# Patient Record
Sex: FEMALE | Race: WHITE | NOT HISPANIC OR LATINO | Employment: OTHER | RURAL
[De-identification: names, ages, dates, MRNs, and addresses within clinical notes are randomized per-mention and may not be internally consistent; named-entity substitution may affect disease eponyms.]

---

## 2017-03-03 ENCOUNTER — CONVERSION ENCOUNTER (OUTPATIENT)
Dept: FAMILY MEDICINE CLINIC | Facility: CLINIC | Age: 72
End: 2017-03-03

## 2017-03-03 LAB
ALBUMIN SERPL-MCNC: 4 G/DL (ref 3.6–5.1)
ALP SERPL-CCNC: 70 U/L (ref 33–130)
ALT SERPL-CCNC: 14 U/L (ref 6–29)
AST SERPL-CCNC: 14 U/L (ref 10–35)
BILIRUB SERPL-MCNC: 0.5 MG/DL (ref 0.2–1.2)
BUN SERPL-MCNC: 23 MG/DL (ref 7–25)
BUN/CREAT SERPL: 14 (CALC) (ref 6–22)
CALCIUM SERPL-MCNC: 9.3 MG/DL (ref 8.6–10.4)
CHLORIDE SERPL-SCNC: 102 MMOL/L (ref 98–110)
CHOLEST SERPL-MCNC: 160 MG/DL (ref 125–200)
CHOLEST/HDLC SERPL: 4.2 (CALC)
CONV CO2: 30 MMOL/L (ref 20–31)
CONV TOTAL PROTEIN: 6.9 G/DL (ref 6.1–8.1)
CREAT UR-MCNC: 1.59 MG/DL (ref 0.6–0.93)
GLOBULIN UR ELPH-MCNC: 2.9 MG/DL (ref 1.9–3.7)
GLUCOSE UR QL: 140 MG/DL (ref 65–99)
HDLC SERPL-MCNC: 38 MG/DL
INSULIN SERPL-ACNC: 1.4 (CALC) (ref 1–2.5)
LDLC SERPL CALC-MCNC: 102 MG/DL
NONHDLC SERPL-MCNC: 122 MG/DL
POTASSIUM SERPL-SCNC: 4.3 MMOL/L (ref 3.5–5.3)
SODIUM SERPL-SCNC: 142 MMOL/L (ref 135–146)
TRIGL SERPL-MCNC: 100 MG/DL

## 2017-11-03 ENCOUNTER — HOSPITAL ENCOUNTER (OUTPATIENT)
Dept: ULTRASOUND IMAGING | Facility: HOSPITAL | Age: 72
Discharge: HOME | End: 2017-11-03
Attending: INTERNAL MEDICINE | Admitting: INTERNAL MEDICINE

## 2017-11-03 LAB
ALBUMIN SERPL-MCNC: 3.6 G/DL (ref 3.5–4.8)
ALBUMIN/GLOB SERPL: 1.3 {RATIO} (ref 1–1.7)
ALP SERPL-CCNC: 67 IU/L (ref 32–91)
ALT SERPL-CCNC: 17 IU/L (ref 14–54)
ANION GAP SERPL CALC-SCNC: 12 MMOL/L (ref 10–20)
AST SERPL-CCNC: 16 IU/L (ref 15–41)
BASOPHILS # BLD AUTO: 0.1 10*3/UL (ref 0–0.2)
BASOPHILS NFR BLD AUTO: 1 % (ref 0–2)
BILIRUB SERPL-MCNC: 0.3 MG/DL (ref 0.3–1.2)
BILIRUB UR QL STRIP: NEGATIVE MG/DL
BUN SERPL-MCNC: 19 MG/DL (ref 8–20)
BUN/CREAT SERPL: 12.7 (ref 5.4–26.2)
CALCIUM SERPL-MCNC: 9.2 MG/DL (ref 8.9–10.3)
CASTS URNS QL MICRO: NORMAL /[LPF]
CHLORIDE SERPL-SCNC: 99 MMOL/L (ref 101–111)
COLOR UR: YELLOW
CONV BACTERIA IN URINE MICRO: NEGATIVE
CONV CLARITY OF URINE: CLEAR
CONV CO2: 31 MMOL/L (ref 22–32)
CONV HYALINE CASTS IN URINE MICRO: 2 /[LPF] (ref 0–5)
CONV PROTEIN IN URINE BY AUTOMATED TEST STRIP: NEGATIVE MG/DL
CONV SMALL ROUND CELLS: NORMAL /[HPF]
CONV TOTAL PROTEIN: 6.4 G/DL (ref 6.1–7.9)
CONV UROBILINOGEN IN URINE BY AUTOMATED TEST STRIP: 0.2 MG/DL
CREAT 24H UR-MCNC: 77.2 MG/DL
CREAT UR-MCNC: 1.5 MG/DL (ref 0.4–1)
CULTURE INDICATED?: NORMAL
DIFFERENTIAL METHOD BLD: (no result)
EOSINOPHIL # BLD AUTO: 0.1 10*3/UL (ref 0–0.3)
EOSINOPHIL # BLD AUTO: 1 % (ref 0–3)
ERYTHROCYTE [DISTWIDTH] IN BLOOD BY AUTOMATED COUNT: 13.5 % (ref 11.5–14.5)
GLOBULIN UR ELPH-MCNC: 2.8 G/DL (ref 2.5–3.8)
GLUCOSE SERPL-MCNC: 205 MG/DL (ref 65–99)
GLUCOSE UR QL: NEGATIVE MG/DL
HCT VFR BLD AUTO: 46.9 % (ref 35–49)
HGB BLD-MCNC: 15.8 G/DL (ref 12–15)
HGB UR QL STRIP: NEGATIVE
IRON SATN MFR SERPL: 12 % (ref 15–50)
IRON SERPL-MCNC: 44 UG/DL (ref 28–170)
KETONES UR QL STRIP: NEGATIVE MG/DL
LEUKOCYTE ESTERASE UR QL STRIP: NEGATIVE
LYMPHOCYTES # BLD AUTO: 2.5 10*3/UL (ref 0.8–4.8)
LYMPHOCYTES NFR BLD AUTO: 37 % (ref 18–42)
MCH RBC QN AUTO: 32.7 PG (ref 26–32)
MCHC RBC AUTO-ENTMCNC: 33.8 G/DL (ref 32–36)
MCV RBC AUTO: 96.8 FL (ref 80–94)
MONOCYTES # BLD AUTO: 0.6 10*3/UL (ref 0.1–1.3)
MONOCYTES NFR BLD AUTO: 8 % (ref 2–11)
NEUTROPHILS # BLD AUTO: 3.7 10*3/UL (ref 2.3–8.6)
NEUTROPHILS NFR BLD AUTO: 53 % (ref 50–75)
NITRITE UR QL STRIP: NEGATIVE
NRBC BLD AUTO-RTO: 0 /100{WBCS}
NRBC/RBC NFR BLD MANUAL: 0 10*3/UL
PH UR STRIP.AUTO: 7 [PH] (ref 4.5–8)
PHOSPHATE SERPL-MCNC: 3.2 MG/DL (ref 2.4–4.7)
PLATELET # BLD AUTO: 203 10*3/UL (ref 150–450)
PMV BLD AUTO: 8.4 FL (ref 7.4–10.4)
POTASSIUM SERPL-SCNC: 4 MMOL/L (ref 3.6–5.1)
PROT UR-MCNC: 7 MG/DL
PROT/CREAT UR: 0.1 MG/MG (ref 0–22)
RBC # BLD AUTO: 4.84 10*6/UL (ref 4–5.4)
RBC #/AREA URNS HPF: 2 /[HPF] (ref 0–3)
SODIUM SERPL-SCNC: 138 MMOL/L (ref 136–144)
SP GR UR: 1.01 (ref 1–1.03)
SPERM URNS QL MICRO: NORMAL /[HPF]
SQUAMOUS SPT QL MICRO: 2 /[HPF] (ref 0–5)
TIBC SERPL-MCNC: 365 UG/DL (ref 228–428)
TSH SERPL-ACNC: 1.43 UIU/ML (ref 0.34–5.6)
UNIDENT CRYS URNS QL MICRO: NORMAL /[HPF]
WBC # BLD AUTO: 6.9 10*3/UL (ref 4.5–11.5)
WBC #/AREA URNS HPF: 1 /[HPF] (ref 0–5)
YEAST SPEC QL WET PREP: NORMAL /[HPF]

## 2017-11-06 LAB — PTH-INTACT SERPL-MCNC: 65 PG/ML (ref 11–72)

## 2018-03-05 ENCOUNTER — OFFICE (AMBULATORY)
Dept: URBAN - METROPOLITAN AREA CLINIC 64 | Facility: CLINIC | Age: 73
End: 2018-03-05

## 2018-03-05 VITALS
SYSTOLIC BLOOD PRESSURE: 154 MMHG | DIASTOLIC BLOOD PRESSURE: 103 MMHG | HEART RATE: 79 BPM | HEIGHT: 65 IN | WEIGHT: 211 LBS

## 2018-03-05 DIAGNOSIS — R10.12 LEFT UPPER QUADRANT PAIN: ICD-10-CM

## 2018-03-05 DIAGNOSIS — K59.00 CONSTIPATION, UNSPECIFIED: ICD-10-CM

## 2018-03-05 PROCEDURE — 99213 OFFICE O/P EST LOW 20 MIN: CPT | Performed by: NURSE PRACTITIONER

## 2018-03-05 RX ORDER — LUBIPROSTONE 24 UG/1
48 CAPSULE, GELATIN COATED ORAL
Qty: 60 | Refills: 11 | Status: COMPLETED
Start: 2018-03-05 | End: 2018-06-29

## 2018-03-15 ENCOUNTER — HOSPITAL ENCOUNTER (OUTPATIENT)
Dept: GENERAL RADIOLOGY | Facility: HOSPITAL | Age: 73
Discharge: HOME | End: 2018-03-15
Attending: FAMILY MEDICINE | Admitting: FAMILY MEDICINE

## 2018-06-04 ENCOUNTER — OFFICE (AMBULATORY)
Dept: URBAN - METROPOLITAN AREA CLINIC 64 | Facility: CLINIC | Age: 73
End: 2018-06-04

## 2018-06-04 VITALS
HEIGHT: 65 IN | SYSTOLIC BLOOD PRESSURE: 162 MMHG | HEART RATE: 60 BPM | DIASTOLIC BLOOD PRESSURE: 93 MMHG | WEIGHT: 208 LBS

## 2018-06-04 DIAGNOSIS — R10.12 LEFT UPPER QUADRANT PAIN: ICD-10-CM

## 2018-06-04 DIAGNOSIS — K30 FUNCTIONAL DYSPEPSIA: ICD-10-CM

## 2018-06-04 DIAGNOSIS — K59.00 CONSTIPATION, UNSPECIFIED: ICD-10-CM

## 2018-06-04 PROCEDURE — 99214 OFFICE O/P EST MOD 30 MIN: CPT | Performed by: INTERNAL MEDICINE

## 2018-06-04 RX ORDER — OMEPRAZOLE 40 MG/1
CAPSULE, DELAYED RELEASE ORAL
Qty: 30 | Refills: 3 | Status: COMPLETED
End: 2020-01-30

## 2018-07-02 ENCOUNTER — OFFICE (AMBULATORY)
Dept: URBAN - METROPOLITAN AREA PATHOLOGY 4 | Facility: PATHOLOGY | Age: 73
End: 2018-07-02

## 2018-07-02 ENCOUNTER — ON CAMPUS - OUTPATIENT (AMBULATORY)
Dept: URBAN - METROPOLITAN AREA HOSPITAL 2 | Facility: HOSPITAL | Age: 73
End: 2018-07-02

## 2018-07-02 VITALS
DIASTOLIC BLOOD PRESSURE: 63 MMHG | DIASTOLIC BLOOD PRESSURE: 75 MMHG | SYSTOLIC BLOOD PRESSURE: 195 MMHG | OXYGEN SATURATION: 94 % | DIASTOLIC BLOOD PRESSURE: 57 MMHG | SYSTOLIC BLOOD PRESSURE: 117 MMHG | DIASTOLIC BLOOD PRESSURE: 83 MMHG | OXYGEN SATURATION: 93 % | OXYGEN SATURATION: 96 % | HEART RATE: 82 BPM | HEART RATE: 59 BPM | DIASTOLIC BLOOD PRESSURE: 67 MMHG | HEART RATE: 85 BPM | HEART RATE: 77 BPM | DIASTOLIC BLOOD PRESSURE: 74 MMHG | OXYGEN SATURATION: 95 % | HEART RATE: 87 BPM | SYSTOLIC BLOOD PRESSURE: 121 MMHG | SYSTOLIC BLOOD PRESSURE: 134 MMHG | WEIGHT: 202 LBS | SYSTOLIC BLOOD PRESSURE: 141 MMHG | RESPIRATION RATE: 19 BRPM | HEART RATE: 66 BPM | HEIGHT: 65 IN | DIASTOLIC BLOOD PRESSURE: 64 MMHG | SYSTOLIC BLOOD PRESSURE: 124 MMHG | RESPIRATION RATE: 14 BRPM | DIASTOLIC BLOOD PRESSURE: 81 MMHG | SYSTOLIC BLOOD PRESSURE: 138 MMHG | HEART RATE: 79 BPM | DIASTOLIC BLOOD PRESSURE: 70 MMHG | RESPIRATION RATE: 20 BRPM | RESPIRATION RATE: 18 BRPM | SYSTOLIC BLOOD PRESSURE: 148 MMHG | OXYGEN SATURATION: 92 % | RESPIRATION RATE: 10 BRPM | SYSTOLIC BLOOD PRESSURE: 115 MMHG | SYSTOLIC BLOOD PRESSURE: 129 MMHG | DIASTOLIC BLOOD PRESSURE: 79 MMHG | RESPIRATION RATE: 16 BRPM | HEART RATE: 83 BPM | DIASTOLIC BLOOD PRESSURE: 65 MMHG | TEMPERATURE: 97.8 F

## 2018-07-02 DIAGNOSIS — D12.6 BENIGN NEOPLASM OF COLON, UNSPECIFIED: ICD-10-CM

## 2018-07-02 DIAGNOSIS — K64.1 SECOND DEGREE HEMORRHOIDS: ICD-10-CM

## 2018-07-02 DIAGNOSIS — K63.5 POLYP OF COLON: ICD-10-CM

## 2018-07-02 DIAGNOSIS — D12.2 BENIGN NEOPLASM OF ASCENDING COLON: ICD-10-CM

## 2018-07-02 DIAGNOSIS — Z98.890 OTHER SPECIFIED POSTPROCEDURAL STATES: ICD-10-CM

## 2018-07-02 DIAGNOSIS — K22.2 ESOPHAGEAL OBSTRUCTION: ICD-10-CM

## 2018-07-02 DIAGNOSIS — R10.32 LEFT LOWER QUADRANT PAIN: ICD-10-CM

## 2018-07-02 DIAGNOSIS — D12.0 BENIGN NEOPLASM OF CECUM: ICD-10-CM

## 2018-07-02 DIAGNOSIS — R13.10 DYSPHAGIA, UNSPECIFIED: ICD-10-CM

## 2018-07-02 PROBLEM — D12.3 BENIGN NEOPLASM OF TRANSVERSE COLON: Status: ACTIVE | Noted: 2018-07-02

## 2018-07-02 LAB
GI HISTOLOGY: A. UNSPECIFIED: (no result)
GI HISTOLOGY: B. UNSPECIFIED: (no result)
GI HISTOLOGY: C. UNSPECIFIED: (no result)
GI HISTOLOGY: D. SELECT: (no result)
GI HISTOLOGY: PDF REPORT: (no result)

## 2018-07-02 PROCEDURE — 43249 ESOPH EGD DILATION <30 MM: CPT | Performed by: INTERNAL MEDICINE

## 2018-07-02 PROCEDURE — 45385 COLONOSCOPY W/LESION REMOVAL: CPT | Performed by: INTERNAL MEDICINE

## 2018-07-02 PROCEDURE — 88305 TISSUE EXAM BY PATHOLOGIST: CPT | Performed by: INTERNAL MEDICINE

## 2018-07-02 RX ADMIN — PROPOFOL: 10 INJECTION, EMULSION INTRAVENOUS at 13:40

## 2018-11-08 ENCOUNTER — CONVERSION ENCOUNTER (OUTPATIENT)
Dept: FAMILY MEDICINE CLINIC | Facility: CLINIC | Age: 73
End: 2018-11-08

## 2018-11-09 LAB
ALBUMIN SERPL-MCNC: 3.8 G/DL (ref 3.6–5.1)
ALP SERPL-CCNC: 77 U/L (ref 33–130)
ALT SERPL-CCNC: 9 U/L (ref 6–29)
AST SERPL-CCNC: 12 U/L (ref 10–35)
BASOPHILS # BLD AUTO: 47 CELLS/UL (ref 0–200)
BASOPHILS NFR BLD AUTO: 0.6 %
BILIRUB SERPL-MCNC: 0.4 MG/DL (ref 0.2–1.2)
BUN SERPL-MCNC: 21 MG/DL (ref 7–25)
BUN/CREAT SERPL: 14 (CALC) (ref 6–22)
CALCIUM SERPL-MCNC: 9.1 MG/DL (ref 8.6–10.4)
CHLORIDE SERPL-SCNC: 105 MMOL/L (ref 98–110)
CHOLEST SERPL-MCNC: 83 MG/DL
CHOLEST/HDLC SERPL: 2.2 (CALC)
CONV CO2: 30 MMOL/L (ref 20–32)
CONV TOTAL PROTEIN: 6.4 G/DL (ref 6.1–8.1)
CREAT UR-MCNC: 1.45 MG/DL (ref 0.6–0.93)
EOSINOPHIL # BLD AUTO: 1.3 %
EOSINOPHIL # BLD AUTO: 103 CELLS/UL (ref 15–500)
ERYTHROCYTE [DISTWIDTH] IN BLOOD BY AUTOMATED COUNT: 12.2 % (ref 11–15)
GLOBULIN UR ELPH-MCNC: 2.6 MG/DL (ref 1.9–3.7)
GLUCOSE UR QL: 130 MG/DL (ref 65–99)
HCT VFR BLD AUTO: 46.5 % (ref 35–45)
HDLC SERPL-MCNC: 38 MG/DL
HGB BLD-MCNC: 15.9 G/DL (ref 11.7–15.5)
INSULIN SERPL-ACNC: 1.5 (CALC) (ref 1–2.5)
LDLC SERPL CALC-MCNC: 26 MG/DL
LYMPHOCYTES # BLD AUTO: 2544 CELLS/UL (ref 850–3900)
LYMPHOCYTES NFR BLD AUTO: 32.2 %
MCH RBC QN AUTO: 31.9 PG (ref 27–33)
MCHC RBC AUTO-ENTMCNC: 34.2 G/DL (ref 32–36)
MCV RBC AUTO: 93.4 FL (ref 80–100)
MONOCYTES # BLD AUTO: 624 CELLS/UL (ref 200–950)
MONOCYTES NFR BLD AUTO: 7.9 %
NEUTROPHILS # BLD AUTO: 4582 CELLS/UL (ref 1500–7800)
NEUTROPHILS NFR BLD AUTO: 58 %
NONHDLC SERPL-MCNC: 45 MG/DL
PLATELET # BLD AUTO: 231 10*3/UL (ref 140–400)
PMV BLD AUTO: 10.6 FL (ref 7.5–12.5)
POTASSIUM SERPL-SCNC: 4.5 MMOL/L (ref 3.5–5.3)
RBC # BLD AUTO: 4.98 MILLION/UL (ref 3.8–5.1)
SODIUM SERPL-SCNC: 142 MMOL/L (ref 135–146)
TRIGL SERPL-MCNC: 100 MG/DL
URATE SERPL-MCNC: 8.5 MG/DL (ref 2.5–7)
WBC # BLD AUTO: 7.9 10*3/UL (ref 3.8–10.8)

## 2019-05-03 ENCOUNTER — HOSPITAL ENCOUNTER (OUTPATIENT)
Dept: CARDIOLOGY | Facility: HOSPITAL | Age: 74
Discharge: HOME | End: 2019-05-03
Attending: INTERNAL MEDICINE | Admitting: INTERNAL MEDICINE

## 2019-07-05 ENCOUNTER — APPOINTMENT (OUTPATIENT)
Dept: CT IMAGING | Facility: HOSPITAL | Age: 74
End: 2019-07-05

## 2019-07-05 ENCOUNTER — HOSPITAL ENCOUNTER (EMERGENCY)
Facility: HOSPITAL | Age: 74
Discharge: HOME | End: 2019-07-05
Attending: EMERGENCY MEDICINE | Admitting: EMERGENCY MEDICINE

## 2019-07-05 VITALS
HEIGHT: 65 IN | SYSTOLIC BLOOD PRESSURE: 124 MMHG | DIASTOLIC BLOOD PRESSURE: 68 MMHG | HEART RATE: 68 BPM | TEMPERATURE: 98 F | RESPIRATION RATE: 18 BRPM | BODY MASS INDEX: 32.18 KG/M2 | OXYGEN SATURATION: 97 % | WEIGHT: 193.12 LBS

## 2019-07-05 DIAGNOSIS — R10.9 ABDOMINAL PAIN, UNSPECIFIED ABDOMINAL LOCATION: Primary | ICD-10-CM

## 2019-07-05 LAB
ALBUMIN SERPL-MCNC: 3.8 G/DL (ref 3.5–4.8)
ALBUMIN/GLOB SERPL: 1.3 G/DL (ref 1–1.7)
ALP SERPL-CCNC: 61 U/L (ref 32–91)
ALT SERPL W P-5'-P-CCNC: 15 U/L (ref 14–54)
ANION GAP SERPL CALCULATED.3IONS-SCNC: 18.7 MMOL/L (ref 10–20)
AST SERPL-CCNC: 23 U/L (ref 15–41)
BASOPHILS # BLD AUTO: 0.1 10*3/MM3 (ref 0–0.2)
BASOPHILS NFR BLD AUTO: 0.8 % (ref 0–1.5)
BILIRUB SERPL-MCNC: 0.9 MG/DL (ref 0.3–1.2)
BILIRUB UR QL STRIP: NEGATIVE
BUN BLD-MCNC: 17 MG/DL (ref 8–20)
BUN/CREAT SERPL: 12.1 (ref 5.4–26.2)
CALCIUM SPEC-SCNC: 9.1 MG/DL (ref 8.9–10.3)
CHLORIDE SERPL-SCNC: 107 MMOL/L (ref 101–111)
CLARITY UR: CLEAR
CO2 SERPL-SCNC: 19 MMOL/L (ref 22–32)
COLOR UR: YELLOW
CREAT BLD-MCNC: 1.4 MG/DL (ref 0.4–1)
DEPRECATED RDW RBC AUTO: 46.8 FL (ref 37–54)
EOSINOPHIL # BLD AUTO: 0.1 10*3/MM3 (ref 0–0.4)
EOSINOPHIL NFR BLD AUTO: 0.9 % (ref 0.3–6.2)
ERYTHROCYTE [DISTWIDTH] IN BLOOD BY AUTOMATED COUNT: 13.6 % (ref 12.3–15.4)
GFR SERPL CREATININE-BSD FRML MDRD: 37 ML/MIN/1.73
GLOBULIN UR ELPH-MCNC: 2.9 GM/DL (ref 2.5–3.8)
GLUCOSE BLD-MCNC: 105 MG/DL (ref 65–99)
GLUCOSE UR STRIP-MCNC: NEGATIVE MG/DL
HCT VFR BLD AUTO: 51.9 % (ref 34–46.6)
HGB BLD-MCNC: 17.3 G/DL (ref 12–15.9)
HGB UR QL STRIP.AUTO: NEGATIVE
KETONES UR QL STRIP: NEGATIVE
LEUKOCYTE ESTERASE UR QL STRIP.AUTO: NEGATIVE
LIPASE SERPL-CCNC: 34 U/L (ref 22–51)
LYMPHOCYTES # BLD AUTO: 2.8 10*3/MM3 (ref 0.7–3.1)
LYMPHOCYTES NFR BLD AUTO: 33.7 % (ref 19.6–45.3)
MCH RBC QN AUTO: 32.7 PG (ref 26.6–33)
MCHC RBC AUTO-ENTMCNC: 33.2 G/DL (ref 31.5–35.7)
MCV RBC AUTO: 98.4 FL (ref 79–97)
MONOCYTES # BLD AUTO: 0.7 10*3/MM3 (ref 0.1–0.9)
MONOCYTES NFR BLD AUTO: 8.1 % (ref 5–12)
NEUTROPHILS # BLD AUTO: 4.7 10*3/MM3 (ref 1.7–7)
NEUTROPHILS NFR BLD AUTO: 56.5 % (ref 42.7–76)
NITRITE UR QL STRIP: NEGATIVE
NRBC BLD AUTO-RTO: 0.1 /100 WBC (ref 0–0.2)
PH UR STRIP.AUTO: 5.5 [PH] (ref 5–8)
PLATELET # BLD AUTO: 174 10*3/MM3 (ref 140–450)
PMV BLD AUTO: 8.7 FL (ref 6–12)
POTASSIUM BLD-SCNC: 4.7 MMOL/L (ref 3.6–5.1)
PROT SERPL-MCNC: 6.7 G/DL (ref 6.1–7.9)
PROT UR QL STRIP: NEGATIVE
RBC # BLD AUTO: 5.28 10*6/MM3 (ref 3.77–5.28)
SODIUM BLD-SCNC: 140 MMOL/L (ref 136–144)
SP GR UR STRIP: 1.02 (ref 1–1.03)
UROBILINOGEN UR QL STRIP: NORMAL
WBC NRBC COR # BLD: 8.3 10*3/MM3 (ref 3.4–10.8)

## 2019-07-05 PROCEDURE — 74176 CT ABD & PELVIS W/O CONTRAST: CPT

## 2019-07-05 PROCEDURE — 85025 COMPLETE CBC W/AUTO DIFF WBC: CPT | Performed by: EMERGENCY MEDICINE

## 2019-07-05 PROCEDURE — 83690 ASSAY OF LIPASE: CPT | Performed by: EMERGENCY MEDICINE

## 2019-07-05 PROCEDURE — 80053 COMPREHEN METABOLIC PANEL: CPT | Performed by: EMERGENCY MEDICINE

## 2019-07-05 PROCEDURE — 81003 URINALYSIS AUTO W/O SCOPE: CPT | Performed by: EMERGENCY MEDICINE

## 2019-07-05 PROCEDURE — 99283 EMERGENCY DEPT VISIT LOW MDM: CPT

## 2019-07-05 RX ORDER — SODIUM CHLORIDE 0.9 % (FLUSH) 0.9 %
10 SYRINGE (ML) INJECTION AS NEEDED
Status: DISCONTINUED | OUTPATIENT
Start: 2019-07-05 | End: 2019-07-05 | Stop reason: HOSPADM

## 2019-07-05 RX ORDER — ONDANSETRON 2 MG/ML
4 INJECTION INTRAMUSCULAR; INTRAVENOUS ONCE
Status: DISCONTINUED | OUTPATIENT
Start: 2019-07-05 | End: 2019-07-05 | Stop reason: HOSPADM

## 2019-07-05 RX ORDER — MORPHINE SULFATE 4 MG/ML
4 INJECTION, SOLUTION INTRAMUSCULAR; INTRAVENOUS ONCE
Status: DISCONTINUED | OUTPATIENT
Start: 2019-07-05 | End: 2019-07-05 | Stop reason: HOSPADM

## 2019-07-05 RX ADMIN — SODIUM CHLORIDE 1000 ML: 900 INJECTION, SOLUTION INTRAVENOUS at 13:25

## 2019-07-05 NOTE — ED PROVIDER NOTES
Subjective   Chief complaint abdominal pain    History of present illness 73-year-old female who complains of one-week history of some right-sided abdominal pain which she is discharged is crampy in nature nonradiating.  Nausea decreased appetite but no vomiting.  No diarrhea no black or bloody stool.  No injury no foreign travels antibiotic use or recent hospitalization.  Nothing makes better worse whenever she tries to eat or drink anything.  Moderate degree.  No urinary symptoms.  Patient's had this off and on for quite some time.  She has been a few yards with negative work-up she is gone to gastrointestinal doctors who have not found anything wrong.  Patient had her gallbladder removed.  She has upcoming GI appointment.            Review of Systems   Constitutional: Negative for chills and fever.   HENT: Negative for congestion and sinus pain.    Eyes: Negative for photophobia and visual disturbance.   Respiratory: Negative for chest tightness and shortness of breath.    Cardiovascular: Negative for chest pain and leg swelling.   Gastrointestinal: Positive for nausea. Negative for abdominal pain and vomiting.   Endocrine: Negative for cold intolerance and heat intolerance.   Genitourinary: Negative for difficulty urinating and dysuria.   Musculoskeletal: Negative for arthralgias and back pain.   Skin: Negative for pallor and rash.   Neurological: Negative for headaches.   Psychiatric/Behavioral: Negative for agitation and behavioral problems.       No past medical history on file.  Cholecystectomy.  Allergies not on file    No past surgical history on file.    No family history on file.    Social History     Socioeconomic History   • Marital status:      Spouse name: Not on file   • Number of children: Not on file   • Years of education: Not on file   • Highest education level: Not on file           Objective   Physical Exam  73-year-old awake alert nontoxic remains well-appearing HEENT extraocular  muscles intact pupils equal reactive sclerae clear mouth is clear moist neck is supple no adenopathy lungs clear no retraction no CVA tenderness heart regular without murmur abdomen soft nontender nondistended good bowel sounds no masses extremities no edema cords or Homans sign is no evidence of DVT no cellulitic changes.  Patient's awake alert follows commandsextremities normal speech no focal weakness.  Procedures        Prior to Admission medications    Not on File           ED Course      Results for orders placed or performed during the hospital encounter of 07/05/19   Comprehensive Metabolic Panel   Result Value Ref Range    Glucose 105 (H) 65 - 99 mg/dL    BUN 17 8 - 20 mg/dL    Creatinine 1.40 (H) 0.40 - 1.00 mg/dL    Sodium 140 136 - 144 mmol/L    Potassium 4.7 3.6 - 5.1 mmol/L    Chloride 107 101 - 111 mmol/L    CO2 19.0 (L) 22.0 - 32.0 mmol/L    Calcium 9.1 8.9 - 10.3 mg/dL    Total Protein 6.7 6.1 - 7.9 g/dL    Albumin 3.80 3.50 - 4.80 g/dL    ALT (SGPT) 15 14 - 54 U/L    AST (SGOT) 23 15 - 41 U/L    Alkaline Phosphatase 61 32 - 91 U/L    Total Bilirubin 0.9 0.3 - 1.2 mg/dL    eGFR Non African Amer 37 (L) >60 mL/min/1.73    Globulin 2.9 2.5 - 3.8 gm/dL    A/G Ratio 1.3 1.0 - 1.7 g/dL    BUN/Creatinine Ratio 12.1 5.4 - 26.2    Anion Gap 18.7 10.0 - 20.0 mmol/L   Lipase   Result Value Ref Range    Lipase 34 22 - 51 U/L   Urinalysis With Culture If Indicated - Urine, Clean Catch   Result Value Ref Range    Color, UA Yellow Yellow, Straw    Appearance, UA Clear Clear    pH, UA 5.5 5.0 - 8.0    Specific Gravity, UA 1.017 1.005 - 1.030    Glucose, UA Negative Negative    Ketones, UA Negative Negative    Bilirubin, UA Negative Negative    Blood, UA Negative Negative    Protein, UA Negative Negative    Leuk Esterase, UA Negative Negative    Nitrite, UA Negative Negative    Urobilinogen, UA 0.2 E.U./dL 0.2 - 1.0 E.U./dL   CBC Auto Differential   Result Value Ref Range    WBC 8.30 3.40 - 10.80 10*3/mm3    RBC  5.28 3.77 - 5.28 10*6/mm3    Hemoglobin 17.3 (H) 12.0 - 15.9 g/dL    Hematocrit 51.9 (H) 34.0 - 46.6 %    MCV 98.4 (H) 79.0 - 97.0 fL    MCH 32.7 26.6 - 33.0 pg    MCHC 33.2 31.5 - 35.7 g/dL    RDW 13.6 12.3 - 15.4 %    RDW-SD 46.8 37.0 - 54.0 fl    MPV 8.7 6.0 - 12.0 fL    Platelets 174 140 - 450 10*3/mm3    Neutrophil % 56.5 42.7 - 76.0 %    Lymphocyte % 33.7 19.6 - 45.3 %    Monocyte % 8.1 5.0 - 12.0 %    Eosinophil % 0.9 0.3 - 6.2 %    Basophil % 0.8 0.0 - 1.5 %    Neutrophils, Absolute 4.70 1.70 - 7.00 10*3/mm3    Lymphocytes, Absolute 2.80 0.70 - 3.10 10*3/mm3    Monocytes, Absolute 0.70 0.10 - 0.90 10*3/mm3    Eosinophils, Absolute 0.10 0.00 - 0.40 10*3/mm3    Basophils, Absolute 0.10 0.00 - 0.20 10*3/mm3    nRBC 0.1 0.0 - 0.2 /100 WBC     Ct Abdomen Pelvis Without Contrast    Result Date: 7/5/2019  No acute findings in the abdomen or pelvis. No CT explanation for the patient's abdominal pain. The appendix is normal.  Surgical changes in the abdomen as described above.  Electronically Signed By-Dr. Kathy Finley MD On:7/5/2019 2:39 PM This report was finalized on [id number] by Dr. Kathy Finley MD.    Medications   sodium chloride 0.9 % flush 10 mL (not administered)   Morphine sulfate (PF) injection 4 mg (4 mg Intravenous Not Given 7/5/19 1326)   ondansetron (ZOFRAN) injection 4 mg (4 mg Intravenous Not Given 7/5/19 1326)   sodium chloride 0.9 % bolus 1,000 mL (1,000 mL Intravenous New Bag 7/5/19 1325)                   MDM  Number of Diagnoses or Management Options  Abdominal pain, unspecified abdominal location:   Diagnosis management comments: Medical decision making.  Patient IV normal saline x1 L given morphine IV and Zofran IV and had the above exam and evaluation.  The patient CBC unremarkable hemoglobin was 17 chemistry his creatinine 1.4.  Pancreas liver enzymes unremarkable.  T scan abdomen pelvis status post nephrectomy but no other acute findings.  The patient on repeat examination at 4:30  PM was feeling better resting comfortably.  Head was soft without tenderness no peritoneal findings or masses.  Patient's had off-and-on problems for quite some time.  Etiology of this discomfort and pain is not quite clear today.  Which is stable she was made aware of the findings and she will be discharged home for outpatient management.  We talked about what to return for       Amount and/or Complexity of Data Reviewed  Clinical lab tests: reviewed          Final diagnoses:   Abdominal pain, unspecified abdominal location            Fabio Cochran MD  07/05/19 8423

## 2019-07-05 NOTE — DISCHARGE INSTRUCTIONS
Follow-up primary care doctor next week.  Follow-up gastrointestinal doctor next week.  [place] diet no greasy fried fatty spicy foods or caffeine.  Return for fever vomiting blood black or bloody stool or any other new response or concerns.  Blood pressure elevated please have this rechecked by primary care doctor within the next 4 weeks.

## 2019-07-11 ENCOUNTER — OFFICE VISIT (OUTPATIENT)
Dept: FAMILY MEDICINE CLINIC | Facility: CLINIC | Age: 74
End: 2019-07-11

## 2019-07-11 ENCOUNTER — OFFICE (AMBULATORY)
Dept: URBAN - METROPOLITAN AREA CLINIC 64 | Facility: CLINIC | Age: 74
End: 2019-07-11

## 2019-07-11 VITALS
HEIGHT: 65 IN | OXYGEN SATURATION: 96 % | HEART RATE: 76 BPM | RESPIRATION RATE: 16 BRPM | WEIGHT: 198.8 LBS | BODY MASS INDEX: 33.12 KG/M2 | TEMPERATURE: 98.1 F | DIASTOLIC BLOOD PRESSURE: 78 MMHG | SYSTOLIC BLOOD PRESSURE: 128 MMHG

## 2019-07-11 VITALS
DIASTOLIC BLOOD PRESSURE: 102 MMHG | SYSTOLIC BLOOD PRESSURE: 172 MMHG | HEIGHT: 65 IN | WEIGHT: 196 LBS | HEART RATE: 62 BPM

## 2019-07-11 DIAGNOSIS — R10.12 LEFT UPPER QUADRANT PAIN: Primary | ICD-10-CM

## 2019-07-11 DIAGNOSIS — I25.10 CORONARY ARTERY DISEASE INVOLVING NATIVE CORONARY ARTERY OF NATIVE HEART WITHOUT ANGINA PECTORIS: ICD-10-CM

## 2019-07-11 DIAGNOSIS — L72.0 EPIDERMAL INCLUSION CYST: ICD-10-CM

## 2019-07-11 DIAGNOSIS — R14.0 ABDOMINAL DISTENSION (GASEOUS): ICD-10-CM

## 2019-07-11 DIAGNOSIS — R10.11 RIGHT UPPER QUADRANT PAIN: ICD-10-CM

## 2019-07-11 PROBLEM — C66.1 MALIGNANT NEOPLASM OF RIGHT URETER: Status: ACTIVE | Noted: 2017-10-30

## 2019-07-11 PROBLEM — E11.9 TYPE 2 DIABETES MELLITUS WITHOUT COMPLICATION: Status: ACTIVE | Noted: 2017-10-24

## 2019-07-11 PROBLEM — I70.213 ATHEROSCLEROSIS OF NATIVE ARTERIES OF EXTREMITIES WITH INTERMITTENT CLAUDICATION, BILATERAL LEGS (HCC): Status: ACTIVE | Noted: 2018-08-01

## 2019-07-11 PROBLEM — J44.9 CHRONIC OBSTRUCTIVE PULMONARY DISEASE: Status: ACTIVE | Noted: 2017-10-24

## 2019-07-11 PROBLEM — K58.9 IRRITABLE BOWEL SYNDROME WITHOUT DIARRHEA: Status: ACTIVE | Noted: 2018-03-22

## 2019-07-11 PROBLEM — I10 HYPERTENSION: Status: ACTIVE | Noted: 2017-10-24

## 2019-07-11 PROBLEM — E78.5 HYPERLIPIDEMIA: Status: ACTIVE | Noted: 2017-10-24

## 2019-07-11 PROBLEM — N18.30 CHRONIC KIDNEY DISEASE, STAGE 3 (MODERATE): Status: ACTIVE | Noted: 2017-10-30

## 2019-07-11 LAB
BILIRUB BLD-MCNC: NEGATIVE MG/DL
CLARITY, POC: CLEAR
COLOR UR: YELLOW
GLUCOSE UR STRIP-MCNC: NEGATIVE MG/DL
KETONES UR QL: NEGATIVE
LEUKOCYTE EST, POC: NEGATIVE
NITRITE UR-MCNC: NEGATIVE MG/ML
PH UR: 5 [PH] (ref 5–8)
PROT UR STRIP-MCNC: NEGATIVE MG/DL
RBC # UR STRIP: NEGATIVE /UL
SP GR UR: 1.01 (ref 1–1.03)
UROBILINOGEN UR QL: NORMAL

## 2019-07-11 PROCEDURE — 99214 OFFICE O/P EST MOD 30 MIN: CPT | Performed by: NURSE PRACTITIONER

## 2019-07-11 PROCEDURE — 81003 URINALYSIS AUTO W/O SCOPE: CPT | Performed by: FAMILY MEDICINE

## 2019-07-11 PROCEDURE — 99214 OFFICE O/P EST MOD 30 MIN: CPT | Performed by: FAMILY MEDICINE

## 2019-07-11 RX ORDER — RIFAXIMIN 550 MG/1
TABLET ORAL
Qty: 42 | Refills: 2 | Status: COMPLETED
Start: 2019-07-11 | End: 2020-01-14

## 2019-07-11 RX ORDER — CARVEDILOL 12.5 MG/1
TABLET ORAL
COMMUNITY
Start: 2018-08-01 | End: 2019-12-02 | Stop reason: SDUPTHER

## 2019-07-11 RX ORDER — UBIDECARENONE 100 MG
1 CAPSULE ORAL DAILY
COMMUNITY
Start: 2018-08-01 | End: 2023-01-23

## 2019-07-11 RX ORDER — INSULIN GLARGINE 100 [IU]/ML
INJECTION, SOLUTION SUBCUTANEOUS DAILY
COMMUNITY
End: 2020-05-01

## 2019-07-11 RX ORDER — SERTRALINE HYDROCHLORIDE 25 MG/1
25 TABLET, FILM COATED ORAL DAILY
COMMUNITY
End: 2020-05-01 | Stop reason: SDUPTHER

## 2019-07-11 RX ORDER — ROSUVASTATIN CALCIUM 20 MG/1
20 TABLET, COATED ORAL DAILY
COMMUNITY
End: 2020-12-03 | Stop reason: SDUPTHER

## 2019-07-11 RX ORDER — NICOTINE 21 MG/24HR
1 PATCH, TRANSDERMAL 24 HOURS TRANSDERMAL EVERY 24 HOURS
COMMUNITY
End: 2020-12-03

## 2019-07-11 RX ORDER — HYDROCHLOROTHIAZIDE 25 MG/1
25 TABLET ORAL DAILY
COMMUNITY
End: 2019-12-19 | Stop reason: SDUPTHER

## 2019-07-11 NOTE — PROGRESS NOTES
Rooming Tab(CC,VS,Pt Hx,Fall Screen)  Chief Complaint   Patient presents with   • Abdominal Pain   • Transitional Care Management       Subjective   Vianey Reeves is a 73 y.o. female.     Abdominal Pain   This is a recurrent problem. The current episode started more than 1 year ago (current pain is 3 weeks). The problem occurs intermittently. The most recent episode lasted 4 weeks. The problem has been gradually improving. The pain is located in the LUQ (radiates to the right). The quality of the pain is cramping, aching and a sensation of fullness (feels like spasms). The abdominal pain radiates to the RUQ and back. Associated symptoms include flatus. Pertinent negatives include no arthralgias, constipation, diarrhea, fever, frequency, nausea or vomiting. Associated symptoms comments: flactulance. The pain is aggravated by eating. The pain is relieved by belching and passing flatus. Prior diagnostic workup includes GI consult (She is seeing GI).   Coronary Artery Disease   Presents for follow-up visit. Pertinent negatives include no chest pain, chest pressure, chest tightness or shortness of breath. The symptoms have been stable. Compliance with diet is variable. Compliance with exercise is variable. Compliance with medications is good.   She had nuclear stress test with Dr Gonzalez last month and it was negative.      The following portions of the patient's history were reviewed and updated as appropriate: allergies, current medications, past family history, past medical history, past social history, past surgical history and problem list.    Patient Care Team:  Emma Hammer MD as PCP - General  Emma Hammer MD as PCP - Family Medicine    Problem List Tab  Medications Tab  Synopsis Tab  Chart Review Tab  Care Everywhere Tab  Immunizations Tab  Patient History Tab    Social History     Tobacco Use   • Smoking status: Current Every Day Smoker     Types: Cigarettes   • Smokeless tobacco: Never Used  "  Substance Use Topics   • Alcohol use: No     Frequency: Never       Review of Systems   Constitutional: Negative for activity change and fever.   HENT: Negative for ear pain, rhinorrhea, sinus pressure and voice change.    Eyes: Negative for visual disturbance.   Respiratory: Negative for cough, chest tightness and shortness of breath.    Cardiovascular: Negative for chest pain.   Gastrointestinal: Positive for abdominal pain and flatus. Negative for constipation, diarrhea, nausea and vomiting.   Endocrine: Negative for cold intolerance and heat intolerance.   Genitourinary: Negative for frequency and urgency.   Musculoskeletal: Negative for arthralgias.   Skin: Negative for rash.   Neurological: Negative for syncope.   Hematological: Does not bruise/bleed easily.   Psychiatric/Behavioral: Negative for depressed mood. The patient is not nervous/anxious.        Objective     Rooming Tab(CC,VS,Pt Hx,Fall Screen)  /78   Pulse 76   Temp 98.1 °F (36.7 °C)   Resp 16   Ht 165.1 cm (65\")   Wt 90.2 kg (198 lb 12.8 oz)   SpO2 96%   BMI 33.08 kg/m²     Body mass index is 33.08 kg/m².    Physical Exam   Constitutional: She is oriented to person, place, and time. She appears well-developed and well-nourished. She is cooperative.   Cardiovascular: Normal rate, regular rhythm and normal heart sounds. Exam reveals no gallop and no friction rub.   No murmur heard.  Pulmonary/Chest: Effort normal and breath sounds normal. She has no wheezes. She has no rales.   Abdominal: Soft. Bowel sounds are normal. She exhibits no distension and no mass. There is tenderness. There is no rebound and no guarding.   LUQ pain   Neurological: She is alert and oriented to person, place, and time. Coordination normal.   Skin: Skin is warm and dry.        Psychiatric: She has a normal mood and affect.   Vitals reviewed.        Ct Abdomen Pelvis Without Contrast    Result Date: 7/5/2019  Impression: No acute findings in the abdomen or " pelvis. No CT explanation for the patient's abdominal pain. The appendix is normal.  Surgical changes in the abdomen as described above.  Electronically Signed By-Dr. Kathy Finley MD On:7/5/2019 2:39 PM This report was finalized on [id number] by Dr. Kathy Finley MD.            Lab Results   Component Value Date    BUN 17 07/05/2019    CREATININE 1.40 (H) 07/05/2019    EGFRIFNONA 37 (L) 07/05/2019     07/05/2019    K 4.7 07/05/2019     07/05/2019    CALCIUM 9.1 07/05/2019    ALBUMIN 3.80 07/05/2019    BILITOT 0.9 07/05/2019    ALKPHOS 61 07/05/2019    AST 23 07/05/2019    ALT 15 07/05/2019    WBC 8.30 07/05/2019    RBC 5.28 07/05/2019    HCT 51.9 (H) 07/05/2019    MCV 98.4 (H) 07/05/2019    MCH 32.7 07/05/2019      Assessment/Plan   Order Review Tab  Health Maintenance Tab  Patient Plan/Order Tab    Problem List Items Addressed This Visit        Cardiovascular and Mediastinum    Coronary artery disease    Relevant Medications    carvedilol (COREG) 12.5 MG tablet      Other Visit Diagnoses     Left upper quadrant pain    -  Primary    She is going to see GI today. She has hx of hiatal hernia    Relevant Orders    POC Urinalysis Dipstick, Automated (Completed)    Epidermal inclusion cyst        right upper arm post, healing          Wrapup Tab  Return if symptoms worsen or fail to improve.

## 2019-12-02 ENCOUNTER — TELEPHONE (OUTPATIENT)
Dept: FAMILY MEDICINE CLINIC | Facility: CLINIC | Age: 74
End: 2019-12-02

## 2019-12-02 DIAGNOSIS — I11.9 HYPERTENSIVE HEART DISEASE, UNSPECIFIED WHETHER HEART FAILURE PRESENT: Primary | ICD-10-CM

## 2019-12-02 RX ORDER — CARVEDILOL 12.5 MG/1
12.5 TABLET ORAL 2 TIMES DAILY
Qty: 30 TABLET | Refills: 12 | Status: SHIPPED | OUTPATIENT
Start: 2019-12-02 | End: 2019-12-19 | Stop reason: SDUPTHER

## 2019-12-19 ENCOUNTER — OFFICE VISIT (OUTPATIENT)
Dept: FAMILY MEDICINE CLINIC | Facility: CLINIC | Age: 74
End: 2019-12-19

## 2019-12-19 VITALS
HEIGHT: 65 IN | SYSTOLIC BLOOD PRESSURE: 157 MMHG | WEIGHT: 200 LBS | DIASTOLIC BLOOD PRESSURE: 84 MMHG | RESPIRATION RATE: 18 BRPM | TEMPERATURE: 97.5 F | OXYGEN SATURATION: 93 % | HEART RATE: 69 BPM | BODY MASS INDEX: 33.32 KG/M2

## 2019-12-19 DIAGNOSIS — J41.0 SIMPLE CHRONIC BRONCHITIS (HCC): ICD-10-CM

## 2019-12-19 DIAGNOSIS — I25.10 ATHEROSCLEROSIS OF NATIVE CORONARY ARTERY OF NATIVE HEART WITHOUT ANGINA PECTORIS: ICD-10-CM

## 2019-12-19 DIAGNOSIS — E78.2 MIXED HYPERLIPIDEMIA: ICD-10-CM

## 2019-12-19 DIAGNOSIS — C64.1 MALIGNANT NEOPLASM OF RIGHT KIDNEY, EXCEPT RENAL PELVIS (HCC): ICD-10-CM

## 2019-12-19 DIAGNOSIS — M54.6 ACUTE LEFT-SIDED THORACIC BACK PAIN: ICD-10-CM

## 2019-12-19 DIAGNOSIS — I10 ESSENTIAL (PRIMARY) HYPERTENSION: Primary | ICD-10-CM

## 2019-12-19 DIAGNOSIS — I11.9 HYPERTENSIVE HEART DISEASE, UNSPECIFIED WHETHER HEART FAILURE PRESENT: ICD-10-CM

## 2019-12-19 DIAGNOSIS — E11.9 TYPE 2 DIABETES MELLITUS WITHOUT COMPLICATION, WITHOUT LONG-TERM CURRENT USE OF INSULIN (HCC): ICD-10-CM

## 2019-12-19 LAB
BILIRUB BLD-MCNC: NEGATIVE MG/DL
CLARITY, POC: CLEAR
COLOR UR: YELLOW
GLUCOSE UR STRIP-MCNC: NEGATIVE MG/DL
KETONES UR QL: NEGATIVE
LEUKOCYTE EST, POC: NEGATIVE
NITRITE UR-MCNC: NEGATIVE MG/ML
PH UR: 6.5 [PH] (ref 5–8)
PROT UR STRIP-MCNC: NEGATIVE MG/DL
RBC # UR STRIP: NEGATIVE /UL
SP GR UR: 1.01 (ref 1–1.03)
UROBILINOGEN UR QL: NORMAL

## 2019-12-19 PROCEDURE — 81003 URINALYSIS AUTO W/O SCOPE: CPT | Performed by: FAMILY MEDICINE

## 2019-12-19 PROCEDURE — 99214 OFFICE O/P EST MOD 30 MIN: CPT | Performed by: FAMILY MEDICINE

## 2019-12-19 RX ORDER — HYDROCHLOROTHIAZIDE 25 MG/1
25 TABLET ORAL DAILY
Qty: 30 TABLET | Refills: 12 | Status: SHIPPED | OUTPATIENT
Start: 2019-12-19 | End: 2019-12-19

## 2019-12-19 RX ORDER — CARVEDILOL 12.5 MG/1
12.5 TABLET ORAL 2 TIMES DAILY
Qty: 180 TABLET | Refills: 3 | Status: SHIPPED | OUTPATIENT
Start: 2019-12-19 | End: 2020-03-18

## 2019-12-19 RX ORDER — FLUTICASONE PROPIONATE 50 MCG
2 SPRAY, SUSPENSION (ML) NASAL AS NEEDED
COMMUNITY
Start: 2019-09-06 | End: 2022-04-14

## 2019-12-19 RX ORDER — OMEPRAZOLE 40 MG/1
CAPSULE, DELAYED RELEASE ORAL
COMMUNITY
Start: 2019-06-17 | End: 2020-12-03

## 2019-12-19 RX ORDER — HYDROCHLOROTHIAZIDE 25 MG/1
25 TABLET ORAL DAILY
Qty: 90 TABLET | Refills: 3 | Status: SHIPPED | OUTPATIENT
Start: 2019-12-19 | End: 2020-03-18

## 2019-12-19 RX ORDER — LIDOCAINE 50 MG/G
1 PATCH TOPICAL EVERY 24 HOURS
Qty: 30 PATCH | Refills: 3 | Status: SHIPPED | OUTPATIENT
Start: 2019-12-19 | End: 2020-09-03

## 2019-12-19 RX ORDER — CARVEDILOL 12.5 MG/1
12.5 TABLET ORAL 2 TIMES DAILY
Qty: 30 TABLET | Refills: 12 | Status: SHIPPED | OUTPATIENT
Start: 2019-12-19 | End: 2019-12-19

## 2019-12-19 RX ORDER — TIZANIDINE 4 MG/1
4 TABLET ORAL NIGHTLY PRN
Qty: 30 TABLET | Refills: 1 | Status: SHIPPED | OUTPATIENT
Start: 2019-12-19 | End: 2020-01-21

## 2019-12-19 NOTE — PROGRESS NOTES
Subjective   Vianey Reeves is a 74 y.o. female.     Chief Complaint   Patient presents with   • Hypertension   • Diabetes     seeing luis -  pt taking Januvia        Hypertension   This is a chronic problem. The current episode started more than 1 year ago. The problem has been gradually worsening (pt reports a high elizabeth at 's office today as well) since onset. The problem is controlled. Pertinent negatives include no chest pain, headaches or shortness of breath. There are no associated agents to hypertension. Risk factors for coronary artery disease include diabetes mellitus, post-menopausal state, smoking/tobacco exposure and dyslipidemia. Past treatments include diuretics. Current antihypertension treatment includes diuretics. The current treatment provides moderate improvement. There are no compliance problems.    Back Pain   This is a recurrent problem. The current episode started more than 1 year ago. The problem occurs daily. The problem is unchanged. The pain is present in the thoracic spine. The quality of the pain is described as aching. Radiates to: to ribs. The pain is moderate. The pain is the same all the time. The symptoms are aggravated by bending (lifting). Stiffness is present in the morning. Pertinent negatives include no abdominal pain, bladder incontinence, bowel incontinence, chest pain, dysuria, fever, headaches, leg pain, numbness, paresis, pelvic pain or perianal numbness. Risk factors include obesity and lack of exercise. She has tried chiropractic manipulation for the symptoms. The treatment provided moderate relief.   Coronary Artery Disease   Presents for follow-up visit. Pertinent negatives include no chest pain or shortness of breath. The symptoms have been stable. Compliance with diet is variable. Compliance with exercise is variable.   COPD   There is no cough or shortness of breath. This is a chronic problem. The current episode started more than 1 year ago.  The problem occurs intermittently. The problem has been unchanged. Pertinent negatives include no chest pain, ear pain, fever, headaches or rhinorrhea. Her symptoms are aggravated by URI and change in weather. Her symptoms are alleviated by rest (meds).        The following portions of the patient's history were reviewed and updated as appropriate: allergies, current medications, past family history, past medical history, past social history, past surgical history and problem list.    Allergies:  Allergies   Allergen Reactions   • Erythromycin Rash   • Naproxen Sodium Unknown (See Comments)   • Latex Itching and Swelling   • Metoclopramide Unknown (See Comments)       Social History:  Social History     Socioeconomic History   • Marital status:      Spouse name: Not on file   • Number of children: Not on file   • Years of education: Not on file   • Highest education level: Not on file   Tobacco Use   • Smoking status: Current Every Day Smoker     Types: Cigarettes   • Smokeless tobacco: Never Used   Substance and Sexual Activity   • Alcohol use: No     Frequency: Never   • Drug use: No       Family History:  Family History   Problem Relation Age of Onset   • Arthritis Father    • Prostate cancer Father        Past Medical History :  Patient Active Problem List   Diagnosis   • Type 2 diabetes mellitus without complication (CMS/HCC)   • Essential (primary) hypertension   • Mixed hyperlipidemia   • History of substance abuse (CMS/HCC)   • Hiatal hernia   • Fatigue   • Long term current use of antithrombotics/antiplatelets   • Chronic kidney disease, stage 3 (moderate) (CMS/HCC)   • Chronic obstructive pulmonary disease (CMS/HCC)   • Atherosclerotic heart disease of native coronary artery without angina pectoris   • Hypertensive cardiovascular disease   • Irritable bowel syndrome without diarrhea   • Malignant neoplasm of unspecified kidney, except renal pelvis (CMS/HCC)   • Solitary kidney   • Atherosclerosis of  native arteries of extremities with intermittent claudication, bilateral legs (CMS/HCC)       Medication List:    Current Outpatient Medications:   •  aspirin 81 MG tablet, Take 81 mg by mouth Daily., Disp: , Rfl:   •  calcium-vitamin D (OSCAL 500/200 D-3) 500-200 MG-UNIT per tablet, Take  by mouth., Disp: , Rfl:   •  carvedilol (COREG) 12.5 MG tablet, Take 1 tablet by mouth 2 (Two) Times a Day for 90 days., Disp: 180 tablet, Rfl: 3  •  coenzyme Q10 100 MG capsule, Take 1 capsule by mouth Daily., Disp: , Rfl:   •  Evolocumab (REPATHA SURECLICK) 140 MG/ML solution auto-injector, , Disp: , Rfl:   •  fluticasone (FLONASE) 50 MCG/ACT nasal spray, USE 2 SPRAYS IN EACH NOSTRIL EVERY DAY, Disp: , Rfl:   •  fluticasone-salmeterol (ADVAIR HFA) 45-21 MCG/ACT inhaler, Inhale 2 puffs 2 (Two) Times a Day., Disp: , Rfl:   •  hydroCHLOROthiazide (HYDRODIURIL) 25 MG tablet, Take 1 tablet by mouth Daily for 90 days., Disp: 90 tablet, Rfl: 3  •  insulin glargine (LANTUS) 100 UNIT/ML injection, Inject  under the skin into the appropriate area as directed Daily., Disp: , Rfl:   •  omeprazole (priLOSEC) 40 MG capsule, TAKE 1 CAPSULE BY MOUTH EVERY MORNING, Disp: , Rfl:   •  sertraline (ZOLOFT) 25 MG tablet, Take 25 mg by mouth Daily., Disp: , Rfl:   •  SITagliptin (JANUVIA) 100 MG tablet, , Disp: , Rfl:   •  lidocaine (LIDODERM) 5 %, Place 1 patch on the skin as directed by provider Daily. Remove & Discard patch within 12 hours or as directed by MD, Disp: 30 patch, Rfl: 3  •  nicotine (NICODERM CQ) 14 MG/24HR patch, Place 1 patch on the skin as directed by provider Daily., Disp: , Rfl:   •  rosuvastatin (CRESTOR) 20 MG tablet, Take 20 mg by mouth Daily., Disp: , Rfl:   •  tiotropium (SPIRIVA) 18 MCG per inhalation capsule, Place 1 capsule into inhaler and inhale Daily., Disp: , Rfl:   •  tiZANidine (ZANAFLEX) 4 MG tablet, Take 1 tablet by mouth At Night As Needed for Muscle Spasms., Disp: 30 tablet, Rfl: 1    Past Surgical  "History:  Past Surgical History:   Procedure Laterality Date   • CARDIAC CATHETERIZATION     • CHOLECYSTECTOMY     • COLON SURGERY      REMOVAL   • HERNIA REPAIR     • NEPHRECTOMY         Review of Systems:  Review of Systems   Constitutional: Negative for activity change and fever.   HENT: Negative for ear pain, rhinorrhea, sinus pressure and voice change.    Eyes: Negative for visual disturbance.   Respiratory: Negative for cough and shortness of breath.    Cardiovascular: Negative for chest pain.   Gastrointestinal: Negative for abdominal pain, bowel incontinence, diarrhea, nausea and vomiting.   Endocrine: Negative for cold intolerance and heat intolerance.   Genitourinary: Negative for dysuria, frequency, pelvic pain, urgency and urinary incontinence.   Musculoskeletal: Positive for back pain. Negative for arthralgias.   Skin: Negative for rash.   Neurological: Negative for syncope and numbness.   Hematological: Does not bruise/bleed easily.   Psychiatric/Behavioral: Negative for depressed mood. The patient is not nervous/anxious.        Physical Exam:  Vital Signs:  Visit Vitals  /84   Pulse 69   Temp 97.5 °F (36.4 °C) (Oral)   Resp 18   Ht 165.1 cm (65\")   Wt 90.7 kg (200 lb)   SpO2 93%   BMI 33.28 kg/m²       Physical Exam   Constitutional: She is oriented to person, place, and time. She appears well-developed and well-nourished. She is cooperative.   Cardiovascular: Normal rate, regular rhythm and normal heart sounds. Exam reveals no gallop and no friction rub.   No murmur heard.  Pulmonary/Chest: Effort normal and breath sounds normal. She has no wheezes. She has no rales.   Musculoskeletal:        Thoracic back: She exhibits spasm. She exhibits normal range of motion and no tenderness.   Neurological: She is alert and oriented to person, place, and time. Coordination normal.   Skin: Skin is warm and dry.   Psychiatric: She has a normal mood and affect.   Vitals reviewed.      Assessment and " Plan:  Problem List Items Addressed This Visit        Cardiovascular and Mediastinum    Essential (primary) hypertension - Primary    Overview     Elevated today but she has not taken her medication. Check at home and call with results in a few days    Dicussed if there ischest pain,  loss of vision, confusion, weakness or numbness in an extremity, dropping of an eyelid or one side of the face, severe pain, intractable vomiting, go to the ER           Relevant Medications    carvedilol (COREG) 12.5 MG tablet    hydroCHLOROthiazide (HYDRODIURIL) 25 MG tablet    Other Relevant Orders    POC Urinalysis Dipstick, Multipro (Completed)    Atherosclerotic heart disease of native coronary artery without angina pectoris    Overview     Stable         Relevant Medications    carvedilol (COREG) 12.5 MG tablet    Hypertensive cardiovascular disease    Overview     stable         Relevant Medications    carvedilol (COREG) 12.5 MG tablet    hydroCHLOROthiazide (HYDRODIURIL) 25 MG tablet       Respiratory    Chronic obstructive pulmonary disease (CMS/HCC)    Overview     Stable         Relevant Medications    fluticasone (FLONASE) 50 MCG/ACT nasal spray       Endocrine    Type 2 diabetes mellitus without complication (CMS/HCC)    Overview     She is seeing Dr Kaur         Relevant Medications    SITagliptin (JANUVIA) 100 MG tablet       Genitourinary    Malignant neoplasm of unspecified kidney, except renal pelvis (CMS/HCC)    Overview     Was in the ureter not kidney  Dr Ray  Advised she go back to see him         Relevant Medications    hydroCHLOROthiazide (HYDRODIURIL) 25 MG tablet      Other Visit Diagnoses     Acute left-sided thoracic back pain        Relevant Medications    lidocaine (LIDODERM) 5 %    tiZANidine (ZANAFLEX) 4 MG tablet        Ice three times a day for about 10-15 minutes for the first 1-2 days. Then may alternate heat and ice. Better body mechanics discussed. Home exercises discussed and hand out  given. Discussed nsaids and if they can be taken. May need imaging and or PT if persists.      An After Visit Summary and PPPS were given to the patient.

## 2019-12-23 PROBLEM — E78.2 MIXED HYPERLIPIDEMIA: Status: ACTIVE | Noted: 2017-10-24

## 2019-12-30 ENCOUNTER — OFFICE VISIT (OUTPATIENT)
Dept: CARDIOLOGY | Facility: CLINIC | Age: 74
End: 2019-12-30

## 2019-12-30 VITALS
OXYGEN SATURATION: 93 % | WEIGHT: 198 LBS | HEIGHT: 65 IN | DIASTOLIC BLOOD PRESSURE: 98 MMHG | HEART RATE: 63 BPM | BODY MASS INDEX: 32.99 KG/M2 | SYSTOLIC BLOOD PRESSURE: 156 MMHG

## 2019-12-30 DIAGNOSIS — IMO0002 SOLITARY KIDNEY: ICD-10-CM

## 2019-12-30 DIAGNOSIS — I10 ESSENTIAL (PRIMARY) HYPERTENSION: ICD-10-CM

## 2019-12-30 DIAGNOSIS — J44.9 CHRONIC OBSTRUCTIVE PULMONARY DISEASE, UNSPECIFIED COPD TYPE (HCC): ICD-10-CM

## 2019-12-30 DIAGNOSIS — I25.10 ATHEROSCLEROSIS OF NATIVE CORONARY ARTERY OF NATIVE HEART WITHOUT ANGINA PECTORIS: ICD-10-CM

## 2019-12-30 DIAGNOSIS — Z79.02 LONG TERM CURRENT USE OF ANTITHROMBOTICS/ANTIPLATELETS: ICD-10-CM

## 2019-12-30 DIAGNOSIS — N18.30 CHRONIC KIDNEY DISEASE, STAGE 3 (MODERATE): ICD-10-CM

## 2019-12-30 DIAGNOSIS — E11.9 TYPE 2 DIABETES MELLITUS WITHOUT COMPLICATION, WITHOUT LONG-TERM CURRENT USE OF INSULIN (HCC): ICD-10-CM

## 2019-12-30 DIAGNOSIS — E78.2 MIXED HYPERLIPIDEMIA: Primary | ICD-10-CM

## 2019-12-30 DIAGNOSIS — I11.9 HYPERTENSIVE HEART DISEASE, UNSPECIFIED WHETHER HEART FAILURE PRESENT: ICD-10-CM

## 2019-12-30 PROCEDURE — 99214 OFFICE O/P EST MOD 30 MIN: CPT | Performed by: INTERNAL MEDICINE

## 2019-12-30 PROCEDURE — 93000 ELECTROCARDIOGRAM COMPLETE: CPT | Performed by: INTERNAL MEDICINE

## 2020-01-14 ENCOUNTER — OFFICE (AMBULATORY)
Dept: URBAN - METROPOLITAN AREA CLINIC 64 | Facility: CLINIC | Age: 75
End: 2020-01-14

## 2020-01-14 ENCOUNTER — TRANSCRIBE ORDERS (OUTPATIENT)
Dept: ADMINISTRATIVE | Facility: HOSPITAL | Age: 75
End: 2020-01-14

## 2020-01-14 VITALS
DIASTOLIC BLOOD PRESSURE: 91 MMHG | HEART RATE: 73 BPM | SYSTOLIC BLOOD PRESSURE: 155 MMHG | WEIGHT: 197 LBS | HEIGHT: 65 IN

## 2020-01-14 DIAGNOSIS — R13.10 DYSPHAGIA, UNSPECIFIED: ICD-10-CM

## 2020-01-14 DIAGNOSIS — Z72.0 TOBACCO USE: ICD-10-CM

## 2020-01-14 DIAGNOSIS — R14.2 FLATULENCE, ERUCTATION, AND GAS PAIN: ICD-10-CM

## 2020-01-14 DIAGNOSIS — R14.3 FLATULENCE, ERUCTATION, AND GAS PAIN: ICD-10-CM

## 2020-01-14 DIAGNOSIS — R10.12 ABDOMINAL PAIN, LEFT UPPER QUADRANT: Primary | ICD-10-CM

## 2020-01-14 DIAGNOSIS — R10.13 EPIGASTRIC PAIN: ICD-10-CM

## 2020-01-14 DIAGNOSIS — R14.3 FLATULENCE: ICD-10-CM

## 2020-01-14 DIAGNOSIS — R14.1 FLATULENCE, ERUCTATION, AND GAS PAIN: ICD-10-CM

## 2020-01-14 DIAGNOSIS — R10.11 ABDOMINAL PAIN, RIGHT UPPER QUADRANT: ICD-10-CM

## 2020-01-14 PROCEDURE — 99214 OFFICE O/P EST MOD 30 MIN: CPT | Performed by: NURSE PRACTITIONER

## 2020-01-17 ENCOUNTER — ON CAMPUS - OUTPATIENT (AMBULATORY)
Dept: URBAN - METROPOLITAN AREA HOSPITAL 2 | Facility: HOSPITAL | Age: 75
End: 2020-01-17

## 2020-01-17 VITALS
HEART RATE: 55 BPM | HEIGHT: 65 IN | RESPIRATION RATE: 18 BRPM | DIASTOLIC BLOOD PRESSURE: 63 MMHG | OXYGEN SATURATION: 96 % | SYSTOLIC BLOOD PRESSURE: 136 MMHG | OXYGEN SATURATION: 95 % | RESPIRATION RATE: 19 BRPM | WEIGHT: 197 LBS | SYSTOLIC BLOOD PRESSURE: 145 MMHG | OXYGEN SATURATION: 97 % | SYSTOLIC BLOOD PRESSURE: 141 MMHG | DIASTOLIC BLOOD PRESSURE: 76 MMHG | RESPIRATION RATE: 16 BRPM | DIASTOLIC BLOOD PRESSURE: 97 MMHG | DIASTOLIC BLOOD PRESSURE: 74 MMHG | OXYGEN SATURATION: 98 % | DIASTOLIC BLOOD PRESSURE: 72 MMHG | SYSTOLIC BLOOD PRESSURE: 160 MMHG | HEART RATE: 64 BPM | HEART RATE: 70 BPM | TEMPERATURE: 97.5 F | SYSTOLIC BLOOD PRESSURE: 148 MMHG | HEART RATE: 60 BPM

## 2020-01-17 DIAGNOSIS — R13.10 DYSPHAGIA, UNSPECIFIED: ICD-10-CM

## 2020-01-17 DIAGNOSIS — K31.89 OTHER DISEASES OF STOMACH AND DUODENUM: ICD-10-CM

## 2020-01-17 DIAGNOSIS — K44.9 DIAPHRAGMATIC HERNIA WITHOUT OBSTRUCTION OR GANGRENE: ICD-10-CM

## 2020-01-17 DIAGNOSIS — K29.60 OTHER GASTRITIS WITHOUT BLEEDING: ICD-10-CM

## 2020-01-17 DIAGNOSIS — K22.2 ESOPHAGEAL OBSTRUCTION: ICD-10-CM

## 2020-01-17 PROBLEM — K29.70 GASTRITIS, UNSPECIFIED, WITHOUT BLEEDING: Status: ACTIVE | Noted: 2020-01-17

## 2020-01-17 PROCEDURE — 43450 DILATE ESOPHAGUS 1/MULT PASS: CPT | Performed by: INTERNAL MEDICINE

## 2020-01-17 PROCEDURE — 43235 EGD DIAGNOSTIC BRUSH WASH: CPT | Performed by: INTERNAL MEDICINE

## 2020-01-17 RX ORDER — LINACLOTIDE 145 UG/1
CAPSULE, GELATIN COATED ORAL
Qty: 0 | Refills: 0 | Status: COMPLETED
End: 2021-06-11

## 2020-01-21 ENCOUNTER — OFFICE VISIT (OUTPATIENT)
Dept: FAMILY MEDICINE CLINIC | Facility: CLINIC | Age: 75
End: 2020-01-21

## 2020-01-21 VITALS
HEART RATE: 98 BPM | TEMPERATURE: 98.2 F | WEIGHT: 196.4 LBS | RESPIRATION RATE: 16 BRPM | BODY MASS INDEX: 30.83 KG/M2 | SYSTOLIC BLOOD PRESSURE: 140 MMHG | OXYGEN SATURATION: 96 % | DIASTOLIC BLOOD PRESSURE: 82 MMHG | HEIGHT: 67 IN

## 2020-01-21 DIAGNOSIS — K58.1 IRRITABLE BOWEL SYNDROME WITH CONSTIPATION: Primary | ICD-10-CM

## 2020-01-21 DIAGNOSIS — K59.00 CONSTIPATION, UNSPECIFIED CONSTIPATION TYPE: ICD-10-CM

## 2020-01-21 DIAGNOSIS — R10.31 RIGHT LOWER QUADRANT ABDOMINAL PAIN: ICD-10-CM

## 2020-01-21 PROCEDURE — 99214 OFFICE O/P EST MOD 30 MIN: CPT | Performed by: FAMILY MEDICINE

## 2020-01-21 RX ORDER — INSULIN GLARGINE 100 [IU]/ML
INJECTION, SOLUTION SUBCUTANEOUS
COMMUNITY
Start: 2019-12-28 | End: 2020-05-01 | Stop reason: SDUPTHER

## 2020-01-21 RX ORDER — LINACLOTIDE 145 UG/1
CAPSULE, GELATIN COATED ORAL
COMMUNITY
Start: 2020-01-17 | End: 2020-11-02

## 2020-01-21 RX ORDER — CIPROFLOXACIN 500 MG/1
500 TABLET, FILM COATED ORAL 2 TIMES DAILY
Qty: 20 TABLET | Refills: 0 | Status: SHIPPED | OUTPATIENT
Start: 2020-01-21 | End: 2020-06-25

## 2020-01-21 RX ORDER — TRAMADOL HYDROCHLORIDE 50 MG/1
TABLET ORAL
COMMUNITY
Start: 2020-01-16 | End: 2021-06-04

## 2020-01-21 NOTE — PROGRESS NOTES
Subjective   Vianey Reeves is a 74 y.o. female.     Chief Complaint   Patient presents with   • Abdominal Pain       Abdominal Pain   This is a recurrent problem. The current episode started more than 1 year ago. The problem occurs constantly. The most recent episode lasted 3 weeks. The problem has been gradually worsening. The pain is located in the RLQ. The pain is moderate. The quality of the pain is cramping (spasm). The abdominal pain radiates to the back. Pertinent negatives include no arthralgias, constipation, diarrhea, fever, frequency, nausea or vomiting. The pain is aggravated by movement. The pain is relieved by being still. Treatments tried: omeprazole, linzess. The treatment provided no relief. Prior diagnostic workup includes GI consult and upper endoscopy (She has had a CT, emptying study, seeing GI).    She has seen GI.   7/19 CT with abdomen and pelvis: Nothing acute  1/2020: EGD with dilation. Showed gastritis  7/2/18: colonoscopy polyps    She is very gassy, eating makes pain worse. Bowel movements help. She has had this pain since 2010 but worse in the last 3 weeks  She had a BM this am that was pellet like and then loose.   The following portions of the patient's history were reviewed and updated as appropriate: allergies, current medications, past family history, past medical history, past social history, past surgical history and problem list.    Allergies:  Allergies   Allergen Reactions   • Erythromycin Rash   • Naproxen Sodium Unknown (See Comments)   • Latex Itching and Swelling   • Metoclopramide Unknown (See Comments)       Social History:  Social History     Socioeconomic History   • Marital status:      Spouse name: Not on file   • Number of children: Not on file   • Years of education: Not on file   • Highest education level: Not on file   Tobacco Use   • Smoking status: Current Every Day Smoker     Packs/day: 1.00     Types: Cigarettes   • Smokeless tobacco: Never Used    Substance and Sexual Activity   • Alcohol use: Yes     Frequency: Never     Comment: occasionally    • Drug use: Never       Family History:  Family History   Problem Relation Age of Onset   • Arthritis Father    • Prostate cancer Father    • Heart disease Sister        Past Medical History :  Patient Active Problem List   Diagnosis   • Type 2 diabetes mellitus without complication (CMS/[facility], located [facility])   • Essential (primary) hypertension   • Mixed hyperlipidemia   • Hiatal hernia   • Fatigue   • Long term current use of antithrombotics/antiplatelets   • Chronic kidney disease, stage 3 (moderate) (CMS/[facility], located [facility])   • Chronic obstructive pulmonary disease (CMS/[facility], located [facility])   • Atherosclerotic heart disease of native coronary artery without angina pectoris   • Hypertensive cardiovascular disease   • Irritable bowel syndrome with constipation   • Malignant neoplasm of unspecified kidney, except renal pelvis (CMS/[facility], located [facility])   • Solitary kidney   • Atherosclerosis of native arteries of extremities with intermittent claudication, bilateral legs (CMS/[facility], located [facility])       Medication List:  Outpatient Encounter Medications as of 1/21/2020   Medication Sig Dispense Refill   • aspirin 81 MG tablet Take 81 mg by mouth Daily.     • carvedilol (COREG) 12.5 MG tablet Take 1 tablet by mouth 2 (Two) Times a Day for 90 days. 180 tablet 3   • coenzyme Q10 100 MG capsule Take 1 capsule by mouth Daily.     • fluticasone (FLONASE) 50 MCG/ACT nasal spray USE 2 SPRAYS IN EACH NOSTRIL EVERY DAY     • fluticasone-salmeterol (ADVAIR HFA) 45-21 MCG/ACT inhaler Inhale 2 puffs 2 (Two) Times a Day.     • hydroCHLOROthiazide (HYDRODIURIL) 25 MG tablet Take 1 tablet by mouth Daily for 90 days. 90 tablet 3   • LANTUS SOLOSTAR 100 UNIT/ML injection pen      • LINZESS 145 MCG capsule capsule      • nicotine (NICODERM CQ) 14 MG/24HR patch Place 1 patch on the skin as directed by provider Daily.     • omeprazole (priLOSEC) 40 MG capsule TAKE 1 CAPSULE BY MOUTH EVERY MORNING     • rifAXIMin  (XIFAXAN PO) Take  by mouth 2 (Two) Times a Day.     • sertraline (ZOLOFT) 25 MG tablet Take 25 mg by mouth Daily.     • SITagliptin (JANUVIA) 100 MG tablet Take 100 mg by mouth Daily.     • traMADol (ULTRAM) 50 MG tablet      • [DISCONTINUED] tiZANidine (ZANAFLEX) 4 MG tablet Take 1 tablet by mouth At Night As Needed for Muscle Spasms. 30 tablet 1   • calcium-vitamin D (OSCAL 500/200 D-3) 500-200 MG-UNIT per tablet Take  by mouth. Twice weekly     • ciprofloxacin (CIPRO) 500 MG tablet Take 1 tablet by mouth 2 (Two) Times a Day. 20 tablet 0   • Evolocumab (REPATHA SURECLICK) 140 MG/ML solution auto-injector      • insulin glargine (LANTUS) 100 UNIT/ML injection Inject  under the skin into the appropriate area as directed Daily.     • lidocaine (LIDODERM) 5 % Place 1 patch on the skin as directed by provider Daily. Remove & Discard patch within 12 hours or as directed by MD 30 patch 3   • rosuvastatin (CRESTOR) 20 MG tablet Take 20 mg by mouth Daily.     • tiotropium (SPIRIVA) 18 MCG per inhalation capsule Place 1 capsule into inhaler and inhale Daily As Needed.       No facility-administered encounter medications on file as of 1/21/2020.        Past Surgical History:  Past Surgical History:   Procedure Laterality Date   • CARDIAC CATHETERIZATION     • CHOLECYSTECTOMY     • COLON SURGERY      REMOVAL   • HERNIA REPAIR     • NEPHRECTOMY         Review of Systems:  Review of Systems   Constitutional: Negative for activity change and fever.   HENT: Negative for ear pain, rhinorrhea, sinus pressure and voice change.    Eyes: Negative for visual disturbance.   Respiratory: Negative for cough and shortness of breath.    Cardiovascular: Negative for chest pain.   Gastrointestinal: Positive for abdominal pain. Negative for constipation, diarrhea, nausea and vomiting.   Endocrine: Negative for cold intolerance and heat intolerance.   Genitourinary: Negative for frequency and urgency.   Musculoskeletal: Negative for  "arthralgias.   Skin: Negative for rash.   Neurological: Negative for syncope.   Hematological: Does not bruise/bleed easily.   Psychiatric/Behavioral: Negative for depressed mood. The patient is not nervous/anxious.        I have reviewed and confirmed the accuracy of the ROS as documented by the MA/LPN/RN Emma Hammer MD    Vital Signs:  Visit Vitals  /82   Pulse 98   Temp 98.2 °F (36.8 °C)   Resp 16   Ht 168.9 cm (66.5\")   Wt 89.1 kg (196 lb 6.4 oz)   SpO2 96%   BMI 31.22 kg/m²       Physical Exam   Constitutional: She is oriented to person, place, and time. She appears well-developed and well-nourished. She is cooperative.   Cardiovascular: Normal rate, regular rhythm and normal heart sounds. Exam reveals no gallop and no friction rub.   No murmur heard.  Pulmonary/Chest: Effort normal and breath sounds normal. She has no wheezes. She has no rales.   Abdominal: Soft. Bowel sounds are normal. She exhibits no distension and no mass. There is no tenderness. There is no rebound.   Feels bloated.   Neurological: She is alert and oriented to person, place, and time. Coordination normal.   Skin: Skin is warm and dry.   Psychiatric: She has a normal mood and affect.   Vitals reviewed.      Assessment and Plan:  Problem List Items Addressed This Visit        Digestive    Irritable bowel syndrome with constipation - Primary    Overview     May be the cause of her pain. Double the linzess and see if that helps           Other Visit Diagnoses     Right lower quadrant abdominal pain        Relevant Medications    ciprofloxacin (CIPRO) 500 MG tablet    Other Relevant Orders    CT Abdomen Pelvis With & Without Contrast    CBC & Differential    Comprehensive Metabolic Panel    Constipation, unspecified constipation type            Liquid diet x 2 days. Will try to get CT scan sometime tomorrow if possible.     An After Visit Summary and PPPS were given to the patient.     "

## 2020-01-22 ENCOUNTER — TELEPHONE (OUTPATIENT)
Dept: FAMILY MEDICINE CLINIC | Facility: CLINIC | Age: 75
End: 2020-01-22

## 2020-01-22 ENCOUNTER — HOSPITAL ENCOUNTER (OUTPATIENT)
Dept: CT IMAGING | Facility: HOSPITAL | Age: 75
Discharge: HOME | End: 2020-01-22
Admitting: FAMILY MEDICINE

## 2020-01-22 ENCOUNTER — HOSPITAL ENCOUNTER (OUTPATIENT)
Dept: CT IMAGING | Facility: HOSPITAL | Age: 75
End: 2020-01-22

## 2020-01-22 DIAGNOSIS — R10.31 RIGHT LOWER QUADRANT ABDOMINAL PAIN: Primary | ICD-10-CM

## 2020-01-22 DIAGNOSIS — R10.31 RIGHT LOWER QUADRANT ABDOMINAL PAIN: ICD-10-CM

## 2020-01-22 PROBLEM — Z85.54: Status: ACTIVE | Noted: 2020-01-22

## 2020-01-22 LAB
ALBUMIN SERPL-MCNC: 3.7 G/DL (ref 3.7–4.7)
ALBUMIN/GLOB SERPL: 1.3 {RATIO} (ref 1.2–2.2)
ALP SERPL-CCNC: 69 IU/L (ref 39–117)
ALT SERPL-CCNC: 16 IU/L (ref 0–32)
AST SERPL-CCNC: 15 IU/L (ref 0–40)
BASOPHILS # BLD AUTO: 0 X10E3/UL (ref 0–0.2)
BASOPHILS NFR BLD AUTO: 0 %
BILIRUB SERPL-MCNC: <0.2 MG/DL (ref 0–1.2)
BUN SERPL-MCNC: 26 MG/DL (ref 8–27)
BUN/CREAT SERPL: 17 (ref 12–28)
CALCIUM SERPL-MCNC: 9 MG/DL (ref 8.7–10.3)
CHLORIDE SERPL-SCNC: 102 MMOL/L (ref 96–106)
CO2 SERPL-SCNC: 23 MMOL/L (ref 20–29)
CREAT SERPL-MCNC: 1.57 MG/DL (ref 0.57–1)
EOSINOPHIL # BLD AUTO: 0.1 X10E3/UL (ref 0–0.4)
EOSINOPHIL NFR BLD AUTO: 1 %
ERYTHROCYTE [DISTWIDTH] IN BLOOD BY AUTOMATED COUNT: 13.2 % (ref 11.7–15.4)
GLOBULIN SER CALC-MCNC: 2.8 G/DL (ref 1.5–4.5)
GLUCOSE SERPL-MCNC: 165 MG/DL (ref 65–99)
HCT VFR BLD AUTO: 47.2 % (ref 34–46.6)
HGB BLD-MCNC: 15.9 G/DL (ref 11.1–15.9)
IMM GRANULOCYTES # BLD AUTO: 0 X10E3/UL (ref 0–0.1)
IMM GRANULOCYTES NFR BLD AUTO: 0 %
LYMPHOCYTES # BLD AUTO: 2.4 X10E3/UL (ref 0.7–3.1)
LYMPHOCYTES NFR BLD AUTO: 33 %
MCH RBC QN AUTO: 32.1 PG (ref 26.6–33)
MCHC RBC AUTO-ENTMCNC: 33.7 G/DL (ref 31.5–35.7)
MCV RBC AUTO: 95 FL (ref 79–97)
MONOCYTES # BLD AUTO: 0.4 X10E3/UL (ref 0.1–0.9)
MONOCYTES NFR BLD AUTO: 5 %
NEUTROPHILS # BLD AUTO: 4.5 X10E3/UL (ref 1.4–7)
NEUTROPHILS NFR BLD AUTO: 61 %
PLATELET # BLD AUTO: 205 X10E3/UL (ref 150–450)
POTASSIUM SERPL-SCNC: 5.1 MMOL/L (ref 3.5–5.2)
PROT SERPL-MCNC: 6.5 G/DL (ref 6–8.5)
RBC # BLD AUTO: 4.96 X10E6/UL (ref 3.77–5.28)
SODIUM SERPL-SCNC: 142 MMOL/L (ref 134–144)
WBC # BLD AUTO: 7.4 X10E3/UL (ref 3.4–10.8)

## 2020-01-22 PROCEDURE — 74176 CT ABD & PELVIS W/O CONTRAST: CPT

## 2020-01-23 ENCOUNTER — OFFICE VISIT (OUTPATIENT)
Dept: FAMILY MEDICINE CLINIC | Facility: CLINIC | Age: 75
End: 2020-01-23

## 2020-01-23 VITALS
HEART RATE: 59 BPM | HEIGHT: 66 IN | WEIGHT: 195 LBS | OXYGEN SATURATION: 96 % | TEMPERATURE: 97.8 F | DIASTOLIC BLOOD PRESSURE: 82 MMHG | RESPIRATION RATE: 16 BRPM | SYSTOLIC BLOOD PRESSURE: 126 MMHG | BODY MASS INDEX: 31.34 KG/M2

## 2020-01-23 DIAGNOSIS — R10.31 RIGHT LOWER QUADRANT ABDOMINAL PAIN: Primary | ICD-10-CM

## 2020-01-23 DIAGNOSIS — K59.00 CONSTIPATION, UNSPECIFIED CONSTIPATION TYPE: ICD-10-CM

## 2020-01-23 DIAGNOSIS — K58.1 IRRITABLE BOWEL SYNDROME WITH CONSTIPATION: ICD-10-CM

## 2020-01-23 PROCEDURE — 99213 OFFICE O/P EST LOW 20 MIN: CPT | Performed by: FAMILY MEDICINE

## 2020-01-23 NOTE — PROGRESS NOTES
Subjective   Vianey Reeves is a 74 y.o. female.     Chief Complaint   Patient presents with   • Abdominal Pain       Patient's abdominal pain is improving    Abdominal Pain   This is a recurrent problem. The current episode started 1 to 4 weeks ago. The onset quality is sudden. The problem occurs daily. The problem has been rapidly improving. Pain location: Mid back pain today. The quality of the pain is sharp. The abdominal pain radiates to the back. Pertinent negatives include no arthralgias, diarrhea, fever, frequency, nausea or vomiting. Nothing aggravates the pain. The pain is relieved by liquids. She has tried antibiotics (cipro and liquid diet) for the symptoms. The treatment provided significant relief. Her past medical history is significant for irritable bowel syndrome.        The following portions of the patient's history were reviewed and updated as appropriate: allergies, current medications, past family history, past medical history, past social history, past surgical history and problem list.    Allergies:  Allergies   Allergen Reactions   • Erythromycin Rash   • Naproxen Sodium Unknown (See Comments)   • Latex Itching and Swelling   • Metoclopramide Unknown (See Comments)       Social History:  Social History     Socioeconomic History   • Marital status:      Spouse name: Not on file   • Number of children: Not on file   • Years of education: Not on file   • Highest education level: Not on file   Tobacco Use   • Smoking status: Current Every Day Smoker     Packs/day: 1.00     Types: Cigarettes   • Smokeless tobacco: Never Used   Substance and Sexual Activity   • Alcohol use: Yes     Frequency: Never     Comment: occasionally    • Drug use: Never       Family History:  Family History   Problem Relation Age of Onset   • Arthritis Father    • Prostate cancer Father    • Heart disease Sister        Past Medical History :  Patient Active Problem List   Diagnosis   • Type 2 diabetes mellitus  without complication (CMS/HCC)   • Essential (primary) hypertension   • Mixed hyperlipidemia   • Hiatal hernia   • Fatigue   • Long term current use of antithrombotics/antiplatelets   • Chronic kidney disease, stage 3 (moderate) (CMS/HCC)   • Chronic obstructive pulmonary disease (CMS/HCC)   • Atherosclerotic heart disease of native coronary artery without angina pectoris   • Hypertensive cardiovascular disease   • Irritable bowel syndrome with constipation   • Malignant neoplasm of unspecified kidney, except renal pelvis (CMS/HCC)   • Solitary kidney   • Atherosclerosis of native arteries of extremities with intermittent claudication, bilateral legs (CMS/HCC)   • H/O malignant neoplasm of ureter       Medication List:    Current Outpatient Medications:   •  aspirin 81 MG tablet, Take 81 mg by mouth Daily., Disp: , Rfl:   •  calcium-vitamin D (OSCAL 500/200 D-3) 500-200 MG-UNIT per tablet, Take  by mouth. Twice weekly, Disp: , Rfl:   •  carvedilol (COREG) 12.5 MG tablet, Take 1 tablet by mouth 2 (Two) Times a Day for 90 days., Disp: 180 tablet, Rfl: 3  •  ciprofloxacin (CIPRO) 500 MG tablet, Take 1 tablet by mouth 2 (Two) Times a Day., Disp: 20 tablet, Rfl: 0  •  coenzyme Q10 100 MG capsule, Take 1 capsule by mouth Daily., Disp: , Rfl:   •  Evolocumab (REPATHA SURECLICK) 140 MG/ML solution auto-injector, , Disp: , Rfl:   •  fluticasone (FLONASE) 50 MCG/ACT nasal spray, USE 2 SPRAYS IN EACH NOSTRIL EVERY DAY, Disp: , Rfl:   •  fluticasone-salmeterol (ADVAIR HFA) 45-21 MCG/ACT inhaler, Inhale 2 puffs 2 (Two) Times a Day., Disp: , Rfl:   •  hydroCHLOROthiazide (HYDRODIURIL) 25 MG tablet, Take 1 tablet by mouth Daily for 90 days., Disp: 90 tablet, Rfl: 3  •  insulin glargine (LANTUS) 100 UNIT/ML injection, Inject  under the skin into the appropriate area as directed Daily., Disp: , Rfl:   •  LANTUS SOLOSTAR 100 UNIT/ML injection pen, , Disp: , Rfl:   •  lidocaine (LIDODERM) 5 %, Place 1 patch on the skin as directed by  provider Daily. Remove & Discard patch within 12 hours or as directed by MD, Disp: 30 patch, Rfl: 3  •  LINZESS 145 MCG capsule capsule, , Disp: , Rfl:   •  nicotine (NICODERM CQ) 14 MG/24HR patch, Place 1 patch on the skin as directed by provider Daily., Disp: , Rfl:   •  omeprazole (priLOSEC) 40 MG capsule, TAKE 1 CAPSULE BY MOUTH EVERY MORNING, Disp: , Rfl:   •  rifAXIMin (XIFAXAN PO), Take  by mouth 2 (Two) Times a Day., Disp: , Rfl:   •  rosuvastatin (CRESTOR) 20 MG tablet, Take 20 mg by mouth Daily., Disp: , Rfl:   •  sertraline (ZOLOFT) 25 MG tablet, Take 25 mg by mouth Daily., Disp: , Rfl:   •  SITagliptin (JANUVIA) 100 MG tablet, Take 100 mg by mouth Daily., Disp: , Rfl:   •  tiotropium (SPIRIVA) 18 MCG per inhalation capsule, Place 1 capsule into inhaler and inhale Daily As Needed., Disp: , Rfl:   •  traMADol (ULTRAM) 50 MG tablet, , Disp: , Rfl:     Past Surgical History:  Past Surgical History:   Procedure Laterality Date   • CARDIAC CATHETERIZATION     • CHOLECYSTECTOMY     • COLON SURGERY      REMOVAL   • HERNIA REPAIR     • NEPHRECTOMY         Review of Systems:  Review of Systems   Constitutional: Negative for activity change and fever.   HENT: Negative for ear pain, rhinorrhea, sinus pressure and voice change.    Eyes: Negative for visual disturbance.   Respiratory: Negative for cough and shortness of breath.    Cardiovascular: Negative for chest pain.   Gastrointestinal: Positive for abdominal pain. Negative for diarrhea, nausea and vomiting.   Endocrine: Negative for cold intolerance and heat intolerance.   Genitourinary: Negative for frequency and urgency.   Musculoskeletal: Negative for arthralgias.   Skin: Negative for rash.   Neurological: Negative for syncope.   Hematological: Does not bruise/bleed easily.   Psychiatric/Behavioral: Negative for depressed mood. The patient is not nervous/anxious.        Physical Exam:  Vital Signs:  Visit Vitals  /82   Pulse 59   Temp 97.8 °F (36.6 °C)  "(Oral)   Resp 16   Ht 168.9 cm (66.5\")   Wt 88.5 kg (195 lb)   SpO2 96%   BMI 31.01 kg/m²       Physical Exam   Constitutional: She is oriented to person, place, and time. She appears well-developed and well-nourished. She is cooperative.   Cardiovascular: Normal rate, regular rhythm and normal heart sounds. Exam reveals no gallop and no friction rub.   No murmur heard.  Pulmonary/Chest: Effort normal and breath sounds normal. She has no wheezes. She has no rales.   Abdominal: Soft. Bowel sounds are normal. She exhibits no distension and no mass. There is no tenderness. There is no rebound and no guarding.   Neurological: She is alert and oriented to person, place, and time. Coordination normal.   Skin: Skin is warm and dry.   Psychiatric: She has a normal mood and affect.   Vitals reviewed.      Assessment and Plan:  Problem List Items Addressed This Visit        Digestive    Irritable bowel syndrome with constipation    Overview     Feeling better           Other Visit Diagnoses     Right lower quadrant abdominal pain    -  Primary    Improving    Constipation, unspecified constipation type            Continue current treatment    An After Visit Summary and PPPS were given to the patient.     "

## 2020-01-30 ENCOUNTER — OFFICE (AMBULATORY)
Dept: URBAN - METROPOLITAN AREA CLINIC 64 | Facility: CLINIC | Age: 75
End: 2020-01-30

## 2020-01-30 ENCOUNTER — HOSPITAL ENCOUNTER (OUTPATIENT)
Dept: NUCLEAR MEDICINE | Facility: HOSPITAL | Age: 75
Discharge: HOME | End: 2020-01-30

## 2020-01-30 ENCOUNTER — TELEPHONE (OUTPATIENT)
Dept: FAMILY MEDICINE CLINIC | Facility: CLINIC | Age: 75
End: 2020-01-30

## 2020-01-30 VITALS
DIASTOLIC BLOOD PRESSURE: 85 MMHG | HEIGHT: 65 IN | HEART RATE: 60 BPM | SYSTOLIC BLOOD PRESSURE: 181 MMHG | WEIGHT: 191 LBS

## 2020-01-30 DIAGNOSIS — R10.12 ABDOMINAL PAIN, LEFT UPPER QUADRANT: ICD-10-CM

## 2020-01-30 DIAGNOSIS — R63.4 ABNORMAL WEIGHT LOSS: ICD-10-CM

## 2020-01-30 DIAGNOSIS — R14.2 FLATULENCE, ERUCTATION, AND GAS PAIN: ICD-10-CM

## 2020-01-30 DIAGNOSIS — R10.11 ABDOMINAL PAIN, RIGHT UPPER QUADRANT: ICD-10-CM

## 2020-01-30 DIAGNOSIS — K57.32 DIVERTICULITIS OF LARGE INTESTINE WITHOUT PERFORATION OR ABS: ICD-10-CM

## 2020-01-30 DIAGNOSIS — K59.00 CONSTIPATION, UNSPECIFIED: ICD-10-CM

## 2020-01-30 DIAGNOSIS — R10.9 UNSPECIFIED ABDOMINAL PAIN: ICD-10-CM

## 2020-01-30 DIAGNOSIS — R14.3 FLATULENCE, ERUCTATION, AND GAS PAIN: ICD-10-CM

## 2020-01-30 DIAGNOSIS — R63.0 ANOREXIA: ICD-10-CM

## 2020-01-30 DIAGNOSIS — R14.1 FLATULENCE, ERUCTATION, AND GAS PAIN: ICD-10-CM

## 2020-01-30 PROCEDURE — 78264 GASTRIC EMPTYING IMG STUDY: CPT

## 2020-01-30 PROCEDURE — 0 TECHNETIUM SULFUR COLLOID: Performed by: NURSE PRACTITIONER

## 2020-01-30 PROCEDURE — A9541 TC99M SULFUR COLLOID: HCPCS | Performed by: NURSE PRACTITIONER

## 2020-01-30 PROCEDURE — 99215 OFFICE O/P EST HI 40 MIN: CPT | Performed by: NURSE PRACTITIONER

## 2020-01-30 RX ORDER — HYOSCYAMINE SULFATE 0.12 MG/1
0.75 TABLET ORAL; SUBLINGUAL
Qty: 30 | Refills: 3 | Status: COMPLETED
Start: 2020-01-30 | End: 2021-06-11

## 2020-01-30 RX ORDER — SIMETHICONE 250 MG/1
1000 CAPSULE, GELATIN COATED ORAL
Qty: 9999 | Refills: 999 | Status: COMPLETED
Start: 2020-01-30 | End: 2021-06-11

## 2020-01-30 RX ADMIN — TECHNETIUM TC 99M SULFUR COLLOID 1 DOSE: KIT at 07:20

## 2020-02-28 ENCOUNTER — OFFICE (AMBULATORY)
Dept: URBAN - METROPOLITAN AREA CLINIC 64 | Facility: CLINIC | Age: 75
End: 2020-02-28

## 2020-02-28 VITALS
HEIGHT: 65 IN | HEART RATE: 61 BPM | DIASTOLIC BLOOD PRESSURE: 74 MMHG | SYSTOLIC BLOOD PRESSURE: 132 MMHG | WEIGHT: 194 LBS

## 2020-02-28 DIAGNOSIS — K59.00 CONSTIPATION, UNSPECIFIED: ICD-10-CM

## 2020-02-28 DIAGNOSIS — K30 FUNCTIONAL DYSPEPSIA: ICD-10-CM

## 2020-02-28 DIAGNOSIS — R13.10 DYSPHAGIA, UNSPECIFIED: ICD-10-CM

## 2020-02-28 DIAGNOSIS — R10.32 LEFT LOWER QUADRANT PAIN: ICD-10-CM

## 2020-02-28 PROCEDURE — 99214 OFFICE O/P EST MOD 30 MIN: CPT | Performed by: INTERNAL MEDICINE

## 2020-02-28 RX ORDER — FAMOTIDINE 20 MG/1
TABLET, FILM COATED ORAL
Qty: 30 | Refills: 1 | Status: ACTIVE
Start: 2020-02-28

## 2020-02-28 RX ORDER — CIPROFLOXACIN 500 MG/1
1000 TABLET, FILM COATED ORAL
Qty: 14 | Refills: 0 | Status: COMPLETED
Start: 2020-02-28 | End: 2021-06-11

## 2020-05-01 ENCOUNTER — OFFICE VISIT (OUTPATIENT)
Dept: FAMILY MEDICINE CLINIC | Facility: CLINIC | Age: 75
End: 2020-05-01

## 2020-05-01 VITALS
HEART RATE: 75 BPM | OXYGEN SATURATION: 96 % | DIASTOLIC BLOOD PRESSURE: 72 MMHG | SYSTOLIC BLOOD PRESSURE: 140 MMHG | HEIGHT: 66 IN | TEMPERATURE: 98 F | BODY MASS INDEX: 31.53 KG/M2 | WEIGHT: 196.2 LBS | RESPIRATION RATE: 17 BRPM

## 2020-05-01 DIAGNOSIS — J44.9 CHRONIC OBSTRUCTIVE PULMONARY DISEASE, UNSPECIFIED COPD TYPE (HCC): ICD-10-CM

## 2020-05-01 DIAGNOSIS — B37.31 CANDIDA VAGINITIS: ICD-10-CM

## 2020-05-01 DIAGNOSIS — E11.9 TYPE 2 DIABETES MELLITUS WITHOUT COMPLICATION, WITHOUT LONG-TERM CURRENT USE OF INSULIN (HCC): ICD-10-CM

## 2020-05-01 DIAGNOSIS — R10.31 RIGHT LOWER QUADRANT ABDOMINAL PAIN: Primary | ICD-10-CM

## 2020-05-01 PROCEDURE — 81003 URINALYSIS AUTO W/O SCOPE: CPT | Performed by: FAMILY MEDICINE

## 2020-05-01 PROCEDURE — 99214 OFFICE O/P EST MOD 30 MIN: CPT | Performed by: FAMILY MEDICINE

## 2020-05-01 RX ORDER — FAMOTIDINE 20 MG/1
20 TABLET, FILM COATED ORAL DAILY
COMMUNITY
Start: 2020-03-30 | End: 2020-09-03

## 2020-05-01 RX ORDER — CARVEDILOL 12.5 MG/1
12.5 TABLET ORAL 2 TIMES DAILY
COMMUNITY
Start: 2020-03-29 | End: 2020-06-29

## 2020-05-01 RX ORDER — SERTRALINE HYDROCHLORIDE 25 MG/1
25 TABLET, FILM COATED ORAL DAILY
Qty: 30 TABLET | Refills: 12 | Status: SHIPPED | OUTPATIENT
Start: 2020-05-01 | End: 2021-07-02

## 2020-05-01 RX ORDER — KETOCONAZOLE 20 MG/ML
SHAMPOO TOPICAL
COMMUNITY
Start: 2020-03-16 | End: 2020-12-03

## 2020-05-01 RX ORDER — FLUCONAZOLE 150 MG/1
150 TABLET ORAL ONCE
Qty: 1 TABLET | Refills: 1 | Status: SHIPPED | OUTPATIENT
Start: 2020-05-01 | End: 2020-05-01

## 2020-05-01 NOTE — PROGRESS NOTES
Subjective   Vianey Reeves is a 74 y.o. female.     Chief Complaint   Patient presents with   • Earache   • Abdominal Pain       Earache    There is pain in the right ear. This is a new problem. The current episode started 1 to 4 weeks ago. The problem occurs every few hours. The problem has been waxing and waning. There has been no fever. The pain is moderate. Associated symptoms include abdominal pain and ear discharge. Pertinent negatives include no coughing, diarrhea, headaches, rash, rhinorrhea, sore throat or vomiting. She has tried nothing for the symptoms.   Abdominal Pain   This is a new problem. The current episode started yesterday. The onset quality is sudden. The problem occurs intermittently. The problem has been unchanged. The pain is located in the RLQ. The quality of the pain is dull. The abdominal pain does not radiate. Pertinent negatives include no arthralgias, diarrhea, fever, frequency, headaches, nausea or vomiting. The pain is aggravated by certain positions and movement. The pain is relieved by being still. She has tried nothing for the symptoms.    Ear feels itchy and the pain feels deep inside.     Her RLQ pain is not the chronic pain she was having in the past. It started yesterday. Movement makes it worse. It is just a dull ache. Nothing makes it better. She has had multiple CTs and EGD and colonscopy that were negative. She has been doing twisting exercises a lot lately on the floor    The following portions of the patient's history were reviewed and updated as appropriate: allergies, current medications, past family history, past medical history, past social history, past surgical history and problem list.    Allergies:  Allergies   Allergen Reactions   • Erythromycin Rash   • Naproxen Sodium Unknown (See Comments)   • Latex Itching and Swelling   • Metoclopramide Unknown (See Comments)       Social History:  Social History     Socioeconomic History   • Marital status:       Spouse name: Not on file   • Number of children: Not on file   • Years of education: Not on file   • Highest education level: Not on file   Tobacco Use   • Smoking status: Current Every Day Smoker     Packs/day: 1.00     Types: Cigarettes   • Smokeless tobacco: Never Used   Substance and Sexual Activity   • Alcohol use: Yes     Frequency: Never     Comment: occasionally    • Drug use: Never       Family History:  Family History   Problem Relation Age of Onset   • Arthritis Father    • Prostate cancer Father    • Heart disease Sister        Past Medical History :  Patient Active Problem List   Diagnosis   • Type 2 diabetes mellitus without complication (CMS/HCC)   • Essential (primary) hypertension   • Mixed hyperlipidemia   • Hiatal hernia   • Fatigue   • Long term current use of antithrombotics/antiplatelets   • Chronic kidney disease, stage 3 (moderate) (CMS/HCC)   • Chronic obstructive pulmonary disease (CMS/HCC)   • Atherosclerotic heart disease of native coronary artery without angina pectoris   • Hypertensive cardiovascular disease   • Irritable bowel syndrome with constipation   • Malignant neoplasm of unspecified kidney, except renal pelvis (CMS/HCC)   • Solitary kidney   • Atherosclerosis of native arteries of extremities with intermittent claudication, bilateral legs (CMS/[facility])   • H/O malignant neoplasm of ureter       Medication List:  Outpatient Encounter Medications as of 5/1/2020   Medication Sig Dispense Refill   • aspirin 81 MG tablet Take 81 mg by mouth Daily.     • calcium-vitamin D (OSCAL 500/200 D-3) 500-200 MG-UNIT per tablet Take  by mouth. Twice weekly     • carvedilol (COREG) 12.5 MG tablet Take 12.5 mg by mouth 2 (Two) Times a Day.     • coenzyme Q10 100 MG capsule Take 1 capsule by mouth Daily.     • fluticasone (FLONASE) 50 MCG/ACT nasal spray USE 2 SPRAYS IN EACH NOSTRIL EVERY DAY     • hyoscyamine (LEVSIN) 0.125 MG SL tablet PLACE 1 TABLET UNDER THE TONGUE EVERY FOUR HOURS AS NEEDED FOR  CRAMPS     • Insulin Glargine (Lantus SoloStar) 100 UNIT/ML injection pen Inject 20 Units under the skin into the appropriate area as directed Every Night. 5 pen 12   • ketoconazole (NIZORAL) 2 % shampoo APPLY TO SCALP THREE TIMES A WEEK     • LINZESS 145 MCG capsule capsule      • nicotine (NICODERM CQ) 14 MG/24HR patch Place 1 patch on the skin as directed by provider Daily.     • sertraline (ZOLOFT) 25 MG tablet Take 1 tablet by mouth Daily. 30 tablet 12   • SITagliptin (Januvia) 100 MG tablet Take 1 tablet by mouth Daily. 30 tablet 12   • tiotropium (SPIRIVA) 18 MCG per inhalation capsule Place 1 capsule into inhaler and inhale Daily. 30 capsule 12   • traMADol (ULTRAM) 50 MG tablet      • [DISCONTINUED] LANTUS SOLOSTAR 100 UNIT/ML injection pen      • [DISCONTINUED] sertraline (ZOLOFT) 25 MG tablet Take 25 mg by mouth Daily.     • [DISCONTINUED] SITagliptin (JANUVIA) 100 MG tablet Take 100 mg by mouth Daily.     • [DISCONTINUED] tiotropium (SPIRIVA) 18 MCG per inhalation capsule Place 1 capsule into inhaler and inhale Daily As Needed.     • ciprofloxacin (CIPRO) 500 MG tablet Take 1 tablet by mouth 2 (Two) Times a Day. 20 tablet 0   • Evolocumab (REPATHA SURECLICK) 140 MG/ML solution auto-injector      • famotidine (PEPCID) 20 MG tablet Take 20 mg by mouth Daily.     • [] fluconazole (DIFLUCAN) 150 MG tablet Take 1 tablet by mouth 1 (One) Time for 1 dose. 1 tablet 1   • fluticasone-salmeterol (ADVAIR HFA) 45-21 MCG/ACT inhaler Inhale 2 puffs 2 (Two) Times a Day.     • lidocaine (LIDODERM) 5 % Place 1 patch on the skin as directed by provider Daily. Remove & Discard patch within 12 hours or as directed by MD 30 patch 3   • omeprazole (priLOSEC) 40 MG capsule TAKE 1 CAPSULE BY MOUTH EVERY MORNING     • rifAXIMin (XIFAXAN PO) Take  by mouth 2 (Two) Times a Day.     • rosuvastatin (CRESTOR) 20 MG tablet Take 20 mg by mouth Daily.     • [DISCONTINUED] insulin glargine (LANTUS) 100 UNIT/ML injection Inject   "under the skin into the appropriate area as directed Daily.       No facility-administered encounter medications on file as of 5/1/2020.        Past Surgical History:  Past Surgical History:   Procedure Laterality Date   • CARDIAC CATHETERIZATION     • CHOLECYSTECTOMY     • COLON SURGERY      REMOVAL   • HERNIA REPAIR     • NEPHRECTOMY         Review of Systems:  Review of Systems   Constitutional: Negative for activity change and fever.   HENT: Positive for ear discharge and ear pain. Negative for rhinorrhea, sinus pressure, sore throat and voice change.    Eyes: Negative for visual disturbance.   Respiratory: Negative for cough and shortness of breath.    Cardiovascular: Negative for chest pain.   Gastrointestinal: Positive for abdominal pain. Negative for diarrhea, nausea and vomiting.   Endocrine: Negative for cold intolerance and heat intolerance.   Genitourinary: Negative for frequency and urgency.   Musculoskeletal: Negative for arthralgias.   Skin: Negative for rash.   Neurological: Negative for syncope.   Hematological: Does not bruise/bleed easily.   Psychiatric/Behavioral: Negative for depressed mood. The patient is not nervous/anxious.        I have reviewed and confirmed the accuracy of the ROS as documented by the MA/LPN/RN Emma Hammer MD    Vital Signs:  Visit Vitals  /72   Pulse 75   Temp 98 °F (36.7 °C)   Resp 17   Ht 167.6 cm (66\")   Wt 89 kg (196 lb 3.2 oz)   SpO2 96%   BMI 31.67 kg/m²       Physical Exam   Constitutional: She is oriented to person, place, and time. She appears well-developed and well-nourished. She is cooperative.   HENT:   Head: Normocephalic and atraumatic.   Right Ear: External ear normal. Tympanic membrane is not injected, not erythematous, not retracted and not bulging. No middle ear effusion.   Left Ear: External ear normal. Tympanic membrane is not injected, not erythematous, not retracted and not bulging.  No middle ear effusion.   Nose: Nose normal. No " rhinorrhea.   Mouth/Throat: Oropharynx is clear and moist. No oropharyngeal exudate.   Cardiovascular: Normal rate, regular rhythm and normal heart sounds. Exam reveals no gallop and no friction rub.   No murmur heard.  Pulmonary/Chest: Effort normal and breath sounds normal. No respiratory distress. She has no wheezes. She has no rales.   Abdominal: Soft. Bowel sounds are normal. She exhibits no distension. There is no tenderness. There is no rebound.   Musculoskeletal: Normal range of motion.        Right hip: She exhibits normal range of motion, normal strength and no tenderness.        Left hip: She exhibits normal range of motion, normal strength and no tenderness.   Lymphadenopathy:     She has no cervical adenopathy.   Neurological: She is alert and oriented to person, place, and time. Coordination normal.   Skin: Skin is warm and dry.   Psychiatric: She has a normal mood and affect.   Vitals reviewed.      Assessment and Plan:  Problem List Items Addressed This Visit        Respiratory    Chronic obstructive pulmonary disease (CMS/[facility])    Overview     Stable         Relevant Medications    tiotropium (SPIRIVA) 18 MCG per inhalation capsule       Endocrine    Type 2 diabetes mellitus without complication (CMS/[facility])    Overview     She is seeing Dr Kaur         Relevant Medications    Insulin Glargine (Lantus SoloStar) 100 UNIT/ML injection pen    SITagliptin (Januvia) 100 MG tablet      Other Visit Diagnoses     Right lower quadrant abdominal pain    -  Primary    u/a is clear. Uncertain etiology of abdominal pain    Relevant Orders    POC Urinalysis Dipstick, Multipro (Completed)    Candida vaginitis            She has an endocrinologist, refilled her medication until she can get in. Her RLQ pain seems more muscular. Exam was negative and no s/s of infection or surgical abdomen. Medication send for yeast infection.   I refilled her sipriva as well. Her COPD is stable.     An After Visit Summary and PPPS  were given to the patient.

## 2020-06-23 ENCOUNTER — OFFICE VISIT (OUTPATIENT)
Dept: FAMILY MEDICINE CLINIC | Facility: CLINIC | Age: 75
End: 2020-06-23

## 2020-06-23 VITALS
SYSTOLIC BLOOD PRESSURE: 132 MMHG | DIASTOLIC BLOOD PRESSURE: 80 MMHG | HEIGHT: 66 IN | WEIGHT: 197.4 LBS | RESPIRATION RATE: 16 BRPM | OXYGEN SATURATION: 95 % | BODY MASS INDEX: 31.72 KG/M2 | TEMPERATURE: 98.1 F | HEART RATE: 56 BPM

## 2020-06-23 DIAGNOSIS — J41.0 SIMPLE CHRONIC BRONCHITIS (HCC): ICD-10-CM

## 2020-06-23 DIAGNOSIS — E78.2 MIXED HYPERLIPIDEMIA: ICD-10-CM

## 2020-06-23 DIAGNOSIS — E11.9 TYPE 2 DIABETES MELLITUS WITHOUT COMPLICATION, WITHOUT LONG-TERM CURRENT USE OF INSULIN (HCC): Primary | ICD-10-CM

## 2020-06-23 DIAGNOSIS — R42 DIZZINESS: ICD-10-CM

## 2020-06-23 DIAGNOSIS — I25.10 ATHEROSCLEROSIS OF NATIVE CORONARY ARTERY OF NATIVE HEART WITHOUT ANGINA PECTORIS: ICD-10-CM

## 2020-06-23 DIAGNOSIS — I10 ESSENTIAL (PRIMARY) HYPERTENSION: ICD-10-CM

## 2020-06-23 DIAGNOSIS — R42 VERTIGO: ICD-10-CM

## 2020-06-23 DIAGNOSIS — R11.0 NAUSEA: ICD-10-CM

## 2020-06-23 DIAGNOSIS — R00.1 BRADYCARDIA: ICD-10-CM

## 2020-06-23 LAB
BILIRUB BLD-MCNC: NEGATIVE MG/DL
CLARITY, POC: CLEAR
COLOR UR: YELLOW
GLUCOSE BLDC GLUCOMTR-MCNC: 94 MG/DL (ref 70–130)
GLUCOSE UR STRIP-MCNC: NEGATIVE MG/DL
HBA1C MFR BLD: 6.1 %
KETONES UR QL: NEGATIVE
LEUKOCYTE EST, POC: NEGATIVE
NITRITE UR-MCNC: NEGATIVE MG/ML
PH UR: 5 [PH] (ref 5–8)
PROT UR STRIP-MCNC: ABNORMAL MG/DL
RBC # UR STRIP: NEGATIVE /UL
SP GR UR: 1.01 (ref 1–1.03)
UROBILINOGEN UR QL: NORMAL

## 2020-06-23 PROCEDURE — 99214 OFFICE O/P EST MOD 30 MIN: CPT | Performed by: FAMILY MEDICINE

## 2020-06-23 PROCEDURE — 82962 GLUCOSE BLOOD TEST: CPT | Performed by: FAMILY MEDICINE

## 2020-06-23 PROCEDURE — 83036 HEMOGLOBIN GLYCOSYLATED A1C: CPT | Performed by: FAMILY MEDICINE

## 2020-06-23 PROCEDURE — 81003 URINALYSIS AUTO W/O SCOPE: CPT | Performed by: FAMILY MEDICINE

## 2020-06-23 RX ORDER — HYDROCHLOROTHIAZIDE 25 MG/1
25 TABLET ORAL DAILY
COMMUNITY
Start: 2020-06-11 | End: 2020-06-25

## 2020-06-23 RX ORDER — PROMETHAZINE HYDROCHLORIDE 25 MG/1
25 TABLET ORAL EVERY 6 HOURS PRN
Qty: 30 TABLET | Refills: 0 | Status: SHIPPED | OUTPATIENT
Start: 2020-06-23 | End: 2020-09-03

## 2020-06-23 RX ORDER — TIZANIDINE 4 MG/1
4 TABLET ORAL EVERY 8 HOURS PRN
COMMUNITY
Start: 2020-05-26 | End: 2020-09-03

## 2020-06-23 RX ORDER — GLUCOSAMINE HCL/CHONDROITIN SU 500-400 MG
1 CAPSULE ORAL 3 TIMES DAILY
Qty: 300 EACH | Refills: 3 | Status: SHIPPED | OUTPATIENT
Start: 2020-06-23 | End: 2020-09-03 | Stop reason: SDUPTHER

## 2020-06-23 RX ORDER — CEFDINIR 300 MG/1
CAPSULE ORAL
COMMUNITY
Start: 2020-06-22 | End: 2020-08-10

## 2020-06-23 RX ORDER — ONDANSETRON 4 MG/1
TABLET, FILM COATED ORAL
COMMUNITY
Start: 2020-06-22 | End: 2020-09-03

## 2020-06-23 NOTE — PROGRESS NOTES
Subjective   Vianey Reeves is a 74 y.o. female.     Chief Complaint   Patient presents with   • Dizziness       Dizziness   This is a new problem. The current episode started in the past 7 days. The problem occurs constantly. The problem has been gradually improving. Associated symptoms include nausea. Pertinent negatives include no abdominal pain, arthralgias, chest pain, coughing, fever, neck pain, rash, visual change or vomiting. The symptoms are aggravated by walking and standing. She has tried rest for the symptoms.   Coronary Artery Disease   Presents for follow-up visit. Symptoms include dizziness. Pertinent negatives include no chest pain, chest pressure, chest tightness or shortness of breath. The symptoms have been stable. Compliance with diet is variable. Compliance with exercise is variable. Compliance with medications is good.   Hypertension   This is a chronic problem. The current episode started more than 1 year ago. The problem is unchanged. The problem is controlled. Pertinent negatives include no anxiety, chest pain, malaise/fatigue, neck pain, orthopnea, peripheral edema, PND or shortness of breath. Risk factors for coronary artery disease include diabetes mellitus and dyslipidemia. Current antihypertension treatment includes beta blockers and diuretics. The current treatment provides moderate improvement.   COPD   There is no cough or shortness of breath. This is a chronic problem. The current episode started more than 1 year ago. The problem occurs constantly. The problem has been unchanged. Pertinent negatives include no chest pain, ear pain, fever, malaise/fatigue, orthopnea, PND, postnasal drip, rhinorrhea or trouble swallowing. Her symptoms are aggravated by change in weather and URI. Her symptoms are alleviated by beta-agonist. She reports significant improvement on treatment.   Chronic Kidney Disease   This is a chronic problem. The current episode started more than 1 year ago. The  problem occurs constantly. The problem has been unchanged. Associated symptoms include nausea. Pertinent negatives include no abdominal pain, arthralgias, chest pain, coughing, fever, neck pain, rash, visual change or vomiting. Nothing aggravates the symptoms. She has tried nothing for the symptoms.      She has had a headache in the front of the head. Turning her head too quick makes her swimmy. No fall. No loss of vision. No change in her vision. After getting up and moving she will get dizzy.     The following portions of the patient's history were reviewed and updated as appropriate: allergies, current medications, past family history, past medical history, past social history, past surgical history and problem list.    Allergies:  Allergies   Allergen Reactions   • Erythromycin Rash   • Naproxen Sodium Unknown (See Comments)   • Latex Itching and Swelling   • Metoclopramide Unknown (See Comments)       Social History:  Social History     Socioeconomic History   • Marital status:      Spouse name: Not on file   • Number of children: Not on file   • Years of education: Not on file   • Highest education level: Not on file   Tobacco Use   • Smoking status: Current Every Day Smoker     Packs/day: 1.00     Types: Cigarettes   • Smokeless tobacco: Never Used   Substance and Sexual Activity   • Alcohol use: Yes     Frequency: Never     Comment: occasionally    • Drug use: Never       Family History:  Family History   Problem Relation Age of Onset   • Arthritis Father    • Prostate cancer Father    • Heart disease Sister        Past Medical History :  Patient Active Problem List   Diagnosis   • Type 2 diabetes mellitus without complication (CMS/HCC)   • Essential (primary) hypertension   • Mixed hyperlipidemia   • Hiatal hernia   • Fatigue   • Long term current use of antithrombotics/antiplatelets   • Chronic kidney disease, stage 3 (moderate) (CMS/HCC)   • Chronic obstructive pulmonary disease (CMS/HCC)   •  Atherosclerotic heart disease of native coronary artery without angina pectoris   • Hypertensive cardiovascular disease   • Irritable bowel syndrome with constipation   • Malignant neoplasm of unspecified kidney, except renal pelvis (CMS/HCC)   • Solitary kidney   • Atherosclerosis of native arteries of extremities with intermittent claudication, bilateral legs (CMS/HCC)   • H/O malignant neoplasm of ureter   • Vertigo   • Bradycardia       Medication List:  Outpatient Encounter Medications as of 6/23/2020   Medication Sig Dispense Refill   • aspirin 81 MG tablet Take 81 mg by mouth Daily.     • calcium-vitamin D (Oscal 500/200 D-3) 500-200 MG-UNIT per tablet Take  by mouth. Twice weekly     • cefdinir (OMNICEF) 300 MG capsule      • coenzyme Q10 100 MG capsule Take 1 capsule by mouth Daily.     • famotidine (PEPCID) 20 MG tablet Take 20 mg by mouth Daily.     • fluticasone (FLONASE) 50 MCG/ACT nasal spray USE 2 SPRAYS IN EACH NOSTRIL EVERY DAY     • fluticasone-salmeterol (ADVAIR HFA) 45-21 MCG/ACT inhaler Inhale 2 puffs 2 (Two) Times a Day.     • Insulin Glargine (Lantus SoloStar) 100 UNIT/ML injection pen Inject 20 Units under the skin into the appropriate area as directed Every Night. 5 pen 12   • ketoconazole (NIZORAL) 2 % shampoo APPLY TO SCALP THREE TIMES A WEEK     • lidocaine (LIDODERM) 5 % Place 1 patch on the skin as directed by provider Daily. Remove & Discard patch within 12 hours or as directed by MD 30 patch 3   • LINZESS 145 MCG capsule capsule      • nicotine (NICODERM CQ) 14 MG/24HR patch Place 1 patch on the skin as directed by provider Daily.     • ondansetron (ZOFRAN) 4 MG tablet      • sertraline (ZOLOFT) 25 MG tablet Take 1 tablet by mouth Daily. 30 tablet 12   • SITagliptin (Januvia) 100 MG tablet Take 1 tablet by mouth Daily. 30 tablet 12   • tiZANidine (ZANAFLEX) 4 MG tablet Take 4 mg by mouth Every 8 (Eight) Hours As Needed.     • [DISCONTINUED] carvedilol (COREG) 12.5 MG tablet Take 12.5  mg by mouth 2 (Two) Times a Day.     • [DISCONTINUED] hydroCHLOROthiazide (HYDRODIURIL) 25 MG tablet Take 25 mg by mouth Daily.     • Evolocumab (REPATHA SURECLICK) 140 MG/ML solution auto-injector      • Glucose Blood (BLOOD GLUCOSE TEST) strip 1 each by In Vitro route 3 (Three) Times a Day. 300 each 3   • hyoscyamine (LEVSIN) 0.125 MG SL tablet PLACE 1 TABLET UNDER THE TONGUE EVERY FOUR HOURS AS NEEDED FOR CRAMPS     • omeprazole (priLOSEC) 40 MG capsule TAKE 1 CAPSULE BY MOUTH EVERY MORNING     • promethazine (PHENERGAN) 25 MG tablet Take 1 tablet by mouth Every 6 (Six) Hours As Needed for Nausea or Vomiting. 30 tablet 0   • rifAXIMin (XIFAXAN PO) Take  by mouth 2 (Two) Times a Day.     • rosuvastatin (CRESTOR) 20 MG tablet Take 20 mg by mouth Daily.     • tiotropium (SPIRIVA) 18 MCG per inhalation capsule Place 1 capsule into inhaler and inhale Daily. 30 capsule 12   • traMADol (ULTRAM) 50 MG tablet      • [DISCONTINUED] ciprofloxacin (CIPRO) 500 MG tablet Take 1 tablet by mouth 2 (Two) Times a Day. 20 tablet 0     No facility-administered encounter medications on file as of 6/23/2020.        Past Surgical History:  Past Surgical History:   Procedure Laterality Date   • CARDIAC CATHETERIZATION     • CHOLECYSTECTOMY     • COLON SURGERY      REMOVAL   • HERNIA REPAIR     • NEPHRECTOMY         Review of Systems:  Review of Systems   Constitutional: Negative for activity change, fever and malaise/fatigue.   HENT: Negative for ear pain, postnasal drip, rhinorrhea, sinus pressure, trouble swallowing and voice change.    Eyes: Negative for visual disturbance.   Respiratory: Negative for cough, chest tightness and shortness of breath.    Cardiovascular: Negative for chest pain, orthopnea and PND.   Gastrointestinal: Positive for nausea. Negative for abdominal pain, diarrhea and vomiting.   Endocrine: Negative for cold intolerance and heat intolerance.   Genitourinary: Negative for frequency and urgency.  "  Musculoskeletal: Negative for arthralgias and neck pain.   Skin: Negative for rash.   Neurological: Positive for dizziness. Negative for syncope.   Hematological: Does not bruise/bleed easily.   Psychiatric/Behavioral: Negative for depressed mood. The patient is not nervous/anxious.        I have reviewed and confirmed the accuracy of the ROS as documented by the MA/LPN/RN Emma Hammer MD    Vital Signs:  Visit Vitals  /80   Pulse 56   Temp 98.1 °F (36.7 °C)   Resp 16   Ht 167.6 cm (66\")   Wt 89.5 kg (197 lb 6.4 oz)   SpO2 95%   BMI 31.86 kg/m²       Physical Exam   Constitutional: She is oriented to person, place, and time. She appears well-developed and well-nourished. She is cooperative. No distress.   HENT:   Head: Normocephalic and atraumatic.   Right Ear: External ear normal.   Left Ear: External ear normal.   Nose: Nose normal.   Mouth/Throat: Oropharynx is clear and moist. No oropharyngeal exudate.   Eyes: Pupils are equal, round, and reactive to light. Conjunctivae and EOM are normal. Right eye exhibits no discharge. Left eye exhibits no discharge. No scleral icterus.   Neck: Normal range of motion. Neck supple. Carotid bruit is not present. No thyromegaly present.   Cardiovascular: Normal rate, regular rhythm, normal heart sounds and intact distal pulses. Exam reveals no gallop and no friction rub.   No murmur heard.  Pulmonary/Chest: Effort normal and breath sounds normal. No respiratory distress. She has no wheezes. She has no rales.   Abdominal: Soft. Bowel sounds are normal. She exhibits no distension. There is no tenderness. There is no rebound and no guarding.   Musculoskeletal: Normal range of motion. She exhibits no edema, tenderness or deformity.   Lymphadenopathy:     She has no cervical adenopathy.   Neurological: She is alert and oriented to person, place, and time. She displays normal reflexes. No cranial nerve deficit. She exhibits normal muscle tone. Coordination normal.   Skin: " Skin is warm and dry. Capillary refill takes less than 2 seconds. Turgor is normal. No rash noted. She is not diaphoretic. No erythema. No pallor.   Psychiatric: She has a normal mood and affect. Her behavior is normal.   Vitals reviewed.      Assessment and Plan:  Problem List Items Addressed This Visit        Cardiovascular and Mediastinum    Essential (primary) hypertension    Overview     Stable         Relevant Orders    TSH (Completed)    Mixed hyperlipidemia    Overview     She sees endocrinology  She can not tolerate statins         Relevant Orders    Comprehensive Metabolic Panel (Completed)    Lipid Panel With / Chol / HDL Ratio (Completed)    Atherosclerotic heart disease of native coronary artery without angina pectoris    Overview     Stable         Bradycardia    Overview     Stop the carvediolol. Her HR was getting low and she was getting dizzy.             Respiratory    Chronic obstructive pulmonary disease (CMS/HCC)    Overview     Stable         Relevant Medications    promethazine (PHENERGAN) 25 MG tablet       Endocrine    Type 2 diabetes mellitus without complication (CMS/[facility]) - Primary    Overview     A1C 6.1 6/23/2020         Relevant Medications    Glucose Blood (BLOOD GLUCOSE TEST) strip    Other Relevant Orders    POC Glucose (Completed)    POC Urinalysis Dipstick, Multipro (Completed)    POC Glycosylated Hemoglobin (Hb A1C) (Completed)       Other    Vertigo    Relevant Orders    CT Head Without Contrast (Completed)      Other Visit Diagnoses     Dizziness        Relevant Orders    CBC & Differential (Completed)    Nausea        Relevant Medications    promethazine (PHENERGAN) 25 MG tablet        Will check labs. Hold coreg Recheck in a few days    An After Visit Summary and PPPS were given to the patient.

## 2020-06-24 ENCOUNTER — HOSPITAL ENCOUNTER (OUTPATIENT)
Dept: CT IMAGING | Facility: HOSPITAL | Age: 75
Discharge: HOME | End: 2020-06-24
Admitting: FAMILY MEDICINE

## 2020-06-24 DIAGNOSIS — R42 VERTIGO: ICD-10-CM

## 2020-06-24 LAB
ALBUMIN SERPL-MCNC: 3.7 G/DL (ref 3.7–4.7)
ALBUMIN/GLOB SERPL: 1.6 {RATIO} (ref 1.2–2.2)
ALP SERPL-CCNC: 56 IU/L (ref 39–117)
ALT SERPL-CCNC: 11 IU/L (ref 0–32)
AST SERPL-CCNC: 12 IU/L (ref 0–40)
BASOPHILS # BLD AUTO: 0 X10E3/UL (ref 0–0.2)
BASOPHILS NFR BLD AUTO: 1 %
BILIRUB SERPL-MCNC: <0.2 MG/DL (ref 0–1.2)
BUN SERPL-MCNC: 27 MG/DL (ref 8–27)
BUN/CREAT SERPL: 15 (ref 12–28)
CALCIUM SERPL-MCNC: 9.2 MG/DL (ref 8.7–10.3)
CHLORIDE SERPL-SCNC: 105 MMOL/L (ref 96–106)
CHOLEST SERPL-MCNC: 184 MG/DL (ref 100–199)
CHOLEST/HDLC SERPL: 5 RATIO (ref 0–4.4)
CO2 SERPL-SCNC: 29 MMOL/L (ref 20–29)
CREAT SERPL-MCNC: 1.86 MG/DL (ref 0.57–1)
EOSINOPHIL # BLD AUTO: 0.1 X10E3/UL (ref 0–0.4)
EOSINOPHIL NFR BLD AUTO: 2 %
ERYTHROCYTE [DISTWIDTH] IN BLOOD BY AUTOMATED COUNT: 12.1 % (ref 11.7–15.4)
GLOBULIN SER CALC-MCNC: 2.3 G/DL (ref 1.5–4.5)
GLUCOSE SERPL-MCNC: 91 MG/DL (ref 65–99)
HCT VFR BLD AUTO: 44.1 % (ref 34–46.6)
HDLC SERPL-MCNC: 37 MG/DL
HGB BLD-MCNC: 14.2 G/DL (ref 11.1–15.9)
IMM GRANULOCYTES # BLD AUTO: 0 X10E3/UL (ref 0–0.1)
IMM GRANULOCYTES NFR BLD AUTO: 0 %
LDLC SERPL CALC-MCNC: 121 MG/DL (ref 0–99)
LYMPHOCYTES # BLD AUTO: 2.8 X10E3/UL (ref 0.7–3.1)
LYMPHOCYTES NFR BLD AUTO: 36 %
MCH RBC QN AUTO: 31.3 PG (ref 26.6–33)
MCHC RBC AUTO-ENTMCNC: 32.2 G/DL (ref 31.5–35.7)
MCV RBC AUTO: 97 FL (ref 79–97)
MONOCYTES # BLD AUTO: 0.7 X10E3/UL (ref 0.1–0.9)
MONOCYTES NFR BLD AUTO: 9 %
NEUTROPHILS # BLD AUTO: 4.2 X10E3/UL (ref 1.4–7)
NEUTROPHILS NFR BLD AUTO: 52 %
PLATELET # BLD AUTO: 206 X10E3/UL (ref 150–450)
POTASSIUM SERPL-SCNC: 4.5 MMOL/L (ref 3.5–5.2)
PROT SERPL-MCNC: 6 G/DL (ref 6–8.5)
RBC # BLD AUTO: 4.54 X10E6/UL (ref 3.77–5.28)
SODIUM SERPL-SCNC: 144 MMOL/L (ref 134–144)
TRIGL SERPL-MCNC: 130 MG/DL (ref 0–149)
TSH SERPL DL<=0.005 MIU/L-ACNC: 1.84 UIU/ML (ref 0.45–4.5)
VLDLC SERPL CALC-MCNC: 26 MG/DL (ref 5–40)
WBC # BLD AUTO: 8 X10E3/UL (ref 3.4–10.8)

## 2020-06-24 PROCEDURE — 70450 CT HEAD/BRAIN W/O DYE: CPT

## 2020-06-25 ENCOUNTER — OFFICE VISIT (OUTPATIENT)
Dept: CARDIOLOGY | Facility: CLINIC | Age: 75
End: 2020-06-25

## 2020-06-25 ENCOUNTER — OFFICE VISIT (OUTPATIENT)
Dept: FAMILY MEDICINE CLINIC | Facility: CLINIC | Age: 75
End: 2020-06-25

## 2020-06-25 VITALS
OXYGEN SATURATION: 96 % | WEIGHT: 196 LBS | SYSTOLIC BLOOD PRESSURE: 152 MMHG | HEART RATE: 74 BPM | DIASTOLIC BLOOD PRESSURE: 82 MMHG | BODY MASS INDEX: 31.64 KG/M2

## 2020-06-25 VITALS
OXYGEN SATURATION: 94 % | RESPIRATION RATE: 16 BRPM | WEIGHT: 197.2 LBS | HEIGHT: 66 IN | TEMPERATURE: 98.2 F | BODY MASS INDEX: 31.69 KG/M2 | DIASTOLIC BLOOD PRESSURE: 80 MMHG | SYSTOLIC BLOOD PRESSURE: 134 MMHG | HEART RATE: 60 BPM

## 2020-06-25 DIAGNOSIS — N18.30 CHRONIC KIDNEY DISEASE, STAGE 3 (MODERATE): ICD-10-CM

## 2020-06-25 DIAGNOSIS — I10 ESSENTIAL (PRIMARY) HYPERTENSION: ICD-10-CM

## 2020-06-25 DIAGNOSIS — I10 ESSENTIAL (PRIMARY) HYPERTENSION: Primary | ICD-10-CM

## 2020-06-25 DIAGNOSIS — E78.2 MIXED HYPERLIPIDEMIA: ICD-10-CM

## 2020-06-25 DIAGNOSIS — IMO0002 SOLITARY KIDNEY: ICD-10-CM

## 2020-06-25 DIAGNOSIS — R00.1 BRADYCARDIA: ICD-10-CM

## 2020-06-25 DIAGNOSIS — N18.30 CHRONIC KIDNEY DISEASE, STAGE 3 (MODERATE): Primary | ICD-10-CM

## 2020-06-25 DIAGNOSIS — E11.9 TYPE 2 DIABETES MELLITUS WITHOUT COMPLICATION, WITHOUT LONG-TERM CURRENT USE OF INSULIN (HCC): ICD-10-CM

## 2020-06-25 DIAGNOSIS — I25.10 ATHEROSCLEROSIS OF NATIVE CORONARY ARTERY OF NATIVE HEART WITHOUT ANGINA PECTORIS: ICD-10-CM

## 2020-06-25 DIAGNOSIS — I11.9 HYPERTENSIVE HEART DISEASE, UNSPECIFIED WHETHER HEART FAILURE PRESENT: ICD-10-CM

## 2020-06-25 DIAGNOSIS — J41.0 SIMPLE CHRONIC BRONCHITIS (HCC): ICD-10-CM

## 2020-06-25 DIAGNOSIS — R42 VERTIGO: ICD-10-CM

## 2020-06-25 PROCEDURE — 99214 OFFICE O/P EST MOD 30 MIN: CPT | Performed by: INTERNAL MEDICINE

## 2020-06-25 PROCEDURE — 99214 OFFICE O/P EST MOD 30 MIN: CPT | Performed by: FAMILY MEDICINE

## 2020-06-25 RX ORDER — AMLODIPINE BESYLATE 10 MG/1
10 TABLET ORAL DAILY
Qty: 30 TABLET | Refills: 12 | Status: SHIPPED | OUTPATIENT
Start: 2020-06-25 | End: 2021-08-31

## 2020-06-25 NOTE — PROGRESS NOTES
Cardiology Office Visit      Encounter Date:  06/25/2020    Patient ID:   Vianey Reeves is a 74 y.o. female.    Reason For Followup:  Coronary artery disease  Hypertension  Hypertensive cardiovascular disease    Brief Clinical History:  Dear Dr. Hammer, Emma Simmons MD    I had the pleasure of seeing Vianey Reeves today. As you are well aware, this is a 74 y.o. female with a known history of ischemic heart disease.  She underwent cardiac catheterization with PCI and drug-eluting stent placement on April 9, 2016 and follow-up PCI on 5/25/2016. She presents today for follow-up on the above conditions.        Interval History:  She denies any chest pain pressure heaviness or tightness.  She denies any shortness of breath out of character. She denies any PND orthopnea.  She denies any syncope or near syncope.     Her blood pressure is elevated today.  It is been elevated on the last several visits.  Her daughter accompanies her today.  She recently was seen in the emergency department because of sudden onset of nausea vomiting and lightheadedness.  Work-up was negative for cardiac etiology but I did reveal that her GFR had dropped.  Her hydrochlorothiazide was stopped.  Her heart rate was slow.  Her beta-blocker was reduced and then discontinued.  She is currently not on an antihypertensive but is supposed to start amlodipine tomorrow.   She is scheduled to see her nephrologist on Monday.     Assessment & Plan     Impressions:  Coronary artery disease status post PCI and drug-eluting stent placement in her mid RCA and Cx        Negative nuclear stress test May 2019  Diabetes mellitus.  Dyslipidemia with intolerance to statins.  Hypertension with hypertensive cardiovascular disease. Suboptimal but better at home.  Solitary kidney.  Chronic renal failure with declining GFR  Tobacco abuse  Anti-platelet therapy.  Fatigue  Dizziness     Recommendations:  Start amlodipine 5 mg daily as previously directed  Monitor blood  pressure notify if persistently elevated.  Smoking cessation  Follow-up in 3 months time sooner should there be difficulties    Objective:    Vitals:  Vitals:    06/25/20 1530   BP: 152/82   Pulse: 74   SpO2: 96%   Weight: 88.9 kg (196 lb)       Physical Exam:    General: Alert, cooperative, no distress, appears stated age  Head:  Normocephalic, atraumatic, mucous membranes moist  Eyes:  Conjunctiva/corneas clear, EOM's intact     Neck:  Supple,  no bruit  Lungs: Clear to auscultation bilaterally, no wheezes rhonchi rales are noted  Chest wall: No tenderness  Heart::  Regular rate and rhythm, S1 and S2 normal, 1/6 holosystolic murmur.  No rub or gallop  Abdomen: Soft, non-tender, nondistended bowel sounds active  Extremities: No cyanosis, clubbing, or edema  Pulses: 2+ and symmetric all extremities  Skin:  No rashes or lesions  Neuro/psych: A&O x3. CN II through XII are grossly intact with appropriate affect      Allergies:  Allergies   Allergen Reactions   • Erythromycin Rash   • Naproxen Sodium Unknown (See Comments)   • Latex Itching and Swelling   • Metoclopramide Unknown (See Comments)       Medication Review:     Current Outpatient Medications:   •  amLODIPine (NORVASC) 10 MG tablet, Take 1 tablet by mouth Daily. (Patient taking differently: Take 10 mg by mouth Daily. Will start tomorrow on 5 mg daily), Disp: 30 tablet, Rfl: 12  •  aspirin 81 MG tablet, Take 81 mg by mouth Daily., Disp: , Rfl:   •  calcium-vitamin D (Oscal 500/200 D-3) 500-200 MG-UNIT per tablet, Take  by mouth. Twice weekly, Disp: , Rfl:   •  cefdinir (OMNICEF) 300 MG capsule, , Disp: , Rfl:   •  coenzyme Q10 100 MG capsule, Take 1 capsule by mouth Daily., Disp: , Rfl:   •  fluticasone (FLONASE) 50 MCG/ACT nasal spray, USE 2 SPRAYS IN EACH NOSTRIL EVERY DAY, Disp: , Rfl:   •  fluticasone-salmeterol (ADVAIR HFA) 45-21 MCG/ACT inhaler, Inhale 2 puffs 2 (Two) Times a Day., Disp: , Rfl:   •  Glucose Blood (BLOOD GLUCOSE TEST) strip, 1 each by In  Vitro route 3 (Three) Times a Day., Disp: 300 each, Rfl: 3  •  Insulin Glargine (Lantus SoloStar) 100 UNIT/ML injection pen, Inject 20 Units under the skin into the appropriate area as directed Every Night., Disp: 5 pen, Rfl: 12  •  LINZESS 145 MCG capsule capsule, , Disp: , Rfl:   •  promethazine (PHENERGAN) 25 MG tablet, Take 1 tablet by mouth Every 6 (Six) Hours As Needed for Nausea or Vomiting., Disp: 30 tablet, Rfl: 0  •  sertraline (ZOLOFT) 25 MG tablet, Take 1 tablet by mouth Daily., Disp: 30 tablet, Rfl: 12  •  SITagliptin (Januvia) 100 MG tablet, Take 1 tablet by mouth Daily., Disp: 30 tablet, Rfl: 12  •  tiotropium (SPIRIVA) 18 MCG per inhalation capsule, Place 1 capsule into inhaler and inhale Daily., Disp: 30 capsule, Rfl: 12  •  tiZANidine (ZANAFLEX) 4 MG tablet, Take 4 mg by mouth Every 8 (Eight) Hours As Needed., Disp: , Rfl:   •  traMADol (ULTRAM) 50 MG tablet, , Disp: , Rfl:   •  carvedilol (COREG) 12.5 MG tablet, Take 12.5 mg by mouth 2 (Two) Times a Day., Disp: , Rfl:   •  Evolocumab (REPATHA SURECLICK) 140 MG/ML solution auto-injector, , Disp: , Rfl:   •  famotidine (PEPCID) 20 MG tablet, Take 20 mg by mouth Daily., Disp: , Rfl:   •  hyoscyamine (LEVSIN) 0.125 MG SL tablet, PLACE 1 TABLET UNDER THE TONGUE EVERY FOUR HOURS AS NEEDED FOR CRAMPS, Disp: , Rfl:   •  ketoconazole (NIZORAL) 2 % shampoo, APPLY TO SCALP THREE TIMES A WEEK, Disp: , Rfl:   •  lidocaine (LIDODERM) 5 %, Place 1 patch on the skin as directed by provider Daily. Remove & Discard patch within 12 hours or as directed by MD, Disp: 30 patch, Rfl: 3  •  nicotine (NICODERM CQ) 14 MG/24HR patch, Place 1 patch on the skin as directed by provider Daily., Disp: , Rfl:   •  omeprazole (priLOSEC) 40 MG capsule, TAKE 1 CAPSULE BY MOUTH EVERY MORNING, Disp: , Rfl:   •  ondansetron (ZOFRAN) 4 MG tablet, , Disp: , Rfl:   •  rifAXIMin (XIFAXAN PO), Take  by mouth 2 (Two) Times a Day., Disp: , Rfl:   •  rosuvastatin (CRESTOR) 20 MG tablet, Take  20 mg by mouth Daily., Disp: , Rfl:     Family History:  Family History   Problem Relation Age of Onset   • Arthritis Father    • Prostate cancer Father    • Heart disease Sister        Past Medical History:  Past Medical History:   Diagnosis Date   • Anxiety    • Bilateral carotid bruits    • CKD (chronic kidney disease)    • Claudication, intermittent (CMS/HCC)    • Coronary artery disease    • DDD (degenerative disc disease), thoracic    • Diabetes mellitus (CMS/HCC)    • Gout    • 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   • Hypertension    • Mass of right breast    • Renal insufficiency    • Simple chronic bronchitis (CMS/HCC)        Past surgical History:  Past Surgical History:   Procedure Laterality Date   • CARDIAC CATHETERIZATION     • CHOLECYSTECTOMY     • COLON SURGERY      REMOVAL   • HERNIA REPAIR     • NEPHRECTOMY         Social History:  Social History     Socioeconomic History   • Marital status:      Spouse name: Not on file   • Number of children: Not on file   • Years of education: Not on file   • Highest education level: Not on file   Tobacco Use   • Smoking status: Current Every Day Smoker     Packs/day: 1.00     Types: Cigarettes   • Smokeless tobacco: Never Used   Substance and Sexual Activity   • Alcohol use: Yes     Frequency: Never     Comment: occasionally    • Drug use: Never       Review of Systems:  The following systems were reviewed as they relate to the cardiovascular system: Constitutional, Eyes, ENT, Cardiovascular, Respiratory, Gastrointestinal, Integumentary, Neurological, Psychiatric, Hematologic, Endocrine, Musculoskeletal, and Genitourinary. The pertinent cardiovascular findings are reported above with all other cardiovascular points within those systems being negative.    Diagnostic Study Review:     Current Electrocardiogram:  Procedures no new EKG.  EKG dated 12/30/2019 demonstrates normal sinus rhythm with a ventricular rate of 63 bpm.  Low voltage in the  precordial leads.  Normal QT and QTc intervals.      NOTE: The following portions of the patient's history were reviewed and updated this visit as appropriate: allergies, current medications, past family history, past medical history, past social history, past surgical history and problem list.

## 2020-06-25 NOTE — PROGRESS NOTES
Subjective   Vianey Reeves is a 74 y.o. female.     Chief Complaint   Patient presents with   • Dizziness   • Headache       Dizziness   This is a new problem. The current episode started in the past 7 days. The problem occurs constantly. The problem has been unchanged. Associated symptoms include headaches and nausea. Pertinent negatives include no abdominal pain, arthralgias, chest pain, coughing, fever, rash or vomiting. Nothing aggravates the symptoms. She has tried nothing for the symptoms.   Headache    The current episode started in the past 7 days. The problem occurs intermittently. The problem has been waxing and waning. The pain is located in the frontal region. The pain quality is similar to prior headaches. The quality of the pain is described as aching and throbbing. Associated symptoms include dizziness and nausea. Pertinent negatives include no abdominal pain, coughing, ear pain, fever, rhinorrhea, sinus pressure or vomiting. Nothing aggravates the symptoms. She has tried nothing for the symptoms.    CT scan was negative  She stopped the carvedilol and hctz    The following portions of the patient's history were reviewed and updated as appropriate: allergies, current medications, past family history, past medical history, past social history, past surgical history and problem list.    Allergies:  Allergies   Allergen Reactions   • Erythromycin Rash   • Naproxen Sodium Unknown (See Comments)   • Latex Itching and Swelling   • Metoclopramide Unknown (See Comments)       Social History:  Social History     Socioeconomic History   • Marital status:      Spouse name: Not on file   • Number of children: Not on file   • Years of education: Not on file   • Highest education level: Not on file   Tobacco Use   • Smoking status: Current Every Day Smoker     Packs/day: 1.00     Types: Cigarettes   • Smokeless tobacco: Never Used   Substance and Sexual Activity   • Alcohol use: Yes     Frequency: Never      Comment: occasionally    • Drug use: Never       Family History:  Family History   Problem Relation Age of Onset   • Arthritis Father    • Prostate cancer Father    • Heart disease Sister        Past Medical History :  Patient Active Problem List   Diagnosis   • Type 2 diabetes mellitus without complication (CMS/HCC)   • Essential (primary) hypertension   • Mixed hyperlipidemia   • Hiatal hernia   • Fatigue   • Long term current use of antithrombotics/antiplatelets   • Chronic kidney disease, stage 3 (moderate) (CMS/HCC)   • Chronic obstructive pulmonary disease (CMS/HCC)   • Atherosclerotic heart disease of native coronary artery without angina pectoris   • Hypertensive cardiovascular disease   • Irritable bowel syndrome with constipation   • Malignant neoplasm of unspecified kidney, except renal pelvis (CMS/HCC)   • Solitary kidney   • Atherosclerosis of native arteries of extremities with intermittent claudication, bilateral legs (CMS/HCC)   • H/O malignant neoplasm of ureter   • Vertigo   • Bradycardia       Medication List:  Outpatient Encounter Medications as of 6/25/2020   Medication Sig Dispense Refill   • aspirin 81 MG tablet Take 81 mg by mouth Daily.     • calcium-vitamin D (Oscal 500/200 D-3) 500-200 MG-UNIT per tablet Take  by mouth. Twice weekly     • cefdinir (OMNICEF) 300 MG capsule      • coenzyme Q10 100 MG capsule Take 1 capsule by mouth Daily.     • Evolocumab (REPATHA SURECLICK) 140 MG/ML solution auto-injector      • famotidine (PEPCID) 20 MG tablet Take 20 mg by mouth Daily.     • fluticasone (FLONASE) 50 MCG/ACT nasal spray USE 2 SPRAYS IN EACH NOSTRIL EVERY DAY     • fluticasone-salmeterol (ADVAIR HFA) 45-21 MCG/ACT inhaler Inhale 2 puffs 2 (Two) Times a Day.     • Glucose Blood (BLOOD GLUCOSE TEST) strip 1 each by In Vitro route 3 (Three) Times a Day. 300 each 3   • hyoscyamine (LEVSIN) 0.125 MG SL tablet PLACE 1 TABLET UNDER THE TONGUE EVERY FOUR HOURS AS NEEDED FOR CRAMPS     • Insulin  Glargine (Lantus SoloStar) 100 UNIT/ML injection pen Inject 20 Units under the skin into the appropriate area as directed Every Night. 5 pen 12   • ketoconazole (NIZORAL) 2 % shampoo APPLY TO SCALP THREE TIMES A WEEK     • lidocaine (LIDODERM) 5 % Place 1 patch on the skin as directed by provider Daily. Remove & Discard patch within 12 hours or as directed by MD 30 patch 3   • LINZESS 145 MCG capsule capsule      • nicotine (NICODERM CQ) 14 MG/24HR patch Place 1 patch on the skin as directed by provider Daily.     • omeprazole (priLOSEC) 40 MG capsule TAKE 1 CAPSULE BY MOUTH EVERY MORNING     • ondansetron (ZOFRAN) 4 MG tablet      • promethazine (PHENERGAN) 25 MG tablet Take 1 tablet by mouth Every 6 (Six) Hours As Needed for Nausea or Vomiting. 30 tablet 0   • rifAXIMin (XIFAXAN PO) Take  by mouth 2 (Two) Times a Day.     • rosuvastatin (CRESTOR) 20 MG tablet Take 20 mg by mouth Daily.     • sertraline (ZOLOFT) 25 MG tablet Take 1 tablet by mouth Daily. 30 tablet 12   • SITagliptin (Januvia) 100 MG tablet Take 1 tablet by mouth Daily. 30 tablet 12   • tiotropium (SPIRIVA) 18 MCG per inhalation capsule Place 1 capsule into inhaler and inhale Daily. 30 capsule 12   • tiZANidine (ZANAFLEX) 4 MG tablet Take 4 mg by mouth Every 8 (Eight) Hours As Needed.     • traMADol (ULTRAM) 50 MG tablet      • [DISCONTINUED] carvedilol (COREG) 12.5 MG tablet Take 12.5 mg by mouth 2 (Two) Times a Day.     • [DISCONTINUED] hydroCHLOROthiazide (HYDRODIURIL) 25 MG tablet Take 25 mg by mouth Daily.     • amLODIPine (NORVASC) 10 MG tablet Take 1 tablet by mouth Daily. (Patient taking differently: Take 10 mg by mouth Daily. Will start tomorrow on 5 mg daily) 30 tablet 12   • [DISCONTINUED] ciprofloxacin (CIPRO) 500 MG tablet Take 1 tablet by mouth 2 (Two) Times a Day. 20 tablet 0     No facility-administered encounter medications on file as of 6/25/2020.        Past Surgical History:  Past Surgical History:   Procedure Laterality Date   •  "CARDIAC CATHETERIZATION     • CHOLECYSTECTOMY     • COLON SURGERY      REMOVAL   • HERNIA REPAIR     • NEPHRECTOMY         Review of Systems:  Review of Systems   Constitutional: Negative for activity change and fever.   HENT: Negative for ear pain, rhinorrhea, sinus pressure and voice change.    Eyes: Negative for visual disturbance.   Respiratory: Negative for cough and shortness of breath.    Cardiovascular: Negative for chest pain.   Gastrointestinal: Positive for nausea. Negative for abdominal pain, diarrhea and vomiting.   Endocrine: Negative for cold intolerance and heat intolerance.   Genitourinary: Negative for frequency and urgency.   Musculoskeletal: Negative for arthralgias.   Skin: Negative for rash.   Neurological: Positive for dizziness. Negative for syncope.   Hematological: Does not bruise/bleed easily.   Psychiatric/Behavioral: Negative for depressed mood. The patient is not nervous/anxious.        I have reviewed and confirmed the accuracy of the ROS as documented by the MA/LPN/RN Emma Hammer MD    Vital Signs:  Visit Vitals  /80   Pulse 60   Temp 98.2 °F (36.8 °C)   Resp 16   Ht 167.6 cm (66\")   Wt 89.4 kg (197 lb 3.2 oz)   SpO2 94%   BMI 31.83 kg/m²       Physical Exam   Constitutional: She is oriented to person, place, and time. She appears well-developed and well-nourished. She is cooperative.   Eyes: Pupils are equal, round, and reactive to light. EOM are normal.   Cardiovascular: Normal rate, regular rhythm and normal heart sounds. Exam reveals no gallop and no friction rub.   No murmur heard.  Pulmonary/Chest: Effort normal and breath sounds normal. She has no wheezes. She has no rales.   Musculoskeletal: Normal range of motion.   Neurological: She is alert and oriented to person, place, and time. She displays normal reflexes. No cranial nerve deficit. She exhibits normal muscle tone. Coordination normal.   Skin: Skin is warm and dry.   Psychiatric: She has a normal mood and " affect.   Vitals reviewed.      Assessment and Plan:  Problem List Items Addressed This Visit        Cardiovascular and Mediastinum    Essential (primary) hypertension    Overview     Stable         Current Assessment & Plan     Doing ok with stopping her carvedilol and hctz         Relevant Medications    amLODIPine (NORVASC) 10 MG tablet    Bradycardia    Overview     Resolved with stopping beta blocker. I asked her to make sure she talks to her cardiologist about stopping it            Genitourinary    Chronic kidney disease, stage 3 (moderate) (CMS/HCC) - Primary    Current Assessment & Plan     Stop hctz            Other    Vertigo    Current Assessment & Plan     Better but still there. Discussed vertigo exercises. Will place her in PT for it         Relevant Orders    Ambulatory Referral to Physical Therapy Evaluate and treat          An After Visit Summary and PPPS were given to the patient.

## 2020-06-29 PROBLEM — R42 VERTIGO: Status: RESOLVED | Noted: 2020-06-23 | Resolved: 2020-06-29

## 2020-07-10 ENCOUNTER — OFFICE VISIT (OUTPATIENT)
Dept: FAMILY MEDICINE CLINIC | Facility: CLINIC | Age: 75
End: 2020-07-10

## 2020-07-10 VITALS
OXYGEN SATURATION: 96 % | HEIGHT: 66 IN | HEART RATE: 76 BPM | WEIGHT: 193.6 LBS | DIASTOLIC BLOOD PRESSURE: 88 MMHG | BODY MASS INDEX: 31.12 KG/M2 | TEMPERATURE: 97.6 F | RESPIRATION RATE: 18 BRPM | SYSTOLIC BLOOD PRESSURE: 142 MMHG

## 2020-07-10 DIAGNOSIS — R00.1 BRADYCARDIA: ICD-10-CM

## 2020-07-10 DIAGNOSIS — I10 ESSENTIAL (PRIMARY) HYPERTENSION: ICD-10-CM

## 2020-07-10 DIAGNOSIS — N18.30 CHRONIC KIDNEY DISEASE, STAGE 3 (MODERATE): Primary | ICD-10-CM

## 2020-07-10 PROCEDURE — 99213 OFFICE O/P EST LOW 20 MIN: CPT | Performed by: FAMILY MEDICINE

## 2020-07-10 NOTE — PROGRESS NOTES
Subjective   Vianey Reeves is a 74 y.o. female.     Chief Complaint   Patient presents with   • Hypertension       Hypertension   This is a chronic problem. The current episode started more than 1 year ago. The problem has been waxing and waning since onset. The problem is controlled. Associated symptoms include headaches. Pertinent negatives include no chest pain or shortness of breath. (Light headed  ) There are no associated agents to hypertension. Risk factors for coronary artery disease include post-menopausal state. Past treatments include calcium channel blockers. Current antihypertension treatment includes calcium channel blockers. The current treatment provides mild improvement. There are no compliance problems.    She had been taking 2 of the 10 mg amlodopine   Headaches with taking 2 of them. She stopped a few days ago    The following portions of the patient's history were reviewed and updated as appropriate: allergies, current medications, past family history, past medical history, past social history, past surgical history and problem list.    Allergies:  Allergies   Allergen Reactions   • Erythromycin Rash   • Naproxen Sodium Unknown (See Comments)   • Latex Itching and Swelling   • Metoclopramide Unknown (See Comments)       Social History:  Social History     Socioeconomic History   • Marital status:      Spouse name: Not on file   • Number of children: Not on file   • Years of education: Not on file   • Highest education level: Not on file   Tobacco Use   • Smoking status: Current Every Day Smoker     Packs/day: 1.00     Types: Cigarettes   • Smokeless tobacco: Never Used   Substance and Sexual Activity   • Alcohol use: Yes     Frequency: Never     Comment: occasionally    • Drug use: Never       Family History:  Family History   Problem Relation Age of Onset   • Arthritis Father    • Prostate cancer Father    • Heart disease Sister        Past Medical History :  Patient Active Problem  List   Diagnosis   • Type 2 diabetes mellitus without complication (CMS/[facility])   • Essential (primary) hypertension   • Mixed hyperlipidemia   • Hiatal hernia   • Fatigue   • Long term current use of antithrombotics/antiplatelets   • Chronic kidney disease, stage 3 (moderate) (CMS/[facility])   • Chronic obstructive pulmonary disease (CMS/[facility])   • Atherosclerotic heart disease of native coronary artery without angina pectoris   • Hypertensive cardiovascular disease   • Irritable bowel syndrome with constipation   • History of cancer of ureter   • Solitary kidney   • Atherosclerosis of native arteries of extremities with intermittent claudication, bilateral legs (CMS/[facility])   • H/O malignant neoplasm of ureter   • Vertigo   • Absent kidney       Medication List:    Current Outpatient Medications:   •  amLODIPine (NORVASC) 10 MG tablet, Take 1 tablet by mouth Daily., Disp: 30 tablet, Rfl: 12  •  aspirin 81 MG tablet, Take 81 mg by mouth Daily., Disp: , Rfl:   •  calcium-vitamin D (Oscal 500/200 D-3) 500-200 MG-UNIT per tablet, Take  by mouth. Twice weekly, Disp: , Rfl:   •  coenzyme Q10 100 MG capsule, Take 1 capsule by mouth Daily., Disp: , Rfl:   •  Evolocumab (REPATHA SURECLICK) 140 MG/ML solution auto-injector, , Disp: , Rfl:   •  fluticasone (FLONASE) 50 MCG/ACT nasal spray, USE 2 SPRAYS IN EACH NOSTRIL EVERY DAY, Disp: , Rfl:   •  fluticasone-salmeterol (ADVAIR HFA) 45-21 MCG/ACT inhaler, Inhale 2 puffs 2 (Two) Times a Day., Disp: , Rfl:   •  Glucose Blood (BLOOD GLUCOSE TEST) strip, 1 each by In Vitro route 3 (Three) Times a Day., Disp: 300 each, Rfl: 3  •  Insulin Glargine (Lantus SoloStar) 100 UNIT/ML injection pen, Inject 20 Units under the skin into the appropriate area as directed Every Night., Disp: 5 pen, Rfl: 12  •  ketoconazole (NIZORAL) 2 % shampoo, APPLY TO SCALP THREE TIMES A WEEK, Disp: , Rfl:   •  LINZESS 145 MCG capsule capsule, , Disp: , Rfl:   •  nicotine (NICODERM CQ) 14 MG/24HR patch, Place 1 patch on  the skin as directed by provider Daily., Disp: , Rfl:   •  sertraline (ZOLOFT) 25 MG tablet, Take 1 tablet by mouth Daily., Disp: 30 tablet, Rfl: 12  •  SITagliptin (Januvia) 100 MG tablet, Take 1 tablet by mouth Daily., Disp: 30 tablet, Rfl: 12  •  traMADol (ULTRAM) 50 MG tablet, , Disp: , Rfl:   •  cefdinir (OMNICEF) 300 MG capsule, , Disp: , Rfl:   •  famotidine (PEPCID) 20 MG tablet, Take 20 mg by mouth Daily., Disp: , Rfl:   •  hyoscyamine (LEVSIN) 0.125 MG SL tablet, PLACE 1 TABLET UNDER THE TONGUE EVERY FOUR HOURS AS NEEDED FOR CRAMPS, Disp: , Rfl:   •  lidocaine (LIDODERM) 5 %, Place 1 patch on the skin as directed by provider Daily. Remove & Discard patch within 12 hours or as directed by MD, Disp: 30 patch, Rfl: 3  •  omeprazole (priLOSEC) 40 MG capsule, TAKE 1 CAPSULE BY MOUTH EVERY MORNING, Disp: , Rfl:   •  ondansetron (ZOFRAN) 4 MG tablet, , Disp: , Rfl:   •  promethazine (PHENERGAN) 25 MG tablet, Take 1 tablet by mouth Every 6 (Six) Hours As Needed for Nausea or Vomiting., Disp: 30 tablet, Rfl: 0  •  rifAXIMin (XIFAXAN PO), Take  by mouth 2 (Two) Times a Day., Disp: , Rfl:   •  rosuvastatin (CRESTOR) 20 MG tablet, Take 20 mg by mouth Daily., Disp: , Rfl:   •  tiotropium (SPIRIVA) 18 MCG per inhalation capsule, Place 1 capsule into inhaler and inhale Daily., Disp: 30 capsule, Rfl: 12  •  tiZANidine (ZANAFLEX) 4 MG tablet, Take 4 mg by mouth Every 8 (Eight) Hours As Needed., Disp: , Rfl:     Past Surgical History:  Past Surgical History:   Procedure Laterality Date   • CARDIAC CATHETERIZATION     • CHOLECYSTECTOMY     • COLON SURGERY      REMOVAL   • HERNIA REPAIR     • NEPHRECTOMY         Review of Systems:  Review of Systems   Constitutional: Negative for activity change and fever.   HENT: Negative for ear pain, rhinorrhea, sinus pressure and voice change.    Eyes: Negative for visual disturbance.   Respiratory: Negative for cough and shortness of breath.    Cardiovascular: Negative for chest pain.  "  Gastrointestinal: Negative for abdominal pain, diarrhea, nausea and vomiting.   Endocrine: Negative for cold intolerance and heat intolerance.   Genitourinary: Negative for frequency and urgency.   Musculoskeletal: Negative for arthralgias.   Skin: Negative for rash.   Neurological: Negative for syncope.   Hematological: Does not bruise/bleed easily.   Psychiatric/Behavioral: Negative for depressed mood. The patient is not nervous/anxious.        Physical Exam:  Vital Signs:  Visit Vitals  /88   Pulse 76   Temp 97.6 °F (36.4 °C)   Resp 18   Ht 166.4 cm (65.5\")   Wt 87.8 kg (193 lb 9.6 oz)   SpO2 96%   BMI 31.73 kg/m²       Physical Exam   Constitutional: She is oriented to person, place, and time. She appears well-developed and well-nourished. She is cooperative.   Cardiovascular: Normal rate, regular rhythm and normal heart sounds. Exam reveals no gallop and no friction rub.   No murmur heard.  Pulmonary/Chest: Effort normal and breath sounds normal. She has no wheezes. She has no rales.   Neurological: She is alert and oriented to person, place, and time. She displays normal reflexes. She exhibits normal muscle tone. Coordination normal.   Skin: Skin is warm and dry.   Psychiatric: She has a normal mood and affect.   Vitals reviewed.      Assessment and Plan:  Problem List Items Addressed This Visit        Cardiovascular and Mediastinum    Essential (primary) hypertension    Overview     Stable  Better on amlodopine         RESOLVED: Bradycardia    Overview     Resolved with stopping beta blocker. I asked her to make sure she talks to her cardiologist about stopping it            Genitourinary    Chronic kidney disease, stage 3 (moderate) (CMS/HCC) - Primary    Overview     Recheck today         Relevant Orders    Comprehensive Metabolic Panel          An After Visit Summary and PPPS were given to the patient.             I wore protective equipment throughout this patient encounter to include mask and " gloves. Hand hygiene was performed before donning protective equipment and after removal when leaving the room.

## 2020-07-11 LAB
ALBUMIN SERPL-MCNC: 3.9 G/DL (ref 3.7–4.7)
ALBUMIN/GLOB SERPL: 1.4 {RATIO} (ref 1.2–2.2)
ALP SERPL-CCNC: 70 IU/L (ref 39–117)
ALT SERPL-CCNC: 12 IU/L (ref 0–32)
AST SERPL-CCNC: 13 IU/L (ref 0–40)
BILIRUB SERPL-MCNC: <0.2 MG/DL (ref 0–1.2)
BUN SERPL-MCNC: 18 MG/DL (ref 8–27)
BUN/CREAT SERPL: 13 (ref 12–28)
CALCIUM SERPL-MCNC: 9 MG/DL (ref 8.7–10.3)
CHLORIDE SERPL-SCNC: 103 MMOL/L (ref 96–106)
CO2 SERPL-SCNC: 26 MMOL/L (ref 20–29)
CREAT SERPL-MCNC: 1.44 MG/DL (ref 0.57–1)
GLOBULIN SER CALC-MCNC: 2.8 G/DL (ref 1.5–4.5)
GLUCOSE SERPL-MCNC: 182 MG/DL (ref 65–99)
POTASSIUM SERPL-SCNC: 4.1 MMOL/L (ref 3.5–5.2)
PROT SERPL-MCNC: 6.7 G/DL (ref 6–8.5)
SODIUM SERPL-SCNC: 141 MMOL/L (ref 134–144)

## 2020-07-27 DIAGNOSIS — R00.1 BRADYCARDIA: Primary | ICD-10-CM

## 2020-08-10 ENCOUNTER — OFFICE VISIT (OUTPATIENT)
Dept: FAMILY MEDICINE CLINIC | Facility: CLINIC | Age: 75
End: 2020-08-10

## 2020-08-10 VITALS
OXYGEN SATURATION: 98 % | RESPIRATION RATE: 16 BRPM | SYSTOLIC BLOOD PRESSURE: 132 MMHG | DIASTOLIC BLOOD PRESSURE: 84 MMHG | TEMPERATURE: 98.6 F | HEART RATE: 95 BPM | HEIGHT: 65 IN | BODY MASS INDEX: 31.79 KG/M2 | WEIGHT: 190.8 LBS

## 2020-08-10 DIAGNOSIS — I10 ESSENTIAL (PRIMARY) HYPERTENSION: Primary | ICD-10-CM

## 2020-08-10 PROCEDURE — 99213 OFFICE O/P EST LOW 20 MIN: CPT | Performed by: FAMILY MEDICINE

## 2020-08-10 NOTE — PROGRESS NOTES
Subjective   Vianey Reeves is a 74 y.o. female.     Chief Complaint   Patient presents with   • Slow Heart Rate   • Hypertension       Hypertension   This is a chronic problem. The current episode started more than 1 year ago. The problem has been waxing and waning since onset. The problem is controlled. Associated symptoms include malaise/fatigue. Pertinent negatives include no chest pain, headaches, palpitations, shortness of breath or sweats. (Light headed  ) There are no associated agents to hypertension. Risk factors for coronary artery disease include post-menopausal state and smoking/tobacco exposure. Past treatments include calcium channel blockers. Current antihypertension treatment includes calcium channel blockers. The current treatment provides mild improvement. There are no compliance problems.    She is doing better. Amlodopine is helping. Her BP is better    The following portions of the patient's history were reviewed and updated as appropriate: allergies, current medications, past family history, past medical history, past social history, past surgical history and problem list.    Allergies:  Allergies   Allergen Reactions   • Erythromycin Rash   • Naproxen Sodium Unknown (See Comments)   • Latex Itching and Swelling   • Metoclopramide Unknown (See Comments)       Social History:  Social History     Socioeconomic History   • Marital status:      Spouse name: Not on file   • Number of children: Not on file   • Years of education: Not on file   • Highest education level: Not on file   Tobacco Use   • Smoking status: Current Every Day Smoker     Packs/day: 1.00     Types: Cigarettes   • Smokeless tobacco: Never Used   Substance and Sexual Activity   • Alcohol use: Yes     Frequency: Never     Comment: occasionally    • Drug use: Never       Family History:  Family History   Problem Relation Age of Onset   • Arthritis Father    • Prostate cancer Father    • Heart disease Sister        Past  Medical History :  Patient Active Problem List   Diagnosis   • Type 2 diabetes mellitus without complication (CMS/[facility])   • Essential (primary) hypertension   • Mixed hyperlipidemia   • Hiatal hernia   • Fatigue   • Long term current use of antithrombotics/antiplatelets   • Chronic kidney disease, stage 3 (moderate) (CMS/HCC)   • Chronic obstructive pulmonary disease (CMS/[facility])   • Atherosclerotic heart disease of native coronary artery without angina pectoris   • Hypertensive cardiovascular disease   • Irritable bowel syndrome with constipation   • History of cancer of ureter   • Solitary kidney   • Atherosclerosis of native arteries of extremities with intermittent claudication, bilateral legs (CMS/[facility])   • H/O malignant neoplasm of ureter   • Vertigo   • Absent kidney       Medication List:    Current Outpatient Medications:   •  amLODIPine (NORVASC) 10 MG tablet, Take 1 tablet by mouth Daily., Disp: 30 tablet, Rfl: 12  •  aspirin 81 MG tablet, Take 81 mg by mouth Daily., Disp: , Rfl:   •  calcium-vitamin D (Oscal 500/200 D-3) 500-200 MG-UNIT per tablet, Take  by mouth. Twice weekly, Disp: , Rfl:   •  cefdinir (OMNICEF) 300 MG capsule, , Disp: , Rfl:   •  coenzyme Q10 100 MG capsule, Take 1 capsule by mouth Daily., Disp: , Rfl:   •  Evolocumab (REPATHA SURECLICK) 140 MG/ML solution auto-injector, , Disp: , Rfl:   •  famotidine (PEPCID) 20 MG tablet, Take 20 mg by mouth Daily., Disp: , Rfl:   •  fluticasone (FLONASE) 50 MCG/ACT nasal spray, USE 2 SPRAYS IN EACH NOSTRIL EVERY DAY, Disp: , Rfl:   •  fluticasone-salmeterol (ADVAIR HFA) 45-21 MCG/ACT inhaler, Inhale 2 puffs 2 (Two) Times a Day., Disp: , Rfl:   •  Glucose Blood (BLOOD GLUCOSE TEST) strip, 1 each by In Vitro route 3 (Three) Times a Day., Disp: 300 each, Rfl: 3  •  hyoscyamine (LEVSIN) 0.125 MG SL tablet, PLACE 1 TABLET UNDER THE TONGUE EVERY FOUR HOURS AS NEEDED FOR CRAMPS, Disp: , Rfl:   •  Insulin Glargine (Lantus SoloStar) 100 UNIT/ML injection pen,  Inject 20 Units under the skin into the appropriate area as directed Every Night., Disp: 5 pen, Rfl: 12  •  ketoconazole (NIZORAL) 2 % shampoo, APPLY TO SCALP THREE TIMES A WEEK, Disp: , Rfl:   •  lidocaine (LIDODERM) 5 %, Place 1 patch on the skin as directed by provider Daily. Remove & Discard patch within 12 hours or as directed by MD, Disp: 30 patch, Rfl: 3  •  LINZESS 145 MCG capsule capsule, , Disp: , Rfl:   •  nicotine (NICODERM CQ) 14 MG/24HR patch, Place 1 patch on the skin as directed by provider Daily., Disp: , Rfl:   •  omeprazole (priLOSEC) 40 MG capsule, TAKE 1 CAPSULE BY MOUTH EVERY MORNING, Disp: , Rfl:   •  ondansetron (ZOFRAN) 4 MG tablet, , Disp: , Rfl:   •  promethazine (PHENERGAN) 25 MG tablet, Take 1 tablet by mouth Every 6 (Six) Hours As Needed for Nausea or Vomiting., Disp: 30 tablet, Rfl: 0  •  rifAXIMin (XIFAXAN PO), Take  by mouth 2 (Two) Times a Day., Disp: , Rfl:   •  rosuvastatin (CRESTOR) 20 MG tablet, Take 20 mg by mouth Daily., Disp: , Rfl:   •  sertraline (ZOLOFT) 25 MG tablet, Take 1 tablet by mouth Daily., Disp: 30 tablet, Rfl: 12  •  SITagliptin (Januvia) 100 MG tablet, Take 1 tablet by mouth Daily., Disp: 30 tablet, Rfl: 12  •  tiotropium (SPIRIVA) 18 MCG per inhalation capsule, Place 1 capsule into inhaler and inhale Daily., Disp: 30 capsule, Rfl: 12  •  tiZANidine (ZANAFLEX) 4 MG tablet, Take 4 mg by mouth Every 8 (Eight) Hours As Needed., Disp: , Rfl:   •  traMADol (ULTRAM) 50 MG tablet, , Disp: , Rfl:     Past Surgical History:  Past Surgical History:   Procedure Laterality Date   • CARDIAC CATHETERIZATION     • CHOLECYSTECTOMY     • COLON SURGERY      REMOVAL   • HERNIA REPAIR     • NEPHRECTOMY         Review of Systems:  Review of Systems   Constitutional: Positive for malaise/fatigue. Negative for activity change and fever.   HENT: Negative for ear pain, rhinorrhea, sinus pressure and voice change.    Eyes: Negative for visual disturbance.   Respiratory: Negative for  "cough and shortness of breath.    Cardiovascular: Negative for chest pain and palpitations.   Gastrointestinal: Negative for abdominal pain, diarrhea, nausea and vomiting.   Endocrine: Negative for cold intolerance and heat intolerance.   Genitourinary: Negative for frequency and urgency.   Musculoskeletal: Negative for arthralgias.   Skin: Negative for rash.   Neurological: Negative for syncope.   Hematological: Does not bruise/bleed easily.   Psychiatric/Behavioral: Negative for depressed mood. The patient is not nervous/anxious.        Physical Exam:  Vital Signs:  Visit Vitals  /84   Pulse 95   Temp 98.6 °F (37 °C)   Resp 16   Ht 165.1 cm (65\")   Wt 86.5 kg (190 lb 12.8 oz)   SpO2 98%   BMI 31.75 kg/m²       Physical Exam   Constitutional: She is oriented to person, place, and time. She appears well-developed and well-nourished. She is cooperative.   Cardiovascular: Normal rate, regular rhythm and normal heart sounds. Exam reveals no gallop and no friction rub.   No murmur heard.  Pulmonary/Chest: Effort normal and breath sounds normal. She has no wheezes. She has no rales.   Neurological: She is alert and oriented to person, place, and time. She displays normal reflexes. She exhibits normal muscle tone. Coordination normal.   Skin: Skin is warm and dry.   Psychiatric: She has a normal mood and affect.   Vitals reviewed.      Assessment and Plan:  Problem List Items Addressed This Visit        Cardiovascular and Mediastinum    Essential (primary) hypertension - Primary    Overview     Stable  Better on amlodopine               An After Visit Summary and PPPS were given to the patient.             I wore protective equipment throughout this patient encounter to include mask and gloves. Hand hygiene was performed before donning protective equipment and after removal when leaving the room.   "

## 2020-09-03 ENCOUNTER — OFFICE VISIT (OUTPATIENT)
Dept: FAMILY MEDICINE CLINIC | Facility: CLINIC | Age: 75
End: 2020-09-03

## 2020-09-03 VITALS
OXYGEN SATURATION: 95 % | RESPIRATION RATE: 18 BRPM | DIASTOLIC BLOOD PRESSURE: 86 MMHG | WEIGHT: 189.6 LBS | HEART RATE: 91 BPM | SYSTOLIC BLOOD PRESSURE: 134 MMHG | HEIGHT: 65 IN | BODY MASS INDEX: 31.59 KG/M2 | TEMPERATURE: 97.7 F

## 2020-09-03 DIAGNOSIS — I25.10 ATHEROSCLEROSIS OF NATIVE CORONARY ARTERY OF NATIVE HEART WITHOUT ANGINA PECTORIS: ICD-10-CM

## 2020-09-03 DIAGNOSIS — Z12.4 SCREENING FOR CERVICAL CANCER: ICD-10-CM

## 2020-09-03 DIAGNOSIS — F17.200 TOBACCO DEPENDENCY: ICD-10-CM

## 2020-09-03 DIAGNOSIS — Z00.00 MEDICARE ANNUAL WELLNESS VISIT, SUBSEQUENT: ICD-10-CM

## 2020-09-03 DIAGNOSIS — N95.8 POSTARTIFICIAL MENOPAUSAL SYNDROME: ICD-10-CM

## 2020-09-03 DIAGNOSIS — J41.0 SIMPLE CHRONIC BRONCHITIS (HCC): ICD-10-CM

## 2020-09-03 DIAGNOSIS — Z12.31 SCREENING MAMMOGRAM, ENCOUNTER FOR: ICD-10-CM

## 2020-09-03 DIAGNOSIS — E11.9 TYPE 2 DIABETES MELLITUS WITHOUT COMPLICATION, WITHOUT LONG-TERM CURRENT USE OF INSULIN (HCC): Primary | ICD-10-CM

## 2020-09-03 DIAGNOSIS — N18.30 CHRONIC KIDNEY DISEASE, STAGE 3 (MODERATE): ICD-10-CM

## 2020-09-03 DIAGNOSIS — I10 ESSENTIAL (PRIMARY) HYPERTENSION: ICD-10-CM

## 2020-09-03 DIAGNOSIS — E78.2 MIXED HYPERLIPIDEMIA: ICD-10-CM

## 2020-09-03 DIAGNOSIS — F17.210 NICOTINE DEPENDENCE, CIGARETTES, UNCOMPLICATED: ICD-10-CM

## 2020-09-03 PROBLEM — H25.819 COMBINED FORM OF SENILE CATARACT: Status: ACTIVE | Noted: 2017-10-27

## 2020-09-03 PROBLEM — H52.4 PRESBYOPIA: Status: ACTIVE | Noted: 2017-10-27

## 2020-09-03 LAB
BILIRUB BLD-MCNC: NEGATIVE MG/DL
CLARITY, POC: CLEAR
COLOR UR: YELLOW
GLUCOSE UR STRIP-MCNC: NEGATIVE MG/DL
KETONES UR QL: NEGATIVE
LEUKOCYTE EST, POC: NEGATIVE
NITRITE UR-MCNC: NEGATIVE MG/ML
PH UR: 5 [PH] (ref 5–8)
POC MICROALBUMIN URINE: 20
PROT UR STRIP-MCNC: ABNORMAL MG/DL
RBC # UR STRIP: NEGATIVE /UL
SP GR UR: 1.02 (ref 1–1.03)
UROBILINOGEN UR QL: NORMAL

## 2020-09-03 PROCEDURE — 81003 URINALYSIS AUTO W/O SCOPE: CPT | Performed by: FAMILY MEDICINE

## 2020-09-03 PROCEDURE — 99213 OFFICE O/P EST LOW 20 MIN: CPT | Performed by: FAMILY MEDICINE

## 2020-09-03 PROCEDURE — 99497 ADVNCD CARE PLAN 30 MIN: CPT | Performed by: FAMILY MEDICINE

## 2020-09-03 PROCEDURE — G0439 PPPS, SUBSEQ VISIT: HCPCS | Performed by: FAMILY MEDICINE

## 2020-09-03 RX ORDER — GLUCOSAMINE HCL/CHONDROITIN SU 500-400 MG
1 CAPSULE ORAL 3 TIMES DAILY
Qty: 300 EACH | Refills: 3 | Status: SHIPPED | OUTPATIENT
Start: 2020-09-03 | End: 2021-03-11 | Stop reason: SDUPTHER

## 2020-09-03 NOTE — PROGRESS NOTES
QUICK REFERENCE INFORMATION:  The ABCs of the Annual Wellness Visit    Subsequent Medicare Wellness Visit     HEALTH RISK ASSESSMENT    : 1945    Recent Hospitalizations:  No hospitalization(s) within the last year..  ccc    Current Medical Providers:  Patient Care Team:  Emma Hammer MD as PCP - General  Emma Hammer MD as PCP - Family Medicine    Smoking Status:  Social History     Tobacco Use   Smoking Status Current Every Day Smoker   • Packs/day: 1.00   • Years: 50.00   • Pack years: 50.00   • Types: Cigarettes   Smokeless Tobacco Never Used       Alcohol Consumption:  Social History     Substance and Sexual Activity   Alcohol Use Yes   • Frequency: Never    Comment: occasionally        Depression Screen:   PHQ-2/PHQ-9 Depression Screening 9/3/2020   Little interest or pleasure in doing things 1   Feeling down, depressed, or hopeless 0   Trouble falling or staying asleep, or sleeping too much 0   Feeling tired or having little energy 0   Poor appetite or overeating 0   Feeling bad about yourself - or that you are a failure or have let yourself or your family down 0   Trouble concentrating on things, such as reading the newspaper or watching television 0   Moving or speaking so slowly that other people could have noticed. Or the opposite - being so fidgety or restless that you have been moving around a lot more than usual 0   Thoughts that you would be better off dead, or of hurting yourself in some way 0   Total Score 1   If you checked off any problems, how difficult have these problems made it for you to do your work, take care of things at home, or get along with other people? Not difficult at all     We spent more than 16 minutes asking patient questions, counseling and documenting in the chart.    Health Habits and Functional and Cognitive Screening:  Functional & Cognitive Status 9/3/2020   Do you have difficulty preparing food and eating? No   Do you have difficulty bathing yourself,  getting dressed or grooming yourself? No   Do you have difficulty using the toilet? No   Do you have difficulty moving around from place to place? No   Do you have trouble with steps or getting out of a bed or a chair? Yes   Current Diet Well Balanced Diet   Dental Exam Not up to date   Eye Exam Up to date   Exercise (times per week) 5 times per week   Current Exercise Activities Include Aerobics   Do you need help using the phone?  No   Are you deaf or do you have serious difficulty hearing?  No   Do you need help with transportation? No   Do you need help shopping? No   Do you need help preparing meals?  No   Do you need help with housework?  No   Do you need help with laundry? No   Do you need help taking your medications? No   Do you need help managing money? No   Do you ever drive or ride in a car without wearing a seat belt? No   Have you felt unusual stress, anger or loneliness in the last month? No   Who do you live with? Child   If you need help, do you have trouble finding someone available to you? No   Have you been bothered in the last four weeks by sexual problems? No   Do you have difficulty concentrating, remembering or making decisions? No       Does the patient have evidence of cognitive impairment? No    Aspirin use counseling: Taking ASA appropriately as indicated    Recent Lab Results:    Lab Results   Component Value Date     (H) 07/10/2020     Lab Results   Component Value Date    HGBA1C 6.1 06/23/2020     Lab Results   Component Value Date    CHOL 83 11/08/2018    TRIG 130 06/23/2020    HDL 37 (L) 06/23/2020    VLDL 26 06/23/2020       Age-appropriate Screening Schedule:  Refer to the list below for future screening recommendations based on patient's age, sex and/or medical conditions. Orders for these recommended tests are listed in the plan section. The patient has been provided with a written plan.    Health Maintenance   Topic Date Due   • TDAP/TD VACCINES (1 - Tdap) 10/09/1962   •  ZOSTER VACCINE (1 of 2) 10/09/1993   • DIABETIC EYE EXAM  07/08/2019   • INFLUENZA VACCINE  08/01/2020   • HEMOGLOBIN A1C  12/23/2020   • LIPID PANEL  06/23/2021   • URINE MICROALBUMIN  09/03/2021        Subjective   History of Present Illness    Vianey Reeves is a 74 y.o. female who presents for an Annual Wellness Visit.  Hyperlipidemia   This is a chronic problem. The current episode started more than 1 year ago. The problem is controlled. Exacerbating diseases include diabetes. She has no history of hypothyroidism. There are no known factors aggravating her hyperlipidemia. Associated symptoms include leg pain. Pertinent negatives include no chest pain or shortness of breath. Current antihyperlipidemic treatment includes herbal therapy. The current treatment provides significant improvement of lipids. Risk factors for coronary artery disease include post-menopausal, diabetes mellitus and dyslipidemia.   Hypertension   This is a chronic problem. The current episode started more than 1 year ago. The problem has been waxing and waning since onset. Associated symptoms include malaise/fatigue. Pertinent negatives include no blurred vision, chest pain, headaches, shortness of breath or sweats. There are no associated agents to hypertension. Risk factors for coronary artery disease include diabetes mellitus, dyslipidemia and post-menopausal state. Current antihypertension treatment includes calcium channel blockers. The current treatment provides significant improvement. There are no compliance problems.    COPD   There is no chest tightness, cough, difficulty breathing, frequent throat clearing, hoarse voice, shortness of breath, sputum production or wheezing. This is a chronic problem. The current episode started more than 1 year ago. The problem occurs constantly. The problem has been waxing and waning. Associated symptoms include malaise/fatigue. Pertinent negatives include no chest pain, ear pain, fever,  headaches, rhinorrhea or sweats. Her symptoms are aggravated by nothing. Her symptoms are alleviated by steroid inhaler. She reports significant improvement on treatment. Her past medical history is significant for COPD.   Diabetes   She presents for her follow-up diabetic visit. She has type 2 diabetes mellitus. Hypoglycemia symptoms include hunger. Pertinent negatives for hypoglycemia include no confusion, dizziness, headaches, nervousness/anxiousness, sweats or tremors. Pertinent negatives for diabetes include no blurred vision, no chest pain, no foot ulcerations, no polyuria, no visual change and no weakness. Symptoms are stable. Risk factors for coronary artery disease include diabetes mellitus, hypertension, dyslipidemia and post-menopausal. Current diabetic treatment includes diet and insulin injections. She is compliant with treatment most of the time. She is following a generally healthy diet. Meal planning includes avoidance of concentrated sweets. She has not had a previous visit with a dietitian. She participates in exercise every other day. (Havent been taking has been out of test strips  ) An ACE inhibitor/angiotensin II receptor blocker is not being taken. She sees a podiatrist.Eye exam is current.   Chronic Kidney Disease   This is a chronic problem. The current episode started more than 1 year ago. Pertinent negatives include no abdominal pain, arthralgias, chest pain, coughing, fever, headaches, nausea, rash, visual change, vomiting or weakness. Nothing aggravates the symptoms. She has tried nothing for the symptoms.       The following portions of the patient's history were reviewed and updated as appropriate: allergies, current medications, past family history, past medical history, past social history, past surgical history and problem list.    Outpatient Medications Prior to Visit   Medication Sig Dispense Refill   • amLODIPine (NORVASC) 10 MG tablet Take 1 tablet by mouth Daily. 30 tablet 12    • aspirin 81 MG tablet Take 81 mg by mouth Daily.     • calcium-vitamin D (Oscal 500/200 D-3) 500-200 MG-UNIT per tablet Take  by mouth. Twice weekly     • coenzyme Q10 100 MG capsule Take 1 capsule by mouth Daily.     • fluticasone (FLONASE) 50 MCG/ACT nasal spray USE 2 SPRAYS IN EACH NOSTRIL EVERY DAY     • fluticasone-salmeterol (ADVAIR HFA) 45-21 MCG/ACT inhaler Inhale 2 puffs 2 (Two) Times a Day.     • Insulin Glargine (Lantus SoloStar) 100 UNIT/ML injection pen Inject 20 Units under the skin into the appropriate area as directed Every Night. 5 pen 12   • ketoconazole (NIZORAL) 2 % shampoo APPLY TO SCALP THREE TIMES A WEEK     • LINZESS 145 MCG capsule capsule      • nicotine (NICODERM CQ) 14 MG/24HR patch Place 1 patch on the skin as directed by provider Daily.     • rosuvastatin (CRESTOR) 20 MG tablet Take 20 mg by mouth Daily.     • sertraline (ZOLOFT) 25 MG tablet Take 1 tablet by mouth Daily. 30 tablet 12   • SITagliptin (Januvia) 100 MG tablet Take 1 tablet by mouth Daily. 30 tablet 12   • traMADol (ULTRAM) 50 MG tablet      • Evolocumab (REPATHA SURECLICK) 140 MG/ML solution auto-injector      • Glucose Blood (BLOOD GLUCOSE TEST) strip 1 each by In Vitro route 3 (Three) Times a Day. 300 each 3   • omeprazole (priLOSEC) 40 MG capsule TAKE 1 CAPSULE BY MOUTH EVERY MORNING     • famotidine (PEPCID) 20 MG tablet Take 20 mg by mouth Daily.     • hyoscyamine (LEVSIN) 0.125 MG SL tablet PLACE 1 TABLET UNDER THE TONGUE EVERY FOUR HOURS AS NEEDED FOR CRAMPS     • lidocaine (LIDODERM) 5 % Place 1 patch on the skin as directed by provider Daily. Remove & Discard patch within 12 hours or as directed by MD 30 patch 3   • ondansetron (ZOFRAN) 4 MG tablet      • promethazine (PHENERGAN) 25 MG tablet Take 1 tablet by mouth Every 6 (Six) Hours As Needed for Nausea or Vomiting. 30 tablet 0   • rifAXIMin (XIFAXAN PO) Take  by mouth 2 (Two) Times a Day.     • tiotropium (SPIRIVA) 18 MCG per inhalation capsule Place 1  capsule into inhaler and inhale Daily. 30 capsule 12   • tiZANidine (ZANAFLEX) 4 MG tablet Take 4 mg by mouth Every 8 (Eight) Hours As Needed.       No facility-administered medications prior to visit.        Patient Active Problem List   Diagnosis   • Type 2 diabetes mellitus without complication (CMS/[facility])   • Essential (primary) hypertension   • Mixed hyperlipidemia   • Hiatal hernia   • Fatigue   • Long term current use of antithrombotics/antiplatelets   • Chronic kidney disease, stage 3 (moderate) (CMS/[facility])   • Chronic obstructive pulmonary disease (CMS/[facility])   • Atherosclerotic heart disease of native coronary artery without angina pectoris   • Hypertensive cardiovascular disease   • Irritable bowel syndrome with constipation   • History of cancer of ureter   • Solitary kidney   • Atherosclerosis of native arteries of extremities with intermittent claudication, bilateral legs (CMS/[facility])   • H/O malignant neoplasm of ureter   • Vertigo   • Absent kidney   • Medicare annual wellness visit, subsequent   • Presbyopia   • Combined form of senile cataract   • Cervical cancer screening   • Postartificial menopausal syndrome       Advance Care Planning:  ACP discussion was held with the patient during this visit. Patient does not have an advance directive, information provided.     A face-to-face visit was completed today with patient.  Counseling explanation, and discussion of advanced directives was performed.   The last advanced care visit was performed in 2018.  In a near life ending situation, from which she is not expected to recover functionally, and she is not able to speak for her, she does not want life sustaining measures. We discussed feeding tubes, ventilators and cardiac support as well as dialysis.    We spent more than 16 minutes discussing Advanced Care Planning information and documenting in the chart.      Identification of Risk Factors:  Risk factors include: Advance Directive Discussion  Breast  Cancer/Mammogram Screening.    Review of Systems   Constitutional: Positive for malaise/fatigue. Negative for activity change and fever.   HENT: Negative for ear pain, hoarse voice, rhinorrhea, sinus pressure and voice change.    Eyes: Negative for blurred vision and visual disturbance.   Respiratory: Negative for cough, sputum production, shortness of breath and wheezing.    Cardiovascular: Negative for chest pain.   Gastrointestinal: Negative for abdominal pain, diarrhea, nausea and vomiting.   Endocrine: Negative for cold intolerance, heat intolerance and polyuria.   Genitourinary: Negative for frequency and urgency.   Musculoskeletal: Negative for arthralgias.   Skin: Negative for rash.   Neurological: Negative for dizziness, tremors, syncope, weakness and confusion.   Hematological: Does not bruise/bleed easily.   Psychiatric/Behavioral: Negative for depressed mood. The patient is not nervous/anxious.          Compared to one year ago, the patient feels her physical health is the same.  Compared to one year ago, the patient feels her mental health is the same.    Objective     Physical Exam   Constitutional: She is oriented to person, place, and time. She appears well-developed and well-nourished. No distress.   HENT:   Head: Normocephalic and atraumatic.   Right Ear: External ear normal.   Left Ear: External ear normal.   Nose: Nose normal.   Mouth/Throat: Oropharynx is clear and moist. No oropharyngeal exudate.   Eyes: Pupils are equal, round, and reactive to light. Conjunctivae are normal. Right eye exhibits no discharge. Left eye exhibits no discharge. No scleral icterus.   Neck: Normal range of motion. Neck supple. No tracheal deviation present. No thyromegaly present.   Cardiovascular: Normal rate, regular rhythm and normal heart sounds. Exam reveals no gallop and no friction rub.   No murmur heard.  Pulmonary/Chest: Effort normal and breath sounds normal. No stridor. No respiratory distress. She has no  "wheezes. She has no rales. Right breast exhibits no inverted nipple, no mass, no nipple discharge, no skin change and no tenderness. Left breast exhibits no inverted nipple, no mass, no nipple discharge, no skin change and no tenderness.   Abdominal: Soft. Bowel sounds are normal. She exhibits no distension and no mass. There is no abdominal tenderness. There is no rebound and no guarding.   Genitourinary: Uterus normal. There is no rash, tenderness or lesion on the right labia. There is no rash, tenderness or lesion on the left labia. Cervix does not exhibit discharge or lesion. Right adnexum displays no mass and no tenderness. Left adnexum displays no mass and no tenderness. Vagina exhibits no lesion. No erythema or bleeding in the vagina. No vaginal discharge found.   Musculoskeletal: Normal range of motion. No tenderness or deformity.   Lymphadenopathy:     She has no cervical adenopathy.   Neurological: She is alert and oriented to person, place, and time. She has normal reflexes. She displays no atrophy, no tremor and normal reflexes. No cranial nerve deficit or sensory deficit. She exhibits normal muscle tone. Coordination and gait normal.   Skin: Skin is warm and dry. Capillary refill takes less than 2 seconds. No rash noted. She is not diaphoretic. No erythema. No pallor.   Psychiatric: Her behavior is normal. Judgment and thought content normal. Cognition and memory are not impaired. She exhibits normal recent memory and normal remote memory.   Nursing note and vitals reviewed.      Vitals:    09/03/20 1353 09/03/20 1426   BP: 142/88 134/86   Pulse: 91    Resp: 18    Temp: 97.7 °F (36.5 °C)    SpO2: 95%    Weight: 86 kg (189 lb 9.6 oz)    Height: 165.1 cm (65\")        Patient's Body mass index is 31.55 kg/m². BMI is above normal parameters. Recommendations include: educational material, exercise counseling and nutrition counseling.      Assessment/Plan   Patient Self-Management and Personalized Health " Advice  The patient has been provided with information about: diet and exercise and preventive services including:   · Annual Wellness Visit (AWV).    Visit Diagnoses:    Problem List Items Addressed This Visit        Cardiovascular and Mediastinum    Essential (primary) hypertension    Relevant Orders    POC Urinalysis Dipstick, Multipro (Completed)    Mixed hyperlipidemia    Relevant Medications    Evolocumab (Repatha SureClick) 140 MG/ML solution auto-injector    Atherosclerotic heart disease of native coronary artery without angina pectoris     Coronary artery disease is unchanged.  Continue current treatment regimen.  Cardiac status will be reassessed in 3 months.            Respiratory    Chronic obstructive pulmonary disease (CMS/[facility])     COPD is unchanged.  Continue current medications.                Endocrine    Type 2 diabetes mellitus without complication (CMS/[facility]) - Primary    Relevant Medications    Glucose Blood (BLOOD GLUCOSE TEST) strip    Other Relevant Orders    POC Urinalysis Dipstick, Multipro (Completed)    POC Microalbumin (Completed)       Genitourinary    Chronic kidney disease, stage 3 (moderate) (CMS/[facility])     Renal condition is unchanged.  Continue current treatment regimen.  Renal condition will be reassessed in 3 months.         Relevant Orders    POC Urinalysis Dipstick, Multipro (Completed)       Other    Medicare annual wellness visit, subsequent    Postartificial menopausal syndrome    Relevant Orders    DEXA Bone Density Axial      Other Visit Diagnoses     Screening mammogram, encounter for        Relevant Orders    Mammo Screening Digital Tomosynthesis Bilateral With CAD    Tobacco dependency        Relevant Orders    CT Chest Low Dose WO    Nicotine dependence, cigarettes, uncomplicated         Relevant Orders    CT Chest Low Dose WO    Screening for cervical cancer        Relevant Orders    IGP, Aptima HPV, Rfx 16 / 18,45 (Completed)           Reviewed use of high risk  medication in the elderly: yes  Reviewed for potential of harmful drug interactions in the elderly: yes    Follow Up:  No follow-ups on file.     An After Visit Summary and PPPS with all of these plans were given to the patient.           Discussed wearing sunscreen, seatbelts. Avoidance or caution with alcohol. Discussed screening as appropriate for age.     I wore protective equipment throughout this patient encounter to include mask, gloves and face shield. Hand hygiene was performed before donning protective equipment and after removal when leaving the room.

## 2020-09-07 LAB
CYTOLOGIST CVX/VAG CYTO: NORMAL
CYTOLOGY CVX/VAG DOC CYTO: NORMAL
CYTOLOGY CVX/VAG DOC THIN PREP: NORMAL
DX ICD CODE: NORMAL
HIV 1 & 2 AB SER-IMP: NORMAL
HPV I/H RISK 4 DNA CVX QL PROBE+SIG AMP: NEGATIVE
OTHER STN SPEC: NORMAL
STAT OF ADQ CVX/VAG CYTO-IMP: NORMAL

## 2020-09-09 ENCOUNTER — PATIENT OUTREACH (OUTPATIENT)
Dept: CASE MANAGEMENT | Facility: OTHER | Age: 75
End: 2020-09-09

## 2020-09-09 NOTE — OUTREACH NOTE
Care Plan Note      Responses   Annual Wellness Visit:   Patient Has Completed   Care Gaps Addressed  Colon Cancer Screening, Diabetic A1C, Diabetic Eye Exam, Flu Shot, Mammogram, Pneumonia Vaccine, Other (See Comment) [dexa scan, lung CA screening, shingles vaccine]   Colon Cancer Screening Type  Colonoscopy   Colonoscopy Status  Up to Date (< 10 yrs)   HbA1c Status  Up to Date-within defined limits   Diabetic Eye Exam Status  Up to Date [UTD per pt]   Diabetic Eye Exam Completion at [facility] or Other  Other   Flu Shot Status  Patient will Complete   Mammogram Status  Patient will Complete   Pneumonia Vaccine Status  Up to Date   Other Patient Education/Resources   24/7 [facility] Nurse Call Line   24/7 Nurse Call Line Education Method  Send Materials   Does patient have depression diagnosis?  No   Advanced Directives:  Send Materials        The main concerns and/or symptoms the patient would like to address are: RN-ACM outreach call made to pt for well check. Explained role of RN-ACM. Pt denies any symptoms or concerns at this time.    Education/instruction provided by Care Coordinator: reviewed with pt: disease education; diet; monitoring BS and BP- pt reports to be compliant; covid precautions; medical appointments; RN-ACM contact information; [facility] 24 hour nurse line number; health maintenance gaps; AD- materials provided; MyChart- active; SDOH- pt denies food, medication, or transportation insecurities. Pt UTD on AWV. Pt reports to have good family support. Pt states she lives with her daughter, who is a nurse, she manages her medications and assists with her care. Pt has follow up appts scheduled, seeing PCP next on 12/3/20. No questions or concerns per pt at this time. Pt appreciates the phone call and declines need for further calls. Advised pt to call with any needs. Follow up outreach as needed.     Follow Up Outreach Due: as needed    Patricia Quinteros RN  Ambulatory     9/9/2020,  15:12

## 2020-09-09 NOTE — OUTREACH NOTE
Care Coordination Assessment    Documented/Reviewed By:  Patricia Quinteros RN Date/time:  9/9/2020  3:09 PM   Assessment completed with:  patient  Enrolled in care management program:  No  Living arrangement:  children (Comment: daughter)  Support system:  children  Type of residence:  private residence  Home care services:  No  Equipment used at home:  none  Communication device:  Yes  Bed or wheelchair confined:  No  Inadequate nutrition:  No  Medication adherence problem:  No  Experiencing side effects from current medications:  No  History of fall(s) in last 6 months:  No  Difficulty keeping appointments:  No  Family aware of the patient's advance care planning wishes:  No

## 2020-09-14 NOTE — ASSESSMENT & PLAN NOTE
Coronary artery disease is unchanged.  Continue current treatment regimen.  Cardiac status will be reassessed in 3 months.

## 2020-09-15 ENCOUNTER — OFFICE VISIT (OUTPATIENT)
Dept: FAMILY MEDICINE CLINIC | Facility: CLINIC | Age: 75
End: 2020-09-15

## 2020-09-15 VITALS
HEART RATE: 92 BPM | BODY MASS INDEX: 31.59 KG/M2 | RESPIRATION RATE: 18 BRPM | DIASTOLIC BLOOD PRESSURE: 76 MMHG | HEIGHT: 65 IN | SYSTOLIC BLOOD PRESSURE: 130 MMHG | WEIGHT: 189.6 LBS | TEMPERATURE: 97.5 F | OXYGEN SATURATION: 97 %

## 2020-09-15 DIAGNOSIS — T14.8XXA WOUND OF SKIN: Primary | ICD-10-CM

## 2020-09-15 PROCEDURE — 99213 OFFICE O/P EST LOW 20 MIN: CPT | Performed by: FAMILY MEDICINE

## 2020-09-15 NOTE — PROGRESS NOTES
Subjective   Vianey Reeves is a 74 y.o. female.     Chief Complaint   Patient presents with   • Wound Check       Wound Infection  This is a new problem. The current episode started in the past 7 days. The problem occurs constantly. The problem has been unchanged. Pertinent negatives include no abdominal pain, arthralgias, chest pain, chills, coughing, fever, nausea, rash or vomiting. Nothing aggravates the symptoms. She has tried nothing for the symptoms.    right lateral lower leg with wound from where she had surgery for skin cancer removal. Area is not healing    The following portions of the patient's history were reviewed and updated as appropriate: allergies, current medications, past family history, past medical history, past social history, past surgical history and problem list.    Allergies:  Allergies   Allergen Reactions   • Erythromycin Rash   • Naproxen Sodium Unknown - High Severity   • Latex Itching and Swelling   • Metoclopramide Unknown (See Comments)       Social History:  Social History     Socioeconomic History   • Marital status:      Spouse name: Not on file   • Number of children: Not on file   • Years of education: Not on file   • Highest education level: Not on file   Social Needs   • Financial resource strain: Not on file   • Food insecurity     Worry: Never true     Inability: Never true   • Transportation needs     Medical: No     Non-medical: No   Tobacco Use   • Smoking status: Current Every Day Smoker     Packs/day: 1.00     Years: 50.00     Pack years: 50.00     Types: Cigarettes   • Smokeless tobacco: Never Used   Substance and Sexual Activity   • Alcohol use: Yes     Frequency: Never     Comment: occasionally    • Drug use: Never       Family History:  Family History   Problem Relation Age of Onset   • Arthritis Father    • Prostate cancer Father    • Heart disease Sister        Past Medical History :  Patient Active Problem List   Diagnosis   • Type 2 diabetes mellitus  without complication (CMS/Formerly [facility])   • Essential (primary) hypertension   • Mixed hyperlipidemia   • Hiatal hernia   • Fatigue   • Long term current use of antithrombotics/antiplatelets   • Chronic kidney disease, stage 3 (moderate) (CMS/HCC)   • Chronic obstructive pulmonary disease (CMS/HCC)   • Atherosclerotic heart disease of native coronary artery without angina pectoris   • Hypertensive cardiovascular disease   • Irritable bowel syndrome with constipation   • History of cancer of ureter   • Solitary kidney   • Atherosclerosis of native arteries of extremities with intermittent claudication, bilateral legs (CMS/[facility])   • H/O malignant neoplasm of ureter   • Vertigo   • Absent kidney   • Medicare annual wellness visit, subsequent   • Presbyopia   • Combined form of senile cataract   • Cervical cancer screening   • Postartificial menopausal syndrome   • Wound of skin       Medication List:    Current Outpatient Medications:   •  amLODIPine (NORVASC) 10 MG tablet, Take 1 tablet by mouth Daily., Disp: 30 tablet, Rfl: 12  •  aspirin 81 MG tablet, Take 81 mg by mouth Daily., Disp: , Rfl:   •  calcium-vitamin D (Oscal 500/200 D-3) 500-200 MG-UNIT per tablet, Take  by mouth. Twice weekly, Disp: , Rfl:   •  coenzyme Q10 100 MG capsule, Take 1 capsule by mouth Daily., Disp: , Rfl:   •  Evolocumab (Repatha SureClick) 140 MG/ML solution auto-injector, Inject 1 mL under the skin into the appropriate area as directed Every 14 (Fourteen) Days., Disp: 1 pen, Rfl: 12  •  fluticasone (FLONASE) 50 MCG/ACT nasal spray, USE 2 SPRAYS IN EACH NOSTRIL EVERY DAY, Disp: , Rfl:   •  fluticasone-salmeterol (ADVAIR HFA) 45-21 MCG/ACT inhaler, Inhale 2 puffs 2 (Two) Times a Day., Disp: , Rfl:   •  Glucose Blood (BLOOD GLUCOSE TEST) strip, 1 each by In Vitro route 3 (Three) Times a Day., Disp: 300 each, Rfl: 3  •  Insulin Glargine (Lantus SoloStar) 100 UNIT/ML injection pen, Inject 20 Units under the skin into the appropriate area as directed  Every Night., Disp: 5 pen, Rfl: 12  •  ketoconazole (NIZORAL) 2 % shampoo, APPLY TO SCALP THREE TIMES A WEEK, Disp: , Rfl:   •  LINZESS 145 MCG capsule capsule, , Disp: , Rfl:   •  nicotine (NICODERM CQ) 14 MG/24HR patch, Place 1 patch on the skin as directed by provider Daily., Disp: , Rfl:   •  sertraline (ZOLOFT) 25 MG tablet, Take 1 tablet by mouth Daily., Disp: 30 tablet, Rfl: 12  •  SITagliptin (Januvia) 100 MG tablet, Take 1 tablet by mouth Daily., Disp: 30 tablet, Rfl: 12  •  traMADol (ULTRAM) 50 MG tablet, , Disp: , Rfl:   •  mupirocin (BACTROBAN) 2 % ointment, Apply  topically to the appropriate area as directed 3 (Three) Times a Day., Disp: 22 g, Rfl: 0  •  omeprazole (priLOSEC) 40 MG capsule, TAKE 1 CAPSULE BY MOUTH EVERY MORNING, Disp: , Rfl:   •  rosuvastatin (CRESTOR) 20 MG tablet, Take 20 mg by mouth Daily., Disp: , Rfl:     Past Surgical History:  Past Surgical History:   Procedure Laterality Date   • CARDIAC CATHETERIZATION     • CHOLECYSTECTOMY     • COLON SURGERY      REMOVAL   • HERNIA REPAIR     • NEPHRECTOMY         Review of Systems:  Review of Systems   Constitutional: Negative for activity change, chills and fever.   HENT: Negative for ear pain, rhinorrhea, sinus pressure and voice change.    Eyes: Negative for visual disturbance.   Respiratory: Negative for cough and shortness of breath.    Cardiovascular: Negative for chest pain.   Gastrointestinal: Negative for abdominal pain, diarrhea, nausea and vomiting.   Endocrine: Negative for cold intolerance and heat intolerance.   Genitourinary: Negative for frequency and urgency.   Musculoskeletal: Negative for arthralgias.   Skin: Negative for rash.   Neurological: Negative for syncope.   Hematological: Does not bruise/bleed easily.   Psychiatric/Behavioral: Negative for depressed mood. The patient is not nervous/anxious.        Physical Exam:  Vital Signs:  Visit Vitals  /76   Pulse 92   Temp 97.5 °F (36.4 °C)   Resp 18   Ht 165.1 cm  "(65\")   Wt 86 kg (189 lb 9.6 oz)   SpO2 97%   BMI 31.55 kg/m²       Physical Exam  Vitals signs reviewed.   Constitutional:       Appearance: She is well-developed.   Eyes:      General:         Right eye: No discharge.         Left eye: No discharge.   Cardiovascular:      Rate and Rhythm: Normal rate and regular rhythm.      Heart sounds: Normal heart sounds. No murmur. No friction rub. No gallop.    Pulmonary:      Effort: Pulmonary effort is normal. No respiratory distress.      Breath sounds: Normal breath sounds. No wheezing or rales.   Skin:     General: Skin is warm and dry.      Findings: No rash.      Comments: Right lower leg lateral. 1,5cm x 2cm wound. Erythema surrounding.    Neurological:      Mental Status: She is alert and oriented to person, place, and time.         Assessment and Plan:  Problem List Items Addressed This Visit        Musculoskeletal and Integument    Wound of skin - Primary    Overview     1.6cm x 2cm from after dermatologist removed a skin cancer. Wound is not healing  She took an antibiotic         Relevant Medications    mupirocin (BACTROBAN) 2 % ointment          An After Visit Summary and PPPS were given to the patient.         I wore protective equipment throughout this patient encounter to include mask, gloves and eye protection. Hand hygiene was performed before donning protective equipment and after removal when leaving the room.   "

## 2020-09-16 ENCOUNTER — HOSPITAL ENCOUNTER (OUTPATIENT)
Dept: CT IMAGING | Facility: HOSPITAL | Age: 75
Discharge: HOME | End: 2020-09-16

## 2020-09-16 ENCOUNTER — HOSPITAL ENCOUNTER (OUTPATIENT)
Dept: MAMMOGRAPHY | Facility: HOSPITAL | Age: 75
Discharge: HOME | End: 2020-09-16

## 2020-09-16 ENCOUNTER — HOSPITAL ENCOUNTER (OUTPATIENT)
Dept: BONE DENSITY | Facility: HOSPITAL | Age: 75
Discharge: HOME | End: 2020-09-16

## 2020-09-16 DIAGNOSIS — F17.200 TOBACCO DEPENDENCY: ICD-10-CM

## 2020-09-16 DIAGNOSIS — Z12.31 SCREENING MAMMOGRAM, ENCOUNTER FOR: ICD-10-CM

## 2020-09-16 DIAGNOSIS — F17.210 NICOTINE DEPENDENCE, CIGARETTES, UNCOMPLICATED: ICD-10-CM

## 2020-09-16 DIAGNOSIS — N95.8 POSTARTIFICIAL MENOPAUSAL SYNDROME: ICD-10-CM

## 2020-09-16 PROCEDURE — 77080 DXA BONE DENSITY AXIAL: CPT

## 2020-09-16 PROCEDURE — 77067 SCR MAMMO BI INCL CAD: CPT

## 2020-09-16 PROCEDURE — 77063 BREAST TOMOSYNTHESIS BI: CPT

## 2020-09-16 PROCEDURE — G0297 LDCT FOR LUNG CA SCREEN: HCPCS

## 2020-09-17 ENCOUNTER — TELEPHONE (OUTPATIENT)
Dept: FAMILY MEDICINE CLINIC | Facility: CLINIC | Age: 75
End: 2020-09-17

## 2020-09-17 DIAGNOSIS — E04.9 ENLARGED THYROID: Primary | ICD-10-CM

## 2020-09-17 NOTE — TELEPHONE ENCOUNTER
----- Message from Emma Hammer MD sent at 9/17/2020  8:13 AM EDT -----  Her ct is ok except her left tyroid lobe is enlarged. I want her to get a thyroid u/s please

## 2020-09-22 RX ORDER — CARVEDILOL 3.12 MG/1
1 TABLET ORAL EVERY 12 HOURS
COMMUNITY
End: 2020-11-02 | Stop reason: SDUPTHER

## 2020-09-29 ENCOUNTER — OFFICE VISIT (OUTPATIENT)
Dept: CARDIOLOGY | Facility: CLINIC | Age: 75
End: 2020-09-29

## 2020-09-29 VITALS
HEIGHT: 65 IN | HEART RATE: 70 BPM | WEIGHT: 190 LBS | RESPIRATION RATE: 18 BRPM | SYSTOLIC BLOOD PRESSURE: 136 MMHG | BODY MASS INDEX: 31.65 KG/M2 | DIASTOLIC BLOOD PRESSURE: 80 MMHG | OXYGEN SATURATION: 96 %

## 2020-09-29 DIAGNOSIS — E11.9 TYPE 2 DIABETES MELLITUS WITHOUT COMPLICATION, WITHOUT LONG-TERM CURRENT USE OF INSULIN (HCC): ICD-10-CM

## 2020-09-29 DIAGNOSIS — N18.30 CHRONIC KIDNEY DISEASE, STAGE 3 (MODERATE): ICD-10-CM

## 2020-09-29 DIAGNOSIS — E78.2 MIXED HYPERLIPIDEMIA: ICD-10-CM

## 2020-09-29 DIAGNOSIS — I10 ESSENTIAL (PRIMARY) HYPERTENSION: Primary | ICD-10-CM

## 2020-09-29 DIAGNOSIS — Z79.02 LONG TERM CURRENT USE OF ANTITHROMBOTICS/ANTIPLATELETS: ICD-10-CM

## 2020-09-29 DIAGNOSIS — IMO0002 SOLITARY KIDNEY: ICD-10-CM

## 2020-09-29 DIAGNOSIS — I25.10 ATHEROSCLEROSIS OF NATIVE CORONARY ARTERY OF NATIVE HEART WITHOUT ANGINA PECTORIS: ICD-10-CM

## 2020-09-29 DIAGNOSIS — I11.9 HYPERTENSIVE HEART DISEASE, UNSPECIFIED WHETHER HEART FAILURE PRESENT: ICD-10-CM

## 2020-09-29 PROCEDURE — 93000 ELECTROCARDIOGRAM COMPLETE: CPT | Performed by: INTERNAL MEDICINE

## 2020-09-29 PROCEDURE — 99214 OFFICE O/P EST MOD 30 MIN: CPT | Performed by: INTERNAL MEDICINE

## 2020-09-29 NOTE — PROGRESS NOTES
Cardiology Office Visit      Encounter Date:  09/29/2020    Patient ID:   Vianey Reeves is a 74 y.o. female.    Reason For Followup:  Coronary artery disease  Hypertension  Hypertensive cardiovascular disease    Brief Clinical History:  Dear Dr. Hammer, Emma Simmons MD    I had the pleasure of seeing Vianey Reeves today. As you are well aware, this is a 74 y.o. female with a known history of ischemic heart disease.  She underwent cardiac catheterization with PCI and drug-eluting stent placement on April 9, 2016 and follow-up PCI on 5/25/2016. She presents today for follow-up on the above conditions.        Interval History:  She denies any chest pain pressure heaviness or tightness.  She denies any shortness of breath out of character. She denies any PND orthopnea.  She denies any syncope or near syncope.     Her blood pressure is much better today.  It had been elevated on the last several visits.  We initiated amlodipine on her last visit and this has improved her blood pressure greatly.  She also reports that she is down to 3 cigarettes daily.  She has some occasional palpitations in the evening when she is preparing for bed.    She had some lab work performed in July of this year.  Reviewed the results of those today in detail.     Assessment & Plan     Impressions:  Coronary artery disease status post PCI and drug-eluting stent placement in her mid RCA and Cx        Negative nuclear stress test May 2019  Diabetes mellitus.  Dyslipidemia with intolerance to statins.  Hypertension with hypertensive cardiovascular disease. Suboptimal but better at home.  Solitary kidney.  Chronic renal failure with declining GFR  Tobacco abuse  Anti-platelet therapy.  Fatigue  Dizziness     Recommendations:  Continuation of her current cardiovascular regimen at the present time.  Smoking cessation  Follow-up in 6 months time sooner should there be difficulties.  Objective:    Vitals:  Vitals:    09/29/20 1444   BP: 136/80   BP  "Location: Left arm   Patient Position: Sitting   Cuff Size: Large Adult   Pulse: 70   Resp: 18   SpO2: 96%   Weight: 86.2 kg (190 lb)   Height: 165.1 cm (65\")       Physical Exam:    General: Alert, cooperative, no distress, appears stated age  Head:  Normocephalic, atraumatic, mucous membranes moist  Eyes:  Conjunctiva/corneas clear, EOM's intact     Neck:  Supple,  no bruit  Lungs: Clear to auscultation bilaterally, no wheezes rhonchi rales are noted  Chest wall: No tenderness  Heart::  Regular rate and rhythm, S1 and S2 normal, 1/6 holosystolic murmur.  No rub or gallop  Abdomen: Soft, non-tender, nondistended bowel sounds active  Extremities: No cyanosis, clubbing, or edema  Pulses: 2+ and symmetric all extremities  Skin:  No rashes or lesions  Neuro/psych: A&O x3. CN II through XII are grossly intact with appropriate affect      Allergies:  Allergies   Allergen Reactions   • Erythromycin Rash   • Naproxen Sodium Unknown - High Severity   • Latex Itching and Swelling   • Metoclopramide Unknown (See Comments)       Medication Review:     Current Outpatient Medications:   •  amLODIPine (NORVASC) 10 MG tablet, Take 1 tablet by mouth Daily., Disp: 30 tablet, Rfl: 12  •  aspirin 81 MG tablet, Take 81 mg by mouth Daily., Disp: , Rfl:   •  calcium-vitamin D (Oscal 500/200 D-3) 500-200 MG-UNIT per tablet, Take  by mouth. Twice weekly, Disp: , Rfl:   •  carvedilol (Coreg) 3.125 MG tablet, Take 1 tablet by mouth Every 12 (Twelve) Hours., Disp: , Rfl:   •  coenzyme Q10 100 MG capsule, Take 1 capsule by mouth Daily., Disp: , Rfl:   •  Evolocumab (Repatha SureClick) 140 MG/ML solution auto-injector, Inject 1 mL under the skin into the appropriate area as directed Every 14 (Fourteen) Days., Disp: 1 pen, Rfl: 12  •  fluticasone (FLONASE) 50 MCG/ACT nasal spray, USE 2 SPRAYS IN EACH NOSTRIL EVERY DAY, Disp: , Rfl:   •  fluticasone-salmeterol (ADVAIR HFA) 45-21 MCG/ACT inhaler, Inhale 2 puffs 2 (Two) Times a Day., Disp: , Rfl: "   •  Glucose Blood (BLOOD GLUCOSE TEST) strip, 1 each by In Vitro route 3 (Three) Times a Day., Disp: 300 each, Rfl: 3  •  Insulin Glargine (Lantus SoloStar) 100 UNIT/ML injection pen, Inject 20 Units under the skin into the appropriate area as directed Every Night., Disp: 5 pen, Rfl: 12  •  ketoconazole (NIZORAL) 2 % shampoo, APPLY TO SCALP THREE TIMES A WEEK, Disp: , Rfl:   •  LINZESS 145 MCG capsule capsule, , Disp: , Rfl:   •  mupirocin (BACTROBAN) 2 % ointment, Apply  topically to the appropriate area as directed 3 (Three) Times a Day., Disp: 22 g, Rfl: 0  •  nicotine (NICODERM CQ) 14 MG/24HR patch, Place 1 patch on the skin as directed by provider Daily., Disp: , Rfl:   •  rosuvastatin (CRESTOR) 20 MG tablet, Take 20 mg by mouth Daily., Disp: , Rfl:   •  sertraline (ZOLOFT) 25 MG tablet, Take 1 tablet by mouth Daily., Disp: 30 tablet, Rfl: 12  •  SITagliptin (Januvia) 100 MG tablet, Take 1 tablet by mouth Daily., Disp: 30 tablet, Rfl: 12  •  traMADol (ULTRAM) 50 MG tablet, , Disp: , Rfl:   •  omeprazole (priLOSEC) 40 MG capsule, TAKE 1 CAPSULE BY MOUTH EVERY MORNING, Disp: , Rfl:     Family History:  Family History   Problem Relation Age of Onset   • Arthritis Father    • Prostate cancer Father    • Heart disease Sister        Past Medical History:  Past Medical History:   Diagnosis Date   • Anxiety    • Bilateral carotid bruits    • CKD (chronic kidney disease)    • Claudication, intermittent (CMS/HCC)    • Coronary artery disease    • DDD (degenerative disc disease), thoracic    • Diabetes mellitus (CMS/HCC)    • Gout    • 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   • Hypertension    • Mass of right breast    • Renal insufficiency    • Simple chronic bronchitis (CMS/HCC)        Past surgical History:  Past Surgical History:   Procedure Laterality Date   • BREAST BIOPSY Right    • CARDIAC CATHETERIZATION     • CHOLECYSTECTOMY     • COLON SURGERY      REMOVAL   • HERNIA REPAIR     • NEPHRECTOMY         Social  History:  Social History     Socioeconomic History   • Marital status:      Spouse name: Not on file   • Number of children: Not on file   • Years of education: Not on file   • Highest education level: Not on file   Social Needs   • Financial resource strain: Not on file   • Food insecurity     Worry: Never true     Inability: Never true   • Transportation needs     Medical: No     Non-medical: No   Tobacco Use   • Smoking status: Current Every Day Smoker     Packs/day: 0.25     Years: 50.00     Pack years: 12.50     Types: Cigarettes   • Smokeless tobacco: Never Used   Substance and Sexual Activity   • Alcohol use: Yes     Frequency: Never     Comment: occasionally    • Drug use: Never       Review of Systems:  The following systems were reviewed as they relate to the cardiovascular system: Constitutional, Eyes, ENT, Cardiovascular, Respiratory, Gastrointestinal, Integumentary, Neurological, Psychiatric, Hematologic, Endocrine, Musculoskeletal, and Genitourinary. The pertinent cardiovascular findings are reported above with all other cardiovascular points within those systems being negative.    Diagnostic Study Review:     Current Electrocardiogram:    ECG 12 Lead    Date/Time: 9/29/2020 5:11 PM  Performed by: Leon Gonzalez DO  Authorized by: Leon Gonzalez DO   Comparison: not compared with previous ECG   Previous ECG: no previous ECG available  Comments: Normal sinus rhythm with a ventricular rate of 73 bpm.  Nonspecific interventricular conduction delay.  Low voltage in the extremity and precordial leads.  Normal QT and QTc intervals.  Normal QRS axis.               NOTE: The following portions of the patient's history were reviewed and updated this visit as appropriate: allergies, current medications, past family history, past medical history, past social history, past surgical history and problem list.

## 2020-09-30 ENCOUNTER — HOSPITAL ENCOUNTER (OUTPATIENT)
Dept: ULTRASOUND IMAGING | Facility: HOSPITAL | Age: 75
Discharge: HOME | End: 2020-09-30
Admitting: FAMILY MEDICINE

## 2020-09-30 DIAGNOSIS — E04.9 ENLARGED THYROID: ICD-10-CM

## 2020-09-30 PROCEDURE — 76536 US EXAM OF HEAD AND NECK: CPT

## 2020-10-05 ENCOUNTER — TELEPHONE (OUTPATIENT)
Dept: FAMILY MEDICINE CLINIC | Facility: CLINIC | Age: 75
End: 2020-10-05

## 2020-10-05 NOTE — TELEPHONE ENCOUNTER
----- Message from Emma Hammer MD sent at 10/3/2020  5:32 PM EDT -----  She needs to see an ent for the thyroid nodule on the left. It needs a biopsy. Who would she want to see?

## 2020-10-05 NOTE — TELEPHONE ENCOUNTER
Pt called back and information given and pt stated that she would like to talk to her daughter and then will call back.

## 2020-10-07 ENCOUNTER — OFFICE VISIT (OUTPATIENT)
Dept: WOUND CARE | Facility: HOSPITAL | Age: 75
End: 2020-10-07

## 2020-10-07 DIAGNOSIS — L97.211 NON-PRESSURE CHRONIC ULCER OF RIGHT CALF, LIMITED TO BREAKDOWN OF SKIN (HCC): ICD-10-CM

## 2020-10-07 PROCEDURE — 99203 OFFICE O/P NEW LOW 30 MIN: CPT | Performed by: PODIATRIST

## 2020-10-07 PROCEDURE — G0463 HOSPITAL OUTPT CLINIC VISIT: HCPCS

## 2020-10-09 ENCOUNTER — APPOINTMENT (OUTPATIENT)
Dept: WOUND CARE | Facility: HOSPITAL | Age: 75
End: 2020-10-09

## 2020-10-09 ENCOUNTER — TELEPHONE (OUTPATIENT)
Dept: FAMILY MEDICINE CLINIC | Facility: CLINIC | Age: 75
End: 2020-10-09

## 2020-10-13 ENCOUNTER — TELEPHONE (OUTPATIENT)
Dept: FAMILY MEDICINE CLINIC | Facility: CLINIC | Age: 75
End: 2020-10-13

## 2020-10-13 NOTE — TELEPHONE ENCOUNTER
Patient was contacted and she said she wants to discuss with her family on which ent and she will call back

## 2020-10-20 ENCOUNTER — HOSPITAL ENCOUNTER (OUTPATIENT)
Dept: CT IMAGING | Facility: HOSPITAL | Age: 75
Discharge: HOME | End: 2020-10-20
Admitting: FAMILY MEDICINE

## 2020-10-20 ENCOUNTER — TELEPHONE (OUTPATIENT)
Dept: FAMILY MEDICINE CLINIC | Facility: CLINIC | Age: 75
End: 2020-10-20

## 2020-10-20 ENCOUNTER — OFFICE VISIT (OUTPATIENT)
Dept: FAMILY MEDICINE CLINIC | Facility: CLINIC | Age: 75
End: 2020-10-20

## 2020-10-20 VITALS
HEIGHT: 65 IN | RESPIRATION RATE: 18 BRPM | OXYGEN SATURATION: 96 % | DIASTOLIC BLOOD PRESSURE: 80 MMHG | TEMPERATURE: 97.9 F | WEIGHT: 188 LBS | BODY MASS INDEX: 31.32 KG/M2 | SYSTOLIC BLOOD PRESSURE: 132 MMHG | HEART RATE: 91 BPM

## 2020-10-20 DIAGNOSIS — K57.30 DIVERTICULA OF COLON: ICD-10-CM

## 2020-10-20 DIAGNOSIS — R10.32 LLQ PAIN: Primary | ICD-10-CM

## 2020-10-20 DIAGNOSIS — R10.32 LLQ PAIN: ICD-10-CM

## 2020-10-20 PROCEDURE — 74176 CT ABD & PELVIS W/O CONTRAST: CPT

## 2020-10-20 PROCEDURE — 99214 OFFICE O/P EST MOD 30 MIN: CPT | Performed by: FAMILY MEDICINE

## 2020-10-20 RX ORDER — CIPROFLOXACIN 500 MG/1
500 TABLET, FILM COATED ORAL 2 TIMES DAILY
Qty: 20 TABLET | Refills: 0 | Status: CANCELLED | OUTPATIENT
Start: 2020-10-20

## 2020-10-20 RX ORDER — METRONIDAZOLE 500 MG/1
500 TABLET ORAL 3 TIMES DAILY
Qty: 21 TABLET | Refills: 0 | Status: SHIPPED | OUTPATIENT
Start: 2020-10-20 | End: 2020-11-16

## 2020-10-20 RX ORDER — CIPROFLOXACIN 500 MG/1
500 TABLET, FILM COATED ORAL 2 TIMES DAILY
Qty: 20 TABLET | Refills: 0 | Status: SHIPPED | OUTPATIENT
Start: 2020-10-20 | End: 2020-11-16

## 2020-10-20 RX ORDER — COLLAGENASE SANTYL 250 [ARB'U]/G
OINTMENT TOPICAL
COMMUNITY
Start: 2020-10-09 | End: 2021-03-11

## 2020-10-20 NOTE — PROGRESS NOTES
Subjective   Vianey Reeves is a 75 y.o. female.     Chief Complaint   Patient presents with   • Abdominal Pain       Abdominal Pain  This is a new problem. The current episode started 1 to 4 weeks ago. The problem occurs constantly. The problem has been gradually worsening. The pain is located in the LLQ, LUQ, RLQ and RUQ (starts on left side and will hurt downward and then come up on the right side). The pain is moderate. The quality of the pain is cramping, aching, burning and sharp. The abdominal pain does not radiate. Pertinent negatives include no arthralgias, belching, constipation, diarrhea, fever, frequency, headaches, nausea or vomiting. Associated symptoms comments: Bowel movements have looked like pellets pt states  . The pain is aggravated by eating. Relieved by: laying down. She has tried antacids (tums ) for the symptoms. The treatment provided mild relief. Prior diagnostic workup includes GI consult.        The following portions of the patient's history were reviewed and updated as appropriate: allergies, current medications, past family history, past medical history, past social history, past surgical history and problem list.    Allergies:  Allergies   Allergen Reactions   • Erythromycin Rash   • Naproxen Sodium Unknown - High Severity   • Latex Itching and Swelling   • Metoclopramide Unknown (See Comments)       Social History:  Social History     Socioeconomic History   • Marital status:      Spouse name: Not on file   • Number of children: Not on file   • Years of education: Not on file   • Highest education level: Not on file   Social Needs   • Financial resource strain: Not on file   • Food insecurity     Worry: Never true     Inability: Never true   • Transportation needs     Medical: No     Non-medical: No   Tobacco Use   • Smoking status: Current Every Day Smoker     Packs/day: 0.25     Years: 50.00     Pack years: 12.50     Types: Cigarettes   • Smokeless tobacco: Never Used    Substance and Sexual Activity   • Alcohol use: Yes     Frequency: Never     Comment: occasionally    • Drug use: Never       Family History:  Family History   Problem Relation Age of Onset   • Arthritis Father    • Prostate cancer Father    • Heart disease Sister        Past Medical History :  Patient Active Problem List   Diagnosis   • Type 2 diabetes mellitus without complication (CMS/HCC)   • Essential (primary) hypertension   • Mixed hyperlipidemia   • Hiatal hernia   • Fatigue   • Long term current use of antithrombotics/antiplatelets   • Chronic kidney disease, stage 3 (moderate)   • Chronic obstructive pulmonary disease (CMS/HCC)   • Atherosclerotic heart disease of native coronary artery without angina pectoris   • Hypertensive cardiovascular disease   • Irritable bowel syndrome with constipation   • History of cancer of ureter   • Solitary kidney   • Atherosclerosis of native arteries of extremities with intermittent claudication, bilateral legs (CMS/HCC)   • H/O malignant neoplasm of ureter   • Vertigo   • Absent kidney   • Medicare annual wellness visit, subsequent   • Presbyopia   • Combined form of senile cataract   • Cervical cancer screening   • Postartificial menopausal syndrome   • Wound of skin   • LLQ pain   • Diverticula of colon       Medication List:    Current Outpatient Medications:   •  amLODIPine (NORVASC) 10 MG tablet, Take 1 tablet by mouth Daily., Disp: 30 tablet, Rfl: 12  •  aspirin 81 MG tablet, Take 81 mg by mouth Daily., Disp: , Rfl:   •  calcium-vitamin D (Oscal 500/200 D-3) 500-200 MG-UNIT per tablet, Take  by mouth. Twice weekly, Disp: , Rfl:   •  carvedilol (Coreg) 3.125 MG tablet, Take 1 tablet by mouth Every 12 (Twelve) Hours., Disp: , Rfl:   •  coenzyme Q10 100 MG capsule, Take 1 capsule by mouth Daily., Disp: , Rfl:   •  Evolocumab (Repatha SureClick) 140 MG/ML solution auto-injector, Inject 1 mL under the skin into the appropriate area as directed Every 14 (Fourteen)  Days., Disp: 1 pen, Rfl: 12  •  fluticasone (FLONASE) 50 MCG/ACT nasal spray, USE 2 SPRAYS IN EACH NOSTRIL EVERY DAY, Disp: , Rfl:   •  fluticasone-salmeterol (ADVAIR HFA) 45-21 MCG/ACT inhaler, Inhale 2 puffs 2 (Two) Times a Day., Disp: , Rfl:   •  Glucose Blood (BLOOD GLUCOSE TEST) strip, 1 each by In Vitro route 3 (Three) Times a Day., Disp: 300 each, Rfl: 3  •  Insulin Glargine (Lantus SoloStar) 100 UNIT/ML injection pen, Inject 20 Units under the skin into the appropriate area as directed Every Night., Disp: 5 pen, Rfl: 12  •  ketoconazole (NIZORAL) 2 % shampoo, APPLY TO SCALP THREE TIMES A WEEK, Disp: , Rfl:   •  LINZESS 145 MCG capsule capsule, , Disp: , Rfl:   •  mupirocin (BACTROBAN) 2 % ointment, Apply  topically to the appropriate area as directed 3 (Three) Times a Day., Disp: 22 g, Rfl: 0  •  nicotine (NICODERM CQ) 14 MG/24HR patch, Place 1 patch on the skin as directed by provider Daily., Disp: , Rfl:   •  omeprazole (priLOSEC) 40 MG capsule, TAKE 1 CAPSULE BY MOUTH EVERY MORNING, Disp: , Rfl:   •  rosuvastatin (CRESTOR) 20 MG tablet, Take 20 mg by mouth Daily., Disp: , Rfl:   •  Santyl 250 UNIT/GM ointment, APPLY TO WOUND ONCE D, Disp: , Rfl:   •  sertraline (ZOLOFT) 25 MG tablet, Take 1 tablet by mouth Daily., Disp: 30 tablet, Rfl: 12  •  SITagliptin (Januvia) 100 MG tablet, Take 1 tablet by mouth Daily., Disp: 30 tablet, Rfl: 12  •  traMADol (ULTRAM) 50 MG tablet, , Disp: , Rfl:   •  ciprofloxacin (CIPRO) 500 MG tablet, Take 1 tablet by mouth 2 (Two) Times a Day., Disp: 20 tablet, Rfl: 0  •  metroNIDAZOLE (FLAGYL) 500 MG tablet, Take 1 tablet by mouth 3 (Three) Times a Day. Do not use alcohol for at least 24 hours after the last dose., Disp: 21 tablet, Rfl: 0    Past Surgical History:  Past Surgical History:   Procedure Laterality Date   • BREAST BIOPSY Right    • CARDIAC CATHETERIZATION     • CHOLECYSTECTOMY     • COLON SURGERY      REMOVAL   • HERNIA REPAIR     • NEPHRECTOMY         Review of  "Systems:  Review of Systems   Constitutional: Negative for activity change and fever.   HENT: Negative for ear pain, rhinorrhea, sinus pressure and voice change.    Eyes: Negative for visual disturbance.   Respiratory: Negative for cough and shortness of breath.    Cardiovascular: Negative for chest pain.   Gastrointestinal: Positive for abdominal pain. Negative for constipation, diarrhea, nausea and vomiting.   Endocrine: Negative for cold intolerance and heat intolerance.   Genitourinary: Negative for frequency and urgency.   Musculoskeletal: Negative for arthralgias.   Skin: Negative for rash.   Neurological: Negative for syncope.   Hematological: Does not bruise/bleed easily.   Psychiatric/Behavioral: Negative for depressed mood. The patient is not nervous/anxious.        Physical Exam:  Vital Signs:  Visit Vitals  /80   Pulse 91   Temp 97.9 °F (36.6 °C)   Resp 18   Ht 165.1 cm (65\")   Wt 85.3 kg (188 lb)   SpO2 96%   BMI 31.28 kg/m²       Physical Exam  Vitals signs reviewed.   Constitutional:       Appearance: Normal appearance. She is well-developed.   HENT:      Head: Normocephalic and atraumatic.   Cardiovascular:      Rate and Rhythm: Normal rate and regular rhythm.      Heart sounds: Normal heart sounds. No murmur. No friction rub. No gallop.    Pulmonary:      Effort: Pulmonary effort is normal.      Breath sounds: Normal breath sounds. No wheezing or rales.   Abdominal:      General: Abdomen is flat. Bowel sounds are normal. There is no distension.      Palpations: Abdomen is soft. There is no mass.      Tenderness: There is abdominal tenderness in the left lower quadrant. There is no right CVA tenderness, left CVA tenderness, guarding or rebound.      Hernia: No hernia is present.   Skin:     General: Skin is warm and dry.   Neurological:      Mental Status: She is alert and oriented to person, place, and time.      Coordination: Coordination normal.      Gait: Gait normal.   Psychiatric:         " Behavior: Behavior is cooperative.         Assessment and Plan:  Problem List Items Addressed This Visit        Digestive    Diverticula of colon    Overview     sigmoid         Relevant Orders    CBC & Differential (Completed)    Comprehensive Metabolic Panel (Completed)       Nervous and Auditory    LLQ pain - Primary    Current Assessment & Plan     Will get ct scan and labs         Relevant Medications    ciprofloxacin (CIPRO) 500 MG tablet    metroNIDAZOLE (FLAGYL) 500 MG tablet        Diagnosis, treatment and and course discussed. Potential side effects discussed. Return if there is worsening or persistence of symptoms.     Will see her in 3 days. She is to start a liquid diet x 2 days. If her pain worsens, she is to go to the ER       An After Visit Summary and PPPS were given to the patient.             I wore protective equipment throughout this patient encounter to include mask, gloves and eye protection. Hand hygiene was performed before donning protective equipment and after removal when leaving the room.

## 2020-10-20 NOTE — TELEPHONE ENCOUNTER
Called pt and left message to check on her and that she had missed her appointment. And to call us back.

## 2020-10-21 ENCOUNTER — APPOINTMENT (OUTPATIENT)
Dept: WOUND CARE | Facility: HOSPITAL | Age: 75
End: 2020-10-21

## 2020-10-21 LAB
ALBUMIN SERPL-MCNC: 4.4 G/DL (ref 3.7–4.7)
ALBUMIN/GLOB SERPL: 1.8 {RATIO} (ref 1.2–2.2)
ALP SERPL-CCNC: 77 IU/L (ref 39–117)
ALT SERPL-CCNC: 16 IU/L (ref 0–32)
AST SERPL-CCNC: 15 IU/L (ref 0–40)
BASOPHILS # BLD AUTO: 0.1 X10E3/UL (ref 0–0.2)
BASOPHILS NFR BLD AUTO: 1 %
BILIRUB SERPL-MCNC: <0.2 MG/DL (ref 0–1.2)
BUN SERPL-MCNC: 32 MG/DL (ref 8–27)
BUN/CREAT SERPL: 18 (ref 12–28)
CALCIUM SERPL-MCNC: 9.3 MG/DL (ref 8.7–10.3)
CHLORIDE SERPL-SCNC: 102 MMOL/L (ref 96–106)
CO2 SERPL-SCNC: 28 MMOL/L (ref 20–29)
CREAT SERPL-MCNC: 1.74 MG/DL (ref 0.57–1)
EOSINOPHIL # BLD AUTO: 0.1 X10E3/UL (ref 0–0.4)
EOSINOPHIL NFR BLD AUTO: 1 %
ERYTHROCYTE [DISTWIDTH] IN BLOOD BY AUTOMATED COUNT: 12.4 % (ref 11.7–15.4)
GLOBULIN SER CALC-MCNC: 2.4 G/DL (ref 1.5–4.5)
GLUCOSE SERPL-MCNC: 115 MG/DL (ref 65–99)
HCT VFR BLD AUTO: 44.3 % (ref 34–46.6)
HGB BLD-MCNC: 14.7 G/DL (ref 11.1–15.9)
IMM GRANULOCYTES # BLD AUTO: 0 X10E3/UL (ref 0–0.1)
IMM GRANULOCYTES NFR BLD AUTO: 0 %
LYMPHOCYTES # BLD AUTO: 2.4 X10E3/UL (ref 0.7–3.1)
LYMPHOCYTES NFR BLD AUTO: 27 %
MCH RBC QN AUTO: 30.9 PG (ref 26.6–33)
MCHC RBC AUTO-ENTMCNC: 33.2 G/DL (ref 31.5–35.7)
MCV RBC AUTO: 93 FL (ref 79–97)
MONOCYTES # BLD AUTO: 0.6 X10E3/UL (ref 0.1–0.9)
MONOCYTES NFR BLD AUTO: 6 %
NEUTROPHILS # BLD AUTO: 5.6 X10E3/UL (ref 1.4–7)
NEUTROPHILS NFR BLD AUTO: 65 %
PLATELET # BLD AUTO: 240 X10E3/UL (ref 150–450)
POTASSIUM SERPL-SCNC: 4.6 MMOL/L (ref 3.5–5.2)
PROT SERPL-MCNC: 6.8 G/DL (ref 6–8.5)
RBC # BLD AUTO: 4.75 X10E6/UL (ref 3.77–5.28)
SODIUM SERPL-SCNC: 142 MMOL/L (ref 134–144)
WBC # BLD AUTO: 8.6 X10E3/UL (ref 3.4–10.8)

## 2020-10-23 ENCOUNTER — OFFICE VISIT (OUTPATIENT)
Dept: FAMILY MEDICINE CLINIC | Facility: CLINIC | Age: 75
End: 2020-10-23

## 2020-10-23 ENCOUNTER — RESULTS ENCOUNTER (OUTPATIENT)
Dept: FAMILY MEDICINE CLINIC | Facility: CLINIC | Age: 75
End: 2020-10-23

## 2020-10-23 VITALS
HEART RATE: 99 BPM | RESPIRATION RATE: 16 BRPM | WEIGHT: 186.4 LBS | BODY MASS INDEX: 31.06 KG/M2 | SYSTOLIC BLOOD PRESSURE: 138 MMHG | TEMPERATURE: 97.3 F | OXYGEN SATURATION: 98 % | DIASTOLIC BLOOD PRESSURE: 80 MMHG | HEIGHT: 65 IN

## 2020-10-23 DIAGNOSIS — R10.32 LLQ PAIN: Primary | ICD-10-CM

## 2020-10-23 DIAGNOSIS — R10.32 LLQ PAIN: ICD-10-CM

## 2020-10-23 PROCEDURE — 99213 OFFICE O/P EST LOW 20 MIN: CPT | Performed by: FAMILY MEDICINE

## 2020-10-23 RX ORDER — DICYCLOMINE HCL 20 MG
20 TABLET ORAL EVERY 6 HOURS
Qty: 30 TABLET | Refills: 1 | Status: SHIPPED | OUTPATIENT
Start: 2020-10-23 | End: 2020-12-03

## 2020-10-23 NOTE — PROGRESS NOTES
Subjective   Vianey Reeves is a 75 y.o. female.     Chief Complaint   Patient presents with   • Abnormal Lab   • Abdominal Pain       Abdominal Pain  This is a new problem. The current episode started 1 to 4 weeks ago. The onset quality is sudden. The problem occurs intermittently. The problem has been waxing and waning. The pain is located in the generalized abdominal region. The quality of the pain is sharp. The abdominal pain radiates to the LUQ and LLQ. Pertinent negatives include no arthralgias, diarrhea, fever, frequency, nausea or vomiting. The pain is aggravated by eating. The pain is relieved by bowel movements. She has tried antibiotics for the symptoms. The treatment provided mild relief. Prior diagnostic workup includes CT scan.        The following portions of the patient's history were reviewed and updated as appropriate: allergies, current medications, past family history, past medical history, past social history, past surgical history and problem list.    Allergies:  Allergies   Allergen Reactions   • Erythromycin Rash   • Naproxen Sodium Unknown - High Severity   • Latex Itching and Swelling   • Metoclopramide Unknown (See Comments)       Social History:  Social History     Socioeconomic History   • Marital status:      Spouse name: Not on file   • Number of children: Not on file   • Years of education: Not on file   • Highest education level: Not on file   Social Needs   • Financial resource strain: Not on file   • Food insecurity     Worry: Never true     Inability: Never true   • Transportation needs     Medical: No     Non-medical: No   Tobacco Use   • Smoking status: Current Every Day Smoker     Packs/day: 0.25     Years: 50.00     Pack years: 12.50     Types: Cigarettes   • Smokeless tobacco: Never Used   Substance and Sexual Activity   • Alcohol use: Yes     Frequency: Never     Comment: occasionally    • Drug use: Never       Family History:  Family History   Problem Relation Age  of Onset   • Arthritis Father    • Prostate cancer Father    • Heart disease Sister        Past Medical History :  Patient Active Problem List   Diagnosis   • Type 2 diabetes mellitus without complication (CMS/HCC)   • Essential (primary) hypertension   • Mixed hyperlipidemia   • Hiatal hernia   • Fatigue   • Long term current use of antithrombotics/antiplatelets   • Chronic kidney disease, stage 3 (moderate)   • Chronic obstructive pulmonary disease (CMS/HCC)   • Atherosclerotic heart disease of native coronary artery without angina pectoris   • Hypertensive cardiovascular disease   • Irritable bowel syndrome with constipation   • History of cancer of ureter   • Solitary kidney   • Atherosclerosis of native arteries of extremities with intermittent claudication, bilateral legs (CMS/HCC)   • H/O malignant neoplasm of ureter   • Vertigo   • Absent kidney   • Medicare annual wellness visit, subsequent   • Presbyopia   • Combined form of senile cataract   • Cervical cancer screening   • Postartificial menopausal syndrome   • Wound of skin   • LLQ pain   • Diverticula of colon       Medication List:    Current Outpatient Medications:   •  amLODIPine (NORVASC) 10 MG tablet, Take 1 tablet by mouth Daily., Disp: 30 tablet, Rfl: 12  •  aspirin 81 MG tablet, Take 81 mg by mouth Daily., Disp: , Rfl:   •  calcium-vitamin D (Oscal 500/200 D-3) 500-200 MG-UNIT per tablet, Take  by mouth. Twice weekly, Disp: , Rfl:   •  carvedilol (Coreg) 3.125 MG tablet, Take 1 tablet by mouth Every 12 (Twelve) Hours., Disp: , Rfl:   •  coenzyme Q10 100 MG capsule, Take 1 capsule by mouth Daily., Disp: , Rfl:   •  Evolocumab (Repatha SureClick) 140 MG/ML solution auto-injector, Inject 1 mL under the skin into the appropriate area as directed Every 14 (Fourteen) Days., Disp: 1 pen, Rfl: 12  •  fluticasone (FLONASE) 50 MCG/ACT nasal spray, USE 2 SPRAYS IN EACH NOSTRIL EVERY DAY, Disp: , Rfl:   •  fluticasone-salmeterol (ADVAIR HFA) 45-21 MCG/ACT  inhaler, Inhale 2 puffs 2 (Two) Times a Day., Disp: , Rfl:   •  Glucose Blood (BLOOD GLUCOSE TEST) strip, 1 each by In Vitro route 3 (Three) Times a Day., Disp: 300 each, Rfl: 3  •  Insulin Glargine (Lantus SoloStar) 100 UNIT/ML injection pen, Inject 20 Units under the skin into the appropriate area as directed Every Night., Disp: 5 pen, Rfl: 12  •  ketoconazole (NIZORAL) 2 % shampoo, APPLY TO SCALP THREE TIMES A WEEK, Disp: , Rfl:   •  LINZESS 145 MCG capsule capsule, , Disp: , Rfl:   •  metroNIDAZOLE (FLAGYL) 500 MG tablet, Take 1 tablet by mouth 3 (Three) Times a Day. Do not use alcohol for at least 24 hours after the last dose., Disp: 21 tablet, Rfl: 0  •  mupirocin (BACTROBAN) 2 % ointment, Apply  topically to the appropriate area as directed 3 (Three) Times a Day., Disp: 22 g, Rfl: 0  •  nicotine (NICODERM CQ) 14 MG/24HR patch, Place 1 patch on the skin as directed by provider Daily., Disp: , Rfl:   •  omeprazole (priLOSEC) 40 MG capsule, TAKE 1 CAPSULE BY MOUTH EVERY MORNING, Disp: , Rfl:   •  Santyl 250 UNIT/GM ointment, APPLY TO WOUND ONCE D, Disp: , Rfl:   •  sertraline (ZOLOFT) 25 MG tablet, Take 1 tablet by mouth Daily., Disp: 30 tablet, Rfl: 12  •  SITagliptin (Januvia) 100 MG tablet, Take 1 tablet by mouth Daily., Disp: 30 tablet, Rfl: 12  •  traMADol (ULTRAM) 50 MG tablet, , Disp: , Rfl:   •  ciprofloxacin (CIPRO) 500 MG tablet, Take 1 tablet by mouth 2 (Two) Times a Day., Disp: 20 tablet, Rfl: 0  •  rosuvastatin (CRESTOR) 20 MG tablet, Take 20 mg by mouth Daily., Disp: , Rfl:     Past Surgical History:  Past Surgical History:   Procedure Laterality Date   • BREAST BIOPSY Right    • CARDIAC CATHETERIZATION     • CHOLECYSTECTOMY     • COLON SURGERY      REMOVAL   • HERNIA REPAIR     • NEPHRECTOMY         Review of Systems:  Review of Systems   Constitutional: Negative for activity change and fever.   HENT: Negative for ear pain, rhinorrhea, sinus pressure and voice change.    Eyes: Negative for visual  "disturbance.   Respiratory: Negative for cough and shortness of breath.    Cardiovascular: Negative for chest pain.   Gastrointestinal: Positive for abdominal pain. Negative for diarrhea, nausea and vomiting.   Endocrine: Negative for cold intolerance and heat intolerance.   Genitourinary: Negative for frequency and urgency.   Musculoskeletal: Negative for arthralgias.   Skin: Negative for rash.   Neurological: Negative for syncope.   Hematological: Does not bruise/bleed easily.   Psychiatric/Behavioral: Negative for depressed mood. The patient is not nervous/anxious.        Physical Exam:  Vital Signs:  Visit Vitals  /80 (BP Location: Right arm)   Pulse 99   Temp 97.3 °F (36.3 °C) (Temporal)   Resp 16   Ht 165.1 cm (65\")   Wt 84.6 kg (186 lb 6.4 oz)   SpO2 98%   BMI 31.02 kg/m²       Physical Exam  Vitals signs reviewed.   Constitutional:       Appearance: Normal appearance. She is well-developed.   HENT:      Head: Normocephalic and atraumatic.   Cardiovascular:      Rate and Rhythm: Normal rate and regular rhythm.      Heart sounds: Normal heart sounds. No murmur. No friction rub. No gallop.    Pulmonary:      Effort: Pulmonary effort is normal.      Breath sounds: Normal breath sounds. No wheezing or rales.   Abdominal:      General: Abdomen is flat. Bowel sounds are normal. There is no distension.      Palpations: Abdomen is soft. There is no mass.      Tenderness: There is abdominal tenderness in the left lower quadrant. There is no right CVA tenderness, left CVA tenderness, guarding or rebound.      Hernia: No hernia is present.   Skin:     General: Skin is warm and dry.   Neurological:      Mental Status: She is alert and oriented to person, place, and time.      Coordination: Coordination normal.      Gait: Gait normal.   Psychiatric:         Behavior: Behavior is cooperative.         Assessment and Plan:  Problem List Items Addressed This Visit        Nervous and Auditory    LLQ pain - Primary    " Relevant Orders    CBC & Differential    Comprehensive Metabolic Panel    C-reactive Protein    Lipase    Amylase        Will recheck labs and labs added to see how things are going. Suspicion still high for diverticulitis. No signs of ischemic bowel. Will resume antibiotics but on a different schedule bc of her kidneys.     An After Visit Summary and PPPS were given to the patient.             I wore protective equipment throughout this patient encounter to include mask, gloves and eye protection. Hand hygiene was performed before donning protective equipment and after removal when leaving the room.

## 2020-10-23 NOTE — ASSESSMENT & PLAN NOTE
Labs were negative.   Will start cipro tomorrow and take every other day.   Start back on motronidazole  Will send bentyl if needed. If she worsens she is to go to the Er

## 2020-10-27 ENCOUNTER — TELEPHONE (OUTPATIENT)
Dept: FAMILY MEDICINE CLINIC | Facility: CLINIC | Age: 75
End: 2020-10-27

## 2020-10-27 NOTE — TELEPHONE ENCOUNTER
Called Vianey to ask why she missed her appt. She said she thought it was Wednesday and that she forgot. I scheduled her for next Monday since she declined coming in this week. She said she's still in some pain but not too badly.

## 2020-10-28 ENCOUNTER — OFFICE VISIT (OUTPATIENT)
Dept: WOUND CARE | Facility: HOSPITAL | Age: 75
End: 2020-10-28

## 2020-10-28 DIAGNOSIS — L97.211 NON-PRESSURE CHRONIC ULCER OF RIGHT CALF, LIMITED TO BREAKDOWN OF SKIN (HCC): ICD-10-CM

## 2020-10-28 PROCEDURE — 99213 OFFICE O/P EST LOW 20 MIN: CPT | Performed by: PODIATRIST

## 2020-10-28 PROCEDURE — G0463 HOSPITAL OUTPT CLINIC VISIT: HCPCS

## 2020-11-02 ENCOUNTER — OFFICE VISIT (OUTPATIENT)
Dept: FAMILY MEDICINE CLINIC | Facility: CLINIC | Age: 75
End: 2020-11-02

## 2020-11-02 VITALS
HEIGHT: 65 IN | BODY MASS INDEX: 31.86 KG/M2 | HEART RATE: 84 BPM | OXYGEN SATURATION: 95 % | SYSTOLIC BLOOD PRESSURE: 132 MMHG | DIASTOLIC BLOOD PRESSURE: 76 MMHG | RESPIRATION RATE: 18 BRPM | WEIGHT: 191.2 LBS | TEMPERATURE: 98.1 F

## 2020-11-02 DIAGNOSIS — R10.32 LLQ PAIN: ICD-10-CM

## 2020-11-02 DIAGNOSIS — I10 ESSENTIAL (PRIMARY) HYPERTENSION: ICD-10-CM

## 2020-11-02 DIAGNOSIS — E04.9 ENLARGED THYROID: Primary | ICD-10-CM

## 2020-11-02 DIAGNOSIS — K58.1 IRRITABLE BOWEL SYNDROME WITH CONSTIPATION: ICD-10-CM

## 2020-11-02 DIAGNOSIS — K57.30 DIVERTICULA OF COLON: Primary | ICD-10-CM

## 2020-11-02 PROCEDURE — 99214 OFFICE O/P EST MOD 30 MIN: CPT | Performed by: FAMILY MEDICINE

## 2020-11-02 RX ORDER — CARVEDILOL 3.12 MG/1
3.12 TABLET ORAL EVERY 12 HOURS
Qty: 60 TABLET | Refills: 12 | Status: SHIPPED | OUTPATIENT
Start: 2020-11-02 | End: 2021-03-11

## 2020-11-02 NOTE — PROGRESS NOTES
Subjective   Vianey Reeves is a 75 y.o. female.     Chief Complaint   Patient presents with   • Abdominal Pain   • Hypertension   • Irritable Bowel Syndrome       When patient eat something sometimes she still is having a little bit of pain but will take a abilio and feel a bit better.     Abdominal Pain  This is a new problem. The current episode started more than 1 month ago. The onset quality is sudden. The problem occurs intermittently. The problem has been waxing and waning. The pain is located in the generalized abdominal region. The quality of the pain is sharp. The abdominal pain radiates to the LUQ and LLQ. Associated symptoms include constipation. Pertinent negatives include no arthralgias, belching, diarrhea, fever, frequency, headaches, nausea or vomiting. The pain is aggravated by eating. The pain is relieved by bowel movements. She has tried antibiotics for the symptoms. The treatment provided mild relief. Prior diagnostic workup includes CT scan.        The following portions of the patient's history were reviewed and updated as appropriate: allergies, current medications, past family history, past medical history, past social history, past surgical history and problem list.    Allergies:  Allergies   Allergen Reactions   • Erythromycin Rash   • Naproxen Sodium Unknown - High Severity   • Latex Itching and Swelling   • Metoclopramide Unknown (See Comments)       Social History:  Social History     Socioeconomic History   • Marital status:      Spouse name: Not on file   • Number of children: Not on file   • Years of education: Not on file   • Highest education level: Not on file   Social Needs   • Financial resource strain: Not on file   • Food insecurity     Worry: Never true     Inability: Never true   • Transportation needs     Medical: No     Non-medical: No   Tobacco Use   • Smoking status: Current Every Day Smoker     Packs/day: 0.25     Years: 50.00     Pack years: 12.50     Types:  Cigarettes   • Smokeless tobacco: Never Used   Substance and Sexual Activity   • Alcohol use: Yes     Frequency: Never     Comment: occasionally    • Drug use: Never       Family History:  Family History   Problem Relation Age of Onset   • Arthritis Father    • Prostate cancer Father    • Heart disease Sister        Past Medical History :  Patient Active Problem List   Diagnosis   • Type 2 diabetes mellitus without complication (CMS/HCC)   • Essential (primary) hypertension   • Mixed hyperlipidemia   • Hiatal hernia   • Fatigue   • Long term current use of antithrombotics/antiplatelets   • Chronic kidney disease, stage 3 (moderate)   • Chronic obstructive pulmonary disease (CMS/HCC)   • Atherosclerotic heart disease of native coronary artery without angina pectoris   • Hypertensive cardiovascular disease   • Irritable bowel syndrome with constipation   • History of cancer of ureter   • Solitary kidney   • Atherosclerosis of native arteries of extremities with intermittent claudication, bilateral legs (CMS/HCC)   • H/O malignant neoplasm of ureter   • Vertigo   • Absent kidney   • Medicare annual wellness visit, subsequent   • Presbyopia   • Combined form of senile cataract   • Cervical cancer screening   • Postartificial menopausal syndrome   • Wound of skin   • LLQ pain   • Diverticula of colon       Medication List:    Current Outpatient Medications:   •  amLODIPine (NORVASC) 10 MG tablet, Take 1 tablet by mouth Daily., Disp: 30 tablet, Rfl: 12  •  aspirin 81 MG tablet, Take 81 mg by mouth Daily., Disp: , Rfl:   •  calcium-vitamin D (Oscal 500/200 D-3) 500-200 MG-UNIT per tablet, Take  by mouth. Twice weekly, Disp: , Rfl:   •  carvedilol (Coreg) 3.125 MG tablet, Take 1 tablet by mouth Every 12 (Twelve) Hours., Disp: 60 tablet, Rfl: 12  •  ciprofloxacin (CIPRO) 500 MG tablet, Take 1 tablet by mouth 2 (Two) Times a Day., Disp: 20 tablet, Rfl: 0  •  coenzyme Q10 100 MG capsule, Take 1 capsule by mouth Daily., Disp: ,  Rfl:   •  dicyclomine (Bentyl) 20 MG tablet, Take 1 tablet by mouth Every 6 (Six) Hours., Disp: 30 tablet, Rfl: 1  •  Evolocumab (Repatha SureClick) 140 MG/ML solution auto-injector, Inject 1 mL under the skin into the appropriate area as directed Every 14 (Fourteen) Days., Disp: 1 pen, Rfl: 12  •  fluticasone (FLONASE) 50 MCG/ACT nasal spray, USE 2 SPRAYS IN EACH NOSTRIL EVERY DAY, Disp: , Rfl:   •  fluticasone-salmeterol (ADVAIR HFA) 45-21 MCG/ACT inhaler, Inhale 2 puffs 2 (Two) Times a Day., Disp: , Rfl:   •  Glucose Blood (BLOOD GLUCOSE TEST) strip, 1 each by In Vitro route 3 (Three) Times a Day., Disp: 300 each, Rfl: 3  •  Insulin Glargine (Lantus SoloStar) 100 UNIT/ML injection pen, Inject 20 Units under the skin into the appropriate area as directed Every Night., Disp: 5 pen, Rfl: 12  •  ketoconazole (NIZORAL) 2 % shampoo, APPLY TO SCALP THREE TIMES A WEEK, Disp: , Rfl:   •  metroNIDAZOLE (FLAGYL) 500 MG tablet, Take 1 tablet by mouth 3 (Three) Times a Day. Do not use alcohol for at least 24 hours after the last dose., Disp: 21 tablet, Rfl: 0  •  mupirocin (BACTROBAN) 2 % ointment, Apply  topically to the appropriate area as directed 3 (Three) Times a Day., Disp: 22 g, Rfl: 0  •  nicotine (NICODERM CQ) 14 MG/24HR patch, Place 1 patch on the skin as directed by provider Daily., Disp: , Rfl:   •  omeprazole (priLOSEC) 40 MG capsule, TAKE 1 CAPSULE BY MOUTH EVERY MORNING, Disp: , Rfl:   •  rosuvastatin (CRESTOR) 20 MG tablet, Take 20 mg by mouth Daily., Disp: , Rfl:   •  Santyl 250 UNIT/GM ointment, APPLY TO WOUND ONCE D, Disp: , Rfl:   •  sertraline (ZOLOFT) 25 MG tablet, Take 1 tablet by mouth Daily., Disp: 30 tablet, Rfl: 12  •  SITagliptin (Januvia) 100 MG tablet, Take 1 tablet by mouth Daily., Disp: 30 tablet, Rfl: 12  •  traMADol (ULTRAM) 50 MG tablet, , Disp: , Rfl:   •  linaclotide (Linzess) 72 MCG capsule capsule, Take 1 capsule by mouth Every Morning Before Breakfast., Disp: 30 capsule, Rfl: 12    Past  "Surgical History:  Past Surgical History:   Procedure Laterality Date   • BREAST BIOPSY Right    • CARDIAC CATHETERIZATION     • CHOLECYSTECTOMY     • COLON SURGERY      REMOVAL   • HERNIA REPAIR     • NEPHRECTOMY         Review of Systems:  Review of Systems   Constitutional: Negative for activity change and fever.   HENT: Negative for ear pain, rhinorrhea, sinus pressure and voice change.    Eyes: Negative for visual disturbance.   Respiratory: Negative for cough and shortness of breath.    Cardiovascular: Negative for chest pain.   Gastrointestinal: Positive for abdominal pain and constipation. Negative for diarrhea, nausea and vomiting.   Endocrine: Negative for cold intolerance and heat intolerance.   Genitourinary: Negative for frequency and urgency.   Musculoskeletal: Negative for arthralgias.   Skin: Negative for rash.   Neurological: Negative for syncope.   Hematological: Does not bruise/bleed easily.   Psychiatric/Behavioral: Negative for depressed mood. The patient is not nervous/anxious.        Physical Exam:  Vital Signs:  Visit Vitals  /76   Pulse 84   Temp 98.1 °F (36.7 °C)   Resp 18   Ht 165.1 cm (65\")   Wt 86.7 kg (191 lb 3.2 oz)   SpO2 95%   BMI 31.82 kg/m²       Physical Exam  Vitals signs reviewed.   Constitutional:       Appearance: Normal appearance. She is well-developed.   HENT:      Head: Normocephalic and atraumatic.   Cardiovascular:      Rate and Rhythm: Normal rate and regular rhythm.      Heart sounds: Normal heart sounds. No murmur. No friction rub. No gallop.    Pulmonary:      Effort: Pulmonary effort is normal.      Breath sounds: Normal breath sounds. No wheezing or rales.   Abdominal:      General: Abdomen is flat. Bowel sounds are normal. There is no distension.      Palpations: Abdomen is soft. There is no mass.      Tenderness: There is abdominal tenderness in the left lower quadrant. There is no right CVA tenderness, left CVA tenderness, guarding or rebound.      Hernia: " No hernia is present.   Skin:     General: Skin is warm and dry.   Neurological:      Mental Status: She is alert and oriented to person, place, and time.      Coordination: Coordination normal.      Gait: Gait normal.   Psychiatric:         Behavior: Behavior is cooperative.         Assessment and Plan:  Problem List Items Addressed This Visit        Cardiovascular and Mediastinum    Essential (primary) hypertension    Overview     Stable    Refill coreg         Relevant Medications    carvedilol (Coreg) 3.125 MG tablet       Digestive    Irritable bowel syndrome with constipation    Overview     She is taking linzess but she does not take it daily bc of diarrhea. Will lower the dose         Relevant Medications    linaclotide (Linzess) 72 MCG capsule capsule    Diverticula of colon - Primary    Overview     sigmoid            Nervous and Auditory    LLQ pain    Current Assessment & Plan     Resolved except for when she eats certain foods. Labs and CT scan was negative. I recommend she see GI.              Will recheck labs and labs added to see how things are going. Suspicion still high for diverticulitis. No signs of ischemic bowel. Will resume antibiotics but on a different schedule bc of her kidneys.     An After Visit Summary and PPPS were given to the patient.             I wore protective equipment throughout this patient encounter to include mask, gloves and eye protection. Hand hygiene was performed before donning protective equipment and after removal when leaving the room.

## 2020-11-02 NOTE — ASSESSMENT & PLAN NOTE
Resolved except for when she eats certain foods. Labs and CT scan was negative. I recommend she see GI.

## 2020-11-16 ENCOUNTER — OFFICE VISIT (OUTPATIENT)
Dept: FAMILY MEDICINE CLINIC | Facility: CLINIC | Age: 75
End: 2020-11-16

## 2020-11-16 VITALS
WEIGHT: 187.2 LBS | OXYGEN SATURATION: 96 % | HEIGHT: 65 IN | RESPIRATION RATE: 18 BRPM | SYSTOLIC BLOOD PRESSURE: 124 MMHG | BODY MASS INDEX: 31.19 KG/M2 | DIASTOLIC BLOOD PRESSURE: 74 MMHG | HEART RATE: 85 BPM | TEMPERATURE: 97.5 F

## 2020-11-16 DIAGNOSIS — R10.32 LLQ PAIN: ICD-10-CM

## 2020-11-16 DIAGNOSIS — K58.1 IRRITABLE BOWEL SYNDROME WITH CONSTIPATION: Primary | ICD-10-CM

## 2020-11-16 PROBLEM — T14.8XXA WOUND OF SKIN: Status: RESOLVED | Noted: 2020-09-15 | Resolved: 2020-11-16

## 2020-11-16 PROCEDURE — 99213 OFFICE O/P EST LOW 20 MIN: CPT | Performed by: FAMILY MEDICINE

## 2020-11-16 NOTE — PROGRESS NOTES
Subjective   Vianey Reeves is a 75 y.o. female.     Chief Complaint   Patient presents with   • Abdominal Pain       Abdominal Pain  This is a new problem. The current episode started more than 1 month ago. The onset quality is sudden. The problem occurs rarely. The problem has been rapidly improving. The pain is located in the generalized abdominal region. The quality of the pain is sharp. The abdominal pain radiates to the LUQ and LLQ. Pertinent negatives include no arthralgias, belching, constipation, diarrhea, fever, frequency, headaches, nausea or vomiting. Nothing aggravates the pain. The pain is relieved by bowel movements. She has tried antibiotics (linzess) for the symptoms. The treatment provided significant relief. Prior diagnostic workup includes CT scan.        The following portions of the patient's history were reviewed and updated as appropriate: allergies, current medications, past family history, past medical history, past social history, past surgical history and problem list.    Allergies:  Allergies   Allergen Reactions   • Erythromycin Rash   • Naproxen Sodium Unknown - High Severity   • Latex Itching and Swelling   • Metoclopramide Unknown (See Comments)       Social History:  Social History     Socioeconomic History   • Marital status:      Spouse name: Not on file   • Number of children: Not on file   • Years of education: Not on file   • Highest education level: Not on file   Social Needs   • Financial resource strain: Not on file   • Food insecurity     Worry: Never true     Inability: Never true   • Transportation needs     Medical: No     Non-medical: No   Tobacco Use   • Smoking status: Current Every Day Smoker     Packs/day: 0.25     Years: 50.00     Pack years: 12.50     Types: Cigarettes   • Smokeless tobacco: Never Used   Substance and Sexual Activity   • Alcohol use: Yes     Frequency: Never     Comment: occasionally    • Drug use: Never       Family History:  Family  History   Problem Relation Age of Onset   • Arthritis Father    • Prostate cancer Father    • Heart disease Sister        Past Medical History :  Patient Active Problem List   Diagnosis   • Type 2 diabetes mellitus without complication (CMS/HCC)   • Essential (primary) hypertension   • Mixed hyperlipidemia   • Hiatal hernia   • Fatigue   • Long term current use of antithrombotics/antiplatelets   • Chronic kidney disease, stage 3 (moderate)   • Chronic obstructive pulmonary disease (CMS/HCC)   • Atherosclerotic heart disease of native coronary artery without angina pectoris   • Hypertensive cardiovascular disease   • Irritable bowel syndrome with constipation   • History of cancer of ureter   • Solitary kidney   • Atherosclerosis of native arteries of extremities with intermittent claudication, bilateral legs (CMS/HCC)   • H/O malignant neoplasm of ureter   • Vertigo   • Absent kidney   • Medicare annual wellness visit, subsequent   • Presbyopia   • Combined form of senile cataract   • Cervical cancer screening   • Postartificial menopausal syndrome   • LLQ pain   • Diverticula of colon       Medication List:    Current Outpatient Medications:   •  amLODIPine (NORVASC) 10 MG tablet, Take 1 tablet by mouth Daily., Disp: 30 tablet, Rfl: 12  •  aspirin 81 MG tablet, Take 81 mg by mouth Daily., Disp: , Rfl:   •  calcium-vitamin D (Oscal 500/200 D-3) 500-200 MG-UNIT per tablet, Take  by mouth. Twice weekly, Disp: , Rfl:   •  carvedilol (Coreg) 3.125 MG tablet, Take 1 tablet by mouth Every 12 (Twelve) Hours., Disp: 60 tablet, Rfl: 12  •  coenzyme Q10 100 MG capsule, Take 1 capsule by mouth Daily., Disp: , Rfl:   •  dicyclomine (Bentyl) 20 MG tablet, Take 1 tablet by mouth Every 6 (Six) Hours., Disp: 30 tablet, Rfl: 1  •  Evolocumab (Repatha SureClick) 140 MG/ML solution auto-injector, Inject 1 mL under the skin into the appropriate area as directed Every 14 (Fourteen) Days., Disp: 1 pen, Rfl: 12  •  fluticasone (FLONASE)  50 MCG/ACT nasal spray, USE 2 SPRAYS IN EACH NOSTRIL EVERY DAY, Disp: , Rfl:   •  fluticasone-salmeterol (ADVAIR HFA) 45-21 MCG/ACT inhaler, Inhale 2 puffs 2 (Two) Times a Day., Disp: , Rfl:   •  Glucose Blood (BLOOD GLUCOSE TEST) strip, 1 each by In Vitro route 3 (Three) Times a Day., Disp: 300 each, Rfl: 3  •  Insulin Glargine (Lantus SoloStar) 100 UNIT/ML injection pen, Inject 20 Units under the skin into the appropriate area as directed Every Night., Disp: 5 pen, Rfl: 12  •  ketoconazole (NIZORAL) 2 % shampoo, APPLY TO SCALP THREE TIMES A WEEK, Disp: , Rfl:   •  linaclotide (Linzess) 72 MCG capsule capsule, Take 1 capsule by mouth Every Morning Before Breakfast., Disp: 30 capsule, Rfl: 12  •  mupirocin (BACTROBAN) 2 % ointment, Apply  topically to the appropriate area as directed 3 (Three) Times a Day., Disp: 22 g, Rfl: 0  •  nicotine (NICODERM CQ) 14 MG/24HR patch, Place 1 patch on the skin as directed by provider Daily., Disp: , Rfl:   •  omeprazole (priLOSEC) 40 MG capsule, TAKE 1 CAPSULE BY MOUTH EVERY MORNING, Disp: , Rfl:   •  rosuvastatin (CRESTOR) 20 MG tablet, Take 20 mg by mouth Daily., Disp: , Rfl:   •  Santyl 250 UNIT/GM ointment, APPLY TO WOUND ONCE D, Disp: , Rfl:   •  sertraline (ZOLOFT) 25 MG tablet, Take 1 tablet by mouth Daily., Disp: 30 tablet, Rfl: 12  •  SITagliptin (Januvia) 100 MG tablet, Take 1 tablet by mouth Daily., Disp: 30 tablet, Rfl: 12  •  traMADol (ULTRAM) 50 MG tablet, , Disp: , Rfl:     Past Surgical History:  Past Surgical History:   Procedure Laterality Date   • BREAST BIOPSY Right    • CARDIAC CATHETERIZATION     • CHOLECYSTECTOMY     • COLON SURGERY      REMOVAL   • HERNIA REPAIR     • NEPHRECTOMY         Review of Systems:  Review of Systems   Constitutional: Negative for activity change and fever.   HENT: Negative for ear pain, rhinorrhea, sinus pressure and voice change.    Eyes: Negative for visual disturbance.   Respiratory: Negative for cough and shortness of breath.   "  Cardiovascular: Negative for chest pain.   Gastrointestinal: Negative for abdominal pain, constipation, diarrhea, nausea and vomiting.   Endocrine: Negative for cold intolerance and heat intolerance.   Genitourinary: Negative for frequency and urgency.   Musculoskeletal: Negative for arthralgias.   Skin: Negative for rash.   Neurological: Negative for syncope.   Hematological: Does not bruise/bleed easily.   Psychiatric/Behavioral: Negative for depressed mood. The patient is not nervous/anxious.        Physical Exam:  Vital Signs:  Visit Vitals  /74   Pulse 85   Temp 97.5 °F (36.4 °C)   Resp 18   Ht 165.1 cm (65\")   Wt 84.9 kg (187 lb 3.2 oz)   SpO2 96%   BMI 31.15 kg/m²       Physical Exam  Vitals signs reviewed.   Constitutional:       Appearance: Normal appearance. She is well-developed.   HENT:      Head: Normocephalic and atraumatic.   Cardiovascular:      Rate and Rhythm: Normal rate and regular rhythm.      Heart sounds: Normal heart sounds. No murmur. No friction rub. No gallop.    Pulmonary:      Effort: Pulmonary effort is normal.      Breath sounds: Normal breath sounds. No wheezing or rales.   Abdominal:      General: Abdomen is flat. Bowel sounds are normal. There is no distension.      Palpations: Abdomen is soft. There is no mass.      Tenderness: There is no abdominal tenderness. There is no right CVA tenderness, left CVA tenderness, guarding or rebound.      Hernia: No hernia is present.   Skin:     General: Skin is warm and dry.   Neurological:      Mental Status: She is alert and oriented to person, place, and time.      Coordination: Coordination normal.      Gait: Gait normal.   Psychiatric:         Behavior: Behavior is cooperative.         Assessment and Plan:  Problem List Items Addressed This Visit        Digestive    Irritable bowel syndrome with constipation - Primary    Overview     She is doing much better with lower dose of linzess            Nervous and Auditory    LLQ pain    " Overview     She is much better             Will recheck labs and labs added to see how things are going. Suspicion still high for diverticulitis. No signs of ischemic bowel. Will resume antibiotics but on a different schedule bc of her kidneys.     An After Visit Summary and PPPS were given to the patient.             I wore protective equipment throughout this patient encounter to include mask, gloves and eye protection. Hand hygiene was performed before donning protective equipment and after removal when leaving the room.

## 2020-12-03 ENCOUNTER — OFFICE VISIT (OUTPATIENT)
Dept: FAMILY MEDICINE CLINIC | Facility: CLINIC | Age: 75
End: 2020-12-03

## 2020-12-03 VITALS
OXYGEN SATURATION: 96 % | DIASTOLIC BLOOD PRESSURE: 82 MMHG | WEIGHT: 186.4 LBS | TEMPERATURE: 97.7 F | RESPIRATION RATE: 18 BRPM | HEART RATE: 72 BPM | SYSTOLIC BLOOD PRESSURE: 149 MMHG | HEIGHT: 65 IN | BODY MASS INDEX: 31.06 KG/M2

## 2020-12-03 DIAGNOSIS — I25.10 ATHEROSCLEROSIS OF NATIVE CORONARY ARTERY OF NATIVE HEART WITHOUT ANGINA PECTORIS: ICD-10-CM

## 2020-12-03 DIAGNOSIS — J41.0 SIMPLE CHRONIC BRONCHITIS (HCC): ICD-10-CM

## 2020-12-03 DIAGNOSIS — H91.93 BILATERAL HEARING LOSS, UNSPECIFIED HEARING LOSS TYPE: ICD-10-CM

## 2020-12-03 DIAGNOSIS — E11.9 TYPE 2 DIABETES MELLITUS WITHOUT COMPLICATION, WITHOUT LONG-TERM CURRENT USE OF INSULIN (HCC): Primary | ICD-10-CM

## 2020-12-03 DIAGNOSIS — I10 ESSENTIAL (PRIMARY) HYPERTENSION: ICD-10-CM

## 2020-12-03 DIAGNOSIS — E78.2 MIXED HYPERLIPIDEMIA: ICD-10-CM

## 2020-12-03 PROBLEM — H72.92 RUPTURED TYMPANIC MEMBRANE, LEFT: Status: ACTIVE | Noted: 2020-12-03

## 2020-12-03 PROBLEM — M26.629 TMJ PAIN DYSFUNCTION SYNDROME: Status: ACTIVE | Noted: 2020-12-03

## 2020-12-03 LAB
BILIRUB BLD-MCNC: NEGATIVE MG/DL
CLARITY, POC: CLEAR
COLOR UR: YELLOW
GLUCOSE BLDC GLUCOMTR-MCNC: 174 MG/DL (ref 70–130)
GLUCOSE UR STRIP-MCNC: NEGATIVE MG/DL
HBA1C MFR BLD: 5.9 %
KETONES UR QL: NEGATIVE
LEUKOCYTE EST, POC: NEGATIVE
NITRITE UR-MCNC: NEGATIVE MG/ML
PH UR: 5 [PH] (ref 5–8)
PROT UR STRIP-MCNC: ABNORMAL MG/DL
RBC # UR STRIP: NEGATIVE /UL
SP GR UR: 1.02 (ref 1–1.03)
UROBILINOGEN UR QL: NORMAL

## 2020-12-03 PROCEDURE — 82962 GLUCOSE BLOOD TEST: CPT | Performed by: FAMILY MEDICINE

## 2020-12-03 PROCEDURE — 81003 URINALYSIS AUTO W/O SCOPE: CPT | Performed by: FAMILY MEDICINE

## 2020-12-03 PROCEDURE — 99214 OFFICE O/P EST MOD 30 MIN: CPT | Performed by: FAMILY MEDICINE

## 2020-12-03 PROCEDURE — 83036 HEMOGLOBIN GLYCOSYLATED A1C: CPT | Performed by: FAMILY MEDICINE

## 2020-12-03 RX ORDER — ROSUVASTATIN CALCIUM 20 MG/1
20 TABLET, COATED ORAL DAILY
Qty: 30 TABLET | Refills: 12 | Status: SHIPPED | OUTPATIENT
Start: 2020-12-03 | End: 2021-01-06

## 2020-12-03 NOTE — PROGRESS NOTES
Subjective   Vianey Reeves is a 75 y.o. female.     Chief Complaint   Patient presents with   • Diabetes   • Hyperlipidemia   • Hypertension   • COPD   • Coronary Artery Disease       Diabetes  She presents for her follow-up diabetic visit. She has type 2 diabetes mellitus. No MedicAlert identification noted. Her disease course has been fluctuating. Pertinent negatives for hypoglycemia include no dizziness, headaches, nervousness/anxiousness or sweats. There are no diabetic associated symptoms. Pertinent negatives for diabetes include no chest pain. There are no hypoglycemic complications. Symptoms are stable. There are no diabetic complications. Risk factors for coronary artery disease include diabetes mellitus, dyslipidemia, hypertension and post-menopausal. Current diabetic treatment includes oral agent (monotherapy) and insulin injections. She is compliant with treatment most of the time. Her weight is stable. She is following a generally healthy diet. Meal planning includes avoidance of concentrated sweets and carbohydrate counting. She has not had a previous visit with a dietitian. She participates in exercise intermittently. Her overall blood glucose range is 130-140 mg/dl. An ACE inhibitor/angiotensin II receptor blocker is being taken. She sees a podiatrist.Eye exam is not current.   Hyperlipidemia  This is a chronic problem. The current episode started more than 1 year ago. The problem is uncontrolled. Recent lipid tests were reviewed and are variable. Factors aggravating her hyperlipidemia include smoking and beta blockers. Pertinent negatives include no chest pain or shortness of breath. Current antihyperlipidemic treatment includes herbal therapy. The current treatment provides mild improvement of lipids. There are no compliance problems.  Risk factors for coronary artery disease include diabetes mellitus, dyslipidemia, obesity and post-menopausal.   Hypertension  This is a chronic problem. The  current episode started more than 1 year ago. The problem has been waxing and waning since onset. The problem is controlled. Pertinent negatives include no chest pain, headaches, PND, shortness of breath or sweats. There are no associated agents to hypertension. There are no known risk factors for coronary artery disease. Past treatments include beta blockers. Current antihypertension treatment includes beta blockers. The current treatment provides moderate improvement. There are no compliance problems.    COPD  There is no chest tightness, cough, shortness of breath, sputum production or wheezing. This is a chronic problem. The current episode started more than 1 year ago. The problem occurs intermittently. The problem has been unchanged. Pertinent negatives include no chest pain, ear pain, fever, headaches, nasal congestion, orthopnea, PND, postnasal drip, rhinorrhea or sweats. Her symptoms are aggravated by URI and change in weather. Her symptoms are alleviated by beta-agonist. She reports significant improvement on treatment.   Coronary Artery Disease  Presents for follow-up visit. Pertinent negatives include no chest pain, chest pressure, chest tightness, dizziness or shortness of breath. Risk factors include hyperlipidemia. The symptoms have been stable. Compliance with diet is variable. Compliance with exercise is variable. Compliance with medications is good.   Earache   There is pain in both ears. This is a new problem. The current episode started in the past 7 days. The problem occurs constantly. The problem has been unchanged. There has been no fever. The pain is moderate. Pertinent negatives include no abdominal pain, coughing, diarrhea, ear discharge, headaches, hearing loss, rash, rhinorrhea or vomiting. She has tried nothing for the symptoms.        The following portions of the patient's history were reviewed and updated as appropriate: allergies, current medications, past family history, past  medical history, past social history, past surgical history and problem list.    Allergies:  Allergies   Allergen Reactions   • Erythromycin Rash   • Naproxen Sodium Unknown - High Severity   • Latex Itching and Swelling   • Metoclopramide Unknown (See Comments)       Social History:  Social History     Socioeconomic History   • Marital status:      Spouse name: Not on file   • Number of children: Not on file   • Years of education: Not on file   • Highest education level: Not on file   Social Needs   • Financial resource strain: Not on file   • Food insecurity     Worry: Never true     Inability: Never true   • Transportation needs     Medical: No     Non-medical: No   Tobacco Use   • Smoking status: Current Every Day Smoker     Packs/day: 0.25     Years: 50.00     Pack years: 12.50     Types: Cigarettes   • Smokeless tobacco: Never Used   Substance and Sexual Activity   • Alcohol use: Yes     Frequency: Never     Comment: occasionally    • Drug use: Never       Family History:  Family History   Problem Relation Age of Onset   • Arthritis Father    • Prostate cancer Father    • Heart disease Sister        Past Medical History :  Patient Active Problem List   Diagnosis   • Type 2 diabetes mellitus without complication (CMS/[facility])   • Essential (primary) hypertension   • Mixed hyperlipidemia   • Hiatal hernia   • Fatigue   • Long term current use of antithrombotics/antiplatelets   • Chronic kidney disease, stage 3 (moderate)   • Chronic obstructive pulmonary disease (CMS/HCC)   • Atherosclerotic heart disease of native coronary artery without angina pectoris   • Irritable bowel syndrome with constipation   • History of cancer of ureter   • Solitary kidney   • Atherosclerosis of native arteries of extremities with intermittent claudication, bilateral legs (CMS/HCC)   • H/O malignant neoplasm of ureter   • Vertigo   • Absent kidney   • Medicare annual wellness visit, subsequent   • Presbyopia   • Combined form of  senile cataract   • Cervical cancer screening   • Postartificial menopausal syndrome   • LLQ pain   • Diverticula of colon   • Ruptured tympanic membrane, left   • Hearing loss, bilateral   • TMJ pain dysfunction syndrome       Medication List:    Current Outpatient Medications:   •  amLODIPine (NORVASC) 10 MG tablet, Take 1 tablet by mouth Daily., Disp: 30 tablet, Rfl: 12  •  aspirin 81 MG tablet, Take 81 mg by mouth Daily., Disp: , Rfl:   •  calcium-vitamin D (Oscal 500/200 D-3) 500-200 MG-UNIT per tablet, Take  by mouth. Twice weekly, Disp: , Rfl:   •  carvedilol (Coreg) 3.125 MG tablet, Take 1 tablet by mouth Every 12 (Twelve) Hours., Disp: 60 tablet, Rfl: 12  •  coenzyme Q10 100 MG capsule, Take 1 capsule by mouth Daily., Disp: , Rfl:   •  Evolocumab (Repatha SureClick) 140 MG/ML solution auto-injector, Inject 1 mL under the skin into the appropriate area as directed Every 14 (Fourteen) Days., Disp: 1 pen, Rfl: 12  •  fluticasone (FLONASE) 50 MCG/ACT nasal spray, USE 2 SPRAYS IN EACH NOSTRIL EVERY DAY, Disp: , Rfl:   •  fluticasone-salmeterol (ADVAIR HFA) 45-21 MCG/ACT inhaler, Inhale 2 puffs 2 (Two) Times a Day., Disp: 1 inhaler, Rfl: 12  •  Glucose Blood (BLOOD GLUCOSE TEST) strip, 1 each by In Vitro route 3 (Three) Times a Day., Disp: 300 each, Rfl: 3  •  Insulin Glargine (Lantus SoloStar) 100 UNIT/ML injection pen, Inject 20 Units under the skin into the appropriate area as directed Every Night., Disp: 5 pen, Rfl: 12  •  linaclotide (Linzess) 72 MCG capsule capsule, Take 1 capsule by mouth Every Morning Before Breakfast., Disp: 30 capsule, Rfl: 12  •  rosuvastatin (CRESTOR) 20 MG tablet, Take 1 tablet by mouth Daily., Disp: 30 tablet, Rfl: 12  •  Santyl 250 UNIT/GM ointment, APPLY TO WOUND ONCE D, Disp: , Rfl:   •  sertraline (ZOLOFT) 25 MG tablet, Take 1 tablet by mouth Daily., Disp: 30 tablet, Rfl: 12  •  SITagliptin (Januvia) 100 MG tablet, Take 1 tablet by mouth Daily., Disp: 30 tablet, Rfl: 12  •   "traMADol (ULTRAM) 50 MG tablet, , Disp: , Rfl:     Past Surgical History:  Past Surgical History:   Procedure Laterality Date   • BREAST BIOPSY Right    • CARDIAC CATHETERIZATION     • CHOLECYSTECTOMY     • COLON SURGERY      REMOVAL   • HERNIA REPAIR     • NEPHRECTOMY         Review of Systems:  Review of Systems   Constitutional: Negative for activity change and fever.   HENT: Negative for ear discharge, ear pain, hearing loss, postnasal drip, rhinorrhea, sinus pressure and voice change.    Eyes: Negative for visual disturbance.   Respiratory: Negative for cough, sputum production, chest tightness, shortness of breath and wheezing.    Cardiovascular: Negative for chest pain and PND.   Gastrointestinal: Negative for abdominal pain, diarrhea, nausea and vomiting.   Endocrine: Negative for cold intolerance and heat intolerance.   Genitourinary: Negative for frequency and urgency.   Musculoskeletal: Negative for arthralgias.   Skin: Negative for rash.   Neurological: Negative for dizziness and syncope.   Hematological: Does not bruise/bleed easily.   Psychiatric/Behavioral: Negative for depressed mood. The patient is not nervous/anxious.        Physical Exam:  Vital Signs:  Visit Vitals  /82 (BP Location: Left arm)   Pulse 72   Temp 97.7 °F (36.5 °C) (Oral)   Resp 18   Ht 165.1 cm (65\")   Wt 84.6 kg (186 lb 6.4 oz)   SpO2 96%   BMI 31.02 kg/m²       Physical Exam  Vitals signs reviewed.   Constitutional:       Appearance: She is well-developed.   HENT:      Head: Normocephalic and atraumatic.      Right Ear: External ear normal. No decreased hearing noted. No tenderness. No middle ear effusion. Tympanic membrane is not injected, erythematous, retracted or bulging.      Left Ear: External ear normal. No decreased hearing noted. No tenderness.  No middle ear effusion. Tympanic membrane is not injected, erythematous, retracted or bulging.      Nose: Nose normal. No rhinorrhea.      Mouth/Throat:      Pharynx: No " oropharyngeal exudate or posterior oropharyngeal erythema.   Eyes:      General:         Right eye: No discharge.         Left eye: No discharge.   Cardiovascular:      Rate and Rhythm: Normal rate and regular rhythm.      Heart sounds: Normal heart sounds. No murmur. No friction rub. No gallop.    Pulmonary:      Effort: Pulmonary effort is normal. No respiratory distress.      Breath sounds: Normal breath sounds. No wheezing or rales.   Lymphadenopathy:      Cervical: No cervical adenopathy.   Skin:     General: Skin is warm and dry.      Findings: No rash.   Neurological:      Mental Status: She is alert and oriented to person, place, and time.         Assessment and Plan:  Problems Addressed this Visit        Cardiovascular and Mediastinum    Essential (primary) hypertension    Relevant Orders    Comprehensive Metabolic Panel    Mixed hyperlipidemia    Relevant Medications    rosuvastatin (CRESTOR) 20 MG tablet    Other Relevant Orders    Lipid Panel With / Chol / HDL Ratio    Atherosclerotic heart disease of native coronary artery without angina pectoris    Relevant Medications    rosuvastatin (CRESTOR) 20 MG tablet       Respiratory    Chronic obstructive pulmonary disease (CMS/HCC)    Relevant Medications    fluticasone-salmeterol (ADVAIR HFA) 45-21 MCG/ACT inhaler       Endocrine    Type 2 diabetes mellitus without complication (CMS/[facility]) - Primary    Relevant Orders    POC Urinalysis Dipstick, Multipro (Completed)    POC Glycosylated Hemoglobin (Hb A1C) (Completed)    POC Glucose (Completed)       Nervous and Auditory    Hearing loss, bilateral     Will have her see Dr Roth           Diagnoses       Codes Comments    Type 2 diabetes mellitus without complication, without long-term current use of insulin (CMS/[facility])    -  Primary ICD-10-CM: E11.9  ICD-9-CM: 250.00     Essential (primary) hypertension     ICD-10-CM: I10  ICD-9-CM: 401.9     Mixed hyperlipidemia     ICD-10-CM: E78.2  ICD-9-CM: 272.2      Atherosclerosis of native coronary artery of native heart without angina pectoris     ICD-10-CM: I25.10  ICD-9-CM: 414.01     Simple chronic bronchitis (CMS/[facility])     ICD-10-CM: J41.0  ICD-9-CM: 491.0     Bilateral hearing loss, unspecified hearing loss type     ICD-10-CM: H91.93  ICD-9-CM: 389.9            An After Visit Summary and PPPS were given to the patient.             I wore protective equipment throughout this patient encounter to include mask, gloves and eye protection. Hand hygiene was performed before donning protective equipment and after removal when leaving the room.

## 2021-01-05 ENCOUNTER — TELEPHONE (OUTPATIENT)
Dept: FAMILY MEDICINE CLINIC | Facility: CLINIC | Age: 76
End: 2021-01-05

## 2021-01-06 ENCOUNTER — OFFICE VISIT (OUTPATIENT)
Dept: FAMILY MEDICINE CLINIC | Facility: CLINIC | Age: 76
End: 2021-01-06

## 2021-01-06 VITALS
HEIGHT: 65 IN | WEIGHT: 183.4 LBS | DIASTOLIC BLOOD PRESSURE: 92 MMHG | SYSTOLIC BLOOD PRESSURE: 162 MMHG | TEMPERATURE: 96.9 F | OXYGEN SATURATION: 98 % | HEART RATE: 93 BPM | RESPIRATION RATE: 20 BRPM | BODY MASS INDEX: 30.56 KG/M2

## 2021-01-06 DIAGNOSIS — K57.92 DIVERTICULITIS: Primary | ICD-10-CM

## 2021-01-06 PROCEDURE — 99214 OFFICE O/P EST MOD 30 MIN: CPT | Performed by: FAMILY MEDICINE

## 2021-01-06 RX ORDER — AMOXICILLIN AND CLAVULANATE POTASSIUM 875; 125 MG/1; MG/1
1 TABLET, FILM COATED ORAL 2 TIMES DAILY
Qty: 30 TABLET | Refills: 0 | Status: SHIPPED | OUTPATIENT
Start: 2021-01-06 | End: 2021-01-11

## 2021-01-06 NOTE — PROGRESS NOTES
Subjective   Vianey Reeves is a 75 y.o. female.     Chief Complaint   Patient presents with   • Diverticulitis       Diverticulitis  This is a chronic problem. The current episode started in the past 7 days (5/3/21). The problem occurs every several days. The problem has been unchanged. Associated symptoms include abdominal pain, a change in bowel habit and chills. Pertinent negatives include no chest pain, diaphoresis, fever, nausea or vomiting.            I personally reviewed and updated the patient's allergies, medications, problem list, and past medical, surgical, social, and family history. I have reviewed and confirmed the accuracy of the History of Present Illness and Review of Symptoms as documented by the MA/LPN/RN. Fabio Schmid MD    Family History   Problem Relation Age of Onset   • Arthritis Father    • Prostate cancer Father    • Heart disease Sister        Social History     Tobacco Use   • Smoking status: Current Every Day Smoker     Packs/day: 0.25     Years: 50.00     Pack years: 12.50     Types: Cigarettes   • Smokeless tobacco: Never Used   Substance Use Topics   • Alcohol use: Yes     Frequency: Never     Comment: occasionally    • Drug use: Never       Past Surgical History:   Procedure Laterality Date   • BREAST BIOPSY Right    • CARDIAC CATHETERIZATION     • CHOLECYSTECTOMY     • COLON SURGERY      REMOVAL   • HERNIA REPAIR     • NEPHRECTOMY         Patient Active Problem List   Diagnosis   • Type 2 diabetes mellitus without complication (CMS/HCC)   • Essential (primary) hypertension   • Mixed hyperlipidemia   • Hiatal hernia   • Fatigue   • Long term current use of antithrombotics/antiplatelets   • Chronic kidney disease, stage 3 (moderate)   • Chronic obstructive pulmonary disease (CMS/HCC)   • Atherosclerotic heart disease of native coronary artery without angina pectoris   • Irritable bowel syndrome with constipation   • History of cancer of ureter   • Solitary kidney   •  Atherosclerosis of native arteries of extremities with intermittent claudication, bilateral legs (CMS/HCC)   • H/O malignant neoplasm of ureter   • Vertigo   • Absent kidney   • Medicare annual wellness visit, subsequent   • Presbyopia   • Combined form of senile cataract   • Cervical cancer screening   • Postartificial menopausal syndrome   • LLQ pain   • Diverticula of colon   • Ruptured tympanic membrane, left   • Hearing loss, bilateral   • TMJ pain dysfunction syndrome   • Diverticulitis         Current Outpatient Medications:   •  amLODIPine (NORVASC) 10 MG tablet, Take 1 tablet by mouth Daily., Disp: 30 tablet, Rfl: 12  •  aspirin 81 MG tablet, Take 81 mg by mouth Daily., Disp: , Rfl:   •  calcium-vitamin D (Oscal 500/200 D-3) 500-200 MG-UNIT per tablet, Take  by mouth. Twice weekly, Disp: , Rfl:   •  carvedilol (Coreg) 3.125 MG tablet, Take 1 tablet by mouth Every 12 (Twelve) Hours., Disp: 60 tablet, Rfl: 12  •  coenzyme Q10 100 MG capsule, Take 1 capsule by mouth Daily., Disp: , Rfl:   •  Evolocumab (Repatha SureClick) 140 MG/ML solution auto-injector, Inject 1 mL under the skin into the appropriate area as directed Every 14 (Fourteen) Days., Disp: 1 pen, Rfl: 12  •  fluticasone (FLONASE) 50 MCG/ACT nasal spray, USE 2 SPRAYS IN EACH NOSTRIL EVERY DAY, Disp: , Rfl:   •  fluticasone-salmeterol (ADVAIR HFA) 45-21 MCG/ACT inhaler, Inhale 2 puffs 2 (Two) Times a Day., Disp: 1 inhaler, Rfl: 12  •  Glucose Blood (BLOOD GLUCOSE TEST) strip, 1 each by In Vitro route 3 (Three) Times a Day., Disp: 300 each, Rfl: 3  •  Insulin Glargine (Lantus SoloStar) 100 UNIT/ML injection pen, Inject 20 Units under the skin into the appropriate area as directed Every Night., Disp: 5 pen, Rfl: 12  •  linaclotide (Linzess) 72 MCG capsule capsule, Take 1 capsule by mouth Every Morning Before Breakfast., Disp: 30 capsule, Rfl: 12  •  Santyl 250 UNIT/GM ointment, APPLY TO WOUND ONCE D, Disp: , Rfl:   •  sertraline (ZOLOFT) 25 MG tablet,  "Take 1 tablet by mouth Daily., Disp: 30 tablet, Rfl: 12  •  SITagliptin (Januvia) 100 MG tablet, Take 1 tablet by mouth Daily., Disp: 30 tablet, Rfl: 12  •  traMADol (ULTRAM) 50 MG tablet, , Disp: , Rfl:   •  gabapentin (NEURONTIN) 300 MG capsule, , Disp: , Rfl:   •  metroNIDAZOLE (FLAGYL) 500 MG tablet, Take 1 tablet by mouth 3 (Three) Times a Day. Do not use alcohol for at least 24 hours after the last dose., Disp: 21 tablet, Rfl: 0  •  sulfamethoxazole-trimethoprim (BACTRIM,SEPTRA) 400-80 MG tablet, Take 1 tablet by mouth 2 (Two) Times a Day., Disp: 20 tablet, Rfl: 0         Review of Systems   Constitutional: Positive for chills. Negative for diaphoresis and fever.   Eyes: Negative for visual disturbance.   Respiratory: Negative for shortness of breath.    Cardiovascular: Negative for chest pain and palpitations.   Gastrointestinal: Positive for abdominal pain and change in bowel habit. Negative for nausea and vomiting.   Endocrine: Negative for polydipsia and polyphagia.   Musculoskeletal: Negative for neck stiffness.   Skin: Negative for color change and pallor.   Neurological: Negative for seizures and syncope.   Hematological: Negative for adenopathy.       I have reviewed and confirmed the accuracy of the ROS as documented by the MA/LPN/RN Fabio Schmid MD      Objective   /92 (BP Location: Right arm, Patient Position: Sitting)   Pulse 93   Temp 96.9 °F (36.1 °C)   Resp 20   Ht 165.1 cm (65\")   Wt 83.2 kg (183 lb 6.4 oz)   SpO2 98%   BMI 30.52 kg/m²   BP Readings from Last 3 Encounters:   01/11/21 126/84   01/06/21 162/92   12/03/20 149/82     Wt Readings from Last 3 Encounters:   01/11/21 84.2 kg (185 lb 9.6 oz)   01/06/21 83.2 kg (183 lb 6.4 oz)   12/03/20 84.6 kg (186 lb 6.4 oz)     Physical Exam  Constitutional:       Appearance: Normal appearance. She is well-developed. She is not diaphoretic.   Cardiovascular:      Rate and Rhythm: Normal rate and regular rhythm.      Pulses: Normal " pulses.      Heart sounds: Normal heart sounds, S1 normal and S2 normal. No murmur. No friction rub. No gallop.    Pulmonary:      Effort: Pulmonary effort is normal. No accessory muscle usage.      Breath sounds: Normal breath sounds. No stridor. No decreased breath sounds, wheezing, rhonchi or rales.   Abdominal:      General: Bowel sounds are normal. There is no distension.      Palpations: Abdomen is soft. Abdomen is not rigid. There is no mass or pulsatile mass.      Tenderness: There is no abdominal tenderness. There is no guarding or rebound. Negative signs include Weber's sign.      Hernia: No hernia is present.      Comments: Tenderness left lower quadrant   Skin:     General: Skin is warm and dry.      Coloration: Skin is not pale.   Neurological:      Mental Status: She is alert and oriented to person, place, and time.      Coordination: Coordination normal.      Gait: Gait normal.         Data / Lab Results:    Hemoglobin A1C   Date Value Ref Range Status   12/03/2020 5.9 % Final   06/23/2020 6.1 % Final   04/19/2019 7.7 4.6 - 7.1 % Final     Lab Results   Component Value Date     (H) 01/11/2021     Lab Results   Component Value Date     (H) 06/23/2020    LDL 26 11/08/2018     (H) 09/29/2017     Lab Results   Component Value Date    CHOL 83 11/08/2018    CHOL 208 (H) 09/29/2017    CHOL 160 03/03/2017     Lab Results   Component Value Date    TRIG 130 06/23/2020    TRIG 100 11/08/2018    TRIG 151 (H) 09/29/2017     Lab Results   Component Value Date    HDL 37 (L) 06/23/2020    HDL 38 (L) 11/08/2018    HDL 35 (L) 09/29/2017     No results found for: PSA  Lab Results   Component Value Date    WBC 7.7 01/11/2021    HGB 14.8 01/11/2021    HCT 44.7 01/11/2021    MCV 95 01/11/2021     01/11/2021     Lab Results   Component Value Date    TSH 1.840 06/23/2020      Lab Results   Component Value Date    GLUCOSE 105 (H) 07/05/2019    BUN 22 01/11/2021    CREATININE 1.53 (H) 01/11/2021     EGFRIFNONA 33 (L) 01/11/2021    EGFRIFAFRI 38 (L) 01/11/2021    BCR 14 01/11/2021    K 4.3 01/11/2021    CO2 27 01/11/2021    CALCIUM 9.3 01/11/2021    PROTENTOTREF 6.1 01/11/2021    ALBUMIN 3.8 01/11/2021    LABIL2 1.7 01/11/2021    AST 12 01/11/2021    ALT 10 01/11/2021     No results found for: NATALYA, RF, SEDRATE   No results found for: CRP   Lab Results   Component Value Date    IRON 44 11/03/2017    TIBC 365 11/03/2017      No results found for: [id number]       Assessment/Plan      Medications        Problem List         LOS    Diverticulitis.  Likely, positive previous history of diverticulitis with rupture.  Start Augmentin.  Increase fluid intake.  Afebrile currently.  Follow-up 10 days.  Possible necessity of IV antibiotics discussed, call or return if fever, if unable to keep fluids down, if worsening symptoms.  Chronic constipation.  Overall stable on Linzess.  Add daily fiber.  Acute bacterial sinusitis.  Start antibiotics.  Call return if fever worsening symptoms.      Diagnoses and all orders for this visit:    1. Diverticulitis (Primary)  -     Discontinue: amoxicillin-clavulanate (AUGMENTIN) 875-125 MG per tablet; Take 1 tablet by mouth 2 (Two) Times a Day.  Dispense: 30 tablet; Refill: 0              Expected course, medications, and adverse effects discussed.  Call or return if worsening or persistent symptoms.  I wore protective equipment throughout this patient encounter including a mask, gloves, and eye protection.  Hand hygiene was performed before donning protective equipment and after removal when leaving the room. The complete contents of the Assessment and Plan and Data/Lab Results as documented above have been reviewed and addressed by myself with the patient today as part of an ongoing evaluation / treatment plan.  If some of the documentation has been copied from a previous note and is unchanged it indicates that this problem / plan has been assessed today but is stable from a previous  visit and no changes have been recommended.

## 2021-01-11 ENCOUNTER — OFFICE VISIT (OUTPATIENT)
Dept: FAMILY MEDICINE CLINIC | Facility: CLINIC | Age: 76
End: 2021-01-11

## 2021-01-11 VITALS
OXYGEN SATURATION: 94 % | DIASTOLIC BLOOD PRESSURE: 84 MMHG | BODY MASS INDEX: 30.92 KG/M2 | HEIGHT: 65 IN | TEMPERATURE: 97.3 F | HEART RATE: 60 BPM | SYSTOLIC BLOOD PRESSURE: 126 MMHG | WEIGHT: 185.6 LBS | RESPIRATION RATE: 18 BRPM

## 2021-01-11 DIAGNOSIS — Z13.6 SCREENING FOR ISCHEMIC HEART DISEASE: ICD-10-CM

## 2021-01-11 DIAGNOSIS — K57.92 DIVERTICULITIS: ICD-10-CM

## 2021-01-11 DIAGNOSIS — R10.32 LLQ PAIN: ICD-10-CM

## 2021-01-11 DIAGNOSIS — K57.30 DIVERTICULA OF COLON: Primary | ICD-10-CM

## 2021-01-11 PROCEDURE — 99214 OFFICE O/P EST MOD 30 MIN: CPT | Performed by: FAMILY MEDICINE

## 2021-01-11 RX ORDER — GABAPENTIN 100 MG/1
100 CAPSULE ORAL 2 TIMES DAILY
COMMUNITY
Start: 2021-01-07 | End: 2022-04-17

## 2021-01-11 RX ORDER — SULFAMETHOXAZOLE AND TRIMETHOPRIM 400; 80 MG/1; MG/1
1 TABLET ORAL 2 TIMES DAILY
Qty: 20 TABLET | Refills: 0 | Status: SHIPPED | OUTPATIENT
Start: 2021-01-11 | End: 2021-03-11

## 2021-01-11 RX ORDER — METRONIDAZOLE 500 MG/1
500 TABLET ORAL 3 TIMES DAILY
Qty: 21 TABLET | Refills: 0 | Status: SHIPPED | OUTPATIENT
Start: 2021-01-11 | End: 2021-03-11

## 2021-01-11 NOTE — PROGRESS NOTES
Subjective   Vianey Reeves is a 75 y.o. female.     Chief Complaint   Patient presents with   • Abdominal Pain       Diverticulitis  This is a chronic problem. The current episode started in the past 7 days (5/3/21). The problem occurs every several days. The problem has been unchanged. Associated symptoms include abdominal pain, a change in bowel habit and chills. Pertinent negatives include no arthralgias, chest pain, coughing, diaphoresis, fever, nausea, rash or vomiting. The symptoms are aggravated by eating and drinking. Treatments tried: flagyll, cipro, augmentin. The treatment provided mild relief.      She was on augmentin but stopped it as it did not help.      I personally reviewed and updated the patient's allergies, medications, problem list, and past medical, surgical, social, and family history. I have reviewed and confirmed the accuracy of the History of Present Illness and Review of Symptoms as documented by the MA/LPN/RN. Emma Hammer MD    Family History   Problem Relation Age of Onset   • Arthritis Father    • Prostate cancer Father    • Heart disease Sister        Social History     Tobacco Use   • Smoking status: Current Every Day Smoker     Packs/day: 0.25     Years: 50.00     Pack years: 12.50     Types: Cigarettes   • Smokeless tobacco: Never Used   Substance Use Topics   • Alcohol use: Yes     Frequency: Never     Comment: occasionally    • Drug use: Never       Past Surgical History:   Procedure Laterality Date   • BREAST BIOPSY Right    • CARDIAC CATHETERIZATION     • CHOLECYSTECTOMY     • COLON SURGERY      REMOVAL   • HERNIA REPAIR     • NEPHRECTOMY         Patient Active Problem List   Diagnosis   • Type 2 diabetes mellitus without complication (CMS/HCC)   • Essential (primary) hypertension   • Mixed hyperlipidemia   • Hiatal hernia   • Fatigue   • Long term current use of antithrombotics/antiplatelets   • Chronic kidney disease, stage 3 (moderate)   • Chronic obstructive  pulmonary disease (CMS/HCC)   • Atherosclerotic heart disease of native coronary artery without angina pectoris   • Irritable bowel syndrome with constipation   • History of cancer of ureter   • Solitary kidney   • Atherosclerosis of native arteries of extremities with intermittent claudication, bilateral legs (CMS/HCC)   • H/O malignant neoplasm of ureter   • Vertigo   • Absent kidney   • Medicare annual wellness visit, subsequent   • Presbyopia   • Combined form of senile cataract   • Cervical cancer screening   • Postartificial menopausal syndrome   • LLQ pain   • Diverticula of colon   • Ruptured tympanic membrane, left   • Hearing loss, bilateral   • TMJ pain dysfunction syndrome   • Diverticulitis         Current Outpatient Medications:   •  amLODIPine (NORVASC) 10 MG tablet, Take 1 tablet by mouth Daily., Disp: 30 tablet, Rfl: 12  •  aspirin 81 MG tablet, Take 81 mg by mouth Daily., Disp: , Rfl:   •  calcium-vitamin D (Oscal 500/200 D-3) 500-200 MG-UNIT per tablet, Take  by mouth. Twice weekly, Disp: , Rfl:   •  carvedilol (Coreg) 3.125 MG tablet, Take 1 tablet by mouth Every 12 (Twelve) Hours., Disp: 60 tablet, Rfl: 12  •  coenzyme Q10 100 MG capsule, Take 1 capsule by mouth Daily., Disp: , Rfl:   •  Evolocumab (Repatha SureClick) 140 MG/ML solution auto-injector, Inject 1 mL under the skin into the appropriate area as directed Every 14 (Fourteen) Days., Disp: 1 pen, Rfl: 12  •  fluticasone (FLONASE) 50 MCG/ACT nasal spray, USE 2 SPRAYS IN EACH NOSTRIL EVERY DAY, Disp: , Rfl:   •  fluticasone-salmeterol (ADVAIR HFA) 45-21 MCG/ACT inhaler, Inhale 2 puffs 2 (Two) Times a Day., Disp: 1 inhaler, Rfl: 12  •  gabapentin (NEURONTIN) 300 MG capsule, , Disp: , Rfl:   •  Glucose Blood (BLOOD GLUCOSE TEST) strip, 1 each by In Vitro route 3 (Three) Times a Day., Disp: 300 each, Rfl: 3  •  Insulin Glargine (Lantus SoloStar) 100 UNIT/ML injection pen, Inject 20 Units under the skin into the appropriate area as directed  "Every Night., Disp: 5 pen, Rfl: 12  •  linaclotide (Linzess) 72 MCG capsule capsule, Take 1 capsule by mouth Every Morning Before Breakfast., Disp: 30 capsule, Rfl: 12  •  Santyl 250 UNIT/GM ointment, APPLY TO WOUND ONCE D, Disp: , Rfl:   •  sertraline (ZOLOFT) 25 MG tablet, Take 1 tablet by mouth Daily., Disp: 30 tablet, Rfl: 12  •  SITagliptin (Januvia) 100 MG tablet, Take 1 tablet by mouth Daily., Disp: 30 tablet, Rfl: 12  •  traMADol (ULTRAM) 50 MG tablet, , Disp: , Rfl:   •  metroNIDAZOLE (FLAGYL) 500 MG tablet, Take 1 tablet by mouth 3 (Three) Times a Day. Do not use alcohol for at least 24 hours after the last dose., Disp: 21 tablet, Rfl: 0  •  sulfamethoxazole-trimethoprim (BACTRIM,SEPTRA) 400-80 MG tablet, Take 1 tablet by mouth 2 (Two) Times a Day., Disp: 20 tablet, Rfl: 0         Review of Systems   Constitutional: Positive for chills. Negative for activity change, diaphoresis and fever.   HENT: Negative for ear pain, rhinorrhea, sinus pressure and voice change.    Eyes: Negative for visual disturbance.   Respiratory: Negative for cough and shortness of breath.    Cardiovascular: Negative for chest pain and palpitations.   Gastrointestinal: Positive for abdominal pain and change in bowel habit. Negative for diarrhea, nausea and vomiting.   Endocrine: Negative for cold intolerance, heat intolerance, polydipsia and polyphagia.   Genitourinary: Negative for frequency and urgency.   Musculoskeletal: Negative for arthralgias and neck stiffness.   Skin: Negative for color change, pallor and rash.   Neurological: Negative for seizures and syncope.   Hematological: Negative for adenopathy. Does not bruise/bleed easily.   Psychiatric/Behavioral: Negative for depressed mood. The patient is not nervous/anxious.        I have reviewed and confirmed the accuracy of the ROS as documented by the MA/LPN/RN Emma Hammer MD      Objective   /84   Pulse 60   Temp 97.3 °F (36.3 °C)   Resp 18   Ht 165.1 cm (65\") "   Wt 84.2 kg (185 lb 9.6 oz)   SpO2 94%   BMI 30.89 kg/m²   BP Readings from Last 3 Encounters:   01/11/21 126/84   01/06/21 162/92   12/03/20 149/82     Wt Readings from Last 3 Encounters:   01/11/21 84.2 kg (185 lb 9.6 oz)   01/06/21 83.2 kg (183 lb 6.4 oz)   12/03/20 84.6 kg (186 lb 6.4 oz)     Physical Exam  Vitals signs reviewed.   Constitutional:       Appearance: Normal appearance. She is well-developed.   HENT:      Head: Normocephalic and atraumatic.   Eyes:      General:         Right eye: No discharge.         Left eye: No discharge.   Cardiovascular:      Rate and Rhythm: Normal rate and regular rhythm.      Heart sounds: Normal heart sounds. No murmur. No friction rub. No gallop.    Pulmonary:      Effort: Pulmonary effort is normal. No respiratory distress.      Breath sounds: Normal breath sounds. No wheezing or rales.   Abdominal:      General: Abdomen is flat. Bowel sounds are normal. There is no distension.      Palpations: Abdomen is soft. There is no mass.      Tenderness: There is abdominal tenderness in the left lower quadrant. There is no guarding or rebound.      Hernia: No hernia is present.   Skin:     General: Skin is warm and dry.      Findings: No rash.   Neurological:      Mental Status: She is alert and oriented to person, place, and time.      Coordination: Coordination normal.      Gait: Gait normal.   Psychiatric:         Behavior: Behavior is cooperative.               Assessment/Plan      Medications        Problem List         LOS    Diverticulitis.  Likely, positive previous history of diverticulitis with rupture.  Start bactrim and flagyl.  Increase fluid intake.  Afebrile currently.  Follow-up 2 days.    Chronic constipation.  Overall stable on Linzess.  Add daily fiber.  She is to call [facility] today. This has been recurrent. Her last CT was negative but she could not have contrast. Will get labs  today.      Diagnoses and all orders for this visit:    1. Diverticula of colon  (Primary)    2. LLQ pain  -     sulfamethoxazole-trimethoprim (BACTRIM,SEPTRA) 400-80 MG tablet; Take 1 tablet by mouth 2 (Two) Times a Day.  Dispense: 20 tablet; Refill: 0  -     metroNIDAZOLE (FLAGYL) 500 MG tablet; Take 1 tablet by mouth 3 (Three) Times a Day. Do not use alcohol for at least 24 hours after the last dose.  Dispense: 21 tablet; Refill: 0  -     CBC & Differential  -     Comprehensive Metabolic Panel    3. Diverticulitis    4. Screening for ischemic heart disease  -     Vascular Screening (Bundle) CAR              Expected course, medications, and adverse effects discussed.  Call or return if worsening or persistent symptoms.  I wore protective equipment throughout this patient encounter including a mask, gloves, and eye protection.  Hand hygiene was performed before donning protective equipment and after removal when leaving the room. The complete contents of the Assessment and Plan and Data/Lab Results as documented above have been reviewed and addressed by myself with the patient today as part of an ongoing evaluation / treatment plan.  If some of the documentation has been copied from a previous note and is unchanged it indicates that this problem / plan has been assessed today but is stable from a previous visit and no changes have been recommended.

## 2021-01-12 ENCOUNTER — TELEPHONE (OUTPATIENT)
Dept: FAMILY MEDICINE CLINIC | Facility: CLINIC | Age: 76
End: 2021-01-12

## 2021-01-12 LAB
ALBUMIN SERPL-MCNC: 3.8 G/DL (ref 3.7–4.7)
ALBUMIN/GLOB SERPL: 1.7 {RATIO} (ref 1.2–2.2)
ALP SERPL-CCNC: 70 IU/L (ref 39–117)
ALT SERPL-CCNC: 10 IU/L (ref 0–32)
AST SERPL-CCNC: 12 IU/L (ref 0–40)
BASOPHILS # BLD AUTO: 0.1 X10E3/UL (ref 0–0.2)
BASOPHILS NFR BLD AUTO: 1 %
BILIRUB SERPL-MCNC: <0.2 MG/DL (ref 0–1.2)
BUN SERPL-MCNC: 22 MG/DL (ref 8–27)
BUN/CREAT SERPL: 14 (ref 12–28)
CALCIUM SERPL-MCNC: 9.3 MG/DL (ref 8.7–10.3)
CHLORIDE SERPL-SCNC: 104 MMOL/L (ref 96–106)
CO2 SERPL-SCNC: 27 MMOL/L (ref 20–29)
CREAT SERPL-MCNC: 1.53 MG/DL (ref 0.57–1)
EOSINOPHIL # BLD AUTO: 0.1 X10E3/UL (ref 0–0.4)
EOSINOPHIL NFR BLD AUTO: 2 %
ERYTHROCYTE [DISTWIDTH] IN BLOOD BY AUTOMATED COUNT: 12.1 % (ref 11.7–15.4)
GLOBULIN SER CALC-MCNC: 2.3 G/DL (ref 1.5–4.5)
GLUCOSE SERPL-MCNC: 147 MG/DL (ref 65–99)
HCT VFR BLD AUTO: 44.7 % (ref 34–46.6)
HGB BLD-MCNC: 14.8 G/DL (ref 11.1–15.9)
IMM GRANULOCYTES # BLD AUTO: 0 X10E3/UL (ref 0–0.1)
IMM GRANULOCYTES NFR BLD AUTO: 1 %
LYMPHOCYTES # BLD AUTO: 2.1 X10E3/UL (ref 0.7–3.1)
LYMPHOCYTES NFR BLD AUTO: 27 %
MCH RBC QN AUTO: 31.5 PG (ref 26.6–33)
MCHC RBC AUTO-ENTMCNC: 33.1 G/DL (ref 31.5–35.7)
MCV RBC AUTO: 95 FL (ref 79–97)
MONOCYTES # BLD AUTO: 0.6 X10E3/UL (ref 0.1–0.9)
MONOCYTES NFR BLD AUTO: 8 %
NEUTROPHILS # BLD AUTO: 4.8 X10E3/UL (ref 1.4–7)
NEUTROPHILS NFR BLD AUTO: 61 %
PLATELET # BLD AUTO: 214 X10E3/UL (ref 150–450)
POTASSIUM SERPL-SCNC: 4.3 MMOL/L (ref 3.5–5.2)
PROT SERPL-MCNC: 6.1 G/DL (ref 6–8.5)
RBC # BLD AUTO: 4.7 X10E6/UL (ref 3.77–5.28)
SODIUM SERPL-SCNC: 143 MMOL/L (ref 134–144)
WBC # BLD AUTO: 7.7 X10E3/UL (ref 3.4–10.8)

## 2021-01-12 RX ORDER — FLUCONAZOLE 150 MG/1
150 TABLET ORAL ONCE
Qty: 1 TABLET | Refills: 0 | Status: SHIPPED | OUTPATIENT
Start: 2021-01-12 | End: 2021-01-12

## 2021-01-12 NOTE — TELEPHONE ENCOUNTER
----- Message from Emma Hammer MD sent at 1/12/2021  5:57 PM EST -----  Her labs are ok and her kidneys are better

## 2021-03-07 RX ORDER — HYDROCHLOROTHIAZIDE 25 MG/1
TABLET ORAL
Qty: 90 TABLET | Refills: 1 | Status: SHIPPED | OUTPATIENT
Start: 2021-03-07 | End: 2021-03-11

## 2021-03-11 ENCOUNTER — OFFICE VISIT (OUTPATIENT)
Dept: FAMILY MEDICINE CLINIC | Facility: CLINIC | Age: 76
End: 2021-03-11

## 2021-03-11 VITALS
RESPIRATION RATE: 16 BRPM | OXYGEN SATURATION: 93 % | BODY MASS INDEX: 31.32 KG/M2 | HEIGHT: 65 IN | TEMPERATURE: 97.8 F | SYSTOLIC BLOOD PRESSURE: 128 MMHG | WEIGHT: 188 LBS | HEART RATE: 78 BPM | DIASTOLIC BLOOD PRESSURE: 76 MMHG

## 2021-03-11 DIAGNOSIS — I70.213 ATHEROSCLEROSIS OF NATIVE ARTERIES OF EXTREMITIES WITH INTERMITTENT CLAUDICATION, BILATERAL LEGS (HCC): ICD-10-CM

## 2021-03-11 DIAGNOSIS — E11.9 TYPE 2 DIABETES MELLITUS WITHOUT COMPLICATION, WITHOUT LONG-TERM CURRENT USE OF INSULIN (HCC): Primary | ICD-10-CM

## 2021-03-11 DIAGNOSIS — E78.2 MIXED HYPERLIPIDEMIA: ICD-10-CM

## 2021-03-11 DIAGNOSIS — I25.10 ATHEROSCLEROSIS OF NATIVE CORONARY ARTERY OF NATIVE HEART WITHOUT ANGINA PECTORIS: ICD-10-CM

## 2021-03-11 DIAGNOSIS — J41.0 SIMPLE CHRONIC BRONCHITIS (HCC): ICD-10-CM

## 2021-03-11 DIAGNOSIS — E11.22 TYPE 2 DIABETES MELLITUS WITH STAGE 4 CHRONIC KIDNEY DISEASE, WITHOUT LONG-TERM CURRENT USE OF INSULIN (HCC): ICD-10-CM

## 2021-03-11 DIAGNOSIS — N18.4 STAGE 4 CHRONIC KIDNEY DISEASE (HCC): ICD-10-CM

## 2021-03-11 DIAGNOSIS — N18.4 TYPE 2 DIABETES MELLITUS WITH STAGE 4 CHRONIC KIDNEY DISEASE, WITHOUT LONG-TERM CURRENT USE OF INSULIN (HCC): ICD-10-CM

## 2021-03-11 DIAGNOSIS — E04.9 ENLARGED THYROID: ICD-10-CM

## 2021-03-11 DIAGNOSIS — I10 ESSENTIAL (PRIMARY) HYPERTENSION: ICD-10-CM

## 2021-03-11 PROBLEM — R10.32 LLQ PAIN: Status: RESOLVED | Noted: 2020-10-20 | Resolved: 2021-03-11

## 2021-03-11 LAB
BILIRUB BLD-MCNC: NEGATIVE MG/DL
CLARITY, POC: CLEAR
COLOR UR: YELLOW
GLUCOSE BLDC GLUCOMTR-MCNC: 154 MG/DL (ref 70–130)
GLUCOSE UR STRIP-MCNC: NEGATIVE MG/DL
HBA1C MFR BLD: 6.4 %
KETONES UR QL: NEGATIVE
LEUKOCYTE EST, POC: NEGATIVE
NITRITE UR-MCNC: NEGATIVE MG/ML
PH UR: 5 [PH] (ref 5–8)
PROT UR STRIP-MCNC: ABNORMAL MG/DL
RBC # UR STRIP: NEGATIVE /UL
SP GR UR: 1.01 (ref 1–1.03)
UROBILINOGEN UR QL: NORMAL

## 2021-03-11 PROCEDURE — 99213 OFFICE O/P EST LOW 20 MIN: CPT | Performed by: FAMILY MEDICINE

## 2021-03-11 PROCEDURE — 82962 GLUCOSE BLOOD TEST: CPT | Performed by: FAMILY MEDICINE

## 2021-03-11 PROCEDURE — 83036 HEMOGLOBIN GLYCOSYLATED A1C: CPT | Performed by: FAMILY MEDICINE

## 2021-03-11 PROCEDURE — 81003 URINALYSIS AUTO W/O SCOPE: CPT | Performed by: FAMILY MEDICINE

## 2021-03-11 RX ORDER — CLOBETASOL PROPIONATE 0.46 MG/ML
SOLUTION TOPICAL
COMMUNITY
Start: 2021-03-08 | End: 2021-08-31

## 2021-03-11 RX ORDER — GLUCOSAMINE HCL/CHONDROITIN SU 500-400 MG
1 CAPSULE ORAL DAILY
Qty: 100 EACH | Refills: 3 | Status: SHIPPED | OUTPATIENT
Start: 2021-03-11 | End: 2023-01-23

## 2021-03-11 RX ORDER — NEOMYCIN SULFATE, POLYMYXIN B SULFATE AND DEXAMETHASONE 3.5; 10000; 1 MG/ML; [USP'U]/ML; MG/ML
SUSPENSION/ DROPS OPHTHALMIC
COMMUNITY
Start: 2021-03-02 | End: 2021-08-31

## 2021-03-11 RX ORDER — CARVEDILOL 3.12 MG/1
3.12 TABLET ORAL EVERY 12 HOURS
Qty: 60 TABLET | Refills: 12
Start: 2021-03-11 | End: 2021-10-07

## 2021-03-11 NOTE — PROGRESS NOTES
Subjective   Vianey Reeves is a 75 y.o. female.     Chief Complaint   Patient presents with   • Diabetes   • Hyperlipidemia   • Hypertension   • COPD   • Coronary Artery Disease       Diabetes  She presents for her follow-up diabetic visit. She has type 2 diabetes mellitus. No MedicAlert identification noted. Her disease course has been fluctuating. Hypoglycemia symptoms include headaches and sweats. Pertinent negatives for hypoglycemia include no dizziness or nervousness/anxiousness. Pertinent negatives for diabetes include no blurred vision, no chest pain, no visual change and no weakness. There are no hypoglycemic complications. Symptoms are stable. There are no diabetic complications. Risk factors for coronary artery disease include diabetes mellitus, dyslipidemia, hypertension and post-menopausal. Current diabetic treatment includes oral agent (monotherapy) and insulin injections. She is compliant with treatment most of the time. Her weight is stable. She is following a generally healthy diet. Meal planning includes avoidance of concentrated sweets and carbohydrate counting. She has not had a previous visit with a dietitian. She never participates in exercise. (Pt has not had strips for her machine  ) An ACE inhibitor/angiotensin II receptor blocker is being taken. She does not see a podiatrist.Eye exam is current.   Hyperlipidemia  This is a chronic problem. The current episode started more than 1 year ago. The problem is uncontrolled. Recent lipid tests were reviewed and are variable. Factors aggravating her hyperlipidemia include smoking and beta blockers. Pertinent negatives include no chest pain, leg pain or shortness of breath. Current antihyperlipidemic treatment includes herbal therapy. The current treatment provides mild improvement of lipids. There are no compliance problems.  Risk factors for coronary artery disease include diabetes mellitus, dyslipidemia, obesity and post-menopausal.    Hypertension  This is a chronic problem. The current episode started more than 1 year ago. The problem has been waxing and waning since onset. The problem is controlled. Associated symptoms include headaches and sweats. Pertinent negatives include no blurred vision, chest pain, malaise/fatigue, palpitations, PND or shortness of breath. There are no associated agents to hypertension. There are no known risk factors for coronary artery disease. Past treatments include nothing. Current antihypertension treatment includes diuretics and calcium channel blockers. The current treatment provides moderate improvement. There are no compliance problems.    COPD  There is no chest tightness, cough, difficulty breathing, frequent throat clearing, shortness of breath, sputum production or wheezing. This is a chronic problem. The current episode started more than 1 year ago. The problem occurs intermittently. The problem has been unchanged. Associated symptoms include headaches and sweats. Pertinent negatives include no chest pain, fever, malaise/fatigue, nasal congestion, orthopnea, PND or postnasal drip. Her symptoms are aggravated by URI and change in weather. Her symptoms are alleviated by beta-agonist. She reports significant improvement on treatment. Risk factors for lung disease include smoking/tobacco exposure.   Coronary Artery Disease  Presents for follow-up visit. Pertinent negatives include no chest pain, chest pressure, chest tightness, dizziness, palpitations or shortness of breath. Risk factors include hyperlipidemia. The symptoms have been stable. Compliance with diet is variable. Compliance with exercise is variable. Compliance with medications is good.        The following portions of the patient's history were reviewed and updated as appropriate: allergies, current medications, past family history, past medical history, past social history, past surgical history and problem list.    Allergies:  Allergies    Allergen Reactions   • Erythromycin Rash   • Naproxen Sodium Unknown - High Severity   • Latex Itching and Swelling   • Metoclopramide Unknown (See Comments)       Social History:  Social History     Socioeconomic History   • Marital status:      Spouse name: Not on file   • Number of children: Not on file   • Years of education: Not on file   • Highest education level: Not on file   Tobacco Use   • Smoking status: Current Every Day Smoker     Packs/day: 0.25     Years: 50.00     Pack years: 12.50     Types: Cigarettes   • Smokeless tobacco: Never Used   Substance and Sexual Activity   • Alcohol use: Yes     Comment: occasionally    • Drug use: Never   • Sexual activity: Defer       Family History:  Family History   Problem Relation Age of Onset   • Arthritis Father    • Prostate cancer Father    • Heart disease Sister        Past Medical History :  Patient Active Problem List   Diagnosis   • Type 2 diabetes mellitus with stage 4 chronic kidney disease, without long-term current use of insulin (CMS/[facility])   • Essential (primary) hypertension   • Mixed hyperlipidemia   • Hiatal hernia   • Fatigue   • Long term current use of antithrombotics/antiplatelets   • Chronic kidney disease, stage 3 (moderate)   • Chronic obstructive pulmonary disease (CMS/HCC)   • Atherosclerotic heart disease of native coronary artery without angina pectoris   • Irritable bowel syndrome with constipation   • History of cancer of ureter   • Solitary kidney   • Atherosclerosis of native arteries of extremities with intermittent claudication, bilateral legs (CMS/HCC)   • H/O malignant neoplasm of ureter   • Vertigo   • Medicare annual wellness visit, subsequent   • Presbyopia   • Combined form of senile cataract   • Cervical cancer screening   • Postartificial menopausal syndrome   • Diverticula of colon   • Ruptured tympanic membrane, left   • Hearing loss, bilateral   • TMJ pain dysfunction syndrome   • Diverticulitis   • Stage 4  chronic kidney disease (CMS/[facility])       Medication List:    Current Outpatient Medications:   •  amLODIPine (NORVASC) 10 MG tablet, Take 1 tablet by mouth Daily., Disp: 30 tablet, Rfl: 12  •  aspirin 81 MG tablet, Take 81 mg by mouth Daily., Disp: , Rfl:   •  calcium-vitamin D (Oscal 500/200 D-3) 500-200 MG-UNIT per tablet, Take  by mouth. Twice weekly, Disp: , Rfl:   •  carvedilol (Coreg) 3.125 MG tablet, Take 1 tablet by mouth Every 12 (Twelve) Hours., Disp: 60 tablet, Rfl: 12  •  coenzyme Q10 100 MG capsule, Take 1 capsule by mouth Daily., Disp: , Rfl:   •  Evolocumab (Repatha SureClick) 140 MG/ML solution auto-injector, Inject 1 mL under the skin into the appropriate area as directed Every 14 (Fourteen) Days., Disp: 1 pen, Rfl: 12  •  fluticasone (FLONASE) 50 MCG/ACT nasal spray, USE 2 SPRAYS IN EACH NOSTRIL EVERY DAY, Disp: , Rfl:   •  fluticasone-salmeterol (ADVAIR HFA) 45-21 MCG/ACT inhaler, Inhale 2 puffs 2 (Two) Times a Day., Disp: 1 inhaler, Rfl: 12  •  gabapentin (NEURONTIN) 300 MG capsule, , Disp: , Rfl:   •  Glucose Blood (Blood Glucose Test) strip, 1 each by In Vitro route Daily., Disp: 100 each, Rfl: 3  •  Insulin Glargine (Lantus SoloStar) 100 UNIT/ML injection pen, Inject 20 Units under the skin into the appropriate area as directed Every Night., Disp: 5 pen, Rfl: 12  •  linaclotide (Linzess) 72 MCG capsule capsule, Take 1 capsule by mouth Every Morning Before Breakfast., Disp: 30 capsule, Rfl: 12  •  sertraline (ZOLOFT) 25 MG tablet, Take 1 tablet by mouth Daily., Disp: 30 tablet, Rfl: 12  •  SITagliptin (Januvia) 100 MG tablet, Take 1 tablet by mouth Daily., Disp: 30 tablet, Rfl: 12  •  traMADol (ULTRAM) 50 MG tablet, , Disp: , Rfl:   •  clobetasol (TEMOVATE) 0.05 % external solution, , Disp: , Rfl:   •  neomycin-polymyxin-dexamethasone (MAXITROL) 3.5-79795-9.1 ophthalmic suspension, , Disp: , Rfl:     Past Surgical History:  Past Surgical History:   Procedure Laterality Date   • BREAST BIOPSY  "Right    • CARDIAC CATHETERIZATION     • CHOLECYSTECTOMY     • COLON SURGERY      REMOVAL   • HERNIA REPAIR     • NEPHRECTOMY         Review of Systems:  Review of Systems   Constitutional: Negative for activity change, fever and malaise/fatigue.   HENT: Negative for postnasal drip, sinus pressure and voice change.    Eyes: Negative for blurred vision and visual disturbance.   Respiratory: Negative for cough, sputum production, chest tightness, shortness of breath and wheezing.    Cardiovascular: Negative for chest pain, palpitations and PND.   Gastrointestinal: Negative for nausea.   Endocrine: Negative for cold intolerance and heat intolerance.   Genitourinary: Negative for frequency and urgency.   Musculoskeletal: Negative for arthralgias.   Neurological: Negative for dizziness, syncope and weakness.   Hematological: Does not bruise/bleed easily.   Psychiatric/Behavioral: Negative for depressed mood. The patient is not nervous/anxious.        Physical Exam:  Vital Signs:  Visit Vitals  /76   Pulse 78   Temp 97.8 °F (36.6 °C)   Resp 16   Ht 165.1 cm (65\")   Wt 85.3 kg (188 lb)   SpO2 93%   BMI 31.28 kg/m²       Physical Exam  Vitals reviewed.   Constitutional:       Appearance: She is well-developed.   HENT:      Head: Normocephalic and atraumatic.      Right Ear: External ear normal. No decreased hearing noted. No tenderness. No middle ear effusion. Tympanic membrane is not injected, erythematous, retracted or bulging.      Left Ear: External ear normal. No decreased hearing noted. No tenderness.  No middle ear effusion. Tympanic membrane is not injected, erythematous, retracted or bulging.      Nose: Nose normal. No rhinorrhea.      Mouth/Throat:      Pharynx: No oropharyngeal exudate or posterior oropharyngeal erythema.   Eyes:      General:         Right eye: No discharge.         Left eye: No discharge.   Cardiovascular:      Rate and Rhythm: Normal rate and regular rhythm.      Heart sounds: Normal " heart sounds. No murmur. No friction rub. No gallop.    Pulmonary:      Effort: Pulmonary effort is normal. No respiratory distress.      Breath sounds: Normal breath sounds. No wheezing or rales.   Lymphadenopathy:      Cervical: No cervical adenopathy.   Skin:     General: Skin is warm and dry.      Findings: No rash.   Neurological:      Mental Status: She is alert and oriented to person, place, and time.         Assessment and Plan:  Problems Addressed this Visit        Cardiac and Vasculature    Essential (primary) hypertension     Hypertension is unchanged.  Continue current treatment regimen.  Dietary sodium restriction.  Weight loss.  Blood pressure will be reassessed in 3 months.         Relevant Medications    carvedilol (Coreg) 3.125 MG tablet    Mixed hyperlipidemia    Relevant Orders    Comprehensive Metabolic Panel    Lipid Panel With / Chol / HDL Ratio    Atherosclerotic heart disease of native coronary artery without angina pectoris     Coronary artery disease is unchanged.  Continue current treatment regimen.  Dietary sodium restriction.  Weight loss.  Cardiac status will be reassessed in 3 months.         Relevant Medications    carvedilol (Coreg) 3.125 MG tablet    Atherosclerosis of native arteries of extremities with intermittent claudication, bilateral legs (CMS/HCC)       Endocrine and Metabolic    Type 2 diabetes mellitus with stage 4 chronic kidney disease, without long-term current use of insulin (CMS/HCC) - Primary    Relevant Medications    Glucose Blood (Blood Glucose Test) strip       Genitourinary and Reproductive     Stage 4 chronic kidney disease (CMS/HCC)     She is seeing Dr Julio    Continue current treatment.             Pulmonary and Pneumonias    Chronic obstructive pulmonary disease (CMS/HCC)     COPD is unchanged.  Counseled to avoid exposure to cigarette smoke.  Continue current medications.               Other Visit Diagnoses     Enlarged thyroid        Relevant Medications     carvedilol (Coreg) 3.125 MG tablet      Diagnoses       Codes Comments    Type 2 diabetes mellitus without complication, without long-term current use of insulin (CMS/[facility])    -  Primary ICD-10-CM: E11.9  ICD-9-CM: 250.00     Essential (primary) hypertension     ICD-10-CM: I10  ICD-9-CM: 401.9     Mixed hyperlipidemia     ICD-10-CM: E78.2  ICD-9-CM: 272.2     Simple chronic bronchitis (CMS/HCC)     ICD-10-CM: J41.0  ICD-9-CM: 491.0     Atherosclerosis of native coronary artery of native heart without angina pectoris     ICD-10-CM: I25.10  ICD-9-CM: 414.01     Enlarged thyroid     ICD-10-CM: E04.9  ICD-9-CM: 240.9     Type 2 diabetes mellitus with stage 4 chronic kidney disease, without long-term current use of insulin (CMS/[facility])     ICD-10-CM: E11.22, N18.4  ICD-9-CM: 250.40, 585.4     Stage 4 chronic kidney disease (CMS/HCC)     ICD-10-CM: N18.4  ICD-9-CM: 585.4     Atherosclerosis of native arteries of extremities with intermittent claudication, bilateral legs (CMS/HCC)     ICD-10-CM: I70.213  ICD-9-CM: 440.21            An After Visit Summary and PPPS were given to the patient.             I wore protective equipment throughout this patient encounter to include mask, gloves and eye protection. Hand hygiene was performed before donning protective equipment and after removal when leaving the room.

## 2021-03-18 NOTE — ASSESSMENT & PLAN NOTE
Coronary artery disease is unchanged.  Continue current treatment regimen.  Dietary sodium restriction.  Weight loss.  Cardiac status will be reassessed in 3 months.

## 2021-03-18 NOTE — ASSESSMENT & PLAN NOTE
Hypertension is unchanged.  Continue current treatment regimen.  Dietary sodium restriction.  Weight loss.  Blood pressure will be reassessed in 3 months.

## 2021-03-18 NOTE — ASSESSMENT & PLAN NOTE
COPD is unchanged.  Counseled to avoid exposure to cigarette smoke.  Continue current medications.

## 2021-03-24 RX ORDER — ROSUVASTATIN CALCIUM 20 MG/1
TABLET, COATED ORAL
COMMUNITY
Start: 2021-02-19 | End: 2021-08-31

## 2021-04-15 ENCOUNTER — OFFICE VISIT (OUTPATIENT)
Dept: CARDIOLOGY | Facility: CLINIC | Age: 76
End: 2021-04-15

## 2021-04-15 VITALS
HEIGHT: 65 IN | OXYGEN SATURATION: 99 % | SYSTOLIC BLOOD PRESSURE: 168 MMHG | BODY MASS INDEX: 32.15 KG/M2 | HEART RATE: 66 BPM | RESPIRATION RATE: 18 BRPM | WEIGHT: 193 LBS | DIASTOLIC BLOOD PRESSURE: 95 MMHG

## 2021-04-15 DIAGNOSIS — N18.4 STAGE 4 CHRONIC KIDNEY DISEASE (HCC): ICD-10-CM

## 2021-04-15 DIAGNOSIS — IMO0002 SOLITARY KIDNEY: ICD-10-CM

## 2021-04-15 DIAGNOSIS — Z79.02 LONG TERM CURRENT USE OF ANTITHROMBOTICS/ANTIPLATELETS: ICD-10-CM

## 2021-04-15 DIAGNOSIS — I10 ESSENTIAL (PRIMARY) HYPERTENSION: ICD-10-CM

## 2021-04-15 DIAGNOSIS — I25.10 ATHEROSCLEROSIS OF NATIVE CORONARY ARTERY OF NATIVE HEART WITHOUT ANGINA PECTORIS: Primary | ICD-10-CM

## 2021-04-15 DIAGNOSIS — E78.2 MIXED HYPERLIPIDEMIA: ICD-10-CM

## 2021-04-15 PROCEDURE — 93000 ELECTROCARDIOGRAM COMPLETE: CPT | Performed by: INTERNAL MEDICINE

## 2021-04-15 PROCEDURE — 99214 OFFICE O/P EST MOD 30 MIN: CPT | Performed by: INTERNAL MEDICINE

## 2021-04-15 RX ORDER — DOXAZOSIN 2 MG/1
2 TABLET ORAL NIGHTLY
Qty: 90 TABLET | Refills: 3 | Status: SHIPPED | OUTPATIENT
Start: 2021-04-15 | End: 2021-04-22 | Stop reason: SINTOL

## 2021-04-15 NOTE — PROGRESS NOTES
Cardiology Office Visit      Encounter Date:  04/15/2021    Patient ID:   Vianey Reeves is a 75 y.o. female.    Reason For Followup:  Coronary artery disease  Hypertension  Hypertensive cardiovascular disease    Brief Clinical History:  Dear Dr. Hammer, Emma Simmons MD    I had the pleasure of seeing Vianey Reeves today. As you are well aware, this is a 75 y.o. female with a known history of ischemic heart disease.  She underwent cardiac catheterization with PCI and drug-eluting stent placement on April 9, 2016 and follow-up PCI on 5/25/2016. She presents today for follow-up on the above conditions.        Interval History:  She denies any chest pain pressure heaviness or tightness.  She denies any shortness of breath out of character. She denies any PND orthopnea.  She denies any syncope or near syncope.    Her blood pressure is elevated.  She reports that her amlodipine was discontinued due to swelling.  She was initiated on carvedilol.  Her blood pressure is suboptimal today however I am hesitant to titrate upward on the carvedilol due to her heart rate.  She is in the mid 60s.  She is already reporting some fatigue and sluggishness.    We have a limited repertoire of medications that can be utilized.  We have stayed away from ACE/ARB medications due to the presence of a solitary kidney and renal insufficiency.  Calcium channel blockers appear to cause edema requiring diuretic usage which can exacerbate renal insufficiency.  Potassium sparing diuretics can also be problematic with renal insufficiency.  Beta-blockers appear to be causing fatigue and bradycardia.  Clonidine results in significant lability in blood pressure and can also contribute to bradycardia.  Hydralazine is very cumbersome to take.  This leaves only alpha blockers as a potential medication.  As such we will try doxazosin and see if we have any success.    She reports that she is smoking a little bit more recently.  She smokes about a half a  "pack a day.  She is utilizing patches and gum to try and quit.    She has an appointment to see her nephrologist Dr. uJlio in the coming weeks.  She will have lab work done at that time.     Assessment & Plan     Impressions:  Coronary artery disease status post PCI and drug-eluting stent placement in her mid RCA and Cx        Negative nuclear stress test May 2019  Diabetes mellitus.  Dyslipidemia with intolerance to statins.  Hypertension with hypertensive cardiovascular disease.      Suboptimal with limited medication repertoire  Solitary kidney.  Chronic renal failure with declining GFR  Tobacco abuse  Anti-platelet therapy.  Fatigue  Dizziness     Recommendations:  Doxazosin 2 mg daily  Monitor blood pressure daily and log values for 2 weeks  Transmit blood pressure readings  Smoking cessation  Follow-up in 3 months time sooner should there be difficulties.    Objective:    Vitals:  Vitals:    04/15/21 1127   BP: 168/95   BP Location: Left arm   Patient Position: Sitting   Cuff Size: Large Adult   Pulse: 66   Resp: 18   SpO2: 99%   Weight: 87.5 kg (193 lb)   Height: 165.1 cm (65\")       Physical Exam:    General: Alert, cooperative, no distress, appears stated age  Head:  Normocephalic, atraumatic, mucous membranes moist  Eyes:  Conjunctiva/corneas clear, EOM's intact     Neck:  Supple,  no bruit  Lungs: Clear to auscultation bilaterally, no wheezes rhonchi rales are noted  Chest wall: No tenderness  Heart::  Regular rate and rhythm, S1 and S2 normal, 1/6 holosystolic murmur.  No rub or gallop  Abdomen: Soft, non-tender, nondistended bowel sounds active  Extremities: No cyanosis, clubbing, or edema  Pulses: 2+ and symmetric all extremities  Skin:  No rashes or lesions  Neuro/psych: A&O x3. CN II through XII are grossly intact with appropriate affect      Allergies:  Allergies   Allergen Reactions   • Erythromycin Rash   • Naproxen Sodium Unknown - High Severity   • Latex Itching and Swelling   • Metoclopramide " Unknown (See Comments)       Medication Review:     Current Outpatient Medications:   •  amLODIPine (NORVASC) 10 MG tablet, Take 1 tablet by mouth Daily., Disp: 30 tablet, Rfl: 12  •  aspirin 81 MG tablet, Take 81 mg by mouth Daily., Disp: , Rfl:   •  calcium-vitamin D (Oscal 500/200 D-3) 500-200 MG-UNIT per tablet, Take  by mouth. Twice weekly, Disp: , Rfl:   •  carvedilol (Coreg) 3.125 MG tablet, Take 1 tablet by mouth Every 12 (Twelve) Hours., Disp: 60 tablet, Rfl: 12  •  clobetasol (TEMOVATE) 0.05 % external solution, , Disp: , Rfl:   •  coenzyme Q10 100 MG capsule, Take 1 capsule by mouth Daily., Disp: , Rfl:   •  Evolocumab (Repatha SureClick) 140 MG/ML solution auto-injector, Inject 1 mL under the skin into the appropriate area as directed Every 14 (Fourteen) Days., Disp: 1 pen, Rfl: 12  •  fluticasone (FLONASE) 50 MCG/ACT nasal spray, USE 2 SPRAYS IN EACH NOSTRIL EVERY DAY, Disp: , Rfl:   •  fluticasone-salmeterol (ADVAIR HFA) 45-21 MCG/ACT inhaler, Inhale 2 puffs 2 (Two) Times a Day., Disp: 1 inhaler, Rfl: 12  •  gabapentin (NEURONTIN) 300 MG capsule, , Disp: , Rfl:   •  Glucose Blood (Blood Glucose Test) strip, 1 each by In Vitro route Daily., Disp: 100 each, Rfl: 3  •  Insulin Glargine (Lantus SoloStar) 100 UNIT/ML injection pen, Inject 20 Units under the skin into the appropriate area as directed Every Night., Disp: 5 pen, Rfl: 12  •  linaclotide (Linzess) 72 MCG capsule capsule, Take 1 capsule by mouth Every Morning Before Breakfast., Disp: 30 capsule, Rfl: 12  •  neomycin-polymyxin-dexamethasone (MAXITROL) 3.5-98269-8.1 ophthalmic suspension, , Disp: , Rfl:   •  rosuvastatin (CRESTOR) 20 MG tablet, , Disp: , Rfl:   •  sertraline (ZOLOFT) 25 MG tablet, Take 1 tablet by mouth Daily., Disp: 30 tablet, Rfl: 12  •  SITagliptin (Januvia) 100 MG tablet, Take 1 tablet by mouth Daily., Disp: 30 tablet, Rfl: 12  •  traMADol (ULTRAM) 50 MG tablet, , Disp: , Rfl:     Family History:  Family History   Problem  Relation Age of Onset   • Arthritis Father    • Prostate cancer Father    • Heart disease Sister        Past Medical History:  Past Medical History:   Diagnosis Date   • Anxiety    • Bilateral carotid bruits    • CKD (chronic kidney disease)    • Claudication, intermittent (CMS/HCC)    • Coronary artery disease    • DDD (degenerative disc disease), thoracic    • Diabetes mellitus (CMS/HCC)    • Gout    • 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   • Hypertension    • Mass of right breast    • Renal insufficiency    • Simple chronic bronchitis (CMS/HCC)        Past surgical History:  Past Surgical History:   Procedure Laterality Date   • BREAST BIOPSY Right    • CARDIAC CATHETERIZATION     • CHOLECYSTECTOMY     • COLON SURGERY      REMOVAL   • HERNIA REPAIR     • NEPHRECTOMY         Social History:  Social History     Socioeconomic History   • Marital status:      Spouse name: Not on file   • Number of children: Not on file   • Years of education: Not on file   • Highest education level: Not on file   Tobacco Use   • Smoking status: Current Every Day Smoker     Packs/day: 0.25     Years: 50.00     Pack years: 12.50     Types: Cigarettes   • Smokeless tobacco: Never Used   Vaping Use   • Vaping Use: Never used   Substance and Sexual Activity   • Alcohol use: Yes     Comment: occasionally    • Drug use: Never   • Sexual activity: Defer       Review of Systems:  The following systems were reviewed as they relate to the cardiovascular system: Constitutional, Eyes, ENT, Cardiovascular, Respiratory, Gastrointestinal, Integumentary, Neurological, Psychiatric, Hematologic, Endocrine, Musculoskeletal, and Genitourinary. The pertinent cardiovascular findings are reported above with all other cardiovascular points within those systems being negative.    Diagnostic Study Review:     Current Electrocardiogram:    ECG 12 Lead    Date/Time: 4/15/2021 12:29 PM  Performed by: Leon Gonzalez DO  Authorized by:  Leon Gonzalez, DO   Comparison: not compared with previous ECG   Previous ECG: no previous ECG available  Comments: Normal sinus rhythm with a ventricular rate of 68 bpm.  Atrial premature complex.  Nonspecific interventricular conduction delay.  Consider old anteroseptal MI.  Normal QT and QTc intervals.  Normal QRS axis.               NOTE: The following portions of the patient's note were reviewed, confirmed and/or updated this visit as appropriate: History of present illness/Interval history, physical examination, assessment & plan, allergies, current medications, past family history, past medical history, past social history, past surgical history and problem list.

## 2021-04-19 ENCOUNTER — TELEPHONE (OUTPATIENT)
Dept: FAMILY MEDICINE CLINIC | Facility: CLINIC | Age: 76
End: 2021-04-19

## 2021-04-19 NOTE — TELEPHONE ENCOUNTER
Pt called asking if she needed to make an appt She seen Dr. Gonzalez Friday and he put her on a different BP med. She said she felt a little light headed this morning.

## 2021-04-19 NOTE — TELEPHONE ENCOUNTER
She should call her cardiologist and let him know. He will probably cut the medication in half. See me Friday

## 2021-04-22 ENCOUNTER — OFFICE VISIT (OUTPATIENT)
Dept: FAMILY MEDICINE CLINIC | Facility: CLINIC | Age: 76
End: 2021-04-22

## 2021-04-22 VITALS
RESPIRATION RATE: 16 BRPM | BODY MASS INDEX: 32.02 KG/M2 | DIASTOLIC BLOOD PRESSURE: 74 MMHG | WEIGHT: 192.2 LBS | HEIGHT: 65 IN | SYSTOLIC BLOOD PRESSURE: 132 MMHG | OXYGEN SATURATION: 94 % | HEART RATE: 64 BPM | TEMPERATURE: 96 F

## 2021-04-22 DIAGNOSIS — I10 ESSENTIAL (PRIMARY) HYPERTENSION: Primary | ICD-10-CM

## 2021-04-22 DIAGNOSIS — E78.2 MIXED HYPERLIPIDEMIA: ICD-10-CM

## 2021-04-22 DIAGNOSIS — J41.0 SIMPLE CHRONIC BRONCHITIS (HCC): ICD-10-CM

## 2021-04-22 PROCEDURE — 99214 OFFICE O/P EST MOD 30 MIN: CPT | Performed by: FAMILY MEDICINE

## 2021-04-22 NOTE — PROGRESS NOTES
Subjective   Vianey Reeves is a 75 y.o. female.     Chief Complaint   Patient presents with   • Hypertension       Pt seen Dr. Gonzalez Friday and changed her BP meds she said that she has been feeling dizzy and light headed. She said that she stopped taking the doxazosin and started taking the amlodipine again.    Hypertension  This is a chronic problem. The current episode started more than 1 year ago. The problem has been waxing and waning since onset. The problem is controlled. Associated symptoms include malaise/fatigue (last month) and shortness of breath (copd). Pertinent negatives include no blurred vision, chest pain, headaches, palpitations or sweats. There are no associated agents to hypertension. Risk factors for coronary artery disease include diabetes mellitus and dyslipidemia. Past treatments include nothing. Current antihypertension treatment includes calcium channel blockers. The current treatment provides moderate improvement. There are no compliance problems.     She did not try taking half the doxazosin. She wanted to stop it and see if it made her feel better. The dizziness and fatigue are gone. Amlodipine causes swelling however and she has one kidney so she can not take a diuretic secondary to the risk to her kidney    I personally reviewed and updated the patient's allergies, medications, problem list, and past medical, surgical, social, and family history. I have reviewed and confirmed the accuracy of the History of Present Illness and Review of Symptoms as documented by the MA/LPN/RN. Emma Hammer MD    Allergies:  Allergies   Allergen Reactions   • Erythromycin Rash   • Naproxen Sodium Unknown - High Severity   • Latex Itching and Swelling   • Metoclopramide Unknown (See Comments)       Social History:  Social History     Socioeconomic History   • Marital status:      Spouse name: Not on file   • Number of children: Not on file   • Years of education: Not on file   • Highest  education level: Not on file   Tobacco Use   • Smoking status: Current Every Day Smoker     Packs/day: 0.25     Years: 50.00     Pack years: 12.50     Types: Cigarettes   • Smokeless tobacco: Never Used   Vaping Use   • Vaping Use: Never used   Substance and Sexual Activity   • Alcohol use: Yes     Comment: occasionally    • Drug use: Never   • Sexual activity: Defer       Family History:  Family History   Problem Relation Age of Onset   • Arthritis Father    • Prostate cancer Father    • Heart disease Sister        Past Medical History :  Patient Active Problem List   Diagnosis   • Type 2 diabetes mellitus with stage 4 chronic kidney disease, without long-term current use of insulin (CMS/[facility])   • Essential (primary) hypertension   • Mixed hyperlipidemia   • Hiatal hernia   • Fatigue   • Long term current use of antithrombotics/antiplatelets   • Chronic kidney disease, stage 3 (moderate)   • Chronic obstructive pulmonary disease (CMS/[facility])   • Atherosclerotic heart disease of native coronary artery without angina pectoris   • Irritable bowel syndrome with constipation   • History of cancer of ureter   • Solitary kidney   • Atherosclerosis of native arteries of extremities with intermittent claudication, bilateral legs (CMS/[facility])   • H/O malignant neoplasm of ureter   • Vertigo   • Medicare annual wellness visit, subsequent   • Presbyopia   • Combined form of senile cataract   • Cervical cancer screening   • Postartificial menopausal syndrome   • Diverticula of colon   • Ruptured tympanic membrane, left   • Hearing loss, bilateral   • TMJ pain dysfunction syndrome   • Diverticulitis   • Stage 4 chronic kidney disease (CMS/HCC)       Medication List:    Current Outpatient Medications:   •  amLODIPine (NORVASC) 10 MG tablet, Take 1 tablet by mouth Daily., Disp: 30 tablet, Rfl: 12  •  aspirin 81 MG tablet, Take 81 mg by mouth Daily., Disp: , Rfl:   •  calcium-vitamin D (Oscal 500/200 D-3) 500-200 MG-UNIT per tablet, Take   by mouth. Twice weekly, Disp: , Rfl:   •  carvedilol (Coreg) 3.125 MG tablet, Take 1 tablet by mouth Every 12 (Twelve) Hours., Disp: 60 tablet, Rfl: 12  •  clobetasol (TEMOVATE) 0.05 % external solution, , Disp: , Rfl:   •  coenzyme Q10 100 MG capsule, Take 1 capsule by mouth Daily., Disp: , Rfl:   •  Evolocumab (Repatha SureClick) 140 MG/ML solution auto-injector, Inject 1 mL under the skin into the appropriate area as directed Every 14 (Fourteen) Days., Disp: 1 pen, Rfl: 12  •  fluticasone (FLONASE) 50 MCG/ACT nasal spray, USE 2 SPRAYS IN EACH NOSTRIL EVERY DAY, Disp: , Rfl:   •  fluticasone-salmeterol (ADVAIR HFA) 45-21 MCG/ACT inhaler, Inhale 2 puffs 2 (Two) Times a Day., Disp: 1 inhaler, Rfl: 12  •  gabapentin (NEURONTIN) 300 MG capsule, , Disp: , Rfl:   •  Glucose Blood (Blood Glucose Test) strip, 1 each by In Vitro route Daily., Disp: 100 each, Rfl: 3  •  Insulin Glargine (Lantus SoloStar) 100 UNIT/ML injection pen, Inject 20 Units under the skin into the appropriate area as directed Every Night., Disp: 5 pen, Rfl: 12  •  linaclotide (Linzess) 72 MCG capsule capsule, Take 1 capsule by mouth Every Morning Before Breakfast., Disp: 30 capsule, Rfl: 12  •  neomycin-polymyxin-dexamethasone (MAXITROL) 3.5-11962-9.1 ophthalmic suspension, , Disp: , Rfl:   •  rosuvastatin (CRESTOR) 20 MG tablet, , Disp: , Rfl:   •  sertraline (ZOLOFT) 25 MG tablet, Take 1 tablet by mouth Daily., Disp: 30 tablet, Rfl: 12  •  SITagliptin (Januvia) 100 MG tablet, Take 1 tablet by mouth Daily., Disp: 30 tablet, Rfl: 12  •  traMADol (ULTRAM) 50 MG tablet, , Disp: , Rfl:     Past Surgical History:  Past Surgical History:   Procedure Laterality Date   • BREAST BIOPSY Right    • CARDIAC CATHETERIZATION     • CHOLECYSTECTOMY     • COLON SURGERY      REMOVAL   • HERNIA REPAIR     • NEPHRECTOMY         Review of Systems:  Review of Systems   Constitutional: Positive for malaise/fatigue (last month). Negative for activity change and fever.  "  HENT: Negative for ear pain, rhinorrhea, sinus pressure and voice change.    Eyes: Negative for blurred vision and visual disturbance.   Respiratory: Positive for shortness of breath (copd). Negative for cough.    Cardiovascular: Negative for chest pain and palpitations.   Gastrointestinal: Negative for abdominal pain, diarrhea, nausea and vomiting.   Endocrine: Negative for cold intolerance and heat intolerance.   Genitourinary: Negative for frequency and urgency.   Musculoskeletal: Negative for arthralgias.   Skin: Negative for rash.   Neurological: Negative for syncope.   Hematological: Does not bruise/bleed easily.   Psychiatric/Behavioral: Negative for depressed mood. The patient is not nervous/anxious.        Physical Exam:  Vital Signs:  Vital Signs:   /74   Pulse 64   Temp 96 °F (35.6 °C)   Resp 16   Ht 165.1 cm (65\")   Wt 87.2 kg (192 lb 3.2 oz)   SpO2 94%   BMI 31.98 kg/m²     Result Review :                Physical Exam  Vitals reviewed.   Constitutional:       Appearance: Normal appearance. She is well-developed.   HENT:      Head: Normocephalic and atraumatic.   Eyes:      General:         Right eye: No discharge.         Left eye: No discharge.   Cardiovascular:      Rate and Rhythm: Normal rate and regular rhythm.      Heart sounds: Normal heart sounds. No murmur heard.   No friction rub. No gallop.    Pulmonary:      Effort: Pulmonary effort is normal. No respiratory distress.      Breath sounds: Normal breath sounds. No wheezing or rales.   Skin:     General: Skin is warm and dry.      Findings: No rash.   Neurological:      Mental Status: She is alert and oriented to person, place, and time.      Coordination: Coordination normal.      Gait: Gait normal.   Psychiatric:         Behavior: Behavior is cooperative.         Assessment and Plan:  Problems Addressed this Visit        Cardiac and Vasculature    Essential (primary) hypertension - Primary     Dr Ceron put her on doxazosin " but it made her dizzy and fatigued. She would like to stay on amlodopine instead.   Amlodpine has caused the least problems for her she says. She is aware of the edema but she says she can live with it.          Mixed hyperlipidemia    Relevant Orders    Comprehensive Metabolic Panel    Lipid Panel With / Chol / HDL Ratio       Pulmonary and Pneumonias    Chronic obstructive pulmonary disease (CMS/HCC)     Stable   Continue with current treatment             Diagnoses       Codes Comments    Essential (primary) hypertension    -  Primary ICD-10-CM: I10  ICD-9-CM: 401.9     Mixed hyperlipidemia     ICD-10-CM: E78.2  ICD-9-CM: 272.2     Simple chronic bronchitis (CMS/HCC)     ICD-10-CM: J41.0  ICD-9-CM: 491.0            An After Visit Summary and PPPS were given to the patient.         I wore protective equipment throughout this patient encounter to include mask and gloves. Hand hygiene was performed before donning protective equipment and after removal when leaving the room.

## 2021-04-22 NOTE — ASSESSMENT & PLAN NOTE
Dr Ceron put her on doxazosin but it made her dizzy and fatigued. She would like to stay on amlodopine instead.   Amlodpine has caused the least problems for her she says. She is aware of the edema but she says she can live with it.

## 2021-04-23 LAB
ALBUMIN SERPL-MCNC: 3.9 G/DL (ref 3.7–4.7)
ALBUMIN/GLOB SERPL: 1.6 {RATIO} (ref 1.2–2.2)
ALP SERPL-CCNC: 71 IU/L (ref 39–117)
ALT SERPL-CCNC: 12 IU/L (ref 0–32)
AST SERPL-CCNC: 13 IU/L (ref 0–40)
BILIRUB SERPL-MCNC: 0.3 MG/DL (ref 0–1.2)
BUN SERPL-MCNC: 23 MG/DL (ref 8–27)
BUN/CREAT SERPL: 14 (ref 12–28)
CALCIUM SERPL-MCNC: 8.9 MG/DL (ref 8.7–10.3)
CHLORIDE SERPL-SCNC: 106 MMOL/L (ref 96–106)
CHOLEST SERPL-MCNC: 106 MG/DL (ref 100–199)
CHOLEST/HDLC SERPL: 2.2 RATIO (ref 0–4.4)
CO2 SERPL-SCNC: 25 MMOL/L (ref 20–29)
CREAT SERPL-MCNC: 1.59 MG/DL (ref 0.57–1)
GLOBULIN SER CALC-MCNC: 2.5 G/DL (ref 1.5–4.5)
GLUCOSE SERPL-MCNC: 71 MG/DL (ref 65–99)
HDLC SERPL-MCNC: 48 MG/DL
LDLC SERPL CALC-MCNC: 43 MG/DL (ref 0–99)
POTASSIUM SERPL-SCNC: 4.7 MMOL/L (ref 3.5–5.2)
PROT SERPL-MCNC: 6.4 G/DL (ref 6–8.5)
SODIUM SERPL-SCNC: 143 MMOL/L (ref 134–144)
TRIGL SERPL-MCNC: 69 MG/DL (ref 0–149)
VLDLC SERPL CALC-MCNC: 15 MG/DL (ref 5–40)

## 2021-04-26 ENCOUNTER — TELEPHONE (OUTPATIENT)
Dept: FAMILY MEDICINE CLINIC | Facility: CLINIC | Age: 76
End: 2021-04-26

## 2021-04-26 NOTE — TELEPHONE ENCOUNTER
----- Message from Emma Hammer MD sent at 4/26/2021  8:55 AM EDT -----  Her labs are ok except her kidney function has fallen more. Need labs sent to Dr saleem. I believe she still sees him for her kidneys

## 2021-06-03 ENCOUNTER — TELEPHONE (OUTPATIENT)
Dept: FAMILY MEDICINE CLINIC | Facility: CLINIC | Age: 76
End: 2021-06-03

## 2021-06-03 NOTE — TELEPHONE ENCOUNTER
UNABLE TO WARM TRANSFER    Caller: Vianey Reeves    Relationship to patient: Self    Best call back number: [phone]    Patient is needing: PATIENT WAS NEEDING A HOSPITAL FU WITH DR. VALERIO. SHE WAS DISCHARGED 06/02 FROM [facility] FOR IBS. SHE WAS IN THE HOSPITAL FOR 3 DAYS. PLEASE ADVISE.

## 2021-06-03 NOTE — TELEPHONE ENCOUNTER
I scheduled her for hospital follow up on 6/10 but she stated that she cannot wait that long. She is wanting to come in tomorrow. She is having stomach pain.

## 2021-06-04 ENCOUNTER — OFFICE VISIT (OUTPATIENT)
Dept: FAMILY MEDICINE CLINIC | Facility: CLINIC | Age: 76
End: 2021-06-04

## 2021-06-04 VITALS
BODY MASS INDEX: 31.89 KG/M2 | SYSTOLIC BLOOD PRESSURE: 112 MMHG | DIASTOLIC BLOOD PRESSURE: 62 MMHG | TEMPERATURE: 97.1 F | HEART RATE: 65 BPM | WEIGHT: 191.4 LBS | RESPIRATION RATE: 18 BRPM | HEIGHT: 65 IN | OXYGEN SATURATION: 94 %

## 2021-06-04 DIAGNOSIS — K58.1 IRRITABLE BOWEL SYNDROME WITH CONSTIPATION: Primary | ICD-10-CM

## 2021-06-04 DIAGNOSIS — K52.9 GASTROENTERITIS: ICD-10-CM

## 2021-06-04 DIAGNOSIS — E73.9 LACTOSE INTOLERANCE: ICD-10-CM

## 2021-06-04 DIAGNOSIS — R13.19 OTHER DYSPHAGIA: ICD-10-CM

## 2021-06-04 PROCEDURE — 99214 OFFICE O/P EST MOD 30 MIN: CPT | Performed by: FAMILY MEDICINE

## 2021-06-04 RX ORDER — HYDROCODONE BITARTRATE AND ACETAMINOPHEN 5; 325 MG/1; MG/1
1 TABLET ORAL
COMMUNITY
Start: 2021-06-02 | End: 2021-06-05

## 2021-06-04 RX ORDER — PANTOPRAZOLE SODIUM 40 MG/1
40 TABLET, DELAYED RELEASE ORAL DAILY
COMMUNITY
Start: 2021-06-02 | End: 2021-08-01

## 2021-06-04 RX ORDER — POLYETHYLENE GLYCOL 3350 17 G/17G
17 POWDER, FOR SOLUTION ORAL DAILY
COMMUNITY
Start: 2021-06-03 | End: 2021-07-03

## 2021-06-04 NOTE — PROGRESS NOTES
Subjective   Vianey Reeves is a 75 y.o. female.     Chief Complaint   Patient presents with   • Abdominal Pain       Patient went to [facility]      Abdominal Pain  This is a recurrent problem. The current episode started more than 1 month ago. The onset quality is gradual. The problem occurs daily. The problem has been gradually worsening. Pain location: above belly button. The quality of the pain is aching. The abdominal pain does not radiate. Pertinent negatives include no arthralgias, belching, constipation, diarrhea, dysuria, fever, frequency, headaches, hematuria, nausea or vomiting. The pain is aggravated by eating. Treatments tried: resting, pantoprazole. The treatment provided moderate relief.      As per discharge summary from [facility] 5/31/21:   Pt is a 75 yoF with hx of GERD, hiatal hernia s/p repair, IBS, diverticulosis who presented with acute on chronic left sided upper abdominal pain. CT abd/pelv without contrast consistent with gastroenteritis and possible new lactose intolerance or secondary to GE. Initially thought to be 2/2 gastritis given improvement with GI cocktail, but due to worsening, GI was consulted given her CAD/PAD history due to possible ischemia, however, based off of symptoms more consistent with GE. She was feeling much better prior to discharge and plans to follow up with her GI doctor vs [facility] if she is amenable. Will start miralax and simethicone for IBS. Plan to follow up with PCP      I personally reviewed and updated the patient's allergies, medications, problem list, and past medical, surgical, social, and family history. I have reviewed and confirmed the accuracy of the History of Present Illness and Review of Symptoms as documented by the MA/LPN/RN. Emma Hammer MD    Allergies:  Allergies   Allergen Reactions   • Erythromycin Rash   • Naproxen Sodium Unknown - High Severity   • Latex Itching and Swelling   • Metoclopramide Unknown (See  Comments)       Social History:  Social History     Socioeconomic History   • Marital status:      Spouse name: Not on file   • Number of children: Not on file   • Years of education: Not on file   • Highest education level: Not on file   Tobacco Use   • Smoking status: Current Every Day Smoker     Packs/day: 0.25     Years: 50.00     Pack years: 12.50     Types: Cigarettes   • Smokeless tobacco: Never Used   Vaping Use   • Vaping Use: Never used   Substance and Sexual Activity   • Alcohol use: Yes     Comment: occasionally    • Drug use: Never   • Sexual activity: Defer       Family History:  Family History   Problem Relation Age of Onset   • Arthritis Father    • Prostate cancer Father    • Heart disease Sister        Past Medical History :  Patient Active Problem List   Diagnosis   • Type 2 diabetes mellitus with stage 4 chronic kidney disease, without long-term current use of insulin (CMS/LTAC, located within [facility])   • Essential (primary) hypertension   • Mixed hyperlipidemia   • Hiatal hernia   • Fatigue   • Long term current use of antithrombotics/antiplatelets   • Chronic kidney disease, stage 3 (moderate)   • Chronic obstructive pulmonary disease (CMS/LTAC, located within [facility])   • Atherosclerotic heart disease of native coronary artery without angina pectoris   • Irritable bowel syndrome with constipation   • History of cancer of ureter   • Solitary kidney   • Atherosclerosis of native arteries of extremities with intermittent claudication, bilateral legs (CMS/LTAC, located within [facility])   • H/O malignant neoplasm of ureter   • Vertigo   • Medicare annual wellness visit, subsequent   • Presbyopia   • Combined form of senile cataract   • Cervical cancer screening   • Postartificial menopausal syndrome   • Diverticula of colon   • Ruptured tympanic membrane, left   • Hearing loss, bilateral   • TMJ pain dysfunction syndrome   • Diverticulitis   • Stage 4 chronic kidney disease (CMS/HCC)   • Gastroenteritis   • Other dysphagia   • Lactose intolerance       Medication  List:    Current Outpatient Medications:   •  amLODIPine (NORVASC) 10 MG tablet, Take 1 tablet by mouth Daily., Disp: 30 tablet, Rfl: 12  •  aspirin 81 MG tablet, Take 81 mg by mouth Daily., Disp: , Rfl:   •  calcium-vitamin D (Oscal 500/200 D-3) 500-200 MG-UNIT per tablet, Take  by mouth. Twice weekly, Disp: , Rfl:   •  carvedilol (Coreg) 3.125 MG tablet, Take 1 tablet by mouth Every 12 (Twelve) Hours., Disp: 60 tablet, Rfl: 12  •  clobetasol (TEMOVATE) 0.05 % external solution, , Disp: , Rfl:   •  coenzyme Q10 100 MG capsule, Take 1 capsule by mouth Daily., Disp: , Rfl:   •  Evolocumab (Repatha SureClick) 140 MG/ML solution auto-injector, Inject 1 mL under the skin into the appropriate area as directed Every 14 (Fourteen) Days., Disp: 1 pen, Rfl: 12  •  fluticasone (FLONASE) 50 MCG/ACT nasal spray, USE 2 SPRAYS IN EACH NOSTRIL EVERY DAY, Disp: , Rfl:   •  Glucose Blood (Blood Glucose Test) strip, 1 each by In Vitro route Daily., Disp: 100 each, Rfl: 3  •  Insulin Glargine (Lantus SoloStar) 100 UNIT/ML injection pen, Inject 20 Units under the skin into the appropriate area as directed Every Night., Disp: 5 pen, Rfl: 12  •  pantoprazole (PROTONIX) 40 MG EC tablet, Take 40 mg by mouth Daily., Disp: , Rfl:   •  polyethylene glycol (MIRALAX) 17 GM/SCOOP powder, Take 17 g by mouth Daily., Disp: , Rfl:   •  sertraline (ZOLOFT) 25 MG tablet, Take 1 tablet by mouth Daily., Disp: 30 tablet, Rfl: 12  •  SITagliptin (Januvia) 100 MG tablet, Take 1 tablet by mouth Daily., Disp: 30 tablet, Rfl: 12  •  fluticasone-salmeterol (ADVAIR HFA) 45-21 MCG/ACT inhaler, Inhale 2 puffs 2 (Two) Times a Day., Disp: 1 inhaler, Rfl: 12  •  gabapentin (NEURONTIN) 300 MG capsule, , Disp: , Rfl:   •  linaclotide (Linzess) 72 MCG capsule capsule, Take 1 capsule by mouth Every Morning Before Breakfast., Disp: 30 capsule, Rfl: 12  •  neomycin-polymyxin-dexamethasone (MAXITROL) 3.5-48127-2.1 ophthalmic suspension, , Disp: , Rfl:   •  rosuvastatin  "(CRESTOR) 20 MG tablet, , Disp: , Rfl:     Past Surgical History:  Past Surgical History:   Procedure Laterality Date   • BREAST BIOPSY Right    • CARDIAC CATHETERIZATION     • CHOLECYSTECTOMY     • COLON SURGERY      REMOVAL   • HERNIA REPAIR     • NEPHRECTOMY         Review of Systems:  Review of Systems   Constitutional: Negative for activity change and fever.   HENT: Negative for ear pain, rhinorrhea, sinus pressure and voice change.    Eyes: Negative for visual disturbance.   Respiratory: Negative for cough and shortness of breath.    Cardiovascular: Negative for chest pain.   Gastrointestinal: Positive for abdominal pain. Negative for constipation, diarrhea, nausea and vomiting.   Endocrine: Negative for cold intolerance and heat intolerance.   Genitourinary: Negative for dysuria, frequency, hematuria and urgency.   Musculoskeletal: Negative for arthralgias.   Skin: Negative for rash.   Neurological: Negative for syncope.   Hematological: Does not bruise/bleed easily.   Psychiatric/Behavioral: Negative for depressed mood. The patient is not nervous/anxious.        Physical Exam:  Vital Signs:  Vital Signs:   /62 (BP Location: Right arm, Patient Position: Sitting, Cuff Size: Adult)   Pulse 65   Temp 97.1 °F (36.2 °C)   Resp 18   Ht 165.1 cm (65\")   Wt 86.8 kg (191 lb 6.4 oz)   SpO2 94%   BMI 31.85 kg/m²     Result Review :   The following data was reviewed by: Emma Hammer MD on 06/04/2021:    Data reviewed: Radiologic studies Ct of abdomen , labs and hospital admission at [facility] 5/31-6/2/21        Physical Exam  Vitals reviewed.   Constitutional:       Appearance: Normal appearance. She is well-developed.   HENT:      Head: Normocephalic and atraumatic.   Eyes:      General:         Right eye: No discharge.         Left eye: No discharge.   Cardiovascular:      Rate and Rhythm: Normal rate and regular rhythm.      Heart sounds: Normal heart sounds. No murmur heard.   No friction rub. No " gallop.    Pulmonary:      Effort: Pulmonary effort is normal. No respiratory distress.      Breath sounds: Normal breath sounds. No wheezing or rales.   Abdominal:      General: Abdomen is flat. Bowel sounds are normal. There is no distension.      Palpations: Abdomen is soft. There is no mass.      Tenderness: There is no abdominal tenderness. There is no guarding or rebound.      Hernia: No hernia is present.   Skin:     General: Skin is warm and dry.      Findings: No rash.   Neurological:      Mental Status: She is alert and oriented to person, place, and time.      Coordination: Coordination normal.      Gait: Gait normal.   Psychiatric:         Behavior: Behavior is cooperative.         Assessment and Plan:  Problems Addressed this Visit        Gastrointestinal Abdominal     Irritable bowel syndrome with constipation - Primary     Her stool is better. She is still in pain. She is trying to change GI specialist. Will try to get her into the one she saw at [facility] or the same group  Refill linzess. Continue miralax as needed         Gastroenteritis     As per work up at [facility]. Continue current treatment.          Relevant Medications    pantoprazole (PROTONIX) 40 MG EC tablet    Other dysphagia     Solids and liquids. Feels like things get hung up. She has had a dilatation before. 1 year ago with her EGD with Dr Mcallister.   She will be seeing another GI for a second opinion         Lactose intolerance     New  Diagnosed in hospital visit           Diagnoses       Codes Comments    Irritable bowel syndrome with constipation    -  Primary ICD-10-CM: K58.1  ICD-9-CM: 564.1     Gastroenteritis     ICD-10-CM: K52.9  ICD-9-CM: 558.9     Other dysphagia     ICD-10-CM: R13.19  ICD-9-CM: 787.29     Lactose intolerance     ICD-10-CM: E73.9  ICD-9-CM: 271.3            An After Visit Summary and PPPS were given to the patient.         I wore protective equipment throughout this patient encounter to include mask. Hand  hygiene was performed before donning protective equipment and after removal when leaving the room.

## 2021-06-09 ENCOUNTER — TELEPHONE (OUTPATIENT)
Dept: FAMILY MEDICINE CLINIC | Facility: CLINIC | Age: 76
End: 2021-06-09

## 2021-06-09 NOTE — TELEPHONE ENCOUNTER
Patient was scheduled 6/10. I called to reschedule due to Dr. Hammer being out of office 6/10. Patient said she had already seen Dr. Hammer for her stomach and will see the gastro doctor this Friday. She feels she does not need this appointment. Please advise patient.

## 2021-06-11 ENCOUNTER — OFFICE (AMBULATORY)
Dept: URBAN - METROPOLITAN AREA CLINIC 64 | Facility: CLINIC | Age: 76
End: 2021-06-11

## 2021-06-11 VITALS
HEART RATE: 66 BPM | HEIGHT: 65 IN | SYSTOLIC BLOOD PRESSURE: 130 MMHG | WEIGHT: 194 LBS | DIASTOLIC BLOOD PRESSURE: 77 MMHG

## 2021-06-11 DIAGNOSIS — K21.9 GASTRO-ESOPHAGEAL REFLUX DISEASE WITHOUT ESOPHAGITIS: ICD-10-CM

## 2021-06-11 DIAGNOSIS — Z86.010 PERSONAL HISTORY OF COLONIC POLYPS: ICD-10-CM

## 2021-06-11 DIAGNOSIS — R13.10 DYSPHAGIA, UNSPECIFIED: ICD-10-CM

## 2021-06-11 DIAGNOSIS — R10.12 LEFT UPPER QUADRANT PAIN: ICD-10-CM

## 2021-06-11 PROCEDURE — 99214 OFFICE O/P EST MOD 30 MIN: CPT | Performed by: NURSE PRACTITIONER

## 2021-06-11 NOTE — ASSESSMENT & PLAN NOTE
As per work up at [facility]. Continue current treatment.   
Her stool is better. She is still in pain. She is trying to change GI specialist. Will try to get her into the one she saw at [facility] or the same group  Refill linzess. Continue miralax as needed  
New  Diagnosed in hospital visit  
Solids and liquids. Feels like things get hung up. She has had a dilatation before. 1 year ago with her EGD with Dr Mcallister.   She will be seeing another GI for a second opinion  
Social Work

## 2021-07-02 RX ORDER — SERTRALINE HYDROCHLORIDE 25 MG/1
TABLET, FILM COATED ORAL
Qty: 90 TABLET | Refills: 1 | Status: SHIPPED | OUTPATIENT
Start: 2021-07-02 | End: 2021-10-29

## 2021-07-14 DIAGNOSIS — E11.9 TYPE 2 DIABETES MELLITUS WITHOUT COMPLICATION, WITHOUT LONG-TERM CURRENT USE OF INSULIN (HCC): ICD-10-CM

## 2021-07-14 RX ORDER — SITAGLIPTIN 100 MG/1
TABLET, FILM COATED ORAL
Qty: 90 TABLET | Refills: 3 | Status: SHIPPED | OUTPATIENT
Start: 2021-07-14 | End: 2022-05-23

## 2021-07-20 ENCOUNTER — OFFICE VISIT (OUTPATIENT)
Dept: CARDIOLOGY | Facility: CLINIC | Age: 76
End: 2021-07-20

## 2021-07-20 VITALS
DIASTOLIC BLOOD PRESSURE: 72 MMHG | OXYGEN SATURATION: 96 % | WEIGHT: 198 LBS | BODY MASS INDEX: 32.95 KG/M2 | SYSTOLIC BLOOD PRESSURE: 132 MMHG | HEART RATE: 68 BPM

## 2021-07-20 DIAGNOSIS — IMO0002 SOLITARY KIDNEY: ICD-10-CM

## 2021-07-20 DIAGNOSIS — I25.10 ATHEROSCLEROSIS OF NATIVE CORONARY ARTERY OF NATIVE HEART WITHOUT ANGINA PECTORIS: Primary | ICD-10-CM

## 2021-07-20 DIAGNOSIS — J41.0 SIMPLE CHRONIC BRONCHITIS (HCC): ICD-10-CM

## 2021-07-20 DIAGNOSIS — Z79.02 LONG TERM CURRENT USE OF ANTITHROMBOTICS/ANTIPLATELETS: ICD-10-CM

## 2021-07-20 DIAGNOSIS — I10 ESSENTIAL (PRIMARY) HYPERTENSION: ICD-10-CM

## 2021-07-20 DIAGNOSIS — I95.1 ORTHOSTASIS: ICD-10-CM

## 2021-07-20 DIAGNOSIS — E78.2 MIXED HYPERLIPIDEMIA: ICD-10-CM

## 2021-07-20 PROCEDURE — 99214 OFFICE O/P EST MOD 30 MIN: CPT | Performed by: INTERNAL MEDICINE

## 2021-07-20 RX ORDER — DOXAZOSIN 2 MG/1
2 TABLET ORAL NIGHTLY
Qty: 90 TABLET | Refills: 3 | Status: SHIPPED | OUTPATIENT
Start: 2021-07-20 | End: 2022-02-09 | Stop reason: SDUPTHER

## 2021-07-20 NOTE — PROGRESS NOTES
Cardiology Office Visit      Encounter Date:  07/20/2021    Patient ID:   Vianey Reeves is a 75 y.o. female.    Reason For Followup:  Coronary artery disease  Hypertension  Hypertensive cardiovascular disease    Brief Clinical History:  Dear Dr. Hammer, Emma Simmons MD    I had the pleasure of seeing Vianey Reeves today. As you are well aware, this is a 75 y.o. female with a known history of ischemic heart disease.  She underwent cardiac catheterization with PCI and drug-eluting stent placement on April 9, 2016 and follow-up PCI on 5/25/2016. She presents today for follow-up on the above conditions.        Interval History:  She denies any chest pain pressure heaviness or tightness.  She denies any shortness of breath out of character. She denies any PND orthopnea.  She denies any syncope or near syncope.  She reports feeling well today.    We have been struggling with her blood pressure for quite some time.  Today remarkably her blood pressure is decent.  On her last visit we started her on doxazosin 2 mg.  She reports that when she initially started the medicine she had a significant amount of orthostasis.  She cut the medication in half and took that for a week or 2 and then titrated herself back up to the full 2 mg.  Since then she reports that she has been doing well.  She does have some occasional lightheadedness that tends to occur at nighttime when she gets up but otherwise has no issues.  She has stayed away from utilizing her Neurontin in addition/combination with the doxazosin simply because of the lightheadedness and dizziness.    She also reports that she was hospitalized with some abdominal pain recently.  She had a work-up performed but no invasive studies were done.  She has a colonoscopy in the upcoming weeks.    We have a limited repertoire of medications that can be utilized.  We have stayed away from ACE/ARB medications due to the presence of a solitary kidney and renal insufficiency.  Calcium  channel blockers appear to cause edema requiring diuretic usage which can exacerbate renal insufficiency.  Potassium sparing diuretics can also be problematic with renal insufficiency.  Beta-blockers appear to be causing fatigue and bradycardia.  Clonidine results in significant lability in blood pressure and can also contribute to bradycardia.  Hydralazine is very cumbersome to take.  This leaves only alpha blockers as a potential medication.    Assessment & Plan     Impressions:  Coronary artery disease status post PCI and drug-eluting stent placement in her mid RCA and Cx        Negative nuclear stress test May 2019  Diabetes mellitus.  Dyslipidemia with intolerance to statins.  Hypertension with hypertensive cardiovascular disease.      Suboptimal with limited medication repertoire  COPD  Solitary kidney.  Chronic renal failure with declining GFR     Followed by Dr. Julio  Tobacco abuse  Anti-platelet therapy.  Fatigue  Dizziness     Recommendations:  Continuation of her current cardiovascular regimen at the present time.  Continue to monitor blood pressure  Utilize caution with doxazosin and Neurontin  Follow-up in 6 months time sooner should there be difficulties.    Objective:    Vitals:  Vitals:    07/20/21 1102   BP: 132/72   Pulse: 68   SpO2: 96%   Weight: 89.8 kg (198 lb)       Physical Exam:    General: Alert, cooperative, no distress, appears stated age  Head:  Normocephalic, atraumatic, mucous membranes moist  Eyes:  Conjunctiva/corneas clear, EOM's intact     Neck:  Supple,  no bruit  Lungs: Clear to auscultation bilaterally, no wheezes rhonchi rales are noted  Chest wall: No tenderness  Heart::  Regular rate and rhythm, S1 and S2 normal, 1/6 holosystolic murmur.  No rub or gallop  Abdomen: Soft, non-tender, nondistended bowel sounds active  Extremities: No cyanosis, clubbing, or edema  Pulses: 2+ and symmetric all extremities  Skin:  No rashes or lesions  Neuro/psych: A&O x3. CN II through XII are  grossly intact with appropriate affect      Allergies:  Allergies   Allergen Reactions   • Erythromycin Rash   • Naproxen Sodium Unknown - High Severity   • Latex Itching and Swelling   • Metoclopramide Unknown (See Comments)       Medication Review:     Current Outpatient Medications:   •  amLODIPine (NORVASC) 10 MG tablet, Take 1 tablet by mouth Daily., Disp: 30 tablet, Rfl: 12  •  aspirin 81 MG tablet, Take 81 mg by mouth Daily., Disp: , Rfl:   •  calcium-vitamin D (Oscal 500/200 D-3) 500-200 MG-UNIT per tablet, Take  by mouth. Twice weekly, Disp: , Rfl:   •  carvedilol (Coreg) 3.125 MG tablet, Take 1 tablet by mouth Every 12 (Twelve) Hours., Disp: 60 tablet, Rfl: 12  •  clobetasol (TEMOVATE) 0.05 % external solution, , Disp: , Rfl:   •  coenzyme Q10 100 MG capsule, Take 1 capsule by mouth Daily., Disp: , Rfl:   •  Evolocumab (Repatha SureClick) 140 MG/ML solution auto-injector, Inject 1 mL under the skin into the appropriate area as directed Every 14 (Fourteen) Days., Disp: 1 pen, Rfl: 12  •  fluticasone (FLONASE) 50 MCG/ACT nasal spray, USE 2 SPRAYS IN EACH NOSTRIL EVERY DAY, Disp: , Rfl:   •  fluticasone-salmeterol (ADVAIR HFA) 45-21 MCG/ACT inhaler, Inhale 2 puffs 2 (Two) Times a Day., Disp: 1 inhaler, Rfl: 12  •  gabapentin (NEURONTIN) 300 MG capsule, , Disp: , Rfl:   •  Glucose Blood (Blood Glucose Test) strip, 1 each by In Vitro route Daily., Disp: 100 each, Rfl: 3  •  Insulin Glargine (Lantus SoloStar) 100 UNIT/ML injection pen, Inject 20 Units under the skin into the appropriate area as directed Every Night., Disp: 5 pen, Rfl: 12  •  Januvia 100 MG tablet, TAKE 1 TABLET BY MOUTH EVERY DAY, Disp: 90 tablet, Rfl: 3  •  linaclotide (Linzess) 72 MCG capsule capsule, Take 1 capsule by mouth Every Morning Before Breakfast., Disp: 30 capsule, Rfl: 12  •  neomycin-polymyxin-dexamethasone (MAXITROL) 3.5-41084-6.1 ophthalmic suspension, , Disp: , Rfl:   •  pantoprazole (PROTONIX) 40 MG EC tablet, Take 40 mg by  mouth Daily., Disp: , Rfl:   •  rosuvastatin (CRESTOR) 20 MG tablet, , Disp: , Rfl:   •  sertraline (ZOLOFT) 25 MG tablet, TAKE 1 TABLET BY MOUTH EVERY DAY, Disp: 90 tablet, Rfl: 1    Family History:  Family History   Problem Relation Age of Onset   • Arthritis Father    • Prostate cancer Father    • Heart disease Sister        Past Medical History:  Past Medical History:   Diagnosis Date   • Anxiety    • Bilateral carotid bruits    • CKD (chronic kidney disease)    • Claudication, intermittent (CMS/HCC)    • Coronary artery disease    • DDD (degenerative disc disease), thoracic    • Diabetes mellitus (CMS/HCC)    • Gout    • 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   • Hypertension    • Mass of right breast    • Renal insufficiency    • Simple chronic bronchitis (CMS/HCC)        Past surgical History:  Past Surgical History:   Procedure Laterality Date   • BREAST BIOPSY Right    • CARDIAC CATHETERIZATION     • CHOLECYSTECTOMY     • COLON SURGERY      REMOVAL   • HERNIA REPAIR     • NEPHRECTOMY         Social History:  Social History     Socioeconomic History   • Marital status:      Spouse name: Not on file   • Number of children: Not on file   • Years of education: Not on file   • Highest education level: Not on file   Tobacco Use   • Smoking status: Current Every Day Smoker     Packs/day: 0.25     Years: 50.00     Pack years: 12.50     Types: Cigarettes   • Smokeless tobacco: Never Used   Vaping Use   • Vaping Use: Never used   Substance and Sexual Activity   • Alcohol use: Yes     Comment: occasionally    • Drug use: Never   • Sexual activity: Defer       Review of Systems:  The following systems were reviewed as they relate to the cardiovascular system: Constitutional, Eyes, ENT, Cardiovascular, Respiratory, Gastrointestinal, Integumentary, Neurological, Psychiatric, Hematologic, Endocrine, Musculoskeletal, and Genitourinary. The pertinent cardiovascular findings are reported above with all other  cardiovascular points within those systems being negative.    Diagnostic Study Review:     Current Electrocardiogram:  Procedures no new EKG.  EKG dated 4/15/2021 demonstrates sinus rhythm with a ventricular rate of 68 bpm.  Atrial premature complex.  Nonspecific interventricular conduction delay.  Low voltage in the precordial leads.      NOTE: The following portions of the patient's note were reviewed, confirmed and/or updated this visit as appropriate: History of present illness/Interval history, physical examination, assessment & plan, allergies, current medications, past family history, past medical history, past social history, past surgical history and problem list.

## 2021-07-22 DIAGNOSIS — E11.9 TYPE 2 DIABETES MELLITUS WITHOUT COMPLICATION, WITHOUT LONG-TERM CURRENT USE OF INSULIN (HCC): ICD-10-CM

## 2021-07-22 RX ORDER — INSULIN GLARGINE 100 [IU]/ML
INJECTION, SOLUTION SUBCUTANEOUS
Qty: 5 PEN | Refills: 11 | Status: SHIPPED | OUTPATIENT
Start: 2021-07-22 | End: 2022-08-12

## 2021-08-10 ENCOUNTER — LAB (OUTPATIENT)
Dept: LAB | Facility: HOSPITAL | Age: 76
End: 2021-08-10

## 2021-08-10 LAB — SARS-COV-2 ORF1AB RESP QL NAA+PROBE: NOT DETECTED

## 2021-08-10 PROCEDURE — C9803 HOPD COVID-19 SPEC COLLECT: HCPCS

## 2021-08-10 PROCEDURE — U0004 COV-19 TEST NON-CDC HGH THRU: HCPCS

## 2021-08-11 ENCOUNTER — ANESTHESIA EVENT (OUTPATIENT)
Dept: GASTROENTEROLOGY | Facility: HOSPITAL | Age: 76
End: 2021-08-11

## 2021-08-12 ENCOUNTER — ANESTHESIA (OUTPATIENT)
Dept: GASTROENTEROLOGY | Facility: HOSPITAL | Age: 76
End: 2021-08-12

## 2021-08-12 ENCOUNTER — HOSPITAL ENCOUNTER (OUTPATIENT)
Facility: HOSPITAL | Age: 76
Setting detail: HOSPITAL OUTPATIENT SURGERY
Discharge: HOME | End: 2021-08-12
Attending: INTERNAL MEDICINE | Admitting: INTERNAL MEDICINE

## 2021-08-12 ENCOUNTER — ON CAMPUS - OUTPATIENT (AMBULATORY)
Dept: URBAN - METROPOLITAN AREA HOSPITAL 85 | Facility: HOSPITAL | Age: 76
End: 2021-08-12
Payer: COMMERCIAL

## 2021-08-12 VITALS
DIASTOLIC BLOOD PRESSURE: 74 MMHG | RESPIRATION RATE: 12 BRPM | HEART RATE: 71 BPM | HEIGHT: 65 IN | SYSTOLIC BLOOD PRESSURE: 166 MMHG | OXYGEN SATURATION: 100 % | WEIGHT: 190.7 LBS | TEMPERATURE: 98 F | BODY MASS INDEX: 31.77 KG/M2

## 2021-08-12 DIAGNOSIS — Z86.010 PERSONAL HISTORY OF COLONIC POLYPS: ICD-10-CM

## 2021-08-12 DIAGNOSIS — K22.2 ESOPHAGEAL OBSTRUCTION: ICD-10-CM

## 2021-08-12 DIAGNOSIS — K91.89 OTHER POSTPROCEDURAL COMPLICATIONS AND DISORDERS OF DIGESTIV: ICD-10-CM

## 2021-08-12 DIAGNOSIS — K57.30 DIVERTICULOSIS OF LARGE INTESTINE WITHOUT PERFORATION OR ABS: ICD-10-CM

## 2021-08-12 DIAGNOSIS — K64.8 OTHER HEMORRHOIDS: ICD-10-CM

## 2021-08-12 DIAGNOSIS — K29.70 GASTRITIS, UNSPECIFIED, WITHOUT BLEEDING: ICD-10-CM

## 2021-08-12 DIAGNOSIS — D12.3 BENIGN NEOPLASM OF TRANSVERSE COLON: ICD-10-CM

## 2021-08-12 DIAGNOSIS — R13.10 DYSPHAGIA, UNSPECIFIED: ICD-10-CM

## 2021-08-12 DIAGNOSIS — K64.4 RESIDUAL HEMORRHOIDAL SKIN TAGS: ICD-10-CM

## 2021-08-12 DIAGNOSIS — R13.10 DYSPHAGIA: ICD-10-CM

## 2021-08-12 LAB — GLUCOSE BLDC GLUCOMTR-MCNC: 116 MG/DL (ref 70–105)

## 2021-08-12 PROCEDURE — 43249 ESOPH EGD DILATION <30 MM: CPT | Performed by: INTERNAL MEDICINE

## 2021-08-12 PROCEDURE — 25010000002 PROPOFOL 10 MG/ML EMULSION: Performed by: NURSE ANESTHETIST, CERTIFIED REGISTERED

## 2021-08-12 PROCEDURE — C1726 CATH, BAL DIL, NON-VASCULAR: HCPCS | Performed by: INTERNAL MEDICINE

## 2021-08-12 PROCEDURE — 45385 COLONOSCOPY W/LESION REMOVAL: CPT | Mod: PT | Performed by: INTERNAL MEDICINE

## 2021-08-12 PROCEDURE — 82962 GLUCOSE BLOOD TEST: CPT

## 2021-08-12 PROCEDURE — 88305 TISSUE EXAM BY PATHOLOGIST: CPT | Performed by: INTERNAL MEDICINE

## 2021-08-12 RX ORDER — MAGNESIUM CARB/ALUMINUM HYDROX 105-160MG
296 TABLET,CHEWABLE ORAL ONCE
Status: DISCONTINUED | OUTPATIENT
Start: 2021-08-12 | End: 2021-08-12 | Stop reason: HOSPADM

## 2021-08-12 RX ORDER — ONDANSETRON 2 MG/ML
4 INJECTION INTRAMUSCULAR; INTRAVENOUS ONCE AS NEEDED
Status: DISCONTINUED | OUTPATIENT
Start: 2021-08-12 | End: 2021-08-12 | Stop reason: HOSPADM

## 2021-08-12 RX ORDER — PROPOFOL 10 MG/ML
VIAL (ML) INTRAVENOUS AS NEEDED
Status: DISCONTINUED | OUTPATIENT
Start: 2021-08-12 | End: 2021-08-12 | Stop reason: SURG

## 2021-08-12 RX ORDER — LIDOCAINE HYDROCHLORIDE 10 MG/ML
INJECTION, SOLUTION EPIDURAL; INFILTRATION; INTRACAUDAL; PERINEURAL AS NEEDED
Status: DISCONTINUED | OUTPATIENT
Start: 2021-08-12 | End: 2021-08-12 | Stop reason: SURG

## 2021-08-12 RX ORDER — SODIUM CHLORIDE 0.9 % (FLUSH) 0.9 %
3 SYRINGE (ML) INJECTION EVERY 12 HOURS SCHEDULED
Status: DISCONTINUED | OUTPATIENT
Start: 2021-08-12 | End: 2021-08-12 | Stop reason: HOSPADM

## 2021-08-12 RX ORDER — SODIUM CHLORIDE 9 MG/ML
9 INJECTION, SOLUTION INTRAVENOUS CONTINUOUS PRN
Status: DISCONTINUED | OUTPATIENT
Start: 2021-08-12 | End: 2021-08-12 | Stop reason: HOSPADM

## 2021-08-12 RX ORDER — SODIUM CHLORIDE 0.9 % (FLUSH) 0.9 %
10 SYRINGE (ML) INJECTION AS NEEDED
Status: DISCONTINUED | OUTPATIENT
Start: 2021-08-12 | End: 2021-08-12 | Stop reason: HOSPADM

## 2021-08-12 RX ORDER — SODIUM CHLORIDE 0.9 % (FLUSH) 0.9 %
10 SYRINGE (ML) INJECTION EVERY 12 HOURS SCHEDULED
Status: DISCONTINUED | OUTPATIENT
Start: 2021-08-12 | End: 2021-08-12 | Stop reason: HOSPADM

## 2021-08-12 RX ORDER — SODIUM CHLORIDE 0.9 % (FLUSH) 0.9 %
3-10 SYRINGE (ML) INJECTION AS NEEDED
Status: DISCONTINUED | OUTPATIENT
Start: 2021-08-12 | End: 2021-08-12 | Stop reason: HOSPADM

## 2021-08-12 RX ADMIN — PROPOFOL 30 MG: 10 INJECTION, EMULSION INTRAVENOUS at 10:46

## 2021-08-12 RX ADMIN — PROPOFOL 20 MG: 10 INJECTION, EMULSION INTRAVENOUS at 10:27

## 2021-08-12 RX ADMIN — PROPOFOL 20 MG: 10 INJECTION, EMULSION INTRAVENOUS at 10:29

## 2021-08-12 RX ADMIN — PROPOFOL 20 MG: 10 INJECTION, EMULSION INTRAVENOUS at 10:35

## 2021-08-12 RX ADMIN — SODIUM CHLORIDE: 9 INJECTION, SOLUTION INTRAVENOUS at 10:18

## 2021-08-12 RX ADMIN — PROPOFOL 50 MG: 10 INJECTION, EMULSION INTRAVENOUS at 10:39

## 2021-08-12 RX ADMIN — PROPOFOL 20 MG: 10 INJECTION, EMULSION INTRAVENOUS at 10:37

## 2021-08-12 RX ADMIN — PROPOFOL 30 MG: 10 INJECTION, EMULSION INTRAVENOUS at 10:43

## 2021-08-12 RX ADMIN — PROPOFOL 80 MG: 10 INJECTION, EMULSION INTRAVENOUS at 10:24

## 2021-08-12 RX ADMIN — LIDOCAINE HYDROCHLORIDE 100 MG: 10 INJECTION, SOLUTION EPIDURAL; INFILTRATION; INTRACAUDAL; PERINEURAL at 10:24

## 2021-08-12 RX ADMIN — PROPOFOL 20 MG: 10 INJECTION, EMULSION INTRAVENOUS at 10:25

## 2021-08-12 RX ADMIN — SODIUM CHLORIDE 9 ML/HR: 9 INJECTION, SOLUTION INTRAVENOUS at 09:48

## 2021-08-12 RX ADMIN — PROPOFOL 20 MG: 10 INJECTION, EMULSION INTRAVENOUS at 10:31

## 2021-08-12 RX ADMIN — PROPOFOL 20 MG: 10 INJECTION, EMULSION INTRAVENOUS at 10:33

## 2021-08-12 NOTE — ANESTHESIA PREPROCEDURE EVALUATION
Anesthesia Evaluation     Patient summary reviewed and Nursing notes reviewed   NPO Solid Status: > 6 hours  NPO Liquid Status: > 6 hours           Airway   Mallampati: I  TM distance: >3 FB  Neck ROM: full  No difficulty expected  Dental - normal exam     Pulmonary - normal exam   (+) COPD,   Cardiovascular - normal exam    (+) hypertension, CAD, PVD, hyperlipidemia,       Neuro/Psych  (+) dizziness/light headedness,     GI/Hepatic/Renal/Endo    (+)  hiatal hernia,  renal disease CRI, diabetes mellitus using insulin,     Musculoskeletal     Abdominal  - normal exam    Bowel sounds: normal.   Substance History - negative use     OB/GYN negative ob/gyn ROS         Other   arthritis,    history of cancer                    Anesthesia Plan    ASA 4     MAC     intravenous induction     Anesthetic plan, all risks, benefits, and alternatives have been provided, discussed and informed consent has been obtained with: patient.    Plan discussed with CAA and CRNA.

## 2021-08-12 NOTE — H&P
Patient Care Team:  Emma Hammer MD as PCP - General  Emma Hammer MD as PCP - Family Medicine  Jame Julio MD as Consulting Physician (Nephrology)      Subjective .     History of present illness:    Vianey Reeves is a 75 y.o. female who presents today for Procedure(s):  ESOPHAGOGASTRODUODENOSCOPY  COLONOSCOPY for the indications listed below.     The updated Patient Profile was reviewed prior to the procedure, in conjunction with the Physical Exam, including medical conditions, surgical procedures, medications, allergies, family history and social history.     Pre-operatively, I reviewed the indication(s) for the procedure, the risks of the procedure [including but not limited to: unexpected bleeding possibly requiring hospitalization and/or unplanned repeat procedures, perforation possibly requiring surgical treatment, missed lesions and complications of sedation/MAC (also explained by anesthesia staff)].     I have evaluated the patient for risks associated with the planned anesthesia and the procedure to be performed and find the patient an acceptable candidate for IV sedation.    Multiple opportunities were provided for any questions or concerns, and all questions were answered satisfactorily before any anesthesia was administered. We will proceed with the planned procedure.      ASSESSMENT/PLAN:  Patient with epigastric pain personal history of polyps here for further evaluation  EGD  COLON          Past Medical History:  Past Medical History:   Diagnosis Date   • Allergies    • Anxiety    • Bilateral carotid bruits    • Bulging of thoracic intervertebral disc    • Cancer (CMS/HCC)     ureter    surgery   • CKD (chronic kidney disease)    • Claudication, intermittent (CMS/HCC)    • COPD (chronic obstructive pulmonary disease) (CMS/HCC)    • Coronary artery disease    • DDD (degenerative disc disease), thoracic    • Diabetes mellitus (CMS/HCC)    • DJD (degenerative joint disease)    • Gout    •  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   • Hyperlipidemia    • Hypertension    • IBS (irritable colon syndrome)     constipation   • Mass of right breast    • Neuropathy    • Renal insufficiency    • Simple chronic bronchitis (CMS/HCC)    • Solitary kidney     left       Past Surgical History:  Past Surgical History:   Procedure Laterality Date   • BREAST BIOPSY Right    • CARDIAC CATHETERIZATION     • CHOLECYSTECTOMY     • COLON SURGERY      REMOVAL diverticular diseae   • CORONARY STENT PLACEMENT     • EYE SURGERY Bilateral    • HIATAL HERNIA REPAIR     • NEPHRECTOMY Right        Social History:  Social History     Tobacco Use   • Smoking status: Current Every Day Smoker     Packs/day: 0.25     Years: 50.00     Pack years: 12.50     Types: Cigarettes   • Smokeless tobacco: Never Used   Vaping Use   • Vaping Use: Never used   Substance Use Topics   • Alcohol use: Yes     Comment: occasionally    • Drug use: Never       Family History:  Family History   Problem Relation Age of Onset   • Arthritis Father    • Prostate cancer Father    • Heart disease Sister        Medications:  Medications Prior to Admission   Medication Sig Dispense Refill Last Dose   • aspirin 81 MG tablet Take 81 mg by mouth Daily. dont take dos      • calcium-vitamin D (Oscal 500/200 D-3) 500-200 MG-UNIT per tablet Take  by mouth 2 (Two) Times a Week. Twice weekly      • carvedilol (Coreg) 3.125 MG tablet Take 1 tablet by mouth Every 12 (Twelve) Hours. 60 tablet 12    • coenzyme Q10 100 MG capsule Take 1 capsule by mouth 2 (Two) Times a Week.      • Evolocumab (Repatha SureClick) 140 MG/ML solution auto-injector Inject 1 mL under the skin into the appropriate area as directed Every 14 (Fourteen) Days. 1 pen 12    • fluticasone (FLONASE) 50 MCG/ACT nasal spray 2 sprays into the nostril(s) as directed by provider As Needed.      • fluticasone-salmeterol (ADVAIR HFA) 45-21 MCG/ACT inhaler Inhale 2 puffs 2 (Two) Times a Day. (Patient taking  differently: Inhale 2 puffs 2 (Two) Times a Day As Needed.) 1 inhaler 12    • gabapentin (NEURONTIN) 300 MG capsule Take 100 mg by mouth 2 (Two) Times a Day.      • Glucose Blood (Blood Glucose Test) strip 1 each by In Vitro route Daily. 100 each 3    • Januvia 100 MG tablet TAKE 1 TABLET BY MOUTH EVERY DAY (Patient taking differently: Take 100 mg by mouth Daily.) 90 tablet 3    • Lantus SoloStar 100 UNIT/ML injection pen INJECT 20 UNITS UNDER THE SKIN INTO THE APPROPRIATE AREA AS DIRECTED EVERY NIGHT. (Patient taking differently: Inject 20 Units under the skin into the appropriate area as directed Every Night.) 5 pen 11    • linaclotide (Linzess) 72 MCG capsule capsule Take 1 capsule by mouth Every Morning Before Breakfast. (Patient taking differently: Take 72 mcg by mouth Daily As Needed.) 30 capsule 12    • sertraline (ZOLOFT) 25 MG tablet TAKE 1 TABLET BY MOUTH EVERY DAY (Patient taking differently: Take 25 mg by mouth Daily.) 90 tablet 1    • amLODIPine (NORVASC) 10 MG tablet Take 1 tablet by mouth Daily. 30 tablet 12    • clobetasol (TEMOVATE) 0.05 % external solution       • doxazosin (Cardura) 2 MG tablet Take 1 tablet by mouth Every Night. 90 tablet 3    • neomycin-polymyxin-dexamethasone (MAXITROL) 3.5-82265-0.1 ophthalmic suspension       • rosuvastatin (CRESTOR) 20 MG tablet           Scheduled Meds:magnesium citrate, 296 mL, Oral, Once  sodium chloride, 3 mL, Intravenous, Q12H      Continuous Infusions:   PRN Meds:.sodium chloride    ALLERGIES:  Erythromycin, Naproxen sodium, Latex, and Metoclopramide        Objective     Vital Signs:        Physical Exam:      General Appearance:    Awake and alert, in no acute distress   Lungs:     Clear to auscultation bilaterally, respirations regular, even and unlabored    Heart:    Regular rhythm and normal rate, normal S1 and S2, no            murmur, no gallop, no rub   Abdomen:     Normal bowel sounds, soft, non-tender, no rebound or guarding, non-distended, no  hepatosplenomegaly        Results Review:   I reviewed the patient's labs and imaging.  Lab Results (last 24 hours)     ** No results found for the last 24 hours. **          Imaging Results (Last 24 Hours)     ** No results found for the last 24 hours. **             I discussed the patients findings and my recommendations with the patient.  Margarette Mcallister MD  08/12/21  09:09 EDT

## 2021-08-12 NOTE — ANESTHESIA POSTPROCEDURE EVALUATION
Patient: Vianey Reeves    Procedure Summary     Date: 08/12/21 Room / Location: [facility] 4 / [facility]    Anesthesia Start: 1018 Anesthesia Stop: 1053    Procedures:       ESOPHAGOGASTRODUODENOSCOPY with dilatation (18-20 mm balloon) and (54 bougie) (N/A )      COLONOSCOPY with polypectomy x 3 (N/A Rectum) Diagnosis:       Personal history of colonic polyps      Dysphagia      (Personal history of colonic polyps [Z86.010])      (Dysphagia [R13.10])    Surgeons: Margarette Mcallister MD Provider: Tai Aparicio DO    Anesthesia Type: MAC ASA Status: 4          Anesthesia Type: MAC    Vitals  Vitals Value Taken Time   /74 08/12/21 1119   Temp     Pulse 71 08/12/21 1119   Resp 12 08/12/21 1119   SpO2 100 % 08/12/21 1119           Post Anesthesia Care and Evaluation    Patient location during evaluation: PACU  Patient participation: complete - patient participated  Level of consciousness: awake  Pain scale: See nurse's notes for pain score.  Pain management: adequate  Airway patency: patent  Anesthetic complications: No anesthetic complications  PONV Status: none  Cardiovascular status: acceptable  Respiratory status: acceptable  Hydration status: acceptable    Comments: Patient seen and examined postoperatively; vital signs stable; SpO2 greater than or equal to 90%; cardiopulmonary status stable; nausea/vomiting adequately controlled; pain adequately controlled; no apparent anesthesia complications; patient discharged from anesthesia care when discharge criteria were met

## 2021-08-12 NOTE — DISCHARGE INSTRUCTIONS
A responsible adult should stay with you and you should rest quietly for the rest of the day.    Do not drink alcohol, drive, operate any heavy machinery or power tools or make any legal/important decisions for the next 24 hours.     Progress your diet as tolerated.  If you begin to experience severe pain, increased shortness of breath, racing heartbeat or a fever above 101 F, seek immediate medical attention.     Follow up with MD as instructed. Call office for results in 3 to 5 days if needed.  [phone]    Impression:  1.  Status post of Nissen which has been slightly slipped, status post of dilation up to 20 mm of balloon with a superficial mucosal laceration suggestive of effective dilation.  Esophagus was also sounded with a 54 [place]   2.  Minimal changes of gastritis.  3.  2 polyp were removed from the transverse colon area with a cold snare.  4.  Surgical changes with the previous sigmoid resection with end to end: Anastomosis, anastomosis widely open.  5.  Moderate-sized hemorrhoids     Recommendations:  Follow up with GI clinic as needed  Follow up with PCP as scheduled  Follow up with biopsy report  No recall  Benefiber 1 scoop 2x/day   Repeat EGD with a dilation continue with the PPI        Margarette Mcallister MD      Date: 8/12/2021    Time: 10:50 EDT

## 2021-08-12 NOTE — OP NOTE
ESOPHAGOGASTRODUODENOSCOPY, COLONOSCOPY Procedure Report    Patient Name:  Vianey Reeves  YOB: 1945    Date of Surgery:  8/12/2021     Pre-Op Diagnosis:  Personal history of colonic polyps [Z86.010]  Dysphagia [R13.10]         Procedure/CPT® Codes:      Procedure(s):  ESOPHAGOGASTRODUODENOSCOPY with dilatation (18-20 mm balloon) and (54 bougie)  COLONOSCOPY with polypectomy x 3    Staff:  Surgeon(s):  Margarette Mcallister MD      Anesthesia: Monitored Anesthesia Care    Description of Procedure:  A description of the procedure as well as risks, benefits and alternative methods were explained to the patient who voiced understanding and signed the corresponding consent form. A physical exam was performed and vital signs were monitored throughout the procedure.   scope advanced regularization from oropharynx, esophagus stomach and the second part of the duodenum retroflexion lamination was performed.    A rectal exam was performed which was normal. An Olympus colonoscope was placed into the rectum and proceeded under direct visualization through the colon until the cecum and appendiceal orifice were identified. Careful visualization occurred upon slow withdraw of the scope. The scope was then retroflexed and the distal rectum was visualized. The quality of the prep was good. The procedure was not difficult and there were no immediate complications.    Findings:   Upper endoscopy examination did show a changes suggestive of Nissen with a slight slip congestion, area of the Nissen was dilated with a stricture 18 1920 mm balloon with a superficial mucosal laceration after dilating to 20 mm balloon.  Some changes of esophagitis was seen.  Gastric mucosa looks normal fundus body antrum area duodenal mucosa looks normal first and the second part of the duodenum.  Esophagus was also sounded with a 54 [place].  Patient subsequently turned around, colonoscopy done here performed using a   scope.  Scope ventricularization from rectum to cecum.  Prep was fairly well.    There were 2 polyp removed from the transverse colon area measuring around 6 and 5 mm with a cold snare.  Rare scattered diverticulosis was seen surgery was noticed with the Seymour-colo astomosis in the sigmoid colon area from.  Diverticular disease.  Moderate-sized internal and external hemorrhoid was seen.  Prep was marginal.    Impression:  1.  Status post of Nissen which has been slightly slipped, status post of dilation up to 20 mm of balloon with a superficial mucosal laceration suggestive of effective dilation.  Esophagus was also sounded with a 54 [place] of [place]   2.  Minimal changes of gastritis.  3.  2 polyp were removed from the transverse colon area with a cold snare.  4.  Surgical changes with the previous sigmoid resection with end to end: Anastomosis, anastomosis widely open.  5.  Moderate-sized hemorrhoids    Recommendations:  Follow up with GI clinic as needed  Follow up with PCP as scheduled  Follow up with biopsy report  No recall  Benefiber 1 scoop 2x/day   Repeat EGD with a dilation continue with the PPI      Margarette Mcallister MD     Date: 8/12/2021    Time: 10:50 EDT

## 2021-08-13 LAB
LAB AP CASE REPORT: NORMAL
PATH REPORT.FINAL DX SPEC: NORMAL
PATH REPORT.GROSS SPEC: NORMAL

## 2021-08-31 ENCOUNTER — OFFICE VISIT (OUTPATIENT)
Dept: FAMILY MEDICINE CLINIC | Facility: CLINIC | Age: 76
End: 2021-08-31

## 2021-08-31 VITALS
OXYGEN SATURATION: 98 % | HEART RATE: 64 BPM | RESPIRATION RATE: 18 BRPM | HEIGHT: 65 IN | BODY MASS INDEX: 32.86 KG/M2 | TEMPERATURE: 97.8 F | DIASTOLIC BLOOD PRESSURE: 78 MMHG | SYSTOLIC BLOOD PRESSURE: 130 MMHG | WEIGHT: 197.2 LBS

## 2021-08-31 DIAGNOSIS — I25.10 ATHEROSCLEROSIS OF NATIVE CORONARY ARTERY OF NATIVE HEART WITHOUT ANGINA PECTORIS: ICD-10-CM

## 2021-08-31 DIAGNOSIS — I10 ESSENTIAL (PRIMARY) HYPERTENSION: ICD-10-CM

## 2021-08-31 DIAGNOSIS — M25.50 POLYARTHRALGIA: ICD-10-CM

## 2021-08-31 DIAGNOSIS — M79.672 LEFT FOOT PAIN: ICD-10-CM

## 2021-08-31 DIAGNOSIS — E11.22 TYPE 2 DIABETES MELLITUS WITH STAGE 4 CHRONIC KIDNEY DISEASE, WITHOUT LONG-TERM CURRENT USE OF INSULIN (HCC): Primary | ICD-10-CM

## 2021-08-31 DIAGNOSIS — E78.2 MIXED HYPERLIPIDEMIA: ICD-10-CM

## 2021-08-31 DIAGNOSIS — N18.4 TYPE 2 DIABETES MELLITUS WITH STAGE 4 CHRONIC KIDNEY DISEASE, WITHOUT LONG-TERM CURRENT USE OF INSULIN (HCC): Primary | ICD-10-CM

## 2021-08-31 DIAGNOSIS — I70.213 ATHEROSCLEROSIS OF NATIVE ARTERIES OF EXTREMITIES WITH INTERMITTENT CLAUDICATION, BILATERAL LEGS (HCC): ICD-10-CM

## 2021-08-31 PROBLEM — I95.1 ORTHOSTASIS: Status: RESOLVED | Noted: 2021-07-20 | Resolved: 2021-08-31

## 2021-08-31 LAB — HBA1C MFR BLD: 6 %

## 2021-08-31 PROCEDURE — 99214 OFFICE O/P EST MOD 30 MIN: CPT | Performed by: FAMILY MEDICINE

## 2021-08-31 PROCEDURE — 83036 HEMOGLOBIN GLYCOSYLATED A1C: CPT | Performed by: FAMILY MEDICINE

## 2021-08-31 RX ORDER — TRAMADOL HYDROCHLORIDE 50 MG/1
100 TABLET ORAL 2 TIMES DAILY
COMMUNITY
Start: 2021-08-16

## 2021-08-31 RX ORDER — METHYLPREDNISOLONE 4 MG/1
TABLET ORAL
COMMUNITY
Start: 2021-08-30 | End: 2021-10-08

## 2021-08-31 NOTE — PROGRESS NOTES
Subjective   Vianey Reeves is a 75 y.o. female.     Chief Complaint   Patient presents with   • Foot Pain     left worse than right   • Joint pain multiple sites       Joint pain, multiple sites:  2-3 months, bilateral hands, knees, and right hip.  Stiff in the morning, dull ache.  Taking Gabapentin, slight improvement.    Foot Injury   There was no injury mechanism. The pain is present in the right foot and left foot. The quality of the pain is described as stabbing (throbbing). The pain is at a severity of 10/10. The pain is severe. The pain has been constant since onset. Associated symptoms include numbness. She reports no foreign bodies present. The symptoms are aggravated by weight bearing, palpation and movement. She has tried elevation and rest (Gabapentin 300 mg) for the symptoms. The treatment provided mild relief.      She saw podiatry and their treatment did not help.     I personally reviewed and updated the patient's allergies, medications, problem list, and past medical, surgical, social, and family history. I have reviewed and confirmed the accuracy of the History of Present Illness and Review of Symptoms as documented by the MA/LPN/RN. Emma Hammer MD    Allergies:  Allergies   Allergen Reactions   • Erythromycin Rash   • Naproxen Sodium Other (See Comments)     Dizzy lightheaded   • Latex Itching and Swelling   • Metoclopramide Other (See Comments)     Unsteady on feet       Social History:  Social History     Socioeconomic History   • Marital status:      Spouse name: Not on file   • Number of children: Not on file   • Years of education: Not on file   • Highest education level: Not on file   Tobacco Use   • Smoking status: Current Every Day Smoker     Packs/day: 0.25     Years: 50.00     Pack years: 12.50     Types: Cigarettes   • Smokeless tobacco: Never Used   Vaping Use   • Vaping Use: Never used   Substance and Sexual Activity   • Alcohol use: Yes     Comment: occasionally    •  Drug use: Never   • Sexual activity: Defer       Family History:  Family History   Problem Relation Age of Onset   • Arthritis Father    • Prostate cancer Father    • Heart disease Sister        Past Medical History :  Patient Active Problem List   Diagnosis   • Type 2 diabetes mellitus with stage 4 chronic kidney disease, without long-term current use of insulin (CMS/[facility])   • Essential (primary) hypertension   • Mixed hyperlipidemia   • Hiatal hernia   • Fatigue   • Long term current use of antithrombotics/antiplatelets   • Chronic kidney disease, stage 3 (moderate)   • Chronic obstructive pulmonary disease (CMS/[facility])   • Atherosclerotic heart disease of native coronary artery without angina pectoris   • Irritable bowel syndrome with constipation   • History of cancer of ureter   • Solitary kidney   • Atherosclerosis of native arteries of extremities with intermittent claudication, bilateral legs (CMS/[facility])   • H/O malignant neoplasm of ureter   • Vertigo   • Medicare annual wellness visit, subsequent   • Presbyopia   • Combined form of senile cataract   • Cervical cancer screening   • Postartificial menopausal syndrome   • Diverticula of colon   • Ruptured tympanic membrane, left   • Hearing loss, bilateral   • TMJ pain dysfunction syndrome   • Diverticulitis   • Stage 4 chronic kidney disease (CMS/[facility])   • Gastroenteritis   • Other dysphagia   • Lactose intolerance   • Left foot pain   • Polyarthralgia       Medication List:    Current Outpatient Medications:   •  aspirin 81 MG tablet, Take 81 mg by mouth Daily. dont take dos, Disp: , Rfl:   •  calcium-vitamin D (Oscal 500/200 D-3) 500-200 MG-UNIT per tablet, Take  by mouth 2 (Two) Times a Week. Twice weekly, Disp: , Rfl:   •  carvedilol (Coreg) 3.125 MG tablet, Take 1 tablet by mouth Every 12 (Twelve) Hours., Disp: 60 tablet, Rfl: 12  •  coenzyme Q10 100 MG capsule, Take 1 capsule by mouth 2 (Two) Times a Week., Disp: , Rfl:   •  doxazosin (Cardura) 2 MG tablet,  Take 1 tablet by mouth Every Night., Disp: 90 tablet, Rfl: 3  •  fluticasone (FLONASE) 50 MCG/ACT nasal spray, 2 sprays into the nostril(s) as directed by provider As Needed., Disp: , Rfl:   •  fluticasone-salmeterol (ADVAIR HFA) 45-21 MCG/ACT inhaler, Inhale 2 puffs 2 (Two) Times a Day. (Patient taking differently: Inhale 2 puffs 2 (Two) Times a Day As Needed.), Disp: 1 inhaler, Rfl: 12  •  gabapentin (NEURONTIN) 300 MG capsule, Take 100 mg by mouth 2 (Two) Times a Day., Disp: , Rfl:   •  Glucose Blood (Blood Glucose Test) strip, 1 each by In Vitro route Daily., Disp: 100 each, Rfl: 3  •  Januvia 100 MG tablet, TAKE 1 TABLET BY MOUTH EVERY DAY (Patient taking differently: Take 100 mg by mouth Daily.), Disp: 90 tablet, Rfl: 3  •  Lantus SoloStar 100 UNIT/ML injection pen, INJECT 20 UNITS UNDER THE SKIN INTO THE APPROPRIATE AREA AS DIRECTED EVERY NIGHT. (Patient taking differently: Inject 20 Units under the skin into the appropriate area as directed Every Night.), Disp: 5 pen, Rfl: 11  •  linaclotide (Linzess) 72 MCG capsule capsule, Take 1 capsule by mouth Every Morning Before Breakfast. (Patient taking differently: Take 72 mcg by mouth Daily As Needed.), Disp: 30 capsule, Rfl: 12  •  methylPREDNISolone (MEDROL) 4 MG dose pack, , Disp: , Rfl:   •  sertraline (ZOLOFT) 25 MG tablet, TAKE 1 TABLET BY MOUTH EVERY DAY (Patient taking differently: Take 25 mg by mouth Daily.), Disp: 90 tablet, Rfl: 1  •  traMADol (ULTRAM) 50 MG tablet, , Disp: , Rfl:     Past Surgical History:  Past Surgical History:   Procedure Laterality Date   • BREAST BIOPSY Right    • CARDIAC CATHETERIZATION     • CHOLECYSTECTOMY     • COLON SURGERY      REMOVAL diverticular diseae   • COLONOSCOPY N/A 8/12/2021    Procedure: COLONOSCOPY with polypectomy x 3;  Surgeon: Margarette Mcallister MD;  Location: [facility] ENDOSCOPY;  Service: Gastroenterology;  Laterality: N/A;  post op: hmorrhoids, diverticulosis, polyps   • CORONARY STENT PLACEMENT     • ENDOSCOPY  "N/A 8/12/2021    Procedure: ESOPHAGOGASTRODUODENOSCOPY with dilatation (18-20 mm balloon) and (54 bougie);  Surgeon: Margarette Mcallister MD;  Location: [facility] ENDOSCOPY;  Service: Gastroenterology;  Laterality: N/A;  post op: esophageal stricture, esophagitis, gastritis, history of nissen   • EYE SURGERY Bilateral    • HIATAL HERNIA REPAIR     • NEPHRECTOMY Right        Review of Systems:  Review of Systems   Constitutional: Negative for activity change and fever.   HENT: Negative for ear pain, rhinorrhea, sinus pressure and voice change.    Eyes: Negative for visual disturbance.   Respiratory: Negative for cough and shortness of breath.    Cardiovascular: Negative for chest pain.   Gastrointestinal: Negative for abdominal pain, diarrhea, nausea and vomiting.   Endocrine: Negative for cold intolerance and heat intolerance.   Genitourinary: Negative for frequency and urgency.   Musculoskeletal: Negative for arthralgias.   Skin: Negative for rash.   Neurological: Positive for numbness. Negative for syncope.   Hematological: Does not bruise/bleed easily.   Psychiatric/Behavioral: Negative for depressed mood. The patient is not nervous/anxious.        Physical Exam:  Vital Signs:  Vital Signs:   /78 (BP Location: Right arm, Patient Position: Sitting, Cuff Size: Large Adult)   Pulse 64   Temp 97.8 °F (36.6 °C) (Skin)   Resp 18   Ht 165.1 cm (65\")   Wt 89.4 kg (197 lb 3.2 oz)   SpO2 98%   BMI 32.82 kg/m²     Result Review :                Physical Exam  Vitals reviewed.   Constitutional:       Appearance: Normal appearance. She is well-developed.   HENT:      Head: Normocephalic and atraumatic.   Eyes:      General:         Right eye: No discharge.         Left eye: No discharge.   Cardiovascular:      Rate and Rhythm: Normal rate and regular rhythm.      Heart sounds: Normal heart sounds. No murmur heard.   No friction rub. No gallop.    Pulmonary:      Effort: Pulmonary effort is normal. No respiratory distress. "      Breath sounds: Normal breath sounds. No wheezing or rales.   Musculoskeletal:      Left foot: Tenderness present.        Feet:    Skin:     General: Skin is warm and dry.      Findings: No rash.   Neurological:      Mental Status: She is alert and oriented to person, place, and time.      Coordination: Coordination normal.      Gait: Gait normal.   Psychiatric:         Behavior: Behavior is cooperative.         Assessment and Plan:  Problems Addressed this Visit        Cardiac and Vasculature    Essential (primary) hypertension     Hypertension is unchanged.  Continue current treatment regimen.  Blood pressure will be reassessed in 3 months.         Mixed hyperlipidemia    Relevant Orders    Comprehensive Metabolic Panel    Lipid Panel With / Chol / HDL Ratio    Atherosclerotic heart disease of native coronary artery without angina pectoris    Atherosclerosis of native arteries of extremities with intermittent claudication, bilateral legs (CMS/HCC)     Stable  No angina            Endocrine and Metabolic    Type 2 diabetes mellitus with stage 4 chronic kidney disease, without long-term current use of insulin (CMS/[facility]) - Primary    Relevant Orders    POC Glycosylated Hemoglobin (Hb A1C) (Completed)       Musculoskeletal and Injuries    Left foot pain     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   Diagnosis, treatment and and course discussed. Potential side effects discussed. Return if there is worsening or persistence of symptoms.          Polyarthralgia     Use topical voltaren.            Diagnoses       Codes Comments    Type 2 diabetes mellitus with stage 4 chronic kidney disease, without long-term current use of insulin (CMS/HCC)    -  Primary ICD-10-CM: E11.22, N18.4  ICD-9-CM: 250.40, 585.4     Left foot pain     ICD-10-CM: M79.672  ICD-9-CM: 729.5     Polyarthralgia     ICD-10-CM: M25.50  ICD-9-CM: 719.49     Atherosclerosis of native coronary artery of native heart without angina pectoris     ICD-10-CM:  I25.10  ICD-9-CM: 414.01     Mixed hyperlipidemia     ICD-10-CM: E78.2  ICD-9-CM: 272.2     Atherosclerosis of native arteries of extremities with intermittent claudication, bilateral legs (CMS/HCC)     ICD-10-CM: I70.213  ICD-9-CM: 440.21     Essential (primary) hypertension     ICD-10-CM: I10  ICD-9-CM: 401.9            An After Visit Summary and PPPS were given to the patient.         I wore protective equipment throughout this patient encounter to include mask. Hand hygiene was performed before donning protective equipment and after removal when leaving the room.

## 2021-08-31 NOTE — ASSESSMENT & PLAN NOTE
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   Diagnosis, treatment and and course discussed. Potential side effects discussed. Return if there is worsening or persistence of symptoms.

## 2021-09-02 LAB
ALBUMIN SERPL-MCNC: 4 G/DL (ref 3.7–4.7)
ALBUMIN/GLOB SERPL: 1.5 {RATIO} (ref 1.2–2.2)
ALP SERPL-CCNC: 77 IU/L (ref 48–121)
ALT SERPL-CCNC: 9 IU/L (ref 0–32)
AST SERPL-CCNC: 11 IU/L (ref 0–40)
BILIRUB SERPL-MCNC: 0.2 MG/DL (ref 0–1.2)
BUN SERPL-MCNC: 27 MG/DL (ref 8–27)
BUN/CREAT SERPL: 16 (ref 12–28)
CALCIUM SERPL-MCNC: 9.2 MG/DL (ref 8.7–10.3)
CHLORIDE SERPL-SCNC: 104 MMOL/L (ref 96–106)
CHOLEST SERPL-MCNC: 191 MG/DL (ref 100–199)
CHOLEST/HDLC SERPL: 3.9 RATIO (ref 0–4.4)
CO2 SERPL-SCNC: 25 MMOL/L (ref 20–29)
CREAT SERPL-MCNC: 1.68 MG/DL (ref 0.57–1)
GLOBULIN SER CALC-MCNC: 2.7 G/DL (ref 1.5–4.5)
GLUCOSE SERPL-MCNC: 145 MG/DL (ref 65–99)
HDLC SERPL-MCNC: 49 MG/DL
LDLC SERPL CALC-MCNC: 130 MG/DL (ref 0–99)
POTASSIUM SERPL-SCNC: 5 MMOL/L (ref 3.5–5.2)
PROT SERPL-MCNC: 6.7 G/DL (ref 6–8.5)
SODIUM SERPL-SCNC: 143 MMOL/L (ref 134–144)
TRIGL SERPL-MCNC: 63 MG/DL (ref 0–149)
VLDLC SERPL CALC-MCNC: 12 MG/DL (ref 5–40)

## 2021-09-09 DIAGNOSIS — E78.2 MIXED HYPERLIPIDEMIA: ICD-10-CM

## 2021-09-09 DIAGNOSIS — I10 ESSENTIAL (PRIMARY) HYPERTENSION: ICD-10-CM

## 2021-09-09 RX ORDER — AMLODIPINE BESYLATE 10 MG/1
TABLET ORAL
Qty: 90 TABLET | Refills: 4 | Status: SHIPPED | OUTPATIENT
Start: 2021-09-09 | End: 2022-08-11 | Stop reason: SDUPTHER

## 2021-09-09 RX ORDER — EVOLOCUMAB 140 MG/ML
INJECTION, SOLUTION SUBCUTANEOUS
Qty: 1 ML | Refills: 12 | Status: SHIPPED | OUTPATIENT
Start: 2021-09-09 | End: 2022-02-09

## 2021-10-07 DIAGNOSIS — I10 ESSENTIAL (PRIMARY) HYPERTENSION: ICD-10-CM

## 2021-10-07 RX ORDER — CARVEDILOL 3.12 MG/1
TABLET ORAL
Qty: 180 TABLET | Refills: 4 | Status: SHIPPED | OUTPATIENT
Start: 2021-10-07 | End: 2023-01-23

## 2021-10-08 ENCOUNTER — OFFICE VISIT (OUTPATIENT)
Dept: FAMILY MEDICINE CLINIC | Facility: CLINIC | Age: 76
End: 2021-10-08

## 2021-10-08 VITALS
WEIGHT: 194 LBS | BODY MASS INDEX: 32.32 KG/M2 | OXYGEN SATURATION: 99 % | DIASTOLIC BLOOD PRESSURE: 72 MMHG | HEIGHT: 65 IN | RESPIRATION RATE: 18 BRPM | TEMPERATURE: 97.4 F | SYSTOLIC BLOOD PRESSURE: 134 MMHG | HEART RATE: 100 BPM

## 2021-10-08 DIAGNOSIS — R09.81 SINUS CONGESTION: ICD-10-CM

## 2021-10-08 DIAGNOSIS — J01.20 ACUTE NON-RECURRENT ETHMOIDAL SINUSITIS: Primary | ICD-10-CM

## 2021-10-08 PROCEDURE — 99213 OFFICE O/P EST LOW 20 MIN: CPT | Performed by: FAMILY MEDICINE

## 2021-10-08 RX ORDER — TRIAMCINOLONE ACETONIDE 55 UG/1
2 SPRAY, METERED NASAL DAILY
Qty: 16.5 G | Refills: 0 | Status: SHIPPED | OUTPATIENT
Start: 2021-10-08 | End: 2022-10-08

## 2021-10-08 RX ORDER — AMOXICILLIN 500 MG/1
1000 CAPSULE ORAL 2 TIMES DAILY
Qty: 20 CAPSULE | Refills: 0 | Status: SHIPPED | OUTPATIENT
Start: 2021-10-08 | End: 2021-10-13

## 2021-10-08 RX ORDER — MONTELUKAST SODIUM 10 MG/1
10 TABLET ORAL NIGHTLY
Qty: 30 TABLET | Refills: 0 | Status: SHIPPED | OUTPATIENT
Start: 2021-10-08 | End: 2021-11-02

## 2021-10-08 NOTE — PROGRESS NOTES
Chief Complaint   Patient presents with   • Dizziness   • Headache       Subjective   Vianey Reeves is a 76 y.o. female.     Dizziness  This is a new problem. The current episode started more than 1 month ago. The problem occurs every several days. The problem has been unchanged. Associated symptoms include congestion, fatigue, headaches and vertigo. Pertinent negatives include no fever. The symptoms are aggravated by walking. She has tried lying down and sleep for the symptoms.   Headache   This is a recurrent problem. The current episode started more than 1 month ago. The problem occurs intermittently. The problem has been unchanged. The pain is located in the frontal region. The pain radiates to the face. The quality of the pain is described as aching and pulsating. The pain is at a severity of 7/10. The pain is moderate. Associated symptoms include dizziness, ear pain (right side) and sinus pressure. Pertinent negatives include no blurred vision, fever, seizures or tinnitus. Treatments tried: steroid shailesh. The treatment provided mild relief.      She is not dizzy.  She is lightheaded.  She feels off balance.    I have reviewed relevant past medical, family, social and surgical history for this patient.  Medications review is done by myself, with patient.      Past Medical History :  Active Ambulatory Problems     Diagnosis Date Noted   • Type 2 diabetes mellitus with stage 4 chronic kidney disease, without long-term current use of insulin ([facility]) 05/19/2016   • Essential (primary) hypertension 05/19/2016   • Mixed hyperlipidemia 10/24/2017   • Hiatal hernia 04/02/2012   • Fatigue 12/08/2016   • Long term current use of antithrombotics/antiplatelets 04/28/2016   • Chronic obstructive pulmonary disease (HCC) 10/24/2017   • Atherosclerotic heart disease of native coronary artery without angina pectoris 05/19/2016   • Irritable bowel syndrome with constipation 03/22/2018   • History of cancer of ureter 05/19/2016   •  Solitary kidney 04/28/2016   • Atherosclerosis of native arteries of extremities with intermittent claudication, bilateral legs ([facility]) 08/01/2018   • 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   • Vertigo 06/23/2020   • Medicare annual wellness visit, subsequent 09/03/2020   • Presbyopia 10/27/2017   • Combined form of senile cataract 10/27/2017   • Cervical cancer screening 09/03/2020   • Postartificial menopausal syndrome 09/03/2020   • Diverticula of colon 10/20/2020   • Ruptured tympanic membrane, left 12/03/2020   • Hearing loss, bilateral 12/03/2020   • TMJ pain dysfunction syndrome 12/03/2020   • Diverticulitis 01/11/2021   • Stage 4 chronic kidney disease (HCC) 03/11/2021   • Gastroenteritis 06/04/2021   • Other dysphagia 06/04/2021   • Lactose intolerance 06/04/2021   • Left foot pain 08/31/2021   • Polyarthralgia 08/31/2021   • Acute bilateral thoracic back pain 10/21/2021     Resolved Ambulatory Problems     Diagnosis Date Noted   • History of substance abuse (CMS/[facility]) 04/28/2016   • Chronic kidney disease, stage 3 (moderate) 05/19/2016   • Hypertensive cardiovascular disease 04/28/2016   • Bradycardia 06/25/2020   • Absent kidney 04/28/2016   • Wound of skin 09/15/2020   • LLQ pain 10/20/2020   • Orthostasis 07/20/2021     Past Medical History:   Diagnosis Date   • Allergies    • Anxiety    • Bilateral carotid bruits    • Bulging of thoracic intervertebral disc    • Cancer ([facility])    • CKD (chronic kidney disease)    • Claudication, intermittent ([facility])    • COPD (chronic obstructive pulmonary disease) ([facility])    • Coronary artery disease    • DDD (degenerative disc disease), thoracic    • Diabetes mellitus ([facility])    • DJD (degenerative joint disease)    • Gout    • Hyperlipidemia    • Hypertension    • IBS (irritable colon syndrome)    • Mass of right breast    • Neuropathy    • Renal insufficiency    • Simple chronic bronchitis ([facility])        Medication List:    Current Outpatient Medications:   •  amLODIPine  (NORVASC) 10 MG tablet, TAKE 1 TABLET BY MOUTH EVERY DAY, Disp: 90 tablet, Rfl: 4  •  aspirin 81 MG tablet, Take 81 mg by mouth Daily. dont take dos, Disp: , Rfl:   •  calcium-vitamin D (Oscal 500/200 D-3) 500-200 MG-UNIT per tablet, Take  by mouth 2 (Two) Times a Week. Twice weekly, Disp: , Rfl:   •  carvedilol (COREG) 3.125 MG tablet, TAKE 1 TABLET BY MOUTH EVERY 12 HOURS, Disp: 180 tablet, Rfl: 4  •  coenzyme Q10 100 MG capsule, Take 1 capsule by mouth 2 (Two) Times a Week., Disp: , Rfl:   •  doxazosin (Cardura) 2 MG tablet, Take 1 tablet by mouth Every Night., Disp: 90 tablet, Rfl: 3  •  fluticasone (FLONASE) 50 MCG/ACT nasal spray, 2 sprays into the nostril(s) as directed by provider As Needed., Disp: , Rfl:   •  fluticasone-salmeterol (ADVAIR HFA) 45-21 MCG/ACT inhaler, Inhale 2 puffs 2 (Two) Times a Day. (Patient taking differently: Inhale 2 puffs 2 (Two) Times a Day As Needed.), Disp: 1 inhaler, Rfl: 12  •  gabapentin (NEURONTIN) 300 MG capsule, Take 100 mg by mouth 2 (Two) Times a Day., Disp: , Rfl:   •  Glucose Blood (Blood Glucose Test) strip, 1 each by In Vitro route Daily., Disp: 100 each, Rfl: 3  •  Januvia 100 MG tablet, TAKE 1 TABLET BY MOUTH EVERY DAY (Patient taking differently: Take 100 mg by mouth Daily.), Disp: 90 tablet, Rfl: 3  •  Lantus SoloStar 100 UNIT/ML injection pen, INJECT 20 UNITS UNDER THE SKIN INTO THE APPROPRIATE AREA AS DIRECTED EVERY NIGHT. (Patient taking differently: Inject 20 Units under the skin into the appropriate area as directed Every Night.), Disp: 5 pen, Rfl: 11  •  linaclotide (Linzess) 72 MCG capsule capsule, Take 1 capsule by mouth Every Morning Before Breakfast. (Patient taking differently: Take 72 mcg by mouth Daily As Needed.), Disp: 30 capsule, Rfl: 12  •  Repatha SureClick solution auto-injector SureClick injection, INJECT 1 ML UNDER THE SKIN INTO THE APPROPRIATE AREA AS DIRECTED EVERY 14 (FOURTEEN) DAYS., Disp: 1 mL, Rfl: 12  •  sertraline (ZOLOFT) 25 MG tablet,  "TAKE 1 TABLET BY MOUTH EVERY DAY (Patient taking differently: Take 25 mg by mouth Daily.), Disp: 90 tablet, Rfl: 1  •  traMADol (ULTRAM) 50 MG tablet, , Disp: , Rfl:     Allergies   Allergen Reactions   • Erythromycin Rash   • Naproxen Sodium Other (See Comments)     Dizzy lightheaded   • Latex Itching and Swelling   • Metoclopramide Other (See Comments)     Unsteady on feet       Social History     Tobacco Use   • Smoking status: Current Every Day Smoker     Packs/day: 0.25     Years: 50.00     Pack years: 12.50     Types: Cigarettes   • Smokeless tobacco: Never Used   Substance Use Topics   • Alcohol use: Yes     Comment: occasionally        Review of Systems   Constitutional: Positive for fatigue. Negative for fever.   HENT: Positive for congestion, ear pain (right side) and sinus pressure. Negative for tinnitus.    Eyes: Negative for blurred vision, double vision and visual disturbance.   Respiratory: Positive for shortness of breath (sometimes, not all the time).    Allergic/Immunologic: Negative for environmental allergies.   Neurological: Positive for dizziness, vertigo and headache. Negative for seizures, syncope, facial asymmetry and speech difficulty.         Objective   Vitals:    10/08/21 1105   BP: 134/72   BP Location: Right arm   Patient Position: Sitting   Cuff Size: Adult   Pulse: 100   Resp: 18   Temp: 97.4 °F (36.3 °C)   TempSrc: Temporal   SpO2: 99%   Weight: 88 kg (194 lb)   Height: 165.1 cm (65\")     Body mass index is 32.28 kg/m².    Physical Exam  Constitutional:       General: She is not in acute distress.     Appearance: Normal appearance. She is well-developed.   HENT:      Head: Normocephalic and atraumatic.      Right Ear: Tympanic membrane is not erythematous.      Left Ear: Tympanic membrane is not erythematous.      Nose:      Right Sinus: Maxillary sinus tenderness present.      Left Sinus: Maxillary sinus tenderness present.   Eyes:      General: No scleral icterus.        Right " eye: No discharge.         Left eye: No discharge.      Extraocular Movements: Extraocular movements intact.      Conjunctiva/sclera: Conjunctivae normal.   Cardiovascular:      Rate and Rhythm: Normal rate and regular rhythm.      Heart sounds: Normal heart sounds. No murmur heard.      Pulmonary:      Effort: Pulmonary effort is normal.      Breath sounds: Normal breath sounds.   Abdominal:      Palpations: Abdomen is soft.   Musculoskeletal:         General: No swelling.      Cervical back: Normal range of motion and neck supple.      Right lower leg: No edema.      Left lower leg: No edema.   Skin:     General: Skin is warm.      Capillary Refill: Capillary refill takes less than 2 seconds.      Findings: No rash.   Neurological:      General: No focal deficit present.      Mental Status: She is alert.      Cranial Nerves: No cranial nerve deficit.             Lab Results   Component Value Date     (H) 09/01/2021    BUN 27 09/01/2021    CREATININE 1.68 (H) 09/01/2021    EGFRIFNONA 29 (L) 09/01/2021    EGFRIFAFRI 34 (L) 09/01/2021     09/01/2021    K 5.0 09/01/2021     09/01/2021    CALCIUM 9.2 09/01/2021    ALBUMIN 4.0 09/01/2021    BILITOT 0.2 09/01/2021    ALKPHOS 77 09/01/2021    AST 11 09/01/2021    ALT 9 09/01/2021    CHLPL 191 09/01/2021    TRIG 63 09/01/2021    HDL 49 09/01/2021    VLDL 12 09/01/2021     (H) 09/01/2021      Lab Results   Component Value Date    HGBA1C 6.0 08/31/2021    HGBA1C 6.4 03/11/2021    HGBA1C 5.9 12/03/2020    HGBA1C 6.1 06/23/2020         Assessment/Plan     Diagnoses and all orders for this visit:    1. Acute non-recurrent ethmoidal sinusitis (Primary)  -     amoxicillin (AMOXIL) 500 MG capsule; Take 2 capsules by mouth 2 (Two) Times a Day for 5 days.  Dispense: 20 capsule; Refill: 0  -     Discontinue: budesonide (RINOCORT AQUA) 32 MCG/ACT nasal spray; 2 sprays into the nostril(s) as directed by provider Daily.  Dispense: 8.6 g; Refill: 0    2. Sinus  congestion  -     Discontinue: budesonide (RINOCORT AQUA) 32 MCG/ACT nasal spray; 2 sprays into the nostril(s) as directed by provider Daily.  Dispense: 8.6 g; Refill: 0  -     montelukast (SINGULAIR) 10 MG tablet; Take 1 tablet by mouth Every Night.  Dispense: 30 tablet; Refill: 0  -     Triamcinolone Acetonide (NASACORT) 55 MCG/ACT nasal inhaler; 2 sprays into the nostril(s) as directed by provider Daily.  Dispense: 16.5 g; Refill: 0      Medication and medication adverse effects discussed.  Drug education given and explained to patient. Patient verbalized understanding.  All questions presented by patient addressed.    Budesonide not covered by insurance.  RX changed.    Return if symptoms worsen or fail to improve.       Patient was given instructions and counseling regarding his/her condition or for health maintenance advice. Please see specific information pulled into the AVS if appropriate.     I wore protective equipment throughout this patient encounter to include mask. Hand hygiene was performed before donning protective equipment and after removal when leaving the room.

## 2021-10-21 ENCOUNTER — OFFICE VISIT (OUTPATIENT)
Dept: FAMILY MEDICINE CLINIC | Facility: CLINIC | Age: 76
End: 2021-10-21

## 2021-10-21 VITALS
RESPIRATION RATE: 18 BRPM | DIASTOLIC BLOOD PRESSURE: 86 MMHG | SYSTOLIC BLOOD PRESSURE: 138 MMHG | OXYGEN SATURATION: 98 % | HEIGHT: 65 IN | WEIGHT: 193.8 LBS | TEMPERATURE: 97.8 F | HEART RATE: 79 BPM | BODY MASS INDEX: 32.29 KG/M2

## 2021-10-21 DIAGNOSIS — M54.6 ACUTE BILATERAL THORACIC BACK PAIN: Primary | ICD-10-CM

## 2021-10-21 DIAGNOSIS — R53.83 FATIGUE, UNSPECIFIED TYPE: ICD-10-CM

## 2021-10-21 DIAGNOSIS — N18.4 STAGE 4 CHRONIC KIDNEY DISEASE (HCC): ICD-10-CM

## 2021-10-21 PROCEDURE — 99214 OFFICE O/P EST MOD 30 MIN: CPT | Performed by: FAMILY MEDICINE

## 2021-10-21 RX ORDER — TIZANIDINE 2 MG/1
2 TABLET ORAL NIGHTLY PRN
Qty: 30 TABLET | Refills: 0 | Status: SHIPPED | OUTPATIENT
Start: 2021-10-21 | End: 2021-11-17

## 2021-10-21 NOTE — PROGRESS NOTES
Subjective   Vianey Reeves is a 76 y.o. female. Presents to [facility]    Chief Complaint   Patient presents with   • Back Pain       Back Pain  This is a new problem. The current episode started 1 to 4 weeks ago. The problem occurs daily. The problem has been waxing and waning since onset. The pain is present in the thoracic spine. The quality of the pain is described as aching. The pain does not radiate. The pain is moderate. The pain is the same all the time. The symptoms are aggravated by bending, position, standing and twisting. Pertinent negatives include no abdominal pain, chest pain or fever. Treatments tried: chiropractor. The treatment provided moderate relief.   Fatigue  Associated symptoms include fatigue. Pertinent negatives include no abdominal pain, arthralgias, chest pain, coughing, fever, nausea, rash or vomiting.        I personally reviewed and updated the patient's allergies, medications, problem list, and past medical, surgical, social, and family history. I have reviewed and confirmed the accuracy of the History of Present Illness and Review of Symptoms as documented by the MA/LPN/RN. Emma Hammer MD    Allergies:  Allergies   Allergen Reactions   • Erythromycin Rash   • Naproxen Sodium Other (See Comments)     Dizzy lightheaded   • Latex Itching and Swelling   • Metoclopramide Other (See Comments)     Unsteady on feet       Social History:  Social History     Socioeconomic History   • Marital status:    Tobacco Use   • Smoking status: Current Every Day Smoker     Packs/day: 0.25     Years: 50.00     Pack years: 12.50     Types: Cigarettes   • Smokeless tobacco: Never Used   Vaping Use   • Vaping Use: Never used   Substance and Sexual Activity   • Alcohol use: Yes     Comment: occasionally    • Drug use: Never   • Sexual activity: Defer       Family History:  Family History   Problem Relation Age of Onset   • Arthritis Father    • Prostate cancer Father    • Heart  disease Sister        Past Medical History :  Patient Active Problem List   Diagnosis   • Type 2 diabetes mellitus with stage 4 chronic kidney disease, without long-term current use of insulin (HCC)   • Essential (primary) hypertension   • Mixed hyperlipidemia   • Hiatal hernia   • Fatigue   • Long term current use of antithrombotics/antiplatelets   • Chronic obstructive pulmonary disease (HCC)   • Atherosclerotic heart disease of native coronary artery without angina pectoris   • Irritable bowel syndrome with constipation   • History of cancer of ureter   • Solitary kidney   • Atherosclerosis of native arteries of extremities with intermittent claudication, bilateral legs (HCC)   • H/O malignant neoplasm of ureter   • Vertigo   • Medicare annual wellness visit, subsequent   • Presbyopia   • Combined form of senile cataract   • Cervical cancer screening   • Postartificial menopausal syndrome   • Diverticula of colon   • Ruptured tympanic membrane, left   • Hearing loss, bilateral   • TMJ pain dysfunction syndrome   • Diverticulitis   • Stage 4 chronic kidney disease (HCC)   • Gastroenteritis   • Other dysphagia   • Lactose intolerance   • Left foot pain   • Polyarthralgia   • Acute bilateral thoracic back pain       Medication List:    Current Outpatient Medications:   •  amLODIPine (NORVASC) 10 MG tablet, TAKE 1 TABLET BY MOUTH EVERY DAY, Disp: 90 tablet, Rfl: 4  •  aspirin 81 MG tablet, Take 81 mg by mouth Daily. dont take dos, Disp: , Rfl:   •  calcium-vitamin D (Oscal 500/200 D-3) 500-200 MG-UNIT per tablet, Take  by mouth 2 (Two) Times a Week. Twice weekly, Disp: , Rfl:   •  carvedilol (COREG) 3.125 MG tablet, TAKE 1 TABLET BY MOUTH EVERY 12 HOURS, Disp: 180 tablet, Rfl: 4  •  coenzyme Q10 100 MG capsule, Take 1 capsule by mouth 2 (Two) Times a Week., Disp: , Rfl:   •  doxazosin (Cardura) 2 MG tablet, Take 1 tablet by mouth Every Night., Disp: 90 tablet, Rfl: 3  •  fluticasone (FLONASE) 50 MCG/ACT nasal spray,  2 sprays into the nostril(s) as directed by provider As Needed., Disp: , Rfl:   •  fluticasone-salmeterol (ADVAIR HFA) 45-21 MCG/ACT inhaler, Inhale 2 puffs 2 (Two) Times a Day. (Patient taking differently: Inhale 2 puffs 2 (Two) Times a Day As Needed.), Disp: 1 inhaler, Rfl: 12  •  gabapentin (NEURONTIN) 300 MG capsule, Take 100 mg by mouth 2 (Two) Times a Day., Disp: , Rfl:   •  Glucose Blood (Blood Glucose Test) strip, 1 each by In Vitro route Daily., Disp: 100 each, Rfl: 3  •  Januvia 100 MG tablet, TAKE 1 TABLET BY MOUTH EVERY DAY (Patient taking differently: Take 100 mg by mouth Daily.), Disp: 90 tablet, Rfl: 3  •  Lantus SoloStar 100 UNIT/ML injection pen, INJECT 20 UNITS UNDER THE SKIN INTO THE APPROPRIATE AREA AS DIRECTED EVERY NIGHT. (Patient taking differently: Inject 20 Units under the skin into the appropriate area as directed Every Night.), Disp: 5 pen, Rfl: 11  •  linaclotide (Linzess) 72 MCG capsule capsule, Take 1 capsule by mouth Every Morning Before Breakfast. (Patient taking differently: Take 72 mcg by mouth Daily As Needed.), Disp: 30 capsule, Rfl: 12  •  montelukast (SINGULAIR) 10 MG tablet, Take 1 tablet by mouth Every Night., Disp: 30 tablet, Rfl: 0  •  Repatha SureClick solution auto-injector SureClick injection, INJECT 1 ML UNDER THE SKIN INTO THE APPROPRIATE AREA AS DIRECTED EVERY 14 (FOURTEEN) DAYS., Disp: 1 mL, Rfl: 12  •  sertraline (ZOLOFT) 25 MG tablet, TAKE 1 TABLET BY MOUTH EVERY DAY (Patient taking differently: Take 25 mg by mouth Daily.), Disp: 90 tablet, Rfl: 1  •  tiZANidine (ZANAFLEX) 2 MG tablet, Take 1 tablet by mouth At Night As Needed for Muscle Spasms., Disp: 30 tablet, Rfl: 0  •  traMADol (ULTRAM) 50 MG tablet, , Disp: , Rfl:   •  Triamcinolone Acetonide (NASACORT) 55 MCG/ACT nasal inhaler, 2 sprays into the nostril(s) as directed by provider Daily., Disp: 16.5 g, Rfl: 0    Past Surgical History:  Past Surgical History:   Procedure Laterality Date   • BREAST BIOPSY Right   "  • CARDIAC CATHETERIZATION     • CHOLECYSTECTOMY     • COLON SURGERY      REMOVAL diverticular diseae   • COLONOSCOPY N/A 8/12/2021    Procedure: COLONOSCOPY with polypectomy x 3;  Surgeon: Margarette Mcallister MD;  Location: [facility];  Service: Gastroenterology;  Laterality: N/A;  post op: hmorrhoids, diverticulosis, polyps   • CORONARY STENT PLACEMENT     • ENDOSCOPY N/A 8/12/2021    Procedure: ESOPHAGOGASTRODUODENOSCOPY with dilatation (18-20 mm balloon) and (54 bougie);  Surgeon: Margarette Mcallister MD;  Location: [facility];  Service: Gastroenterology;  Laterality: N/A;  post op: esophageal stricture, esophagitis, gastritis, history of nissen   • EYE SURGERY Bilateral    • HIATAL HERNIA REPAIR     • NEPHRECTOMY Right        Review of Systems:  Review of Systems   Constitutional: Positive for fatigue. Negative for activity change and fever.   HENT: Negative for ear pain, rhinorrhea, sinus pressure and voice change.    Eyes: Negative for visual disturbance.   Respiratory: Negative for cough and shortness of breath.    Cardiovascular: Negative for chest pain.   Gastrointestinal: Negative for abdominal pain, diarrhea, nausea and vomiting.   Endocrine: Negative for cold intolerance and heat intolerance.   Genitourinary: Negative for frequency and urgency.   Musculoskeletal: Positive for back pain. Negative for arthralgias.   Skin: Negative for rash.   Neurological: Negative for syncope.   Hematological: Does not bruise/bleed easily.   Psychiatric/Behavioral: Negative for depressed mood. The patient is not nervous/anxious.        Physical Exam:  Vital Signs:  Vital Signs:   /86   Pulse 79   Temp 97.8 °F (36.6 °C)   Resp 18   Ht 165.1 cm (65\")   Wt 87.9 kg (193 lb 12.8 oz)   SpO2 98%   BMI 32.25 kg/m²     Result Review :                Physical Exam  Vitals reviewed.   Constitutional:       General: She is not in acute distress.     Appearance: Normal appearance. She is well-developed. She is not " diaphoretic.   HENT:      Head: Normocephalic and atraumatic.   Eyes:      General:         Right eye: No discharge.         Left eye: No discharge.   Cardiovascular:      Rate and Rhythm: Normal rate and regular rhythm.      Heart sounds: Normal heart sounds. No murmur heard.  No friction rub. No gallop.    Pulmonary:      Effort: Pulmonary effort is normal. No respiratory distress.      Breath sounds: Normal breath sounds. No wheezing or rales.   Musculoskeletal:         General: No deformity.      Thoracic back: Spasms and tenderness present.      Lumbar back: No deformity, spasms or tenderness. Normal range of motion.   Skin:     General: Skin is warm and dry.      Findings: No rash.   Neurological:      Mental Status: She is alert and oriented to person, place, and time.      Motor: No abnormal muscle tone.      Coordination: Coordination normal.      Gait: Gait normal.      Deep Tendon Reflexes: Reflexes normal.   Psychiatric:         Behavior: Behavior is cooperative.         Assessment and Plan:  Problems Addressed this Visit        Genitourinary and Reproductive     Stage 4 chronic kidney disease (HCC)       Musculoskeletal and Injuries    Acute bilateral thoracic back pain - Primary     Ice three times a day for about 10-15 minutes for the first 1-2 days. Then may alternate heat and ice. Better body mechanics discussed. Home exercises discussed and hand out given. Discussed nsaids and if they can be taken. May need imaging and or PT if persists.  Discussed red flags, if there is severe pain, fever with pain, loss of movement in one or both legs pain, numbness in groin or both legs, trouble urinating or defecating on oneself, then patient is to go to the ER.            Relevant Medications    tiZANidine (ZANAFLEX) 2 MG tablet    Other Relevant Orders    XR Spine Thoracic 3 View    Ambulatory Referral to Physical Therapy Evaluate and treat       Symptoms and Signs    Fatigue     Will check labs          Relevant Orders    Comprehensive Metabolic Panel (Completed)    TSH (Completed)    CBC & Differential (Completed)      Diagnoses       Codes Comments    Acute bilateral thoracic back pain    -  Primary ICD-10-CM: M54.6  ICD-9-CM: 724.1     Stage 4 chronic kidney disease (HCC)     ICD-10-CM: N18.4  ICD-9-CM: 585.4     Fatigue, unspecified type     ICD-10-CM: R53.83  ICD-9-CM: 780.79            An After Visit Summary and PPPS were given to the patient.       I wore protective equipment throughout this patient encounter to include mask and eye protection. Hand hygiene was performed before donning protective equipment and after removal when leaving the room.

## 2021-10-22 LAB
ALBUMIN SERPL-MCNC: 4 G/DL (ref 3.7–4.7)
ALBUMIN/GLOB SERPL: 1.6 {RATIO} (ref 1.2–2.2)
ALP SERPL-CCNC: 66 IU/L (ref 44–121)
ALT SERPL-CCNC: 18 IU/L (ref 0–32)
AST SERPL-CCNC: 16 IU/L (ref 0–40)
BASOPHILS # BLD AUTO: 0.1 X10E3/UL (ref 0–0.2)
BASOPHILS NFR BLD AUTO: 1 %
BILIRUB SERPL-MCNC: <0.2 MG/DL (ref 0–1.2)
BUN SERPL-MCNC: 24 MG/DL (ref 8–27)
BUN/CREAT SERPL: 14 (ref 12–28)
CALCIUM SERPL-MCNC: 11.2 MG/DL (ref 8.7–10.3)
CHLORIDE SERPL-SCNC: 104 MMOL/L (ref 96–106)
CO2 SERPL-SCNC: 27 MMOL/L (ref 20–29)
CREAT SERPL-MCNC: 1.67 MG/DL (ref 0.57–1)
EOSINOPHIL # BLD AUTO: 0.1 X10E3/UL (ref 0–0.4)
EOSINOPHIL NFR BLD AUTO: 1 %
ERYTHROCYTE [DISTWIDTH] IN BLOOD BY AUTOMATED COUNT: 12.7 % (ref 11.7–15.4)
GLOBULIN SER CALC-MCNC: 2.5 G/DL (ref 1.5–4.5)
GLUCOSE SERPL-MCNC: 118 MG/DL (ref 65–99)
HCT VFR BLD AUTO: 46.3 % (ref 34–46.6)
HGB BLD-MCNC: 15.4 G/DL (ref 11.1–15.9)
IMM GRANULOCYTES # BLD AUTO: 0 X10E3/UL (ref 0–0.1)
IMM GRANULOCYTES NFR BLD AUTO: 1 %
LYMPHOCYTES # BLD AUTO: 2.3 X10E3/UL (ref 0.7–3.1)
LYMPHOCYTES NFR BLD AUTO: 29 %
MCH RBC QN AUTO: 31.5 PG (ref 26.6–33)
MCHC RBC AUTO-ENTMCNC: 33.3 G/DL (ref 31.5–35.7)
MCV RBC AUTO: 95 FL (ref 79–97)
MONOCYTES # BLD AUTO: 0.6 X10E3/UL (ref 0.1–0.9)
MONOCYTES NFR BLD AUTO: 8 %
NEUTROPHILS # BLD AUTO: 4.8 X10E3/UL (ref 1.4–7)
NEUTROPHILS NFR BLD AUTO: 60 %
PLATELET # BLD AUTO: 199 X10E3/UL (ref 150–450)
POTASSIUM SERPL-SCNC: 5.1 MMOL/L (ref 3.5–5.2)
PROT SERPL-MCNC: 6.5 G/DL (ref 6–8.5)
RBC # BLD AUTO: 4.89 X10E6/UL (ref 3.77–5.28)
SODIUM SERPL-SCNC: 143 MMOL/L (ref 134–144)
TSH SERPL DL<=0.005 MIU/L-ACNC: 0.94 UIU/ML (ref 0.45–4.5)
WBC # BLD AUTO: 8 X10E3/UL (ref 3.4–10.8)

## 2021-10-25 ENCOUNTER — TELEPHONE (OUTPATIENT)
Dept: FAMILY MEDICINE CLINIC | Facility: CLINIC | Age: 76
End: 2021-10-25

## 2021-10-29 ENCOUNTER — HOSPITAL ENCOUNTER (OUTPATIENT)
Dept: GENERAL RADIOLOGY | Facility: HOSPITAL | Age: 76
Discharge: HOME | End: 2021-10-29
Admitting: FAMILY MEDICINE

## 2021-10-29 ENCOUNTER — TREATMENT (OUTPATIENT)
Dept: PHYSICAL THERAPY | Facility: CLINIC | Age: 76
End: 2021-10-29

## 2021-10-29 ENCOUNTER — OFFICE VISIT (OUTPATIENT)
Dept: FAMILY MEDICINE CLINIC | Facility: CLINIC | Age: 76
End: 2021-10-29

## 2021-10-29 VITALS
HEIGHT: 65 IN | TEMPERATURE: 97.7 F | SYSTOLIC BLOOD PRESSURE: 128 MMHG | RESPIRATION RATE: 18 BRPM | DIASTOLIC BLOOD PRESSURE: 62 MMHG | BODY MASS INDEX: 32.52 KG/M2 | HEART RATE: 86 BPM | OXYGEN SATURATION: 98 % | WEIGHT: 195.2 LBS

## 2021-10-29 DIAGNOSIS — G89.29 CHRONIC BILATERAL THORACIC BACK PAIN: Primary | ICD-10-CM

## 2021-10-29 DIAGNOSIS — E83.52 HYPERCALCEMIA: ICD-10-CM

## 2021-10-29 DIAGNOSIS — M54.6 ACUTE BILATERAL THORACIC BACK PAIN: Primary | ICD-10-CM

## 2021-10-29 DIAGNOSIS — F41.9 ANXIETY: ICD-10-CM

## 2021-10-29 DIAGNOSIS — R40.0 DAYTIME SOMNOLENCE: ICD-10-CM

## 2021-10-29 DIAGNOSIS — M54.6 ACUTE BILATERAL THORACIC BACK PAIN: ICD-10-CM

## 2021-10-29 DIAGNOSIS — F17.200 TOBACCO DEPENDENCY: ICD-10-CM

## 2021-10-29 DIAGNOSIS — M54.6 CHRONIC BILATERAL THORACIC BACK PAIN: Primary | ICD-10-CM

## 2021-10-29 DIAGNOSIS — E66.09 CLASS 1 OBESITY DUE TO EXCESS CALORIES WITH SERIOUS COMORBIDITY AND BODY MASS INDEX (BMI) OF 32.0 TO 32.9 IN ADULT: ICD-10-CM

## 2021-10-29 PROBLEM — E66.811 CLASS 1 OBESITY DUE TO EXCESS CALORIES WITH SERIOUS COMORBIDITY AND BODY MASS INDEX (BMI) OF 32.0 TO 32.9 IN ADULT: Status: ACTIVE | Noted: 2021-10-29

## 2021-10-29 PROCEDURE — 97140 MANUAL THERAPY 1/> REGIONS: CPT | Performed by: PHYSICAL THERAPIST

## 2021-10-29 PROCEDURE — 72072 X-RAY EXAM THORAC SPINE 3VWS: CPT

## 2021-10-29 PROCEDURE — 97162 PT EVAL MOD COMPLEX 30 MIN: CPT | Performed by: PHYSICAL THERAPIST

## 2021-10-29 PROCEDURE — G0283 ELEC STIM OTHER THAN WOUND: HCPCS | Performed by: PHYSICAL THERAPIST

## 2021-10-29 PROCEDURE — 99214 OFFICE O/P EST MOD 30 MIN: CPT | Performed by: FAMILY MEDICINE

## 2021-10-29 RX ORDER — SERTRALINE HYDROCHLORIDE 25 MG/1
25 TABLET, FILM COATED ORAL DAILY
Qty: 90 TABLET | Refills: 1 | Status: CANCELLED | OUTPATIENT
Start: 2021-10-29

## 2021-10-29 NOTE — PROGRESS NOTES
Subjective   Vianey Reeves is a 76 y.o. female. Presents to [facility]    Chief Complaint   Patient presents with   • Back Pain       Vianey started physical therapy today 10/29/2021. She will be attending twice weekly.    Back Pain  This is a new problem. The current episode started 1 to 4 weeks ago. The problem occurs daily. The problem has been waxing and waning since onset. The pain is present in the thoracic spine. The quality of the pain is described as aching. The pain does not radiate. The pain is moderate. The pain is the same all the time. The symptoms are aggravated by bending, position, standing and twisting. Pertinent negatives include no abdominal pain, chest pain or fever. She has tried chiropractic manipulation (physical therapy) for the symptoms. The treatment provided moderate relief.        I personally reviewed and updated the patient's allergies, medications, problem list, and past medical, surgical, social, and family history. I have reviewed and confirmed the accuracy of the History of Present Illness and Review of Symptoms as documented by the MA/LPN/RN. Emma Hammer MD    Allergies:  Allergies   Allergen Reactions   • Erythromycin Rash   • Naproxen Sodium Other (See Comments)     Dizzy lightheaded   • Latex Itching and Swelling   • Metoclopramide Other (See Comments)     Unsteady on feet       Social History:  Social History     Socioeconomic History   • Marital status:    Tobacco Use   • Smoking status: Current Every Day Smoker     Packs/day: 0.25     Years: 50.00     Pack years: 12.50     Types: Cigarettes   • Smokeless tobacco: Never Used   Vaping Use   • Vaping Use: Never used   Substance and Sexual Activity   • Alcohol use: Yes     Comment: occasionally    • Drug use: Never   • Sexual activity: Defer       Family History:  Family History   Problem Relation Age of Onset   • Arthritis Father    • Prostate cancer Father    • Heart disease Sister        Past  Medical History :  Patient Active Problem List   Diagnosis   • Type 2 diabetes mellitus with stage 4 chronic kidney disease, without long-term current use of insulin (HCC)   • Essential (primary) hypertension   • Mixed hyperlipidemia   • Hiatal hernia   • Fatigue   • Long term current use of antithrombotics/antiplatelets   • Chronic obstructive pulmonary disease (HCC)   • Atherosclerotic heart disease of native coronary artery without angina pectoris   • Irritable bowel syndrome with constipation   • History of cancer of ureter   • Solitary kidney   • Atherosclerosis of native arteries of extremities with intermittent claudication, bilateral legs (HCC)   • H/O malignant neoplasm of ureter   • Vertigo   • Medicare annual wellness visit, subsequent   • Presbyopia   • Combined form of senile cataract   • Cervical cancer screening   • Postartificial menopausal syndrome   • Diverticula of colon   • Ruptured tympanic membrane, left   • Hearing loss, bilateral   • TMJ pain dysfunction syndrome   • Diverticulitis   • Stage 4 chronic kidney disease (HCC)   • Gastroenteritis   • Other dysphagia   • Lactose intolerance   • Left foot pain   • Polyarthralgia   • Acute bilateral thoracic back pain   • Class 1 obesity due to excess calories with serious comorbidity and body mass index (BMI) of 32.0 to 32.9 in adult   • Anxiety   • Hypercalcemia   • Daytime somnolence       Medication List:    Current Outpatient Medications:   •  amLODIPine (NORVASC) 10 MG tablet, TAKE 1 TABLET BY MOUTH EVERY DAY, Disp: 90 tablet, Rfl: 4  •  aspirin 81 MG tablet, Take 81 mg by mouth Daily. dont take dos, Disp: , Rfl:   •  calcium-vitamin D (Oscal 500/200 D-3) 500-200 MG-UNIT per tablet, Take  by mouth 2 (Two) Times a Week. Twice weekly, Disp: , Rfl:   •  carvedilol (COREG) 3.125 MG tablet, TAKE 1 TABLET BY MOUTH EVERY 12 HOURS, Disp: 180 tablet, Rfl: 4  •  coenzyme Q10 100 MG capsule, Take 1 capsule by mouth 2 (Two) Times a Week., Disp: , Rfl:   •   doxazosin (Cardura) 2 MG tablet, Take 1 tablet by mouth Every Night., Disp: 90 tablet, Rfl: 3  •  fluticasone (FLONASE) 50 MCG/ACT nasal spray, 2 sprays into the nostril(s) as directed by provider As Needed., Disp: , Rfl:   •  fluticasone-salmeterol (ADVAIR HFA) 45-21 MCG/ACT inhaler, Inhale 2 puffs 2 (Two) Times a Day., Disp: 1 inhaler, Rfl: 12  •  gabapentin (NEURONTIN) 100 MG capsule, Take 100 mg by mouth 2 (Two) Times a Day., Disp: , Rfl:   •  Glucose Blood (Blood Glucose Test) strip, 1 each by In Vitro route Daily., Disp: 100 each, Rfl: 3  •  Januvia 100 MG tablet, TAKE 1 TABLET BY MOUTH EVERY DAY, Disp: 90 tablet, Rfl: 3  •  Lantus SoloStar 100 UNIT/ML injection pen, INJECT 20 UNITS UNDER THE SKIN INTO THE APPROPRIATE AREA AS DIRECTED EVERY NIGHT., Disp: 5 pen, Rfl: 11  •  linaclotide (Linzess) 72 MCG capsule capsule, Take 1 capsule by mouth Every Morning Before Breakfast., Disp: 30 capsule, Rfl: 12  •  montelukast (SINGULAIR) 10 MG tablet, Take 1 tablet by mouth Every Night., Disp: 30 tablet, Rfl: 0  •  Repatha SureClick solution auto-injector SureClick injection, INJECT 1 ML UNDER THE SKIN INTO THE APPROPRIATE AREA AS DIRECTED EVERY 14 (FOURTEEN) DAYS., Disp: 1 mL, Rfl: 12  •  tiZANidine (ZANAFLEX) 2 MG tablet, Take 1 tablet by mouth At Night As Needed for Muscle Spasms., Disp: 30 tablet, Rfl: 0  •  traMADol (ULTRAM) 50 MG tablet, Take 50 mg by mouth 2 (Two) Times a Day., Disp: , Rfl:   •  Triamcinolone Acetonide (NASACORT) 55 MCG/ACT nasal inhaler, 2 sprays into the nostril(s) as directed by provider Daily., Disp: 16.5 g, Rfl: 0  •  sertraline (Zoloft) 50 MG tablet, Take 1 tablet by mouth Daily., Disp: 90 tablet, Rfl: 3    Past Surgical History:  Past Surgical History:   Procedure Laterality Date   • BREAST BIOPSY Right    • CARDIAC CATHETERIZATION     • CHOLECYSTECTOMY     • COLON SURGERY      REMOVAL diverticular diseae   • COLONOSCOPY N/A 8/12/2021    Procedure: COLONOSCOPY with polypectomy x 3;   "Surgeon: Margarette Mcallister MD;  Location: [facility] ENDOSCOPY;  Service: Gastroenterology;  Laterality: N/A;  post op: hmorrhoids, diverticulosis, polyps   • CORONARY STENT PLACEMENT     • ENDOSCOPY N/A 8/12/2021    Procedure: ESOPHAGOGASTRODUODENOSCOPY with dilatation (18-20 mm balloon) and (54 bougie);  Surgeon: Margarette Mcallister MD;  Location: [facility] ENDOSCOPY;  Service: Gastroenterology;  Laterality: N/A;  post op: esophageal stricture, esophagitis, gastritis, history of nissen   • EYE SURGERY Bilateral    • HIATAL HERNIA REPAIR     • NEPHRECTOMY Right        Review of Systems:  Review of Systems   Constitutional: Positive for fatigue. Negative for activity change and fever.   HENT: Negative for ear pain, rhinorrhea, sinus pressure and voice change.    Eyes: Negative for visual disturbance.   Respiratory: Negative for cough and shortness of breath.    Cardiovascular: Negative for chest pain.   Gastrointestinal: Negative for abdominal pain, diarrhea, nausea and vomiting.   Endocrine: Negative for cold intolerance and heat intolerance.   Genitourinary: Negative for frequency and urgency.   Musculoskeletal: Positive for back pain. Negative for arthralgias.   Skin: Negative for rash.   Neurological: Negative for syncope.   Hematological: Does not bruise/bleed easily.   Psychiatric/Behavioral: Negative for depressed mood. The patient is not nervous/anxious.        Physical Exam:  Vital Signs:  Vital Signs:   /62 (BP Location: Right arm, Patient Position: Sitting, Cuff Size: Large Adult)   Pulse 86   Temp 97.7 °F (36.5 °C) (Temporal)   Resp 18   Ht 165.1 cm (65\")   Wt 88.5 kg (195 lb 3.2 oz)   SpO2 98% Comment: Room air  BMI 32.48 kg/m²     Result Review :   The following data was reviewed by: Emma Hammer MD on 10/29/2021:  CMP    CMP 4/22/21 9/1/21 10/21/21   Glucose 71 145 (A) 118 (A)   BUN 23 27 24   Creatinine 1.59 (A) 1.68 (A) 1.67 (A)   eGFR Non African Am 31 (A) 29 (A) 29 (A)   eGFR  Am 36 (A) " 34 (A) 34 (A)   Sodium 143 143 143   Potassium 4.7 5.0 5.1   Chloride 106 104 104   Calcium 8.9 9.2 11.2 (A)   Total Protein 6.4 6.7 6.5   Albumin 3.9 4.0 4.0   Globulin 2.5 2.7 2.5   Total Bilirubin 0.3 0.2 <0.2   Alkaline Phosphatase 71 77 66   AST (SGOT) 13 11 16   ALT (SGPT) 12 9 18   (A) Abnormal value       Comments are available for some flowsheets but are not being displayed.           Lipid Panel    Lipid Panel 4/22/21 9/1/21   Total Cholesterol 106 191   Triglycerides 69 63   HDL Cholesterol 48 49   VLDL Cholesterol 15 12   LDL Cholesterol  43 130 (A)   (A) Abnormal value                   XR Spine Thoracic 3 View    Result Date: 10/29/2021  No change when compared to previous study. Osteopenia. Mild spondylosis thoracic spine. Changes of prior granulomatous disease exposure.  Electronically Signed By-Joelle Liao MD On:10/29/2021 11:40 AM This report was finalized on [id number] by  Joelle Liao MD.      Physical Exam  Vitals reviewed.   Constitutional:       General: She is not in acute distress.     Appearance: Normal appearance. She is well-developed. She is not diaphoretic.   HENT:      Head: Normocephalic and atraumatic.   Eyes:      General:         Right eye: No discharge.         Left eye: No discharge.   Cardiovascular:      Rate and Rhythm: Normal rate and regular rhythm.      Heart sounds: Normal heart sounds. No murmur heard.  No friction rub. No gallop.    Pulmonary:      Effort: Pulmonary effort is normal. No respiratory distress.      Breath sounds: Normal breath sounds. No wheezing or rales.   Musculoskeletal:         General: No deformity.      Thoracic back: No tenderness. Normal range of motion.      Lumbar back: No deformity, spasms or tenderness. Normal range of motion.   Skin:     General: Skin is warm and dry.      Findings: No rash.   Neurological:      Mental Status: She is alert and oriented to person, place, and time.      Motor: No abnormal muscle tone.      Coordination:  Coordination normal.      Gait: Gait normal.      Deep Tendon Reflexes: Reflexes normal.   Psychiatric:         Behavior: Behavior is cooperative.         Assessment and Plan:  Problems Addressed this Visit        Endocrine and Metabolic    Class 1 obesity due to excess calories with serious comorbidity and body mass index (BMI) of 32.0 to 32.9 in adult       Genitourinary and Reproductive     Hypercalcemia     recheck         Relevant Orders    Comprehensive Metabolic Panel       Mental Health    Anxiety     Worse  Increase zoloft to 50mg         Relevant Medications    sertraline (Zoloft) 50 MG tablet       Musculoskeletal and Injuries    Acute bilateral thoracic back pain - Primary       Neuro    Daytime somnolence     Send for evaluation         Relevant Orders    Ambulatory Referral to Sleep Medicine      Other Visit Diagnoses     Tobacco dependency          Diagnoses       Codes Comments    Acute bilateral thoracic back pain    -  Primary ICD-10-CM: M54.6  ICD-9-CM: 724.1     Tobacco dependency     ICD-10-CM: F17.200  ICD-9-CM: 305.1     Class 1 obesity due to excess calories with serious comorbidity and body mass index (BMI) of 32.0 to 32.9 in adult     ICD-10-CM: E66.09, Z68.32  ICD-9-CM: 278.00, V85.32     Anxiety     ICD-10-CM: F41.9  ICD-9-CM: 300.00     Hypercalcemia     ICD-10-CM: E83.52  ICD-9-CM: 275.42     Daytime somnolence     ICD-10-CM: R40.0  ICD-9-CM: 780.54            An After Visit Summary and PPPS were given to the patient.       I wore protective equipment throughout this patient encounter to include mask. Hand hygiene was performed before donning protective equipment and after removal when leaving the room.

## 2021-10-29 NOTE — PROGRESS NOTES
Physical Therapy Initial Evaluation and Plan of Care    Patient: Vianey Reeves   : 1945  Diagnosis/ICD-10 Code:  Chronic bilateral thoracic back pain [M54.6, G89.29]  Referring practitioner: Emma Hammer MD  Date of Initial Visit: 10/29/2021  Today's Date: 10/29/2021  Patient seen for 1 sessions           Subjective Questionnaire: Oswestry: 35/50, indicating 70% impairment      Subjective Evaluation    History of Present Illness  Mechanism of injury: Pt reports having thoracic back pain along bra line, primarily L sided. States pain occurs about every 3 months. Current pain started ~4 wk ago. No injury or accident at time of onset or any activity out of the ordinary. Went to chiropractor with no relief, usually helps. Has another appt next wk with chiropractor. States if she puts pressure on the area it decreases the pain. Standing increases pain. Difficulty sleeping. Difficulty standing to shower. States pain is keeping from doing everything she enjoys. Laying on her stomach or side is most comfortable. Arm movement does not affect pain. Bending over increases pain in mid back. Has home TENS unit but has difficulty getting electrodes on due to no help. Having x-ray after PT appt today. States she has tried dry needling at chiropractor office but it did not help much, willing to try again with PT at future appt.      Patient Occupation: retired Quality of life: good    Pain  Current pain rating: 10  At best pain ratin  At worst pain rating: 10  Location: thoracic region, L side  Quality: dull ache, sharp and knife-like  Relieving factors: heat  Aggravating factors: sleeping, lifting, movement and standing  Progression: worsening    Social Support  Lives with: alone    Hand dominance: right    Treatments  Previous treatment: chiropractic  Patient Goals  Patient goals for therapy: decreased pain, increased motion, increased strength, independence with ADLs/IADLs and return to sport/leisure  activities             Objective          Postural Observations  Seated posture: fair  Standing posture: fair  Correction of posture: has no consistent effect    Additional Postural Observation Details  Forward head, rounded shoulders with B scap abducted, increased thoracic kyphosis.    Palpation   Left   Hypertonic in the cervical paraspinals and thoracic paraspinals.   Tenderness of the cervical paraspinals, latissimus, middle trapezius, rhomboids and thoracic paraspinals.     Right Tenderness of the cervical paraspinals.     Tenderness   Cervical Spine   Tenderness in the facet joint, spinous process and left scapula.     Left Shoulder   Tenderness in the lateral scapula and medial scapula.     Additional Tenderness Details  Tenderness throughout thoracic spine. No tenderness in cervical spine.    Active Range of Motion     Additional Active Range of Motion Details  Cervical AROM WFL  Thoracic AROM with significant limitation in rotation B, L>R.    Scapular Mobility   Left Shoulder   Scapular mobility: fair    Right Shoulder   Scapular mobility: fair    Strength/Myotome Testing     Left Shoulder     Planes of Motion   Flexion: 4   Extension: 4+   Abduction: 4     Right Shoulder     Planes of Motion   Flexion: 4   Extension: 4+   Abduction: 4      General Comments     Cervical/Thoracic Comments  Significant tightness and guarding of thoracic musculature.             Assessment & Plan     Assessment  Impairments: abnormal gait, abnormal muscle firing, abnormal muscle tone, abnormal or restricted ROM, activity intolerance, impaired balance, impaired physical strength, lacks appropriate home exercise program, pain with function, safety issue and weight-bearing intolerance  Assessment details: Pt is a 76 y.o. female with thoracic spine and L scapular pain. Pt presents with significantly limited thoracic spine ROM and decreased postural strength. Pt is having difficulty and increased pain with all standing. Difficulty  with ambulation. Difficulty sleeping. Increased thoracic pain with bending over. Oswestry indicates 70% impairment.    Patient presents with the impairments listed above and based on the objective findings and the physical therapy evaluation, the patient’s condition has the potential to improve in response to therapy.   The patient’s condition and/or services required are at a level of complexity that necessitates the skill & supervision of a physical therapist.    Prognosis: good  Functional Limitations: carrying objects, lifting, sleeping, walking, pulling, uncomfortable because of pain, moving in bed, sitting, standing, stooping and unable to perform repetitive tasks  Goals  Plan Goals: STG:  - Pt to report 25% or better improvement in sleep in 3 weeks.  - Pt to demonstrate improved posture with min verbal cues in 3 weeks.  - Pt to be independent with HEP in 3 weeks.  LTG:  - Improve Oswestry to 30% or less impairment by discharge.  - Pt to report ability to stand up to 30 min with no increase in pain by discharge.  - Pt to rate max pain at 2-3/10 with activity by discharge.  - Pt to have no thoracic pain with bending over to retrieve items from floor by discharge.    Plan  Therapy options: will be seen for skilled physical therapy services  Planned modality interventions: electrical stimulation/Russian stimulation, thermotherapy (hydrocollator packs), traction, dry needling and ultrasound  Other planned modality interventions: modalities as indicated  Planned therapy interventions: manual therapy, abdominal trunk stabilization, balance/weight-bearing training, body mechanics training, flexibility, functional ROM exercises, home exercise program, joint mobilization, neuromuscular re-education, postural training, soft tissue mobilization, spinal/joint mobilization, strengthening, stretching and therapeutic activities  Frequency: 2x week  Duration in weeks: 12  Treatment plan discussed with: patient        Timed:          Manual Therapy:    15     mins  00345;     Therapeutic Exercise:    5     mins  20790;     Neuromuscular Lucia:        mins  54551;    Therapeutic Activity:          mins  34881;     Gait Training:           mins  84634;     Ultrasound:          mins  55517;    Ionto                                   mins   75710  Can Repos          mins 54990    Un-Timed:  Electrical Stimulation:    15     mins  17355 ( );  Dry Needling          mins self-pay  Traction          mins 52386  Low Eval          Mins  63100  Mod Eval     25     Mins  96876  High Eval                            Mins  37282  Self - Care                          mins  36920        Timed Treatment:   20   mins   Total Treatment:     60   mins    PT SIGNATURE: Christina Lucero, PT   DATE TREATMENT INITIATED: 10/29/2021    Initial Certification  Certification Period: 1/27/2022  I certify that the therapy services are furnished while this patient is under my care.  The services outlined above are required by this patient, and will be reviewed every 90 days.     PHYSICIAN: Emma Hammer MD ________________________________________________________________     DATE:  _________________________________________________    Please sign and return via fax to [phone].. Thank you, [facility].

## 2021-11-01 ENCOUNTER — TREATMENT (OUTPATIENT)
Dept: PHYSICAL THERAPY | Facility: CLINIC | Age: 76
End: 2021-11-01

## 2021-11-01 DIAGNOSIS — M54.6 CHRONIC BILATERAL THORACIC BACK PAIN: Primary | ICD-10-CM

## 2021-11-01 DIAGNOSIS — G89.29 CHRONIC BILATERAL THORACIC BACK PAIN: Primary | ICD-10-CM

## 2021-11-01 PROCEDURE — 97140 MANUAL THERAPY 1/> REGIONS: CPT | Performed by: PHYSICAL THERAPIST

## 2021-11-01 PROCEDURE — 97110 THERAPEUTIC EXERCISES: CPT | Performed by: PHYSICAL THERAPIST

## 2021-11-01 PROCEDURE — G0283 ELEC STIM OTHER THAN WOUND: HCPCS | Performed by: PHYSICAL THERAPIST

## 2021-11-01 PROCEDURE — 20561 NDL INSJ W/O NJX 3+ MUSC: CPT | Performed by: PHYSICAL THERAPIST

## 2021-11-01 NOTE — PROGRESS NOTES
Physical Therapy Daily Progress Note      Patient: Vianey Reeves   : 1945  Diagnosis/ICD-10 Code:  Chronic bilateral thoracic back pain [M54.6, G89.29]  Referring practitioner: Emma Hammer MD  Date of Initial Visit: Type: THERAPY  Noted: 10/29/2021  Today's Date: 2021  Patient seen for 2 sessions         Vianey Reeves reports: she is doing okay this visit but states it has still been bothering her in the shoulder blades Pt. State sshe has hd this kind of pain many times but this time seems to be getting minimal relief. Pt. States she has received good relief with Dry Needling in the past and would like to try that this time around.    Objective   See Exercise, Manual, and Modality Logs for complete treatment.     Assessment/Plan   P.t has progressive relief throughout treatment but notes not significant change immediately following Dry needling this visit. Pt. Does report that Dry Needling felt fine during but just didn't change much overall. Pt. Completes stretches and exercise well and feels improved following application of Estim and Heat this date.    Progress strengthening /stabilization /functional activity           Timed:         Manual Therapy:    15     mins  17333;     Therapeutic Exercise:    15     mins  35747;       Un-Timed:  Electrical Stimulation:    15     mins  55885 ( );  Dry Needling     15     mins self-pay      Timed Treatment:   30   mins   Total Treatment:     60   mins    Suma Alegria PTA  Physical Therapist Assistant License #[id number]       normal performance

## 2021-11-02 DIAGNOSIS — R09.81 SINUS CONGESTION: ICD-10-CM

## 2021-11-02 RX ORDER — MONTELUKAST SODIUM 10 MG/1
TABLET ORAL
Qty: 30 TABLET | Refills: 0 | Status: SHIPPED | OUTPATIENT
Start: 2021-11-02 | End: 2021-11-26

## 2021-11-05 ENCOUNTER — TELEPHONE (OUTPATIENT)
Dept: PHYSICAL THERAPY | Facility: CLINIC | Age: 76
End: 2021-11-05

## 2021-11-08 ENCOUNTER — OFFICE VISIT (OUTPATIENT)
Dept: FAMILY MEDICINE CLINIC | Facility: CLINIC | Age: 76
End: 2021-11-08

## 2021-11-08 VITALS
SYSTOLIC BLOOD PRESSURE: 126 MMHG | BODY MASS INDEX: 32.59 KG/M2 | TEMPERATURE: 97.1 F | DIASTOLIC BLOOD PRESSURE: 74 MMHG | RESPIRATION RATE: 18 BRPM | OXYGEN SATURATION: 98 % | HEART RATE: 90 BPM | HEIGHT: 65 IN | WEIGHT: 195.6 LBS

## 2021-11-08 DIAGNOSIS — E66.09 CLASS 1 OBESITY DUE TO EXCESS CALORIES WITH SERIOUS COMORBIDITY AND BODY MASS INDEX (BMI) OF 32.0 TO 32.9 IN ADULT: ICD-10-CM

## 2021-11-08 DIAGNOSIS — E78.2 MIXED HYPERLIPIDEMIA: ICD-10-CM

## 2021-11-08 DIAGNOSIS — Z12.31 SCREENING MAMMOGRAM, ENCOUNTER FOR: ICD-10-CM

## 2021-11-08 DIAGNOSIS — I10 ESSENTIAL (PRIMARY) HYPERTENSION: ICD-10-CM

## 2021-11-08 DIAGNOSIS — Z00.00 MEDICARE ANNUAL WELLNESS VISIT, SUBSEQUENT: Primary | ICD-10-CM

## 2021-11-08 DIAGNOSIS — E11.22 TYPE 2 DIABETES MELLITUS WITH STAGE 4 CHRONIC KIDNEY DISEASE, WITHOUT LONG-TERM CURRENT USE OF INSULIN (HCC): ICD-10-CM

## 2021-11-08 DIAGNOSIS — I25.10 ATHEROSCLEROSIS OF NATIVE CORONARY ARTERY OF NATIVE HEART WITHOUT ANGINA PECTORIS: ICD-10-CM

## 2021-11-08 DIAGNOSIS — Z00.00 ENCOUNTER FOR SUBSEQUENT ANNUAL WELLNESS VISIT IN MEDICARE PATIENT: ICD-10-CM

## 2021-11-08 DIAGNOSIS — Z11.59 NEED FOR HEPATITIS C SCREENING TEST: ICD-10-CM

## 2021-11-08 DIAGNOSIS — N18.4 STAGE 4 CHRONIC KIDNEY DISEASE (HCC): ICD-10-CM

## 2021-11-08 DIAGNOSIS — N18.4 TYPE 2 DIABETES MELLITUS WITH STAGE 4 CHRONIC KIDNEY DISEASE, WITHOUT LONG-TERM CURRENT USE OF INSULIN (HCC): ICD-10-CM

## 2021-11-08 DIAGNOSIS — E04.9 ENLARGED THYROID: ICD-10-CM

## 2021-11-08 LAB
BILIRUB BLD-MCNC: NEGATIVE MG/DL
CLARITY, POC: CLEAR
COLOR UR: YELLOW
GLUCOSE BLDC GLUCOMTR-MCNC: 137 MG/DL (ref 70–130)
GLUCOSE UR STRIP-MCNC: NEGATIVE MG/DL
KETONES UR QL: NEGATIVE
LEUKOCYTE EST, POC: NEGATIVE
NITRITE UR-MCNC: NEGATIVE MG/ML
PH UR: 5 [PH] (ref 5–8)
POC MICROALBUMIN URINE: 20
PROT UR STRIP-MCNC: ABNORMAL MG/DL
RBC # UR STRIP: ABNORMAL /UL
SP GR UR: 1.01 (ref 1–1.03)
UROBILINOGEN UR QL: NORMAL

## 2021-11-08 PROCEDURE — 99497 ADVNCD CARE PLAN 30 MIN: CPT | Performed by: FAMILY MEDICINE

## 2021-11-08 PROCEDURE — 1170F FXNL STATUS ASSESSED: CPT | Performed by: FAMILY MEDICINE

## 2021-11-08 PROCEDURE — 3062F POS MACROALBUMINURIA REV: CPT | Performed by: FAMILY MEDICINE

## 2021-11-08 PROCEDURE — 81003 URINALYSIS AUTO W/O SCOPE: CPT | Performed by: FAMILY MEDICINE

## 2021-11-08 PROCEDURE — 1160F RVW MEDS BY RX/DR IN RCRD: CPT | Performed by: FAMILY MEDICINE

## 2021-11-08 PROCEDURE — 99214 OFFICE O/P EST MOD 30 MIN: CPT | Performed by: FAMILY MEDICINE

## 2021-11-08 PROCEDURE — G0439 PPPS, SUBSEQ VISIT: HCPCS | Performed by: FAMILY MEDICINE

## 2021-11-08 PROCEDURE — 82044 UR ALBUMIN SEMIQUANTITATIVE: CPT | Performed by: FAMILY MEDICINE

## 2021-11-08 PROCEDURE — 82962 GLUCOSE BLOOD TEST: CPT | Performed by: FAMILY MEDICINE

## 2021-11-08 NOTE — PROGRESS NOTES
Subsequent Medicare Wellness Visit    Chief Complaint   Patient presents with   • Medicare Wellness-subsequent   • Hyperlipidemia   • Hypertension   • Diabetes   • Chronic Kidney Disease       Subjective   History of Present Illness:  Vianey Reeves is a 76 y.o. female who presents for a Subsequent Medicare Wellness Visit. The patient is here: for coordination of medical care to discuss health maintenance and disease prevention. Overall has: minimal activity with work/home activities. Nutrition: eating a variety of foods. Last tetanus shot was unknown.    Hypertension  This is a chronic problem. The current episode started more than 1 year ago. The problem has been waxing and waning since onset. The problem is controlled. Pertinent negatives include no blurred vision, chest pain, headaches, malaise/fatigue, palpitations, shortness of breath or sweats. There are no associated agents to hypertension. Risk factors for coronary artery disease include dyslipidemia, diabetes mellitus and post-menopausal state. Past treatments include nothing. Current antihypertension treatment includes calcium channel blockers. The current treatment provides moderate improvement. There are no compliance problems.    Hyperlipidemia  This is a chronic problem. The current episode started more than 1 year ago. The problem is controlled. Exacerbating diseases include diabetes. She has no history of hypothyroidism. Pertinent negatives include no chest pain, leg pain or shortness of breath. Current antihyperlipidemic treatment includes diet change and exercise. The current treatment provides moderate improvement of lipids. There are no compliance problems.  Risk factors for coronary artery disease include diabetes mellitus, dyslipidemia and hypertension.   Diabetes  She presents for her follow-up diabetic visit. She has type 2 diabetes mellitus. There are no hypoglycemic associated symptoms. Pertinent negatives for hypoglycemia include no  headaches, nervousness/anxiousness or sweats. There are no diabetic associated symptoms. Pertinent negatives for diabetes include no blurred vision and no chest pain. There are no hypoglycemic complications. There are no diabetic complications. Risk factors for coronary artery disease include diabetes mellitus, dyslipidemia and hypertension. Current diabetic treatment includes oral agent (monotherapy). Her weight is fluctuating minimally. She is following a diabetic and generally healthy diet. Meal planning includes avoidance of concentrated sweets. She has not had a previous visit with a dietitian. She participates in exercise three times a week. An ACE inhibitor/angiotensin II receptor blocker is not being taken. She does not see a podiatrist.Eye exam is current.       HEALTH RISK ASSESSMENT    Recent Hospitalizations:  No hospitalization(s) within the last year.    Current Medical Providers:  Patient Care Team:  Emma Hammer MD as PCP - General  Emma Hammer MD as PCP - Family Medicine  Jame Julio MD as Consulting Physician (Nephrology)    Smoking Status:  Social History     Tobacco Use   Smoking Status Current Every Day Smoker   • Packs/day: 0.25   • Years: 50.00   • Pack years: 12.50   • Types: Cigarettes   Smokeless Tobacco Never Used       Alcohol Consumption:  Social History     Substance and Sexual Activity   Alcohol Use Yes    Comment: occasionally        Depression Screen:   PHQ-2/PHQ-9 Depression Screening 11/8/2021   Little interest or pleasure in doing things 0   Feeling down, depressed, or hopeless 0   Trouble falling or staying asleep, or sleeping too much 0   Feeling tired or having little energy 1   Poor appetite or overeating 0   Feeling bad about yourself - or that you are a failure or have let yourself or your family down 0   Trouble concentrating on things, such as reading the newspaper or watching television 0   Moving or speaking so slowly that other people could have noticed. Or  the opposite - being so fidgety or restless that you have been moving around a lot more than usual 0   Thoughts that you would be better off dead, or of hurting yourself in some way 0   Total Score 1   If you checked off any problems, how difficult have these problems made it for you to do your work, take care of things at home, or get along with other people? Not difficult at all     I spent more than 16 minutes asking patient questions, counseling and documenting in the chart    Fall Risk Screen:  CRISTIANO Fall Risk Assessment was completed, and patient is at MODERATE risk for falls. Assessment completed on:11/8/2021    Health Habits and Functional and Cognitive Screening:  Functional & Cognitive Status 11/8/2021   Do you have difficulty preparing food and eating? No   Do you have difficulty bathing yourself, getting dressed or grooming yourself? No   Do you have difficulty using the toilet? No   Do you have difficulty moving around from place to place? No   Do you have trouble with steps or getting out of a bed or a chair? No   Current Diet Well Balanced Diet   Dental Exam Up to date   Eye Exam Up to date   Exercise (times per week) 3 times per week   Current Exercises Include House Cleaning   Current Exercise Activities Include -   Do you need help using the phone?  No   Are you deaf or do you have serious difficulty hearing?  No   Do you need help with transportation? No   Do you need help shopping? No   Do you need help preparing meals?  No   Do you need help with housework?  No   Do you need help with laundry? No   Do you need help taking your medications? No   Do you need help managing money? No   Do you ever drive or ride in a car without wearing a seat belt? No   Have you felt unusual stress, anger or loneliness in the last month? No   Who do you live with? Community   If you need help, do you have trouble finding someone available to you? No   Have you been bothered in the last four weeks by sexual  problems? No   Do you have difficulty concentrating, remembering or making decisions? No       Does the patient have evidence of cognitive impairment? No    Asprin use counseling:Taking ASA appropriately as indicated    Recent Lab Results:  Lab Results   Component Value Date    CHLPL 201 (H) 11/11/2021    TRIG 116 11/11/2021    HDL 36 (L) 11/11/2021     (H) 11/11/2021    VLDL 21 11/11/2021    HGBA1C 6.0 08/31/2021        Most Recent A1C    HGBA1C Most Recent 8/31/21   Hemoglobin A1C 6.0           Brief Urine Lab Results  (Last result in the past 365 days)      Color   Clarity   Blood   Leuk Est   Nitrite   Protein   CREAT   Urine HCG        11/08/21 1534 Yellow   Clear   Trace   Negative   Negative   Trace                       Age-appropriate Screening Schedule:  Refer to the list below for future screening recommendations based on patient's age, sex and/or medical conditions. Orders for these recommended tests are listed in the plan section. The patient has been provided with a written plan.    Health Maintenance   Topic Date Due   • ZOSTER VACCINE (1 of 2) Never done   • TDAP/TD VACCINES (1 - Tdap) 01/06/2022 (Originally 10/9/1964)   • DIABETIC EYE EXAM  02/08/2022   • HEMOGLOBIN A1C  02/28/2022   • DXA SCAN  09/16/2022   • URINE MICROALBUMIN  11/08/2022   • LIPID PANEL  11/11/2022   • INFLUENZA VACCINE  Completed          The following portions of the patient's history were reviewed and updated as appropriate: allergies, current medications, past family history, past medical history, past social history, past surgical history and problem list.      Outpatient Medications Prior to Visit   Medication Sig Dispense Refill   • amLODIPine (NORVASC) 10 MG tablet TAKE 1 TABLET BY MOUTH EVERY DAY 90 tablet 4   • aspirin 81 MG tablet Take 81 mg by mouth Daily. dont take dos     • calcium-vitamin D (Oscal 500/200 D-3) 500-200 MG-UNIT per tablet Take  by mouth 2 (Two) Times a Week. Twice weekly     • carvedilol  (COREG) 3.125 MG tablet TAKE 1 TABLET BY MOUTH EVERY 12 HOURS 180 tablet 4   • coenzyme Q10 100 MG capsule Take 1 capsule by mouth 2 (Two) Times a Week.     • doxazosin (Cardura) 2 MG tablet Take 1 tablet by mouth Every Night. 90 tablet 3   • fluticasone (FLONASE) 50 MCG/ACT nasal spray 2 sprays into the nostril(s) as directed by provider As Needed.     • fluticasone-salmeterol (ADVAIR HFA) 45-21 MCG/ACT inhaler Inhale 2 puffs 2 (Two) Times a Day. 1 inhaler 12   • gabapentin (NEURONTIN) 100 MG capsule Take 100 mg by mouth 2 (Two) Times a Day.     • Glucose Blood (Blood Glucose Test) strip 1 each by In Vitro route Daily. 100 each 3   • Januvia 100 MG tablet TAKE 1 TABLET BY MOUTH EVERY DAY 90 tablet 3   • Lantus SoloStar 100 UNIT/ML injection pen INJECT 20 UNITS UNDER THE SKIN INTO THE APPROPRIATE AREA AS DIRECTED EVERY NIGHT. 5 pen 11   • linaclotide (Linzess) 72 MCG capsule capsule Take 1 capsule by mouth Every Morning Before Breakfast. 30 capsule 12   • montelukast (SINGULAIR) 10 MG tablet TAKE 1 TABLET BY MOUTH EVERY DAY AT NIGHT 30 tablet 0   • Repatha SureClick solution auto-injector SureClick injection INJECT 1 ML UNDER THE SKIN INTO THE APPROPRIATE AREA AS DIRECTED EVERY 14 (FOURTEEN) DAYS. 1 mL 12   • sertraline (Zoloft) 50 MG tablet Take 1 tablet by mouth Daily. 90 tablet 3   • tiZANidine (ZANAFLEX) 2 MG tablet Take 1 tablet by mouth At Night As Needed for Muscle Spasms. 30 tablet 0   • traMADol (ULTRAM) 50 MG tablet Take 50 mg by mouth 2 (Two) Times a Day.     • Triamcinolone Acetonide (NASACORT) 55 MCG/ACT nasal inhaler 2 sprays into the nostril(s) as directed by provider Daily. 16.5 g 0     No facility-administered medications prior to visit.       Patient Active Problem List   Diagnosis   • Type 2 diabetes mellitus with stage 4 chronic kidney disease, without long-term current use of insulin (HCC)   • Essential (primary) hypertension   • Mixed hyperlipidemia   • Hiatal hernia   • Long term current use  of antithrombotics/antiplatelets   • Chronic obstructive pulmonary disease (HCC)   • Atherosclerotic heart disease of native coronary artery without angina pectoris   • Irritable bowel syndrome with constipation   • History of cancer of ureter   • Solitary kidney   • Atherosclerosis of native arteries of extremities with intermittent claudication, bilateral legs (HCC)   • H/O malignant neoplasm of ureter   • Vertigo   • Medicare annual wellness visit, subsequent   • Presbyopia   • Combined form of senile cataract   • Cervical cancer screening   • Postartificial menopausal syndrome   • Diverticula of colon   • Ruptured tympanic membrane, left   • Hearing loss, bilateral   • TMJ pain dysfunction syndrome   • Diverticulitis   • Stage 4 chronic kidney disease (HCC)   • Gastroenteritis   • Other dysphagia   • Lactose intolerance   • Left foot pain   • Polyarthralgia   • Acute bilateral thoracic back pain   • Class 1 obesity due to excess calories with serious comorbidity and body mass index (BMI) of 32.0 to 32.9 in adult   • Anxiety   • Hypercalcemia   • Daytime somnolence   • Screening mammogram, encounter for       Advanced Care Planning:    ACP discussion was held with Vianey. Patient does not have an advanced directive, handout given and discussed her handout    A face-to-face visit was completed today with patient.  Counseling explanation, and discussion of advanced directives was performed.   The last advanced care visit was performed in 2019.  In a near life ending situation, from which she is not expected to recover functionally, and she is not able to speak for her, she does not want life sustaining measures. We discussed feeding tubes, ventilators and cardiac support as well as dialysis.    I spent more than 16 minutes discussing Advanced Care Planning information and documenting in the chart.    Review of Systems   Constitutional: Negative for activity change, fever and malaise/fatigue.   HENT: Negative for  "ear pain, rhinorrhea, sinus pressure and voice change.    Eyes: Negative for blurred vision and visual disturbance.   Respiratory: Negative for cough and shortness of breath.    Cardiovascular: Negative for chest pain and palpitations.   Gastrointestinal: Negative for abdominal pain, diarrhea, nausea and vomiting.   Endocrine: Negative for cold intolerance and heat intolerance.   Genitourinary: Negative for frequency and urgency.   Musculoskeletal: Negative for arthralgias.   Skin: Negative for rash.   Neurological: Negative for syncope.   Hematological: Does not bruise/bleed easily.   Psychiatric/Behavioral: Negative for depressed mood. The patient is not nervous/anxious.        I have reviewed and confirmed the accuracy of the HPI and ROS as documented by the MA/LPN/RN Emma Hammer MD    Compared to one year ago, the patient feels her physical health is the same.  Compared to one year ago, the patient feels her mental health is the same.    Reviewed chart for potential of high risk medication in the elderly: yes  Reviewed chart for potential of harmful drug interactions in the elderly:yes    Objective      Vitals:    11/08/21 1530   BP: 126/74   Pulse: 90   Resp: 18   Temp: 97.1 °F (36.2 °C)   SpO2: 98%   Weight: 88.7 kg (195 lb 9.6 oz)   Height: 165.1 cm (65\")       Body mass index is 32.55 kg/m².  Discussed the patient's BMI with her. The BMI is above average; BMI management plan is completed.    Physical Exam  Vitals and nursing note reviewed.   Constitutional:       General: She is not in acute distress.     Appearance: She is well-developed. She is not diaphoretic.   HENT:      Head: Normocephalic and atraumatic.      Right Ear: Tympanic membrane and external ear normal.      Left Ear: Tympanic membrane and external ear normal.      Nose: Nose normal.      Mouth/Throat:      Pharynx: No oropharyngeal exudate.   Eyes:      General: No scleral icterus.        Right eye: No discharge.         Left eye: No " discharge.      Conjunctiva/sclera: Conjunctivae normal.      Pupils: Pupils are equal, round, and reactive to light.   Neck:      Thyroid: No thyromegaly.      Trachea: No tracheal deviation.   Cardiovascular:      Rate and Rhythm: Normal rate and regular rhythm.      Heart sounds: Normal heart sounds. No murmur heard.  No friction rub. No gallop.    Pulmonary:      Effort: Pulmonary effort is normal. No respiratory distress.      Breath sounds: Normal breath sounds. No stridor. No wheezing or rales.   Chest:   Breasts:      Right: Normal. No swelling, bleeding, inverted nipple, mass, nipple discharge, skin change or tenderness.      Left: Normal. No swelling, bleeding, inverted nipple, mass, nipple discharge, skin change or tenderness.       Abdominal:      General: Bowel sounds are normal. There is no distension.      Palpations: Abdomen is soft. There is no mass.      Tenderness: There is no abdominal tenderness. There is no guarding or rebound.   Musculoskeletal:         General: No tenderness or deformity. Normal range of motion.      Cervical back: Normal range of motion and neck supple.   Lymphadenopathy:      Cervical: No cervical adenopathy.   Skin:     General: Skin is warm and dry.      Capillary Refill: Capillary refill takes less than 2 seconds.      Coloration: Skin is not pale.      Findings: No erythema or rash.   Neurological:      Mental Status: She is alert and oriented to person, place, and time.      Cranial Nerves: No cranial nerve deficit.      Sensory: No sensory deficit.      Motor: No tremor, atrophy or abnormal muscle tone.      Coordination: Coordination normal.      Gait: Gait normal.      Deep Tendon Reflexes: Reflexes are normal and symmetric. Reflexes normal.   Psychiatric:         Behavior: Behavior normal.         Thought Content: Thought content normal.         Cognition and Memory: Memory is not impaired. She does not exhibit impaired recent memory or impaired remote memory.          Judgment: Judgment normal.         Assessment    Medicare Risks and Personalized Health Plan  CMS Preventative Services Quick Reference  Advance Directive Discussion    The above risks/problems have been discussed with the patient.  Pertinent information has been shared with the patient in the After Visit Summary.  Follow up plans and orders are seen below in the Assessment/Plan Section.      Visit Diagnoses:    Problems Addressed this Visit        Cardiac and Vasculature    Essential (primary) hypertension     Hypertension is unchanged.  Continue current treatment regimen.  Dietary sodium restriction.  Weight loss.  Blood pressure will be reassessed in 3 months.         Relevant Orders    CBC & Differential (Completed)    Mixed hyperlipidemia    Relevant Orders    Comprehensive Metabolic Panel (Completed)    Lipid Panel With / Chol / HDL Ratio (Completed)    Atherosclerotic heart disease of native coronary artery without angina pectoris       Endocrine and Metabolic    Type 2 diabetes mellitus with stage 4 chronic kidney disease, without long-term current use of insulin (HCC)    Relevant Orders    POC Glucose (Completed)    POC Urinalysis Dipstick, Multipro (Completed)    POC Microalbumin (Completed)    Class 1 obesity due to excess calories with serious comorbidity and body mass index (BMI) of 32.0 to 32.9 in adult       Genitourinary and Reproductive     Stage 4 chronic kidney disease (HCC)    Screening mammogram, encounter for    Relevant Orders    Mammo Screening Digital Tomosynthesis Bilateral With CAD       Health Encounters    Medicare annual wellness visit, subsequent - Primary      Other Visit Diagnoses     Enlarged thyroid        Relevant Orders    TSH (Completed)    Encounter for subsequent annual wellness visit in Medicare patient        Need for hepatitis C screening test        Relevant Orders    Hepatitis C RNA, Quantitative, PCR (graph) (Completed)      Diagnoses       Codes Comments     Medicare annual wellness visit, subsequent    -  Primary ICD-10-CM: Z00.00  ICD-9-CM: V70.0     Type 2 diabetes mellitus with stage 4 chronic kidney disease, without long-term current use of insulin (HCC)     ICD-10-CM: E11.22, N18.4  ICD-9-CM: 250.40, 585.4     Mixed hyperlipidemia     ICD-10-CM: E78.2  ICD-9-CM: 272.2     Stage 4 chronic kidney disease (HCC)     ICD-10-CM: N18.4  ICD-9-CM: 585.4     Enlarged thyroid     ICD-10-CM: E04.9  ICD-9-CM: 240.9     Encounter for subsequent annual wellness visit in Medicare patient     ICD-10-CM: Z00.00  ICD-9-CM: V70.0     Need for hepatitis C screening test     ICD-10-CM: Z11.59  ICD-9-CM: V73.89     Essential (primary) hypertension     ICD-10-CM: I10  ICD-9-CM: 401.9     Atherosclerosis of native coronary artery of native heart without angina pectoris     ICD-10-CM: I25.10  ICD-9-CM: 414.01     Class 1 obesity due to excess calories with serious comorbidity and body mass index (BMI) of 32.0 to 32.9 in adult     ICD-10-CM: E66.09, Z68.32  ICD-9-CM: 278.00, V85.32     Screening mammogram, encounter for     ICD-10-CM: Z12.31  ICD-9-CM: V76.12                Follow Up:  No follow-ups on file.     An After Visit Summary and PPPS were given to the patient.    I wore protective equipment throughout this patient encounter to include mask and eye protection. Hand hygiene was performed before donning protective equipment and after removal when leaving the room.

## 2021-11-10 ENCOUNTER — CONSULT (OUTPATIENT)
Dept: CARDIOLOGY | Facility: CLINIC | Age: 76
End: 2021-11-10

## 2021-11-10 ENCOUNTER — TREATMENT (OUTPATIENT)
Dept: PHYSICAL THERAPY | Facility: CLINIC | Age: 76
End: 2021-11-10

## 2021-11-10 VITALS
HEIGHT: 65 IN | SYSTOLIC BLOOD PRESSURE: 150 MMHG | BODY MASS INDEX: 32.65 KG/M2 | DIASTOLIC BLOOD PRESSURE: 90 MMHG | HEART RATE: 84 BPM | WEIGHT: 196 LBS | OXYGEN SATURATION: 94 %

## 2021-11-10 DIAGNOSIS — G89.29 CHRONIC BILATERAL THORACIC BACK PAIN: Primary | ICD-10-CM

## 2021-11-10 DIAGNOSIS — N28.9 RENAL INSUFFICIENCY: ICD-10-CM

## 2021-11-10 DIAGNOSIS — M54.6 CHRONIC BILATERAL THORACIC BACK PAIN: Primary | ICD-10-CM

## 2021-11-10 DIAGNOSIS — E83.52 HYPERCALCEMIA: ICD-10-CM

## 2021-11-10 DIAGNOSIS — I25.10 ATHEROSCLEROSIS OF NATIVE CORONARY ARTERY OF NATIVE HEART WITHOUT ANGINA PECTORIS: ICD-10-CM

## 2021-11-10 DIAGNOSIS — I70.213 ATHEROSCLEROSIS OF NATIVE ARTERIES OF EXTREMITIES WITH INTERMITTENT CLAUDICATION, BILATERAL LEGS (HCC): Primary | ICD-10-CM

## 2021-11-10 DIAGNOSIS — E78.2 MIXED HYPERLIPIDEMIA: ICD-10-CM

## 2021-11-10 DIAGNOSIS — Z79.02 LONG TERM CURRENT USE OF ANTITHROMBOTICS/ANTIPLATELETS: ICD-10-CM

## 2021-11-10 DIAGNOSIS — I10 ESSENTIAL (PRIMARY) HYPERTENSION: ICD-10-CM

## 2021-11-10 PROCEDURE — 99214 OFFICE O/P EST MOD 30 MIN: CPT | Performed by: INTERNAL MEDICINE

## 2021-11-10 PROCEDURE — 97140 MANUAL THERAPY 1/> REGIONS: CPT | Performed by: PHYSICAL THERAPIST

## 2021-11-10 PROCEDURE — 93000 ELECTROCARDIOGRAM COMPLETE: CPT | Performed by: INTERNAL MEDICINE

## 2021-11-10 PROCEDURE — 97110 THERAPEUTIC EXERCISES: CPT | Performed by: PHYSICAL THERAPIST

## 2021-11-10 NOTE — PROGRESS NOTES
Physical Therapy Daily Progress Note      Patient: Vianey Reeves   : 1945  Diagnosis/ICD-10 Code:  Chronic bilateral thoracic back pain [M54.6, G89.29]  Referring practitioner: Emma Hammer MD  Date of Initial Visit: Type: THERAPY  Noted: 10/29/2021  Today's Date: 11/10/2021  Patient seen for 3 sessions         Vianey Reeves reports: she has had some very difficult nights sleeping maybe getting 3 and a half hours total last night and also struggling due to pain in low back legs and her upper back and ribs at times.    Objective   See Exercise, Manual, and Modality Logs for complete treatment.     Assessment/Plan  Pt. Tolerates treatment well having good response to manual intervention and self stretches. Pt. Would like to resume dry needling when PT return next Tuesday stating her relief was better with that. Pt. Denies estim and heat this day stating she needed to be somewhere sooner than expected upon the conclusion of stretch and exercise.    Progress strengthening /stabilization /functional activity           Timed:         Manual Therapy:    15     mins  [id number];     Therapeutic Exercise:    15     mins  [id number];       Timed Treatment:   30   mins   Total Treatment:     30   mins    Suma Alegria PTA  Physical Therapist Assistant License #[id number]

## 2021-11-10 NOTE — PROGRESS NOTES
Cardiology Office Visit      Encounter Date:  11/10/2021    Patient ID:   Vianey Reeves is a 76 y.o. female.    Reason For Followup:  Coronary artery disease  Hypertension  Hypertensive cardiovascular disease    Brief Clinical History:  Dear Dr. Hammer, Emma Simmons MD    I had the pleasure of seeing Vianey Reeves today. As you are well aware, this is a 76 y.o. female with a known history of ischemic heart disease.  She underwent cardiac catheterization with PCI and drug-eluting stent placement on April 9, 2016 and follow-up PCI on 5/25/2016. She presents today for follow-up on the above conditions.        Interval History:  She denies any chest pain pressure heaviness or tightness.  She denies any shortness of breath out of character. She denies any PND orthopnea.  She denies any syncope or near syncope.  She reports feeling well today.    Her blood pressure is elevated today however it was well controlled on her last visit and normal in your office just over a week ago.  As you know we have had a difficult time controlling her blood pressure but it seems for the most part we have struck on a combination that provides her adequate control.  She does have some occasional orthostasis with the doxazosin but it is tolerable.    Her most recent laboratory data revealed a sharp incline in her calcium level.  No thyroid abnormalities were noted.  She does have a history of chronic renal failure.  I reached out to her nephrologist during her visit to discuss the values.  We will get a PTH level and have her follow-up with her nephrologist for further analysis and therapy.    As you know, with regards to her hypertension, we have a limited repertoire of medications that can be utilized.  We have stayed away from ACE/ARB medications due to the presence of a solitary kidney and renal insufficiency.  Calcium channel blockers appear to cause edema requiring diuretic usage which can exacerbate renal insufficiency.  Potassium  "sparing diuretics can also be problematic with renal insufficiency.  Beta-blockers appear to be causing fatigue and bradycardia.  Clonidine results in significant lability in blood pressure and can also contribute to bradycardia.  Hydralazine is very cumbersome to take.     Assessment & Plan     Impressions:  Coronary artery disease status post PCI and drug-eluting stent placement in her mid RCA and Cx        Negative nuclear stress test May 2019  Diabetes mellitus.  Dyslipidemia with intolerance to statins.  Hypertension with hypertensive cardiovascular disease.      Suboptimal with limited medication repertoire  COPD  Solitary kidney.  Chronic renal failure with declining GFR     Followed by Dr. Julio  Tobacco abuse  Anti-platelet therapy.  Hypercalcemia  Fatigue  Dizziness     Recommendations:  Continuation of her current cardiovascular regimen at the present time.  Continue to monitor blood pressure periodically  PTH level  Referral to nephrology office  Follow-up as scheduled in February.    Objective:    Vitals:  Vitals:    11/10/21 1345   BP: 150/90   BP Location: Left arm   Patient Position: Sitting   Pulse: 84   SpO2: 94%   Weight: 88.9 kg (196 lb)   Height: 165.1 cm (65\")       Physical Exam:    General: Alert, cooperative, no distress, appears stated age  Head:  Normocephalic, atraumatic, mucous membranes moist  Eyes:  Conjunctiva/corneas clear, EOM's intact     Neck:  Supple,  no bruit  Lungs: Clear to auscultation bilaterally, no wheezes rhonchi rales are noted  Chest wall: No tenderness  Heart::  Regular rate and rhythm, S1 and S2 normal, 1/6 holosystolic murmur.  No rub or gallop  Abdomen: Soft, non-tender, nondistended bowel sounds active  Extremities: No cyanosis, clubbing, or edema  Pulses: 2+ and symmetric all extremities  Skin:  No rashes or lesions  Neuro/psych: A&O x3. CN II through XII are grossly intact with appropriate affect      Allergies:  Allergies   Allergen Reactions   • Erythromycin " Rash   • Naproxen Sodium Other (See Comments)     Dizzy lightheaded   • Latex Itching and Swelling   • Metoclopramide Other (See Comments)     Unsteady on feet       Medication Review:     Current Outpatient Medications:   •  amLODIPine (NORVASC) 10 MG tablet, TAKE 1 TABLET BY MOUTH EVERY DAY, Disp: 90 tablet, Rfl: 4  •  aspirin 81 MG tablet, Take 81 mg by mouth Daily. dont take dos, Disp: , Rfl:   •  calcium-vitamin D (Oscal 500/200 D-3) 500-200 MG-UNIT per tablet, Take  by mouth 2 (Two) Times a Week. Twice weekly, Disp: , Rfl:   •  carvedilol (COREG) 3.125 MG tablet, TAKE 1 TABLET BY MOUTH EVERY 12 HOURS, Disp: 180 tablet, Rfl: 4  •  coenzyme Q10 100 MG capsule, Take 1 capsule by mouth 2 (Two) Times a Week., Disp: , Rfl:   •  doxazosin (Cardura) 2 MG tablet, Take 1 tablet by mouth Every Night., Disp: 90 tablet, Rfl: 3  •  fluticasone (FLONASE) 50 MCG/ACT nasal spray, 2 sprays into the nostril(s) as directed by provider As Needed., Disp: , Rfl:   •  fluticasone-salmeterol (ADVAIR HFA) 45-21 MCG/ACT inhaler, Inhale 2 puffs 2 (Two) Times a Day., Disp: 1 inhaler, Rfl: 12  •  gabapentin (NEURONTIN) 100 MG capsule, Take 100 mg by mouth 2 (Two) Times a Day., Disp: , Rfl:   •  Glucose Blood (Blood Glucose Test) strip, 1 each by In Vitro route Daily., Disp: 100 each, Rfl: 3  •  Januvia 100 MG tablet, TAKE 1 TABLET BY MOUTH EVERY DAY, Disp: 90 tablet, Rfl: 3  •  Lantus SoloStar 100 UNIT/ML injection pen, INJECT 20 UNITS UNDER THE SKIN INTO THE APPROPRIATE AREA AS DIRECTED EVERY NIGHT., Disp: 5 pen, Rfl: 11  •  linaclotide (Linzess) 72 MCG capsule capsule, Take 1 capsule by mouth Every Morning Before Breakfast., Disp: 30 capsule, Rfl: 12  •  montelukast (SINGULAIR) 10 MG tablet, TAKE 1 TABLET BY MOUTH EVERY DAY AT NIGHT, Disp: 30 tablet, Rfl: 0  •  Repatha SureClick solution auto-injector SureClick injection, INJECT 1 ML UNDER THE SKIN INTO THE APPROPRIATE AREA AS DIRECTED EVERY 14 (FOURTEEN) DAYS., Disp: 1 mL, Rfl: 12  •   sertraline (Zoloft) 50 MG tablet, Take 1 tablet by mouth Daily., Disp: 90 tablet, Rfl: 3  •  tiZANidine (ZANAFLEX) 2 MG tablet, Take 1 tablet by mouth At Night As Needed for Muscle Spasms., Disp: 30 tablet, Rfl: 0  •  traMADol (ULTRAM) 50 MG tablet, Take 50 mg by mouth 2 (Two) Times a Day., Disp: , Rfl:   •  Triamcinolone Acetonide (NASACORT) 55 MCG/ACT nasal inhaler, 2 sprays into the nostril(s) as directed by provider Daily., Disp: 16.5 g, Rfl: 0    Family History:  Family History   Problem Relation Age of Onset   • Arthritis Father    • Prostate cancer Father    • Heart disease Sister        Past Medical History:  Past Medical History:   Diagnosis Date   • Allergic     latex allergy   • Allergies    • Anxiety    • Bilateral carotid bruits    • Bulging of thoracic intervertebral disc    • Cancer (HCC)     ureter    surgery   • CKD (chronic kidney disease)    • Claudication, intermittent (HCC)    • COPD (chronic obstructive pulmonary disease) (HCC)    • Coronary artery disease    • DDD (degenerative disc disease), thoracic    • Diabetes mellitus (HCC)    • DJD (degenerative joint disease)    • Gout    • 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   • Hyperlipidemia    • Hypertension    • IBS (irritable colon syndrome)     constipation   • Mass of right breast    • Neuropathy    • Renal insufficiency    • Simple chronic bronchitis (HCC)    • Solitary kidney     left       Past surgical History:  Past Surgical History:   Procedure Laterality Date   • BREAST BIOPSY Right    • CARDIAC CATHETERIZATION     • CHOLECYSTECTOMY     • COLON SURGERY      REMOVAL diverticular diseae   • COLONOSCOPY N/A 8/12/2021    Procedure: COLONOSCOPY with polypectomy x 3;  Surgeon: Margarette Mcallister MD;  Location: [facility] ENDOSCOPY;  Service: Gastroenterology;  Laterality: N/A;  post op: hmorrhoids, diverticulosis, polyps   • CORONARY STENT PLACEMENT     • ENDOSCOPY N/A 8/12/2021    Procedure: ESOPHAGOGASTRODUODENOSCOPY with dilatation (18-20 mm  balloon) and (54 bougie);  Surgeon: Margarette Mcallister MD;  Location: [facility] ENDOSCOPY;  Service: Gastroenterology;  Laterality: N/A;  post op: esophageal stricture, esophagitis, gastritis, history of nissen   • EYE SURGERY Bilateral    • HIATAL HERNIA REPAIR     • NEPHRECTOMY Right        Social History:  Social History     Socioeconomic History   • Marital status:    Tobacco Use   • Smoking status: Current Every Day Smoker     Packs/day: 0.25     Years: 50.00     Pack years: 12.50     Types: Cigarettes   • Smokeless tobacco: Never Used   Vaping Use   • Vaping Use: Never used   Substance and Sexual Activity   • Alcohol use: Yes     Comment: occasionally    • Drug use: Never   • Sexual activity: Defer       Review of Systems:  The following systems were reviewed as they relate to the cardiovascular system: Constitutional, Eyes, ENT, Cardiovascular, Respiratory, Gastrointestinal, Integumentary, Neurological, Psychiatric, Hematologic, Endocrine, Musculoskeletal, and Genitourinary. The pertinent cardiovascular findings are reported above with all other cardiovascular points within those systems being negative.    Diagnostic Study Review:     Current Electrocardiogram:    ECG 12 Lead    Date/Time: 11/10/2021 4:20 PM  Performed by: Leon Gonzalez DO  Authorized by: Leon Gonzalez DO   Comparison: not compared with previous ECG   Previous ECG: no previous ECG available  Comments: Sinus rhythm with a ventricular rate of 96 bpm.  Significant baseline artifact.  Ventricular premature complex.  Normal QT and QTc intervals.               NOTE: The following portions of the patient's note were reviewed, confirmed and/or updated this visit as appropriate: History of present illness/Interval history, physical examination, assessment & plan, allergies, current medications, past family history, past medical history, past social history, past surgical history and problem list.

## 2021-11-10 NOTE — PATIENT INSTRUCTIONS
Follow-up with Dr Julio to discuss calcium levels  Intact PTH level  Follow-up as scheduled but go to bren

## 2021-11-12 ENCOUNTER — APPOINTMENT (OUTPATIENT)
Dept: MAMMOGRAPHY | Facility: HOSPITAL | Age: 76
End: 2021-11-12

## 2021-11-12 LAB
ALBUMIN SERPL-MCNC: 4 G/DL (ref 3.7–4.7)
ALBUMIN/GLOB SERPL: 1.6 {RATIO} (ref 1.2–2.2)
ALP SERPL-CCNC: 73 IU/L (ref 44–121)
ALT SERPL-CCNC: 12 IU/L (ref 0–32)
AST SERPL-CCNC: 12 IU/L (ref 0–40)
BASOPHILS # BLD AUTO: 0 X10E3/UL (ref 0–0.2)
BASOPHILS NFR BLD AUTO: 1 %
BILIRUB SERPL-MCNC: 0.3 MG/DL (ref 0–1.2)
BUN SERPL-MCNC: 20 MG/DL (ref 8–27)
BUN/CREAT SERPL: 12 (ref 12–28)
CALCIUM SERPL-MCNC: 9 MG/DL (ref 8.7–10.3)
CALCIUM SERPL-MCNC: 9.4 MG/DL (ref 8.7–10.3)
CHLORIDE SERPL-SCNC: 104 MMOL/L (ref 96–106)
CHOLEST SERPL-MCNC: 201 MG/DL (ref 100–199)
CHOLEST/HDLC SERPL: 5.6 RATIO (ref 0–4.4)
CO2 SERPL-SCNC: 24 MMOL/L (ref 20–29)
CREAT SERPL-MCNC: 1.68 MG/DL (ref 0.57–1)
EOSINOPHIL # BLD AUTO: 0.1 X10E3/UL (ref 0–0.4)
EOSINOPHIL NFR BLD AUTO: 1 %
ERYTHROCYTE [DISTWIDTH] IN BLOOD BY AUTOMATED COUNT: 12.9 % (ref 11.7–15.4)
GLOBULIN SER CALC-MCNC: 2.5 G/DL (ref 1.5–4.5)
GLUCOSE SERPL-MCNC: 98 MG/DL (ref 65–99)
HCT VFR BLD AUTO: 43.1 % (ref 34–46.6)
HDLC SERPL-MCNC: 36 MG/DL
HGB BLD-MCNC: 14.4 G/DL (ref 11.1–15.9)
IMM GRANULOCYTES # BLD AUTO: 0 X10E3/UL (ref 0–0.1)
IMM GRANULOCYTES NFR BLD AUTO: 0 %
LDLC SERPL CALC-MCNC: 144 MG/DL (ref 0–99)
LYMPHOCYTES # BLD AUTO: 2.1 X10E3/UL (ref 0.7–3.1)
LYMPHOCYTES NFR BLD AUTO: 26 %
MCH RBC QN AUTO: 31.4 PG (ref 26.6–33)
MCHC RBC AUTO-ENTMCNC: 33.4 G/DL (ref 31.5–35.7)
MCV RBC AUTO: 94 FL (ref 79–97)
MONOCYTES # BLD AUTO: 0.7 X10E3/UL (ref 0.1–0.9)
MONOCYTES NFR BLD AUTO: 9 %
NEUTROPHILS # BLD AUTO: 5 X10E3/UL (ref 1.4–7)
NEUTROPHILS NFR BLD AUTO: 63 %
PLATELET # BLD AUTO: 224 X10E3/UL (ref 150–450)
POTASSIUM SERPL-SCNC: 4.7 MMOL/L (ref 3.5–5.2)
PROT SERPL-MCNC: 6.5 G/DL (ref 6–8.5)
PTH-INTACT SERPL-MCNC: 41 PG/ML (ref 15–65)
RBC # BLD AUTO: 4.59 X10E6/UL (ref 3.77–5.28)
SODIUM SERPL-SCNC: 144 MMOL/L (ref 134–144)
TRIGL SERPL-MCNC: 116 MG/DL (ref 0–149)
TSH SERPL DL<=0.005 MIU/L-ACNC: 1.68 UIU/ML (ref 0.45–4.5)
VLDLC SERPL CALC-MCNC: 21 MG/DL (ref 5–40)
WBC # BLD AUTO: 8 X10E3/UL (ref 3.4–10.8)

## 2021-11-14 LAB
HCV RNA SERPL NAA+PROBE-ACNC: NORMAL IU/ML
TEST INFORMATION: NORMAL

## 2021-11-16 ENCOUNTER — TREATMENT (OUTPATIENT)
Dept: PHYSICAL THERAPY | Facility: CLINIC | Age: 76
End: 2021-11-16

## 2021-11-16 DIAGNOSIS — G89.29 CHRONIC BILATERAL THORACIC BACK PAIN: Primary | ICD-10-CM

## 2021-11-16 DIAGNOSIS — M54.6 CHRONIC BILATERAL THORACIC BACK PAIN: Primary | ICD-10-CM

## 2021-11-16 PROCEDURE — G0283 ELEC STIM OTHER THAN WOUND: HCPCS | Performed by: PHYSICAL THERAPIST

## 2021-11-16 PROCEDURE — 97140 MANUAL THERAPY 1/> REGIONS: CPT | Performed by: PHYSICAL THERAPIST

## 2021-11-16 PROCEDURE — 97110 THERAPEUTIC EXERCISES: CPT | Performed by: PHYSICAL THERAPIST

## 2021-11-16 NOTE — PROGRESS NOTES
Late entry due to error in not completing full documentation date of service:  Dry needling completed with written consent to the following areas: B thoracic paraspinals ~T4-T10, spinous process of T7.     Christina Lucero, PT

## 2021-11-16 NOTE — PROGRESS NOTES
Physical Therapy Daily Progress Note      Patient: Vianey Reeves   : 1945  Diagnosis/ICD-10 Code:  Chronic bilateral thoracic back pain [M54.6, G89.29]  Referring practitioner: Emma Hammer MD  Date of Initial Visit: Type: THERAPY  Noted: 10/29/2021  Today's Date: 2021  Patient seen for 4 sessions         Vianey Reeves reports: she has felt about the same continuing to have knots throughout her shoulders and scapular regions. Pt. States she has gone to the chiropractor some and it helps a little but not a lot. Pt. States Dry needling did well for her the first time and would like to have that again today.    Objective   See Exercise, Manual, and Modality Logs for complete treatment.     Assessment/Plan   Pt. responds well to manual intervention and stretching in the short term with relief of soreness after Estim and heat application however all relief provided is currently short term and arzola snot have very good carry over. Pt. Continues t have best response to Dry Needling and feels greatest relief after needles are removed.    Progress strengthening /stabilization /functional activity           Timed:         Manual Therapy:    15     mins  13904;     Therapeutic Exercise:    10     mins  75475;       Un-Timed:  Electrical Stimulation:    15     mins  47500 ( );  Dry Needling    10      mins self-pay      Timed Treatment:   25   mins   Total Treatment:     50   mins    Suma Alegria PTA  Physical Therapist Assistant License #[id number]

## 2021-11-16 NOTE — PROGRESS NOTES
Dry needling completed with written consent to the following areas: B thoracic paraspinals ~T4-T10, spinous process of T7.    Christina Lucero, PT

## 2021-11-17 DIAGNOSIS — M54.6 ACUTE BILATERAL THORACIC BACK PAIN: ICD-10-CM

## 2021-11-17 RX ORDER — TIZANIDINE 2 MG/1
2 TABLET ORAL NIGHTLY PRN
Qty: 30 TABLET | Refills: 0 | Status: SHIPPED | OUTPATIENT
Start: 2021-11-17 | End: 2021-12-14

## 2021-11-19 ENCOUNTER — TELEPHONE (OUTPATIENT)
Dept: FAMILY MEDICINE CLINIC | Facility: CLINIC | Age: 76
End: 2021-11-19

## 2021-11-19 NOTE — TELEPHONE ENCOUNTER
Called pt and she said she has never taken zetia she did ask if she needs to take the reptha again? She said to send the zetia to [facility]

## 2021-11-19 NOTE — TELEPHONE ENCOUNTER
----- Message from Emma Hammer MD sent at 11/19/2021  7:58 AM EST -----  Her labs are ok except for her cholesterol. Can she take zetia?  Have her start red yeast rice

## 2021-11-22 RX ORDER — EZETIMIBE 10 MG/1
10 TABLET ORAL DAILY
Qty: 30 TABLET | Refills: 12 | Status: SHIPPED | OUTPATIENT
Start: 2021-11-22 | End: 2021-12-14

## 2021-11-24 ENCOUNTER — TREATMENT (OUTPATIENT)
Dept: PHYSICAL THERAPY | Facility: CLINIC | Age: 76
End: 2021-11-24

## 2021-11-24 DIAGNOSIS — G89.29 CHRONIC BILATERAL THORACIC BACK PAIN: Primary | ICD-10-CM

## 2021-11-24 DIAGNOSIS — M54.6 CHRONIC BILATERAL THORACIC BACK PAIN: Primary | ICD-10-CM

## 2021-11-24 PROCEDURE — 97140 MANUAL THERAPY 1/> REGIONS: CPT | Performed by: PHYSICAL THERAPIST

## 2021-11-24 PROCEDURE — G0283 ELEC STIM OTHER THAN WOUND: HCPCS | Performed by: PHYSICAL THERAPIST

## 2021-11-24 PROCEDURE — 20561 NDL INSJ W/O NJX 3+ MUSC: CPT | Performed by: PHYSICAL THERAPIST

## 2021-11-24 NOTE — PROGRESS NOTES
Physical Therapy Daily Progress Note      Patient: Vianey Reeves   : 1945  Diagnosis/ICD-10 Code:  Chronic bilateral thoracic back pain [M54.6, G89.29]  Referring practitioner: Emma Hammer MD  Date of Initial Visit: Type: THERAPY  Noted: 10/29/2021  Today's Date: 2021  Patient seen for 5 sessions         Vianey Reeves reports: she has a little improvement in her ability to move but states she still has aches and pains constantly and feels she has knots in her B UT's and Back but she attempts to stretch and work them out and states moving helps but what helps her best is deep pressure.     Oswestry 25/50 = 50% impairment    Objective   See Exercise, Manual, and Modality Logs for complete treatment.     Assessment/Plan   Pt. responds well to manual techniques this visit and exercise was held due to patient compliance with HEP and greater need being for relief of tension. Pt. continues to benefit from dry needling with progressive relief with each application. Pt. states she is noticing more mobility without pain at work and home. Pt. Reports good relief post treatment this date feeling much less tense and presenting with improved posture and appears more relaxed in stance.    Goals  Plan Goals: STG:  - Pt to report 25% or better improvement in sleep in 3 weeks. MET  - Pt to demonstrate improved posture with min verbal cues in 3 weeks. MET  - Pt to be independent with HEP in 3 weeks. MET  LTG:  - Improve Oswestry to 30% or less impairment by discharge. Progressing   - Pt to report ability to stand up to 30 min with no increase in pain by discharge. Progressing   - Pt to rate max pain at 2-3/10 with activity by discharge. Not Met  - Pt to have no thoracic pain with bending over to retrieve items from floor by discharge. Not Met    Progress strengthening /stabilization /functional activity           Timed:         Manual Therapy:    20     mins  83990;       Un-Timed:  Electrical Stimulation:     15     mins  11084 ( );  Dry Needling     15     mins self-pay    Timed Treatment:   20   mins   Total Treatment:     50   mins    Suma Alegria PTA  Physical Therapist Assistant License #[id number]

## 2021-11-25 DIAGNOSIS — R09.81 SINUS CONGESTION: ICD-10-CM

## 2021-11-26 RX ORDER — MONTELUKAST SODIUM 10 MG/1
10 TABLET ORAL
Qty: 30 TABLET | Refills: 0 | Status: SHIPPED | OUTPATIENT
Start: 2021-11-26 | End: 2021-12-19

## 2021-12-01 NOTE — PROGRESS NOTES
Subjective   Vianey Reeves is a 76 y.o. female. Presents to [facility]    Chief Complaint   Patient presents with   • Acute bilateral thoracic pain     follow up       Back Pain  This is a chronic problem. The current episode started more than 1 year ago. The problem occurs constantly. The problem has been gradually worsening since onset. The pain is present in the thoracic spine and lumbar spine. The quality of the pain is described as aching. Radiates to: right hip. The pain is at a severity of 9/10. The pain is severe. The pain is the same all the time. The symptoms are aggravated by standing and twisting (lifting). Stiffness is present in the morning. Pertinent negatives include no abdominal pain, chest pain, fever, numbness or tingling. She has tried muscle relaxant, home exercises and NSAIDs (pain management) for the symptoms. The treatment provided moderate relief.        I personally reviewed and updated the patient's allergies, medications, problem list, and past medical, surgical, social, and family history. I have reviewed and confirmed the accuracy of the History of Present Illness and Review of Symptoms as documented by the MA/LPN/RN. Emma Hammer MD    Allergies:  Allergies   Allergen Reactions   • Erythromycin Rash   • Naproxen Sodium Other (See Comments)     Dizzy lightheaded   • Latex Itching and Swelling   • Metoclopramide Other (See Comments)     Unsteady on feet       Social History:  Social History     Socioeconomic History   • Marital status:    Tobacco Use   • Smoking status: Current Every Day Smoker     Packs/day: 0.25     Years: 50.00     Pack years: 12.50     Types: Cigarettes   • Smokeless tobacco: Never Used   Vaping Use   • Vaping Use: Never used   Substance and Sexual Activity   • Alcohol use: Yes     Comment: occasionally    • Drug use: Never   • Sexual activity: Defer       Family History:  Family History   Problem Relation Age of Onset   • Arthritis  Father    • Prostate cancer Father    • Heart disease Sister        Past Medical History :  Patient Active Problem List   Diagnosis   • Type 2 diabetes mellitus with stage 4 chronic kidney disease, without long-term current use of insulin (HCC)   • Essential (primary) hypertension   • Mixed hyperlipidemia   • Hiatal hernia   • Long term current use of antithrombotics/antiplatelets   • Chronic obstructive pulmonary disease (HCC)   • Atherosclerotic heart disease of native coronary artery without angina pectoris   • Irritable bowel syndrome with constipation   • History of cancer of ureter   • Solitary kidney   • Atherosclerosis of native arteries of extremities with intermittent claudication, bilateral legs (HCC)   • H/O malignant neoplasm of ureter   • Vertigo   • Medicare annual wellness visit, subsequent   • Presbyopia   • Combined form of senile cataract   • Cervical cancer screening   • Postartificial menopausal syndrome   • Diverticula of colon   • Ruptured tympanic membrane, left   • Hearing loss, bilateral   • TMJ pain dysfunction syndrome   • Diverticulitis   • Stage 4 chronic kidney disease (HCC)   • Gastroenteritis   • Other dysphagia   • Lactose intolerance   • Left foot pain   • Polyarthralgia   • Chronic bilateral thoracic back pain   • Class 1 obesity due to excess calories with serious comorbidity and body mass index (BMI) of 32.0 to 32.9 in adult   • Anxiety   • Hypercalcemia   • Daytime somnolence   • Screening mammogram, encounter for   • Chronic bilateral low back pain without sciatica   • Acute non-recurrent maxillary sinusitis       Medication List:    Current Outpatient Medications:   •  amLODIPine (NORVASC) 10 MG tablet, TAKE 1 TABLET BY MOUTH EVERY DAY, Disp: 90 tablet, Rfl: 4  •  aspirin 81 MG tablet, Take 81 mg by mouth Daily. dont take dos, Disp: , Rfl:   •  calcium-vitamin D (Oscal 500/200 D-3) 500-200 MG-UNIT per tablet, Take  by mouth 2 (Two) Times a Week. Twice weekly, Disp: , Rfl:   •   carvedilol (COREG) 3.125 MG tablet, TAKE 1 TABLET BY MOUTH EVERY 12 HOURS, Disp: 180 tablet, Rfl: 4  •  coenzyme Q10 100 MG capsule, Take 1 capsule by mouth 2 (Two) Times a Week., Disp: , Rfl:   •  Diclofenac Sodium (VOLTAREN) 1 % gel gel, , Disp: , Rfl:   •  doxazosin (Cardura) 2 MG tablet, Take 1 tablet by mouth Every Night., Disp: 90 tablet, Rfl: 3  •  ezetimibe (ZETIA) 10 MG tablet, Take 1 tablet by mouth Daily., Disp: 30 tablet, Rfl: 12  •  fluticasone (FLONASE) 50 MCG/ACT nasal spray, 2 sprays into the nostril(s) as directed by provider As Needed., Disp: , Rfl:   •  fluticasone-salmeterol (ADVAIR HFA) 45-21 MCG/ACT inhaler, Inhale 2 puffs 2 (Two) Times a Day., Disp: 1 inhaler, Rfl: 12  •  gabapentin (NEURONTIN) 100 MG capsule, Take 100 mg by mouth 2 (Two) Times a Day., Disp: , Rfl:   •  Glucose Blood (Blood Glucose Test) strip, 1 each by In Vitro route Daily., Disp: 100 each, Rfl: 3  •  Januvia 100 MG tablet, TAKE 1 TABLET BY MOUTH EVERY DAY, Disp: 90 tablet, Rfl: 3  •  Lantus SoloStar 100 UNIT/ML injection pen, INJECT 20 UNITS UNDER THE SKIN INTO THE APPROPRIATE AREA AS DIRECTED EVERY NIGHT., Disp: 5 pen, Rfl: 11  •  linaclotide (Linzess) 72 MCG capsule capsule, Take 1 capsule by mouth Every Morning Before Breakfast., Disp: 30 capsule, Rfl: 12  •  methylPREDNISolone (MEDROL) 4 MG dose pack, , Disp: , Rfl:   •  montelukast (SINGULAIR) 10 MG tablet, Take 1 tablet by mouth every night at bedtime., Disp: 30 tablet, Rfl: 0  •  Repatha SureClick solution auto-injector SureClick injection, INJECT 1 ML UNDER THE SKIN INTO THE APPROPRIATE AREA AS DIRECTED EVERY 14 (FOURTEEN) DAYS., Disp: 1 mL, Rfl: 12  •  sertraline (Zoloft) 50 MG tablet, Take 1 tablet by mouth Daily., Disp: 90 tablet, Rfl: 3  •  tiZANidine (ZANAFLEX) 2 MG tablet, TAKE 1 TABLET BY MOUTH AT NIGHT AS NEEDED FOR MUSCLE SPASMS., Disp: 30 tablet, Rfl: 0  •  traMADol (ULTRAM) 50 MG tablet, Take 50 mg by mouth 2 (Two) Times a Day., Disp: , Rfl:   •   Triamcinolone Acetonide (NASACORT) 55 MCG/ACT nasal inhaler, 2 sprays into the nostril(s) as directed by provider Daily., Disp: 16.5 g, Rfl: 0  •  cephalexin (KEFLEX) 500 MG capsule, Take 1 capsule by mouth 3 (Three) Times a Day., Disp: 30 capsule, Rfl: 0    Past Surgical History:  Past Surgical History:   Procedure Laterality Date   • BREAST BIOPSY Right    • CARDIAC CATHETERIZATION     • CHOLECYSTECTOMY     • COLON SURGERY      REMOVAL diverticular diseae   • COLONOSCOPY N/A 8/12/2021    Procedure: COLONOSCOPY with polypectomy x 3;  Surgeon: Margarette Mcallister MD;  Location: [facility] ENDOSCOPY;  Service: Gastroenterology;  Laterality: N/A;  post op: hmorrhoids, diverticulosis, polyps   • CORONARY STENT PLACEMENT     • ENDOSCOPY N/A 8/12/2021    Procedure: ESOPHAGOGASTRODUODENOSCOPY with dilatation (18-20 mm balloon) and (54 bougie);  Surgeon: Margarette Mcallister MD;  Location: [facility] ENDOSCOPY;  Service: Gastroenterology;  Laterality: N/A;  post op: esophageal stricture, esophagitis, gastritis, history of nissen   • EYE SURGERY Bilateral    • HIATAL HERNIA REPAIR     • NEPHRECTOMY Right        Review of Systems:  Review of Systems   Constitutional: Negative for activity change and fever.   HENT: Negative for ear pain, rhinorrhea, sinus pressure and voice change.    Eyes: Negative for visual disturbance.   Respiratory: Negative for cough and shortness of breath.    Cardiovascular: Negative for chest pain.   Gastrointestinal: Negative for abdominal pain, diarrhea, nausea and vomiting.   Endocrine: Negative for cold intolerance and heat intolerance.   Genitourinary: Negative for frequency and urgency.   Musculoskeletal: Positive for back pain. Negative for arthralgias.   Skin: Negative for rash.   Neurological: Negative for tingling, syncope and numbness.   Hematological: Does not bruise/bleed easily.   Psychiatric/Behavioral: Negative for depressed mood. The patient is not nervous/anxious.        Physical Exam:  Vital  "Signs:  Vital Signs:   /64 (BP Location: Right arm, Patient Position: Sitting, Cuff Size: Large Adult)   Pulse 72   Temp 98.2 °F (36.8 °C) (Skin)   Resp 18   Ht 165.1 cm (65\")   SpO2 97%   BMI 32.62 kg/m²     Result Review :                Physical Exam  Vitals reviewed.   Constitutional:       General: She is not in acute distress.     Appearance: Normal appearance. She is well-developed. She is not diaphoretic.   HENT:      Head: Normocephalic and atraumatic.      Right Ear: External ear normal. No decreased hearing noted. No tenderness. No middle ear effusion. Tympanic membrane is not injected, erythematous, retracted or bulging.      Left Ear: External ear normal. No decreased hearing noted. No tenderness.  No middle ear effusion. Tympanic membrane is not injected, erythematous, retracted or bulging.      Nose: Nose normal. No rhinorrhea.      Mouth/Throat:      Pharynx: No oropharyngeal exudate or posterior oropharyngeal erythema.   Eyes:      General:         Right eye: No discharge.         Left eye: No discharge.   Cardiovascular:      Rate and Rhythm: Normal rate and regular rhythm.      Heart sounds: Normal heart sounds. No murmur heard.  No friction rub. No gallop.    Pulmonary:      Effort: Pulmonary effort is normal. No respiratory distress.      Breath sounds: Normal breath sounds. No wheezing or rales.   Musculoskeletal:         General: No deformity.      Thoracic back: Spasms and tenderness present.      Lumbar back: Spasms and tenderness present. No deformity. Normal range of motion.   Lymphadenopathy:      Cervical: No cervical adenopathy.   Skin:     General: Skin is warm and dry.      Findings: No rash.   Neurological:      Mental Status: She is alert and oriented to person, place, and time.      Motor: No abnormal muscle tone.      Coordination: Coordination normal.      Gait: Gait normal.      Deep Tendon Reflexes: Reflexes normal.   Psychiatric:         Behavior: Behavior is " cooperative.         Assessment and Plan:  Problems Addressed this Visit        ENT    Acute non-recurrent maxillary sinusitis     increase fluids, tylenol for fever, motrin for pain. Humidifier to help with congestion and to sleep at night. Dicussed OTC meds, gargle with warm salt water. If there is recurrent fever, shortness of breath, lethargy, advised to come in to the office or go to the ER.              Musculoskeletal and Injuries    Chronic bilateral thoracic back pain - Primary     She is getting PT and it is helping.          Chronic bilateral low back pain without sciatica     Ice three times a day for about 10-15 minutes for the first 1-2 days. Then may alternate heat and ice. Better body mechanics discussed. Home exercises discussed and hand out given. Discussed nsaids and if they can be taken. May need imaging and or PT if persists.  Discussed red flags, if there is severe pain, fever with pain, loss of movement in one or both legs pain, numbness in groin or both legs, trouble urinating or defecating on oneself, then patient is to go to the ER.            Relevant Orders    XR Spine Lumbar 2 or 3 View    Ambulatory Referral to Physical Therapy Evaluate and treat    Ambulatory Referral to Pain Management      Other Visit Diagnoses     At low risk for fall          Diagnoses       Codes Comments    Chronic bilateral thoracic back pain    -  Primary ICD-10-CM: M54.6, G89.29  ICD-9-CM: 724.1, 338.29     Chronic bilateral low back pain without sciatica     ICD-10-CM: M54.50, G89.29  ICD-9-CM: 724.2, 338.29     At low risk for fall     ICD-10-CM: Z91.81  ICD-9-CM: V49.89     Acute non-recurrent maxillary sinusitis     ICD-10-CM: J01.00  ICD-9-CM: 461.0            An After Visit Summary and PPPS were given to the patient.       I wore protective equipment throughout this patient encounter to include mask. Hand hygiene was performed before donning protective equipment and after removal when leaving the  room.

## 2021-12-02 ENCOUNTER — OFFICE VISIT (OUTPATIENT)
Dept: FAMILY MEDICINE CLINIC | Facility: CLINIC | Age: 76
End: 2021-12-02

## 2021-12-02 VITALS
HEART RATE: 72 BPM | SYSTOLIC BLOOD PRESSURE: 132 MMHG | HEIGHT: 65 IN | OXYGEN SATURATION: 97 % | DIASTOLIC BLOOD PRESSURE: 64 MMHG | TEMPERATURE: 98.2 F | RESPIRATION RATE: 18 BRPM | BODY MASS INDEX: 32.62 KG/M2

## 2021-12-02 DIAGNOSIS — M54.50 CHRONIC BILATERAL LOW BACK PAIN WITHOUT SCIATICA: ICD-10-CM

## 2021-12-02 DIAGNOSIS — Z91.81 AT LOW RISK FOR FALL: ICD-10-CM

## 2021-12-02 DIAGNOSIS — M54.6 CHRONIC BILATERAL THORACIC BACK PAIN: Primary | ICD-10-CM

## 2021-12-02 DIAGNOSIS — G89.29 CHRONIC BILATERAL THORACIC BACK PAIN: Primary | ICD-10-CM

## 2021-12-02 DIAGNOSIS — J01.00 ACUTE NON-RECURRENT MAXILLARY SINUSITIS: ICD-10-CM

## 2021-12-02 DIAGNOSIS — G89.29 CHRONIC BILATERAL LOW BACK PAIN WITHOUT SCIATICA: ICD-10-CM

## 2021-12-02 PROCEDURE — 99213 OFFICE O/P EST LOW 20 MIN: CPT | Performed by: FAMILY MEDICINE

## 2021-12-02 RX ORDER — METHYLPREDNISOLONE 4 MG/1
TABLET ORAL
COMMUNITY
Start: 2021-11-23 | End: 2021-12-28

## 2021-12-02 NOTE — PATIENT INSTRUCTIONS

## 2021-12-03 ENCOUNTER — TELEPHONE (OUTPATIENT)
Dept: FAMILY MEDICINE CLINIC | Facility: CLINIC | Age: 76
End: 2021-12-03

## 2021-12-03 ENCOUNTER — TREATMENT (OUTPATIENT)
Dept: PHYSICAL THERAPY | Facility: CLINIC | Age: 76
End: 2021-12-03

## 2021-12-03 DIAGNOSIS — M54.6 CHRONIC BILATERAL THORACIC BACK PAIN: Primary | ICD-10-CM

## 2021-12-03 DIAGNOSIS — G89.29 CHRONIC BILATERAL THORACIC BACK PAIN: Primary | ICD-10-CM

## 2021-12-03 PROBLEM — J01.00 ACUTE NON-RECURRENT MAXILLARY SINUSITIS: Status: ACTIVE | Noted: 2021-12-03

## 2021-12-03 PROCEDURE — 20561 NDL INSJ W/O NJX 3+ MUSC: CPT | Performed by: PHYSICAL THERAPIST

## 2021-12-03 PROCEDURE — G0283 ELEC STIM OTHER THAN WOUND: HCPCS | Performed by: PHYSICAL THERAPIST

## 2021-12-03 PROCEDURE — 97140 MANUAL THERAPY 1/> REGIONS: CPT | Performed by: PHYSICAL THERAPIST

## 2021-12-03 RX ORDER — CEPHALEXIN 500 MG/1
500 CAPSULE ORAL 3 TIMES DAILY
Qty: 30 CAPSULE | Refills: 0 | Status: SHIPPED | OUTPATIENT
Start: 2021-12-03 | End: 2021-12-28

## 2021-12-03 NOTE — PROGRESS NOTES
Physical Therapy Daily Progress Note      Patient: Vianey Reeves   : 1945  Diagnosis/ICD-10 Code:  Chronic bilateral thoracic back pain [M54.6, G89.29]  Referring practitioner: Emma Hammer MD  Date of Initial Visit: Type: THERAPY  Noted: 10/29/2021  Today's Date: 12/3/2021  Patient seen for 6 sessions         Vianey Reeves reports: she is doing better with her upper back some but now her lower back has been very aggravated and painful. Pt. States she has seen relief and benefit to dry needling at this time and wishes to continue doing it today.    Objective   See Exercise, Manual, and Modality Logs for complete treatment.     Assessment/Plan   Pt. continues to respond well to manual intervention and dry needling having multiple palpable trigger points through scapular borders and B UT's. Pt. Will continue to benefit form intervention as long as progress is noted.    Progress strengthening /stabilization /functional activity           Timed:         Manual Therapy:    15     mins  [id number];       Un-Timed:  Electrical Stimulation:    15     mins  [id number] ( );  Dry Needling     10     mins self-pay      Timed Treatment:   15   mins   Total Treatment:     40   mins    Suma Alegria PTA  Physical Therapist Assistant License #[id number]

## 2021-12-14 DIAGNOSIS — M54.6 ACUTE BILATERAL THORACIC BACK PAIN: ICD-10-CM

## 2021-12-14 DIAGNOSIS — E78.2 MIXED HYPERLIPIDEMIA: Primary | ICD-10-CM

## 2021-12-14 RX ORDER — TIZANIDINE 2 MG/1
2 TABLET ORAL NIGHTLY PRN
Qty: 30 TABLET | Refills: 0 | Status: SHIPPED | OUTPATIENT
Start: 2021-12-14 | End: 2022-01-10

## 2021-12-14 RX ORDER — EZETIMIBE 10 MG/1
TABLET ORAL
Qty: 30 TABLET | Refills: 12 | Status: SHIPPED | OUTPATIENT
Start: 2021-12-14 | End: 2022-02-09

## 2021-12-19 DIAGNOSIS — R09.81 SINUS CONGESTION: ICD-10-CM

## 2021-12-19 RX ORDER — MONTELUKAST SODIUM 10 MG/1
TABLET ORAL
Qty: 30 TABLET | Refills: 0 | Status: SHIPPED | OUTPATIENT
Start: 2021-12-19 | End: 2023-01-04

## 2021-12-27 NOTE — PROGRESS NOTES
Subjective   Vianey Reeves is a 76 y.o. female. Presents to [facility]    Chief Complaint   Patient presents with   • Hyperlipidemia   • Hypertension   • Diabetes       Diabetes  She presents for her follow-up diabetic visit. She has type 2 diabetes mellitus. Hypoglycemia symptoms include headaches. Pertinent negatives for hypoglycemia include no nervousness/anxiousness or sweats. Pertinent negatives for diabetes include no blurred vision and no chest pain. There are no hypoglycemic complications. There are no diabetic complications. Risk factors for coronary artery disease include dyslipidemia, diabetes mellitus and hypertension. Current diabetic treatment includes oral agent (monotherapy) and insulin injections. Her weight is stable. She is following a diabetic and generally healthy diet. Meal planning includes avoidance of concentrated sweets. She has not had a previous visit with a dietitian. She participates in exercise intermittently. Her home blood glucose trend is fluctuating minimally. Her overall blood glucose range is 140-180 mg/dl. An ACE inhibitor/angiotensin II receptor blocker is being taken. She sees a podiatrist.Eye exam is current.   Hypertension  This is a chronic problem. The current episode started more than 1 year ago. The problem has been waxing and waning since onset. The problem is controlled. Associated symptoms include headaches and malaise/fatigue. Pertinent negatives include no blurred vision, chest pain, palpitations, shortness of breath or sweats. There are no associated agents to hypertension. Risk factors for coronary artery disease include dyslipidemia, diabetes mellitus, obesity and post-menopausal state. Past treatments include nothing. Current antihypertension treatment includes calcium channel blockers. The current treatment provides moderate improvement. There are no compliance problems.    Hyperlipidemia  This is a chronic problem. The current episode  started more than 1 year ago. The problem is controlled. There are no known factors aggravating her hyperlipidemia. Pertinent negatives include no chest pain, leg pain or shortness of breath. Current antihyperlipidemic treatment includes ezetimibe. The current treatment provides moderate improvement of lipids. There are no compliance problems.  Risk factors for coronary artery disease include dyslipidemia, hypertension and diabetes mellitus.        I personally reviewed and updated the patient's allergies, medications, problem list, and past medical, surgical, social, and family history. I have reviewed and confirmed the accuracy of the History of Present Illness and Review of Symptoms as documented by the MA/LPN/RN. Emma Hammer MD    Allergies:  Allergies   Allergen Reactions   • Erythromycin Rash   • Naproxen Sodium Other (See Comments)     Dizzy lightheaded   • Latex Itching and Swelling   • Metoclopramide Other (See Comments)     Unsteady on feet       Social History:  Social History     Socioeconomic History   • Marital status:    Tobacco Use   • Smoking status: Current Every Day Smoker     Packs/day: 0.25     Years: 50.00     Pack years: 12.50     Types: Cigarettes   • Smokeless tobacco: Never Used   Vaping Use   • Vaping Use: Never used   Substance and Sexual Activity   • Alcohol use: Yes     Comment: occasionally    • Drug use: Never   • Sexual activity: Defer       Family History:  Family History   Problem Relation Age of Onset   • Arthritis Father    • Prostate cancer Father    • Heart disease Sister        Past Medical History :  Patient Active Problem List   Diagnosis   • Type 2 diabetes mellitus with stage 4 chronic kidney disease, without long-term current use of insulin (HCC)   • Essential (primary) hypertension   • Mixed hyperlipidemia   • Hiatal hernia   • Long term current use of antithrombotics/antiplatelets   • Chronic obstructive pulmonary disease (HCC)   • Atherosclerotic heart  disease of native coronary artery without angina pectoris   • Irritable bowel syndrome with constipation   • History of cancer of ureter   • Solitary kidney   • Atherosclerosis of native arteries of extremities with intermittent claudication, bilateral legs (HCC)   • H/O malignant neoplasm of ureter   • Vertigo   • Medicare annual wellness visit, subsequent   • Presbyopia   • Combined form of senile cataract   • Cervical cancer screening   • Postartificial menopausal syndrome   • Diverticula of colon   • Ruptured tympanic membrane, left   • Hearing loss, bilateral   • TMJ pain dysfunction syndrome   • Diverticulitis   • Stage 4 chronic kidney disease (HCC)   • Gastroenteritis   • Other dysphagia   • Lactose intolerance   • Left foot pain   • Polyarthralgia   • Chronic bilateral thoracic back pain   • Class 1 obesity due to excess calories with serious comorbidity and body mass index (BMI) of 32.0 to 32.9 in adult   • Anxiety   • Hypercalcemia   • Daytime somnolence   • Screening mammogram, encounter for   • Chronic bilateral low back pain without sciatica   • Acute non-recurrent maxillary sinusitis       Medication List:    Current Outpatient Medications:   •  amLODIPine (NORVASC) 10 MG tablet, TAKE 1 TABLET BY MOUTH EVERY DAY, Disp: 90 tablet, Rfl: 4  •  aspirin 81 MG tablet, Take 81 mg by mouth Daily. dont take dos, Disp: , Rfl:   •  calcium-vitamin D (Oscal 500/200 D-3) 500-200 MG-UNIT per tablet, Take  by mouth 2 (Two) Times a Week. Twice weekly, Disp: , Rfl:   •  carvedilol (COREG) 3.125 MG tablet, TAKE 1 TABLET BY MOUTH EVERY 12 HOURS, Disp: 180 tablet, Rfl: 4  •  coenzyme Q10 100 MG capsule, Take 1 capsule by mouth 2 (Two) Times a Week., Disp: , Rfl:   •  Diclofenac Sodium (VOLTAREN) 1 % gel gel, , Disp: , Rfl:   •  doxazosin (Cardura) 2 MG tablet, Take 1 tablet by mouth Every Night., Disp: 90 tablet, Rfl: 3  •  ezetimibe (ZETIA) 10 MG tablet, TAKE 1 TABLET BY MOUTH EVERY DAY, Disp: 30 tablet, Rfl: 12  •   fluticasone (FLONASE) 50 MCG/ACT nasal spray, 2 sprays into the nostril(s) as directed by provider As Needed., Disp: , Rfl:   •  fluticasone-salmeterol (ADVAIR HFA) 45-21 MCG/ACT inhaler, Inhale 2 puffs 2 (Two) Times a Day., Disp: 1 inhaler, Rfl: 12  •  gabapentin (NEURONTIN) 100 MG capsule, Take 100 mg by mouth 2 (Two) Times a Day., Disp: , Rfl:   •  Glucose Blood (Blood Glucose Test) strip, 1 each by In Vitro route Daily., Disp: 100 each, Rfl: 3  •  Januvia 100 MG tablet, TAKE 1 TABLET BY MOUTH EVERY DAY, Disp: 90 tablet, Rfl: 3  •  Lantus SoloStar 100 UNIT/ML injection pen, INJECT 20 UNITS UNDER THE SKIN INTO THE APPROPRIATE AREA AS DIRECTED EVERY NIGHT., Disp: 5 pen, Rfl: 11  •  linaclotide (Linzess) 72 MCG capsule capsule, Take 1 capsule by mouth Every Morning Before Breakfast., Disp: 30 capsule, Rfl: 12  •  montelukast (SINGULAIR) 10 MG tablet, TAKE 1 TABLET BY MOUTH EVERYDAY AT BEDTIME, Disp: 30 tablet, Rfl: 0  •  Repatha SureClick solution auto-injector SureClick injection, INJECT 1 ML UNDER THE SKIN INTO THE APPROPRIATE AREA AS DIRECTED EVERY 14 (FOURTEEN) DAYS., Disp: 1 mL, Rfl: 12  •  sertraline (Zoloft) 50 MG tablet, Take 1 tablet by mouth Daily., Disp: 90 tablet, Rfl: 3  •  tiZANidine (ZANAFLEX) 2 MG tablet, TAKE 1 TABLET BY MOUTH AT NIGHT AS NEEDED FOR MUSCLE SPASMS., Disp: 30 tablet, Rfl: 0  •  traMADol (ULTRAM) 50 MG tablet, Take 50 mg by mouth 2 (Two) Times a Day., Disp: , Rfl:   •  Triamcinolone Acetonide (NASACORT) 55 MCG/ACT nasal inhaler, 2 sprays into the nostril(s) as directed by provider Daily., Disp: 16.5 g, Rfl: 0  •  cephalexin (KEFLEX) 500 MG capsule, Take 1 capsule by mouth 3 (Three) Times a Day., Disp: 30 capsule, Rfl: 0  •  methylPREDNISolone (MEDROL) 4 MG dose pack, , Disp: , Rfl:     Past Surgical History:  Past Surgical History:   Procedure Laterality Date   • BREAST BIOPSY Right    • CARDIAC CATHETERIZATION     • CHOLECYSTECTOMY     • COLON SURGERY      REMOVAL diverticular diseae  "  • COLONOSCOPY N/A 8/12/2021    Procedure: COLONOSCOPY with polypectomy x 3;  Surgeon: Margarette Mcallister MD;  Location: [facility] ENDOSCOPY;  Service: Gastroenterology;  Laterality: N/A;  post op: hmorrhoids, diverticulosis, polyps   • CORONARY STENT PLACEMENT     • ENDOSCOPY N/A 8/12/2021    Procedure: ESOPHAGOGASTRODUODENOSCOPY with dilatation (18-20 mm balloon) and (54 bougie);  Surgeon: Margarette Mcallister MD;  Location: [facility] ENDOSCOPY;  Service: Gastroenterology;  Laterality: N/A;  post op: esophageal stricture, esophagitis, gastritis, history of nissen   • EYE SURGERY Bilateral    • HIATAL HERNIA REPAIR     • NEPHRECTOMY Right        Review of Systems:  Review of Systems   Constitutional: Positive for malaise/fatigue. Negative for activity change and fever.   HENT: Negative for ear pain, rhinorrhea, sinus pressure and voice change.    Eyes: Negative for blurred vision and visual disturbance.   Respiratory: Negative for cough and shortness of breath.    Cardiovascular: Negative for chest pain and palpitations.   Gastrointestinal: Negative for abdominal pain, diarrhea, nausea and vomiting.   Endocrine: Negative for cold intolerance and heat intolerance.   Genitourinary: Negative for frequency and urgency.   Musculoskeletal: Negative for arthralgias.   Skin: Negative for rash.   Neurological: Negative for syncope.   Hematological: Does not bruise/bleed easily.   Psychiatric/Behavioral: Negative for depressed mood. The patient is not nervous/anxious.        Physical Exam:  Vital Signs:  Vital Signs:   /88   Pulse 91   Temp 98.1 °F (36.7 °C)   Resp 18   Ht 165.1 cm (65\")   Wt 88.5 kg (195 lb)   SpO2 98%   BMI 32.45 kg/m²     Result Review :   The following data was reviewed by: Emma Hammer MD on 12/28/2021:      Lab Results   Component Value Date    HGBA1C 6.2 12/28/2021     Lab Results   Component Value Date    GLUCOSE 98 11/11/2021    BUN 20 11/11/2021    CREATININE 1.68 (H) 11/11/2021    EGFRIFNONA 29 " (L) 11/11/2021    EGFRIFAFRI 33 (L) 11/11/2021    BCR 12 11/11/2021    K 4.7 11/11/2021    CO2 24 11/11/2021    CALCIUM 9.0 11/11/2021    PROTENTOTREF 6.5 11/11/2021    ALBUMIN 4.0 11/11/2021    LABIL2 1.6 11/11/2021    AST 12 11/11/2021    ALT 12 11/11/2021     Lab Results   Component Value Date    CHOL 83 11/08/2018    CHOL 208 (H) 09/29/2017    CHOL 160 03/03/2017     Lab Results   Component Value Date    TRIG 116 11/11/2021    TRIG 63 09/01/2021    TRIG 69 04/22/2021     Lab Results   Component Value Date    HDL 36 (L) 11/11/2021    HDL 49 09/01/2021    HDL 48 04/22/2021     Lab Results   Component Value Date     (H) 11/11/2021     (H) 09/01/2021    LDL 43 04/22/2021     Lab Results   Component Value Date    VLDL 21 11/11/2021    VLDL 12 09/01/2021    VLDL 15 04/22/2021     No results found for: LDLHDL             Physical Exam  Vitals reviewed.   Constitutional:       Appearance: Normal appearance. She is well-developed.   HENT:      Head: Normocephalic and atraumatic.   Eyes:      General:         Right eye: No discharge.         Left eye: No discharge.   Cardiovascular:      Rate and Rhythm: Normal rate and regular rhythm.      Heart sounds: Normal heart sounds. No murmur heard.  No friction rub. No gallop.    Pulmonary:      Effort: Pulmonary effort is normal. No respiratory distress.      Breath sounds: Normal breath sounds. No wheezing or rales.   Skin:     General: Skin is warm and dry.      Findings: No rash.   Neurological:      Mental Status: She is alert and oriented to person, place, and time.      Coordination: Coordination normal.      Gait: Gait normal.   Psychiatric:         Behavior: Behavior is cooperative.         Assessment and Plan:  Problems Addressed this Visit        Cardiac and Vasculature    Essential (primary) hypertension - Primary     Hypertension is unchanged.  Continue current treatment regimen.  Dietary sodium restriction.  Continue current medications.  Blood  pressure will be reassessed in 3 months.         Mixed hyperlipidemia    Atherosclerotic heart disease of native coronary artery without angina pectoris     Coronary artery disease is unchanged.  Continue current treatment regimen.  Dietary sodium restriction.  Weight loss.  Cardiac status will be reassessed in 3 months.            Endocrine and Metabolic    Type 2 diabetes mellitus with stage 4 chronic kidney disease, without long-term current use of insulin (HCC)     Renal condition is improving with treatment.  Continue current treatment regimen.  Renal condition will be reassessed in 3 months.         Relevant Orders    POC Glucose (Completed)    POC Glycosylated Hemoglobin (Hb A1C) (Completed)    POC Urinalysis Dipstick, Automated (Completed)    Class 1 obesity due to excess calories with serious comorbidity and body mass index (BMI) of 32.0 to 32.9 in adult     Patient's (Body mass index is 32.45 kg/m².) indicates that they are obese (BMI >30) with health conditions that include hypertension, coronary heart disease and diabetes mellitus . Weight is improving with lifestyle modifications. BMI is is above average; BMI management plan is completed. We discussed low calorie, low carb based diet program, portion control and increasing exercise.             Genitourinary and Reproductive     Stage 4 chronic kidney disease (HCC)      Diagnoses       Codes Comments    Essential (primary) hypertension    -  Primary ICD-10-CM: I10  ICD-9-CM: 401.9     Mixed hyperlipidemia     ICD-10-CM: E78.2  ICD-9-CM: 272.2     Type 2 diabetes mellitus with stage 4 chronic kidney disease, without long-term current use of insulin (HCC)     ICD-10-CM: E11.22, N18.4  ICD-9-CM: 250.40, 585.4     Stage 4 chronic kidney disease (HCC)     ICD-10-CM: N18.4  ICD-9-CM: 585.4     Atherosclerosis of native coronary artery of native heart without angina pectoris     ICD-10-CM: I25.10  ICD-9-CM: 414.01     Class 1 obesity due to excess calories  with serious comorbidity and body mass index (BMI) of 32.0 to 32.9 in adult     ICD-10-CM: E66.09, Z68.32  ICD-9-CM: 278.00, V85.32            An After Visit Summary and PPPS were given to the patient.       I wore protective equipment throughout this patient encounter to include mask. Hand hygiene was performed before donning protective equipment and after removal when leaving the room.

## 2021-12-28 ENCOUNTER — OFFICE VISIT (OUTPATIENT)
Dept: FAMILY MEDICINE CLINIC | Facility: CLINIC | Age: 76
End: 2021-12-28

## 2021-12-28 VITALS
RESPIRATION RATE: 18 BRPM | OXYGEN SATURATION: 98 % | TEMPERATURE: 98.1 F | SYSTOLIC BLOOD PRESSURE: 126 MMHG | DIASTOLIC BLOOD PRESSURE: 88 MMHG | WEIGHT: 195 LBS | HEIGHT: 65 IN | BODY MASS INDEX: 32.49 KG/M2 | HEART RATE: 91 BPM

## 2021-12-28 DIAGNOSIS — E78.2 MIXED HYPERLIPIDEMIA: ICD-10-CM

## 2021-12-28 DIAGNOSIS — E11.22 TYPE 2 DIABETES MELLITUS WITH STAGE 4 CHRONIC KIDNEY DISEASE, WITHOUT LONG-TERM CURRENT USE OF INSULIN (HCC): ICD-10-CM

## 2021-12-28 DIAGNOSIS — I10 ESSENTIAL (PRIMARY) HYPERTENSION: Primary | ICD-10-CM

## 2021-12-28 DIAGNOSIS — I25.10 ATHEROSCLEROSIS OF NATIVE CORONARY ARTERY OF NATIVE HEART WITHOUT ANGINA PECTORIS: ICD-10-CM

## 2021-12-28 DIAGNOSIS — N18.4 TYPE 2 DIABETES MELLITUS WITH STAGE 4 CHRONIC KIDNEY DISEASE, WITHOUT LONG-TERM CURRENT USE OF INSULIN (HCC): ICD-10-CM

## 2021-12-28 DIAGNOSIS — N18.4 STAGE 4 CHRONIC KIDNEY DISEASE (HCC): ICD-10-CM

## 2021-12-28 DIAGNOSIS — E66.09 CLASS 1 OBESITY DUE TO EXCESS CALORIES WITH SERIOUS COMORBIDITY AND BODY MASS INDEX (BMI) OF 32.0 TO 32.9 IN ADULT: ICD-10-CM

## 2021-12-28 LAB
BILIRUB BLD-MCNC: NEGATIVE MG/DL
CLARITY, POC: CLEAR
COLOR UR: YELLOW
EXPIRATION DATE: ABNORMAL
EXPIRATION DATE: NORMAL
GLUCOSE BLDC GLUCOMTR-MCNC: 208 MG/DL (ref 70–130)
GLUCOSE UR STRIP-MCNC: NEGATIVE MG/DL
HBA1C MFR BLD: 6.2 %
KETONES UR QL: NEGATIVE
LEUKOCYTE EST, POC: NEGATIVE
Lab: ABNORMAL
Lab: NORMAL
NITRITE UR-MCNC: NEGATIVE MG/ML
PH UR: 5 [PH] (ref 5–8)
PROT UR STRIP-MCNC: ABNORMAL MG/DL
RBC # UR STRIP: NEGATIVE /UL
SP GR UR: 1.02 (ref 1–1.03)
UROBILINOGEN UR QL: NORMAL

## 2021-12-28 PROCEDURE — 83036 HEMOGLOBIN GLYCOSYLATED A1C: CPT | Performed by: FAMILY MEDICINE

## 2021-12-28 PROCEDURE — 99214 OFFICE O/P EST MOD 30 MIN: CPT | Performed by: FAMILY MEDICINE

## 2021-12-28 PROCEDURE — 81003 URINALYSIS AUTO W/O SCOPE: CPT | Performed by: FAMILY MEDICINE

## 2021-12-28 PROCEDURE — 3044F HG A1C LEVEL LT 7.0%: CPT | Performed by: FAMILY MEDICINE

## 2021-12-28 PROCEDURE — 82962 GLUCOSE BLOOD TEST: CPT | Performed by: FAMILY MEDICINE

## 2021-12-28 NOTE — ASSESSMENT & PLAN NOTE
Patient's (Body mass index is 32.45 kg/m².) indicates that they are obese (BMI >30) with health conditions that include hypertension, coronary heart disease and diabetes mellitus . Weight is improving with lifestyle modifications. BMI is is above average; BMI management plan is completed. We discussed low calorie, low carb based diet program, portion control and increasing exercise.

## 2021-12-30 DIAGNOSIS — J41.0 SIMPLE CHRONIC BRONCHITIS (HCC): ICD-10-CM

## 2021-12-30 RX ORDER — FLUTICASONE PROPIONATE AND SALMETEROL XINAFOATE 45; 21 UG/1; UG/1
AEROSOL, METERED RESPIRATORY (INHALATION)
Qty: 12 EACH | Refills: 12 | Status: SHIPPED | OUTPATIENT
Start: 2021-12-30 | End: 2022-10-03 | Stop reason: SDUPTHER

## 2022-01-09 DIAGNOSIS — M54.6 ACUTE BILATERAL THORACIC BACK PAIN: ICD-10-CM

## 2022-01-10 RX ORDER — TIZANIDINE 2 MG/1
2 TABLET ORAL NIGHTLY PRN
Qty: 30 TABLET | Refills: 0 | Status: SHIPPED | OUTPATIENT
Start: 2022-01-10 | End: 2022-02-07

## 2022-01-14 ENCOUNTER — TELEPHONE (OUTPATIENT)
Dept: FAMILY MEDICINE CLINIC | Facility: CLINIC | Age: 77
End: 2022-01-14

## 2022-01-14 NOTE — TELEPHONE ENCOUNTER
Caller: Vianey Reeves    Relationship to patient: Self    Best call back number: [phone]    Type of visit: SICK     Requested date: 1-14-22          Additional notes:PATIENT STATES SHE IS IN PAIN FROM A UTI. NO APPOINTMENTS AVAILABLE.  PLEASE ADVISE

## 2022-01-17 ENCOUNTER — CLINICAL SUPPORT (OUTPATIENT)
Dept: FAMILY MEDICINE CLINIC | Facility: CLINIC | Age: 77
End: 2022-01-17

## 2022-01-17 DIAGNOSIS — N39.0 URINARY TRACT INFECTION WITHOUT HEMATURIA, SITE UNSPECIFIED: Primary | ICD-10-CM

## 2022-01-17 LAB
BILIRUB BLD-MCNC: NEGATIVE MG/DL
CLARITY, POC: ABNORMAL
COLOR UR: YELLOW
GLUCOSE UR STRIP-MCNC: NEGATIVE MG/DL
KETONES UR QL: NEGATIVE
LEUKOCYTE EST, POC: NEGATIVE
NITRITE UR-MCNC: NEGATIVE MG/ML
PH UR: 5 [PH] (ref 5–8)
PROT UR STRIP-MCNC: ABNORMAL MG/DL
RBC # UR STRIP: ABNORMAL /UL
SP GR UR: 1.02 (ref 1–1.03)
UROBILINOGEN UR QL: NORMAL

## 2022-01-17 PROCEDURE — 81003 URINALYSIS AUTO W/O SCOPE: CPT | Performed by: FAMILY MEDICINE

## 2022-01-18 LAB
APPEARANCE UR: CLEAR
BACTERIA #/AREA URNS HPF: ABNORMAL /[HPF]
BILIRUB UR QL STRIP: NEGATIVE
CASTS URNS QL MICRO: ABNORMAL /LPF
COLOR UR: YELLOW
EPI CELLS #/AREA URNS HPF: ABNORMAL /HPF (ref 0–10)
GLUCOSE UR QL: NEGATIVE
HGB UR QL STRIP: NEGATIVE
KETONES UR QL STRIP: NEGATIVE
LEUKOCYTE ESTERASE UR QL STRIP: ABNORMAL
MICRO URNS: ABNORMAL
NITRITE UR QL STRIP: NEGATIVE
PH UR STRIP: 5.5 [PH] (ref 5–7.5)
PROT UR QL STRIP: NEGATIVE
RBC #/AREA URNS HPF: ABNORMAL /HPF (ref 0–2)
SP GR UR: 1.02 (ref 1–1.03)
UROBILINOGEN UR STRIP-MCNC: 0.2 MG/DL (ref 0.2–1)
WBC #/AREA URNS HPF: ABNORMAL /HPF (ref 0–5)

## 2022-01-18 RX ORDER — CEFDINIR 300 MG/1
300 CAPSULE ORAL 2 TIMES DAILY
Qty: 20 CAPSULE | Refills: 0 | Status: SHIPPED | OUTPATIENT
Start: 2022-01-18 | End: 2022-02-09

## 2022-01-19 LAB
BACTERIA UR CULT: NORMAL
BACTERIA UR CULT: NORMAL

## 2022-01-21 ENCOUNTER — OFFICE VISIT (OUTPATIENT)
Dept: FAMILY MEDICINE CLINIC | Facility: CLINIC | Age: 77
End: 2022-01-21

## 2022-01-21 VITALS
TEMPERATURE: 98 F | BODY MASS INDEX: 32.59 KG/M2 | SYSTOLIC BLOOD PRESSURE: 120 MMHG | HEART RATE: 73 BPM | RESPIRATION RATE: 18 BRPM | HEIGHT: 65 IN | DIASTOLIC BLOOD PRESSURE: 62 MMHG | OXYGEN SATURATION: 97 % | WEIGHT: 195.6 LBS

## 2022-01-21 DIAGNOSIS — R35.0 URINARY FREQUENCY: ICD-10-CM

## 2022-01-21 DIAGNOSIS — N39.0 URINARY TRACT INFECTION WITHOUT HEMATURIA, SITE UNSPECIFIED: Primary | ICD-10-CM

## 2022-01-21 PROCEDURE — 99213 OFFICE O/P EST LOW 20 MIN: CPT | Performed by: FAMILY MEDICINE

## 2022-01-21 NOTE — ASSESSMENT & PLAN NOTE
On occasion. Testing was negative  Offered medication to help but she declines since it is occasional

## 2022-01-21 NOTE — PROGRESS NOTES
Subjective   Vianey Reeves is a 76 y.o. female. Presents to [facility]    Chief Complaint   Patient presents with   • Urinary Tract Infection       Urinary Tract Infection   This is a new problem. The current episode started 1 to 4 weeks ago. The problem occurs every urination. The patient is experiencing no pain. She is not sexually active. There is no history of pyelonephritis. Associated symptoms include flank pain (not currently). Pertinent negatives include no chills, discharge, frequency, hematuria, hesitancy, nausea, possible pregnancy, sweats, urgency or vomiting. She has tried antibiotics (cefdinir) for the symptoms. Improvement on treatment: patient has not started taking it.      Culture is negative    I personally reviewed and updated the patient's allergies, medications, problem list, and past medical, surgical, social, and family history. I have reviewed and confirmed the accuracy of the History of Present Illness and Review of Symptoms as documented by the MA/LPN/RN. Emma Hammer MD    Allergies:  Allergies   Allergen Reactions   • Erythromycin Rash   • Naproxen Sodium Other (See Comments)     Dizzy lightheaded   • Latex Itching and Swelling   • Metoclopramide Other (See Comments)     Unsteady on feet       Social History:  Social History     Socioeconomic History   • Marital status:    Tobacco Use   • Smoking status: Current Every Day Smoker     Packs/day: 0.25     Years: 50.00     Pack years: 12.50     Types: Cigarettes   • Smokeless tobacco: Never Used   Vaping Use   • Vaping Use: Never used   Substance and Sexual Activity   • Alcohol use: Yes     Comment: occasionally    • Drug use: Never   • Sexual activity: Defer       Family History:  Family History   Problem Relation Age of Onset   • Arthritis Father    • Prostate cancer Father    • Heart disease Sister        Past Medical History :  Patient Active Problem List   Diagnosis   • Type 2 diabetes mellitus with stage  4 chronic kidney disease, without long-term current use of insulin (HCC)   • Essential (primary) hypertension   • Mixed hyperlipidemia   • Hiatal hernia   • Long term current use of antithrombotics/antiplatelets   • Chronic obstructive pulmonary disease (HCC)   • Atherosclerotic heart disease of native coronary artery without angina pectoris   • Irritable bowel syndrome with constipation   • History of cancer of ureter   • Solitary kidney   • Atherosclerosis of native arteries of extremities with intermittent claudication, bilateral legs (HCC)   • H/O malignant neoplasm of ureter   • Vertigo   • Medicare annual wellness visit, subsequent   • Presbyopia   • Combined form of senile cataract   • Cervical cancer screening   • Postartificial menopausal syndrome   • Diverticula of colon   • Ruptured tympanic membrane, left   • Hearing loss, bilateral   • TMJ pain dysfunction syndrome   • Diverticulitis   • Stage 4 chronic kidney disease (HCC)   • Gastroenteritis   • Other dysphagia   • Lactose intolerance   • Left foot pain   • Polyarthralgia   • Chronic bilateral thoracic back pain   • Class 1 obesity due to excess calories with serious comorbidity and body mass index (BMI) of 32.0 to 32.9 in adult   • Anxiety   • Hypercalcemia   • Daytime somnolence   • Screening mammogram, encounter for   • Chronic bilateral low back pain without sciatica   • Acute non-recurrent maxillary sinusitis   • Urinary frequency       Medication List:    Current Outpatient Medications:   •  Advair HFA 45-21 MCG/ACT inhaler, TAKE 2 PUFFS BY MOUTH TWICE A DAY, Disp: 12 each, Rfl: 12  •  amLODIPine (NORVASC) 10 MG tablet, TAKE 1 TABLET BY MOUTH EVERY DAY, Disp: 90 tablet, Rfl: 4  •  aspirin 81 MG tablet, Take 81 mg by mouth Daily. dont take dos, Disp: , Rfl:   •  calcium-vitamin D (Oscal 500/200 D-3) 500-200 MG-UNIT per tablet, Take  by mouth 2 (Two) Times a Week. Twice weekly, Disp: , Rfl:   •  carvedilol (COREG) 3.125 MG tablet, TAKE 1 TABLET BY  MOUTH EVERY 12 HOURS, Disp: 180 tablet, Rfl: 4  •  cefdinir (OMNICEF) 300 MG capsule, Take 1 capsule by mouth 2 (Two) Times a Day. May turn stool orange, Disp: 20 capsule, Rfl: 0  •  coenzyme Q10 100 MG capsule, Take 1 capsule by mouth 2 (Two) Times a Week., Disp: , Rfl:   •  Diclofenac Sodium (VOLTAREN) 1 % gel gel, , Disp: , Rfl:   •  doxazosin (Cardura) 2 MG tablet, Take 1 tablet by mouth Every Night., Disp: 90 tablet, Rfl: 3  •  ezetimibe (ZETIA) 10 MG tablet, TAKE 1 TABLET BY MOUTH EVERY DAY, Disp: 30 tablet, Rfl: 12  •  fluticasone (FLONASE) 50 MCG/ACT nasal spray, 2 sprays into the nostril(s) as directed by provider As Needed., Disp: , Rfl:   •  gabapentin (NEURONTIN) 100 MG capsule, Take 100 mg by mouth 2 (Two) Times a Day., Disp: , Rfl:   •  Glucose Blood (Blood Glucose Test) strip, 1 each by In Vitro route Daily., Disp: 100 each, Rfl: 3  •  Januvia 100 MG tablet, TAKE 1 TABLET BY MOUTH EVERY DAY, Disp: 90 tablet, Rfl: 3  •  Lantus SoloStar 100 UNIT/ML injection pen, INJECT 20 UNITS UNDER THE SKIN INTO THE APPROPRIATE AREA AS DIRECTED EVERY NIGHT., Disp: 5 pen, Rfl: 11  •  linaclotide (Linzess) 72 MCG capsule capsule, Take 1 capsule by mouth Every Morning Before Breakfast., Disp: 30 capsule, Rfl: 12  •  montelukast (SINGULAIR) 10 MG tablet, TAKE 1 TABLET BY MOUTH EVERYDAY AT BEDTIME, Disp: 30 tablet, Rfl: 0  •  Repatha SureClick solution auto-injector SureClick injection, INJECT 1 ML UNDER THE SKIN INTO THE APPROPRIATE AREA AS DIRECTED EVERY 14 (FOURTEEN) DAYS., Disp: 1 mL, Rfl: 12  •  sertraline (Zoloft) 50 MG tablet, Take 1 tablet by mouth Daily., Disp: 90 tablet, Rfl: 3  •  tiZANidine (ZANAFLEX) 2 MG tablet, TAKE 1 TABLET BY MOUTH AT NIGHT AS NEEDED FOR MUSCLE SPASMS., Disp: 30 tablet, Rfl: 0  •  traMADol (ULTRAM) 50 MG tablet, Take 50 mg by mouth 2 (Two) Times a Day., Disp: , Rfl:   •  Triamcinolone Acetonide (NASACORT) 55 MCG/ACT nasal inhaler, 2 sprays into the nostril(s) as directed by provider  "Daily., Disp: 16.5 g, Rfl: 0    Past Surgical History:  Past Surgical History:   Procedure Laterality Date   • BREAST BIOPSY Right    • CARDIAC CATHETERIZATION     • CHOLECYSTECTOMY     • COLON SURGERY      REMOVAL diverticular diseae   • COLONOSCOPY N/A 8/12/2021    Procedure: COLONOSCOPY with polypectomy x 3;  Surgeon: Margarette Mcallister MD;  Location: [facility] ENDOSCOPY;  Service: Gastroenterology;  Laterality: N/A;  post op: hmorrhoids, diverticulosis, polyps   • CORONARY STENT PLACEMENT     • ENDOSCOPY N/A 8/12/2021    Procedure: ESOPHAGOGASTRODUODENOSCOPY with dilatation (18-20 mm balloon) and (54 bougie);  Surgeon: Margarette Mcallister MD;  Location: [facility] ENDOSCOPY;  Service: Gastroenterology;  Laterality: N/A;  post op: esophageal stricture, esophagitis, gastritis, history of nissen   • EYE SURGERY Bilateral    • HIATAL HERNIA REPAIR     • NEPHRECTOMY Right        Review of Systems:  Review of Systems   Constitutional: Negative for chills.   Gastrointestinal: Negative for nausea and vomiting.   Genitourinary: Positive for flank pain (not currently). Negative for frequency, hematuria, hesitancy and urgency.       Physical Exam:  Vital Signs:  Vital Signs:   /62   Pulse 73   Temp 98 °F (36.7 °C)   Resp 18   Ht 165.1 cm (65\")   Wt 88.7 kg (195 lb 9.6 oz)   SpO2 97%   BMI 32.55 kg/m²     Result Review :   The following data was reviewed by: Emma Hammer MD on 01/21/2022:  Urine Culture    Urine Culture 1/17/22   Urine Culture Final report         culture was negative    \plain       Brief Urine Lab Results  (Last result in the past 365 days)      Color   Clarity   Blood   Leuk Est   Nitrite   Protein   CREAT   Urine HCG        01/17/22 1223 Yellow   Cloudy   Trace   Negative   Negative   Trace                 Physical Exam  Vitals reviewed.   Constitutional:       Appearance: Normal appearance. She is well-developed.   HENT:      Head: Normocephalic and atraumatic.   Eyes:      General:         Right eye: " No discharge.         Left eye: No discharge.   Cardiovascular:      Rate and Rhythm: Normal rate and regular rhythm.      Heart sounds: Normal heart sounds. No murmur heard.  No friction rub. No gallop.    Pulmonary:      Effort: Pulmonary effort is normal. No respiratory distress.      Breath sounds: Normal breath sounds. No wheezing or rales.   Abdominal:      General: Abdomen is flat. Bowel sounds are normal. There is no distension.      Palpations: Abdomen is soft. There is no mass.      Tenderness: There is no abdominal tenderness. There is no guarding or rebound.      Hernia: No hernia is present.   Skin:     General: Skin is warm and dry.      Findings: No rash.   Neurological:      Mental Status: She is alert and oriented to person, place, and time.      Coordination: Coordination normal.      Gait: Gait normal.   Psychiatric:         Behavior: Behavior is cooperative.         Assessment and Plan:  Problems Addressed this Visit        Genitourinary and Reproductive     Urinary frequency     On occasion. Testing was negative  Offered medication to help but she declines since it is occasional           Other Visit Diagnoses     Urinary tract infection without hematuria, site unspecified    -  Primary    Relevant Orders    POCT urinalysis dipstick, multipro      Diagnoses       Codes Comments    Urinary tract infection without hematuria, site unspecified    -  Primary ICD-10-CM: N39.0  ICD-9-CM: 599.0     Urinary frequency     ICD-10-CM: R35.0  ICD-9-CM: 788.41            An After Visit Summary and PPPS were given to the patient.       I wore protective equipment throughout this patient encounter to include respirator. Hand hygiene was performed before donning protective equipment and after removal when leaving the room.     .

## 2022-02-05 DIAGNOSIS — M54.6 ACUTE BILATERAL THORACIC BACK PAIN: ICD-10-CM

## 2022-02-07 RX ORDER — TIZANIDINE 2 MG/1
2 TABLET ORAL NIGHTLY PRN
Qty: 30 TABLET | Refills: 0 | Status: SHIPPED | OUTPATIENT
Start: 2022-02-07 | End: 2022-03-03

## 2022-02-09 ENCOUNTER — OFFICE VISIT (OUTPATIENT)
Dept: CARDIOLOGY | Facility: CLINIC | Age: 77
End: 2022-02-09

## 2022-02-09 ENCOUNTER — TELEPHONE (OUTPATIENT)
Dept: CARDIOLOGY | Facility: CLINIC | Age: 77
End: 2022-02-09

## 2022-02-09 VITALS
BODY MASS INDEX: 32.07 KG/M2 | HEIGHT: 65 IN | HEART RATE: 54 BPM | OXYGEN SATURATION: 96 % | WEIGHT: 192.5 LBS | SYSTOLIC BLOOD PRESSURE: 134 MMHG | DIASTOLIC BLOOD PRESSURE: 79 MMHG

## 2022-02-09 DIAGNOSIS — I25.10 ATHEROSCLEROSIS OF NATIVE CORONARY ARTERY OF NATIVE HEART WITHOUT ANGINA PECTORIS: ICD-10-CM

## 2022-02-09 DIAGNOSIS — Z79.02 LONG TERM CURRENT USE OF ANTITHROMBOTICS/ANTIPLATELETS: ICD-10-CM

## 2022-02-09 DIAGNOSIS — E78.2 MIXED HYPERLIPIDEMIA: ICD-10-CM

## 2022-02-09 DIAGNOSIS — I10 ESSENTIAL (PRIMARY) HYPERTENSION: Primary | ICD-10-CM

## 2022-02-09 PROCEDURE — 99214 OFFICE O/P EST MOD 30 MIN: CPT | Performed by: INTERNAL MEDICINE

## 2022-02-09 RX ORDER — DOXAZOSIN 2 MG/1
2 TABLET ORAL NIGHTLY
Qty: 90 TABLET | Refills: 3 | Status: SHIPPED | OUTPATIENT
Start: 2022-02-09 | End: 2023-03-20

## 2022-02-09 RX ORDER — ALIROCUMAB 150 MG/ML
150 INJECTION, SOLUTION SUBCUTANEOUS
Qty: 2.24 ML | Refills: 12 | Status: SHIPPED | OUTPATIENT
Start: 2022-02-09 | End: 2022-07-01

## 2022-02-09 RX ORDER — CHOLECALCIFEROL (VITAMIN D3) 125 MCG
500 CAPSULE ORAL EVERY OTHER DAY
COMMUNITY
End: 2022-10-03

## 2022-02-09 RX ORDER — CLOBETASOL PROPIONATE 0.46 MG/ML
SOLUTION TOPICAL
COMMUNITY
Start: 2022-02-08 | End: 2022-02-09

## 2022-02-09 NOTE — PROGRESS NOTES
Cardiology Office Visit      Encounter Date:  02/09/2022    Patient ID:   Vianey Reeves is a 76 y.o. female.    Reason For Followup:  Coronary artery disease  Hypertension  Hypertensive cardiovascular disease    Brief Clinical History:  Dear Dr. Hammer, Emma Simmons MD    I had the pleasure of seeing Vianey Reeves today. As you are well aware, this is a 76 y.o. female with a known history of ischemic heart disease.  She underwent cardiac catheterization with PCI and drug-eluting stent placement on April 9, 2016 and follow-up PCI on 5/25/2016. She presents today for follow-up on the above conditions.      Interval History:  She denies any chest pain pressure heaviness or tightness.  She denies any shortness of breath out of character. She denies any PND orthopnea.  She denies any syncope or near syncope.  She reports feeling well today.    Her last visit she had notable increase in her calcium level.  We had some laboratory work performed and she followed up with nephrology.  Her calcium level actually returned to normal in the interim.    She continues to smoke.  She has been wearing patches.  She smokes between 4 cigarettes and a half a pack of cigarettes daily.    As you know, with regards to her hypertension, we have a limited repertoire of medications that can be utilized.  We have stayed away from ACE/ARB medications due to the presence of a solitary kidney and renal insufficiency.  Calcium channel blockers appear to cause edema requiring diuretic usage which can exacerbate renal insufficiency.  Potassium sparing diuretics can also be problematic with renal insufficiency.  Beta-blockers appear to be causing fatigue and bradycardia.  Clonidine results in significant lability in blood pressure and can also contribute to bradycardia.  Hydralazine is very cumbersome to take.     Assessment & Plan     Impressions:  Coronary artery disease status post PCI and drug-eluting stent placement in her mid RCA and  "Cx        Negative nuclear stress test May 2019  Diabetes mellitus.  Dyslipidemia with intolerance to statins.  Hypertension with hypertensive cardiovascular disease.      Suboptimal with limited medication repertoire  COPD  Solitary kidney.  Chronic renal failure with declining GFR     Followed by Dr. Julio  Tobacco abuse  Anti-platelet therapy.  Hypercalcemia-resolved  Fatigue  Dizziness     Recommendations:  Continuation of her current cardiovascular regimen at the present time.     This includes antihypertensives, and antiplatelets.  Continue to monitor blood pressure periodically  Follow-up in 6 months time sooner should there be difficulties.    Diagnoses and all orders for this visit:    1. Essential (primary) hypertension (Primary)  Overview:  Dr Ceron put her on doxazosin but it made her dizzy and fatigued. She would like to stay on amlodopine instead.   Amlodpine has caused the least problems for her she says. She is aware of the edema but she says she can live with it.       2. Mixed hyperlipidemia  Overview:  She sees endocrinology  She can not tolerate statins      3. Atherosclerosis of native coronary artery of native heart without angina pectoris  Overview:  Stable      4. Long term current use of antithrombotics/antiplatelets    Other orders  -     doxazosin (Cardura) 2 MG tablet; Take 1 tablet by mouth Every Night.  Dispense: 90 tablet; Refill: 3  -     Alirocumab (Praluent) 150 MG/ML injection pen; Inject 1 mL under the skin into the appropriate area as directed Every 14 (Fourteen) Days.  Dispense: 2.24 mL; Refill: 12        Objective:    Vitals:  Vitals:    02/09/22 1334   BP: 134/79   Pulse: 54   SpO2: 96%   Weight: 87.3 kg (192 lb 8 oz)   Height: 165.1 cm (65\")     Body mass index is 32.03 kg/m².      Physical Exam:    General: Alert, cooperative, no distress, appears stated age  Head:  Normocephalic, atraumatic, mucous membranes moist  Eyes:  Conjunctiva/corneas clear, EOM's intact   "   Neck:  Supple,  no bruit    Lungs: Clear to auscultation bilaterally, no wheezes rhonchi rales are noted  Chest wall: No tenderness  Heart::  Regular rate and rhythm, S1 and S2 normal, 1/6 holosystolic murmur.  No rub or gallop  Abdomen: Soft, non-tender, nondistended bowel sounds active  Extremities: No cyanosis, clubbing, or edema  Pulses: 2+ and symmetric all extremities  Skin:  No rashes or lesions  Neuro/psych: A&O x3. CN II through XII are grossly intact with appropriate affect      Allergies:  Allergies   Allergen Reactions   • Erythromycin Rash   • Naproxen Sodium Other (See Comments)     Dizzy lightheaded   • Latex Itching and Swelling   • Metoclopramide Other (See Comments)     Unsteady on feet       Medication Review:     Current Outpatient Medications:   •  Advair HFA 45-21 MCG/ACT inhaler, TAKE 2 PUFFS BY MOUTH TWICE A DAY, Disp: 12 each, Rfl: 12  •  amLODIPine (NORVASC) 10 MG tablet, TAKE 1 TABLET BY MOUTH EVERY DAY, Disp: 90 tablet, Rfl: 4  •  aspirin 81 MG tablet, Take 81 mg by mouth Daily. dont take dos, Disp: , Rfl:   •  calcium-vitamin D (Oscal 500/200 D-3) 500-200 MG-UNIT per tablet, Take  by mouth 2 (Two) Times a Week. Twice weekly, Disp: , Rfl:   •  carvedilol (COREG) 3.125 MG tablet, TAKE 1 TABLET BY MOUTH EVERY 12 HOURS, Disp: 180 tablet, Rfl: 4  •  coenzyme Q10 100 MG capsule, Take 1 capsule by mouth 2 (Two) Times a Week., Disp: , Rfl:   •  Diclofenac Sodium (VOLTAREN) 1 % gel gel, , Disp: , Rfl:   •  doxazosin (Cardura) 2 MG tablet, Take 1 tablet by mouth Every Night., Disp: 90 tablet, Rfl: 3  •  fluticasone (FLONASE) 50 MCG/ACT nasal spray, 2 sprays into the nostril(s) as directed by provider As Needed., Disp: , Rfl:   •  gabapentin (NEURONTIN) 100 MG capsule, Take 100 mg by mouth 2 (Two) Times a Day., Disp: , Rfl:   •  Januvia 100 MG tablet, TAKE 1 TABLET BY MOUTH EVERY DAY, Disp: 90 tablet, Rfl: 3  •  Lantus SoloStar 100 UNIT/ML injection pen, INJECT 20 UNITS UNDER THE SKIN INTO THE  APPROPRIATE AREA AS DIRECTED EVERY NIGHT., Disp: 5 pen, Rfl: 11  •  linaclotide (Linzess) 72 MCG capsule capsule, Take 1 capsule by mouth Every Morning Before Breakfast., Disp: 30 capsule, Rfl: 12  •  sertraline (Zoloft) 50 MG tablet, Take 1 tablet by mouth Daily., Disp: 90 tablet, Rfl: 3  •  tiZANidine (ZANAFLEX) 2 MG tablet, TAKE 1 TABLET BY MOUTH AT NIGHT AS NEEDED FOR MUSCLE SPASMS., Disp: 30 tablet, Rfl: 0  •  traMADol (ULTRAM) 50 MG tablet, Take 50 mg by mouth 2 (Two) Times a Day., Disp: , Rfl:   •  vitamin B-12 (CYANOCOBALAMIN) 500 MCG tablet, Take 500 mcg by mouth Every Other Day., Disp: , Rfl:   •  Alirocumab (Praluent) 150 MG/ML injection pen, Inject 1 mL under the skin into the appropriate area as directed Every 14 (Fourteen) Days., Disp: 2.24 mL, Rfl: 12  •  Glucose Blood (Blood Glucose Test) strip, 1 each by In Vitro route Daily., Disp: 100 each, Rfl: 3  •  montelukast (SINGULAIR) 10 MG tablet, TAKE 1 TABLET BY MOUTH EVERYDAY AT BEDTIME, Disp: 30 tablet, Rfl: 0  •  Triamcinolone Acetonide (NASACORT) 55 MCG/ACT nasal inhaler, 2 sprays into the nostril(s) as directed by provider Daily., Disp: 16.5 g, Rfl: 0    Family History:  Family History   Problem Relation Age of Onset   • Arthritis Father    • Prostate cancer Father    • Heart disease Sister        Past Medical History:  Past Medical History:   Diagnosis Date   • Allergic     latex allergy   • Allergies    • Anxiety    • Bilateral carotid bruits    • Bulging of thoracic intervertebral disc    • Cancer (HCC)     ureter    surgery   • CKD (chronic kidney disease)    • Claudication, intermittent (HCC)    • COPD (chronic obstructive pulmonary disease) (HCC)    • Coronary artery disease    • DDD (degenerative disc disease), thoracic    • Diabetes mellitus (HCC)    • DJD (degenerative joint disease)    • Gout    • 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   • Hyperlipidemia    • Hypertension    • IBS (irritable colon syndrome)     constipation   • Mass of  right breast    • Neuropathy    • Renal insufficiency    • Simple chronic bronchitis (HCC)    • Solitary kidney     left       Past Surgical History:  Past Surgical History:   Procedure Laterality Date   • BREAST BIOPSY Right    • CARDIAC CATHETERIZATION     • CHOLECYSTECTOMY     • COLON SURGERY      REMOVAL diverticular diseae   • COLONOSCOPY N/A 8/12/2021    Procedure: COLONOSCOPY with polypectomy x 3;  Surgeon: Margarette Mcallister MD;  Location: [facility] ENDOSCOPY;  Service: Gastroenterology;  Laterality: N/A;  post op: hmorrhoids, diverticulosis, polyps   • CORONARY STENT PLACEMENT     • ENDOSCOPY N/A 8/12/2021    Procedure: ESOPHAGOGASTRODUODENOSCOPY with dilatation (18-20 mm balloon) and (54 bougie);  Surgeon: Margarette Mcallister MD;  Location: [facility] ENDOSCOPY;  Service: Gastroenterology;  Laterality: N/A;  post op: esophageal stricture, esophagitis, gastritis, history of nissen   • EYE SURGERY Bilateral    • HIATAL HERNIA REPAIR     • NEPHRECTOMY Right        Social History:  Social History     Socioeconomic History   • Marital status:    Tobacco Use   • Smoking status: Current Every Day Smoker     Packs/day: 0.25     Years: 50.00     Pack years: 12.50     Types: Cigarettes   • Smokeless tobacco: Never Used   Vaping Use   • Vaping Use: Never used   Substance and Sexual Activity   • Alcohol use: Yes     Comment: occasionally    • Drug use: Never   • Sexual activity: Defer       Review of Systems:  The following systems were reviewed as they relate to the cardiovascular system: Constitutional, Eyes, ENT, Cardiovascular, Respiratory, Gastrointestinal, Integumentary, Neurological, Psychiatric, Hematologic, Endocrine, Musculoskeletal, and Genitourinary. The pertinent cardiovascular findings are reported above with all other cardiovascular points within those systems being negative.    Diagnostic Study Review:     Current Electrocardiogram:  Procedures no new EKG.  EKG dated 11/10/2021 demonstrates sinus rhythm with a  ventricular rate of 96 bpm.    Laboratory Data:  Lab Results   Component Value Date    GLUCOSE 98 11/11/2021    BUN 20 11/11/2021    CREATININE 1.68 (H) 11/11/2021    EGFRIFNONA 29 (L) 11/11/2021    EGFRIFAFRI 33 (L) 11/11/2021    BCR 12 11/11/2021    K 4.7 11/11/2021    CO2 24 11/11/2021    CALCIUM 9.0 11/11/2021    PROTENTOTREF 6.5 11/11/2021    ALBUMIN 4.0 11/11/2021    LABIL2 1.6 11/11/2021    AST 12 11/11/2021    ALT 12 11/11/2021     Lab Results   Component Value Date    GLUCOSE 98 11/11/2021    CALCIUM 9.0 11/11/2021     11/11/2021    K 4.7 11/11/2021    CO2 24 11/11/2021     11/11/2021    BUN 20 11/11/2021    CREATININE 1.68 (H) 11/11/2021    EGFRIFAFRI 33 (L) 11/11/2021    EGFRIFNONA 29 (L) 11/11/2021    BCR 12 11/11/2021    ANIONGAP 18.7 07/05/2019     Lab Results   Component Value Date    WBC 8.0 11/11/2021    HGB 14.4 11/11/2021    HCT 43.1 11/11/2021    MCV 94 11/11/2021     11/11/2021     Lab Results   Component Value Date    CHOL 83 11/08/2018    CHLPL 201 (H) 11/11/2021    TRIG 116 11/11/2021    HDL 36 (L) 11/11/2021     (H) 11/11/2021     Lab Results   Component Value Date    HGBA1C 6.2 12/28/2021     No results found for: INR, PROTIME    Most Recent Echo:       Most Recent Stress Test:       Most Recent Cardiac Catheterization:   No results found for this or any previous visit.       NOTE: The following portions of the patient's note were reviewed, confirmed and/or updated this visit as appropriate: History of present illness/Interval history, physical examination, assessment & plan, allergies, current medications, past family history, past medical history, past social history, past surgical history and problem list.

## 2022-02-15 ENCOUNTER — TELEPHONE (OUTPATIENT)
Dept: FAMILY MEDICINE CLINIC | Facility: CLINIC | Age: 77
End: 2022-02-15

## 2022-02-22 ENCOUNTER — TELEPHONE (OUTPATIENT)
Dept: FAMILY MEDICINE CLINIC | Facility: CLINIC | Age: 77
End: 2022-02-22

## 2022-02-22 NOTE — TELEPHONE ENCOUNTER
Called patient and let her know that I have faxed over the referral she said that she will call tomorrow and see what is going with it

## 2022-02-22 NOTE — TELEPHONE ENCOUNTER
PATIENT CALLING TO FOLLOW UP ON THE REFERRAL TO NEPHROLOGY NO ONE HAS CALLED TO MAKE AN APPOINTMENT.    PLEASE ADVISE  [phone]

## 2022-03-03 DIAGNOSIS — M54.6 ACUTE BILATERAL THORACIC BACK PAIN: ICD-10-CM

## 2022-03-03 RX ORDER — TIZANIDINE 2 MG/1
2 TABLET ORAL NIGHTLY PRN
Qty: 30 TABLET | Refills: 0 | Status: SHIPPED | OUTPATIENT
Start: 2022-03-03 | End: 2022-04-14

## 2022-03-29 ENCOUNTER — DOCUMENTATION (OUTPATIENT)
Dept: PHYSICAL THERAPY | Facility: CLINIC | Age: 77
End: 2022-03-29

## 2022-03-29 ENCOUNTER — OFFICE VISIT (OUTPATIENT)
Dept: FAMILY MEDICINE CLINIC | Facility: CLINIC | Age: 77
End: 2022-03-29

## 2022-03-29 VITALS
DIASTOLIC BLOOD PRESSURE: 73 MMHG | RESPIRATION RATE: 18 BRPM | OXYGEN SATURATION: 96 % | BODY MASS INDEX: 32.79 KG/M2 | HEART RATE: 82 BPM | HEIGHT: 65 IN | TEMPERATURE: 97.7 F | SYSTOLIC BLOOD PRESSURE: 116 MMHG | WEIGHT: 196.8 LBS

## 2022-03-29 DIAGNOSIS — E78.2 MIXED HYPERLIPIDEMIA: ICD-10-CM

## 2022-03-29 DIAGNOSIS — E66.09 CLASS 1 OBESITY DUE TO EXCESS CALORIES WITH SERIOUS COMORBIDITY AND BODY MASS INDEX (BMI) OF 32.0 TO 32.9 IN ADULT: ICD-10-CM

## 2022-03-29 DIAGNOSIS — I70.213 ATHEROSCLEROSIS OF NATIVE ARTERIES OF EXTREMITIES WITH INTERMITTENT CLAUDICATION, BILATERAL LEGS: ICD-10-CM

## 2022-03-29 DIAGNOSIS — N18.4 TYPE 2 DIABETES MELLITUS WITH STAGE 4 CHRONIC KIDNEY DISEASE, WITHOUT LONG-TERM CURRENT USE OF INSULIN: Primary | ICD-10-CM

## 2022-03-29 DIAGNOSIS — E11.22 TYPE 2 DIABETES MELLITUS WITH STAGE 4 CHRONIC KIDNEY DISEASE, WITHOUT LONG-TERM CURRENT USE OF INSULIN: Primary | ICD-10-CM

## 2022-03-29 DIAGNOSIS — G89.29 CHRONIC BILATERAL THORACIC BACK PAIN: Primary | ICD-10-CM

## 2022-03-29 DIAGNOSIS — C64.1 MALIGNANT NEOPLASM OF RIGHT KIDNEY, EXCEPT RENAL PELVIS: ICD-10-CM

## 2022-03-29 DIAGNOSIS — M54.6 CHRONIC BILATERAL THORACIC BACK PAIN: Primary | ICD-10-CM

## 2022-03-29 DIAGNOSIS — I10 ESSENTIAL (PRIMARY) HYPERTENSION: ICD-10-CM

## 2022-03-29 DIAGNOSIS — N18.4 STAGE 4 CHRONIC KIDNEY DISEASE: ICD-10-CM

## 2022-03-29 DIAGNOSIS — J41.0 SIMPLE CHRONIC BRONCHITIS: ICD-10-CM

## 2022-03-29 DIAGNOSIS — I25.10 ATHEROSCLEROSIS OF NATIVE CORONARY ARTERY OF NATIVE HEART WITHOUT ANGINA PECTORIS: ICD-10-CM

## 2022-03-29 LAB
BILIRUB BLD-MCNC: NEGATIVE MG/DL
CLARITY, POC: CLEAR
COLOR UR: YELLOW
EXPIRATION DATE: NORMAL
GLUCOSE BLDC GLUCOMTR-MCNC: 163 MG/DL (ref 70–130)
GLUCOSE UR STRIP-MCNC: NEGATIVE MG/DL
HBA1C MFR BLD: 6.5 %
KETONES UR QL: NEGATIVE
LEUKOCYTE EST, POC: NEGATIVE
Lab: NORMAL
NITRITE UR-MCNC: NEGATIVE MG/ML
PH UR: 5 [PH] (ref 5–8)
PROT UR STRIP-MCNC: ABNORMAL MG/DL
SP GR UR: 1.02 (ref 1–1.03)
UROBILINOGEN UR QL: NORMAL

## 2022-03-29 PROCEDURE — 3044F HG A1C LEVEL LT 7.0%: CPT | Performed by: FAMILY MEDICINE

## 2022-03-29 PROCEDURE — 99214 OFFICE O/P EST MOD 30 MIN: CPT | Performed by: FAMILY MEDICINE

## 2022-03-29 PROCEDURE — 83036 HEMOGLOBIN GLYCOSYLATED A1C: CPT | Performed by: FAMILY MEDICINE

## 2022-03-29 PROCEDURE — 3062F POS MACROALBUMINURIA REV: CPT | Performed by: FAMILY MEDICINE

## 2022-03-29 PROCEDURE — 81002 URINALYSIS NONAUTO W/O SCOPE: CPT | Performed by: FAMILY MEDICINE

## 2022-03-29 PROCEDURE — 82962 GLUCOSE BLOOD TEST: CPT | Performed by: FAMILY MEDICINE

## 2022-03-29 RX ORDER — KETOCONAZOLE 20 MG/ML
SHAMPOO TOPICAL
COMMUNITY
Start: 2022-02-14 | End: 2022-04-17

## 2022-03-29 RX ORDER — ALBUTEROL SULFATE 2.5 MG/3ML
2.5 SOLUTION RESPIRATORY (INHALATION) EVERY 4 HOURS PRN
Qty: 3 ML | Refills: 12 | Status: SHIPPED | OUTPATIENT
Start: 2022-03-29 | End: 2023-01-23

## 2022-03-29 RX ORDER — CLINDAMYCIN PHOSPHATE 11.9 MG/ML
SOLUTION TOPICAL
COMMUNITY
Start: 2022-02-14 | End: 2022-04-05

## 2022-03-29 NOTE — PROGRESS NOTES
Discharge Summary  Discharge Summary from Physical Therapy Report      Dates  PT visit: 10/29/2021 - 12/3/2021  Number of Visits: 6     Discharge Status of Patient: See Progress Note dated 12/3/2021    Goals: Partially Met    Discharge Plan: Continue with current home exercise program as instructed    Comments : Pt reported decreased pain and good relief with dry needling for thoracic pain. Remaining visits were cancelled by pt and pt did not return for treatment.    Date of Discharge 3/29/2022        Christina Lucero, PT  Physical Therapist  [place] Lic# [id number]

## 2022-03-29 NOTE — PROGRESS NOTES
Subjective   Vianey Reeves is a 76 y.o. female. Presents to [facility]    Chief Complaint   Patient presents with   • Hypertension   • Hyperlipidemia   • Diabetes       Diabetes  She presents for her follow-up diabetic visit. She has type 2 diabetes mellitus. Hypoglycemia symptoms include headaches. Pertinent negatives for hypoglycemia include no nervousness/anxiousness or sweats. Pertinent negatives for diabetes include no blurred vision and no chest pain. There are no hypoglycemic complications. There are no diabetic complications. Risk factors for coronary artery disease include dyslipidemia, diabetes mellitus, hypertension and obesity. Current diabetic treatment includes oral agent (monotherapy) and insulin injections. Her weight is stable. She is following a diabetic and generally healthy diet. Meal planning includes avoidance of concentrated sweets. She has not had a previous visit with a dietitian. She participates in exercise intermittently. Home blood sugar record trend: pt is not checking sugars at home. An ACE inhibitor/angiotensin II receptor blocker is being taken. She sees a podiatrist.Eye exam is current.   Hypertension  This is a chronic problem. The current episode started more than 1 year ago. The problem has been waxing and waning since onset. The problem is controlled. Associated symptoms include headaches and malaise/fatigue. Pertinent negatives include no blurred vision, chest pain, palpitations, shortness of breath or sweats. There are no associated agents to hypertension. Risk factors for coronary artery disease include dyslipidemia, diabetes mellitus, obesity and post-menopausal state. Past treatments include nothing. Current antihypertension treatment includes calcium channel blockers. The current treatment provides moderate improvement. There are no compliance problems.    Hyperlipidemia  This is a chronic problem. The current episode started more than 1 year ago. The  problem is controlled. There are no known factors aggravating her hyperlipidemia. Pertinent negatives include no chest pain, leg pain or shortness of breath. Current antihyperlipidemic treatment includes ezetimibe. The current treatment provides moderate improvement of lipids. There are no compliance problems.  Risk factors for coronary artery disease include dyslipidemia, hypertension and diabetes mellitus.       I personally reviewed and updated the patient's allergies, medications, problem list, and past medical, surgical, social, and family history. I have reviewed and confirmed the accuracy of the History of Present Illness and Review of Symptoms as documented by the MA/LPN/RN. Emma Hammer MD    Allergies:  Allergies   Allergen Reactions   • Erythromycin Rash   • Naproxen Sodium Other (See Comments)     Dizzy lightheaded   • Latex Itching and Swelling   • Metoclopramide Other (See Comments)     Unsteady on feet       Social History:  Social History     Socioeconomic History   • Marital status:    Tobacco Use   • Smoking status: Current Every Day Smoker     Packs/day: 0.25     Years: 50.00     Pack years: 12.50     Types: Cigarettes   • Smokeless tobacco: Never Used   Vaping Use   • Vaping Use: Never used   Substance and Sexual Activity   • Alcohol use: Yes     Comment: occasionally    • Drug use: Never   • Sexual activity: Defer       Family History:  Family History   Problem Relation Age of Onset   • Arthritis Father    • Prostate cancer Father    • Heart disease Sister        Past Medical History :  Patient Active Problem List   Diagnosis   • Type 2 diabetes mellitus with stage 4 chronic kidney disease, without long-term current use of insulin (HCC)   • Essential (primary) hypertension   • Mixed hyperlipidemia   • Hiatal hernia   • Long term current use of antithrombotics/antiplatelets   • Chronic obstructive pulmonary disease (HCC)   • Atherosclerotic heart disease of native coronary artery  without angina pectoris   • Irritable bowel syndrome with constipation   • History of cancer of ureter   • Solitary kidney   • Atherosclerosis of native arteries of extremities with intermittent claudication, bilateral legs (HCC)   • H/O malignant neoplasm of ureter   • Vertigo   • Medicare annual wellness visit, subsequent   • Presbyopia   • Combined form of senile cataract   • Cervical cancer screening   • Postartificial menopausal syndrome   • Diverticula of colon   • Ruptured tympanic membrane, left   • Hearing loss, bilateral   • TMJ pain dysfunction syndrome   • Diverticulitis   • Stage 4 chronic kidney disease (HCC)   • Gastroenteritis   • Other dysphagia   • Lactose intolerance   • Left foot pain   • Polyarthralgia   • Chronic bilateral thoracic back pain   • Class 1 obesity due to excess calories with serious comorbidity and body mass index (BMI) of 32.0 to 32.9 in adult   • Anxiety   • Hypercalcemia   • Daytime somnolence   • Screening mammogram, encounter for   • Chronic bilateral low back pain without sciatica   • Acute non-recurrent maxillary sinusitis   • Urinary frequency       Medication List:    Current Outpatient Medications:   •  Advair HFA 45-21 MCG/ACT inhaler, TAKE 2 PUFFS BY MOUTH TWICE A DAY, Disp: 12 each, Rfl: 12  •  Alirocumab (Praluent) 150 MG/ML injection pen, Inject 1 mL under the skin into the appropriate area as directed Every 14 (Fourteen) Days., Disp: 2.24 mL, Rfl: 12  •  amLODIPine (NORVASC) 10 MG tablet, TAKE 1 TABLET BY MOUTH EVERY DAY, Disp: 90 tablet, Rfl: 4  •  aspirin 81 MG tablet, Take 81 mg by mouth Daily. dont take dos, Disp: , Rfl:   •  calcium-vitamin D (Oscal 500/200 D-3) 500-200 MG-UNIT per tablet, Take  by mouth 2 (Two) Times a Week. Twice weekly, Disp: , Rfl:   •  carvedilol (COREG) 3.125 MG tablet, TAKE 1 TABLET BY MOUTH EVERY 12 HOURS, Disp: 180 tablet, Rfl: 4  •  coenzyme Q10 100 MG capsule, Take 1 capsule by mouth 2 (Two) Times a Week., Disp: , Rfl:   •   Diclofenac Sodium (VOLTAREN) 1 % gel gel, , Disp: , Rfl:   •  doxazosin (Cardura) 2 MG tablet, Take 1 tablet by mouth Every Night., Disp: 90 tablet, Rfl: 3  •  fluticasone (FLONASE) 50 MCG/ACT nasal spray, 2 sprays into the nostril(s) as directed by provider As Needed., Disp: , Rfl:   •  Glucose Blood (Blood Glucose Test) strip, 1 each by In Vitro route Daily., Disp: 100 each, Rfl: 3  •  Januvia 100 MG tablet, TAKE 1 TABLET BY MOUTH EVERY DAY, Disp: 90 tablet, Rfl: 3  •  Lantus SoloStar 100 UNIT/ML injection pen, INJECT 20 UNITS UNDER THE SKIN INTO THE APPROPRIATE AREA AS DIRECTED EVERY NIGHT., Disp: 5 pen, Rfl: 11  •  linaclotide (Linzess) 72 MCG capsule capsule, Take 1 capsule by mouth Every Morning Before Breakfast., Disp: 30 capsule, Rfl: 12  •  montelukast (SINGULAIR) 10 MG tablet, TAKE 1 TABLET BY MOUTH EVERYDAY AT BEDTIME, Disp: 30 tablet, Rfl: 0  •  sertraline (Zoloft) 50 MG tablet, Take 1 tablet by mouth Daily., Disp: 90 tablet, Rfl: 3  •  traMADol (ULTRAM) 50 MG tablet, Take 50 mg by mouth 2 (Two) Times a Day., Disp: , Rfl:   •  Triamcinolone Acetonide (NASACORT) 55 MCG/ACT nasal inhaler, 2 sprays into the nostril(s) as directed by provider Daily., Disp: 16.5 g, Rfl: 0  •  vitamin B-12 (CYANOCOBALAMIN) 500 MCG tablet, Take 500 mcg by mouth Every Other Day., Disp: , Rfl:   •  albuterol (PROVENTIL) (2.5 MG/3ML) 0.083% nebulizer solution, Take 2.5 mg by nebulization Every 4 (Four) Hours As Needed for Wheezing., Disp: 3 mL, Rfl: 12  •  clindamycin (CLEOCIN T) 1 % external solution, APPLY 2 TIMES A DAY TO AFFECTED AREAS IN GENITALIA, Disp: , Rfl:   •  gabapentin (NEURONTIN) 100 MG capsule, Take 100 mg by mouth 2 (Two) Times a Day., Disp: , Rfl:   •  ketoconazole (NIZORAL) 2 % shampoo, SHAMPOO SCALP 3 TIMES A WEEK, Disp: , Rfl:   •  tiZANidine (ZANAFLEX) 2 MG tablet, TAKE 1 TABLET BY MOUTH AT NIGHT AS NEEDED FOR MUSCLE SPASMS., Disp: 30 tablet, Rfl: 0    Past Surgical History:  Past Surgical History:   Procedure  "Laterality Date   • BREAST BIOPSY Right    • CARDIAC CATHETERIZATION     • CHOLECYSTECTOMY     • COLON SURGERY      REMOVAL diverticular diseae   • COLONOSCOPY N/A 8/12/2021    Procedure: COLONOSCOPY with polypectomy x 3;  Surgeon: Margarette Mcallister MD;  Location: [facility] ENDOSCOPY;  Service: Gastroenterology;  Laterality: N/A;  post op: hmorrhoids, diverticulosis, polyps   • CORONARY STENT PLACEMENT     • ENDOSCOPY N/A 8/12/2021    Procedure: ESOPHAGOGASTRODUODENOSCOPY with dilatation (18-20 mm balloon) and (54 bougie);  Surgeon: Margarette Mcallister MD;  Location: [facility] ENDOSCOPY;  Service: Gastroenterology;  Laterality: N/A;  post op: esophageal stricture, esophagitis, gastritis, history of nissen   • EYE SURGERY Bilateral    • HIATAL HERNIA REPAIR     • NEPHRECTOMY Right        Review of Systems:  Review of Systems   Constitutional: Positive for malaise/fatigue. Negative for activity change and fever.   HENT: Negative for ear pain, rhinorrhea, sinus pressure and voice change.    Eyes: Negative for blurred vision and visual disturbance.   Respiratory: Negative for cough and shortness of breath.    Cardiovascular: Negative for chest pain and palpitations.   Gastrointestinal: Negative for abdominal pain, diarrhea, nausea and vomiting.   Endocrine: Negative for cold intolerance and heat intolerance.   Genitourinary: Negative for frequency and urgency.   Musculoskeletal: Negative for arthralgias.   Skin: Negative for rash.   Neurological: Negative for syncope.   Hematological: Does not bruise/bleed easily.   Psychiatric/Behavioral: Negative for depressed mood. The patient is not nervous/anxious.        Physical Exam:  Vital Signs:  Vital Signs:   /73   Pulse 82   Temp 97.7 °F (36.5 °C)   Resp 18   Ht 165.1 cm (65\")   Wt 89.3 kg (196 lb 12.8 oz)   SpO2 96%   BMI 32.75 kg/m²     Result Review :                Physical Exam  Vitals reviewed.   Constitutional:       Appearance: Normal appearance. She is " well-developed.   HENT:      Head: Normocephalic and atraumatic.   Eyes:      General:         Right eye: No discharge.         Left eye: No discharge.   Cardiovascular:      Rate and Rhythm: Normal rate and regular rhythm.      Heart sounds: Normal heart sounds. No murmur heard.    No friction rub. No gallop.   Pulmonary:      Effort: Pulmonary effort is normal. No respiratory distress.      Breath sounds: Normal breath sounds. No wheezing or rales.   Skin:     General: Skin is warm and dry.      Findings: No rash.   Neurological:      Mental Status: She is alert and oriented to person, place, and time.      Coordination: Coordination normal.      Gait: Gait normal.   Psychiatric:         Behavior: Behavior is cooperative.         Assessment and Plan:  Problems Addressed this Visit        Cardiac and Vasculature    Essential (primary) hypertension     Hypertension is improving with treatment.  Continue current treatment regimen.  Dietary sodium restriction.  Weight loss.  Continue current medications.  Blood pressure will be reassessed in 3 months.           Mixed hyperlipidemia    Relevant Orders    Comprehensive metabolic panel    Lipid Panel With / Chol / HDL Ratio    Atherosclerotic heart disease of native coronary artery without angina pectoris    Atherosclerosis of native arteries of extremities with intermittent claudication, bilateral legs (HCC)       Endocrine and Metabolic    Type 2 diabetes mellitus with stage 4 chronic kidney disease, without long-term current use of insulin (HCC) - Primary     Renal condition is improving with treatment.  Continue current treatment regimen.  Stop smoking.  Continue current medications.  Renal condition will be reassessed in 3 months.           Relevant Orders    POCT urinalysis dipstick, manual (Completed)    POC Glycosylated Hemoglobin (Hb A1C) (Completed)    POCT Glucose (Completed)    Class 1 obesity due to excess calories with serious comorbidity and body mass  index (BMI) of 32.0 to 32.9 in adult       Genitourinary and Reproductive     Stage 4 chronic kidney disease (HCC)       Pulmonary and Pneumonias    Chronic obstructive pulmonary disease (HCC)     COPD is unchanged.  Continue current medications.               Relevant Medications    albuterol (PROVENTIL) (2.5 MG/3ML) 0.083% nebulizer solution      Other Visit Diagnoses     Malignant neoplasm of right kidney, except renal pelvis (HCC)          Diagnoses       Codes Comments    Type 2 diabetes mellitus with stage 4 chronic kidney disease, without long-term current use of insulin (HCC)    -  Primary ICD-10-CM: E11.22, N18.4  ICD-9-CM: 250.40, 585.4     Stage 4 chronic kidney disease (HCC)     ICD-10-CM: N18.4  ICD-9-CM: 585.4     Mixed hyperlipidemia     ICD-10-CM: E78.2  ICD-9-CM: 272.2     Essential (primary) hypertension     ICD-10-CM: I10  ICD-9-CM: 401.9     Class 1 obesity due to excess calories with serious comorbidity and body mass index (BMI) of 32.0 to 32.9 in adult     ICD-10-CM: E66.09, Z68.32  ICD-9-CM: 278.00, V85.32     Atherosclerosis of native coronary artery of native heart without angina pectoris     ICD-10-CM: I25.10  ICD-9-CM: 414.01     Atherosclerosis of native arteries of extremities with intermittent claudication, bilateral legs (HCC)     ICD-10-CM: I70.213  ICD-9-CM: 440.21     Simple chronic bronchitis (HCC)     ICD-10-CM: J41.0  ICD-9-CM: 491.0     Malignant neoplasm of right kidney, except renal pelvis (HCC)     ICD-10-CM: C64.1  ICD-9-CM: 189.0            An After Visit Summary and PPPS were given to the patient.       I wore protective equipment throughout this patient encounter to include mask. Hand hygiene was performed before donning protective equipment and after removal when leaving the room.

## 2022-03-31 NOTE — ASSESSMENT & PLAN NOTE
Renal condition is improving with treatment.  Continue current treatment regimen.  Stop smoking.  Continue current medications.  Renal condition will be reassessed in 3 months.

## 2022-04-05 ENCOUNTER — HOSPITAL ENCOUNTER (OUTPATIENT)
Dept: GENERAL RADIOLOGY | Facility: HOSPITAL | Age: 77
Discharge: HOME | End: 2022-04-05
Admitting: FAMILY MEDICINE

## 2022-04-05 ENCOUNTER — OFFICE VISIT (OUTPATIENT)
Dept: FAMILY MEDICINE CLINIC | Facility: CLINIC | Age: 77
End: 2022-04-05

## 2022-04-05 DIAGNOSIS — J01.00 ACUTE NON-RECURRENT MAXILLARY SINUSITIS: ICD-10-CM

## 2022-04-05 DIAGNOSIS — E66.09 CLASS 1 OBESITY DUE TO EXCESS CALORIES WITH SERIOUS COMORBIDITY AND BODY MASS INDEX (BMI) OF 32.0 TO 32.9 IN ADULT: ICD-10-CM

## 2022-04-05 DIAGNOSIS — I10 ESSENTIAL (PRIMARY) HYPERTENSION: ICD-10-CM

## 2022-04-05 DIAGNOSIS — N18.4 STAGE 4 CHRONIC KIDNEY DISEASE: ICD-10-CM

## 2022-04-05 DIAGNOSIS — N18.4 TYPE 2 DIABETES MELLITUS WITH STAGE 4 CHRONIC KIDNEY DISEASE, WITHOUT LONG-TERM CURRENT USE OF INSULIN: ICD-10-CM

## 2022-04-05 DIAGNOSIS — R06.02 SHORTNESS OF BREATH: ICD-10-CM

## 2022-04-05 DIAGNOSIS — J44.1 CHRONIC OBSTRUCTIVE PULMONARY DISEASE WITH ACUTE EXACERBATION: Primary | ICD-10-CM

## 2022-04-05 DIAGNOSIS — R05.9 COUGH: ICD-10-CM

## 2022-04-05 DIAGNOSIS — I25.10 ATHEROSCLEROSIS OF NATIVE CORONARY ARTERY OF NATIVE HEART WITHOUT ANGINA PECTORIS: ICD-10-CM

## 2022-04-05 DIAGNOSIS — E11.22 TYPE 2 DIABETES MELLITUS WITH STAGE 4 CHRONIC KIDNEY DISEASE, WITHOUT LONG-TERM CURRENT USE OF INSULIN: ICD-10-CM

## 2022-04-05 PROBLEM — R40.0 DAYTIME SOMNOLENCE: Status: RESOLVED | Noted: 2021-10-29 | Resolved: 2022-04-05

## 2022-04-05 PROBLEM — M79.672 LEFT FOOT PAIN: Status: RESOLVED | Noted: 2021-08-31 | Resolved: 2022-04-05

## 2022-04-05 LAB — GLUCOSE BLDC GLUCOMTR-MCNC: 394 MG/DL (ref 70–130)

## 2022-04-05 PROCEDURE — 99214 OFFICE O/P EST MOD 30 MIN: CPT | Performed by: FAMILY MEDICINE

## 2022-04-05 PROCEDURE — 82962 GLUCOSE BLOOD TEST: CPT | Performed by: FAMILY MEDICINE

## 2022-04-05 PROCEDURE — 71046 X-RAY EXAM CHEST 2 VIEWS: CPT

## 2022-04-05 RX ORDER — PREDNISONE 20 MG/1
TABLET ORAL
COMMUNITY
Start: 2022-04-04 | End: 2022-04-14

## 2022-04-05 RX ORDER — AMOXICILLIN 500 MG/1
500 TABLET, FILM COATED ORAL 3 TIMES DAILY
Qty: 30 TABLET | Refills: 0 | Status: SHIPPED | OUTPATIENT
Start: 2022-04-05 | End: 2022-04-14

## 2022-04-05 RX ORDER — DOXYCYCLINE HYCLATE 100 MG/1
100 CAPSULE ORAL 2 TIMES DAILY
COMMUNITY
Start: 2022-04-01 | End: 2022-04-14

## 2022-04-05 NOTE — PROGRESS NOTES
Chief Complaint  URI, Diabetes, COPD, and Coronary Artery Disease    Subjective     CC  Problem List  Visit Diagnosis   Encounters  Notes  Medications  Labs  Result Review Imaging  Media    Vianey Reeves presents to [facility] for   Vianey reports of going to an [facility]  for congestion on 03/31/2022 .They gave her a Rocephin injection, Doxycycline and Prednisone. She reports improvement but continued sxs. She is here to follow up.    URI   This is a new problem. The current episode started 1 to 4 weeks ago. The problem has been unchanged. There has been no fever. Associated symptoms include coughing, headaches and wheezing. Pertinent negatives include no abdominal pain, chest pain, congestion, diarrhea, dysuria, ear pain, nausea, plugged ear sensation, rhinorrhea, sinus pain, sneezing, sore throat, swollen glands or vomiting. Treatments tried: rochephen , Doxycycline, and Prednisone. The treatment provided mild relief.   Diabetes  She presents for her follow-up diabetic visit. She has type 2 diabetes mellitus. Hypoglycemia symptoms include headaches. Pertinent negatives for diabetes include no chest pain, no polydipsia, no weakness and no weight loss. There are no hypoglycemic complications. There are no diabetic complications. Risk factors for coronary artery disease include diabetes mellitus, dyslipidemia, hypertension, post-menopausal, obesity and family history. She is following a generally healthy diet. Meal planning includes avoidance of concentrated sweets. She participates in exercise intermittently. An ACE inhibitor/angiotensin II receptor blocker is not being taken. She sees a podiatrist.Eye exam is current.       Review of Systems   Constitutional: Negative for appetite change, fever and unexpected weight loss.   HENT: Negative for congestion, ear pain, rhinorrhea, sneezing, sore throat and swollen glands.    Respiratory: Positive for cough and  wheezing. Negative for shortness of breath.    Cardiovascular: Negative for chest pain, palpitations and leg swelling.   Gastrointestinal: Negative for abdominal pain, diarrhea, nausea and vomiting.   Endocrine: Negative for cold intolerance, heat intolerance and polydipsia.   Genitourinary: Negative for dysuria.   Neurological: Negative for weakness.   Hematological: Negative for adenopathy. Does not bruise/bleed easily.        Objective   Vital Signs:   There were no vitals taken for this visit.    Physical Exam   Result Review :Labs  Result Review  Imaging  Med Tab  Media                 Assessment and Plan CC Problem List  Visit Diagnosis  ROS  Review (Popup)  [facility] Maintenance  Quality  BestPractice  Medications  SmartSets  SnapShot Encounters  Media  Problem List Items Addressed This Visit        Unprioritized    Type 2 diabetes mellitus with stage 4 chronic kidney disease, without long-term current use of insulin (HCC)    Overview     A1c 6.5 3/29/2022  A1C 6.1 6/23/2020  Continue with current treatment. She is trying to lose weight   A1C 6.4 3/11/21          6.2  12/28/21           Relevant Orders    POCT Glucose (Completed)    Essential (primary) hypertension    Overview     Dr Ceron put her on doxazosin but it made her dizzy and fatigued. She would like to stay on amlodopine instead.   Amlodpine has caused the least problems for her she says. She is aware of the edema but she says she can live with it.            Simple chronic bronchitis (HCC) - Primary    Overview     S/P steroids, and IM and po abx. Sxs improved but not resolved. Neg exam normal O2 sat CXR           Atherosclerotic heart disease of native coronary artery without angina pectoris    Overview     No chest pain, Cardiology apt pending 04/07/2022           Stage 4 chronic kidney disease (HCC)    Overview     She sees Dr Julio           Class 1 obesity due to excess calories with serious comorbidity and body mass index (BMI)  of 32.0 to 32.9 in adult      Other Visit Diagnoses     Cough        Relevant Orders    XR Chest PA & Lateral (Completed)    Shortness of breath        possible cardiac etiology    Acute non-recurrent maxillary sinusitis        ABx fluids saline flushes fu if no improvement          Follow Up Instructions Charge Capture  Follow-up Communications  Return in about 2 months (around 6/5/2022), or if symptoms worsen or fail to improve.  Patient was given instructions and counseling regarding her condition or for health maintenance advice. Please see specific information pulled into the AVS if appropriate.

## 2022-04-07 ENCOUNTER — HOSPITAL ENCOUNTER (OUTPATIENT)
Dept: CARDIOLOGY | Facility: HOSPITAL | Age: 77
Discharge: HOME | End: 2022-04-07

## 2022-04-07 ENCOUNTER — OFFICE VISIT (OUTPATIENT)
Dept: CARDIOLOGY | Facility: CLINIC | Age: 77
End: 2022-04-07

## 2022-04-07 VITALS
SYSTOLIC BLOOD PRESSURE: 119 MMHG | DIASTOLIC BLOOD PRESSURE: 82 MMHG | BODY MASS INDEX: 32.65 KG/M2 | OXYGEN SATURATION: 95 % | HEART RATE: 67 BPM | HEIGHT: 65 IN | WEIGHT: 196 LBS

## 2022-04-07 DIAGNOSIS — I10 ESSENTIAL (PRIMARY) HYPERTENSION: ICD-10-CM

## 2022-04-07 DIAGNOSIS — E78.2 MIXED HYPERLIPIDEMIA: ICD-10-CM

## 2022-04-07 DIAGNOSIS — I49.9 IRREGULAR HEART BEAT: ICD-10-CM

## 2022-04-07 DIAGNOSIS — I49.9 IRREGULAR HEART BEAT: Primary | ICD-10-CM

## 2022-04-07 PROCEDURE — 93242 EXT ECG>48HR<7D RECORDING: CPT

## 2022-04-07 PROCEDURE — 99213 OFFICE O/P EST LOW 20 MIN: CPT | Performed by: NURSE PRACTITIONER

## 2022-04-07 PROCEDURE — 93000 ELECTROCARDIOGRAM COMPLETE: CPT | Performed by: NURSE PRACTITIONER

## 2022-04-07 NOTE — PROGRESS NOTES
[facility] CARDIOLOGY      REASON FOR FOLLOW-UP:  Acute office visit for irregular heartbeat      Chief Complaint   Patient presents with   • Coronary Artery Disease   • Hyperlipidemia         Dear Emma Hammer MD        History of Present Illness   It was my pleasure to see Ms. Reeves in acute office visit.  She is a 76 y.o. female with a known history of ischemic heart disease.  She underwent cardiac catheterization with PCI and drug-eluting stent placement on April 9, 2016 and follow-up PCI on 5/25/2016.  Patient presents to our office after being evaluated at urgent care center and noted to have irregular heartbeat.  Patient denies any palpitations, chest discomfort, dizziness, lightheadedness, near syncopal or syncopal episodes.  She denies any shortness of breath.  EKG in the office shows normal sinus rhythm, 1 PAC      ASSESSMENT:  Irregular heartbeat  Coronary artery disease status post PCI and drug-eluting stent placement in her mid RCA and Cx        Negative nuclear stress test May 2019  Diabetes mellitus.  Dyslipidemia with intolerance to statins.  Hypertension with hypertensive cardiovascular disease.      Suboptimal with limited medication repertoire  COPD  Solitary kidney.  Chronic renal failure with declining GFR     Followed by Dr. Julio  Tobacco abuse  Anti-platelet therapy.  Hypercalcemia-resolved  Fatigue  Dizziness        PLAN:  We will obtain 72-hour Holter monitor to evaluate for any arrhythmia.  No history of A. fib or other arrhythmia.  Follow-up after monitor        The following portions of the patient's history were reviewed and updated as appropriate: allergies, current medications, past family history, past medical history, past social history, past surgical history and problem list.    REVIEW OF SYSTEMS:    Review of Systems   All other systems reviewed and are negative.      Vitals:    04/07/22 1421   BP: 119/82   Pulse: 67   SpO2: 95%         PHYSICAL  EXAM:    General: Alert, cooperative, no distress, appears stated age  Head:  Normocephalic, atraumatic, mucous membranes moist  Eyes:  Conjunctiva/corneas clear, EOM's intact     Neck:  Supple,  no JVD or bruit     Lungs: Clear to auscultation bilaterally, no wheezes rhonchi rales are noted  Chest wall: No tenderness  Musculoskeletal:   Ambulates freely without assistance  Heart::  Regular rate and rhythm, S1 and S2 normal, no murmur, rub or gallop  Abdomen: Soft, non-tender, nondistended, bowel sounds active, no abdominal bruit  Extremities: No cyanosis, clubbing, or edema   Pulses: 2+ and symmetric all extremities  Skin:  No rashes or lesions  Neuro/psych: A&O x3. 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   • Hyperlipidemia    • Hypertension    • IBS (irritable colon syndrome)     constipation   • Mass of right breast    • Neuropathy    • Renal insufficiency    • Simple chronic bronchitis (HCC)    • Solitary kidney     left       Past Surgical History:   Procedure Laterality Date   • BREAST BIOPSY Right    • CARDIAC CATHETERIZATION     • CHOLECYSTECTOMY     • COLON SURGERY      REMOVAL diverticular diseae   • COLONOSCOPY N/A 8/12/2021    Procedure: COLONOSCOPY with polypectomy x 3;  Surgeon: Margarette Mcallister MD;  Location: [facility] ENDOSCOPY;  Service: Gastroenterology;  Laterality: N/A;  post op: hmorrhoids, diverticulosis, polyps   • CORONARY STENT PLACEMENT      • ENDOSCOPY N/A 8/12/2021    Procedure: ESOPHAGOGASTRODUODENOSCOPY with dilatation (18-20 mm balloon) and (54 bougie);  Surgeon: Margarette Mcallister MD;  Location: [facility] ENDOSCOPY;  Service: Gastroenterology;  Laterality: N/A;  post op: esophageal stricture, esophagitis, gastritis, history of nissen   • EYE SURGERY Bilateral    • HIATAL HERNIA REPAIR     • NEPHRECTOMY Right          Current Outpatient Medications:   •  Advair HFA 45-21 MCG/ACT inhaler, TAKE 2 PUFFS BY MOUTH TWICE A DAY, Disp: 12 each, Rfl: 12  •  albuterol (PROVENTIL) (2.5 MG/3ML) 0.083% nebulizer solution, Take 2.5 mg by nebulization Every 4 (Four) Hours As Needed for Wheezing., Disp: 3 mL, Rfl: 12  •  Alirocumab (Praluent) 150 MG/ML injection pen, Inject 1 mL under the skin into the appropriate area as directed Every 14 (Fourteen) Days., Disp: 2.24 mL, Rfl: 12  •  amLODIPine (NORVASC) 10 MG tablet, TAKE 1 TABLET BY MOUTH EVERY DAY, Disp: 90 tablet, Rfl: 4  •  amoxicillin (AMOXIL) 500 MG tablet, Take 1 tablet by mouth 3 (Three) Times a Day., Disp: 30 tablet, Rfl: 0  •  aspirin 81 MG tablet, Take 81 mg by mouth Daily. dont take dos, Disp: , Rfl:   •  calcium-vitamin D (Oscal 500/200 D-3) 500-200 MG-UNIT per tablet, Take  by mouth 2 (Two) Times a Week. Twice weekly, Disp: , Rfl:   •  carvedilol (COREG) 3.125 MG tablet, TAKE 1 TABLET BY MOUTH EVERY 12 HOURS, Disp: 180 tablet, Rfl: 4  •  coenzyme Q10 100 MG capsule, Take 1 capsule by mouth 2 (Two) Times a Week., Disp: , Rfl:   •  Diclofenac Sodium (VOLTAREN) 1 % gel gel, , Disp: , Rfl:   •  doxazosin (Cardura) 2 MG tablet, Take 1 tablet by mouth Every Night., Disp: 90 tablet, Rfl: 3  •  doxycycline (VIBRAMYCIN) 100 MG capsule, Take 100 mg by mouth 2 (Two) Times a Day., Disp: , Rfl:   •  fluticasone (FLONASE) 50 MCG/ACT nasal spray, 2 sprays into the nostril(s) as directed by provider As Needed., Disp: , Rfl:   •  gabapentin (NEURONTIN) 100 MG capsule, Take 100 mg by mouth 2 (Two) Times a Day., Disp: ,  Rfl:   •  Glucose Blood (Blood Glucose Test) strip, 1 each by In Vitro route Daily., Disp: 100 each, Rfl: 3  •  Januvia 100 MG tablet, TAKE 1 TABLET BY MOUTH EVERY DAY, Disp: 90 tablet, Rfl: 3  •  ketoconazole (NIZORAL) 2 % shampoo, SHAMPOO SCALP 3 TIMES A WEEK, Disp: , Rfl:   •  Lantus SoloStar 100 UNIT/ML injection pen, INJECT 20 UNITS UNDER THE SKIN INTO THE APPROPRIATE AREA AS DIRECTED EVERY NIGHT., Disp: 5 pen, Rfl: 11  •  linaclotide (Linzess) 72 MCG capsule capsule, Take 1 capsule by mouth Every Morning Before Breakfast., Disp: 30 capsule, Rfl: 12  •  montelukast (SINGULAIR) 10 MG tablet, TAKE 1 TABLET BY MOUTH EVERYDAY AT BEDTIME, Disp: 30 tablet, Rfl: 0  •  predniSONE (DELTASONE) 20 MG tablet, , Disp: , Rfl:   •  sertraline (Zoloft) 50 MG tablet, Take 1 tablet by mouth Daily., Disp: 90 tablet, Rfl: 3  •  tiZANidine (ZANAFLEX) 2 MG tablet, TAKE 1 TABLET BY MOUTH AT NIGHT AS NEEDED FOR MUSCLE SPASMS., Disp: 30 tablet, Rfl: 0  •  traMADol (ULTRAM) 50 MG tablet, Take 50 mg by mouth 2 (Two) Times a Day., Disp: , Rfl:   •  Triamcinolone Acetonide (NASACORT) 55 MCG/ACT nasal inhaler, 2 sprays into the nostril(s) as directed by provider Daily., Disp: 16.5 g, Rfl: 0  •  vitamin B-12 (CYANOCOBALAMIN) 500 MCG tablet, Take 500 mcg by mouth Every Other Day., Disp: , Rfl:     Allergies   Allergen Reactions   • Erythromycin Rash   • Naproxen Sodium Other (See Comments)     Dizzy lightheaded   • Latex Itching and Swelling   • Metoclopramide Other (See Comments)     Unsteady on feet       Family History   Problem Relation Age of Onset   • Arthritis Father    • Prostate cancer Father    • Heart disease Sister        Social History     Tobacco Use   • Smoking status: Current Every Day Smoker     Packs/day: 0.25     Years: 50.00     Pack years: 12.50     Types: Cigarettes   • Smokeless tobacco: Never Used   Substance Use Topics   • Alcohol use: Yes     Comment: occasionally            Current Electrocardiogram:    ECG 12  "Lead    Date/Time: 4/7/2022 3:58 PM  Performed by: Iliana Alfonso APRN  Authorized by: Iliana Alfonso APRN   Comparison: not compared with previous ECG   Rhythm: sinus rhythm  Ectopy comments: 1 PVC  BPM: 67                  EMR Dragon/Transcription:   \"Dictated utilizing Dragon dictation\".       "

## 2022-04-08 PROBLEM — I49.9 IRREGULAR HEART BEAT: Status: ACTIVE | Noted: 2022-04-08

## 2022-04-13 LAB
ALBUMIN SERPL-MCNC: 3.3 G/DL (ref 3.7–4.7)
ALBUMIN/GLOB SERPL: 1.4 {RATIO} (ref 1.2–2.2)
ALP SERPL-CCNC: 70 IU/L (ref 44–121)
ALT SERPL-CCNC: 19 IU/L (ref 0–32)
AST SERPL-CCNC: 15 IU/L (ref 0–40)
BILIRUB SERPL-MCNC: 0.4 MG/DL (ref 0–1.2)
BUN SERPL-MCNC: 18 MG/DL (ref 8–27)
BUN/CREAT SERPL: 12 (ref 12–28)
CALCIUM SERPL-MCNC: 8.6 MG/DL (ref 8.7–10.3)
CHLORIDE SERPL-SCNC: 103 MMOL/L (ref 96–106)
CHOLEST SERPL-MCNC: 104 MG/DL (ref 100–199)
CHOLEST/HDLC SERPL: 2.4 RATIO (ref 0–4.4)
CO2 SERPL-SCNC: 24 MMOL/L (ref 20–29)
CREAT SERPL-MCNC: 1.55 MG/DL (ref 0.57–1)
EGFRCR SERPLBLD CKD-EPI 2021: 34 ML/MIN/1.73
GLOBULIN SER CALC-MCNC: 2.3 G/DL (ref 1.5–4.5)
GLUCOSE SERPL-MCNC: 163 MG/DL (ref 65–99)
HDLC SERPL-MCNC: 44 MG/DL
LDLC SERPL CALC-MCNC: 45 MG/DL (ref 0–99)
POTASSIUM SERPL-SCNC: 4.8 MMOL/L (ref 3.5–5.2)
PROT SERPL-MCNC: 5.6 G/DL (ref 6–8.5)
SODIUM SERPL-SCNC: 140 MMOL/L (ref 134–144)
TRIGL SERPL-MCNC: 71 MG/DL (ref 0–149)
VLDLC SERPL CALC-MCNC: 15 MG/DL (ref 5–40)

## 2022-04-14 ENCOUNTER — OFFICE VISIT (OUTPATIENT)
Dept: FAMILY MEDICINE CLINIC | Facility: CLINIC | Age: 77
End: 2022-04-14

## 2022-04-14 VITALS
OXYGEN SATURATION: 98 % | TEMPERATURE: 97.5 F | RESPIRATION RATE: 18 BRPM | DIASTOLIC BLOOD PRESSURE: 62 MMHG | HEART RATE: 84 BPM | HEIGHT: 65 IN | BODY MASS INDEX: 32.65 KG/M2 | WEIGHT: 196 LBS | SYSTOLIC BLOOD PRESSURE: 110 MMHG

## 2022-04-14 DIAGNOSIS — R53.83 OTHER FATIGUE: ICD-10-CM

## 2022-04-14 DIAGNOSIS — I25.10 ATHEROSCLEROSIS OF NATIVE CORONARY ARTERY OF NATIVE HEART WITHOUT ANGINA PECTORIS: ICD-10-CM

## 2022-04-14 DIAGNOSIS — R00.2 PALPITATIONS: ICD-10-CM

## 2022-04-14 DIAGNOSIS — J41.0 SIMPLE CHRONIC BRONCHITIS: Primary | ICD-10-CM

## 2022-04-14 PROCEDURE — 99214 OFFICE O/P EST MOD 30 MIN: CPT | Performed by: FAMILY MEDICINE

## 2022-04-14 NOTE — PROGRESS NOTES
Subjective   Vianey Reeves is a 76 y.o. female. Presents to [facility]    Chief Complaint   Patient presents with   • Cough   • Fatigue       Cough  This is a new problem. The current episode started 1 to 4 weeks ago. The problem has been unchanged. The problem occurs every few hours. The cough is non-productive. Nothing aggravates the symptoms. Treatments tried: Amoxicillin, Prednisone. The treatment provided moderate relief. Her past medical history is significant for COPD.   Fatigue  This is a new problem. Episode onset: x 2 weeks. The problem occurs constantly. The problem has been gradually worsening. Associated symptoms include coughing and fatigue. Nothing aggravates the symptoms. She has tried nothing for the symptoms.        The cough is better.     I personally reviewed and updated the patient's allergies, medications, problem list, and past medical, surgical, social, and family history. I have reviewed and confirmed the accuracy of the History of Present Illness and Review of Symptoms as documented by the MA/LPN/RN. Emma Hammer MD    Allergies:  Allergies   Allergen Reactions   • Erythromycin Rash   • Naproxen Sodium Other (See Comments)     Dizzy lightheaded   • Latex Itching and Swelling   • Metoclopramide Other (See Comments)     Unsteady on feet       Social History:  Social History     Socioeconomic History   • Marital status:    Tobacco Use   • Smoking status: Current Every Day Smoker     Packs/day: 0.25     Years: 50.00     Pack years: 12.50     Types: Cigarettes   • Smokeless tobacco: Never Used   Vaping Use   • Vaping Use: Never used   Substance and Sexual Activity   • Alcohol use: Yes     Comment: occasionally    • Drug use: Never   • Sexual activity: Defer       Family History:  Family History   Problem Relation Age of Onset   • Arthritis Father    • Prostate cancer Father    • Heart disease Sister        Past Medical History :  Patient Active Problem List    Diagnosis   • Type 2 diabetes mellitus with stage 4 chronic kidney disease, without long-term current use of insulin ([facility])   • Essential (primary) hypertension   • Mixed hyperlipidemia   • Hiatal hernia   • Other fatigue   • Long term current use of antithrombotics/antiplatelets   • Simple chronic bronchitis ([facility])   • Atherosclerotic heart disease of native coronary artery without angina pectoris   • Irritable bowel syndrome with constipation   • History of cancer of ureter   • Solitary kidney   • Atherosclerosis of native arteries of extremities with intermittent claudication, bilateral legs ([facility])   • H/O malignant neoplasm of ureter   • Vertigo   • Medicare annual wellness visit, subsequent   • Presbyopia   • Combined form of senile cataract   • Cervical cancer screening   • Postartificial menopausal syndrome   • Diverticula of colon   • Ruptured tympanic membrane, left   • Hearing loss, bilateral   • TMJ pain dysfunction syndrome   • Diverticulitis   • Stage 4 chronic kidney disease ([facility])   • Gastroenteritis   • Other dysphagia   • Lactose intolerance   • Polyarthralgia   • Chronic bilateral thoracic back pain   • Class 1 obesity due to excess calories with serious comorbidity and body mass index (BMI) of 32.0 to 32.9 in adult   • Anxiety   • Hypercalcemia   • Screening mammogram, encounter for   • Chronic bilateral low back pain without sciatica   • Urinary frequency   • Irregular heart beat   • Palpitations   • COPD (chronic obstructive pulmonary disease) ([facility])       Medication List:    Current Outpatient Medications:   •  Advair HFA 45-21 MCG/ACT inhaler, TAKE 2 PUFFS BY MOUTH TWICE A DAY, Disp: 12 each, Rfl: 12  •  albuterol (PROVENTIL) (2.5 MG/3ML) 0.083% nebulizer solution, Take 2.5 mg by nebulization Every 4 (Four) Hours As Needed for Wheezing., Disp: 3 mL, Rfl: 12  •  Alirocumab (Praluent) 150 MG/ML injection pen, Inject 1 mL under the skin into the appropriate area as directed Every 14 (Fourteen)  Days., Disp: 2.24 mL, Rfl: 12  •  amLODIPine (NORVASC) 10 MG tablet, TAKE 1 TABLET BY MOUTH EVERY DAY, Disp: 90 tablet, Rfl: 4  •  aspirin 81 MG tablet, Take 81 mg by mouth Daily. dont take dos, Disp: , Rfl:   •  carvedilol (COREG) 3.125 MG tablet, TAKE 1 TABLET BY MOUTH EVERY 12 HOURS, Disp: 180 tablet, Rfl: 4  •  coenzyme Q10 100 MG capsule, Take 1 capsule by mouth 2 (Two) Times a Week., Disp: , Rfl:   •  Diclofenac Sodium (VOLTAREN) 1 % gel gel, , Disp: , Rfl:   •  doxazosin (Cardura) 2 MG tablet, Take 1 tablet by mouth Every Night., Disp: 90 tablet, Rfl: 3  •  Glucose Blood (Blood Glucose Test) strip, 1 each by In Vitro route Daily., Disp: 100 each, Rfl: 3  •  Januvia 100 MG tablet, TAKE 1 TABLET BY MOUTH EVERY DAY, Disp: 90 tablet, Rfl: 3  •  Lantus SoloStar 100 UNIT/ML injection pen, INJECT 20 UNITS UNDER THE SKIN INTO THE APPROPRIATE AREA AS DIRECTED EVERY NIGHT., Disp: 5 pen, Rfl: 11  •  linaclotide (Linzess) 72 MCG capsule capsule, Take 1 capsule by mouth Every Morning Before Breakfast., Disp: 30 capsule, Rfl: 12  •  montelukast (SINGULAIR) 10 MG tablet, TAKE 1 TABLET BY MOUTH EVERYDAY AT BEDTIME, Disp: 30 tablet, Rfl: 0  •  sertraline (Zoloft) 50 MG tablet, Take 1 tablet by mouth Daily., Disp: 90 tablet, Rfl: 3  •  traMADol (ULTRAM) 50 MG tablet, Take 50 mg by mouth 2 (Two) Times a Day., Disp: , Rfl:   •  Triamcinolone Acetonide (NASACORT) 55 MCG/ACT nasal inhaler, 2 sprays into the nostril(s) as directed by provider Daily., Disp: 16.5 g, Rfl: 0  •  vitamin B-12 (CYANOCOBALAMIN) 500 MCG tablet, Take 500 mcg by mouth Every Other Day., Disp: , Rfl:   •  calcium-vitamin D (Oscal 500/200 D-3) 500-200 MG-UNIT per tablet, Take  by mouth 2 (Two) Times a Week. Twice weekly, Disp: , Rfl:     Past Surgical History:  Past Surgical History:   Procedure Laterality Date   • BREAST BIOPSY Right    • CARDIAC CATHETERIZATION     • CHOLECYSTECTOMY     • COLON SURGERY      REMOVAL diverticular diseae   • COLONOSCOPY N/A  "8/12/2021    Procedure: COLONOSCOPY with polypectomy x 3;  Surgeon: Margarette Mcallister MD;  Location: [facility] ENDOSCOPY;  Service: Gastroenterology;  Laterality: N/A;  post op: hmorrhoids, diverticulosis, polyps   • CORONARY STENT PLACEMENT     • ENDOSCOPY N/A 8/12/2021    Procedure: ESOPHAGOGASTRODUODENOSCOPY with dilatation (18-20 mm balloon) and (54 bougie);  Surgeon: Margarette Mcallister MD;  Location: [facility] ENDOSCOPY;  Service: Gastroenterology;  Laterality: N/A;  post op: esophageal stricture, esophagitis, gastritis, history of nissen   • EYE SURGERY Bilateral    • HIATAL HERNIA REPAIR     • NEPHRECTOMY Right        Review of Systems:  Review of Systems   Constitutional: Positive for fatigue.   Respiratory: Positive for cough.        Physical Exam:  Vital Signs:  Vital Signs:   /62 (BP Location: Right arm, Patient Position: Sitting, Cuff Size: Adult)   Pulse 84   Temp 97.5 °F (36.4 °C) (Skin)   Resp 18   Ht 165.1 cm (65\")   Wt 88.9 kg (196 lb)   SpO2 98%   BMI 32.62 kg/m²     Result Review :                Physical Exam  Vitals reviewed.   Constitutional:       Appearance: Normal appearance. She is well-developed.   HENT:      Head: Normocephalic and atraumatic.   Eyes:      General:         Right eye: No discharge.         Left eye: No discharge.   Cardiovascular:      Rate and Rhythm: Normal rate and regular rhythm.      Heart sounds: Normal heart sounds. No murmur heard.    No friction rub. No gallop.   Pulmonary:      Effort: Pulmonary effort is normal. No respiratory distress.      Breath sounds: Normal breath sounds. No wheezing or rales.   Skin:     General: Skin is warm and dry.      Findings: No rash.   Neurological:      Mental Status: She is alert and oriented to person, place, and time.      Coordination: Coordination normal.      Gait: Gait normal.   Psychiatric:         Behavior: Behavior is cooperative.         Assessment and Plan:  Problems Addressed this Visit        Cardiac and Vasculature "    Atherosclerotic heart disease of native coronary artery without angina pectoris    Palpitations     Will get echo           Relevant Orders    Adult Transthoracic Echo Complete W/ Cont if Necessary Per Protocol       Pulmonary and Pneumonias    Simple chronic bronchitis (HCC) - Primary     Much better. Treatment helped.               Symptoms and Signs    Other fatigue     Check labs           Relevant Orders    CBC & Differential (Completed)    TSH (Completed)      Diagnoses       Codes Comments    Simple chronic bronchitis (HCC)    -  Primary ICD-10-CM: J41.0  ICD-9-CM: 491.0     Other fatigue     ICD-10-CM: R53.83  ICD-9-CM: 780.79     Atherosclerosis of native coronary artery of native heart without angina pectoris     ICD-10-CM: I25.10  ICD-9-CM: 414.01     Palpitations     ICD-10-CM: R00.2  ICD-9-CM: 785.1            An After Visit Summary and PPPS were given to the patient.       I wore protective equipment throughout this patient encounter to include mask. Hand hygiene was performed before donning protective equipment and after removal when leaving the room.

## 2022-04-15 LAB
BASOPHILS # BLD AUTO: 0.1 X10E3/UL (ref 0–0.2)
BASOPHILS NFR BLD AUTO: 1 %
EOSINOPHIL # BLD AUTO: 0.1 X10E3/UL (ref 0–0.4)
EOSINOPHIL NFR BLD AUTO: 1 %
ERYTHROCYTE [DISTWIDTH] IN BLOOD BY AUTOMATED COUNT: 12.2 % (ref 11.7–15.4)
HCT VFR BLD AUTO: 43.3 % (ref 34–46.6)
HGB BLD-MCNC: 14.1 G/DL (ref 11.1–15.9)
IMM GRANULOCYTES # BLD AUTO: 0.1 X10E3/UL (ref 0–0.1)
IMM GRANULOCYTES NFR BLD AUTO: 1 %
LYMPHOCYTES # BLD AUTO: 1.8 X10E3/UL (ref 0.7–3.1)
LYMPHOCYTES NFR BLD AUTO: 20 %
MAXIMAL PREDICTED HEART RATE: 142 BPM
MCH RBC QN AUTO: 31.3 PG (ref 26.6–33)
MCHC RBC AUTO-ENTMCNC: 32.6 G/DL (ref 31.5–35.7)
MCV RBC AUTO: 96 FL (ref 79–97)
MONOCYTES # BLD AUTO: 1.1 X10E3/UL (ref 0.1–0.9)
MONOCYTES NFR BLD AUTO: 12 %
NEUTROPHILS # BLD AUTO: 6.1 X10E3/UL (ref 1.4–7)
NEUTROPHILS NFR BLD AUTO: 65 %
PLATELET # BLD AUTO: 200 X10E3/UL (ref 150–450)
RBC # BLD AUTO: 4.5 X10E6/UL (ref 3.77–5.28)
STRESS TARGET HR: 121 BPM
TSH SERPL DL<=0.005 MIU/L-ACNC: 1.35 UIU/ML (ref 0.45–4.5)
WBC # BLD AUTO: 9.2 X10E3/UL (ref 3.4–10.8)

## 2022-04-15 PROCEDURE — 93244 EXT ECG>48HR<7D REV&INTERPJ: CPT | Performed by: INTERNAL MEDICINE

## 2022-04-22 ENCOUNTER — TRANSCRIBE ORDERS (OUTPATIENT)
Dept: ADMINISTRATIVE | Facility: HOSPITAL | Age: 77
End: 2022-04-22

## 2022-04-22 DIAGNOSIS — N18.32 STAGE 3B CHRONIC KIDNEY DISEASE: Primary | ICD-10-CM

## 2022-05-03 ENCOUNTER — OFFICE VISIT (OUTPATIENT)
Dept: FAMILY MEDICINE CLINIC | Facility: CLINIC | Age: 77
End: 2022-05-03

## 2022-05-03 VITALS
SYSTOLIC BLOOD PRESSURE: 142 MMHG | TEMPERATURE: 97.1 F | OXYGEN SATURATION: 96 % | WEIGHT: 191.2 LBS | HEIGHT: 65 IN | HEART RATE: 105 BPM | RESPIRATION RATE: 18 BRPM | BODY MASS INDEX: 31.86 KG/M2 | DIASTOLIC BLOOD PRESSURE: 80 MMHG

## 2022-05-03 DIAGNOSIS — G89.29 CHRONIC BILATERAL LOW BACK PAIN WITHOUT SCIATICA: Primary | ICD-10-CM

## 2022-05-03 DIAGNOSIS — T14.8XXA ABRASION: ICD-10-CM

## 2022-05-03 DIAGNOSIS — R10.32 LEFT LOWER QUADRANT ABDOMINAL PAIN: ICD-10-CM

## 2022-05-03 DIAGNOSIS — K57.30 DIVERTICULA OF COLON: ICD-10-CM

## 2022-05-03 DIAGNOSIS — M54.50 CHRONIC BILATERAL LOW BACK PAIN WITHOUT SCIATICA: Primary | ICD-10-CM

## 2022-05-03 PROCEDURE — 81003 URINALYSIS AUTO W/O SCOPE: CPT | Performed by: FAMILY MEDICINE

## 2022-05-03 PROCEDURE — 99214 OFFICE O/P EST MOD 30 MIN: CPT | Performed by: FAMILY MEDICINE

## 2022-05-03 RX ORDER — METRONIDAZOLE 500 MG/1
500 TABLET ORAL 3 TIMES DAILY
Qty: 21 TABLET | Refills: 0 | Status: SHIPPED | OUTPATIENT
Start: 2022-05-03 | End: 2022-07-01

## 2022-05-03 RX ORDER — CIPROFLOXACIN 500 MG/1
500 TABLET, FILM COATED ORAL 2 TIMES DAILY
Qty: 20 TABLET | Refills: 0 | Status: SHIPPED | OUTPATIENT
Start: 2022-05-03 | End: 2022-07-01

## 2022-05-03 NOTE — PROGRESS NOTES
Subjective   Vianey Reeves is a 76 y.o. female. Presents to [facility]    Chief Complaint   Patient presents with   • Back Pain   • Abdominal Pain       Back Pain  This is a new problem. Episode onset: 04/29/2022. The problem occurs constantly. The problem has been gradually worsening since onset. The pain is present in the thoracic spine. The quality of the pain is described as stabbing. The pain does not radiate. The pain is at a severity of 8/10. The pain is moderate. The symptoms are aggravated by sitting. Associated symptoms include abdominal pain. Pertinent negatives include no bowel incontinence, chest pain, dysuria, fever, headaches, leg pain, numbness, tingling or weakness. Treatments tried: TENS unit. The treatment provided mild relief.   Abdominal Pain  This is a new problem. Episode onset: 04/29/2022. The onset quality is sudden. The problem occurs constantly. The problem has been gradually worsening. The pain is located in the LLQ. The pain is at a severity of 8/10. The pain is moderate. The quality of the pain is aching and a sensation of fullness. The abdominal pain does not radiate. Pertinent negatives include no arthralgias, constipation, diarrhea, dysuria, fever, frequency, headaches, nausea or vomiting. The pain is aggravated by eating. The pain is relieved by bowel movements. She has tried nothing for the symptoms. The treatment provided no relief.   Abrasion  This is a new problem. The current episode started yesterday (Vianey reports of her Chiropractor trying to push on her arms to pop her back and it pulled her skin back. ). The problem has been unchanged. Associated symptoms include abdominal pain. Pertinent negatives include no arthralgias, chest pain, coughing, fever, headaches, nausea, numbness, rash, vomiting or weakness. Associated symptoms comments: Right forearm . She has tried nothing for the symptoms. The treatment provided no relief.      Her pain is in her mid  back and radiates to her LLQ.     I personally reviewed and updated the patient's allergies, medications, problem list, and past medical, surgical, social, and family history. I have reviewed and confirmed the accuracy of the History of Present Illness and Review of Symptoms as documented by the MA/LPN/RN. Emma Hammer MD    Allergies:  Allergies   Allergen Reactions   • Erythromycin Rash   • Naproxen Sodium Other (See Comments)     Dizzy lightheaded   • Latex Itching and Swelling   • Metoclopramide Other (See Comments)     Unsteady on feet       Social History:  Social History     Socioeconomic History   • Marital status:    Tobacco Use   • Smoking status: Current Every Day Smoker     Packs/day: 0.25     Years: 50.00     Pack years: 12.50     Types: Cigarettes   • Smokeless tobacco: Never Used   Vaping Use   • Vaping Use: Never used   Substance and Sexual Activity   • Alcohol use: Yes     Comment: occasionally    • Drug use: Never   • Sexual activity: Defer       Family History:  Family History   Problem Relation Age of Onset   • Arthritis Father    • Prostate cancer Father    • Heart disease Sister        Past Medical History :  Patient Active Problem List   Diagnosis   • Type 2 diabetes mellitus with stage 4 chronic kidney disease, without long-term current use of insulin ([facility])   • Essential (primary) hypertension   • Mixed hyperlipidemia   • Hiatal hernia   • Other fatigue   • Long term current use of antithrombotics/antiplatelets   • Simple chronic bronchitis ([facility])   • Atherosclerotic heart disease of native coronary artery without angina pectoris   • Irritable bowel syndrome with constipation   • History of cancer of ureter   • Solitary kidney   • Atherosclerosis of native arteries of extremities with intermittent claudication, bilateral legs ([facility])   • H/O malignant neoplasm of ureter   • Vertigo   • Medicare annual wellness visit, subsequent   • Presbyopia   • Combined form of senile cataract   •  Cervical cancer screening   • Postartificial menopausal syndrome   • Diverticula of colon   • Ruptured tympanic membrane, left   • Hearing loss, bilateral   • TMJ pain dysfunction syndrome   • Diverticulitis   • Stage 4 chronic kidney disease (HCC)   • Gastroenteritis   • Other dysphagia   • Lactose intolerance   • Polyarthralgia   • Chronic bilateral thoracic back pain   • Class 1 obesity due to excess calories with serious comorbidity and body mass index (BMI) of 32.0 to 32.9 in adult   • Anxiety   • Hypercalcemia   • Screening mammogram, encounter for   • Chronic bilateral low back pain without sciatica   • Urinary frequency   • Irregular heart beat   • Palpitations   • COPD (chronic obstructive pulmonary disease) ([facility])       Medication List:    Current Outpatient Medications:   •  Advair HFA 45-21 MCG/ACT inhaler, TAKE 2 PUFFS BY MOUTH TWICE A DAY, Disp: 12 each, Rfl: 12  •  albuterol (PROVENTIL) (2.5 MG/3ML) 0.083% nebulizer solution, Take 2.5 mg by nebulization Every 4 (Four) Hours As Needed for Wheezing., Disp: 3 mL, Rfl: 12  •  Alirocumab (Praluent) 150 MG/ML injection pen, Inject 1 mL under the skin into the appropriate area as directed Every 14 (Fourteen) Days., Disp: 2.24 mL, Rfl: 12  •  amLODIPine (NORVASC) 10 MG tablet, TAKE 1 TABLET BY MOUTH EVERY DAY, Disp: 90 tablet, Rfl: 4  •  aspirin 81 MG tablet, Take 81 mg by mouth Daily. dont take dos, Disp: , Rfl:   •  carvedilol (COREG) 3.125 MG tablet, TAKE 1 TABLET BY MOUTH EVERY 12 HOURS, Disp: 180 tablet, Rfl: 4  •  coenzyme Q10 100 MG capsule, Take 1 capsule by mouth 2 (Two) Times a Week., Disp: , Rfl:   •  Diclofenac Sodium (VOLTAREN) 1 % gel gel, , Disp: , Rfl:   •  doxazosin (Cardura) 2 MG tablet, Take 1 tablet by mouth Every Night., Disp: 90 tablet, Rfl: 3  •  Glucose Blood (Blood Glucose Test) strip, 1 each by In Vitro route Daily., Disp: 100 each, Rfl: 3  •  Januvia 100 MG tablet, TAKE 1 TABLET BY MOUTH EVERY DAY, Disp: 90 tablet, Rfl: 3  •  Lantus  SoloStar 100 UNIT/ML injection pen, INJECT 20 UNITS UNDER THE SKIN INTO THE APPROPRIATE AREA AS DIRECTED EVERY NIGHT., Disp: 5 pen, Rfl: 11  •  linaclotide (Linzess) 72 MCG capsule capsule, Take 1 capsule by mouth Every Morning Before Breakfast., Disp: 30 capsule, Rfl: 12  •  montelukast (SINGULAIR) 10 MG tablet, TAKE 1 TABLET BY MOUTH EVERYDAY AT BEDTIME, Disp: 30 tablet, Rfl: 0  •  sertraline (Zoloft) 50 MG tablet, Take 1 tablet by mouth Daily., Disp: 90 tablet, Rfl: 3  •  traMADol (ULTRAM) 50 MG tablet, Take 50 mg by mouth 2 (Two) Times a Day., Disp: , Rfl:   •  Triamcinolone Acetonide (NASACORT) 55 MCG/ACT nasal inhaler, 2 sprays into the nostril(s) as directed by provider Daily., Disp: 16.5 g, Rfl: 0  •  vitamin B-12 (CYANOCOBALAMIN) 500 MCG tablet, Take 500 mcg by mouth Every Other Day., Disp: , Rfl:   •  ciprofloxacin (CIPRO) 500 MG tablet, Take 1 tablet by mouth 2 (Two) Times a Day., Disp: 20 tablet, Rfl: 0  •  metroNIDAZOLE (FLAGYL) 500 MG tablet, Take 1 tablet by mouth 3 (Three) Times a Day. Do not use alcohol for at least 24 hours after the last dose., Disp: 21 tablet, Rfl: 0    Past Surgical History:  Past Surgical History:   Procedure Laterality Date   • BREAST BIOPSY Right    • CARDIAC CATHETERIZATION     • CHOLECYSTECTOMY     • COLON SURGERY      REMOVAL diverticular diseae   • COLONOSCOPY N/A 8/12/2021    Procedure: COLONOSCOPY with polypectomy x 3;  Surgeon: Margarette Mcallister MD;  Location: [facility] ENDOSCOPY;  Service: Gastroenterology;  Laterality: N/A;  post op: hmorrhoids, diverticulosis, polyps   • CORONARY STENT PLACEMENT     • ENDOSCOPY N/A 8/12/2021    Procedure: ESOPHAGOGASTRODUODENOSCOPY with dilatation (18-20 mm balloon) and (54 bougie);  Surgeon: Margarette Mcallister MD;  Location: [facility] ENDOSCOPY;  Service: Gastroenterology;  Laterality: N/A;  post op: esophageal stricture, esophagitis, gastritis, history of nissen   • EYE SURGERY Bilateral    • HIATAL HERNIA REPAIR     • NEPHRECTOMY Right   "      Review of Systems:  Review of Systems   Constitutional: Negative for activity change and fever.   HENT: Negative for ear pain, rhinorrhea, sinus pressure and voice change.    Eyes: Negative for visual disturbance.   Respiratory: Negative for cough and shortness of breath.    Cardiovascular: Negative for chest pain.   Gastrointestinal: Positive for abdominal pain. Negative for bowel incontinence, constipation, diarrhea, nausea and vomiting.   Endocrine: Negative for cold intolerance and heat intolerance.   Genitourinary: Negative for dysuria, frequency and urgency.   Musculoskeletal: Positive for back pain. Negative for arthralgias.   Skin: Negative for rash.   Neurological: Negative for tingling, syncope, weakness and numbness.   Hematological: Does not bruise/bleed easily.   Psychiatric/Behavioral: Negative for depressed mood. The patient is not nervous/anxious.        Physical Exam:  Vital Signs:  Vital Signs:   /80 (BP Location: Right arm, Patient Position: Sitting, Cuff Size: Adult)   Pulse 105   Temp 97.1 °F (36.2 °C) (Temporal)   Resp 18   Ht 165.1 cm (65\")   Wt 86.7 kg (191 lb 3.2 oz)   SpO2 96% Comment: room air  BMI 31.82 kg/m²     Result Review :   The following data was reviewed by: Emma Hammer MD on 05/03/2022:           Brief Urine Lab Results  (Last result in the past 365 days)      Color   Clarity   Blood   Leuk Est   Nitrite   Protein   CREAT   Urine HCG        05/06/22 1056             258.4         05/06/22 1056 Dark Yellow   Clear   Negative   Small (1+)   Negative   Trace                   Physical Exam  Vitals reviewed.   Constitutional:       Appearance: Normal appearance. She is well-developed.   HENT:      Head: Normocephalic and atraumatic.   Eyes:      General:         Right eye: No discharge.         Left eye: No discharge.   Cardiovascular:      Rate and Rhythm: Normal rate and regular rhythm.      Heart sounds: Normal heart sounds. No murmur heard.    No friction " rub. No gallop.   Pulmonary:      Effort: Pulmonary effort is normal. No respiratory distress.      Breath sounds: Normal breath sounds. No wheezing or rales.   Skin:     General: Skin is warm and dry.      Findings: No rash.      Comments: Right forearm, post, with skin tear   Neurological:      Mental Status: She is alert and oriented to person, place, and time.      Coordination: Coordination normal.      Gait: Gait normal.   Psychiatric:         Behavior: Behavior is cooperative.         Assessment and Plan:  Problems Addressed this Visit        Gastrointestinal Abdominal     Diverticula of colon       Musculoskeletal and Injuries    Chronic bilateral low back pain without sciatica - Primary     U/A negative. Pain may be referred from back           Relevant Orders    POC Urinalysis Dipstick, Multipro (Completed)      Other Visit Diagnoses     Left lower quadrant abdominal pain        Will check labs. antibiotic sent. liquid diet x 2 days    Relevant Medications    ciprofloxacin (CIPRO) 500 MG tablet    metroNIDAZOLE (FLAGYL) 500 MG tablet    Other Relevant Orders    POC Urinalysis Dipstick, Multipro (Completed)    CBC & Differential (Completed)    Comprehensive Metabolic Panel (Completed)    Abrasion        discussed home care      Diagnoses       Codes Comments    Chronic bilateral low back pain without sciatica    -  Primary ICD-10-CM: M54.50, G89.29  ICD-9-CM: 724.2, 338.29     Left lower quadrant abdominal pain     ICD-10-CM: R10.32  ICD-9-CM: 789.04 Will check labs. antibiotic sent. liquid diet x 2 days    Abrasion     ICD-10-CM: T14.8XXA  ICD-9-CM: 919.0 discussed home care    Diverticula of colon     ICD-10-CM: K57.30  ICD-9-CM: 562.10                  An After Visit Summary and PPPS were given to the patient.       I wore protective equipment throughout this patient encounter to include mask. Hand hygiene was performed before donning protective equipment and after removal when leaving the room.

## 2022-05-04 ENCOUNTER — TELEPHONE (OUTPATIENT)
Dept: FAMILY MEDICINE CLINIC | Facility: CLINIC | Age: 77
End: 2022-05-04

## 2022-05-04 LAB
ALBUMIN SERPL-MCNC: 4.1 G/DL (ref 3.7–4.7)
ALBUMIN/GLOB SERPL: 1.8 {RATIO} (ref 1.2–2.2)
ALP SERPL-CCNC: 69 IU/L (ref 44–121)
ALT SERPL-CCNC: 12 IU/L (ref 0–32)
AST SERPL-CCNC: 14 IU/L (ref 0–40)
BASOPHILS # BLD AUTO: 0.1 X10E3/UL (ref 0–0.2)
BASOPHILS NFR BLD AUTO: 1 %
BILIRUB SERPL-MCNC: 0.3 MG/DL (ref 0–1.2)
BUN SERPL-MCNC: 27 MG/DL (ref 8–27)
BUN/CREAT SERPL: 16 (ref 12–28)
CALCIUM SERPL-MCNC: 9.4 MG/DL (ref 8.7–10.3)
CHLORIDE SERPL-SCNC: 105 MMOL/L (ref 96–106)
CO2 SERPL-SCNC: 22 MMOL/L (ref 20–29)
CREAT SERPL-MCNC: 1.68 MG/DL (ref 0.57–1)
EGFRCR SERPLBLD CKD-EPI 2021: 31 ML/MIN/1.73
EOSINOPHIL # BLD AUTO: 0.1 X10E3/UL (ref 0–0.4)
EOSINOPHIL NFR BLD AUTO: 1 %
ERYTHROCYTE [DISTWIDTH] IN BLOOD BY AUTOMATED COUNT: 13.1 % (ref 11.7–15.4)
GLOBULIN SER CALC-MCNC: 2.3 G/DL (ref 1.5–4.5)
GLUCOSE SERPL-MCNC: 185 MG/DL (ref 65–99)
HCT VFR BLD AUTO: 45 % (ref 34–46.6)
HGB BLD-MCNC: 14.9 G/DL (ref 11.1–15.9)
IMM GRANULOCYTES # BLD AUTO: 0 X10E3/UL (ref 0–0.1)
IMM GRANULOCYTES NFR BLD AUTO: 1 %
LYMPHOCYTES # BLD AUTO: 2.2 X10E3/UL (ref 0.7–3.1)
LYMPHOCYTES NFR BLD AUTO: 28 %
MCH RBC QN AUTO: 31.6 PG (ref 26.6–33)
MCHC RBC AUTO-ENTMCNC: 33.1 G/DL (ref 31.5–35.7)
MCV RBC AUTO: 95 FL (ref 79–97)
MONOCYTES # BLD AUTO: 0.7 X10E3/UL (ref 0.1–0.9)
MONOCYTES NFR BLD AUTO: 9 %
NEUTROPHILS # BLD AUTO: 4.8 X10E3/UL (ref 1.4–7)
NEUTROPHILS NFR BLD AUTO: 60 %
PLATELET # BLD AUTO: 214 X10E3/UL (ref 150–450)
POTASSIUM SERPL-SCNC: 4.5 MMOL/L (ref 3.5–5.2)
PROT SERPL-MCNC: 6.4 G/DL (ref 6–8.5)
RBC # BLD AUTO: 4.72 X10E6/UL (ref 3.77–5.28)
SODIUM SERPL-SCNC: 143 MMOL/L (ref 134–144)
WBC # BLD AUTO: 7.9 X10E3/UL (ref 3.4–10.8)

## 2022-05-04 NOTE — TELEPHONE ENCOUNTER
I received a cover my meds authorization to start for repatha I am not seeing this in patient's chart is she still using this?

## 2022-05-06 ENCOUNTER — LAB (OUTPATIENT)
Dept: LAB | Facility: HOSPITAL | Age: 77
End: 2022-05-06

## 2022-05-06 ENCOUNTER — TRANSCRIBE ORDERS (OUTPATIENT)
Dept: ADMINISTRATIVE | Facility: HOSPITAL | Age: 77
End: 2022-05-06

## 2022-05-06 ENCOUNTER — HOSPITAL ENCOUNTER (OUTPATIENT)
Dept: ULTRASOUND IMAGING | Facility: HOSPITAL | Age: 77
Discharge: HOME | End: 2022-05-06

## 2022-05-06 DIAGNOSIS — N18.30 STAGE 3 CHRONIC KIDNEY DISEASE, UNSPECIFIED WHETHER STAGE 3A OR 3B CKD: Primary | ICD-10-CM

## 2022-05-06 DIAGNOSIS — N18.30 STAGE 3 CHRONIC KIDNEY DISEASE, UNSPECIFIED WHETHER STAGE 3A OR 3B CKD: ICD-10-CM

## 2022-05-06 DIAGNOSIS — E55.9 VITAMIN D DEFICIENCY DISEASE: ICD-10-CM

## 2022-05-06 DIAGNOSIS — N18.32 STAGE 3B CHRONIC KIDNEY DISEASE: ICD-10-CM

## 2022-05-06 DIAGNOSIS — E11.8 DIABETIC COMPLICATION: ICD-10-CM

## 2022-05-06 LAB
25(OH)D3 SERPL-MCNC: 46.8 NG/ML (ref 30–100)
ANION GAP SERPL CALCULATED.3IONS-SCNC: 9.1 MMOL/L (ref 5–15)
BACTERIA UR QL AUTO: ABNORMAL /HPF
BASOPHILS # BLD AUTO: 0.06 10*3/MM3 (ref 0–0.2)
BASOPHILS NFR BLD AUTO: 0.7 % (ref 0–1.5)
BILIRUB UR QL STRIP: NEGATIVE
BUN SERPL-MCNC: 18 MG/DL (ref 8–23)
BUN/CREAT SERPL: 10.4 (ref 7–25)
CALCIUM SPEC-SCNC: 9 MG/DL (ref 8.6–10.5)
CHLORIDE SERPL-SCNC: 104 MMOL/L (ref 98–107)
CK SERPL-CCNC: 67 U/L (ref 20–180)
CLARITY UR: CLEAR
CO2 SERPL-SCNC: 24.9 MMOL/L (ref 22–29)
COLOR UR: ABNORMAL
CREAT SERPL-MCNC: 1.73 MG/DL (ref 0.57–1)
CREAT UR-MCNC: 258.4 MG/DL
DEPRECATED RDW RBC AUTO: 42.9 FL (ref 37–54)
EGFRCR SERPLBLD CKD-EPI 2021: 29.9 ML/MIN/1.73
EOSINOPHIL # BLD AUTO: 0.07 10*3/MM3 (ref 0–0.4)
EOSINOPHIL NFR BLD AUTO: 0.8 % (ref 0.3–6.2)
ERYTHROCYTE [DISTWIDTH] IN BLOOD BY AUTOMATED COUNT: 12.6 % (ref 12.3–15.4)
GLUCOSE SERPL-MCNC: 81 MG/DL (ref 65–99)
GLUCOSE UR STRIP-MCNC: NEGATIVE MG/DL
HBA1C MFR BLD: 6.8 % (ref 3.5–5.6)
HCT VFR BLD AUTO: 45.3 % (ref 34–46.6)
HGB BLD-MCNC: 15.5 G/DL (ref 12–15.9)
HGB UR QL STRIP.AUTO: NEGATIVE
HYALINE CASTS UR QL AUTO: ABNORMAL /LPF
IMM GRANULOCYTES # BLD AUTO: 0.06 10*3/MM3 (ref 0–0.05)
IMM GRANULOCYTES NFR BLD AUTO: 0.7 % (ref 0–0.5)
KETONES UR QL STRIP: ABNORMAL
LEUKOCYTE ESTERASE UR QL STRIP.AUTO: ABNORMAL
LYMPHOCYTES # BLD AUTO: 2.06 10*3/MM3 (ref 0.7–3.1)
LYMPHOCYTES NFR BLD AUTO: 24.4 % (ref 19.6–45.3)
MCH RBC QN AUTO: 32.2 PG (ref 26.6–33)
MCHC RBC AUTO-ENTMCNC: 34.2 G/DL (ref 31.5–35.7)
MCV RBC AUTO: 94.2 FL (ref 79–97)
MONOCYTES # BLD AUTO: 0.77 10*3/MM3 (ref 0.1–0.9)
MONOCYTES NFR BLD AUTO: 9.1 % (ref 5–12)
NEUTROPHILS NFR BLD AUTO: 5.43 10*3/MM3 (ref 1.7–7)
NEUTROPHILS NFR BLD AUTO: 64.3 % (ref 42.7–76)
NITRITE UR QL STRIP: NEGATIVE
NRBC BLD AUTO-RTO: 0 /100 WBC (ref 0–0.2)
PH UR STRIP.AUTO: <=5 [PH] (ref 5–8)
PLATELET # BLD AUTO: 188 10*3/MM3 (ref 140–450)
PMV BLD AUTO: 10.7 FL (ref 6–12)
POTASSIUM SERPL-SCNC: 4.4 MMOL/L (ref 3.5–5.2)
PROT ?TM UR-MCNC: 17.3 MG/DL
PROT UR QL STRIP: ABNORMAL
PROT/CREAT UR: 67 MG/G CREA (ref 0–200)
PTH-INTACT SERPL-MCNC: 104 PG/ML (ref 15–65)
RBC # BLD AUTO: 4.81 10*6/MM3 (ref 3.77–5.28)
RBC # UR STRIP: ABNORMAL /HPF
REF LAB TEST METHOD: ABNORMAL
SODIUM SERPL-SCNC: 138 MMOL/L (ref 136–145)
SP GR UR STRIP: 1.03 (ref 1–1.03)
SQUAMOUS #/AREA URNS HPF: ABNORMAL /HPF
URATE SERPL-MCNC: 6.5 MG/DL (ref 2.4–5.7)
UROBILINOGEN UR QL STRIP: ABNORMAL
WBC # UR STRIP: ABNORMAL /HPF
WBC NRBC COR # BLD: 8.45 10*3/MM3 (ref 3.4–10.8)

## 2022-05-06 PROCEDURE — 36415 COLL VENOUS BLD VENIPUNCTURE: CPT

## 2022-05-06 PROCEDURE — 83036 HEMOGLOBIN GLYCOSYLATED A1C: CPT

## 2022-05-06 PROCEDURE — 76775 US EXAM ABDO BACK WALL LIM: CPT

## 2022-05-06 PROCEDURE — 82550 ASSAY OF CK (CPK): CPT

## 2022-05-06 PROCEDURE — 84550 ASSAY OF BLOOD/URIC ACID: CPT

## 2022-05-06 PROCEDURE — 80048 BASIC METABOLIC PNL TOTAL CA: CPT

## 2022-05-06 PROCEDURE — 84156 ASSAY OF PROTEIN URINE: CPT

## 2022-05-06 PROCEDURE — 83520 IMMUNOASSAY QUANT NOS NONAB: CPT

## 2022-05-06 PROCEDURE — 85025 COMPLETE CBC W/AUTO DIFF WBC: CPT

## 2022-05-06 PROCEDURE — 82306 VITAMIN D 25 HYDROXY: CPT

## 2022-05-06 PROCEDURE — 86038 ANTINUCLEAR ANTIBODIES: CPT

## 2022-05-06 PROCEDURE — 87086 URINE CULTURE/COLONY COUNT: CPT

## 2022-05-06 PROCEDURE — 86334 IMMUNOFIX E-PHORESIS SERUM: CPT

## 2022-05-06 PROCEDURE — 82570 ASSAY OF URINE CREATININE: CPT

## 2022-05-06 PROCEDURE — 86037 ANCA TITER EACH ANTIBODY: CPT

## 2022-05-06 PROCEDURE — 81001 URINALYSIS AUTO W/SCOPE: CPT

## 2022-05-06 PROCEDURE — 83970 ASSAY OF PARATHORMONE: CPT

## 2022-05-06 PROCEDURE — 82784 ASSAY IGA/IGD/IGG/IGM EACH: CPT

## 2022-05-07 LAB — BACTERIA SPEC AEROBE CULT: NO GROWTH

## 2022-05-09 LAB
ANA SER QL: NEGATIVE
IGA SERPL-MCNC: 176 MG/DL (ref 64–422)
IGG SERPL-MCNC: 915 MG/DL (ref 586–1602)
IGM SERPL-MCNC: 58 MG/DL (ref 26–217)
PROT PATTERN SERPL IFE-IMP: NORMAL

## 2022-05-10 ENCOUNTER — OFFICE (AMBULATORY)
Dept: URBAN - METROPOLITAN AREA CLINIC 64 | Facility: CLINIC | Age: 77
End: 2022-05-10

## 2022-05-10 VITALS
HEIGHT: 65 IN | HEART RATE: 73 BPM | SYSTOLIC BLOOD PRESSURE: 135 MMHG | DIASTOLIC BLOOD PRESSURE: 71 MMHG | WEIGHT: 201 LBS

## 2022-05-10 DIAGNOSIS — R10.84 GENERALIZED ABDOMINAL PAIN: ICD-10-CM

## 2022-05-10 LAB
C-ANCA TITR SER IF: ABNORMAL TITER
MYELOPEROXIDASE AB SER IA-ACNC: <9 U/ML (ref 0–9)
P-ANCA ATYPICAL TITR SER IF: ABNORMAL TITER
P-ANCA TITR SER IF: ABNORMAL TITER
PROTEINASE3 AB SER IA-ACNC: <3.5 U/ML (ref 0–3.5)

## 2022-05-10 PROCEDURE — 99213 OFFICE O/P EST LOW 20 MIN: CPT | Performed by: NURSE PRACTITIONER

## 2022-05-17 ENCOUNTER — HOSPITAL ENCOUNTER (OUTPATIENT)
Dept: CARDIOLOGY | Facility: HOSPITAL | Age: 77
Discharge: HOME | End: 2022-05-17
Admitting: FAMILY MEDICINE

## 2022-05-17 VITALS
BODY MASS INDEX: 31.85 KG/M2 | DIASTOLIC BLOOD PRESSURE: 53 MMHG | HEIGHT: 65 IN | WEIGHT: 191.14 LBS | SYSTOLIC BLOOD PRESSURE: 110 MMHG

## 2022-05-17 PROCEDURE — 93306 TTE W/DOPPLER COMPLETE: CPT

## 2022-05-17 PROCEDURE — 93306 TTE W/DOPPLER COMPLETE: CPT | Performed by: INTERNAL MEDICINE

## 2022-05-18 ENCOUNTER — HOSPITAL ENCOUNTER (OUTPATIENT)
Dept: ULTRASOUND IMAGING | Facility: HOSPITAL | Age: 77
Discharge: HOME | End: 2022-05-18
Admitting: PAIN MEDICINE

## 2022-05-18 DIAGNOSIS — M79.604 RIGHT LEG PAIN: ICD-10-CM

## 2022-05-18 PROCEDURE — 93971 EXTREMITY STUDY: CPT

## 2022-05-19 ENCOUNTER — APPOINTMENT (OUTPATIENT)
Dept: CARDIOLOGY | Facility: HOSPITAL | Age: 77
End: 2022-05-19

## 2022-05-20 ENCOUNTER — HOSPITAL ENCOUNTER (OUTPATIENT)
Dept: GENERAL RADIOLOGY | Facility: HOSPITAL | Age: 77
Discharge: HOME | End: 2022-05-20
Admitting: NURSE PRACTITIONER

## 2022-05-20 DIAGNOSIS — R14.0 GASSINESS: ICD-10-CM

## 2022-05-20 DIAGNOSIS — K59.00 CONSTIPATION: ICD-10-CM

## 2022-05-20 DIAGNOSIS — R10.9 LEFT SIDED ABDOMINAL PAIN: ICD-10-CM

## 2022-05-20 DIAGNOSIS — R14.0 BLOATING: ICD-10-CM

## 2022-05-20 LAB
BH CV ECHO MEAS - ACS: 1.65 CM
BH CV ECHO MEAS - AI P1/2T: 559.7 MSEC
BH CV ECHO MEAS - AO MAX PG: 14.3 MMHG
BH CV ECHO MEAS - AO MEAN PG: 7.5 MMHG
BH CV ECHO MEAS - AO ROOT DIAM: 3.1 CM
BH CV ECHO MEAS - AO V2 MAX: 188.8 CM/SEC
BH CV ECHO MEAS - AO V2 VTI: 44.4 CM
BH CV ECHO MEAS - AVA(I,D): 2.5 CM2
BH CV ECHO MEAS - EDV(CUBED): 256.2 ML
BH CV ECHO MEAS - EDV(MOD-SP4): 94.5 ML
BH CV ECHO MEAS - EF(MOD-BP): 55 %
BH CV ECHO MEAS - EF(MOD-SP4): 61.5 %
BH CV ECHO MEAS - ESV(CUBED): 109.4 ML
BH CV ECHO MEAS - ESV(MOD-SP4): 36.4 ML
BH CV ECHO MEAS - FS: 24.7 %
BH CV ECHO MEAS - IVS/LVPW: 0.94 CM
BH CV ECHO MEAS - IVSD: 0.94 CM
BH CV ECHO MEAS - LA DIMENSION: 3.8 CM
BH CV ECHO MEAS - LV DIASTOLIC VOL/BSA (35-75): 48.7 CM2
BH CV ECHO MEAS - LV MASS(C)D: 260.5 GRAMS
BH CV ECHO MEAS - LV MAX PG: 10.6 MMHG
BH CV ECHO MEAS - LV MEAN PG: 4.9 MMHG
BH CV ECHO MEAS - LV SYSTOLIC VOL/BSA (12-30): 18.8 CM2
BH CV ECHO MEAS - LV V1 MAX: 162.7 CM/SEC
BH CV ECHO MEAS - LV V1 VTI: 36.7 CM
BH CV ECHO MEAS - LVIDD: 6.4 CM
BH CV ECHO MEAS - LVIDS: 4.8 CM
BH CV ECHO MEAS - LVOT AREA: 3 CM2
BH CV ECHO MEAS - LVOT DIAM: 1.97 CM
BH CV ECHO MEAS - LVPWD: 1 CM
BH CV ECHO MEAS - MR MAX PG: 133.1 MMHG
BH CV ECHO MEAS - MR MAX VEL: 576.6 CM/SEC
BH CV ECHO MEAS - MV A MAX VEL: 143.4 CM/SEC
BH CV ECHO MEAS - MV DEC SLOPE: 443.8 CM/SEC2
BH CV ECHO MEAS - MV DEC TIME: 0.25 MSEC
BH CV ECHO MEAS - MV E MAX VEL: 110.2 CM/SEC
BH CV ECHO MEAS - MV E/A: 0.77
BH CV ECHO MEAS - MV MAX PG: 10.9 MMHG
BH CV ECHO MEAS - MV MEAN PG: 3.7 MMHG
BH CV ECHO MEAS - MV V2 VTI: 44.5 CM
BH CV ECHO MEAS - MVA(VTI): 2.5 CM2
BH CV ECHO MEAS - PA ACC TIME: 0.11 SEC
BH CV ECHO MEAS - PA PR(ACCEL): 27.6 MMHG
BH CV ECHO MEAS - PA V2 MAX: 91.5 CM/SEC
BH CV ECHO MEAS - PI END-D VEL: 108.2 CM/SEC
BH CV ECHO MEAS - PULM A REVS DUR: 0.14 SEC
BH CV ECHO MEAS - PULM A REVS VEL: 37.1 CM/SEC
BH CV ECHO MEAS - PULM DIAS VEL: 48.5 CM/SEC
BH CV ECHO MEAS - PULM S/D: 1.68
BH CV ECHO MEAS - PULM SYS VEL: 81.7 CM/SEC
BH CV ECHO MEAS - RAP SYSTOLE: 10 MMHG
BH CV ECHO MEAS - RVSP: 44.6 MMHG
BH CV ECHO MEAS - SI(MOD-SP4): 29.9 ML/M2
BH CV ECHO MEAS - SV(LVOT): 111.5 ML
BH CV ECHO MEAS - SV(MOD-SP4): 58.1 ML
BH CV ECHO MEAS - TAPSE (>1.6): 2.4 CM
BH CV ECHO MEAS - TR MAX PG: 34.6 MMHG
BH CV ECHO MEAS - TR MAX VEL: 294.1 CM/SEC
BH CV XLRA - RV BASE: 3.9 CM
BH CV XLRA - RV MID: 1.8 CM
MAXIMAL PREDICTED HEART RATE: 142 BPM
STRESS TARGET HR: 121 BPM

## 2022-05-20 PROCEDURE — 74018 RADEX ABDOMEN 1 VIEW: CPT

## 2022-05-22 DIAGNOSIS — E11.9 TYPE 2 DIABETES MELLITUS WITHOUT COMPLICATION, WITHOUT LONG-TERM CURRENT USE OF INSULIN: ICD-10-CM

## 2022-05-23 RX ORDER — SITAGLIPTIN 100 MG/1
TABLET, FILM COATED ORAL
Qty: 90 TABLET | Refills: 3 | Status: SHIPPED | OUTPATIENT
Start: 2022-05-23 | End: 2023-01-23

## 2022-05-23 NOTE — PROGRESS NOTES
Subjective   Vianey Reeves is a 76 y.o. female. Presents to [facility]    Chief Complaint   Patient presents with   • Diabetes   • Hypertension   • Hyperlipidemia       Hypertension  This is a chronic problem. The current episode started more than 1 year ago. The problem is unchanged. The problem is controlled. Associated symptoms include sweats. Pertinent negatives include no chest pain, headaches, malaise/fatigue, orthopnea, palpitations or shortness of breath. Risk factors for coronary artery disease include diabetes mellitus, dyslipidemia, obesity, smoking/tobacco exposure, post-menopausal state and family history.   Hyperlipidemia  This is a chronic problem. The current episode started more than 1 year ago. Pertinent negatives include no chest pain or shortness of breath.   Diabetes  She presents for her follow-up diabetic visit. She has type 2 diabetes mellitus. Hypoglycemia symptoms include sweats. Pertinent negatives for hypoglycemia include no confusion, dizziness, headaches or nervousness/anxiousness. Pertinent negatives for diabetes include no chest pain and no fatigue. Risk factors for coronary artery disease include diabetes mellitus, dyslipidemia, family history, hypertension, obesity, post-menopausal and tobacco exposure. She is following a generally healthy diet. When asked about meal planning, she reported none. She participates in exercise weekly. Her overall blood glucose range is 140-180 mg/dl. She sees a podiatrist (Dr. Garcia).Eye exam is current ([facility]).      She went to her GI MD and was told to take gas x. She is is feeling better as long as she watches what she eats and smaller meals.     I personally reviewed and updated the patient's allergies, medications, problem list, and past medical, surgical, social, and family history. I have reviewed and confirmed the accuracy of the History of Present Illness and Review of Symptoms as documented by the MA/LPN/RN.  Emma Hammer MD    Allergies:  Allergies   Allergen Reactions   • Erythromycin Rash   • Naproxen Sodium Other (See Comments)     Dizzy lightheaded   • Latex Itching and Swelling   • Metoclopramide Other (See Comments)     Unsteady on feet       Social History:  Social History     Socioeconomic History   • Marital status:    Tobacco Use   • Smoking status: Current Every Day Smoker     Packs/day: 0.25     Years: 50.00     Pack years: 12.50     Types: Cigarettes   • Smokeless tobacco: Never Used   Vaping Use   • Vaping Use: Never used   Substance and Sexual Activity   • Alcohol use: Yes     Comment: occasionally    • Drug use: Never   • Sexual activity: Defer       Family History:  Family History   Problem Relation Age of Onset   • Arthritis Father    • Prostate cancer Father    • Heart disease Sister        Past Medical History :  Patient Active Problem List   Diagnosis   • Type 2 diabetes mellitus with stage 4 chronic kidney disease, without long-term current use of insulin ([facility])   • Essential (primary) hypertension   • Mixed hyperlipidemia   • Hiatal hernia   • Other fatigue   • Long term current use of antithrombotics/antiplatelets   • Simple chronic bronchitis ([facility])   • Atherosclerotic heart disease of native coronary artery without angina pectoris   • Irritable bowel syndrome with constipation   • History of cancer of ureter   • Solitary kidney   • Atherosclerosis of native arteries of extremities with intermittent claudication, bilateral legs ([facility])   • H/O malignant neoplasm of ureter   • Vertigo   • Medicare annual wellness visit, subsequent   • Presbyopia   • Combined form of senile cataract   • Cervical cancer screening   • Postartificial menopausal syndrome   • Diverticula of colon   • Ruptured tympanic membrane, left   • Hearing loss, bilateral   • TMJ pain dysfunction syndrome   • Diverticulitis   • Stage 4 chronic kidney disease (HCC)   • Gastroenteritis   • Other dysphagia   • Lactose  intolerance   • Polyarthralgia   • Chronic bilateral thoracic back pain   • Class 1 obesity due to excess calories with serious comorbidity and body mass index (BMI) of 32.0 to 32.9 in adult   • Anxiety   • Hypercalcemia   • Screening mammogram, encounter for   • Chronic bilateral low back pain without sciatica   • Urinary frequency   • Irregular heart beat   • Palpitations   • COPD (chronic obstructive pulmonary disease) ([facility])       Medication List:    Current Outpatient Medications:   •  Advair HFA 45-21 MCG/ACT inhaler, TAKE 2 PUFFS BY MOUTH TWICE A DAY, Disp: 12 each, Rfl: 12  •  albuterol (PROVENTIL) (2.5 MG/3ML) 0.083% nebulizer solution, Take 2.5 mg by nebulization Every 4 (Four) Hours As Needed for Wheezing., Disp: 3 mL, Rfl: 12  •  Alirocumab (Praluent) 150 MG/ML injection pen, Inject 1 mL under the skin into the appropriate area as directed Every 14 (Fourteen) Days., Disp: 2.24 mL, Rfl: 12  •  amLODIPine (NORVASC) 10 MG tablet, TAKE 1 TABLET BY MOUTH EVERY DAY, Disp: 90 tablet, Rfl: 4  •  aspirin 81 MG tablet, Take 81 mg by mouth Daily. dont take dos, Disp: , Rfl:   •  carvedilol (COREG) 3.125 MG tablet, TAKE 1 TABLET BY MOUTH EVERY 12 HOURS, Disp: 180 tablet, Rfl: 4  •  ciprofloxacin (CIPRO) 500 MG tablet, Take 1 tablet by mouth 2 (Two) Times a Day., Disp: 20 tablet, Rfl: 0  •  coenzyme Q10 100 MG capsule, Take 1 capsule by mouth 2 (Two) Times a Week., Disp: , Rfl:   •  Diclofenac Sodium (VOLTAREN) 1 % gel gel, , Disp: , Rfl:   •  doxazosin (Cardura) 2 MG tablet, Take 1 tablet by mouth Every Night., Disp: 90 tablet, Rfl: 3  •  gabapentin (NEURONTIN) 100 MG capsule, TAKE 1-2 CAPSULES BY MOUTH AT BEDTIME AS NEEDED, Disp: , Rfl:   •  Glucose Blood (Blood Glucose Test) strip, 1 each by In Vitro route Daily., Disp: 100 each, Rfl: 3  •  Januvia 100 MG tablet, TAKE 1 TABLET BY MOUTH EVERY DAY, Disp: 90 tablet, Rfl: 3  •  Lantus SoloStar 100 UNIT/ML injection pen, INJECT 20 UNITS UNDER THE SKIN INTO THE  APPROPRIATE AREA AS DIRECTED EVERY NIGHT., Disp: 5 pen, Rfl: 11  •  linaclotide (Linzess) 72 MCG capsule capsule, Take 1 capsule by mouth Every Morning Before Breakfast., Disp: 30 capsule, Rfl: 12  •  metroNIDAZOLE (FLAGYL) 500 MG tablet, Take 1 tablet by mouth 3 (Three) Times a Day. Do not use alcohol for at least 24 hours after the last dose., Disp: 21 tablet, Rfl: 0  •  montelukast (SINGULAIR) 10 MG tablet, TAKE 1 TABLET BY MOUTH EVERYDAY AT BEDTIME, Disp: 30 tablet, Rfl: 0  •  sertraline (Zoloft) 50 MG tablet, Take 1 tablet by mouth Daily., Disp: 90 tablet, Rfl: 3  •  sucralfate (CARAFATE) 1 g tablet, , Disp: , Rfl:   •  traMADol (ULTRAM) 50 MG tablet, Take 50 mg by mouth 2 (Two) Times a Day., Disp: , Rfl:   •  Triamcinolone Acetonide (NASACORT) 55 MCG/ACT nasal inhaler, 2 sprays into the nostril(s) as directed by provider Daily., Disp: 16.5 g, Rfl: 0  •  vitamin B-12 (CYANOCOBALAMIN) 500 MCG tablet, Take 500 mcg by mouth Every Other Day., Disp: , Rfl:     Past Surgical History:  Past Surgical History:   Procedure Laterality Date   • BREAST BIOPSY Right    • CARDIAC CATHETERIZATION     • CHOLECYSTECTOMY     • COLON SURGERY      REMOVAL diverticular diseae   • COLONOSCOPY N/A 8/12/2021    Procedure: COLONOSCOPY with polypectomy x 3;  Surgeon: Margarette Mcallister MD;  Location: [facility] ENDOSCOPY;  Service: Gastroenterology;  Laterality: N/A;  post op: hmorrhoids, diverticulosis, polyps   • CORONARY STENT PLACEMENT     • ENDOSCOPY N/A 8/12/2021    Procedure: ESOPHAGOGASTRODUODENOSCOPY with dilatation (18-20 mm balloon) and (54 bougie);  Surgeon: Margarette Mcallister MD;  Location: [facility] ENDOSCOPY;  Service: Gastroenterology;  Laterality: N/A;  post op: esophageal stricture, esophagitis, gastritis, history of nissen   • EYE SURGERY Bilateral    • HIATAL HERNIA REPAIR     • NEPHRECTOMY Right        Review of Systems:  Review of Systems   Constitutional: Negative for activity change, fatigue, fever and malaise/fatigue.   HENT:  "Negative for ear pain, rhinorrhea, sinus pressure and voice change.    Eyes: Negative for visual disturbance.   Respiratory: Negative for cough and shortness of breath.    Cardiovascular: Negative for chest pain, palpitations and orthopnea.   Gastrointestinal: Negative for abdominal pain, diarrhea, nausea and vomiting.   Endocrine: Negative for cold intolerance and heat intolerance.   Genitourinary: Negative for frequency and urgency.   Musculoskeletal: Negative for arthralgias.   Skin: Negative for rash.   Neurological: Negative for dizziness, syncope and confusion.   Hematological: Does not bruise/bleed easily.   Psychiatric/Behavioral: Negative for depressed mood. The patient is not nervous/anxious.        Physical Exam:  Vital Signs:  Vital Signs:   /84 (BP Location: Right arm, Patient Position: Sitting, Cuff Size: Adult)   Pulse 92   Temp 97.5 °F (36.4 °C) (Temporal)   Resp 20   Ht 165.1 cm (65\")   Wt 89.9 kg (198 lb 3.2 oz)   SpO2 95% Comment: room air  BMI 32.98 kg/m²     Result Review :   The following data was reviewed by: Emma Hammer MD on 05/24/2022:  CMP    CMP 4/12/22 5/3/22 5/6/22   Glucose 163 (A) 185 (A) 81   BUN 18 27 18   Creatinine 1.55 (A) 1.68 (A) 1.73 (A)   Sodium 140 143 138   Potassium 4.8 4.5 4.4   Chloride 103 105 104   Calcium 8.6 (A) 9.4 9.0   Total Protein 5.6 (A) 6.4    Albumin 3.3 (A) 4.1    Globulin 2.3 2.3    Total Bilirubin 0.4 0.3    Alkaline Phosphatase 70 69    AST (SGOT) 15 14    ALT (SGPT) 19 12    (A) Abnormal value            Lipid Panel    Lipid Panel 9/1/21 11/11/21 4/12/22   Total Cholesterol 191 201 (A) 104   Triglycerides 63 116 71   HDL Cholesterol 49 36 (A) 44   VLDL Cholesterol 12 21 15   LDL Cholesterol  130 (A) 144 (A) 45   (A) Abnormal value            Most Recent A1C    HGBA1C Most Recent 5/6/22   Hemoglobin A1C 6.8 (A)   (A) Abnormal value                     Physical Exam  Vitals reviewed.   Constitutional:       Appearance: Normal appearance. " She is well-developed.   HENT:      Head: Normocephalic and atraumatic.   Eyes:      General:         Right eye: No discharge.         Left eye: No discharge.   Cardiovascular:      Rate and Rhythm: Normal rate and regular rhythm.      Heart sounds: Normal heart sounds. No murmur heard.    No friction rub. No gallop.   Pulmonary:      Effort: Pulmonary effort is normal. No respiratory distress.      Breath sounds: Normal breath sounds. No wheezing or rales.   Skin:     General: Skin is warm and dry.      Findings: No rash.   Neurological:      Mental Status: She is alert and oriented to person, place, and time.      Coordination: Coordination normal.      Gait: Gait normal.   Psychiatric:         Behavior: Behavior is cooperative.         Assessment and Plan:  Problems Addressed this Visit        Cardiac and Vasculature    Essential (primary) hypertension     Hypertension is unchanged.  Continue current treatment regimen.  Dietary sodium restriction.  Weight loss.  Continue current medications.  Blood pressure will be reassessed in 3 months.           Mixed hyperlipidemia - Primary    Atherosclerosis of native arteries of extremities with intermittent claudication, bilateral legs (HCC)     Stable  No current symptoms             Diagnoses       Codes Comments    Mixed hyperlipidemia    -  Primary ICD-10-CM: E78.2  ICD-9-CM: 272.2     Essential (primary) hypertension     ICD-10-CM: I10  ICD-9-CM: 401.9     Atherosclerosis of native arteries of extremities with intermittent claudication, bilateral legs (HCC)     ICD-10-CM: I70.213  ICD-9-CM: 440.21            BMI is >= 30 and <= 34.9 (Class 1 obesity). The following options were offered after discussion: exercise counseling/recommendations and nutrition counseling/recommendations      An After Visit Summary and PPPS were given to the patient.       I wore protective equipment throughout this patient encounter to include mask. Hand hygiene was performed before donning  protective equipment and after removal when leaving the room.

## 2022-05-24 ENCOUNTER — OFFICE VISIT (OUTPATIENT)
Dept: FAMILY MEDICINE CLINIC | Facility: CLINIC | Age: 77
End: 2022-05-24

## 2022-05-24 VITALS
TEMPERATURE: 97.5 F | BODY MASS INDEX: 33.02 KG/M2 | SYSTOLIC BLOOD PRESSURE: 126 MMHG | WEIGHT: 198.2 LBS | DIASTOLIC BLOOD PRESSURE: 84 MMHG | HEIGHT: 65 IN | RESPIRATION RATE: 20 BRPM | OXYGEN SATURATION: 95 % | HEART RATE: 92 BPM

## 2022-05-24 DIAGNOSIS — E78.2 MIXED HYPERLIPIDEMIA: Primary | ICD-10-CM

## 2022-05-24 DIAGNOSIS — I70.213 ATHEROSCLEROSIS OF NATIVE ARTERIES OF EXTREMITIES WITH INTERMITTENT CLAUDICATION, BILATERAL LEGS: ICD-10-CM

## 2022-05-24 DIAGNOSIS — I10 ESSENTIAL (PRIMARY) HYPERTENSION: ICD-10-CM

## 2022-05-24 PROCEDURE — 99214 OFFICE O/P EST MOD 30 MIN: CPT | Performed by: FAMILY MEDICINE

## 2022-05-24 RX ORDER — GABAPENTIN 100 MG/1
100-200 CAPSULE ORAL AS NEEDED
COMMUNITY
Start: 2022-04-25 | End: 2023-02-24 | Stop reason: HOSPADM

## 2022-05-24 RX ORDER — SUCRALFATE 1 G/1
TABLET ORAL
COMMUNITY
Start: 2022-05-23 | End: 2022-07-01

## 2022-05-30 NOTE — ASSESSMENT & PLAN NOTE
Hypertension is unchanged.  Continue current treatment regimen.  Dietary sodium restriction.  Weight loss.  Continue current medications.  Blood pressure will be reassessed in 3 months.

## 2022-06-01 ENCOUNTER — APPOINTMENT (OUTPATIENT)
Dept: CARDIOLOGY | Facility: HOSPITAL | Age: 77
End: 2022-06-01

## 2022-06-20 ENCOUNTER — TRANSCRIBE ORDERS (OUTPATIENT)
Dept: PHYSICAL THERAPY | Facility: CLINIC | Age: 77
End: 2022-06-20

## 2022-06-20 DIAGNOSIS — M26.629 TENDERNESS OF TEMPOROMANDIBULAR JOINT, UNSPECIFIED LATERALITY: Primary | ICD-10-CM

## 2022-07-01 ENCOUNTER — HOSPITAL ENCOUNTER (OUTPATIENT)
Dept: GENERAL RADIOLOGY | Facility: HOSPITAL | Age: 77
Discharge: HOME | End: 2022-07-01
Admitting: FAMILY MEDICINE

## 2022-07-01 ENCOUNTER — OFFICE VISIT (OUTPATIENT)
Dept: FAMILY MEDICINE CLINIC | Facility: CLINIC | Age: 77
End: 2022-07-01

## 2022-07-01 ENCOUNTER — TELEPHONE (OUTPATIENT)
Dept: FAMILY MEDICINE CLINIC | Facility: CLINIC | Age: 77
End: 2022-07-01

## 2022-07-01 VITALS
OXYGEN SATURATION: 98 % | HEART RATE: 91 BPM | TEMPERATURE: 97.3 F | WEIGHT: 199.2 LBS | HEIGHT: 65 IN | BODY MASS INDEX: 33.19 KG/M2 | DIASTOLIC BLOOD PRESSURE: 82 MMHG | SYSTOLIC BLOOD PRESSURE: 142 MMHG | RESPIRATION RATE: 18 BRPM

## 2022-07-01 DIAGNOSIS — M10.071 ACUTE IDIOPATHIC GOUT OF RIGHT FOOT: ICD-10-CM

## 2022-07-01 DIAGNOSIS — M72.2 PLANTAR FASCIITIS OF RIGHT FOOT: ICD-10-CM

## 2022-07-01 DIAGNOSIS — E66.09 CLASS 1 OBESITY DUE TO EXCESS CALORIES WITH SERIOUS COMORBIDITY AND BODY MASS INDEX (BMI) OF 33.0 TO 33.9 IN ADULT: ICD-10-CM

## 2022-07-01 DIAGNOSIS — M79.671 RIGHT FOOT PAIN: Primary | ICD-10-CM

## 2022-07-01 PROBLEM — M10.9 GOUT OF RIGHT FOOT: Status: ACTIVE | Noted: 2022-07-01

## 2022-07-01 PROCEDURE — 99213 OFFICE O/P EST LOW 20 MIN: CPT | Performed by: FAMILY MEDICINE

## 2022-07-01 PROCEDURE — 73630 X-RAY EXAM OF FOOT: CPT

## 2022-07-01 RX ORDER — PREDNISONE 10 MG/1
10 TABLET ORAL DAILY
Qty: 5 TABLET | Refills: 0 | Status: SHIPPED | OUTPATIENT
Start: 2022-07-01 | End: 2022-08-11

## 2022-07-01 NOTE — TELEPHONE ENCOUNTER
Caller: Vianey Reeves    Relationship: Self    Best call back number: [phone]    What was the call regarding: PATIENT IS WANTING TO KNOW IF DR. VALERIO HAS REVIEWED THE LABS THAT WERE FAXED TO HER THIS MORNING. PATIENT STATES HER URIC ACID LEVEL WAS HIGH.    Do you require a callback: YES

## 2022-07-01 NOTE — TELEPHONE ENCOUNTER
[facility] in [facility]  [place]  Called patient she stated that she went there due to her foot hurting she cant put any weight on her foot. It is her right foot starting in heel and goes to the sides.  She had a cortizone shot in it and a round of steroids, and it has helped her.   The foot Dr  she seen yesterday wrapped her feet. And wanted to put her in a boot and she declined. She said it may be a gout flair up.

## 2022-07-01 NOTE — TELEPHONE ENCOUNTER
Uric acid level is 6.4. patient also had a CBC done as well. Please advise and I will call the patient to let her know.

## 2022-07-01 NOTE — PROGRESS NOTES
Subjective   Vianey Reeves is a 76 y.o. female. Presents to [facility]    Chief Complaint   Patient presents with   • Foot Pain       Foot Pain  This is a new problem. The current episode started 1 to 4 weeks ago. The problem occurs constantly. The problem has been gradually worsening. Associated symptoms include numbness (in toes ). Pertinent negatives include no abdominal pain, anorexia, arthralgias, change in bowel habit, chest pain, chills, congestion, coughing, diaphoresis, fatigue, fever, headaches, joint swelling, myalgias, nausea, neck pain, rash, sore throat, swollen glands, urinary symptoms, vertigo, visual change, vomiting or weakness. Nothing aggravates the symptoms. She has tried ice for the symptoms. The treatment provided no relief.        I personally reviewed and updated the patient's allergies, medications, problem list, and past medical, surgical, social, and family history. I have reviewed and confirmed the accuracy of the History of Present Illness and Review of Symptoms as documented by the MA/LPN/RN. Emma Hammer MD    Allergies:  Allergies   Allergen Reactions   • Erythromycin Rash   • Naproxen Sodium Other (See Comments)     Dizzy lightheaded   • Latex Itching and Swelling   • Metoclopramide Other (See Comments)     Unsteady on feet       Social History:  Social History     Socioeconomic History   • Marital status:    Tobacco Use   • Smoking status: Current Every Day Smoker     Packs/day: 0.25     Years: 50.00     Pack years: 12.50     Types: Cigarettes   • Smokeless tobacco: Never Used   Vaping Use   • Vaping Use: Never used   Substance and Sexual Activity   • Alcohol use: Yes     Comment: occasionally    • Drug use: Never   • Sexual activity: Defer       Family History:  Family History   Problem Relation Age of Onset   • Arthritis Father    • Prostate cancer Father    • Heart disease Sister        Past Medical History :  Patient Active Problem List    Diagnosis   • Type 2 diabetes mellitus with stage 4 chronic kidney disease, without long-term current use of insulin ([facility]ly [facility])   • Essential (primary) hypertension   • Mixed hyperlipidemia   • Hiatal hernia   • Other fatigue   • Long term current use of antithrombotics/antiplatelets   • Simple chronic bronchitis ([facility])   • Atherosclerotic heart disease of native coronary artery without angina pectoris   • Irritable bowel syndrome with constipation   • History of cancer of ureter   • Solitary kidney   • Atherosclerosis of native arteries of extremities with intermittent claudication, bilateral legs ([facility])   • H/O malignant neoplasm of ureter   • Vertigo   • Medicare annual wellness visit, subsequent   • Presbyopia   • Combined form of senile cataract   • Cervical cancer screening   • Postartificial menopausal syndrome   • Diverticula of colon   • Ruptured tympanic membrane, left   • Hearing loss, bilateral   • TMJ pain dysfunction syndrome   • Diverticulitis   • Stage 4 chronic kidney disease ([facility])   • Gastroenteritis   • Other dysphagia   • Lactose intolerance   • Polyarthralgia   • Chronic bilateral thoracic back pain   • Class 1 obesity due to excess calories with serious comorbidity and body mass index (BMI) of 33.0 to 33.9 in adult   • Anxiety   • Hypercalcemia   • Screening mammogram, encounter for   • Chronic bilateral low back pain without sciatica   • Urinary frequency   • Irregular heart beat   • Palpitations   • COPD (chronic obstructive pulmonary disease) ([facility])   • Gout of right foot   • Plantar fasciitis of right foot       Medication List:    Current Outpatient Medications:   •  Advair HFA 45-21 MCG/ACT inhaler, TAKE 2 PUFFS BY MOUTH TWICE A DAY, Disp: 12 each, Rfl: 12  •  albuterol (PROVENTIL) (2.5 MG/3ML) 0.083% nebulizer solution, Take 2.5 mg by nebulization Every 4 (Four) Hours As Needed for Wheezing., Disp: 3 mL, Rfl: 12  •  amLODIPine (NORVASC) 10 MG tablet, TAKE 1 TABLET BY MOUTH EVERY DAY, Disp: 90  tablet, Rfl: 4  •  aspirin 81 MG tablet, Take 81 mg by mouth Daily. dont take dos, Disp: , Rfl:   •  carvedilol (COREG) 3.125 MG tablet, TAKE 1 TABLET BY MOUTH EVERY 12 HOURS, Disp: 180 tablet, Rfl: 4  •  coenzyme Q10 100 MG capsule, Take 1 capsule by mouth 2 (Two) Times a Week., Disp: , Rfl:   •  Diclofenac Sodium (VOLTAREN) 1 % gel gel, , Disp: , Rfl:   •  doxazosin (Cardura) 2 MG tablet, Take 1 tablet by mouth Every Night., Disp: 90 tablet, Rfl: 3  •  gabapentin (NEURONTIN) 100 MG capsule, TAKE 1-2 CAPSULES BY MOUTH AT BEDTIME AS NEEDED, Disp: , Rfl:   •  Glucose Blood (Blood Glucose Test) strip, 1 each by In Vitro route Daily., Disp: 100 each, Rfl: 3  •  Januvia 100 MG tablet, TAKE 1 TABLET BY MOUTH EVERY DAY, Disp: 90 tablet, Rfl: 3  •  Lantus SoloStar 100 UNIT/ML injection pen, INJECT 20 UNITS UNDER THE SKIN INTO THE APPROPRIATE AREA AS DIRECTED EVERY NIGHT., Disp: 5 pen, Rfl: 11  •  linaclotide (Linzess) 72 MCG capsule capsule, Take 1 capsule by mouth Every Morning Before Breakfast., Disp: 30 capsule, Rfl: 12  •  montelukast (SINGULAIR) 10 MG tablet, TAKE 1 TABLET BY MOUTH EVERYDAY AT BEDTIME, Disp: 30 tablet, Rfl: 0  •  sertraline (Zoloft) 50 MG tablet, Take 1 tablet by mouth Daily., Disp: 90 tablet, Rfl: 3  •  traMADol (ULTRAM) 50 MG tablet, Take 50 mg by mouth 2 (Two) Times a Day., Disp: , Rfl:   •  Triamcinolone Acetonide (NASACORT) 55 MCG/ACT nasal inhaler, 2 sprays into the nostril(s) as directed by provider Daily., Disp: 16.5 g, Rfl: 0  •  vitamin B-12 (CYANOCOBALAMIN) 500 MCG tablet, Take 500 mcg by mouth Every Other Day., Disp: , Rfl:   •  predniSONE (DELTASONE) 10 MG tablet, Take 1 tablet by mouth Daily., Disp: 5 tablet, Rfl: 0    Past Surgical History:  Past Surgical History:   Procedure Laterality Date   • BREAST BIOPSY Right    • CARDIAC CATHETERIZATION     • CHOLECYSTECTOMY     • COLON SURGERY      REMOVAL diverticular diseae   • COLONOSCOPY N/A 8/12/2021    Procedure: COLONOSCOPY with  "polypectomy x 3;  Surgeon: Margarette Mcallister MD;  Location: [facility] ENDOSCOPY;  Service: Gastroenterology;  Laterality: N/A;  post op: hmorrhoids, diverticulosis, polyps   • CORONARY STENT PLACEMENT     • ENDOSCOPY N/A 8/12/2021    Procedure: ESOPHAGOGASTRODUODENOSCOPY with dilatation (18-20 mm balloon) and (54 bougie);  Surgeon: Margarette Mcallister MD;  Location: [facility] ENDOSCOPY;  Service: Gastroenterology;  Laterality: N/A;  post op: esophageal stricture, esophagitis, gastritis, history of nissen   • EYE SURGERY Bilateral    • HIATAL HERNIA REPAIR     • NEPHRECTOMY Right        Review of Systems:  Review of Systems   Constitutional: Negative for chills, diaphoresis, fatigue and fever.   HENT: Negative for congestion, sore throat and swollen glands.    Respiratory: Negative for cough.    Cardiovascular: Negative for chest pain.   Gastrointestinal: Negative for abdominal pain, anorexia, change in bowel habit, nausea and vomiting.   Musculoskeletal: Negative for arthralgias, joint swelling, myalgias and neck pain.   Skin: Negative for rash.   Neurological: Positive for numbness (in toes ). Negative for vertigo and weakness.       Physical Exam:  Vital Signs:  Vital Signs:   /82 (BP Location: Right arm, Patient Position: Sitting, Cuff Size: Adult)   Pulse 91   Temp 97.3 °F (36.3 °C) (Temporal)   Resp 18   Ht 165.1 cm (65\")   Wt 90.4 kg (199 lb 3.2 oz)   SpO2 98% Comment: room air  BMI 33.15 kg/m²     Result Review :                Physical Exam  Vitals reviewed.   Constitutional:       Appearance: Normal appearance. She is well-developed.   HENT:      Head: Normocephalic and atraumatic.   Eyes:      General:         Right eye: No discharge.         Left eye: No discharge.   Cardiovascular:      Rate and Rhythm: Normal rate and regular rhythm.      Heart sounds: Normal heart sounds. No murmur heard.    No friction rub. No gallop.   Pulmonary:      Effort: Pulmonary effort is normal. No respiratory distress.      " Breath sounds: Normal breath sounds. No wheezing or rales.   Musculoskeletal:      Right foot: Normal range of motion. Tenderness present.        Legs:    Skin:     General: Skin is warm and dry.      Findings: No rash.   Neurological:      Mental Status: She is alert and oriented to person, place, and time.      Coordination: Coordination normal.      Gait: Gait normal.   Psychiatric:         Behavior: Behavior is cooperative.         Assessment and Plan:  Problems Addressed this Visit        Endocrine and Metabolic    Class 1 obesity due to excess calories with serious comorbidity and body mass index (BMI) of 33.0 to 33.9 in adult       Musculoskeletal and Injuries    Gout of right foot    Plantar fasciitis of right foot     Declines pt  Send to podaitry    Discussed risks of steroids: hyperglycemia, osteoporosis, avascular necrosis, anxiety, insomnia and cataracts. Patient states understanding               Other Visit Diagnoses     Right foot pain    -  Primary    Relevant Medications    predniSONE (DELTASONE) 10 MG tablet    Other Relevant Orders    XR Foot 3+ View Right (Completed)      Diagnoses       Codes Comments    Right foot pain    -  Primary ICD-10-CM: M79.671  ICD-9-CM: 729.5     Class 1 obesity due to excess calories with serious comorbidity and body mass index (BMI) of 33.0 to 33.9 in adult     ICD-10-CM: E66.09, Z68.33  ICD-9-CM: 278.00, V85.33     Acute idiopathic gout of right foot     ICD-10-CM: M10.071  ICD-9-CM: 274.01     Plantar fasciitis of right foot     ICD-10-CM: M72.2  ICD-9-CM: 728.71            BMI is >= 30 and <35. (Class 1 Obesity). The following options were offered after discussion;: weight loss educational material (shared in after visit summary), exercise counseling/recommendations and nutrition counseling/recommendations      An After Visit Summary and PPPS were given to the patient.       I wore protective equipment throughout this patient encounter to include mask and  eyewear. Hand hygiene was performed before donning protective equipment and after removal when leaving the room.

## 2022-07-01 NOTE — ASSESSMENT & PLAN NOTE
Declines pt  Send to podaitry    Discussed risks of steroids: hyperglycemia, osteoporosis, avascular necrosis, anxiety, insomnia and cataracts. Patient states understanding

## 2022-07-05 DIAGNOSIS — M79.671 RIGHT FOOT PAIN: ICD-10-CM

## 2022-07-05 DIAGNOSIS — M72.2 PLANTAR FASCIITIS OF RIGHT FOOT: ICD-10-CM

## 2022-07-05 DIAGNOSIS — M10.071 ACUTE IDIOPATHIC GOUT OF RIGHT FOOT: Primary | ICD-10-CM

## 2022-07-07 ENCOUNTER — TELEPHONE (OUTPATIENT)
Dept: FAMILY MEDICINE CLINIC | Facility: CLINIC | Age: 77
End: 2022-07-07

## 2022-07-07 NOTE — TELEPHONE ENCOUNTER
Called patient and she said that she did attend the fair yesterday and she states that she does have some balance issues as well as uses furniture at times to help herself but she states that she does not feels that this therapy is actually needed. She said that she was going to address this on her upcoming appointment with dr. Hammer if she still felt it was needed and I have notified Dr. Hammer of this and she was fine with this as well

## 2022-07-07 NOTE — TELEPHONE ENCOUNTER
[facility] called requesting face sheet, last ov, and home health order for PT to be faxed to [phone]

## 2022-07-28 DIAGNOSIS — R10.32 LLQ PAIN: ICD-10-CM

## 2022-07-28 RX ORDER — METRONIDAZOLE 500 MG/1
TABLET ORAL
Qty: 21 TABLET | Refills: 0 | OUTPATIENT
Start: 2022-07-28

## 2022-08-03 NOTE — PROGRESS NOTES
Subjective   Vianey Reeves is a 76 y.o. female. Presents to [facility]    Chief Complaint   Patient presents with   • Foot Pain   • Diabetes       Foot Pain  This is a new problem. The current episode started 1 to 4 weeks ago. The problem occurs daily. The problem has been gradually improving. Pertinent negatives include no abdominal pain, arthralgias, chest pain, chills, congestion, coughing, fatigue, fever, headaches, nausea, rash, vertigo, visual change or vomiting. Nothing aggravates the symptoms. She has tried nothing for the symptoms.   Diabetes  She presents for her follow-up diabetic visit. She has type 2 diabetes mellitus. There are no hypoglycemic associated symptoms. Pertinent negatives for hypoglycemia include no headaches or nervousness/anxiousness. Pertinent negatives for diabetes include no blurred vision, no chest pain, no fatigue and no visual change. There are no hypoglycemic complications. There are no diabetic complications. Risk factors for coronary artery disease include hypertension, dyslipidemia and diabetes mellitus. Current diabetic treatment includes oral agent (dual therapy) and intensive insulin program. She is compliant with treatment most of the time. Her weight is fluctuating minimally. Meal planning includes avoidance of concentrated sweets and carbohydrate counting. She has not had a previous visit with a dietitian. She rarely participates in exercise. Her home blood glucose trend is fluctuating minimally. Her overall blood glucose range is >200 mg/dl. (Patient states her sugars have been running in the 200s  ) An ACE inhibitor/angiotensin II receptor blocker is not being taken. She sees a podiatrist.Eye exam is current.   Hypertension  This is a chronic problem. The current episode started more than 1 year ago. The problem has been rapidly improving since onset. The problem is controlled. Pertinent negatives include no blurred vision, chest pain, headaches or  shortness of breath. Risk factors for coronary artery disease include diabetes mellitus and dyslipidemia. Current antihypertension treatment includes calcium channel blockers and alpha 1 blockers. The current treatment provides significant improvement. Compliance problems include diet.         I personally reviewed and updated the patient's allergies, medications, problem list, and past medical, surgical, social, and family history. I have reviewed and confirmed the accuracy of the History of Present Illness and Review of Symptoms as documented by the MA/LPN/RN. Emma Hammer MD    Allergies:  Allergies   Allergen Reactions   • Erythromycin Rash   • Naproxen Sodium Other (See Comments)     Dizzy lightheaded   • Latex Itching and Swelling   • Metoclopramide Other (See Comments)     Unsteady on feet       Social History:  Social History     Socioeconomic History   • Marital status:    Tobacco Use   • Smoking status: Current Every Day Smoker     Packs/day: 0.25     Years: 50.00     Pack years: 12.50     Types: Cigarettes   • Smokeless tobacco: Never Used   Vaping Use   • Vaping Use: Never used   Substance and Sexual Activity   • Alcohol use: Yes     Comment: occasionally    • Drug use: Never   • Sexual activity: Defer       Family History:  Family History   Problem Relation Age of Onset   • Arthritis Father    • Prostate cancer Father    • Heart disease Sister        Past Medical History :  Patient Active Problem List   Diagnosis   • Type 2 diabetes mellitus with stage 4 chronic kidney disease, without long-term current use of insulin (HCC)   • Essential (primary) hypertension   • Mixed hyperlipidemia   • Hiatal hernia   • Other fatigue   • Long term current use of antithrombotics/antiplatelets   • Simple chronic bronchitis (HCC)   • Atherosclerotic heart disease of native coronary artery without angina pectoris   • Irritable bowel syndrome with constipation   • History of cancer of ureter   • Solitary kidney    • Atherosclerosis of native arteries of extremities with intermittent claudication, bilateral legs (HCC)   • H/O malignant neoplasm of ureter   • Vertigo   • Medicare annual wellness visit, subsequent   • Presbyopia   • Combined form of senile cataract   • Cervical cancer screening   • Postartificial menopausal syndrome   • Diverticula of colon   • Ruptured tympanic membrane, left   • Hearing loss, bilateral   • TMJ pain dysfunction syndrome   • Diverticulitis   • Stage 4 chronic kidney disease (HCC)   • Gastroenteritis   • Other dysphagia   • Lactose intolerance   • Polyarthralgia   • Chronic bilateral thoracic back pain   • Class 1 obesity due to excess calories with serious comorbidity and body mass index (BMI) of 33.0 to 33.9 in adult   • Anxiety   • Hypercalcemia   • Screening mammogram, encounter for   • Chronic bilateral low back pain without sciatica   • Urinary frequency   • Irregular heart beat   • Palpitations   • COPD (chronic obstructive pulmonary disease) ([facility])   • Gout of right foot   • Plantar fasciitis of right foot       Medication List:    Current Outpatient Medications:   •  Advair HFA 45-21 MCG/ACT inhaler, TAKE 2 PUFFS BY MOUTH TWICE A DAY, Disp: 12 each, Rfl: 12  •  albuterol (PROVENTIL) (2.5 MG/3ML) 0.083% nebulizer solution, Take 2.5 mg by nebulization Every 4 (Four) Hours As Needed for Wheezing., Disp: 3 mL, Rfl: 12  •  amLODIPine (NORVASC) 10 MG tablet, Take 1 tablet by mouth Daily., Disp: 90 tablet, Rfl: 3  •  aspirin 81 MG tablet, Take 81 mg by mouth Daily. dont take dos, Disp: , Rfl:   •  carvedilol (COREG) 3.125 MG tablet, TAKE 1 TABLET BY MOUTH EVERY 12 HOURS, Disp: 180 tablet, Rfl: 4  •  coenzyme Q10 100 MG capsule, Take 1 capsule by mouth 2 (Two) Times a Week., Disp: , Rfl:   •  Diclofenac Sodium (VOLTAREN) 1 % gel gel, , Disp: , Rfl:   •  doxazosin (Cardura) 2 MG tablet, Take 1 tablet by mouth Every Night., Disp: 90 tablet, Rfl: 3  •  gabapentin (NEURONTIN) 100 MG capsule, TAKE  1-2 CAPSULES BY MOUTH AT BEDTIME AS NEEDED, Disp: , Rfl:   •  Glucose Blood (Blood Glucose Test) strip, 1 each by In Vitro route Daily., Disp: 100 each, Rfl: 3  •  Januvia 100 MG tablet, TAKE 1 TABLET BY MOUTH EVERY DAY, Disp: 90 tablet, Rfl: 3  •  linaclotide (Linzess) 72 MCG capsule capsule, Take 1 capsule by mouth Every Morning Before Breakfast., Disp: 30 capsule, Rfl: 12  •  montelukast (SINGULAIR) 10 MG tablet, TAKE 1 TABLET BY MOUTH EVERYDAY AT BEDTIME, Disp: 30 tablet, Rfl: 0  •  sertraline (Zoloft) 50 MG tablet, Take 1 tablet by mouth Daily., Disp: 90 tablet, Rfl: 3  •  traMADol (ULTRAM) 50 MG tablet, Take 50 mg by mouth 2 (Two) Times a Day., Disp: , Rfl:   •  Triamcinolone Acetonide (NASACORT) 55 MCG/ACT nasal inhaler, 2 sprays into the nostril(s) as directed by provider Daily., Disp: 16.5 g, Rfl: 0  •  vitamin B-12 (CYANOCOBALAMIN) 500 MCG tablet, Take 500 mcg by mouth Every Other Day., Disp: , Rfl:   •  Lantus SoloStar 100 UNIT/ML injection pen, INJECT 20 UNITS UNDER THE SKIN AS DIRECTED EVERY NIGHT, Disp: 3 mL, Rfl: 0    Past Surgical History:  Past Surgical History:   Procedure Laterality Date   • BREAST BIOPSY Right    • CARDIAC CATHETERIZATION     • CHOLECYSTECTOMY     • COLON SURGERY      REMOVAL diverticular diseae   • COLONOSCOPY N/A 8/12/2021    Procedure: COLONOSCOPY with polypectomy x 3;  Surgeon: Margarette Mcallister MD;  Location: [facility] ENDOSCOPY;  Service: Gastroenterology;  Laterality: N/A;  post op: hmorrhoids, diverticulosis, polyps   • CORONARY STENT PLACEMENT     • ENDOSCOPY N/A 8/12/2021    Procedure: ESOPHAGOGASTRODUODENOSCOPY with dilatation (18-20 mm balloon) and (54 bougie);  Surgeon: Margarette Mcallister MD;  Location: [facility] ENDOSCOPY;  Service: Gastroenterology;  Laterality: N/A;  post op: esophageal stricture, esophagitis, gastritis, history of nissen   • EYE SURGERY Bilateral    • HIATAL HERNIA REPAIR     • NEPHRECTOMY Right        Review of Systems:  Review of Systems   Constitutional:  "Negative for activity change, chills, fatigue and fever.   HENT: Negative for congestion, ear pain, rhinorrhea, sinus pressure and voice change.    Eyes: Negative for blurred vision and visual disturbance.   Respiratory: Negative for cough and shortness of breath.    Cardiovascular: Negative for chest pain.   Gastrointestinal: Negative for abdominal pain, diarrhea, nausea and vomiting.   Endocrine: Negative for cold intolerance and heat intolerance.   Genitourinary: Negative for frequency and urgency.   Musculoskeletal: Negative for arthralgias.   Skin: Negative for rash.   Neurological: Negative for vertigo and syncope.   Hematological: Does not bruise/bleed easily.   Psychiatric/Behavioral: Negative for depressed mood. The patient is not nervous/anxious.        Physical Exam:  Vital Signs:  Vital Signs:   /86   Pulse 78   Temp 97.5 °F (36.4 °C)   Resp 18   Ht 165.1 cm (65\")   Wt 88.5 kg (195 lb 3.2 oz)   SpO2 98%   BMI 32.48 kg/m²     Result Review :   The following data was reviewed by: Emma Hammer MD on 08/11/2022:  Most Recent A1C    HGBA1C Most Recent 8/11/22   Hemoglobin A1C 7.3                    Physical Exam  Vitals reviewed.   Constitutional:       Appearance: Normal appearance. She is well-developed.   HENT:      Head: Normocephalic and atraumatic.   Eyes:      General:         Right eye: No discharge.         Left eye: No discharge.   Cardiovascular:      Rate and Rhythm: Normal rate and regular rhythm.      Heart sounds: Normal heart sounds. No murmur heard.    No friction rub. No gallop.   Pulmonary:      Effort: Pulmonary effort is normal. No respiratory distress.      Breath sounds: Normal breath sounds. No wheezing or rales.   Skin:     General: Skin is warm and dry.      Findings: No rash.   Neurological:      Mental Status: She is alert and oriented to person, place, and time.      Coordination: Coordination normal.      Gait: Gait normal.   Psychiatric:         Behavior: " Behavior is cooperative.         Assessment and Plan:  Problems Addressed this Visit        Cardiac and Vasculature    Essential (primary) hypertension    Relevant Medications    amLODIPine (NORVASC) 10 MG tablet    Mixed hyperlipidemia    Relevant Orders    Comprehensive Metabolic Panel    Lipid Panel With / Chol / HDL Ratio       Endocrine and Metabolic    Type 2 diabetes mellitus with stage 4 chronic kidney disease, without long-term current use of insulin (HCC) - Primary     Renal condition is worsening.  Regular aerobic exercise.  Continue current medications.  discussed diet change  Renal condition will be reassessed in 3 months.         Relevant Orders    POC Glycosylated Hemoglobin (Hb A1C) (Completed)    POC Glucose (Completed)       Gastrointestinal Abdominal     Diverticulitis     Resolved  She is also seeing GI.             Musculoskeletal and Injuries    Gout of right foot     resolved         Plantar fasciitis of right foot     Getting better  Will have her see podiatry           Diagnoses       Codes Comments    Type 2 diabetes mellitus with stage 4 chronic kidney disease, without long-term current use of insulin (HCC)    -  Primary ICD-10-CM: E11.22, N18.4  ICD-9-CM: 250.40, 585.4     Essential (primary) hypertension     ICD-10-CM: I10  ICD-9-CM: 401.9     Mixed hyperlipidemia     ICD-10-CM: E78.2  ICD-9-CM: 272.2     Diverticulitis     ICD-10-CM: K57.92  ICD-9-CM: 562.11     Plantar fasciitis of right foot     ICD-10-CM: M72.2  ICD-9-CM: 728.71     Acute idiopathic gout of right foot     ICD-10-CM: M10.071  ICD-9-CM: 274.01            BMI is >= 30 and <35. (Class 1 Obesity). The following options were offered after discussion;: weight loss educational material (shared in after visit summary), exercise counseling/recommendations and nutrition counseling/recommendations      An After Visit Summary and PPPS were given to the patient.       I wore protective equipment throughout this patient encounter  to include mask and eyewear. Hand hygiene was performed before donning protective equipment and after removal when leaving the room.

## 2022-08-05 ENCOUNTER — APPOINTMENT (OUTPATIENT)
Dept: CT IMAGING | Facility: HOSPITAL | Age: 77
End: 2022-08-05

## 2022-08-11 ENCOUNTER — OFFICE (AMBULATORY)
Dept: URBAN - METROPOLITAN AREA CLINIC 64 | Facility: CLINIC | Age: 77
End: 2022-08-11

## 2022-08-11 ENCOUNTER — OFFICE VISIT (OUTPATIENT)
Dept: FAMILY MEDICINE CLINIC | Facility: CLINIC | Age: 77
End: 2022-08-11

## 2022-08-11 VITALS
RESPIRATION RATE: 18 BRPM | BODY MASS INDEX: 32.52 KG/M2 | TEMPERATURE: 97.5 F | HEART RATE: 78 BPM | HEIGHT: 65 IN | WEIGHT: 195.2 LBS | SYSTOLIC BLOOD PRESSURE: 128 MMHG | DIASTOLIC BLOOD PRESSURE: 86 MMHG | OXYGEN SATURATION: 98 %

## 2022-08-11 VITALS
WEIGHT: 195 LBS | HEART RATE: 78 BPM | DIASTOLIC BLOOD PRESSURE: 78 MMHG | SYSTOLIC BLOOD PRESSURE: 132 MMHG | HEIGHT: 65 IN

## 2022-08-11 DIAGNOSIS — M54.9 DORSALGIA, UNSPECIFIED: ICD-10-CM

## 2022-08-11 DIAGNOSIS — M72.2 PLANTAR FASCIITIS OF RIGHT FOOT: ICD-10-CM

## 2022-08-11 DIAGNOSIS — M10.071 ACUTE IDIOPATHIC GOUT OF RIGHT FOOT: ICD-10-CM

## 2022-08-11 DIAGNOSIS — R10.12 LEFT UPPER QUADRANT PAIN: ICD-10-CM

## 2022-08-11 DIAGNOSIS — E78.2 MIXED HYPERLIPIDEMIA: ICD-10-CM

## 2022-08-11 DIAGNOSIS — N18.4 TYPE 2 DIABETES MELLITUS WITH STAGE 4 CHRONIC KIDNEY DISEASE, WITHOUT LONG-TERM CURRENT USE OF INSULIN: Primary | ICD-10-CM

## 2022-08-11 DIAGNOSIS — K57.92 DIVERTICULITIS: ICD-10-CM

## 2022-08-11 DIAGNOSIS — R14.0 ABDOMINAL DISTENSION (GASEOUS): ICD-10-CM

## 2022-08-11 DIAGNOSIS — E11.22 TYPE 2 DIABETES MELLITUS WITH STAGE 4 CHRONIC KIDNEY DISEASE, WITHOUT LONG-TERM CURRENT USE OF INSULIN: Primary | ICD-10-CM

## 2022-08-11 DIAGNOSIS — I10 ESSENTIAL (PRIMARY) HYPERTENSION: ICD-10-CM

## 2022-08-11 DIAGNOSIS — E11.9 TYPE 2 DIABETES MELLITUS WITHOUT COMPLICATION, WITHOUT LONG-TERM CURRENT USE OF INSULIN: ICD-10-CM

## 2022-08-11 DIAGNOSIS — R10.10 UPPER ABDOMINAL PAIN, UNSPECIFIED: ICD-10-CM

## 2022-08-11 DIAGNOSIS — R63.0 ANOREXIA: ICD-10-CM

## 2022-08-11 LAB
EXPIRATION DATE: NORMAL
GLUCOSE BLDC GLUCOMTR-MCNC: 176 MG/DL (ref 70–130)
HBA1C MFR BLD: 7.3 %
Lab: NORMAL

## 2022-08-11 PROCEDURE — 83036 HEMOGLOBIN GLYCOSYLATED A1C: CPT | Performed by: FAMILY MEDICINE

## 2022-08-11 PROCEDURE — 99214 OFFICE O/P EST MOD 30 MIN: CPT | Performed by: NURSE PRACTITIONER

## 2022-08-11 PROCEDURE — 82962 GLUCOSE BLOOD TEST: CPT | Performed by: FAMILY MEDICINE

## 2022-08-11 PROCEDURE — 99214 OFFICE O/P EST MOD 30 MIN: CPT | Performed by: FAMILY MEDICINE

## 2022-08-11 PROCEDURE — 3051F HG A1C>EQUAL 7.0%<8.0%: CPT | Performed by: FAMILY MEDICINE

## 2022-08-11 RX ORDER — AMLODIPINE BESYLATE 10 MG/1
10 TABLET ORAL DAILY
Qty: 90 TABLET | Refills: 3 | Status: SHIPPED | OUTPATIENT
Start: 2022-08-11 | End: 2022-08-11 | Stop reason: SDUPTHER

## 2022-08-11 RX ORDER — OMEPRAZOLE 40 MG/1
CAPSULE, DELAYED RELEASE ORAL
Qty: 90 | Refills: 4 | Status: COMPLETED
End: 2023-12-18

## 2022-08-11 RX ORDER — SIMETHICONE 500 MG
1500 CAPSULE ORAL
Qty: 90 | Refills: 3 | Status: ACTIVE
Start: 2022-08-11

## 2022-08-11 RX ORDER — AMLODIPINE BESYLATE 10 MG/1
10 TABLET ORAL DAILY
Qty: 90 TABLET | Refills: 3 | Status: SHIPPED | OUTPATIENT
Start: 2022-08-11 | End: 2022-10-19

## 2022-08-12 RX ORDER — INSULIN GLARGINE 100 [IU]/ML
INJECTION, SOLUTION SUBCUTANEOUS
Qty: 3 ML | Refills: 0 | Status: SHIPPED | OUTPATIENT
Start: 2022-08-12 | End: 2022-10-19

## 2022-08-17 NOTE — ASSESSMENT & PLAN NOTE
Renal condition is worsening.  Regular aerobic exercise.  Continue current medications.  discussed diet change  Renal condition will be reassessed in 3 months.

## 2022-09-01 ENCOUNTER — OFFICE VISIT (OUTPATIENT)
Dept: FAMILY MEDICINE CLINIC | Facility: CLINIC | Age: 77
End: 2022-09-01

## 2022-09-01 VITALS
OXYGEN SATURATION: 98 % | SYSTOLIC BLOOD PRESSURE: 128 MMHG | DIASTOLIC BLOOD PRESSURE: 84 MMHG | BODY MASS INDEX: 33.42 KG/M2 | WEIGHT: 200.6 LBS | HEART RATE: 87 BPM | RESPIRATION RATE: 18 BRPM | TEMPERATURE: 97.3 F | HEIGHT: 65 IN

## 2022-09-01 DIAGNOSIS — R33.9 URINARY RETENTION: ICD-10-CM

## 2022-09-01 DIAGNOSIS — N39.490 OVERFLOW INCONTINENCE OF URINE: ICD-10-CM

## 2022-09-01 DIAGNOSIS — E66.09 CLASS 1 OBESITY DUE TO EXCESS CALORIES WITH SERIOUS COMORBIDITY AND BODY MASS INDEX (BMI) OF 33.0 TO 33.9 IN ADULT: Primary | ICD-10-CM

## 2022-09-01 PROBLEM — R35.0 URINARY FREQUENCY: Status: RESOLVED | Noted: 2022-01-21 | Resolved: 2022-09-01

## 2022-09-01 PROBLEM — M54.16 LUMBAR RADICULOPATHY: Status: ACTIVE | Noted: 2022-07-20

## 2022-09-01 PROCEDURE — 99212 OFFICE O/P EST SF 10 MIN: CPT | Performed by: FAMILY MEDICINE

## 2022-09-01 PROCEDURE — 81002 URINALYSIS NONAUTO W/O SCOPE: CPT | Performed by: FAMILY MEDICINE

## 2022-09-01 NOTE — ASSESSMENT & PLAN NOTE
Mild  U/a is negative  With some difficulty urinating.   Stop decongestants  Will have her see urology

## 2022-09-01 NOTE — ASSESSMENT & PLAN NOTE
Mild. Due to decongestants? She has some overflow issues as well. I do not feel comfortable giving her an incontinence medication for this    She should also decrease tramadol usage. This may be causing this

## 2022-09-01 NOTE — ASSESSMENT & PLAN NOTE
Patient's (Body mass index is 33.38 kg/m².) indicates that they are obese (BMI >30) with health conditions that include hypertension, diabetes mellitus and dyslipidemias . Weight is worsening. BMI is is above average; BMI management plan is completed. We discussed portion control and increasing exercise.    Pt. switched to 2 lpm NC as per Dr's orders. SpO2 95%; Vishnu. well. Bipap remains in rm; will continue to monitor.

## 2022-09-21 ENCOUNTER — ANESTHESIA EVENT (OUTPATIENT)
Dept: GASTROENTEROLOGY | Facility: HOSPITAL | Age: 77
End: 2022-09-21

## 2022-09-21 RX ORDER — SODIUM CHLORIDE 9 MG/ML
9 INJECTION, SOLUTION INTRAVENOUS CONTINUOUS PRN
Status: CANCELLED | OUTPATIENT
Start: 2022-09-21

## 2022-09-21 RX ORDER — SODIUM CHLORIDE 0.9 % (FLUSH) 0.9 %
10 SYRINGE (ML) INJECTION EVERY 12 HOURS SCHEDULED
Status: CANCELLED | OUTPATIENT
Start: 2022-09-21

## 2022-09-21 RX ORDER — MIDAZOLAM HYDROCHLORIDE 1 MG/ML
0.5 INJECTION INTRAMUSCULAR; INTRAVENOUS
Status: CANCELLED | OUTPATIENT
Start: 2022-09-21

## 2022-09-21 RX ORDER — SODIUM CHLORIDE 0.9 % (FLUSH) 0.9 %
10 SYRINGE (ML) INJECTION AS NEEDED
Status: CANCELLED | OUTPATIENT
Start: 2022-09-21

## 2022-09-22 ENCOUNTER — ON CAMPUS - OUTPATIENT (AMBULATORY)
Dept: URBAN - METROPOLITAN AREA HOSPITAL 85 | Facility: HOSPITAL | Age: 77
End: 2022-09-22

## 2022-09-22 ENCOUNTER — HOSPITAL ENCOUNTER (EMERGENCY)
Facility: HOSPITAL | Age: 77
Discharge: LEFT BEFORE DISCHARGE | End: 2022-09-22
Attending: EMERGENCY MEDICINE

## 2022-09-22 ENCOUNTER — HOSPITAL ENCOUNTER (OUTPATIENT)
Facility: HOSPITAL | Age: 77
Setting detail: HOSPITAL OUTPATIENT SURGERY
Discharge: HOME | End: 2022-09-22
Attending: INTERNAL MEDICINE | Admitting: INTERNAL MEDICINE

## 2022-09-22 ENCOUNTER — ANESTHESIA (OUTPATIENT)
Dept: GASTROENTEROLOGY | Facility: HOSPITAL | Age: 77
End: 2022-09-22

## 2022-09-22 VITALS
DIASTOLIC BLOOD PRESSURE: 56 MMHG | HEART RATE: 55 BPM | OXYGEN SATURATION: 95 % | SYSTOLIC BLOOD PRESSURE: 119 MMHG | BODY MASS INDEX: 33.09 KG/M2 | TEMPERATURE: 97.8 F | HEIGHT: 65 IN | RESPIRATION RATE: 16 BRPM | WEIGHT: 198.63 LBS

## 2022-09-22 DIAGNOSIS — R63.0 ANOREXIA: ICD-10-CM

## 2022-09-22 DIAGNOSIS — R10.9 UNSPECIFIED ABDOMINAL PAIN: ICD-10-CM

## 2022-09-22 DIAGNOSIS — R14.0 ABDOMINAL DISTENSION (GASEOUS): ICD-10-CM

## 2022-09-22 DIAGNOSIS — K83.8 OTHER SPECIFIED DISEASES OF BILIARY TRACT: ICD-10-CM

## 2022-09-22 DIAGNOSIS — K44.9 DIAPHRAGMATIC HERNIA WITHOUT OBSTRUCTION OR GANGRENE: ICD-10-CM

## 2022-09-22 DIAGNOSIS — K91.89 OTHER POSTPROCEDURAL COMPLICATIONS AND DISORDERS OF DIGESTIV: ICD-10-CM

## 2022-09-22 LAB — GLUCOSE BLDC GLUCOMTR-MCNC: 99 MG/DL (ref 70–105)

## 2022-09-22 PROCEDURE — 25010000002 PROPOFOL 10 MG/ML EMULSION: Performed by: ANESTHESIOLOGY

## 2022-09-22 PROCEDURE — 99211 OFF/OP EST MAY X REQ PHY/QHP: CPT | Performed by: EMERGENCY MEDICINE

## 2022-09-22 PROCEDURE — 25010000002 PROPOFOL 200 MG/20ML EMULSION: Performed by: ANESTHESIOLOGY

## 2022-09-22 PROCEDURE — 43259 EGD US EXAM DUODENUM/JEJUNUM: CPT | Performed by: INTERNAL MEDICINE

## 2022-09-22 PROCEDURE — 82962 GLUCOSE BLOOD TEST: CPT

## 2022-09-22 RX ORDER — LEVOFLOXACIN 5 MG/ML
INJECTION, SOLUTION INTRAVENOUS
Status: DISCONTINUED
Start: 2022-09-22 | End: 2022-09-22 | Stop reason: WASHOUT

## 2022-09-22 RX ORDER — SODIUM CHLORIDE 9 MG/ML
INJECTION, SOLUTION INTRAVENOUS CONTINUOUS PRN
Status: DISCONTINUED | OUTPATIENT
Start: 2022-09-22 | End: 2022-09-22 | Stop reason: SURG

## 2022-09-22 RX ORDER — METRONIDAZOLE 500 MG/100ML
INJECTION, SOLUTION INTRAVENOUS
Status: DISCONTINUED
Start: 2022-09-22 | End: 2022-09-22 | Stop reason: WASHOUT

## 2022-09-22 RX ORDER — PROPOFOL 10 MG/ML
INJECTION, EMULSION INTRAVENOUS AS NEEDED
Status: DISCONTINUED | OUTPATIENT
Start: 2022-09-22 | End: 2022-09-22 | Stop reason: SURG

## 2022-09-22 RX ADMIN — PROPOFOL 100 MG: 10 INJECTION, EMULSION INTRAVENOUS at 10:23

## 2022-09-22 RX ADMIN — SODIUM CHLORIDE: 0.9 INJECTION, SOLUTION INTRAVENOUS at 10:10

## 2022-09-22 RX ADMIN — PROPOFOL 100 MCG/KG/MIN: 10 INJECTION, EMULSION INTRAVENOUS at 10:23

## 2022-09-22 NOTE — H&P
Patient Care Team:  Emma Hammer MD as PCP - General  Emma Hammer MD as PCP - Family Medicine  Jame Julio MD as Consulting Physician (Nephrology)      Subjective .     History of present illness:    Vianey Reeves is a 76 y.o. female who presents today for Procedure(s):  EUS for the indications listed below.     The updated Patient Profile was reviewed prior to the procedure, in conjunction with the Physical Exam, including medical conditions, surgical procedures, medications, allergies, family history and social history.     Pre-operatively, I reviewed the indication(s) for the procedure, the risks of the procedure [including but not limited to: unexpected bleeding possibly requiring hospitalization and/or unplanned repeat procedures, perforation possibly requiring surgical treatment, missed lesions and complications of sedation/MAC (also explained by anesthesia staff)].     I have evaluated the patient for risks associated with the planned anesthesia and the procedure to be performed and find the patient an acceptable candidate for IV sedation.    Multiple opportunities were provided for any questions or concerns, and all questions were answered satisfactorily before any anesthesia was administered. We will proceed with the planned procedure.      ASSESSMENT/PLAN:  76 years old female with a left upper quadrant pain here for EUS to rule out chronic panc      Past Medical History:  Past Medical History:   Diagnosis Date   • Allergic     latex allergy   • Allergies    • Anxiety    • Bilateral carotid bruits    • Bulging lumbar disc    • Bulging of thoracic intervertebral disc    • Cancer (HCC)     ureter    surgery   • CKD (chronic kidney disease)     stage 3   • Claudication, intermittent ([facility])    • COPD (chronic obstructive pulmonary disease) ([facility])    • Coronary artery disease    • DDD (degenerative disc disease), lumbar    • DDD (degenerative disc disease), thoracic    • Diabetes mellitus ([facility])    •  DJD (degenerative joint disease)    • Gout    • 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   • Hypertension    • IBS (irritable colon syndrome)     constipation   • Mass of right breast    • Neuropathy    • Renal insufficiency    • Sciatic leg pain     right   • Simple chronic bronchitis (HCC)    • Solitary kidney     left       Past Surgical History:  Past Surgical History:   Procedure Laterality Date   • BREAST BIOPSY Right    • CARDIAC CATHETERIZATION     • CHOLECYSTECTOMY     • COLON SURGERY      REMOVAL diverticular diseae   • COLONOSCOPY N/A 08/12/2021    Procedure: COLONOSCOPY with polypectomy x 3;  Surgeon: Margarette Mcallister MD;  Location: [facility] ENDOSCOPY;  Service: Gastroenterology;  Laterality: N/A;  post op: hmorrhoids, diverticulosis, polyps   • CORONARY STENT PLACEMENT     • ENDOSCOPY N/A 08/12/2021    Procedure: ESOPHAGOGASTRODUODENOSCOPY with dilatation (18-20 mm balloon) and (54 bougie);  Surgeon: Margarette Mcallister MD;  Location: [facility] ENDOSCOPY;  Service: Gastroenterology;  Laterality: N/A;  post op: esophageal stricture, esophagitis, gastritis, history of nissen   • EYE SURGERY Bilateral    • HIATAL HERNIA REPAIR     • NEPHRECTOMY Right     cancer       Social History:  Social History     Tobacco Use   • Smoking status: Current Every Day Smoker     Packs/day: 0.25     Years: 50.00     Pack years: 12.50     Types: Cigarettes   • Smokeless tobacco: Never Used   Vaping Use   • Vaping Use: Never used   Substance Use Topics   • Alcohol use: Yes     Comment: occasionally    • Drug use: Never       Family History:  Family History   Problem Relation Age of Onset   • Arthritis Father    • Prostate cancer Father    • Heart disease Sister        Medications:  Medications Prior to Admission   Medication Sig Dispense Refill Last Dose   • Advair HFA 45-21 MCG/ACT inhaler TAKE 2 PUFFS BY MOUTH TWICE A DAY (Patient taking differently: Inhale 2 puffs 2 (Two) Times a Day.) 12 each 12 Past Week at Unknown time   •  albuterol (PROVENTIL) (2.5 MG/3ML) 0.083% nebulizer solution Take 2.5 mg by nebulization Every 4 (Four) Hours As Needed for Wheezing. (Patient taking differently: Take 2.5 mg by nebulization Every 4 (Four) Hours As Needed for Wheezing. Not using currently) 3 mL 12 Past Month at Unknown time   • amLODIPine (NORVASC) 10 MG tablet Take 1 tablet by mouth Daily. 90 tablet 3 9/21/2022 at Unknown time   • aspirin 81 MG tablet Take 81 mg by mouth Daily. dont take dos   Past Week at Unknown time   • carvedilol (COREG) 3.125 MG tablet TAKE 1 TABLET BY MOUTH EVERY 12 HOURS (Patient taking differently: Take 3.125 mg by mouth 2 (Two) Times a Day With Meals.) 180 tablet 4 9/21/2022 at Unknown time   • coenzyme Q10 100 MG capsule Take 1 capsule by mouth Daily. On hold per patient due to abdominal pain   9/21/2022 at Unknown time   • Diclofenac Sodium (VOLTAREN) 1 % gel gel Apply 4 g topically to the appropriate area as directed As Needed.   Past Month at Unknown time   • doxazosin (Cardura) 2 MG tablet Take 1 tablet by mouth Every Night. 90 tablet 3 9/21/2022 at Unknown time   • gabapentin (NEURONTIN) 100 MG capsule Take 100-200 mg by mouth Every Night.   9/21/2022 at Unknown time   • Januvia 100 MG tablet TAKE 1 TABLET BY MOUTH EVERY DAY (Patient taking differently: Take 100 mg by mouth Daily.) 90 tablet 3 9/21/2022 at Unknown time   • Lantus SoloStar 100 UNIT/ML injection pen INJECT 20 UNITS UNDER THE SKIN AS DIRECTED EVERY NIGHT (Patient taking differently: Inject 20 Units under the skin into the appropriate area as directed Every Night.) 3 mL 0 9/21/2022 at Unknown time   • montelukast (SINGULAIR) 10 MG tablet TAKE 1 TABLET BY MOUTH EVERYDAY AT BEDTIME (Patient taking differently: Take 10 mg by mouth Every Night. On hold per patient due to abdominal pain) 30 tablet 0 Past Week at Unknown time   • sertraline (Zoloft) 50 MG tablet Take 1 tablet by mouth Daily. (Patient taking differently: Take 50 mg by mouth Daily. On hold per  patient due to abdominal pain) 90 tablet 3 Past Month at Unknown time   • traMADol (ULTRAM) 50 MG tablet Take 50 mg by mouth 1 (One) Time As Needed.   9/21/2022 at Unknown time   • Triamcinolone Acetonide (NASACORT) 55 MCG/ACT nasal inhaler 2 sprays into the nostril(s) as directed by provider Daily. (Patient taking differently: 2 sprays into the nostril(s) as directed by provider Every Night.) 16.5 g 0 9/21/2022 at Unknown time   • vitamin B-12 (CYANOCOBALAMIN) 500 MCG tablet Take 500 mcg by mouth Every Other Day. On hold per patient due to abdominal pain   Past Month at Unknown time   • Glucose Blood (Blood Glucose Test) strip 1 each by In Vitro route Daily. 100 each 3        Scheduled Meds:levoFLOXacin (LEVAQUIN) 500 mg/100 mL D5W (premix), , ,   metroNIDAZOLE, , ,       Continuous Infusions:   PRN Meds:.    ALLERGIES:  Erythromycin, Naproxen sodium, Latex, and Metoclopramide        Objective     Vital Signs:   Temp:  [97.8 °F (36.6 °C)] 97.8 °F (36.6 °C)  Heart Rate:  [59] 59  Resp:  [16] 16  BP: (170)/(66) 170/66    Physical Exam:      General Appearance:    Awake and alert, in no acute distress   Lungs:     Clear to auscultation bilaterally, respirations regular, even and unlabored    Heart:    Regular rhythm and normal rate, normal S1 and S2, no            murmur, no gallop, no rub   Abdomen:     Normal bowel sounds, soft, non-tender, no rebound or guarding, non-distended, no hepatosplenomegaly        Results Review:   I reviewed the patient's labs and imaging.  Lab Results (last 24 hours)     Procedure Component Value Units Date/Time    POC Glucose Once [[id number]]  (Normal) Collected: 09/22/22 0833    Specimen: Blood Updated: 09/22/22 0834     Glucose 99 mg/dL      Comment: Serial Number: [id number]:  [id number]             Imaging Results (Last 24 Hours)     ** No results found for the last 24 hours. **             I discussed the patients findings and my recommendations with the patient.  Margarette  MD Esvin  09/22/22  10:35 EDT

## 2022-09-22 NOTE — ANESTHESIA POSTPROCEDURE EVALUATION
Patient: Vianey Reeves    Procedure Summary     Date: 09/22/22 Room / Location: [facility] ENDOSCOPY 2 / [facility] ENDOSCOPY    Anesthesia Start: 1010 Anesthesia Stop: 1033    Procedure: EUS (N/A ) Diagnosis:       Abdominal pain      Bloating      Anorexia      Gassiness      (Abdominal pain [R10.9])      (Bloating [R14.0])      (Anorexia [R63.0])      (Gassiness [R14.0])    Surgeons: Margarette Mcallister MD Provider: Nigel Ortiz MD    Anesthesia Type: MAC ASA Status: 3          Anesthesia Type: MAC    Vitals  Vitals Value Taken Time   /56 09/22/22 1058   Temp     Pulse 55 09/22/22 1102   Resp 16 09/22/22 1058   SpO2 95 % 09/22/22 1102           Post Anesthesia Care and Evaluation    Patient location during evaluation: PACU  Patient participation: complete - patient participated  Level of consciousness: awake  Pain scale: See nurse's notes for pain score.  Pain management: adequate    Airway patency: patent  Anesthetic complications: No anesthetic complications  PONV Status: none  Cardiovascular status: acceptable  Respiratory status: acceptable  Hydration status: acceptable    Comments: Patient seen and examined postoperatively; vital signs stable; SpO2 greater than or equal to 90%; cardiopulmonary status stable; nausea/vomiting adequately controlled; pain adequately controlled; no apparent anesthesia complications; patient discharged from anesthesia care when discharge criteria were met

## 2022-09-22 NOTE — OP NOTE
ESOPHAGOGASTRODUODENOSCOPY WITH ULTRASOUND AND FINE NEEDLE ASPIRATION Procedure Report    Patient Name:  Vianey Reeves  YOB: 1945    Date of Surgery:  9/22/2022     Pre-Op Diagnosis:  Abdominal pain [R10.9]  Bloating [R14.0]  Anorexia [R63.0]  Gassiness [R14.0]       Post-Op Diagnosis Codes:     * Abdominal pain [R10.9]     * Bloating [R14.0]     * Anorexia [R63.0]     * Gassiness [R14.0]      Procedure/CPT® Codes:      Procedure(s):  EUS    Staff:  Surgeon(s):  Margarette Mcallister MD      Anesthesia: Monitored Anesthesia Care    Description of Procedure:  A description of the procedure as well as risks, benefits and alternative methods were explained to the patient who voiced understanding and signed the corresponding consent form. A physical exam was performed and vital signs were monitored throughout the procedure.    Initially Pentax radial scope was advanced under direct realization from oropharynx, esophagus stomach and the second part duodenum.  Limited evaluation of the esophageal gastric antral mucosa looks normal.    Scanning of the body and the tail of the pancreas showed normal pancreatic parenchyma without any evidence of dilated pancreatic duct, no hyperechoic bright foci was noticed.  No lobulation was seen.  No change of chronic pancreatitis was seen.  Minimal hyperechoic bright foci was seen rarely.  There was no prominent side branches were noticed.  No mass or lesion was seen in the body the tail of the pancreas.    Scanning of the head of the pancreas through duodenal bulb showed common bile duct 7 mm and pancreatic duct 2.5 millimeters.  No obvious mass or lesion was seen head of the pancreas.  No changes of chronic pancreatitis noticed the head of the pancreas.  Scanning at the D2 level around the papula did not show any focal changes of pancreatitis.  Common bile duct of the pancreas duct were well-visualized at that stage.  No obvious mass lesion was seen.  Uncinate process  well-visualized.  Patient tolerated procedure very well no immediate complication was noticed    Surgical changes of Nissen was noticed which is likely slipped with a small hiatal hernia.  Minimal changes of gastritis seen.      Impression:  1.  Normal pancreatic parenchymal EUS without any finding of chronic pancreatitis.  2.  Common bile duct is mildly dilated to 7 mm diameter.  Pancreatic was also normal diameter.  3.  Surgical changes of Nissen which has slightly slipped as well as minimal changes of gastritis      Recommendations:  1.   Patient to follow the GI clinic for any persistent abdominal pain as scheduled  2.   Continue with the PPI.  3. May consider CT scan abdomen and pelvis with IV and oral contrast to evaluate her symptom as outpatient  3. Follow up in clinic with NP in 2-4 weeks to discuss results.         Margarette Mcallister MD     Date: 9/22/2022    Time: 10:36 EDT

## 2022-09-22 NOTE — ANESTHESIA PREPROCEDURE EVALUATION
Anesthesia Evaluation     Patient summary reviewed and Nursing notes reviewed   NPO Solid Status: > 8 hours  NPO Liquid Status: > 2 hours           Airway   Mallampati: II  TM distance: >3 FB  Neck ROM: full  No difficulty expected  Dental - normal exam     Pulmonary - normal exam   (+) COPD,   Cardiovascular - normal exam    (+) hypertension, CAD, PVD, hyperlipidemia,       Neuro/Psych  (+) psychiatric history Depression and Anxiety,    GI/Hepatic/Renal/Endo    (+) obesity,  hiatal hernia,  renal disease CRI, diabetes mellitus type 2 well controlled,     Musculoskeletal (-) negative ROS    Abdominal  - normal exam    Bowel sounds: normal.   Substance History - negative use     OB/GYN negative ob/gyn ROS         Other                        Anesthesia Plan    ASA 3     MAC   total IV anesthesia  intravenous induction     Anesthetic plan, risks, benefits, and alternatives have been provided, discussed and informed consent has been obtained with: patient.  Pre-procedure education provided      CODE STATUS:

## 2022-09-23 ENCOUNTER — TELEPHONE (OUTPATIENT)
Dept: FAMILY MEDICINE CLINIC | Facility: CLINIC | Age: 77
End: 2022-09-23

## 2022-09-23 ENCOUNTER — HOSPITAL ENCOUNTER (EMERGENCY)
Facility: HOSPITAL | Age: 77
Discharge: HOME | End: 2022-09-23
Attending: EMERGENCY MEDICINE | Admitting: EMERGENCY MEDICINE

## 2022-09-23 ENCOUNTER — APPOINTMENT (OUTPATIENT)
Dept: CT IMAGING | Facility: HOSPITAL | Age: 77
End: 2022-09-23

## 2022-09-23 VITALS
RESPIRATION RATE: 12 BRPM | OXYGEN SATURATION: 99 % | HEIGHT: 65 IN | SYSTOLIC BLOOD PRESSURE: 145 MMHG | DIASTOLIC BLOOD PRESSURE: 79 MMHG | WEIGHT: 197.6 LBS | HEART RATE: 67 BPM | BODY MASS INDEX: 32.92 KG/M2 | TEMPERATURE: 98.3 F

## 2022-09-23 DIAGNOSIS — L03.211 FACIAL CELLULITIS: Primary | ICD-10-CM

## 2022-09-23 LAB
ANION GAP SERPL CALCULATED.3IONS-SCNC: 8 MMOL/L (ref 5–15)
BASOPHILS # BLD AUTO: 0 10*3/MM3 (ref 0–0.2)
BASOPHILS NFR BLD AUTO: 0.5 % (ref 0–1.5)
BUN SERPL-MCNC: 21 MG/DL (ref 8–23)
BUN/CREAT SERPL: 14.1 (ref 7–25)
CALCIUM SPEC-SCNC: 9 MG/DL (ref 8.6–10.5)
CHLORIDE SERPL-SCNC: 105 MMOL/L (ref 98–107)
CO2 SERPL-SCNC: 29 MMOL/L (ref 22–29)
CREAT SERPL-MCNC: 1.49 MG/DL (ref 0.57–1)
DEPRECATED RDW RBC AUTO: 49.9 FL (ref 37–54)
EGFRCR SERPLBLD CKD-EPI 2021: 35.8 ML/MIN/1.73
EOSINOPHIL # BLD AUTO: 0.1 10*3/MM3 (ref 0–0.4)
EOSINOPHIL NFR BLD AUTO: 1.2 % (ref 0.3–6.2)
ERYTHROCYTE [DISTWIDTH] IN BLOOD BY AUTOMATED COUNT: 14.6 % (ref 12.3–15.4)
GLUCOSE SERPL-MCNC: 130 MG/DL (ref 65–99)
HCT VFR BLD AUTO: 43.6 % (ref 34–46.6)
HGB BLD-MCNC: 14.1 G/DL (ref 12–15.9)
LYMPHOCYTES # BLD AUTO: 2.4 10*3/MM3 (ref 0.7–3.1)
LYMPHOCYTES NFR BLD AUTO: 28.9 % (ref 19.6–45.3)
MCH RBC QN AUTO: 31.8 PG (ref 26.6–33)
MCHC RBC AUTO-ENTMCNC: 32.3 G/DL (ref 31.5–35.7)
MCV RBC AUTO: 98.5 FL (ref 79–97)
MONOCYTES # BLD AUTO: 0.7 10*3/MM3 (ref 0.1–0.9)
MONOCYTES NFR BLD AUTO: 8.9 % (ref 5–12)
NEUTROPHILS NFR BLD AUTO: 4.9 10*3/MM3 (ref 1.7–7)
NEUTROPHILS NFR BLD AUTO: 60.5 % (ref 42.7–76)
NRBC BLD AUTO-RTO: 0 /100 WBC (ref 0–0.2)
PLATELET # BLD AUTO: 203 10*3/MM3 (ref 140–450)
PMV BLD AUTO: 8.4 FL (ref 6–12)
POTASSIUM SERPL-SCNC: 4.3 MMOL/L (ref 3.5–5.2)
RBC # BLD AUTO: 4.43 10*6/MM3 (ref 3.77–5.28)
SODIUM SERPL-SCNC: 142 MMOL/L (ref 136–145)
WBC NRBC COR # BLD: 8.2 10*3/MM3 (ref 3.4–10.8)

## 2022-09-23 PROCEDURE — 70486 CT MAXILLOFACIAL W/O DYE: CPT

## 2022-09-23 PROCEDURE — 85025 COMPLETE CBC W/AUTO DIFF WBC: CPT | Performed by: PHYSICIAN ASSISTANT

## 2022-09-23 PROCEDURE — 99282 EMERGENCY DEPT VISIT SF MDM: CPT

## 2022-09-23 PROCEDURE — 80048 BASIC METABOLIC PNL TOTAL CA: CPT | Performed by: PHYSICIAN ASSISTANT

## 2022-09-23 PROCEDURE — 36415 COLL VENOUS BLD VENIPUNCTURE: CPT

## 2022-09-23 PROCEDURE — 25010000002 CEFAZOLIN PER 500 MG: Performed by: PHYSICIAN ASSISTANT

## 2022-09-23 RX ORDER — CEPHALEXIN 500 MG/1
500 CAPSULE ORAL 3 TIMES DAILY
Qty: 30 CAPSULE | Refills: 0 | Status: SHIPPED | OUTPATIENT
Start: 2022-09-23 | End: 2022-10-03

## 2022-09-23 RX ORDER — SODIUM CHLORIDE 0.9 % (FLUSH) 0.9 %
10 SYRINGE (ML) INJECTION AS NEEDED
Status: DISCONTINUED | OUTPATIENT
Start: 2022-09-23 | End: 2022-09-23 | Stop reason: HOSPADM

## 2022-09-23 RX ADMIN — CEFAZOLIN 2 G: 2 INJECTION, POWDER, FOR SOLUTION INTRAMUSCULAR; INTRAVENOUS at 16:24

## 2022-09-23 NOTE — DISCHARGE INSTRUCTIONS
Take antibiotics as directed.  Be sure to take full course.    Consider taking probiotic or eating yogurt daily while on antibiotic and up to 10 days after to replace the good bacteria in your gastrointestinal tract; this can reduce chances of developing a GI infection such as C difficile    Tylenol as needed for pain.  Apply cool compresses for 20 minutes at a time for the next 72 hours to help with swelling.    Follow-up with your primary care provider in 3-5 days.  If you do not have a primary care provider call [phone] for help in finding one, or you may follow up with [facility] at [phone].    Return to ED for any new or worsening symptoms

## 2022-09-23 NOTE — ED PROVIDER NOTES
Subjective   History of Present Illness  Patient is a 76-year-old female presents to the ED with complaints of right-sided facial swelling that started this morning.  Patient does report some tenderness to palpation she rates it as moderate in severity and nonradiating from the area.  Patient states it is along her right jawline.  She denies any dental pain or gingival swelling.  No dental injury recently.  Patient also denies any significant ear pain drainage or swelling.  No fever body aches or chills.  Patient states she did have an upper GI scope performed yesterday by Dr. Mcallister.  Patient states she did call his office they advised him come to the ED to be evaluated.  She denies any sore throat, voice change, or trouble handling oral secretions.  Patient is on no blood thinners.  No chest pain or shortness of breath.  No headache visual changes lightheadedness or dizziness.  No recent cough or nasal congestion.    History provided by:  Patient      Review of Systems   Constitutional: Negative.    HENT: Positive for facial swelling. Negative for congestion, dental problem, ear discharge, ear pain, postnasal drip, rhinorrhea, sinus pressure, sinus pain, sneezing, sore throat, trouble swallowing and voice change.    Eyes: Negative.    Respiratory: Negative for apnea, cough, shortness of breath, wheezing and stridor.    Cardiovascular: Negative.    Gastrointestinal: Negative for abdominal pain, nausea and vomiting.   Musculoskeletal: Negative for neck pain and neck stiffness.   Skin: Negative for rash and wound.   Neurological: Negative.    Hematological: Negative.        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   • Hypertension    • IBS (irritable colon syndrome)     constipation   • Mass of right breast    • Neuropathy    • Renal insufficiency    • Sciatic leg pain     right   • Simple chronic bronchitis (HCC)    • Solitary kidney     left       Allergies   Allergen Reactions   • Erythromycin Rash   • Naproxen Sodium Other (See Comments)     Dizzy lightheaded   • Latex Itching and Swelling   • Metoclopramide Other (See Comments)     Unsteady on feet       Past Surgical History:   Procedure Laterality Date   • BREAST BIOPSY Right    • CARDIAC CATHETERIZATION     • CHOLECYSTECTOMY     • COLON SURGERY      REMOVAL diverticular diseae   • COLONOSCOPY N/A 08/12/2021    Procedure: COLONOSCOPY with polypectomy x 3;  Surgeon: Margarette Mcallister MD;  Location: [facility] ENDOSCOPY;  Service: Gastroenterology;  Laterality: N/A;  post op: hmorrhoids, diverticulosis, polyps   • CORONARY STENT PLACEMENT     • ENDOSCOPY N/A 08/12/2021    Procedure: ESOPHAGOGASTRODUODENOSCOPY with dilatation (18-20 mm balloon) and (54 bougie);  Surgeon: Margarette Mcallister MD;  Location: [facility] ENDOSCOPY;  Service: Gastroenterology;  Laterality: N/A;  post op: esophageal stricture, esophagitis, gastritis, history of nissen   • EYE SURGERY Bilateral    • HIATAL HERNIA REPAIR     • NEPHRECTOMY Right     cancer   • UPPER ENDOSCOPIC ULTRASOUND W/ FNA N/A 9/22/2022    Procedure: EUS;  Surgeon: Margarette Mcallister MD;  Location: [facility] ENDOSCOPY;  Service: Gastroenterology;  Laterality: N/A;  post: normal pancreas, dilated pancreatic duct, hiatal hernia,        Family History   Problem Relation Age of Onset   • Arthritis Father    • Prostate cancer Father    • Heart disease Sister        Social History      Socioeconomic History   • Marital status:    Tobacco Use   • Smoking status: Current Every Day Smoker     Packs/day: 0.25     Years: 50.00     Pack years: 12.50     Types: Cigarettes   • Smokeless tobacco: Never Used   Vaping Use   • Vaping Use: Never used   Substance and Sexual Activity   • Alcohol use: Yes     Comment: occasionally    • Drug use: Never   • Sexual activity: Defer           Objective   Physical Exam  Vitals and nursing note reviewed.   Constitutional:       General: She is not in acute distress.     Appearance: She is well-developed. She is not ill-appearing, toxic-appearing or diaphoretic.   HENT:      Head: Normocephalic and atraumatic. No raccoon eyes, Corrigan's sign, abrasion, contusion, right periorbital erythema, left periorbital erythema or laceration.      Jaw: There is normal jaw occlusion. Tenderness present. No pain on movement.        Comments: Normal phonation     Right Ear: Tympanic membrane, ear canal and external ear normal. No mastoid tenderness.      Left Ear: Tympanic membrane, ear canal and external ear normal.      Nose: Nose normal.      Mouth/Throat:      Lips: No lesions.      Mouth: Mucous membranes are moist. No angioedema.      Dentition: No dental tenderness, gingival swelling or dental abscesses.      Pharynx: Oropharynx is clear.   Eyes:      General: Lids are normal. No scleral icterus.     Extraocular Movements: Extraocular movements intact.      Pupils: Pupils are equal, round, and reactive to light.   Neck:      Trachea: Trachea and phonation normal.   Cardiovascular:      Rate and Rhythm: Normal rate and regular rhythm.      Heart sounds: No murmur heard.    No friction rub. No gallop.   Pulmonary:      Effort: Pulmonary effort is normal. No respiratory distress.      Breath sounds: Normal breath sounds. No stridor. No wheezing, rhonchi or rales.   Chest:      Chest wall: No tenderness.   Abdominal:      General: Bowel sounds are normal.      Palpations:  "Abdomen is soft.      Tenderness: There is no abdominal tenderness. There is no guarding or rebound.   Musculoskeletal:      Cervical back: Full passive range of motion without pain. No rigidity. No spinous process tenderness or muscular tenderness. Normal range of motion.   Lymphadenopathy:      Cervical: No cervical adenopathy.   Skin:     General: Skin is warm.      Capillary Refill: Capillary refill takes less than 2 seconds.      Coloration: Skin is not cyanotic, jaundiced or pale.      Findings: No rash.   Neurological:      General: No focal deficit present.      Mental Status: She is alert and oriented to person, place, and time.      GCS: GCS eye subscore is 4. GCS verbal subscore is 5. GCS motor subscore is 6.   Psychiatric:         Mood and Affect: Mood normal.         Behavior: Behavior normal.         Procedures           ED Course    /79   Pulse 67   Temp 98.3 °F (36.8 °C) (Oral)   Resp 12   Ht 165.1 cm (65\")   Wt 89.6 kg (197 lb 9.6 oz)   LMP  (LMP Unknown)   SpO2 99%   BMI 32.88 kg/m²   Medications   sodium chloride 0.9 % flush 10 mL (has no administration in time range)   ceFAZolin 2 gm IVPB in 100 mL NS (MBP) (2 g Intravenous Given 9/23/22 1624)     Labs Reviewed   BASIC METABOLIC PANEL - Abnormal; Notable for the following components:       Result Value    Glucose 130 (*)     Creatinine 1.49 (*)     eGFR 35.8 (*)     All other components within normal limits    Narrative:     GFR Normal >60  Chronic Kidney Disease <60  Kidney Failure <15     CBC WITH AUTO DIFFERENTIAL - Abnormal; Notable for the following components:    MCV 98.5 (*)     All other components within normal limits   CBC AND DIFFERENTIAL    Narrative:     The following orders were created for panel order CBC & Differential.  Procedure                               Abnormality         Status                     ---------                               -----------         ------                     CBC Auto " Differential[[id number]]        Abnormal            Final result                 Please view results for these tests on the individual orders.     CT Facial Bones Without Contrast    Result Date: 9/23/2022   1. Cellulitis involving the right aspect of the neck. 2. No fluid collection to suggest an abscess. 3. No definite periodontal abscess. There is scattered artifact secondary to the patient's dental fillings/hardware. 4. No acute sinusitis.  Electronically Signed By-Del Willoughby MD On:9/23/2022 3:13 PM This report was finalized on [id number] by  Del Willoughby MD.                                           MDM  Number of Diagnoses or Management Options  Facial cellulitis  Diagnosis management comments: Chart Review:  Comorbidity: As per past medical history  Differentials: Cellulitis, abscess, mastoiditis,    ;this list is not all inclusive and does not constitute the entirety of considered causes  ECG:no warranted   Labs:as above   Imaging: Was interpreted by physician and reviewed by myself:  CT Facial Bones Without Contrast   Final Result         1. Cellulitis involving the right aspect of the neck.    2. No fluid collection to suggest an abscess.    3. No definite periodontal abscess. There is scattered artifact    secondary to the patient's dental fillings/hardware.    4. No acute sinusitis.         Electronically Signed By-Del Willoughby MD On:9/23/2022 3:13 PM    This report was finalized on [id number] by  Del Willoughby MD.     Disposition/Treatment:  Appropriate PPE was worn during exam and throughout all encounters with the patient.  When the ED IV was placed and labs were obtained patient was placed on proper monitors she was afebrile and appeared nontoxic airway remained patent.  CT facial was initially ordered with contrast changed by radiologist due to national contrast shortage.  Attending was made aware.  Lab results showed normal white count of 8.2 patient's hemodynamically stable.  Glucose 130  creatinine 1.49 which appears to be improved.  CT facial bones shows no definite abscess cellulitis was noted.  Case was discussed with Dr. Mcallister patient's gastroenterologist who did scope on her yesterday.  He states they did have to do a jaw thrust due to patient's oxygen dropping during procedure there is no definite injury noted on CT of facial bones.  He was in agreement with plan of discharge advised cool compresses.  Patient was given a dose of cefazolin while in the ED will be sent home on Keflex.  Lab results and findings were discussed with the patient at bedside patient voiced understanding and discharge along with signs and symptoms to return to the ED.  Patient was ambulatory with upright steady gait at time of departure.       Amount and/or Complexity of Data Reviewed  Clinical lab tests: reviewed  Tests in the radiology section of CPT®: reviewed        Final diagnoses:   Facial cellulitis       ED Disposition  ED Disposition     ED Disposition   Discharge    Condition   Stable    Comment   --             Emma Hammer MD  [address] [place] [address] [address] [place]  [place] [address]  [phone]    Schedule an appointment as soon as possible for a visit in 3 days      [facility] EMERGENCY DEPARTMENT  [address] [place] [id number] [phone]  Go to   If symptoms worsen         Medication List      New Prescriptions    cephalexin 500 MG capsule  Commonly known as: KEFLEX  Take 1 capsule by mouth 3 (Three) Times a Day.        Changed    Advair HFA 45-21 MCG/ACT inhaler  Generic drug: fluticasone-salmeterol  TAKE 2 PUFFS BY MOUTH TWICE A DAY  What changed: when to take this     albuterol (2.5 MG/3ML) 0.083% nebulizer solution  Commonly known as: PROVENTIL  Take 2.5 mg by nebulization Every 4 (Four) Hours As Needed for Wheezing.  What changed: additional instructions     carvedilol 3.125 MG tablet  Commonly known as: COREG  TAKE 1 TABLET BY MOUTH EVERY 12 HOURS  What changed: when to take  this     Januvia 100 MG tablet  Generic drug: SITagliptin  TAKE 1 TABLET BY MOUTH EVERY DAY  What changed: how much to take     Lantus SoloStar 100 UNIT/ML injection pen  Generic drug: Insulin Glargine  INJECT 20 UNITS UNDER THE SKIN AS DIRECTED EVERY NIGHT  What changed: See the new instructions.     montelukast 10 MG tablet  Commonly known as: SINGULAIR  TAKE 1 TABLET BY MOUTH EVERYDAY AT BEDTIME  What changed:   · when to take this  · additional instructions     sertraline 50 MG tablet  Commonly known as: Zoloft  Take 1 tablet by mouth Daily.  What changed: additional instructions     Triamcinolone Acetonide 55 MCG/ACT nasal inhaler  Commonly known as: NASACORT  2 sprays into the nostril(s) as directed by provider Daily.  What changed: when to take this           Where to Get Your Medications      These medications were sent to [facility]/pharmacy #[id number] - [place], [place] - [address] [facility] - [phone]  - [phone] FX  [address] [facility] [address] [place] [address]    Phone: [phone]   · cephalexin 500 MG capsule          Haresh Almodovar PA  09/23/22 8445

## 2022-10-03 ENCOUNTER — OFFICE VISIT (OUTPATIENT)
Dept: FAMILY MEDICINE CLINIC | Facility: CLINIC | Age: 77
End: 2022-10-03

## 2022-10-03 VITALS
TEMPERATURE: 97.1 F | WEIGHT: 202.8 LBS | RESPIRATION RATE: 18 BRPM | HEIGHT: 65 IN | HEART RATE: 75 BPM | BODY MASS INDEX: 33.79 KG/M2 | SYSTOLIC BLOOD PRESSURE: 140 MMHG | DIASTOLIC BLOOD PRESSURE: 70 MMHG | OXYGEN SATURATION: 96 %

## 2022-10-03 DIAGNOSIS — R53.83 OTHER FATIGUE: ICD-10-CM

## 2022-10-03 DIAGNOSIS — E66.09 CLASS 1 OBESITY DUE TO EXCESS CALORIES WITH SERIOUS COMORBIDITY AND BODY MASS INDEX (BMI) OF 33.0 TO 33.9 IN ADULT: ICD-10-CM

## 2022-10-03 DIAGNOSIS — J41.0 SIMPLE CHRONIC BRONCHITIS: Primary | ICD-10-CM

## 2022-10-03 DIAGNOSIS — K21.9 GASTROESOPHAGEAL REFLUX DISEASE WITHOUT ESOPHAGITIS: ICD-10-CM

## 2022-10-03 PROBLEM — K57.92 DIVERTICULITIS: Status: RESOLVED | Noted: 2021-01-11 | Resolved: 2022-10-03

## 2022-10-03 PROBLEM — D75.89 MACROCYTOSIS WITHOUT ANEMIA: Status: ACTIVE | Noted: 2022-10-03

## 2022-10-03 PROBLEM — K52.9 GASTROENTERITIS: Status: RESOLVED | Noted: 2021-06-04 | Resolved: 2022-10-03

## 2022-10-03 PROBLEM — D75.89 MACROCYTOSIS WITHOUT ANEMIA: Status: RESOLVED | Noted: 2022-10-03 | Resolved: 2022-10-03

## 2022-10-03 PROCEDURE — 99214 OFFICE O/P EST MOD 30 MIN: CPT | Performed by: FAMILY MEDICINE

## 2022-10-03 RX ORDER — FLUTICASONE PROPIONATE AND SALMETEROL XINAFOATE 45; 21 UG/1; UG/1
2 AEROSOL, METERED RESPIRATORY (INHALATION) 2 TIMES DAILY
Qty: 12 EACH | Refills: 12 | Status: SHIPPED | OUTPATIENT
Start: 2022-10-03

## 2022-10-03 NOTE — ASSESSMENT & PLAN NOTE
Patient's (Body mass index is 33.75 kg/m².) indicates that they are obese (BMI >30) with health conditions that include hypertension, diabetes mellitus and dyslipidemias . Weight is worsening. BMI is is above average; BMI management plan is completed. We discussed portion control and increasing exercise.

## 2022-10-12 NOTE — ASSESSMENT & PLAN NOTE
Restart advair.   Diagnosis, treatment and and course discussed. Potential side effects discussed. Return if there is worsening or persistence of symptoms.

## 2022-10-16 LAB
ALBUMIN SERPL-MCNC: 4.1 G/DL (ref 3.7–4.7)
ALBUMIN/GLOB SERPL: 1.6 {RATIO} (ref 1.2–2.2)
ALP SERPL-CCNC: 82 IU/L (ref 44–121)
ALT SERPL-CCNC: 9 IU/L (ref 0–32)
AST SERPL-CCNC: 14 IU/L (ref 0–40)
BILIRUB SERPL-MCNC: 0.4 MG/DL (ref 0–1.2)
BUN SERPL-MCNC: 27 MG/DL (ref 8–27)
BUN/CREAT SERPL: 17 (ref 12–28)
CALCIUM SERPL-MCNC: 9.6 MG/DL (ref 8.7–10.3)
CHLORIDE SERPL-SCNC: 105 MMOL/L (ref 96–106)
CHOLEST SERPL-MCNC: 201 MG/DL (ref 100–199)
CHOLEST/HDLC SERPL: 5.6 RATIO (ref 0–4.4)
CO2 SERPL-SCNC: 25 MMOL/L (ref 20–29)
CREAT SERPL-MCNC: 1.59 MG/DL (ref 0.57–1)
EGFRCR SERPLBLD CKD-EPI 2021: 33 ML/MIN/1.73
GLOBULIN SER CALC-MCNC: 2.5 G/DL (ref 1.5–4.5)
GLUCOSE SERPL-MCNC: 73 MG/DL (ref 70–99)
HDLC SERPL-MCNC: 36 MG/DL
LDLC SERPL CALC-MCNC: 143 MG/DL (ref 0–99)
POTASSIUM SERPL-SCNC: 4.7 MMOL/L (ref 3.5–5.2)
PROT SERPL-MCNC: 6.6 G/DL (ref 6–8.5)
SODIUM SERPL-SCNC: 145 MMOL/L (ref 134–144)
TRIGL SERPL-MCNC: 121 MG/DL (ref 0–149)
VLDLC SERPL CALC-MCNC: 22 MG/DL (ref 5–40)

## 2022-10-19 DIAGNOSIS — E11.9 TYPE 2 DIABETES MELLITUS WITHOUT COMPLICATION, WITHOUT LONG-TERM CURRENT USE OF INSULIN: ICD-10-CM

## 2022-10-19 DIAGNOSIS — I10 ESSENTIAL (PRIMARY) HYPERTENSION: ICD-10-CM

## 2022-10-19 RX ORDER — NORTRIPTYLINE HYDROCHLORIDE 10 MG/1
CAPSULE ORAL
COMMUNITY
Start: 2022-10-17 | End: 2022-11-10

## 2022-10-19 RX ORDER — AMLODIPINE BESYLATE 10 MG/1
TABLET ORAL
Qty: 90 TABLET | Refills: 4 | Status: SHIPPED | OUTPATIENT
Start: 2022-10-19

## 2022-10-19 RX ORDER — INSULIN GLARGINE 100 [IU]/ML
INJECTION, SOLUTION SUBCUTANEOUS
Qty: 15 ML | Refills: 12 | Status: SHIPPED | OUTPATIENT
Start: 2022-10-19 | End: 2023-01-23

## 2022-10-21 ENCOUNTER — TELEPHONE (OUTPATIENT)
Dept: FAMILY MEDICINE CLINIC | Facility: CLINIC | Age: 77
End: 2022-10-21

## 2022-10-21 DIAGNOSIS — E78.2 MIXED HYPERLIPIDEMIA: Primary | ICD-10-CM

## 2022-10-21 RX ORDER — EZETIMIBE 10 MG/1
10 TABLET ORAL DAILY
Qty: 90 TABLET | Refills: 3 | Status: SHIPPED | OUTPATIENT
Start: 2022-10-21 | End: 2023-01-04

## 2022-10-21 NOTE — TELEPHONE ENCOUNTER
PATIENT CALLED REQUESTING HER LAB RESULTS BE FAXED TO DR. HUYNH , NEPHROLOGIST  PLEASE FAX [phone] ATTN: JASMINE    CALL BACK NUMBER [phone]  PLEASE FAX TODAY

## 2022-11-09 DIAGNOSIS — F41.9 ANXIETY: ICD-10-CM

## 2022-12-05 ENCOUNTER — HOSPITAL ENCOUNTER (EMERGENCY)
Facility: HOSPITAL | Age: 77
Discharge: HOME | End: 2022-12-05
Attending: EMERGENCY MEDICINE | Admitting: EMERGENCY MEDICINE

## 2022-12-05 ENCOUNTER — APPOINTMENT (OUTPATIENT)
Dept: GENERAL RADIOLOGY | Facility: HOSPITAL | Age: 77
End: 2022-12-05

## 2022-12-05 ENCOUNTER — TELEPHONE (OUTPATIENT)
Dept: FAMILY MEDICINE CLINIC | Facility: CLINIC | Age: 77
End: 2022-12-05

## 2022-12-05 VITALS
WEIGHT: 196.87 LBS | DIASTOLIC BLOOD PRESSURE: 80 MMHG | HEIGHT: 65 IN | SYSTOLIC BLOOD PRESSURE: 147 MMHG | RESPIRATION RATE: 15 BRPM | TEMPERATURE: 98 F | HEART RATE: 57 BPM | BODY MASS INDEX: 32.8 KG/M2 | OXYGEN SATURATION: 95 %

## 2022-12-05 DIAGNOSIS — R42 DIZZINESS: ICD-10-CM

## 2022-12-05 DIAGNOSIS — N39.0 URINARY TRACT INFECTION WITHOUT HEMATURIA, SITE UNSPECIFIED: ICD-10-CM

## 2022-12-05 DIAGNOSIS — R53.1 WEAKNESS: Primary | ICD-10-CM

## 2022-12-05 LAB
ALBUMIN SERPL-MCNC: 3.9 G/DL (ref 3.5–5.2)
ALBUMIN/GLOB SERPL: 1.6 G/DL
ALP SERPL-CCNC: 66 U/L (ref 39–117)
ALT SERPL W P-5'-P-CCNC: 17 U/L (ref 1–33)
ANION GAP SERPL CALCULATED.3IONS-SCNC: 8 MMOL/L (ref 5–15)
AST SERPL-CCNC: 11 U/L (ref 1–32)
BACTERIA UR QL AUTO: ABNORMAL /HPF
BASOPHILS # BLD AUTO: 0 10*3/MM3 (ref 0–0.2)
BASOPHILS NFR BLD AUTO: 0.5 % (ref 0–1.5)
BILIRUB SERPL-MCNC: 0.2 MG/DL (ref 0–1.2)
BILIRUB UR QL STRIP: NEGATIVE
BUN SERPL-MCNC: 23 MG/DL (ref 8–23)
BUN/CREAT SERPL: 14.9 (ref 7–25)
CALCIUM SPEC-SCNC: 9.2 MG/DL (ref 8.6–10.5)
CHLORIDE SERPL-SCNC: 104 MMOL/L (ref 98–107)
CLARITY UR: ABNORMAL
CO2 SERPL-SCNC: 29 MMOL/L (ref 22–29)
COLOR UR: YELLOW
CREAT SERPL-MCNC: 1.54 MG/DL (ref 0.57–1)
DEPRECATED RDW RBC AUTO: 48.1 FL (ref 37–54)
EGFRCR SERPLBLD CKD-EPI 2021: 34.2 ML/MIN/1.73
EOSINOPHIL # BLD AUTO: 0.1 10*3/MM3 (ref 0–0.4)
EOSINOPHIL NFR BLD AUTO: 0.8 % (ref 0.3–6.2)
ERYTHROCYTE [DISTWIDTH] IN BLOOD BY AUTOMATED COUNT: 13.8 % (ref 12.3–15.4)
FLUAV SUBTYP SPEC NAA+PROBE: NOT DETECTED
FLUBV RNA ISLT QL NAA+PROBE: NOT DETECTED
GLOBULIN UR ELPH-MCNC: 2.4 GM/DL
GLUCOSE SERPL-MCNC: 150 MG/DL (ref 65–99)
GLUCOSE UR STRIP-MCNC: NEGATIVE MG/DL
HCT VFR BLD AUTO: 45.6 % (ref 34–46.6)
HGB BLD-MCNC: 14.8 G/DL (ref 12–15.9)
HGB UR QL STRIP.AUTO: NEGATIVE
HYALINE CASTS UR QL AUTO: ABNORMAL /LPF
KETONES UR QL STRIP: ABNORMAL
LEUKOCYTE ESTERASE UR QL STRIP.AUTO: ABNORMAL
LIPASE SERPL-CCNC: 26 U/L (ref 13–60)
LYMPHOCYTES # BLD AUTO: 1.7 10*3/MM3 (ref 0.7–3.1)
LYMPHOCYTES NFR BLD AUTO: 21 % (ref 19.6–45.3)
MCH RBC QN AUTO: 32.3 PG (ref 26.6–33)
MCHC RBC AUTO-ENTMCNC: 32.4 G/DL (ref 31.5–35.7)
MCV RBC AUTO: 99.6 FL (ref 79–97)
MONOCYTES # BLD AUTO: 0.7 10*3/MM3 (ref 0.1–0.9)
MONOCYTES NFR BLD AUTO: 8.2 % (ref 5–12)
NEUTROPHILS NFR BLD AUTO: 5.6 10*3/MM3 (ref 1.7–7)
NEUTROPHILS NFR BLD AUTO: 69.5 % (ref 42.7–76)
NITRITE UR QL STRIP: NEGATIVE
NRBC BLD AUTO-RTO: 0.1 /100 WBC (ref 0–0.2)
PH UR STRIP.AUTO: <=5 [PH] (ref 5–8)
PLATELET # BLD AUTO: 170 10*3/MM3 (ref 140–450)
PMV BLD AUTO: 8.1 FL (ref 6–12)
POTASSIUM SERPL-SCNC: 4.5 MMOL/L (ref 3.5–5.2)
PROT SERPL-MCNC: 6.3 G/DL (ref 6–8.5)
PROT UR QL STRIP: NEGATIVE
RBC # BLD AUTO: 4.58 10*6/MM3 (ref 3.77–5.28)
RBC # UR STRIP: ABNORMAL /HPF
REF LAB TEST METHOD: ABNORMAL
SARS-COV-2 RNA RESP QL NAA+PROBE: NOT DETECTED
SODIUM SERPL-SCNC: 141 MMOL/L (ref 136–145)
SP GR UR STRIP: 1.02 (ref 1–1.03)
SQUAMOUS #/AREA URNS HPF: ABNORMAL /HPF
TROPONIN T SERPL-MCNC: <0.01 NG/ML (ref 0–0.03)
UROBILINOGEN UR QL STRIP: ABNORMAL
WBC # UR STRIP: ABNORMAL /HPF
WBC NRBC COR # BLD: 8.1 10*3/MM3 (ref 3.4–10.8)

## 2022-12-05 PROCEDURE — 87502 INFLUENZA DNA AMP PROBE: CPT | Performed by: NURSE PRACTITIONER

## 2022-12-05 PROCEDURE — 80053 COMPREHEN METABOLIC PANEL: CPT | Performed by: NURSE PRACTITIONER

## 2022-12-05 PROCEDURE — 81001 URINALYSIS AUTO W/SCOPE: CPT | Performed by: NURSE PRACTITIONER

## 2022-12-05 PROCEDURE — U0003 INFECTIOUS AGENT DETECTION BY NUCLEIC ACID (DNA OR RNA); SEVERE ACUTE RESPIRATORY SYNDROME CORONAVIRUS 2 (SARS-COV-2) (CORONAVIRUS DISEASE [COVID-19]), AMPLIFIED PROBE TECHNIQUE, MAKING USE OF HIGH THROUGHPUT TECHNOLOGIES AS DESCRIBED BY CMS-2020-01-R: HCPCS | Performed by: NURSE PRACTITIONER

## 2022-12-05 PROCEDURE — 84484 ASSAY OF TROPONIN QUANT: CPT | Performed by: NURSE PRACTITIONER

## 2022-12-05 PROCEDURE — 83690 ASSAY OF LIPASE: CPT | Performed by: NURSE PRACTITIONER

## 2022-12-05 PROCEDURE — 85025 COMPLETE CBC W/AUTO DIFF WBC: CPT | Performed by: NURSE PRACTITIONER

## 2022-12-05 PROCEDURE — 99283 EMERGENCY DEPT VISIT LOW MDM: CPT

## 2022-12-05 PROCEDURE — 71045 X-RAY EXAM CHEST 1 VIEW: CPT

## 2022-12-05 PROCEDURE — 93005 ELECTROCARDIOGRAM TRACING: CPT | Performed by: EMERGENCY MEDICINE

## 2022-12-05 RX ORDER — CEFDINIR 300 MG/1
300 CAPSULE ORAL 2 TIMES DAILY
Qty: 10 CAPSULE | Refills: 0 | Status: SHIPPED | OUTPATIENT
Start: 2022-12-05 | End: 2023-01-04

## 2022-12-05 RX ORDER — SODIUM CHLORIDE 0.9 % (FLUSH) 0.9 %
10 SYRINGE (ML) INJECTION AS NEEDED
Status: DISCONTINUED | OUTPATIENT
Start: 2022-12-05 | End: 2022-12-05 | Stop reason: HOSPADM

## 2022-12-05 NOTE — TELEPHONE ENCOUNTER
Caller: Vianey Reeves    Relationship to patient: Self    Best call back number:[phone]    Chief complaint: PATIENT REPORTS FEELING SWEATY AND CLAMMY WHEN MOVING AROUND TODAY. SHE STATES SHE ALSO HAS A HEADACHE AND HAD SOME NAUSEA EARLIER THIS MORNING. HUB ASKED FOR SOME DIRECTION FROM THE  AND THEY SUGGESTED SHE SEEK TREATMENT AT THE ER. HUB THEN DIRECTED PATIENT TO THE ER. PATIENT AGREED.     Patient directed to call 911 or go to their nearest emergency room.     Patient verbalized understanding: [x] Yes  [] No  If no, why?

## 2022-12-05 NOTE — ED PROVIDER NOTES
Subjective      Provider in Triage Note  Patient is a 77-year-old white female who presents today from home with complaints of nausea and feeling generally weak.  She states that started this morning.  She states over the last couple days she has had some central chest discomfort worse with exertion.  She also reports with exertion she breaks out into a sweat.  She denies any cough fever chills shortness of breath or other complaint.      History of Present Illness  I interviewed the patient for HPI and agree with the nurse practitioner provider triage note as noted above  Review of Systems   Constitutional: Negative for chills and fever.   HENT: Negative for congestion and sore throat.    Eyes: Negative for visual disturbance.   Respiratory: Negative for cough and shortness of breath.    Cardiovascular: Negative for chest pain.   Gastrointestinal: Negative for abdominal pain, diarrhea and vomiting.   Endocrine: Negative for polyuria.   Genitourinary: Negative for dysuria and flank pain.   Musculoskeletal: Negative for back pain.   Neurological: Positive for dizziness and weakness. Negative for headaches.       Past Medical History:   Diagnosis Date   • Allergic     latex allergy   • Allergies    • Anxiety    • Bilateral carotid bruits    • Bulging lumbar disc    • Bulging of thoracic intervertebral disc    • Cancer (HCC)     ureter    surgery   • CKD (chronic kidney disease)     stage 3   • Claudication, intermittent ([facility])    • COPD (chronic obstructive pulmonary disease) ([facility])    • Coronary artery disease    • DDD (degenerative disc disease), lumbar    • DDD (degenerative disc disease), thoracic    • Diabetes mellitus ([facility])    • DJD (degenerative joint disease)    • Gout    • 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   • Hypertension    • IBS (irritable colon syndrome)     constipation   • Mass of right breast    • Neuropathy    • Renal insufficiency    • Sciatic leg pain     right   • Simple chronic bronchitis  (HCC)    • Solitary kidney     left       Allergies   Allergen Reactions   • Erythromycin Rash   • Naproxen Sodium Other (See Comments)     Dizzy lightheaded   • Statins Myalgia   • Latex Itching and Swelling   • Metoclopramide Other (See Comments)     Unsteady on feet       Past Surgical History:   Procedure Laterality Date   • BREAST BIOPSY Right    • CARDIAC CATHETERIZATION     • CHOLECYSTECTOMY     • COLON SURGERY      REMOVAL diverticular diseae   • COLONOSCOPY N/A 08/12/2021    Procedure: COLONOSCOPY with polypectomy x 3;  Surgeon: Margarette Mcallister MD;  Location: [facility] ENDOSCOPY;  Service: Gastroenterology;  Laterality: N/A;  post op: hmorrhoids, diverticulosis, polyps   • CORONARY STENT PLACEMENT     • ENDOSCOPY N/A 08/12/2021    Procedure: ESOPHAGOGASTRODUODENOSCOPY with dilatation (18-20 mm balloon) and (54 bougie);  Surgeon: Margarette Mcallister MD;  Location: [facility] ENDOSCOPY;  Service: Gastroenterology;  Laterality: N/A;  post op: esophageal stricture, esophagitis, gastritis, history of nissen   • EYE SURGERY Bilateral    • HIATAL HERNIA REPAIR     • NEPHRECTOMY Right     cancer   • UPPER ENDOSCOPIC ULTRASOUND W/ FNA N/A 9/22/2022    Procedure: EUS;  Surgeon: Margarette Mcallister MD;  Location: [facility] ENDOSCOPY;  Service: Gastroenterology;  Laterality: N/A;  post: normal pancreas, dilated pancreatic duct, hiatal hernia,        Family History   Problem Relation Age of Onset   • Arthritis Father    • Prostate cancer Father    • Heart disease Sister        Social History     Socioeconomic History   • Marital status:    Tobacco Use   • Smoking status: Every Day     Packs/day: 0.25     Years: 50.00     Pack years: 12.50     Types: Cigarettes   • Smokeless tobacco: Never   Vaping Use   • Vaping Use: Never used   Substance and Sexual Activity   • Alcohol use: Yes     Comment: occasionally    • Drug use: Never   • Sexual activity: Defer           Objective   Physical Exam  HEENT exam shows TMs to be clear.  Oropharynx  comers but sclera is nonicteric.  Neck has no adenopathy JVD or bruits.  Lungs are clear.  Heart has regular rhythm without murmur gallop.  Chest is nontender.  Abdomen soft nontender.  Patient has normal bowel sounds without rebound or guarding.  Back has no CVA tenderness.  Extremities-no cyanosis or edema.  Neurologic exam is nonfocal.  Procedures     EKG interpretation shows normal sinus rhythm at a rate of 62 with no acute ST change    ED Course      Results for orders placed or performed during the hospital encounter of 12/05/22   COVID-19,CEPHEID/LUC,COR/NATE/PAD/JUAN/MAD IN-HOUSE(OR EMERGENT/ADD-ON),NP SWAB IN TRANSPORT MEDIA 3-4 HR TAT, RT-PCR - Swab, Nasopharynx    Specimen: Nasopharynx; Swab   Result Value Ref Range    COVID19 Not Detected Not Detected - Ref. Range   Influenza Antigen, Rapid - Swab, Nasopharynx    Specimen: Nasopharynx; Swab   Result Value Ref Range    Influenza A PCR Not Detected Not Detected    Influenza B PCR Not Detected Not Detected   Comprehensive Metabolic Panel    Specimen: Blood   Result Value Ref Range    Glucose 150 (H) 65 - 99 mg/dL    BUN 23 8 - 23 mg/dL    Creatinine 1.54 (H) 0.57 - 1.00 mg/dL    Sodium 141 136 - 145 mmol/L    Potassium 4.5 3.5 - 5.2 mmol/L    Chloride 104 98 - 107 mmol/L    CO2 29.0 22.0 - 29.0 mmol/L    Calcium 9.2 8.6 - 10.5 mg/dL    Total Protein 6.3 6.0 - 8.5 g/dL    Albumin 3.90 3.50 - 5.20 g/dL    ALT (SGPT) 17 1 - 33 U/L    AST (SGOT) 11 1 - 32 U/L    Alkaline Phosphatase 66 39 - 117 U/L    Total Bilirubin 0.2 0.0 - 1.2 mg/dL    Globulin 2.4 gm/dL    A/G Ratio 1.6 g/dL    BUN/Creatinine Ratio 14.9 7.0 - 25.0    Anion Gap 8.0 5.0 - 15.0 mmol/L    eGFR 34.2 (L) >60.0 mL/min/1.73   Lipase    Specimen: Blood   Result Value Ref Range    Lipase 26 13 - 60 U/L   Urinalysis With Microscopic If Indicated (No Culture) - Urine, Clean Catch    Specimen: Urine, Clean Catch   Result Value Ref Range    Color, UA Yellow Yellow, Straw    Appearance, UA Cloudy (A)  Clear    pH, UA <=5.0 5.0 - 8.0    Specific Gravity, UA 1.022 1.005 - 1.030    Glucose, UA Negative Negative    Ketones, UA Trace (A) Negative    Bilirubin, UA Negative Negative    Blood, UA Negative Negative    Protein, UA Negative Negative    Leuk Esterase, UA Moderate (2+) (A) Negative    Nitrite, UA Negative Negative    Urobilinogen, UA 1.0 E.U./dL 0.2 - 1.0 E.U./dL   Troponin    Specimen: Blood   Result Value Ref Range    Troponin T <0.010 0.000 - 0.030 ng/mL   CBC Auto Differential    Specimen: Blood   Result Value Ref Range    WBC 8.10 3.40 - 10.80 10*3/mm3    RBC 4.58 3.77 - 5.28 10*6/mm3    Hemoglobin 14.8 12.0 - 15.9 g/dL    Hematocrit 45.6 34.0 - 46.6 %    MCV 99.6 (H) 79.0 - 97.0 fL    MCH 32.3 26.6 - 33.0 pg    MCHC 32.4 31.5 - 35.7 g/dL    RDW 13.8 12.3 - 15.4 %    RDW-SD 48.1 37.0 - 54.0 fl    MPV 8.1 6.0 - 12.0 fL    Platelets 170 140 - 450 10*3/mm3    Neutrophil % 69.5 42.7 - 76.0 %    Lymphocyte % 21.0 19.6 - 45.3 %    Monocyte % 8.2 5.0 - 12.0 %    Eosinophil % 0.8 0.3 - 6.2 %    Basophil % 0.5 0.0 - 1.5 %    Neutrophils, Absolute 5.60 1.70 - 7.00 10*3/mm3    Lymphocytes, Absolute 1.70 0.70 - 3.10 10*3/mm3    Monocytes, Absolute 0.70 0.10 - 0.90 10*3/mm3    Eosinophils, Absolute 0.10 0.00 - 0.40 10*3/mm3    Basophils, Absolute 0.00 0.00 - 0.20 10*3/mm3    nRBC 0.1 0.0 - 0.2 /100 WBC   Urinalysis, Microscopic Only - Urine, Clean Catch    Specimen: Urine, Clean Catch   Result Value Ref Range    RBC, UA 0-2 (A) None Seen /HPF    WBC, UA 31-50 (A) None Seen /HPF    Bacteria, UA 1+ (A) None Seen /HPF    Squamous Epithelial Cells, UA 7-12 (A) None Seen, 0-2 /HPF    Hyaline Casts, UA 3-6 None Seen /LPF    Methodology Automated Microscopy    ECG 12 Lead Chest Pain   Result Value Ref Range    QT Interval 420 ms     XR Chest 1 View    Result Date: 12/5/2022  No acute process.  Electronically Signed By-López Cobos MD On:12/5/2022 1:16 PM This report was finalized on [id number] by  López Cobos MD.                                          MDM  Number of Diagnoses or Management Options  Diagnosis management comments: Patient has UTI.  She has no focal neurologic deficit with normal CT scan.  Metabolic panel is at baseline without renal insufficiency or electrolyte abnormalities evidence of other infectious process.  Patient made at her baseline throughout ED visit.  She will be discharged with a prescription for Omnicef.  She will see MD for recheck.       Amount and/or Complexity of Data Reviewed  Clinical lab tests: reviewed  Tests in the radiology section of CPT®: reviewed  Tests in the medicine section of CPT®: reviewed    Risk of Complications, Morbidity, and/or Mortality  Presenting problems: high  Diagnostic procedures: high  Management options: high        Final diagnoses:   Weakness   Dizziness   Urinary tract infection without hematuria, site unspecified       ED Disposition  ED Disposition     ED Disposition   Discharge    Condition   Stable    Comment   --             No follow-up provider specified.       Medication List      New Prescriptions    cefdinir 300 MG capsule  Commonly known as: OMNICEF  Take 1 capsule by mouth 2 (Two) Times a Day.        Changed    albuterol (2.5 MG/3ML) 0.083% nebulizer solution  Commonly known as: PROVENTIL  Take 2.5 mg by nebulization Every 4 (Four) Hours As Needed for Wheezing.  What changed: additional instructions     carvedilol 3.125 MG tablet  Commonly known as: COREG  TAKE 1 TABLET BY MOUTH EVERY 12 HOURS  What changed: when to take this     Januvia 100 MG tablet  Generic drug: SITagliptin  TAKE 1 TABLET BY MOUTH EVERY DAY  What changed: how much to take     montelukast 10 MG tablet  Commonly known as: SINGULAIR  TAKE 1 TABLET BY MOUTH EVERYDAY AT BEDTIME  What changed:   · when to take this  · additional instructions           Where to Get Your Medications      Information about where to get these medications is not yet available    Ask your nurse or doctor about  these medications  · cefdinir 300 MG capsule          Pedrito Resendiz MD  12/05/22 [id number]

## 2022-12-05 NOTE — ED NOTES
Pt reports  nausea, lightheadedness,  sweating,  when at work this am. Pt reports she has had a couple episodes of this over the past few  weeks.  Pt denies pain  or  vomiting  during this  episodes.

## 2022-12-12 ENCOUNTER — TELEPHONE (OUTPATIENT)
Dept: FAMILY MEDICINE CLINIC | Facility: CLINIC | Age: 77
End: 2022-12-12

## 2022-12-12 DIAGNOSIS — E78.2 MIXED HYPERLIPIDEMIA: Primary | ICD-10-CM

## 2022-12-12 DIAGNOSIS — E11.9 TYPE 2 DIABETES MELLITUS WITHOUT COMPLICATION, WITHOUT LONG-TERM CURRENT USE OF INSULIN: ICD-10-CM

## 2022-12-12 DIAGNOSIS — I10 ESSENTIAL (PRIMARY) HYPERTENSION: ICD-10-CM

## 2022-12-12 NOTE — TELEPHONE ENCOUNTER
Caller: Vianey Reeves    Relationship: Self    Best call back number: [phone]    What orders are you requesting (i.e. lab or imaging): LABS FOR WELCOME TO MEDICARE     In what timeframe would the patient need to come in: APPOINTMENT ON 12.23.22    Where will you receive your lab/imaging services: UNKNOWN     Additional notes: PATIENT HAD LABS DONE ON 12.02.22 AND WASN'T SURE ID DR. VALERIO WOULD USE THOSE LABS OR IF SHE IS NEEDING TO HAVE NEW ONES DONE.    PLEASE CALL PATIENT TO SCHEDULE IF NEEDED

## 2022-12-13 ENCOUNTER — OFFICE (AMBULATORY)
Dept: URBAN - METROPOLITAN AREA CLINIC 64 | Facility: CLINIC | Age: 77
End: 2022-12-13

## 2022-12-13 VITALS
HEIGHT: 65 IN | DIASTOLIC BLOOD PRESSURE: 86 MMHG | HEART RATE: 69 BPM | SYSTOLIC BLOOD PRESSURE: 137 MMHG | WEIGHT: 197 LBS

## 2022-12-13 DIAGNOSIS — K59.00 CONSTIPATION, UNSPECIFIED: ICD-10-CM

## 2022-12-13 DIAGNOSIS — R13.10 DYSPHAGIA, UNSPECIFIED: ICD-10-CM

## 2022-12-13 DIAGNOSIS — R14.0 ABDOMINAL DISTENSION (GASEOUS): ICD-10-CM

## 2022-12-13 LAB — QT INTERVAL: 420 MS

## 2022-12-13 PROCEDURE — 99213 OFFICE O/P EST LOW 20 MIN: CPT | Performed by: INTERNAL MEDICINE

## 2022-12-17 LAB
ALBUMIN SERPL-MCNC: 3.6 G/DL (ref 3.7–4.7)
ALBUMIN/GLOB SERPL: 1.7 {RATIO} (ref 1.2–2.2)
ALP SERPL-CCNC: 72 IU/L (ref 44–121)
ALT SERPL-CCNC: 14 IU/L (ref 0–32)
AST SERPL-CCNC: 11 IU/L (ref 0–40)
BASOPHILS # BLD AUTO: 0 X10E3/UL (ref 0–0.2)
BASOPHILS NFR BLD AUTO: 1 %
BILIRUB SERPL-MCNC: 0.2 MG/DL (ref 0–1.2)
BUN SERPL-MCNC: 27 MG/DL (ref 8–27)
BUN/CREAT SERPL: 16 (ref 12–28)
CALCIUM SERPL-MCNC: 9.1 MG/DL (ref 8.7–10.3)
CHLORIDE SERPL-SCNC: 106 MMOL/L (ref 96–106)
CHOLEST SERPL-MCNC: 206 MG/DL (ref 100–199)
CHOLEST/HDLC SERPL: 4.9 RATIO (ref 0–4.4)
CO2 SERPL-SCNC: 27 MMOL/L (ref 20–29)
CREAT SERPL-MCNC: 1.7 MG/DL (ref 0.57–1)
EGFRCR SERPLBLD CKD-EPI 2021: 30 ML/MIN/1.73
EOSINOPHIL # BLD AUTO: 0.1 X10E3/UL (ref 0–0.4)
EOSINOPHIL NFR BLD AUTO: 1 %
ERYTHROCYTE [DISTWIDTH] IN BLOOD BY AUTOMATED COUNT: 12.3 % (ref 11.7–15.4)
GLOBULIN SER CALC-MCNC: 2.1 G/DL (ref 1.5–4.5)
GLUCOSE SERPL-MCNC: 85 MG/DL (ref 70–99)
HBA1C MFR BLD: 7 % (ref 4.8–5.6)
HCT VFR BLD AUTO: 45.4 % (ref 34–46.6)
HDLC SERPL-MCNC: 42 MG/DL
HGB BLD-MCNC: 15 G/DL (ref 11.1–15.9)
IMM GRANULOCYTES # BLD AUTO: 0 X10E3/UL (ref 0–0.1)
IMM GRANULOCYTES NFR BLD AUTO: 0 %
LDLC SERPL CALC-MCNC: 153 MG/DL (ref 0–99)
LYMPHOCYTES # BLD AUTO: 1.9 X10E3/UL (ref 0.7–3.1)
LYMPHOCYTES NFR BLD AUTO: 32 %
MCH RBC QN AUTO: 32.3 PG (ref 26.6–33)
MCHC RBC AUTO-ENTMCNC: 33 G/DL (ref 31.5–35.7)
MCV RBC AUTO: 98 FL (ref 79–97)
MONOCYTES # BLD AUTO: 0.6 X10E3/UL (ref 0.1–0.9)
MONOCYTES NFR BLD AUTO: 10 %
NEUTROPHILS # BLD AUTO: 3.2 X10E3/UL (ref 1.4–7)
NEUTROPHILS NFR BLD AUTO: 56 %
PLATELET # BLD AUTO: 176 X10E3/UL (ref 150–450)
POTASSIUM SERPL-SCNC: 4.6 MMOL/L (ref 3.5–5.2)
PROT SERPL-MCNC: 5.7 G/DL (ref 6–8.5)
RBC # BLD AUTO: 4.64 X10E6/UL (ref 3.77–5.28)
SODIUM SERPL-SCNC: 145 MMOL/L (ref 134–144)
TRIGL SERPL-MCNC: 60 MG/DL (ref 0–149)
TSH SERPL DL<=0.005 MIU/L-ACNC: 1.48 UIU/ML (ref 0.45–4.5)
VLDLC SERPL CALC-MCNC: 11 MG/DL (ref 5–40)
WBC # BLD AUTO: 5.8 X10E3/UL (ref 3.4–10.8)

## 2022-12-22 DIAGNOSIS — E78.2 MIXED HYPERLIPIDEMIA: ICD-10-CM

## 2022-12-22 RX ORDER — DOXYCYCLINE 100 MG/1
100 CAPSULE ORAL 2 TIMES DAILY
Qty: 20 CAPSULE | Refills: 0 | Status: SHIPPED | OUTPATIENT
Start: 2022-12-22 | End: 2023-01-04

## 2022-12-22 RX ORDER — EVOLOCUMAB 140 MG/ML
INJECTION, SOLUTION SUBCUTANEOUS
Qty: 2 ML | Refills: 12 | Status: SHIPPED | OUTPATIENT
Start: 2022-12-22 | End: 2023-01-23

## 2023-01-04 ENCOUNTER — OFFICE VISIT (OUTPATIENT)
Dept: FAMILY MEDICINE CLINIC | Facility: CLINIC | Age: 78
End: 2023-01-04
Payer: MEDICARE

## 2023-01-04 VITALS
RESPIRATION RATE: 18 BRPM | DIASTOLIC BLOOD PRESSURE: 84 MMHG | TEMPERATURE: 98 F | WEIGHT: 201.4 LBS | SYSTOLIC BLOOD PRESSURE: 120 MMHG | BODY MASS INDEX: 33.55 KG/M2 | HEIGHT: 65 IN | OXYGEN SATURATION: 97 % | HEART RATE: 94 BPM

## 2023-01-04 DIAGNOSIS — R00.2 PALPITATIONS: ICD-10-CM

## 2023-01-04 DIAGNOSIS — E66.09 CLASS 1 OBESITY DUE TO EXCESS CALORIES WITH SERIOUS COMORBIDITY AND BODY MASS INDEX (BMI) OF 33.0 TO 33.9 IN ADULT: ICD-10-CM

## 2023-01-04 DIAGNOSIS — M79.89 LEG SWELLING: Primary | ICD-10-CM

## 2023-01-04 DIAGNOSIS — N18.4 STAGE 4 CHRONIC KIDNEY DISEASE: ICD-10-CM

## 2023-01-04 PROCEDURE — 99213 OFFICE O/P EST LOW 20 MIN: CPT | Performed by: FAMILY MEDICINE

## 2023-01-04 NOTE — PROGRESS NOTES
Subjective   Vianey Reeves is a 77 y.o. female. Presents to [facility]    Chief Complaint   Patient presents with   • Leg Swelling       Leg Swelling  This is a new problem. The current episode started more than 1 month ago. The problem occurs daily. The problem has been waxing and waning. Pertinent negatives include no joint swelling, numbness or weakness. Associated symptoms comments: Bilateral swelling in legs and feet.     Palpitations on occasion. Nothing aggravates the symptoms. She has tried nothing for the symptoms. The treatment provided no relief.        I personally reviewed and updated the patient's allergies, medications, problem list, and past medical, surgical, social, and family history. I have reviewed and confirmed the accuracy of the History of Present Illness and Review of Symptoms as documented by the MA/LPN/RN. Emma Hammer MD    Allergies:  Allergies   Allergen Reactions   • Erythromycin Rash   • Naproxen Sodium Other (See Comments)     Dizzy lightheaded   • Statins Myalgia   • Latex Itching and Swelling   • Metoclopramide Other (See Comments)     Unsteady on feet       Social History:  Social History     Socioeconomic History   • Marital status:    Tobacco Use   • Smoking status: Every Day     Packs/day: 0.25     Years: 50.00     Pack years: 12.50     Types: Cigarettes   • Smokeless tobacco: Never   Vaping Use   • Vaping Use: Never used   Substance and Sexual Activity   • Alcohol use: Yes     Comment: occasionally    • Drug use: Never   • Sexual activity: Defer       Family History:  Family History   Problem Relation Age of Onset   • Arthritis Father    • Prostate cancer Father    • Heart disease Sister        Past Medical History :  Patient Active Problem List   Diagnosis   • Type 2 diabetes mellitus with stage 4 chronic kidney disease, without long-term current use of insulin (HCC)   • Essential (primary) hypertension   • Mixed hyperlipidemia   • Hiatal hernia    • Other fatigue   • Long term current use of antithrombotics/antiplatelets   • Simple chronic bronchitis (HCC)   • Atherosclerotic heart disease of native coronary artery without angina pectoris   • Irritable bowel syndrome with constipation   • History of cancer of ureter   • Solitary kidney   • Atherosclerosis of native arteries of extremities with intermittent claudication, bilateral legs (HCC)   • H/O malignant neoplasm of ureter   • Vertigo   • Medicare annual wellness visit, subsequent   • Presbyopia   • Combined form of senile cataract   • Cervical cancer screening   • Postartificial menopausal syndrome   • Left lower quadrant abdominal pain   • Diverticula of colon   • Ruptured tympanic membrane, left   • Hearing loss, bilateral   • TMJ pain dysfunction syndrome   • Stage 4 chronic kidney disease (HCC)   • Other dysphagia   • Lactose intolerance   • Polyarthralgia   • Chronic bilateral thoracic back pain   • Class 1 obesity due to excess calories with serious comorbidity and body mass index (BMI) of 33.0 to 33.9 in adult   • Anxiety   • Hypercalcemia   • Screening mammogram, encounter for   • Chronic bilateral low back pain without sciatica   • Irregular heart beat   • Palpitations   • Gout of right foot   • Plantar fasciitis of right foot   • Lumbar radiculopathy   • Overflow incontinence of urine   • Urinary retention   • Gastroesophageal reflux disease without esophagitis   • Leg swelling       Medication List:    Current Outpatient Medications:   •  albuterol (PROVENTIL) (2.5 MG/3ML) 0.083% nebulizer solution, Take 2.5 mg by nebulization Every 4 (Four) Hours As Needed for Wheezing. (Patient taking differently: Take 2.5 mg by nebulization Every 4 (Four) Hours As Needed for Wheezing. Not using currently), Disp: 3 mL, Rfl: 12  •  amLODIPine (NORVASC) 10 MG tablet, TAKE 1 TABLET BY MOUTH EVERY DAY, Disp: 90 tablet, Rfl: 4  •  aspirin 81 MG tablet, Take 81 mg by mouth Daily. dont take dos, Disp: , Rfl:   •   carvedilol (COREG) 3.125 MG tablet, TAKE 1 TABLET BY MOUTH EVERY 12 HOURS (Patient taking differently: Take 3.125 mg by mouth 2 (Two) Times a Day With Meals.), Disp: 180 tablet, Rfl: 4  •  coenzyme Q10 100 MG capsule, Take 1 capsule by mouth Daily. On hold per patient due to abdominal pain, Disp: , Rfl:   •  doxazosin (Cardura) 2 MG tablet, Take 1 tablet by mouth Every Night., Disp: 90 tablet, Rfl: 3  •  fluticasone-salmeterol (Advair HFA) 45-21 MCG/ACT inhaler, Inhale 2 puffs 2 (Two) Times a Day., Disp: 12 each, Rfl: 12  •  gabapentin (NEURONTIN) 100 MG capsule, Take 100-200 mg by mouth Every Night., Disp: , Rfl:   •  Glucose Blood (Blood Glucose Test) strip, 1 each by In Vitro route Daily., Disp: 100 each, Rfl: 3  •  Januvia 100 MG tablet, TAKE 1 TABLET BY MOUTH EVERY DAY (Patient taking differently: Take 100 mg by mouth Daily.), Disp: 90 tablet, Rfl: 3  •  Lantus SoloStar 100 UNIT/ML injection pen, INJECT 20 UNITS UNDER THE SKIN AS DIRECTED EVERY NIGHT, Disp: 15 mL, Rfl: 12  •  Repatha SureClick solution auto-injector SureClick injection, INJECT 1 ML UNDER THE SKIN INTO THE APPROPRIATE AREA AS DIRECTED EVERY 14 (FOURTEEN) DAYS., Disp: 2 mL, Rfl: 12  •  sertraline (ZOLOFT) 50 MG tablet, TAKE 1 TABLET BY MOUTH EVERY DAY, Disp: 90 tablet, Rfl: 3  •  traMADol (ULTRAM) 50 MG tablet, Take 50 mg by mouth 1 (One) Time As Needed., Disp: , Rfl:     Past Surgical History:  Past Surgical History:   Procedure Laterality Date   • BREAST BIOPSY Right    • CARDIAC CATHETERIZATION     • CHOLECYSTECTOMY     • COLON SURGERY      REMOVAL diverticular diseae   • COLONOSCOPY N/A 08/12/2021    Procedure: COLONOSCOPY with polypectomy x 3;  Surgeon: Margarette Mcallister MD;  Location: [facility] ENDOSCOPY;  Service: Gastroenterology;  Laterality: N/A;  post op: hmorrhoids, diverticulosis, polyps   • CORONARY STENT PLACEMENT     • ENDOSCOPY N/A 08/12/2021    Procedure: ESOPHAGOGASTRODUODENOSCOPY with dilatation (18-20 mm balloon) and (54 bougie);   Surgeon: Margarette Mcallister MD;  Location: [facility] ENDOSCOPY;  Service: Gastroenterology;  Laterality: N/A;  post op: esophageal stricture, esophagitis, gastritis, history of nissen   • EYE SURGERY Bilateral    • HIATAL HERNIA REPAIR     • NEPHRECTOMY Right     cancer   • UPPER ENDOSCOPIC ULTRASOUND W/ FNA N/A 9/22/2022    Procedure: EUS;  Surgeon: Margarette Mcallister MD;  Location: [facility] ENDOSCOPY;  Service: Gastroenterology;  Laterality: N/A;  post: normal pancreas, dilated pancreatic duct, hiatal hernia,          Physical Exam:      Vital Signs:    Vitals:    01/04/23 1351   BP: 120/84   Pulse: 94   Resp: 18   Temp: 98 °F (36.7 °C)   SpO2: 97%        Wt Readings from Last 3 Encounters:   01/04/23 91.4 kg (201 lb 6.4 oz)   12/05/22 89.3 kg (196 lb 13.9 oz)   10/03/22 92 kg (202 lb 12.8 oz)       Result Review :   The following data was reviewed by: Emma Hammer MD on 01/04/2023:            Latest Reference Range & Units 12/16/22 08:57   Glucose 70 - 99 mg/dL 85   Sodium 134 - 144 mmol/L 145 (H)   Potassium 3.5 - 5.2 mmol/L 4.6   CO2 20 - 29 mmol/L 27   Chloride 96 - 106 mmol/L 106   Creatinine 0.57 - 1.00 mg/dL 1.70 (H)   BUN 8 - 27 mg/dL 27   BUN/Creatinine Ratio 12 - 28  16   Calcium 8.7 - 10.3 mg/dL 9.1   EGFR Result >59 mL/min/1.73 30 (L)   Alkaline Phosphatase 44 - 121 IU/L 72   Total Protein 6.0 - 8.5 g/dL 5.7 (L)   ALT (SGPT) 0 - 32 IU/L 14   AST (SGOT) 0 - 40 IU/L 11   Total Bilirubin 0.0 - 1.2 mg/dL 0.2   Albumin 3.7 - 4.7 g/dL 3.6 (L)   A/G Ratio 1.2 - 2.2  1.7   Hemoglobin A1C 4.8 - 5.6 % 7.0 (H)   TSH Baseline 0.450 - 4.500 uIU/mL 1.480   Total Cholesterol 100 - 199 mg/dL 206 (H)   HDL Cholesterol >39 mg/dL 42   LDL Cholesterol  0 - 99 mg/dL 153 (H)   Triglycerides 0 - 149 mg/dL 60   Chol/HDL Ratio 0.0 - 4.4 ratio 4.9 (H)   VLDL Cholesterol Steven 5 - 40 mg/dL 11   Globulin 1.5 - 4.5 g/dL 2.1   WBC 3.4 - 10.8 x10E3/uL 5.8   RBC 3.77 - 5.28 x10E6/uL 4.64   Hemoglobin 11.1 - 15.9 g/dL 15.0   Hematocrit 34.0 -  46.6 % 45.4   RDW 11.7 - 15.4 % 12.3   MCV 79 - 97 fL 98 (H)   MCH 26.6 - 33.0 pg 32.3   MCHC 31.5 - 35.7 g/dL 33.0   Platelets 150 - 450 x10E3/uL 176   Neutrophil Rel % Not Estab. % 56   Lymphocyte Rel % Not Estab. % 32   Monocyte Rel % Not Estab. % 10   Eosinophil Rel % Not Estab. % 1   Basophil Rel % Not Estab. % 1   Immature Granulocyte Rel % Not Estab. % 0   Neutrophils Absolute 1.4 - 7.0 x10E3/uL 3.2   Lymphocytes Absolute 0.7 - 3.1 x10E3/uL 1.9   Monocytes Absolute 0.1 - 0.9 x10E3/uL 0.6   Eosinophils Absolute 0.0 - 0.4 x10E3/uL 0.1   Basophils Absolute 0.0 - 0.2 x10E3/uL 0.0   Immature Grans, Absolute 0.0 - 0.1 x10E3/uL 0.0   (H): Data is abnormally high  (L): Data is abnormally low  Physical Exam  Vitals reviewed.   Constitutional:       Appearance: She is well-developed.   Eyes:      General:         Right eye: No discharge.         Left eye: No discharge.   Cardiovascular:      Rate and Rhythm: Normal rate and regular rhythm.      Heart sounds: Normal heart sounds. No murmur heard.    No friction rub. No gallop.   Pulmonary:      Effort: Pulmonary effort is normal. No respiratory distress.      Breath sounds: Normal breath sounds. No wheezing or rales.   Skin:     General: Skin is warm and dry.      Findings: No rash.   Neurological:      Mental Status: She is alert and oriented to person, place, and time.         Assessment and Plan:  Problems Addressed this Visit        Cardiac and Vasculature    Palpitations     Will get echo.          Relevant Orders    Adult Transthoracic Echo Complete W/ Cont if Necessary Per Protocol       Endocrine and Metabolic    Class 1 obesity due to excess calories with serious comorbidity and body mass index (BMI) of 33.0 to 33.9 in adult     Patient's (Body mass index is 33.51 kg/m².) indicates that they are obese (BMI >30) with health conditions that include hypertension and dyslipidemias . Weight is worsening. BMI is is above average; BMI management plan is completed. We  discussed portion control and increasing exercise.             Genitourinary and Reproductive     Stage 4 chronic kidney disease (HCC)     Renal condition is worsening.  Continue current treatment regimen.  Fluid restriction.  Renal condition will be reassessed in 3 months.  Discussed with her and may be contributing.             Symptoms and Signs    Leg swelling - Primary     Will get echo  However her swelling is from amlodopine most likely. Discussed this with her. Wear compression stockings.         Diagnoses       Codes Comments    Leg swelling    -  Primary ICD-10-CM: M79.89  ICD-9-CM: 729.81     Class 1 obesity due to excess calories with serious comorbidity and body mass index (BMI) of 33.0 to 33.9 in adult     ICD-10-CM: E66.09, Z68.33  ICD-9-CM: 278.00, V85.33     Palpitations     ICD-10-CM: R00.2  ICD-9-CM: 785.1     Stage 4 chronic kidney disease (HCC)     ICD-10-CM: N18.4  ICD-9-CM: 585.4            BMI is >= 30 and <35. (Class 1 Obesity). The following options were offered after discussion;: weight loss educational material (shared in after visit summary), exercise counseling/recommendations and nutrition counseling/recommendations      An After Visit Summary and PPPS were given to the patient.       I wore protective equipment throughout this patient encounter to include mask and eyewear. Hand hygiene was performed before donning protective equipment and after removal when leaving the room.

## 2023-01-04 NOTE — ASSESSMENT & PLAN NOTE
Patient's (Body mass index is 33.51 kg/m².) indicates that they are obese (BMI >30) with health conditions that include hypertension and dyslipidemias . Weight is worsening. BMI is is above average; BMI management plan is completed. We discussed portion control and increasing exercise.

## 2023-01-08 NOTE — ASSESSMENT & PLAN NOTE
Renal condition is worsening.  Continue current treatment regimen.  Fluid restriction.  Renal condition will be reassessed in 3 months.  Discussed with her and may be contributing.

## 2023-01-08 NOTE — ASSESSMENT & PLAN NOTE
Will get echo  However her swelling is from amlodopine most likely. Discussed this with her. Wear compression stockings.

## 2023-01-16 NOTE — PROGRESS NOTES
Subsequent Medicare Wellness Visit    Subjective    History of Present Illness:  Vianey Reeves is a 77 y.o. female who presents for a Subsequent Medicare Wellness Visit.    The following portions of the patient's history were reviewed and   updated as appropriate: allergies, current medications, past family history, past medical history, past social history, past surgical history and problem list.  Family History   Problem Relation Age of Onset   • Arthritis Father    • Prostate cancer Father    • Heart disease Sister      Past Surgical History:   Procedure Laterality Date   • BREAST BIOPSY Right    • CARDIAC CATHETERIZATION     • CHOLECYSTECTOMY     • COLON SURGERY      REMOVAL diverticular diseae   • COLONOSCOPY N/A 08/12/2021    Procedure: COLONOSCOPY with polypectomy x 3;  Surgeon: Margarette Mcallister MD;  Location: [facility];  Service: Gastroenterology;  Laterality: N/A;  post op: hmorrhoids, diverticulosis, polyps   • CORONARY STENT PLACEMENT     • ENDOSCOPY N/A 08/12/2021    Procedure: ESOPHAGOGASTRODUODENOSCOPY with dilatation (18-20 mm balloon) and (54 bougie);  Surgeon: Margarette Mcallister MD;  Location: [facility];  Service: Gastroenterology;  Laterality: N/A;  post op: esophageal stricture, esophagitis, gastritis, history of nissen   • EYE SURGERY Bilateral    • HIATAL HERNIA REPAIR     • NEPHRECTOMY Right     cancer   • UPPER ENDOSCOPIC ULTRASOUND W/ FNA N/A 9/22/2022    Procedure: EUS;  Surgeon: Margarette Mcallister MD;  Location: [facility];  Service: Gastroenterology;  Laterality: N/A;  post: normal pancreas, dilated pancreatic duct, hiatal hernia,         Compared to one year ago, the patient feels her physical   health is the same.    Compared to one year ago, the patient feels her mental   health is the same.    Recent Hospitalizations:  She was not admitted to the hospital during the last year.       Current Medical Providers:  Patient Care Team:  Emma Hammer MD as PCP -  General  Glass, Emma Simmons MD as PCP - Family Medicine  Jame Julio MD as Consulting Physician (Nephrology)    Outpatient Medications Prior to Visit   Medication Sig Dispense Refill   • albuterol (PROVENTIL) (2.5 MG/3ML) 0.083% nebulizer solution Take 2.5 mg by nebulization Every 4 (Four) Hours As Needed for Wheezing. (Patient taking differently: Take 2.5 mg by nebulization Every 4 (Four) Hours As Needed for Wheezing. Not using currently) 3 mL 12   • amLODIPine (NORVASC) 10 MG tablet TAKE 1 TABLET BY MOUTH EVERY DAY 90 tablet 4   • aspirin 81 MG tablet Take 81 mg by mouth Daily. dont take dos     • carvedilol (COREG) 3.125 MG tablet TAKE 1 TABLET BY MOUTH EVERY 12 HOURS (Patient taking differently: Take 3.125 mg by mouth 2 (Two) Times a Day With Meals.) 180 tablet 4   • coenzyme Q10 100 MG capsule Take 1 capsule by mouth Daily. On hold per patient due to abdominal pain     • doxazosin (Cardura) 2 MG tablet Take 1 tablet by mouth Every Night. 90 tablet 3   • fluticasone-salmeterol (Advair HFA) 45-21 MCG/ACT inhaler Inhale 2 puffs 2 (Two) Times a Day. 12 each 12   • gabapentin (NEURONTIN) 100 MG capsule Take 100-200 mg by mouth Every Night.     • Glucose Blood (Blood Glucose Test) strip 1 each by In Vitro route Daily. 100 each 3   • Januvia 100 MG tablet TAKE 1 TABLET BY MOUTH EVERY DAY (Patient taking differently: Take 100 mg by mouth Daily.) 90 tablet 3   • Lantus SoloStar 100 UNIT/ML injection pen INJECT 20 UNITS UNDER THE SKIN AS DIRECTED EVERY NIGHT 15 mL 12   • Linzess 145 MCG capsule capsule      • nortriptyline (PAMELOR) 10 MG capsule      • Repatha SureClick solution auto-injector SureClick injection INJECT 1 ML UNDER THE SKIN INTO THE APPROPRIATE AREA AS DIRECTED EVERY 14 (FOURTEEN) DAYS. 2 mL 12   • sertraline (ZOLOFT) 50 MG tablet TAKE 1 TABLET BY MOUTH EVERY DAY 90 tablet 3   • traMADol (ULTRAM) 50 MG tablet Take 50 mg by mouth 1 (One) Time As Needed.       No facility-administered medications prior to  visit.     Pain Score    01/19/23 1402   PainSc: 0-No pain      Opioid medication/s are on active medication list.  and I have evaluated her active treatment plan and pain score trends (see table).  Vitals:    01/19/23 1402   PainSc: 0-No pain     I have reviewed the chart for potential of high risk medication and harmful drug interactions in the elderly.            Aspirin is on active medication list. Aspirin use is indicated based on review of current medical condition/s. Pros and cons of this therapy have been discussed today. Benefits of this medication outweigh potential harm.  Patient has been encouraged to continue taking this medication.  .      Patient Active Problem List   Diagnosis   • Type 2 diabetes mellitus with stage 4 chronic kidney disease, without long-term current use of insulin (HCC)   • Essential (primary) hypertension   • Mixed hyperlipidemia   • Hiatal hernia   • Other fatigue   • Long term current use of antithrombotics/antiplatelets   • Simple chronic bronchitis (HCC)   • Atherosclerotic heart disease of native coronary artery without angina pectoris   • Irritable bowel syndrome with constipation   • History of cancer of ureter   • Solitary kidney   • Atherosclerosis of native arteries of extremities with intermittent claudication, bilateral legs (HCC)   • H/O malignant neoplasm of ureter   • Vertigo   • Medicare annual wellness visit, subsequent   • Presbyopia   • Combined form of senile cataract   • Cervical cancer screening   • Postartificial menopausal syndrome   • Left lower quadrant abdominal pain   • Diverticula of colon   • Ruptured tympanic membrane, left   • Hearing loss, bilateral   • TMJ pain dysfunction syndrome   • Stage 4 chronic kidney disease (HCC)   • Other dysphagia   • Lactose intolerance   • Polyarthralgia   • Chronic bilateral thoracic back pain   • Class 1 obesity due to excess calories with serious comorbidity and body mass index (BMI) of 34.0 to 34.9 in adult   •  "Anxiety   • Hypercalcemia   • Screening mammogram, encounter for   • Chronic bilateral low back pain without sciatica   • Irregular heart beat   • Palpitations   • Gout of right foot   • Plantar fasciitis of right foot   • Lumbar radiculopathy   • Overflow incontinence of urine   • Urinary retention   • Gastroesophageal reflux disease without esophagitis   • Leg swelling   • Cellulitis of groin     Advance Care Planning  Advance Directive is not on file.  ACP discussion was held with the patient during this visit. Patient does not have an advance directive, information provided.         Objective    Vitals:    01/19/23 1402   BP: 130/80   BP Location: Right arm   Patient Position: Sitting   Cuff Size: Adult   Pulse: 85   Resp: 18   Temp: 97.1 °F (36.2 °C)   TempSrc: Temporal   SpO2: 96%  Comment: room air   Weight: 93.2 kg (205 lb 6.4 oz)   Height: 165.1 cm (65\")   PainSc: 0-No pain       Does the patient have evidence of cognitive impairment? No           HEALTH RISK ASSESSMENT    Smoking Status:  Social History     Tobacco Use   Smoking Status Every Day   • Packs/day: 0.25   • Years: 50.00   • Pack years: 12.50   • Types: Cigarettes   Smokeless Tobacco Never     Alcohol Consumption:  Social History     Substance and Sexual Activity   Alcohol Use Yes    Comment: occasionally      Fall Risk Screen:    DONALDADI Fall Risk Assessment was completed, and patient is at LOW risk for falls.Assessment completed on:1/19/2023    Depression Screening:  PHQ-2/PHQ-9 Depression Screening 1/19/2023   Retired PHQ-9 Total Score -   Retired Total Score -   Little Interest or Pleasure in Doing Things 0-->not at all   Feeling Down, Depressed or Hopeless 0-->not at all   PHQ-9: Brief Depression Severity Measure Score 0       Health Habits and Functional and Cognitive Screening:  Functional & Cognitive Status 1/19/2023   Do you have difficulty preparing food and eating? No   Do you have difficulty bathing yourself, getting dressed or " grooming yourself? No   Do you have difficulty using the toilet? No   Do you have difficulty moving around from place to place? No   Do you have trouble with steps or getting out of a bed or a chair? No   Current Diet Well Balanced Diet   Dental Exam Not up to date   Eye Exam Not up to date   Exercise (times per week) 0 times per week   Current Exercises Include No Regular Exercise   Current Exercise Activities Include -   Do you need help using the phone?  No   Are you deaf or do you have serious difficulty hearing?  No   Do you need help with transportation? No   Do you need help shopping? No   Do you need help preparing meals?  No   Do you need help with housework?  No   Do you need help with laundry? No   Do you need help taking your medications? No   Do you need help managing money? No   Do you ever drive or ride in a car without wearing a seat belt? No   Have you felt unusual stress, anger or loneliness in the last month? No   Who do you live with? Alone   If you need help, do you have trouble finding someone available to you? No   Have you been bothered in the last four weeks by sexual problems? No   Do you have difficulty concentrating, remembering or making decisions? No       Age-appropriate Screening Schedule:  Refer to the list below for future screening recommendations based on patient's age, sex and/or medical conditions. Orders for these recommended tests are listed in the plan section. The patient has been provided with a written plan.    Health Maintenance   Topic Date Due   • ZOSTER VACCINE (1 of 2) Never done   • DIABETIC EYE EXAM  02/08/2022   • DXA SCAN  09/16/2022   • INFLUENZA VACCINE  03/31/2023 (Originally 8/1/2022)   • TDAP/TD VACCINES (1 - Tdap) 10/03/2023 (Originally 10/9/1964)   • URINE MICROALBUMIN  05/06/2023   • HEMOGLOBIN A1C  06/16/2023   • LIPID PANEL  12/16/2023              Assessment & Plan   Medically necessary, significant, and separately identifiable medical problems  identified during this visit are addressed on a separate visit note.    CMS Preventative Services Quick Reference  Risk Factors Identified During Encounter  None Identified  The above risks/problems have been discussed with the patient.  Follow up actions/plans if indicated are seen below in the Assessment/Plan Section.  Pertinent information has been shared with the patient in the After Visit Summary.    Follow Up:   No follow-ups on file.     An After Visit Summary and PPPS were made available to the patient.    Medicare Wellness  Personal Prevention Plan of Service     Date of Office Visit:  2023  Encounter Provider:  Emma Hammer MD  Place of Service:  [facility]  Patient Name: Vianey Reeves  :  1945    As part of the Medicare Wellness portion of your visit today, we are providing you with this personalized preventive plan of services (PPPS). This plan is based upon recommendations of the [place] Preventive Services Task Force (USPSTF) and the Advisory Committee on Immunization Practices (ACIP).    This lists the preventive care services that should be considered, and provides dates of when you are due. Items listed as completed are up-to-date and do not require any further intervention.    Health Maintenance   Topic Date Due   • ZOSTER VACCINE (1 of 2) Never done   • COVID-19 Vaccine (4 - Booster for Pfizer series) 2021   • DIABETIC EYE EXAM  2022   • DXA SCAN  2022   • INFLUENZA VACCINE  2023 (Originally 2022)   • Pneumococcal Vaccine 65+ (2 - PCV) 10/03/2023 (Originally 2020)   • TDAP/TD VACCINES (1 - Tdap) 10/03/2023 (Originally 10/9/1964)   • URINE MICROALBUMIN  2023   • HEMOGLOBIN A1C  2023   • LIPID PANEL  2023   • ANNUAL WELLNESS VISIT  2024   • HEPATITIS C SCREENING  Completed       Orders Placed This Encounter   Procedures   • DEXA Bone Density Axial     Standing Status:   Future      Standing Expiration Date:   1/16/2024     Order Specific Question:   Reason for Exam:     Answer:   osteopenia   • Mammo Screening Digital Tomosynthesis Bilateral With CAD     Standing Status:   Future     Standing Expiration Date:   1/16/2024     Order Specific Question:   Reason for Exam:     Answer:   screen   • POCT urinalysis dipstick, manual     Order Specific Question:   Release to patient     Answer:   Routine Release       No follow-ups on file.  Sit-to-Stand Exercise    The sit-to-stand exercise (also known as the chair stand or chair rise exercise) strengthens your lower body and helps you maintain or improve your mobility and independence. The goal is to do the sit-to-stand exercise without using your hands. This will be easier as you become stronger. You should always talk with your health care provider before starting any exercise program, especially if you have had recent surgery.  Do the exercise exactly as told by your health care provider and adjust it as directed. It is normal to feel mild stretching, pulling, tightness, or discomfort as you do this exercise, but you should stop right away if you feel sudden pain or your pain gets worse. Do not begin doing this exercise until told by your health care provider.  What the sit-to-stand exercise does  The sit-to-stand exercise helps to strengthen the muscles in your thighs and the muscles in the center of your body that give you stability (core muscles). This exercise is especially helpful if:  · You have had knee or hip surgery.  · You have trouble getting up from a chair, out of a car, or off the toilet.  How to do the sit-to-stand exercise  1. Sit toward the front edge of a sturdy chair without armrests. Your knees should be bent and your feet should be flat on the floor and shoulder-width apart.  2. Place your hands lightly on each side of the seat. Keep your back and neck as straight as possible, with your chest slightly forward.  3. Breathe in  slowly. Lean forward and slightly shift your weight to the front of your feet.  4. Breathe out as you slowly stand up. Use your hands as little as possible.  5. Stand and pause for a full breath in and out.  6. Breathe in as you sit down slowly. Tighten your core and abdominal muscles to control your lowering as much as possible.  7. Breathe out slowly.  8. Do this exercise 10-15 times. If needed, do it fewer times until you build up strength.  9. Rest for 1 minute, then do another set of 10-15 repetitions.  To change the difficulty of the sit-to-stand exercise  · If the exercise is too difficult, use a chair with sturdy armrests, and push off the armrests to help you come to the standing position. You can also use the armrests to help slowly lower yourself back to sitting. As this gets easier, try to use your arms less. You can also place a firm cushion or pillow on the chair to make the surface higher.  · If this exercise is too easy, do not use your arms to help raise or lower yourself. You can also wear a weighted vest, use hand weights, increase your repetitions, or try a lower chair.  General tips  · You may feel tired when starting an exercise routine. This is normal.  · You may have muscle soreness that lasts a few days. This is normal. As you get stronger, you may not feel muscle soreness.  · Use smooth, steady movements.  · Do not  hold your breath during strength exercises. This can cause unsafe changes in your blood pressure.  · Breathe in slowly through your nose, and breathe out slowly through your mouth.  Summary  · Strengthening your lower body is an important step to help you move safely and independently.  · The sit-to-stand exercise helps strengthen the muscles in your thighs and core.  · You should always talk with your health care provider before starting any exercise program, especially if you have had recent surgery.  This information is not intended to replace advice given to you by your  health care provider. Make sure you discuss any questions you have with your health care provider.  Document Revised: 10/16/2019 Document Reviewed: 02/08/2018  Elsevier Patient Education © 2021 [employer]    Advance Care Planning and Advance Directives     You make decisions on a daily basis - decisions about where you want to live, your career, your home, your life. Perhaps one of the most important decisions you face is your choice for future medical care. Take time to talk with your family and your healthcare team and start planning today.  Advance Care Planning is a process that can help you:  · Understand possible future healthcare decisions in light of your own experiences  · Reflect on those decision in light of your goals and values  · Discuss your decisions with those closest to you and the healthcare professionals that care for you  · Make a plan by creating a document that reflects your wishes    Surrogate Decision Maker  In the event of a medical emergency, which has left you unable to communicate or to make your own decisions, you would need someone to make decisions for you.  It is important to discuss your preferences for medical treatment with this person while you are in good health.     Qualities of a surrogate decision maker:  • Willing to take on this role and responsibility  • Knows what you want for future medical care  • Willing to follow your wishes even if they don't agree with them  • Able to make difficult medical decisions under stressful circumstances    Advance Directives  These are legal documents you can create that will guide your healthcare team and decision maker(s) when needed. These documents can be stored in the electronic medical record.    · Living Will - a legal document to guide your care if you have a terminal condition or a serious illness and are unable to communicate. States vary by statute in document names/types, but most forms may include one or more of the  following:        -  Directions regarding life-prolonging treatments        -  Directions regarding artificially provided nutrition/hydration        -  Choosing a healthcare decision maker        -  Direction regarding organ/tissue donation    · Durable Power of  for Healthcare - this document names an -in-fact to make medical decisions for you, but it may also allow this person to make personal and financial decisions for you. Please seek the advice of an  if you need this type of document.    **Advance Directives are not required and no one may discriminate against you if you do not sign one.    Medical Orders  Many states allow specific forms/orders signed by your physician to record your wishes for medical treatment in your current state of health. This form, signed in personal communication with your physician, addresses resuscitation and other medical interventions that you may or may not want.  For more information or to schedule a time with a [facility] Advance Care Planning Facilitator contact: [facility].[employer]/ACP or call [phone] and someone will contact you directly.    Fall Prevention in the Home, Adult  Falls can cause injuries and can happen to people of all ages. There are many things you can do to make your home safe and to help prevent falls. Ask for help when making these changes.  What actions can I take to prevent falls?  General Instructions  1. Use good lighting in all rooms. Replace any light bulbs that burn out.  2. Turn on the lights in dark areas. Use night-lights.  3. Keep items that you use often in easy-to-reach places. Lower the shelves around your home if needed.  4. Set up your furniture so you have a clear path. Avoid moving your furniture around.  5. Do not have throw rugs or other things on the floor that can make you trip.  6. Avoid walking on wet floors.  7. If any of your floors are uneven, fix them.  8. Add color or contrast paint or tape to  clearly michelle and help you see:  ? Grab bars or handrails.  ? First and last steps of staircases.  ? Where the edge of each step is.  9. If you use a stepladder:  ? Make sure that it is fully opened. Do not climb a closed stepladder.  ? Make sure the sides of the stepladder are locked in place.  ? Ask someone to hold the stepladder while you use it.  10. Know where your pets are when moving through your home.  What can I do in the bathroom?         · Keep the floor dry. Clean up any water on the floor right away.  · Remove soap buildup in the tub or shower.  · Use nonskid mats or decals on the floor of the tub or shower.  · Attach bath mats securely with double-sided, nonslip rug tape.  · If you need to sit down in the shower, use a plastic, nonslip stool.  · Install grab bars by the toilet and in the tub and shower. Do not use towel bars as grab bars.  What can I do in the bedroom?  · Make sure that you have a light by your bed that is easy to reach.  · Do not use any sheets or blankets for your bed that hang to the floor.  · Have a firm chair with side arms that you can use for support when you get dressed.  What can I do in the kitchen?  · Clean up any spills right away.  · If you need to reach something above you, use a step stool with a grab bar.  · Keep electrical cords out of the way.  · Do not use floor polish or wax that makes floors slippery.  What can I do with my stairs?  · Do not leave any items on the stairs.  · Make sure that you have a light switch at the top and the bottom of the stairs.  · Make sure that there are handrails on both sides of the stairs. Fix handrails that are broken or loose.  · Install nonslip stair treads on all your stairs.  · Avoid having throw rugs at the top or bottom of the stairs.  · Choose a carpet that does not hide the edge of the steps on the stairs.  · Check carpeting to make sure that it is firmly attached to the stairs. Fix carpet that is loose or worn.  What can I do  on the outside of my home?  · Use bright outdoor lighting.  · Fix the edges of walkways and driveways and fix any cracks.  · Remove anything that might make you trip as you walk through a door, such as a raised step or threshold.  · Trim any bushes or trees on paths to your home.  · Check to see if handrails are loose or broken and that both sides of all steps have handrails.  · Install guardrails along the edges of any raised decks and porches.  · Clear paths of anything that can make you trip, such as tools or rocks.  · Have leaves, snow, or ice cleared regularly.  · Use sand or salt on paths during winter.  · Clean up any spills in your garage right away. This includes grease or oil spills.  What other actions can I take?  1. Wear shoes that:  ? Have a low heel. Do not wear high heels.  ? Have rubber bottoms.  ? Feel good on your feet and fit well.  ? Are closed at the toe. Do not wear open-toe sandals.  2. Use tools that help you move around if needed. These include:  ? Canes.  ? Walkers.  ? Scooters.  ? Crutches.  3. Review your medicines with your doctor. Some medicines can make you feel dizzy. This can increase your chance of falling.  Ask your doctor what else you can do to help prevent falls.  Where to find more information  · Centers for Disease Control and Prevention, [place]: www.cdc.gov  · [place]: www.vargas.nih.gov  Contact a doctor if:  · You are afraid of falling at home.  · You feel weak, drowsy, or dizzy at home.  · You fall at home.  Summary  · There are many simple things that you can do to make your home safe and to help prevent falls.  · Ways to make your home safe include removing things that can make you trip and installing grab bars in the bathroom.  · Ask for help when making these changes in your home.  This information is not intended to replace advice given to you by your health care provider. Make sure you discuss any questions you have with your health care  provider.  Document Revised: 07/21/2021 Document Reviewed: 07/21/2021  [employer] Patient Education © 2021 Elsevier Inc.         +++++E/M portion medically necessary secondary to new or uncontrolled chronic problem+++++++    Subjective   Vianey VILMA Reeves is here for:    Chief Complaint   Patient presents with   • Medicare Wellness-subsequent   • Diabetes   • Hypertension   • Hyperlipidemia       Diabetes  She presents for her follow-up diabetic visit. She has type 2 diabetes mellitus. Pertinent negatives for hypoglycemia include no sweats. Pertinent negatives for diabetes include no chest pain and no weakness. There are no hypoglycemic complications. Risk factors for coronary artery disease include diabetes mellitus, dyslipidemia, hypertension, post-menopausal, obesity and family history. She is following a generally unhealthy diet. Meal planning includes avoidance of concentrated sweets. She never participates in exercise. Her overall blood glucose range is  mg/dl. An ACE inhibitor/angiotensin II receptor blocker is not being taken. She sees a podiatrist.Eye exam is not current.   Hypertension  This is a chronic problem. The current episode started more than 1 year ago. The problem is controlled. Pertinent negatives include no chest pain, malaise/fatigue, shortness of breath or sweats. Risk factors for coronary artery disease include diabetes mellitus, dyslipidemia, family history, post-menopausal state and obesity. Current antihypertension treatment includes calcium channel blockers and beta blockers. The current treatment provides moderate improvement.   Hyperlipidemia  This is a chronic problem. The current episode started more than 1 year ago. Exacerbating diseases include diabetes and obesity. Pertinent negatives include no chest pain or shortness of breath. She is currently on no antihyperlipidemic treatment. The current treatment provides mild improvement of lipids. Risk factors for coronary artery  disease include dyslipidemia, diabetes mellitus, family history, obesity, hypertension and post-menopausal.   Rash  This is a new problem. The current episode started 1 to 4 weeks ago. The problem has been waxing and waning since onset. Location: mons. The rash is characterized by redness and swelling. She was exposed to nothing. Pertinent negatives include no shortness of breath. Treatments tried: she used a pin.         Physical Exam:  Review of Systems   Constitutional: Negative for malaise/fatigue.   Respiratory: Negative for shortness of breath.    Cardiovascular: Negative for chest pain.   Skin: Positive for rash.   Neurological: Negative for weakness.        Physical Exam  Vitals and nursing note reviewed.   Constitutional:       General: She is not in acute distress.     Appearance: She is well-developed. She is not diaphoretic.   HENT:      Head: Normocephalic and atraumatic.      Right Ear: Tympanic membrane and external ear normal.      Left Ear: Tympanic membrane and external ear normal.      Nose: Nose normal.      Mouth/Throat:      Mouth: Mucous membranes are moist.      Pharynx: No oropharyngeal exudate.   Eyes:      General: No scleral icterus.        Right eye: No discharge.         Left eye: No discharge.      Conjunctiva/sclera: Conjunctivae normal.      Pupils: Pupils are equal, round, and reactive to light.   Neck:      Thyroid: No thyromegaly.      Trachea: No tracheal deviation.   Cardiovascular:      Rate and Rhythm: Normal rate and regular rhythm.      Heart sounds: Normal heart sounds. No murmur heard.    No friction rub. No gallop.   Pulmonary:      Effort: Pulmonary effort is normal. No respiratory distress.      Breath sounds: Normal breath sounds. No stridor. No wheezing or rales.   Chest:   Breasts:     Right: No inverted nipple, mass, nipple discharge, skin change or tenderness.      Left: No inverted nipple, mass, nipple discharge, skin change or tenderness.   Abdominal:       General: Bowel sounds are normal. There is no distension.      Palpations: Abdomen is soft. There is no mass.      Tenderness: There is no abdominal tenderness. There is no guarding or rebound.   Genitourinary:     Labia:         Right: No rash, tenderness or lesion.         Left: No rash, tenderness or lesion.       Vagina: No vaginal discharge, erythema, bleeding or lesions.      Cervix: No discharge or lesion.      Adnexa:         Right: No mass or tenderness.          Left: No mass or tenderness.         Musculoskeletal:         General: No tenderness or deformity. Normal range of motion.      Cervical back: Normal range of motion and neck supple.   Lymphadenopathy:      Cervical: No cervical adenopathy.   Skin:     General: Skin is warm and dry.      Capillary Refill: Capillary refill takes less than 2 seconds.      Coloration: Skin is not pale.      Findings: No erythema or rash.   Neurological:      General: No focal deficit present.      Mental Status: She is alert and oriented to person, place, and time.      Cranial Nerves: No cranial nerve deficit.      Sensory: No sensory deficit.      Motor: No tremor, atrophy or abnormal muscle tone.      Coordination: Coordination normal.      Gait: Gait normal.      Deep Tendon Reflexes: Reflexes are normal and symmetric. Reflexes normal.   Psychiatric:         Behavior: Behavior normal.         Thought Content: Thought content normal.         Cognition and Memory: Memory is not impaired. She does not exhibit impaired recent memory or impaired remote memory.         Judgment: Judgment normal.         Result Review :            Assessment and Plan:  Problem List Items Addressed This Visit        Cardiac and Vasculature    Essential (primary) hypertension    Overview      doxazosin made her dizzy and fatigued. She would like to stay on amlodopine instead.   Amlodpine has caused the least problems for her she says. She is aware of the edema but she says she can live  with it.          Current Assessment & Plan     Hypertension is unchanged.  Continue current treatment regimen.  Blood pressure will be reassessed in 3 months.    Amlodopine causes swelling but less side effects than other medications         Mixed hyperlipidemia    Overview     She sees endocrinology  She can not tolerate statins         Atherosclerotic heart disease of native coronary artery without angina pectoris    Overview     No chest pain, Cardiology apt pending 04/07/2022            Endocrine and Metabolic    Type 2 diabetes mellitus with stage 4 chronic kidney disease, without long-term current use of insulin (HCC)    Overview     A1c 6.5 3/29/2022  A1C 6.1 6/23/2020  Continue with current treatment. She is trying to lose weight   A1C 6.4 3/11/21          6.2  12/28/21  7.3 8/11/22         Current Assessment & Plan     Renal condition is unchanged.  Continue current treatment regimen.  Renal condition will be reassessed in 3 months.         Class 1 obesity due to excess calories with serious comorbidity and body mass index (BMI) of 34.0 to 34.9 in adult    Current Assessment & Plan     Patient's (Body mass index is 34.18 kg/m².) indicates that they are obese (BMI >30) with health conditions that include hypertension, diabetes mellitus and dyslipidemias . Weight is worsening. BMI is is above average; BMI management plan is completed. We discussed portion control and increasing exercise.             Genitourinary and Reproductive     Screening mammogram, encounter for    Relevant Orders    Mammo Screening Digital Tomosynthesis Bilateral With CAD       Health Encounters    Medicare annual wellness visit, subsequent - Primary    Relevant Orders    POCT urinalysis dipstick, manual (Completed)       Other    Cellulitis of groin    Current Assessment & Plan     Discussed heat and antibiotics.   Advised the area needs monitoring and no sticking pins in the area. If there is an increase in pain she is to go to the  ER.          Relevant Medications    clindamycin (CLEOCIN) 300 MG capsule   Other Visit Diagnoses     Osteopenia, unspecified location        Other specified menopausal and perimenopausal disorders        Relevant Orders    DEXA Bone Density Axial

## 2023-01-19 ENCOUNTER — OFFICE VISIT (OUTPATIENT)
Dept: FAMILY MEDICINE CLINIC | Facility: CLINIC | Age: 78
End: 2023-01-19
Payer: MEDICARE

## 2023-01-19 VITALS
RESPIRATION RATE: 18 BRPM | OXYGEN SATURATION: 96 % | WEIGHT: 205.4 LBS | BODY MASS INDEX: 34.22 KG/M2 | SYSTOLIC BLOOD PRESSURE: 130 MMHG | HEIGHT: 65 IN | DIASTOLIC BLOOD PRESSURE: 80 MMHG | HEART RATE: 85 BPM | TEMPERATURE: 97.1 F

## 2023-01-19 DIAGNOSIS — I25.10 ATHEROSCLEROSIS OF NATIVE CORONARY ARTERY OF NATIVE HEART WITHOUT ANGINA PECTORIS: ICD-10-CM

## 2023-01-19 DIAGNOSIS — E11.22 TYPE 2 DIABETES MELLITUS WITH STAGE 4 CHRONIC KIDNEY DISEASE, WITHOUT LONG-TERM CURRENT USE OF INSULIN: ICD-10-CM

## 2023-01-19 DIAGNOSIS — E66.09 CLASS 1 OBESITY DUE TO EXCESS CALORIES WITH SERIOUS COMORBIDITY AND BODY MASS INDEX (BMI) OF 34.0 TO 34.9 IN ADULT: ICD-10-CM

## 2023-01-19 DIAGNOSIS — I10 ESSENTIAL (PRIMARY) HYPERTENSION: ICD-10-CM

## 2023-01-19 DIAGNOSIS — Z12.31 SCREENING MAMMOGRAM, ENCOUNTER FOR: ICD-10-CM

## 2023-01-19 DIAGNOSIS — L03.314 CELLULITIS OF GROIN: ICD-10-CM

## 2023-01-19 DIAGNOSIS — Z00.00 MEDICARE ANNUAL WELLNESS VISIT, SUBSEQUENT: Primary | ICD-10-CM

## 2023-01-19 DIAGNOSIS — N18.4 TYPE 2 DIABETES MELLITUS WITH STAGE 4 CHRONIC KIDNEY DISEASE, WITHOUT LONG-TERM CURRENT USE OF INSULIN: ICD-10-CM

## 2023-01-19 DIAGNOSIS — E78.2 MIXED HYPERLIPIDEMIA: ICD-10-CM

## 2023-01-19 DIAGNOSIS — N95.8 OTHER SPECIFIED MENOPAUSAL AND PERIMENOPAUSAL DISORDERS: ICD-10-CM

## 2023-01-19 DIAGNOSIS — M85.80 OSTEOPENIA, UNSPECIFIED LOCATION: ICD-10-CM

## 2023-01-19 LAB
BILIRUB BLD-MCNC: NEGATIVE MG/DL
CLARITY, POC: CLEAR
COLOR UR: YELLOW
GLUCOSE UR STRIP-MCNC: NEGATIVE MG/DL
KETONES UR QL: NEGATIVE
LEUKOCYTE EST, POC: NEGATIVE
NITRITE UR-MCNC: NEGATIVE MG/ML
PH UR: 6 [PH] (ref 5–8)
PROT UR STRIP-MCNC: NEGATIVE MG/DL
RBC # UR STRIP: NEGATIVE /UL
SP GR UR: 1.01 (ref 1–1.03)
UROBILINOGEN UR QL: NORMAL

## 2023-01-19 PROCEDURE — 81002 URINALYSIS NONAUTO W/O SCOPE: CPT | Performed by: FAMILY MEDICINE

## 2023-01-19 PROCEDURE — 1159F MED LIST DOCD IN RCRD: CPT | Performed by: FAMILY MEDICINE

## 2023-01-19 PROCEDURE — 99213 OFFICE O/P EST LOW 20 MIN: CPT | Performed by: FAMILY MEDICINE

## 2023-01-19 PROCEDURE — 99497 ADVNCD CARE PLAN 30 MIN: CPT | Performed by: FAMILY MEDICINE

## 2023-01-19 PROCEDURE — 1126F AMNT PAIN NOTED NONE PRSNT: CPT | Performed by: FAMILY MEDICINE

## 2023-01-19 PROCEDURE — 1170F FXNL STATUS ASSESSED: CPT | Performed by: FAMILY MEDICINE

## 2023-01-19 PROCEDURE — G0439 PPPS, SUBSEQ VISIT: HCPCS | Performed by: FAMILY MEDICINE

## 2023-01-19 RX ORDER — LINACLOTIDE 145 UG/1
145 CAPSULE, GELATIN COATED ORAL DAILY
COMMUNITY
Start: 2023-01-18

## 2023-01-19 RX ORDER — CLINDAMYCIN HYDROCHLORIDE 300 MG/1
300 CAPSULE ORAL 3 TIMES DAILY
Qty: 30 CAPSULE | Refills: 0 | Status: SHIPPED | OUTPATIENT
Start: 2023-01-19 | End: 2023-02-01 | Stop reason: HOSPADM

## 2023-01-19 RX ORDER — NORTRIPTYLINE HYDROCHLORIDE 10 MG/1
10 CAPSULE ORAL NIGHTLY
COMMUNITY
Start: 2023-01-15

## 2023-01-19 NOTE — ASSESSMENT & PLAN NOTE
Patient's (Body mass index is 34.18 kg/m².) indicates that they are obese (BMI >30) with health conditions that include hypertension, diabetes mellitus and dyslipidemias . Weight is worsening. BMI is is above average; BMI management plan is completed. We discussed portion control and increasing exercise.

## 2023-01-22 NOTE — ASSESSMENT & PLAN NOTE
Hypertension is unchanged.  Continue current treatment regimen.  Blood pressure will be reassessed in 3 months.    Amlodopine causes swelling but less side effects than other medications

## 2023-01-22 NOTE — ASSESSMENT & PLAN NOTE
Discussed heat and antibiotics.   Advised the area needs monitoring and no sticking pins in the area. If there is an increase in pain she is to go to the ER.

## 2023-01-23 ENCOUNTER — APPOINTMENT (OUTPATIENT)
Dept: GENERAL RADIOLOGY | Facility: HOSPITAL | Age: 78
End: 2023-01-23
Payer: MEDICARE

## 2023-01-23 ENCOUNTER — HOSPITAL ENCOUNTER (OUTPATIENT)
Facility: HOSPITAL | Age: 78
Setting detail: OBSERVATION
Discharge: HOME | End: 2023-01-25
Attending: EMERGENCY MEDICINE | Admitting: EMERGENCY MEDICINE
Payer: MEDICARE

## 2023-01-23 ENCOUNTER — APPOINTMENT (OUTPATIENT)
Dept: CT IMAGING | Facility: HOSPITAL | Age: 78
End: 2023-01-23
Payer: MEDICARE

## 2023-01-23 DIAGNOSIS — R10.84 GENERALIZED ABDOMINAL PAIN: Primary | ICD-10-CM

## 2023-01-23 PROBLEM — R10.9 ABDOMINAL PAIN: Status: ACTIVE | Noted: 2023-01-23

## 2023-01-23 LAB
ALBUMIN SERPL-MCNC: 3.6 G/DL (ref 3.5–5.2)
ALBUMIN/GLOB SERPL: 1.2 G/DL
ALP SERPL-CCNC: 81 U/L (ref 39–117)
ALT SERPL W P-5'-P-CCNC: 9 U/L (ref 1–33)
ANION GAP SERPL CALCULATED.3IONS-SCNC: 9 MMOL/L (ref 5–15)
AST SERPL-CCNC: 21 U/L (ref 1–32)
BACTERIA UR QL AUTO: ABNORMAL /HPF
BASOPHILS # BLD AUTO: 0.1 10*3/MM3 (ref 0–0.2)
BASOPHILS NFR BLD AUTO: 0.7 % (ref 0–1.5)
BILIRUB SERPL-MCNC: 0.3 MG/DL (ref 0–1.2)
BILIRUB UR QL STRIP: NEGATIVE
BUN SERPL-MCNC: 12 MG/DL (ref 8–23)
BUN/CREAT SERPL: 7.8 (ref 7–25)
CALCIUM SPEC-SCNC: 8.6 MG/DL (ref 8.6–10.5)
CHLORIDE SERPL-SCNC: 104 MMOL/L (ref 98–107)
CLARITY UR: CLEAR
CO2 SERPL-SCNC: 28 MMOL/L (ref 22–29)
COLOR UR: YELLOW
CREAT SERPL-MCNC: 1.53 MG/DL (ref 0.57–1)
DEPRECATED RDW RBC AUTO: 48.1 FL (ref 37–54)
EGFRCR SERPLBLD CKD-EPI 2021: 34.5 ML/MIN/1.73
EOSINOPHIL # BLD AUTO: 0.2 10*3/MM3 (ref 0–0.4)
EOSINOPHIL NFR BLD AUTO: 3 % (ref 0.3–6.2)
ERYTHROCYTE [DISTWIDTH] IN BLOOD BY AUTOMATED COUNT: 13.7 % (ref 12.3–15.4)
GLOBULIN UR ELPH-MCNC: 3 GM/DL
GLUCOSE BLDC GLUCOMTR-MCNC: 104 MG/DL (ref 70–105)
GLUCOSE SERPL-MCNC: 147 MG/DL (ref 65–99)
GLUCOSE UR STRIP-MCNC: NEGATIVE MG/DL
HCT VFR BLD AUTO: 43.9 % (ref 34–46.6)
HGB BLD-MCNC: 14.7 G/DL (ref 12–15.9)
HGB UR QL STRIP.AUTO: NEGATIVE
HYALINE CASTS UR QL AUTO: ABNORMAL /LPF
KETONES UR QL STRIP: NEGATIVE
LEUKOCYTE ESTERASE UR QL STRIP.AUTO: ABNORMAL
LIPASE SERPL-CCNC: 18 U/L (ref 13–60)
LYMPHOCYTES # BLD AUTO: 2 10*3/MM3 (ref 0.7–3.1)
LYMPHOCYTES NFR BLD AUTO: 24.4 % (ref 19.6–45.3)
MCH RBC QN AUTO: 32.1 PG (ref 26.6–33)
MCHC RBC AUTO-ENTMCNC: 33.4 G/DL (ref 31.5–35.7)
MCV RBC AUTO: 96 FL (ref 79–97)
MONOCYTES # BLD AUTO: 0.6 10*3/MM3 (ref 0.1–0.9)
MONOCYTES NFR BLD AUTO: 7.6 % (ref 5–12)
NEUTROPHILS NFR BLD AUTO: 5.3 10*3/MM3 (ref 1.7–7)
NEUTROPHILS NFR BLD AUTO: 64.3 % (ref 42.7–76)
NITRITE UR QL STRIP: NEGATIVE
NRBC BLD AUTO-RTO: 0 /100 WBC (ref 0–0.2)
PH UR STRIP.AUTO: 6.5 [PH] (ref 5–8)
PLATELET # BLD AUTO: 229 10*3/MM3 (ref 140–450)
PMV BLD AUTO: 8 FL (ref 6–12)
POTASSIUM SERPL-SCNC: 4.1 MMOL/L (ref 3.5–5.2)
PROT SERPL-MCNC: 6.6 G/DL (ref 6–8.5)
PROT UR QL STRIP: NEGATIVE
RBC # BLD AUTO: 4.58 10*6/MM3 (ref 3.77–5.28)
RBC # UR STRIP: ABNORMAL /HPF
REF LAB TEST METHOD: ABNORMAL
SODIUM SERPL-SCNC: 141 MMOL/L (ref 136–145)
SP GR UR STRIP: 1.01 (ref 1–1.03)
SQUAMOUS #/AREA URNS HPF: ABNORMAL /HPF
UROBILINOGEN UR QL STRIP: ABNORMAL
WBC # UR STRIP: ABNORMAL /HPF
WBC NRBC COR # BLD: 8.3 10*3/MM3 (ref 3.4–10.8)

## 2023-01-23 PROCEDURE — 74176 CT ABD & PELVIS W/O CONTRAST: CPT

## 2023-01-23 PROCEDURE — 25010000002 MORPHINE PER 10 MG: Performed by: EMERGENCY MEDICINE

## 2023-01-23 PROCEDURE — G0378 HOSPITAL OBSERVATION PER HR: HCPCS

## 2023-01-23 PROCEDURE — 94799 UNLISTED PULMONARY SVC/PX: CPT

## 2023-01-23 PROCEDURE — 85025 COMPLETE CBC W/AUTO DIFF WBC: CPT | Performed by: NURSE PRACTITIONER

## 2023-01-23 PROCEDURE — 71046 X-RAY EXAM CHEST 2 VIEWS: CPT

## 2023-01-23 PROCEDURE — 94640 AIRWAY INHALATION TREATMENT: CPT

## 2023-01-23 PROCEDURE — 96375 TX/PRO/DX INJ NEW DRUG ADDON: CPT

## 2023-01-23 PROCEDURE — 81001 URINALYSIS AUTO W/SCOPE: CPT | Performed by: NURSE PRACTITIONER

## 2023-01-23 PROCEDURE — 99285 EMERGENCY DEPT VISIT HI MDM: CPT

## 2023-01-23 PROCEDURE — 80053 COMPREHEN METABOLIC PANEL: CPT | Performed by: NURSE PRACTITIONER

## 2023-01-23 PROCEDURE — 83690 ASSAY OF LIPASE: CPT | Performed by: NURSE PRACTITIONER

## 2023-01-23 PROCEDURE — 87086 URINE CULTURE/COLONY COUNT: CPT | Performed by: NURSE PRACTITIONER

## 2023-01-23 PROCEDURE — 82962 GLUCOSE BLOOD TEST: CPT

## 2023-01-23 PROCEDURE — 25010000002 ONDANSETRON PER 1 MG: Performed by: EMERGENCY MEDICINE

## 2023-01-23 RX ORDER — AMLODIPINE BESYLATE 5 MG/1
10 TABLET ORAL DAILY
Status: DISCONTINUED | OUTPATIENT
Start: 2023-01-23 | End: 2023-01-25 | Stop reason: HOSPADM

## 2023-01-23 RX ORDER — BISACODYL 5 MG/1
5 TABLET, DELAYED RELEASE ORAL DAILY PRN
Status: DISCONTINUED | OUTPATIENT
Start: 2023-01-23 | End: 2023-01-25 | Stop reason: HOSPADM

## 2023-01-23 RX ORDER — OLANZAPINE 10 MG/2ML
1 INJECTION, POWDER, LYOPHILIZED, FOR SOLUTION INTRAMUSCULAR
Status: DISCONTINUED | OUTPATIENT
Start: 2023-01-23 | End: 2023-01-25 | Stop reason: HOSPADM

## 2023-01-23 RX ORDER — BUDESONIDE AND FORMOTEROL FUMARATE DIHYDRATE 80; 4.5 UG/1; UG/1
2 AEROSOL RESPIRATORY (INHALATION)
Status: DISCONTINUED | OUTPATIENT
Start: 2023-01-23 | End: 2023-01-25 | Stop reason: HOSPADM

## 2023-01-23 RX ORDER — SODIUM CHLORIDE 9 MG/ML
40 INJECTION, SOLUTION INTRAVENOUS AS NEEDED
Status: DISCONTINUED | OUTPATIENT
Start: 2023-01-23 | End: 2023-01-25 | Stop reason: HOSPADM

## 2023-01-23 RX ORDER — INSULIN LISPRO 100 [IU]/ML
3-14 INJECTION, SOLUTION INTRAVENOUS; SUBCUTANEOUS
Status: DISCONTINUED | OUTPATIENT
Start: 2023-01-23 | End: 2023-01-25 | Stop reason: HOSPADM

## 2023-01-23 RX ORDER — CARVEDILOL 3.12 MG/1
3.12 TABLET ORAL 2 TIMES DAILY WITH MEALS
COMMUNITY

## 2023-01-23 RX ORDER — ONDANSETRON 2 MG/ML
4 INJECTION INTRAMUSCULAR; INTRAVENOUS ONCE
Status: COMPLETED | OUTPATIENT
Start: 2023-01-23 | End: 2023-01-23

## 2023-01-23 RX ORDER — INSULIN GLARGINE 100 [IU]/ML
20 INJECTION, SOLUTION SUBCUTANEOUS NIGHTLY
COMMUNITY

## 2023-01-23 RX ORDER — BISACODYL 10 MG
10 SUPPOSITORY, RECTAL RECTAL DAILY PRN
Status: DISCONTINUED | OUTPATIENT
Start: 2023-01-23 | End: 2023-01-25 | Stop reason: HOSPADM

## 2023-01-23 RX ORDER — ALBUTEROL SULFATE 2.5 MG/3ML
2.5 SOLUTION RESPIRATORY (INHALATION) EVERY 4 HOURS PRN
COMMUNITY

## 2023-01-23 RX ORDER — ONDANSETRON 2 MG/ML
4 INJECTION INTRAMUSCULAR; INTRAVENOUS EVERY 6 HOURS PRN
Status: DISCONTINUED | OUTPATIENT
Start: 2023-01-23 | End: 2023-01-25 | Stop reason: HOSPADM

## 2023-01-23 RX ORDER — TERAZOSIN 2 MG/1
2 CAPSULE ORAL NIGHTLY
Status: DISCONTINUED | OUTPATIENT
Start: 2023-01-23 | End: 2023-01-25 | Stop reason: HOSPADM

## 2023-01-23 RX ORDER — GABAPENTIN 100 MG/1
100 CAPSULE ORAL DAILY PRN
Status: DISCONTINUED | OUTPATIENT
Start: 2023-01-23 | End: 2023-01-25 | Stop reason: HOSPADM

## 2023-01-23 RX ORDER — DEXTROSE MONOHYDRATE 25 G/50ML
25 INJECTION, SOLUTION INTRAVENOUS
Status: DISCONTINUED | OUTPATIENT
Start: 2023-01-23 | End: 2023-01-25 | Stop reason: HOSPADM

## 2023-01-23 RX ORDER — SODIUM CHLORIDE 0.9 % (FLUSH) 0.9 %
10 SYRINGE (ML) INJECTION EVERY 12 HOURS SCHEDULED
Status: DISCONTINUED | OUTPATIENT
Start: 2023-01-23 | End: 2023-01-25 | Stop reason: HOSPADM

## 2023-01-23 RX ORDER — ACETAMINOPHEN 325 MG/1
650 TABLET ORAL EVERY 4 HOURS PRN
Status: DISCONTINUED | OUTPATIENT
Start: 2023-01-23 | End: 2023-01-25 | Stop reason: HOSPADM

## 2023-01-23 RX ORDER — SODIUM CHLORIDE 0.9 % (FLUSH) 0.9 %
10 SYRINGE (ML) INJECTION AS NEEDED
Status: DISCONTINUED | OUTPATIENT
Start: 2023-01-23 | End: 2023-01-25 | Stop reason: HOSPADM

## 2023-01-23 RX ORDER — TRAMADOL HYDROCHLORIDE 50 MG/1
100 TABLET ORAL 2 TIMES DAILY
Status: DISCONTINUED | OUTPATIENT
Start: 2023-01-23 | End: 2023-01-25 | Stop reason: HOSPADM

## 2023-01-23 RX ORDER — NICOTINE POLACRILEX 4 MG
15 LOZENGE BUCCAL
Status: DISCONTINUED | OUTPATIENT
Start: 2023-01-23 | End: 2023-01-25 | Stop reason: HOSPADM

## 2023-01-23 RX ORDER — INSULIN GLARGINE 100 [IU]/ML
20 INJECTION, SOLUTION SUBCUTANEOUS NIGHTLY
Status: DISCONTINUED | OUTPATIENT
Start: 2023-01-23 | End: 2023-01-25 | Stop reason: HOSPADM

## 2023-01-23 RX ORDER — ALBUTEROL SULFATE 2.5 MG/3ML
2.5 SOLUTION RESPIRATORY (INHALATION) EVERY 4 HOURS PRN
Status: DISCONTINUED | OUTPATIENT
Start: 2023-01-23 | End: 2023-01-25 | Stop reason: HOSPADM

## 2023-01-23 RX ORDER — ONDANSETRON 4 MG/1
4 TABLET, FILM COATED ORAL EVERY 6 HOURS PRN
Status: DISCONTINUED | OUTPATIENT
Start: 2023-01-23 | End: 2023-01-25 | Stop reason: HOSPADM

## 2023-01-23 RX ORDER — ASPIRIN 81 MG/1
81 TABLET ORAL DAILY
Status: DISCONTINUED | OUTPATIENT
Start: 2023-01-23 | End: 2023-01-25 | Stop reason: HOSPADM

## 2023-01-23 RX ORDER — POLYETHYLENE GLYCOL 3350 17 G/17G
17 POWDER, FOR SOLUTION ORAL DAILY PRN
Status: DISCONTINUED | OUTPATIENT
Start: 2023-01-23 | End: 2023-01-25 | Stop reason: HOSPADM

## 2023-01-23 RX ORDER — HYDRALAZINE HYDROCHLORIDE 20 MG/ML
10 INJECTION INTRAMUSCULAR; INTRAVENOUS EVERY 6 HOURS PRN
Status: DISCONTINUED | OUTPATIENT
Start: 2023-01-23 | End: 2023-01-25 | Stop reason: HOSPADM

## 2023-01-23 RX ORDER — CARVEDILOL 3.12 MG/1
3.12 TABLET ORAL 2 TIMES DAILY WITH MEALS
Status: DISCONTINUED | OUTPATIENT
Start: 2023-01-23 | End: 2023-01-25 | Stop reason: HOSPADM

## 2023-01-23 RX ADMIN — CARVEDILOL 3.12 MG: 3.12 TABLET, FILM COATED ORAL at 20:12

## 2023-01-23 RX ADMIN — TRAMADOL HYDROCHLORIDE 100 MG: 50 TABLET, COATED ORAL at 22:01

## 2023-01-23 RX ADMIN — BUDESONIDE AND FORMOTEROL FUMARATE DIHYDRATE 2 PUFF: 80; 4.5 AEROSOL RESPIRATORY (INHALATION) at 20:01

## 2023-01-23 RX ADMIN — Medication 10 ML: at 23:34

## 2023-01-23 RX ADMIN — ONDANSETRON 4 MG: 2 INJECTION INTRAMUSCULAR; INTRAVENOUS at 12:22

## 2023-01-23 RX ADMIN — MORPHINE SULFATE 2 MG: 4 INJECTION, SOLUTION INTRAMUSCULAR; INTRAVENOUS at 12:24

## 2023-01-24 ENCOUNTER — ON CAMPUS - OUTPATIENT (AMBULATORY)
Dept: URBAN - METROPOLITAN AREA HOSPITAL 85 | Facility: HOSPITAL | Age: 78
End: 2023-01-24

## 2023-01-24 DIAGNOSIS — R11.0 NAUSEA: ICD-10-CM

## 2023-01-24 DIAGNOSIS — Z90.49 ACQUIRED ABSENCE OF OTHER SPECIFIED PARTS OF DIGESTIVE TRACT: ICD-10-CM

## 2023-01-24 DIAGNOSIS — K59.00 CONSTIPATION, UNSPECIFIED: ICD-10-CM

## 2023-01-24 DIAGNOSIS — R93.2 ABNORMAL FINDINGS ON DIAGNOSTIC IMAGING OF LIVER AND BILIARY: ICD-10-CM

## 2023-01-24 DIAGNOSIS — R10.84 GENERALIZED ABDOMINAL PAIN: ICD-10-CM

## 2023-01-24 LAB
ANION GAP SERPL CALCULATED.3IONS-SCNC: 8 MMOL/L (ref 5–15)
BASOPHILS # BLD AUTO: 0.1 10*3/MM3 (ref 0–0.2)
BASOPHILS NFR BLD AUTO: 1.5 % (ref 0–1.5)
BUN SERPL-MCNC: 11 MG/DL (ref 8–23)
BUN/CREAT SERPL: 8.4 (ref 7–25)
CALCIUM SPEC-SCNC: 9 MG/DL (ref 8.6–10.5)
CHLORIDE SERPL-SCNC: 105 MMOL/L (ref 98–107)
CO2 SERPL-SCNC: 29 MMOL/L (ref 22–29)
CREAT SERPL-MCNC: 1.31 MG/DL (ref 0.57–1)
D-LACTATE SERPL-SCNC: 1 MMOL/L (ref 0.5–2)
DEPRECATED RDW RBC AUTO: 46.4 FL (ref 37–54)
EGFRCR SERPLBLD CKD-EPI 2021: 41.5 ML/MIN/1.73
EOSINOPHIL # BLD AUTO: 0.2 10*3/MM3 (ref 0–0.4)
EOSINOPHIL NFR BLD AUTO: 3.2 % (ref 0.3–6.2)
ERYTHROCYTE [DISTWIDTH] IN BLOOD BY AUTOMATED COUNT: 13.7 % (ref 12.3–15.4)
GLUCOSE BLDC GLUCOMTR-MCNC: 110 MG/DL (ref 70–105)
GLUCOSE BLDC GLUCOMTR-MCNC: 115 MG/DL (ref 70–105)
GLUCOSE BLDC GLUCOMTR-MCNC: 185 MG/DL (ref 70–105)
GLUCOSE BLDC GLUCOMTR-MCNC: 95 MG/DL (ref 70–105)
GLUCOSE BLDC GLUCOMTR-MCNC: 97 MG/DL (ref 70–105)
GLUCOSE SERPL-MCNC: 104 MG/DL (ref 65–99)
HCT VFR BLD AUTO: 41.9 % (ref 34–46.6)
HGB BLD-MCNC: 13.6 G/DL (ref 12–15.9)
LYMPHOCYTES # BLD AUTO: 1.9 10*3/MM3 (ref 0.7–3.1)
LYMPHOCYTES NFR BLD AUTO: 25.3 % (ref 19.6–45.3)
MCH RBC QN AUTO: 31.6 PG (ref 26.6–33)
MCHC RBC AUTO-ENTMCNC: 32.5 G/DL (ref 31.5–35.7)
MCV RBC AUTO: 97.3 FL (ref 79–97)
MONOCYTES # BLD AUTO: 0.7 10*3/MM3 (ref 0.1–0.9)
MONOCYTES NFR BLD AUTO: 8.8 % (ref 5–12)
NEUTROPHILS NFR BLD AUTO: 4.7 10*3/MM3 (ref 1.7–7)
NEUTROPHILS NFR BLD AUTO: 61.2 % (ref 42.7–76)
NRBC BLD AUTO-RTO: 0 /100 WBC (ref 0–0.2)
NT-PROBNP SERPL-MCNC: 543.9 PG/ML (ref 0–1800)
PLATELET # BLD AUTO: 223 10*3/MM3 (ref 140–450)
PMV BLD AUTO: 8.4 FL (ref 6–12)
POTASSIUM SERPL-SCNC: 4.3 MMOL/L (ref 3.5–5.2)
RBC # BLD AUTO: 4.31 10*6/MM3 (ref 3.77–5.28)
SODIUM SERPL-SCNC: 142 MMOL/L (ref 136–145)
WBC NRBC COR # BLD: 7.7 10*3/MM3 (ref 3.4–10.8)

## 2023-01-24 PROCEDURE — 25010000002 DIPHENHYDRAMINE PER 50 MG: Performed by: EMERGENCY MEDICINE

## 2023-01-24 PROCEDURE — 25010000002 MORPHINE PER 10 MG: Performed by: NURSE PRACTITIONER

## 2023-01-24 PROCEDURE — 87040 BLOOD CULTURE FOR BACTERIA: CPT | Performed by: NURSE PRACTITIONER

## 2023-01-24 PROCEDURE — 94799 UNLISTED PULMONARY SVC/PX: CPT

## 2023-01-24 PROCEDURE — 25010000002 CEFTRIAXONE PER 250 MG: Performed by: NURSE PRACTITIONER

## 2023-01-24 PROCEDURE — 99214 OFFICE O/P EST MOD 30 MIN: CPT | Performed by: NURSE PRACTITIONER

## 2023-01-24 PROCEDURE — 96376 TX/PRO/DX INJ SAME DRUG ADON: CPT

## 2023-01-24 PROCEDURE — 25010000002 ONDANSETRON PER 1 MG: Performed by: EMERGENCY MEDICINE

## 2023-01-24 PROCEDURE — 96375 TX/PRO/DX INJ NEW DRUG ADDON: CPT

## 2023-01-24 PROCEDURE — 85025 COMPLETE CBC W/AUTO DIFF WBC: CPT | Performed by: NURSE PRACTITIONER

## 2023-01-24 PROCEDURE — 63710000001 INSULIN GLARGINE PER 5 UNITS: Performed by: NURSE PRACTITIONER

## 2023-01-24 PROCEDURE — 83605 ASSAY OF LACTIC ACID: CPT | Performed by: NURSE PRACTITIONER

## 2023-01-24 PROCEDURE — 83880 ASSAY OF NATRIURETIC PEPTIDE: CPT | Performed by: NURSE PRACTITIONER

## 2023-01-24 PROCEDURE — 82962 GLUCOSE BLOOD TEST: CPT

## 2023-01-24 PROCEDURE — 25010000002 ONDANSETRON PER 1 MG: Performed by: NURSE PRACTITIONER

## 2023-01-24 PROCEDURE — G0378 HOSPITAL OBSERVATION PER HR: HCPCS

## 2023-01-24 PROCEDURE — 94760 N-INVAS EAR/PLS OXIMETRY 1: CPT

## 2023-01-24 PROCEDURE — 96365 THER/PROPH/DIAG IV INF INIT: CPT

## 2023-01-24 PROCEDURE — 80048 BASIC METABOLIC PNL TOTAL CA: CPT | Performed by: NURSE PRACTITIONER

## 2023-01-24 RX ORDER — ONDANSETRON 2 MG/ML
8 INJECTION INTRAMUSCULAR; INTRAVENOUS ONCE
Status: COMPLETED | OUTPATIENT
Start: 2023-01-24 | End: 2023-01-24

## 2023-01-24 RX ORDER — MORPHINE SULFATE 2 MG/ML
2 INJECTION, SOLUTION INTRAMUSCULAR; INTRAVENOUS EVERY 4 HOURS PRN
Status: DISCONTINUED | OUTPATIENT
Start: 2023-01-24 | End: 2023-01-25 | Stop reason: HOSPADM

## 2023-01-24 RX ORDER — SORBITOL SOLUTION 70 %
50 SOLUTION, ORAL MISCELLANEOUS ONCE
Status: COMPLETED | OUTPATIENT
Start: 2023-01-24 | End: 2023-01-24

## 2023-01-24 RX ORDER — DIPHENHYDRAMINE HYDROCHLORIDE 50 MG/ML
50 INJECTION INTRAMUSCULAR; INTRAVENOUS ONCE
Status: COMPLETED | OUTPATIENT
Start: 2023-01-24 | End: 2023-01-24

## 2023-01-24 RX ADMIN — ASPIRIN 81 MG: 81 TABLET, COATED ORAL at 15:31

## 2023-01-24 RX ADMIN — ONDANSETRON 4 MG: 2 INJECTION INTRAMUSCULAR; INTRAVENOUS at 16:09

## 2023-01-24 RX ADMIN — ONDANSETRON 8 MG: 2 INJECTION INTRAMUSCULAR; INTRAVENOUS at 19:25

## 2023-01-24 RX ADMIN — SORBITOL SOLUTION (BULK) 50 ML: 70 SOLUTION at 15:31

## 2023-01-24 RX ADMIN — BUDESONIDE AND FORMOTEROL FUMARATE DIHYDRATE 2 PUFF: 80; 4.5 AEROSOL RESPIRATORY (INHALATION) at 06:56

## 2023-01-24 RX ADMIN — CEFTRIAXONE 2 G: 2 INJECTION, POWDER, FOR SOLUTION INTRAMUSCULAR; INTRAVENOUS at 19:05

## 2023-01-24 RX ADMIN — TERAZOSIN HYDROCHLORIDE 2 MG: 2 CAPSULE ORAL at 22:14

## 2023-01-24 RX ADMIN — Medication 10 ML: at 22:15

## 2023-01-24 RX ADMIN — MORPHINE SULFATE 2 MG: 2 INJECTION, SOLUTION INTRAMUSCULAR; INTRAVENOUS at 13:05

## 2023-01-24 RX ADMIN — DIPHENHYDRAMINE HYDROCHLORIDE 50 MG: 50 INJECTION, SOLUTION INTRAMUSCULAR; INTRAVENOUS at 19:25

## 2023-01-24 RX ADMIN — BUDESONIDE AND FORMOTEROL FUMARATE DIHYDRATE 2 PUFF: 80; 4.5 AEROSOL RESPIRATORY (INHALATION) at 19:34

## 2023-01-24 RX ADMIN — TRAMADOL HYDROCHLORIDE 100 MG: 50 TABLET, COATED ORAL at 22:14

## 2023-01-24 RX ADMIN — INSULIN GLARGINE 20 UNITS: 100 INJECTION, SOLUTION SUBCUTANEOUS at 22:14

## 2023-01-24 RX ADMIN — CARVEDILOL 3.12 MG: 3.12 TABLET, FILM COATED ORAL at 18:23

## 2023-01-25 ENCOUNTER — ON CAMPUS - OUTPATIENT (AMBULATORY)
Dept: URBAN - METROPOLITAN AREA HOSPITAL 85 | Facility: HOSPITAL | Age: 78
End: 2023-01-25

## 2023-01-25 ENCOUNTER — READMISSION MANAGEMENT (OUTPATIENT)
Dept: CALL CENTER | Facility: HOSPITAL | Age: 78
End: 2023-01-25
Payer: MEDICARE

## 2023-01-25 VITALS
RESPIRATION RATE: 18 BRPM | HEIGHT: 65 IN | BODY MASS INDEX: 32.72 KG/M2 | OXYGEN SATURATION: 94 % | TEMPERATURE: 98.4 F | DIASTOLIC BLOOD PRESSURE: 58 MMHG | WEIGHT: 196.4 LBS | SYSTOLIC BLOOD PRESSURE: 108 MMHG | HEART RATE: 73 BPM

## 2023-01-25 DIAGNOSIS — R10.12 LEFT UPPER QUADRANT PAIN: ICD-10-CM

## 2023-01-25 DIAGNOSIS — R93.2 ABNORMAL FINDINGS ON DIAGNOSTIC IMAGING OF LIVER AND BILIARY: ICD-10-CM

## 2023-01-25 DIAGNOSIS — R11.0 NAUSEA: ICD-10-CM

## 2023-01-25 DIAGNOSIS — K59.00 CONSTIPATION, UNSPECIFIED: ICD-10-CM

## 2023-01-25 LAB
ANION GAP SERPL CALCULATED.3IONS-SCNC: 9 MMOL/L (ref 5–15)
BACTERIA SPEC AEROBE CULT: NO GROWTH
BASOPHILS # BLD AUTO: 0 10*3/MM3 (ref 0–0.2)
BASOPHILS NFR BLD AUTO: 0.6 % (ref 0–1.5)
BUN SERPL-MCNC: 13 MG/DL (ref 8–23)
BUN/CREAT SERPL: 9.3 (ref 7–25)
CALCIUM SPEC-SCNC: 8.6 MG/DL (ref 8.6–10.5)
CHLORIDE SERPL-SCNC: 103 MMOL/L (ref 98–107)
CO2 SERPL-SCNC: 27 MMOL/L (ref 22–29)
CREAT SERPL-MCNC: 1.4 MG/DL (ref 0.57–1)
DEPRECATED RDW RBC AUTO: 48.1 FL (ref 37–54)
EGFRCR SERPLBLD CKD-EPI 2021: 38.3 ML/MIN/1.73
EOSINOPHIL # BLD AUTO: 0.1 10*3/MM3 (ref 0–0.4)
EOSINOPHIL NFR BLD AUTO: 1.4 % (ref 0.3–6.2)
ERYTHROCYTE [DISTWIDTH] IN BLOOD BY AUTOMATED COUNT: 13.6 % (ref 12.3–15.4)
GLUCOSE BLDC GLUCOMTR-MCNC: 85 MG/DL (ref 70–105)
GLUCOSE SERPL-MCNC: 102 MG/DL (ref 65–99)
HCT VFR BLD AUTO: 41.1 % (ref 34–46.6)
HGB BLD-MCNC: 13.8 G/DL (ref 12–15.9)
HOLD SPECIMEN: NORMAL
LYMPHOCYTES # BLD AUTO: 2.3 10*3/MM3 (ref 0.7–3.1)
LYMPHOCYTES NFR BLD AUTO: 29.9 % (ref 19.6–45.3)
MCH RBC QN AUTO: 32.1 PG (ref 26.6–33)
MCHC RBC AUTO-ENTMCNC: 33.6 G/DL (ref 31.5–35.7)
MCV RBC AUTO: 95.5 FL (ref 79–97)
MONOCYTES # BLD AUTO: 0.8 10*3/MM3 (ref 0.1–0.9)
MONOCYTES NFR BLD AUTO: 9.9 % (ref 5–12)
NEUTROPHILS NFR BLD AUTO: 4.5 10*3/MM3 (ref 1.7–7)
NEUTROPHILS NFR BLD AUTO: 58.2 % (ref 42.7–76)
NRBC BLD AUTO-RTO: 0 /100 WBC (ref 0–0.2)
PLATELET # BLD AUTO: 215 10*3/MM3 (ref 140–450)
PMV BLD AUTO: 8.4 FL (ref 6–12)
POTASSIUM SERPL-SCNC: 4 MMOL/L (ref 3.5–5.2)
RBC # BLD AUTO: 4.31 10*6/MM3 (ref 3.77–5.28)
SODIUM SERPL-SCNC: 139 MMOL/L (ref 136–145)
WBC NRBC COR # BLD: 7.7 10*3/MM3 (ref 3.4–10.8)

## 2023-01-25 PROCEDURE — 94664 DEMO&/EVAL PT USE INHALER: CPT

## 2023-01-25 PROCEDURE — G0378 HOSPITAL OBSERVATION PER HR: HCPCS

## 2023-01-25 PROCEDURE — 99212 OFFICE O/P EST SF 10 MIN: CPT | Performed by: NURSE PRACTITIONER

## 2023-01-25 PROCEDURE — 85025 COMPLETE CBC W/AUTO DIFF WBC: CPT | Performed by: NURSE PRACTITIONER

## 2023-01-25 PROCEDURE — 82962 GLUCOSE BLOOD TEST: CPT

## 2023-01-25 PROCEDURE — 94799 UNLISTED PULMONARY SVC/PX: CPT

## 2023-01-25 PROCEDURE — 80048 BASIC METABOLIC PNL TOTAL CA: CPT | Performed by: NURSE PRACTITIONER

## 2023-01-25 RX ORDER — POLYETHYLENE GLYCOL 3350 17 G/17G
17 POWDER, FOR SOLUTION ORAL 2 TIMES DAILY
Status: DISCONTINUED | OUTPATIENT
Start: 2023-01-25 | End: 2023-01-25 | Stop reason: HOSPADM

## 2023-01-25 RX ADMIN — AMLODIPINE BESYLATE 10 MG: 5 TABLET ORAL at 08:40

## 2023-01-25 RX ADMIN — SERTRALINE 50 MG: 50 TABLET, FILM COATED ORAL at 08:40

## 2023-01-25 RX ADMIN — TRAMADOL HYDROCHLORIDE 100 MG: 50 TABLET, COATED ORAL at 08:40

## 2023-01-25 RX ADMIN — CARVEDILOL 3.12 MG: 3.12 TABLET, FILM COATED ORAL at 08:40

## 2023-01-25 RX ADMIN — BUDESONIDE AND FORMOTEROL FUMARATE DIHYDRATE 2 PUFF: 80; 4.5 AEROSOL RESPIRATORY (INHALATION) at 08:54

## 2023-01-25 RX ADMIN — Medication 10 ML: at 08:40

## 2023-01-25 RX ADMIN — ASPIRIN 81 MG: 81 TABLET, COATED ORAL at 08:40

## 2023-01-26 ENCOUNTER — TRANSITIONAL CARE MANAGEMENT TELEPHONE ENCOUNTER (OUTPATIENT)
Dept: CALL CENTER | Facility: HOSPITAL | Age: 78
End: 2023-01-26
Payer: MEDICARE

## 2023-01-26 ENCOUNTER — APPOINTMENT (OUTPATIENT)
Dept: GENERAL RADIOLOGY | Facility: HOSPITAL | Age: 78
DRG: 313 | End: 2023-01-26
Payer: MEDICARE

## 2023-01-26 ENCOUNTER — HOSPITAL ENCOUNTER (INPATIENT)
Facility: HOSPITAL | Age: 78
LOS: 2 days | Discharge: HOME HEALTH | DRG: 313 | End: 2023-02-01
Attending: EMERGENCY MEDICINE | Admitting: INTERNAL MEDICINE
Payer: MEDICARE

## 2023-01-26 ENCOUNTER — APPOINTMENT (OUTPATIENT)
Dept: CT IMAGING | Facility: HOSPITAL | Age: 78
DRG: 313 | End: 2023-01-26
Payer: MEDICARE

## 2023-01-26 DIAGNOSIS — R06.00 DYSPNEA, UNSPECIFIED TYPE: ICD-10-CM

## 2023-01-26 DIAGNOSIS — R09.02 HYPOXIA: ICD-10-CM

## 2023-01-26 DIAGNOSIS — R07.9 CHEST PAIN, UNSPECIFIED TYPE: ICD-10-CM

## 2023-01-26 DIAGNOSIS — E83.52 HYPERCALCEMIA: ICD-10-CM

## 2023-01-26 DIAGNOSIS — J44.9 CHRONIC OBSTRUCTIVE PULMONARY DISEASE, UNSPECIFIED COPD TYPE: Primary | ICD-10-CM

## 2023-01-26 LAB
ANION GAP SERPL CALCULATED.3IONS-SCNC: 10 MMOL/L (ref 5–15)
ARTERIAL PATENCY WRIST A: POSITIVE
ATMOSPHERIC PRESS: ABNORMAL MM[HG]
BASE EXCESS BLDA CALC-SCNC: 3.5 MMOL/L (ref 0–3)
BASOPHILS # BLD AUTO: 0.1 10*3/MM3 (ref 0–0.2)
BASOPHILS NFR BLD AUTO: 0.8 % (ref 0–1.5)
BDY SITE: ABNORMAL
BUN SERPL-MCNC: 15 MG/DL (ref 8–23)
BUN/CREAT SERPL: 9.9 (ref 7–25)
CALCIUM SPEC-SCNC: 10.3 MG/DL (ref 8.6–10.5)
CHLORIDE SERPL-SCNC: 102 MMOL/L (ref 98–107)
CO2 BLDA-SCNC: 31.6 MMOL/L (ref 22–29)
CO2 SERPL-SCNC: 28 MMOL/L (ref 22–29)
CREAT SERPL-MCNC: 1.51 MG/DL (ref 0.57–1)
D DIMER PPP FEU-MCNC: 1.88 MG/L (FEU) (ref 0–0.79)
DEPRECATED RDW RBC AUTO: 46.4 FL (ref 37–54)
EGFRCR SERPLBLD CKD-EPI 2021: 35 ML/MIN/1.73
EOSINOPHIL # BLD AUTO: 0.2 10*3/MM3 (ref 0–0.4)
EOSINOPHIL NFR BLD AUTO: 1.8 % (ref 0.3–6.2)
ERYTHROCYTE [DISTWIDTH] IN BLOOD BY AUTOMATED COUNT: 13.7 % (ref 12.3–15.4)
GLUCOSE BLDC GLUCOMTR-MCNC: 104 MG/DL (ref 70–105)
GLUCOSE SERPL-MCNC: 124 MG/DL (ref 65–99)
HCO3 BLDA-SCNC: 30 MMOL/L (ref 21–28)
HCT VFR BLD AUTO: 43.5 % (ref 34–46.6)
HEMODILUTION: NO
HGB BLD-MCNC: 14.1 G/DL (ref 12–15.9)
HOLD SPECIMEN: NORMAL
INHALED O2 CONCENTRATION: 21 %
LYMPHOCYTES # BLD AUTO: 2 10*3/MM3 (ref 0.7–3.1)
LYMPHOCYTES NFR BLD AUTO: 23.1 % (ref 19.6–45.3)
MCH RBC QN AUTO: 31.5 PG (ref 26.6–33)
MCHC RBC AUTO-ENTMCNC: 32.4 G/DL (ref 31.5–35.7)
MCV RBC AUTO: 97.2 FL (ref 79–97)
MODALITY: ABNORMAL
MONOCYTES # BLD AUTO: 0.8 10*3/MM3 (ref 0.1–0.9)
MONOCYTES NFR BLD AUTO: 9.8 % (ref 5–12)
NEUTROPHILS NFR BLD AUTO: 5.5 10*3/MM3 (ref 1.7–7)
NEUTROPHILS NFR BLD AUTO: 64.5 % (ref 42.7–76)
NRBC BLD AUTO-RTO: 0 /100 WBC (ref 0–0.2)
NT-PROBNP SERPL-MCNC: 344.6 PG/ML (ref 0–1800)
PCO2 BLDA: 51.2 MM HG (ref 35–48)
PH BLDA: 7.38 PH UNITS (ref 7.35–7.45)
PLATELET # BLD AUTO: 232 10*3/MM3 (ref 140–450)
PMV BLD AUTO: 8.1 FL (ref 6–12)
PO2 BLDA: 61.8 MM HG (ref 83–108)
POTASSIUM SERPL-SCNC: 4.1 MMOL/L (ref 3.5–5.2)
RBC # BLD AUTO: 4.48 10*6/MM3 (ref 3.77–5.28)
SAO2 % BLDCOA: 90.3 % (ref 94–98)
SODIUM SERPL-SCNC: 140 MMOL/L (ref 136–145)
TROPONIN T SERPL-MCNC: <0.01 NG/ML (ref 0–0.03)
WBC NRBC COR # BLD: 8.6 10*3/MM3 (ref 3.4–10.8)

## 2023-01-26 PROCEDURE — 84484 ASSAY OF TROPONIN QUANT: CPT | Performed by: EMERGENCY MEDICINE

## 2023-01-26 PROCEDURE — 83880 ASSAY OF NATRIURETIC PEPTIDE: CPT | Performed by: EMERGENCY MEDICINE

## 2023-01-26 PROCEDURE — 25010000002 HYDRALAZINE PER 20 MG

## 2023-01-26 PROCEDURE — 85379 FIBRIN DEGRADATION QUANT: CPT | Performed by: EMERGENCY MEDICINE

## 2023-01-26 PROCEDURE — 82803 BLOOD GASES ANY COMBINATION: CPT

## 2023-01-26 PROCEDURE — G0378 HOSPITAL OBSERVATION PER HR: HCPCS

## 2023-01-26 PROCEDURE — 25010000002 MORPHINE PER 10 MG

## 2023-01-26 PROCEDURE — 80048 BASIC METABOLIC PNL TOTAL CA: CPT | Performed by: EMERGENCY MEDICINE

## 2023-01-26 PROCEDURE — 85025 COMPLETE CBC W/AUTO DIFF WBC: CPT | Performed by: EMERGENCY MEDICINE

## 2023-01-26 PROCEDURE — 36600 WITHDRAWAL OF ARTERIAL BLOOD: CPT

## 2023-01-26 PROCEDURE — 93005 ELECTROCARDIOGRAM TRACING: CPT | Performed by: EMERGENCY MEDICINE

## 2023-01-26 PROCEDURE — 25010000002 ENOXAPARIN PER 10 MG: Performed by: EMERGENCY MEDICINE

## 2023-01-26 PROCEDURE — 93005 ELECTROCARDIOGRAM TRACING: CPT

## 2023-01-26 PROCEDURE — 82962 GLUCOSE BLOOD TEST: CPT

## 2023-01-26 PROCEDURE — 99284 EMERGENCY DEPT VISIT MOD MDM: CPT

## 2023-01-26 PROCEDURE — 71045 X-RAY EXAM CHEST 1 VIEW: CPT

## 2023-01-26 PROCEDURE — 71250 CT THORAX DX C-: CPT

## 2023-01-26 RX ORDER — SODIUM CHLORIDE 0.9 % (FLUSH) 0.9 %
10 SYRINGE (ML) INJECTION AS NEEDED
Status: DISCONTINUED | OUTPATIENT
Start: 2023-01-26 | End: 2023-02-01 | Stop reason: HOSPADM

## 2023-01-26 RX ORDER — CLINDAMYCIN HYDROCHLORIDE 300 MG/1
300 CAPSULE ORAL 3 TIMES DAILY
Status: COMPLETED | OUTPATIENT
Start: 2023-01-27 | End: 2023-01-30

## 2023-01-26 RX ORDER — GABAPENTIN 100 MG/1
200 CAPSULE ORAL EVERY 6 HOURS PRN
Status: DISCONTINUED | OUTPATIENT
Start: 2023-01-27 | End: 2023-02-01 | Stop reason: HOSPADM

## 2023-01-26 RX ORDER — TRAMADOL HYDROCHLORIDE 50 MG/1
100 TABLET ORAL 2 TIMES DAILY
Status: DISCONTINUED | OUTPATIENT
Start: 2023-01-27 | End: 2023-02-01 | Stop reason: HOSPADM

## 2023-01-26 RX ORDER — ALBUTEROL SULFATE 2.5 MG/3ML
2.5 SOLUTION RESPIRATORY (INHALATION) EVERY 4 HOURS PRN
Status: DISCONTINUED | OUTPATIENT
Start: 2023-01-26 | End: 2023-02-01 | Stop reason: HOSPADM

## 2023-01-26 RX ORDER — INSULIN GLARGINE 100 [IU]/ML
20 INJECTION, SOLUTION SUBCUTANEOUS NIGHTLY
Status: DISCONTINUED | OUTPATIENT
Start: 2023-01-27 | End: 2023-02-01 | Stop reason: HOSPADM

## 2023-01-26 RX ORDER — ONDANSETRON 2 MG/ML
4 INJECTION INTRAMUSCULAR; INTRAVENOUS EVERY 6 HOURS PRN
Status: DISCONTINUED | OUTPATIENT
Start: 2023-01-26 | End: 2023-02-01 | Stop reason: HOSPADM

## 2023-01-26 RX ORDER — ASPIRIN 81 MG/1
81 TABLET ORAL DAILY
Status: DISCONTINUED | OUTPATIENT
Start: 2023-01-27 | End: 2023-02-01 | Stop reason: HOSPADM

## 2023-01-26 RX ORDER — SODIUM CHLORIDE 9 MG/ML
40 INJECTION, SOLUTION INTRAVENOUS AS NEEDED
Status: DISCONTINUED | OUTPATIENT
Start: 2023-01-26 | End: 2023-02-01 | Stop reason: HOSPADM

## 2023-01-26 RX ORDER — BUDESONIDE AND FORMOTEROL FUMARATE DIHYDRATE 80; 4.5 UG/1; UG/1
2 AEROSOL RESPIRATORY (INHALATION)
Status: DISCONTINUED | OUTPATIENT
Start: 2023-01-27 | End: 2023-01-27

## 2023-01-26 RX ORDER — SODIUM CHLORIDE 0.9 % (FLUSH) 0.9 %
3 SYRINGE (ML) INJECTION EVERY 12 HOURS SCHEDULED
Status: DISCONTINUED | OUTPATIENT
Start: 2023-01-27 | End: 2023-02-01 | Stop reason: HOSPADM

## 2023-01-26 RX ORDER — ACETAMINOPHEN 325 MG/1
650 TABLET ORAL EVERY 4 HOURS PRN
Status: DISCONTINUED | OUTPATIENT
Start: 2023-01-26 | End: 2023-02-01 | Stop reason: HOSPADM

## 2023-01-26 RX ORDER — CARVEDILOL 3.12 MG/1
3.12 TABLET ORAL 2 TIMES DAILY WITH MEALS
Status: DISCONTINUED | OUTPATIENT
Start: 2023-01-27 | End: 2023-02-01 | Stop reason: HOSPADM

## 2023-01-26 RX ORDER — SODIUM CHLORIDE 0.9 % (FLUSH) 0.9 %
3-10 SYRINGE (ML) INJECTION AS NEEDED
Status: DISCONTINUED | OUTPATIENT
Start: 2023-01-26 | End: 2023-02-01 | Stop reason: HOSPADM

## 2023-01-26 RX ORDER — HYDRALAZINE HYDROCHLORIDE 20 MG/ML
10 INJECTION INTRAMUSCULAR; INTRAVENOUS EVERY 6 HOURS PRN
Status: DISCONTINUED | OUTPATIENT
Start: 2023-01-26 | End: 2023-02-01 | Stop reason: HOSPADM

## 2023-01-26 RX ORDER — TERAZOSIN 2 MG/1
2 CAPSULE ORAL NIGHTLY
Status: DISCONTINUED | OUTPATIENT
Start: 2023-01-27 | End: 2023-02-01 | Stop reason: HOSPADM

## 2023-01-26 RX ORDER — AMLODIPINE BESYLATE 5 MG/1
10 TABLET ORAL DAILY
Status: DISCONTINUED | OUTPATIENT
Start: 2023-01-27 | End: 2023-02-01 | Stop reason: HOSPADM

## 2023-01-26 RX ORDER — ENOXAPARIN SODIUM 100 MG/ML
1 INJECTION SUBCUTANEOUS ONCE
Status: COMPLETED | OUTPATIENT
Start: 2023-01-26 | End: 2023-01-26

## 2023-01-26 RX ADMIN — Medication 3 ML: at 23:47

## 2023-01-26 RX ADMIN — MORPHINE SULFATE 4 MG: 4 INJECTION, SOLUTION INTRAMUSCULAR; INTRAVENOUS at 23:47

## 2023-01-26 RX ADMIN — ENOXAPARIN SODIUM 90 MG: 100 INJECTION SUBCUTANEOUS at 23:49

## 2023-01-26 RX ADMIN — HYDRALAZINE HYDROCHLORIDE 10 MG: 20 INJECTION INTRAMUSCULAR; INTRAVENOUS at 23:48

## 2023-01-26 NOTE — PROGRESS NOTES
Vianey is in pain in her abdomen worse than when she went to [facility]. It hurts worse with movement she says. She was not sure she could come to the office today. I reviewed her hospital reports with her over the phone. She said her pain is pretty bad and worsening. I asked her to go back to hospital since it is worsening. I also spoke with her daughter Yazmin who is going to check on her.

## 2023-01-26 NOTE — OUTREACH NOTE
Call Center TCM Note    Flowsheet Row Responses   [facility] [place] facility patient discharged from? [facility]   Does the patient have one of the following disease processes/diagnoses(primary or secondary)? Other   TCM attempt successful? No   Unsuccessful attempts Attempt 1          Lanie Reddy MA    1/26/2023, 09:32 EST

## 2023-01-26 NOTE — OUTREACH NOTE
Call Center TCM Note    Flowsheet Row Responses   [facility] facility patient discharged from? [facility]   Does the patient have one of the following disease processes/diagnoses(primary or secondary)? Other   TCM attempt successful? No   Unsuccessful attempts Attempt 2          Lanie Reddy MA    1/26/2023, 14:49 EST

## 2023-01-26 NOTE — OUTREACH NOTE
Prep Survey    Flowsheet Row Responses   [facility] patient discharged from? [facility]   Is LACE score < 7 ? No   Eligibility [facility]   Date of Admission 01/23/23   Date of Discharge 01/25/23   Discharge Disposition Home or Self Care   Discharge diagnosis Abdominal pain   Does the patient have one of the following disease processes/diagnoses(primary or secondary)? Other   Does the patient have Home health ordered? No   Is there a DME ordered? No   Prep survey completed? Yes          AURELIA TERRY - Registered Nurse

## 2023-01-27 ENCOUNTER — APPOINTMENT (OUTPATIENT)
Dept: NUCLEAR MEDICINE | Facility: HOSPITAL | Age: 78
DRG: 313 | End: 2023-01-27
Payer: MEDICARE

## 2023-01-27 ENCOUNTER — APPOINTMENT (OUTPATIENT)
Dept: GENERAL RADIOLOGY | Facility: HOSPITAL | Age: 78
DRG: 313 | End: 2023-01-27
Payer: MEDICARE

## 2023-01-27 LAB
ANION GAP SERPL CALCULATED.3IONS-SCNC: 7 MMOL/L (ref 5–15)
B PARAPERT DNA SPEC QL NAA+PROBE: NOT DETECTED
B PERT DNA SPEC QL NAA+PROBE: NOT DETECTED
BASOPHILS # BLD AUTO: 0 10*3/MM3 (ref 0–0.2)
BASOPHILS NFR BLD AUTO: 0.4 % (ref 0–1.5)
BUN SERPL-MCNC: 14 MG/DL (ref 8–23)
BUN/CREAT SERPL: 11.1 (ref 7–25)
C PNEUM DNA NPH QL NAA+NON-PROBE: NOT DETECTED
CALCIUM SPEC-SCNC: 9.4 MG/DL (ref 8.6–10.5)
CHLORIDE SERPL-SCNC: 105 MMOL/L (ref 98–107)
CO2 SERPL-SCNC: 31 MMOL/L (ref 22–29)
CREAT SERPL-MCNC: 1.26 MG/DL (ref 0.57–1)
DEPRECATED RDW RBC AUTO: 46.4 FL (ref 37–54)
EGFRCR SERPLBLD CKD-EPI 2021: 43.5 ML/MIN/1.73
EOSINOPHIL # BLD AUTO: 0.1 10*3/MM3 (ref 0–0.4)
EOSINOPHIL NFR BLD AUTO: 0.9 % (ref 0.3–6.2)
ERYTHROCYTE [DISTWIDTH] IN BLOOD BY AUTOMATED COUNT: 13.7 % (ref 12.3–15.4)
FLUAV SUBTYP SPEC NAA+PROBE: NOT DETECTED
FLUBV RNA ISLT QL NAA+PROBE: NOT DETECTED
GLUCOSE BLDC GLUCOMTR-MCNC: 125 MG/DL (ref 70–105)
GLUCOSE SERPL-MCNC: 185 MG/DL (ref 65–99)
HADV DNA SPEC NAA+PROBE: NOT DETECTED
HCOV 229E RNA SPEC QL NAA+PROBE: NOT DETECTED
HCOV HKU1 RNA SPEC QL NAA+PROBE: NOT DETECTED
HCOV NL63 RNA SPEC QL NAA+PROBE: NOT DETECTED
HCOV OC43 RNA SPEC QL NAA+PROBE: NOT DETECTED
HCT VFR BLD AUTO: 41.1 % (ref 34–46.6)
HGB BLD-MCNC: 13.5 G/DL (ref 12–15.9)
HMPV RNA NPH QL NAA+NON-PROBE: NOT DETECTED
HPIV1 RNA ISLT QL NAA+PROBE: NOT DETECTED
HPIV2 RNA SPEC QL NAA+PROBE: NOT DETECTED
HPIV3 RNA NPH QL NAA+PROBE: NOT DETECTED
HPIV4 P GENE NPH QL NAA+PROBE: NOT DETECTED
LYMPHOCYTES # BLD AUTO: 1.7 10*3/MM3 (ref 0.7–3.1)
LYMPHOCYTES NFR BLD AUTO: 23.1 % (ref 19.6–45.3)
M PNEUMO IGG SER IA-ACNC: NOT DETECTED
MCH RBC QN AUTO: 31.8 PG (ref 26.6–33)
MCHC RBC AUTO-ENTMCNC: 32.9 G/DL (ref 31.5–35.7)
MCV RBC AUTO: 96.5 FL (ref 79–97)
MONOCYTES # BLD AUTO: 0.6 10*3/MM3 (ref 0.1–0.9)
MONOCYTES NFR BLD AUTO: 7.6 % (ref 5–12)
NEUTROPHILS NFR BLD AUTO: 4.9 10*3/MM3 (ref 1.7–7)
NEUTROPHILS NFR BLD AUTO: 68 % (ref 42.7–76)
NRBC BLD AUTO-RTO: 0 /100 WBC (ref 0–0.2)
PLATELET # BLD AUTO: 229 10*3/MM3 (ref 140–450)
PMV BLD AUTO: 8.1 FL (ref 6–12)
POTASSIUM SERPL-SCNC: 4.1 MMOL/L (ref 3.5–5.2)
RBC # BLD AUTO: 4.26 10*6/MM3 (ref 3.77–5.28)
RHINOVIRUS RNA SPEC NAA+PROBE: NOT DETECTED
RSV RNA NPH QL NAA+NON-PROBE: NOT DETECTED
SARS-COV-2 RNA NPH QL NAA+NON-PROBE: NOT DETECTED
SODIUM SERPL-SCNC: 143 MMOL/L (ref 136–145)
WBC NRBC COR # BLD: 7.2 10*3/MM3 (ref 3.4–10.8)

## 2023-01-27 PROCEDURE — 0 TECHNETIUM TC99M PYROPHOSPHATE: Performed by: INTERNAL MEDICINE

## 2023-01-27 PROCEDURE — 82962 GLUCOSE BLOOD TEST: CPT

## 2023-01-27 PROCEDURE — 97161 PT EVAL LOW COMPLEX 20 MIN: CPT

## 2023-01-27 PROCEDURE — 78582 LUNG VENTILAT&PERFUS IMAGING: CPT

## 2023-01-27 PROCEDURE — G0378 HOSPITAL OBSERVATION PER HR: HCPCS

## 2023-01-27 PROCEDURE — A9540 TC99M MAA: HCPCS | Performed by: INTERNAL MEDICINE

## 2023-01-27 PROCEDURE — 0202U NFCT DS 22 TRGT SARS-COV-2: CPT | Performed by: INTERNAL MEDICINE

## 2023-01-27 PROCEDURE — A9538 TC99M PYROPHOSPHATE: HCPCS | Performed by: INTERNAL MEDICINE

## 2023-01-27 PROCEDURE — 25010000002 MORPHINE PER 10 MG

## 2023-01-27 PROCEDURE — 80048 BASIC METABOLIC PNL TOTAL CA: CPT

## 2023-01-27 PROCEDURE — 0 TECHNETIUM ALBUMIN AGGREGATED: Performed by: INTERNAL MEDICINE

## 2023-01-27 PROCEDURE — 71046 X-RAY EXAM CHEST 2 VIEWS: CPT

## 2023-01-27 PROCEDURE — 63710000001 INSULIN GLARGINE PER 5 UNITS

## 2023-01-27 PROCEDURE — 25010000002 ONDANSETRON PER 1 MG

## 2023-01-27 PROCEDURE — 85025 COMPLETE CBC W/AUTO DIFF WBC: CPT

## 2023-01-27 RX ORDER — CALCIUM CARBONATE 200(500)MG
2 TABLET,CHEWABLE ORAL 3 TIMES DAILY PRN
Status: DISCONTINUED | OUTPATIENT
Start: 2023-01-27 | End: 2023-02-01 | Stop reason: HOSPADM

## 2023-01-27 RX ORDER — PANTOPRAZOLE SODIUM 40 MG/1
40 TABLET, DELAYED RELEASE ORAL
Status: DISCONTINUED | OUTPATIENT
Start: 2023-01-27 | End: 2023-02-01 | Stop reason: HOSPADM

## 2023-01-27 RX ORDER — BUDESONIDE 0.5 MG/2ML
0.5 INHALANT ORAL
Status: DISCONTINUED | OUTPATIENT
Start: 2023-01-27 | End: 2023-02-01 | Stop reason: HOSPADM

## 2023-01-27 RX ADMIN — CLINDAMYCIN HYDROCHLORIDE 300 MG: 300 CAPSULE ORAL at 09:33

## 2023-01-27 RX ADMIN — ASPIRIN 81 MG: 81 TABLET, COATED ORAL at 09:33

## 2023-01-27 RX ADMIN — CARVEDILOL 3.12 MG: 3.12 TABLET, FILM COATED ORAL at 09:32

## 2023-01-27 RX ADMIN — ONDANSETRON 4 MG: 2 INJECTION INTRAMUSCULAR; INTRAVENOUS at 21:48

## 2023-01-27 RX ADMIN — Medication 3 ML: at 21:48

## 2023-01-27 RX ADMIN — Medication 3 ML: at 09:39

## 2023-01-27 RX ADMIN — INSULIN GLARGINE 20 UNITS: 100 INJECTION, SOLUTION SUBCUTANEOUS at 22:03

## 2023-01-27 RX ADMIN — TRAMADOL HYDROCHLORIDE 100 MG: 50 TABLET, COATED ORAL at 09:41

## 2023-01-27 RX ADMIN — CLINDAMYCIN HYDROCHLORIDE 300 MG: 300 CAPSULE ORAL at 21:48

## 2023-01-27 RX ADMIN — MORPHINE SULFATE 4 MG: 4 INJECTION, SOLUTION INTRAMUSCULAR; INTRAVENOUS at 21:48

## 2023-01-27 RX ADMIN — CARVEDILOL 3.12 MG: 3.12 TABLET, FILM COATED ORAL at 17:46

## 2023-01-27 RX ADMIN — TERAZOSIN HYDROCHLORIDE 2 MG: 2 CAPSULE ORAL at 22:03

## 2023-01-27 RX ADMIN — SERTRALINE 50 MG: 50 TABLET, FILM COATED ORAL at 09:33

## 2023-01-27 RX ADMIN — KIT FOR THE PREPARATION OF TECHNETIUM TC 99M ALBUMIN AGGREGATED 1 DOSE: 2.5 INJECTION, POWDER, FOR SOLUTION INTRAVENOUS at 08:58

## 2023-01-27 RX ADMIN — ONDANSETRON 4 MG: 2 INJECTION INTRAMUSCULAR; INTRAVENOUS at 06:00

## 2023-01-27 RX ADMIN — CLINDAMYCIN HYDROCHLORIDE 300 MG: 300 CAPSULE ORAL at 16:13

## 2023-01-27 RX ADMIN — MORPHINE SULFATE 4 MG: 4 INJECTION, SOLUTION INTRAMUSCULAR; INTRAVENOUS at 06:01

## 2023-01-27 RX ADMIN — LINAGLIPTIN 5 MG: 5 TABLET, FILM COATED ORAL at 09:33

## 2023-01-27 RX ADMIN — AMLODIPINE BESYLATE 10 MG: 5 TABLET ORAL at 09:32

## 2023-01-27 RX ADMIN — PANTOPRAZOLE SODIUM 40 MG: 40 TABLET, DELAYED RELEASE ORAL at 21:48

## 2023-01-27 RX ADMIN — TECHNETIUM TC99M PYROPHOSPHATE 1 DOSE: 12 INJECTION INTRAVENOUS at 08:59

## 2023-01-27 RX ADMIN — TRAMADOL HYDROCHLORIDE 100 MG: 50 TABLET, COATED ORAL at 21:48

## 2023-01-27 RX ADMIN — Medication 10 ML: at 06:00

## 2023-01-27 NOTE — OUTREACH NOTE
Call Center TCM Note    Flowsheet Row Responses   [facility] facility patient discharged from? [facility]   Does the patient have one of the following disease processes/diagnoses(primary or secondary)? Other   TCM attempt successful? No   Unsuccessful attempts Attempt 1   Change in Health Status Readmitted          Sherri Killian RN    1/26/2023, 22:29 EST

## 2023-01-28 ENCOUNTER — INPATIENT HOSPITAL (AMBULATORY)
Dept: URBAN - METROPOLITAN AREA HOSPITAL 84 | Facility: HOSPITAL | Age: 78
End: 2023-01-28

## 2023-01-28 ENCOUNTER — APPOINTMENT (OUTPATIENT)
Dept: GENERAL RADIOLOGY | Facility: HOSPITAL | Age: 78
DRG: 313 | End: 2023-01-28
Payer: MEDICARE

## 2023-01-28 ENCOUNTER — APPOINTMENT (OUTPATIENT)
Dept: CARDIOLOGY | Facility: HOSPITAL | Age: 78
DRG: 313 | End: 2023-01-28
Payer: MEDICARE

## 2023-01-28 DIAGNOSIS — R10.12 LEFT UPPER QUADRANT PAIN: ICD-10-CM

## 2023-01-28 LAB
ANION GAP SERPL CALCULATED.3IONS-SCNC: 6 MMOL/L (ref 5–15)
BASOPHILS # BLD AUTO: 0.1 10*3/MM3 (ref 0–0.2)
BASOPHILS NFR BLD AUTO: 0.8 % (ref 0–1.5)
BH CV ECHO MEAS - ACS: 2.1 CM
BH CV ECHO MEAS - AI P1/2T: 660.5 MSEC
BH CV ECHO MEAS - AO MAX PG: 15.1 MMHG
BH CV ECHO MEAS - AO MEAN PG: 9 MMHG
BH CV ECHO MEAS - AO ROOT DIAM: 2.7 CM
BH CV ECHO MEAS - AO V2 MAX: 194 CM/SEC
BH CV ECHO MEAS - AO V2 VTI: 42 CM
BH CV ECHO MEAS - AVA(I,D): 1.88 CM2
BH CV ECHO MEAS - EDV(CUBED): 175.6 ML
BH CV ECHO MEAS - EDV(MOD-SP2): 66.8 ML
BH CV ECHO MEAS - EDV(MOD-SP4): 83.8 ML
BH CV ECHO MEAS - EF(MOD-BP): 54.9 %
BH CV ECHO MEAS - EF(MOD-SP2): 50.4 %
BH CV ECHO MEAS - EF(MOD-SP4): 61.6 %
BH CV ECHO MEAS - ESV(MOD-SP2): 33.1 ML
BH CV ECHO MEAS - ESV(MOD-SP4): 32.2 ML
BH CV ECHO MEAS - FS: 30.4 %
BH CV ECHO MEAS - IVS/LVPW: 0.8 CM
BH CV ECHO MEAS - IVSD: 0.8 CM
BH CV ECHO MEAS - LA DIMENSION: 3.6 CM
BH CV ECHO MEAS - LAT PEAK E' VEL: 10.9 CM/SEC
BH CV ECHO MEAS - LV DIASTOLIC VOL/BSA (35-75): 42.8 CM2
BH CV ECHO MEAS - LV MASS(C)D: 191.6 GRAMS
BH CV ECHO MEAS - LV MAX PG: 6 MMHG
BH CV ECHO MEAS - LV MEAN PG: 4 MMHG
BH CV ECHO MEAS - LV SYSTOLIC VOL/BSA (12-30): 16.5 CM2
BH CV ECHO MEAS - LV V1 MAX: 122 CM/SEC
BH CV ECHO MEAS - LV V1 VTI: 27.8 CM
BH CV ECHO MEAS - LVIDD: 5.6 CM
BH CV ECHO MEAS - LVIDS: 3.9 CM
BH CV ECHO MEAS - LVOT AREA: 2.8 CM2
BH CV ECHO MEAS - LVOT DIAM: 1.9 CM
BH CV ECHO MEAS - LVPWD: 1 CM
BH CV ECHO MEAS - MED PEAK E' VEL: 6.4 CM/SEC
BH CV ECHO MEAS - MR MAX PG: 108.6 MMHG
BH CV ECHO MEAS - MR MAX VEL: 521 CM/SEC
BH CV ECHO MEAS - MV A DUR: 0.12 SEC
BH CV ECHO MEAS - MV A MAX VEL: 140 CM/SEC
BH CV ECHO MEAS - MV DEC SLOPE: 632 CM/SEC2
BH CV ECHO MEAS - MV DEC TIME: 0.18 MSEC
BH CV ECHO MEAS - MV E MAX VEL: 107 CM/SEC
BH CV ECHO MEAS - MV E/A: 0.76
BH CV ECHO MEAS - MV MAX PG: 9.6 MMHG
BH CV ECHO MEAS - MV MEAN PG: 4 MMHG
BH CV ECHO MEAS - MV P1/2T: 54.2 MSEC
BH CV ECHO MEAS - MV V2 VTI: 39.3 CM
BH CV ECHO MEAS - MVA(P1/2T): 4.1 CM2
BH CV ECHO MEAS - MVA(VTI): 2.01 CM2
BH CV ECHO MEAS - PA V2 MAX: 87.5 CM/SEC
BH CV ECHO MEAS - PULM A REVS DUR: 0.13 SEC
BH CV ECHO MEAS - PULM A REVS VEL: 29.3 CM/SEC
BH CV ECHO MEAS - PULM DIAS VEL: 33.1 CM/SEC
BH CV ECHO MEAS - PULM S/D: 1.55
BH CV ECHO MEAS - PULM SYS VEL: 51.4 CM/SEC
BH CV ECHO MEAS - RAP SYSTOLE: 3 MMHG
BH CV ECHO MEAS - RV MAX PG: 2.3 MMHG
BH CV ECHO MEAS - RV V1 MAX: 75.8 CM/SEC
BH CV ECHO MEAS - RV V1 VTI: 17.9 CM
BH CV ECHO MEAS - RVSP: 37.3 MMHG
BH CV ECHO MEAS - SI(MOD-SP2): 17.2 ML/M2
BH CV ECHO MEAS - SI(MOD-SP4): 26.4 ML/M2
BH CV ECHO MEAS - SV(LVOT): 78.8 ML
BH CV ECHO MEAS - SV(MOD-SP2): 33.7 ML
BH CV ECHO MEAS - SV(MOD-SP4): 51.6 ML
BH CV ECHO MEAS - TAPSE (>1.6): 1.94 CM
BH CV ECHO MEAS - TR MAX PG: 34.3 MMHG
BH CV ECHO MEAS - TR MAX VEL: 293 CM/SEC
BH CV ECHO MEASUREMENTS AVERAGE E/E' RATIO: 12.37
BH CV XLRA - TDI S': 11.5 CM/SEC
BUN SERPL-MCNC: 16 MG/DL (ref 8–23)
BUN/CREAT SERPL: 10.8 (ref 7–25)
CALCIUM SPEC-SCNC: 8.7 MG/DL (ref 8.6–10.5)
CHLORIDE SERPL-SCNC: 102 MMOL/L (ref 98–107)
CO2 SERPL-SCNC: 32 MMOL/L (ref 22–29)
CREAT SERPL-MCNC: 1.48 MG/DL (ref 0.57–1)
DEPRECATED RDW RBC AUTO: 48.6 FL (ref 37–54)
EGFRCR SERPLBLD CKD-EPI 2021: 35.9 ML/MIN/1.73
EOSINOPHIL # BLD AUTO: 0.1 10*3/MM3 (ref 0–0.4)
EOSINOPHIL NFR BLD AUTO: 1.9 % (ref 0.3–6.2)
ERYTHROCYTE [DISTWIDTH] IN BLOOD BY AUTOMATED COUNT: 13.6 % (ref 12.3–15.4)
GLUCOSE BLDC GLUCOMTR-MCNC: 165 MG/DL (ref 70–105)
GLUCOSE SERPL-MCNC: 108 MG/DL (ref 65–99)
HCT VFR BLD AUTO: 38.4 % (ref 34–46.6)
HGB BLD-MCNC: 13 G/DL (ref 12–15.9)
LEFT ATRIUM VOLUME INDEX: 36.1 ML/M2
LYMPHOCYTES # BLD AUTO: 2.2 10*3/MM3 (ref 0.7–3.1)
LYMPHOCYTES NFR BLD AUTO: 29.8 % (ref 19.6–45.3)
MAXIMAL PREDICTED HEART RATE: 141 BPM
MCH RBC QN AUTO: 32.6 PG (ref 26.6–33)
MCHC RBC AUTO-ENTMCNC: 33.8 G/DL (ref 31.5–35.7)
MCV RBC AUTO: 96.4 FL (ref 79–97)
MONOCYTES # BLD AUTO: 0.8 10*3/MM3 (ref 0.1–0.9)
MONOCYTES NFR BLD AUTO: 10.4 % (ref 5–12)
NEUTROPHILS NFR BLD AUTO: 4.1 10*3/MM3 (ref 1.7–7)
NEUTROPHILS NFR BLD AUTO: 57.1 % (ref 42.7–76)
NRBC BLD AUTO-RTO: 0.1 /100 WBC (ref 0–0.2)
PLATELET # BLD AUTO: 203 10*3/MM3 (ref 140–450)
PMV BLD AUTO: 8 FL (ref 6–12)
POTASSIUM SERPL-SCNC: 4 MMOL/L (ref 3.5–5.2)
RBC # BLD AUTO: 3.98 10*6/MM3 (ref 3.77–5.28)
SINUS: 3.2 CM
SODIUM SERPL-SCNC: 140 MMOL/L (ref 136–145)
STJ: 2.3 CM
STRESS TARGET HR: 120 BPM
WBC NRBC COR # BLD: 7.3 10*3/MM3 (ref 3.4–10.8)

## 2023-01-28 PROCEDURE — 25010000002 ONDANSETRON PER 1 MG

## 2023-01-28 PROCEDURE — 93306 TTE W/DOPPLER COMPLETE: CPT | Performed by: INTERNAL MEDICINE

## 2023-01-28 PROCEDURE — 93306 TTE W/DOPPLER COMPLETE: CPT

## 2023-01-28 PROCEDURE — 94799 UNLISTED PULMONARY SVC/PX: CPT

## 2023-01-28 PROCEDURE — 74018 RADEX ABDOMEN 1 VIEW: CPT

## 2023-01-28 PROCEDURE — G0378 HOSPITAL OBSERVATION PER HR: HCPCS

## 2023-01-28 PROCEDURE — 94640 AIRWAY INHALATION TREATMENT: CPT

## 2023-01-28 PROCEDURE — 80048 BASIC METABOLIC PNL TOTAL CA: CPT

## 2023-01-28 PROCEDURE — 94664 DEMO&/EVAL PT USE INHALER: CPT

## 2023-01-28 PROCEDURE — 82962 GLUCOSE BLOOD TEST: CPT

## 2023-01-28 PROCEDURE — 63710000001 INSULIN GLARGINE PER 5 UNITS

## 2023-01-28 PROCEDURE — 94761 N-INVAS EAR/PLS OXIMETRY MLT: CPT

## 2023-01-28 PROCEDURE — 85025 COMPLETE CBC W/AUTO DIFF WBC: CPT

## 2023-01-28 PROCEDURE — 99233 SBSQ HOSP IP/OBS HIGH 50: CPT | Performed by: NURSE PRACTITIONER

## 2023-01-28 PROCEDURE — 25010000002 MORPHINE PER 10 MG

## 2023-01-28 RX ORDER — LUBIPROSTONE 24 UG/1
24 CAPSULE ORAL 2 TIMES DAILY WITH MEALS
Status: DISCONTINUED | OUTPATIENT
Start: 2023-01-28 | End: 2023-02-01 | Stop reason: HOSPADM

## 2023-01-28 RX ORDER — POLYETHYLENE GLYCOL 3350 17 G/17G
17 POWDER, FOR SOLUTION ORAL 2 TIMES DAILY
Status: DISCONTINUED | OUTPATIENT
Start: 2023-01-28 | End: 2023-02-01 | Stop reason: HOSPADM

## 2023-01-28 RX ORDER — IPRATROPIUM BROMIDE AND ALBUTEROL SULFATE 2.5; .5 MG/3ML; MG/3ML
3 SOLUTION RESPIRATORY (INHALATION)
Status: DISCONTINUED | OUTPATIENT
Start: 2023-01-28 | End: 2023-02-01 | Stop reason: HOSPADM

## 2023-01-28 RX ADMIN — IPRATROPIUM BROMIDE AND ALBUTEROL SULFATE 3 ML: 2.5; .5 SOLUTION RESPIRATORY (INHALATION) at 18:30

## 2023-01-28 RX ADMIN — PANTOPRAZOLE SODIUM 40 MG: 40 TABLET, DELAYED RELEASE ORAL at 05:54

## 2023-01-28 RX ADMIN — TRAMADOL HYDROCHLORIDE 100 MG: 50 TABLET, COATED ORAL at 08:25

## 2023-01-28 RX ADMIN — LUBIPROSTONE 24 MCG: 24 CAPSULE, GELATIN COATED ORAL at 17:59

## 2023-01-28 RX ADMIN — Medication 3 ML: at 20:02

## 2023-01-28 RX ADMIN — ONDANSETRON 4 MG: 2 INJECTION INTRAMUSCULAR; INTRAVENOUS at 20:47

## 2023-01-28 RX ADMIN — MORPHINE SULFATE 4 MG: 4 INJECTION, SOLUTION INTRAMUSCULAR; INTRAVENOUS at 02:44

## 2023-01-28 RX ADMIN — SERTRALINE 50 MG: 50 TABLET, FILM COATED ORAL at 08:25

## 2023-01-28 RX ADMIN — CARVEDILOL 3.12 MG: 3.12 TABLET, FILM COATED ORAL at 17:59

## 2023-01-28 RX ADMIN — CLINDAMYCIN HYDROCHLORIDE 300 MG: 300 CAPSULE ORAL at 08:25

## 2023-01-28 RX ADMIN — TERAZOSIN HYDROCHLORIDE 2 MG: 2 CAPSULE ORAL at 20:02

## 2023-01-28 RX ADMIN — CLINDAMYCIN HYDROCHLORIDE 300 MG: 300 CAPSULE ORAL at 15:51

## 2023-01-28 RX ADMIN — ASPIRIN 81 MG: 81 TABLET, COATED ORAL at 08:24

## 2023-01-28 RX ADMIN — LINAGLIPTIN 5 MG: 5 TABLET, FILM COATED ORAL at 08:25

## 2023-01-28 RX ADMIN — AMLODIPINE BESYLATE 10 MG: 5 TABLET ORAL at 08:24

## 2023-01-28 RX ADMIN — POLYETHYLENE GLYCOL 3350 17 G: 17 POWDER, FOR SOLUTION ORAL at 20:01

## 2023-01-28 RX ADMIN — BUDESONIDE 0.5 MG: 0.5 INHALANT RESPIRATORY (INHALATION) at 18:30

## 2023-01-28 RX ADMIN — TRAMADOL HYDROCHLORIDE 100 MG: 50 TABLET, COATED ORAL at 20:02

## 2023-01-28 RX ADMIN — Medication 3 ML: at 08:25

## 2023-01-28 RX ADMIN — CLINDAMYCIN HYDROCHLORIDE 300 MG: 300 CAPSULE ORAL at 20:02

## 2023-01-28 RX ADMIN — ACETAMINOPHEN 650 MG: 325 TABLET, FILM COATED ORAL at 15:51

## 2023-01-28 RX ADMIN — BUDESONIDE 0.5 MG: 0.5 INHALANT RESPIRATORY (INHALATION) at 06:12

## 2023-01-28 RX ADMIN — CARVEDILOL 3.12 MG: 3.12 TABLET, FILM COATED ORAL at 08:24

## 2023-01-28 RX ADMIN — INSULIN GLARGINE 20 UNITS: 100 INJECTION, SOLUTION SUBCUTANEOUS at 20:47

## 2023-01-29 ENCOUNTER — APPOINTMENT (OUTPATIENT)
Dept: GENERAL RADIOLOGY | Facility: HOSPITAL | Age: 78
DRG: 313 | End: 2023-01-29
Payer: MEDICARE

## 2023-01-29 LAB
ALBUMIN SERPL-MCNC: 3.1 G/DL (ref 3.5–5.2)
ALP SERPL-CCNC: 67 U/L (ref 39–117)
ALT SERPL W P-5'-P-CCNC: 5 U/L (ref 1–33)
AMYLASE SERPL-CCNC: 47 U/L (ref 28–100)
ANION GAP SERPL CALCULATED.3IONS-SCNC: 8 MMOL/L (ref 5–15)
AST SERPL-CCNC: 9 U/L (ref 1–32)
BACTERIA SPEC AEROBE CULT: NORMAL
BACTERIA SPEC AEROBE CULT: NORMAL
BASOPHILS # BLD AUTO: 0.1 10*3/MM3 (ref 0–0.2)
BASOPHILS NFR BLD AUTO: 0.8 % (ref 0–1.5)
BILIRUB CONJ SERPL-MCNC: <0.2 MG/DL (ref 0–0.3)
BILIRUB INDIRECT SERPL-MCNC: ABNORMAL MG/DL
BILIRUB SERPL-MCNC: 0.3 MG/DL (ref 0–1.2)
BUN SERPL-MCNC: 17 MG/DL (ref 8–23)
BUN/CREAT SERPL: 13 (ref 7–25)
CALCIUM SPEC-SCNC: 8.6 MG/DL (ref 8.6–10.5)
CHLORIDE SERPL-SCNC: 101 MMOL/L (ref 98–107)
CO2 SERPL-SCNC: 31 MMOL/L (ref 22–29)
CREAT SERPL-MCNC: 1.31 MG/DL (ref 0.57–1)
DEPRECATED RDW RBC AUTO: 47.3 FL (ref 37–54)
EGFRCR SERPLBLD CKD-EPI 2021: 41.5 ML/MIN/1.73
EOSINOPHIL # BLD AUTO: 0.2 10*3/MM3 (ref 0–0.4)
EOSINOPHIL NFR BLD AUTO: 1.9 % (ref 0.3–6.2)
ERYTHROCYTE [DISTWIDTH] IN BLOOD BY AUTOMATED COUNT: 13.4 % (ref 12.3–15.4)
GLUCOSE BLDC GLUCOMTR-MCNC: 171 MG/DL (ref 70–105)
GLUCOSE SERPL-MCNC: 95 MG/DL (ref 65–99)
HCT VFR BLD AUTO: 38.5 % (ref 34–46.6)
HGB BLD-MCNC: 12.9 G/DL (ref 12–15.9)
LIPASE SERPL-CCNC: 21 U/L (ref 13–60)
LYMPHOCYTES # BLD AUTO: 2.4 10*3/MM3 (ref 0.7–3.1)
LYMPHOCYTES NFR BLD AUTO: 28 % (ref 19.6–45.3)
MCH RBC QN AUTO: 31.9 PG (ref 26.6–33)
MCHC RBC AUTO-ENTMCNC: 33.4 G/DL (ref 31.5–35.7)
MCV RBC AUTO: 95.8 FL (ref 79–97)
MONOCYTES # BLD AUTO: 1 10*3/MM3 (ref 0.1–0.9)
MONOCYTES NFR BLD AUTO: 11.3 % (ref 5–12)
NEUTROPHILS NFR BLD AUTO: 4.9 10*3/MM3 (ref 1.7–7)
NEUTROPHILS NFR BLD AUTO: 58 % (ref 42.7–76)
NRBC BLD AUTO-RTO: 0.3 /100 WBC (ref 0–0.2)
PLATELET # BLD AUTO: 205 10*3/MM3 (ref 140–450)
PMV BLD AUTO: 9.1 FL (ref 6–12)
POTASSIUM SERPL-SCNC: 4.2 MMOL/L (ref 3.5–5.2)
PROT SERPL-MCNC: 5.9 G/DL (ref 6–8.5)
QT INTERVAL: 396 MS
RBC # BLD AUTO: 4.02 10*6/MM3 (ref 3.77–5.28)
RBC MORPH BLD: NORMAL
SMALL PLATELETS BLD QL SMEAR: ADEQUATE
SODIUM SERPL-SCNC: 140 MMOL/L (ref 136–145)
WBC MORPH BLD: NORMAL
WBC NRBC COR # BLD: 8.5 10*3/MM3 (ref 3.4–10.8)

## 2023-01-29 PROCEDURE — G0378 HOSPITAL OBSERVATION PER HR: HCPCS

## 2023-01-29 PROCEDURE — 85025 COMPLETE CBC W/AUTO DIFF WBC: CPT

## 2023-01-29 PROCEDURE — 71045 X-RAY EXAM CHEST 1 VIEW: CPT

## 2023-01-29 PROCEDURE — 80048 BASIC METABOLIC PNL TOTAL CA: CPT

## 2023-01-29 PROCEDURE — 94664 DEMO&/EVAL PT USE INHALER: CPT

## 2023-01-29 PROCEDURE — 63710000001 INSULIN GLARGINE PER 5 UNITS

## 2023-01-29 PROCEDURE — 85007 BL SMEAR W/DIFF WBC COUNT: CPT

## 2023-01-29 PROCEDURE — 82962 GLUCOSE BLOOD TEST: CPT

## 2023-01-29 PROCEDURE — 94799 UNLISTED PULMONARY SVC/PX: CPT

## 2023-01-29 PROCEDURE — 80076 HEPATIC FUNCTION PANEL: CPT | Performed by: NURSE PRACTITIONER

## 2023-01-29 PROCEDURE — 83690 ASSAY OF LIPASE: CPT | Performed by: NURSE PRACTITIONER

## 2023-01-29 PROCEDURE — 82150 ASSAY OF AMYLASE: CPT | Performed by: NURSE PRACTITIONER

## 2023-01-29 PROCEDURE — 94761 N-INVAS EAR/PLS OXIMETRY MLT: CPT

## 2023-01-29 RX ORDER — FUROSEMIDE 20 MG/1
20 TABLET ORAL DAILY
Status: DISCONTINUED | OUTPATIENT
Start: 2023-01-29 | End: 2023-02-01 | Stop reason: HOSPADM

## 2023-01-29 RX ORDER — LIDOCAINE 50 MG/G
1 PATCH TOPICAL
Status: DISPENSED | OUTPATIENT
Start: 2023-01-29 | End: 2023-01-31

## 2023-01-29 RX ADMIN — IPRATROPIUM BROMIDE AND ALBUTEROL SULFATE 3 ML: 2.5; .5 SOLUTION RESPIRATORY (INHALATION) at 14:44

## 2023-01-29 RX ADMIN — ASPIRIN 81 MG: 81 TABLET, COATED ORAL at 08:36

## 2023-01-29 RX ADMIN — LINAGLIPTIN 5 MG: 5 TABLET, FILM COATED ORAL at 08:35

## 2023-01-29 RX ADMIN — CALCIUM CARBONATE (ANTACID) CHEW TAB 500 MG 2 TABLET: 500 CHEW TAB at 20:13

## 2023-01-29 RX ADMIN — CARVEDILOL 3.12 MG: 3.12 TABLET, FILM COATED ORAL at 08:36

## 2023-01-29 RX ADMIN — AMLODIPINE BESYLATE 10 MG: 5 TABLET ORAL at 08:35

## 2023-01-29 RX ADMIN — BUDESONIDE 0.5 MG: 0.5 INHALANT RESPIRATORY (INHALATION) at 06:34

## 2023-01-29 RX ADMIN — PANTOPRAZOLE SODIUM 40 MG: 40 TABLET, DELAYED RELEASE ORAL at 05:34

## 2023-01-29 RX ADMIN — POLYETHYLENE GLYCOL 3350 17 G: 17 POWDER, FOR SOLUTION ORAL at 08:35

## 2023-01-29 RX ADMIN — BUDESONIDE 0.5 MG: 0.5 INHALANT RESPIRATORY (INHALATION) at 19:10

## 2023-01-29 RX ADMIN — IPRATROPIUM BROMIDE AND ALBUTEROL SULFATE 3 ML: 2.5; .5 SOLUTION RESPIRATORY (INHALATION) at 19:06

## 2023-01-29 RX ADMIN — SERTRALINE 50 MG: 50 TABLET, FILM COATED ORAL at 08:36

## 2023-01-29 RX ADMIN — CARVEDILOL 3.12 MG: 3.12 TABLET, FILM COATED ORAL at 17:19

## 2023-01-29 RX ADMIN — LUBIPROSTONE 24 MCG: 24 CAPSULE, GELATIN COATED ORAL at 08:35

## 2023-01-29 RX ADMIN — IPRATROPIUM BROMIDE AND ALBUTEROL SULFATE 3 ML: 2.5; .5 SOLUTION RESPIRATORY (INHALATION) at 06:30

## 2023-01-29 RX ADMIN — FUROSEMIDE 20 MG: 20 TABLET ORAL at 17:19

## 2023-01-29 RX ADMIN — INSULIN GLARGINE 20 UNITS: 100 INJECTION, SOLUTION SUBCUTANEOUS at 21:55

## 2023-01-29 RX ADMIN — Medication 3 ML: at 20:33

## 2023-01-29 RX ADMIN — CLINDAMYCIN HYDROCHLORIDE 300 MG: 300 CAPSULE ORAL at 20:14

## 2023-01-29 RX ADMIN — TERAZOSIN HYDROCHLORIDE 2 MG: 2 CAPSULE ORAL at 20:15

## 2023-01-29 RX ADMIN — TRAMADOL HYDROCHLORIDE 100 MG: 50 TABLET, COATED ORAL at 20:16

## 2023-01-29 RX ADMIN — IPRATROPIUM BROMIDE AND ALBUTEROL SULFATE 3 ML: 2.5; .5 SOLUTION RESPIRATORY (INHALATION) at 11:45

## 2023-01-29 RX ADMIN — LIDOCAINE 1 PATCH: 700 PATCH TOPICAL at 12:04

## 2023-01-29 RX ADMIN — TRAMADOL HYDROCHLORIDE 100 MG: 50 TABLET, COATED ORAL at 08:35

## 2023-01-29 RX ADMIN — Medication 3 ML: at 08:39

## 2023-01-29 RX ADMIN — CLINDAMYCIN HYDROCHLORIDE 300 MG: 300 CAPSULE ORAL at 08:35

## 2023-01-29 RX ADMIN — LUBIPROSTONE 24 MCG: 24 CAPSULE, GELATIN COATED ORAL at 17:19

## 2023-01-29 RX ADMIN — CLINDAMYCIN HYDROCHLORIDE 300 MG: 300 CAPSULE ORAL at 17:19

## 2023-01-30 LAB
ALBUMIN SERPL-MCNC: 3.3 G/DL (ref 3.5–5.2)
ANION GAP SERPL CALCULATED.3IONS-SCNC: 8 MMOL/L (ref 5–15)
BASOPHILS # BLD AUTO: 0 10*3/MM3 (ref 0–0.2)
BASOPHILS NFR BLD AUTO: 0.4 % (ref 0–1.5)
BUN SERPL-MCNC: 19 MG/DL (ref 8–23)
BUN/CREAT SERPL: 15 (ref 7–25)
CALCIUM SPEC-SCNC: 9 MG/DL (ref 8.6–10.5)
CHLORIDE SERPL-SCNC: 101 MMOL/L (ref 98–107)
CO2 SERPL-SCNC: 32 MMOL/L (ref 22–29)
CREAT SERPL-MCNC: 1.27 MG/DL (ref 0.57–1)
DEPRECATED RDW RBC AUTO: 47.7 FL (ref 37–54)
EGFRCR SERPLBLD CKD-EPI 2021: 43.1 ML/MIN/1.73
EOSINOPHIL # BLD AUTO: 0.1 10*3/MM3 (ref 0–0.4)
EOSINOPHIL NFR BLD AUTO: 1.6 % (ref 0.3–6.2)
ERYTHROCYTE [DISTWIDTH] IN BLOOD BY AUTOMATED COUNT: 13.7 % (ref 12.3–15.4)
GLUCOSE BLDC GLUCOMTR-MCNC: 144 MG/DL (ref 70–105)
GLUCOSE SERPL-MCNC: 100 MG/DL (ref 65–99)
HCT VFR BLD AUTO: 38.4 % (ref 34–46.6)
HGB BLD-MCNC: 13.2 G/DL (ref 12–15.9)
LYMPHOCYTES # BLD AUTO: 1.7 10*3/MM3 (ref 0.7–3.1)
LYMPHOCYTES NFR BLD AUTO: 23.4 % (ref 19.6–45.3)
MCH RBC QN AUTO: 32.5 PG (ref 26.6–33)
MCHC RBC AUTO-ENTMCNC: 34.2 G/DL (ref 31.5–35.7)
MCV RBC AUTO: 94.9 FL (ref 79–97)
MONOCYTES # BLD AUTO: 0.7 10*3/MM3 (ref 0.1–0.9)
MONOCYTES NFR BLD AUTO: 9.8 % (ref 5–12)
NEUTROPHILS NFR BLD AUTO: 4.6 10*3/MM3 (ref 1.7–7)
NEUTROPHILS NFR BLD AUTO: 64.8 % (ref 42.7–76)
NRBC BLD AUTO-RTO: 0.1 /100 WBC (ref 0–0.2)
PHOSPHATE SERPL-MCNC: 2.9 MG/DL (ref 2.5–4.5)
PLATELET # BLD AUTO: 201 10*3/MM3 (ref 140–450)
PMV BLD AUTO: 8.3 FL (ref 6–12)
POTASSIUM SERPL-SCNC: 3.8 MMOL/L (ref 3.5–5.2)
RBC # BLD AUTO: 4.05 10*6/MM3 (ref 3.77–5.28)
SODIUM SERPL-SCNC: 141 MMOL/L (ref 136–145)
WBC NRBC COR # BLD: 7 10*3/MM3 (ref 3.4–10.8)

## 2023-01-30 PROCEDURE — 82962 GLUCOSE BLOOD TEST: CPT

## 2023-01-30 PROCEDURE — 94799 UNLISTED PULMONARY SVC/PX: CPT

## 2023-01-30 PROCEDURE — 80069 RENAL FUNCTION PANEL: CPT | Performed by: INTERNAL MEDICINE

## 2023-01-30 PROCEDURE — 25010000002 ONDANSETRON PER 1 MG

## 2023-01-30 PROCEDURE — 85025 COMPLETE CBC W/AUTO DIFF WBC: CPT

## 2023-01-30 PROCEDURE — 63710000001 INSULIN GLARGINE PER 5 UNITS

## 2023-01-30 PROCEDURE — 94618 PULMONARY STRESS TESTING: CPT

## 2023-01-30 PROCEDURE — 94761 N-INVAS EAR/PLS OXIMETRY MLT: CPT

## 2023-01-30 PROCEDURE — 97116 GAIT TRAINING THERAPY: CPT

## 2023-01-30 PROCEDURE — 25010000002 MORPHINE PER 10 MG

## 2023-01-30 PROCEDURE — 94664 DEMO&/EVAL PT USE INHALER: CPT

## 2023-01-30 RX ADMIN — MORPHINE SULFATE 4 MG: 4 INJECTION, SOLUTION INTRAMUSCULAR; INTRAVENOUS at 18:30

## 2023-01-30 RX ADMIN — FUROSEMIDE 20 MG: 20 TABLET ORAL at 08:16

## 2023-01-30 RX ADMIN — LINAGLIPTIN 5 MG: 5 TABLET, FILM COATED ORAL at 08:15

## 2023-01-30 RX ADMIN — TERAZOSIN HYDROCHLORIDE 2 MG: 2 CAPSULE ORAL at 21:21

## 2023-01-30 RX ADMIN — ASPIRIN 81 MG: 81 TABLET, COATED ORAL at 08:16

## 2023-01-30 RX ADMIN — BUDESONIDE 0.5 MG: 0.5 INHALANT RESPIRATORY (INHALATION) at 06:55

## 2023-01-30 RX ADMIN — Medication 3 ML: at 10:19

## 2023-01-30 RX ADMIN — POLYETHYLENE GLYCOL 3350 17 G: 17 POWDER, FOR SOLUTION ORAL at 21:17

## 2023-01-30 RX ADMIN — TRAMADOL HYDROCHLORIDE 100 MG: 50 TABLET, COATED ORAL at 21:21

## 2023-01-30 RX ADMIN — SERTRALINE 50 MG: 50 TABLET, FILM COATED ORAL at 10:14

## 2023-01-30 RX ADMIN — ONDANSETRON 4 MG: 2 INJECTION INTRAMUSCULAR; INTRAVENOUS at 18:30

## 2023-01-30 RX ADMIN — IPRATROPIUM BROMIDE AND ALBUTEROL SULFATE 3 ML: 2.5; .5 SOLUTION RESPIRATORY (INHALATION) at 06:50

## 2023-01-30 RX ADMIN — CARVEDILOL 3.12 MG: 3.12 TABLET, FILM COATED ORAL at 08:17

## 2023-01-30 RX ADMIN — CARVEDILOL 3.12 MG: 3.12 TABLET, FILM COATED ORAL at 17:21

## 2023-01-30 RX ADMIN — LUBIPROSTONE 24 MCG: 24 CAPSULE, GELATIN COATED ORAL at 17:36

## 2023-01-30 RX ADMIN — POLYETHYLENE GLYCOL 3350 17 G: 17 POWDER, FOR SOLUTION ORAL at 08:17

## 2023-01-30 RX ADMIN — CLINDAMYCIN HYDROCHLORIDE 300 MG: 300 CAPSULE ORAL at 08:16

## 2023-01-30 RX ADMIN — CLINDAMYCIN HYDROCHLORIDE 300 MG: 300 CAPSULE ORAL at 21:20

## 2023-01-30 RX ADMIN — TRAMADOL HYDROCHLORIDE 100 MG: 50 TABLET, COATED ORAL at 08:16

## 2023-01-30 RX ADMIN — CLINDAMYCIN HYDROCHLORIDE 300 MG: 300 CAPSULE ORAL at 16:26

## 2023-01-30 RX ADMIN — INSULIN GLARGINE 20 UNITS: 100 INJECTION, SOLUTION SUBCUTANEOUS at 22:17

## 2023-01-30 RX ADMIN — AMLODIPINE BESYLATE 10 MG: 5 TABLET ORAL at 08:16

## 2023-01-30 RX ADMIN — LUBIPROSTONE 24 MCG: 24 CAPSULE, GELATIN COATED ORAL at 08:16

## 2023-01-30 RX ADMIN — Medication 3 ML: at 21:23

## 2023-01-30 RX ADMIN — ACETAMINOPHEN 650 MG: 325 TABLET, FILM COATED ORAL at 06:16

## 2023-01-30 RX ADMIN — PANTOPRAZOLE SODIUM 40 MG: 40 TABLET, DELAYED RELEASE ORAL at 06:07

## 2023-01-30 RX ADMIN — IPRATROPIUM BROMIDE AND ALBUTEROL SULFATE 3 ML: 2.5; .5 SOLUTION RESPIRATORY (INHALATION) at 11:15

## 2023-01-31 ENCOUNTER — APPOINTMENT (OUTPATIENT)
Dept: MRI IMAGING | Facility: HOSPITAL | Age: 78
DRG: 313 | End: 2023-01-31
Payer: MEDICARE

## 2023-01-31 ENCOUNTER — TELEPHONE (OUTPATIENT)
Dept: FAMILY MEDICINE CLINIC | Facility: CLINIC | Age: 78
End: 2023-01-31
Payer: MEDICARE

## 2023-01-31 LAB
ALBUMIN SERPL-MCNC: 3.2 G/DL (ref 3.5–5.2)
ANION GAP SERPL CALCULATED.3IONS-SCNC: 7 MMOL/L (ref 5–15)
BASOPHILS # BLD AUTO: 0 10*3/MM3 (ref 0–0.2)
BASOPHILS NFR BLD AUTO: 0.5 % (ref 0–1.5)
BUN SERPL-MCNC: 19 MG/DL (ref 8–23)
BUN/CREAT SERPL: 14.5 (ref 7–25)
CALCIUM SPEC-SCNC: 8.7 MG/DL (ref 8.6–10.5)
CHLORIDE SERPL-SCNC: 101 MMOL/L (ref 98–107)
CO2 SERPL-SCNC: 34 MMOL/L (ref 22–29)
CREAT SERPL-MCNC: 1.31 MG/DL (ref 0.57–1)
DEPRECATED RDW RBC AUTO: 45.9 FL (ref 37–54)
EGFRCR SERPLBLD CKD-EPI 2021: 41.5 ML/MIN/1.73
EOSINOPHIL # BLD AUTO: 0.2 10*3/MM3 (ref 0–0.4)
EOSINOPHIL NFR BLD AUTO: 2.3 % (ref 0.3–6.2)
ERYTHROCYTE [DISTWIDTH] IN BLOOD BY AUTOMATED COUNT: 13.6 % (ref 12.3–15.4)
GLUCOSE BLDC GLUCOMTR-MCNC: 113 MG/DL (ref 70–105)
GLUCOSE BLDC GLUCOMTR-MCNC: 133 MG/DL (ref 70–105)
GLUCOSE BLDC GLUCOMTR-MCNC: 54 MG/DL (ref 70–105)
GLUCOSE BLDC GLUCOMTR-MCNC: 81 MG/DL (ref 70–105)
GLUCOSE BLDC GLUCOMTR-MCNC: 81 MG/DL (ref 70–105)
GLUCOSE BLDC GLUCOMTR-MCNC: 96 MG/DL (ref 70–105)
GLUCOSE SERPL-MCNC: 63 MG/DL (ref 65–99)
HCT VFR BLD AUTO: 39.9 % (ref 34–46.6)
HGB BLD-MCNC: 12.9 G/DL (ref 12–15.9)
LYMPHOCYTES # BLD AUTO: 2.2 10*3/MM3 (ref 0.7–3.1)
LYMPHOCYTES NFR BLD AUTO: 32.3 % (ref 19.6–45.3)
MCH RBC QN AUTO: 31.2 PG (ref 26.6–33)
MCHC RBC AUTO-ENTMCNC: 32.2 G/DL (ref 31.5–35.7)
MCV RBC AUTO: 96.8 FL (ref 79–97)
MONOCYTES # BLD AUTO: 0.8 10*3/MM3 (ref 0.1–0.9)
MONOCYTES NFR BLD AUTO: 11.5 % (ref 5–12)
NEUTROPHILS NFR BLD AUTO: 3.7 10*3/MM3 (ref 1.7–7)
NEUTROPHILS NFR BLD AUTO: 53.4 % (ref 42.7–76)
NRBC BLD AUTO-RTO: 0 /100 WBC (ref 0–0.2)
PHOSPHATE SERPL-MCNC: 4 MG/DL (ref 2.5–4.5)
PLATELET # BLD AUTO: 209 10*3/MM3 (ref 140–450)
PMV BLD AUTO: 8.4 FL (ref 6–12)
POTASSIUM SERPL-SCNC: 3.9 MMOL/L (ref 3.5–5.2)
RBC # BLD AUTO: 4.13 10*6/MM3 (ref 3.77–5.28)
SODIUM SERPL-SCNC: 142 MMOL/L (ref 136–145)
WBC NRBC COR # BLD: 6.9 10*3/MM3 (ref 3.4–10.8)

## 2023-01-31 PROCEDURE — 63710000001 INSULIN GLARGINE PER 5 UNITS

## 2023-01-31 PROCEDURE — 94799 UNLISTED PULMONARY SVC/PX: CPT

## 2023-01-31 PROCEDURE — 80069 RENAL FUNCTION PANEL: CPT | Performed by: INTERNAL MEDICINE

## 2023-01-31 PROCEDURE — 82962 GLUCOSE BLOOD TEST: CPT

## 2023-01-31 PROCEDURE — 94761 N-INVAS EAR/PLS OXIMETRY MLT: CPT

## 2023-01-31 PROCEDURE — 85025 COMPLETE CBC W/AUTO DIFF WBC: CPT

## 2023-01-31 PROCEDURE — 72146 MRI CHEST SPINE W/O DYE: CPT

## 2023-01-31 RX ORDER — SORBITOL SOLUTION 70 %
50 SOLUTION, ORAL MISCELLANEOUS ONCE
Status: COMPLETED | OUTPATIENT
Start: 2023-01-31 | End: 2023-01-31

## 2023-01-31 RX ORDER — DOCUSATE SODIUM 100 MG/1
100 CAPSULE, LIQUID FILLED ORAL 2 TIMES DAILY
Status: DISCONTINUED | OUTPATIENT
Start: 2023-01-31 | End: 2023-02-01 | Stop reason: HOSPADM

## 2023-01-31 RX ORDER — BISACODYL 10 MG
10 SUPPOSITORY, RECTAL RECTAL ONCE
Status: COMPLETED | OUTPATIENT
Start: 2023-01-31 | End: 2023-01-31

## 2023-01-31 RX ADMIN — CARVEDILOL 3.12 MG: 3.12 TABLET, FILM COATED ORAL at 17:54

## 2023-01-31 RX ADMIN — LUBIPROSTONE 24 MCG: 24 CAPSULE, GELATIN COATED ORAL at 08:00

## 2023-01-31 RX ADMIN — ASPIRIN 81 MG: 81 TABLET, COATED ORAL at 08:00

## 2023-01-31 RX ADMIN — IPRATROPIUM BROMIDE AND ALBUTEROL SULFATE 3 ML: 2.5; .5 SOLUTION RESPIRATORY (INHALATION) at 15:41

## 2023-01-31 RX ADMIN — POLYETHYLENE GLYCOL 3350 17 G: 17 POWDER, FOR SOLUTION ORAL at 08:02

## 2023-01-31 RX ADMIN — TERAZOSIN HYDROCHLORIDE 2 MG: 2 CAPSULE ORAL at 21:43

## 2023-01-31 RX ADMIN — SERTRALINE 50 MG: 50 TABLET, FILM COATED ORAL at 08:00

## 2023-01-31 RX ADMIN — BISACODYL 10 MG: 10 SUPPOSITORY RECTAL at 08:44

## 2023-01-31 RX ADMIN — DOCUSATE SODIUM 100 MG: 100 CAPSULE, LIQUID FILLED ORAL at 12:36

## 2023-01-31 RX ADMIN — BUDESONIDE 0.5 MG: 0.5 INHALANT RESPIRATORY (INHALATION) at 18:35

## 2023-01-31 RX ADMIN — PANTOPRAZOLE SODIUM 40 MG: 40 TABLET, DELAYED RELEASE ORAL at 05:53

## 2023-01-31 RX ADMIN — TRAMADOL HYDROCHLORIDE 100 MG: 50 TABLET, COATED ORAL at 21:46

## 2023-01-31 RX ADMIN — CARVEDILOL 3.12 MG: 3.12 TABLET, FILM COATED ORAL at 08:00

## 2023-01-31 RX ADMIN — FUROSEMIDE 20 MG: 20 TABLET ORAL at 08:01

## 2023-01-31 RX ADMIN — Medication 3 ML: at 21:50

## 2023-01-31 RX ADMIN — Medication 3 ML: at 08:03

## 2023-01-31 RX ADMIN — POLYETHYLENE GLYCOL 3350 17 G: 17 POWDER, FOR SOLUTION ORAL at 21:49

## 2023-01-31 RX ADMIN — BUDESONIDE 0.5 MG: 0.5 INHALANT RESPIRATORY (INHALATION) at 07:16

## 2023-01-31 RX ADMIN — ACETAMINOPHEN 650 MG: 325 TABLET, FILM COATED ORAL at 18:05

## 2023-01-31 RX ADMIN — DOCUSATE SODIUM 100 MG: 100 CAPSULE, LIQUID FILLED ORAL at 21:43

## 2023-01-31 RX ADMIN — IPRATROPIUM BROMIDE AND ALBUTEROL SULFATE 3 ML: 2.5; .5 SOLUTION RESPIRATORY (INHALATION) at 07:20

## 2023-01-31 RX ADMIN — INSULIN GLARGINE 20 UNITS: 100 INJECTION, SOLUTION SUBCUTANEOUS at 21:47

## 2023-01-31 RX ADMIN — TRAMADOL HYDROCHLORIDE 100 MG: 50 TABLET, COATED ORAL at 08:00

## 2023-01-31 RX ADMIN — SORBITOL SOLUTION (BULK) 50 ML: 70 SOLUTION at 12:36

## 2023-01-31 RX ADMIN — IPRATROPIUM BROMIDE AND ALBUTEROL SULFATE 3 ML: 2.5; .5 SOLUTION RESPIRATORY (INHALATION) at 11:23

## 2023-01-31 RX ADMIN — AMLODIPINE BESYLATE 10 MG: 5 TABLET ORAL at 08:06

## 2023-01-31 RX ADMIN — IPRATROPIUM BROMIDE AND ALBUTEROL SULFATE 3 ML: 2.5; .5 SOLUTION RESPIRATORY (INHALATION) at 18:39

## 2023-02-01 ENCOUNTER — READMISSION MANAGEMENT (OUTPATIENT)
Dept: CALL CENTER | Facility: HOSPITAL | Age: 78
End: 2023-02-01
Payer: MEDICARE

## 2023-02-01 VITALS
TEMPERATURE: 97.5 F | HEART RATE: 80 BPM | DIASTOLIC BLOOD PRESSURE: 76 MMHG | BODY MASS INDEX: 32.49 KG/M2 | SYSTOLIC BLOOD PRESSURE: 131 MMHG | RESPIRATION RATE: 18 BRPM | WEIGHT: 195 LBS | HEIGHT: 65 IN | OXYGEN SATURATION: 93 %

## 2023-02-01 PROBLEM — I27.20 PULMONARY HYPERTENSION: Status: ACTIVE | Noted: 2023-02-01

## 2023-02-01 PROBLEM — K59.00 CONSTIPATION: Status: ACTIVE | Noted: 2018-03-22

## 2023-02-01 PROBLEM — N18.9 CRF (CHRONIC RENAL FAILURE): Status: ACTIVE | Noted: 2023-02-01

## 2023-02-01 LAB
ALBUMIN SERPL-MCNC: 3.3 G/DL (ref 3.5–5.2)
ALBUMIN/GLOB SERPL: 1.1 G/DL
ALP SERPL-CCNC: 83 U/L (ref 39–117)
ALT SERPL W P-5'-P-CCNC: 7 U/L (ref 1–33)
ANION GAP SERPL CALCULATED.3IONS-SCNC: 7 MMOL/L (ref 5–15)
AST SERPL-CCNC: 13 U/L (ref 1–32)
BILIRUB SERPL-MCNC: 0.4 MG/DL (ref 0–1.2)
BUN SERPL-MCNC: 20 MG/DL (ref 8–23)
BUN/CREAT SERPL: 15.6 (ref 7–25)
CA-I SERPL ISE-MCNC: 1.23 MMOL/L (ref 1.2–1.3)
CALCIUM SPEC-SCNC: 9.4 MG/DL (ref 8.6–10.5)
CHLORIDE SERPL-SCNC: 101 MMOL/L (ref 98–107)
CO2 SERPL-SCNC: 34 MMOL/L (ref 22–29)
CREAT SERPL-MCNC: 1.28 MG/DL (ref 0.57–1)
DEPRECATED RDW RBC AUTO: 46.4 FL (ref 37–54)
EGFRCR SERPLBLD CKD-EPI 2021: 42.7 ML/MIN/1.73
ERYTHROCYTE [DISTWIDTH] IN BLOOD BY AUTOMATED COUNT: 13.6 % (ref 12.3–15.4)
GLOBULIN UR ELPH-MCNC: 2.9 GM/DL
GLUCOSE BLDC GLUCOMTR-MCNC: 112 MG/DL (ref 70–105)
GLUCOSE BLDC GLUCOMTR-MCNC: 79 MG/DL (ref 70–105)
GLUCOSE BLDC GLUCOMTR-MCNC: 79 MG/DL (ref 70–105)
GLUCOSE SERPL-MCNC: 68 MG/DL (ref 65–99)
HCT VFR BLD AUTO: 42.5 % (ref 34–46.6)
HGB BLD-MCNC: 13.7 G/DL (ref 12–15.9)
MAGNESIUM SERPL-MCNC: 1.9 MG/DL (ref 1.6–2.4)
MCH RBC QN AUTO: 31.4 PG (ref 26.6–33)
MCHC RBC AUTO-ENTMCNC: 32.2 G/DL (ref 31.5–35.7)
MCV RBC AUTO: 97.3 FL (ref 79–97)
PHOSPHATE SERPL-MCNC: 4 MG/DL (ref 2.5–4.5)
PLATELET # BLD AUTO: 231 10*3/MM3 (ref 140–450)
PMV BLD AUTO: 8.5 FL (ref 6–12)
POTASSIUM SERPL-SCNC: 4 MMOL/L (ref 3.5–5.2)
PROT SERPL-MCNC: 6.2 G/DL (ref 6–8.5)
RBC # BLD AUTO: 4.37 10*6/MM3 (ref 3.77–5.28)
SODIUM SERPL-SCNC: 142 MMOL/L (ref 136–145)
WBC NRBC COR # BLD: 8 10*3/MM3 (ref 3.4–10.8)

## 2023-02-01 PROCEDURE — 82962 GLUCOSE BLOOD TEST: CPT

## 2023-02-01 PROCEDURE — 94799 UNLISTED PULMONARY SVC/PX: CPT

## 2023-02-01 PROCEDURE — 82330 ASSAY OF CALCIUM: CPT | Performed by: INTERNAL MEDICINE

## 2023-02-01 PROCEDURE — 84100 ASSAY OF PHOSPHORUS: CPT | Performed by: INTERNAL MEDICINE

## 2023-02-01 PROCEDURE — 85027 COMPLETE CBC AUTOMATED: CPT | Performed by: INTERNAL MEDICINE

## 2023-02-01 PROCEDURE — 25010000002 ONDANSETRON PER 1 MG

## 2023-02-01 PROCEDURE — 94618 PULMONARY STRESS TESTING: CPT

## 2023-02-01 PROCEDURE — 83735 ASSAY OF MAGNESIUM: CPT | Performed by: INTERNAL MEDICINE

## 2023-02-01 PROCEDURE — 80053 COMPREHEN METABOLIC PANEL: CPT | Performed by: INTERNAL MEDICINE

## 2023-02-01 RX ORDER — IPRATROPIUM BROMIDE AND ALBUTEROL SULFATE 2.5; .5 MG/3ML; MG/3ML
3 SOLUTION RESPIRATORY (INHALATION)
Qty: 360 ML | Refills: 0 | Status: SHIPPED | OUTPATIENT
Start: 2023-02-01

## 2023-02-01 RX ORDER — POLYETHYLENE GLYCOL 3350 17 G/17G
17 POWDER, FOR SOLUTION ORAL 2 TIMES DAILY
Start: 2023-02-01

## 2023-02-01 RX ORDER — FUROSEMIDE 20 MG/1
20 TABLET ORAL DAILY
Qty: 30 TABLET | Refills: 0 | Status: SHIPPED | OUTPATIENT
Start: 2023-02-02

## 2023-02-01 RX ORDER — GABAPENTIN 100 MG/1
200 CAPSULE ORAL EVERY 6 HOURS PRN
Start: 2023-02-01 | End: 2023-02-24 | Stop reason: HOSPADM

## 2023-02-01 RX ADMIN — ACETAMINOPHEN 650 MG: 325 TABLET, FILM COATED ORAL at 05:43

## 2023-02-01 RX ADMIN — SERTRALINE 50 MG: 50 TABLET, FILM COATED ORAL at 08:34

## 2023-02-01 RX ADMIN — CARVEDILOL 3.12 MG: 3.12 TABLET, FILM COATED ORAL at 08:33

## 2023-02-01 RX ADMIN — BUDESONIDE 0.5 MG: 0.5 INHALANT RESPIRATORY (INHALATION) at 07:45

## 2023-02-01 RX ADMIN — LUBIPROSTONE 24 MCG: 24 CAPSULE, GELATIN COATED ORAL at 08:31

## 2023-02-01 RX ADMIN — TRAMADOL HYDROCHLORIDE 100 MG: 50 TABLET, COATED ORAL at 08:32

## 2023-02-01 RX ADMIN — AMLODIPINE BESYLATE 10 MG: 5 TABLET ORAL at 08:51

## 2023-02-01 RX ADMIN — IPRATROPIUM BROMIDE AND ALBUTEROL SULFATE 3 ML: 2.5; .5 SOLUTION RESPIRATORY (INHALATION) at 14:34

## 2023-02-01 RX ADMIN — FUROSEMIDE 20 MG: 20 TABLET ORAL at 08:33

## 2023-02-01 RX ADMIN — ONDANSETRON 4 MG: 2 INJECTION INTRAMUSCULAR; INTRAVENOUS at 05:02

## 2023-02-01 RX ADMIN — IPRATROPIUM BROMIDE AND ALBUTEROL SULFATE 3 ML: 2.5; .5 SOLUTION RESPIRATORY (INHALATION) at 07:49

## 2023-02-01 RX ADMIN — IPRATROPIUM BROMIDE AND ALBUTEROL SULFATE 3 ML: 2.5; .5 SOLUTION RESPIRATORY (INHALATION) at 11:26

## 2023-02-01 RX ADMIN — ASPIRIN 81 MG: 81 TABLET, COATED ORAL at 08:34

## 2023-02-01 RX ADMIN — CARVEDILOL 3.12 MG: 3.12 TABLET, FILM COATED ORAL at 17:18

## 2023-02-01 RX ADMIN — ACETAMINOPHEN 650 MG: 325 TABLET, FILM COATED ORAL at 17:18

## 2023-02-01 RX ADMIN — LUBIPROSTONE 24 MCG: 24 CAPSULE, GELATIN COATED ORAL at 17:18

## 2023-02-01 RX ADMIN — Medication 10 ML: at 05:00

## 2023-02-01 RX ADMIN — POLYETHYLENE GLYCOL 3350 17 G: 17 POWDER, FOR SOLUTION ORAL at 08:34

## 2023-02-01 RX ADMIN — DOCUSATE SODIUM 100 MG: 100 CAPSULE, LIQUID FILLED ORAL at 08:32

## 2023-02-01 RX ADMIN — Medication 3 ML: at 08:34

## 2023-02-01 RX ADMIN — PANTOPRAZOLE SODIUM 40 MG: 40 TABLET, DELAYED RELEASE ORAL at 05:17

## 2023-02-01 NOTE — DISCHARGE SUMMARY
Date of Discharge:  2/1/2023    Discharge Diagnosis:   Chest Pain:   -resolved    CRF:   -continue follow up with Dr Ordonez    COPD:   -Home O2 6L via [place] continuous for home use set up  -[facility] to eval and treat for SN/PT/OT services    Hypoxia:   -Failed 6minute walk- requiring 6LO2 continuous    History of ureter cancer s/p right nephrectomy:   -Continue follow up with renal as directed    DM2 with CKD 3B:   -HHC to teach and instruct on medications and disease management  -Follow up as directed with PCP and specialist    Constipation:   -Miralax daily/as needed    Presenting Problem/History of Present Illness  Active Hospital Problems    Diagnosis  POA   • **Chest pain, unspecified type [R07.9]  Yes   • CRF (chronic renal failure) [N18.9]  Unknown   • Pulmonary hypertension (HCC) [I27.20]  Unknown   • Dyspnea [R06.00]  Unknown   • Hypoxia [R09.02]  Unknown   • COPD (chronic obstructive pulmonary disease) (HCC) [J44.9]  Unknown   • H/O malignant neoplasm of ureter [Z85.54]  Not Applicable   • Constipation [K59.00]  Unknown   • Mixed hyperlipidemia [E78.2]  Yes   • Type 2 diabetes mellitus with chronic kidney disease (HCC) [E11.22]  Unknown   • Essential (primary) hypertension [I10]  Yes   • Stage 3b chronic kidney disease (CKD) (HCC) [N18.32]  Unknown      Resolved Hospital Problems   No resolved problems to display.          Hospital Course  Patient is a 77 y.o. female who presented with c/o CP.   She was found to have a thoracic compression fracture and will follow up with Claudio PEOPLES as an outpatient- this appt has been set up.  She was evaluated by neurosurgeon while in hospital and was felt no surgery or bracing was needed. 6 minute walk performed today and she does qualify for home O2 and this has been set up.   She has a PMH of DM2, HTN, CKD3B, Ureteral cancer s/p right nephrectomy, pulmonary hypertension.    She will be establishing care with Dr Garcia in our office for hospital discharge follow up.     Her home O2 and HHC services have been arranged.    She is felt to be hemodynamically stable for discharge to home today with [facility].      Procedures Performed         Consults:   Consults     Date and Time Order Name Status Description    1/29/2023  8:41 AM Inpatient Nephrology Consult Completed     1/27/2023  6:18 PM Inpatient Pulmonology Consult Completed     1/27/2023  6:18 PM Inpatient Gastroenterology Consult Completed     1/27/2023 11:21 AM Inpatient Spine Surgery Consult      1/24/2023  8:50 AM Inpatient Gastroenterology Consult Completed           Pertinent Test Results:CT Chest Without Contrast Diagnostic    Result Date: 1/26/2023  Impression: 1. No acute intrathoracic process. 2. Cardiomegaly and coronary artery calcifications. 3. Mild left medial basilar lower lobe atelectasis. 4. Mild superior plate compression fracture at T6 is age-indeterminate, new from September 2020. No retropulsed fracture fragment. 5. Stable left thyroid lobe nodular enlargement with deviation of the trachea to the right. Electronically Signed: López Cobos  1/26/2023 9:11 PM EST  Workstation ID: [id number]    MRI Thoracic Spine Without Contrast    Result Date: 1/31/2023  Impression: Acute fracture superior endplate of the T6 vertebral body with approximately 25-30% loss of height. No posterior cortical buckling is seen. There is bone marrow edema within the. No central canal stenosis. There is normal signal in the thoracic spinal cord. Electronically Signed: Eusebio Doan  1/31/2023 5:26 PM EST  Workstation ID: [id number]    NM Lung Ventilation Perfusion Aerosol    Result Date: 1/27/2023  Impression: 1. Areas of patchy uptake on ventilation suggest air trapping, possible underlying COPD. 2. No convincing mismatch defects on perfusion weighted imaging to suggest a pulmonary embolus. Underlying findings suggestive of COPD make this an indeterminate exam although no convincing evidence of a significant pulmonary embolus noted.  Electronically Signed: Sky Palafox  1/27/2023 9:25 AM EST  Workstation ID: [id number]    XR Chest 1 View    Result Date: 1/29/2023  Impression: Stable right midlung scarring and atelectasis. Otherwise, no acute process. Electronically Signed: Marisabel Roberts  1/29/2023 8:04 AM EST  Workstation ID: [id number]    XR Chest 1 View    Result Date: 1/26/2023  Impression: No acute process. Electronically Signed: López Cobos  1/26/2023 6:30 PM EST  Workstation ID: [id number]    XR Abdomen KUB    Result Date: 1/28/2023  Impression: Nonobstructive bowel gas pattern. Mild to moderate stool burden present, similar as compared to the recent prior study. Electronically Signed: Marisabel Roberts  1/28/2023 2:44 PM EST  Workstation ID: [id number]    XR Chest PA & Lateral    Result Date: 1/27/2023  Impression: No acute cardiopulmonary process. Electronically Signed: Marisabel Roberts  1/27/2023 10:08 AM EST  Workstation ID: [id number]      Imaging Results (Last 7 Days)     Procedure Component Value Units Date/Time    MRI Thoracic Spine Without Contrast [[id number]] Collected: 01/31/23 1715     Updated: 01/31/23 1728    Narrative:      MRI THORACIC SPINE WO CONTRAST    Date of Exam: 1/31/2023 4:54 PM EST    Indication: Compression fracture, thoracic.     Comparison: T-spine MRI dated 1/28/2020    Technique:  Routine multiplanar/multisequence sequence images of the thoracic spine were obtained without contrast administration.     Findings:  There is bone marrow edema with superior endplate fracture line and 25-30% loss of height at the T6 vertebral body. Bone marrow edema extends into the pedicles left greater than right. No retropulsion or posterior cortical buckling. The remainder of the   posterior elements are intact. The remainder of the vertebral body heights are maintained. Alignment is preserved. There is vertebral hemangioma no suspicious focal osseous lesions.    There is normal signal in the substance of the thoracic spinal cord, which is normal  in course and caliber. There is small amount of paraspinal edema/fluid around the anterolateral aspect of the T6 vertebral body. No hematoma, or loculated fluid   collections. No focal disc protrusions or extrusions, canal stenosis or neural foraminal narrowing.      Impression:      Impression:  Acute fracture superior endplate of the T6 vertebral body with approximately 25-30% loss of height. No posterior cortical buckling is seen. There is bone marrow edema within the. No central canal stenosis. There is normal signal in the thoracic spinal   cord.    Electronically Signed: Eusebio Doan    1/31/2023 5:26 PM EST    Workstation ID: [id number]    XR Chest 1 View [[id number]] Collected: 01/29/23 0803     Updated: 01/29/23 0806    Narrative:      XR CHEST 1 VW    Date of Exam: 1/29/2023 6:00 AM EST    Indication: sob.    Comparison: VQ scan 1/27/2023, radiograph 1/27/2023    Findings:  The heart appears enlarged. Linear atelectasis and scarring present within the right midlung. No focal consolidations. No pleural effusions. No pneumothorax. No acute osseous abnormality identified. Chronic interstitial changes are present.      Impression:      Impression:  Stable right midlung scarring and atelectasis. Otherwise, no acute process.    Electronically Signed: Marisabel Roberts    1/29/2023 8:04 AM EST    Workstation ID: [id number]    XR Abdomen KUB [[id number]] Collected: 01/28/23 1442     Updated: 01/28/23 1446    Narrative:      XR ABDOMEN KUB    Date of Exam: 1/28/2023 2:35 PM EST    Indication: Check for stool burden, left upper quadrant abdominal pain, nausea.    Comparison: CT 1/23/2023    Findings:  No gross free air or pneumatosis though evaluation is slightly limited due to technique. There is a non obstructive bowel gas pattern. Cholecystectomy clips present within the right upper quadrant. A mild to moderate stool burden is present. No   suspicious calcifications identified. No acute osseous abnormality identified.       Impression:      Impression:  Nonobstructive bowel gas pattern. Mild to moderate stool burden present, similar as compared to the recent prior study.    Electronically Signed: Marisabel Armando    1/28/2023 2:44 PM EST    Workstation ID: [id number]    XR Chest PA & Lateral [[id number]] Collected: 01/27/23 1007     Updated: 01/27/23 1010    Narrative:      XR CHEST PA AND LATERAL    Date of Exam: 1/27/2023 9:04 AM EST    Indication: chest pain.    Comparison: PET scan 1/27/2023, CT 1/26/2023    Findings:  There are no airspace consolidations. Scarring present within the right midlung. No pleural fluid. No pneumothorax. The pulmonary vasculature appears within normal limits. The heart appears mildly enlarged. No acute osseous abnormality identified.      Impression:      Impression:  No acute cardiopulmonary process.    Electronically Signed: Marisabel Roberts    1/27/2023 10:08 AM EST    Workstation ID: [id number]    NM Lung Ventilation Perfusion Aerosol [[id number]] Collected: 01/27/23 0901     Updated: 01/27/23 0927    Narrative:      NM LUNG VENTILATION PERFUSION AEROSOL    Date of Exam: 1/27/2023 7:28 AM EST    Indication: Chest pain, nonspecific.    Comparison: Chest radiograph 1/26/2023    Technique:  The patient was ventilated with 44 mCi of technetium 99m pertechnetate aerosol and ventilation images were acquired in multiple obliquities. The patient then received 6.0 mCi of technetium 99m MAA intravenously and perfusion images were   acquired in multiple obliquities.    Findings:  Ventilation demonstrates some patchy areas of uptake bilaterally and along the fissures which are more prominent in the upper lung zones.    Perfusion demonstrates mildly heterogeneous but more homogeneous uptake within the lungs bilaterally with without evidence of mismatched or segmental defects to suggest pulmonary emboli      Impression:      Impression:    1. Areas of patchy uptake on ventilation suggest air trapping, possible underlying  COPD.  2. No convincing mismatch defects on perfusion weighted imaging to suggest a pulmonary embolus.    Underlying findings suggestive of COPD make this an indeterminate exam although no convincing evidence of a significant pulmonary embolus noted.    Electronically Signed: Sky Palafox    1/27/2023 9:25 AM EST    Workstation ID: [id number]    CT Chest Without Contrast Diagnostic [[id number]] Collected: 01/26/23 2101     Updated: 01/26/23 2113    Narrative:      CT CHEST WO CONTRAST DIAGNOSTIC    Date of Exam: 1/26/2023 8:30 PM EST    Indication: Respiratory illness, nondiagnostic xray.    Comparison: Chest radiograph 1/26/2023, chest CT 9/16/2020    Technique: Axial CT images were obtained of the chest without contrast administration.  Sagittal and coronal reconstructions were performed.  Automated exposure control and iterative reconstruction methods were used.     Findings:  Soft tissues of the lower neck demonstrate enlargement left thyroid lobe with deviation of the trachea to the right stable from the prior study. Heart size mildly enlarged. Coronary calcifications noted. No significant pericardial effusion. Scattered   mediastinal lymph nodes below size criteria. Calcified subcarinal left hilar nodes related to granulomatous disease. No axillary adenopathy. Negative for pleural effusion.    Trachea and mainstem bronchi patent. No pneumothorax or pleural effusion. Small amount of mucus/debris within the right mainstem bronchus on image 44. No consolidation or findings of pneumonia. Calcified right lower lobe granuloma. Mild linear   atelectasis/scarring involving the medial basilar left lower lobe.    Noncontrast portions of the liver, spleen, and pancreas are without acute abnormality. There is a stable 1.9 cm left adrenal gland adenoma. Normal right adrenal gland. Gallbladder absent. No free fluid in the upper abdomen. Postsurgical changes at the GE   junction likely relating to prior hiatal hernia repair.  Left kidney without hydronephrosis. Right kidney not visualized. New mild superior plate compression fracture at the T6 level which is age-indeterminate, new from 9/16/2020. Remote healed fractures   of the left anterior third and fourth ribs.      Impression:      Impression:  1. No acute intrathoracic process.  2. Cardiomegaly and coronary artery calcifications.  3. Mild left medial basilar lower lobe atelectasis.  4. Mild superior plate compression fracture at T6 is age-indeterminate, new from September 2020. No retropulsed fracture fragment.  5. Stable left thyroid lobe nodular enlargement with deviation of the trachea to the right.    Electronically Signed: [employer]    1/26/2023 9:11 PM EST    Workstation ID: [id number]    XR Chest 1 View [[id number]] Collected: 01/26/23 1829     Updated: 01/26/23 1832    Narrative:      XR CHEST 1 VW    Date of Exam: 1/26/2023 6:22 PM EST    Indication: Dyspnea.    Comparison: 1/23/2023    Findings:  Heart size top normal, exaggerated by technique. Mild thickening in the right midlung along the minor fissure similar to prior study. No new consolidation. No pneumothorax or pleural effusion. Osseous structures intact. Mild ectasia of the descending   thoracic aorta stable.      Impression:      Impression:  No acute process.    Electronically Signed: [employer]    1/26/2023 6:30 PM EST    Workstation ID: [id number]              Condition on Discharge:  Fair    Vital Signs  Temp:  [97.2 °F (36.2 °C)-97.9 °F (36.6 °C)] 97.5 °F (36.4 °C)  Heart Rate:  [63-84] 80  Resp:  [9-25] 18  BP: (115-156)/(72-77) 131/76    Physical Exam:     General Appearance:    Alert, cooperative, in no acute distress   Head:    Normocephalic, without obvious abnormality, atraumatic   Eyes:           Conjunctivae and sclerae normal, no   icterus, no pallor, corneas clear, PERRLA   Throat:   No oral lesions, no thrush, oral mucosa moist   Neck:   No adenopathy, supple, trachea midline, no thyromegaly, no    carotid bruit, no JVD   Lungs:     Clear to auscultation,respirations regular, even and                  unlabored    Heart:    Regular rhythm and normal rate, normal S1 and S2, no            murmur, no gallop, no rub, no click   Chest Wall:    No abnormalities observed   Abdomen:     Normal bowel sounds, no masses, no organomegaly, soft        non-tender, non-distended, no guarding, no rebound                tenderness   Rectal:     Deferred   Extremities:   Moves all extremities well, no edema, no cyanosis, no             redness   Pulses:   Pulses palpable and equal bilaterally   Skin:   No bleeding, bruising or rash   Lymph nodes:   No palpable adenopathy   Neurologic:   Cranial nerves 2 - 12 grossly intact, sensation intact, DTR       present and equal bilaterally         Discharge Disposition  Home-Health Care Svc    Discharge Medications     Discharge Medications      New Medications      Instructions Start Date   furosemide 20 MG tablet  Commonly known as: LASIX   20 mg, Oral, Daily   Start Date: February 2, 2023     ipratropium-albuterol 0.5-2.5 mg/3 ml nebulizer  Commonly known as: DUO-NEB   3 mL, Nebulization, 4 Times Daily - RT      polyethylene glycol 17 g packet  Commonly known as: MIRALAX   17 g, Oral, 2 Times Daily         Changes to Medications      Instructions Start Date   gabapentin 100 MG capsule  Commonly known as: NEURONTIN  What changed: Another medication with the same name was added. Make sure you understand how and when to take each.   100-200 mg, Oral, As Needed      gabapentin 100 MG capsule  Commonly known as: NEURONTIN  What changed: You were already taking a medication with the same name, and this prescription was added. Make sure you understand how and when to take each.   200 mg, Oral, Every 6 Hours PRN         Continue These Medications      Instructions Start Date   Advair HFA 45-21 MCG/ACT inhaler  Generic drug: fluticasone-salmeterol   2 puffs, Inhalation, 2 Times Daily       albuterol (2.5 MG/3ML) 0.083% nebulizer solution  Commonly known as: PROVENTIL   2.5 mg, Nebulization, Every 4 Hours PRN      amLODIPine 10 MG tablet  Commonly known as: NORVASC   TAKE 1 TABLET BY MOUTH EVERY DAY      aspirin 81 MG tablet   81 mg, Oral, Nightly, dont take dos      carvedilol 3.125 MG tablet  Commonly known as: COREG   3.125 mg, Oral, 2 Times Daily With Meals      doxazosin 2 MG tablet  Commonly known as: Cardura   2 mg, Oral, Nightly      Evolocumab solution auto-injector SureClick injection  Commonly known as: REPATHA   140 mg, Subcutaneous, Every 14 Days      Lantus SoloStar 100 UNIT/ML injection pen  Generic drug: Insulin Glargine   20 Units, Subcutaneous, Nightly      Linzess 145 MCG capsule capsule  Generic drug: linaclotide   145 mcg, Daily      nortriptyline 10 MG capsule  Commonly known as: PAMELOR   10 mg, Oral, Nightly      sertraline 50 MG tablet  Commonly known as: ZOLOFT   TAKE 1 TABLET BY MOUTH EVERY DAY      SITagliptin 100 MG tablet  Commonly known as: JANUVIA   100 mg, Oral, Daily      traMADol 50 MG tablet  Commonly known as: ULTRAM   100 mg, Oral, 2 Times Daily         Stop These Medications    clindamycin 300 MG capsule  Commonly known as: CLEOCIN            Discharge Diet:   Diet Instructions     Diet: Consistent Carbohydrate, Cardiac      Discharge Diet:  Consistent Carbohydrate  Cardiac             Activity at Discharge: Up as tolerated with assistance from family    Follow-up Appointments  No future appointments.  Additional Instructions for the Follow-ups that You Need to Schedule     Ambulatory Referral to Home Health (Hospital)   As directed      Face to Face Visit Date: 1/30/2023    Follow-up provider for Plan of Care?: I will be treating the patient on an ongoing basis.  Please send me the Plan of Care for signature.    Follow-up provider: FREDRICK BRINK [[id number]]    Reason/Clinical Findings: generalized weakness, COPD    Describe mobility limitations that make leaving home  difficult: Shortness of breath, generalized weakness    Nursing/Therapeutic Services Requested: Skilled Nursing Physical Therapy    Skilled nursing orders: O2 instruction COPD management    PT orders: Therapeutic exercise    Frequency: 1 Week 1         Ambulatory Referral to [facility] ([facility])   As directed      Face to Face Visit Date: 1/31/2023    Follow-up provider for Plan of Care?: I treated the patient in an acute care facility and will not continue treatment after discharge.    Follow-up provider: FREDRICK BRINK [[id number]]    Reason/Clinical Findings: post hospitalization    Describe mobility limitations that make leaving home difficult: weak    Nursing/Therapeutic Services Requested: Skilled Nursing    Skilled nursing orders: Medication education Pain management    Frequency: 1 Week 1         Ambulatory Referral to [facility] ([facility])   As directed      Face to Face Visit Date: 2/1/2023    Follow-up provider for Plan of Care?: I treated the patient in an acute care facility and will not continue treatment after discharge.    Follow-up provider: FREDRICK BRINK [[id number]]    Reason/Clinical Findings: Weakness and SOA    Describe mobility limitations that make leaving home difficult: Patient is not able to drive, requires assistance of person and use of O2    Nursing/Therapeutic Services Requested: Skilled Nursing Physical Therapy Occupational Therapy    Skilled nursing orders: Medication education Cardiopulmonary assessments O2 instruction    PT orders: Therapeutic exercise Gait Training Transfer training Home safety assessment Strengthening    Weight Bearing Status: Full Weight Bearing    Occupational orders: Activities of daily living Energy conservation Strengthening Cognition Home safety assessment    Frequency: 1 Week 1         Discharge Follow-up with Specified Provider: You have an appointment scheduled to see Claudio PEOPLES with [facility]- dgt has appt info   As directed      To: You have an  appointment scheduled to see Claudio PEOPLES with [facility]- dgt has appt info         Discharge Follow-up with Specified Provider: You will see Dr Garcia on 2/15/2023 at 3pm   As directed      To: You will see Dr Garcia on 2/15/2023 at 3pm         Basic Metabolic Panel    Feb 08, 2023 (Approximate)      Release to patient: Routine Release               Test Results Pending at Discharge       Christina Rosado, SAMIRA  02/01/23  15:57 EST

## 2023-02-01 NOTE — PROGRESS NOTES
Exercise Oximetry    Patient Name:Vianey Reeves   MRN: [id number]   Date: 02/01/23             ROOM AIR BASELINE   SpO2% 86%   Heart Rate    Blood Pressure      EXERCISE ON ROOM AIR SpO2% EXERCISE ON O2 @ 6 LPM SpO2%   1 MINUTE n/a 1 MINUTE 89% on 2lpm   2 MINUTES n/a 2 MINUTES 88% on 2lpm; turned up to 4lpm to achieve sat of 89-90%   3 MINUTES n/a 3 MINUTES 89% on 4lpm   4 MINUTES n/a 4 MINUTES 88% on 4lpm; turned up to 6lpm to achieve sat of 90%    5 MINUTES n/a 5 MINUTES 91% on 6lpm   6 MINUTES n/a 6 MINUTES 91% on 6lpm              Distance Walked  n/a Distance Walked up and down 2B hallway   Dyspnea (Edmar Scale)  n/a Dyspnea (Edmar Scale)   Fatigue (Edmar Scale)  n/a Fatigue (Edmar Scale)   SpO2% Post Exercise  n/a SpO2% Post Exercise 93%   HR Post Exercise  n/a HR Post Exercise 80   Time to Recovery  n/a Time to Recovery less than 1 min     Comments: Patient says she does not currently wear O2 @home. Patient was on 4lpm nasal cannula when I entered room. Removed nasal cannula and sats dropped to 86% on room air, while sitting on edge of bed. Placed patient on 2lpm to achieve sats of 89%. Walked w/patient to the door of patient's room and sats dropped to 88% again. Increased to 4lpm to achieve sats of 89-90%. Walked to end of 2B hallway and sats dropped again to 88%. Stopped and stood in hallway to see if patient would recover. Had to increase to 6lpm to achieve sats of 89% or greater. Walked down 2B hallway again on 6lpm and sats stayed @91%. No shortness of breath observed. Patient was unsteady and needed assistance from RN while walking.

## 2023-02-01 NOTE — PLAN OF CARE
Goal Outcome Evaluation:      Patient is discharging home with daughter. She is discharging on 6 liters of oxygen

## 2023-02-01 NOTE — CASE MANAGEMENT/SOCIAL WORK
Case Management Discharge Note       Home Medical Care Coordination complete.    Service Provider Selected Services Address Phone Fax Patient Preferred    [facility] [address] [place] [id number] [phone] --           Transportation Services  Private: Car    Final Discharge Disposition Code: 06 - home with home health care    Continued Stay Note  PRAKASH Tillman     Patient Name: Vianey Reeves  MRN: [id number]  Today's Date: 2/1/2023    Admit Date: 1/26/2023    Plan: Home with family.  [facility] accepted, order noted.  [employer] delivered O2 tanks.  [facility] referral made.  DC orders noted.  Family to transport.   Discharge Plan     Row Name 02/01/23 1651       Plan    Plan Home with family.  [facility] accepted, order noted.  [employer] delivered O2 tanks.  [facility] referral made.  DC orders noted.  Family to transport.    Final Discharge Disposition Code 06 - home with home health care              Expected Discharge Date and Time     Expected Discharge Date Expected Discharge Time    Feb 1, 2023           Phone communication or documentation only - no physical contact with patient or family.  Elva Ogden RN      Office Phone [phone]  Office Cell [phone]

## 2023-02-01 NOTE — PROGRESS NOTES
Daily Progress Note          Assessment    Chest pain: Musculoskeletal: No PE on VQ scan  Atelectasis  COPD  CKD  Anxiety  DM II  Hx malignant ureter cancer   Right breast mass (2016 US biopsy negative)  Intermittent claudication   DDD  T 6 compression fracture   IBS    Echo 1/28/2023: No pulmonary hypertension, LVEF 56-60%, LV diastolic dysfunction, mild bileaflet mitral valve thickening     PLAN:  Oxygen supplement and titration to maintain saturation 90 to 95%: Currently requiring 1-2 L per nasal cannula: Arrange for home oxygen  encourage to use IS more   Mucinex  Antibiotics complete  Bronchodilators  Inhaled corticosteroids  Incentive spirometer  Electrolytes/ glycemic control             LOS: 2 days     Subjective     Patient reports gradual improvement into cough and shortness of breath    Objective     Vital signs for last 24 hours:  Vitals:    02/01/23 0752 02/01/23 0827 02/01/23 1126 02/01/23 1129   BP:  141/75     BP Location:  Right arm     Patient Position:  Lying     Pulse: 70 70 71 72   Resp: 11 13 10 10   Temp:  97.9 °F (36.6 °C)     TempSrc:  Oral     SpO2: 90% 95% 91% 91%   Weight:       Height:           Intake/Output last 3 shifts:  I/O last 3 completed shifts:  In: 720 [P.O.:720]  Out: -   Intake/Output this shift:  I/O this shift:  In: 240 [P.O.:240]  Out: -       Radiology  Imaging Results (Last 24 Hours)     Procedure Component Value Units Date/Time    MRI Thoracic Spine Without Contrast [[id number]] Collected: 01/31/23 1715     Updated: 01/31/23 1728    Narrative:      MRI THORACIC SPINE WO CONTRAST    Date of Exam: 1/31/2023 4:54 PM EST    Indication: Compression fracture, thoracic.     Comparison: T-spine MRI dated 1/28/2020    Technique:  Routine multiplanar/multisequence sequence images of the thoracic spine were obtained without contrast administration.     Findings:  There is bone marrow edema with superior endplate fracture line and 25-30% loss of height at the T6 vertebral body. Bone  marrow edema extends into the pedicles left greater than right. No retropulsion or posterior cortical buckling. The remainder of the   posterior elements are intact. The remainder of the vertebral body heights are maintained. Alignment is preserved. There is vertebral hemangioma no suspicious focal osseous lesions.    There is normal signal in the substance of the thoracic spinal cord, which is normal in course and caliber. There is small amount of paraspinal edema/fluid around the anterolateral aspect of the T6 vertebral body. No hematoma, or loculated fluid   collections. No focal disc protrusions or extrusions, canal stenosis or neural foraminal narrowing.      Impression:      Impression:  Acute fracture superior endplate of the T6 vertebral body with approximately 25-30% loss of height. No posterior cortical buckling is seen. There is bone marrow edema within the. No central canal stenosis. There is normal signal in the thoracic spinal   cord.    Electronically Signed: Eusebio Doan    1/31/2023 5:26 PM EST    Workstation ID: [id number]          Labs:  Results from last 7 days   Lab Units 02/01/23  0451   WBC 10*3/mm3 8.00   HEMOGLOBIN g/dL 13.7   HEMATOCRIT % 42.5   PLATELETS 10*3/mm3 231     Results from last 7 days   Lab Units 02/01/23  0451   SODIUM mmol/L 142   POTASSIUM mmol/L 4.0   CHLORIDE mmol/L 101   CO2 mmol/L 34.0*   BUN mg/dL 20   CREATININE mg/dL 1.28*   CALCIUM mg/dL 9.4   BILIRUBIN mg/dL 0.4   ALK PHOS U/L 83   ALT (SGPT) U/L 7   AST (SGOT) U/L 13   GLUCOSE mg/dL 68     Results from last 7 days   Lab Units 01/26/23  1803   PH, ARTERIAL pH units 7.376   PO2 ART mm Hg 61.8*   PCO2, ARTERIAL mm Hg 51.2*   HCO3 ART mmol/L 30.0*     Results from last 7 days   Lab Units 02/01/23  0451 01/31/23  0403 01/30/23  0721   ALBUMIN g/dL 3.3* 3.2* 3.3*     Results from last 7 days   Lab Units 01/26/23  1759   TROPONIN T ng/mL <0.010         Results from last 7 days   Lab Units 02/01/23  0451   MAGNESIUM mg/dL  1.9                   Meds:   SCHEDULE  amLODIPine, 10 mg, Oral, Daily  aspirin, 81 mg, Oral, Daily  budesonide, 0.5 mg, Nebulization, BID - RT  carvedilol, 3.125 mg, Oral, BID With Meals  docusate sodium, 100 mg, Oral, BID  furosemide, 20 mg, Oral, Daily  insulin glargine, 20 Units, Subcutaneous, Nightly  ipratropium-albuterol, 3 mL, Nebulization, 4x Daily - RT  linagliptin, 5 mg, Oral, Daily  lubiprostone, 24 mcg, Oral, BID With Meals  pantoprazole, 40 mg, Oral, Q AM  polyethylene glycol, 17 g, Oral, BID  sertraline, 50 mg, Oral, Daily  sodium chloride, 3 mL, Intravenous, Q12H  terazosin, 2 mg, Oral, Nightly  traMADol, 100 mg, Oral, BID      Infusions     PRNs  •  acetaminophen  •  albuterol  •  calcium carbonate  •  gabapentin  •  hydrALAZINE  •  Morphine  •  ondansetron  •  [COMPLETED] Insert Peripheral IV **AND** sodium chloride  •  sodium chloride  •  sodium chloride    Physical Exam:  General Appearance:  Alert   HEENT:  Normocephalic, without obvious abnormality, Conjunctiva/corneas clear,.   Nares normal, no drainage     Neck:  Supple, symmetrical, trachea midline.   Lungs /Chest wall:   Good respiratory effort with mild bilateral basal rhonchi, respirations unlabored, symmetrical wall movement.     Heart:  Regular rate and rhythm, S1 S2 normal  Abdomen: Soft, non-tender, no masses, no organomegaly.    Extremities: No edema, no clubbing or cyanosis     ROS  Constitutional: Negative for chills, fever and positive for malaise/fatigue.   HENT: Negative.    Eyes: Negative.    Cardiovascular: Negative.    Respiratory: Positive for cough and shortness of breath.    Skin: Negative.    Musculoskeletal: Chronic low back and lower abdominal pain  Gastrointestinal: Negative.    Genitourinary: Negative.    Neurological: Negative.    Psychiatric/Behavioral: Negative.      I reviewed the recent clinical results    Much of this encounter note is an electronic transcription/translation of spoken language to printed text  using Dragon Software which might include inadvertent errors in transcription.

## 2023-02-01 NOTE — PLAN OF CARE
Problem: Adult Inpatient Plan of Care  Goal: Absence of Hospital-Acquired Illness or Injury  Intervention: Identify and Manage Fall Risk  Recent Flowsheet Documentation  Taken 2/1/2023 0200 by Emerita Alegria LPN  Safety Promotion/Fall Prevention:   activity supervised   assistive device/personal items within reach   clutter free environment maintained   fall prevention program maintained   gait belt   lighting adjusted   mobility aid in reach   nonskid shoes/slippers when out of bed   room organization consistent   safety round/check completed  Taken 2/1/2023 0000 by Emerita Alegria LPN  Safety Promotion/Fall Prevention:   activity supervised   assistive device/personal items within reach   clutter free environment maintained   fall prevention program maintained   gait belt   lighting adjusted   mobility aid in reach   nonskid shoes/slippers when out of bed   room organization consistent   safety round/check completed  Taken 1/31/2023 2200 by Emerita Alegria LPN  Safety Promotion/Fall Prevention:   activity supervised   assistive device/personal items within reach   clutter free environment maintained   fall prevention program maintained   gait belt   lighting adjusted   mobility aid in reach   room organization consistent   nonskid shoes/slippers when out of bed   safety round/check completed  Taken 1/31/2023 2000 by Emerita Alegria LPN  Safety Promotion/Fall Prevention:   activity supervised   assistive device/personal items within reach   clutter free environment maintained   fall prevention program maintained   gait belt   lighting adjusted   mobility aid in reach   nonskid shoes/slippers when out of bed   room organization consistent   safety round/check completed  Intervention: Prevent Skin Injury  Recent Flowsheet Documentation  Taken 2/1/2023 0200 by Emerita Alegria LPN  Body Position:   position changed independently   weight shifting  Taken 2/1/2023 0000 by Emerita Alegria LPN  Body Position:   position  changed independently   weight shifting  Taken 1/31/2023 2200 by Emerita Alegria LPN  Body Position:   position changed independently   weight shifting  Taken 1/31/2023 2000 by Emerita Alegria LPN  Body Position: position changed independently  Skin Protection:   tubing/devices free from skin contact   pulse oximeter probe site changed  Intervention: Prevent and Manage VTE (Venous Thromboembolism) Risk  Recent Flowsheet Documentation  Taken 2/1/2023 0200 by Emerita Alegria LPN  Activity Management:   activity adjusted per tolerance   activity encouraged  Taken 2/1/2023 0000 by Emerita Alegria LPN  Activity Management:   activity adjusted per tolerance   activity encouraged   up to bedside commode  Taken 1/31/2023 2200 by Emerita Alegria LPN  Activity Management:   activity adjusted per tolerance   activity encouraged  Taken 1/31/2023 2000 by Emerita Alegria LPN  Activity Management:   activity adjusted per tolerance   activity encouraged   ambulated to bathroom   up to bedside commode   dorsiflexion/plantar flexion performed  VTE Prevention/Management: (per patient request) sequential compression devices off  Range of Motion: active ROM (range of motion) encouraged  Intervention: Prevent Infection  Recent Flowsheet Documentation  Taken 2/1/2023 0200 by Emerita Alegria LPN  Infection Prevention:   environmental surveillance performed   equipment surfaces disinfected   hand hygiene promoted   rest/sleep promoted   single patient room provided  Taken 2/1/2023 0000 by Emerita Alegria LPN  Infection Prevention:   environmental surveillance performed   equipment surfaces disinfected   hand hygiene promoted   rest/sleep promoted   single patient room provided  Taken 1/31/2023 2200 by Emerita Alegria LPN  Infection Prevention:   environmental surveillance performed   equipment surfaces disinfected   hand hygiene promoted   rest/sleep promoted   single patient room provided  Taken 1/31/2023 2000 by Emerita Alegria  LPN  Infection Prevention:   environmental surveillance performed   equipment surfaces disinfected   hand hygiene promoted   single patient room provided   rest/sleep promoted  Goal: Optimal Comfort and Wellbeing  Intervention: Monitor Pain and Promote Comfort  Recent Flowsheet Documentation  Taken 2/1/2023 0200 by Emerita Alegria LPN  Pain Management Interventions:   care clustered   quiet environment facilitated  Taken 2/1/2023 0000 by Emerita Alegria LPN  Pain Management Interventions:   care clustered   quiet environment facilitated  Taken 1/31/2023 2200 by Emerita Alegria LPN  Pain Management Interventions:   quiet environment facilitated   care clustered  Taken 1/31/2023 2000 by Emerita Alegria LPN  Pain Management Interventions:   see MAR   quiet environment facilitated   care clustered  Intervention: Provide Person-Centered Care  Recent Flowsheet Documentation  Taken 1/31/2023 2000 by Emerita Alegria LPN  Trust Relationship/Rapport:   care explained   choices provided   empathic listening provided   emotional support provided   questions answered   questions encouraged   reassurance provided   thoughts/feelings acknowledged     Problem: Fall Injury Risk  Goal: Absence of Fall and Fall-Related Injury  Intervention: Identify and Manage Contributors  Recent Flowsheet Documentation  Taken 2/1/2023 0200 by Emerita Alegria LPN  Medication Review/Management: medications reviewed  Taken 2/1/2023 0000 by Emerita Alegria LPN  Medication Review/Management: medications reviewed  Taken 1/31/2023 2200 by Emerita Alegria LPN  Medication Review/Management: medications reviewed  Taken 1/31/2023 2000 by Emerita Alegria LPN  Medication Review/Management: medications reviewed  Self-Care Promotion: independence encouraged  Intervention: Promote Injury-Free Environment  Recent Flowsheet Documentation  Taken 2/1/2023 0200 by Emerita Alegria LPN  Safety Promotion/Fall Prevention:   activity supervised   assistive device/personal  items within reach   clutter free environment maintained   fall prevention program maintained   gait belt   lighting adjusted   mobility aid in reach   nonskid shoes/slippers when out of bed   room organization consistent   safety round/check completed  Taken 2/1/2023 0000 by Emerita Alegria LPN  Safety Promotion/Fall Prevention:   activity supervised   assistive device/personal items within reach   clutter free environment maintained   fall prevention program maintained   gait belt   lighting adjusted   mobility aid in reach   nonskid shoes/slippers when out of bed   room organization consistent   safety round/check completed  Taken 1/31/2023 2200 by Emerita Alegria LPN  Safety Promotion/Fall Prevention:   activity supervised   assistive device/personal items within reach   clutter free environment maintained   fall prevention program maintained   gait belt   lighting adjusted   mobility aid in reach   room organization consistent   nonskid shoes/slippers when out of bed   safety round/check completed  Taken 1/31/2023 2000 by Emerita Alegria LPN  Safety Promotion/Fall Prevention:   activity supervised   assistive device/personal items within reach   clutter free environment maintained   fall prevention program maintained   gait belt   lighting adjusted   mobility aid in reach   nonskid shoes/slippers when out of bed   room organization consistent   safety round/check completed     Problem: Skin Injury Risk Increased  Goal: Skin Health and Integrity  Intervention: Optimize Skin Protection  Recent Flowsheet Documentation  Taken 2/1/2023 0200 by Emerita Alegria LPN  Head of Bed (HOB) Positioning: (per patient request) HOB flat  Taken 2/1/2023 0000 by Emerita Alegria LPN  Head of Bed (HOB) Positioning: (per patient request) HOB flat  Taken 1/31/2023 2200 by Emerita Alegria LPN  Head of Bed (HOB) Positioning: (per patient request) HOB flat  Taken 1/31/2023 2000 by Emerita Alegria LPN  Pressure Reduction Techniques:    frequent weight shift encouraged   positioned off wounds  Head of Bed (HOB) Positioning: (per patient request) HOB flat  Pressure Reduction Devices: specialty bed utilized  Skin Protection:   tubing/devices free from skin contact   pulse oximeter probe site changed   Goal Outcome Evaluation:      Patient is 78 y/o female admitted for chest pain, unspecified type. Patient has been pleasant and stable throughout current shift. Patient's glucose has remained controlled throughout current shift. Patient was able to eat some crackers before [facility] administration. Patient's vital's have been stable throughout current shift as well. Will continue to monitor patient and patient's well being during current shift, and strive to attain patient's goals summarized in care plan during current shift.

## 2023-02-01 NOTE — PROGRESS NOTES
"NEPHROLOGY PROGRESS NOTE------[facility]    [facility]  [phone]  Sonali Ordonez MD      Patient Care Team:  Emma Hammer MD as PCP - General  Emma Hammer MD as PCP - Family Medicine  José Luis, MD Sergio as Consulting Physician (Nephrology)      Provider:  Sonali Ordonez MD  Patient Name: Vianey Reeves  :  1945    SUBJECTIVE:    F/U CRF/CKD    No complaints. No SOB, CP, palpitations, cramping. No dysuria.     Medication:  amLODIPine, 10 mg, Oral, Daily  aspirin, 81 mg, Oral, Daily  budesonide, 0.5 mg, Nebulization, BID - RT  carvedilol, 3.125 mg, Oral, BID With Meals  docusate sodium, 100 mg, Oral, BID  furosemide, 20 mg, Oral, Daily  insulin glargine, 20 Units, Subcutaneous, Nightly  ipratropium-albuterol, 3 mL, Nebulization, 4x Daily - RT  linagliptin, 5 mg, Oral, Daily  lubiprostone, 24 mcg, Oral, BID With Meals  pantoprazole, 40 mg, Oral, Q AM  polyethylene glycol, 17 g, Oral, BID  sertraline, 50 mg, Oral, Daily  sodium chloride, 3 mL, Intravenous, Q12H  terazosin, 2 mg, Oral, Nightly  traMADol, 100 mg, Oral, BID           OBJECTIVE    Vital Sign Min/Max for last 24 hours  Temp  Min: 97.2 °F (36.2 °C)  Max: 97.8 °F (36.6 °C)   BP  Min: 108/63  Max: 156/75   Pulse  Min: 63  Max: 80   Resp  Min: 9  Max: 18   SpO2  Min: 91 %  Max: 98 %   No data recorded   No data recorded     Flowsheet Rows    Flowsheet Row First Filed Value   Admission Height 165.1 cm (65\") Documented at 2023 1710   Admission Weight 88.9 kg (196 lb) Documented at 2023 1710          No intake/output data recorded.  I/O last 3 completed shifts:  In: 1200 [P.O.:1200]  Out: 600 [Urine:600]    Physical Exam:  General Appearance: alert, appears stated age and cooperative  Head: normocephalic, without obvious abnormality and atraumatic  Eyes: conjunctivae and sclerae normal and no icterus  Neck: supple and no JVD  Lungs: clear to " auscultation and respirations regular  Heart: regular rhythm & normal rate and normal S1, S2 +AMINA  Chest: Wall no abnormalities observed  Abdomen: normal bowel sounds and soft, nontender  Extremities: moves extremities well, trace edema, no cyanosis and no redness +DJD  Skin: no bleeding, bruising or rash, turgor normal, color normal and no lesions noted  Neurologic: Alert, and oriented. No focal deficits    Labs:    WBC WBC   Date Value Ref Range Status   02/01/2023 8.00 3.40 - 10.80 10*3/mm3 Final   01/31/2023 6.90 3.40 - 10.80 10*3/mm3 Final   01/30/2023 7.00 3.40 - 10.80 10*3/mm3 Final      HGB Hemoglobin   Date Value Ref Range Status   02/01/2023 13.7 12.0 - 15.9 g/dL Final   01/31/2023 12.9 12.0 - 15.9 g/dL Final   01/30/2023 13.2 12.0 - 15.9 g/dL Final      HCT Hematocrit   Date Value Ref Range Status   02/01/2023 42.5 34.0 - 46.6 % Final   01/31/2023 39.9 34.0 - 46.6 % Final   01/30/2023 38.4 34.0 - 46.6 % Final      Platelets No results found for: LABPLAT   MCV MCV   Date Value Ref Range Status   02/01/2023 97.3 (H) 79.0 - 97.0 fL Final   01/31/2023 96.8 79.0 - 97.0 fL Final   01/30/2023 94.9 79.0 - 97.0 fL Final          Sodium Sodium   Date Value Ref Range Status   02/01/2023 142 136 - 145 mmol/L Final   01/31/2023 142 136 - 145 mmol/L Final   01/30/2023 141 136 - 145 mmol/L Final      Potassium Potassium   Date Value Ref Range Status   02/01/2023 4.0 3.5 - 5.2 mmol/L Final   01/31/2023 3.9 3.5 - 5.2 mmol/L Final   01/30/2023 3.8 3.5 - 5.2 mmol/L Final      Chloride Chloride   Date Value Ref Range Status   02/01/2023 101 98 - 107 mmol/L Final   01/31/2023 101 98 - 107 mmol/L Final   01/30/2023 101 98 - 107 mmol/L Final      CO2 CO2   Date Value Ref Range Status   02/01/2023 34.0 (H) 22.0 - 29.0 mmol/L Final   01/31/2023 34.0 (H) 22.0 - 29.0 mmol/L Final   01/30/2023 32.0 (H) 22.0 - 29.0 mmol/L Final      BUN BUN   Date Value Ref Range Status   02/01/2023 20 8 - 23 mg/dL Final   01/31/2023 19 8 - 23 mg/dL  Final   01/30/2023 19 8 - 23 mg/dL Final      Creatinine Creatinine   Date Value Ref Range Status   02/01/2023 1.28 (H) 0.57 - 1.00 mg/dL Final   01/31/2023 1.31 (H) 0.57 - 1.00 mg/dL Final   01/30/2023 1.27 (H) 0.57 - 1.00 mg/dL Final      Calcium Calcium   Date Value Ref Range Status   02/01/2023 9.4 8.6 - 10.5 mg/dL Final   01/31/2023 8.7 8.6 - 10.5 mg/dL Final   01/30/2023 9.0 8.6 - 10.5 mg/dL Final      PO4 No components found for: PO4   Albumin Albumin   Date Value Ref Range Status   02/01/2023 3.3 (L) 3.5 - 5.2 g/dL Final   01/31/2023 3.2 (L) 3.5 - 5.2 g/dL Final   01/30/2023 3.3 (L) 3.5 - 5.2 g/dL Final      Magnesium Magnesium   Date Value Ref Range Status   02/01/2023 1.9 1.6 - 2.4 mg/dL Final      Uric Acid No components found for: URIC ACID     Imaging Results (Last 72 Hours)     Procedure Component Value Units Date/Time    MRI Thoracic Spine Without Contrast [[id number]] Collected: 01/31/23 1715     Updated: 01/31/23 1728    Narrative:      MRI THORACIC SPINE WO CONTRAST    Date of Exam: 1/31/2023 4:54 PM EST    Indication: Compression fracture, thoracic.     Comparison: T-spine MRI dated 1/28/2020    Technique:  Routine multiplanar/multisequence sequence images of the thoracic spine were obtained without contrast administration.     Findings:  There is bone marrow edema with superior endplate fracture line and 25-30% loss of height at the T6 vertebral body. Bone marrow edema extends into the pedicles left greater than right. No retropulsion or posterior cortical buckling. The remainder of the   posterior elements are intact. The remainder of the vertebral body heights are maintained. Alignment is preserved. There is vertebral hemangioma no suspicious focal osseous lesions.    There is normal signal in the substance of the thoracic spinal cord, which is normal in course and caliber. There is small amount of paraspinal edema/fluid around the anterolateral aspect of the T6 vertebral body. No hematoma, or  loculated fluid   collections. No focal disc protrusions or extrusions, canal stenosis or neural foraminal narrowing.      Impression:      Impression:  Acute fracture superior endplate of the T6 vertebral body with approximately 25-30% loss of height. No posterior cortical buckling is seen. There is bone marrow edema within the. No central canal stenosis. There is normal signal in the thoracic spinal   cord.    Electronically Signed: Eusebio Franki    1/31/2023 5:26 PM EST    Workstation ID: [id number]    XR Chest 1 View [[id number]] Collected: 01/29/23 0803     Updated: 01/29/23 0806    Narrative:      XR CHEST 1 VW    Date of Exam: 1/29/2023 6:00 AM EST    Indication: sob.    Comparison: VQ scan 1/27/2023, radiograph 1/27/2023    Findings:  The heart appears enlarged. Linear atelectasis and scarring present within the right midlung. No focal consolidations. No pleural effusions. No pneumothorax. No acute osseous abnormality identified. Chronic interstitial changes are present.      Impression:      Impression:  Stable right midlung scarring and atelectasis. Otherwise, no acute process.    Electronically Signed: Marisabel Roberts    1/29/2023 8:04 AM EST    Workstation ID: [id number]          Results for orders placed during the hospital encounter of 01/26/23    XR Chest 1 View    Narrative  XR CHEST 1 VW    Date of Exam: 1/29/2023 6:00 AM EST    Indication: sob.    Comparison: VQ scan 1/27/2023, radiograph 1/27/2023    Findings:  The heart appears enlarged. Linear atelectasis and scarring present within the right midlung. No focal consolidations. No pleural effusions. No pneumothorax. No acute osseous abnormality identified. Chronic interstitial changes are present.    Impression  Impression:  Stable right midlung scarring and atelectasis. Otherwise, no acute process.    Electronically Signed: Marisabel Roberts  1/29/2023 8:04 AM EST  Workstation ID: [id number]      XR Abdomen KUB    Narrative  XR ABDOMEN KUB    Date of Exam:  1/28/2023 2:35 PM EST    Indication: Check for stool burden, left upper quadrant abdominal pain, nausea.    Comparison: CT 1/23/2023    Findings:  No gross free air or pneumatosis though evaluation is slightly limited due to technique. There is a non obstructive bowel gas pattern. Cholecystectomy clips present within the right upper quadrant. A mild to moderate stool burden is present. No  suspicious calcifications identified. No acute osseous abnormality identified.    Impression  Impression:  Nonobstructive bowel gas pattern. Mild to moderate stool burden present, similar as compared to the recent prior study.    Electronically Signed: Marisabel Roberts  1/28/2023 2:44 PM EST  Workstation ID: [id number]      XR Chest PA & Lateral    Narrative  XR CHEST PA AND LATERAL    Date of Exam: 1/27/2023 9:04 AM EST    Indication: chest pain.    Comparison: PET scan 1/27/2023, CT 1/26/2023    Findings:  There are no airspace consolidations. Scarring present within the right midlung. No pleural fluid. No pneumothorax. The pulmonary vasculature appears within normal limits. The heart appears mildly enlarged. No acute osseous abnormality identified.    Impression  Impression:  No acute cardiopulmonary process.    Electronically Signed: Marisabel Roberts  1/27/2023 10:08 AM EST  Workstation ID: [id number]            ASSESSMENT / PLAN      Chest pain, unspecified type    Dyspnea    Hypoxia    1. CRF/CKD-------Nonoliguric. 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 Cr stable. Lytes okay. Volume okay. Dose meds for CrCl 30-45 cc/min. No NSAIDs or IV dye    2. HTN WITH CKD------BP okay. Avoid hypotension    3. DMII WITH RENAL MANIFESTATIONS-----BS okay. Avoid hypoglycemia. Glucometers, SSI    4. COPD-------Respiratory status stable    5. URETERAL CA S/P R NEPHRECTOMY/SOLITARY KIDNEY STATE    6. PULMONARY HTN    7. CONSTIPATION-----Dulcolax suppository    Okay from RENAL standpoint to  d/c      Sonali Ordonez MD  [facility]  [phone]  02/01/23  06:40 EST

## 2023-02-01 NOTE — SIGNIFICANT NOTE
02/01/23 1419   OTHER   Discipline physical therapist   Rehab Time/Intention   Session Not Performed other (see comments)  (Anticipate d/c this date per nursing and .)   Therapy Assessment/Plan (PT)   Criteria for Skilled Interventions Met (PT) yes;meets criteria;skilled treatment is necessary   Recommendation   PT - Next Appointment 02/02/23        DC instructions

## 2023-02-02 ENCOUNTER — TRANSITIONAL CARE MANAGEMENT TELEPHONE ENCOUNTER (OUTPATIENT)
Dept: CALL CENTER | Facility: HOSPITAL | Age: 78
End: 2023-02-02
Payer: MEDICARE

## 2023-02-02 NOTE — OUTREACH NOTE
Call Center TCM Note    Flowsheet Row Responses   [facility] [place] facility patient discharged from? [facility]   Does the patient have one of the following disease processes/diagnoses(primary or secondary)? Other   TCM attempt successful? No   Unsuccessful attempts Attempt 2          Lanie Reddy MA    2/2/2023, 16:08 EST

## 2023-02-02 NOTE — OUTREACH NOTE
Call Center TCM Note    Flowsheet Row Responses   [facility] facility patient discharged from? [facility]   Does the patient have one of the following disease processes/diagnoses(primary or secondary)? Other   TCM attempt successful? No   Unsuccessful attempts Attempt 1          Lanie Reddy MA    2/2/2023, 09:08 EST

## 2023-02-02 NOTE — OUTREACH NOTE
Prep Survey    Flowsheet Row Responses   [facility] facility patient discharged from? [facility]   Is LACE score < 7 ? No   Eligibility [facility]   Date of Admission 01/26/23   Date of Discharge 02/01/23   Discharge Disposition Home or Self Care   Discharge diagnosis Chest pain, copd   Does the patient have one of the following disease processes/diagnoses(primary or secondary)? Other   Does the patient have Home health ordered? Yes   What is the Home health agency?  [facility] accepted   Is there a DME ordered? Yes   What DME was ordered? [place] delivered O2 tanks   Prep survey completed? Yes          IVETH HERNÁNDEZ - Registered Nurse

## 2023-02-03 ENCOUNTER — TRANSITIONAL CARE MANAGEMENT TELEPHONE ENCOUNTER (OUTPATIENT)
Dept: CALL CENTER | Facility: HOSPITAL | Age: 78
End: 2023-02-03
Payer: MEDICARE

## 2023-02-03 NOTE — OUTREACH NOTE
Call Center TCM Note    Flowsheet Row Responses   [facility] facility patient discharged from? [facility]   Does the patient have one of the following disease processes/diagnoses(primary or secondary)? Other   TCM attempt successful? No   Unsuccessful attempts Attempt 3          Lanie Mcrae RN    2/3/2023, 08:33 EST

## 2023-02-10 ENCOUNTER — TELEPHONE (OUTPATIENT)
Dept: FAMILY MEDICINE CLINIC | Facility: CLINIC | Age: 78
End: 2023-02-10
Payer: MEDICARE

## 2023-02-10 NOTE — TELEPHONE ENCOUNTER
Called Vianey she said she did not know he has been trying to get ahold of her. I gave her Gurdeep's phone number she said she would call him.

## 2023-02-10 NOTE — TELEPHONE ENCOUNTER
Gurdeep guy [facility] states he has been unable to reach patient all week for home health visit. Therefore, labs will not be done. He is requesting a verbal order for labs to be done next week. Please advise.   [Takes medication as prescribed] : takes [None] : Patient does not have any barriers to medication adherence

## 2023-02-13 ENCOUNTER — TELEPHONE (OUTPATIENT)
Dept: FAMILY MEDICINE CLINIC | Facility: CLINIC | Age: 78
End: 2023-02-13
Payer: MEDICARE

## 2023-02-13 NOTE — TELEPHONE ENCOUNTER
Gurdeep of [facility] called to inform pcp that patient told him she wants services with [facility] discontinued because she feels they are unnecessary.

## 2023-02-16 ENCOUNTER — OFFICE VISIT (OUTPATIENT)
Dept: CARDIOLOGY | Facility: CLINIC | Age: 78
End: 2023-02-16
Payer: MEDICARE

## 2023-02-16 VITALS
HEIGHT: 65 IN | DIASTOLIC BLOOD PRESSURE: 86 MMHG | SYSTOLIC BLOOD PRESSURE: 119 MMHG | WEIGHT: 196 LBS | HEART RATE: 61 BPM | BODY MASS INDEX: 32.65 KG/M2 | OXYGEN SATURATION: 96 %

## 2023-02-16 DIAGNOSIS — I25.10 ATHEROSCLEROSIS OF NATIVE CORONARY ARTERY OF NATIVE HEART WITHOUT ANGINA PECTORIS: ICD-10-CM

## 2023-02-16 DIAGNOSIS — E78.2 MIXED HYPERLIPIDEMIA: Primary | ICD-10-CM

## 2023-02-16 DIAGNOSIS — I10 ESSENTIAL (PRIMARY) HYPERTENSION: ICD-10-CM

## 2023-02-16 DIAGNOSIS — Z79.02 LONG TERM CURRENT USE OF ANTITHROMBOTICS/ANTIPLATELETS: ICD-10-CM

## 2023-02-16 PROCEDURE — 99213 OFFICE O/P EST LOW 20 MIN: CPT | Performed by: NURSE PRACTITIONER

## 2023-02-16 NOTE — PROGRESS NOTES
[facility] CARDIOLOGY      REASON FOR FOLLOW-UP:  Presurgical cardiac risk assessment  Coronary artery disease    Chief Complaint   Patient presents with   • Heart Problem         Dear Anny Garcia MD        History of Present Illness   It was my pleasure to see Ms. Reeves in acute office visit.  She is a 77 y.o. female with a known history of ischemic heart disease.  She underwent cardiac catheterization with PCI and drug-eluting stent placement on April 9, 2016 and follow-up PCI on 5/25/2016.      Last office visit 4/7/2022  TTE 4/20/2022 with normal LV systolic function and EF 56-60%, grade 1 DD, mild MR.  Patient was fitted with a 72-hour Holter monitor in April 2022- normal sinus rhythm with heart rate average 78 bpm, highest 150 bpm.  She had frequent PACs and rare PVCs.    Ms. Reeves presents today for presurgical cardiac risk assessment to undergo kyphoplasty on the 20th.  The patient denies any complaints of chest pain, pressure, tightness or palpitations.  She is on supplemental oxygen at 2 L nasal cannula, but reports no worsening of her respiratory status.  She has had no dizziness, lightheadedness, near syncopal or syncopal episodes.  No lower extremity edema.  She continues to smoke occasionally.      ASSESSMENT:  Presurgical cardiac risk assessment to undergo kyphoplasty  Coronary artery disease status post PCI and drug-eluting stent placement in her mid RCA and Cx        Negative nuclear stress test May 2019  Diabetes mellitus.  Dyslipidemia with intolerance to statins.  Hypertension with hypertensive cardiovascular disease.   COPD  Solitary kidney.  Chronic renal failure with declining GFR     Followed by Dr. Julio  Tobacco abuse  Anti-platelet therapy.    PLAN:  Patient should be low risk for perioperative cardiac event to undergo general anesthesia for kyphoplasty.  Granddaughter states she may not have general anesthesia.  Continue current CV medical regimen to include  aspirin, amlodipine, carvedilol, Lasix  Recommend routine follow-up  Recommend complete smoking cessation  I will send cardiac clearance to Dr. Driscoll's office    There are no diagnoses linked to this encounter.      The following portions of the patient's history were reviewed and updated as appropriate: allergies, current medications, past family history, past medical history, past social history, past surgical history and problem list.    REVIEW OF SYSTEMS:    Review of Systems   Respiratory: Positive for shortness of breath.    Musculoskeletal: Positive for back pain.   All other systems reviewed and are negative.      Vitals:    02/16/23 1327   BP: 119/86   Pulse: 61   SpO2: 96%         PHYSICAL EXAM:    General: Alert, cooperative, no distress, appears stated age  Head:  Normocephalic, atraumatic, mucous membranes moist  Eyes:  Conjunctiva/corneas clear, EOM's intact     Neck:  Supple,  no JVD or bruit     Lungs: Clear to auscultation bilaterally, no wheezes rhonchi rales are noted  Chest wall: No tenderness  Musculoskeletal:   Ambulates freely without assistance  Heart::  Regular rate and rhythm, S1 and S2 normal, no murmur, rub or gallop  Abdomen: Soft, non-tender, nondistended, bowel sounds active, no abdominal bruit  Extremities: No cyanosis, clubbing, or edema   Pulses: 2+ and symmetric all extremities  Skin:  No rashes or lesions  Neuro/psych: A&O x3. CN II through XII are grossly intact with appropriate affect        Past Medical History:   Diagnosis Date   • Allergic     latex allergy   • Allergies    • Anxiety    • Bilateral carotid bruits    • Bulging lumbar disc    • Bulging of thoracic intervertebral disc    • Cancer ([facility])     ureter    surgery   • CKD (chronic kidney disease)     stage 3   • Claudication, intermittent ([facility])    • COPD (chronic obstructive pulmonary disease) ([facility])    • Coronary artery disease    • DDD (degenerative disc disease), lumbar    • DDD (degenerative disc disease), thoracic     • Diabetes mellitus (HCC)    • DJD (degenerative joint disease)    • Gout    • 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   • Hypertension    • IBS (irritable colon syndrome)     constipation   • Mass of right breast    • Neuropathy    • Renal insufficiency    • Sciatic leg pain     right   • Simple chronic bronchitis (HCC)    • Solitary kidney     left       Past Surgical History:   Procedure Laterality Date   • BREAST BIOPSY Right    • CARDIAC CATHETERIZATION     • CHOLECYSTECTOMY     • COLON SURGERY      REMOVAL diverticular diseae   • COLONOSCOPY N/A 08/12/2021    Procedure: COLONOSCOPY with polypectomy x 3;  Surgeon: Margarette Mcallister MD;  Location: [facility] ENDOSCOPY;  Service: Gastroenterology;  Laterality: N/A;  post op: hmorrhoids, diverticulosis, polyps   • CORONARY STENT PLACEMENT     • ENDOSCOPY N/A 08/12/2021    Procedure: ESOPHAGOGASTRODUODENOSCOPY with dilatation (18-20 mm balloon) and (54 bougie);  Surgeon: Margarette Mcallister MD;  Location: [facility] ENDOSCOPY;  Service: Gastroenterology;  Laterality: N/A;  post op: esophageal stricture, esophagitis, gastritis, history of nissen   • EYE SURGERY Bilateral    • HIATAL HERNIA REPAIR     • NEPHRECTOMY Right     cancer   • UPPER ENDOSCOPIC ULTRASOUND W/ FNA N/A 9/22/2022    Procedure: EUS;  Surgeon: Margarette Mcallister MD;  Location: [facility] ENDOSCOPY;  Service: Gastroenterology;  Laterality: N/A;  post: normal pancreas, dilated pancreatic duct, hiatal hernia,          Current Outpatient Medications:   •  albuterol (PROVENTIL) (2.5 MG/3ML) 0.083% nebulizer solution, Take 2.5 mg by nebulization Every 4 (Four) Hours As Needed for Wheezing., Disp: , Rfl:   •  amLODIPine (NORVASC) 10 MG tablet, TAKE 1 TABLET BY MOUTH EVERY DAY, Disp: 90 tablet, Rfl: 4  •  aspirin 81 MG tablet, Take 81 mg by mouth Every Night. dont take dos, Disp: , Rfl:   •  carvedilol (COREG) 3.125 MG tablet, Take 3.125 mg by mouth 2 (Two) Times a Day With Meals., Disp: , Rfl:   •  doxazosin (Cardura) 2 MG  tablet, Take 1 tablet by mouth Every Night., Disp: 90 tablet, Rfl: 3  •  Evolocumab (REPATHA) solution auto-injector SureClick injection, Inject 140 mg under the skin into the appropriate area as directed Every 14 (Fourteen) Days., Disp: , Rfl:   •  fluticasone-salmeterol (Advair HFA) 45-21 MCG/ACT inhaler, Inhale 2 puffs 2 (Two) Times a Day., Disp: 12 each, Rfl: 12  •  furosemide (LASIX) 20 MG tablet, Take 1 tablet by mouth Daily., Disp: 30 tablet, Rfl: 0  •  gabapentin (NEURONTIN) 100 MG capsule, Take 100-200 mg by mouth As Needed., Disp: , Rfl:   •  gabapentin (NEURONTIN) 100 MG capsule, Take 2 capsules by mouth Every 6 (Six) Hours As Needed (pain)., Disp: , Rfl:   •  Insulin Glargine (Lantus SoloStar) 100 UNIT/ML injection pen, Inject 20 Units under the skin into the appropriate area as directed Every Night., Disp: , Rfl:   •  ipratropium-albuterol (DUO-NEB) 0.5-2.5 mg/3 ml nebulizer, Take 3 mL by nebulization 4 (Four) Times a Day., Disp: 360 mL, Rfl: 0  •  Linzess 145 MCG capsule capsule, 145 mcg Daily., Disp: , Rfl:   •  nortriptyline (PAMELOR) 10 MG capsule, Take 10 mg by mouth Every Night., Disp: , Rfl:   •  polyethylene glycol (MIRALAX) 17 g packet, Take 17 g by mouth 2 (Two) Times a Day., Disp: , Rfl:   •  sertraline (ZOLOFT) 50 MG tablet, TAKE 1 TABLET BY MOUTH EVERY DAY, Disp: 90 tablet, Rfl: 3  •  SITagliptin (JANUVIA) 100 MG tablet, Take 100 mg by mouth Daily., Disp: , Rfl:   •  traMADol (ULTRAM) 50 MG tablet, Take 100 mg by mouth 2 (Two) Times a Day., Disp: , Rfl:     Allergies   Allergen Reactions   • Erythromycin Rash   • Naproxen Sodium Other (See Comments)     Dizzy lightheaded   • Statins Myalgia   • Latex Itching and Swelling   • Metoclopramide Other (See Comments)     Unsteady on feet   • Rocephin [Ceftriaxone] Itching and Nausea Only   • Dicyclomine Other (See Comments)       Family History   Problem Relation Age of Onset   • Arthritis Father    • Prostate cancer Father    • Heart disease  "Sister        Social History     Tobacco Use   • Smoking status: Some Days     Packs/day: 0.25     Years: 50.00     Pack years: 12.50     Types: Cigarettes   • Smokeless tobacco: Never   Substance Use Topics   • Alcohol use: Yes     Comment: occasionally            Current Electrocardiogram:  Procedures        EMR Dragon/Transcription:   \"Dictated utilizing Dragon dictation\".       "

## 2023-02-20 ENCOUNTER — HOSPITAL ENCOUNTER (OUTPATIENT)
Dept: INTERVENTIONAL RADIOLOGY/VASCULAR | Facility: HOSPITAL | Age: 78
Discharge: HOME | End: 2023-02-20
Admitting: RADIOLOGY
Payer: MEDICARE

## 2023-02-20 VITALS
DIASTOLIC BLOOD PRESSURE: 68 MMHG | RESPIRATION RATE: 13 BRPM | HEART RATE: 66 BPM | BODY MASS INDEX: 32.65 KG/M2 | SYSTOLIC BLOOD PRESSURE: 152 MMHG | OXYGEN SATURATION: 95 % | HEIGHT: 65 IN | TEMPERATURE: 98.7 F | WEIGHT: 196 LBS

## 2023-02-20 DIAGNOSIS — S22.050A COMPRESSION FRACTURE OF T6 VERTEBRA, INITIAL ENCOUNTER: ICD-10-CM

## 2023-02-20 LAB
APTT PPP: 21.1 SECONDS (ref 24–31)
BASOPHILS # BLD AUTO: 0.1 10*3/MM3 (ref 0–0.2)
BASOPHILS NFR BLD AUTO: 1.4 % (ref 0–1.5)
DEPRECATED RDW RBC AUTO: 47.3 FL (ref 37–54)
EOSINOPHIL # BLD AUTO: 0.1 10*3/MM3 (ref 0–0.4)
EOSINOPHIL NFR BLD AUTO: 1.8 % (ref 0.3–6.2)
ERYTHROCYTE [DISTWIDTH] IN BLOOD BY AUTOMATED COUNT: 13.3 % (ref 12.3–15.4)
HCT VFR BLD AUTO: 39.5 % (ref 34–46.6)
HGB BLD-MCNC: 13.1 G/DL (ref 12–15.9)
INR PPP: 1 (ref 0.93–1.1)
LYMPHOCYTES # BLD AUTO: 2.1 10*3/MM3 (ref 0.7–3.1)
LYMPHOCYTES NFR BLD AUTO: 27.3 % (ref 19.6–45.3)
MCH RBC QN AUTO: 31.8 PG (ref 26.6–33)
MCHC RBC AUTO-ENTMCNC: 33.3 G/DL (ref 31.5–35.7)
MCV RBC AUTO: 95.7 FL (ref 79–97)
MONOCYTES # BLD AUTO: 0.6 10*3/MM3 (ref 0.1–0.9)
MONOCYTES NFR BLD AUTO: 8.2 % (ref 5–12)
NEUTROPHILS NFR BLD AUTO: 4.6 10*3/MM3 (ref 1.7–7)
NEUTROPHILS NFR BLD AUTO: 61.3 % (ref 42.7–76)
NRBC BLD AUTO-RTO: 0.1 /100 WBC (ref 0–0.2)
PLATELET # BLD AUTO: 213 10*3/MM3 (ref 140–450)
PMV BLD AUTO: 7.9 FL (ref 6–12)
PROTHROMBIN TIME: 10.3 SECONDS (ref 9.6–11.7)
RBC # BLD AUTO: 4.13 10*6/MM3 (ref 3.77–5.28)
WBC NRBC COR # BLD: 7.6 10*3/MM3 (ref 3.4–10.8)

## 2023-02-20 PROCEDURE — 25010000002 CEFAZOLIN PER 500 MG: Performed by: RADIOLOGY

## 2023-02-20 PROCEDURE — 85025 COMPLETE CBC W/AUTO DIFF WBC: CPT | Performed by: RADIOLOGY

## 2023-02-20 PROCEDURE — 25010000002 FENTANYL CITRATE (PF) 50 MCG/ML SOLUTION: Performed by: RADIOLOGY

## 2023-02-20 PROCEDURE — 0 LIDOCAINE 1 % SOLUTION: Performed by: RADIOLOGY

## 2023-02-20 PROCEDURE — 99153 MOD SED SAME PHYS/QHP EA: CPT

## 2023-02-20 PROCEDURE — C1713 ANCHOR/SCREW BN/BN,TIS/BN: HCPCS

## 2023-02-20 PROCEDURE — 25010000002 MIDAZOLAM PER 1 MG: Performed by: RADIOLOGY

## 2023-02-20 PROCEDURE — 85610 PROTHROMBIN TIME: CPT | Performed by: RADIOLOGY

## 2023-02-20 PROCEDURE — 85730 THROMBOPLASTIN TIME PARTIAL: CPT | Performed by: RADIOLOGY

## 2023-02-20 PROCEDURE — 99152 MOD SED SAME PHYS/QHP 5/>YRS: CPT

## 2023-02-20 PROCEDURE — 22513 PERQ VERTEBRAL AUGMENTATION: CPT

## 2023-02-20 PROCEDURE — 25010000002 ONDANSETRON PER 1 MG: Performed by: RADIOLOGY

## 2023-02-20 RX ORDER — SODIUM CHLORIDE 0.9 % (FLUSH) 0.9 %
10 SYRINGE (ML) INJECTION EVERY 12 HOURS SCHEDULED
Status: DISCONTINUED | OUTPATIENT
Start: 2023-02-20 | End: 2023-02-21 | Stop reason: HOSPADM

## 2023-02-20 RX ORDER — LIDOCAINE HYDROCHLORIDE 10 MG/ML
INJECTION, SOLUTION INFILTRATION; PERINEURAL AS NEEDED
Status: COMPLETED | OUTPATIENT
Start: 2023-02-20 | End: 2023-02-20

## 2023-02-20 RX ORDER — SODIUM CHLORIDE 0.9 % (FLUSH) 0.9 %
10 SYRINGE (ML) INJECTION AS NEEDED
Status: DISCONTINUED | OUTPATIENT
Start: 2023-02-20 | End: 2023-02-21 | Stop reason: HOSPADM

## 2023-02-20 RX ORDER — ACETAMINOPHEN 325 MG/1
650 TABLET ORAL EVERY 4 HOURS PRN
Status: DISCONTINUED | OUTPATIENT
Start: 2023-02-20 | End: 2023-02-21 | Stop reason: HOSPADM

## 2023-02-20 RX ORDER — SODIUM CHLORIDE 9 MG/ML
75 INJECTION, SOLUTION INTRAVENOUS CONTINUOUS
Status: DISCONTINUED | OUTPATIENT
Start: 2023-02-20 | End: 2023-02-21 | Stop reason: HOSPADM

## 2023-02-20 RX ORDER — ONDANSETRON 2 MG/ML
INJECTION INTRAMUSCULAR; INTRAVENOUS AS NEEDED
Status: COMPLETED | OUTPATIENT
Start: 2023-02-20 | End: 2023-02-20

## 2023-02-20 RX ORDER — MIDAZOLAM HYDROCHLORIDE 1 MG/ML
INJECTION INTRAMUSCULAR; INTRAVENOUS AS NEEDED
Status: COMPLETED | OUTPATIENT
Start: 2023-02-20 | End: 2023-02-20

## 2023-02-20 RX ORDER — FENTANYL CITRATE 50 UG/ML
INJECTION, SOLUTION INTRAMUSCULAR; INTRAVENOUS AS NEEDED
Status: COMPLETED | OUTPATIENT
Start: 2023-02-20 | End: 2023-02-20

## 2023-02-20 RX ORDER — LIDOCAINE HYDROCHLORIDE 20 MG/ML
INJECTION, SOLUTION INFILTRATION; PERINEURAL AS NEEDED
Status: COMPLETED | OUTPATIENT
Start: 2023-02-20 | End: 2023-02-20

## 2023-02-20 RX ADMIN — SODIUM CHLORIDE 75 ML/HR: 0.9 INJECTION, SOLUTION INTRAVENOUS at 08:45

## 2023-02-20 RX ADMIN — FENTANYL CITRATE 50 MCG: 50 INJECTION, SOLUTION INTRAMUSCULAR; INTRAVENOUS at 09:43

## 2023-02-20 RX ADMIN — MIDAZOLAM 0.5 MG: 1 INJECTION INTRAMUSCULAR; INTRAVENOUS at 10:08

## 2023-02-20 RX ADMIN — ONDANSETRON 4 MG: 2 INJECTION INTRAMUSCULAR; INTRAVENOUS at 09:35

## 2023-02-20 RX ADMIN — MIDAZOLAM 0.5 MG: 1 INJECTION INTRAMUSCULAR; INTRAVENOUS at 09:43

## 2023-02-20 RX ADMIN — LIDOCAINE HYDROCHLORIDE 11 ML: 20 INJECTION, SOLUTION INFILTRATION; PERINEURAL at 10:06

## 2023-02-20 RX ADMIN — Medication 10 ML: at 14:28

## 2023-02-20 RX ADMIN — CEFAZOLIN 2 G: 1 INJECTION, POWDER, FOR SOLUTION INTRAMUSCULAR; INTRAVENOUS at 09:11

## 2023-02-20 RX ADMIN — LIDOCAINE HYDROCHLORIDE 12 ML: 10 INJECTION, SOLUTION INFILTRATION; PERINEURAL at 09:59

## 2023-02-20 RX ADMIN — FENTANYL CITRATE 50 MCG: 50 INJECTION, SOLUTION INTRAMUSCULAR; INTRAVENOUS at 10:08

## 2023-02-20 RX ADMIN — MIDAZOLAM 0.5 MG: 1 INJECTION INTRAMUSCULAR; INTRAVENOUS at 09:48

## 2023-02-20 RX ADMIN — FENTANYL CITRATE 50 MCG: 50 INJECTION, SOLUTION INTRAMUSCULAR; INTRAVENOUS at 09:48

## 2023-02-20 NOTE — PRE-PROCEDURE NOTE
.    [facility]   Interventional Radiology H&P    Patient Name: Vianey Reeves  : 1945  MRN: [id number]  Primary Care Physician:  Anny Garcia MD  Referring Physician: Buster Driscoll II, DO  Date of admission: 2023    Subjective   Subjective     HPI:  Vianey Reeves is a 77 y.o. female who presents with thoracic back pain beginning 22 after fall. Found to have T6 compression fracture. Referred for kyphoplasty.    Review of Systems:   Constitutional no fever,  no weight loss       Otolaryngeal no difficulty swallowing   Cardiovascular no chest pain   Pulmonary no cough, no sputum production   Gastrointestinal no constipation, no diarrhea                         Personal History       Past Medical/Surgical History:   Past Medical History:   Diagnosis Date   • Allergic     latex allergy   • Allergies    • Anxiety    • Bilateral carotid bruits    • Bulging lumbar disc    • Bulging of thoracic intervertebral disc    • Cancer (HCC)     ureter    surgery   • CKD (chronic kidney disease)     stage 3   • Claudication, intermittent (HCC)    • COPD (chronic obstructive pulmonary disease) (HCC)    • Coronary artery disease    • DDD (degenerative disc disease), lumbar    • DDD (degenerative disc disease), thoracic    • Diabetes mellitus (HCC)    • DJD (degenerative joint disease)    • Gout    • H/O malignant neoplasm of ureter 2020   • Hypertension    • IBS (irritable colon syndrome)     constipation   • Mass of right breast    • Neuropathy    • Renal insufficiency    • Sciatic leg pain     right   • Simple chronic bronchitis (HCC)    • Solitary kidney     left     Past Surgical History:   Procedure Laterality Date   • BREAST BIOPSY Right    • CARDIAC CATHETERIZATION     • CHOLECYSTECTOMY     • COLON SURGERY      REMOVAL diverticular diseae   • COLONOSCOPY N/A 2021    Procedure: COLONOSCOPY with polypectomy x 3;  Surgeon: Margarette Mcallister MD;  Location: [facility] ENDOSCOPY;  Service:  Gastroenterology;  Laterality: N/A;  post op: hmorrhoids, diverticulosis, polyps   • CORONARY STENT PLACEMENT     • ENDOSCOPY N/A 08/12/2021    Procedure: ESOPHAGOGASTRODUODENOSCOPY with dilatation (18-20 mm balloon) and (54 bougie);  Surgeon: Margarette Mcallister MD;  Location: [facility] ENDOSCOPY;  Service: Gastroenterology;  Laterality: N/A;  post op: esophageal stricture, esophagitis, gastritis, history of nissen   • EYE SURGERY Bilateral    • HIATAL HERNIA REPAIR     • NEPHRECTOMY Right     cancer   • UPPER ENDOSCOPIC ULTRASOUND W/ FNA N/A 9/22/2022    Procedure: EUS;  Surgeon: Margarette Mcallister MD;  Location: [facility] ENDOSCOPY;  Service: Gastroenterology;  Laterality: N/A;  post: normal pancreas, dilated pancreatic duct, hiatal hernia,        Social History:  reports that she has been smoking cigarettes. She has a 12.50 pack-year smoking history. She has never used smokeless tobacco. She reports current alcohol use. She reports that she does not use drugs.    Medications:  (Not in a hospital admission)    Current medications:  sodium chloride, 10 mL, Intravenous, Q12H      Current IV drips:  sodium chloride, 75 mL/hr        Allergies:  Allergies   Allergen Reactions   • Erythromycin Rash   • Naproxen Sodium Other (See Comments)     Dizzy lightheaded   • Statins Myalgia   • Latex Itching and Swelling   • Metoclopramide Other (See Comments)     Unsteady on feet   • Rocephin [Ceftriaxone] Itching and Nausea Only   • Dicyclomine Other (See Comments)       Objective    Objective     Vitals:   Temp:  [98.7 °F (37.1 °C)] 98.7 °F (37.1 °C)  Heart Rate:  [66] 66  Resp:  [11] 11  BP: (130)/(69) 130/69      Physical Exam:   Constitutional: Awake, alert, No acute distress    Respiratory: Clear to auscultation bilaterally, nonlabored respirations    Cardiovascular: RRR, no murmurs, rubs, or gallops, palpable pedal pulses bilaterally   Gastrointestinal: Positive bowel sounds, soft, nontender, nondistended        ASA SCALE ASSESSMENT:        MALLAMPATI CLASSIFICATION:          Result Review        Result Review:     No results found for: NA    No results found for: K    No results found for: CL    No results found for: PLASMABICARB    No results found for: BUN    No results found for: CREATININE    No results found for: CALCIUM        No components found for: GLUCOSE.*  Results from last 7 days   Lab Units 02/20/23  0820   WBC 10*3/mm3 7.60   HEMOGLOBIN g/dL 13.1   HEMATOCRIT % 39.5   PLATELETS 10*3/mm3 213                Assessment / Plan     Assesment:  T6 compression fracture      Plan:   T6 kyphoplasty    The risks and benefits of the procedure were discussed with the patient.    Electronically signed by Fabio Ochoa III, MD, 02/20/23, 8:41 AM EST.

## 2023-02-20 NOTE — NURSING NOTE
Local dressing of bacitracin, maxorb, and tegaderm applied to pt's back. Dressing is labeled, dry and intact.

## 2023-02-20 NOTE — NURSING NOTE
BP: 145/69, HR 64, RR 11, SpO2 96 (3L N/C), etCO2 38, pain 2/10. Asleep with brisk response to a light glabellar tap or loud auditory stimulus.

## 2023-02-20 NOTE — NURSING NOTE
Pt brought to IR dept via stretcher, on 3L oxygen via N/C with face mask in place. Pt is alert and oriented x4. Pt is relaxed and cooperative. Pt skin is flesh, warm, and dry. Pt is on cardiac monitoring. Pt will also be placed on etCO2 monitoring for conscious sedation procedure. Pt is laying semi fowlers on stretcher. Dr. Ochoa educated and consented patient for T-6 kyphoplasty procedure while patient was in pre-op dept. Consent obtained and signed by patient.

## 2023-02-20 NOTE — NURSING NOTE
Pt is being taken into IR lab D via stretcher for T-6 kyphoplasty procedure with conscious sedation.

## 2023-02-20 NOTE — NURSING NOTE
Dr. Ochoa marked area for T-6 kyphoplasty using fluoroscopy image guidance. Area on back was then prepped in sterile fashion using chlorhexidine scrub.

## 2023-02-20 NOTE — NURSING NOTE
Dr. Ochoa is now placing bone cement into pt's left and right T-6 pedicals under fluoroscopy image guidance. Ref # [id number] and Lot # [id number].

## 2023-02-20 NOTE — NURSING NOTE
Dr. Ochoa is numbing area on pt's back at level T-6 on left side under fluoroscopy image guidance.

## 2023-02-20 NOTE — NURSING NOTE
Pt transferred back to post op RN in stable condition on 3L oxygen via N/C. Pt remains drowsy, but is awakening easily and talking appropriately. Pt has equal strength to bilateral lower extremities. Pt remains on cardiac monitoring. Pt is laying supine on her stretcher with family member at bedside. Call light is within reach.

## 2023-02-20 NOTE — NURSING NOTE
Pt moved self from stretcher onto IR procedure table into prone position. Pt remains on cardiac and etCO2 monitoring and was increased from 3L to 4L oxygen via N/C for conscious sedation procedure. Pt provided warm blankets for comfort.

## 2023-02-20 NOTE — NURSING NOTE
Pt moved self back onto her stretcher laying supine. Pt is drowsy but easily arousable. Pt remains on 3L oxygen via N/C and continues to be monitored on cardiac and etCO2 for conscious sedation time.,   normal gait and station , no tenderness or deformities present

## 2023-02-20 NOTE — NURSING NOTE
Dr. Ochoa took final fluoroscopy image of level T-6 and then removed bilateral trocar needles. Local dressing to be applied.

## 2023-02-20 NOTE — NURSING NOTE
Pt's IV would not free flow. IV repositioned slightly and new tegaderm applied. IV in LAC is now free flowing and pt denies any pain at site.

## 2023-02-22 ENCOUNTER — APPOINTMENT (OUTPATIENT)
Dept: CT IMAGING | Facility: HOSPITAL | Age: 78
End: 2023-02-22
Payer: MEDICARE

## 2023-02-22 ENCOUNTER — HOSPITAL ENCOUNTER (OUTPATIENT)
Facility: HOSPITAL | Age: 78
Setting detail: OBSERVATION
Discharge: HOME HEALTH | End: 2023-02-24
Attending: EMERGENCY MEDICINE | Admitting: INTERNAL MEDICINE
Payer: MEDICARE

## 2023-02-22 DIAGNOSIS — M54.6 ACUTE RIGHT-SIDED THORACIC BACK PAIN: Primary | ICD-10-CM

## 2023-02-22 LAB
ALBUMIN SERPL-MCNC: 3.8 G/DL (ref 3.5–5.2)
ALBUMIN/GLOB SERPL: 1.2 G/DL
ALP SERPL-CCNC: 113 U/L (ref 39–117)
ALT SERPL W P-5'-P-CCNC: 10 U/L (ref 1–33)
ANION GAP SERPL CALCULATED.3IONS-SCNC: 9 MMOL/L (ref 5–15)
AST SERPL-CCNC: 17 U/L (ref 1–32)
BASOPHILS # BLD AUTO: 0 10*3/MM3 (ref 0–0.2)
BASOPHILS NFR BLD AUTO: 0.5 % (ref 0–1.5)
BILIRUB SERPL-MCNC: 0.4 MG/DL (ref 0–1.2)
BILIRUB UR QL STRIP: NEGATIVE
BUN SERPL-MCNC: 13 MG/DL (ref 8–23)
BUN/CREAT SERPL: 10.6 (ref 7–25)
CALCIUM SPEC-SCNC: 10.8 MG/DL (ref 8.6–10.5)
CHLORIDE SERPL-SCNC: 103 MMOL/L (ref 98–107)
CLARITY UR: CLEAR
CO2 SERPL-SCNC: 31 MMOL/L (ref 22–29)
COLOR UR: YELLOW
CREAT SERPL-MCNC: 1.23 MG/DL (ref 0.57–1)
DEPRECATED RDW RBC AUTO: 47.3 FL (ref 37–54)
EGFRCR SERPLBLD CKD-EPI 2021: 44.8 ML/MIN/1.73
EOSINOPHIL # BLD AUTO: 0.1 10*3/MM3 (ref 0–0.4)
EOSINOPHIL NFR BLD AUTO: 1 % (ref 0.3–6.2)
ERYTHROCYTE [DISTWIDTH] IN BLOOD BY AUTOMATED COUNT: 13.5 % (ref 12.3–15.4)
GLOBULIN UR ELPH-MCNC: 3.3 GM/DL
GLUCOSE SERPL-MCNC: 166 MG/DL (ref 65–99)
GLUCOSE UR STRIP-MCNC: NEGATIVE MG/DL
HCT VFR BLD AUTO: 45 % (ref 34–46.6)
HGB BLD-MCNC: 15 G/DL (ref 12–15.9)
HGB UR QL STRIP.AUTO: NEGATIVE
KETONES UR QL STRIP: NEGATIVE
LEUKOCYTE ESTERASE UR QL STRIP.AUTO: NEGATIVE
LYMPHOCYTES # BLD AUTO: 2.4 10*3/MM3 (ref 0.7–3.1)
LYMPHOCYTES NFR BLD AUTO: 27.1 % (ref 19.6–45.3)
MCH RBC QN AUTO: 32.1 PG (ref 26.6–33)
MCHC RBC AUTO-ENTMCNC: 33.5 G/DL (ref 31.5–35.7)
MCV RBC AUTO: 95.9 FL (ref 79–97)
MONOCYTES # BLD AUTO: 0.7 10*3/MM3 (ref 0.1–0.9)
MONOCYTES NFR BLD AUTO: 7.9 % (ref 5–12)
NEUTROPHILS NFR BLD AUTO: 5.7 10*3/MM3 (ref 1.7–7)
NEUTROPHILS NFR BLD AUTO: 63.5 % (ref 42.7–76)
NITRITE UR QL STRIP: NEGATIVE
NRBC BLD AUTO-RTO: 0.1 /100 WBC (ref 0–0.2)
PH UR STRIP.AUTO: 7.5 [PH] (ref 5–8)
PLATELET # BLD AUTO: 228 10*3/MM3 (ref 140–450)
PMV BLD AUTO: 7.9 FL (ref 6–12)
POTASSIUM SERPL-SCNC: 4.5 MMOL/L (ref 3.5–5.2)
PROT SERPL-MCNC: 7.1 G/DL (ref 6–8.5)
PROT UR QL STRIP: NEGATIVE
RBC # BLD AUTO: 4.69 10*6/MM3 (ref 3.77–5.28)
SODIUM SERPL-SCNC: 143 MMOL/L (ref 136–145)
SP GR UR STRIP: 1.01 (ref 1–1.03)
UROBILINOGEN UR QL STRIP: NORMAL
WBC NRBC COR # BLD: 8.9 10*3/MM3 (ref 3.4–10.8)

## 2023-02-22 PROCEDURE — 81003 URINALYSIS AUTO W/O SCOPE: CPT | Performed by: NURSE PRACTITIONER

## 2023-02-22 PROCEDURE — 25010000002 ONDANSETRON PER 1 MG

## 2023-02-22 PROCEDURE — 96376 TX/PRO/DX INJ SAME DRUG ADON: CPT

## 2023-02-22 PROCEDURE — 96375 TX/PRO/DX INJ NEW DRUG ADDON: CPT

## 2023-02-22 PROCEDURE — 85025 COMPLETE CBC W/AUTO DIFF WBC: CPT | Performed by: NURSE PRACTITIONER

## 2023-02-22 PROCEDURE — 74176 CT ABD & PELVIS W/O CONTRAST: CPT

## 2023-02-22 PROCEDURE — 99285 EMERGENCY DEPT VISIT HI MDM: CPT

## 2023-02-22 PROCEDURE — 96374 THER/PROPH/DIAG INJ IV PUSH: CPT

## 2023-02-22 PROCEDURE — 80053 COMPREHEN METABOLIC PANEL: CPT | Performed by: NURSE PRACTITIONER

## 2023-02-22 PROCEDURE — 25010000002 HYDROMORPHONE 1 MG/ML SOLUTION

## 2023-02-22 PROCEDURE — 25010000002 ONDANSETRON PER 1 MG: Performed by: EMERGENCY MEDICINE

## 2023-02-22 RX ORDER — SODIUM CHLORIDE 0.9 % (FLUSH) 0.9 %
10 SYRINGE (ML) INJECTION AS NEEDED
Status: DISCONTINUED | OUTPATIENT
Start: 2023-02-22 | End: 2023-02-24 | Stop reason: HOSPADM

## 2023-02-22 RX ORDER — ONDANSETRON 2 MG/ML
4 INJECTION INTRAMUSCULAR; INTRAVENOUS ONCE
Status: COMPLETED | OUTPATIENT
Start: 2023-02-22 | End: 2023-02-22

## 2023-02-22 RX ADMIN — HYDROMORPHONE HYDROCHLORIDE 1 MG: 1 INJECTION, SOLUTION INTRAMUSCULAR; INTRAVENOUS; SUBCUTANEOUS at 20:33

## 2023-02-22 RX ADMIN — HYDROMORPHONE HYDROCHLORIDE 0.5 MG: 1 INJECTION, SOLUTION INTRAMUSCULAR; INTRAVENOUS; SUBCUTANEOUS at 16:29

## 2023-02-22 RX ADMIN — ONDANSETRON 4 MG: 2 INJECTION INTRAMUSCULAR; INTRAVENOUS at 21:06

## 2023-02-22 RX ADMIN — ONDANSETRON 4 MG: 2 INJECTION INTRAMUSCULAR; INTRAVENOUS at 16:27

## 2023-02-23 ENCOUNTER — APPOINTMENT (OUTPATIENT)
Dept: CT IMAGING | Facility: HOSPITAL | Age: 78
End: 2023-02-23
Payer: MEDICARE

## 2023-02-23 LAB
ANION GAP SERPL CALCULATED.3IONS-SCNC: 7 MMOL/L (ref 5–15)
BASOPHILS # BLD AUTO: 0 10*3/MM3 (ref 0–0.2)
BASOPHILS NFR BLD AUTO: 0.6 % (ref 0–1.5)
BUN SERPL-MCNC: 14 MG/DL (ref 8–23)
BUN/CREAT SERPL: 12.2 (ref 7–25)
CALCIUM SPEC-SCNC: 9.8 MG/DL (ref 8.6–10.5)
CHLORIDE SERPL-SCNC: 104 MMOL/L (ref 98–107)
CO2 SERPL-SCNC: 30 MMOL/L (ref 22–29)
CREAT SERPL-MCNC: 1.15 MG/DL (ref 0.57–1)
DEPRECATED RDW RBC AUTO: 45.5 FL (ref 37–54)
EGFRCR SERPLBLD CKD-EPI 2021: 48.6 ML/MIN/1.73
EOSINOPHIL # BLD AUTO: 0.1 10*3/MM3 (ref 0–0.4)
EOSINOPHIL NFR BLD AUTO: 0.9 % (ref 0.3–6.2)
ERYTHROCYTE [DISTWIDTH] IN BLOOD BY AUTOMATED COUNT: 13.6 % (ref 12.3–15.4)
GLUCOSE BLDC GLUCOMTR-MCNC: 127 MG/DL (ref 70–105)
GLUCOSE BLDC GLUCOMTR-MCNC: 131 MG/DL (ref 70–105)
GLUCOSE BLDC GLUCOMTR-MCNC: 156 MG/DL (ref 70–105)
GLUCOSE BLDC GLUCOMTR-MCNC: 181 MG/DL (ref 70–105)
GLUCOSE SERPL-MCNC: 152 MG/DL (ref 65–99)
HCT VFR BLD AUTO: 40.6 % (ref 34–46.6)
HGB BLD-MCNC: 13.1 G/DL (ref 12–15.9)
LYMPHOCYTES # BLD AUTO: 2.1 10*3/MM3 (ref 0.7–3.1)
LYMPHOCYTES NFR BLD AUTO: 26.5 % (ref 19.6–45.3)
MCH RBC QN AUTO: 31.1 PG (ref 26.6–33)
MCHC RBC AUTO-ENTMCNC: 32.2 G/DL (ref 31.5–35.7)
MCV RBC AUTO: 96.6 FL (ref 79–97)
MONOCYTES # BLD AUTO: 0.7 10*3/MM3 (ref 0.1–0.9)
MONOCYTES NFR BLD AUTO: 9.2 % (ref 5–12)
NEUTROPHILS NFR BLD AUTO: 4.9 10*3/MM3 (ref 1.7–7)
NEUTROPHILS NFR BLD AUTO: 62.8 % (ref 42.7–76)
NRBC BLD AUTO-RTO: 0.1 /100 WBC (ref 0–0.2)
PLATELET # BLD AUTO: 214 10*3/MM3 (ref 140–450)
PMV BLD AUTO: 7.7 FL (ref 6–12)
POTASSIUM SERPL-SCNC: 4 MMOL/L (ref 3.5–5.2)
RBC # BLD AUTO: 4.21 10*6/MM3 (ref 3.77–5.28)
SODIUM SERPL-SCNC: 141 MMOL/L (ref 136–145)
WBC NRBC COR # BLD: 7.8 10*3/MM3 (ref 3.4–10.8)

## 2023-02-23 PROCEDURE — 96375 TX/PRO/DX INJ NEW DRUG ADDON: CPT

## 2023-02-23 PROCEDURE — 25010000002 PROCHLORPERAZINE 10 MG/2ML SOLUTION: Performed by: INTERNAL MEDICINE

## 2023-02-23 PROCEDURE — G0378 HOSPITAL OBSERVATION PER HR: HCPCS

## 2023-02-23 PROCEDURE — 96376 TX/PRO/DX INJ SAME DRUG ADON: CPT

## 2023-02-23 PROCEDURE — 63710000001 INSULIN GLARGINE PER 5 UNITS: Performed by: INTERNAL MEDICINE

## 2023-02-23 PROCEDURE — 94799 UNLISTED PULMONARY SVC/PX: CPT

## 2023-02-23 PROCEDURE — 25010000002 DEXAMETHASONE PER 1 MG: Performed by: INTERNAL MEDICINE

## 2023-02-23 PROCEDURE — 80048 BASIC METABOLIC PNL TOTAL CA: CPT

## 2023-02-23 PROCEDURE — 93010 ELECTROCARDIOGRAM REPORT: CPT | Performed by: INTERNAL MEDICINE

## 2023-02-23 PROCEDURE — 25010000002 HYDROMORPHONE 1 MG/ML SOLUTION: Performed by: INTERNAL MEDICINE

## 2023-02-23 PROCEDURE — 85025 COMPLETE CBC W/AUTO DIFF WBC: CPT

## 2023-02-23 PROCEDURE — 72131 CT LUMBAR SPINE W/O DYE: CPT

## 2023-02-23 PROCEDURE — 25010000002 ONDANSETRON PER 1 MG

## 2023-02-23 PROCEDURE — 25010000002 HYDRALAZINE PER 20 MG

## 2023-02-23 PROCEDURE — 93005 ELECTROCARDIOGRAM TRACING: CPT

## 2023-02-23 PROCEDURE — 25010000002 HYDROMORPHONE 1 MG/ML SOLUTION

## 2023-02-23 PROCEDURE — 25010000002 ONDANSETRON PER 1 MG: Performed by: INTERNAL MEDICINE

## 2023-02-23 PROCEDURE — 72128 CT CHEST SPINE W/O DYE: CPT

## 2023-02-23 PROCEDURE — 82962 GLUCOSE BLOOD TEST: CPT

## 2023-02-23 RX ORDER — SODIUM CHLORIDE 9 MG/ML
40 INJECTION, SOLUTION INTRAVENOUS AS NEEDED
Status: DISCONTINUED | OUTPATIENT
Start: 2023-02-23 | End: 2023-02-24 | Stop reason: HOSPADM

## 2023-02-23 RX ORDER — SODIUM CHLORIDE 0.9 % (FLUSH) 0.9 %
3 SYRINGE (ML) INJECTION EVERY 12 HOURS SCHEDULED
Status: DISCONTINUED | OUTPATIENT
Start: 2023-02-23 | End: 2023-02-24 | Stop reason: HOSPADM

## 2023-02-23 RX ORDER — TRAMADOL HYDROCHLORIDE 50 MG/1
100 TABLET ORAL 2 TIMES DAILY
Status: DISCONTINUED | OUTPATIENT
Start: 2023-02-23 | End: 2023-02-24 | Stop reason: HOSPADM

## 2023-02-23 RX ORDER — POLYETHYLENE GLYCOL 3350 17 G/17G
17 POWDER, FOR SOLUTION ORAL 2 TIMES DAILY
Status: DISCONTINUED | OUTPATIENT
Start: 2023-02-23 | End: 2023-02-24 | Stop reason: HOSPADM

## 2023-02-23 RX ORDER — ASPIRIN 81 MG/1
81 TABLET ORAL NIGHTLY
Status: DISCONTINUED | OUTPATIENT
Start: 2023-02-23 | End: 2023-02-24 | Stop reason: HOSPADM

## 2023-02-23 RX ORDER — FUROSEMIDE 20 MG/1
20 TABLET ORAL DAILY
Status: DISCONTINUED | OUTPATIENT
Start: 2023-02-23 | End: 2023-02-24 | Stop reason: HOSPADM

## 2023-02-23 RX ORDER — HYDRALAZINE HYDROCHLORIDE 20 MG/ML
10 INJECTION INTRAMUSCULAR; INTRAVENOUS EVERY 6 HOURS PRN
Status: DISCONTINUED | OUTPATIENT
Start: 2023-02-23 | End: 2023-02-24 | Stop reason: HOSPADM

## 2023-02-23 RX ORDER — ONDANSETRON 2 MG/ML
4 INJECTION INTRAMUSCULAR; INTRAVENOUS EVERY 6 HOURS PRN
Status: DISCONTINUED | OUTPATIENT
Start: 2023-02-23 | End: 2023-02-24 | Stop reason: HOSPADM

## 2023-02-23 RX ORDER — DEXTROSE MONOHYDRATE 25 G/50ML
50 INJECTION, SOLUTION INTRAVENOUS
Status: DISCONTINUED | OUTPATIENT
Start: 2023-02-23 | End: 2023-02-24 | Stop reason: HOSPADM

## 2023-02-23 RX ORDER — PROCHLORPERAZINE EDISYLATE 5 MG/ML
10 INJECTION INTRAMUSCULAR; INTRAVENOUS ONCE
Status: COMPLETED | OUTPATIENT
Start: 2023-02-23 | End: 2023-02-23

## 2023-02-23 RX ORDER — GABAPENTIN 100 MG/1
200 CAPSULE ORAL 3 TIMES DAILY
Status: DISCONTINUED | OUTPATIENT
Start: 2023-02-23 | End: 2023-02-24 | Stop reason: HOSPADM

## 2023-02-23 RX ORDER — NITROGLYCERIN 0.4 MG/1
0.4 TABLET SUBLINGUAL
Status: DISCONTINUED | OUTPATIENT
Start: 2023-02-23 | End: 2023-02-24 | Stop reason: HOSPADM

## 2023-02-23 RX ORDER — DEXTROSE MONOHYDRATE 25 G/50ML
25 INJECTION, SOLUTION INTRAVENOUS
Status: DISCONTINUED | OUTPATIENT
Start: 2023-02-23 | End: 2023-02-24 | Stop reason: HOSPADM

## 2023-02-23 RX ORDER — TERAZOSIN 2 MG/1
2 CAPSULE ORAL NIGHTLY
Status: DISCONTINUED | OUTPATIENT
Start: 2023-02-23 | End: 2023-02-23

## 2023-02-23 RX ORDER — PANTOPRAZOLE SODIUM 40 MG/10ML
40 INJECTION, POWDER, LYOPHILIZED, FOR SOLUTION INTRAVENOUS
Status: DISCONTINUED | OUTPATIENT
Start: 2023-02-23 | End: 2023-02-23

## 2023-02-23 RX ORDER — BUDESONIDE AND FORMOTEROL FUMARATE DIHYDRATE 160; 4.5 UG/1; UG/1
2 AEROSOL RESPIRATORY (INHALATION)
Status: DISCONTINUED | OUTPATIENT
Start: 2023-02-23 | End: 2023-02-24 | Stop reason: HOSPADM

## 2023-02-23 RX ORDER — PANTOPRAZOLE SODIUM 40 MG/1
40 TABLET, DELAYED RELEASE ORAL
Status: DISCONTINUED | OUTPATIENT
Start: 2023-02-24 | End: 2023-02-24 | Stop reason: HOSPADM

## 2023-02-23 RX ORDER — ACETAMINOPHEN 325 MG/1
650 TABLET ORAL EVERY 4 HOURS PRN
Status: DISCONTINUED | OUTPATIENT
Start: 2023-02-23 | End: 2023-02-24 | Stop reason: HOSPADM

## 2023-02-23 RX ORDER — SODIUM CHLORIDE 0.9 % (FLUSH) 0.9 %
3-10 SYRINGE (ML) INJECTION AS NEEDED
Status: DISCONTINUED | OUTPATIENT
Start: 2023-02-23 | End: 2023-02-24 | Stop reason: HOSPADM

## 2023-02-23 RX ORDER — OLANZAPINE 10 MG/2ML
1 INJECTION, POWDER, LYOPHILIZED, FOR SOLUTION INTRAMUSCULAR
Status: DISCONTINUED | OUTPATIENT
Start: 2023-02-23 | End: 2023-02-24 | Stop reason: HOSPADM

## 2023-02-23 RX ORDER — INSULIN GLARGINE 100 [IU]/ML
20 INJECTION, SOLUTION SUBCUTANEOUS NIGHTLY
Status: DISCONTINUED | OUTPATIENT
Start: 2023-02-23 | End: 2023-02-24 | Stop reason: HOSPADM

## 2023-02-23 RX ORDER — NICOTINE POLACRILEX 4 MG
15 LOZENGE BUCCAL
Status: DISCONTINUED | OUTPATIENT
Start: 2023-02-23 | End: 2023-02-24 | Stop reason: HOSPADM

## 2023-02-23 RX ORDER — CARVEDILOL 3.12 MG/1
3.12 TABLET ORAL 2 TIMES DAILY WITH MEALS
Status: DISCONTINUED | OUTPATIENT
Start: 2023-02-23 | End: 2023-02-24 | Stop reason: HOSPADM

## 2023-02-23 RX ORDER — METOPROLOL TARTRATE 5 MG/5ML
5 INJECTION INTRAVENOUS ONCE
Status: COMPLETED | OUTPATIENT
Start: 2023-02-23 | End: 2023-02-23

## 2023-02-23 RX ORDER — INSULIN LISPRO 100 [IU]/ML
2-7 INJECTION, SOLUTION INTRAVENOUS; SUBCUTANEOUS
Status: DISCONTINUED | OUTPATIENT
Start: 2023-02-23 | End: 2023-02-24 | Stop reason: HOSPADM

## 2023-02-23 RX ORDER — AMLODIPINE BESYLATE 5 MG/1
10 TABLET ORAL DAILY
Status: DISCONTINUED | OUTPATIENT
Start: 2023-02-23 | End: 2023-02-24 | Stop reason: HOSPADM

## 2023-02-23 RX ORDER — IPRATROPIUM BROMIDE AND ALBUTEROL SULFATE 2.5; .5 MG/3ML; MG/3ML
3 SOLUTION RESPIRATORY (INHALATION)
Status: DISCONTINUED | OUTPATIENT
Start: 2023-02-23 | End: 2023-02-24 | Stop reason: HOSPADM

## 2023-02-23 RX ORDER — DEXAMETHASONE SODIUM PHOSPHATE 4 MG/ML
6 INJECTION, SOLUTION INTRA-ARTICULAR; INTRALESIONAL; INTRAMUSCULAR; INTRAVENOUS; SOFT TISSUE ONCE
Status: COMPLETED | OUTPATIENT
Start: 2023-02-23 | End: 2023-02-23

## 2023-02-23 RX ORDER — ALBUTEROL SULFATE 2.5 MG/3ML
2.5 SOLUTION RESPIRATORY (INHALATION) EVERY 4 HOURS PRN
Status: DISCONTINUED | OUTPATIENT
Start: 2023-02-23 | End: 2023-02-24 | Stop reason: HOSPADM

## 2023-02-23 RX ADMIN — METOPROLOL TARTRATE 5 MG: 1 INJECTION, SOLUTION INTRAVENOUS at 08:00

## 2023-02-23 RX ADMIN — ASPIRIN 81 MG: 81 TABLET, COATED ORAL at 21:48

## 2023-02-23 RX ADMIN — HYDROMORPHONE HYDROCHLORIDE 0.5 MG: 1 INJECTION, SOLUTION INTRAMUSCULAR; INTRAVENOUS; SUBCUTANEOUS at 04:54

## 2023-02-23 RX ADMIN — POLYETHYLENE GLYCOL 3350 17 G: 17 POWDER, FOR SOLUTION ORAL at 15:26

## 2023-02-23 RX ADMIN — Medication 10 ML: at 06:50

## 2023-02-23 RX ADMIN — BUDESONIDE AND FORMOTEROL FUMARATE DIHYDRATE 2 PUFF: 160; 4.5 AEROSOL RESPIRATORY (INHALATION) at 19:00

## 2023-02-23 RX ADMIN — INSULIN GLARGINE 20 UNITS: 100 INJECTION, SOLUTION SUBCUTANEOUS at 21:49

## 2023-02-23 RX ADMIN — POLYETHYLENE GLYCOL 3350 17 G: 17 POWDER, FOR SOLUTION ORAL at 21:49

## 2023-02-23 RX ADMIN — HYDROMORPHONE HYDROCHLORIDE 0.5 MG: 1 INJECTION, SOLUTION INTRAMUSCULAR; INTRAVENOUS; SUBCUTANEOUS at 15:25

## 2023-02-23 RX ADMIN — TRAMADOL HYDROCHLORIDE 100 MG: 50 TABLET, COATED ORAL at 21:48

## 2023-02-23 RX ADMIN — HYDRALAZINE HYDROCHLORIDE 10 MG: 20 INJECTION INTRAMUSCULAR; INTRAVENOUS at 06:50

## 2023-02-23 RX ADMIN — TRAMADOL HYDROCHLORIDE 100 MG: 50 TABLET, COATED ORAL at 15:24

## 2023-02-23 RX ADMIN — Medication 3 ML: at 10:21

## 2023-02-23 RX ADMIN — HYDROMORPHONE HYDROCHLORIDE 0.5 MG: 1 INJECTION, SOLUTION INTRAMUSCULAR; INTRAVENOUS; SUBCUTANEOUS at 10:21

## 2023-02-23 RX ADMIN — IPRATROPIUM BROMIDE AND ALBUTEROL SULFATE 3 ML: 2.5; .5 SOLUTION RESPIRATORY (INHALATION) at 19:03

## 2023-02-23 RX ADMIN — DEXAMETHASONE SODIUM PHOSPHATE 6 MG: 4 INJECTION, SOLUTION INTRAMUSCULAR; INTRAVENOUS at 18:12

## 2023-02-23 RX ADMIN — SODIUM CHLORIDE 500 ML: 9 INJECTION, SOLUTION INTRAVENOUS at 18:13

## 2023-02-23 RX ADMIN — HYDROMORPHONE HYDROCHLORIDE 0.5 MG: 1 INJECTION, SOLUTION INTRAMUSCULAR; INTRAVENOUS; SUBCUTANEOUS at 19:25

## 2023-02-23 RX ADMIN — ONDANSETRON 4 MG: 2 INJECTION INTRAMUSCULAR; INTRAVENOUS at 15:25

## 2023-02-23 RX ADMIN — SERTRALINE 50 MG: 50 TABLET, FILM COATED ORAL at 15:24

## 2023-02-23 RX ADMIN — ONDANSETRON 4 MG: 2 INJECTION INTRAMUSCULAR; INTRAVENOUS at 04:53

## 2023-02-23 RX ADMIN — Medication 3 ML: at 19:25

## 2023-02-23 RX ADMIN — PANTOPRAZOLE SODIUM 40 MG: 40 INJECTION, POWDER, LYOPHILIZED, FOR SOLUTION INTRAVENOUS at 04:54

## 2023-02-23 RX ADMIN — PROCHLORPERAZINE EDISYLATE 10 MG: 5 INJECTION INTRAMUSCULAR; INTRAVENOUS at 08:00

## 2023-02-23 RX ADMIN — CARVEDILOL 3.12 MG: 3.12 TABLET, FILM COATED ORAL at 15:24

## 2023-02-23 RX ADMIN — LINAGLIPTIN 5 MG: 5 TABLET, FILM COATED ORAL at 15:24

## 2023-02-23 RX ADMIN — AMLODIPINE BESYLATE 10 MG: 5 TABLET ORAL at 15:24

## 2023-02-24 ENCOUNTER — APPOINTMENT (OUTPATIENT)
Dept: MRI IMAGING | Facility: HOSPITAL | Age: 78
End: 2023-02-24
Payer: MEDICARE

## 2023-02-24 ENCOUNTER — READMISSION MANAGEMENT (OUTPATIENT)
Dept: CALL CENTER | Facility: HOSPITAL | Age: 78
End: 2023-02-24
Payer: MEDICARE

## 2023-02-24 VITALS
TEMPERATURE: 97.9 F | HEART RATE: 76 BPM | BODY MASS INDEX: 28.65 KG/M2 | DIASTOLIC BLOOD PRESSURE: 59 MMHG | SYSTOLIC BLOOD PRESSURE: 118 MMHG | WEIGHT: 171.96 LBS | OXYGEN SATURATION: 96 % | RESPIRATION RATE: 16 BRPM | HEIGHT: 65 IN

## 2023-02-24 LAB
ANION GAP SERPL CALCULATED.3IONS-SCNC: 11 MMOL/L (ref 5–15)
BASOPHILS # BLD AUTO: 0 10*3/MM3 (ref 0–0.2)
BASOPHILS NFR BLD AUTO: 0.3 % (ref 0–1.5)
BUN SERPL-MCNC: 25 MG/DL (ref 8–23)
BUN/CREAT SERPL: 17.7 (ref 7–25)
CALCIUM SPEC-SCNC: 9.5 MG/DL (ref 8.6–10.5)
CHLORIDE SERPL-SCNC: 100 MMOL/L (ref 98–107)
CO2 SERPL-SCNC: 28 MMOL/L (ref 22–29)
CREAT SERPL-MCNC: 1.41 MG/DL (ref 0.57–1)
DEPRECATED RDW RBC AUTO: 47.3 FL (ref 37–54)
EGFRCR SERPLBLD CKD-EPI 2021: 38 ML/MIN/1.73
EOSINOPHIL # BLD AUTO: 0 10*3/MM3 (ref 0–0.4)
EOSINOPHIL NFR BLD AUTO: 0 % (ref 0.3–6.2)
ERYTHROCYTE [DISTWIDTH] IN BLOOD BY AUTOMATED COUNT: 13.4 % (ref 12.3–15.4)
GLUCOSE BLDC GLUCOMTR-MCNC: 151 MG/DL (ref 70–105)
GLUCOSE BLDC GLUCOMTR-MCNC: 277 MG/DL (ref 70–105)
GLUCOSE SERPL-MCNC: 174 MG/DL (ref 65–99)
HCT VFR BLD AUTO: 40.9 % (ref 34–46.6)
HGB BLD-MCNC: 13.7 G/DL (ref 12–15.9)
LYMPHOCYTES # BLD AUTO: 1.1 10*3/MM3 (ref 0.7–3.1)
LYMPHOCYTES NFR BLD AUTO: 14 % (ref 19.6–45.3)
MCH RBC QN AUTO: 32 PG (ref 26.6–33)
MCHC RBC AUTO-ENTMCNC: 33.5 G/DL (ref 31.5–35.7)
MCV RBC AUTO: 95.6 FL (ref 79–97)
MONOCYTES # BLD AUTO: 0.2 10*3/MM3 (ref 0.1–0.9)
MONOCYTES NFR BLD AUTO: 2.9 % (ref 5–12)
NEUTROPHILS NFR BLD AUTO: 6.6 10*3/MM3 (ref 1.7–7)
NEUTROPHILS NFR BLD AUTO: 82.8 % (ref 42.7–76)
NRBC BLD AUTO-RTO: 0.1 /100 WBC (ref 0–0.2)
PLATELET # BLD AUTO: 231 10*3/MM3 (ref 140–450)
PMV BLD AUTO: 8.8 FL (ref 6–12)
POTASSIUM SERPL-SCNC: 4.2 MMOL/L (ref 3.5–5.2)
QT INTERVAL: 434 MS
RBC # BLD AUTO: 4.28 10*6/MM3 (ref 3.77–5.28)
SODIUM SERPL-SCNC: 139 MMOL/L (ref 136–145)
WBC NRBC COR # BLD: 8 10*3/MM3 (ref 3.4–10.8)

## 2023-02-24 PROCEDURE — 94761 N-INVAS EAR/PLS OXIMETRY MLT: CPT

## 2023-02-24 PROCEDURE — 82962 GLUCOSE BLOOD TEST: CPT

## 2023-02-24 PROCEDURE — G0378 HOSPITAL OBSERVATION PER HR: HCPCS

## 2023-02-24 PROCEDURE — 94799 UNLISTED PULMONARY SVC/PX: CPT

## 2023-02-24 PROCEDURE — 80048 BASIC METABOLIC PNL TOTAL CA: CPT | Performed by: INTERNAL MEDICINE

## 2023-02-24 PROCEDURE — 72146 MRI CHEST SPINE W/O DYE: CPT

## 2023-02-24 PROCEDURE — 97162 PT EVAL MOD COMPLEX 30 MIN: CPT

## 2023-02-24 PROCEDURE — 94664 DEMO&/EVAL PT USE INHALER: CPT

## 2023-02-24 PROCEDURE — 63710000001 INSULIN LISPRO (HUMAN) PER 5 UNITS: Performed by: INTERNAL MEDICINE

## 2023-02-24 PROCEDURE — 36415 COLL VENOUS BLD VENIPUNCTURE: CPT | Performed by: INTERNAL MEDICINE

## 2023-02-24 PROCEDURE — 85025 COMPLETE CBC W/AUTO DIFF WBC: CPT | Performed by: INTERNAL MEDICINE

## 2023-02-24 RX ORDER — GABAPENTIN 100 MG/1
200 CAPSULE ORAL 3 TIMES DAILY
Qty: 90 CAPSULE | Refills: 0
Start: 2023-02-24

## 2023-02-24 RX ADMIN — POLYETHYLENE GLYCOL 3350 17 G: 17 POWDER, FOR SOLUTION ORAL at 09:27

## 2023-02-24 RX ADMIN — IPRATROPIUM BROMIDE AND ALBUTEROL SULFATE 3 ML: 2.5; .5 SOLUTION RESPIRATORY (INHALATION) at 11:12

## 2023-02-24 RX ADMIN — INSULIN LISPRO 4 UNITS: 100 INJECTION, SOLUTION INTRAVENOUS; SUBCUTANEOUS at 09:25

## 2023-02-24 RX ADMIN — AMLODIPINE BESYLATE 10 MG: 5 TABLET ORAL at 09:26

## 2023-02-24 RX ADMIN — CARVEDILOL 3.12 MG: 3.12 TABLET, FILM COATED ORAL at 09:26

## 2023-02-24 RX ADMIN — Medication 3 ML: at 09:25

## 2023-02-24 RX ADMIN — FUROSEMIDE 20 MG: 20 TABLET ORAL at 09:26

## 2023-02-24 RX ADMIN — INSULIN LISPRO 2 UNITS: 100 INJECTION, SOLUTION INTRAVENOUS; SUBCUTANEOUS at 12:18

## 2023-02-24 RX ADMIN — GABAPENTIN 200 MG: 100 CAPSULE ORAL at 09:26

## 2023-02-24 RX ADMIN — BUDESONIDE AND FORMOTEROL FUMARATE DIHYDRATE 2 PUFF: 160; 4.5 AEROSOL RESPIRATORY (INHALATION) at 07:29

## 2023-02-24 RX ADMIN — SERTRALINE 50 MG: 50 TABLET, FILM COATED ORAL at 09:26

## 2023-02-24 RX ADMIN — LINAGLIPTIN 5 MG: 5 TABLET, FILM COATED ORAL at 09:26

## 2023-02-24 RX ADMIN — PANTOPRAZOLE SODIUM 40 MG: 40 TABLET, DELAYED RELEASE ORAL at 05:28

## 2023-02-24 RX ADMIN — IPRATROPIUM BROMIDE AND ALBUTEROL SULFATE 3 ML: 2.5; .5 SOLUTION RESPIRATORY (INHALATION) at 07:25

## 2023-02-24 RX ADMIN — IPRATROPIUM BROMIDE AND ALBUTEROL SULFATE 3 ML: 2.5; .5 SOLUTION RESPIRATORY (INHALATION) at 14:47

## 2023-02-24 RX ADMIN — TRAMADOL HYDROCHLORIDE 100 MG: 50 TABLET, COATED ORAL at 09:26

## 2023-02-25 NOTE — OUTREACH NOTE
Prep Survey    Flowsheet Row Responses   [facility] facility patient discharged from? [facility]   Is LACE score < 7 ? No   Eligibility Not Eligible   What are the reasons patient is not eligible? [facility]]   Does the patient have one of the following disease processes/diagnoses(primary or secondary)? Other   Prep survey completed? Yes          Sanjuana HERNÁNDEZ - Registered Nurse

## 2023-03-17 ENCOUNTER — TELEPHONE (OUTPATIENT)
Dept: FAMILY MEDICINE CLINIC | Facility: CLINIC | Age: 78
End: 2023-03-17
Payer: MEDICARE

## 2023-03-17 NOTE — TELEPHONE ENCOUNTER
Caller: [employer]     Relationship: PARTNER WITH [employer] call back number: [phone]    What was the call regarding: HAVE NOT BEEN ABLE TO REACH THE PATIENT.  CONCERNED ABOUT HER WELFARE.  NOTHING IN [facility] SINCE FEB 2023.  PLEASE ADVISE    IS SHE GETTING ANY HOME HEALTH?  NOT ANSWERING HER PHONE.      Do you require a callback: YES

## 2023-03-20 RX ORDER — DOXAZOSIN 2 MG/1
TABLET ORAL
Qty: 90 TABLET | Refills: 3 | Status: SHIPPED | OUTPATIENT
Start: 2023-03-20

## 2023-04-20 DIAGNOSIS — E11.9 TYPE 2 DIABETES MELLITUS WITHOUT COMPLICATIONS: ICD-10-CM

## 2023-04-24 RX ORDER — SITAGLIPTIN 100 MG/1
TABLET, FILM COATED ORAL
Qty: 90 TABLET | Refills: 3 | OUTPATIENT
Start: 2023-04-24

## 2023-06-14 ENCOUNTER — OFFICE (AMBULATORY)
Dept: URBAN - METROPOLITAN AREA CLINIC 64 | Facility: CLINIC | Age: 78
End: 2023-06-14

## 2023-06-14 VITALS
HEIGHT: 65 IN | SYSTOLIC BLOOD PRESSURE: 149 MMHG | DIASTOLIC BLOOD PRESSURE: 91 MMHG | HEART RATE: 76 BPM | WEIGHT: 198 LBS

## 2023-06-14 DIAGNOSIS — R13.10 DYSPHAGIA, UNSPECIFIED: ICD-10-CM

## 2023-06-14 PROCEDURE — 99213 OFFICE O/P EST LOW 20 MIN: CPT | Performed by: NURSE PRACTITIONER

## 2023-08-02 RX ORDER — LIDOCAINE 50 MG/G
PATCH TOPICAL
COMMUNITY
Start: 2023-05-25

## 2023-08-02 RX ORDER — OMEPRAZOLE 40 MG/1
1 CAPSULE, DELAYED RELEASE ORAL EVERY MORNING
COMMUNITY

## 2023-08-02 RX ORDER — DEXAMETHASONE 6 MG/1
1 TABLET ORAL DAILY
COMMUNITY
End: 2023-08-09

## 2023-08-02 RX ORDER — METHOCARBAMOL 500 MG/1
TABLET, FILM COATED ORAL
COMMUNITY
Start: 2023-06-22

## 2023-08-02 RX ORDER — ABALOPARATIDE 2000 UG/ML
INJECTION, SOLUTION SUBCUTANEOUS
COMMUNITY

## 2023-08-02 RX ORDER — PREDNISONE 10 MG/1
1 TABLET ORAL DAILY
COMMUNITY
End: 2023-08-09

## 2023-08-02 RX ORDER — SUCRALFATE 1 G/1
TABLET ORAL
COMMUNITY
End: 2023-08-09

## 2023-08-02 RX ORDER — TIZANIDINE 2 MG/1
1 TABLET ORAL NIGHTLY PRN
COMMUNITY
End: 2023-08-09

## 2023-08-02 RX ORDER — CEPHALEXIN 500 MG/1
1 CAPSULE ORAL 3 TIMES DAILY
COMMUNITY
End: 2023-08-09

## 2023-08-02 RX ORDER — ESTRADIOL 0.1 MG/G
CREAM VAGINAL
COMMUNITY

## 2023-08-02 RX ORDER — CYANOCOBALAMIN 1000 UG/ML
INJECTION, SOLUTION INTRAMUSCULAR; SUBCUTANEOUS
COMMUNITY
Start: 2023-05-22

## 2023-08-02 RX ORDER — MONTELUKAST SODIUM 10 MG/1
1 TABLET ORAL
COMMUNITY
End: 2023-08-09

## 2023-08-02 RX ORDER — EZETIMIBE 10 MG/1
1 TABLET ORAL DAILY
COMMUNITY
End: 2023-08-09

## 2023-08-02 RX ORDER — LEVOCETIRIZINE DIHYDROCHLORIDE 5 MG/1
TABLET, FILM COATED ORAL
COMMUNITY
End: 2023-08-09

## 2023-08-02 RX ORDER — HYDROCODONE BITARTRATE AND ACETAMINOPHEN 5; 325 MG/1; MG/1
1 TABLET ORAL EVERY 8 HOURS PRN
COMMUNITY

## 2023-08-02 RX ORDER — ALIROCUMAB 150 MG/ML
INJECTION, SOLUTION SUBCUTANEOUS
COMMUNITY
End: 2023-08-09

## 2023-08-02 RX ORDER — ALCLOMETASONE DIPROPIONATE 0.5 MG/G
CREAM TOPICAL
COMMUNITY

## 2023-08-02 RX ORDER — CLINDAMYCIN HYDROCHLORIDE 300 MG/1
1 CAPSULE ORAL 3 TIMES DAILY
COMMUNITY
End: 2023-08-09

## 2023-08-03 RX ORDER — SITAGLIPTIN 100 MG/1
TABLET, FILM COATED ORAL
Qty: 90 TABLET | Refills: 3 | OUTPATIENT
Start: 2023-08-03

## 2023-08-07 PROBLEM — R13.10 DYSPHAGIA: Status: ACTIVE | Noted: 2018-07-02

## 2023-08-09 ENCOUNTER — OFFICE VISIT (OUTPATIENT)
Dept: CARDIOLOGY | Facility: CLINIC | Age: 78
End: 2023-08-09
Payer: MEDICARE

## 2023-08-09 VITALS
DIASTOLIC BLOOD PRESSURE: 86 MMHG | HEART RATE: 97 BPM | HEIGHT: 65 IN | SYSTOLIC BLOOD PRESSURE: 150 MMHG | WEIGHT: 195 LBS | BODY MASS INDEX: 32.49 KG/M2 | OXYGEN SATURATION: 97 % | RESPIRATION RATE: 18 BRPM

## 2023-08-09 DIAGNOSIS — I10 ESSENTIAL (PRIMARY) HYPERTENSION: ICD-10-CM

## 2023-08-09 DIAGNOSIS — E78.2 MIXED HYPERLIPIDEMIA: ICD-10-CM

## 2023-08-09 DIAGNOSIS — I73.9 PVD (PERIPHERAL VASCULAR DISEASE) WITH CLAUDICATION: Primary | ICD-10-CM

## 2023-08-09 DIAGNOSIS — I25.10 ATHEROSCLEROSIS OF NATIVE CORONARY ARTERY OF NATIVE HEART WITHOUT ANGINA PECTORIS: ICD-10-CM

## 2023-08-09 PROCEDURE — 93000 ELECTROCARDIOGRAM COMPLETE: CPT | Performed by: INTERNAL MEDICINE

## 2023-08-09 PROCEDURE — 1160F RVW MEDS BY RX/DR IN RCRD: CPT | Performed by: INTERNAL MEDICINE

## 2023-08-09 PROCEDURE — 3079F DIAST BP 80-89 MM HG: CPT | Performed by: INTERNAL MEDICINE

## 2023-08-09 PROCEDURE — 1159F MED LIST DOCD IN RCRD: CPT | Performed by: INTERNAL MEDICINE

## 2023-08-09 PROCEDURE — 3077F SYST BP >= 140 MM HG: CPT | Performed by: INTERNAL MEDICINE

## 2023-08-09 PROCEDURE — 99214 OFFICE O/P EST MOD 30 MIN: CPT | Performed by: INTERNAL MEDICINE

## 2023-08-09 RX ORDER — HYDRALAZINE HYDROCHLORIDE 25 MG/1
25 TABLET, FILM COATED ORAL 2 TIMES DAILY
COMMUNITY
Start: 2023-08-09

## 2023-08-09 RX ORDER — HYDRALAZINE HYDROCHLORIDE 25 MG/1
TABLET, FILM COATED ORAL
COMMUNITY
Start: 2023-08-08 | End: 2023-08-09

## 2023-08-09 NOTE — PROGRESS NOTES
Cardiology Office Visit      Encounter Date:  08/09/2023    Patient ID:   Vianey Reeves is a 77 y.o. female.    Reason For Followup:  Coronary artery disease  Hypertension  Hypertensive cardiovascular disease     Brief Clinical History:  Dear Dr. Hammer, Emma Simmons MD     I had the pleasure of seeing Vianey Reeves today. As you are well aware, this is a 77 y.o. female with a known history of ischemic heart disease.  She underwent cardiac catheterization with PCI and drug-eluting stent placement on April 9, 2016 and follow-up PCI on 5/25/2016. She presents today for follow-up on the above conditions.      Interval History:  She denies any chest pain pressure heaviness or tightness.  She denies any shortness of breath out of character. She denies any PND orthopnea.  She denies any syncope or near syncope.  She reports feeling well today.     Her last visit she had notable increase in her calcium level.  We had some laboratory work performed and she followed up with nephrology.  Her calcium level actually returned to normal in the interim.     She continues to smoke.  She has been wearing patches.  She smokes between 4 cigarettes and a half a pack of cigarettes daily.     As you know, with regards to her hypertension, we have a limited repertoire of medications that can be utilized.  We have stayed away from ACE/ARB medications due to the presence of a solitary kidney and renal insufficiency.  Calcium channel blockers appear to cause edema requiring diuretic usage which can exacerbate renal insufficiency.  Potassium sparing diuretics can also be problematic with renal insufficiency.  Beta-blockers appear to be causing fatigue and bradycardia.  Clonidine results in significant lability in blood pressure and can also contribute to bradycardia.  Hydralazine is very cumbersome to take.      Complaining of bilateral lower extremity claudication  Recent evaluation by nephrology with decreasing the dose of beta-blocker  secondary bradycardia patient was added on some hydralazine  Assessment & Plan     Impressions:  Coronary artery disease status post PCI and drug-eluting stent placement in her mid RCA and Cx        Negative nuclear stress test May 2019  Diabetes mellitus.  Dyslipidemia with intolerance to statins.  Hypertension with hypertensive cardiovascular disease.      Suboptimal with limited medication repertoire  COPD  Solitary kidney.  Chronic renal failure with declining GFR     Followed by nephrology  Tobacco abuse  Anti-platelet therapy.  Hypercalcemia-resolved  Bradycardia improved with decreasing the dose of beta-blockers     Recommendations:  Continuation of her current cardiovascular regimen at the present time.  Continue current medical therapy with aspirin 81 mg p.o. once a day Coreg 6.25 mg p.o. twice daily Norvasc 10 mg p.o. once a day Cardura 2 mg p.o. once a day patient is on Repatha injections every 2 weeks patient is on hydralazine 25 mg p.o. twice daily  Schedule for IKE to assess the claudication symptoms of the bilateral lower extremities  Continue close monitoring and follow-up  Recent labs and workup reviewed and discussed with patient  Follow-up in office in 6 months        Lab Results   Component Value Date    GLUCOSE 174 (H) 02/24/2023    BUN 25 (H) 02/24/2023    CREATININE 1.41 (H) 02/24/2023    EGFRRESULT 30 (L) 12/16/2022    EGFR 38.0 (L) 02/24/2023    BCR 17.7 02/24/2023    K 4.2 02/24/2023    CO2 28.0 02/24/2023    CALCIUM 9.5 02/24/2023    PROTENTOTREF 5.7 (L) 12/16/2022    ALBUMIN 3.8 02/22/2023    BILITOT 0.4 02/22/2023    AST 17 02/22/2023    ALT 10 02/22/2023     Results for orders placed during the hospital encounter of 01/26/23    Adult Transthoracic Echo Complete W/ Cont if Necessary Per Protocol    Interpretation Summary    Left ventricular systolic function is normal. Left ventricular ejection fraction appears to be 56 - 60%.    Left ventricular diastolic function is consistent with  "(grade Ia w/high LAP) impaired relaxation.    Left atrial volume is mildly increased.    There is mild, bileaflet mitral valve thickening present.    Estimated right ventricular systolic pressure from tricuspid regurgitation is normal (<35 mmHg).     No results found for this or any previous visit.     Lab Results   Component Value Date    CHOL 83 11/08/2018    CHLPL 206 (H) 12/16/2022    TRIG 60 12/16/2022    HDL 42 12/16/2022     (H) 12/16/2022       Results for orders placed during the hospital encounter of 04/07/22    Holter Monitor - 72 Hour Up To 15 Days    Interpretation Summary  ú An abnormal monitor study.  ú Normal sinus rhythm  ú Sinus tachycardia  ú Sinus bradycardia  ú Rare PVCs  ú Frequent PACs at a rate of 1200/h  ú Occasional atrial runs  ú Clinical correlation suggested           Objective:    Vitals:  Vitals:    08/09/23 1432   BP: 150/86   BP Location: Left arm   Patient Position: Sitting   Cuff Size: Large Adult   Pulse: 97   Resp: 18   SpO2: 97%   Weight: 88.5 kg (195 lb)   Height: 165.1 cm (65\")       Physical Exam:    General: Alert, cooperative, no distress, appears stated age  Head:  Normocephalic, atraumatic, mucous membranes moist  Eyes:  Conjunctiva/corneas clear, EOM's intact     Neck:  Supple,  no adenopathy;      Lungs: Clear to auscultation bilaterally, no wheezes rhonchi rales are noted  Chest wall: No tenderness  Heart::  Regular rate and rhythm, S1 and S2 normal, no murmur, rub or gallop  Abdomen: Soft, non-tender, nondistended bowel sounds active  Extremities: No cyanosis, clubbing, or edema  Pulses: 2+ and symmetric all extremities  Skin:  No rashes or lesions  Neuro/psych: A&O x3. CN II through XII are grossly intact with appropriate affect      Allergies:  Allergies   Allergen Reactions    Erythromycin Rash    Naproxen Sodium Other (See Comments)     Dizzy lightheaded    Statins Myalgia    Latex Itching and Swelling    Metoclopramide Other (See Comments)     Unsteady on " feet    Rocephin [Ceftriaxone] Itching and Nausea Only    Dicyclomine Other (See Comments)       Medication Review:     Current Outpatient Medications:     albuterol (PROVENTIL) (2.5 MG/3ML) 0.083% nebulizer solution, Take 2.5 mg by nebulization Every 4 (Four) Hours As Needed for Wheezing., Disp: , Rfl:     alclometasone (ACLOVATE) 0.05 % cream, , Disp: , Rfl:     amLODIPine (NORVASC) 10 MG tablet, TAKE 1 TABLET BY MOUTH EVERY DAY, Disp: 90 tablet, Rfl: 4    aspirin 81 MG tablet, Take 1 tablet by mouth Every Night. dont take dos, Disp: , Rfl:     carvedilol (COREG) 3.125 MG tablet, Take 2 tablets by mouth 2 (Two) Times a Day With Meals., Disp: , Rfl:     cyanocobalamin 1000 MCG/ML injection, INJECT 1 ML BY INTRAMUSCULAR ROUTE ONCE WEEKLY X 4 DOSES, THEN REDUCE TO ONCE A MONTH, Disp: , Rfl:     Diclofenac Sodium (VOLTAREN) 1 % gel gel, APPLY 1 GRAM TO FEET 3 TIMES A DAY, Disp: , Rfl:     doxazosin (CARDURA) 2 MG tablet, TAKE 1 TABLET BY MOUTH EVERY DAY AT NIGHT, Disp: 90 tablet, Rfl: 3    estradiol (ESTRACE) 0.1 MG/GM vaginal cream, USE CREAM VAGINALLY ONCE DAILY AT BEDTIME FOR 2 WEEKS THEN USE 3 TIMES WEEKLY AT BEDTIME, Disp: , Rfl:     Evolocumab (REPATHA) solution auto-injector SureClick injection, Inject 1 mL under the skin into the appropriate area as directed Every 14 (Fourteen) Days., Disp: , Rfl:     fluticasone-salmeterol (Advair HFA) 45-21 MCG/ACT inhaler, Inhale 2 puffs 2 (Two) Times a Day., Disp: 12 each, Rfl: 12    gabapentin (NEURONTIN) 100 MG capsule, Take 2 capsules by mouth 3 (Three) Times a Day., Disp: 90 capsule, Rfl: 0    HYDROcodone-acetaminophen (NORCO) 5-325 MG per tablet, Take 1 tablet by mouth Every 8 (Eight) Hours As Needed., Disp: , Rfl:     Insulin Glargine (Lantus SoloStar) 100 UNIT/ML injection pen, Inject 20 Units under the skin into the appropriate area as directed Every Night., Disp: , Rfl:     ipratropium-albuterol (DUO-NEB) 0.5-2.5 mg/3 ml nebulizer, Take 3 mL by nebulization 4  (Four) Times a Day., Disp: 360 mL, Rfl: 0    lidocaine (LIDODERM) 5 %, APPLY 1 PATCH BY TOPICAL ROUTE EVERY DAY (MAY WEAR UP TO 12HOURS.) AS NEEDED FOR PAIN, Disp: , Rfl:     Linzess 145 MCG capsule capsule, 1 capsule Daily., Disp: , Rfl:     methocarbamol (ROBAXIN) 500 MG tablet, TAKE 1 TABLET BY ORAL ROUTE 4 TIMES EVERY DAY AS NEEDED FOR MUSCLE SPASM, Disp: , Rfl:     omeprazole (priLOSEC) 40 MG capsule, Take 1 capsule by mouth Every Morning., Disp: , Rfl:     polyethylene glycol (MIRALAX) 17 g packet, Take 17 g by mouth 2 (Two) Times a Day., Disp: , Rfl:     sertraline (ZOLOFT) 50 MG tablet, TAKE 1 TABLET BY MOUTH EVERY DAY, Disp: 90 tablet, Rfl: 3    SITagliptin (JANUVIA) 100 MG tablet, Take 1 tablet by mouth Daily., Disp: , Rfl:     traMADol (ULTRAM) 50 MG tablet, Take 2 tablets by mouth 2 (Two) Times a Day., Disp: , Rfl:     Tymlos 3120 MCG/1.56ML solution pen-injector, , Disp: , Rfl:     hydrALAZINE (APRESOLINE) 25 MG tablet, Take 1 tablet by mouth 2 (Two) Times a Day., Disp: , Rfl:     Family History:  Family History   Problem Relation Age of Onset    Arthritis Father     Prostate cancer Father     Heart disease Sister        Past Medical History:  Past Medical History:   Diagnosis Date    Allergic     latex allergy    Allergies     Anxiety     Bilateral carotid bruits     Bulging lumbar disc     Bulging of thoracic intervertebral disc     Cancer     ureter    surgery    CKD (chronic kidney disease)     stage 3    Claudication, intermittent     COPD (chronic obstructive pulmonary disease)     Coronary artery disease     DDD (degenerative disc disease), lumbar     DDD (degenerative disc disease), thoracic     Diabetes mellitus     DJD (degenerative joint disease)     Gout     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    Hypertension     IBS (irritable colon syndrome)     constipation    Mass of right breast     Neuropathy     Renal insufficiency     Sciatic leg pain     right    Simple chronic bronchitis      Solitary kidney     left       Past surgical History:  Past Surgical History:   Procedure Laterality Date    BREAST BIOPSY Right     CARDIAC CATHETERIZATION      CHOLECYSTECTOMY      COLON SURGERY      REMOVAL diverticular diseae    COLONOSCOPY N/A 08/12/2021    Procedure: COLONOSCOPY with polypectomy x 3;  Surgeon: Margarette Mcallister MD;  Location: [facility] ENDOSCOPY;  Service: Gastroenterology;  Laterality: N/A;  post op: hmorrhoids, diverticulosis, polyps    CORONARY STENT PLACEMENT      ENDOSCOPY N/A 08/12/2021    Procedure: ESOPHAGOGASTRODUODENOSCOPY with dilatation (18-20 mm balloon) and (54 bougie);  Surgeon: Margarette Mcallister MD;  Location: [facility] ENDOSCOPY;  Service: Gastroenterology;  Laterality: N/A;  post op: esophageal stricture, esophagitis, gastritis, history of nissen    EYE SURGERY Bilateral     HIATAL HERNIA REPAIR      NEPHRECTOMY Right     cancer    UPPER ENDOSCOPIC ULTRASOUND W/ FNA N/A 9/22/2022    Procedure: EUS;  Surgeon: Margarette Mcallister MD;  Location: [facility] ENDOSCOPY;  Service: Gastroenterology;  Laterality: N/A;  post: normal pancreas, dilated pancreatic duct, hiatal hernia,        Social History:  Social History     Socioeconomic History    Marital status:    Tobacco Use    Smoking status: Some Days     Packs/day: 0.25     Years: 50.00     Pack years: 12.50     Types: Cigarettes    Smokeless tobacco: Never   Vaping Use    Vaping Use: Never used   Substance and Sexual Activity    Alcohol use: Yes     Comment: occasionally     Drug use: Never    Sexual activity: Defer       Review of Systems:  The following systems were reviewed as they relate to the cardiovascular system: Constitutional, Eyes, ENT, Cardiovascular, Respiratory, Gastrointestinal, Integumentary, Neurological, Psychiatric, Hematologic, Endocrine, Musculoskeletal, and Genitourinary. The pertinent cardiovascular findings are reported above with all other cardiovascular points within those systems being negative.    Diagnostic  Study Review:     Current Electrocardiogram:    ECG 12 Lead    Date/Time: 8/9/2023 3:41 PM  Performed by: Charles Pak MD  Authorized by: Charles Pak MD   Comparison: compared with previous ECG   Similar to previous ECG  Rhythm: sinus rhythm  Ectopy: multifocal PVCs  Rate: normal  BPM: 97  Conduction: conduction normal  QRS axis: normal  Other findings: non-specific ST-T wave changes    Clinical impression: abnormal EKG          Advance Care Planning   ACP discussion was held with the patient during this visit. Patient has an advance directive in EMR which is still valid.         NOTE: The following portions of the patient's history were reviewed and updated this visit as appropriate: allergies, current medications, past family history, past medical history, past social history, past surgical history and problem list.

## 2023-08-20 RX ORDER — SITAGLIPTIN 100 MG/1
TABLET, FILM COATED ORAL
Qty: 90 TABLET | Refills: 3 | OUTPATIENT
Start: 2023-08-20

## 2023-08-22 ENCOUNTER — HOSPITAL ENCOUNTER (OUTPATIENT)
Dept: CARDIOLOGY | Facility: HOSPITAL | Age: 78
Discharge: HOME | End: 2023-08-22
Admitting: INTERNAL MEDICINE
Payer: MEDICARE

## 2023-08-22 DIAGNOSIS — I73.9 PVD (PERIPHERAL VASCULAR DISEASE) WITH CLAUDICATION: ICD-10-CM

## 2023-08-22 LAB
BH CV LOWER ARTERIAL LEFT ABI RATIO: 1.24
BH CV LOWER ARTERIAL LEFT DORSALIS PEDIS SYS MAX: 136
BH CV LOWER ARTERIAL LEFT GREAT TOE SYS MAX: 86
BH CV LOWER ARTERIAL LEFT POST TIBIAL SYS MAX: 171
BH CV LOWER ARTERIAL LEFT TBI RATIO: 0.62
BH CV LOWER ARTERIAL RIGHT ABI RATIO: 1.27
BH CV LOWER ARTERIAL RIGHT DORSALIS PEDIS SYS MAX: 156
BH CV LOWER ARTERIAL RIGHT GREAT TOE SYS MAX: 120
BH CV LOWER ARTERIAL RIGHT POST TIBIAL SYS MAX: 175
BH CV LOWER ARTERIAL RIGHT TBI RATIO: 0.87
UPPER ARTERIAL LEFT ARM BRACHIAL SYS MAX: 129 MMHG
UPPER ARTERIAL RIGHT ARM BRACHIAL SYS MAX: 138 MMHG

## 2023-08-22 PROCEDURE — 93922 UPR/L XTREMITY ART 2 LEVELS: CPT

## 2023-08-29 ENCOUNTER — OFFICE VISIT (OUTPATIENT)
Dept: WOUND CARE | Facility: HOSPITAL | Age: 78
End: 2023-08-29
Payer: MEDICARE

## 2023-08-29 ENCOUNTER — OFFICE VISIT (OUTPATIENT)
Dept: PODIATRY | Facility: CLINIC | Age: 78
End: 2023-08-29
Payer: MEDICARE

## 2023-08-29 ENCOUNTER — LAB REQUISITION (OUTPATIENT)
Dept: LAB | Facility: HOSPITAL | Age: 78
End: 2023-08-29
Payer: MEDICARE

## 2023-08-29 VITALS — WEIGHT: 195 LBS | RESPIRATION RATE: 20 BRPM | HEIGHT: 65 IN | BODY MASS INDEX: 32.49 KG/M2

## 2023-08-29 DIAGNOSIS — M79.672 HEEL PAIN, BILATERAL: Primary | ICD-10-CM

## 2023-08-29 DIAGNOSIS — M21.862 ACQUIRED POSTERIOR EQUINUS OF BOTH LOWER EXTREMITIES: ICD-10-CM

## 2023-08-29 DIAGNOSIS — S80.811A ABRASION, RIGHT LOWER LEG, INITIAL ENCOUNTER: ICD-10-CM

## 2023-08-29 DIAGNOSIS — M79.671 HEEL PAIN, BILATERAL: Primary | ICD-10-CM

## 2023-08-29 DIAGNOSIS — M21.861 ACQUIRED POSTERIOR EQUINUS OF BOTH LOWER EXTREMITIES: ICD-10-CM

## 2023-08-29 DIAGNOSIS — M72.2 PLANTAR FASCIITIS, BILATERAL: ICD-10-CM

## 2023-08-29 PROCEDURE — 87070 CULTURE OTHR SPECIMN AEROBIC: CPT | Performed by: NURSE PRACTITIONER

## 2023-08-29 PROCEDURE — 87205 SMEAR GRAM STAIN: CPT | Performed by: NURSE PRACTITIONER

## 2023-08-29 PROCEDURE — G0463 HOSPITAL OUTPT CLINIC VISIT: HCPCS

## 2023-08-29 RX ORDER — CIPROFLOXACIN 500 MG/1
1 TABLET, FILM COATED ORAL EVERY 12 HOURS SCHEDULED
COMMUNITY
Start: 2023-08-21

## 2023-08-29 RX ORDER — CEPHALEXIN 250 MG/1
CAPSULE ORAL
COMMUNITY
Start: 2023-08-28

## 2023-08-29 NOTE — PROGRESS NOTES
"08/29/2023  Foot and Ankle Surgery - New Patient   Provider: Dr. Federico Winter DPM  Location: [facility]    Subjective:  Vianey Reeves is a 77 y.o. female.     Chief Complaint   Patient presents with    Right Foot - Pain, Heel Spurs    Left Foot - Pain, Heel Spurs    Initial Evaluation     LOYD Garcia md  8/2023  date unknown       HPI: Vianey Reeves is a 77-year-old female who presents to the office today for evaluation of bilateral heel pain.    The patient states that she has been experiencing bilateral heel pain. She localizes the pain to the plantar aspect of her bilateral feet, right worse than left. She notes that she has seen Dr. Estrada in the past. The patient reports that she has noticed that her left ankle wants to \"turn in.\" She notes that she rolls her left foot on a wooden ramp at night, because it is so sore.    The patient states that she hit her left lower extremity on 08/23/2023. She notes that she went to wound care.    The patient states that she is diabetic. Her last A1c was 6.7 percent.    Allergies   Allergen Reactions    Erythromycin Rash    Naproxen Sodium Other (See Comments)     Dizzy lightheaded    Statins Myalgia    Latex Itching and Swelling    Metoclopramide Other (See Comments)     Unsteady on feet    Rocephin [Ceftriaxone] Itching and Nausea Only    Dicyclomine Other (See Comments)       Past Medical History:   Diagnosis Date    Allergic     latex allergy    Allergies     Anxiety     Bilateral carotid bruits     Bulging lumbar disc     Bulging of thoracic intervertebral disc     Cancer     ureter    surgery    CKD (chronic kidney disease)     stage 3    Claudication, intermittent     COPD (chronic obstructive pulmonary disease)     Coronary artery disease     DDD (degenerative disc disease), lumbar     DDD (degenerative disc disease), thoracic     Diabetes mellitus     DJD (degenerative joint disease)     Gout     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    Hypertension "     IBS (irritable colon syndrome)     constipation    Mass of right breast     Neuropathy     Renal insufficiency     Sciatic leg pain     right    Simple chronic bronchitis     Solitary kidney     left       Past Surgical History:   Procedure Laterality Date    BREAST BIOPSY Right     CARDIAC CATHETERIZATION      CHOLECYSTECTOMY      COLON SURGERY      REMOVAL diverticular diseae    COLONOSCOPY N/A 08/12/2021    Procedure: COLONOSCOPY with polypectomy x 3;  Surgeon: Margarette Mcallister MD;  Location: [facility];  Service: Gastroenterology;  Laterality: N/A;  post op: hmorrhoids, diverticulosis, polyps    CORONARY STENT PLACEMENT      ENDOSCOPY N/A 08/12/2021    Procedure: ESOPHAGOGASTRODUODENOSCOPY with dilatation (18-20 mm balloon) and (54 bougie);  Surgeon: Margarette Mcallister MD;  Location: [facility] ENDOSCOPY;  Service: Gastroenterology;  Laterality: N/A;  post op: esophageal stricture, esophagitis, gastritis, history of nissen    EYE SURGERY Bilateral     HIATAL HERNIA REPAIR      NEPHRECTOMY Right     cancer    UPPER ENDOSCOPIC ULTRASOUND W/ FNA N/A 9/22/2022    Procedure: EUS;  Surgeon: Margarette Mcallister MD;  Location: [facility] ENDOSCOPY;  Service: Gastroenterology;  Laterality: N/A;  post: normal pancreas, dilated pancreatic duct, hiatal hernia,        Family History   Problem Relation Age of Onset    Arthritis Father     Prostate cancer Father     Heart disease Sister        Social History     Socioeconomic History    Marital status:    Tobacco Use    Smoking status: Some Days     Packs/day: 0.25     Years: 50.00     Pack years: 12.50     Types: Cigarettes    Smokeless tobacco: Never   Vaping Use    Vaping Use: Never used   Substance and Sexual Activity    Alcohol use: Yes     Comment: occasionally     Drug use: Never    Sexual activity: Defer        Current Outpatient Medications on File Prior to Visit   Medication Sig Dispense Refill    albuterol (PROVENTIL) (2.5 MG/3ML) 0.083% nebulizer solution Take 2.5 mg  by nebulization Every 4 (Four) Hours As Needed for Wheezing.      alclometasone (ACLOVATE) 0.05 % cream       amLODIPine (NORVASC) 10 MG tablet TAKE 1 TABLET BY MOUTH EVERY DAY 90 tablet 4    aspirin 81 MG tablet Take 1 tablet by mouth Every Night. dont take dos      carvedilol (COREG) 3.125 MG tablet Take 2 tablets by mouth 2 (Two) Times a Day With Meals.      ciprofloxacin (CIPRO) 500 MG tablet Take 1 tablet by mouth Every 12 (Twelve) Hours.      cyanocobalamin 1000 MCG/ML injection INJECT 1 ML BY INTRAMUSCULAR ROUTE ONCE WEEKLY X 4 DOSES, THEN REDUCE TO ONCE A MONTH      Diclofenac Sodium (VOLTAREN) 1 % gel gel APPLY 1 GRAM TO FEET 3 TIMES A DAY      doxazosin (CARDURA) 2 MG tablet TAKE 1 TABLET BY MOUTH EVERY DAY AT NIGHT 90 tablet 3    estradiol (ESTRACE) 0.1 MG/GM vaginal cream USE CREAM VAGINALLY ONCE DAILY AT BEDTIME FOR 2 WEEKS THEN USE 3 TIMES WEEKLY AT BEDTIME      Evolocumab (REPATHA) solution auto-injector SureClick injection Inject 1 mL under the skin into the appropriate area as directed Every 14 (Fourteen) Days.      fluticasone-salmeterol (Advair HFA) 45-21 MCG/ACT inhaler Inhale 2 puffs 2 (Two) Times a Day. 12 each 12    gabapentin (NEURONTIN) 100 MG capsule Take 2 capsules by mouth 3 (Three) Times a Day. 90 capsule 0    hydrALAZINE (APRESOLINE) 25 MG tablet Take 1 tablet by mouth 2 (Two) Times a Day.      HYDROcodone-acetaminophen (NORCO) 5-325 MG per tablet Take 1 tablet by mouth Every 8 (Eight) Hours As Needed.      Insulin Glargine (Lantus SoloStar) 100 UNIT/ML injection pen Inject 20 Units under the skin into the appropriate area as directed Every Night.      ipratropium-albuterol (DUO-NEB) 0.5-2.5 mg/3 ml nebulizer Take 3 mL by nebulization 4 (Four) Times a Day. 360 mL 0    lidocaine (LIDODERM) 5 % APPLY 1 PATCH BY TOPICAL ROUTE EVERY DAY (MAY WEAR UP TO 12HOURS.) AS NEEDED FOR PAIN      Linzess 145 MCG capsule capsule 1 capsule Daily.      methocarbamol (ROBAXIN) 500 MG tablet TAKE 1 TABLET  "BY ORAL ROUTE 4 TIMES EVERY DAY AS NEEDED FOR MUSCLE SPASM      omeprazole (priLOSEC) 40 MG capsule Take 1 capsule by mouth Every Morning.      polyethylene glycol (MIRALAX) 17 g packet Take 17 g by mouth 2 (Two) Times a Day.      sertraline (ZOLOFT) 50 MG tablet TAKE 1 TABLET BY MOUTH EVERY DAY 90 tablet 3    SITagliptin (JANUVIA) 100 MG tablet Take 1 tablet by mouth Daily.      traMADol (ULTRAM) 50 MG tablet Take 2 tablets by mouth 2 (Two) Times a Day.      Tymlos 3120 MCG/1.56ML solution pen-injector       cephalexin (KEFLEX) 250 MG capsule  (Patient not taking: Reported on 8/29/2023)       No current facility-administered medications on file prior to visit.       Review of Systems:  General: Denies fever, chills, fatigue, and weakness.  Eyes: Denies vision loss, blurry vision, and excessive redness.  ENT: Denies hearing issues and difficulty swallowing.  Cardiovascular: Denies palpitations, chest pain, or syncopal episodes.  Respiratory: Denies shortness of breath, wheezing, and coughing.  GI: Denies abdominal pain, nausea, and vomiting.   : Denies frequency, hematuria, and urgency.  Musculoskeletal: Denies muscle cramps, joint pains, and stiffness.  Derm: Denies rash, open wounds, or suspicious lesions.  Neuro: Denies headaches, numbness, loss of coordination, and tremors.  Psych: Denies anxiety and depression.  Endocrine: Denies temperature intolerance and changes in appetite.  Heme: Denies bleeding disorders or abnormal bruising.     Objective   Resp 20   Ht 165.1 cm (65\")   Wt 88.5 kg (195 lb)   LMP  (LMP Unknown)   BMI 32.45 kg/mý     Foot/Ankle Exam    GENERAL  Orientation:  AAOx3  Affect:  appropriate    VASCULAR     Right Foot Vascularity   Normal vascular exam    Dorsalis pedis:  2+  Posterior tibial:  2+  Skin temperature:  warm  Edema grading:  None  CFT:  < 3 seconds  Pedal hair growth:  Present  Varicosities:  none     Left Foot Vascularity   Normal vascular exam    Dorsalis pedis:  " 2+  Posterior tibial:  2+  Skin temperature:  warm  Edema grading:  None  CFT:  < 3 seconds  Pedal hair growth:  Present  Varicosities:  none     NEUROLOGIC     Right Foot Neurologic   Light touch sensation: normal  Hot/Cold sensation: normal  Achilles reflex:  2+     Left Foot Neurologic   Light touch sensation: normal  Hot/Cold sensation:  normal  Achilles reflex:  2+    MUSCULOSKELETAL     Right Foot Musculoskeletal   Arch:  Normal     Left Foot Musculoskeletal   Arch:  Normal    MUSCLE STRENGTH     Right Foot Muscle Strength   Normal strength    Foot dorsiflexion:  5  Foot plantar flexion:  5  Foot inversion:  5  Foot eversion:  5     Left Foot Muscle Strength   Normal strength    Foot dorsiflexion:  5  Foot plantar flexion:  5  Foot inversion:  5  Foot eversion:  5    DERMATOLOGIC      Right Foot Dermatologic   Skin  Right foot skin is intact.   Nails comment:  Nails 1-5     Left Foot Dermatologic   Skin  Left foot skin is intact.   Nails comment:  Nails 1-5    TESTS     Right Foot Tests   Anterior drawer: negative  Varus tilt: negative     Left Foot Tests   Anterior drawer: negative  Varus tilt: negative    Right lower extremity not evaluated secondary to intact wound care dressing. Left lower extremity has discomfort involving the plantar medial calcaneal tuberosity. Moderate equinus contracture with knee extended and flexed. Semirigid hammer digit deformities. No gross deformity involving the ankle or lower limb. Muscle strength and range of motion are appropriate.    Assessment & Plan   Diagnoses and all orders for this visit:    1. Heel pain, bilateral (Primary)  -     XR Foot 3+ View Bilateral    2. Plantar fasciitis, bilateral    3. Acquired posterior equinus of both lower extremities      The patient is a 77-year-old female who presents to the office today for evaluation of bilateral heel pain, right greater than left. X-rays of the bilateral feet, taken today, were independently reviewed revealing  plantar fasciitis. We discussed the etiology and biomechanics involved with her bilateral heel pain. I recommend wearing appropriate arch support in tennis shoes daily. I advised the patient to avoid ambulating while barefoot, as well as avoidance of wearing flip flops, sandals, or flats. We discussed rolling her foot with a tennis ball or massage ball. I recommend stretching exercises. We discussed surgical intervention; however, the patient would like to proceed with a steroid injection today. The patient will return to the office in 6 weeks for reevaluation. Greater than 30 minutes was spent before, during, and after evaluation for patient care.    Plantar Fascia Steroid Injection: Left heel    Consent and time out was performed before proceeding with the procedure. The area of maximal tenderness was palpated near the plantar medial calcaneal tuberosity of left foot. The area was cleansed with alcohol. Under ultrasound guidance, the plantar fascia was visualized and a solution totaling 1.5 mL was injected about the origin of the plantar fascia. The solution contained 0.5 mL of 1% lidocaine plain, 0.5 mL of 0.5% Marcaine plain, and 0.5 mL of Kenalog. After the injection, the patient noted immediate pain relief. Mild compression was placed at the injection site followed by a sterile bandage. The patient tolerated the injection well without complication.    Orders Placed This Encounter   Procedures    XR Foot 3+ View Bilateral     Order Specific Question:   Reason for Exam:     Answer:   augustine heel pain  room 13  wb     Order Specific Question:   Does this patient have a diabetic monitoring/medication delivering device on?     Answer:   No     Order Specific Question:   Release to patient     Answer:   Routine Release [[id number]]        Note is dictated utilizing voice recognition software. Unfortunately this leads to occasional typographical errors. I apologize in advance if the situation occurs. If questions occur  please do not hesitate to call our office.    Transcribed from ambient dictation for ALEJANDRO Winter DPM by Nori Hammer.  08/29/23   16:25 EDT    Patient or patient representative verbalized consent to the visit recording.  I have personally performed the services described in this document as transcribed by the above individual, and it is both accurate and complete.

## 2023-09-01 LAB
BACTERIA SPEC AEROBE CULT: NORMAL
GRAM STN SPEC: NORMAL

## 2023-09-05 ENCOUNTER — OFFICE VISIT (OUTPATIENT)
Dept: WOUND CARE | Facility: HOSPITAL | Age: 78
End: 2023-09-05
Payer: MEDICARE

## 2023-09-05 PROCEDURE — 97602 WOUND(S) CARE NON-SELECTIVE: CPT

## 2023-09-12 ENCOUNTER — LAB (OUTPATIENT)
Dept: LAB | Facility: HOSPITAL | Age: 78
End: 2023-09-12
Payer: MEDICARE

## 2023-09-12 ENCOUNTER — TRANSCRIBE ORDERS (OUTPATIENT)
Dept: ADMINISTRATIVE | Facility: HOSPITAL | Age: 78
End: 2023-09-12
Payer: MEDICARE

## 2023-09-12 ENCOUNTER — OFFICE VISIT (OUTPATIENT)
Dept: WOUND CARE | Facility: HOSPITAL | Age: 78
End: 2023-09-12
Payer: MEDICARE

## 2023-09-12 DIAGNOSIS — M81.0 SENILE OSTEOPOROSIS: Primary | ICD-10-CM

## 2023-09-12 DIAGNOSIS — M81.0 SENILE OSTEOPOROSIS: ICD-10-CM

## 2023-09-12 LAB — CALCIUM SPEC-SCNC: 9.3 MG/DL (ref 8.6–10.5)

## 2023-09-12 PROCEDURE — 36415 COLL VENOUS BLD VENIPUNCTURE: CPT

## 2023-09-12 PROCEDURE — 82310 ASSAY OF CALCIUM: CPT

## 2023-09-13 ENCOUNTER — ON CAMPUS - OUTPATIENT (AMBULATORY)
Dept: URBAN - METROPOLITAN AREA HOSPITAL 85 | Facility: HOSPITAL | Age: 78
End: 2023-09-13
Payer: MEDICAID

## 2023-09-13 ENCOUNTER — ANESTHESIA (OUTPATIENT)
Dept: GASTROENTEROLOGY | Facility: HOSPITAL | Age: 78
End: 2023-09-13
Payer: MEDICARE

## 2023-09-13 ENCOUNTER — HOSPITAL ENCOUNTER (OUTPATIENT)
Facility: HOSPITAL | Age: 78
Setting detail: HOSPITAL OUTPATIENT SURGERY
Discharge: HOME | End: 2023-09-13
Attending: INTERNAL MEDICINE | Admitting: INTERNAL MEDICINE
Payer: MEDICARE

## 2023-09-13 ENCOUNTER — ANESTHESIA EVENT (OUTPATIENT)
Dept: GASTROENTEROLOGY | Facility: HOSPITAL | Age: 78
End: 2023-09-13
Payer: MEDICARE

## 2023-09-13 VITALS
HEIGHT: 65 IN | WEIGHT: 200 LBS | BODY MASS INDEX: 33.32 KG/M2 | HEART RATE: 65 BPM | RESPIRATION RATE: 20 BRPM | DIASTOLIC BLOOD PRESSURE: 73 MMHG | OXYGEN SATURATION: 96 % | SYSTOLIC BLOOD PRESSURE: 160 MMHG

## 2023-09-13 DIAGNOSIS — R13.10 DYSPHAGIA, UNSPECIFIED: ICD-10-CM

## 2023-09-13 DIAGNOSIS — R13.10 DYSPHAGIA: ICD-10-CM

## 2023-09-13 DIAGNOSIS — K31.89 OTHER DISEASES OF STOMACH AND DUODENUM: ICD-10-CM

## 2023-09-13 DIAGNOSIS — K44.9 DIAPHRAGMATIC HERNIA WITHOUT OBSTRUCTION OR GANGRENE: ICD-10-CM

## 2023-09-13 DIAGNOSIS — K22.2 ESOPHAGEAL OBSTRUCTION: ICD-10-CM

## 2023-09-13 LAB — GLUCOSE BLDC GLUCOMTR-MCNC: 83 MG/DL (ref 70–105)

## 2023-09-13 PROCEDURE — 43239 EGD BIOPSY SINGLE/MULTIPLE: CPT | Performed by: INTERNAL MEDICINE

## 2023-09-13 PROCEDURE — 88305 TISSUE EXAM BY PATHOLOGIST: CPT | Performed by: INTERNAL MEDICINE

## 2023-09-13 PROCEDURE — 43450 DILATE ESOPHAGUS 1/MULT PASS: CPT | Performed by: INTERNAL MEDICINE

## 2023-09-13 PROCEDURE — 25010000002 PROPOFOL 200 MG/20ML EMULSION: Performed by: NURSE ANESTHETIST, CERTIFIED REGISTERED

## 2023-09-13 PROCEDURE — 82948 REAGENT STRIP/BLOOD GLUCOSE: CPT

## 2023-09-13 RX ORDER — LIDOCAINE HYDROCHLORIDE 20 MG/ML
INJECTION, SOLUTION EPIDURAL; INFILTRATION; INTRACAUDAL; PERINEURAL AS NEEDED
Status: DISCONTINUED | OUTPATIENT
Start: 2023-09-13 | End: 2023-09-13 | Stop reason: SURG

## 2023-09-13 RX ORDER — ONDANSETRON 2 MG/ML
4 INJECTION INTRAMUSCULAR; INTRAVENOUS ONCE AS NEEDED
Status: DISCONTINUED | OUTPATIENT
Start: 2023-09-13 | End: 2023-09-13 | Stop reason: HOSPADM

## 2023-09-13 RX ORDER — EPHEDRINE SULFATE 5 MG/ML
5 INJECTION INTRAVENOUS ONCE AS NEEDED
Status: DISCONTINUED | OUTPATIENT
Start: 2023-09-13 | End: 2023-09-13 | Stop reason: HOSPADM

## 2023-09-13 RX ORDER — IPRATROPIUM BROMIDE AND ALBUTEROL SULFATE 2.5; .5 MG/3ML; MG/3ML
3 SOLUTION RESPIRATORY (INHALATION) ONCE AS NEEDED
Status: DISCONTINUED | OUTPATIENT
Start: 2023-09-13 | End: 2023-09-13 | Stop reason: HOSPADM

## 2023-09-13 RX ORDER — LABETALOL HYDROCHLORIDE 5 MG/ML
5 INJECTION, SOLUTION INTRAVENOUS
Status: DISCONTINUED | OUTPATIENT
Start: 2023-09-13 | End: 2023-09-13 | Stop reason: HOSPADM

## 2023-09-13 RX ORDER — SODIUM CHLORIDE 9 MG/ML
INJECTION, SOLUTION INTRAVENOUS CONTINUOUS PRN
Status: DISCONTINUED | OUTPATIENT
Start: 2023-09-13 | End: 2023-09-13 | Stop reason: SURG

## 2023-09-13 RX ORDER — PROPOFOL 10 MG/ML
INJECTION, EMULSION INTRAVENOUS AS NEEDED
Status: DISCONTINUED | OUTPATIENT
Start: 2023-09-13 | End: 2023-09-13 | Stop reason: SURG

## 2023-09-13 RX ORDER — HYDRALAZINE HYDROCHLORIDE 20 MG/ML
5 INJECTION INTRAMUSCULAR; INTRAVENOUS
Status: DISCONTINUED | OUTPATIENT
Start: 2023-09-13 | End: 2023-09-13 | Stop reason: HOSPADM

## 2023-09-13 RX ADMIN — PROPOFOL 100 MG: 10 INJECTION, EMULSION INTRAVENOUS at 14:02

## 2023-09-13 RX ADMIN — SODIUM CHLORIDE: 9 INJECTION, SOLUTION INTRAVENOUS at 13:56

## 2023-09-13 RX ADMIN — LIDOCAINE HYDROCHLORIDE 100 MG: 20 INJECTION, SOLUTION EPIDURAL; INFILTRATION; INTRACAUDAL; PERINEURAL at 14:02

## 2023-09-13 RX ADMIN — PROPOFOL 20 MG: 10 INJECTION, EMULSION INTRAVENOUS at 14:05

## 2023-09-13 NOTE — DISCHARGE INSTRUCTIONS
A responsible adult should stay with you and you should rest quietly for the rest of the day.    Do not drink alcohol, drive, operate any heavy machinery or power tools or make any legal/important decisions for the next 24 hours.     Progress your diet as tolerated.  If you begin to experience severe pain, increased shortness of breath, racing heartbeat or a fever above 101 F, seek immediate medical attention.     Follow up with MD as instructed. Call office for results in 3 to 5 days if needed.     Dr Mcallister @ [phone]      Impression:  1.  Small hiatal hernia with esophageal ring and stricture progressively dilated 50, 54 and 56 [place].  2.  Small hiatal hernia.  3.  Changes suggestive of chronic gastritis.     Recommendations:  Patient will continue with the PPI.  Some of her symptoms may be related to her constipation, she will be placed on MiraLAX regular dose twice a day.  Follow the GI clinic.  Follow up with PCP as scheduled  Follow up with biopsy report

## 2023-09-13 NOTE — OP NOTE
ESOPHAGOGASTRODUODENOSCOPY Procedure Report    Patient Name:  Vianey Reeves  YOB: 1945    Date of Surgery:  9/13/2023     Pre-Op Diagnosis:  Dysphagia [R13.10]         Procedure/CPT® Codes:      Procedure(s):  ESOPHAGOGASTRODUODENOSCOPY with biopsy x 1 area and dilation (50,54,56 non guided bougie)    Staff:  Surgeon(s):  Margarette Mcallister MD      Anesthesia: Monitored Anesthesia Care    Description of Procedure:  A description of the procedure as well as risks, benefits and alternative methods were explained to the patient who voiced understanding and signed the corresponding consent form. A physical exam was performed and vital signs were monitored throughout the procedure.    An upper GI endoscope was placed into the mouth and proceeded through the esophagus, stomach and second portion of the duodenum without difficulty. The scope was then retroflexed and the fundus was visualized. The procedure was not difficult and there were no immediate complications.    Findings  Esophageal mucosa looks normal upper and mid part of the esophagus, UES stricture and GE junction stricture progressively dilated 50, 54 and 56 Dutch.  Small hiatal hernia was noted.  Gastric mucosa did show changes consistent with a significant chronic gastritis in the form of erythema mucosal edema as well as significant scarring of the body and the antrum area biopsy was performed.  Duodenal mucosa looks normal.    Impression:  1.  Small hiatal hernia with esophageal ring and stricture progressively dilated 50, 54 and 56 Dutch.  2.  Small hiatal hernia.  3.  Changes suggestive of chronic gastritis.    Recommendations:  Patient will continue with the PPI.  Some of her symptoms may be related to her constipation, she will be placed on MiraLAX regular dose twice a day.  Follow the GI clinic.  Follow up with PCP as scheduled  Follow up with biopsy report      Margarette Mcallister MD     Date: 9/13/2023    Time: 14:18 EDT

## 2023-09-13 NOTE — H&P
Patient Care Team:  Anny Garcia MD as PCP - General (Internal Medicine)  Sergio Ordonez MD as Consulting Physician (Nephrology)      Subjective .     History of present illness:    Vianey Reeves is a 77 y.o. female who presents today for Procedure(s):  ESOPHAGOGASTRODUODENOSCOPY with biopsy x 1 area and dilation (50,54,56 non guided bougie) for the indications listed below.     The updated Patient Profile was reviewed prior to the procedure, in conjunction with the Physical Exam, including medical conditions, surgical procedures, medications, allergies, family history and social history.     Pre-operatively, I reviewed the indication(s) for the procedure, the risks of the procedure [including but not limited to: unexpected bleeding possibly requiring hospitalization and/or unplanned repeat procedures, perforation possibly requiring surgical treatment, missed lesions and complications of sedation/MAC (also explained by anesthesia staff)].     I have evaluated the patient for risks associated with the planned anesthesia and the procedure to be performed and find the patient an acceptable candidate for IV sedation.    Multiple opportunities were provided for any questions or concerns, and all questions were answered satisfactorily before any anesthesia was administered. We will proceed with the planned procedure.      ASSESSMENT/PLAN:  77 years old female with a history of dyspepsia as well as dysphagia and upper abdominal discomfort.      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    Hypertension     IBS (irritable colon syndrome)     constipation    Mass of right breast     Neuropathy     Renal insufficiency     Sciatic leg pain     right    Simple chronic bronchitis     Solitary kidney     left       Past Surgical History:  Past Surgical History:   Procedure Laterality Date    BREAST BIOPSY Right     CARDIAC CATHETERIZATION      CHOLECYSTECTOMY      COLON SURGERY      REMOVAL diverticular diseae    COLONOSCOPY N/A 08/12/2021    Procedure: COLONOSCOPY with polypectomy x 3;  Surgeon: Margarette Mcallister MD;  Location: [facility] ENDOSCOPY;  Service: Gastroenterology;  Laterality: N/A;  post op: hmorrhoids, diverticulosis, polyps    CORONARY STENT PLACEMENT      ENDOSCOPY N/A 08/12/2021    Procedure: ESOPHAGOGASTRODUODENOSCOPY with dilatation (18-20 mm balloon) and (54 bougie);  Surgeon: Margarette Mcallister MD;  Location: [facility] ENDOSCOPY;  Service: Gastroenterology;  Laterality: N/A;  post op: esophageal stricture, esophagitis, gastritis, history of nissen    EYE SURGERY Bilateral     cataracts    HIATAL HERNIA REPAIR      NEPHRECTOMY Right     cancer    UPPER ENDOSCOPIC ULTRASOUND W/ FNA N/A 09/22/2022    Procedure: EUS;  Surgeon: Margarette Mcallister MD;  Location: [facility] ENDOSCOPY;  Service: Gastroenterology;  Laterality: N/A;  post: normal pancreas, dilated pancreatic duct, hiatal hernia,        Social History:  Social History     Tobacco Use    Smoking status: Some Days     Packs/day: 0.25     Years: 50.00     Pack years: 12.50     Types: Cigarettes    Smokeless tobacco: Never   Vaping Use    Vaping Use: Never used   Substance Use Topics    Alcohol use: Yes     Comment: occasionally     Drug use: Never       Family History:  Family History   Problem Relation Age of Onset    Arthritis Father     Prostate cancer Father     Heart disease Sister         Medications:  Facility-Administered Medications Prior to Admission   Medication Dose Route Frequency Provider Last Rate Last Admin    triamcinolone acetonide (KENALOG-40) injection 20 mg  20 mg Intramuscular Once ALEJANDRO Winter DPM         Medications Prior to Admission   Medication Sig Dispense Refill Last Dose    albuterol (PROVENTIL) (2.5 MG/3ML) 0.083% nebulizer solution Take 2.5 mg by nebulization Every 4 (Four) Hours As Needed for Wheezing.   9/12/2023    amLODIPine (NORVASC) 10 MG tablet TAKE 1 TABLET BY MOUTH EVERY DAY 90 tablet 4 9/12/2023    aspirin 81 MG tablet Take 1 tablet by mouth Every Night. Hold dos   8/30/2023    carvedilol (COREG) 3.125 MG tablet Take 2 tablets by mouth 2 (Two) Times a Day With Meals.   9/12/2023    cephalexin (KEFLEX) 250 MG capsule Take 1 capsule by mouth Every Night.   9/12/2023    cyanocobalamin 1000 MCG/ML injection INJECT 1 ML BY INTRAMUSCULAR ROUTE ONCE WEEKLY X 4 DOSES, THEN REDUCE TO ONCE A MONTH   9/12/2023    doxazosin (CARDURA) 2 MG tablet TAKE 1 TABLET BY MOUTH EVERY DAY AT NIGHT 90 tablet 3 9/12/2023    estradiol (ESTRACE) 0.1 MG/GM vaginal cream USE CREAM VAGINALLY ONCE DAILY AT BEDTIME FOR 2 WEEKS THEN USE 3 TIMES WEEKLY AT BEDTIME   9/12/2023    Evolocumab (REPATHA) solution auto-injector SureClick injection Inject 1 mL under the skin into the appropriate area as directed Every 14 (Fourteen) Days.   9/12/2023    fluticasone-salmeterol (Advair HFA) 45-21 MCG/ACT inhaler Inhale 2 puffs 2 (Two) Times a Day. 12 each 12 9/12/2023    gabapentin (NEURONTIN) 100 MG capsule Take 2 capsules by mouth 3 (Three) Times a Day. 90 capsule 0 9/12/2023    hydrALAZINE (APRESOLINE) 25 MG tablet Take 1 tablet by mouth 2 (Two) Times a Day.   9/12/2023    Insulin Glargine (Lantus SoloStar) 100 UNIT/ML injection pen Inject 20 Units under the skin into the appropriate area as directed Every Night.   9/12/2023    ipratropium-albuterol (DUO-NEB) 0.5-2.5 mg/3 ml nebulizer Take 3 mL  by nebulization 4 (Four) Times a Day. 360 mL 0 Past Week    Linzess 145 MCG capsule capsule 1 capsule Daily.   Past Week    methocarbamol (ROBAXIN) 500 MG tablet TAKE 1 TABLET BY ORAL ROUTE 4 TIMES EVERY DAY AS NEEDED FOR MUSCLE SPASM   9/12/2023    omeprazole (priLOSEC) 40 MG capsule Take 1 capsule by mouth Every Morning.   9/12/2023    polyethylene glycol (MIRALAX) 17 g packet Take 17 g by mouth 2 (Two) Times a Day.   Past Week    SITagliptin (JANUVIA) 100 MG tablet Take 1 tablet by mouth Daily. Hold dos   9/12/2023    traMADol (ULTRAM) 50 MG tablet Take 2 tablets by mouth 2 (Two) Times a Day.   9/12/2023    Tymlos 3120 MCG/1.56ML solution pen-injector Daily.   9/12/2023    alclometasone (ACLOVATE) 0.05 % cream        Diclofenac Sodium (VOLTAREN) 1 % gel gel 3 (Three) Times a Day As Needed.   More than a month    HYDROcodone-acetaminophen (NORCO) 5-325 MG per tablet Take 1 tablet by mouth Every 8 (Eight) Hours As Needed.       lidocaine (LIDODERM) 5 % APPLY 1 PATCH BY TOPICAL ROUTE EVERY DAY (MAY WEAR UP TO 12HOURS.) AS NEEDED FOR PAIN       sertraline (ZOLOFT) 50 MG tablet TAKE 1 TABLET BY MOUTH EVERY DAY 90 tablet 3 Unknown       Scheduled Meds:   Continuous Infusions:   PRN Meds:.  atropine    ePHEDrine Sulfate (Pressors)    hydrALAZINE    ipratropium-albuterol    labetalol    lidocaine (cardiac)    ondansetron    ALLERGIES:  Erythromycin, Naproxen sodium, Statins, Latex, Metoclopramide, Rocephin [ceftriaxone], and Dicyclomine        Objective     Vital Signs:   Heart Rate:  [67-69] 69  Resp:  [13-18] 18  BP: (138-145)/(72-73) 145/73    Physical Exam:      General Appearance:    Awake and alert, in no acute distress   Lungs:     Clear to auscultation bilaterally, respirations regular, even and unlabored    Heart:    Regular rhythm and normal rate, normal S1 and S2, no            murmur, no gallop, no rub   Abdomen:     Normal bowel sounds, soft, non-tender, no rebound or guarding, non-distended, no  hepatosplenomegaly        Results Review:   I reviewed the patient's labs and imaging.  Lab Results (last 24 hours)       Procedure Component Value Units Date/Time    POC Glucose Once [[id number]]  (Normal) Collected: 09/13/23 1316    Specimen: Blood Updated: 09/13/23 1318     Glucose 83 mg/dL      Comment: Serial Number: [id number]:  [id number]               Imaging Results (Last 24 Hours)       ** No results found for the last 24 hours. **               I discussed the patients findings and my recommendations with the patient.  Margarette Mcallister MD  09/13/23  14:16 EDT

## 2023-09-13 NOTE — ANESTHESIA POSTPROCEDURE EVALUATION
Patient: Vianey Reeves    Procedure Summary       Date: 09/13/23 Room / Location: [facility] ENDOSCOPY 2 / [facility] ENDOSCOPY    Anesthesia Start: 1356 Anesthesia Stop: 1414    Procedure: ESOPHAGOGASTRODUODENOSCOPY with biopsy x 1 area and dilation (50,54,56 non guided bougie) Diagnosis:       Dysphagia      (Dysphagia [R13.10])    Surgeons: Margarette Mcallister MD Provider: Lloyd Simmons MD    Anesthesia Type: general ASA Status: 3            Anesthesia Type: general    Vitals  Vitals Value Taken Time   /73 09/13/23 1433   Temp     Pulse 66 09/13/23 1433   Resp 18 09/13/23 1411   SpO2 95 % 09/13/23 1433   Vitals shown include unvalidated device data.        Post Anesthesia Care and Evaluation    Patient location during evaluation: PACU  Patient participation: complete - patient participated  Level of consciousness: awake  Pain scale: See nurse's notes for pain score.  Pain management: adequate    Airway patency: patent  Anesthetic complications: No anesthetic complications  PONV Status: none  Cardiovascular status: acceptable  Respiratory status: acceptable and spontaneous ventilation  Hydration status: acceptable    Comments: Patient seen and examined postoperatively; vital signs stable; SpO2 greater than or equal to 90%; cardiopulmonary status stable; nausea/vomiting adequately controlled; pain adequately controlled; no apparent anesthesia complications; patient discharged from anesthesia care when discharge criteria were met

## 2023-09-13 NOTE — ANESTHESIA PREPROCEDURE EVALUATION
Anesthesia Evaluation     Patient summary reviewed and Nursing notes reviewed   NPO Solid Status: > 8 hours  NPO Liquid Status: > 8 hours           Airway   Mallampati: II  TM distance: >3 FB  Neck ROM: full  No difficulty expected  Dental - normal exam     Pulmonary    (+) COPD,  Cardiovascular     (+) hypertension, CAD, cardiac stents , hyperlipidemia      Neuro/Psych  (+) dizziness/light headedness, numbness, psychiatric history  GI/Hepatic/Renal/Endo    (+) obesity, hiatal hernia, GERD, renal disease, diabetes mellitus    Musculoskeletal     Abdominal    Substance History      OB/GYN          Other   arthritis,   history of cancer                  Anesthesia Plan    ASA 3     general     intravenous induction     Anesthetic plan, risks, benefits, and alternatives have been provided, discussed and informed consent has been obtained with: patient.    Plan discussed with CRNA.    CODE STATUS:

## 2023-09-15 LAB
LAB AP CASE REPORT: NORMAL
PATH REPORT.FINAL DX SPEC: NORMAL
PATH REPORT.GROSS SPEC: NORMAL

## 2023-09-18 ENCOUNTER — OFFICE VISIT (OUTPATIENT)
Dept: WOUND CARE | Facility: HOSPITAL | Age: 78
End: 2023-09-18
Payer: MEDICARE

## 2023-09-25 ENCOUNTER — OFFICE VISIT (OUTPATIENT)
Dept: WOUND CARE | Facility: HOSPITAL | Age: 78
End: 2023-09-25
Payer: MEDICARE

## 2023-09-25 PROCEDURE — 97602 WOUND(S) CARE NON-SELECTIVE: CPT

## 2023-12-18 ENCOUNTER — OFFICE (AMBULATORY)
Dept: URBAN - METROPOLITAN AREA CLINIC 64 | Facility: CLINIC | Age: 78
End: 2023-12-18
Payer: MEDICAID

## 2023-12-18 VITALS
SYSTOLIC BLOOD PRESSURE: 125 MMHG | DIASTOLIC BLOOD PRESSURE: 82 MMHG | HEART RATE: 79 BPM | WEIGHT: 192 LBS | HEIGHT: 65 IN

## 2023-12-18 DIAGNOSIS — M54.9 DORSALGIA, UNSPECIFIED: ICD-10-CM

## 2023-12-18 DIAGNOSIS — K59.00 CONSTIPATION, UNSPECIFIED: ICD-10-CM

## 2023-12-18 DIAGNOSIS — R14.0 ABDOMINAL DISTENSION (GASEOUS): ICD-10-CM

## 2023-12-18 PROCEDURE — 99212 OFFICE O/P EST SF 10 MIN: CPT | Performed by: NURSE PRACTITIONER

## 2024-01-14 ENCOUNTER — APPOINTMENT (OUTPATIENT)
Dept: GENERAL RADIOLOGY | Facility: HOSPITAL | Age: 79
End: 2024-01-14
Payer: MEDICARE

## 2024-01-14 ENCOUNTER — APPOINTMENT (OUTPATIENT)
Dept: CT IMAGING | Facility: HOSPITAL | Age: 79
End: 2024-01-14
Payer: MEDICARE

## 2024-01-14 ENCOUNTER — HOSPITAL ENCOUNTER (INPATIENT)
Facility: HOSPITAL | Age: 79
LOS: 3 days | Discharge: HOME | End: 2024-01-17
Attending: EMERGENCY MEDICINE
Payer: MEDICARE

## 2024-01-14 DIAGNOSIS — R07.9 CHEST PAIN, UNSPECIFIED TYPE: Primary | ICD-10-CM

## 2024-01-14 DIAGNOSIS — M54.16 LUMBAR RADICULOPATHY: ICD-10-CM

## 2024-01-14 DIAGNOSIS — R06.09 EXERTIONAL DYSPNEA: ICD-10-CM

## 2024-01-14 DIAGNOSIS — U07.1 COVID-19: ICD-10-CM

## 2024-01-14 LAB
ALBUMIN SERPL-MCNC: 3.8 G/DL (ref 3.5–5.2)
ALBUMIN/GLOB SERPL: 1.4 G/DL
ALP SERPL-CCNC: 68 U/L (ref 39–117)
ALT SERPL W P-5'-P-CCNC: 26 U/L (ref 1–33)
ANION GAP SERPL CALCULATED.3IONS-SCNC: 10 MMOL/L (ref 5–15)
AST SERPL-CCNC: 25 U/L (ref 1–32)
BILIRUB SERPL-MCNC: 0.3 MG/DL (ref 0–1.2)
BUN SERPL-MCNC: 20 MG/DL (ref 8–23)
BUN/CREAT SERPL: 13.2 (ref 7–25)
CALCIUM SPEC-SCNC: 9.4 MG/DL (ref 8.6–10.5)
CHLORIDE SERPL-SCNC: 101 MMOL/L (ref 98–107)
CO2 SERPL-SCNC: 27 MMOL/L (ref 22–29)
CREAT SERPL-MCNC: 1.52 MG/DL (ref 0.57–1)
D DIMER PPP FEU-MCNC: 1.83 MG/L (FEU) (ref 0–0.8)
DEPRECATED RDW RBC AUTO: 50.8 FL (ref 37–54)
EGFRCR SERPLBLD CKD-EPI 2021: 34.5 ML/MIN/1.73
ERYTHROCYTE [DISTWIDTH] IN BLOOD BY AUTOMATED COUNT: 14.3 % (ref 12.3–15.4)
GEN 5 2HR TROPONIN T REFLEX: 57 NG/L
GLOBULIN UR ELPH-MCNC: 2.8 GM/DL
GLUCOSE BLDC GLUCOMTR-MCNC: 321 MG/DL (ref 70–105)
GLUCOSE SERPL-MCNC: 105 MG/DL (ref 65–99)
HCT VFR BLD AUTO: 46.9 % (ref 34–46.6)
HGB BLD-MCNC: 15.3 G/DL (ref 12–15.9)
LYMPHOCYTES # BLD MANUAL: 1.22 10*3/MM3 (ref 0.7–3.1)
LYMPHOCYTES NFR BLD MANUAL: 14 % (ref 5–12)
MACROCYTES BLD QL SMEAR: ABNORMAL
MCH RBC QN AUTO: 33.7 PG (ref 26.6–33)
MCHC RBC AUTO-ENTMCNC: 32.6 G/DL (ref 31.5–35.7)
MCV RBC AUTO: 103.3 FL (ref 79–97)
MONOCYTES # BLD: 0.71 10*3/MM3 (ref 0.1–0.9)
MYELOCYTES NFR BLD MANUAL: 1 % (ref 0–0)
NEUTROPHILS # BLD AUTO: 3.11 10*3/MM3 (ref 1.7–7)
NEUTROPHILS NFR BLD MANUAL: 56 % (ref 42.7–76)
NEUTS BAND NFR BLD MANUAL: 5 % (ref 0–5)
NT-PROBNP SERPL-MCNC: 642.8 PG/ML (ref 0–1800)
PLAT MORPH BLD: NORMAL
PLATELET # BLD AUTO: 180 10*3/MM3 (ref 140–450)
PMV BLD AUTO: 7.8 FL (ref 6–12)
POTASSIUM SERPL-SCNC: 4.3 MMOL/L (ref 3.5–5.2)
PROT SERPL-MCNC: 6.6 G/DL (ref 6–8.5)
RBC # BLD AUTO: 4.54 10*6/MM3 (ref 3.77–5.28)
SCAN SLIDE: NORMAL
SODIUM SERPL-SCNC: 138 MMOL/L (ref 136–145)
TOXIC GRANULATION: ABNORMAL
TROPONIN T DELTA: -10 NG/L
TROPONIN T SERPL HS-MCNC: 67 NG/L
VARIANT LYMPHS NFR BLD MANUAL: 24 % (ref 19.6–45.3)
WBC NRBC COR # BLD AUTO: 5.1 10*3/MM3 (ref 3.4–10.8)

## 2024-01-14 PROCEDURE — 36415 COLL VENOUS BLD VENIPUNCTURE: CPT

## 2024-01-14 PROCEDURE — 85007 BL SMEAR W/DIFF WBC COUNT: CPT | Performed by: NURSE PRACTITIONER

## 2024-01-14 PROCEDURE — 99285 EMERGENCY DEPT VISIT HI MDM: CPT

## 2024-01-14 PROCEDURE — 84484 ASSAY OF TROPONIN QUANT: CPT | Performed by: NURSE PRACTITIONER

## 2024-01-14 PROCEDURE — 85025 COMPLETE CBC W/AUTO DIFF WBC: CPT | Performed by: NURSE PRACTITIONER

## 2024-01-14 PROCEDURE — 71045 X-RAY EXAM CHEST 1 VIEW: CPT

## 2024-01-14 PROCEDURE — 63710000001 DEXAMETHASONE PER 0.25 MG: Performed by: INTERNAL MEDICINE

## 2024-01-14 PROCEDURE — 63710000001 INSULIN GLARGINE PER 5 UNITS: Performed by: INTERNAL MEDICINE

## 2024-01-14 PROCEDURE — 71046 X-RAY EXAM CHEST 2 VIEWS: CPT

## 2024-01-14 PROCEDURE — 80053 COMPREHEN METABOLIC PANEL: CPT | Performed by: NURSE PRACTITIONER

## 2024-01-14 PROCEDURE — 93005 ELECTROCARDIOGRAM TRACING: CPT | Performed by: NURSE PRACTITIONER

## 2024-01-14 PROCEDURE — 25010000002 ENOXAPARIN PER 10 MG: Performed by: INTERNAL MEDICINE

## 2024-01-14 PROCEDURE — 83880 ASSAY OF NATRIURETIC PEPTIDE: CPT | Performed by: NURSE PRACTITIONER

## 2024-01-14 PROCEDURE — 82948 REAGENT STRIP/BLOOD GLUCOSE: CPT

## 2024-01-14 PROCEDURE — 25010000002 ENOXAPARIN PER 10 MG: Performed by: NURSE PRACTITIONER

## 2024-01-14 PROCEDURE — 85379 FIBRIN DEGRADATION QUANT: CPT | Performed by: EMERGENCY MEDICINE

## 2024-01-14 RX ORDER — CARVEDILOL 6.25 MG/1
6.25 TABLET ORAL 2 TIMES DAILY WITH MEALS
Status: DISCONTINUED | OUTPATIENT
Start: 2024-01-14 | End: 2024-01-15

## 2024-01-14 RX ORDER — IPRATROPIUM BROMIDE AND ALBUTEROL SULFATE 2.5; .5 MG/3ML; MG/3ML
3 SOLUTION RESPIRATORY (INHALATION)
Status: DISCONTINUED | OUTPATIENT
Start: 2024-01-14 | End: 2024-01-14

## 2024-01-14 RX ORDER — METHOCARBAMOL 500 MG/1
500 TABLET, FILM COATED ORAL EVERY 8 HOURS SCHEDULED
Status: DISCONTINUED | OUTPATIENT
Start: 2024-01-14 | End: 2024-01-15

## 2024-01-14 RX ORDER — HYDROCODONE BITARTRATE AND ACETAMINOPHEN 5; 325 MG/1; MG/1
1 TABLET ORAL EVERY 8 HOURS PRN
Status: DISCONTINUED | OUTPATIENT
Start: 2024-01-14 | End: 2024-01-17 | Stop reason: HOSPADM

## 2024-01-14 RX ORDER — POLYETHYLENE GLYCOL 3350 17 G/17G
17 POWDER, FOR SOLUTION ORAL DAILY PRN
Status: DISCONTINUED | OUTPATIENT
Start: 2024-01-14 | End: 2024-01-17 | Stop reason: HOSPADM

## 2024-01-14 RX ORDER — GABAPENTIN 100 MG/1
200 CAPSULE ORAL 3 TIMES DAILY
Status: DISCONTINUED | OUTPATIENT
Start: 2024-01-14 | End: 2024-01-15

## 2024-01-14 RX ORDER — SODIUM CHLORIDE 0.9 % (FLUSH) 0.9 %
10 SYRINGE (ML) INJECTION AS NEEDED
Status: DISCONTINUED | OUTPATIENT
Start: 2024-01-14 | End: 2024-01-17 | Stop reason: HOSPADM

## 2024-01-14 RX ORDER — ASPIRIN 81 MG/1
324 TABLET, CHEWABLE ORAL ONCE
Status: COMPLETED | OUTPATIENT
Start: 2024-01-14 | End: 2024-01-14

## 2024-01-14 RX ORDER — BISACODYL 5 MG/1
5 TABLET, DELAYED RELEASE ORAL DAILY PRN
Status: DISCONTINUED | OUTPATIENT
Start: 2024-01-14 | End: 2024-01-17 | Stop reason: HOSPADM

## 2024-01-14 RX ORDER — AMLODIPINE BESYLATE 5 MG/1
10 TABLET ORAL DAILY
Status: DISCONTINUED | OUTPATIENT
Start: 2024-01-14 | End: 2024-01-17 | Stop reason: HOSPADM

## 2024-01-14 RX ORDER — POLYETHYLENE GLYCOL 3350 17 G/17G
17 POWDER, FOR SOLUTION ORAL 2 TIMES DAILY
Status: DISCONTINUED | OUTPATIENT
Start: 2024-01-14 | End: 2024-01-17 | Stop reason: HOSPADM

## 2024-01-14 RX ORDER — SODIUM CHLORIDE 0.9 % (FLUSH) 0.9 %
10 SYRINGE (ML) INJECTION EVERY 12 HOURS SCHEDULED
Status: DISCONTINUED | OUTPATIENT
Start: 2024-01-14 | End: 2024-01-17 | Stop reason: HOSPADM

## 2024-01-14 RX ORDER — ALBUTEROL SULFATE 2.5 MG/3ML
2.5 SOLUTION RESPIRATORY (INHALATION) EVERY 4 HOURS PRN
Status: DISCONTINUED | OUTPATIENT
Start: 2024-01-14 | End: 2024-01-16

## 2024-01-14 RX ORDER — ASPIRIN 81 MG/1
81 TABLET ORAL DAILY
Status: DISCONTINUED | OUTPATIENT
Start: 2024-01-14 | End: 2024-01-17 | Stop reason: HOSPADM

## 2024-01-14 RX ORDER — DEXAMETHASONE 6 MG/1
6 TABLET ORAL
Status: DISCONTINUED | OUTPATIENT
Start: 2024-01-14 | End: 2024-01-17 | Stop reason: HOSPADM

## 2024-01-14 RX ORDER — SODIUM CHLORIDE 9 MG/ML
40 INJECTION, SOLUTION INTRAVENOUS AS NEEDED
Status: DISCONTINUED | OUTPATIENT
Start: 2024-01-14 | End: 2024-01-17 | Stop reason: HOSPADM

## 2024-01-14 RX ORDER — TERAZOSIN 2 MG/1
2 CAPSULE ORAL NIGHTLY
Status: DISCONTINUED | OUTPATIENT
Start: 2024-01-14 | End: 2024-01-17 | Stop reason: HOSPADM

## 2024-01-14 RX ORDER — BISACODYL 10 MG
10 SUPPOSITORY, RECTAL RECTAL DAILY PRN
Status: DISCONTINUED | OUTPATIENT
Start: 2024-01-14 | End: 2024-01-17 | Stop reason: HOSPADM

## 2024-01-14 RX ORDER — AMOXICILLIN 250 MG
2 CAPSULE ORAL 2 TIMES DAILY
Status: DISCONTINUED | OUTPATIENT
Start: 2024-01-14 | End: 2024-01-17 | Stop reason: HOSPADM

## 2024-01-14 RX ORDER — FLUTICASONE PROPIONATE AND SALMETEROL XINAFOATE 45; 21 UG/1; UG/1
2 AEROSOL, METERED RESPIRATORY (INHALATION) 2 TIMES DAILY PRN
COMMUNITY
End: 2024-01-21

## 2024-01-14 RX ORDER — HYDRALAZINE HYDROCHLORIDE 25 MG/1
25 TABLET, FILM COATED ORAL 2 TIMES DAILY
Status: DISCONTINUED | OUTPATIENT
Start: 2024-01-14 | End: 2024-01-17 | Stop reason: HOSPADM

## 2024-01-14 RX ORDER — BUDESONIDE AND FORMOTEROL FUMARATE DIHYDRATE 160; 4.5 UG/1; UG/1
1 AEROSOL RESPIRATORY (INHALATION)
Status: DISCONTINUED | OUTPATIENT
Start: 2024-01-14 | End: 2024-01-17 | Stop reason: HOSPADM

## 2024-01-14 RX ORDER — ENOXAPARIN SODIUM 100 MG/ML
40 INJECTION SUBCUTANEOUS DAILY
Status: DISCONTINUED | OUTPATIENT
Start: 2024-01-14 | End: 2024-01-17 | Stop reason: HOSPADM

## 2024-01-14 RX ORDER — GABAPENTIN 100 MG/1
100 CAPSULE ORAL NIGHTLY
Status: ON HOLD | COMMUNITY
End: 2024-01-29 | Stop reason: SDUPTHER

## 2024-01-14 RX ORDER — ALBUTEROL SULFATE 90 UG/1
2 AEROSOL, METERED RESPIRATORY (INHALATION)
Status: DISCONTINUED | OUTPATIENT
Start: 2024-01-14 | End: 2024-01-17 | Stop reason: HOSPADM

## 2024-01-14 RX ORDER — PANTOPRAZOLE SODIUM 40 MG/1
40 TABLET, DELAYED RELEASE ORAL
Status: DISCONTINUED | OUTPATIENT
Start: 2024-01-15 | End: 2024-01-17 | Stop reason: HOSPADM

## 2024-01-14 RX ORDER — ENOXAPARIN SODIUM 100 MG/ML
50 INJECTION SUBCUTANEOUS ONCE
Status: COMPLETED | OUTPATIENT
Start: 2024-01-14 | End: 2024-01-14

## 2024-01-14 RX ORDER — TRAMADOL HYDROCHLORIDE 50 MG/1
100 TABLET ORAL 2 TIMES DAILY
Status: DISCONTINUED | OUTPATIENT
Start: 2024-01-14 | End: 2024-01-17 | Stop reason: HOSPADM

## 2024-01-14 RX ADMIN — POLYETHYLENE GLYCOL 3350 17 G: 17 POWDER, FOR SOLUTION ORAL at 22:47

## 2024-01-14 RX ADMIN — CARVEDILOL 6.25 MG: 6.25 TABLET, FILM COATED ORAL at 22:47

## 2024-01-14 RX ADMIN — TERAZOSIN HYDROCHLORIDE 2 MG: 2 CAPSULE ORAL at 22:47

## 2024-01-14 RX ADMIN — SERTRALINE HYDROCHLORIDE 50 MG: 50 TABLET ORAL at 23:51

## 2024-01-14 RX ADMIN — Medication 10 ML: at 22:48

## 2024-01-14 RX ADMIN — TRAMADOL HYDROCHLORIDE 100 MG: 50 TABLET, COATED ORAL at 22:47

## 2024-01-14 RX ADMIN — GABAPENTIN 200 MG: 100 CAPSULE ORAL at 22:47

## 2024-01-14 RX ADMIN — DEXAMETHASONE 6 MG: 4 TABLET ORAL at 16:20

## 2024-01-14 RX ADMIN — INSULIN GLARGINE 10 UNITS: 100 INJECTION, SOLUTION SUBCUTANEOUS at 22:48

## 2024-01-14 RX ADMIN — ASPIRIN 81 MG CHEWABLE TABLET 324 MG: 81 TABLET CHEWABLE at 16:19

## 2024-01-14 RX ADMIN — ENOXAPARIN SODIUM 40 MG: 100 INJECTION SUBCUTANEOUS at 16:20

## 2024-01-14 RX ADMIN — ENOXAPARIN SODIUM 50 MG: 60 INJECTION SUBCUTANEOUS at 22:53

## 2024-01-14 RX ADMIN — HYDRALAZINE HYDROCHLORIDE 25 MG: 25 TABLET ORAL at 22:47

## 2024-01-14 NOTE — ED NOTES
Nursing report ED to floor  Vianey Reeves  78 y.o.  female    HPI:   Chief Complaint   Patient presents with    Shortness of Breath     Pt reports increased SOA and cough that started yesterday.  Pt was seen at [facility] today and sent here.  Pt reports she tested + for Covid today at [facility].        Admitting doctor:   Emily Matthews MD    Admitting diagnosis:   The primary encounter diagnosis was Chest pain, unspecified type. Diagnoses of Exertional dyspnea and COVID-19 were also pertinent to this visit.    Code status:   Current Code Status       Date Active Code Status Order ID Comments User Context       1/14/2024 1532 CPR (Attempt to Resuscitate) [id number]  Emily Matthews MD ED        Question Answer    Code Status (Patient has no pulse and is not breathing) CPR (Attempt to Resuscitate)    Medical Interventions (Patient has pulse or is breathing) Full Support                    Allergies:   Erythromycin, Naproxen sodium, Statins, Latex, Metoclopramide, Rocephin [ceftriaxone], and Dicyclomine    Isolation:  No active isolations     Fall Risk:  Fall Risk Assessment was completed, and patient is at moderate risk for falls.   Predictive Model Details         11 (Low) Factor Value    Calculated 1/14/2024 16:46 Age 78    Risk of Fall Model Musculoskeletal Assessment WDL     Active Peripheral IV Present     Imaging order in this encounter Present     Skin Assessment WDL     Financial Class Other     Magnesium not on file     Number of Distinct Medication Classes administered 3     Drug Use No     Tobacco Use Current     Creatinine 1.52 mg/dL     Zack Scale not on file     Peripheral Vascular Assessment WDL     Cardiac Assessment X     Diastolic BP 79     Albumin 3.8 g/dL     Chloride 101 mmol/L     Gastrointestinal Assessment WDL     ALT 26 U/L     Respiratory Rate 16     Number of administrations of Anti-Coagulants 1     Calcium 9.4 mg/dL     Potassium 4.3 mmol/L     Days after Admission 0.086     Total Bilirubin 0.3  "mg/dL         Weight:       01/14/24  1353   Weight: 86 kg (189 lb 9.5 oz)       Intake and Output  No intake or output data in the 24 hours ending 01/14/24 1646    Diet:   Dietary Orders (From admission, onward)       Start     Ordered    01/14/24 1533  Diet: Cardiac Diets; Healthy Heart (2-3 Na+); Texture: Regular Texture (IDDSI 7); Fluid Consistency: Thin (IDDSI 0)  Diet Effective Now        References:    Diet Order [place]   Question Answer Comment   Diets: Cardiac Diets    Cardiac Diet: Healthy Heart (2-3 Na+)    Texture: Regular Texture (IDDSI 7)    Fluid Consistency: Thin (IDDSI 0)        01/14/24 1532                     Most recent vitals:   Vitals:    01/14/24 1336 01/14/24 1353 01/14/24 1510 01/14/24 1631   BP: 133/78  134/98 145/79   BP Location: Right arm      Patient Position: Sitting      Pulse: 109  111 95   Resp: 16      Temp: 99 °F (37.2 °C)      TempSrc: Oral      SpO2: 93%  91% 92%   Weight:  86 kg (189 lb 9.5 oz)     Height:  165.1 cm (65\")         Active LDAs/IV Access:   Lines, Drains & Airways       Active LDAs       Name Placement date Placement time Site Days    Peripheral IV 01/14/24 1406 Left Antecubital 01/14/24  1406  Antecubital  less than 1                    Skin Condition:   Skin Assessments (last day)       None             Labs (abnormal labs have a star):   Labs Reviewed   COMPREHENSIVE METABOLIC PANEL - Abnormal; Notable for the following components:       Result Value    Glucose 105 (*)     Creatinine 1.52 (*)     eGFR 34.5 (*)     All other components within normal limits    Narrative:     GFR Normal >60  Chronic Kidney Disease <60  Kidney Failure <15    The GFR formula is only valid for adults with stable renal function between ages 18 and 70.   TROPONIN - Abnormal; Notable for the following components:    HS Troponin T 67 (*)     All other components within normal limits    Narrative:     High Sensitive Troponin T Reference Range:  <14.0 ng/L- Negative Female for " "AMI  <22.0 ng/L- Negative Male for AMI  >=14 - Abnormal Female indicating possible myocardial injury.  >=22 - Abnormal Male indicating possible myocardial injury.   Clinicians would have to utilize clinical acumen, EKG, Troponin, and serial changes to determine if it is an Acute Myocardial Infarction or myocardial injury due to an underlying chronic condition.        CBC WITH AUTO DIFFERENTIAL - Abnormal; Notable for the following components:    Hematocrit 46.9 (*)     .3 (*)     MCH 33.7 (*)     All other components within normal limits    Narrative:     The previously reported component NRBC is no longer being reported. Previous result was 0.1 /100 WBC (Reference Range: 0.0-0.2 /100 WBC) on 1/14/2024 at 1418 EST.   MANUAL DIFFERENTIAL - Abnormal; Notable for the following components:    Monocyte % 14.0 (*)     Myelocyte % 1.0 (*)     All other components within normal limits   D-DIMER, QUANTITATIVE - Abnormal; Notable for the following components:    D-Dimer, Quantitative 1.83 (*)     All other components within normal limits    Narrative:     According to the assay 's published package insert, a normal (<0.50 mg/L (FEU)) D-dimer result in conjunction with a non-high clinical probability assessment, excludes deep vein thrombosis (DVT) and pulmonary embolism (PE) with high sensitivity.    D-dimer values increase with age and this can make VTE exclusion of an older population difficult. To address this, the [employer], based on best available evidence and recent guidelines, recommends that clinicians use age-adjusted D-dimer thresholds in patients greater than 50 years of age with: a) a low probability of PE who do not meet all Pulmonary Embolism Rule Out Criteria, or b) in those with intermediate probability of PE.   The formula for an age-adjusted D-dimer cut-off is \"age/100\".  For example, a 60 year old patient would have an age-adjusted cut-off of 0.60 mg/L (FEU) and an 80 " year old 0.80 mg/L (FEU).   BNP (IN-HOUSE) - Normal    Narrative:     This assay is used as an aid in the diagnosis of individuals suspected of having heart failure. It can be used as an aid in the diagnosis of acute decompensated heart failure (ADHF) in patients presenting with signs and symptoms of ADHF to the emergency department (ED). In addition, NT-proBNP of <300 pg/mL indicates ADHF is not likely.    Age Range Result Interpretation  NT-proBNP Concentration (pg/mL:      <50             Positive            >450                   Gray                 300-450                    Negative             <300    50-75           Positive            >900                  Gray                300-900                  Negative            <300      >75             Positive            >1800                  Gray                300-1800                  Negative            <300   SCAN SLIDE   HIGH SENSITIVITIY TROPONIN T 2HR   CBC AND DIFFERENTIAL    Narrative:     The following orders were created for panel order CBC & Differential.  Procedure                               Abnormality         Status                     ---------                               -----------         ------                     CBC Auto Differential[[id number]]        Abnormal            Final result               Scan Slide[[id number]]                                       Final result                 Please view results for these tests on the individual orders.       LOC: Person, Place, Time, and Situation    Telemetry:  Telemetry    Cardiac Monitoring Ordered: yes    EKG:   ECG 12 Lead Dyspnea   Preliminary Result   HEART RATE= 106  bpm   RR Interval= 568  ms   VA Interval= 149  ms   P Horizontal Axis= -27  deg   P Front Axis= 20  deg   QRSD Interval= 94  ms   QT Interval= 323  ms   QTcB= 429  ms   QRS Axis= 4  deg   T Wave Axis= 1  deg   - ABNORMAL ECG -   Sinus tachycardia   Inferior infarct, old   Consider anterior infarct   Electronically  Signed By:    Date and Time of Study: 2024-01-14 13:59:33          Medications Given in the ED:   Medications   sodium chloride 0.9 % flush 10 mL (has no administration in time range)   sodium chloride 0.9 % flush 10 mL (has no administration in time range)   sodium chloride 0.9 % flush 10 mL (has no administration in time range)   sodium chloride 0.9 % infusion 40 mL (has no administration in time range)   sennosides-docusate (PERICOLACE) 8.6-50 MG per tablet 2 tablet (has no administration in time range)     And   polyethylene glycol (MIRALAX) packet 17 g (has no administration in time range)     And   bisacodyl (DULCOLAX) EC tablet 5 mg (has no administration in time range)     And   bisacodyl (DULCOLAX) suppository 10 mg (has no administration in time range)   Enoxaparin Sodium (LOVENOX) syringe 40 mg (40 mg Subcutaneous Given 1/14/24 1620)   dexAMETHasone (DECADRON) tablet 6 mg (6 mg Oral Given 1/14/24 1620)   albuterol (PROVENTIL) nebulizer solution 0.083% 2.5 mg/3mL (has no administration in time range)   amLODIPine (NORVASC) tablet 10 mg (has no administration in time range)   aspirin EC tablet 81 mg (has no administration in time range)   carvedilol (COREG) tablet 6.25 mg (has no administration in time range)   Diclofenac Sodium (VOLTAREN) 1 % gel 2 g (has no administration in time range)   terazosin (HYTRIN) capsule 2 mg (has no administration in time range)   budesonide-formoterol (SYMBICORT) 160-4.5 MCG/ACT inhaler 1 puff (has no administration in time range)   gabapentin (NEURONTIN) capsule 200 mg (has no administration in time range)   HYDROcodone-acetaminophen (NORCO) 5-325 MG per tablet 1 tablet (has no administration in time range)   hydrALAZINE (APRESOLINE) tablet 25 mg (has no administration in time range)   ipratropium-albuterol (DUO-NEB) nebulizer solution 3 mL (3 mL Nebulization Not Given 1/14/24 1631)   insulin glargine (LANTUS, SEMGLEE) injection 10 Units (has no administration in time range)    pantoprazole (PROTONIX) EC tablet 40 mg (has no administration in time range)   methocarbamol (ROBAXIN) tablet 500 mg (has no administration in time range)   polyethylene glycol (MIRALAX) packet 17 g (has no administration in time range)   sertraline (ZOLOFT) tablet 50 mg (has no administration in time range)   linagliptin (TRADJENTA) tablet 5 mg (has no administration in time range)   traMADol (ULTRAM) tablet 100 mg (has no administration in time range)   aspirin chewable tablet 324 mg (324 mg Oral Given 1/14/24 1619)       Imaging results:  XR Chest 1 View    Result Date: 1/14/2024  Impression: No acute cardiopulmonary disease. Electronically Signed: Kiran Burgess MD  1/14/2024 3:39 PM EST  Workstation ID: [id number]     Social issues:   Social History     Socioeconomic History    Marital status:    Tobacco Use    Smoking status: Some Days     Packs/day: 0.25     Years: 50.00     Additional pack years: 0.00     Total pack years: 12.50     Types: Cigarettes    Smokeless tobacco: Never   Vaping Use    Vaping Use: Never used   Substance and Sexual Activity    Alcohol use: Yes     Comment: occasionally     Drug use: Never    Sexual activity: Defer       NIH Stroke Scale:  Interval: (not recorded)  1a. Level of Consciousness: (not recorded)  1b. LOC Questions: (not recorded)  1c. LOC Commands: (not recorded)  2. Best Gaze: (not recorded)  3. Visual: (not recorded)  4. Facial Palsy: (not recorded)  5a. Motor Arm, Left: (not recorded)  5b. Motor Arm, Right: (not recorded)  6a. Motor Leg, Left: (not recorded)  6b. Motor Leg, Right: (not recorded)  7. Limb Ataxia: (not recorded)  8. Sensory: (not recorded)  9. Best Language: (not recorded)  10. Dysarthria: (not recorded)  11. Extinction and Inattention (formerly Neglect): (not recorded)    Total (NIH Stroke Scale): (not recorded)     Additional notable assessment information:     Nursing report ED to floor:  PCU LEON Velazquez RN   01/14/24 16:46 EST

## 2024-01-14 NOTE — H&P
[facility]   HISTORY AND PHYSICAL    Patient Name: Vianey Reeves  : 1945  MRN: [id number]  Primary Care Physician:  Anny Garcia MD  Date of admission: 2024    Subjective   Subjective     Chief Complaint: sob and cough since yesterday  With some chest tightness today    HPI:    Vianey Reeves is a 78 y.o. female who went to urgent care today for some chest tightness and was dx with covid  Pt has been having cough and sob since yesterdat    Review of Systems   Sob,cough since yesterday  Chest tightness today  No fever or chills  No n/v/d  Ewst of 14 systtem reviewed neg    Personal History     Past Medical History:   Diagnosis Date    Allergic     latex allergy    Allergies     Anxiety     Bilateral carotid bruits     Bulging lumbar disc     Bulging of thoracic intervertebral disc     Cancer     ureter    surgery    CKD (chronic kidney disease)     stage 3    Claudication, intermittent     COPD (chronic obstructive pulmonary disease)     Coronary artery disease     DDD (degenerative disc disease), lumbar     DDD (degenerative disc disease), thoracic     Diabetes mellitus     DJD (degenerative joint disease)     Dysphagia     Gout     H/O malignant neoplasm of ureter 2020    Hypertension     IBS (irritable colon syndrome)     constipation    Mass of right breast     Neuropathy     Renal insufficiency     Sciatic leg pain     right    Simple chronic bronchitis     Solitary kidney     left       Past Surgical History:   Procedure Laterality Date    BREAST BIOPSY Right     CARDIAC CATHETERIZATION      CHOLECYSTECTOMY      COLON SURGERY      REMOVAL diverticular diseae    COLONOSCOPY N/A 2021    Procedure: COLONOSCOPY with polypectomy x 3;  Surgeon: Margarette Mcallister MD;  Location: [facility] ENDOSCOPY;  Service: Gastroenterology;  Laterality: N/A;  post op: hmorrhoids, diverticulosis, polyps    CORONARY STENT PLACEMENT      ENDOSCOPY N/A 2021    Procedure: ESOPHAGOGASTRODUODENOSCOPY with  dilatation (18-20 mm balloon) and (54 bougie);  Surgeon: Margarette Mcallister MD;  Location: [facility] ENDOSCOPY;  Service: Gastroenterology;  Laterality: N/A;  post op: esophageal stricture, esophagitis, gastritis, history of nissen    ENDOSCOPY N/A 9/13/2023    Procedure: ESOPHAGOGASTRODUODENOSCOPY with biopsy x 1 area and dilation (50,54,56 non guided bougie);  Surgeon: Margarette Mcallister MD;  Location: [facility] ENDOSCOPY;  Service: Gastroenterology;  Laterality: N/A;  post op: esophageal stricture    EYE SURGERY Bilateral     cataracts    HIATAL HERNIA REPAIR      NEPHRECTOMY Right     cancer    UPPER ENDOSCOPIC ULTRASOUND W/ FNA N/A 09/22/2022    Procedure: EUS;  Surgeon: Margarette Mcallister MD;  Location: [facility] ENDOSCOPY;  Service: Gastroenterology;  Laterality: N/A;  post: normal pancreas, dilated pancreatic duct, hiatal hernia,        Family History: family history includes Arthritis in her father; Heart disease in her sister; Prostate cancer in her father. Otherwise pertinent FHx was reviewed and not pertinent to current issue.    Social History:  reports that she has been smoking cigarettes. She has a 12.50 pack-year smoking history. She has never used smokeless tobacco. She reports current alcohol use. She reports that she does not use drugs.    Home Medications:  Abaloparatide, Diclofenac Sodium, Evolocumab, HYDROcodone-acetaminophen, Insulin Glargine, SITagliptin, albuterol, alclometasone, amLODIPine, aspirin, carvedilol, cephalexin, cyanocobalamin, doxazosin, estradiol, fluticasone-salmeterol, gabapentin, hydrALAZINE, ipratropium-albuterol, lidocaine, linaclotide, methocarbamol, omeprazole, polyethylene glycol, sertraline, and traMADol    Allergies:  Allergies   Allergen Reactions    Erythromycin Rash    Naproxen Sodium Other (See Comments)     Dizzy lightheaded    Statins Myalgia    Latex Itching and Swelling    Metoclopramide Other (See Comments)     Unsteady on feet    Rocephin [Ceftriaxone] Itching and Nausea Only     Dicyclomine Unknown - Low Severity       Objective   Objective     Vitals:   Temp:  [99 °F (37.2 °C)] 99 °F (37.2 °C)  Heart Rate:  [109-111] 111  Resp:  [16] 16  BP: (133-134)/(78-98) 134/98  Physical Exam    Constitutional: Awake, alert   Eyes: PERRLA, sclerae anicteric, no conjunctival injection   HENT: NCAT, mucous membranes moist   Neck: Supple, no thyromegaly, no lymphadenopathy, trachea midline   Respiratory: Clear to auscultation bilaterally, nonlabored respirations    Cardiovascular: RRR, no murmurs, rubs, or gallops, palpable pedal pulses bilaterally   Gastrointestinal: Positive bowel sounds, soft, nontender, nondistended   Musculoskeletal: No bilateral ankle edema, no clubbing or cyanosis to extremities   Psychiatric: Appropriate affect, cooperative   Neurologic: Oriented x 3, strength symmetric in all extremities, Cranial Nerves grossly intact to confrontation, speech clear   Skin: No rashes     Result Review    Result Review:  I have personally reviewed the results from the time of this admission to 1/14/2024 16:01 EST and agree with these findings:  [x]  Laboratory list / accordion  [x]  Microbiology  []  Radiology  []  EKG/Telemetry   []  Cardiology/Vascular   []  Pathology  []  Old records  []  Other:  Most notable findings include:       Assessment & Plan   Assessment / Plan     Brief Patient Summary:  Vianey Reeves is a 78 y.o. female who is admitted with chest pain with mildly elevated troponin  Pt with newly dx covid  Hx of copd  Hx of dm,htn,hld    Active Hospital Problems:  Active Hospital Problems    Diagnosis     **Chest pain      Plan:   Pt will be admitted  Repeat troponin for troponin trned  Monitor bed  Card ev'  Resume home med  Dvt proph  Pt cxr with no pneumonia ,pt with no resp distress    Start decadron 6 mg po daily  Accucheck and sliding scale  DVT prophylaxis:  Medical DVT prophylaxis orders are present.    CODE STATUS:    Code Status (Patient has no pulse and is not breathing):  CPR (Attempt to Resuscitate)  Medical Interventions (Patient has pulse or is breathing): Full Support    Admission Status:  I believe this patient meets ip status.    Emily Matthews MD

## 2024-01-14 NOTE — ED PROVIDER NOTES
"Subjective   History of Present Illness  Chief complaint: Patient is an 78-year-old female.  She was sent over by urgent care.  She tested positive for COVID there.  She has been short of breath and congested with a mild cough with some clear mucus and some yellow mucus as well.  She has not had fever.  She states all the symptoms started yesterday.  However she thinks her chest hurts secondary to coughing.  She does note for the last couple of weeks increasing fatigue with walking.  She states her \"legs feel like Jell-O\" she has to stop and she is short of breath during these episodes.  She is not having chest pain at the time.  She sees cardiology and has had coronary stents in the past.  She has a history of 3 stents.    Context:    Duration:    Timing:    Severity:    Associated Symptoms:        PCP:  LMP:      Review of Systems   Constitutional:  Positive for fatigue.   Respiratory:  Positive for cough and shortness of breath.    Cardiovascular:  Positive for chest pain. Negative for leg swelling.   Gastrointestinal:  Negative for abdominal pain.   Genitourinary: Negative.    Musculoskeletal: Negative.    Neurological:  Positive for weakness.       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   • Hypertension    • IBS (irritable colon syndrome)     constipation   • Mass of right breast    • Neuropathy    • Renal insufficiency    • Sciatic leg pain     " right   • Simple chronic bronchitis    • Solitary kidney     left       Allergies   Allergen Reactions   • Erythromycin Rash   • Naproxen Sodium Other (See Comments)     Dizzy lightheaded   • Statins Myalgia   • Latex Itching and Swelling   • Metoclopramide Other (See Comments)     Unsteady on feet   • Rocephin [Ceftriaxone] Itching and Nausea Only   • Dicyclomine Unknown - Low Severity       Past Surgical History:   Procedure Laterality Date   • BREAST BIOPSY Right    • CARDIAC CATHETERIZATION     • CHOLECYSTECTOMY     • COLON SURGERY      REMOVAL diverticular diseae   • COLONOSCOPY N/A 08/12/2021    Procedure: COLONOSCOPY with polypectomy x 3;  Surgeon: Margarette Mcallister MD;  Location: [facility] ENDOSCOPY;  Service: Gastroenterology;  Laterality: N/A;  post op: hmorrhoids, diverticulosis, polyps   • CORONARY STENT PLACEMENT     • ENDOSCOPY N/A 08/12/2021    Procedure: ESOPHAGOGASTRODUODENOSCOPY with dilatation (18-20 mm balloon) and (54 bougie);  Surgeon: Margarette Mcallister MD;  Location: [facility] ENDOSCOPY;  Service: Gastroenterology;  Laterality: N/A;  post op: esophageal stricture, esophagitis, gastritis, history of nissen   • ENDOSCOPY N/A 9/13/2023    Procedure: ESOPHAGOGASTRODUODENOSCOPY with biopsy x 1 area and dilation (50,54,56 non guided bougie);  Surgeon: Margarette Mcallister MD;  Location: [facility] ENDOSCOPY;  Service: Gastroenterology;  Laterality: N/A;  post op: esophageal stricture   • EYE SURGERY Bilateral     cataracts   • HIATAL HERNIA REPAIR     • NEPHRECTOMY Right     cancer   • UPPER ENDOSCOPIC ULTRASOUND W/ FNA N/A 09/22/2022    Procedure: EUS;  Surgeon: Margarette Mcallister MD;  Location: [facility] ENDOSCOPY;  Service: Gastroenterology;  Laterality: N/A;  post: normal pancreas, dilated pancreatic duct, hiatal hernia,        Family History   Problem Relation Age of Onset   • Arthritis Father    • Prostate cancer Father    • Heart disease Sister        Social History     Socioeconomic History   • Marital status:     Tobacco Use   • Smoking status: Some Days     Packs/day: 0.25     Years: 50.00     Additional pack years: 0.00     Total pack years: 12.50     Types: Cigarettes   • Smokeless tobacco: Never   Vaping Use   • Vaping Use: Never used   Substance and Sexual Activity   • Alcohol use: Yes     Comment: occasionally    • Drug use: Never   • Sexual activity: Defer           Objective   Physical Exam  Vitals and nursing note reviewed.   Constitutional:       Appearance: She is well-developed.   HENT:      Head: Normocephalic and atraumatic.   Cardiovascular:      Rate and Rhythm: Tachycardia present.   Pulmonary:      Effort: Pulmonary effort is normal.      Breath sounds: Normal breath sounds.   Abdominal:      Palpations: Abdomen is soft.      Tenderness: There is no abdominal tenderness.   Musculoskeletal:         General: Normal range of motion.      Right lower leg: No edema.      Left lower leg: No edema.   Skin:     General: Skin is warm and dry.   Neurological:      General: No focal deficit present.      Mental Status: She is alert and oriented to person, place, and time.         Procedures           ED Course  ED Course as of 01/14/24 2037   Sun Jan 14, 2024   1728 HS Troponin T(!!): 57 [LH]   2036 I spoke with Gabi on-call for the hospitalist regarding the patient.  She has 1 kidney and her GFR is 34.  She declines a CT PE study.  I discussed placing the patient on heparin and Lovenox.  Patient is however at 88 patient.  So she will have care transferred to Dr. Garcia. []      ED Course User Index  [LH] Denzel Boothe DO      Results for orders placed or performed during the hospital encounter of 01/14/24   Comprehensive Metabolic Panel    Specimen: Blood   Result Value Ref Range    Glucose 105 (H) 65 - 99 mg/dL    BUN 20 8 - 23 mg/dL    Creatinine 1.52 (H) 0.57 - 1.00 mg/dL    Sodium 138 136 - 145 mmol/L    Potassium 4.3 3.5 - 5.2 mmol/L    Chloride 101 98 - 107 mmol/L    CO2 27.0 22.0 - 29.0 mmol/L     Calcium 9.4 8.6 - 10.5 mg/dL    Total Protein 6.6 6.0 - 8.5 g/dL    Albumin 3.8 3.5 - 5.2 g/dL    ALT (SGPT) 26 1 - 33 U/L    AST (SGOT) 25 1 - 32 U/L    Alkaline Phosphatase 68 39 - 117 U/L    Total Bilirubin 0.3 0.0 - 1.2 mg/dL    Globulin 2.8 gm/dL    A/G Ratio 1.4 g/dL    BUN/Creatinine Ratio 13.2 7.0 - 25.0    Anion Gap 10.0 5.0 - 15.0 mmol/L    eGFR 34.5 (L) >60.0 mL/min/1.73   BNP    Specimen: Blood   Result Value Ref Range    proBNP 642.8 0.0 - 1,800.0 pg/mL   High Sensitivity Troponin T    Specimen: Blood   Result Value Ref Range    HS Troponin T 67 (C) <14 ng/L   CBC Auto Differential    Specimen: Blood   Result Value Ref Range    WBC 5.10 3.40 - 10.80 10*3/mm3    RBC 4.54 3.77 - 5.28 10*6/mm3    Hemoglobin 15.3 12.0 - 15.9 g/dL    Hematocrit 46.9 (H) 34.0 - 46.6 %    .3 (H) 79.0 - 97.0 fL    MCH 33.7 (H) 26.6 - 33.0 pg    MCHC 32.6 31.5 - 35.7 g/dL    RDW 14.3 12.3 - 15.4 %    RDW-SD 50.8 37.0 - 54.0 fl    MPV 7.8 6.0 - 12.0 fL    Platelets 180 140 - 450 10*3/mm3   Scan Slide    Specimen: Blood   Result Value Ref Range    Scan Slide     Manual Differential    Specimen: Blood   Result Value Ref Range    Neutrophil % 56.0 42.7 - 76.0 %    Lymphocyte % 24.0 19.6 - 45.3 %    Monocyte % 14.0 (H) 5.0 - 12.0 %    Bands %  5.0 0.0 - 5.0 %    Myelocyte % 1.0 (H) 0.0 - 0.0 %    Neutrophils Absolute 3.11 1.70 - 7.00 10*3/mm3    Lymphocytes Absolute 1.22 0.70 - 3.10 10*3/mm3    Monocytes Absolute 0.71 0.10 - 0.90 10*3/mm3    Macrocytes Slight/1+ None Seen    Toxic Granulation Slight/1+ None Seen    Platelet Morphology Normal Normal   ECG 12 Lead Dyspnea   Result Value Ref Range    QT Interval 323 ms    QTC Interval 429 ms                                No radiology results for the last day              Medical Decision Making  Patient was seen and evaluated for the above problem    Differential diagnosis includes but not limited to ACS, PE, pneumothorax, pneumonia, COVID-19    Patient did test positive for  COVID-19.  Initial EKG shows sinus tachycardia with inferior Q waves.  There is poor R wave progression as well.  Rate of 106.  Patient is not having any chest discomfort.  She is persistently tachycardic.  Mild sinus tach.  However with COVID and the shortness of breath she is having will add D-dimer.  If positive will order CT chest.  I discussed with  who does agree to admit the patient.  It appears as some of these symptoms with exertional dyspnea and extreme fatigue and weak legs has been present prior to the diagnosis of COVID.  I am concerned about a cardiac cause.    Problems Addressed:  Chest pain, unspecified type: complicated acute illness or injury  COVID-19: complicated acute illness or injury  Exertional dyspnea: complicated acute illness or injury    Amount and/or Complexity of Data Reviewed  Labs: ordered. Decision-making details documented in ED Course.     Details: Labs reviewed by myself  Radiology: ordered and independent interpretation performed. Decision-making details documented in ED Course.     Details: X-ray reviewed by myself and shows no pneumothorax.  Chronic fissure atelectatic changes.  No emergent findings.  ECG/medicine tests: ordered and independent interpretation performed.     Details: Interpreted by myself as above    Risk  OTC drugs.  Prescription drug management.  Decision regarding hospitalization.        Final diagnoses:   None     Exertional dyspnea  COVID-19  ED Disposition  ED Disposition       None            No follow-up provider specified.       Medication List      No changes were made to your prescriptions during this visit.            Denzel Boothe, DO  01/14/24 1541       Denzel Boothe, DO  01/14/24 2003       Denzel Boothe,   01/14/24 2037

## 2024-01-14 NOTE — Clinical Note
Level of Care: Med/Surg [1]   Admitting Physician: CASEY SMILEY [[id number]]   Attending Physician: CASEY SMILEY [[id number]]

## 2024-01-15 ENCOUNTER — APPOINTMENT (OUTPATIENT)
Dept: CT IMAGING | Facility: HOSPITAL | Age: 79
End: 2024-01-15
Payer: MEDICARE

## 2024-01-15 ENCOUNTER — APPOINTMENT (OUTPATIENT)
Dept: CARDIOLOGY | Facility: HOSPITAL | Age: 79
End: 2024-01-15
Payer: MEDICARE

## 2024-01-15 LAB
ALBUMIN SERPL-MCNC: 3.1 G/DL (ref 3.5–5.2)
ALBUMIN/GLOB SERPL: 1.6 G/DL
ALP SERPL-CCNC: 54 U/L (ref 39–117)
ALT SERPL W P-5'-P-CCNC: 25 U/L (ref 1–33)
ANION GAP SERPL CALCULATED.3IONS-SCNC: 6 MMOL/L (ref 5–15)
AST SERPL-CCNC: 18 U/L (ref 1–32)
BACTERIA UR QL AUTO: ABNORMAL /HPF
BASOPHILS # BLD AUTO: 0 10*3/MM3 (ref 0–0.2)
BASOPHILS NFR BLD AUTO: 0.2 % (ref 0–1.5)
BH CV ECHO LEFT VENTRICLE GLOBAL LONGITUDINAL STRAIN: -19.2 %
BH CV ECHO MEAS - EDV(MOD-SP4): 89.1 ML
BH CV ECHO MEAS - EF(MOD-BP): 51 %
BH CV ECHO MEAS - EF(MOD-SP4): 50.5 %
BH CV ECHO MEAS - ESV(MOD-SP4): 44.1 ML
BH CV ECHO MEAS - LV DIASTOLIC VOL/BSA (35-75): 46.5 CM2
BH CV ECHO MEAS - LV SYSTOLIC VOL/BSA (12-30): 23 CM2
BH CV ECHO MEAS - LVOT AREA: 3.1 CM2
BH CV ECHO MEAS - LVOT DIAM: 2 CM
BH CV ECHO MEAS - MR MAX PG: 110.7 MMHG
BH CV ECHO MEAS - MR MAX VEL: 526 CM/SEC
BH CV ECHO MEAS - MV A DUR: 0.17 SEC
BH CV ECHO MEAS - MV A MAX VEL: 125 CM/SEC
BH CV ECHO MEAS - MV DEC SLOPE: 389 CM/SEC2
BH CV ECHO MEAS - MV DEC TIME: 0.23 SEC
BH CV ECHO MEAS - MV E MAX VEL: 89.1 CM/SEC
BH CV ECHO MEAS - MV E/A: 0.71
BH CV ECHO MEAS - MV MAX PG: 6.8 MMHG
BH CV ECHO MEAS - MV MEAN PG: 2 MMHG
BH CV ECHO MEAS - MV P1/2T: 66.9 MSEC
BH CV ECHO MEAS - MV V2 VTI: 37.6 CM
BH CV ECHO MEAS - MVA(P1/2T): 3.3 CM2
BH CV ECHO MEAS - PA V2 MAX: 66.5 CM/SEC
BH CV ECHO MEAS - RAP SYSTOLE: 3 MMHG
BH CV ECHO MEAS - RVSP: 18.5 MMHG
BH CV ECHO MEAS - SI(MOD-SP4): 23.5 ML/M2
BH CV ECHO MEAS - SV(MOD-SP4): 45 ML
BH CV ECHO MEAS - TR MAX PG: 15.5 MMHG
BH CV ECHO MEAS - TR MAX VEL: 197 CM/SEC
BILIRUB SERPL-MCNC: <0.2 MG/DL (ref 0–1.2)
BILIRUB UR QL STRIP: NEGATIVE
BUN SERPL-MCNC: 24 MG/DL (ref 8–23)
BUN/CREAT SERPL: 18.2 (ref 7–25)
CALCIUM SPEC-SCNC: 8.4 MG/DL (ref 8.6–10.5)
CHLORIDE SERPL-SCNC: 106 MMOL/L (ref 98–107)
CK SERPL-CCNC: 29 U/L (ref 20–180)
CLARITY UR: CLEAR
CO2 SERPL-SCNC: 27 MMOL/L (ref 22–29)
COLOR UR: YELLOW
CREAT SERPL-MCNC: 1.32 MG/DL (ref 0.57–1)
D-LACTATE SERPL-SCNC: 0.6 MMOL/L (ref 0.5–2)
DEPRECATED RDW RBC AUTO: 50.8 FL (ref 37–54)
EGFRCR SERPLBLD CKD-EPI 2021: 40.9 ML/MIN/1.73
EOSINOPHIL # BLD AUTO: 0 10*3/MM3 (ref 0–0.4)
EOSINOPHIL NFR BLD AUTO: 0.1 % (ref 0.3–6.2)
ERYTHROCYTE [DISTWIDTH] IN BLOOD BY AUTOMATED COUNT: 14.4 % (ref 12.3–15.4)
GLOBULIN UR ELPH-MCNC: 2 GM/DL
GLUCOSE BLDC GLUCOMTR-MCNC: 150 MG/DL (ref 70–105)
GLUCOSE BLDC GLUCOMTR-MCNC: 185 MG/DL (ref 70–105)
GLUCOSE BLDC GLUCOMTR-MCNC: 256 MG/DL (ref 70–105)
GLUCOSE BLDC GLUCOMTR-MCNC: 282 MG/DL (ref 70–105)
GLUCOSE SERPL-MCNC: 163 MG/DL (ref 65–99)
GLUCOSE UR STRIP-MCNC: ABNORMAL MG/DL
HCT VFR BLD AUTO: 40.5 % (ref 34–46.6)
HGB BLD-MCNC: 13.6 G/DL (ref 12–15.9)
HGB UR QL STRIP.AUTO: NEGATIVE
HYALINE CASTS UR QL AUTO: ABNORMAL /LPF
KETONES UR QL STRIP: NEGATIVE
LEFT ATRIUM VOLUME INDEX: 18.5 ML/M2
LEUKOCYTE ESTERASE UR QL STRIP.AUTO: ABNORMAL
LYMPHOCYTES # BLD AUTO: 0.8 10*3/MM3 (ref 0.7–3.1)
LYMPHOCYTES NFR BLD AUTO: 23.6 % (ref 19.6–45.3)
MCH RBC QN AUTO: 34.1 PG (ref 26.6–33)
MCHC RBC AUTO-ENTMCNC: 33.6 G/DL (ref 31.5–35.7)
MCV RBC AUTO: 101.4 FL (ref 79–97)
MONOCYTES # BLD AUTO: 0.5 10*3/MM3 (ref 0.1–0.9)
MONOCYTES NFR BLD AUTO: 14.9 % (ref 5–12)
NEUTROPHILS NFR BLD AUTO: 2.1 10*3/MM3 (ref 1.7–7)
NEUTROPHILS NFR BLD AUTO: 61.2 % (ref 42.7–76)
NITRITE UR QL STRIP: NEGATIVE
NRBC BLD AUTO-RTO: 0.3 /100 WBC (ref 0–0.2)
PH UR STRIP.AUTO: <=5 [PH] (ref 5–8)
PLATELET # BLD AUTO: 164 10*3/MM3 (ref 140–450)
PMV BLD AUTO: 8.3 FL (ref 6–12)
POTASSIUM SERPL-SCNC: 4.4 MMOL/L (ref 3.5–5.2)
PROT SERPL-MCNC: 5.1 G/DL (ref 6–8.5)
PROT UR QL STRIP: NEGATIVE
QT INTERVAL: 323 MS
QTC INTERVAL: 429 MS
RBC # BLD AUTO: 3.99 10*6/MM3 (ref 3.77–5.28)
RBC # UR STRIP: ABNORMAL /HPF
REF LAB TEST METHOD: ABNORMAL
SODIUM SERPL-SCNC: 139 MMOL/L (ref 136–145)
SP GR UR STRIP: 1.02 (ref 1–1.03)
SQUAMOUS #/AREA URNS HPF: ABNORMAL /HPF
TSH SERPL DL<=0.05 MIU/L-ACNC: 0.26 UIU/ML (ref 0.27–4.2)
URATE SERPL-MCNC: 7.3 MG/DL (ref 2.4–5.7)
UROBILINOGEN UR QL STRIP: ABNORMAL
WBC # UR STRIP: ABNORMAL /HPF
WBC NRBC COR # BLD AUTO: 3.5 10*3/MM3 (ref 3.4–10.8)

## 2024-01-15 PROCEDURE — 87086 URINE CULTURE/COLONY COUNT: CPT | Performed by: INTERNAL MEDICINE

## 2024-01-15 PROCEDURE — 94664 DEMO&/EVAL PT USE INHALER: CPT

## 2024-01-15 PROCEDURE — 93306 TTE W/DOPPLER COMPLETE: CPT

## 2024-01-15 PROCEDURE — 83605 ASSAY OF LACTIC ACID: CPT | Performed by: FAMILY MEDICINE

## 2024-01-15 PROCEDURE — 82948 REAGENT STRIP/BLOOD GLUCOSE: CPT

## 2024-01-15 PROCEDURE — 84550 ASSAY OF BLOOD/URIC ACID: CPT | Performed by: INTERNAL MEDICINE

## 2024-01-15 PROCEDURE — 80053 COMPREHEN METABOLIC PANEL: CPT | Performed by: INTERNAL MEDICINE

## 2024-01-15 PROCEDURE — 94761 N-INVAS EAR/PLS OXIMETRY MLT: CPT

## 2024-01-15 PROCEDURE — 25010000002 AZITHROMYCIN PER 500 MG: Performed by: FAMILY MEDICINE

## 2024-01-15 PROCEDURE — 99222 1ST HOSP IP/OBS MODERATE 55: CPT | Performed by: INTERNAL MEDICINE

## 2024-01-15 PROCEDURE — 93356 MYOCRD STRAIN IMG SPCKL TRCK: CPT

## 2024-01-15 PROCEDURE — 25810000003 SODIUM CHLORIDE 0.9 % SOLUTION: Performed by: INTERNAL MEDICINE

## 2024-01-15 PROCEDURE — 94799 UNLISTED PULMONARY SVC/PX: CPT

## 2024-01-15 PROCEDURE — 84443 ASSAY THYROID STIM HORMONE: CPT | Performed by: INTERNAL MEDICINE

## 2024-01-15 PROCEDURE — 71275 CT ANGIOGRAPHY CHEST: CPT

## 2024-01-15 PROCEDURE — 82550 ASSAY OF CK (CPK): CPT | Performed by: INTERNAL MEDICINE

## 2024-01-15 PROCEDURE — 25010000002 CALCIUM GLUCONATE-NACL 1-0.675 GM/50ML-% SOLUTION: Performed by: INTERNAL MEDICINE

## 2024-01-15 PROCEDURE — 25010000002 ENOXAPARIN PER 10 MG: Performed by: INTERNAL MEDICINE

## 2024-01-15 PROCEDURE — 25810000003 SODIUM CHLORIDE 0.9 % SOLUTION 250 ML FLEX CONT: Performed by: FAMILY MEDICINE

## 2024-01-15 PROCEDURE — 81001 URINALYSIS AUTO W/SCOPE: CPT | Performed by: INTERNAL MEDICINE

## 2024-01-15 PROCEDURE — 25510000001 IOPAMIDOL PER 1 ML: Performed by: INTERNAL MEDICINE

## 2024-01-15 PROCEDURE — 63710000001 INSULIN GLARGINE PER 5 UNITS: Performed by: INTERNAL MEDICINE

## 2024-01-15 PROCEDURE — 93306 TTE W/DOPPLER COMPLETE: CPT | Performed by: INTERNAL MEDICINE

## 2024-01-15 PROCEDURE — 93356 MYOCRD STRAIN IMG SPCKL TRCK: CPT | Performed by: INTERNAL MEDICINE

## 2024-01-15 PROCEDURE — 87040 BLOOD CULTURE FOR BACTERIA: CPT | Performed by: FAMILY MEDICINE

## 2024-01-15 PROCEDURE — 85025 COMPLETE CBC W/AUTO DIFF WBC: CPT | Performed by: INTERNAL MEDICINE

## 2024-01-15 RX ORDER — METHOCARBAMOL 500 MG/1
500 TABLET, FILM COATED ORAL EVERY 8 HOURS PRN
Status: DISCONTINUED | OUTPATIENT
Start: 2024-01-15 | End: 2024-01-17 | Stop reason: HOSPADM

## 2024-01-15 RX ORDER — ABALOPARATIDE 2000 UG/ML
INJECTION, SOLUTION SUBCUTANEOUS DAILY
COMMUNITY

## 2024-01-15 RX ORDER — GABAPENTIN 100 MG/1
100 CAPSULE ORAL NIGHTLY
Status: DISCONTINUED | OUTPATIENT
Start: 2024-01-15 | End: 2024-01-17 | Stop reason: HOSPADM

## 2024-01-15 RX ORDER — CARVEDILOL 6.25 MG/1
12.5 TABLET ORAL 2 TIMES DAILY WITH MEALS
Status: DISCONTINUED | OUTPATIENT
Start: 2024-01-15 | End: 2024-01-17 | Stop reason: HOSPADM

## 2024-01-15 RX ORDER — SODIUM CHLORIDE 9 MG/ML
100 INJECTION, SOLUTION INTRAVENOUS CONTINUOUS
Status: DISCONTINUED | OUTPATIENT
Start: 2024-01-15 | End: 2024-01-16

## 2024-01-15 RX ORDER — CALCIUM GLUCONATE 20 MG/ML
1000 INJECTION, SOLUTION INTRAVENOUS ONCE
Status: COMPLETED | OUTPATIENT
Start: 2024-01-15 | End: 2024-01-15

## 2024-01-15 RX ADMIN — DEXAMETHASONE 6 MG: 4 TABLET ORAL at 09:57

## 2024-01-15 RX ADMIN — CALCIUM GLUCONATE 1000 MG: 20 INJECTION, SOLUTION INTRAVENOUS at 10:50

## 2024-01-15 RX ADMIN — POLYETHYLENE GLYCOL 3350 17 G: 17 POWDER, FOR SOLUTION ORAL at 09:58

## 2024-01-15 RX ADMIN — TRAMADOL HYDROCHLORIDE 100 MG: 50 TABLET, COATED ORAL at 20:41

## 2024-01-15 RX ADMIN — AMLODIPINE BESYLATE 10 MG: 5 TABLET ORAL at 09:58

## 2024-01-15 RX ADMIN — GABAPENTIN 100 MG: 100 CAPSULE ORAL at 20:41

## 2024-01-15 RX ADMIN — PANTOPRAZOLE SODIUM 40 MG: 40 TABLET, DELAYED RELEASE ORAL at 06:19

## 2024-01-15 RX ADMIN — SODIUM CHLORIDE 100 ML/HR: 9 INJECTION, SOLUTION INTRAVENOUS at 10:50

## 2024-01-15 RX ADMIN — GABAPENTIN 200 MG: 100 CAPSULE ORAL at 09:57

## 2024-01-15 RX ADMIN — ALBUTEROL SULFATE 2 PUFF: 108 AEROSOL, METERED RESPIRATORY (INHALATION) at 07:52

## 2024-01-15 RX ADMIN — HYDRALAZINE HYDROCHLORIDE 25 MG: 25 TABLET ORAL at 09:58

## 2024-01-15 RX ADMIN — Medication 1200 MG: at 10:02

## 2024-01-15 RX ADMIN — BUDESONIDE AND FORMOTEROL FUMARATE DIHYDRATE 1 PUFF: 160; 4.5 AEROSOL RESPIRATORY (INHALATION) at 18:25

## 2024-01-15 RX ADMIN — LINAGLIPTIN 5 MG: 5 TABLET, FILM COATED ORAL at 09:58

## 2024-01-15 RX ADMIN — BUDESONIDE AND FORMOTEROL FUMARATE DIHYDRATE 1 PUFF: 160; 4.5 AEROSOL RESPIRATORY (INHALATION) at 07:52

## 2024-01-15 RX ADMIN — HYDRALAZINE HYDROCHLORIDE 25 MG: 25 TABLET ORAL at 20:41

## 2024-01-15 RX ADMIN — IOPAMIDOL 100 ML: 755 INJECTION, SOLUTION INTRAVENOUS at 14:20

## 2024-01-15 RX ADMIN — DOCUSATE SODIUM 50 MG AND SENNOSIDES 8.6 MG 2 TABLET: 8.6; 5 TABLET, FILM COATED ORAL at 09:58

## 2024-01-15 RX ADMIN — CARVEDILOL 12.5 MG: 6.25 TABLET, FILM COATED ORAL at 17:01

## 2024-01-15 RX ADMIN — ENOXAPARIN SODIUM 40 MG: 100 INJECTION SUBCUTANEOUS at 16:05

## 2024-01-15 RX ADMIN — INSULIN GLARGINE 10 UNITS: 100 INJECTION, SOLUTION SUBCUTANEOUS at 21:35

## 2024-01-15 RX ADMIN — AZITHROMYCIN MONOHYDRATE 500 MG: 500 INJECTION, POWDER, LYOPHILIZED, FOR SOLUTION INTRAVENOUS at 02:02

## 2024-01-15 RX ADMIN — CARVEDILOL 6.25 MG: 6.25 TABLET, FILM COATED ORAL at 09:57

## 2024-01-15 RX ADMIN — DOCUSATE SODIUM 50 MG AND SENNOSIDES 8.6 MG 2 TABLET: 8.6; 5 TABLET, FILM COATED ORAL at 20:41

## 2024-01-15 RX ADMIN — Medication 10 ML: at 09:59

## 2024-01-15 RX ADMIN — Medication 10 ML: at 20:44

## 2024-01-15 RX ADMIN — TRAMADOL HYDROCHLORIDE 100 MG: 50 TABLET, COATED ORAL at 09:57

## 2024-01-15 RX ADMIN — ALBUTEROL SULFATE 2 PUFF: 108 AEROSOL, METERED RESPIRATORY (INHALATION) at 18:30

## 2024-01-15 RX ADMIN — Medication 1200 MG: at 20:41

## 2024-01-15 RX ADMIN — TERAZOSIN HYDROCHLORIDE 2 MG: 2 CAPSULE ORAL at 20:41

## 2024-01-15 RX ADMIN — SERTRALINE HYDROCHLORIDE 50 MG: 50 TABLET ORAL at 09:57

## 2024-01-15 RX ADMIN — ALBUTEROL SULFATE 2 PUFF: 108 AEROSOL, METERED RESPIRATORY (INHALATION) at 11:40

## 2024-01-15 RX ADMIN — ASPIRIN 81 MG: 81 TABLET, COATED ORAL at 09:57

## 2024-01-15 RX ADMIN — METHOCARBAMOL 500 MG: 500 TABLET ORAL at 20:44

## 2024-01-15 NOTE — PLAN OF CARE
Goal Outcome Evaluation:           Progress: no change                  The patient has been in good spirits, and is still requiring 2L of oxygen. At one point in her sleep she accidentally removed her nasal cannula and her oxygen saturation was maintaining in the low to mid 80's. The patient's vitals have been stable and she is attempting to get more sleep. She has no complaints at this time.

## 2024-01-15 NOTE — CONSULTS
[facility] CARDIOLOGY CONSULT  [facility]        Cardiology assessment and plan    Chest pain  Nonspecific elevation of troponin  Abnormal D-dimer  Acute on chronic renal insufficiency  Coronary artery disease prior PCI and stenting  Normal LV systolic function  COVID-positive status  Hypertension  Hyperlipidemia  Diabetes mellitus  COPD  Gout  Peripheral vascular disease    Tmax is 97.8 pulse is 62-86 respirations are 18 blood pressure is 118/68 sats are 94% on 2 L  Normal CPK  Sodium is 139 potassium is 4.4 creatinine is 1.3 TSH is low at 0.26 hemoglobin is 13.6  Nonspecific elevation of troponin with no significant trend  Normal proBNP  Abnormal D-dimer at 1.8  Current medications include aspirin 81 mg p.o. once a day Norvasc 10 mg p.o. once a day Coreg 6.25 mg p.o. twice daily hydralazine 25 mg p.o. twice a day patient is on Hytrin 2 mg p.o. at nighttime patient is on Lovenox for DVT prophylaxis  Primary team is working up for elevated abnormal D-dimer  If patient is ruled out for pulmonary embolism will schedule for cardiac stress test  Discussed with patient at bedside      Subjective:     Encounter Date:01/14/2024      Patient ID: Vianey Reeves is a 78 y.o. female.    Chief Complaint: Chest Pain      HPI:  Vianey Reeves is a 78 y.o. female known to Dr. Gonzalez with a past cardiac history of CAD status post PCI in 2016.  Last nuclear stress test in 2019 was negative for ischemia.  Last 2D echo 1/2023 showed an EF of 56 to 60% with grade 1 diastolic dysfunction and mild MR/TR.  PMH includes HTN, HLD, DM, CKD stage III with solitary kidney, COPD, gout, PVD, and tobacco dependence.  Patient presented with complaints of cough and shortness of breath to urgent care facility and found to be COVID-positive.  She admitted complaints of chest pain and was referred to the ER.  Cardiology was consulted for further evaluation and management.    Patient reports that over the past 3  to 4 months she has been experiencing a nonradiating midsternal chest pain companied by nausea.  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  This resolves with rest.  She complains of social dyspnea and significant early fatigue over the past few months.  She further complains of discomfort in the back of her legs and buttocks with ambulating in which her legs will become weak.  Symptoms resolve with rest.    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    Hypertension     IBS (irritable colon syndrome)     constipation    Mass of right breast     Neuropathy     Renal insufficiency     Sciatic leg pain     right    Simple chronic bronchitis     Solitary kidney     left         Past Surgical History:   Procedure Laterality Date    BREAST BIOPSY Right     CARDIAC CATHETERIZATION      CHOLECYSTECTOMY      COLON SURGERY      REMOVAL diverticular diseae    COLONOSCOPY N/A 08/12/2021    Procedure: COLONOSCOPY with polypectomy x 3;  Surgeon: Margarette Mcallister MD;  Location: [facility] ENDOSCOPY;  Service: Gastroenterology;  Laterality: N/A;  post op: hmorrhoids, diverticulosis, polyps    CORONARY STENT PLACEMENT      ENDOSCOPY N/A 08/12/2021    Procedure: ESOPHAGOGASTRODUODENOSCOPY with dilatation (18-20 mm balloon) and (54 bougie);  Surgeon: Margarette Mcallister MD;  Location: [facility] ENDOSCOPY;  Service: Gastroenterology;  Laterality: N/A;  post  op: esophageal stricture, esophagitis, gastritis, history of nissen    ENDOSCOPY N/A 9/13/2023    Procedure: ESOPHAGOGASTRODUODENOSCOPY with biopsy x 1 area and dilation (50,54,56 non guided bougie);  Surgeon: Margarette Mcallister MD;  Location: [facility] ENDOSCOPY;  Service: Gastroenterology;  Laterality: N/A;  post op: esophageal stricture    EYE SURGERY Bilateral     cataracts    HIATAL HERNIA REPAIR      NEPHRECTOMY Right     cancer    UPPER ENDOSCOPIC ULTRASOUND W/ FNA N/A 09/22/2022    Procedure: EUS;  Surgeon: Margarette Mcallister MD;  Location: [facility] ENDOSCOPY;  Service: Gastroenterology;  Laterality: N/A;  post: normal pancreas, dilated pancreatic duct, hiatal hernia,          Social History     Socioeconomic History    Marital status:    Tobacco Use    Smoking status: Some Days     Packs/day: 0.25     Years: 50.00     Additional pack years: 0.00     Total pack years: 12.50     Types: Cigarettes    Smokeless tobacco: Never    Tobacco comments:     Mostly patches, some days none, some days 1-2   Vaping Use    Vaping Use: Never used   Substance and Sexual Activity    Alcohol use: Yes     Comment: occasionally     Drug use: Never    Sexual activity: Defer   Cut down smoking to 1 to 2 cigarettes/day.    Family History   Problem Relation Age of Onset    Arthritis Father     Prostate cancer Father     Heart disease Sister          Allergies   Allergen Reactions    Erythromycin Rash    Naproxen Sodium Other (See Comments)     Dizzy lightheaded    Statins Myalgia    Latex Itching and Swelling    Metoclopramide Other (See Comments)     Unsteady on feet    Rocephin [Ceftriaxone] Itching and Nausea Only    Dicyclomine Unknown - Low Severity       Current Medications:   Scheduled Meds:Acetylcysteine, 1,200 mg, Oral, BID  albuterol sulfate HFA, 2 puff, Inhalation, 4x Daily - RT  amLODIPine, 10 mg, Oral, Daily  aspirin, 81 mg, Oral, Daily  azithromycin, 500 mg, Intravenous, Q24H  budesonide-formoterol, 1 puff, Inhalation, BID -  "RT  carvedilol, 6.25 mg, Oral, BID With Meals  dexAMETHasone, 6 mg, Oral, Daily With Breakfast  Diclofenac Sodium, 2 g, Topical, BID  enoxaparin, 40 mg, Subcutaneous, Daily  gabapentin, 200 mg, Oral, TID  hydrALAZINE, 25 mg, Oral, BID  insulin glargine, 10 Units, Subcutaneous, Nightly  linagliptin, 5 mg, Oral, Daily  pantoprazole, 40 mg, Oral, Q AM  polyethylene glycol, 17 g, Oral, BID  senna-docusate sodium, 2 tablet, Oral, BID  sertraline, 50 mg, Oral, Daily  sodium chloride, 10 mL, Intravenous, Q12H  terazosin, 2 mg, Oral, Nightly  traMADol, 100 mg, Oral, BID      Continuous Infusions:sodium chloride, 100 mL/hr, Last Rate: 100 mL/hr (01/15/24 0974)        Review of Systems   Constitutional: Negative for chills, decreased appetite and malaise/fatigue.   HENT:  Negative for congestion and nosebleeds.    Eyes:  Negative for blurred vision and double vision.   Cardiovascular:  Positive for dyspnea on exertion. Negative for irregular heartbeat, leg swelling, near-syncope, orthopnea and palpitations.   Respiratory:  Positive for shortness of breath. Negative for cough.    Hematologic/Lymphatic: Negative for adenopathy. Does not bruise/bleed easily.   Skin:  Negative for color change and rash.   Musculoskeletal:  Negative for back pain and joint pain.   Gastrointestinal:  Negative for bloating, abdominal pain, hematemesis and hematochezia.   Genitourinary:  Negative for flank pain and hematuria.   Neurological:  Negative for dizziness and focal weakness.   Psychiatric/Behavioral:  Negative for altered mental status. The patient does not have insomnia.      All other systems reviewed and are negative.       Objective:         /68   Pulse 63   Temp 97.8 °F (36.6 °C) (Oral)   Resp 18   Ht 165.1 cm (65\")   Wt 84.5 kg (186 lb 4.6 oz)   LMP  (LMP Unknown)   SpO2 95%   BMI 31.00 kg/m²       General: Well-developed in NAD.  Neuro: AAOx3. No gross deficits.  HEENT: Sclera clear, no xanthelasmas.  CV: S1S2 RRR. No " "murmurs or gallops.  Resp: Breathing is unlabored. Lungs CTA throughout.  GI: BS+. Abdomen soft and NTTP.  Ext: Pedal pulses are palpable. Extremities are nonedematous.  MS: moves all extremities, no weakness.  Skin: warm, dry.  Psych: calm and cooperative.            Lab Review:     Results from last 7 days   Lab Units 01/15/24  0748 01/14/24  1405   SODIUM mmol/L 139 138   POTASSIUM mmol/L 4.4 4.3   CHLORIDE mmol/L 106 101   CO2 mmol/L 27.0 27.0   BUN mg/dL 24* 20   CREATININE mg/dL 1.32* 1.52*   GLUCOSE mg/dL 163* 105*   CALCIUM mg/dL 8.4* 9.4   AST (SGOT) U/L 18 25   ALT (SGPT) U/L 25 26     Results from last 7 days   Lab Units 01/14/24  1619 01/14/24  1405   HSTROP T ng/L 57* 67*     Results from last 7 days   Lab Units 01/15/24  0748 01/14/24  1405   WBC 10*3/mm3 3.50 5.10   HEMOGLOBIN g/dL 13.6 15.3   HEMATOCRIT % 40.5 46.9*   PLATELETS 10*3/mm3 164 180                   Invalid input(s): \"LDLCALC\"  Results from last 7 days   Lab Units 01/14/24  1405   PROBNP pg/mL 642.8           Recent Radiology:  Imaging Results (Most Recent)       Procedure Component Value Units Date/Time    XR Chest PA & Lateral [[id number]] Collected: 01/15/24 0723     Updated: 01/15/24 0726    Narrative:      XR CHEST PA AND LATERAL    Date of Exam: 1/14/2024 9:20 PM CST    Indication: elevated d-dimer    Comparison: Chest x-ray 1/14/2024    Findings:  The heart is stable in size. There is trace fluid in the right minor fissure and trace blunting at the costophrenic angle. No evidence for pneumothorax. There are calcified hilar lymph nodes.      Impression:      Impression:  Trace right pleural effusion.      Electronically Signed: Bia Lucero MD    1/15/2024 6:24 AM CST    Workstation ID: [id number]    XR Chest 1 View [[id number]] Collected: 01/14/24 1538     Updated: 01/14/24 1541    Narrative:      XR CHEST 1 VW    Date of Exam: 1/14/2024 3:22 PM EST    Indication: soa/covid +    Comparison: None available.    Findings: No focal " consolidation. Resolving right midlung zone atelectasis. No pneumothorax or pleural effusion. Cardiac size is normal. The visualized clavicles appear intact. No displaced rib fractures. The visualized upper abdomen is normal.      Impression:      Impression: No acute cardiopulmonary disease.    Electronically Signed: Kiran Burgess MD    1/14/2024 3:39 PM EST    Workstation ID: [id number]              ECHOCARDIOGRAM:    Results for orders placed during the hospital encounter of 01/26/23    Adult Transthoracic Echo Complete W/ Cont if Necessary Per Protocol    Interpretation Summary    Left ventricular systolic function is normal. Left ventricular ejection fraction appears to be 56 - 60%.    Left ventricular diastolic function is consistent with (grade Ia w/high LAP) impaired relaxation.    Left atrial volume is mildly increased.    There is mild, bileaflet mitral valve thickening present.    Estimated right ventricular systolic pressure from tricuspid regurgitation is normal (<35 mmHg).            Assessment:         Active Hospital Problems    Diagnosis  POA    **Chest pain [R07.9]  Yes     1) Chest Pain  -High sensitivity troponin 67, 57  -EKG shows no acute ST abnormalities  -CXR shows trace right pleural effusion  - d-dimer positive but unable to have CTA chest (renal dysfunction) or VQ scan (covid)    2) CAD status post PCI in 2016  - Last nuclear stress test in 2019 was negative for ischemia.    - Last 2D echo 1/2023 showed an EF of 56 to 60% with grade 1 diastolic dysfunction and mild MR/TR.      3) COVID positive    4) HTN    5) HLD    6) DM    7) CKD stage III with solitary kidney    8) COPD    9) gout    10) PVD           Plan:   Check 2D echo.  D-dimer positive with primary team evaluating.  Anticipate patient will need ischemic eval.  She is currently COVID positive.  Will discuss with attending cardiologist regarding testing/timing.         Electronically signed by SAMIRA Kunz, 01/15/24,  10:28 AM EST.

## 2024-01-15 NOTE — PROGRESS NOTES
LOS: 1 day   Patient Care Team:  Anny Garcia MD as PCP - General (Internal Medicine)  Sergio Ordonez MD as Consulting Physician (Nephrology)    Subjective     Patient breathing is about the same    Review of Systems   Constitutional:  Positive for activity change, appetite change and fatigue.   HENT: Negative.     Respiratory:  Positive for shortness of breath.    Cardiovascular: Negative.    Gastrointestinal: Negative.    Genitourinary: Negative.    Musculoskeletal: Negative.    Neurological:  Positive for weakness.   Psychiatric/Behavioral: Negative.             Objective     Vital Signs  Temp:  [97.8 °F (36.6 °C)-99 °F (37.2 °C)] 97.8 °F (36.6 °C)  Heart Rate:  [] 63  Resp:  [14-18] 18  BP: (111-146)/(51-98) 118/68      Physical Exam  Vitals reviewed.   Constitutional:       Appearance: She is not ill-appearing.   HENT:      Head: Normocephalic and atraumatic.      Right Ear: External ear normal.      Left Ear: External ear normal.      Nose: Nose normal.      Mouth/Throat:      Mouth: Mucous membranes are moist.   Eyes:      General:         Right eye: No discharge.         Left eye: No discharge.   Cardiovascular:      Rate and Rhythm: Normal rate and regular rhythm.      Pulses: Normal pulses.      Heart sounds: Normal heart sounds.   Pulmonary:      Effort: Pulmonary effort is normal.      Breath sounds: Normal breath sounds.   Abdominal:      General: Bowel sounds are normal.      Palpations: Abdomen is soft.   Musculoskeletal:         General: Normal range of motion.      Cervical back: Normal range of motion.   Skin:     General: Skin is warm.      Coloration: Skin is pale.   Neurological:      Mental Status: She is alert and oriented to person, place, and time.   Psychiatric:         Behavior: Behavior normal.              Results Review:    Lab Results (last 24 hours)       Procedure Component Value Units Date/Time    TSH [[id number]]  (Abnormal) Collected: 01/15/24 0727     Specimen: Blood Updated: 01/15/24 1026     TSH 0.261 uIU/mL     CK [[id number]]  (Normal) Collected: 01/15/24 0748    Specimen: Blood Updated: 01/15/24 1020     Creatine Kinase 29 U/L     Uric Acid [[id number]]  (Abnormal) Collected: 01/15/24 0748    Specimen: Blood Updated: 01/15/24 1020     Uric Acid 7.3 mg/dL     Comprehensive Metabolic Panel [[id number]]  (Abnormal) Collected: 01/15/24 0748    Specimen: Blood Updated: 01/15/24 0847     Glucose 163 mg/dL      BUN 24 mg/dL      Creatinine 1.32 mg/dL      Sodium 139 mmol/L      Potassium 4.4 mmol/L      Chloride 106 mmol/L      CO2 27.0 mmol/L      Calcium 8.4 mg/dL      Total Protein 5.1 g/dL      Albumin 3.1 g/dL      ALT (SGPT) 25 U/L      AST (SGOT) 18 U/L      Alkaline Phosphatase 54 U/L      Total Bilirubin <0.2 mg/dL      Globulin 2.0 gm/dL      A/G Ratio 1.6 g/dL      BUN/Creatinine Ratio 18.2     Anion Gap 6.0 mmol/L      eGFR 40.9 mL/min/1.73     Narrative:      GFR Normal >60  Chronic Kidney Disease <60  Kidney Failure <15    The GFR formula is only valid for adults with stable renal function between ages 18 and 70.    CBC & Differential [[id number]]  (Abnormal) Collected: 01/15/24 0748    Specimen: Blood Updated: 01/15/24 0833    Narrative:      The following orders were created for panel order CBC & Differential.  Procedure                               Abnormality         Status                     ---------                               -----------         ------                     CBC Auto Differential[[id number]]        Abnormal            Final result                 Please view results for these tests on the individual orders.    CBC Auto Differential [[id number]]  (Abnormal) Collected: 01/15/24 0748    Specimen: Blood Updated: 01/15/24 0833     WBC 3.50 10*3/mm3      RBC 3.99 10*6/mm3      Hemoglobin 13.6 g/dL      Hematocrit 40.5 %      .4 fL      MCH 34.1 pg      MCHC 33.6 g/dL      RDW 14.4 %      RDW-SD 50.8 fl      MPV 8.3 fL       Platelets 164 10*3/mm3      Neutrophil % 61.2 %      Lymphocyte % 23.6 %      Monocyte % 14.9 %      Eosinophil % 0.1 %      Basophil % 0.2 %      Neutrophils, Absolute 2.10 10*3/mm3      Lymphocytes, Absolute 0.80 10*3/mm3      Monocytes, Absolute 0.50 10*3/mm3      Eosinophils, Absolute 0.00 10*3/mm3      Basophils, Absolute 0.00 10*3/mm3      nRBC 0.3 /100 WBC     POC Glucose Once [[id number]]  (Abnormal) Collected: 01/15/24 0706    Specimen: Blood Updated: 01/15/24 0707     Glucose 185 mg/dL      Comment: Serial Number: [id number]:  [id number]       Lactic Acid, Plasma [[id number]]  (Normal) Collected: 01/15/24 0140    Specimen: Blood Updated: 01/15/24 0228     Lactate 0.6 mmol/L     Blood Culture - Blood, Hand, Left [[id number]] Collected: 01/15/24 0140    Specimen: Blood from Hand, Left Updated: 01/15/24 0154    Blood Culture - Blood, Arm, Right [[id number]] Collected: 01/15/24 0140    Specimen: Blood from Arm, Right Updated: 01/15/24 0154    POC Glucose Once [[id number]]  (Abnormal) Collected: 01/14/24 2242    Specimen: Blood Updated: 01/14/24 2244     Glucose 321 mg/dL      Comment: Serial Number: [id number]:  [id number]       High Sensitivity Troponin T 2Hr [[id number]]  (Abnormal) Collected: 01/14/24 1619    Specimen: Blood Updated: 01/14/24 1659     HS Troponin T 57 ng/L      Troponin T Delta -10 ng/L     Narrative:      High Sensitive Troponin T Reference Range:  <14.0 ng/L- Negative Female for AMI  <22.0 ng/L- Negative Male for AMI  >=14 - Abnormal Female indicating possible myocardial injury.  >=22 - Abnormal Male indicating possible myocardial injury.   Clinicians would have to utilize clinical acumen, EKG, Troponin, and serial changes to determine if it is an Acute Myocardial Infarction or myocardial injury due to an underlying chronic condition.         D-dimer, Quantitative [[id number]]  (Abnormal) Collected: 01/14/24 1619    Specimen: Blood Updated: 01/14/24 1644     D-Dimer, Quantitative  "1.83 mg/L (FEU)     Narrative:      According to the assay 's published package insert, a normal (<0.50 mg/L (FEU)) D-dimer result in conjunction with a non-high clinical probability assessment, excludes deep vein thrombosis (DVT) and pulmonary embolism (PE) with high sensitivity.    D-dimer values increase with age and this can make VTE exclusion of an older population difficult. To address this, the [employer], based on best available evidence and recent guidelines, recommends that clinicians use age-adjusted D-dimer thresholds in patients greater than 50 years of age with: a) a low probability of PE who do not meet all Pulmonary Embolism Rule Out Criteria, or b) in those with intermediate probability of PE.   The formula for an age-adjusted D-dimer cut-off is \"age/100\".  For example, a 60 year old patient would have an age-adjusted cut-off of 0.60 mg/L (FEU) and an 80 year old 0.80 mg/L (FEU).    Manual Differential [[id number]]  (Abnormal) Collected: 01/14/24 1405    Specimen: Blood Updated: 01/14/24 1440     Neutrophil % 56.0 %      Lymphocyte % 24.0 %      Monocyte % 14.0 %      Bands %  5.0 %      Myelocyte % 1.0 %      Neutrophils Absolute 3.11 10*3/mm3      Lymphocytes Absolute 1.22 10*3/mm3      Monocytes Absolute 0.71 10*3/mm3      Macrocytes Slight/1+     Toxic Granulation Slight/1+     Platelet Morphology Normal    CBC & Differential [[id number]]  (Abnormal) Collected: 01/14/24 1405    Specimen: Blood Updated: 01/14/24 1440    Narrative:      The following orders were created for panel order CBC & Differential.  Procedure                               Abnormality         Status                     ---------                               -----------         ------                     CBC Auto Differential[[id number]]        Abnormal            Final result               Scan Slide[[id number]]                                       Final result                 Please view " results for these tests on the individual orders.    CBC Auto Differential [[id number]]  (Abnormal) Collected: 01/14/24 1405    Specimen: Blood Updated: 01/14/24 1440     WBC 5.10 10*3/mm3      RBC 4.54 10*6/mm3      Hemoglobin 15.3 g/dL      Hematocrit 46.9 %      .3 fL      MCH 33.7 pg      MCHC 32.6 g/dL      RDW 14.3 %      RDW-SD 50.8 fl      MPV 7.8 fL      Platelets 180 10*3/mm3     Narrative:      The previously reported component NRBC is no longer being reported. Previous result was 0.1 /100 WBC (Reference Range: 0.0-0.2 /100 WBC) on 1/14/2024 at 1418 EST.    Scan Slide [[id number]] Collected: 01/14/24 1405    Specimen: Blood Updated: 01/14/24 1440     Scan Slide --     Comment: See Manual Differential Results       High Sensitivity Troponin T [[id number]]  (Abnormal) Collected: 01/14/24 1405    Specimen: Blood Updated: 01/14/24 1439     HS Troponin T 67 ng/L     Narrative:      High Sensitive Troponin T Reference Range:  <14.0 ng/L- Negative Female for AMI  <22.0 ng/L- Negative Male for AMI  >=14 - Abnormal Female indicating possible myocardial injury.  >=22 - Abnormal Male indicating possible myocardial injury.   Clinicians would have to utilize clinical acumen, EKG, Troponin, and serial changes to determine if it is an Acute Myocardial Infarction or myocardial injury due to an underlying chronic condition.         Comprehensive Metabolic Panel [[id number]]  (Abnormal) Collected: 01/14/24 1405    Specimen: Blood Updated: 01/14/24 1436     Glucose 105 mg/dL      BUN 20 mg/dL      Creatinine 1.52 mg/dL      Sodium 138 mmol/L      Potassium 4.3 mmol/L      Chloride 101 mmol/L      CO2 27.0 mmol/L      Calcium 9.4 mg/dL      Total Protein 6.6 g/dL      Albumin 3.8 g/dL      ALT (SGPT) 26 U/L      AST (SGOT) 25 U/L      Alkaline Phosphatase 68 U/L      Total Bilirubin 0.3 mg/dL      Globulin 2.8 gm/dL      A/G Ratio 1.4 g/dL      BUN/Creatinine Ratio 13.2     Anion Gap 10.0 mmol/L      eGFR 34.5 mL/min/1.73      Narrative:      GFR Normal >60  Chronic Kidney Disease <60  Kidney Failure <15    The GFR formula is only valid for adults with stable renal function between ages 18 and 70.    BNP [[id number]]  (Normal) Collected: 01/14/24 1405    Specimen: Blood Updated: 01/14/24 1436     proBNP 642.8 pg/mL     Narrative:      This assay is used as an aid in the diagnosis of individuals suspected of having heart failure. It can be used as an aid in the diagnosis of acute decompensated heart failure (ADHF) in patients presenting with signs and symptoms of ADHF to the emergency department (ED). In addition, NT-proBNP of <300 pg/mL indicates ADHF is not likely.    Age Range Result Interpretation  NT-proBNP Concentration (pg/mL:      <50             Positive            >450                   Gray                 300-450                    Negative             <300    50-75           Positive            >900                  Gray                300-900                  Negative            <300      >75             Positive            >1800                  Gray                300-1800                  Negative            <300             Imaging Results (Last 24 Hours)       Procedure Component Value Units Date/Time    XR Chest PA & Lateral [[id number]] Collected: 01/15/24 0723     Updated: 01/15/24 0726    Narrative:      XR CHEST PA AND LATERAL    Date of Exam: 1/14/2024 9:20 PM CST    Indication: elevated d-dimer    Comparison: Chest x-ray 1/14/2024    Findings:  The heart is stable in size. There is trace fluid in the right minor fissure and trace blunting at the costophrenic angle. No evidence for pneumothorax. There are calcified hilar lymph nodes.      Impression:      Impression:  Trace right pleural effusion.      Electronically Signed: Bia Lucero MD    1/15/2024 6:24 AM CST    Workstation ID: [id number]    XR Chest 1 View [[id number]] Collected: 01/14/24 1538     Updated: 01/14/24 1541    Narrative:      XR CHEST 1  VW    Date of Exam: 1/14/2024 3:22 PM EST    Indication: soa/covid +    Comparison: None available.    Findings: No focal consolidation. Resolving right midlung zone atelectasis. No pneumothorax or pleural effusion. Cardiac size is normal. The visualized clavicles appear intact. No displaced rib fractures. The visualized upper abdomen is normal.      Impression:      Impression: No acute cardiopulmonary disease.    Electronically Signed: Kiran Burgess MD    1/14/2024 3:39 PM EST    Workstation ID: [id number]                 I reviewed the patient's new clinical results.    Medication Review:   Scheduled Meds:Acetylcysteine, 1,200 mg, Oral, BID  albuterol sulfate HFA, 2 puff, Inhalation, 4x Daily - RT  amLODIPine, 10 mg, Oral, Daily  aspirin, 81 mg, Oral, Daily  azithromycin, 500 mg, Intravenous, Q24H  budesonide-formoterol, 1 puff, Inhalation, BID - RT  carvedilol, 6.25 mg, Oral, BID With Meals  dexAMETHasone, 6 mg, Oral, Daily With Breakfast  Diclofenac Sodium, 2 g, Topical, BID  enoxaparin, 40 mg, Subcutaneous, Daily  gabapentin, 200 mg, Oral, TID  hydrALAZINE, 25 mg, Oral, BID  insulin glargine, 10 Units, Subcutaneous, Nightly  linagliptin, 5 mg, Oral, Daily  pantoprazole, 40 mg, Oral, Q AM  polyethylene glycol, 17 g, Oral, BID  senna-docusate sodium, 2 tablet, Oral, BID  sertraline, 50 mg, Oral, Daily  sodium chloride, 10 mL, Intravenous, Q12H  terazosin, 2 mg, Oral, Nightly  traMADol, 100 mg, Oral, BID      Continuous Infusions:sodium chloride, 100 mL/hr, Last Rate: 100 mL/hr (01/15/24 1050)      PRN Meds:.  albuterol    senna-docusate sodium **AND** polyethylene glycol **AND** bisacodyl **AND** bisacodyl    HYDROcodone-acetaminophen    methocarbamol    [COMPLETED] Insert Peripheral IV **AND** sodium chloride    sodium chloride    sodium chloride     Interval History:    Assessment & Plan     Chest pain  COVID +  Elevated d-dimer  HTN  Pulmonary htn  HLD  Diabetes   CKD (one kindey)  COPD  Gout  PVD     Plan of  care  Cardiology and nephrology following; will get 2d echo and ct pe protocol today;continue supportive care    Plan for disposition:SAMIRA Forte  01/15/24  11:01 EST

## 2024-01-15 NOTE — CASE MANAGEMENT/SOCIAL WORK
Discharge Planning Assessment  [facility]     Patient Name: Vianey Reeves  MRN: [id number]  Today's Date: 1/15/2024    Admit Date: 1/14/2024    Plan: Anticipate routine home alone. Has home O2 through [facility].   Discharge Needs Assessment       Row Name 01/15/24 1444       Living Environment    People in Home alone    Current Living Arrangements apartment    Potentially Unsafe Housing Conditions none    Primary Care Provided by self    Provides Primary Care For no one    Family Caregiver if Needed child(erin), adult    Family Caregiver Names Daughter- Janice    Quality of Family Relationships unable to assess    Able to Return to Prior Arrangements yes       Resource/Environmental Concerns    Resource/Environmental Concerns none    Transportation Concerns none       Transition Planning    Patient/Family Anticipates Transition to home    Patient/Family Anticipated Services at Transition none    Transportation Anticipated family or friend will provide       Discharge Needs Assessment    Readmission Within the Last 30 Days no previous admission in last 30 days    Equipment Currently Used at Home rollator;oxygen    Concerns to be Addressed denies needs/concerns at this time    Anticipated Changes Related to Illness none    Equipment Needed After Discharge none    Provided Post Acute Provider List? N/A                   Discharge Plan       Row Name 01/15/24 1444       Plan    Plan Anticipate routine home alone. Has home O2 through [facility].    Patient/Family in Agreement with Plan yes    Plan Comments Due to positive COVID precautions, CM contacted patient via room telephone to discuss dc planning. PCP and pharmacy confirmed, reported no trouble affording medications, agreeable to meds to bed, and declined needs at this time for any DME/HH/PT services. Reported that she has a rollator at home but does not use. Reported that she has home O2, portable tanks and concentrator through [facility]. Reported that she mainly uses 5-6L at  night but sometimes turns her O2 on during the day if needed. Reported that she recently finished OP PT at [facility] for her leg strength. DC Barriers: Cardiology and nephrology following, CT PE ordered, IV fluids.                   Demographic Summary       Row Name 01/15/24 1443       General Information    Admission Type inpatient    Required Notices Provided Important Message from Medicare    Referral Source admission list    Reason for Consult discharge planning    Preferred Language English       Contact Information    Permission Granted to Share Info With                    Functional Status       Row Name 01/15/24 1443       Functional Status    Usual Activity Tolerance moderate    Current Activity Tolerance moderate       Functional Status, IADL    Medications independent    Meal Preparation assistive equipment    Housekeeping assistive equipment    Laundry assistive equipment    Shopping assistive equipment                Mahogany Jimenez RN     Office Phone: [phone]  Office Cell: [phone]

## 2024-01-15 NOTE — CONSULTS
NEPHROLOGY CONSULTATION-----[facility]    [facility]  [phone]  Sonali Ordonez MD    Patient Care Team:  Anny Garcia MD as PCP - General (Internal Medicine)  Sergio Ordonez MD as Consulting Physician (Nephrology)    CC/REASON FOR CONSULTATION: RENAL FAILURE/SOLITARY KIDNEY STATE    PHYSICIAN REQUESTING CONSULTATION:     History of Present Illness    HPI    Patient is a 78 y.o. WF, very well known to me, whom I was asked to see in consultation for evaluation and management of renal failure/elevated serum creatinine and solitary kidney state. Patient was admitted with SOB, cough, congestion and found to have COVID 19 + PNA.   Patient with known CRF/CKD STG 3A. No NSAIDs or recent IV dye exposure. No known h/o hepatitis, TB, rheumatic fever, jaundice, SLE, bleeding/bruising disorders.  No urinary sx. No edema or fluid retention.  +Compliance with home meds. Was not on diuretics prior to admission. Was not on ACE-I/ARB prior to admission. No herbal med use.    Review of Systems   Constitutional:  Positive for activity change and fatigue. Negative for appetite change, chills, diaphoresis, fever and unexpected weight change.   HENT:  Positive for congestion. Negative for dental problem, drooling, ear discharge, ear pain, facial swelling, hearing loss, mouth sores, nosebleeds, postnasal drip, rhinorrhea, sinus pressure, sinus pain, sneezing, sore throat, tinnitus, trouble swallowing and voice change.    Eyes:  Negative for photophobia, pain, discharge, redness, itching and visual disturbance.   Respiratory:  Positive for cough and shortness of breath. Negative for apnea, choking, chest tightness, wheezing and stridor.    Cardiovascular:  Negative for chest pain, palpitations and leg swelling.   Gastrointestinal:  Negative for abdominal distention, abdominal pain, anal bleeding, blood in stool, constipation, diarrhea, nausea,  rectal pain and vomiting.   Endocrine: Negative for cold intolerance, heat intolerance, polydipsia, polyphagia and polyuria.   Genitourinary:  Negative for decreased urine volume, difficulty urinating, dysuria, enuresis, flank pain, frequency, genital sores, hematuria and urgency.   Musculoskeletal:  Positive for arthralgias and back pain. Negative for gait problem, joint swelling, myalgias, neck pain and neck stiffness.   Skin:  Negative for color change, pallor, rash and wound.   Allergic/Immunologic: Negative for environmental allergies, food allergies and immunocompromised state.   Neurological:  Negative for dizziness, tremors, seizures, syncope, facial asymmetry, speech difficulty, weakness, light-headedness, numbness and headaches.   Hematological:  Negative for adenopathy. Does not bruise/bleed easily.   Psychiatric/Behavioral:  Negative for agitation, behavioral problems, confusion, decreased concentration, dysphoric mood, hallucinations, self-injury, sleep disturbance and suicidal ideas. The patient is not nervous/anxious and is not hyperactive.           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    Hypertension     IBS (irritable colon syndrome)     constipation    Mass of right breast     Neuropathy     Renal insufficiency     Sciatic leg pain     right    Simple chronic bronchitis     Solitary kidney     left       Past Surgical History:   Procedure Laterality  Date    BREAST BIOPSY Right     CARDIAC CATHETERIZATION      CHOLECYSTECTOMY      COLON SURGERY      REMOVAL diverticular diseae    COLONOSCOPY N/A 08/12/2021    Procedure: COLONOSCOPY with polypectomy x 3;  Surgeon: Margarette Mcallister MD;  Location: [facility];  Service: Gastroenterology;  Laterality: N/A;  post op: hmorrhoids, diverticulosis, polyps    CORONARY STENT PLACEMENT      ENDOSCOPY N/A 08/12/2021    Procedure: ESOPHAGOGASTRODUODENOSCOPY with dilatation (18-20 mm balloon) and (54 bougie);  Surgeon: Margarette Mcallister MD;  Location: [facility];  Service: Gastroenterology;  Laterality: N/A;  post op: esophageal stricture, esophagitis, gastritis, history of nissen    ENDOSCOPY N/A 9/13/2023    Procedure: ESOPHAGOGASTRODUODENOSCOPY with biopsy x 1 area and dilation (50,54,56 non guided bougie);  Surgeon: Margarette Mcallister MD;  Location: [facility];  Service: Gastroenterology;  Laterality: N/A;  post op: esophageal stricture    EYE SURGERY Bilateral     cataracts    HIATAL HERNIA REPAIR      NEPHRECTOMY Right     cancer    UPPER ENDOSCOPIC ULTRASOUND W/ FNA N/A 09/22/2022    Procedure: EUS;  Surgeon: Margarette Mcallister MD;  Location: [facility];  Service: Gastroenterology;  Laterality: N/A;  post: normal pancreas, dilated pancreatic duct, hiatal hernia,        Family History   Problem Relation Age of Onset    Arthritis Father     Prostate cancer Father     Heart disease Sister        Social History     Tobacco Use    Smoking status: Some Days     Packs/day: 0.25     Years: 50.00     Additional pack years: 0.00     Total pack years: 12.50     Types: Cigarettes    Smokeless tobacco: Never    Tobacco comments:     Mostly patches, some days none, some days 1-2   Vaping Use    Vaping Use: Never used   Substance Use Topics    Alcohol use: Yes     Comment: occasionally     Drug use: Never       Home Meds:   Medications Prior to Admission   Medication Sig Dispense Refill Last Dose    amLODIPine (NORVASC) 10  MG tablet TAKE 1 TABLET BY MOUTH EVERY DAY 90 tablet 4 1/14/2024    aspirin 81 MG tablet Take 1 tablet by mouth Every Night.   1/13/2024    carvedilol (COREG) 12.5 MG tablet Take 1 tablet by mouth 2 (Two) Times a Day With Meals.   1/13/2024    cephalexin (KEFLEX) 250 MG capsule Take 1 capsule by mouth Every Night. Extended therapy   1/13/2024    doxazosin (CARDURA) 2 MG tablet TAKE 1 TABLET BY MOUTH EVERY DAY AT NIGHT 90 tablet 3 1/13/2024    estradiol (ESTRACE) 0.1 MG/GM vaginal cream Insert 1 applicator into the vagina 3 (Three) Times a Week. Monday, Wednesday and Friday 1/13/2024    Evolocumab (REPATHA) solution auto-injector SureClick injection Inject 1 mL under the skin into the appropriate area as directed Every 14 (Fourteen) Days.   1/3/2024    gabapentin (NEURONTIN) 100 MG capsule Take 1 capsule by mouth Every Night.   1/13/2024    hydrALAZINE (APRESOLINE) 25 MG tablet Take 1 tablet by mouth 2 (Two) Times a Day.   1/13/2024    Insulin Glargine (Lantus SoloStar) 100 UNIT/ML injection pen Inject 20 Units under the skin into the appropriate area as directed Every Night.   1/13/2024    sertraline (ZOLOFT) 50 MG tablet TAKE 1 TABLET BY MOUTH EVERY DAY 90 tablet 3 1/13/2024    SITagliptin (JANUVIA) 100 MG tablet Take 1 tablet by mouth Daily.   1/13/2024    albuterol (PROVENTIL) (2.5 MG/3ML) 0.083% nebulizer solution Take 2.5 mg by nebulization Every 4 (Four) Hours As Needed for Wheezing.       cyanocobalamin 1000 MCG/ML injection Inject 1 mL into the appropriate muscle as directed by prescriber Every 28 (Twenty-Eight) Days.   1/2/2024    Diclofenac Sodium (VOLTAREN) 1 % gel gel 3 (Three) Times a Day As Needed.       fluticasone-salmeterol (ADVAIR HFA) 45-21 MCG/ACT inhaler Inhale 2 puffs 2 (Two) Times a Day As Needed.       ipratropium-albuterol (DUO-NEB) 0.5-2.5 mg/3 ml nebulizer Take 3 mL by nebulization 4 (Four) Times a Day. 360 mL 0     Linzess 145 MCG capsule capsule Take 1 capsule by mouth Daily As Needed.        traMADol (ULTRAM) 50 MG tablet Take 2 tablets by mouth 2 (Two) Times a Day As Needed.          Scheduled Meds:  Acetylcysteine, 1,200 mg, Oral, BID  albuterol sulfate HFA, 2 puff, Inhalation, 4x Daily - RT  amLODIPine, 10 mg, Oral, Daily  aspirin, 81 mg, Oral, Daily  azithromycin, 500 mg, Intravenous, Q24H  budesonide-formoterol, 1 puff, Inhalation, BID - RT  carvedilol, 6.25 mg, Oral, BID With Meals  dexAMETHasone, 6 mg, Oral, Daily With Breakfast  Diclofenac Sodium, 2 g, Topical, BID  enoxaparin, 40 mg, Subcutaneous, Daily  gabapentin, 200 mg, Oral, TID  hydrALAZINE, 25 mg, Oral, BID  insulin glargine, 10 Units, Subcutaneous, Nightly  linagliptin, 5 mg, Oral, Daily  pantoprazole, 40 mg, Oral, Q AM  polyethylene glycol, 17 g, Oral, BID  senna-docusate sodium, 2 tablet, Oral, BID  sertraline, 50 mg, Oral, Daily  sodium chloride, 10 mL, Intravenous, Q12H  terazosin, 2 mg, Oral, Nightly  traMADol, 100 mg, Oral, BID        Continuous Infusions:  sodium chloride, 100 mL/hr        PRN Meds:    albuterol    senna-docusate sodium **AND** polyethylene glycol **AND** bisacodyl **AND** bisacodyl    HYDROcodone-acetaminophen    methocarbamol    [COMPLETED] Insert Peripheral IV **AND** sodium chloride    sodium chloride    sodium chloride    Allergies:  Erythromycin, Naproxen sodium, Statins, Latex, Metoclopramide, Rocephin [ceftriaxone], and Dicyclomine    OBJECTIVE    Vital Signs  Temp:  [97.8 °F (36.6 °C)-99 °F (37.2 °C)] 97.8 °F (36.6 °C)  Heart Rate:  [] 74  Resp:  [14-18] 18  BP: (111-146)/(51-98) 111/70    No intake/output data recorded.  I/O last 3 completed shifts:  In: 360 [P.O.:360]  Out: -     Physical Exam:  General Appearance: alert, appears stated age and cooperative  Head: normocephalic, without obvious abnormality and atraumatic  Eyes: conjunctivae and sclerae normal and no icterus  Neck: supple and no JVD  Lungs: +BIBASILAR RALES  Heart: regular rhythm & normal rate and normal S1, S2 +AMINA  Chest  "Wall: no abnormalities observed  Abdomen: normal bowel sounds and soft, nontender  Extremities: moves extremities well, no edema, no cyanosis +DJD  Skin: no bleeding, bruising or rash  Neurologic: Alert, and oriented. No focal deficits    Results Review:    I reviewed the patient's new clinical results.    WBC WBC   Date Value Ref Range Status   01/15/2024 3.50 3.40 - 10.80 10*3/mm3 Final   01/14/2024 5.10 3.40 - 10.80 10*3/mm3 Final      HGB Hemoglobin   Date Value Ref Range Status   01/15/2024 13.6 12.0 - 15.9 g/dL Final   01/14/2024 15.3 12.0 - 15.9 g/dL Final      HCT Hematocrit   Date Value Ref Range Status   01/15/2024 40.5 34.0 - 46.6 % Final   01/14/2024 46.9 (H) 34.0 - 46.6 % Final      Platelets No results found for: \"LABPLAT\"   MCV MCV   Date Value Ref Range Status   01/15/2024 101.4 (H) 79.0 - 97.0 fL Final   01/14/2024 103.3 (H) 79.0 - 97.0 fL Final          Sodium Sodium   Date Value Ref Range Status   01/15/2024 139 136 - 145 mmol/L Final   01/14/2024 138 136 - 145 mmol/L Final      Potassium Potassium   Date Value Ref Range Status   01/15/2024 4.4 3.5 - 5.2 mmol/L Final   01/14/2024 4.3 3.5 - 5.2 mmol/L Final      Chloride Chloride   Date Value Ref Range Status   01/15/2024 106 98 - 107 mmol/L Final   01/14/2024 101 98 - 107 mmol/L Final      CO2 CO2   Date Value Ref Range Status   01/15/2024 27.0 22.0 - 29.0 mmol/L Final   01/14/2024 27.0 22.0 - 29.0 mmol/L Final      BUN BUN   Date Value Ref Range Status   01/15/2024 24 (H) 8 - 23 mg/dL Final   01/14/2024 20 8 - 23 mg/dL Final      Creatinine Creatinine   Date Value Ref Range Status   01/15/2024 1.32 (H) 0.57 - 1.00 mg/dL Final   01/14/2024 1.52 (H) 0.57 - 1.00 mg/dL Final      Calcium Calcium   Date Value Ref Range Status   01/15/2024 8.4 (L) 8.6 - 10.5 mg/dL Final   01/14/2024 9.4 8.6 - 10.5 mg/dL Final      PO4 No results found for: \"CAPO4\"   Albumin Albumin   Date Value Ref Range Status   01/15/2024 3.1 (L) 3.5 - 5.2 g/dL Final   01/14/2024 3.8 " "3.5 - 5.2 g/dL Final      Magnesium No results found for: \"MG\"   Uric Acid No results found for: \"URICACID\"       Imaging Results (Last 72 Hours)       Procedure Component Value Units Date/Time    XR Chest PA & Lateral [[id number]] Collected: 01/15/24 0723     Updated: 01/15/24 0726    Narrative:      XR CHEST PA AND LATERAL    Date of Exam: 1/14/2024 9:20 PM CST    Indication: elevated d-dimer    Comparison: Chest x-ray 1/14/2024    Findings:  The heart is stable in size. There is trace fluid in the right minor fissure and trace blunting at the costophrenic angle. No evidence for pneumothorax. There are calcified hilar lymph nodes.      Impression:      Impression:  Trace right pleural effusion.      Electronically Signed: Bia Lucero MD    1/15/2024 6:24 AM CST    Workstation ID: [id number]    XR Chest 1 View [[id number]] Collected: 01/14/24 1538     Updated: 01/14/24 1541    Narrative:      XR CHEST 1 VW    Date of Exam: 1/14/2024 3:22 PM EST    Indication: soa/covid +    Comparison: None available.    Findings: No focal consolidation. Resolving right midlung zone atelectasis. No pneumothorax or pleural effusion. Cardiac size is normal. The visualized clavicles appear intact. No displaced rib fractures. The visualized upper abdomen is normal.      Impression:      Impression: No acute cardiopulmonary disease.    Electronically Signed: Kiran Burgess MD    1/14/2024 3:39 PM EST    Workstation ID: [id number]              Results for orders placed during the hospital encounter of 01/14/24    XR Chest PA & Lateral    Narrative  XR CHEST PA AND LATERAL    Date of Exam: 1/14/2024 9:20 PM CST    Indication: elevated d-dimer    Comparison: Chest x-ray 1/14/2024    Findings:  The heart is stable in size. There is trace fluid in the right minor fissure and trace blunting at the costophrenic angle. No evidence for pneumothorax. There are calcified hilar lymph nodes.    Impression  Impression:  Trace right pleural " effusion.      Electronically Signed: Bia Lucero MD  1/15/2024 6:24 AM CST  Workstation ID: [id number]      XR Chest 1 View    Narrative  XR CHEST 1 VW    Date of Exam: 1/14/2024 3:22 PM EST    Indication: soa/covid +    Comparison: None available.    Findings: No focal consolidation. Resolving right midlung zone atelectasis. No pneumothorax or pleural effusion. Cardiac size is normal. The visualized clavicles appear intact. No displaced rib fractures. The visualized upper abdomen is normal.    Impression  Impression: No acute cardiopulmonary disease.    Electronically Signed: Kiran Burgess MD  1/14/2024 3:39 PM EST  Workstation ID: [id number]      Results for orders placed in visit on 08/29/23    XR Foot 3+ View Bilateral    Narrative  XR FOOT 3+ VW BILATERAL    Date of Exam: 8/29/2023 2:20 PM EDT    Indication: augustine heel pain  room 13  wb    Comparison: Right foot 7/1/2022    Findings:  Right foot: There is no acute fracture or dislocation. No bony erosion or abnormal periosteal reaction identified. There is enthesophyte formation of the calcaneus unchanged from prior study. Tarsal bones are otherwise unremarkable. There is some mild  pes planus deformity which has developed since the prior exam.    Left foot: No acute fracture or dislocation. No bony erosion or abnormal periosteal reaction. There is mild pes planus deformity. There is enthesophyte formation of the calcaneus.    Impression  Impression:      1. Bilateral pes planus deformity.  2. No acute bony abnormality. No evidence of erosive arthropathy.    Electronically Signed: Srinath Lock MD  8/30/2023 2:07 PM EDT  Workstation ID: [id number]        Results for orders placed during the hospital encounter of 08/22/23    Doppler Ankle Brachial Index Single Level CAR    Interpretation Summary    Right Conclusion: The right IKE is normal. Normal digital pressures.    Left Conclusion: The left IKE is normal. Mild digital ischemia.      ASSESSMENT / PLAN       Chest pain        1. CRF/CKD-------Nonoliguric. 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 Cr stable. Lytes okay. Volume okay. Dose meds for CrCl 30-45 cc/min. No NSAIDs.  Will premedicate for CT PE protocol with IVFs, Mucomyst and bicarbonate. Patient has been explained and understands the risks of IV dye exposure.      2. HTN WITH CKD------BP okay. Avoid hypotension     3. DMII WITH RENAL MANIFESTATIONS-----BS okay. Avoid hypoglycemia. Lantus, Glucometers, SSI. Follow with steroid exposure     4. COPD/COVID 19 + PNA-------Oxygen, Decadron, Supportive care     5. URETERAL CA S/P R NEPHRECTOMY/SOLITARY KIDNEY STATE     6. PULMONARY HTN     7. OA/DJD-----No NSAIDs. Check uric acid level    8. DVT PROPHYLAXIS------Lovenox    9. ELEVATED D-DIMER-------CT PE protocol today    10. DEPRESSION------On Zoloft    11. GERD/PUD PROPHYLAXIS--------PPI. Benefits outweigh risks despite renal dysfunction      I discussed the patient's findings and my recommendations with patient, nursing staff, and primary care team    Will follow along closely. Thank you for allowing us to see this patient in renal consultation.    [facility]  [phone]  MD Sonali Steele MD  01/15/24  09:45 EST

## 2024-01-15 NOTE — SIGNIFICANT NOTE
ED provider called follow up d dimer elevated cannot have CT due to one kidney and Cr VQ scan ordered. Pt had already received 40 mg Lovenox added 1 x dose extra 50 mg to bring up to therapeutic level until VQ resulted. Patient is also a private provider patient although hospitalist MD was unaware and did H and P ,Nurse instructed to call provider to take over care.

## 2024-01-15 NOTE — PLAN OF CARE
Goal Outcome Evaluation:  Plan of Care Reviewed With: patient        Progress: no change  Outcome Evaluation: CT of chest done today, negative for PE. Plans for myoview tomorrow

## 2024-01-15 NOTE — PLAN OF CARE
Goal Outcome Evaluation:      Pt arrived from ED just immediatly  before shift change. Pt was hooked up to monitor, safety check, andset of vitals were collected. Pt was comfortable and vitals stable.

## 2024-01-16 ENCOUNTER — APPOINTMENT (OUTPATIENT)
Dept: NUCLEAR MEDICINE | Facility: HOSPITAL | Age: 79
End: 2024-01-16
Payer: MEDICARE

## 2024-01-16 LAB
ALBUMIN SERPL-MCNC: 2.9 G/DL (ref 3.5–5.2)
ALBUMIN/GLOB SERPL: 1.4 G/DL
ALP SERPL-CCNC: 49 U/L (ref 39–117)
ALT SERPL W P-5'-P-CCNC: 21 U/L (ref 1–33)
ANION GAP SERPL CALCULATED.3IONS-SCNC: 7 MMOL/L (ref 5–15)
ANION GAP SERPL CALCULATED.3IONS-SCNC: 9 MMOL/L (ref 5–15)
AST SERPL-CCNC: 15 U/L (ref 1–32)
BACTERIA SPEC AEROBE CULT: NO GROWTH
BH CV REST NUCLEAR ISOTOPE DOSE: 11 MCI
BH CV STRESS BP STAGE 1: NORMAL
BH CV STRESS COMMENTS STAGE 1: NORMAL
BH CV STRESS DOSE REGADENOSON STAGE 1: 0.4
BH CV STRESS DURATION MIN STAGE 1: 0
BH CV STRESS DURATION SEC STAGE 1: 10
BH CV STRESS GRADE STAGE 1: 10
BH CV STRESS HR STAGE 1: 64
BH CV STRESS METS STAGE 1: 5
BH CV STRESS NUCLEAR ISOTOPE DOSE: 31.7 MCI
BH CV STRESS PROTOCOL 1: NORMAL
BH CV STRESS RECOVERY BP: NORMAL MMHG
BH CV STRESS RECOVERY HR: 66 BPM
BH CV STRESS SPEED STAGE 1: 1.7
BH CV STRESS STAGE 1: 1
BILIRUB SERPL-MCNC: <0.2 MG/DL (ref 0–1.2)
BUN SERPL-MCNC: 25 MG/DL (ref 8–23)
BUN SERPL-MCNC: 26 MG/DL (ref 8–23)
BUN/CREAT SERPL: 17.4 (ref 7–25)
BUN/CREAT SERPL: 21.1 (ref 7–25)
CA-I SERPL ISE-MCNC: 1.1 MMOL/L (ref 1.2–1.3)
CALCIUM SPEC-SCNC: 7.9 MG/DL (ref 8.6–10.5)
CALCIUM SPEC-SCNC: 8.6 MG/DL (ref 8.6–10.5)
CHLORIDE SERPL-SCNC: 105 MMOL/L (ref 98–107)
CHLORIDE SERPL-SCNC: 109 MMOL/L (ref 98–107)
CO2 SERPL-SCNC: 22 MMOL/L (ref 22–29)
CO2 SERPL-SCNC: 25 MMOL/L (ref 22–29)
CREAT SERPL-MCNC: 1.23 MG/DL (ref 0.57–1)
CREAT SERPL-MCNC: 1.44 MG/DL (ref 0.57–1)
DEPRECATED RDW RBC AUTO: 49 FL (ref 37–54)
DEPRECATED RDW RBC AUTO: 51.6 FL (ref 37–54)
EGFRCR SERPLBLD CKD-EPI 2021: 36.8 ML/MIN/1.73
EGFRCR SERPLBLD CKD-EPI 2021: 44.5 ML/MIN/1.73
ERYTHROCYTE [DISTWIDTH] IN BLOOD BY AUTOMATED COUNT: 14.1 % (ref 12.3–15.4)
ERYTHROCYTE [DISTWIDTH] IN BLOOD BY AUTOMATED COUNT: 14.1 % (ref 12.3–15.4)
GLOBULIN UR ELPH-MCNC: 2.1 GM/DL
GLUCOSE BLDC GLUCOMTR-MCNC: 119 MG/DL (ref 70–105)
GLUCOSE BLDC GLUCOMTR-MCNC: 269 MG/DL (ref 70–105)
GLUCOSE BLDC GLUCOMTR-MCNC: 69 MG/DL (ref 70–105)
GLUCOSE SERPL-MCNC: 238 MG/DL (ref 65–99)
GLUCOSE SERPL-MCNC: 277 MG/DL (ref 65–99)
HCT VFR BLD AUTO: 36.8 % (ref 34–46.6)
HCT VFR BLD AUTO: 38 % (ref 34–46.6)
HGB BLD-MCNC: 12.3 G/DL (ref 12–15.9)
HGB BLD-MCNC: 12.5 G/DL (ref 12–15.9)
LV EF NUC BP: 50 %
MAGNESIUM SERPL-MCNC: 1.9 MG/DL (ref 1.6–2.4)
MAGNESIUM SERPL-MCNC: 2 MG/DL (ref 1.6–2.4)
MAXIMAL PREDICTED HEART RATE: 140 BPM
MCH RBC QN AUTO: 33.1 PG (ref 26.6–33)
MCH RBC QN AUTO: 33.2 PG (ref 26.6–33)
MCHC RBC AUTO-ENTMCNC: 32.9 G/DL (ref 31.5–35.7)
MCHC RBC AUTO-ENTMCNC: 33.4 G/DL (ref 31.5–35.7)
MCV RBC AUTO: 100.7 FL (ref 79–97)
MCV RBC AUTO: 99.4 FL (ref 79–97)
PERCENT MAX PREDICTED HR: 52.86 %
PHOSPHATE SERPL-MCNC: 2.4 MG/DL (ref 2.5–4.5)
PHOSPHATE SERPL-MCNC: 2.5 MG/DL (ref 2.5–4.5)
PLATELET # BLD AUTO: 146 10*3/MM3 (ref 140–450)
PLATELET # BLD AUTO: 165 10*3/MM3 (ref 140–450)
PMV BLD AUTO: 8.6 FL (ref 6–12)
PMV BLD AUTO: 8.8 FL (ref 6–12)
POTASSIUM SERPL-SCNC: 4.5 MMOL/L (ref 3.5–5.2)
POTASSIUM SERPL-SCNC: 4.7 MMOL/L (ref 3.5–5.2)
PROT SERPL-MCNC: 5 G/DL (ref 6–8.5)
RBC # BLD AUTO: 3.7 10*6/MM3 (ref 3.77–5.28)
RBC # BLD AUTO: 3.77 10*6/MM3 (ref 3.77–5.28)
SODIUM SERPL-SCNC: 136 MMOL/L (ref 136–145)
SODIUM SERPL-SCNC: 141 MMOL/L (ref 136–145)
STRESS BASELINE BP: NORMAL MMHG
STRESS BASELINE HR: 59 BPM
STRESS PERCENT HR: 62 %
STRESS POST PEAK BP: NORMAL MMHG
STRESS POST PEAK HR: 74 BPM
STRESS TARGET HR: 119 BPM
WBC NRBC COR # BLD AUTO: 4.5 10*3/MM3 (ref 3.4–10.8)
WBC NRBC COR # BLD AUTO: 5.3 10*3/MM3 (ref 3.4–10.8)

## 2024-01-16 PROCEDURE — A9502 TC99M TETROFOSMIN: HCPCS | Performed by: INTERNAL MEDICINE

## 2024-01-16 PROCEDURE — 25810000003 SODIUM CHLORIDE 0.9 % SOLUTION: Performed by: INTERNAL MEDICINE

## 2024-01-16 PROCEDURE — 84100 ASSAY OF PHOSPHORUS: CPT | Performed by: INTERNAL MEDICINE

## 2024-01-16 PROCEDURE — 78452 HT MUSCLE IMAGE SPECT MULT: CPT

## 2024-01-16 PROCEDURE — 25810000003 SODIUM CHLORIDE 0.9 % SOLUTION 250 ML FLEX CONT: Performed by: FAMILY MEDICINE

## 2024-01-16 PROCEDURE — 80053 COMPREHEN METABOLIC PANEL: CPT | Performed by: INTERNAL MEDICINE

## 2024-01-16 PROCEDURE — 83735 ASSAY OF MAGNESIUM: CPT | Performed by: INTERNAL MEDICINE

## 2024-01-16 PROCEDURE — 25010000002 CALCIUM GLUCONATE-NACL 1-0.675 GM/50ML-% SOLUTION: Performed by: INTERNAL MEDICINE

## 2024-01-16 PROCEDURE — 63710000001 INSULIN LISPRO (HUMAN) PER 5 UNITS: Performed by: INTERNAL MEDICINE

## 2024-01-16 PROCEDURE — 82948 REAGENT STRIP/BLOOD GLUCOSE: CPT

## 2024-01-16 PROCEDURE — 25010000002 REGADENOSON 0.4 MG/5ML SOLUTION: Performed by: INTERNAL MEDICINE

## 2024-01-16 PROCEDURE — 94799 UNLISTED PULMONARY SVC/PX: CPT

## 2024-01-16 PROCEDURE — 99232 SBSQ HOSP IP/OBS MODERATE 35: CPT | Performed by: INTERNAL MEDICINE

## 2024-01-16 PROCEDURE — 85027 COMPLETE CBC AUTOMATED: CPT | Performed by: INTERNAL MEDICINE

## 2024-01-16 PROCEDURE — 93017 CV STRESS TEST TRACING ONLY: CPT

## 2024-01-16 PROCEDURE — 93018 CV STRESS TEST I&R ONLY: CPT | Performed by: INTERNAL MEDICINE

## 2024-01-16 PROCEDURE — 78452 HT MUSCLE IMAGE SPECT MULT: CPT | Performed by: INTERNAL MEDICINE

## 2024-01-16 PROCEDURE — 82330 ASSAY OF CALCIUM: CPT | Performed by: INTERNAL MEDICINE

## 2024-01-16 PROCEDURE — 94664 DEMO&/EVAL PT USE INHALER: CPT

## 2024-01-16 PROCEDURE — 0 TECHNETIUM TETROFOSMIN KIT: Performed by: INTERNAL MEDICINE

## 2024-01-16 PROCEDURE — 94761 N-INVAS EAR/PLS OXIMETRY MLT: CPT

## 2024-01-16 PROCEDURE — 25010000002 AZITHROMYCIN PER 500 MG: Performed by: FAMILY MEDICINE

## 2024-01-16 PROCEDURE — 25010000002 ENOXAPARIN PER 10 MG: Performed by: INTERNAL MEDICINE

## 2024-01-16 PROCEDURE — 63710000001 INSULIN GLARGINE PER 5 UNITS: Performed by: INTERNAL MEDICINE

## 2024-01-16 PROCEDURE — 94640 AIRWAY INHALATION TREATMENT: CPT

## 2024-01-16 RX ORDER — CALCIUM GLUCONATE 20 MG/ML
1000 INJECTION, SOLUTION INTRAVENOUS EVERY 12 HOURS
Status: COMPLETED | OUTPATIENT
Start: 2024-01-16 | End: 2024-01-16

## 2024-01-16 RX ORDER — ALBUTEROL SULFATE 90 UG/1
2 AEROSOL, METERED RESPIRATORY (INHALATION) EVERY 4 HOURS PRN
Status: DISCONTINUED | OUTPATIENT
Start: 2024-01-16 | End: 2024-01-17 | Stop reason: HOSPADM

## 2024-01-16 RX ORDER — INSULIN LISPRO 100 [IU]/ML
2-7 INJECTION, SOLUTION INTRAVENOUS; SUBCUTANEOUS
Status: DISCONTINUED | OUTPATIENT
Start: 2024-01-16 | End: 2024-01-17 | Stop reason: HOSPADM

## 2024-01-16 RX ORDER — AZITHROMYCIN 250 MG/1
500 TABLET, FILM COATED ORAL
Status: COMPLETED | OUTPATIENT
Start: 2024-01-16 | End: 2024-01-16

## 2024-01-16 RX ORDER — DEXTROSE MONOHYDRATE 25 G/50ML
25 INJECTION, SOLUTION INTRAVENOUS
Status: DISCONTINUED | OUTPATIENT
Start: 2024-01-16 | End: 2024-01-17 | Stop reason: HOSPADM

## 2024-01-16 RX ORDER — REGADENOSON 0.08 MG/ML
0.4 INJECTION, SOLUTION INTRAVENOUS
Status: COMPLETED | OUTPATIENT
Start: 2024-01-16 | End: 2024-01-16

## 2024-01-16 RX ORDER — NICOTINE POLACRILEX 4 MG
15 LOZENGE BUCCAL
Status: DISCONTINUED | OUTPATIENT
Start: 2024-01-16 | End: 2024-01-17 | Stop reason: HOSPADM

## 2024-01-16 RX ORDER — IBUPROFEN 600 MG/1
1 TABLET ORAL
Status: DISCONTINUED | OUTPATIENT
Start: 2024-01-16 | End: 2024-01-17 | Stop reason: HOSPADM

## 2024-01-16 RX ORDER — FENTANYL/ROPIVACAINE/NS/PF 2-625MCG/1
15 PLASTIC BAG, INJECTION (ML) EPIDURAL ONCE
Status: COMPLETED | OUTPATIENT
Start: 2024-01-16 | End: 2024-01-16

## 2024-01-16 RX ADMIN — Medication 10 ML: at 21:55

## 2024-01-16 RX ADMIN — TETROFOSMIN 1 DOSE: 1.38 INJECTION, POWDER, LYOPHILIZED, FOR SOLUTION INTRAVENOUS at 06:38

## 2024-01-16 RX ADMIN — ALBUTEROL SULFATE 2 PUFF: 108 AEROSOL, METERED RESPIRATORY (INHALATION) at 00:41

## 2024-01-16 RX ADMIN — GABAPENTIN 100 MG: 100 CAPSULE ORAL at 21:54

## 2024-01-16 RX ADMIN — CARVEDILOL 12.5 MG: 6.25 TABLET, FILM COATED ORAL at 17:27

## 2024-01-16 RX ADMIN — AZITHROMYCIN DIHYDRATE 500 MG: 250 TABLET ORAL at 21:54

## 2024-01-16 RX ADMIN — Medication 10 ML: at 09:53

## 2024-01-16 RX ADMIN — INSULIN LISPRO 4 UNITS: 100 INJECTION, SOLUTION INTRAVENOUS; SUBCUTANEOUS at 19:03

## 2024-01-16 RX ADMIN — ALBUTEROL SULFATE 2 PUFF: 108 AEROSOL, METERED RESPIRATORY (INHALATION) at 15:21

## 2024-01-16 RX ADMIN — DICLOFENAC SODIUM 2 G: 10 GEL TOPICAL at 21:55

## 2024-01-16 RX ADMIN — Medication 1200 MG: at 21:54

## 2024-01-16 RX ADMIN — TETROFOSMIN 1 DOSE: 1.38 INJECTION, POWDER, LYOPHILIZED, FOR SOLUTION INTRAVENOUS at 11:55

## 2024-01-16 RX ADMIN — PANTOPRAZOLE SODIUM 40 MG: 40 TABLET, DELAYED RELEASE ORAL at 05:52

## 2024-01-16 RX ADMIN — DEXAMETHASONE 6 MG: 4 TABLET ORAL at 09:51

## 2024-01-16 RX ADMIN — AMLODIPINE BESYLATE 10 MG: 5 TABLET ORAL at 09:51

## 2024-01-16 RX ADMIN — BUDESONIDE AND FORMOTEROL FUMARATE DIHYDRATE 1 PUFF: 160; 4.5 AEROSOL RESPIRATORY (INHALATION) at 18:12

## 2024-01-16 RX ADMIN — LINAGLIPTIN 5 MG: 5 TABLET, FILM COATED ORAL at 09:51

## 2024-01-16 RX ADMIN — INSULIN GLARGINE 10 UNITS: 100 INJECTION, SOLUTION SUBCUTANEOUS at 21:53

## 2024-01-16 RX ADMIN — POLYETHYLENE GLYCOL 3350 17 G: 17 POWDER, FOR SOLUTION ORAL at 09:51

## 2024-01-16 RX ADMIN — ALBUTEROL SULFATE 2 PUFF: 108 AEROSOL, METERED RESPIRATORY (INHALATION) at 18:17

## 2024-01-16 RX ADMIN — DICLOFENAC SODIUM 2 G: 10 GEL TOPICAL at 09:52

## 2024-01-16 RX ADMIN — AZITHROMYCIN MONOHYDRATE 500 MG: 500 INJECTION, POWDER, LYOPHILIZED, FOR SOLUTION INTRAVENOUS at 00:20

## 2024-01-16 RX ADMIN — TRAMADOL HYDROCHLORIDE 100 MG: 50 TABLET, COATED ORAL at 21:54

## 2024-01-16 RX ADMIN — TERAZOSIN HYDROCHLORIDE 2 MG: 2 CAPSULE ORAL at 21:54

## 2024-01-16 RX ADMIN — ALBUTEROL SULFATE 2 PUFF: 108 AEROSOL, METERED RESPIRATORY (INHALATION) at 08:42

## 2024-01-16 RX ADMIN — METHOCARBAMOL 500 MG: 500 TABLET ORAL at 17:38

## 2024-01-16 RX ADMIN — TRAMADOL HYDROCHLORIDE 100 MG: 50 TABLET, COATED ORAL at 09:51

## 2024-01-16 RX ADMIN — CALCIUM GLUCONATE 1000 MG: 20 INJECTION, SOLUTION INTRAVENOUS at 21:53

## 2024-01-16 RX ADMIN — Medication 1200 MG: at 09:51

## 2024-01-16 RX ADMIN — BUDESONIDE AND FORMOTEROL FUMARATE DIHYDRATE 1 PUFF: 160; 4.5 AEROSOL RESPIRATORY (INHALATION) at 08:45

## 2024-01-16 RX ADMIN — REGADENOSON 0.4 MG: 0.08 INJECTION, SOLUTION INTRAVENOUS at 11:55

## 2024-01-16 RX ADMIN — ASPIRIN 81 MG: 81 TABLET, COATED ORAL at 09:51

## 2024-01-16 RX ADMIN — CARVEDILOL 12.5 MG: 6.25 TABLET, FILM COATED ORAL at 09:51

## 2024-01-16 RX ADMIN — HYDRALAZINE HYDROCHLORIDE 25 MG: 25 TABLET ORAL at 09:51

## 2024-01-16 RX ADMIN — ENOXAPARIN SODIUM 40 MG: 100 INJECTION SUBCUTANEOUS at 17:27

## 2024-01-16 RX ADMIN — CALCIUM GLUCONATE 1000 MG: 20 INJECTION, SOLUTION INTRAVENOUS at 09:52

## 2024-01-16 RX ADMIN — SERTRALINE HYDROCHLORIDE 50 MG: 50 TABLET ORAL at 09:51

## 2024-01-16 RX ADMIN — POTASSIUM PHOSPHATE, MONOBASIC AND POTASSIUM PHOSPHATE, DIBASIC 15 MMOL: 224; 236 INJECTION, SOLUTION, CONCENTRATE INTRAVENOUS at 09:52

## 2024-01-16 RX ADMIN — HYDRALAZINE HYDROCHLORIDE 25 MG: 25 TABLET ORAL at 21:53

## 2024-01-16 NOTE — PROGRESS NOTES
[facility] CARDIOLOGY  [facility]        LOS:  LOS: 2 days   Patient Name: Vianey Reeves  Age/Sex: 78 y.o. female  : 1945  MRN: [id number]    Day of Service: 24   Length of Stay: 2  Encounter Provider: SAMIRA Kunz  Place of Service: [facility] CARDIOLOGY  Patient Care Team:  Anny Garcia MD as PCP - General (Internal Medicine)  Sergio Ordonez MD as Consulting Physician (Nephrology)    Cardiology assessment and plan     Chest pain  Nonspecific elevation of troponin  Abnormal D-dimer  Acute on chronic renal insufficiency  Coronary artery disease prior PCI and stenting  Normal LV systolic function  COVID-positive status  Hypertension  Hyperlipidemia  Diabetes mellitus  COPD  Gout  Peripheral vascular disease  Renal function stable    Tmax is 98.2 pulse is 62 respirations are 18 blood pressure is 130/58 sats are 93%  Sodium is 136 potassium is 4.5 creatinine is 1.4 LFTs are normal hemoglobin is 12.3  Nonspecific elevation of troponin with no significant trend  Normal proBNP  Current medications include aspirin 81 mg p.o. once a day Norvasc 10 mg p.o. once a day Coreg 6.25 mg p.o. twice daily hydralazine 25 mg p.o. twice a day patient is on Hytrin 2 mg p.o. at nighttime patient is on Lovenox for DVT prophylaxis  CT chest with no pulmonary embolism  Myocardial perfusion study with no significant reversible ischemia  From cardiac standpoint continued aggressive risk factor modification and close monitoring as of outpatient  If patient continued to have cardiac symptoms will consider further evaluation workup  Discussed with patient at bedside  Stable from cardiac standpoint  Okay to discharge and follow-up as an outpatient from cardiac standpoint                  Subjective:     Chief Complaint:  F/U CP    Subjective:   Patient only c/o positional chest discomfort this morning with lying on her side.  Denies  SOA.    Current Medications:   Scheduled Meds:Acetylcysteine, 1,200 mg, Oral, BID  albuterol sulfate HFA, 2 puff, Inhalation, 4x Daily - RT  amLODIPine, 10 mg, Oral, Daily  aspirin, 81 mg, Oral, Daily  azithromycin, 500 mg, Intravenous, Q24H  budesonide-formoterol, 1 puff, Inhalation, BID - RT  calcium gluconate, 1,000 mg, Intravenous, Q12H  carvedilol, 12.5 mg, Oral, BID With Meals  dexAMETHasone, 6 mg, Oral, Daily With Breakfast  Diclofenac Sodium, 2 g, Topical, BID  enoxaparin, 40 mg, Subcutaneous, Daily  gabapentin, 100 mg, Oral, Nightly  hydrALAZINE, 25 mg, Oral, BID  insulin glargine, 10 Units, Subcutaneous, Nightly  linagliptin, 5 mg, Oral, Daily  pantoprazole, 40 mg, Oral, Q AM  polyethylene glycol, 17 g, Oral, BID  potassium phosphate, 15 mmol, Intravenous, Once  senna-docusate sodium, 2 tablet, Oral, BID  sertraline, 50 mg, Oral, Daily  sodium chloride, 10 mL, Intravenous, Q12H  terazosin, 2 mg, Oral, Nightly  traMADol, 100 mg, Oral, BID      Continuous Infusions:     Allergies:  Allergies   Allergen Reactions    Erythromycin Rash    Naproxen Sodium Other (See Comments)     Dizzy lightheaded    Statins Myalgia    Latex Itching and Swelling    Metoclopramide Other (See Comments)     Unsteady on feet    Rocephin [Ceftriaxone] Itching and Nausea Only    Dicyclomine Unknown - Low Severity       Review of Systems   Constitutional: Negative for chills, decreased appetite and malaise/fatigue.   HENT:  Negative for congestion and nosebleeds.    Eyes:  Negative for blurred vision and double vision.   Cardiovascular:  Positive for chest pain and dyspnea on exertion. Negative for irregular heartbeat, leg swelling, near-syncope, orthopnea, palpitations and paroxysmal nocturnal dyspnea.   Respiratory:  Positive for shortness of breath. Negative for cough.    Hematologic/Lymphatic: Negative for adenopathy. Does not bruise/bleed easily.   Skin:  Negative for color change and rash.   Musculoskeletal:  Negative for back  "pain and joint pain.   Gastrointestinal:  Negative for bloating, abdominal pain, hematemesis and hematochezia.   Genitourinary:  Negative for flank pain and hematuria.   Neurological:  Negative for dizziness and focal weakness.   Psychiatric/Behavioral:  Negative for altered mental status. The patient does not have insomnia.        Objective:     Temp:  [97 °F (36.1 °C)-98.1 °F (36.7 °C)] 97.1 °F (36.2 °C)  Heart Rate:  [60-98] 62  Resp:  [16-20] 18  BP: (108-130)/(54-70) 108/54     Intake/Output Summary (Last 24 hours) at 1/16/2024 0930  Last data filed at 1/16/2024 0554  Gross per 24 hour   Intake 960 ml   Output 975 ml   Net -15 ml     Body mass index is 31.11 kg/m².      01/15/24  0500 01/15/24  1109 01/16/24  0500   Weight: 84.5 kg (186 lb 4.6 oz) 84.4 kg (186 lb) 84.8 kg (186 lb 15.2 oz)         Physical Exam:  Neuro:  CV:  Resp:  GI:  Ext:  Tele: AAOx3, no gross deficits  S1S2 RRR, no murmur  Nonlabored, faint wheezing  BS+, abd soft  Pedal pulses palp, no edema  SR with PAC / PVC                                                   Lab Review:   Results from last 7 days   Lab Units 01/16/24  0003 01/15/24  0748   SODIUM mmol/L 136 139   POTASSIUM mmol/L 4.5 4.4   CHLORIDE mmol/L 105 106   CO2 mmol/L 22.0 27.0   BUN mg/dL 25* 24*   CREATININE mg/dL 1.44* 1.32*   GLUCOSE mg/dL 238* 163*   CALCIUM mg/dL 7.9* 8.4*   AST (SGOT) U/L 15 18   ALT (SGPT) U/L 21 25     Results from last 7 days   Lab Units 01/15/24  0748 01/14/24  1619 01/14/24  1405   CK TOTAL U/L 29  --   --    HSTROP T ng/L  --  57* 67*     Results from last 7 days   Lab Units 01/16/24  0003 01/15/24  0748   WBC 10*3/mm3 4.50 3.50   HEMOGLOBIN g/dL 12.3 13.6   HEMATOCRIT % 36.8 40.5   PLATELETS 10*3/mm3 146 164         Results from last 7 days   Lab Units 01/16/24  0003   MAGNESIUM mg/dL 2.0           Invalid input(s): \"LDLCALC\"  Results from last 7 days   Lab Units 01/14/24  1405   PROBNP pg/mL 642.8     Results from last 7 days   Lab Units " 01/15/24  0748   TSH uIU/mL 0.261*       Recent Radiology:  Imaging Results (Most Recent)       Procedure Component Value Units Date/Time    CT Angiogram Chest Pulmonary Embolism [[id number]] Collected: 01/15/24 1424     Updated: 01/15/24 1434    Narrative:      CT ANGIOGRAM CHEST PULMONARY EMBOLISM    Date of Exam: 1/15/2024 2:05 PM EST    Indication: Pulmonary embolism (PE) suspected, positive D-dimer.    Comparison: None available.    Technique: Axial CT images were obtained of the chest after the uneventful intravenous administration of iodinated contrast utilizing pulmonary embolism protocol.  Sagittal and coronal reconstructions were performed.  Automated exposure control and   iterative reconstruction methods were used.      Findings:  There are new pulmonary arterial filling defects evident to suggest PE. The pulmonary arteries appear normal caliber. The thoracic aorta is ectatic measuring 3.9 cm. Coronary and other vascular calcification is noted. There is a small pericardial   effusion. There is cardiomegaly. Small bilateral pleural effusions are noted, right greater than left. There is linear consolidation in the base of the right upper lobe suggesting partial atelectasis. Bibasilar dependent atelectasis is also noted. There   is patchy groundglass opacities in the perihilar regions which may indicate pulmonary edema or atypical infection pattern. Central airways are grossly patent.    There is marked heterogeneous enlargement of the left thyroid lobe with substernal extension, which may be due to goitrous change. There are mildly prominent lymph nodes in the right hilum and right paratracheal region which may be reactive. Limited   imaging through the upper abdomen demonstrates a 2.3 cm low-density nodule in the left adrenal suggesting adenomatous change. Gallbladder is absent. There is postsurgical change of the proximal stomach which may be due to prior hiatal hernia repair.   There is vertebral  augmentation cement at the mid thoracic level. Probable hemangioma bone noted at what appears to be T10.      Impression:      Impression:    1. No evidence of pulmonary embolus.  2. Cardiomegaly with small pericardial effusion.  3. Small bilateral pleural effusions with overlying consolidation likely due to atelectasis.  4. Patchy groundglass opacities in the perihilar region may be due to early pulmonary edema or atypical infection pattern. Correlate clinically.  5. Borderline prominent lymph nodes in the right hilum and paratracheal region may be reactive given the pulmonary findings. Attention on follow-up suggested.        Electronically Signed: Pedrito Mcdowell MD    1/15/2024 2:31 PM EST    Workstation ID: [id number]    XR Chest PA & Lateral [[id number]] Collected: 01/15/24 0723     Updated: 01/15/24 0726    Narrative:      XR CHEST PA AND LATERAL    Date of Exam: 1/14/2024 9:20 PM CST    Indication: elevated d-dimer    Comparison: Chest x-ray 1/14/2024    Findings:  The heart is stable in size. There is trace fluid in the right minor fissure and trace blunting at the costophrenic angle. No evidence for pneumothorax. There are calcified hilar lymph nodes.      Impression:      Impression:  Trace right pleural effusion.      Electronically Signed: Bia Lucero MD    1/15/2024 6:24 AM CST    Workstation ID: [id number]    XR Chest 1 View [[id number]] Collected: 01/14/24 1538     Updated: 01/14/24 1541    Narrative:      XR CHEST 1 VW    Date of Exam: 1/14/2024 3:22 PM EST    Indication: soa/covid +    Comparison: None available.    Findings: No focal consolidation. Resolving right midlung zone atelectasis. No pneumothorax or pleural effusion. Cardiac size is normal. The visualized clavicles appear intact. No displaced rib fractures. The visualized upper abdomen is normal.      Impression:      Impression: No acute cardiopulmonary disease.    Electronically Signed: Kiran Burgess MD    1/14/2024 3:39 PM EST     Workstation ID: [id number]            ECHOCARDIOGRAM:    Results for orders placed during the hospital encounter of 01/14/24    Adult Transthoracic Echo Complete W/ Cont if Necessary Per Protocol    Interpretation Summary    Left ventricular systolic function is normal. Calculated left ventricular EF = 51% Left ventricular ejection fraction appears to be 51 - 55%.    Left ventricular diastolic function is consistent with (grade I) impaired relaxation.    The left atrial cavity is mild to moderately dilated.    There is mild calcification of the aortic valve mainly affecting the left coronary and right coronary cusp(s).    Moderate mitral valve regurgitation is present.    Estimated right ventricular systolic pressure from tricuspid regurgitation is normal (<35 mmHg).        I reviewed the patient's new clinical results.    EKG:      Assessment:       Chest pain    1) Chest Pain  -High sensitivity troponin 67, 57  -EKG shows no acute ST abnormalities  -CXR shows trace right pleural effusion  - d-dimer positive but unable to have CTA chest (renal dysfunction) or VQ scan (covid)  - 2D echo 1/15 showed EF 51-55% with grade 1 diastolic dysfunction and moderate MR.  - CTA chest is negative PE.     2) CAD status post PCI in 2016  - Last nuclear stress test in 2019 was negative for ischemia.    - Last 2D echo 1/2023 showed an EF of 56 to 60% with grade 1 diastolic dysfunction and mild MR/TR.       3) COVID positive     4) HTN     5) HLD     6) DM     7) CKD stage III with solitary kidney  - nephrology following     8) COPD     9) gout     10) PVD    Plan:   Patient has been r/o for PE.  2D echo shows a preserved EF and moderate MR.  She is planned for nuclear stress testing today.        Electronically signed by SAMIRA Kunz, 01/16/24, 9:37 AM EST.

## 2024-01-16 NOTE — PROGRESS NOTES
"NEPHROLOGY PROGRESS NOTE------[facility]    [facility]  [phone]  Sonali Ordonez MD      Patient Care Team:  Anny Garcia MD as PCP - General (Internal Medicine)  José Luis, MD Sergio as Consulting Physician (Nephrology)      Provider:  Sonali Ordonez MD  Patient Name: Vianey Reeves  :  1945    SUBJECTIVE:    F/U CRF/CKD    Comfortable. No SOB, CP, palpitations, cramping. No dysuria    Medication:  Acetylcysteine, 1,200 mg, Oral, BID  albuterol sulfate HFA, 2 puff, Inhalation, 4x Daily - RT  amLODIPine, 10 mg, Oral, Daily  aspirin, 81 mg, Oral, Daily  azithromycin, 500 mg, Intravenous, Q24H  budesonide-formoterol, 1 puff, Inhalation, BID - RT  carvedilol, 12.5 mg, Oral, BID With Meals  dexAMETHasone, 6 mg, Oral, Daily With Breakfast  Diclofenac Sodium, 2 g, Topical, BID  enoxaparin, 40 mg, Subcutaneous, Daily  gabapentin, 100 mg, Oral, Nightly  hydrALAZINE, 25 mg, Oral, BID  insulin glargine, 10 Units, Subcutaneous, Nightly  linagliptin, 5 mg, Oral, Daily  pantoprazole, 40 mg, Oral, Q AM  polyethylene glycol, 17 g, Oral, BID  senna-docusate sodium, 2 tablet, Oral, BID  sertraline, 50 mg, Oral, Daily  sodium chloride, 10 mL, Intravenous, Q12H  terazosin, 2 mg, Oral, Nightly  traMADol, 100 mg, Oral, BID      sodium chloride, 100 mL/hr, Last Rate: 100 mL/hr (01/15/24 1050)        OBJECTIVE    Vital Sign Min/Max for last 24 hours  Temp  Min: 97 °F (36.1 °C)  Max: 98.1 °F (36.7 °C)   BP  Min: 108/54  Max: 130/69   Pulse  Min: 60  Max: 98   Resp  Min: 16  Max: 20   SpO2  Min: 93 %  Max: 98 %   No data recorded   Weight  Min: 84.4 kg (186 lb)  Max: 84.8 kg (186 lb 15.2 oz)     Flowsheet Rows      Flowsheet Row First Filed Value   Admission Height 165.1 cm (65\") Documented at 2024 1353   Admission Weight 86 kg (189 lb 9.5 oz) Documented at 2024 1353            No intake/output data recorded.  I/O last 3 completed " "shifts:  In: 1680 [P.O.:1680]  Out: 975 [Urine:975]    Physical Exam:  General Appearance: alert, appears stated age and cooperative  Head: normocephalic, without obvious abnormality and atraumatic  Eyes: conjunctivae and sclerae normal and no icterus  Neck: supple and no JVD  Lungs: +BIBASILAR RALES  Heart: regular rhythm & normal rate and normal S1, S2 +AMINA  Chest Wall: no abnormalities observed  Abdomen: normal bowel sounds and soft, nontender  Extremities: moves extremities well, no edema, no cyanosis +DJD  Skin: no bleeding, bruising or rash  Neurologic: Alert, and oriented. No focal deficits    Labs:    WBC WBC   Date Value Ref Range Status   01/16/2024 4.50 3.40 - 10.80 10*3/mm3 Final   01/15/2024 3.50 3.40 - 10.80 10*3/mm3 Final   01/14/2024 5.10 3.40 - 10.80 10*3/mm3 Final      HGB Hemoglobin   Date Value Ref Range Status   01/16/2024 12.3 12.0 - 15.9 g/dL Final   01/15/2024 13.6 12.0 - 15.9 g/dL Final   01/14/2024 15.3 12.0 - 15.9 g/dL Final      HCT Hematocrit   Date Value Ref Range Status   01/16/2024 36.8 34.0 - 46.6 % Final   01/15/2024 40.5 34.0 - 46.6 % Final   01/14/2024 46.9 (H) 34.0 - 46.6 % Final      Platelets No results found for: \"LABPLAT\"   MCV MCV   Date Value Ref Range Status   01/16/2024 99.4 (H) 79.0 - 97.0 fL Final   01/15/2024 101.4 (H) 79.0 - 97.0 fL Final   01/14/2024 103.3 (H) 79.0 - 97.0 fL Final          Sodium Sodium   Date Value Ref Range Status   01/16/2024 136 136 - 145 mmol/L Final   01/15/2024 139 136 - 145 mmol/L Final   01/14/2024 138 136 - 145 mmol/L Final      Potassium Potassium   Date Value Ref Range Status   01/16/2024 4.5 3.5 - 5.2 mmol/L Final     Comment:     Slight hemolysis detected by analyzer. Result may be falsely elevated.   01/15/2024 4.4 3.5 - 5.2 mmol/L Final   01/14/2024 4.3 3.5 - 5.2 mmol/L Final      Chloride Chloride   Date Value Ref Range Status   01/16/2024 105 98 - 107 mmol/L Final   01/15/2024 106 98 - 107 mmol/L Final   01/14/2024 101 98 - 107 " "mmol/L Final      CO2 CO2   Date Value Ref Range Status   01/16/2024 22.0 22.0 - 29.0 mmol/L Final   01/15/2024 27.0 22.0 - 29.0 mmol/L Final   01/14/2024 27.0 22.0 - 29.0 mmol/L Final      BUN BUN   Date Value Ref Range Status   01/16/2024 25 (H) 8 - 23 mg/dL Final   01/15/2024 24 (H) 8 - 23 mg/dL Final   01/14/2024 20 8 - 23 mg/dL Final      Creatinine Creatinine   Date Value Ref Range Status   01/16/2024 1.44 (H) 0.57 - 1.00 mg/dL Final   01/15/2024 1.32 (H) 0.57 - 1.00 mg/dL Final   01/14/2024 1.52 (H) 0.57 - 1.00 mg/dL Final      Calcium Calcium   Date Value Ref Range Status   01/16/2024 7.9 (L) 8.6 - 10.5 mg/dL Final   01/15/2024 8.4 (L) 8.6 - 10.5 mg/dL Final   01/14/2024 9.4 8.6 - 10.5 mg/dL Final      PO4 No components found for: \"PO4\"   Albumin Albumin   Date Value Ref Range Status   01/16/2024 2.9 (L) 3.5 - 5.2 g/dL Final   01/15/2024 3.1 (L) 3.5 - 5.2 g/dL Final   01/14/2024 3.8 3.5 - 5.2 g/dL Final      Magnesium Magnesium   Date Value Ref Range Status   01/16/2024 2.0 1.6 - 2.4 mg/dL Final      Uric Acid No components found for: \"URIC ACID\"     Imaging Results (Last 72 Hours)       Procedure Component Value Units Date/Time    CT Angiogram Chest Pulmonary Embolism [[id number]] Collected: 01/15/24 1424     Updated: 01/15/24 1434    Narrative:      CT ANGIOGRAM CHEST PULMONARY EMBOLISM    Date of Exam: 1/15/2024 2:05 PM EST    Indication: Pulmonary embolism (PE) suspected, positive D-dimer.    Comparison: None available.    Technique: Axial CT images were obtained of the chest after the uneventful intravenous administration of iodinated contrast utilizing pulmonary embolism protocol.  Sagittal and coronal reconstructions were performed.  Automated exposure control and   iterative reconstruction methods were used.      Findings:  There are new pulmonary arterial filling defects evident to suggest PE. The pulmonary arteries appear normal caliber. The thoracic aorta is ectatic measuring 3.9 cm. Coronary and " other vascular calcification is noted. There is a small pericardial   effusion. There is cardiomegaly. Small bilateral pleural effusions are noted, right greater than left. There is linear consolidation in the base of the right upper lobe suggesting partial atelectasis. Bibasilar dependent atelectasis is also noted. There   is patchy groundglass opacities in the perihilar regions which may indicate pulmonary edema or atypical infection pattern. Central airways are grossly patent.    There is marked heterogeneous enlargement of the left thyroid lobe with substernal extension, which may be due to goitrous change. There are mildly prominent lymph nodes in the right hilum and right paratracheal region which may be reactive. Limited   imaging through the upper abdomen demonstrates a 2.3 cm low-density nodule in the left adrenal suggesting adenomatous change. Gallbladder is absent. There is postsurgical change of the proximal stomach which may be due to prior hiatal hernia repair.   There is vertebral augmentation cement at the mid thoracic level. Probable hemangioma bone noted at what appears to be T10.      Impression:      Impression:    1. No evidence of pulmonary embolus.  2. Cardiomegaly with small pericardial effusion.  3. Small bilateral pleural effusions with overlying consolidation likely due to atelectasis.  4. Patchy groundglass opacities in the perihilar region may be due to early pulmonary edema or atypical infection pattern. Correlate clinically.  5. Borderline prominent lymph nodes in the right hilum and paratracheal region may be reactive given the pulmonary findings. Attention on follow-up suggested.        Electronically Signed: Pedrito Mcdowell MD    1/15/2024 2:31 PM EST    Workstation ID: [id number]    XR Chest PA & Lateral [[id number]] Collected: 01/15/24 0723     Updated: 01/15/24 0726    Narrative:      XR CHEST PA AND LATERAL    Date of Exam: 1/14/2024 9:20 PM CST    Indication: elevated  d-dimer    Comparison: Chest x-ray 1/14/2024    Findings:  The heart is stable in size. There is trace fluid in the right minor fissure and trace blunting at the costophrenic angle. No evidence for pneumothorax. There are calcified hilar lymph nodes.      Impression:      Impression:  Trace right pleural effusion.      Electronically Signed: Bia Lucero MD    1/15/2024 6:24 AM CST    Workstation ID: [id number]    XR Chest 1 View [[id number]] Collected: 01/14/24 1538     Updated: 01/14/24 1541    Narrative:      XR CHEST 1 VW    Date of Exam: 1/14/2024 3:22 PM EST    Indication: soa/covid +    Comparison: None available.    Findings: No focal consolidation. Resolving right midlung zone atelectasis. No pneumothorax or pleural effusion. Cardiac size is normal. The visualized clavicles appear intact. No displaced rib fractures. The visualized upper abdomen is normal.      Impression:      Impression: No acute cardiopulmonary disease.    Electronically Signed: Kiran Burgess MD    1/14/2024 3:39 PM EST    Workstation ID: [id number]            Results for orders placed during the hospital encounter of 01/14/24    XR Chest PA & Lateral    Narrative  XR CHEST PA AND LATERAL    Date of Exam: 1/14/2024 9:20 PM CST    Indication: elevated d-dimer    Comparison: Chest x-ray 1/14/2024    Findings:  The heart is stable in size. There is trace fluid in the right minor fissure and trace blunting at the costophrenic angle. No evidence for pneumothorax. There are calcified hilar lymph nodes.    Impression  Impression:  Trace right pleural effusion.      Electronically Signed: Bia Lucero MD  1/15/2024 6:24 AM CST  Workstation ID: [id number]      XR Chest 1 View    Narrative  XR CHEST 1 VW    Date of Exam: 1/14/2024 3:22 PM EST    Indication: soa/covid +    Comparison: None available.    Findings: No focal consolidation. Resolving right midlung zone atelectasis. No pneumothorax or pleural effusion. Cardiac size is normal. The  visualized clavicles appear intact. No displaced rib fractures. The visualized upper abdomen is normal.    Impression  Impression: No acute cardiopulmonary disease.    Electronically Signed: Kiran Burgess MD  1/14/2024 3:39 PM EST  Workstation ID: [id number]      Results for orders placed in visit on 08/29/23    XR Foot 3+ View Bilateral    Narrative  XR FOOT 3+ VW BILATERAL    Date of Exam: 8/29/2023 2:20 PM EDT    Indication: augustine heel pain  room 13  wb    Comparison: Right foot 7/1/2022    Findings:  Right foot: There is no acute fracture or dislocation. No bony erosion or abnormal periosteal reaction identified. There is enthesophyte formation of the calcaneus unchanged from prior study. Tarsal bones are otherwise unremarkable. There is some mild  pes planus deformity which has developed since the prior exam.    Left foot: No acute fracture or dislocation. No bony erosion or abnormal periosteal reaction. There is mild pes planus deformity. There is enthesophyte formation of the calcaneus.    Impression  Impression:      1. Bilateral pes planus deformity.  2. No acute bony abnormality. No evidence of erosive arthropathy.    Electronically Signed: Srinath Lock MD  8/30/2023 2:07 PM EDT  Workstation ID: [id number]      Results for orders placed during the hospital encounter of 08/22/23    Doppler Ankle Brachial Index Single Level CAR    Interpretation Summary    Right Conclusion: The right IKE is normal. Normal digital pressures.    Left Conclusion: The left IKE is normal. Mild digital ischemia.        ASSESSMENT / PLAN      Chest pain    1. CRF/CKD-------Nonoliguric. 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 Cr stable. Lytes okay. Volume okay. Dose meds for CrCl 30-45 cc/min. No NSAIDs.  Stable post CT PE. Patient has been explained and understands the risks of IV dye exposure. D/C IVFs and follow     2. HTN WITH CKD------BP okay. Avoid hypotension     3. DMII  WITH RENAL MANIFESTATIONS-----BS okay. Avoid hypoglycemia. Lantus, Glucometers, SSI. Follow with steroid exposure     4. COPD/COVID 19 + PNA-------Oxygen, Decadron, Supportive care     5. URETERAL CA S/P R NEPHRECTOMY/SOLITARY KIDNEY STATE     6. PULMONARY HTN     7. OA/DJD-----No NSAIDs.       8. DVT PROPHYLAXIS------Lovenox     9. ELEVATED D-DIMER-------CT PE negative for PE     10. DEPRESSION------On Zoloft     11. GERD/PUD PROPHYLAXIS--------PPI. Benefits outweigh risks despite renal dysfunction        Sonali Ordonez MD  [facility]  [phone]  01/16/24  08:05 EST

## 2024-01-16 NOTE — PROGRESS NOTES
LOS: 2 days   Patient Care Team:  Anny Garcia MD as PCP - General (Internal Medicine)  Sergio Ordonez MD as Consulting Physician (Nephrology)    Subjective     Patient states continues with some chest discomfort and coughing    Review of Systems   Constitutional:  Negative for activity change, appetite change and fatigue.   HENT: Negative.     Respiratory:  Negative for shortness of breath.    Cardiovascular: Negative.    Gastrointestinal: Negative.    Genitourinary: Negative.    Musculoskeletal: Negative.    Neurological:  Positive for weakness.   Psychiatric/Behavioral: Negative.             Objective     Vital Signs  Temp:  [97 °F (36.1 °C)-98.1 °F (36.7 °C)] 98.1 °F (36.7 °C)  Heart Rate:  [61-98] 62  Resp:  [16-20] 18  BP: (108-131)/(54-69) 131/58      Physical Exam  Vitals reviewed.   Constitutional:       Appearance: She is not ill-appearing.   HENT:      Head: Normocephalic and atraumatic.      Right Ear: External ear normal.      Left Ear: External ear normal.      Nose: Nose normal.      Mouth/Throat:      Mouth: Mucous membranes are moist.   Eyes:      General:         Right eye: No discharge.         Left eye: No discharge.   Cardiovascular:      Rate and Rhythm: Normal rate and regular rhythm.      Pulses: Normal pulses.      Heart sounds: Normal heart sounds.   Pulmonary:      Effort: Pulmonary effort is normal.      Breath sounds: Normal breath sounds.   Abdominal:      General: Bowel sounds are normal.      Palpations: Abdomen is soft.   Musculoskeletal:         General: Normal range of motion.      Cervical back: Normal range of motion.   Skin:     General: Skin is warm.      Coloration: Skin is pale.   Neurological:      Mental Status: She is alert and oriented to person, place, and time.   Psychiatric:         Behavior: Behavior normal.              Results Review:    Lab Results (last 24 hours)       Procedure Component Value Units Date/Time    POC Glucose Once [[id number]]   (Abnormal) Collected: 01/16/24 1100    Specimen: Blood Updated: 01/16/24 1102     Glucose 69 mg/dL      Comment: Serial Number: [id number]:  [id number]       Urine Culture - Urine, Urine, Clean Catch [[id number]]  (Normal) Collected: 01/15/24 1349    Specimen: Urine, Clean Catch Updated: 01/16/24 0959     Urine Culture No growth    POC Glucose Once [[id number]]  (Abnormal) Collected: 01/16/24 0641    Specimen: Blood Updated: 01/16/24 0642     Glucose 119 mg/dL      Comment: Serial Number: [id number]:  [id number]       Blood Culture - Blood, Arm, Right [[id number]]  (Normal) Collected: 01/15/24 0140    Specimen: Blood from Arm, Right Updated: 01/16/24 0200     Blood Culture No growth at 24 hours    Blood Culture - Blood, Hand, Left [[id number]]  (Normal) Collected: 01/15/24 0140    Specimen: Blood from Hand, Left Updated: 01/16/24 0200     Blood Culture No growth at 24 hours    Magnesium [[id number]]  (Normal) Collected: 01/16/24 0003    Specimen: Blood Updated: 01/16/24 0053     Magnesium 2.0 mg/dL     Comprehensive Metabolic Panel [[id number]]  (Abnormal) Collected: 01/16/24 0003    Specimen: Blood Updated: 01/16/24 0053     Glucose 238 mg/dL      BUN 25 mg/dL      Creatinine 1.44 mg/dL      Sodium 136 mmol/L      Potassium 4.5 mmol/L      Comment: Slight hemolysis detected by analyzer. Result may be falsely elevated.        Chloride 105 mmol/L      CO2 22.0 mmol/L      Calcium 7.9 mg/dL      Total Protein 5.0 g/dL      Albumin 2.9 g/dL      ALT (SGPT) 21 U/L      AST (SGOT) 15 U/L      Alkaline Phosphatase 49 U/L      Total Bilirubin <0.2 mg/dL      Globulin 2.1 gm/dL      A/G Ratio 1.4 g/dL      BUN/Creatinine Ratio 17.4     Anion Gap 9.0 mmol/L      eGFR 36.8 mL/min/1.73     Narrative:      GFR Normal >60  Chronic Kidney Disease <60  Kidney Failure <15    The GFR formula is only valid for adults with stable renal function between ages 18 and 70.    Phosphorus [[id number]]  (Abnormal) Collected: 01/16/24  0003    Specimen: Blood Updated: 01/16/24 0053     Phosphorus 2.4 mg/dL     Calcium, Ionized [[id number]]  (Abnormal) Collected: 01/16/24 0003    Specimen: Blood Updated: 01/16/24 0040     Ionized Calcium 1.10 mmol/L     CBC (No Diff) [[id number]]  (Abnormal) Collected: 01/16/24 0003    Specimen: Blood Updated: 01/16/24 0034     WBC 4.50 10*3/mm3      RBC 3.70 10*6/mm3      Hemoglobin 12.3 g/dL      Hematocrit 36.8 %      MCV 99.4 fL      MCH 33.2 pg      MCHC 33.4 g/dL      RDW 14.1 %      RDW-SD 51.6 fl      MPV 8.6 fL      Platelets 146 10*3/mm3     POC Glucose Once [[id number]]  (Abnormal) Collected: 01/15/24 2057    Specimen: Blood Updated: 01/15/24 2059     Glucose 256 mg/dL      Comment: Serial Number: [id number]:  [id number]       POC Glucose Once [[id number]]  (Abnormal) Collected: 01/15/24 1732    Specimen: Blood Updated: 01/15/24 1734     Glucose 282 mg/dL      Comment: Serial Number: [id number]:  [id number]       Urinalysis With Culture If Indicated - Urine, Clean Catch [[id number]]  (Abnormal) Collected: 01/15/24 1349    Specimen: Urine, Clean Catch Updated: 01/15/24 1404     Color, UA Yellow     Appearance, UA Clear     pH, UA <=5.0     Specific Gravity, UA 1.018     Glucose,  mg/dL (1+)     Ketones, UA Negative     Bilirubin, UA Negative     Blood, UA Negative     Protein, UA Negative     Leuk Esterase, UA Trace     Nitrite, UA Negative     Urobilinogen, UA 0.2 E.U./dL    Narrative:      In absence of clinical symptoms, the presence of pyuria, bacteria, and/or nitrites on the urinalysis result does not correlate with infection.    Urinalysis, Microscopic Only - Urine, Clean Catch [[id number]]  (Abnormal) Collected: 01/15/24 1349    Specimen: Urine, Clean Catch Updated: 01/15/24 1404     RBC, UA 0-2 /HPF      WBC, UA 6-10 /HPF      Bacteria, UA 1+ /HPF      Squamous Epithelial Cells, UA 13-20 /HPF      Hyaline Casts, UA 0-2 /LPF      Methodology Automated Microscopy             Imaging  Results (Last 24 Hours)       Procedure Component Value Units Date/Time    CT Angiogram Chest Pulmonary Embolism [[id number]] Collected: 01/15/24 1424     Updated: 01/15/24 1434    Narrative:      CT ANGIOGRAM CHEST PULMONARY EMBOLISM    Date of Exam: 1/15/2024 2:05 PM EST    Indication: Pulmonary embolism (PE) suspected, positive D-dimer.    Comparison: None available.    Technique: Axial CT images were obtained of the chest after the uneventful intravenous administration of iodinated contrast utilizing pulmonary embolism protocol.  Sagittal and coronal reconstructions were performed.  Automated exposure control and   iterative reconstruction methods were used.      Findings:  There are new pulmonary arterial filling defects evident to suggest PE. The pulmonary arteries appear normal caliber. The thoracic aorta is ectatic measuring 3.9 cm. Coronary and other vascular calcification is noted. There is a small pericardial   effusion. There is cardiomegaly. Small bilateral pleural effusions are noted, right greater than left. There is linear consolidation in the base of the right upper lobe suggesting partial atelectasis. Bibasilar dependent atelectasis is also noted. There   is patchy groundglass opacities in the perihilar regions which may indicate pulmonary edema or atypical infection pattern. Central airways are grossly patent.    There is marked heterogeneous enlargement of the left thyroid lobe with substernal extension, which may be due to goitrous change. There are mildly prominent lymph nodes in the right hilum and right paratracheal region which may be reactive. Limited   imaging through the upper abdomen demonstrates a 2.3 cm low-density nodule in the left adrenal suggesting adenomatous change. Gallbladder is absent. There is postsurgical change of the proximal stomach which may be due to prior hiatal hernia repair.   There is vertebral augmentation cement at the mid thoracic level. Probable hemangioma bone  noted at what appears to be T10.      Impression:      Impression:    1. No evidence of pulmonary embolus.  2. Cardiomegaly with small pericardial effusion.  3. Small bilateral pleural effusions with overlying consolidation likely due to atelectasis.  4. Patchy groundglass opacities in the perihilar region may be due to early pulmonary edema or atypical infection pattern. Correlate clinically.  5. Borderline prominent lymph nodes in the right hilum and paratracheal region may be reactive given the pulmonary findings. Attention on follow-up suggested.        Electronically Signed: Pedrito Mcdowell MD    1/15/2024 2:31 PM EST    Workstation ID: [id number]                 I reviewed the patient's new clinical results.    Medication Review:   Scheduled Meds:Acetylcysteine, 1,200 mg, Oral, BID  albuterol sulfate HFA, 2 puff, Inhalation, 4x Daily - RT  amLODIPine, 10 mg, Oral, Daily  aspirin, 81 mg, Oral, Daily  azithromycin, 500 mg, Intravenous, Q24H  budesonide-formoterol, 1 puff, Inhalation, BID - RT  calcium gluconate, 1,000 mg, Intravenous, Q12H  carvedilol, 12.5 mg, Oral, BID With Meals  dexAMETHasone, 6 mg, Oral, Daily With Breakfast  Diclofenac Sodium, 2 g, Topical, BID  enoxaparin, 40 mg, Subcutaneous, Daily  gabapentin, 100 mg, Oral, Nightly  hydrALAZINE, 25 mg, Oral, BID  insulin glargine, 10 Units, Subcutaneous, Nightly  linagliptin, 5 mg, Oral, Daily  pantoprazole, 40 mg, Oral, Q AM  polyethylene glycol, 17 g, Oral, BID  senna-docusate sodium, 2 tablet, Oral, BID  sertraline, 50 mg, Oral, Daily  sodium chloride, 10 mL, Intravenous, Q12H  terazosin, 2 mg, Oral, Nightly  traMADol, 100 mg, Oral, BID      Continuous Infusions:     PRN Meds:.  albuterol sulfate HFA    senna-docusate sodium **AND** polyethylene glycol **AND** bisacodyl **AND** bisacodyl    HYDROcodone-acetaminophen    methocarbamol    [COMPLETED] Insert Peripheral IV **AND** sodium chloride    sodium chloride    sodium chloride     Interval  History:    Assessment & Plan     Chest pain  COVID +  Elevated d-dimer  HTN  Pulmonary htn  HLD  Diabetes   CKD (one kindey)  COPD  Gout  PVD     Plan of care  Cardiology and nephrology following; echo with normal LV function; stress test negative; cardiac event r/o with EKG, troponin and stress. Most likely from coughing chest discomfort. CT PE negative with some atypical pna present. Continue current plan of care and if improved home tomorrow    Plan for disposition:home    SAMIRA Flowers  01/16/24  13:22 EST

## 2024-01-17 ENCOUNTER — READMISSION MANAGEMENT (OUTPATIENT)
Dept: CALL CENTER | Facility: HOSPITAL | Age: 79
End: 2024-01-17
Payer: MEDICARE

## 2024-01-17 VITALS
BODY MASS INDEX: 31.18 KG/M2 | WEIGHT: 187.17 LBS | DIASTOLIC BLOOD PRESSURE: 76 MMHG | RESPIRATION RATE: 18 BRPM | TEMPERATURE: 97.8 F | HEART RATE: 90 BPM | HEIGHT: 65 IN | OXYGEN SATURATION: 93 % | SYSTOLIC BLOOD PRESSURE: 134 MMHG

## 2024-01-17 LAB
GLUCOSE BLDC GLUCOMTR-MCNC: 120 MG/DL (ref 70–105)
GLUCOSE BLDC GLUCOMTR-MCNC: 139 MG/DL (ref 70–105)
GLUCOSE BLDC GLUCOMTR-MCNC: 65 MG/DL (ref 70–105)

## 2024-01-17 PROCEDURE — 25010000002 MAGNESIUM SULFATE IN D5W 1G/100ML (PREMIX) 1-5 GM/100ML-% SOLUTION: Performed by: INTERNAL MEDICINE

## 2024-01-17 PROCEDURE — 82948 REAGENT STRIP/BLOOD GLUCOSE: CPT | Performed by: INTERNAL MEDICINE

## 2024-01-17 PROCEDURE — 94799 UNLISTED PULMONARY SVC/PX: CPT

## 2024-01-17 PROCEDURE — 94664 DEMO&/EVAL PT USE INHALER: CPT

## 2024-01-17 PROCEDURE — 94761 N-INVAS EAR/PLS OXIMETRY MLT: CPT

## 2024-01-17 PROCEDURE — 99232 SBSQ HOSP IP/OBS MODERATE 35: CPT | Performed by: INTERNAL MEDICINE

## 2024-01-17 PROCEDURE — 82948 REAGENT STRIP/BLOOD GLUCOSE: CPT

## 2024-01-17 RX ORDER — POLYETHYLENE GLYCOL 3350 17 G/17G
17 POWDER, FOR SOLUTION ORAL 2 TIMES DAILY
Start: 2024-01-17 | End: 2024-01-21

## 2024-01-17 RX ORDER — HYDROCODONE BITARTRATE AND ACETAMINOPHEN 5; 325 MG/1; MG/1
1 TABLET ORAL EVERY 8 HOURS PRN
Qty: 15 TABLET | Refills: 0 | Status: SHIPPED | OUTPATIENT
Start: 2024-01-17

## 2024-01-17 RX ORDER — MAGNESIUM SULFATE 1 G/100ML
1 INJECTION INTRAVENOUS ONCE
Status: COMPLETED | OUTPATIENT
Start: 2024-01-17 | End: 2024-01-17

## 2024-01-17 RX ORDER — METHOCARBAMOL 500 MG/1
500 TABLET, FILM COATED ORAL 4 TIMES DAILY
Qty: 30 TABLET | Refills: 0 | Status: SHIPPED | OUTPATIENT
Start: 2024-01-17

## 2024-01-17 RX ORDER — METHOCARBAMOL 500 MG/1
500 TABLET, FILM COATED ORAL EVERY 8 HOURS PRN
Qty: 60 TABLET | Refills: 0 | Status: SHIPPED | OUTPATIENT
Start: 2024-01-17 | End: 2024-01-21

## 2024-01-17 RX ORDER — OMEPRAZOLE 40 MG/1
40 CAPSULE, DELAYED RELEASE ORAL EVERY MORNING
Qty: 30 CAPSULE | Refills: 0 | Status: SHIPPED | OUTPATIENT
Start: 2024-01-17

## 2024-01-17 RX ADMIN — Medication 10 ML: at 09:52

## 2024-01-17 RX ADMIN — SERTRALINE HYDROCHLORIDE 50 MG: 50 TABLET ORAL at 08:37

## 2024-01-17 RX ADMIN — CARVEDILOL 12.5 MG: 6.25 TABLET, FILM COATED ORAL at 08:37

## 2024-01-17 RX ADMIN — LINAGLIPTIN 5 MG: 5 TABLET, FILM COATED ORAL at 08:37

## 2024-01-17 RX ADMIN — MAGNESIUM SULFATE IN DEXTROSE 1 G: 10 INJECTION, SOLUTION INTRAVENOUS at 11:45

## 2024-01-17 RX ADMIN — AMLODIPINE BESYLATE 10 MG: 5 TABLET ORAL at 08:37

## 2024-01-17 RX ADMIN — TRAMADOL HYDROCHLORIDE 100 MG: 50 TABLET, COATED ORAL at 08:37

## 2024-01-17 RX ADMIN — POLYETHYLENE GLYCOL 3350 17 G: 17 POWDER, FOR SOLUTION ORAL at 08:37

## 2024-01-17 RX ADMIN — ALBUTEROL SULFATE 2 PUFF: 108 AEROSOL, METERED RESPIRATORY (INHALATION) at 14:00

## 2024-01-17 RX ADMIN — HYDRALAZINE HYDROCHLORIDE 25 MG: 25 TABLET ORAL at 08:37

## 2024-01-17 RX ADMIN — ALBUTEROL SULFATE 2 PUFF: 108 AEROSOL, METERED RESPIRATORY (INHALATION) at 07:17

## 2024-01-17 RX ADMIN — PANTOPRAZOLE SODIUM 40 MG: 40 TABLET, DELAYED RELEASE ORAL at 05:13

## 2024-01-17 RX ADMIN — ASPIRIN 81 MG: 81 TABLET, COATED ORAL at 08:37

## 2024-01-17 RX ADMIN — ALBUTEROL SULFATE 2 PUFF: 108 AEROSOL, METERED RESPIRATORY (INHALATION) at 11:17

## 2024-01-17 RX ADMIN — DEXAMETHASONE 6 MG: 4 TABLET ORAL at 08:37

## 2024-01-17 RX ADMIN — BUDESONIDE AND FORMOTEROL FUMARATE DIHYDRATE 1 PUFF: 160; 4.5 AEROSOL RESPIRATORY (INHALATION) at 07:19

## 2024-01-17 NOTE — NURSING NOTE
Pt blood sugar 269 before dinner, pt on decadron. Secure chat to MD and orders placed for sliding scale

## 2024-01-17 NOTE — PROGRESS NOTES
[facility] CARDIOLOGY  [facility]        LOS:  LOS: 3 days   Patient Name: Vianey Reeves  Age/Sex: 78 y.o. female  : 1945  MRN: [id number]    Day of Service: 24   Length of Stay: 3  Encounter Provider: Charles Pak MD  Place of Service: [facility] CARDIOLOGY  Patient Care Team:  Anny Garcia MD as PCP - General (Internal Medicine)  Sergio Ordonez MD as Consulting Physician (Nephrology)    Cardiology assessment and plan     Chest pain  Nonspecific elevation of troponin  Abnormal D-dimer  Acute on chronic renal insufficiency  Coronary artery disease prior PCI and stenting  Normal LV systolic function  COVID-positive status  Hypertension  Hyperlipidemia  Diabetes mellitus  COPD  Gout  Peripheral vascular disease  Renal function stable    Denies any new cardiac symptoms  Denies any chest pain  Tmax is 97.6 pulse is 66 respirations are 20 blood pressure is 128/70 sats are 92% on room air  Nonspecific elevation of troponin with no significant trend  Normal proBNP  Current medications include aspirin 81 mg p.o. once a day Norvasc 10 mg p.o. once a day Coreg 6.25 mg p.o. twice daily hydralazine 25 mg p.o. twice a day patient is on Hytrin 2 mg p.o. at nighttime patient is on Lovenox for DVT prophylaxis  CT chest with no pulmonary embolism  Myocardial perfusion study with no significant reversible ischemia  From cardiac standpoint continued aggressive risk factor modification and close monitoring as of outpatient  If patient continued to have cardiac symptoms will consider further evaluation workup  Discussed with patient at bedside  Stable from cardiac standpoint  Okay to discharge and follow-up as an outpatient from cardiac standpoint                  Subjective:     Chief Complaint:  F/U CP    Subjective:   Patient only c/o positional chest discomfort this morning with lying on her side.  Denies SOA.    Current  Medications:   Scheduled Meds:albuterol sulfate HFA, 2 puff, Inhalation, 4x Daily - RT  amLODIPine, 10 mg, Oral, Daily  aspirin, 81 mg, Oral, Daily  budesonide-formoterol, 1 puff, Inhalation, BID - RT  carvedilol, 12.5 mg, Oral, BID With Meals  dexAMETHasone, 6 mg, Oral, Daily With Breakfast  Diclofenac Sodium, 2 g, Topical, BID  enoxaparin, 40 mg, Subcutaneous, Daily  gabapentin, 100 mg, Oral, Nightly  hydrALAZINE, 25 mg, Oral, BID  insulin glargine, 10 Units, Subcutaneous, Nightly  insulin lispro, 2-7 Units, Subcutaneous, TID With Meals  linagliptin, 5 mg, Oral, Daily  magnesium sulfate, 1 g, Intravenous, Once  pantoprazole, 40 mg, Oral, Q AM  polyethylene glycol, 17 g, Oral, BID  senna-docusate sodium, 2 tablet, Oral, BID  sertraline, 50 mg, Oral, Daily  sodium chloride, 10 mL, Intravenous, Q12H  terazosin, 2 mg, Oral, Nightly  traMADol, 100 mg, Oral, BID      Continuous Infusions:     Allergies:  Allergies   Allergen Reactions    Erythromycin Rash    Naproxen Sodium Other (See Comments)     Dizzy lightheaded    Statins Myalgia    Latex Itching and Swelling    Metoclopramide Other (See Comments)     Unsteady on feet    Rocephin [Ceftriaxone] Itching and Nausea Only    Dicyclomine Unknown - Low Severity       Review of Systems   Constitutional: Negative for chills, decreased appetite and malaise/fatigue.   HENT:  Negative for congestion and nosebleeds.    Eyes:  Negative for blurred vision and double vision.   Cardiovascular:  Positive for chest pain and dyspnea on exertion. Negative for irregular heartbeat, leg swelling, near-syncope, orthopnea, palpitations and paroxysmal nocturnal dyspnea.   Respiratory:  Positive for shortness of breath. Negative for cough.    Hematologic/Lymphatic: Negative for adenopathy. Does not bruise/bleed easily.   Skin:  Negative for color change and rash.   Musculoskeletal:  Negative for back pain and joint pain.   Gastrointestinal:  Negative for bloating, abdominal pain, hematemesis  "and hematochezia.   Genitourinary:  Negative for flank pain and hematuria.   Neurological:  Negative for dizziness and focal weakness.   Psychiatric/Behavioral:  Negative for altered mental status. The patient does not have insomnia.        Objective:     Temp:  [96.7 °F (35.9 °C)-99.1 °F (37.3 °C)] 97.6 °F (36.4 °C)  Heart Rate:  [60-87] 67  Resp:  [15-20] 20  BP: (117-132)/(58-83) 128/71     Intake/Output Summary (Last 24 hours) at 1/17/2024 1005  Last data filed at 1/17/2024 0900  Gross per 24 hour   Intake 960 ml   Output 300 ml   Net 660 ml     Body mass index is 31.15 kg/m².      01/15/24  1109 01/16/24  0500 01/17/24  0500   Weight: 84.4 kg (186 lb) 84.8 kg (186 lb 15.2 oz) 84.9 kg (187 lb 2.7 oz)         Physical Exam:  Neuro:  CV:  Resp:  GI:  Ext:  Tele: AAOx3, no gross deficits  S1S2 RRR, no murmur  Nonlabored, faint wheezing  BS+, abd soft  Pedal pulses palp, no edema  SR with PAC / PVC                                                   Lab Review:   Results from last 7 days   Lab Units 01/16/24 2305 01/16/24  0003 01/15/24  0748   SODIUM mmol/L 141 136 139   POTASSIUM mmol/L 4.7 4.5 4.4   CHLORIDE mmol/L 109* 105 106   CO2 mmol/L 25.0 22.0 27.0   BUN mg/dL 26* 25* 24*   CREATININE mg/dL 1.23* 1.44* 1.32*   GLUCOSE mg/dL 277* 238* 163*   CALCIUM mg/dL 8.6 7.9* 8.4*   AST (SGOT) U/L  --  15 18   ALT (SGPT) U/L  --  21 25     Results from last 7 days   Lab Units 01/15/24  0748 01/14/24  1619 01/14/24  1405   CK TOTAL U/L 29  --   --    HSTROP T ng/L  --  57* 67*     Results from last 7 days   Lab Units 01/16/24 2305 01/16/24  0003   WBC 10*3/mm3 5.30 4.50   HEMOGLOBIN g/dL 12.5 12.3   HEMATOCRIT % 38.0 36.8   PLATELETS 10*3/mm3 165 146         Results from last 7 days   Lab Units 01/16/24  2305 01/16/24  0003   MAGNESIUM mg/dL 1.9 2.0           Invalid input(s): \"LDLCALC\"  Results from last 7 days   Lab Units 01/14/24  1405   PROBNP pg/mL 642.8     Results from last 7 days   Lab Units 01/15/24  0748   TSH " uIU/mL 0.261*       Recent Radiology:  Imaging Results (Most Recent)       Procedure Component Value Units Date/Time    CT Angiogram Chest Pulmonary Embolism [[id number]] Collected: 01/15/24 1424     Updated: 01/15/24 1434    Narrative:      CT ANGIOGRAM CHEST PULMONARY EMBOLISM    Date of Exam: 1/15/2024 2:05 PM EST    Indication: Pulmonary embolism (PE) suspected, positive D-dimer.    Comparison: None available.    Technique: Axial CT images were obtained of the chest after the uneventful intravenous administration of iodinated contrast utilizing pulmonary embolism protocol.  Sagittal and coronal reconstructions were performed.  Automated exposure control and   iterative reconstruction methods were used.      Findings:  There are new pulmonary arterial filling defects evident to suggest PE. The pulmonary arteries appear normal caliber. The thoracic aorta is ectatic measuring 3.9 cm. Coronary and other vascular calcification is noted. There is a small pericardial   effusion. There is cardiomegaly. Small bilateral pleural effusions are noted, right greater than left. There is linear consolidation in the base of the right upper lobe suggesting partial atelectasis. Bibasilar dependent atelectasis is also noted. There   is patchy groundglass opacities in the perihilar regions which may indicate pulmonary edema or atypical infection pattern. Central airways are grossly patent.    There is marked heterogeneous enlargement of the left thyroid lobe with substernal extension, which may be due to goitrous change. There are mildly prominent lymph nodes in the right hilum and right paratracheal region which may be reactive. Limited   imaging through the upper abdomen demonstrates a 2.3 cm low-density nodule in the left adrenal suggesting adenomatous change. Gallbladder is absent. There is postsurgical change of the proximal stomach which may be due to prior hiatal hernia repair.   There is vertebral augmentation cement at the  mid thoracic level. Probable hemangioma bone noted at what appears to be T10.      Impression:      Impression:    1. No evidence of pulmonary embolus.  2. Cardiomegaly with small pericardial effusion.  3. Small bilateral pleural effusions with overlying consolidation likely due to atelectasis.  4. Patchy groundglass opacities in the perihilar region may be due to early pulmonary edema or atypical infection pattern. Correlate clinically.  5. Borderline prominent lymph nodes in the right hilum and paratracheal region may be reactive given the pulmonary findings. Attention on follow-up suggested.        Electronically Signed: Pedrito Mcdowell MD    1/15/2024 2:31 PM EST    Workstation ID: [id number]    XR Chest PA & Lateral [[id number]] Collected: 01/15/24 0723     Updated: 01/15/24 0726    Narrative:      XR CHEST PA AND LATERAL    Date of Exam: 1/14/2024 9:20 PM CST    Indication: elevated d-dimer    Comparison: Chest x-ray 1/14/2024    Findings:  The heart is stable in size. There is trace fluid in the right minor fissure and trace blunting at the costophrenic angle. No evidence for pneumothorax. There are calcified hilar lymph nodes.      Impression:      Impression:  Trace right pleural effusion.      Electronically Signed: Bia Lucero MD    1/15/2024 6:24 AM CST    Workstation ID: [id number]    XR Chest 1 View [[id number]] Collected: 01/14/24 1538     Updated: 01/14/24 1541    Narrative:      XR CHEST 1 VW    Date of Exam: 1/14/2024 3:22 PM EST    Indication: soa/covid +    Comparison: None available.    Findings: No focal consolidation. Resolving right midlung zone atelectasis. No pneumothorax or pleural effusion. Cardiac size is normal. The visualized clavicles appear intact. No displaced rib fractures. The visualized upper abdomen is normal.      Impression:      Impression: No acute cardiopulmonary disease.    Electronically Signed: Kiran Burgess MD    1/14/2024 3:39 PM EST    Workstation ID: [id number]             ECHOCARDIOGRAM:    Results for orders placed during the hospital encounter of 01/14/24    Adult Transthoracic Echo Complete W/ Cont if Necessary Per Protocol    Interpretation Summary    Left ventricular systolic function is normal. Calculated left ventricular EF = 51% Left ventricular ejection fraction appears to be 51 - 55%.    Left ventricular diastolic function is consistent with (grade I) impaired relaxation.    The left atrial cavity is mild to moderately dilated.    There is mild calcification of the aortic valve mainly affecting the left coronary and right coronary cusp(s).    Moderate mitral valve regurgitation is present.    Estimated right ventricular systolic pressure from tricuspid regurgitation is normal (<35 mmHg).        I reviewed the patient's new clinical results.    EKG:      Assessment:       Chest pain    1) Chest Pain  -High sensitivity troponin 67, 57  -EKG shows no acute ST abnormalities  -CXR shows trace right pleural effusion  - d-dimer positive but unable to have CTA chest (renal dysfunction) or VQ scan (covid)  - 2D echo 1/15 showed EF 51-55% with grade 1 diastolic dysfunction and moderate MR.  - CTA chest is negative PE.     2) CAD status post PCI in 2016  - Last nuclear stress test in 2019 was negative for ischemia.    - Last 2D echo 1/2023 showed an EF of 56 to 60% with grade 1 diastolic dysfunction and mild MR/TR.       3) COVID positive     4) HTN     5) HLD     6) DM     7) CKD stage III with solitary kidney  - nephrology following     8) COPD     9) gout     10) PVD

## 2024-01-17 NOTE — DISCHARGE SUMMARY
Date of Discharge:  1/17/2024    Discharge Diagnosis:     Chest pain  COVID-19 virus  Elevated D-dimer  Essential hypertension  Pulmonary hypertension  Hyperlipidemia  Diabetes mellitus  Chronic kidney disease  Chronic obstructive pulmonary disease  Gout  Peripheral vascular disease    Presenting Problem/History of Present Illness  Active Hospital Problems    Diagnosis  POA    **Chest pain [R07.9]  Yes      Resolved Hospital Problems   No resolved problems to display.          Hospital Course    Vianey Reeves is an 78-year-old female who presented to [facility] ED on 1/14/2024 and discharged on 1/17/2024.  Patient presented to [facility] urgent care with complaints progressive shortness of breath.  She was seen by urgent care and sent to emergency room for chest pain, elevated D-dimer, and COVID-19 virus.  Cardiology was consulted.  Myocardial perfusion study was completed with no significant reversible ischemia.  Cardiac event ruled out with EKG, troponin, and stress test.  Chest CT showed no pulmonary embolism.  Patient was stable from cardiac standpoint.  Nephrology was consulted and followed for chronic renal failure.  Patient's oxygenation remained stable on room air.  Patient denies chest pain at time of discharge.  She is eager to go home.  Patient is overall hemodynamically stable and agrees with above plan.  She will follow-up with cardiology as outpatient.  Patient will follow-up with her PCP in 1 to 2 weeks.  Patient is established with nephrology and will keep her routine follow-up appointment.    Procedures Performed         Consults:   Consults       Date and Time Order Name Status Description    1/15/2024  9:44 AM Inpatient Nephrology Consult Completed     1/14/2024  3:33 PM Inpatient Cardiology Consult Completed     1/14/2024  3:29 PM Cardiology (on-call MD unless specified)      1/14/2024  3:21 PM Hospitalist (on-call MD unless specified)              Pertinent Test Results:    Lab  Results (most recent)       Procedure Component Value Units Date/Time    POC Glucose TID AC [[id number]]  (Abnormal) Collected: 01/17/24 1123    Specimen: Blood Updated: 01/17/24 1124     Glucose 120 mg/dL      Comment: Serial Number: [id number]:  [id number]       POC Glucose Once [[id number]]  (Abnormal) Collected: 01/17/24 0834    Specimen: Blood Updated: 01/17/24 0849     Glucose 139 mg/dL      Comment: Serial Number: [id number]:  [id number]       Blood Culture - Blood, Arm, Right [[id number]]  (Normal) Collected: 01/15/24 0140    Specimen: Blood from Arm, Right Updated: 01/17/24 0200     Blood Culture No growth at 2 days    Blood Culture - Blood, Hand, Left [[id number]]  (Normal) Collected: 01/15/24 0140    Specimen: Blood from Hand, Left Updated: 01/17/24 0200     Blood Culture No growth at 2 days    Basic Metabolic Panel [[id number]]  (Abnormal) Collected: 01/16/24 2305    Specimen: Blood Updated: 01/16/24 2352     Glucose 277 mg/dL      BUN 26 mg/dL      Creatinine 1.23 mg/dL      Sodium 141 mmol/L      Potassium 4.7 mmol/L      Comment: Slight hemolysis detected by analyzer. Result may be falsely elevated.        Chloride 109 mmol/L      CO2 25.0 mmol/L      Calcium 8.6 mg/dL      BUN/Creatinine Ratio 21.1     Anion Gap 7.0 mmol/L      eGFR 44.5 mL/min/1.73     Narrative:      GFR Normal >60  Chronic Kidney Disease <60  Kidney Failure <15    The GFR formula is only valid for adults with stable renal function between ages 18 and 70.    Magnesium [[id number]]  (Normal) Collected: 01/16/24 2305    Specimen: Blood Updated: 01/16/24 2352     Magnesium 1.9 mg/dL     Phosphorus [[id number]]  (Normal) Collected: 01/16/24 2305    Specimen: Blood Updated: 01/16/24 2352     Phosphorus 2.5 mg/dL     CBC (No Diff) [[id number]]  (Abnormal) Collected: 01/16/24 2305    Specimen: Blood Updated: 01/16/24 2329     WBC 5.30 10*3/mm3      RBC 3.77 10*6/mm3      Hemoglobin 12.5 g/dL      Hematocrit 38.0 %      .7 fL       MCH 33.1 pg      MCHC 32.9 g/dL      RDW 14.1 %      RDW-SD 49.0 fl      MPV 8.8 fL      Platelets 165 10*3/mm3     Urine Culture - Urine, Urine, Clean Catch [[id number]]  (Normal) Collected: 01/15/24 1349    Specimen: Urine, Clean Catch Updated: 01/16/24 0959     Urine Culture No growth    Magnesium [[id number]]  (Normal) Collected: 01/16/24 0003    Specimen: Blood Updated: 01/16/24 0053     Magnesium 2.0 mg/dL     Comprehensive Metabolic Panel [[id number]]  (Abnormal) Collected: 01/16/24 0003    Specimen: Blood Updated: 01/16/24 0053     Glucose 238 mg/dL      BUN 25 mg/dL      Creatinine 1.44 mg/dL      Sodium 136 mmol/L      Potassium 4.5 mmol/L      Comment: Slight hemolysis detected by analyzer. Result may be falsely elevated.        Chloride 105 mmol/L      CO2 22.0 mmol/L      Calcium 7.9 mg/dL      Total Protein 5.0 g/dL      Albumin 2.9 g/dL      ALT (SGPT) 21 U/L      AST (SGOT) 15 U/L      Alkaline Phosphatase 49 U/L      Total Bilirubin <0.2 mg/dL      Globulin 2.1 gm/dL      A/G Ratio 1.4 g/dL      BUN/Creatinine Ratio 17.4     Anion Gap 9.0 mmol/L      eGFR 36.8 mL/min/1.73     Narrative:      GFR Normal >60  Chronic Kidney Disease <60  Kidney Failure <15    The GFR formula is only valid for adults with stable renal function between ages 18 and 70.    Phosphorus [[id number]]  (Abnormal) Collected: 01/16/24 0003    Specimen: Blood Updated: 01/16/24 0053     Phosphorus 2.4 mg/dL     Calcium, Ionized [[id number]]  (Abnormal) Collected: 01/16/24 0003    Specimen: Blood Updated: 01/16/24 0040     Ionized Calcium 1.10 mmol/L     CBC (No Diff) [[id number]]  (Abnormal) Collected: 01/16/24 0003    Specimen: Blood Updated: 01/16/24 0034     WBC 4.50 10*3/mm3      RBC 3.70 10*6/mm3      Hemoglobin 12.3 g/dL      Hematocrit 36.8 %      MCV 99.4 fL      MCH 33.2 pg      MCHC 33.4 g/dL      RDW 14.1 %      RDW-SD 51.6 fl      MPV 8.6 fL      Platelets 146 10*3/mm3     Urinalysis With Culture If Indicated - Urine,  Clean Catch [[id number]]  (Abnormal) Collected: 01/15/24 1349    Specimen: Urine, Clean Catch Updated: 01/15/24 1404     Color, UA Yellow     Appearance, UA Clear     pH, UA <=5.0     Specific Gravity, UA 1.018     Glucose,  mg/dL (1+)     Ketones, UA Negative     Bilirubin, UA Negative     Blood, UA Negative     Protein, UA Negative     Leuk Esterase, UA Trace     Nitrite, UA Negative     Urobilinogen, UA 0.2 E.U./dL    Narrative:      In absence of clinical symptoms, the presence of pyuria, bacteria, and/or nitrites on the urinalysis result does not correlate with infection.    Urinalysis, Microscopic Only - Urine, Clean Catch [[id number]]  (Abnormal) Collected: 01/15/24 1349    Specimen: Urine, Clean Catch Updated: 01/15/24 1404     RBC, UA 0-2 /HPF      WBC, UA 6-10 /HPF      Bacteria, UA 1+ /HPF      Squamous Epithelial Cells, UA 13-20 /HPF      Hyaline Casts, UA 0-2 /LPF      Methodology Automated Microscopy    TSH [[id number]]  (Abnormal) Collected: 01/15/24 0748    Specimen: Blood Updated: 01/15/24 1026     TSH 0.261 uIU/mL     CK [[id number]]  (Normal) Collected: 01/15/24 0748    Specimen: Blood Updated: 01/15/24 1020     Creatine Kinase 29 U/L     Uric Acid [[id number]]  (Abnormal) Collected: 01/15/24 0748    Specimen: Blood Updated: 01/15/24 1020     Uric Acid 7.3 mg/dL     Comprehensive Metabolic Panel [[id number]]  (Abnormal) Collected: 01/15/24 0748    Specimen: Blood Updated: 01/15/24 0847     Glucose 163 mg/dL      BUN 24 mg/dL      Creatinine 1.32 mg/dL      Sodium 139 mmol/L      Potassium 4.4 mmol/L      Chloride 106 mmol/L      CO2 27.0 mmol/L      Calcium 8.4 mg/dL      Total Protein 5.1 g/dL      Albumin 3.1 g/dL      ALT (SGPT) 25 U/L      AST (SGOT) 18 U/L      Alkaline Phosphatase 54 U/L      Total Bilirubin <0.2 mg/dL      Globulin 2.0 gm/dL      A/G Ratio 1.6 g/dL      BUN/Creatinine Ratio 18.2     Anion Gap 6.0 mmol/L      eGFR 40.9 mL/min/1.73     Narrative:      GFR Normal >60  Chronic  Kidney Disease <60  Kidney Failure <15    The GFR formula is only valid for adults with stable renal function between ages 18 and 70.    CBC & Differential [[id number]]  (Abnormal) Collected: 01/15/24 0748    Specimen: Blood Updated: 01/15/24 0833    Narrative:      The following orders were created for panel order CBC & Differential.  Procedure                               Abnormality         Status                     ---------                               -----------         ------                     CBC Auto Differential[[id number]]        Abnormal            Final result                 Please view results for these tests on the individual orders.    CBC Auto Differential [[id number]]  (Abnormal) Collected: 01/15/24 0748    Specimen: Blood Updated: 01/15/24 0833     WBC 3.50 10*3/mm3      RBC 3.99 10*6/mm3      Hemoglobin 13.6 g/dL      Hematocrit 40.5 %      .4 fL      MCH 34.1 pg      MCHC 33.6 g/dL      RDW 14.4 %      RDW-SD 50.8 fl      MPV 8.3 fL      Platelets 164 10*3/mm3      Neutrophil % 61.2 %      Lymphocyte % 23.6 %      Monocyte % 14.9 %      Eosinophil % 0.1 %      Basophil % 0.2 %      Neutrophils, Absolute 2.10 10*3/mm3      Lymphocytes, Absolute 0.80 10*3/mm3      Monocytes, Absolute 0.50 10*3/mm3      Eosinophils, Absolute 0.00 10*3/mm3      Basophils, Absolute 0.00 10*3/mm3      nRBC 0.3 /100 WBC     Lactic Acid, Plasma [[id number]]  (Normal) Collected: 01/15/24 0140    Specimen: Blood Updated: 01/15/24 0228     Lactate 0.6 mmol/L     High Sensitivity Troponin T 2Hr [[id number]]  (Abnormal) Collected: 01/14/24 1619    Specimen: Blood Updated: 01/14/24 1659     HS Troponin T 57 ng/L      Troponin T Delta -10 ng/L     Narrative:      High Sensitive Troponin T Reference Range:  <14.0 ng/L- Negative Female for AMI  <22.0 ng/L- Negative Male for AMI  >=14 - Abnormal Female indicating possible myocardial injury.  >=22 - Abnormal Male indicating possible myocardial injury.   Clinicians would  "have to utilize clinical acumen, EKG, Troponin, and serial changes to determine if it is an Acute Myocardial Infarction or myocardial injury due to an underlying chronic condition.         D-dimer, Quantitative [[id number]]  (Abnormal) Collected: 01/14/24 1619    Specimen: Blood Updated: 01/14/24 1644     D-Dimer, Quantitative 1.83 mg/L (FEU)     Narrative:      According to the assay 's published package insert, a normal (<0.50 mg/L (FEU)) D-dimer result in conjunction with a non-high clinical probability assessment, excludes deep vein thrombosis (DVT) and pulmonary embolism (PE) with high sensitivity.    D-dimer values increase with age and this can make VTE exclusion of an older population difficult. To address this, the [employer], based on best available evidence and recent guidelines, recommends that clinicians use age-adjusted D-dimer thresholds in patients greater than 50 years of age with: a) a low probability of PE who do not meet all Pulmonary Embolism Rule Out Criteria, or b) in those with intermediate probability of PE.   The formula for an age-adjusted D-dimer cut-off is \"age/100\".  For example, a 60 year old patient would have an age-adjusted cut-off of 0.60 mg/L (FEU) and an 80 year old 0.80 mg/L (FEU).    Manual Differential [[id number]]  (Abnormal) Collected: 01/14/24 1405    Specimen: Blood Updated: 01/14/24 1440     Neutrophil % 56.0 %      Lymphocyte % 24.0 %      Monocyte % 14.0 %      Bands %  5.0 %      Myelocyte % 1.0 %      Neutrophils Absolute 3.11 10*3/mm3      Lymphocytes Absolute 1.22 10*3/mm3      Monocytes Absolute 0.71 10*3/mm3      Macrocytes Slight/1+     Toxic Granulation Slight/1+     Platelet Morphology Normal    CBC & Differential [[id number]]  (Abnormal) Collected: 01/14/24 1405    Specimen: Blood Updated: 01/14/24 1440    Narrative:      The following orders were created for panel order CBC & Differential.  Procedure                               " Abnormality         Status                     ---------                               -----------         ------                     CBC Auto Differential[[id number]]        Abnormal            Final result               Scan Slide[[id number]]                                       Final result                 Please view results for these tests on the individual orders.    CBC Auto Differential [[id number]]  (Abnormal) Collected: 01/14/24 1405    Specimen: Blood Updated: 01/14/24 1440     WBC 5.10 10*3/mm3      RBC 4.54 10*6/mm3      Hemoglobin 15.3 g/dL      Hematocrit 46.9 %      .3 fL      MCH 33.7 pg      MCHC 32.6 g/dL      RDW 14.3 %      RDW-SD 50.8 fl      MPV 7.8 fL      Platelets 180 10*3/mm3     Narrative:      The previously reported component NRBC is no longer being reported. Previous result was 0.1 /100 WBC (Reference Range: 0.0-0.2 /100 WBC) on 1/14/2024 at 1418 EST.    Scan Slide [[id number]] Collected: 01/14/24 1405    Specimen: Blood Updated: 01/14/24 1440     Scan Slide --     Comment: See Manual Differential Results       High Sensitivity Troponin T [[id number]]  (Abnormal) Collected: 01/14/24 1405    Specimen: Blood Updated: 01/14/24 1439     HS Troponin T 67 ng/L     Narrative:      High Sensitive Troponin T Reference Range:  <14.0 ng/L- Negative Female for AMI  <22.0 ng/L- Negative Male for AMI  >=14 - Abnormal Female indicating possible myocardial injury.  >=22 - Abnormal Male indicating possible myocardial injury.   Clinicians would have to utilize clinical acumen, EKG, Troponin, and serial changes to determine if it is an Acute Myocardial Infarction or myocardial injury due to an underlying chronic condition.         BNP [[id number]]  (Normal) Collected: 01/14/24 1405    Specimen: Blood Updated: 01/14/24 1436     proBNP 642.8 pg/mL     Narrative:      This assay is used as an aid in the diagnosis of individuals suspected of having heart failure. It can be used as an aid in the diagnosis  of acute decompensated heart failure (ADHF) in patients presenting with signs and symptoms of ADHF to the emergency department (ED). In addition, NT-proBNP of <300 pg/mL indicates ADHF is not likely.    Age Range Result Interpretation  NT-proBNP Concentration (pg/mL:      <50             Positive            >450                   Gray                 300-450                    Negative             <300    50-75           Positive            >900                  Gray                300-900                  Negative            <300      >75             Positive            >1800                  Gray                300-1800                  Negative            <300             Results for orders placed during the hospital encounter of 01/14/24    Adult Transthoracic Echo Complete W/ Cont if Necessary Per Protocol    Interpretation Summary    Left ventricular systolic function is normal. Calculated left ventricular EF = 51% Left ventricular ejection fraction appears to be 51 - 55%.    Left ventricular diastolic function is consistent with (grade I) impaired relaxation.    The left atrial cavity is mild to moderately dilated.    There is mild calcification of the aortic valve mainly affecting the left coronary and right coronary cusp(s).    Moderate mitral valve regurgitation is present.    Estimated right ventricular systolic pressure from tricuspid regurgitation is normal (<35 mmHg).              Condition on Discharge:   Stable Stable    Vital Signs  Temp:  [96.7 °F (35.9 °C)-99.1 °F (37.3 °C)] 97.6 °F (36.4 °C)  Heart Rate:  [60-87] 67  Resp:  [15-20] 20  BP: (117-132)/(63-83) 128/71      Physical Exam  Vitals reviewed.   HENT:      Right Ear: External ear normal.      Left Ear: External ear normal.      Nose: Nose normal.   Eyes:      General:         Right eye: No discharge.         Left eye: No discharge.   Cardiovascular:      Rate and Rhythm: Normal rate.      Pulses: Normal pulses.   Pulmonary:      Effort:  Pulmonary effort is normal.      Breath sounds: Rhonchi present.   Abdominal:      General: Bowel sounds are normal.      Palpations: Abdomen is soft.   Musculoskeletal:         General: Normal range of motion.   Skin:     General: Skin is warm and dry.   Neurological:      Mental Status: She is alert and oriented to person, place, and time.   Psychiatric:         Mood and Affect: Mood normal.         Behavior: Behavior normal.              Discharge Disposition  Home or Self Care    Discharge Medications     Discharge Medications        New Medications        Instructions Start Date   insulin glargine 100 UNIT/ML injection  Commonly known as: LANTUS, SEMGLEE  Replaces: Lantus SoloStar 100 UNIT/ML injection pen   10 Units, Subcutaneous, Nightly             Changes to Medications        Instructions Start Date   HYDROcodone-acetaminophen 5-325 MG per tablet  Commonly known as: NORCO  What changed: reasons to take this   1 tablet, Oral, Every 8 Hours PRN      methocarbamol 500 MG tablet  Commonly known as: ROBAXIN  What changed: See the new instructions.   500 mg, Oral, 4 Times Daily      methocarbamol 500 MG tablet  Commonly known as: ROBAXIN  What changed: You were already taking a medication with the same name, and this prescription was added. Make sure you understand how and when to take each.   500 mg, Oral, Every 8 Hours PRN             Continue These Medications        Instructions Start Date   albuterol (2.5 MG/3ML) 0.083% nebulizer solution  Commonly known as: PROVENTIL   2.5 mg, Nebulization, Every 4 Hours PRN      amLODIPine 10 MG tablet  Commonly known as: NORVASC   TAKE 1 TABLET BY MOUTH EVERY DAY      aspirin 81 MG tablet   81 mg, Oral, Nightly      carvedilol 12.5 MG tablet  Commonly known as: COREG   12.5 mg, Oral, 2 Times Daily With Meals      cyanocobalamin 1000 MCG/ML injection   Inject 1 mL into the appropriate muscle as directed by prescriber Every 28 (Twenty-Eight) Days.      Diclofenac Sodium 1  % gel gel  Commonly known as: VOLTAREN   3 (Three) Times a Day As Needed.      doxazosin 2 MG tablet  Commonly known as: CARDURA   TAKE 1 TABLET BY MOUTH EVERY DAY AT NIGHT      estradiol 0.1 MG/GM vaginal cream  Commonly known as: ESTRACE   Insert 1 applicator into the vagina 3 (Three) Times a Week. Monday, Wednesday and Friday      Evolocumab solution auto-injector SureClick injection  Commonly known as: REPATHA   140 mg, Subcutaneous, Every 14 Days      fluticasone-salmeterol 45-21 MCG/ACT inhaler  Commonly known as: ADVAIR HFA   2 puffs, Inhalation, 2 Times Daily PRN      gabapentin 100 MG capsule  Commonly known as: NEURONTIN   100 mg, Oral, Nightly      hydrALAZINE 25 MG tablet  Commonly known as: APRESOLINE   25 mg, Oral, 2 Times Daily      ipratropium-albuterol 0.5-2.5 mg/3 ml nebulizer  Commonly known as: DUO-NEB   3 mL, Nebulization, 4 Times Daily - RT      Linzess 145 MCG capsule capsule  Generic drug: linaclotide   145 mcg, Oral, Daily PRN      omeprazole 40 MG capsule  Commonly known as: priLOSEC   40 mg, Oral, Every Morning      polyethylene glycol 17 g packet  Commonly known as: MIRALAX   17 g, Oral, 2 Times Daily      sertraline 50 MG tablet  Commonly known as: ZOLOFT   TAKE 1 TABLET BY MOUTH EVERY DAY      SITagliptin 100 MG tablet  Commonly known as: JANUVIA   100 mg, Oral, Daily      traMADol 50 MG tablet  Commonly known as: ULTRAM   100 mg, Oral, 2 Times Daily PRN      Tymlos 3120 MCG/1.56ML solution pen-injector  Generic drug: Abaloparatide   Subcutaneous             Stop These Medications      cephalexin 250 MG capsule  Commonly known as: KEFLEX     Lantus SoloStar 100 UNIT/ML injection pen  Generic drug: Insulin Glargine  Replaced by: insulin glargine 100 UNIT/ML injection              Discharge Diet:   Diet Instructions       Diet: Cardiac Diets; Healthy Heart (2-3 Na+); Regular Texture (IDDSI 7); Thin (IDDSI 0)      Discharge Diet: Cardiac Diets    Cardiac Diet: Healthy Heart (2-3 Na+)     Texture: Regular Texture (IDDSI 7)    Fluid Consistency: Thin (IDDSI 0)            Activity at Discharge:   Activity Instructions       Activity as Tolerated              Follow-up Appointments  Future Appointments   Date Time Provider Department Center   2/15/2024  2:15 PM Leon Gonzalez, DO [facility]     Additional Instructions for the Follow-ups that You Need to Schedule       Discharge Follow-up with PCP   As directed       Currently Documented PCP:    Anny Garcia MD    PCP Phone Number:    [phone]     Follow Up Details: 1-2 weeks        Discharge Follow-up with Specified Provider: Cardiology, Dr Pak; 3 Weeks   As directed      To: Cardiology, Dr Pak   Follow Up: 3 Weeks        Discharge Follow-up with Specified Provider: Dr Ordonez, keep routine follow up   As directed      To: Dr Ordonez, keep routine follow up                Test Results Pending at Discharge  Pending Labs       Order Current Status    Blood Culture - Blood, Arm, Right Preliminary result    Blood Culture - Blood, Hand, Left Preliminary result             SAMIRA Gomez  01/17/24  13:19 EST    Time: Discharge 29 min

## 2024-01-17 NOTE — CASE MANAGEMENT/SOCIAL WORK
Case Management Discharge Note           Selected Continued Care - Discharged on 1/17/2024 Admission date: 1/14/2024 - Discharge disposition: Home or Self Care          Transportation Services  Private: Car    Final Discharge Disposition Code: 01 - home or self-care

## 2024-01-17 NOTE — PROGRESS NOTES
"NEPHROLOGY PROGRESS NOTE------[facility]    [facility]  [phone]  Sonali Ordonez MD      Patient Care Team:  Anny Garcia MD as PCP - General (Internal Medicine)  Sergio Ordonez MD as Consulting Physician (Nephrology)      Provider:  Sonali Ordonez MD  Patient Name: Vianey Reeves  :  1945    SUBJECTIVE:    F/U CRF/CKD    Feeling good. No SOB, CP, palpitations, cramping. Appetite ok    Medication:  albuterol sulfate HFA, 2 puff, Inhalation, 4x Daily - RT  amLODIPine, 10 mg, Oral, Daily  aspirin, 81 mg, Oral, Daily  budesonide-formoterol, 1 puff, Inhalation, BID - RT  carvedilol, 12.5 mg, Oral, BID With Meals  dexAMETHasone, 6 mg, Oral, Daily With Breakfast  Diclofenac Sodium, 2 g, Topical, BID  enoxaparin, 40 mg, Subcutaneous, Daily  gabapentin, 100 mg, Oral, Nightly  hydrALAZINE, 25 mg, Oral, BID  insulin glargine, 10 Units, Subcutaneous, Nightly  insulin lispro, 2-7 Units, Subcutaneous, TID With Meals  linagliptin, 5 mg, Oral, Daily  pantoprazole, 40 mg, Oral, Q AM  polyethylene glycol, 17 g, Oral, BID  senna-docusate sodium, 2 tablet, Oral, BID  sertraline, 50 mg, Oral, Daily  sodium chloride, 10 mL, Intravenous, Q12H  terazosin, 2 mg, Oral, Nightly  traMADol, 100 mg, Oral, BID             OBJECTIVE    Vital Sign Min/Max for last 24 hours  Temp  Min: 96.7 °F (35.9 °C)  Max: 99.1 °F (37.3 °C)   BP  Min: 117/63  Max: 132/83   Pulse  Min: 60  Max: 87   Resp  Min: 15  Max: 18   SpO2  Min: 91 %  Max: 96 %   No data recorded   Weight  Min: 84.9 kg (187 lb 2.7 oz)  Max: 84.9 kg (187 lb 2.7 oz)     Flowsheet Rows      Flowsheet Row First Filed Value   Admission Height 165.1 cm (65\") Documented at 2024 1353   Admission Weight 86 kg (189 lb 9.5 oz) Documented at 2024 1353            No intake/output data recorded.  I/O last 3 completed shifts:  In: 1080 [P.O.:1080]  Out: 1575 [Urine:1575]    Physical Exam:  General " "Appearance: alert, appears stated age and cooperative  Head: normocephalic, without obvious abnormality and atraumatic  Eyes: conjunctivae and sclerae normal and no icterus  Neck: supple and no JVD  Lungs: +BIBASILAR RALES  Heart: regular rhythm & normal rate and normal S1, S2 +AMINA  Chest Wall: no abnormalities observed  Abdomen: normal bowel sounds and soft, nontender  Extremities: moves extremities well, no edema, no cyanosis +DJD  Skin: no bleeding, bruising or rash  Neurologic: Alert, and oriented. No focal deficits    Labs:    WBC WBC   Date Value Ref Range Status   01/16/2024 5.30 3.40 - 10.80 10*3/mm3 Final   01/16/2024 4.50 3.40 - 10.80 10*3/mm3 Final   01/15/2024 3.50 3.40 - 10.80 10*3/mm3 Final   01/14/2024 5.10 3.40 - 10.80 10*3/mm3 Final      HGB Hemoglobin   Date Value Ref Range Status   01/16/2024 12.5 12.0 - 15.9 g/dL Final   01/16/2024 12.3 12.0 - 15.9 g/dL Final   01/15/2024 13.6 12.0 - 15.9 g/dL Final   01/14/2024 15.3 12.0 - 15.9 g/dL Final      HCT Hematocrit   Date Value Ref Range Status   01/16/2024 38.0 34.0 - 46.6 % Final   01/16/2024 36.8 34.0 - 46.6 % Final   01/15/2024 40.5 34.0 - 46.6 % Final   01/14/2024 46.9 (H) 34.0 - 46.6 % Final      Platelets No results found for: \"LABPLAT\"   MCV MCV   Date Value Ref Range Status   01/16/2024 100.7 (H) 79.0 - 97.0 fL Final   01/16/2024 99.4 (H) 79.0 - 97.0 fL Final   01/15/2024 101.4 (H) 79.0 - 97.0 fL Final   01/14/2024 103.3 (H) 79.0 - 97.0 fL Final          Sodium Sodium   Date Value Ref Range Status   01/16/2024 141 136 - 145 mmol/L Final   01/16/2024 136 136 - 145 mmol/L Final   01/15/2024 139 136 - 145 mmol/L Final   01/14/2024 138 136 - 145 mmol/L Final      Potassium Potassium   Date Value Ref Range Status   01/16/2024 4.7 3.5 - 5.2 mmol/L Final     Comment:     Slight hemolysis detected by analyzer. Result may be falsely elevated.   01/16/2024 4.5 3.5 - 5.2 mmol/L Final     Comment:     Slight hemolysis detected by analyzer. Result may be " "falsely elevated.   01/15/2024 4.4 3.5 - 5.2 mmol/L Final   01/14/2024 4.3 3.5 - 5.2 mmol/L Final      Chloride Chloride   Date Value Ref Range Status   01/16/2024 109 (H) 98 - 107 mmol/L Final   01/16/2024 105 98 - 107 mmol/L Final   01/15/2024 106 98 - 107 mmol/L Final   01/14/2024 101 98 - 107 mmol/L Final      CO2 CO2   Date Value Ref Range Status   01/16/2024 25.0 22.0 - 29.0 mmol/L Final   01/16/2024 22.0 22.0 - 29.0 mmol/L Final   01/15/2024 27.0 22.0 - 29.0 mmol/L Final   01/14/2024 27.0 22.0 - 29.0 mmol/L Final      BUN BUN   Date Value Ref Range Status   01/16/2024 26 (H) 8 - 23 mg/dL Final   01/16/2024 25 (H) 8 - 23 mg/dL Final   01/15/2024 24 (H) 8 - 23 mg/dL Final   01/14/2024 20 8 - 23 mg/dL Final      Creatinine Creatinine   Date Value Ref Range Status   01/16/2024 1.23 (H) 0.57 - 1.00 mg/dL Final   01/16/2024 1.44 (H) 0.57 - 1.00 mg/dL Final   01/15/2024 1.32 (H) 0.57 - 1.00 mg/dL Final   01/14/2024 1.52 (H) 0.57 - 1.00 mg/dL Final      Calcium Calcium   Date Value Ref Range Status   01/16/2024 8.6 8.6 - 10.5 mg/dL Final   01/16/2024 7.9 (L) 8.6 - 10.5 mg/dL Final   01/15/2024 8.4 (L) 8.6 - 10.5 mg/dL Final   01/14/2024 9.4 8.6 - 10.5 mg/dL Final      PO4 No components found for: \"PO4\"   Albumin Albumin   Date Value Ref Range Status   01/16/2024 2.9 (L) 3.5 - 5.2 g/dL Final   01/15/2024 3.1 (L) 3.5 - 5.2 g/dL Final   01/14/2024 3.8 3.5 - 5.2 g/dL Final      Magnesium Magnesium   Date Value Ref Range Status   01/16/2024 1.9 1.6 - 2.4 mg/dL Final   01/16/2024 2.0 1.6 - 2.4 mg/dL Final      Uric Acid No components found for: \"URIC ACID\"     Imaging Results (Last 72 Hours)       Procedure Component Value Units Date/Time    CT Angiogram Chest Pulmonary Embolism [[id number]] Collected: 01/15/24 1424     Updated: 01/15/24 1434    Narrative:      CT ANGIOGRAM CHEST PULMONARY EMBOLISM    Date of Exam: 1/15/2024 2:05 PM EST    Indication: Pulmonary embolism (PE) suspected, positive D-dimer.    Comparison: None " available.    Technique: Axial CT images were obtained of the chest after the uneventful intravenous administration of iodinated contrast utilizing pulmonary embolism protocol.  Sagittal and coronal reconstructions were performed.  Automated exposure control and   iterative reconstruction methods were used.      Findings:  There are new pulmonary arterial filling defects evident to suggest PE. The pulmonary arteries appear normal caliber. The thoracic aorta is ectatic measuring 3.9 cm. Coronary and other vascular calcification is noted. There is a small pericardial   effusion. There is cardiomegaly. Small bilateral pleural effusions are noted, right greater than left. There is linear consolidation in the base of the right upper lobe suggesting partial atelectasis. Bibasilar dependent atelectasis is also noted. There   is patchy groundglass opacities in the perihilar regions which may indicate pulmonary edema or atypical infection pattern. Central airways are grossly patent.    There is marked heterogeneous enlargement of the left thyroid lobe with substernal extension, which may be due to goitrous change. There are mildly prominent lymph nodes in the right hilum and right paratracheal region which may be reactive. Limited   imaging through the upper abdomen demonstrates a 2.3 cm low-density nodule in the left adrenal suggesting adenomatous change. Gallbladder is absent. There is postsurgical change of the proximal stomach which may be due to prior hiatal hernia repair.   There is vertebral augmentation cement at the mid thoracic level. Probable hemangioma bone noted at what appears to be T10.      Impression:      Impression:    1. No evidence of pulmonary embolus.  2. Cardiomegaly with small pericardial effusion.  3. Small bilateral pleural effusions with overlying consolidation likely due to atelectasis.  4. Patchy groundglass opacities in the perihilar region may be due to early pulmonary edema or atypical  infection pattern. Correlate clinically.  5. Borderline prominent lymph nodes in the right hilum and paratracheal region may be reactive given the pulmonary findings. Attention on follow-up suggested.        Electronically Signed: Pedrito Mcdowell MD    1/15/2024 2:31 PM EST    Workstation ID: [id number]    XR Chest PA & Lateral [[id number]] Collected: 01/15/24 0723     Updated: 01/15/24 0726    Narrative:      XR CHEST PA AND LATERAL    Date of Exam: 1/14/2024 9:20 PM CST    Indication: elevated d-dimer    Comparison: Chest x-ray 1/14/2024    Findings:  The heart is stable in size. There is trace fluid in the right minor fissure and trace blunting at the costophrenic angle. No evidence for pneumothorax. There are calcified hilar lymph nodes.      Impression:      Impression:  Trace right pleural effusion.      Electronically Signed: Bia Lucero MD    1/15/2024 6:24 AM CST    Workstation ID: [id number]    XR Chest 1 View [[id number]] Collected: 01/14/24 1538     Updated: 01/14/24 1541    Narrative:      XR CHEST 1 VW    Date of Exam: 1/14/2024 3:22 PM EST    Indication: soa/covid +    Comparison: None available.    Findings: No focal consolidation. Resolving right midlung zone atelectasis. No pneumothorax or pleural effusion. Cardiac size is normal. The visualized clavicles appear intact. No displaced rib fractures. The visualized upper abdomen is normal.      Impression:      Impression: No acute cardiopulmonary disease.    Electronically Signed: Kiran Burgess MD    1/14/2024 3:39 PM EST    Workstation ID: [id number]            Results for orders placed during the hospital encounter of 01/14/24    XR Chest PA & Lateral    Narrative  XR CHEST PA AND LATERAL    Date of Exam: 1/14/2024 9:20 PM CST    Indication: elevated d-dimer    Comparison: Chest x-ray 1/14/2024    Findings:  The heart is stable in size. There is trace fluid in the right minor fissure and trace blunting at the costophrenic angle. No evidence for  pneumothorax. There are calcified hilar lymph nodes.    Impression  Impression:  Trace right pleural effusion.      Electronically Signed: Bia Lucero MD  1/15/2024 6:24 AM CST  Workstation ID: [id number]      XR Chest 1 View    Narrative  XR CHEST 1 VW    Date of Exam: 1/14/2024 3:22 PM EST    Indication: soa/covid +    Comparison: None available.    Findings: No focal consolidation. Resolving right midlung zone atelectasis. No pneumothorax or pleural effusion. Cardiac size is normal. The visualized clavicles appear intact. No displaced rib fractures. The visualized upper abdomen is normal.    Impression  Impression: No acute cardiopulmonary disease.    Electronically Signed: Kiran Burgess MD  1/14/2024 3:39 PM EST  Workstation ID: [id number]      Results for orders placed in visit on 08/29/23    XR Foot 3+ View Bilateral    Narrative  XR FOOT 3+ VW BILATERAL    Date of Exam: 8/29/2023 2:20 PM EDT    Indication: augustine heel pain  room 13  wb    Comparison: Right foot 7/1/2022    Findings:  Right foot: There is no acute fracture or dislocation. No bony erosion or abnormal periosteal reaction identified. There is enthesophyte formation of the calcaneus unchanged from prior study. Tarsal bones are otherwise unremarkable. There is some mild  pes planus deformity which has developed since the prior exam.    Left foot: No acute fracture or dislocation. No bony erosion or abnormal periosteal reaction. There is mild pes planus deformity. There is enthesophyte formation of the calcaneus.    Impression  Impression:      1. Bilateral pes planus deformity.  2. No acute bony abnormality. No evidence of erosive arthropathy.    Electronically Signed: Srinath Lock MD  8/30/2023 2:07 PM EDT  Workstation ID: [id number]      Results for orders placed during the hospital encounter of 08/22/23    Doppler Ankle Brachial Index Single Level CAR    Interpretation Summary    Right Conclusion: The right IKE is normal. Normal digital  pressures.    Left Conclusion: The left IKE is normal. Mild digital ischemia.        ASSESSMENT / PLAN      Chest pain    1. CRF/CKD-------Nonoliguric. 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 Cr stable. Lytes okay. Volume okay. Dose meds for CrCl 30-45 cc/min. No NSAIDs.  Stable post CT PE. Stable off of IVFs     2. HTN WITH CKD------BP okay. Avoid hypotension     3. DMII WITH RENAL MANIFESTATIONS-----BS okay. Avoid hypoglycemia. Lantus, Glucometers, SSI. Follow with steroid exposure     4. COPD/COVID 19 + PNA-------Oxygen, Decadron, Supportive care     5. URETERAL CA S/P R NEPHRECTOMY/SOLITARY KIDNEY STATE     6. PULMONARY HTN     7. OA/DJD-----No NSAIDs.       8. DVT PROPHYLAXIS------Lovenox     9. ELEVATED D-DIMER-------CT PE negative for PE     10. DEPRESSION------On Zoloft     11. GERD/PUD PROPHYLAXIS--------PPI. Benefits outweigh risks despite renal dysfunction    Okay from RENAL standpoint to d/c today. Not much else to add from a Renal standpoint. Will sign off. Patient to f/u as previously scheduled. Thanks    Sonali Ordonez MD  [facility]  [phone]  01/17/24  08:22 EST

## 2024-01-17 NOTE — PLAN OF CARE
Goal Outcome Evaluation:           Progress: no change  Outcome Evaluation: Pt remains in enhanced droplet/contact precautions r/t + COVID status. No C/O voiced per pt so far during this shift. Pt remains on RA with no SOA reported or observed. Pt received PO azithromycin per orders. Pt resting abed throughout this shift with call light within reach. Pt anticipated to D/C today. Plan of care ongoing.         Problem: Adult Inpatient Plan of Care  Goal: Plan of Care Review  Outcome: Ongoing, Progressing  Flowsheets (Taken 1/17/2024 0339)  Progress: no change  Outcome Evaluation: Pt remains in enhanced droplet/contact precautions r/t + COVID status. No C/O voiced per pt so far during this shift. Pt remains on RA with no SOA reported or observed. Pt received PO azithromycin per orders. Pt resting abed throughout this shift with call light within reach. Pt anticipated to D/C today. Plan of care ongoing.  Goal: Patient-Specific Goal (Individualized)  Outcome: Ongoing, Progressing  Goal: Absence of Hospital-Acquired Illness or Injury  Outcome: Ongoing, Progressing  Intervention: Identify and Manage Fall Risk  Recent Flowsheet Documentation  Taken 1/17/2024 0200 by Flori Stahl, RN  Safety Promotion/Fall Prevention:   safety round/check completed   fall prevention program maintained  Taken 1/17/2024 0000 by Flori Stahl, RN  Safety Promotion/Fall Prevention:   safety round/check completed   fall prevention program maintained  Taken 1/16/2024 2200 by Flori Stahl, RN  Safety Promotion/Fall Prevention:   safety round/check completed   fall prevention program maintained  Taken 1/16/2024 2000 by Flori Stahl, RN  Safety Promotion/Fall Prevention:   safety round/check completed   nonskid shoes/slippers when out of bed   fall prevention program maintained   clutter free environment maintained   assistive device/personal items within reach   activity supervised  Intervention: Prevent Skin Injury  Recent Flowsheet  Documentation  Taken 1/17/2024 0000 by Flori Stahl RN  Body Position: position changed independently  Taken 1/16/2024 2000 by Flori Stahl RN  Body Position: position changed independently  Skin Protection:   adhesive use limited   incontinence pads utilized   tubing/devices free from skin contact  Intervention: Prevent and Manage VTE (Venous Thromboembolism) Risk  Recent Flowsheet Documentation  Taken 1/16/2024 2000 by Flori Stahl RN  Activity Management: activity encouraged  Intervention: Prevent Infection  Recent Flowsheet Documentation  Taken 1/17/2024 0200 by Flori Stahl RN  Infection Prevention:   hand hygiene promoted   personal protective equipment utilized   rest/sleep promoted   single patient room provided  Taken 1/17/2024 0000 by Flori Stahl RN  Infection Prevention:   hand hygiene promoted   personal protective equipment utilized   rest/sleep promoted   single patient room provided  Taken 1/16/2024 2200 by Flori Stahl RN  Infection Prevention:   hand hygiene promoted   personal protective equipment utilized   rest/sleep promoted   single patient room provided  Taken 1/16/2024 2000 by Flori Stahl RN  Infection Prevention:   hand hygiene promoted   personal protective equipment utilized   rest/sleep promoted   single patient room provided  Goal: Optimal Comfort and Wellbeing  Outcome: Ongoing, Progressing  Intervention: Monitor Pain and Promote Comfort  Recent Flowsheet Documentation  Taken 1/16/2024 2000 by Flori Stahl RN  Pain Management Interventions:   care clustered   diversional activity provided   pillow support provided   relaxation techniques promoted   unnecessary movement minimized  Intervention: Provide Person-Centered Care  Recent Flowsheet Documentation  Taken 1/16/2024 2000 by Flori Stahl RN  Trust Relationship/Rapport:   care explained   choices provided   questions answered   questions encouraged   reassurance provided   thoughts/feelings  acknowledged  Goal: Readiness for Transition of Care  Outcome: Ongoing, Progressing     Problem: COPD (Chronic Obstructive Pulmonary Disease) Comorbidity  Goal: Maintenance of COPD Symptom Control  Outcome: Ongoing, Progressing  Intervention: Maintain COPD-Symptom Control  Recent Flowsheet Documentation  Taken 1/17/2024 0200 by Flori Stahl RN  Medication Review/Management: medications reviewed  Taken 1/17/2024 0000 by Flori Stahl RN  Medication Review/Management: medications reviewed  Taken 1/16/2024 2200 by Flori Stahl RN  Medication Review/Management: medications reviewed  Taken 1/16/2024 2000 by Flori Stahl RN  Supportive Measures:   active listening utilized   verbalization of feelings encouraged  Medication Review/Management:   medications reviewed   high-risk medications identified     Problem: Fall Injury Risk  Goal: Absence of Fall and Fall-Related Injury  Outcome: Ongoing, Progressing  Intervention: Identify and Manage Contributors  Recent Flowsheet Documentation  Taken 1/17/2024 0200 by Flori Stahl RN  Medication Review/Management: medications reviewed  Taken 1/17/2024 0000 by Flori Stahl RN  Medication Review/Management: medications reviewed  Taken 1/16/2024 2200 by lFori Stahl RN  Medication Review/Management: medications reviewed  Taken 1/16/2024 2000 by Flori Stahl RN  Medication Review/Management:   medications reviewed   high-risk medications identified  Self-Care Promotion: BADL personal objects within reach  Intervention: Promote Injury-Free Environment  Recent Flowsheet Documentation  Taken 1/17/2024 0200 by Flori Stahl RN  Safety Promotion/Fall Prevention:   safety round/check completed   fall prevention program maintained  Taken 1/17/2024 0000 by Flori Stahl RN  Safety Promotion/Fall Prevention:   safety round/check completed   fall prevention program maintained  Taken 1/16/2024 2200 by Flori Stahl RN  Safety Promotion/Fall Prevention:    safety round/check completed   fall prevention program maintained  Taken 1/16/2024 2000 by Flori Stahl, RN  Safety Promotion/Fall Prevention:   safety round/check completed   nonskid shoes/slippers when out of bed   fall prevention program maintained   clutter free environment maintained   assistive device/personal items within reach   activity supervised

## 2024-01-18 NOTE — OUTREACH NOTE
Prep Survey      Flowsheet Row Responses   [facility] discharged from? [facility]   Is LACE score < 7 ? No   Eligibility Readm Mgmt   Discharge diagnosis Chest pain   Does the patient have one of the following disease processes/diagnoses(primary or secondary)? Other   Does the patient have Home health ordered? No   Is there a DME ordered? Yes   What DME was ordered? home O2 through [place] [facility] [place].   Prep survey completed? Yes            Li BARNETT - Registered Nurse

## 2024-01-19 ENCOUNTER — READMISSION MANAGEMENT (OUTPATIENT)
Dept: CALL CENTER | Facility: HOSPITAL | Age: 79
End: 2024-01-19
Payer: MEDICARE

## 2024-01-19 DIAGNOSIS — E78.2 MIXED HYPERLIPIDEMIA: ICD-10-CM

## 2024-01-19 RX ORDER — EVOLOCUMAB 140 MG/ML
INJECTION, SOLUTION SUBCUTANEOUS
Refills: 4 | OUTPATIENT
Start: 2024-01-19

## 2024-01-19 NOTE — OUTREACH NOTE
Medical Week 1 Survey      Flowsheet Row Responses   [facility] patient discharged from? [facility]   Does the patient have one of the following disease processes/diagnoses(primary or secondary)? Other   Week 1 attempt successful? Yes   Call start time 1648   Call end time 1655   Discharge diagnosis Chest pain   Is patient permission given to speak with other caregiver? Yes   List who call center can speak with dtr   Person spoke with today (if not patient) and relationship dtr   Meds reviewed with patient/caregiver? Yes   Is the patient having any side effects they believe may be caused by any medication additions or changes? No   Does the patient have all medications ordered at discharge? Yes   Is the patient taking all medications as directed (includes completed medication regime)? Yes   Does the patient have a primary care provider?  Yes   Does the patient have an appointment with their PCP within 7 days of discharge? No   What is preventing the patient from scheduling follow up appointments within 7 days of discharge? Haven't had time   What DME was ordered? home O2 through [place].   Has all DME been delivered? Yes   DME comments 2L of O2 at home, pt has home nebs but is not compliant with this, her dtr reports.   Comments Per dtr, the pt is improving and had not had further chest pain. Pt continues with SOA at baseline, dtr reports.Pt does not monitor her glucose regularly.   Did the patient receive a copy of their discharge instructions? Yes   Nursing interventions Reviewed instructions with patient   What is the patient's perception of their health status since discharge? Improving   Is the patient/caregiver able to teach back signs and symptoms related to disease process for when to call PCP? Yes   Is the patient/caregiver able to teach back signs and symptoms related to disease process for when to call 911? Yes   If the patient is a current smoker, are they able to teach back resources for cessation? --   [pt continues to smoke]   Week 1 call completed? Yes   Revoked No further contact(revokes)-requires comment   Wrap up additional comments Dtr is a RN   Call end time [phone]            Kay HERNÁNDEZ - Registered Nurse

## 2024-01-20 LAB
BACTERIA SPEC AEROBE CULT: NORMAL
BACTERIA SPEC AEROBE CULT: NORMAL

## 2024-01-21 ENCOUNTER — HOSPITAL ENCOUNTER (INPATIENT)
Facility: HOSPITAL | Age: 79
LOS: 6 days | Discharge: HOME | End: 2024-01-29
Attending: EMERGENCY MEDICINE | Admitting: INTERNAL MEDICINE
Payer: MEDICARE

## 2024-01-21 ENCOUNTER — APPOINTMENT (OUTPATIENT)
Dept: GENERAL RADIOLOGY | Facility: HOSPITAL | Age: 79
End: 2024-01-21
Payer: MEDICARE

## 2024-01-21 DIAGNOSIS — J18.9 PNEUMONIA OF RIGHT MIDDLE LOBE DUE TO INFECTIOUS ORGANISM: Primary | ICD-10-CM

## 2024-01-21 DIAGNOSIS — R53.1 GENERALIZED WEAKNESS: ICD-10-CM

## 2024-01-21 DIAGNOSIS — J43.1 PANLOBULAR EMPHYSEMA: ICD-10-CM

## 2024-01-21 DIAGNOSIS — R06.00 DYSPNEA, UNSPECIFIED TYPE: ICD-10-CM

## 2024-01-21 DIAGNOSIS — U07.1 COVID-19: ICD-10-CM

## 2024-01-21 LAB
ALBUMIN SERPL-MCNC: 2.8 G/DL (ref 3.5–5.2)
ALBUMIN/GLOB SERPL: 1 G/DL
ALP SERPL-CCNC: 56 U/L (ref 39–117)
ALT SERPL W P-5'-P-CCNC: 18 U/L (ref 1–33)
ANION GAP SERPL CALCULATED.3IONS-SCNC: 9 MMOL/L (ref 5–15)
ARTERIAL PATENCY WRIST A: POSITIVE
AST SERPL-CCNC: 19 U/L (ref 1–32)
ATMOSPHERIC PRESS: ABNORMAL MM[HG]
BASE EXCESS BLDA CALC-SCNC: -1.2 MMOL/L (ref 0–3)
BASOPHILS # BLD MANUAL: 0.04 10*3/MM3 (ref 0–0.2)
BASOPHILS NFR BLD MANUAL: 1 % (ref 0–1.5)
BDY SITE: ABNORMAL
BILIRUB SERPL-MCNC: 0.2 MG/DL (ref 0–1.2)
BUN SERPL-MCNC: 22 MG/DL (ref 8–23)
BUN/CREAT SERPL: 18.2 (ref 7–25)
CALCIUM SPEC-SCNC: 8.1 MG/DL (ref 8.6–10.5)
CHLORIDE SERPL-SCNC: 102 MMOL/L (ref 98–107)
CO2 BLDA-SCNC: 24.3 MMOL/L (ref 22–29)
CO2 SERPL-SCNC: 23 MMOL/L (ref 22–29)
CREAT SERPL-MCNC: 1.21 MG/DL (ref 0.57–1)
DEPRECATED RDW RBC AUTO: 47.3 FL (ref 37–54)
EGFRCR SERPLBLD CKD-EPI 2021: 45.4 ML/MIN/1.73
ERYTHROCYTE [DISTWIDTH] IN BLOOD BY AUTOMATED COUNT: 13.7 % (ref 12.3–15.4)
GEN 5 2HR TROPONIN T REFLEX: 59 NG/L
GLOBULIN UR ELPH-MCNC: 2.7 GM/DL
GLUCOSE BLDC GLUCOMTR-MCNC: 300 MG/DL (ref 70–105)
GLUCOSE SERPL-MCNC: 119 MG/DL (ref 65–99)
HBA1C MFR BLD: 7.1 % (ref 4.8–5.6)
HCO3 BLDA-SCNC: 23.1 MMOL/L (ref 21–28)
HCT VFR BLD AUTO: 40.9 % (ref 34–46.6)
HEMODILUTION: NO
HGB BLD-MCNC: 13.6 G/DL (ref 12–15.9)
HOLD SPECIMEN: NORMAL
HOLD SPECIMEN: NORMAL
INHALED O2 CONCENTRATION: 28 %
LYMPHOCYTES # BLD MANUAL: 0.97 10*3/MM3 (ref 0.7–3.1)
LYMPHOCYTES NFR BLD MANUAL: 10 % (ref 5–12)
MACROCYTES BLD QL SMEAR: ABNORMAL
MCH RBC QN AUTO: 33.2 PG (ref 26.6–33)
MCHC RBC AUTO-ENTMCNC: 33.3 G/DL (ref 31.5–35.7)
MCV RBC AUTO: 99.6 FL (ref 79–97)
METAMYELOCYTES NFR BLD MANUAL: 1 % (ref 0–0)
MODALITY: ABNORMAL
MONOCYTES # BLD: 0.44 10*3/MM3 (ref 0.1–0.9)
NEUTROPHILS # BLD AUTO: 2.9 10*3/MM3 (ref 1.7–7)
NEUTROPHILS NFR BLD MANUAL: 47 % (ref 42.7–76)
NEUTS BAND NFR BLD MANUAL: 19 % (ref 0–5)
NT-PROBNP SERPL-MCNC: 395.8 PG/ML (ref 0–1800)
PCO2 BLDA: 36.7 MM HG (ref 35–48)
PH BLDA: 7.41 PH UNITS (ref 7.35–7.45)
PLAT MORPH BLD: NORMAL
PLATELET # BLD AUTO: 153 10*3/MM3 (ref 140–450)
PMV BLD AUTO: 8.5 FL (ref 6–12)
PO2 BLDA: 68.7 MM HG (ref 83–108)
POTASSIUM SERPL-SCNC: 4.5 MMOL/L (ref 3.5–5.2)
PROCALCITONIN SERPL-MCNC: 0.09 NG/ML (ref 0–0.25)
PROT SERPL-MCNC: 5.5 G/DL (ref 6–8.5)
RBC # BLD AUTO: 4.11 10*6/MM3 (ref 3.77–5.28)
SAO2 % BLDCOA: 93.7 % (ref 94–98)
SCAN SLIDE: NORMAL
SODIUM SERPL-SCNC: 134 MMOL/L (ref 136–145)
TROPONIN T DELTA: -2 NG/L
TROPONIN T SERPL HS-MCNC: 61 NG/L
VARIANT LYMPHS NFR BLD MANUAL: 2 % (ref 0–5)
VARIANT LYMPHS NFR BLD MANUAL: 20 % (ref 19.6–45.3)
WBC MORPH BLD: NORMAL
WBC NRBC COR # BLD AUTO: 4.4 10*3/MM3 (ref 3.4–10.8)
WHOLE BLOOD HOLD COAG: NORMAL
WHOLE BLOOD HOLD SPECIMEN: NORMAL

## 2024-01-21 PROCEDURE — 87040 BLOOD CULTURE FOR BACTERIA: CPT | Performed by: PHYSICIAN ASSISTANT

## 2024-01-21 PROCEDURE — 82803 BLOOD GASES ANY COMBINATION: CPT | Performed by: PHYSICIAN ASSISTANT

## 2024-01-21 PROCEDURE — 82948 REAGENT STRIP/BLOOD GLUCOSE: CPT

## 2024-01-21 PROCEDURE — 25010000002 AMPICILLIN-SULBACTAM PER 1.5 G: Performed by: PHYSICIAN ASSISTANT

## 2024-01-21 PROCEDURE — 99285 EMERGENCY DEPT VISIT HI MDM: CPT

## 2024-01-21 PROCEDURE — 25810000003 SODIUM CHLORIDE 0.9 % SOLUTION 250 ML FLEX CONT: Performed by: PHYSICIAN ASSISTANT

## 2024-01-21 PROCEDURE — 94799 UNLISTED PULMONARY SVC/PX: CPT

## 2024-01-21 PROCEDURE — 83880 ASSAY OF NATRIURETIC PEPTIDE: CPT | Performed by: PHYSICIAN ASSISTANT

## 2024-01-21 PROCEDURE — 71045 X-RAY EXAM CHEST 1 VIEW: CPT

## 2024-01-21 PROCEDURE — 25010000002 AZITHROMYCIN PER 500 MG: Performed by: PHYSICIAN ASSISTANT

## 2024-01-21 PROCEDURE — 94640 AIRWAY INHALATION TREATMENT: CPT

## 2024-01-21 PROCEDURE — 80053 COMPREHEN METABOLIC PANEL: CPT | Performed by: PHYSICIAN ASSISTANT

## 2024-01-21 PROCEDURE — 63710000001 INSULIN GLARGINE PER 5 UNITS: Performed by: NURSE PRACTITIONER

## 2024-01-21 PROCEDURE — 85007 BL SMEAR W/DIFF WBC COUNT: CPT | Performed by: PHYSICIAN ASSISTANT

## 2024-01-21 PROCEDURE — 84484 ASSAY OF TROPONIN QUANT: CPT | Performed by: PHYSICIAN ASSISTANT

## 2024-01-21 PROCEDURE — 84145 PROCALCITONIN (PCT): CPT | Performed by: PHYSICIAN ASSISTANT

## 2024-01-21 PROCEDURE — 36415 COLL VENOUS BLD VENIPUNCTURE: CPT

## 2024-01-21 PROCEDURE — 85025 COMPLETE CBC W/AUTO DIFF WBC: CPT | Performed by: PHYSICIAN ASSISTANT

## 2024-01-21 PROCEDURE — 36600 WITHDRAWAL OF ARTERIAL BLOOD: CPT | Performed by: PHYSICIAN ASSISTANT

## 2024-01-21 PROCEDURE — 83036 HEMOGLOBIN GLYCOSYLATED A1C: CPT | Performed by: NURSE PRACTITIONER

## 2024-01-21 PROCEDURE — 93005 ELECTROCARDIOGRAM TRACING: CPT | Performed by: EMERGENCY MEDICINE

## 2024-01-21 RX ORDER — HYDRALAZINE HYDROCHLORIDE 25 MG/1
25 TABLET, FILM COATED ORAL 2 TIMES DAILY
Status: DISCONTINUED | OUTPATIENT
Start: 2024-01-21 | End: 2024-01-29 | Stop reason: HOSPADM

## 2024-01-21 RX ORDER — ASPIRIN 81 MG/1
81 TABLET ORAL DAILY
Status: DISCONTINUED | OUTPATIENT
Start: 2024-01-22 | End: 2024-01-29 | Stop reason: HOSPADM

## 2024-01-21 RX ORDER — SODIUM CHLORIDE 0.9 % (FLUSH) 0.9 %
10 SYRINGE (ML) INJECTION EVERY 12 HOURS SCHEDULED
Status: DISCONTINUED | OUTPATIENT
Start: 2024-01-21 | End: 2024-01-29 | Stop reason: HOSPADM

## 2024-01-21 RX ORDER — IPRATROPIUM BROMIDE AND ALBUTEROL SULFATE 2.5; .5 MG/3ML; MG/3ML
3 SOLUTION RESPIRATORY (INHALATION) ONCE
Status: DISCONTINUED | OUTPATIENT
Start: 2024-01-21 | End: 2024-01-21

## 2024-01-21 RX ORDER — SODIUM CHLORIDE 0.9 % (FLUSH) 0.9 %
10 SYRINGE (ML) INJECTION AS NEEDED
Status: DISCONTINUED | OUTPATIENT
Start: 2024-01-21 | End: 2024-01-29 | Stop reason: HOSPADM

## 2024-01-21 RX ORDER — ENOXAPARIN SODIUM 100 MG/ML
40 INJECTION SUBCUTANEOUS EVERY 24 HOURS
Status: DISCONTINUED | OUTPATIENT
Start: 2024-01-22 | End: 2024-01-29 | Stop reason: HOSPADM

## 2024-01-21 RX ORDER — DOXAZOSIN 2 MG/1
2 TABLET ORAL NIGHTLY
COMMUNITY

## 2024-01-21 RX ORDER — AMLODIPINE BESYLATE 10 MG/1
10 TABLET ORAL DAILY
COMMUNITY

## 2024-01-21 RX ORDER — METHOCARBAMOL 500 MG/1
500 TABLET, FILM COATED ORAL 4 TIMES DAILY
Status: DISCONTINUED | OUTPATIENT
Start: 2024-01-21 | End: 2024-01-29 | Stop reason: HOSPADM

## 2024-01-21 RX ORDER — NITROGLYCERIN 0.4 MG/1
0.4 TABLET SUBLINGUAL
Status: DISCONTINUED | OUTPATIENT
Start: 2024-01-21 | End: 2024-01-29 | Stop reason: HOSPADM

## 2024-01-21 RX ORDER — ALBUTEROL SULFATE 2.5 MG/3ML
2.5 SOLUTION RESPIRATORY (INHALATION) EVERY 4 HOURS PRN
Status: DISCONTINUED | OUTPATIENT
Start: 2024-01-21 | End: 2024-01-29 | Stop reason: HOSPADM

## 2024-01-21 RX ORDER — TRAMADOL HYDROCHLORIDE 50 MG/1
100 TABLET ORAL 2 TIMES DAILY PRN
Status: DISCONTINUED | OUTPATIENT
Start: 2024-01-21 | End: 2024-01-29 | Stop reason: HOSPADM

## 2024-01-21 RX ORDER — BISACODYL 10 MG
10 SUPPOSITORY, RECTAL RECTAL DAILY PRN
Status: DISCONTINUED | OUTPATIENT
Start: 2024-01-21 | End: 2024-01-29 | Stop reason: HOSPADM

## 2024-01-21 RX ORDER — METHYLPREDNISOLONE SODIUM SUCCINATE 125 MG/2ML
80 INJECTION, POWDER, LYOPHILIZED, FOR SOLUTION INTRAMUSCULAR; INTRAVENOUS DAILY
Status: DISCONTINUED | OUTPATIENT
Start: 2024-01-22 | End: 2024-01-24

## 2024-01-21 RX ORDER — METHYLPREDNISOLONE SODIUM SUCCINATE 125 MG/2ML
125 INJECTION, POWDER, LYOPHILIZED, FOR SOLUTION INTRAMUSCULAR; INTRAVENOUS ONCE
Status: DISCONTINUED | OUTPATIENT
Start: 2024-01-21 | End: 2024-01-21

## 2024-01-21 RX ORDER — AMLODIPINE BESYLATE 5 MG/1
10 TABLET ORAL DAILY
Status: DISCONTINUED | OUTPATIENT
Start: 2024-01-22 | End: 2024-01-29 | Stop reason: HOSPADM

## 2024-01-21 RX ORDER — PANTOPRAZOLE SODIUM 40 MG/1
40 TABLET, DELAYED RELEASE ORAL
Status: DISCONTINUED | OUTPATIENT
Start: 2024-01-22 | End: 2024-01-29 | Stop reason: HOSPADM

## 2024-01-21 RX ORDER — TERBINAFINE HYDROCHLORIDE 250 MG/1
250 TABLET ORAL DAILY
COMMUNITY

## 2024-01-21 RX ORDER — TERAZOSIN 2 MG/1
2 CAPSULE ORAL NIGHTLY
Status: DISCONTINUED | OUTPATIENT
Start: 2024-01-21 | End: 2024-01-29 | Stop reason: HOSPADM

## 2024-01-21 RX ORDER — ONDANSETRON 2 MG/ML
4 INJECTION INTRAMUSCULAR; INTRAVENOUS EVERY 6 HOURS PRN
Status: DISCONTINUED | OUTPATIENT
Start: 2024-01-21 | End: 2024-01-29 | Stop reason: HOSPADM

## 2024-01-21 RX ORDER — AMOXICILLIN 250 MG
2 CAPSULE ORAL 2 TIMES DAILY
Status: DISCONTINUED | OUTPATIENT
Start: 2024-01-21 | End: 2024-01-29 | Stop reason: HOSPADM

## 2024-01-21 RX ORDER — HYDROCODONE BITARTRATE AND ACETAMINOPHEN 5; 325 MG/1; MG/1
1 TABLET ORAL EVERY 8 HOURS PRN
Status: DISCONTINUED | OUTPATIENT
Start: 2024-01-21 | End: 2024-01-29 | Stop reason: HOSPADM

## 2024-01-21 RX ORDER — CARVEDILOL 6.25 MG/1
12.5 TABLET ORAL 2 TIMES DAILY WITH MEALS
Status: DISCONTINUED | OUTPATIENT
Start: 2024-01-21 | End: 2024-01-29 | Stop reason: HOSPADM

## 2024-01-21 RX ORDER — POLYETHYLENE GLYCOL 3350 17 G/17G
17 POWDER, FOR SOLUTION ORAL DAILY PRN
Status: DISCONTINUED | OUTPATIENT
Start: 2024-01-21 | End: 2024-01-29 | Stop reason: HOSPADM

## 2024-01-21 RX ORDER — POLYETHYLENE GLYCOL 3350 17 G/17G
17 POWDER, FOR SOLUTION ORAL DAILY
Status: DISCONTINUED | OUTPATIENT
Start: 2024-01-22 | End: 2024-01-29 | Stop reason: HOSPADM

## 2024-01-21 RX ORDER — BISACODYL 5 MG/1
5 TABLET, DELAYED RELEASE ORAL DAILY PRN
Status: DISCONTINUED | OUTPATIENT
Start: 2024-01-21 | End: 2024-01-29 | Stop reason: HOSPADM

## 2024-01-21 RX ORDER — ALBUTEROL SULFATE 90 UG/1
2 AEROSOL, METERED RESPIRATORY (INHALATION) ONCE
Status: COMPLETED | OUTPATIENT
Start: 2024-01-21 | End: 2024-01-22

## 2024-01-21 RX ORDER — IPRATROPIUM BROMIDE AND ALBUTEROL SULFATE 2.5; .5 MG/3ML; MG/3ML
3 SOLUTION RESPIRATORY (INHALATION)
Status: DISCONTINUED | OUTPATIENT
Start: 2024-01-21 | End: 2024-01-23

## 2024-01-21 RX ORDER — SODIUM CHLORIDE 9 MG/ML
40 INJECTION, SOLUTION INTRAVENOUS AS NEEDED
Status: DISCONTINUED | OUTPATIENT
Start: 2024-01-21 | End: 2024-01-29 | Stop reason: HOSPADM

## 2024-01-21 RX ORDER — POLYETHYLENE GLYCOL 3350 17 G/17G
17 POWDER, FOR SOLUTION ORAL DAILY
COMMUNITY

## 2024-01-21 RX ORDER — GABAPENTIN 100 MG/1
100 CAPSULE ORAL NIGHTLY
Status: DISCONTINUED | OUTPATIENT
Start: 2024-01-21 | End: 2024-01-22

## 2024-01-21 RX ADMIN — INSULIN GLARGINE 10 UNITS: 100 INJECTION, SOLUTION SUBCUTANEOUS at 22:23

## 2024-01-21 RX ADMIN — GABAPENTIN 100 MG: 100 CAPSULE ORAL at 22:24

## 2024-01-21 RX ADMIN — AZITHROMYCIN MONOHYDRATE 500 MG: 500 INJECTION, POWDER, LYOPHILIZED, FOR SOLUTION INTRAVENOUS at 17:50

## 2024-01-21 RX ADMIN — Medication 10 ML: at 22:24

## 2024-01-21 RX ADMIN — DOCUSATE SODIUM AND SENNOSIDES 2 TABLET: 8.6; 5 TABLET, FILM COATED ORAL at 22:23

## 2024-01-21 RX ADMIN — ALBUTEROL SULFATE 2 PUFF: 108 INHALANT RESPIRATORY (INHALATION) at 16:41

## 2024-01-21 RX ADMIN — METHOCARBAMOL 500 MG: 500 TABLET ORAL at 22:23

## 2024-01-21 RX ADMIN — TERAZOSIN HYDROCHLORIDE 2 MG: 2 CAPSULE ORAL at 22:23

## 2024-01-21 RX ADMIN — AMPICILLIN SODIUM AND SULBACTAM SODIUM 3 G: 2; 1 INJECTION, POWDER, FOR SOLUTION INTRAMUSCULAR; INTRAVENOUS at 17:55

## 2024-01-21 NOTE — ED PROVIDER NOTES
Subjective   History of Present Illness  Chief Complaint: Shortness of breath    Patient is a 78-year-old  female with history of CKD 3, CAD, hypertension presents to the ER with complaints of shortness of breath and generalized fatigue for 1 week.  Patient states 1 week ago she was diagnosed with COVID.  She was admitted to the hospital, discharged 4 days ago.  Per patient's daughter, who is at bedside she states that she has not been doing her nebulizer treatments at home.  She denies any chest pain.  She reports persistent shortness of breath since she was discharged.  She was discharged on 2 L per nasal cannula, daughter at bedside states that she has been on 2 L.  No abdominal pain nausea vomiting or diarrhea.  She has a fever 101 earlier this week.  No lower extremity swelling that is new.    PCP: Anny Garcia    History provided by:  Patient      Review of Systems   Constitutional:  Positive for fatigue. Negative for fever.   HENT:  Negative for sore throat and trouble swallowing.    Eyes: Negative.    Respiratory:  Positive for cough and shortness of breath. Negative for wheezing.    Cardiovascular:  Negative for chest pain.   Gastrointestinal:  Negative for abdominal pain, diarrhea, nausea and vomiting.   Endocrine: Negative.    Genitourinary:  Negative for dysuria.   Musculoskeletal:  Negative for myalgias.   Skin:  Negative for rash.   Allergic/Immunologic: Negative.    Neurological:  Positive for light-headedness. Negative for weakness and headaches.   Psychiatric/Behavioral:  Negative for behavioral problems.    All other systems reviewed and are negative.      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    Hypertension     IBS (irritable colon syndrome)     constipation    Mass of right breast     Neuropathy     Renal insufficiency     Sciatic leg pain     right    Simple chronic bronchitis     Solitary kidney     left       Allergies   Allergen Reactions    Erythromycin Rash    Naproxen Sodium Other (See Comments)     Dizzy lightheaded    Statins Myalgia    Latex Itching and Swelling    Metoclopramide Other (See Comments)     Unsteady on feet    Rocephin [Ceftriaxone] Itching and Nausea Only    Dicyclomine Unknown - Low Severity       Past Surgical History:   Procedure Laterality Date    BREAST BIOPSY Right     CARDIAC CATHETERIZATION      CHOLECYSTECTOMY      COLON SURGERY      REMOVAL diverticular diseae    COLONOSCOPY N/A 08/12/2021    Procedure: COLONOSCOPY with polypectomy x 3;  Surgeon: Margarette Mcallister MD;  Location: [facility] ENDOSCOPY;  Service: Gastroenterology;  Laterality: N/A;  post op: hmorrhoids, diverticulosis, polyps    CORONARY STENT PLACEMENT      ENDOSCOPY N/A 08/12/2021    Procedure: ESOPHAGOGASTRODUODENOSCOPY with dilatation (18-20 mm balloon) and (54 bougie);  Surgeon: Margarette Mcallister MD;  Location: [facility] ENDOSCOPY;  Service: Gastroenterology;  Laterality: N/A;  post op: esophageal stricture, esophagitis, gastritis, history of nissen    ENDOSCOPY N/A 9/13/2023    Procedure: ESOPHAGOGASTRODUODENOSCOPY with biopsy x 1 area and dilation (50,54,56 non guided bougie);  Surgeon: Margarette Mcallister MD;  Location: [facility] ENDOSCOPY;  Service: Gastroenterology;  Laterality: N/A;  post op: esophageal stricture    EYE SURGERY Bilateral     cataracts    HIATAL HERNIA REPAIR      NEPHRECTOMY Right     cancer    UPPER ENDOSCOPIC ULTRASOUND W/ FNA N/A 09/22/2022     Procedure: EUS;  Surgeon: Margarette Mcallister MD;  Location: [facility] ENDOSCOPY;  Service: Gastroenterology;  Laterality: N/A;  post: normal pancreas, dilated pancreatic duct, hiatal hernia,        Family History   Problem Relation Age of Onset    Arthritis Father     Prostate cancer Father     Heart disease Sister        Social History     Socioeconomic History    Marital status:    Tobacco Use    Smoking status: Some Days     Packs/day: 0.25     Years: 50.00     Additional pack years: 0.00     Total pack years: 12.50     Types: Cigarettes    Smokeless tobacco: Never    Tobacco comments:     Mostly patches, some days none, some days 1-2   Vaping Use    Vaping Use: Never used   Substance and Sexual Activity    Alcohol use: Yes     Comment: occasionally     Drug use: Never    Sexual activity: Defer           Objective   Physical Exam  Vitals and nursing note reviewed.   Constitutional:       Appearance: Normal appearance. She is well-developed. She is not ill-appearing or toxic-appearing.   HENT:      Head: Normocephalic and atraumatic.   Eyes:      Pupils: Pupils are equal, round, and reactive to light.   Cardiovascular:      Rate and Rhythm: Normal rate and regular rhythm.      Heart sounds: Normal heart sounds. No murmur heard.  Pulmonary:      Effort: Pulmonary effort is normal. No respiratory distress.      Breath sounds: Examination of the right-middle field reveals wheezing. Examination of the left-middle field reveals wheezing. Wheezing present.   Abdominal:      General: Bowel sounds are normal. There is no distension.      Palpations: Abdomen is soft.      Tenderness: There is no abdominal tenderness.   Musculoskeletal:      Right lower leg: No edema.      Left lower leg: No edema.   Skin:     General: Skin is warm and dry.      Capillary Refill: Capillary refill takes less than 2 seconds.      Findings: No rash.   Neurological:      General: No focal deficit present.      Mental Status: She is alert and  "oriented to person, place, and time.   Psychiatric:         Mood and Affect: Mood normal.         Behavior: Behavior normal.         ECG 12 Lead      Date/Time: 1/21/2024 5:13 PM    Performed by: Lanie Tovar PA  Authorized by: Pedrito Resendiz MD  Interpreted by physician  Previous ECG: no previous ECG available  Rhythm: sinus rhythm  Rate: normal  BPM: 84  QRS axis: normal  Conduction: conduction normal  Clinical impression: non-specific ECG               ED Course  ED Course as of 01/21/24 1722   Sun Jan 21, 2024   1707 Chest x-ray reviewed by myself and ER attending, Dr. Collado, new right middle lobe pneumonia suspicious for possible opportunistic or superimposed infection.  Will obtain blood cultures and treat with IV antibiotics. [MC]   1707 I spoke with Christina Rosado NP who agreed to accept patient for admission on behalf of the [employer]. [MC]      ED Course User Index  [MC] Lanie Tovar PA    /67   Pulse 84   Temp 99.7 °F (37.6 °C) (Oral)   Resp 18   Ht 165.1 cm (65\")   Wt 85.3 kg (188 lb)   LMP  (LMP Unknown)   SpO2 93%   BMI 31.28 kg/m²   Labs Reviewed   COMPREHENSIVE METABOLIC PANEL - Abnormal; Notable for the following components:       Result Value    Glucose 119 (*)     Creatinine 1.21 (*)     Sodium 134 (*)     Calcium 8.1 (*)     Total Protein 5.5 (*)     Albumin 2.8 (*)     eGFR 45.4 (*)     All other components within normal limits    Narrative:     GFR Normal >60  Chronic Kidney Disease <60  Kidney Failure <15    The GFR formula is only valid for adults with stable renal function between ages 18 and 70.   SINGLE HSTROPONIN T - Abnormal; Notable for the following components:    HS Troponin T 61 (*)     All other components within normal limits    Narrative:     High Sensitive Troponin T Reference Range:  <14.0 ng/L- Negative Female for AMI  <22.0 ng/L- Negative Male for AMI  >=14 - Abnormal Female indicating possible myocardial injury.  >=22 - Abnormal Male indicating " possible myocardial injury.   Clinicians would have to utilize clinical acumen, EKG, Troponin, and serial changes to determine if it is an Acute Myocardial Infarction or myocardial injury due to an underlying chronic condition.        CBC WITH AUTO DIFFERENTIAL - Abnormal; Notable for the following components:    MCV 99.6 (*)     MCH 33.2 (*)     All other components within normal limits    Narrative:     The previously reported component NRBC is no longer being reported. Previous result was 0.2 /100 WBC (Reference Range: 0.0-0.2 /100 WBC) on 1/21/2024 at 1606 EST.   MANUAL DIFFERENTIAL - Abnormal; Notable for the following components:    Bands %  19.0 (*)     Metamyelocyte % 1.0 (*)     All other components within normal limits   BNP (IN-HOUSE) - Normal    Narrative:     This assay is used as an aid in the diagnosis of individuals suspected of having heart failure. It can be used as an aid in the diagnosis of acute decompensated heart failure (ADHF) in patients presenting with signs and symptoms of ADHF to the emergency department (ED). In addition, NT-proBNP of <300 pg/mL indicates ADHF is not likely.    Age Range Result Interpretation  NT-proBNP Concentration (pg/mL:      <50             Positive            >450                   Gray                 300-450                    Negative             <300    50-75           Positive            >900                  Gray                300-900                  Negative            <300      >75             Positive            >1800                  Gray                300-1800                  Negative            <300   PROCALCITONIN - Normal    Narrative:     As a Marker for Sepsis (Non-Neonates):    1. <0.5 ng/mL represents a low risk of severe sepsis and/or septic shock.  2. >2 ng/mL represents a high risk of severe sepsis and/or septic shock.    As a Marker for Lower Respiratory Tract Infections that require antibiotic therapy:    PCT on Admission    Antibiotic  "Therapy       6-12 Hrs later    >0.5                Strongly Recommended  >0.25 - <0.5        Recommended   0.1 - 0.25          Discouraged              Remeasure/reassess PCT  <0.1                Strongly Discouraged     Remeasure/reassess PCT    As 28 day mortality risk marker: \"Change in Procalcitonin Result\" (>80% or <=80%) if Day 0 (or Day 1) and Day 4 values are available. Refer to http://www.[facility] [place]-pct-calculator.com    Change in PCT <=80%  A decrease of PCT levels below or equal to 80% defines a positive change in PCT test result representing a higher risk for 28-day all-cause mortality of patients diagnosed with severe sepsis for septic shock.    Change in PCT >80%  A decrease of PCT levels of more than 80% defines a negative change in PCT result representing a lower risk for 28-day all-cause mortality of patients diagnosed with severe sepsis or septic shock.      BLOOD CULTURE   BLOOD CULTURE   RAINBOW DRAW    Narrative:     The following orders were created for panel order [facility] Draw.  Procedure                               Abnormality         Status                     ---------                               -----------         ------                     Green Top (Gel)[[id number]]                                  Final result               Lavender Top[[id number]]                                     Final result               Gold Top - SST[[id number]]                                   Final result               Light Blue Top[[id number]]                                   Final result                 Please view results for these tests on the individual orders.   SCAN SLIDE   HIGH SENSITIVITIY TROPONIN T 2HR   BLOOD GAS, ARTERIAL   CBC AND DIFFERENTIAL    Narrative:     The following orders were created for panel order CBC & Differential.  Procedure                               Abnormality         Status                     ---------                               -----------         ------               "       CBC Auto Differential[[id number]]        Abnormal            Final result               Scan Slide[[id number]]                                       Final result                 Please view results for these tests on the individual orders.   GREEN TOP   LAVENDER TOP   GOLD TOP - SST   LIGHT BLUE TOP     Medications   sodium chloride 0.9 % flush 10 mL (has no administration in time range)   ampicillin-sulbactam (UNASYN) 3 g in sodium chloride 0.9 % 100 mL IVPB-MBP (has no administration in time range)   azithromycin (ZITHROMAX) 500 mg in sodium chloride 0.9 % 250 mL IVPB-VTB (has no administration in time range)   albuterol sulfate HFA (PROVENTIL HFA;VENTOLIN HFA;PROAIR HFA) inhaler 2 puff (2 puffs Inhalation Given 1/21/24 1641)     XR Chest 1 View    Result Date: 1/21/2024  Impression: Consolidation within right midlung, concerning for ongoing pneumonia. Electronically Signed: Wilder Crenshaw MD  1/21/2024 4:14 PM EST  Workstation ID: [id number]                                            Medical Decision Making  Differential Dx (Includes but not limited to): PNA, pleural effusion COPD exacerbation CHF exacerbation bronchitis, electrolyte abnormality  Medical Records Reviewed: Patient diagnosed with COVID 1/14/2024.  She was admitted, recently discharged about 5 days ago.  She was discharged on 2 L.  Labs: On my interpretation, CBC no leukocytosis.  CMP creatinine 1.2.  Troponin 61, previously 67.  BNP normal.  Imaging: Chest x-ray shows new right middle lobe pneumonia that was not previously seen on x-ray from last week.  Telemetry: EKG interpretation: Reviewed by myself interpreted by ER attending sinus rhythm rate of 84 with no acute ST elevation or depression.  Testing considered but not ordered: CT head patient denies headache or head  Nature of Complaint: Acute  Admission vs Discharge:Admission  Discussion: While in the ED IV was placed and labs were obtained appropriate PPE was worn during exam and  throughout all encounters with the patient.  Patient had the above evaluation.  She is afebrile, nontoxic appearance she is in no acute distress.  She is not requiring more supplemental oxygen.  She received IV Solu-Medrol and a DuoNeb treatment per EMS and route.  She was given additional inhaler treatment.  She does report feeling somewhat better.  No chest pain.  Lab work significant for elevated troponin 61, previously 67 last week.  Kidney function stable.  BNP unremarkable.  Chest x-ray does show a new right middle lobe pneumonia consistent with a possible superimposed bacterial infection, not consistent with viral pneumonia.  Patient also has new bandemia, 19% bands that was not present previously.  WBC count normal.  Procalcitonin and blood cultures ordered and patient will be given IV antibiotics.  She had allergy to Rocephin, Unasyn ordered instead.  Patient will be admitted for further breathing treatments, IV antibiotics and further evaluation per the hospitalist team.  I spoke with Christina Rosado NP regarding admission.    Problems Addressed:  Dyspnea, unspecified type: acute illness or injury  Generalized weakness: acute illness or injury  Pneumonia of right middle lobe due to infectious organism: acute illness or injury    Amount and/or Complexity of Data Reviewed  Labs: ordered. Decision-making details documented in ED Course.  Radiology: ordered. Decision-making details documented in ED Course.  ECG/medicine tests: ordered. Decision-making details documented in ED Course.    Risk  Prescription drug management.  Decision regarding hospitalization.        Final diagnoses:   Pneumonia of right middle lobe due to infectious organism   Generalized weakness   Dyspnea, unspecified type       ED Disposition  ED Disposition       ED Disposition   Decision to Admit    Condition   --    Comment   Level of Care: Telemetry [5]   Admitting Physician: FREDRICK BRINK [[id number]]   Attending Physician: FREDRICK BRINK [[id number]]                  No follow-up provider specified.       Medication List      No changes were made to your prescriptions during this visit.            Lanie Tovar, PA  01/21/24 1726       Lanie Tovar PA  01/21/24 1727

## 2024-01-21 NOTE — Clinical Note
Level of Care: Telemetry [5]   Admitting Physician: FREDRICK BRINK [[id number]]   Attending Physician: FREDRICK BRINK [[id number]]

## 2024-01-22 PROBLEM — J18.9 PNEUMONIA: Status: ACTIVE | Noted: 2024-01-22

## 2024-01-22 LAB
ALBUMIN SERPL-MCNC: 3 G/DL (ref 3.5–5.2)
ALBUMIN/GLOB SERPL: 1.2 G/DL
ALP SERPL-CCNC: 56 U/L (ref 39–117)
ALT SERPL W P-5'-P-CCNC: 21 U/L (ref 1–33)
ANION GAP SERPL CALCULATED.3IONS-SCNC: 10 MMOL/L (ref 5–15)
ANISOCYTOSIS BLD QL: ABNORMAL
AST SERPL-CCNC: 18 U/L (ref 1–32)
B PARAPERT DNA SPEC QL NAA+PROBE: NOT DETECTED
B PERT DNA SPEC QL NAA+PROBE: NOT DETECTED
BILIRUB SERPL-MCNC: 0.2 MG/DL (ref 0–1.2)
BUN SERPL-MCNC: 26 MG/DL (ref 8–23)
BUN/CREAT SERPL: 19.5 (ref 7–25)
C PNEUM DNA NPH QL NAA+NON-PROBE: NOT DETECTED
CALCIUM SPEC-SCNC: 8.1 MG/DL (ref 8.6–10.5)
CHLORIDE SERPL-SCNC: 101 MMOL/L (ref 98–107)
CO2 SERPL-SCNC: 24 MMOL/L (ref 22–29)
CREAT SERPL-MCNC: 1.33 MG/DL (ref 0.57–1)
DEPRECATED RDW RBC AUTO: 45.9 FL (ref 37–54)
EGFRCR SERPLBLD CKD-EPI 2021: 40.5 ML/MIN/1.73
ERYTHROCYTE [DISTWIDTH] IN BLOOD BY AUTOMATED COUNT: 13.4 % (ref 12.3–15.4)
FLUAV SUBTYP SPEC NAA+PROBE: NOT DETECTED
FLUBV RNA ISLT QL NAA+PROBE: NOT DETECTED
GLOBULIN UR ELPH-MCNC: 2.5 GM/DL
GLUCOSE BLDC GLUCOMTR-MCNC: 254 MG/DL (ref 70–105)
GLUCOSE BLDC GLUCOMTR-MCNC: 305 MG/DL (ref 70–105)
GLUCOSE BLDC GLUCOMTR-MCNC: 322 MG/DL (ref 70–105)
GLUCOSE SERPL-MCNC: 257 MG/DL (ref 65–99)
HADV DNA SPEC NAA+PROBE: NOT DETECTED
HCOV 229E RNA SPEC QL NAA+PROBE: NOT DETECTED
HCOV HKU1 RNA SPEC QL NAA+PROBE: NOT DETECTED
HCOV NL63 RNA SPEC QL NAA+PROBE: NOT DETECTED
HCOV OC43 RNA SPEC QL NAA+PROBE: NOT DETECTED
HCT VFR BLD AUTO: 39.4 % (ref 34–46.6)
HGB BLD-MCNC: 13.2 G/DL (ref 12–15.9)
HMPV RNA NPH QL NAA+NON-PROBE: NOT DETECTED
HPIV1 RNA ISLT QL NAA+PROBE: NOT DETECTED
HPIV2 RNA SPEC QL NAA+PROBE: NOT DETECTED
HPIV3 RNA NPH QL NAA+PROBE: NOT DETECTED
HPIV4 P GENE NPH QL NAA+PROBE: NOT DETECTED
LYMPHOCYTES # BLD MANUAL: 0.43 10*3/MM3 (ref 0.7–3.1)
LYMPHOCYTES NFR BLD MANUAL: 5 % (ref 5–12)
M PNEUMO IGG SER IA-ACNC: NOT DETECTED
MCH RBC QN AUTO: 33.1 PG (ref 26.6–33)
MCHC RBC AUTO-ENTMCNC: 33.4 G/DL (ref 31.5–35.7)
MCV RBC AUTO: 99.3 FL (ref 79–97)
METAMYELOCYTES NFR BLD MANUAL: 2 % (ref 0–0)
MONOCYTES # BLD: 0.12 10*3/MM3 (ref 0.1–0.9)
NEUTROPHILS # BLD AUTO: 1.8 10*3/MM3 (ref 1.7–7)
NEUTROPHILS NFR BLD MANUAL: 75 % (ref 42.7–76)
PLATELET # BLD AUTO: 151 10*3/MM3 (ref 140–450)
PMV BLD AUTO: 8.6 FL (ref 6–12)
POTASSIUM SERPL-SCNC: 4.5 MMOL/L (ref 3.5–5.2)
PROT SERPL-MCNC: 5.5 G/DL (ref 6–8.5)
QT INTERVAL: 356 MS
QTC INTERVAL: 422 MS
RBC # BLD AUTO: 3.97 10*6/MM3 (ref 3.77–5.28)
RHINOVIRUS RNA SPEC NAA+PROBE: NOT DETECTED
RSV RNA NPH QL NAA+NON-PROBE: NOT DETECTED
SARS-COV-2 RNA NPH QL NAA+NON-PROBE: DETECTED
SCAN SLIDE: NORMAL
SMALL PLATELETS BLD QL SMEAR: ABNORMAL
SODIUM SERPL-SCNC: 135 MMOL/L (ref 136–145)
VARIANT LYMPHS NFR BLD MANUAL: 18 % (ref 19.6–45.3)
WBC MORPH BLD: NORMAL
WBC NRBC COR # BLD AUTO: 2.4 10*3/MM3 (ref 3.4–10.8)

## 2024-01-22 PROCEDURE — 85025 COMPLETE CBC W/AUTO DIFF WBC: CPT | Performed by: NURSE PRACTITIONER

## 2024-01-22 PROCEDURE — 94799 UNLISTED PULMONARY SVC/PX: CPT

## 2024-01-22 PROCEDURE — 80053 COMPREHEN METABOLIC PANEL: CPT | Performed by: NURSE PRACTITIONER

## 2024-01-22 PROCEDURE — 82948 REAGENT STRIP/BLOOD GLUCOSE: CPT

## 2024-01-22 PROCEDURE — 83735 ASSAY OF MAGNESIUM: CPT | Performed by: INTERNAL MEDICINE

## 2024-01-22 PROCEDURE — 63710000001 INSULIN LISPRO (HUMAN) PER 5 UNITS: Performed by: INTERNAL MEDICINE

## 2024-01-22 PROCEDURE — 94761 N-INVAS EAR/PLS OXIMETRY MLT: CPT

## 2024-01-22 PROCEDURE — 36415 COLL VENOUS BLD VENIPUNCTURE: CPT | Performed by: INTERNAL MEDICINE

## 2024-01-22 PROCEDURE — 25010000002 AMPICILLIN-SULBACTAM PER 1.5 G: Performed by: INTERNAL MEDICINE

## 2024-01-22 PROCEDURE — 63710000001 INSULIN GLARGINE PER 5 UNITS: Performed by: NURSE PRACTITIONER

## 2024-01-22 PROCEDURE — 0202U NFCT DS 22 TRGT SARS-COV-2: CPT | Performed by: INTERNAL MEDICINE

## 2024-01-22 PROCEDURE — 25010000002 ENOXAPARIN PER 10 MG: Performed by: NURSE PRACTITIONER

## 2024-01-22 PROCEDURE — 25010000002 CALCIUM GLUCONATE-NACL 1-0.675 GM/50ML-% SOLUTION: Performed by: INTERNAL MEDICINE

## 2024-01-22 PROCEDURE — 25010000002 METHYLPREDNISOLONE PER 125 MG: Performed by: NURSE PRACTITIONER

## 2024-01-22 PROCEDURE — 84100 ASSAY OF PHOSPHORUS: CPT | Performed by: INTERNAL MEDICINE

## 2024-01-22 PROCEDURE — 85007 BL SMEAR W/DIFF WBC COUNT: CPT | Performed by: NURSE PRACTITIONER

## 2024-01-22 PROCEDURE — G0378 HOSPITAL OBSERVATION PER HR: HCPCS

## 2024-01-22 PROCEDURE — 82330 ASSAY OF CALCIUM: CPT | Performed by: INTERNAL MEDICINE

## 2024-01-22 PROCEDURE — 94664 DEMO&/EVAL PT USE INHALER: CPT

## 2024-01-22 RX ORDER — GABAPENTIN 100 MG/1
100 CAPSULE ORAL 3 TIMES DAILY
Status: DISCONTINUED | OUTPATIENT
Start: 2024-01-22 | End: 2024-01-29 | Stop reason: HOSPADM

## 2024-01-22 RX ORDER — INSULIN LISPRO 100 [IU]/ML
2-7 INJECTION, SOLUTION INTRAVENOUS; SUBCUTANEOUS
Status: DISCONTINUED | OUTPATIENT
Start: 2024-01-22 | End: 2024-01-29 | Stop reason: HOSPADM

## 2024-01-22 RX ORDER — GABAPENTIN 100 MG/1
100 CAPSULE ORAL 3 TIMES DAILY
Status: DISCONTINUED | OUTPATIENT
Start: 2024-01-22 | End: 2024-01-22

## 2024-01-22 RX ORDER — DEXTROSE MONOHYDRATE 25 G/50ML
25 INJECTION, SOLUTION INTRAVENOUS
Status: DISCONTINUED | OUTPATIENT
Start: 2024-01-22 | End: 2024-01-29 | Stop reason: HOSPADM

## 2024-01-22 RX ORDER — GUAIFENESIN 600 MG/1
600 TABLET, EXTENDED RELEASE ORAL EVERY 12 HOURS SCHEDULED
Status: DISCONTINUED | OUTPATIENT
Start: 2024-01-22 | End: 2024-01-29 | Stop reason: HOSPADM

## 2024-01-22 RX ORDER — CALCIUM GLUCONATE 20 MG/ML
1000 INJECTION, SOLUTION INTRAVENOUS EVERY 12 HOURS
Qty: 100 ML | Refills: 0 | Status: COMPLETED | OUTPATIENT
Start: 2024-01-22 | End: 2024-01-22

## 2024-01-22 RX ORDER — NICOTINE POLACRILEX 4 MG
15 LOZENGE BUCCAL
Status: DISCONTINUED | OUTPATIENT
Start: 2024-01-22 | End: 2024-01-29 | Stop reason: HOSPADM

## 2024-01-22 RX ORDER — IBUPROFEN 600 MG/1
1 TABLET ORAL
Status: DISCONTINUED | OUTPATIENT
Start: 2024-01-22 | End: 2024-01-29 | Stop reason: HOSPADM

## 2024-01-22 RX ADMIN — POLYETHYLENE GLYCOL 3350 17 G: 17 POWDER, FOR SOLUTION ORAL at 09:10

## 2024-01-22 RX ADMIN — AMLODIPINE BESYLATE 10 MG: 5 TABLET ORAL at 09:09

## 2024-01-22 RX ADMIN — METHOCARBAMOL 500 MG: 500 TABLET ORAL at 16:29

## 2024-01-22 RX ADMIN — GABAPENTIN 100 MG: 100 CAPSULE ORAL at 14:35

## 2024-01-22 RX ADMIN — Medication 10 ML: at 21:15

## 2024-01-22 RX ADMIN — ASPIRIN 81 MG: 81 TABLET, COATED ORAL at 09:09

## 2024-01-22 RX ADMIN — INSULIN GLARGINE 10 UNITS: 100 INJECTION, SOLUTION SUBCUTANEOUS at 21:16

## 2024-01-22 RX ADMIN — METHOCARBAMOL 500 MG: 500 TABLET ORAL at 09:10

## 2024-01-22 RX ADMIN — TRAMADOL HYDROCHLORIDE 100 MG: 50 TABLET, COATED ORAL at 22:28

## 2024-01-22 RX ADMIN — PANTOPRAZOLE SODIUM 40 MG: 40 TABLET, DELAYED RELEASE ORAL at 06:00

## 2024-01-22 RX ADMIN — INSULIN LISPRO 5 UNITS: 100 INJECTION, SOLUTION INTRAVENOUS; SUBCUTANEOUS at 16:28

## 2024-01-22 RX ADMIN — CARVEDILOL 12.5 MG: 6.25 TABLET, FILM COATED ORAL at 16:28

## 2024-01-22 RX ADMIN — TERAZOSIN HYDROCHLORIDE 2 MG: 2 CAPSULE ORAL at 21:15

## 2024-01-22 RX ADMIN — ENOXAPARIN SODIUM 40 MG: 100 INJECTION SUBCUTANEOUS at 14:37

## 2024-01-22 RX ADMIN — CARVEDILOL 12.5 MG: 6.25 TABLET, FILM COATED ORAL at 09:10

## 2024-01-22 RX ADMIN — METHOCARBAMOL 500 MG: 500 TABLET ORAL at 21:15

## 2024-01-22 RX ADMIN — GABAPENTIN 100 MG: 100 CAPSULE ORAL at 21:15

## 2024-01-22 RX ADMIN — SERTRALINE HYDROCHLORIDE 50 MG: 50 TABLET ORAL at 09:10

## 2024-01-22 RX ADMIN — AMPICILLIN SODIUM AND SULBACTAM SODIUM 3 G: 2; 1 INJECTION, POWDER, FOR SOLUTION INTRAMUSCULAR; INTRAVENOUS at 21:16

## 2024-01-22 RX ADMIN — GUAIFENESIN 600 MG: 600 TABLET, MULTILAYER, EXTENDED RELEASE ORAL at 22:25

## 2024-01-22 RX ADMIN — LINAGLIPTIN 5 MG: 5 TABLET, FILM COATED ORAL at 09:10

## 2024-01-22 RX ADMIN — HYDRALAZINE HYDROCHLORIDE 25 MG: 25 TABLET ORAL at 21:15

## 2024-01-22 RX ADMIN — HYDRALAZINE HYDROCHLORIDE 25 MG: 25 TABLET ORAL at 09:10

## 2024-01-22 RX ADMIN — METHYLPREDNISOLONE SODIUM SUCCINATE 80 MG: 125 INJECTION, POWDER, FOR SOLUTION INTRAMUSCULAR; INTRAVENOUS at 09:10

## 2024-01-22 RX ADMIN — CALCIUM GLUCONATE 1000 MG: 20 INJECTION, SOLUTION INTRAVENOUS at 09:10

## 2024-01-22 RX ADMIN — Medication 10 ML: at 09:11

## 2024-01-22 RX ADMIN — ALBUTEROL SULFATE 2 PUFF: 108 INHALANT RESPIRATORY (INHALATION) at 11:35

## 2024-01-22 RX ADMIN — GUAIFENESIN 600 MG: 600 TABLET, MULTILAYER, EXTENDED RELEASE ORAL at 14:35

## 2024-01-22 RX ADMIN — CALCIUM GLUCONATE 1000 MG: 20 INJECTION, SOLUTION INTRAVENOUS at 22:25

## 2024-01-22 RX ADMIN — AMPICILLIN SODIUM AND SULBACTAM SODIUM 3 G: 2; 1 INJECTION, POWDER, FOR SOLUTION INTRAMUSCULAR; INTRAVENOUS at 14:36

## 2024-01-22 RX ADMIN — INSULIN LISPRO 5 UNITS: 100 INJECTION, SOLUTION INTRAVENOUS; SUBCUTANEOUS at 14:36

## 2024-01-22 RX ADMIN — METHOCARBAMOL 500 MG: 500 TABLET ORAL at 14:35

## 2024-01-22 NOTE — H&P
Patient Care Team:  Anny Garcia MD as PCP - General (Internal Medicine)  Sergio Ordonez MD as Consulting Physician (Nephrology)    Chief complaint shortness of breath    Subjective     Patient is a 78 y.o. female with history of CKD stage 3, COPD, hypertension with complaints of shortness of breath and fatigue for the past week. Patient reports she was diagnosed with COVID one week ago and admitted here and was discharged 4 days ago. Patient's family reports she has not been doing her breathing treatments at home and has had increased shortness of breath since at home. She was discharged on 2L oxygen which she has maintained at. Denies any nausea, vomiting, chest pain. She does report fever of 101 earlier in the week.   In the ER CXR shows new right middle lobe pneumonia, troponin 61, was 67 last week, Bnp unremarkable.  Patient also has new bandemia, 19% bands that was not present previously.  WBC count normal.  Procalcitonin and blood cultures ordered She was given Unasyn, steroids, duonebs.         Onset of symptoms was 1 week      Review of Systems   Constitutional:  Positive for fatigue and fever.   HENT: Negative.     Eyes: Negative.    Respiratory:  Positive for cough and shortness of breath.    Cardiovascular: Negative.    Gastrointestinal: Negative.    Endocrine: Negative.    Genitourinary: Negative.    Musculoskeletal: Negative.    Skin: Negative.    Neurological: Negative.    Psychiatric/Behavioral: Negative.            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    Hypertension     IBS (irritable colon syndrome)     constipation    Mass of right breast     Neuropathy     Renal insufficiency     Sciatic leg pain     right    Simple chronic bronchitis     Solitary kidney     left     Past Surgical History:   Procedure Laterality Date    BREAST BIOPSY Right     CARDIAC CATHETERIZATION      CHOLECYSTECTOMY      COLON SURGERY      REMOVAL diverticular diseae    COLONOSCOPY N/A 08/12/2021    Procedure: COLONOSCOPY with polypectomy x 3;  Surgeon: Margarette Mcallister MD;  Location: [facility] ENDOSCOPY;  Service: Gastroenterology;  Laterality: N/A;  post op: hmorrhoids, diverticulosis, polyps    CORONARY STENT PLACEMENT      ENDOSCOPY N/A 08/12/2021    Procedure: ESOPHAGOGASTRODUODENOSCOPY with dilatation (18-20 mm balloon) and (54 bougie);  Surgeon: Margarette Mcallister MD;  Location: [facility] ENDOSCOPY;  Service: Gastroenterology;  Laterality: N/A;  post op: esophageal stricture, esophagitis, gastritis, history of nissen    ENDOSCOPY N/A 9/13/2023    Procedure: ESOPHAGOGASTRODUODENOSCOPY with biopsy x 1 area and dilation (50,54,56 non guided bougie);  Surgeon: Margarette Mcallister MD;  Location: [facility] ENDOSCOPY;  Service: Gastroenterology;  Laterality: N/A;  post op: esophageal stricture    EYE SURGERY Bilateral     cataracts    HIATAL HERNIA REPAIR      NEPHRECTOMY Right     cancer    UPPER ENDOSCOPIC ULTRASOUND W/ FNA N/A 09/22/2022    Procedure: EUS;  Surgeon: Margarette Mcallister MD;  Location: [facility] ENDOSCOPY;  Service: Gastroenterology;  Laterality: N/A;  post: normal pancreas, dilated pancreatic duct, hiatal hernia,      Family History   Problem Relation Age of Onset    Arthritis Father     Prostate cancer Father     Heart disease Sister      Social History     Tobacco Use    Smoking status: Some Days     Packs/day: 0.25     Years: 50.00     Additional pack years: 0.00      Total pack years: 12.50     Types: Cigarettes    Smokeless tobacco: Never    Tobacco comments:     Mostly patches, some days none, some days 1-2   Vaping Use    Vaping Use: Never used   Substance Use Topics    Alcohol use: Yes     Comment: occasionally     Drug use: Never     (Not in a hospital admission)    Allergies:  Erythromycin, Naproxen sodium, Statins, Latex, Metoclopramide, Rocephin [ceftriaxone], and Dicyclomine    Objective     Vital Signs  Temp:  [98.6 °F (37 °C)-99.7 °F (37.6 °C)] 98.6 °F (37 °C)  Heart Rate:  [76-89] 88  Resp:  [14-18] 14  BP: (104-135)/(67-78) 135/71     Physical Exam:      General Appearance:    Alert, cooperative, in no acute distress   Head:    Normocephalic, without obvious abnormality, atraumatic   Eyes:            Lids and lashes normal, conjunctivae and sclerae normal, no   icterus, no pallor, corneas clear, PERRLA   Ears:    Ears appear intact with no abnormalities noted   Throat:   No oral lesions, no thrush, oral mucosa moist   Neck:   No adenopathy, supple, trachea midline, no thyromegaly, no   carotid bruit, no JVD   Lungs:     Wheezing present,respirations regular, even and                  unlabored    Heart:    Regular rhythm and normal rate, normal S1 and S2, no            murmur, no gallop, no rub, no click   Chest Wall:    No abnormalities observed   Abdomen:     Normal bowel sounds, no masses, no organomegaly, soft        non-tender, non-distended, no guarding, no rebound                tenderness   Extremities:   Moves all extremities well, no edema, no cyanosis, no             redness   Pulses:   Pulses palpable and equal bilaterally   Skin:   No bleeding, bruising or rash   Lymph nodes:   No palpable adenopathy   Neurologic:   No focal deficits noted       Results Review:     Imaging Results (Last 24 Hours)       Procedure Component Value Units Date/Time    XR Chest 1 View [[id number]] Collected: 01/21/24 1612     Updated: 01/21/24 1616    Narrative:      XR CHEST  1 VW    Date of Exam: 1/21/2024 3:57 PM EST    Indication: CHF/COPD Protocol  CHF/COPD Protocol    Comparison: CT chest from January 15, 2024    Findings:  There is a consolidation within the right midlung. The heart and mediastinal contours appear stable. The pulmonary vasculature appears normal. The osseous structures appear intact.      Impression:      Impression:  Consolidation within right midlung, concerning for ongoing pneumonia.      Electronically Signed: Wilder Crenshaw MD    1/21/2024 4:14 PM EST    Workstation ID: [id number]             Lab Results (last 24 hours)       Procedure Component Value Units Date/Time    Hemoglobin A1c [[id number]]  (Abnormal) Collected: 01/21/24 1556    Specimen: Blood from Arm, Left Updated: 01/21/24 2121     Hemoglobin A1C 7.10 %     High Sensitivity Troponin T 2Hr [[id number]]  (Abnormal) Collected: 01/21/24 1804    Specimen: Blood from Arm, Right Updated: 01/21/24 1830     HS Troponin T 59 ng/L      Troponin T Delta -2 ng/L     Narrative:      High Sensitive Troponin T Reference Range:  <14.0 ng/L- Negative Female for AMI  <22.0 ng/L- Negative Male for AMI  >=14 - Abnormal Female indicating possible myocardial injury.  >=22 - Abnormal Male indicating possible myocardial injury.   Clinicians would have to utilize clinical acumen, EKG, Troponin, and serial changes to determine if it is an Acute Myocardial Infarction or myocardial injury due to an underlying chronic condition.         Blood Gas, Arterial - [[id number]]  (Abnormal) Collected: 01/21/24 1757    Specimen: Arterial Blood Updated: 01/21/24 1759     Site Right Radial     Constantin's Test Positive     pH, Arterial 7.408 pH units      pCO2, Arterial 36.7 mm Hg      pO2, Arterial 68.7 mm Hg      HCO3, Arterial 23.1 mmol/L      Base Excess, Arterial -1.2 mmol/L      Comment: Serial Number: [id number]:  [id number]        O2 Saturation, Arterial 93.7 %      CO2 Content 24.3 mmol/L      Barometric Pressure for Blood Gas --      "Comment: N/A        Modality Cannula     FIO2 28 %      Hemodilution No    Blood Culture - Blood, Arm, Left [[id number]] Collected: 01/21/24 1725    Specimen: Blood from Arm, Left Updated: 01/21/24 1728    Blood Culture - Blood, Hand, Right [[id number]] Collected: 01/21/24 1725    Specimen: Blood from Hand, Right Updated: 01/21/24 1728    Procalcitonin [[id number]]  (Normal) Collected: 01/21/24 1556    Specimen: Blood from Arm, Left Updated: 01/21/24 1708     Procalcitonin 0.09 ng/mL     Narrative:      As a Marker for Sepsis (Non-Neonates):    1. <0.5 ng/mL represents a low risk of severe sepsis and/or septic shock.  2. >2 ng/mL represents a high risk of severe sepsis and/or septic shock.    As a Marker for Lower Respiratory Tract Infections that require antibiotic therapy:    PCT on Admission    Antibiotic Therapy       6-12 Hrs later    >0.5                Strongly Recommended  >0.25 - <0.5        Recommended   0.1 - 0.25          Discouraged              Remeasure/reassess PCT  <0.1                Strongly Discouraged     Remeasure/reassess PCT    As 28 day mortality risk marker: \"Change in Procalcitonin Result\" (>80% or <=80%) if Day 0 (or Day 1) and Day 4 values are available. Refer to http://www.[facility]pct-calculator[facility]    Change in PCT <=80%  A decrease of PCT levels below or equal to 80% defines a positive change in PCT test result representing a higher risk for 28-day all-cause mortality of patients diagnosed with severe sepsis for septic shock.    Change in PCT >80%  A decrease of PCT levels of more than 80% defines a negative change in PCT result representing a lower risk for 28-day all-cause mortality of patients diagnosed with severe sepsis or septic shock.       [place] Draw [[id number]] Collected: 01/21/24 1556    Specimen: Blood from Arm, Left Updated: 01/21/24 1700    Narrative:      The following orders were created for panel order [place] Draw.  Procedure                               Abnormality    "      Status                     ---------                               -----------         ------                     Green Top (Gel)[[id number]]                                  Final result               Lavender Top[[id number]]                                     Final result               Gold Top - SST[[id number]]                                   Final result               Light Blue Top[[id number]]                                   Final result                 Please view results for these tests on the individual orders.    Lavender Top [[id number]] Collected: 01/21/24 1556    Specimen: Blood from Arm, Left Updated: 01/21/24 1700     Extra Tube hold for add-on     Comment: Auto resulted       Green Top (Gel) [[id number]] Collected: 01/21/24 1556    Specimen: Blood from Arm, Left Updated: 01/21/24 1700     Extra Tube Hold for add-ons.     Comment: Auto resulted.       Gold Top - SST [[id number]] Collected: 01/21/24 1556    Specimen: Blood from Arm, Left Updated: 01/21/24 1700     Extra Tube Hold for add-ons.     Comment: Auto resulted.       Light Blue Top [[id number]] Collected: 01/21/24 1556    Specimen: Blood from Arm, Left Updated: 01/21/24 1700     Extra Tube Hold for add-ons.     Comment: Auto resulted       CBC & Differential [[id number]]  (Abnormal) Collected: 01/21/24 1556    Specimen: Blood from Arm, Left Updated: 01/21/24 1633    Narrative:      The following orders were created for panel order CBC & Differential.  Procedure                               Abnormality         Status                     ---------                               -----------         ------                     CBC Auto Differential[[id number]]        Abnormal            Final result               Scan Slide[[id number]]                                       Final result                 Please view results for these tests on the individual orders.    Scan Slide [[id number]] Collected: 01/21/24 1556    Specimen: Blood from Arm, Left  Updated: 01/21/24 1633     Scan Slide --     Comment: See Manual Differential Results       Manual Differential [[id number]]  (Abnormal) Collected: 01/21/24 1556    Specimen: Blood from Arm, Left Updated: 01/21/24 1633     Neutrophil % 47.0 %      Lymphocyte % 20.0 %      Monocyte % 10.0 %      Basophil % 1.0 %      Bands %  19.0 %      Metamyelocyte % 1.0 %      Atypical Lymphocyte % 2.0 %      Neutrophils Absolute 2.90 10*3/mm3      Lymphocytes Absolute 0.97 10*3/mm3      Monocytes Absolute 0.44 10*3/mm3      Basophils Absolute 0.04 10*3/mm3      Macrocytes Slight/1+     WBC Morphology Normal     Platelet Morphology Normal    CBC Auto Differential [[id number]]  (Abnormal) Collected: 01/21/24 1556    Specimen: Blood from Arm, Left Updated: 01/21/24 1633     WBC 4.40 10*3/mm3      RBC 4.11 10*6/mm3      Hemoglobin 13.6 g/dL      Hematocrit 40.9 %      MCV 99.6 fL      MCH 33.2 pg      MCHC 33.3 g/dL      RDW 13.7 %      RDW-SD 47.3 fl      MPV 8.5 fL      Platelets 153 10*3/mm3     Narrative:      The previously reported component NRBC is no longer being reported. Previous result was 0.2 /100 WBC (Reference Range: 0.0-0.2 /100 WBC) on 1/21/2024 at 1606 EST.    Single High Sensitivity Troponin T [[id number]]  (Abnormal) Collected: 01/21/24 1556    Specimen: Blood from Arm, Left Updated: 01/21/24 1622     HS Troponin T 61 ng/L     Narrative:      High Sensitive Troponin T Reference Range:  <14.0 ng/L- Negative Female for AMI  <22.0 ng/L- Negative Male for AMI  >=14 - Abnormal Female indicating possible myocardial injury.  >=22 - Abnormal Male indicating possible myocardial injury.   Clinicians would have to utilize clinical acumen, EKG, Troponin, and serial changes to determine if it is an Acute Myocardial Infarction or myocardial injury due to an underlying chronic condition.         Comprehensive Metabolic Panel [[id number]]  (Abnormal) Collected: 01/21/24 1556    Specimen: Blood from Arm, Left Updated: 01/21/24 1620      Glucose 119 mg/dL      BUN 22 mg/dL      Creatinine 1.21 mg/dL      Sodium 134 mmol/L      Potassium 4.5 mmol/L      Chloride 102 mmol/L      CO2 23.0 mmol/L      Calcium 8.1 mg/dL      Total Protein 5.5 g/dL      Albumin 2.8 g/dL      ALT (SGPT) 18 U/L      AST (SGOT) 19 U/L      Alkaline Phosphatase 56 U/L      Total Bilirubin 0.2 mg/dL      Globulin 2.7 gm/dL      A/G Ratio 1.0 g/dL      BUN/Creatinine Ratio 18.2     Anion Gap 9.0 mmol/L      eGFR 45.4 mL/min/1.73     Narrative:      GFR Normal >60  Chronic Kidney Disease <60  Kidney Failure <15    The GFR formula is only valid for adults with stable renal function between ages 18 and 70.    BNP [[id number]]  (Normal) Collected: 01/21/24 1556    Specimen: Blood from Arm, Left Updated: 01/21/24 1620     proBNP 395.8 pg/mL     Narrative:      This assay is used as an aid in the diagnosis of individuals suspected of having heart failure. It can be used as an aid in the diagnosis of acute decompensated heart failure (ADHF) in patients presenting with signs and symptoms of ADHF to the emergency department (ED). In addition, NT-proBNP of <300 pg/mL indicates ADHF is not likely.    Age Range Result Interpretation  NT-proBNP Concentration (pg/mL:      <50             Positive            >450                   Gray                 300-450                    Negative             <300    50-75           Positive            >900                  Gray                300-900                  Negative            <300      >75             Positive            >1800                  Gray                300-1800                  Negative            <300             I reviewed the patient's new clinical results.    Assessment & Plan     Pneumonia of right middle lobe  Generalized weakness  Dyspnea  -CXR shows new pna  -bnp unremarkable  -troponin 61  -started on azithromycin, steroids, nebs        DVT prophylaxis- SCD's  GI prophylaxis- ppi    I discussed the patient's findings and  my recommendations with patient.     Asha Almodovar, APRN  01/21/24  22:18 EST

## 2024-01-22 NOTE — PLAN OF CARE
Goal Outcome Evaluation:  Plan of Care Reviewed With: patient        Progress: no change  Outcome Evaluation: VSS, admitted from ED for new dx PNA 2ndary to recent Cov19 infection. Requiring 3L O2. Up ad felix. Nephro consulted due to CKD3/labs.

## 2024-01-22 NOTE — CONSULTS
NEPHROLOGY CONSULTATION-----[facility]    [facility]  [phone]  Sonali Ordonez MD    Patient Care Team:  Anny Garcia MD as PCP - General (Internal Medicine)  José Luis, MD Sergio as Consulting Physician (Nephrology)    CC/REASON FOR CONSULTATION: RENAL FAILURE/ELEVATED SERUM CREATININE    PHYSICIAN REQUESTING CONSULTATION:     History of Present Illness    Patient is a 78 y.o. WF, very well known to me, whom I was asked to see in consultation for evaluation and management of renal failure/elevated serum creatinine and solitary kidney state. Patient was admitted with recurrent SOB, cough, congestion and recently in hospital after being found to have COVID 19 + PNA.   Patient with known CRF/CKD STG 3A. No NSAIDs. Had IV dye exposure with CT PE protocol recent admission.. No known h/o hepatitis, TB, rheumatic fever, jaundice, SLE, bleeding/bruising disorders.  No urinary sx. No edema or fluid retention.  +Compliance with home meds. Was not on diuretics prior to admission. Was not on ACE-I/ARB prior to admission. No herbal med use.     Review of Systems   Constitutional:  Positive for activity change, appetite change and fatigue. Negative for chills, diaphoresis, fever and unexpected weight change.   HENT:  Positive for congestion. Negative for dental problem, drooling, ear discharge, ear pain, facial swelling, hearing loss, mouth sores, nosebleeds, postnasal drip, rhinorrhea, sinus pressure, sinus pain, sneezing, sore throat, tinnitus, trouble swallowing and voice change.    Eyes:  Negative for photophobia, pain, discharge, redness, itching and visual disturbance.   Respiratory:  Positive for choking and shortness of breath. Negative for apnea, cough, chest tightness, wheezing and stridor.    Cardiovascular:  Negative for chest pain, palpitations and leg swelling.   Gastrointestinal:  Negative for abdominal distention, abdominal  pain, anal bleeding, blood in stool, constipation, diarrhea, nausea, rectal pain and vomiting.   Endocrine: Negative for cold intolerance, heat intolerance, polydipsia, polyphagia and polyuria.   Genitourinary:  Positive for frequency. Negative for decreased urine volume, difficulty urinating, dysuria, enuresis, flank pain, genital sores, hematuria and urgency.   Musculoskeletal:  Positive for arthralgias and back pain. Negative for gait problem, joint swelling, myalgias, neck pain and neck stiffness.   Skin:  Negative for color change, pallor, rash and wound.   Allergic/Immunologic: Negative for environmental allergies, food allergies and immunocompromised state.   Neurological:  Positive for weakness. Negative for dizziness, tremors, seizures, syncope, facial asymmetry, speech difficulty, light-headedness, numbness and headaches.   Hematological:  Negative for adenopathy. Does not bruise/bleed easily.   Psychiatric/Behavioral:  Positive for dysphoric mood. Negative for agitation, behavioral problems, confusion, decreased concentration, hallucinations, self-injury, sleep disturbance and suicidal ideas. The patient is not nervous/anxious and is not hyperactive.           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    Hypertension     IBS (irritable colon syndrome)     constipation    Mass of right breast     Neuropathy     Renal insufficiency     Sciatic leg pain     right     Simple chronic bronchitis     Solitary kidney     left       Past Surgical History:   Procedure Laterality Date    BREAST BIOPSY Right     CARDIAC CATHETERIZATION      CHOLECYSTECTOMY      COLON SURGERY      REMOVAL diverticular diseae    COLONOSCOPY N/A 08/12/2021    Procedure: COLONOSCOPY with polypectomy x 3;  Surgeon: Margarette Mcallister MD;  Location: [facility] ENDOSCOPY;  Service: Gastroenterology;  Laterality: N/A;  post op: hmorrhoids, diverticulosis, polyps    CORONARY STENT PLACEMENT      ENDOSCOPY N/A 08/12/2021    Procedure: ESOPHAGOGASTRODUODENOSCOPY with dilatation (18-20 mm balloon) and (54 bougie);  Surgeon: Margarette Mcallister MD;  Location: [facility] ENDOSCOPY;  Service: Gastroenterology;  Laterality: N/A;  post op: esophageal stricture, esophagitis, gastritis, history of nissen    ENDOSCOPY N/A 9/13/2023    Procedure: ESOPHAGOGASTRODUODENOSCOPY with biopsy x 1 area and dilation (50,54,56 non guided bougie);  Surgeon: Margarette Mcallister MD;  Location: [facility] ENDOSCOPY;  Service: Gastroenterology;  Laterality: N/A;  post op: esophageal stricture    EYE SURGERY Bilateral     cataracts    HIATAL HERNIA REPAIR      NEPHRECTOMY Right     cancer    UPPER ENDOSCOPIC ULTRASOUND W/ FNA N/A 09/22/2022    Procedure: EUS;  Surgeon: Margarette Mcallister MD;  Location: [facility] ENDOSCOPY;  Service: Gastroenterology;  Laterality: N/A;  post: normal pancreas, dilated pancreatic duct, hiatal hernia,        Family History   Problem Relation Age of Onset    Arthritis Father     Prostate cancer Father     Heart disease Sister        Social History     Tobacco Use    Smoking status: Some Days     Packs/day: 0.25     Years: 50.00     Additional pack years: 0.00     Total pack years: 12.50     Types: Cigarettes    Smokeless tobacco: Never    Tobacco comments:     Mostly patches, some days none, some days 1-2   Vaping Use    Vaping Use: Never used   Substance Use Topics    Alcohol use: Yes     Comment: occasionally     Drug use: Never       Home  Meds: (Not in a hospital admission)      Scheduled Meds:  amLODIPine, 10 mg, Oral, Daily  aspirin, 81 mg, Oral, Daily  azithromycin, 500 mg, Intravenous, Q24H  carvedilol, 12.5 mg, Oral, BID With Meals  enoxaparin, 40 mg, Subcutaneous, Q24H  gabapentin, 100 mg, Oral, Nightly  hydrALAZINE, 25 mg, Oral, BID  insulin glargine, 10 Units, Subcutaneous, Nightly  ipratropium-albuterol, 3 mL, Nebulization, 4x Daily - RT  linagliptin, 5 mg, Oral, Daily  methocarbamol, 500 mg, Oral, 4x Daily  methylPREDNISolone sodium succinate, 80 mg, Intravenous, Daily  pantoprazole, 40 mg, Oral, Q AM  PATIENT SUPPLIED MEDICATION, 3,120 mcg, Subcutaneous, Daily  PATIENT SUPPLIED MEDICATION, 10 mg, Oral, Daily  polyethylene glycol, 17 g, Oral, Daily  senna-docusate sodium, 2 tablet, Oral, BID  sertraline, 50 mg, Oral, Daily  sodium chloride, 10 mL, Intravenous, Q12H  terazosin, 2 mg, Oral, Nightly        Continuous Infusions:  Pharmacy to Dose enoxaparin (LOVENOX),         PRN Meds:    albuterol    senna-docusate sodium **AND** polyethylene glycol **AND** bisacodyl **AND** bisacodyl    Calcium Replacement - Follow Nurse / BPA Driven Protocol    HYDROcodone-acetaminophen    Magnesium Standard Dose Replacement - Follow Nurse / BPA Driven Protocol    nitroglycerin    ondansetron    Pharmacy to Dose enoxaparin (LOVENOX)    Phosphorus Replacement - Follow Nurse / BPA Driven Protocol    Potassium Replacement - Follow Nurse / BPA Driven Protocol    sodium chloride    sodium chloride    sodium chloride    traMADol    Allergies:  Erythromycin, Naproxen sodium, Statins, Latex, Metoclopramide, Rocephin [ceftriaxone], and Dicyclomine    OBJECTIVE    Vital Signs  Temp:  [97.6 °F (36.4 °C)-99.7 °F (37.6 °C)] 97.6 °F (36.4 °C)  Heart Rate:  [61-89] 62  Resp:  [14-18] 18  BP: (101-136)/(53-78) 136/68    No intake/output data recorded.  I/O last 3 completed shifts:  In: 350 [IV Piggyback:350]  Out: -     Physical Exam:  General Appearance: alert, appears  "stated age and cooperative  Head: normocephalic, without obvious abnormality and atraumatic  Eyes: conjunctivae and sclerae normal and no icterus  Neck: supple and no JVD  Lungs: +RIGHT  RALES  Heart: regular rhythm & normal rate and normal S1, S2 +AMINA  Chest Wall: no abnormalities observed  Abdomen: normal bowel sounds and soft, nontender  Extremities: moves extremities well, no edema, no cyanosis +DJD  Skin: no bleeding, bruising or rash  Neurologic: Alert, and oriented. No focal deficits    Results Review:    I reviewed the patient's new clinical results.    WBC WBC   Date Value Ref Range Status   01/22/2024 2.40 (L) 3.40 - 10.80 10*3/mm3 Final   01/21/2024 4.40 3.40 - 10.80 10*3/mm3 Final      HGB Hemoglobin   Date Value Ref Range Status   01/22/2024 13.2 12.0 - 15.9 g/dL Final   01/21/2024 13.6 12.0 - 15.9 g/dL Final      HCT Hematocrit   Date Value Ref Range Status   01/22/2024 39.4 34.0 - 46.6 % Final   01/21/2024 40.9 34.0 - 46.6 % Final      Platelets No results found for: \"LABPLAT\"   MCV MCV   Date Value Ref Range Status   01/22/2024 99.3 (H) 79.0 - 97.0 fL Final   01/21/2024 99.6 (H) 79.0 - 97.0 fL Final          Sodium Sodium   Date Value Ref Range Status   01/22/2024 135 (L) 136 - 145 mmol/L Final   01/21/2024 134 (L) 136 - 145 mmol/L Final      Potassium Potassium   Date Value Ref Range Status   01/22/2024 4.5 3.5 - 5.2 mmol/L Final   01/21/2024 4.5 3.5 - 5.2 mmol/L Final      Chloride Chloride   Date Value Ref Range Status   01/22/2024 101 98 - 107 mmol/L Final   01/21/2024 102 98 - 107 mmol/L Final      CO2 CO2   Date Value Ref Range Status   01/22/2024 24.0 22.0 - 29.0 mmol/L Final   01/21/2024 23.0 22.0 - 29.0 mmol/L Final      BUN BUN   Date Value Ref Range Status   01/22/2024 26 (H) 8 - 23 mg/dL Final   01/21/2024 22 8 - 23 mg/dL Final      Creatinine Creatinine   Date Value Ref Range Status   01/22/2024 1.33 (H) 0.57 - 1.00 mg/dL Final   01/21/2024 1.21 (H) 0.57 - 1.00 mg/dL Final      Calcium " "Calcium   Date Value Ref Range Status   01/22/2024 8.1 (L) 8.6 - 10.5 mg/dL Final   01/21/2024 8.1 (L) 8.6 - 10.5 mg/dL Final      PO4 No results found for: \"CAPO4\"   Albumin Albumin   Date Value Ref Range Status   01/22/2024 3.0 (L) 3.5 - 5.2 g/dL Final   01/21/2024 2.8 (L) 3.5 - 5.2 g/dL Final      Magnesium No results found for: \"MG\"   Uric Acid No results found for: \"URICACID\"       Imaging Results (Last 72 Hours)       Procedure Component Value Units Date/Time    XR Chest 1 View [[id number]] Collected: 01/21/24 1612     Updated: 01/21/24 1616    Narrative:      XR CHEST 1 VW    Date of Exam: 1/21/2024 3:57 PM EST    Indication: CHF/COPD Protocol  CHF/COPD Protocol    Comparison: CT chest from January 15, 2024    Findings:  There is a consolidation within the right midlung. The heart and mediastinal contours appear stable. The pulmonary vasculature appears normal. The osseous structures appear intact.      Impression:      Impression:  Consolidation within right midlung, concerning for ongoing pneumonia.      Electronically Signed: Wilder Crenshaw MD    1/21/2024 4:14 PM EST    Workstation ID: [id number]              Results for orders placed during the hospital encounter of 01/21/24    XR Chest 1 View    Narrative  XR CHEST 1 VW    Date of Exam: 1/21/2024 3:57 PM EST    Indication: CHF/COPD Protocol  CHF/COPD Protocol    Comparison: CT chest from January 15, 2024    Findings:  There is a consolidation within the right midlung. The heart and mediastinal contours appear stable. The pulmonary vasculature appears normal. The osseous structures appear intact.    Impression  Impression:  Consolidation within right midlung, concerning for ongoing pneumonia.      Electronically Signed: Wilder Crenshaw MD  1/21/2024 4:14 PM EST  Workstation ID: [id number]      Results for orders placed during the hospital encounter of 01/14/24    XR Chest PA & Lateral    Narrative  XR CHEST PA AND LATERAL    Date of Exam: 1/14/2024 9:20 PM " CST    Indication: elevated d-dimer    Comparison: Chest x-ray 1/14/2024    Findings:  The heart is stable in size. There is trace fluid in the right minor fissure and trace blunting at the costophrenic angle. No evidence for pneumothorax. There are calcified hilar lymph nodes.    Impression  Impression:  Trace right pleural effusion.      Electronically Signed: Bia Lucero MD  1/15/2024 6:24 AM CST  Workstation ID: [id number]      XR Chest 1 View    Narrative  XR CHEST 1 VW    Date of Exam: 1/14/2024 3:22 PM EST    Indication: soa/covid +    Comparison: None available.    Findings: No focal consolidation. Resolving right midlung zone atelectasis. No pneumothorax or pleural effusion. Cardiac size is normal. The visualized clavicles appear intact. No displaced rib fractures. The visualized upper abdomen is normal.    Impression  Impression: No acute cardiopulmonary disease.    Electronically Signed: Kiran Burgess MD  1/14/2024 3:39 PM EST  Workstation ID: [id number]        Results for orders placed during the hospital encounter of 08/22/23    Doppler Ankle Brachial Index Single Level CAR    Interpretation Summary    Right Conclusion: The right IKE is normal. Normal digital pressures.    Left Conclusion: The left IKE is normal. Mild digital ischemia.      ASSESSMENT / PLAN      Pneumonia of right middle lobe due to infectious organism    Generalized weakness    Dyspnea    CRF/CKD-------Nonoliguric. 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 Cr stable. Lytes okay. Volume okay. Dose meds for CrCl 30-45 cc/min. No NSAIDs.  Current creatinine at [facility]     2. HTN WITH CKD------BP okay. Avoid hypotension     3. DMII WITH RENAL MANIFESTATIONS-----BS okay. Avoid hypoglycemia. Lantus, Glucometers, SSI. Follow with steroid exposure     4. COPD/COVID 19 + PNA/RML CONSOLIDATION-------Oxygen, Decadron, Supportive care. On abx per PCP     5. URETERAL CA S/P R  NEPHRECTOMY/SOLITARY KIDNEY STATE     6. PULMONARY HTN     7. OA/DJD-----No NSAIDs.       8. DVT PROPHYLAXIS------Lovenox     9. ELEVATED D-DIMER-------CT PE protocol recent admission negative     10. DEPRESSION------On Zoloft     11. GERD/PUD PROPHYLAXIS--------PPI. Benefits outweigh risks despite renal dysfunction      I discussed the patient's findings and my recommendations with patient and nursing staff    Will follow along closely. Thank you for allowing us to see this patient in renal consultation.    [facility]  [phone]  MD Sonali Steele MD  01/22/24  07:50 EST

## 2024-01-22 NOTE — PROGRESS NOTES
LOS: 0 days   Patient Care Team:  Anny Garcia MD as PCP - General (Internal Medicine)  Sergio Ordonez MD as Consulting Physician (Nephrology)    Subjective     Interval History:Stable overnight    Patient Complaints: Feels a little better, cough is loosening    History taken from: patient    Review of Systems   Constitutional:  Positive for activity change, appetite change, fatigue and fever. Negative for chills and diaphoresis.   HENT:  Negative for facial swelling.    Eyes:  Negative for visual disturbance.   Respiratory:  Positive for cough and shortness of breath. Negative for stridor.    Cardiovascular:  Negative for chest pain, palpitations and leg swelling.   Gastrointestinal:  Negative for abdominal pain and constipation.   Genitourinary:  Negative for dysuria.   Musculoskeletal:  Positive for arthralgias and back pain.   Skin:  Negative for rash and wound.   Neurological:  Negative for dizziness and headaches.   Psychiatric/Behavioral:  Negative for confusion.            Objective     Vital Signs  Temp:  [97.6 °F (36.4 °C)-99.7 °F (37.6 °C)] 97.7 °F (36.5 °C)  Heart Rate:  [61-89] 67  Resp:  [14-21] 21  BP: (101-136)/(53-78) 133/65    Physical Exam:     General Appearance:    Alert, cooperative, in no acute distress,   Head:    Normocephalic, without obvious abnormality, atraumatic   Eyes:            Lids and lashes normal, conjunctivae and sclerae normal, no   icterus, no pallor, corneas clear, PERRLA   Ears:    Ears appear intact with no abnormalities noted   Throat:   No oral lesions, no thrush, oral mucosa moist   Neck:   No adenopathy, supple, trachea midline, no thyromegaly, no   carotid bruit, no JVD   Lungs:     Diffuse rhonchi, largely clear with coughing    Heart:    Regular rhythm and normal rate, normal S1 and S2, no            murmur, no gallop, no rub, no click   Chest Wall:    No abnormalities observed   Abdomen:     Normal bowel sounds, no masses, no organomegaly, soft         Non-tender non-distended, no guarding,   Extremities:   Moves all extremities well, no edema, no cyanosis, no             Redness   Pulses:   Pulses palpable and equal bilaterally   Skin:   No bleeding, bruising or rash   Lymph nodes:   No palpable adenopathy   Neurologic:   Cranial nerves 2 - 12 grossly intact, sensation intact, DTR       present and equal bilaterally        Results Review:    Lab Results (last 24 hours)       Procedure Component Value Units Date/Time    CBC & Differential [[id number]]  (Abnormal) Collected: 01/22/24 0453    Specimen: Blood Updated: 01/22/24 0548    Narrative:      The following orders were created for panel order CBC & Differential.  Procedure                               Abnormality         Status                     ---------                               -----------         ------                     CBC Auto Differential[[id number]]        Abnormal            Final result               Scan Slide[[id number]]                                       Final result                 Please view results for these tests on the individual orders.    CBC Auto Differential [[id number]]  (Abnormal) Collected: 01/22/24 0453    Specimen: Blood Updated: 01/22/24 0548     WBC 2.40 10*3/mm3      RBC 3.97 10*6/mm3      Hemoglobin 13.2 g/dL      Hematocrit 39.4 %      MCV 99.3 fL      MCH 33.1 pg      MCHC 33.4 g/dL      RDW 13.4 %      RDW-SD 45.9 fl      MPV 8.6 fL      Platelets 151 10*3/mm3     Narrative:      The previously reported component NRBC is no longer being reported. Previous result was 0.3 /100 WBC (Reference Range: 0.0-0.2 /100 WBC) on 1/22/2024 at 0516 EST.    Scan Slide [[id number]] Collected: 01/22/24 0453    Specimen: Blood Updated: 01/22/24 0548     Scan Slide --     Comment: See Manual Differential Results       Manual Differential [[id number]]  (Abnormal) Collected: 01/22/24 0453    Specimen: Blood Updated: 01/22/24 0548     Neutrophil % 75.0 %      Lymphocyte % 18.0 %       Monocyte % 5.0 %      Metamyelocyte % 2.0 %      Neutrophils Absolute 1.80 10*3/mm3      Lymphocytes Absolute 0.43 10*3/mm3      Monocytes Absolute 0.12 10*3/mm3      Anisocytosis Slight/1+     WBC Morphology Normal     Platelet Estimate Decreased    Comprehensive Metabolic Panel [[id number]]  (Abnormal) Collected: 01/22/24 0453    Specimen: Blood Updated: 01/22/24 0521     Glucose 257 mg/dL      BUN 26 mg/dL      Creatinine 1.33 mg/dL      Sodium 135 mmol/L      Potassium 4.5 mmol/L      Chloride 101 mmol/L      CO2 24.0 mmol/L      Calcium 8.1 mg/dL      Total Protein 5.5 g/dL      Albumin 3.0 g/dL      ALT (SGPT) 21 U/L      AST (SGOT) 18 U/L      Alkaline Phosphatase 56 U/L      Total Bilirubin 0.2 mg/dL      Globulin 2.5 gm/dL      A/G Ratio 1.2 g/dL      BUN/Creatinine Ratio 19.5     Anion Gap 10.0 mmol/L      eGFR 40.5 mL/min/1.73     Narrative:      GFR Normal >60  Chronic Kidney Disease <60  Kidney Failure <15    The GFR formula is only valid for adults with stable renal function between ages 18 and 70.    POC Glucose Once [[id number]]  (Abnormal) Collected: 01/21/24 2222    Specimen: Blood Updated: 01/21/24 2224     Glucose 300 mg/dL      Comment: Serial Number: [id number]:  [id number]       Hemoglobin A1c [[id number]]  (Abnormal) Collected: 01/21/24 1556    Specimen: Blood from Arm, Left Updated: 01/21/24 2121     Hemoglobin A1C 7.10 %     High Sensitivity Troponin T 2Hr [[id number]]  (Abnormal) Collected: 01/21/24 1804    Specimen: Blood from Arm, Right Updated: 01/21/24 1830     HS Troponin T 59 ng/L      Troponin T Delta -2 ng/L     Narrative:      High Sensitive Troponin T Reference Range:  <14.0 ng/L- Negative Female for AMI  <22.0 ng/L- Negative Male for AMI  >=14 - Abnormal Female indicating possible myocardial injury.  >=22 - Abnormal Male indicating possible myocardial injury.   Clinicians would have to utilize clinical acumen, EKG, Troponin, and serial changes to determine if it is an Acute  "Myocardial Infarction or myocardial injury due to an underlying chronic condition.         Blood Gas, Arterial - [[id number]]  (Abnormal) Collected: 01/21/24 1757    Specimen: Arterial Blood Updated: 01/21/24 1759     Site Right Radial     Constantin's Test Positive     pH, Arterial 7.408 pH units      pCO2, Arterial 36.7 mm Hg      pO2, Arterial 68.7 mm Hg      HCO3, Arterial 23.1 mmol/L      Base Excess, Arterial -1.2 mmol/L      Comment: Serial Number: [id number]:  [id number]        O2 Saturation, Arterial 93.7 %      CO2 Content 24.3 mmol/L      Barometric Pressure for Blood Gas --     Comment: N/A        Modality Cannula     FIO2 28 %      Hemodilution No    Blood Culture - Blood, Arm, Left [[id number]] Collected: 01/21/24 1725    Specimen: Blood from Arm, Left Updated: 01/21/24 1728    Blood Culture - Blood, Hand, Right [[id number]] Collected: 01/21/24 1725    Specimen: Blood from Hand, Right Updated: 01/21/24 1728    Procalcitonin [[id number]]  (Normal) Collected: 01/21/24 1556    Specimen: Blood from Arm, Left Updated: 01/21/24 1708     Procalcitonin 0.09 ng/mL     Narrative:      As a Marker for Sepsis (Non-Neonates):    1. <0.5 ng/mL represents a low risk of severe sepsis and/or septic shock.  2. >2 ng/mL represents a high risk of severe sepsis and/or septic shock.    As a Marker for Lower Respiratory Tract Infections that require antibiotic therapy:    PCT on Admission    Antibiotic Therapy       6-12 Hrs later    >0.5                Strongly Recommended  >0.25 - <0.5        Recommended   0.1 - 0.25          Discouraged              Remeasure/reassess PCT  <0.1                Strongly Discouraged     Remeasure/reassess PCT    As 28 day mortality risk marker: \"Change in Procalcitonin Result\" (>80% or <=80%) if Day 0 (or Day 1) and Day 4 values are available. Refer to http://www.[employer]-pct-calculator.com    Change in PCT <=80%  A decrease of PCT levels below or equal to 80% defines a positive change in PCT test " result representing a higher risk for 28-day all-cause mortality of patients diagnosed with severe sepsis for septic shock.    Change in PCT >80%  A decrease of PCT levels of more than 80% defines a negative change in PCT result representing a lower risk for 28-day all-cause mortality of patients diagnosed with severe sepsis or septic shock.       Harleigh Draw [[id number]] Collected: 01/21/24 1556    Specimen: Blood from Arm, Left Updated: 01/21/24 1700    Narrative:      The following orders were created for panel order Harleigh Draw.  Procedure                               Abnormality         Status                     ---------                               -----------         ------                     Green Top (Gel)[[id number]]                                  Final result               Lavender Top[[id number]]                                     Final result               Gold Top - SST[[id number]]                                   Final result               Light Blue Top[[id number]]                                   Final result                 Please view results for these tests on the individual orders.    Lavender Top [[id number]] Collected: 01/21/24 1556    Specimen: Blood from Arm, Left Updated: 01/21/24 1700     Extra Tube hold for add-on     Comment: Auto resulted       Green Top (Gel) [[id number]] Collected: 01/21/24 1556    Specimen: Blood from Arm, Left Updated: 01/21/24 1700     Extra Tube Hold for add-ons.     Comment: Auto resulted.       Gold Top - SST [[id number]] Collected: 01/21/24 1556    Specimen: Blood from Arm, Left Updated: 01/21/24 1700     Extra Tube Hold for add-ons.     Comment: Auto resulted.       Light Blue Top [[id number]] Collected: 01/21/24 1556    Specimen: Blood from Arm, Left Updated: 01/21/24 1700     Extra Tube Hold for add-ons.     Comment: Auto resulted       CBC & Differential [[id number]]  (Abnormal) Collected: 01/21/24 1556    Specimen: Blood from Arm, Left Updated: 01/21/24  1633    Narrative:      The following orders were created for panel order CBC & Differential.  Procedure                               Abnormality         Status                     ---------                               -----------         ------                     CBC Auto Differential[[id number]]        Abnormal            Final result               Scan Slide[[id number]]                                       Final result                 Please view results for these tests on the individual orders.    Scan Slide [[id number]] Collected: 01/21/24 1556    Specimen: Blood from Arm, Left Updated: 01/21/24 1633     Scan Slide --     Comment: See Manual Differential Results       Manual Differential [[id number]]  (Abnormal) Collected: 01/21/24 1556    Specimen: Blood from Arm, Left Updated: 01/21/24 1633     Neutrophil % 47.0 %      Lymphocyte % 20.0 %      Monocyte % 10.0 %      Basophil % 1.0 %      Bands %  19.0 %      Metamyelocyte % 1.0 %      Atypical Lymphocyte % 2.0 %      Neutrophils Absolute 2.90 10*3/mm3      Lymphocytes Absolute 0.97 10*3/mm3      Monocytes Absolute 0.44 10*3/mm3      Basophils Absolute 0.04 10*3/mm3      Macrocytes Slight/1+     WBC Morphology Normal     Platelet Morphology Normal    CBC Auto Differential [[id number]]  (Abnormal) Collected: 01/21/24 1556    Specimen: Blood from Arm, Left Updated: 01/21/24 1633     WBC 4.40 10*3/mm3      RBC 4.11 10*6/mm3      Hemoglobin 13.6 g/dL      Hematocrit 40.9 %      MCV 99.6 fL      MCH 33.2 pg      MCHC 33.3 g/dL      RDW 13.7 %      RDW-SD 47.3 fl      MPV 8.5 fL      Platelets 153 10*3/mm3     Narrative:      The previously reported component NRBC is no longer being reported. Previous result was 0.2 /100 WBC (Reference Range: 0.0-0.2 /100 WBC) on 1/21/2024 at 1606 EST.    Single High Sensitivity Troponin T [[id number]]  (Abnormal) Collected: 01/21/24 1556    Specimen: Blood from Arm, Left Updated: 01/21/24 1622     HS Troponin T 61 ng/L      Narrative:      High Sensitive Troponin T Reference Range:  <14.0 ng/L- Negative Female for AMI  <22.0 ng/L- Negative Male for AMI  >=14 - Abnormal Female indicating possible myocardial injury.  >=22 - Abnormal Male indicating possible myocardial injury.   Clinicians would have to utilize clinical acumen, EKG, Troponin, and serial changes to determine if it is an Acute Myocardial Infarction or myocardial injury due to an underlying chronic condition.         Comprehensive Metabolic Panel [[id number]]  (Abnormal) Collected: 01/21/24 1556    Specimen: Blood from Arm, Left Updated: 01/21/24 1620     Glucose 119 mg/dL      BUN 22 mg/dL      Creatinine 1.21 mg/dL      Sodium 134 mmol/L      Potassium 4.5 mmol/L      Chloride 102 mmol/L      CO2 23.0 mmol/L      Calcium 8.1 mg/dL      Total Protein 5.5 g/dL      Albumin 2.8 g/dL      ALT (SGPT) 18 U/L      AST (SGOT) 19 U/L      Alkaline Phosphatase 56 U/L      Total Bilirubin 0.2 mg/dL      Globulin 2.7 gm/dL      A/G Ratio 1.0 g/dL      BUN/Creatinine Ratio 18.2     Anion Gap 9.0 mmol/L      eGFR 45.4 mL/min/1.73     Narrative:      GFR Normal >60  Chronic Kidney Disease <60  Kidney Failure <15    The GFR formula is only valid for adults with stable renal function between ages 18 and 70.    BNP [[id number]]  (Normal) Collected: 01/21/24 1556    Specimen: Blood from Arm, Left Updated: 01/21/24 1620     proBNP 395.8 pg/mL     Narrative:      This assay is used as an aid in the diagnosis of individuals suspected of having heart failure. It can be used as an aid in the diagnosis of acute decompensated heart failure (ADHF) in patients presenting with signs and symptoms of ADHF to the emergency department (ED). In addition, NT-proBNP of <300 pg/mL indicates ADHF is not likely.    Age Range Result Interpretation  NT-proBNP Concentration (pg/mL:      <50             Positive            >450                   Gray                 300-450                    Negative              <300    50-75           Positive            >900                  Gray                300-900                  Negative            <300      >75             Positive            >1800                  Gray                300-1800                  Negative            <300             Imaging Results (Last 24 Hours)       Procedure Component Value Units Date/Time    XR Chest 1 View [[id number]] Collected: 01/21/24 1612     Updated: 01/21/24 1616    Narrative:      XR CHEST 1 VW    Date of Exam: 1/21/2024 3:57 PM EST    Indication: CHF/COPD Protocol  CHF/COPD Protocol    Comparison: CT chest from January 15, 2024    Findings:  There is a consolidation within the right midlung. The heart and mediastinal contours appear stable. The pulmonary vasculature appears normal. The osseous structures appear intact.      Impression:      Impression:  Consolidation within right midlung, concerning for ongoing pneumonia.      Electronically Signed: Wilder Crenshaw MD    1/21/2024 4:14 PM EST    Workstation ID: [id number]                 I reviewed the patient's new clinical results.    Medication Review:   Scheduled Meds:amLODIPine, 10 mg, Oral, Daily  aspirin, 81 mg, Oral, Daily  azithromycin, 500 mg, Intravenous, Q24H  calcium gluconate, 1,000 mg, Intravenous, Q12H  carvedilol, 12.5 mg, Oral, BID With Meals  enoxaparin, 40 mg, Subcutaneous, Q24H  gabapentin, 100 mg, Oral, TID  hydrALAZINE, 25 mg, Oral, BID  insulin glargine, 10 Units, Subcutaneous, Nightly  ipratropium-albuterol, 3 mL, Nebulization, 4x Daily - RT  linagliptin, 5 mg, Oral, Daily  methocarbamol, 500 mg, Oral, 4x Daily  methylPREDNISolone sodium succinate, 80 mg, Intravenous, Daily  pantoprazole, 40 mg, Oral, Q AM  PATIENT SUPPLIED MEDICATION, 3,120 mcg, Subcutaneous, Daily  PATIENT SUPPLIED MEDICATION, 10 mg, Oral, Daily  polyethylene glycol, 17 g, Oral, Daily  senna-docusate sodium, 2 tablet, Oral, BID  sertraline, 50 mg, Oral, Daily  sodium chloride, 10 mL,  Intravenous, Q12H  terazosin, 2 mg, Oral, Nightly      Continuous Infusions:Pharmacy to Dose enoxaparin (LOVENOX),       PRN Meds:.  albuterol    senna-docusate sodium **AND** polyethylene glycol **AND** bisacodyl **AND** bisacodyl    Calcium Replacement - Follow Nurse / BPA Driven Protocol    HYDROcodone-acetaminophen    Magnesium Standard Dose Replacement - Follow Nurse / BPA Driven Protocol    nitroglycerin    ondansetron    Pharmacy to Dose enoxaparin (LOVENOX)    Phosphorus Replacement - Follow Nurse / BPA Driven Protocol    Potassium Replacement - Follow Nurse / BPA Driven Protocol    sodium chloride    sodium chloride    sodium chloride    traMADol     Assessment & Plan       Pneumonia of right middle lobe due to infectious organism    Generalized weakness    Dyspnea    Pneumonia  -Patient was recently hospitalized from 1/14/2024 through 1/17/2024 for COVID infection.  She felt well at home for a few days but then started noticing fatigue and yesterday a fever and worsening cough..  Chest x-ray is concerning for right middle lobe infiltrate.  She has been covered with antibiotics for possible aspiration pneumonia.  However, I am concerned with leukopenia that she may have a second viral infection.  Repeat respiratory panel is pending.    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

## 2024-01-22 NOTE — PLAN OF CARE
Goal Outcome Evaluation:   Patient admitted to unit. Patient has no complaints of pain during shift. Patient resting in bed, patient currently requiring 2L of O2.

## 2024-01-23 LAB
ALBUMIN SERPL-MCNC: 2.7 G/DL (ref 3.5–5.2)
ALBUMIN/GLOB SERPL: 1 G/DL
ALP SERPL-CCNC: 53 U/L (ref 39–117)
ALT SERPL W P-5'-P-CCNC: 19 U/L (ref 1–33)
ANION GAP SERPL CALCULATED.3IONS-SCNC: 8 MMOL/L (ref 5–15)
AST SERPL-CCNC: 20 U/L (ref 1–32)
BASOPHILS # BLD AUTO: 0 10*3/MM3 (ref 0–0.2)
BASOPHILS NFR BLD AUTO: 0.1 % (ref 0–1.5)
BILIRUB SERPL-MCNC: <0.2 MG/DL (ref 0–1.2)
BUN SERPL-MCNC: 31 MG/DL (ref 8–23)
BUN/CREAT SERPL: 25 (ref 7–25)
CA-I SERPL ISE-MCNC: 1.16 MMOL/L (ref 1.2–1.3)
CALCIUM SPEC-SCNC: 8.6 MG/DL (ref 8.6–10.5)
CHLORIDE SERPL-SCNC: 99 MMOL/L (ref 98–107)
CO2 SERPL-SCNC: 22 MMOL/L (ref 22–29)
CREAT SERPL-MCNC: 1.24 MG/DL (ref 0.57–1)
DEPRECATED RDW RBC AUTO: 50.3 FL (ref 37–54)
EGFRCR SERPLBLD CKD-EPI 2021: 44.1 ML/MIN/1.73
EOSINOPHIL # BLD AUTO: 0 10*3/MM3 (ref 0–0.4)
EOSINOPHIL NFR BLD AUTO: 0.1 % (ref 0.3–6.2)
ERYTHROCYTE [DISTWIDTH] IN BLOOD BY AUTOMATED COUNT: 13.7 % (ref 12.3–15.4)
GLOBULIN UR ELPH-MCNC: 2.8 GM/DL
GLUCOSE BLDC GLUCOMTR-MCNC: 190 MG/DL (ref 70–105)
GLUCOSE BLDC GLUCOMTR-MCNC: 191 MG/DL (ref 70–105)
GLUCOSE BLDC GLUCOMTR-MCNC: 255 MG/DL (ref 70–105)
GLUCOSE SERPL-MCNC: 228 MG/DL (ref 65–99)
HCT VFR BLD AUTO: 38.9 % (ref 34–46.6)
HGB BLD-MCNC: 13.2 G/DL (ref 12–15.9)
LYMPHOCYTES # BLD AUTO: 0.6 10*3/MM3 (ref 0.7–3.1)
LYMPHOCYTES NFR BLD AUTO: 10 % (ref 19.6–45.3)
MAGNESIUM SERPL-MCNC: 2.3 MG/DL (ref 1.6–2.4)
MCH RBC QN AUTO: 33.7 PG (ref 26.6–33)
MCHC RBC AUTO-ENTMCNC: 34 G/DL (ref 31.5–35.7)
MCV RBC AUTO: 99 FL (ref 79–97)
MONOCYTES # BLD AUTO: 0.5 10*3/MM3 (ref 0.1–0.9)
MONOCYTES NFR BLD AUTO: 8.5 % (ref 5–12)
NEUTROPHILS NFR BLD AUTO: 4.7 10*3/MM3 (ref 1.7–7)
NEUTROPHILS NFR BLD AUTO: 81.3 % (ref 42.7–76)
NRBC BLD AUTO-RTO: 0.1 /100 WBC (ref 0–0.2)
PHOSPHATE SERPL-MCNC: 1.9 MG/DL (ref 2.5–4.5)
PLATELET # BLD AUTO: 142 10*3/MM3 (ref 140–450)
PMV BLD AUTO: 9.5 FL (ref 6–12)
POTASSIUM SERPL-SCNC: 4.6 MMOL/L (ref 3.5–5.2)
PROT SERPL-MCNC: 5.5 G/DL (ref 6–8.5)
RBC # BLD AUTO: 3.93 10*6/MM3 (ref 3.77–5.28)
SODIUM SERPL-SCNC: 129 MMOL/L (ref 136–145)
WBC NRBC COR # BLD AUTO: 5.8 10*3/MM3 (ref 3.4–10.8)

## 2024-01-23 PROCEDURE — 25010000002 AMPICILLIN-SULBACTAM PER 1.5 G: Performed by: INTERNAL MEDICINE

## 2024-01-23 PROCEDURE — 82948 REAGENT STRIP/BLOOD GLUCOSE: CPT

## 2024-01-23 PROCEDURE — 97162 PT EVAL MOD COMPLEX 30 MIN: CPT

## 2024-01-23 PROCEDURE — 25810000003 SODIUM CHLORIDE 0.9 % SOLUTION: Performed by: INTERNAL MEDICINE

## 2024-01-23 PROCEDURE — 63710000001 INSULIN LISPRO (HUMAN) PER 5 UNITS: Performed by: INTERNAL MEDICINE

## 2024-01-23 PROCEDURE — 25010000002 ENOXAPARIN PER 10 MG: Performed by: NURSE PRACTITIONER

## 2024-01-23 PROCEDURE — 63710000001 INSULIN GLARGINE PER 5 UNITS: Performed by: NURSE PRACTITIONER

## 2024-01-23 PROCEDURE — 87205 SMEAR GRAM STAIN: CPT | Performed by: INTERNAL MEDICINE

## 2024-01-23 PROCEDURE — 25010000002 METHYLPREDNISOLONE PER 125 MG: Performed by: NURSE PRACTITIONER

## 2024-01-23 PROCEDURE — 25010000002 CALCIUM GLUCONATE-NACL 1-0.675 GM/50ML-% SOLUTION: Performed by: INTERNAL MEDICINE

## 2024-01-23 PROCEDURE — 82948 REAGENT STRIP/BLOOD GLUCOSE: CPT | Performed by: INTERNAL MEDICINE

## 2024-01-23 RX ORDER — CALCIUM GLUCONATE 20 MG/ML
1000 INJECTION, SOLUTION INTRAVENOUS ONCE
Status: COMPLETED | OUTPATIENT
Start: 2024-01-23 | End: 2024-01-23

## 2024-01-23 RX ADMIN — CARVEDILOL 12.5 MG: 6.25 TABLET, FILM COATED ORAL at 08:14

## 2024-01-23 RX ADMIN — DOCUSATE SODIUM AND SENNOSIDES 2 TABLET: 8.6; 5 TABLET, FILM COATED ORAL at 08:14

## 2024-01-23 RX ADMIN — INSULIN LISPRO 4 UNITS: 100 INJECTION, SOLUTION INTRAVENOUS; SUBCUTANEOUS at 16:04

## 2024-01-23 RX ADMIN — LINAGLIPTIN 5 MG: 5 TABLET, FILM COATED ORAL at 08:13

## 2024-01-23 RX ADMIN — TRAMADOL HYDROCHLORIDE 100 MG: 50 TABLET, COATED ORAL at 08:18

## 2024-01-23 RX ADMIN — SODIUM PHOSPHATE, MONOBASIC, MONOHYDRATE AND SODIUM PHOSPHATE, DIBASIC, ANHYDROUS 15 MMOL: 142; 276 INJECTION, SOLUTION INTRAVENOUS at 12:04

## 2024-01-23 RX ADMIN — GABAPENTIN 100 MG: 100 CAPSULE ORAL at 08:13

## 2024-01-23 RX ADMIN — METHOCARBAMOL 500 MG: 500 TABLET ORAL at 20:08

## 2024-01-23 RX ADMIN — AMPICILLIN SODIUM AND SULBACTAM SODIUM 3 G: 2; 1 INJECTION, POWDER, FOR SOLUTION INTRAMUSCULAR; INTRAVENOUS at 08:13

## 2024-01-23 RX ADMIN — AMPICILLIN SODIUM AND SULBACTAM SODIUM 3 G: 2; 1 INJECTION, POWDER, FOR SOLUTION INTRAMUSCULAR; INTRAVENOUS at 03:14

## 2024-01-23 RX ADMIN — GUAIFENESIN 600 MG: 600 TABLET, MULTILAYER, EXTENDED RELEASE ORAL at 08:14

## 2024-01-23 RX ADMIN — GABAPENTIN 100 MG: 100 CAPSULE ORAL at 16:04

## 2024-01-23 RX ADMIN — GUAIFENESIN 600 MG: 600 TABLET, MULTILAYER, EXTENDED RELEASE ORAL at 20:07

## 2024-01-23 RX ADMIN — AMLODIPINE BESYLATE 10 MG: 5 TABLET ORAL at 08:14

## 2024-01-23 RX ADMIN — INSULIN LISPRO 2 UNITS: 100 INJECTION, SOLUTION INTRAVENOUS; SUBCUTANEOUS at 12:03

## 2024-01-23 RX ADMIN — CARVEDILOL 12.5 MG: 6.25 TABLET, FILM COATED ORAL at 16:04

## 2024-01-23 RX ADMIN — METHYLPREDNISOLONE SODIUM SUCCINATE 80 MG: 125 INJECTION, POWDER, FOR SOLUTION INTRAMUSCULAR; INTRAVENOUS at 08:13

## 2024-01-23 RX ADMIN — HYDRALAZINE HYDROCHLORIDE 25 MG: 25 TABLET ORAL at 20:08

## 2024-01-23 RX ADMIN — AMPICILLIN SODIUM AND SULBACTAM SODIUM 3 G: 2; 1 INJECTION, POWDER, FOR SOLUTION INTRAMUSCULAR; INTRAVENOUS at 20:08

## 2024-01-23 RX ADMIN — INSULIN GLARGINE 10 UNITS: 100 INJECTION, SOLUTION SUBCUTANEOUS at 20:08

## 2024-01-23 RX ADMIN — POLYETHYLENE GLYCOL 3350 17 G: 17 POWDER, FOR SOLUTION ORAL at 08:14

## 2024-01-23 RX ADMIN — HYDRALAZINE HYDROCHLORIDE 25 MG: 25 TABLET ORAL at 08:14

## 2024-01-23 RX ADMIN — METHOCARBAMOL 500 MG: 500 TABLET ORAL at 16:04

## 2024-01-23 RX ADMIN — PANTOPRAZOLE SODIUM 40 MG: 40 TABLET, DELAYED RELEASE ORAL at 03:51

## 2024-01-23 RX ADMIN — TRAMADOL HYDROCHLORIDE 100 MG: 50 TABLET, COATED ORAL at 20:07

## 2024-01-23 RX ADMIN — AMPICILLIN SODIUM AND SULBACTAM SODIUM 3 G: 2; 1 INJECTION, POWDER, FOR SOLUTION INTRAMUSCULAR; INTRAVENOUS at 12:03

## 2024-01-23 RX ADMIN — CALCIUM GLUCONATE 1000 MG: 20 INJECTION, SOLUTION INTRAVENOUS at 10:38

## 2024-01-23 RX ADMIN — GABAPENTIN 100 MG: 100 CAPSULE ORAL at 20:07

## 2024-01-23 RX ADMIN — SERTRALINE HYDROCHLORIDE 50 MG: 50 TABLET ORAL at 08:13

## 2024-01-23 RX ADMIN — Medication 10 ML: at 20:08

## 2024-01-23 RX ADMIN — ASPIRIN 81 MG: 81 TABLET, COATED ORAL at 08:13

## 2024-01-23 RX ADMIN — ENOXAPARIN SODIUM 40 MG: 100 INJECTION SUBCUTANEOUS at 16:04

## 2024-01-23 RX ADMIN — METHOCARBAMOL 500 MG: 500 TABLET ORAL at 12:04

## 2024-01-23 RX ADMIN — TERAZOSIN HYDROCHLORIDE 2 MG: 2 CAPSULE ORAL at 20:07

## 2024-01-23 RX ADMIN — METHOCARBAMOL 500 MG: 500 TABLET ORAL at 08:13

## 2024-01-23 RX ADMIN — INSULIN LISPRO 2 UNITS: 100 INJECTION, SOLUTION INTRAVENOUS; SUBCUTANEOUS at 08:14

## 2024-01-23 RX ADMIN — Medication 10 ML: at 08:13

## 2024-01-23 NOTE — PLAN OF CARE
Goal Outcome Evaluation:              Outcome Evaluation: Patient up and down during the night.  Remained on 3L NC.  Nephro to see.  Pt stable at this time.

## 2024-01-23 NOTE — PROGRESS NOTES
LOS: 0 days   Patient Care Team:  Anny Garcia MD as PCP - General (Internal Medicine)  Sergio Ordonez MD as Consulting Physician (Nephrology)    Subjective     Interval History: Improving    Patient Complaints: Able to go periods of  time without oxygen, overall feels better but still fatigued and coughing.    History taken from: patient    Review of Systems   Constitutional:  Positive for activity change, appetite change and fatigue. Negative for chills, diaphoresis and fever.   HENT:  Negative for facial swelling.    Eyes:  Negative for visual disturbance.   Respiratory:  Positive for cough and shortness of breath. Negative for stridor.    Cardiovascular:  Negative for chest pain, palpitations and leg swelling.   Gastrointestinal:  Negative for abdominal pain, constipation and diarrhea.   Genitourinary:  Negative for dysuria.   Musculoskeletal:  Positive for arthralgias and back pain.   Skin:  Negative for rash and wound.   Neurological:  Negative for dizziness, light-headedness and headaches.   Psychiatric/Behavioral:  Negative for confusion. Behavioral problem: Chr.           Objective     Vital Signs  Temp:  [96.8 °F (36 °C)-97.5 °F (36.4 °C)] 97.5 °F (36.4 °C)  Heart Rate:  [62-77] 62  Resp:  [17-21] 18  BP: (122-134)/(53-74) 122/67    Physical Exam:     General Appearance:    Alert, cooperative, in no acute distress,   Head:    Normocephalic, without obvious abnormality, atraumatic   Eyes:            Lids and lashes normal, conjunctivae and sclerae normal, no   icterus, no pallor, corneas clear, PERRLA   Ears:    Ears appear intact with no abnormalities noted   Throat:   No oral lesions, no thrush, oral mucosa moist   Neck:   No adenopathy, supple, trachea midline, no thyromegaly, no   carotid bruit, no JVD   Lungs:   Rhonchi right side    Heart:    Regular rhythm and normal rate, normal S1 and S2, no            murmur, no gallop, no rub, no click   Chest Wall:    No abnormalities observed    Abdomen:     Normal bowel sounds, no masses, no organomegaly, soft        Non-tender non-distended, no guarding,   Extremities:   Moves all extremities well, no edema, no cyanosis, no             Redness   Pulses:   Pulses palpable and equal bilaterally   Skin:   No bleeding, bruising or rash   Lymph nodes:   No palpable adenopathy   Neurologic:   Cranial nerves 2 - 12 grossly intact, sensation intact, DTR       present and equal bilaterally        Results Review:    Lab Results (last 24 hours)       Procedure Component Value Units Date/Time    POC Glucose TID AC [[id number]]  (Abnormal) Collected: 01/23/24 1140    Specimen: Blood Updated: 01/23/24 1142     Glucose 191 mg/dL      Comment: Serial Number: [id number]:  [id number]       POC Glucose TID AC [[id number]]  (Abnormal) Collected: 01/23/24 0749    Specimen: Blood Updated: 01/23/24 0750     Glucose 190 mg/dL      Comment: Serial Number: [id number]:  [id number]       Magnesium [[id number]]  (Normal) Collected: 01/22/24 2249    Specimen: Blood Updated: 01/23/24 0022     Magnesium 2.3 mg/dL     Comprehensive Metabolic Panel [[id number]]  (Abnormal) Collected: 01/22/24 2249    Specimen: Blood Updated: 01/23/24 0022     Glucose 228 mg/dL      BUN 31 mg/dL      Creatinine 1.24 mg/dL      Sodium 129 mmol/L      Potassium 4.6 mmol/L      Comment: Slight hemolysis detected by analyzer. Result may be falsely elevated.        Chloride 99 mmol/L      CO2 22.0 mmol/L      Calcium 8.6 mg/dL      Total Protein 5.5 g/dL      Albumin 2.7 g/dL      ALT (SGPT) 19 U/L      AST (SGOT) 20 U/L      Comment: Slight hemolysis detected by analyzer. Result may be falsely elevated.        Alkaline Phosphatase 53 U/L      Total Bilirubin <0.2 mg/dL      Globulin 2.8 gm/dL      A/G Ratio 1.0 g/dL      BUN/Creatinine Ratio 25.0     Anion Gap 8.0 mmol/L      eGFR 44.1 mL/min/1.73     Narrative:      GFR Normal >60  Chronic Kidney Disease <60  Kidney Failure <15    The GFR formula is  only valid for adults with stable renal function between ages 18 and 70.    Phosphorus [[id number]]  (Abnormal) Collected: 01/22/24 2249    Specimen: Blood Updated: 01/23/24 0021     Phosphorus 1.9 mg/dL     CBC & Differential [[id number]]  (Abnormal) Collected: 01/22/24 2249    Specimen: Blood Updated: 01/23/24 0011    Narrative:      The following orders were created for panel order CBC & Differential.  Procedure                               Abnormality         Status                     ---------                               -----------         ------                     CBC Auto Differential[[id number]]        Abnormal            Final result                 Please view results for these tests on the individual orders.    CBC Auto Differential [[id number]]  (Abnormal) Collected: 01/22/24 2249    Specimen: Blood Updated: 01/23/24 0011     WBC 5.80 10*3/mm3      RBC 3.93 10*6/mm3      Hemoglobin 13.2 g/dL      Hematocrit 38.9 %      MCV 99.0 fL      MCH 33.7 pg      MCHC 34.0 g/dL      RDW 13.7 %      RDW-SD 50.3 fl      MPV 9.5 fL      Platelets 142 10*3/mm3      Neutrophil % 81.3 %      Lymphocyte % 10.0 %      Monocyte % 8.5 %      Eosinophil % 0.1 %      Basophil % 0.1 %      Neutrophils, Absolute 4.70 10*3/mm3      Lymphocytes, Absolute 0.60 10*3/mm3      Monocytes, Absolute 0.50 10*3/mm3      Eosinophils, Absolute 0.00 10*3/mm3      Basophils, Absolute 0.00 10*3/mm3      nRBC 0.1 /100 WBC     Calcium, Ionized [[id number]]  (Abnormal) Collected: 01/22/24 2249    Specimen: Blood Updated: 01/23/24 0007     Ionized Calcium 1.16 mmol/L     POC Glucose Once [[id number]]  (Abnormal) Collected: 01/22/24 2054    Specimen: Blood Updated: 01/22/24 2055     Glucose 254 mg/dL      Comment: Serial Number: [id number]:  [id number]       Blood Culture - Blood, Arm, Left [[id number]]  (Normal) Collected: 01/21/24 1725    Specimen: Blood from Arm, Left Updated: 01/22/24 1732     Blood Culture No growth at 24 hours     Narrative:      Less than seven (7) mL's of blood was collected.  Insufficient quantity may yield false negative results.    Blood Culture - Blood, Hand, Right [[id number]]  (Normal) Collected: 01/21/24 1725    Specimen: Blood from Hand, Right Updated: 01/22/24 1732     Blood Culture No growth at 24 hours    POC Glucose Once [[id number]]  (Abnormal) Collected: 01/22/24 1606    Specimen: Blood Updated: 01/22/24 1607     Glucose 322 mg/dL      Comment: Serial Number: [id number]:  [id number]       Respiratory Panel PCR w/COVID-19(SARS-CoV-2) NITA/KRISTEN/NATE/PAD/COR/DYLON In-House, NP Swab in UTM/VTM, 2 HR TAT - Swab, Nasopharynx [[id number]]  (Abnormal) Collected: 01/22/24 1425    Specimen: Swab from Nasopharynx Updated: 01/22/24 1600     ADENOVIRUS, PCR Not Detected     Coronavirus 229E Not Detected     Coronavirus HKU1 Not Detected     Coronavirus NL63 Not Detected     Coronavirus OC43 Not Detected     COVID19 Detected     Human Metapneumovirus Not Detected     Human Rhinovirus/Enterovirus Not Detected     Influenza A PCR Not Detected     Influenza B PCR Not Detected     Parainfluenza Virus 1 Not Detected     Parainfluenza Virus 2 Not Detected     Parainfluenza Virus 3 Not Detected     Parainfluenza Virus 4 Not Detected     RSV, PCR Not Detected     Bordetella pertussis pcr Not Detected     Bordetella parapertussis PCR Not Detected     Chlamydophila pneumoniae PCR Not Detected     Mycoplasma pneumo by PCR Not Detected    Narrative:      In the setting of a positive respiratory panel with a viral infection PLUS a negative procalcitonin without other underlying concern for bacterial infection, consider observing off antibiotics or discontinuation of antibiotics and continue supportive care. If the respiratory panel is positive for atypical bacterial infection (Bordetella pertussis, Chlamydophila pneumoniae, or Mycoplasma pneumoniae), consider antibiotic de-escalation to target atypical bacterial infection.              Imaging Results (Last 24 Hours)       ** No results found for the last 24 hours. **                 I reviewed the patient's new clinical results.    Medication Review:   Scheduled Meds:amLODIPine, 10 mg, Oral, Daily  ampicillin-sulbactam, 3 g, Intravenous, Q6H  aspirin, 81 mg, Oral, Daily  carvedilol, 12.5 mg, Oral, BID With Meals  enoxaparin, 40 mg, Subcutaneous, Q24H  gabapentin, 100 mg, Oral, TID  guaiFENesin, 600 mg, Oral, Q12H  hydrALAZINE, 25 mg, Oral, BID  insulin glargine, 10 Units, Subcutaneous, Nightly  insulin lispro, 2-7 Units, Subcutaneous, TID With Meals  ipratropium-albuterol, 3 mL, Nebulization, 4x Daily - RT  linagliptin, 5 mg, Oral, Daily  methocarbamol, 500 mg, Oral, 4x Daily  methylPREDNISolone sodium succinate, 80 mg, Intravenous, Daily  pantoprazole, 40 mg, Oral, Q AM  PATIENT SUPPLIED MEDICATION, 3,120 mcg, Subcutaneous, Daily  PATIENT SUPPLIED MEDICATION, 10 mg, Oral, Daily  polyethylene glycol, 17 g, Oral, Daily  senna-docusate sodium, 2 tablet, Oral, BID  sertraline, 50 mg, Oral, Daily  sodium chloride, 10 mL, Intravenous, Q12H  terazosin, 2 mg, Oral, Nightly      Continuous Infusions:Pharmacy to Dose enoxaparin (LOVENOX),       PRN Meds:.  albuterol    senna-docusate sodium **AND** polyethylene glycol **AND** bisacodyl **AND** bisacodyl    Calcium Replacement - Follow Nurse / BPA Driven Protocol    dextrose    dextrose    glucagon (human recombinant)    HYDROcodone-acetaminophen    Magnesium Standard Dose Replacement - Follow Nurse / BPA Driven Protocol    nitroglycerin    ondansetron    Pharmacy to Dose enoxaparin (LOVENOX)    Phosphorus Replacement - Follow Nurse / BPA Driven Protocol    Potassium Replacement - Follow Nurse / BPA Driven Protocol    sodium chloride    sodium chloride    sodium chloride    traMADol     Assessment & Plan       Pneumonia of right middle lobe due to infectious organism    Generalized weakness    Dyspnea    Pneumonia  Chronic back pain  Recurrent urinary  tract infections  Mood disorder  Essential hypertension  Kidney disease stage III  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  All her chronic medical conditions are stable.  Continue IV antibiotics today.  Anticipate transitioning to oral antibiotics tomorrow and discharge home.         Plan for disposition: Home    Anny Garcia MD  01/23/24  15:48 EST

## 2024-01-23 NOTE — CASE MANAGEMENT/SOCIAL WORK
Continued Stay Note  PRAKASH Tillman     Patient Name: Vianey Reeves  MRN: [id number]  Today's Date: 1/23/2024    Admit Date: 1/21/2024    Plan: Return home. Current home O2 5L w/ [place].   Discharge Plan       Row Name 01/23/24 1556       Plan    Plan Return home. Current home O2 5L w/ [place].    Patient/Family in Agreement with Plan yes    Plan Comments DC barriers: IV abx, steroids. Renal following. Possible transition to PO meds tomorrow and d/c home.             Megan Naegele, RN     Office Phone: [phone]  Office Cell: [phone]

## 2024-01-23 NOTE — PROGRESS NOTES
"NEPHROLOGY PROGRESS NOTE------[facility]    [facility]  [phone]  Sonali Ordonez MD      Patient Care Team:  Anny Garcia MD as PCP - General (Internal Medicine)  Sergio Ordonez MD as Consulting Physician (Nephrology)      Provider:  Sonali Ordonez MD  Patient Name: Vianey Reeves  :  1945    SUBJECTIVE:    F/U CRF/CKD    No complaints. No CP, palpitations, cramping. Breathing better    Medication:  amLODIPine, 10 mg, Oral, Daily  ampicillin-sulbactam, 3 g, Intravenous, Q6H  aspirin, 81 mg, Oral, Daily  carvedilol, 12.5 mg, Oral, BID With Meals  enoxaparin, 40 mg, Subcutaneous, Q24H  gabapentin, 100 mg, Oral, TID  guaiFENesin, 600 mg, Oral, Q12H  hydrALAZINE, 25 mg, Oral, BID  insulin glargine, 10 Units, Subcutaneous, Nightly  insulin lispro, 2-7 Units, Subcutaneous, TID With Meals  ipratropium-albuterol, 3 mL, Nebulization, 4x Daily - RT  linagliptin, 5 mg, Oral, Daily  methocarbamol, 500 mg, Oral, 4x Daily  methylPREDNISolone sodium succinate, 80 mg, Intravenous, Daily  pantoprazole, 40 mg, Oral, Q AM  PATIENT SUPPLIED MEDICATION, 3,120 mcg, Subcutaneous, Daily  PATIENT SUPPLIED MEDICATION, 10 mg, Oral, Daily  polyethylene glycol, 17 g, Oral, Daily  senna-docusate sodium, 2 tablet, Oral, BID  sertraline, 50 mg, Oral, Daily  sodium chloride, 10 mL, Intravenous, Q12H  terazosin, 2 mg, Oral, Nightly      Pharmacy to Dose enoxaparin (LOVENOX),         OBJECTIVE    Vital Sign Min/Max for last 24 hours  Temp  Min: 96.8 °F (36 °C)  Max: 97.8 °F (36.6 °C)   BP  Min: 122/53  Max: 134/74   Pulse  Min: 62  Max: 78   Resp  Min: 17  Max: 21   SpO2  Min: 92 %  Max: 93 %   No data recorded   Weight  Min: 85.8 kg (189 lb 2.5 oz)  Max: 85.8 kg (189 lb 2.5 oz)     Flowsheet Rows      Flowsheet Row First Filed Value   Admission Height 165.1 cm (65\") Documented at 2024 1503   Admission Weight 85.3 kg (188 lb) Documented at " "01/21/2024 1503            No intake/output data recorded.  I/O last 3 completed shifts:  In: 570 [P.O.:120; IV Piggyback:450]  Out: -     Physical Exam:  General Appearance: alert, appears stated age and cooperative  Head: normocephalic, without obvious abnormality and atraumatic  Eyes: conjunctivae and sclerae normal and no icterus  Neck: supple and no JVD  Lungs: +RIGHT  RALES  Heart: regular rhythm & normal rate and normal S1, S2 +AMINA  Chest Wall: no abnormalities observed  Abdomen: normal bowel sounds and soft, nontender  Extremities: moves extremities well, no edema, no cyanosis +DJD  Skin: no bleeding, bruising or rash  Neurologic: Alert, and oriented. No focal deficits    Labs:    WBC WBC   Date Value Ref Range Status   01/22/2024 5.80 3.40 - 10.80 10*3/mm3 Final   01/22/2024 2.40 (L) 3.40 - 10.80 10*3/mm3 Final   01/21/2024 4.40 3.40 - 10.80 10*3/mm3 Final      HGB Hemoglobin   Date Value Ref Range Status   01/22/2024 13.2 12.0 - 15.9 g/dL Final   01/22/2024 13.2 12.0 - 15.9 g/dL Final   01/21/2024 13.6 12.0 - 15.9 g/dL Final      HCT Hematocrit   Date Value Ref Range Status   01/22/2024 38.9 34.0 - 46.6 % Final   01/22/2024 39.4 34.0 - 46.6 % Final   01/21/2024 40.9 34.0 - 46.6 % Final      Platelets No results found for: \"LABPLAT\"   MCV MCV   Date Value Ref Range Status   01/22/2024 99.0 (H) 79.0 - 97.0 fL Final   01/22/2024 99.3 (H) 79.0 - 97.0 fL Final   01/21/2024 99.6 (H) 79.0 - 97.0 fL Final          Sodium Sodium   Date Value Ref Range Status   01/22/2024 129 (L) 136 - 145 mmol/L Final   01/22/2024 135 (L) 136 - 145 mmol/L Final   01/21/2024 134 (L) 136 - 145 mmol/L Final      Potassium Potassium   Date Value Ref Range Status   01/22/2024 4.6 3.5 - 5.2 mmol/L Final     Comment:     Slight hemolysis detected by analyzer. Result may be falsely elevated.   01/22/2024 4.5 3.5 - 5.2 mmol/L Final   01/21/2024 4.5 3.5 - 5.2 mmol/L Final      Chloride Chloride   Date Value Ref Range Status   01/22/2024 99 " "98 - 107 mmol/L Final   01/22/2024 101 98 - 107 mmol/L Final   01/21/2024 102 98 - 107 mmol/L Final      CO2 CO2   Date Value Ref Range Status   01/22/2024 22.0 22.0 - 29.0 mmol/L Final   01/22/2024 24.0 22.0 - 29.0 mmol/L Final   01/21/2024 23.0 22.0 - 29.0 mmol/L Final      BUN BUN   Date Value Ref Range Status   01/22/2024 31 (H) 8 - 23 mg/dL Final   01/22/2024 26 (H) 8 - 23 mg/dL Final   01/21/2024 22 8 - 23 mg/dL Final      Creatinine Creatinine   Date Value Ref Range Status   01/22/2024 1.24 (H) 0.57 - 1.00 mg/dL Final   01/22/2024 1.33 (H) 0.57 - 1.00 mg/dL Final   01/21/2024 1.21 (H) 0.57 - 1.00 mg/dL Final      Calcium Calcium   Date Value Ref Range Status   01/22/2024 8.6 8.6 - 10.5 mg/dL Final   01/22/2024 8.1 (L) 8.6 - 10.5 mg/dL Final   01/21/2024 8.1 (L) 8.6 - 10.5 mg/dL Final      PO4 No components found for: \"PO4\"   Albumin Albumin   Date Value Ref Range Status   01/22/2024 2.7 (L) 3.5 - 5.2 g/dL Final   01/22/2024 3.0 (L) 3.5 - 5.2 g/dL Final   01/21/2024 2.8 (L) 3.5 - 5.2 g/dL Final      Magnesium Magnesium   Date Value Ref Range Status   01/22/2024 2.3 1.6 - 2.4 mg/dL Final      Uric Acid No components found for: \"URIC ACID\"     Imaging Results (Last 72 Hours)       Procedure Component Value Units Date/Time    XR Chest 1 View [[id number]] Collected: 01/21/24 1612     Updated: 01/21/24 1616    Narrative:      XR CHEST 1 VW    Date of Exam: 1/21/2024 3:57 PM EST    Indication: CHF/COPD Protocol  CHF/COPD Protocol    Comparison: CT chest from January 15, 2024    Findings:  There is a consolidation within the right midlung. The heart and mediastinal contours appear stable. The pulmonary vasculature appears normal. The osseous structures appear intact.      Impression:      Impression:  Consolidation within right midlung, concerning for ongoing pneumonia.      Electronically Signed: Wilder Crenshaw MD    1/21/2024 4:14 PM EST    Workstation ID: [id number]            Results for orders placed during the " hospital encounter of 01/21/24    XR Chest 1 View    Narrative  XR CHEST 1 VW    Date of Exam: 1/21/2024 3:57 PM EST    Indication: CHF/COPD Protocol  CHF/COPD Protocol    Comparison: CT chest from January 15, 2024    Findings:  There is a consolidation within the right midlung. The heart and mediastinal contours appear stable. The pulmonary vasculature appears normal. The osseous structures appear intact.    Impression  Impression:  Consolidation within right midlung, concerning for ongoing pneumonia.      Electronically Signed: Wilder Crenshaw MD  1/21/2024 4:14 PM EST  Workstation ID: [id number]      Results for orders placed during the hospital encounter of 01/14/24    XR Chest PA & Lateral    Narrative  XR CHEST PA AND LATERAL    Date of Exam: 1/14/2024 9:20 PM CST    Indication: elevated d-dimer    Comparison: Chest x-ray 1/14/2024    Findings:  The heart is stable in size. There is trace fluid in the right minor fissure and trace blunting at the costophrenic angle. No evidence for pneumothorax. There are calcified hilar lymph nodes.    Impression  Impression:  Trace right pleural effusion.      Electronically Signed: Bia Lucero MD  1/15/2024 6:24 AM CST  Workstation ID: [id number]      XR Chest 1 View    Narrative  XR CHEST 1 VW    Date of Exam: 1/14/2024 3:22 PM EST    Indication: soa/covid +    Comparison: None available.    Findings: No focal consolidation. Resolving right midlung zone atelectasis. No pneumothorax or pleural effusion. Cardiac size is normal. The visualized clavicles appear intact. No displaced rib fractures. The visualized upper abdomen is normal.    Impression  Impression: No acute cardiopulmonary disease.    Electronically Signed: Kiran Burgess MD  1/14/2024 3:39 PM EST  Workstation ID: [id number]      Results for orders placed during the hospital encounter of 08/22/23    Doppler Ankle Brachial Index Single Level CAR    Interpretation Summary    Right Conclusion: The right IKE is normal.  Normal digital pressures.    Left Conclusion: The left IKE is normal. Mild digital ischemia.        ASSESSMENT / PLAN      Pneumonia of right middle lobe due to infectious organism    Generalized weakness    Dyspnea    Pneumonia      CRF/CKD-------Nonoliguric. 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 Cr stable. Lytes okay. Volume okay. Dose meds for CrCl 30-45 cc/min. No NSAIDs.  Current creatinine at [facility]     2. HTN WITH CKD------BP okay. Avoid hypotension     3. DMII WITH RENAL MANIFESTATIONS-----BS okay. Avoid hypoglycemia. Lantus, Glucometers, SSI. Follow with steroid exposure     4. COPD/COVID 19 + PNA/RML CONSOLIDATION-------Oxygen, Decadron, Supportive care. On abx per PCP     5. URETERAL CA S/P R NEPHRECTOMY/SOLITARY KIDNEY STATE     6. PULMONARY HTN     7. OA/DJD-----No NSAIDs.       8. DVT PROPHYLAXIS------Lovenox     9. ELEVATED D-DIMER-------CT PE protocol recent admission negative     10. DEPRESSION------On Zoloft     11. GERD/PUD PROPHYLAXIS--------PPI. Benefits outweigh risks despite renal dysfunction      Sonali Ordonez MD  [facility]  [phone]  01/23/24  08:04 EST

## 2024-01-23 NOTE — PLAN OF CARE
Goal Outcome Evaluation:  Plan of Care Reviewed With: patient           Outcome Evaluation: 77 yo female adm 1/21/24 for acute RML pna w/ covid (+) results. Pt was recently admitted to [facility] from 1/14-1/17/24 for covid. Pt went home but had increased difficulty w/ breathing, so returned to ER. PMH: ckd3, dm, copd, pulm htn, nephrectomy 2* ureteral ca. At baseline, pt lives alone but dtr checks on pt frequently. Does not use asst device at baseline. Does use 2LO2 at night only. Today, pt presents w/ strength which is wnl for all extrmities. Able to come to sitting, standing, and amb x 40 ft w/ supervision only. Pt has very mild lateral sway while amb, but she was able to manage this well w/o PT assistance. Pt took her O2 off initially when PT arrived, and she nearly immediately dropped to 90%. PT encouraged use of O2 until amb, and then would see how she did. Pt able to come to stand and amb as noted. O2 level dropped significantly while amb on room air. Dropped to 88% in ~2 minutes. Suggested that pt use her O2 monitor at home, as she may need to use O2 during the day for a while. Suggest 6 min walk to assess this prior to d/c. Needs elongated O2 line so she can amb to/from bathroom w/o removing O2. May want to use cane for additional security w/ amb. No need for skilled PT at this time. Recommend home upon d/c. Will sign off.

## 2024-01-23 NOTE — THERAPY EVALUATION
Patient Name: Vianey Reeves  : 1945    MRN: [id number]                              Today's Date: 2024       Admit Date: 2024    Visit Dx:     ICD-10-CM ICD-9-CM   1. Pneumonia of right middle lobe due to infectious organism  J18.9 486   2. Generalized weakness  R53.1 780.79   3. Dyspnea, unspecified type  R06.00 786.09     Patient Active Problem List   Diagnosis    Type 2 diabetes mellitus with chronic kidney disease    Essential (primary) hypertension    Mixed hyperlipidemia    Hiatal hernia    Generalized weakness    Long term current use of antithrombotics/antiplatelets    Stage 3b chronic kidney disease (CKD)    Simple chronic bronchitis    Atherosclerotic heart disease of native coronary artery without angina pectoris    Constipation    History of cancer of ureter    Solitary kidney    Atherosclerosis of native arteries of extremities with intermittent claudication, bilateral legs    H/O malignant neoplasm of ureter    Vertigo    Medicare annual wellness visit, subsequent    Presbyopia    Combined form of senile cataract    Cervical cancer screening    Postartificial menopausal syndrome    Left lower quadrant abdominal pain    Diverticula of colon    Ruptured tympanic membrane, left    Hearing loss, bilateral    TMJ pain dysfunction syndrome    Stage 4 chronic kidney disease    Other dysphagia    Lactose intolerance    Polyarthralgia    Acute right-sided thoracic back pain    Class 1 obesity due to excess calories with serious comorbidity and body mass index (BMI) of 34.0 to 34.9 in adult    Anxiety    Hypercalcemia    Screening mammogram, encounter for    Chronic bilateral low back pain without sciatica    Irregular heart beat    Palpitations    COPD (chronic obstructive pulmonary disease)    Gout of right foot    Plantar fasciitis of right foot    Lumbar radiculopathy    Overflow incontinence of urine    Urinary retention    Gastroesophageal reflux disease without esophagitis    Leg  swelling    Cellulitis of groin    Abdominal pain    Chest pain, unspecified type    Dyspnea    Hypoxia    CRF (chronic renal failure)    Pulmonary hypertension    Chest pain    Pneumonia of right middle lobe due to infectious organism    Dyspnea    Pneumonia     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    Hypertension     IBS (irritable colon syndrome)     constipation    Mass of right breast     Neuropathy     Renal insufficiency     Sciatic leg pain     right    Simple chronic bronchitis     Solitary kidney     left     Past Surgical History:   Procedure Laterality Date    BREAST BIOPSY Right     CARDIAC CATHETERIZATION      CHOLECYSTECTOMY      COLON SURGERY      REMOVAL diverticular diseae    COLONOSCOPY N/A 08/12/2021    Procedure: COLONOSCOPY with polypectomy x 3;  Surgeon: Margarette Mcallister MD;  Location: [facility] ENDOSCOPY;  Service: Gastroenterology;  Laterality: N/A;  post op: hmorrhoids, diverticulosis, polyps    CORONARY STENT PLACEMENT      ENDOSCOPY N/A 08/12/2021    Procedure: ESOPHAGOGASTRODUODENOSCOPY with dilatation (18-20 mm balloon) and (54 bougie);  Surgeon: Margarette Mcallister MD;  Location: [facility] ENDOSCOPY;  Service: Gastroenterology;  Laterality: N/A;  post op: esophageal stricture, esophagitis, gastritis, history of nissen    ENDOSCOPY N/A 9/13/2023    Procedure: ESOPHAGOGASTRODUODENOSCOPY with biopsy x 1 area and dilation (50,54,56 non guided bougie);   Surgeon: Margarette Mcallister MD;  Location: [facility] ENDOSCOPY;  Service: Gastroenterology;  Laterality: N/A;  post op: esophageal stricture    EYE SURGERY Bilateral     cataracts    HIATAL HERNIA REPAIR      NEPHRECTOMY Right     cancer    UPPER ENDOSCOPIC ULTRASOUND W/ FNA N/A 09/22/2022    Procedure: EUS;  Surgeon: Margarette Mcallister MD;  Location: [facility] ENDOSCOPY;  Service: Gastroenterology;  Laterality: N/A;  post: normal pancreas, dilated pancreatic duct, hiatal hernia,       General Information       Row Name 01/23/24 1551          Physical Therapy Time and Intention    Document Type evaluation  -CM     Mode of Treatment physical therapy  -CM       Row Name 01/23/24 1551          General Information    Patient Profile Reviewed yes  -CM     Prior Level of Function independent:;all household mobility;community mobility;gait  uses 2L O2 only at night  -CM     Existing Precautions/Restrictions oxygen therapy device and L/min  -CM     Barriers to Rehab medically complex  -CM       Row Name 01/23/24 1551          Living Environment    People in Home alone  -CM       Row Name 01/23/24 1551          Home Main Entrance    Number of Stairs, Main Entrance none  -CM       Row Name 01/23/24 1551          Stairs Within Home, Primary    Number of Stairs, Within Home, Primary none  -CM       Row Name 01/23/24 1551          Cognition    Orientation Status (Cognition) oriented x 4  -CM       Row Name 01/23/24 1551          Safety Issues, Functional Mobility    Impairments Affecting Function (Mobility) endurance/activity tolerance;balance  -CM               User Key  (r) = Recorded By, (t) = Taken By, (c) = Cosigned By      Initials Name Provider Type    CM Alecia Cheema, PT Physical Therapist                   Mobility       Row Name 01/23/24 1552          Bed Mobility    Bed Mobility bed mobility (all) activities  -CM     All Activities, [place] (Bed Mobility) independent  -CM       Row Name 01/23/24 1552          Sit-Stand  Transfer    Sit-Stand [place] (Transfers) independent  -CM       Row Name 01/23/24 1552          Gait/Stairs (Locomotion)    [place] Level (Gait) supervision  -CM     Assistive Device (Gait) other (see comments)  gait belt, shoes; room air  -CM     Patient was able to Ambulate yes  -CM     Distance in Feet (Gait) 40 ft; very mild lateral sway, which pt was able to manage independently w/o danger of fall. No other significant gait deviations  -CM               User Key  (r) = Recorded By, (t) = Taken By, (c) = Cosigned By      Initials Name Provider Type    Alecia Juarez, PT Physical Therapist                   Obj/Interventions       Row Name 01/23/24 1553          Range of Motion Comprehensive    General Range of Motion bilateral upper extremity ROM WFL;bilateral lower extremity ROM WNL  -CM       Row Name 01/23/24 1553          Strength Comprehensive (MMT)    General Manual Muscle Testing (MMT) Assessment no strength deficits identified  -CM     Comment, General Manual Muscle Testing (MMT) Assessment BUEs 4+/5, BLEs 4/5 at hips, 5/5 other mm groups  -CM       Row Name 01/23/24 1553          Balance    Balance Assessment sitting static balance;standing static balance;sitting dynamic balance;standing dynamic balance  -CM     Static Sitting Balance independent  -CM     Dynamic Sitting Balance independent  -CM     Position, Sitting Balance unsupported;sitting edge of bed  -CM     Static Standing Balance supervision  -CM     Dynamic Standing Balance supervision  -CM     Position/Device Used, Standing Balance unsupported  -CM       Row Name 01/23/24 1557          Sensory Assessment (Somatosensory)    Sensory Assessment (Somatosensory) sensation intact  -CM               User Key  (r) = Recorded By, (t) = Taken By, (c) = Cosigned By      Initials Name Provider Type    Alecia Juarez, PT Physical Therapist                   Goals/Plan    No documentation.                  Clinical Impression        Row Name 01/23/24 1554          Pain    Pretreatment Pain Rating 0/10 - no pain  -CM     Posttreatment Pain Rating 0/10 - no pain  -CM     Pain Intervention(s) Repositioned;Ambulation/increased activity;Emotional support  -CM       Row Name 01/23/24 3807          Plan of Care Review    Plan of Care Reviewed With patient  -CM     Outcome Evaluation 77 yo female adm 1/21/24 for acute RML pna w/ covid (+) results. Pt was recently admitted to [facility] from 1/14-1/17/24 for covid. Pt went home but had increased difficulty w/ breathing, so returned to ER. PMH: ckd3, dm, copd, pulm htn, nephrectomy 2* ureteral ca. At baseline, pt lives alone but dtr checks on pt frequently. Does not use asst device at baseline. Does use 2LO2 at night only. Today, pt presents w/ strength which is wnl for all extrmities. Able to come to sitting, standing, and amb x 40 ft w/ supervision only. Pt has very mild lateral sway while amb, but she was able to manage this well w/o PT assistance. Pt took her O2 off initially when PT arrived, and she nearly immediately dropped to 90%. PT encouraged use of O2 until amb, and then would see how she did. Pt able to come to stand and amb as noted. O2 level dropped significantly while amb on room air. Dropped to 88% in ~2 minutes. Suggested that pt use her O2 monitor at home, as she may need to use O2 during the day for a while. Suggest 6 min walk to assess this prior to d/c. Needs elongated O2 line so she can amb to/from bathroom w/o removing O2. May want to use cane for additional security w/ amb. No need for skilled PT at this time. Recommend home upon d/c. Will sign off.  -CM       Row Name 01/23/24 1606          Therapy Assessment/Plan (PT)    Criteria for Skilled Interventions Met (PT) no;no problems identified which require skilled intervention  -CM     Therapy Frequency (PT) evaluation only  -CM       Row Name 01/23/24 1606          Vital Signs    Pre SpO2 (%) 93  -CM     O2 Delivery Pre Treatment  supplemental O2  2L  -CM     Intra SpO2 (%) 88  -CM     O2 Delivery Intra Treatment room air  -CM     Post SpO2 (%) 94  -CM     O2 Delivery Post Treatment supplemental O2  2L  -CM     Recovery Time all other VSS  -CM       Row Name 01/23/24 1606          Positioning and Restraints    Pre-Treatment Position in bed  -CM     Post Treatment Position chair  -CM     In Chair notified nsg;sitting;call light within reach;encouraged to call for assist  -CM               User Key  (r) = Recorded By, (t) = Taken By, (c) = Cosigned By      Initials Name Provider Type    Alecia Juarez, PT Physical Therapist                   Outcome Measures       Row Name 01/23/24 1607 01/23/24 0833       How much help from another person do you currently need...    Turning from your back to your side while in flat bed without using bedrails? 4  -CM 4  -MM    Moving from lying on back to sitting on the side of a flat bed without bedrails? 4  -CM 4  -MM    Moving to and from a bed to a chair (including a wheelchair)? 4  -CM 4  -MM    Standing up from a chair using your arms (e.g., wheelchair, bedside chair)? 4  -CM 4  -MM    Climbing 3-5 steps with a railing? 3  -CM 3  -MM    To walk in hospital room? 4  -CM 4  -MM    AM-PAC 6 Clicks Score (PT) 23  -CM 23  -MM    Highest Level of Mobility Goal 7 --> Walk 25 feet or more  -CM 7 --> Walk 25 feet or more  -MM              User Key  (r) = Recorded By, (t) = Taken By, (c) = Cosigned By      Initials Name Provider Type    Alecia Juarez, PT Physical Therapist    Kiara Quick, RN Registered Nurse                                 Physical Therapy Education       Title: PT OT SLP Therapies (Done)       Topic: Physical Therapy (Done)       Point: Mobility training (Done)       Learning Progress Summary             Patient Acceptance, E,TB, VU,DU by GAMALIEL at 1/23/2024 1608                                         User Key       Initials Effective Dates Name Provider Type Discipline    GAMALIEL  06/16/21 -  Alecia Cheema, PT Physical Therapist PT                  PT Recommendation and Plan     Plan of Care Reviewed With: patient  Outcome Evaluation: 79 yo female adm 1/21/24 for acute RML pna w/ covid (+) results. Pt was recently admitted to [facility] from 1/14-1/17/24 for covid. Pt went home but had increased difficulty w/ breathing, so returned to ER. PMH: ckd3, dm, copd, pulm htn, nephrectomy 2* ureteral ca. At baseline, pt lives alone but dtr checks on pt frequently. Does not use asst device at baseline. Does use 2LO2 at night only. Today, pt presents w/ strength which is wnl for all extrmities. Able to come to sitting, standing, and amb x 40 ft w/ supervision only. Pt has very mild lateral sway while amb, but she was able to manage this well w/o PT assistance. Pt took her O2 off initially when PT arrived, and she nearly immediately dropped to 90%. PT encouraged use of O2 until amb, and then would see how she did. Pt able to come to stand and amb as noted. O2 level dropped significantly while amb on room air. Dropped to 88% in ~2 minutes. Suggested that pt use her O2 monitor at home, as she may need to use O2 during the day for a while. Suggest 6 min walk to assess this prior to d/c. Needs elongated O2 line so she can amb to/from bathroom w/o removing O2. May want to use cane for additional security w/ amb. No need for skilled PT at this time. Recommend home upon d/c. Will sign off.     Time Calculation:         PT Charges       Row Name 01/23/24 1609             Time Calculation    Start Time 1517  -CM      Stop Time 1545  -CM      Time Calculation (min) 28 min  -CM      PT Received On 01/23/24  -CM         Time Calculation- PT    Total Timed Code Minutes- PT 0 minute(s)  -CM                User Key  (r) = Recorded By, (t) = Taken By, (c) = Cosigned By      Initials Name Provider Type    Alecia Juarez, PT Physical Therapist                  Therapy Charges for Today       Code Description Service  Date Service Provider Modifiers Qty    09051745156 HC PT EVAL MOD COMPLEXITY 4 1/23/2024 Alecia Cheema, PT GP 1            PT G-Codes  AM-PAC 6 Clicks Score (PT): 23  PT Discharge Summary  Anticipated Discharge Disposition (PT): home    Alecia Cheema, PT  1/23/2024

## 2024-01-23 NOTE — PLAN OF CARE
Goal Outcome Evaluation:   Patient has had no complaints of pain. Patient electrolytes replaced during shift. Patient requiring 2L of oxygen.

## 2024-01-24 ENCOUNTER — APPOINTMENT (OUTPATIENT)
Dept: GENERAL RADIOLOGY | Facility: HOSPITAL | Age: 79
End: 2024-01-24
Payer: MEDICARE

## 2024-01-24 LAB
ALBUMIN SERPL-MCNC: 2.6 G/DL (ref 3.5–5.2)
ALBUMIN/GLOB SERPL: 1 G/DL
ALP SERPL-CCNC: 51 U/L (ref 39–117)
ALT SERPL W P-5'-P-CCNC: 16 U/L (ref 1–33)
ANION GAP SERPL CALCULATED.3IONS-SCNC: 5 MMOL/L (ref 5–15)
AST SERPL-CCNC: 15 U/L (ref 1–32)
BACTERIA SPEC RESP CULT: NORMAL
BASOPHILS # BLD AUTO: 0 10*3/MM3 (ref 0–0.2)
BASOPHILS NFR BLD AUTO: 0.1 % (ref 0–1.5)
BILIRUB SERPL-MCNC: 0.2 MG/DL (ref 0–1.2)
BUN SERPL-MCNC: 25 MG/DL (ref 8–23)
BUN/CREAT SERPL: 22.9 (ref 7–25)
CALCIUM SPEC-SCNC: 8.3 MG/DL (ref 8.6–10.5)
CHLORIDE SERPL-SCNC: 107 MMOL/L (ref 98–107)
CO2 SERPL-SCNC: 28 MMOL/L (ref 22–29)
CREAT SERPL-MCNC: 1.09 MG/DL (ref 0.57–1)
DEPRECATED RDW RBC AUTO: 50.3 FL (ref 37–54)
EGFRCR SERPLBLD CKD-EPI 2021: 51.5 ML/MIN/1.73
EOSINOPHIL # BLD AUTO: 0 10*3/MM3 (ref 0–0.4)
EOSINOPHIL NFR BLD AUTO: 0 % (ref 0.3–6.2)
ERYTHROCYTE [DISTWIDTH] IN BLOOD BY AUTOMATED COUNT: 13.9 % (ref 12.3–15.4)
GLOBULIN UR ELPH-MCNC: 2.7 GM/DL
GLUCOSE BLDC GLUCOMTR-MCNC: 100 MG/DL (ref 70–105)
GLUCOSE BLDC GLUCOMTR-MCNC: 127 MG/DL (ref 70–105)
GLUCOSE BLDC GLUCOMTR-MCNC: 262 MG/DL (ref 70–105)
GLUCOSE BLDC GLUCOMTR-MCNC: 295 MG/DL (ref 70–105)
GLUCOSE SERPL-MCNC: 127 MG/DL (ref 65–99)
GRAM STN SPEC: NORMAL
HCT VFR BLD AUTO: 37 % (ref 34–46.6)
HGB BLD-MCNC: 12.4 G/DL (ref 12–15.9)
LYMPHOCYTES # BLD AUTO: 0.3 10*3/MM3 (ref 0.7–3.1)
LYMPHOCYTES NFR BLD AUTO: 4.1 % (ref 19.6–45.3)
MAGNESIUM SERPL-MCNC: 1.8 MG/DL (ref 1.6–2.4)
MCH RBC QN AUTO: 32.9 PG (ref 26.6–33)
MCHC RBC AUTO-ENTMCNC: 33.5 G/DL (ref 31.5–35.7)
MCV RBC AUTO: 98.1 FL (ref 79–97)
MONOCYTES # BLD AUTO: 0.4 10*3/MM3 (ref 0.1–0.9)
MONOCYTES NFR BLD AUTO: 5.8 % (ref 5–12)
NEUTROPHILS NFR BLD AUTO: 6.6 10*3/MM3 (ref 1.7–7)
NEUTROPHILS NFR BLD AUTO: 90 % (ref 42.7–76)
NRBC BLD AUTO-RTO: 0 /100 WBC (ref 0–0.2)
PHOSPHATE SERPL-MCNC: 2.1 MG/DL (ref 2.5–4.5)
PLATELET # BLD AUTO: 156 10*3/MM3 (ref 140–450)
PMV BLD AUTO: 9 FL (ref 6–12)
POTASSIUM SERPL-SCNC: 4.4 MMOL/L (ref 3.5–5.2)
PROT SERPL-MCNC: 5.3 G/DL (ref 6–8.5)
RBC # BLD AUTO: 3.78 10*6/MM3 (ref 3.77–5.28)
SODIUM SERPL-SCNC: 140 MMOL/L (ref 136–145)
WBC NRBC COR # BLD AUTO: 7.3 10*3/MM3 (ref 3.4–10.8)

## 2024-01-24 PROCEDURE — 25010000002 AMPICILLIN-SULBACTAM PER 1.5 G: Performed by: INTERNAL MEDICINE

## 2024-01-24 PROCEDURE — 94761 N-INVAS EAR/PLS OXIMETRY MLT: CPT

## 2024-01-24 PROCEDURE — 25810000003 SODIUM CHLORIDE 0.9 % SOLUTION: Performed by: INTERNAL MEDICINE

## 2024-01-24 PROCEDURE — 36415 COLL VENOUS BLD VENIPUNCTURE: CPT | Performed by: NURSE PRACTITIONER

## 2024-01-24 PROCEDURE — 82948 REAGENT STRIP/BLOOD GLUCOSE: CPT

## 2024-01-24 PROCEDURE — 82948 REAGENT STRIP/BLOOD GLUCOSE: CPT | Performed by: INTERNAL MEDICINE

## 2024-01-24 PROCEDURE — 94799 UNLISTED PULMONARY SVC/PX: CPT

## 2024-01-24 PROCEDURE — 85025 COMPLETE CBC W/AUTO DIFF WBC: CPT | Performed by: NURSE PRACTITIONER

## 2024-01-24 PROCEDURE — 94664 DEMO&/EVAL PT USE INHALER: CPT

## 2024-01-24 PROCEDURE — 80053 COMPREHEN METABOLIC PANEL: CPT | Performed by: NURSE PRACTITIONER

## 2024-01-24 PROCEDURE — 84100 ASSAY OF PHOSPHORUS: CPT | Performed by: INTERNAL MEDICINE

## 2024-01-24 PROCEDURE — 71045 X-RAY EXAM CHEST 1 VIEW: CPT

## 2024-01-24 PROCEDURE — 63710000001 INSULIN GLARGINE PER 5 UNITS: Performed by: NURSE PRACTITIONER

## 2024-01-24 PROCEDURE — 63710000001 INSULIN LISPRO (HUMAN) PER 5 UNITS: Performed by: INTERNAL MEDICINE

## 2024-01-24 PROCEDURE — 25010000002 METHYLPREDNISOLONE PER 125 MG: Performed by: NURSE PRACTITIONER

## 2024-01-24 PROCEDURE — 25010000002 ENOXAPARIN PER 10 MG: Performed by: NURSE PRACTITIONER

## 2024-01-24 PROCEDURE — 83735 ASSAY OF MAGNESIUM: CPT | Performed by: INTERNAL MEDICINE

## 2024-01-24 RX ORDER — IPRATROPIUM BROMIDE AND ALBUTEROL SULFATE 2.5; .5 MG/3ML; MG/3ML
3 SOLUTION RESPIRATORY (INHALATION)
Status: DISCONTINUED | OUTPATIENT
Start: 2024-01-24 | End: 2024-01-24

## 2024-01-24 RX ORDER — ALBUTEROL SULFATE 90 UG/1
2 AEROSOL, METERED RESPIRATORY (INHALATION)
Status: DISCONTINUED | OUTPATIENT
Start: 2024-01-24 | End: 2024-01-29 | Stop reason: HOSPADM

## 2024-01-24 RX ORDER — BUDESONIDE AND FORMOTEROL FUMARATE DIHYDRATE 160; 4.5 UG/1; UG/1
2 AEROSOL RESPIRATORY (INHALATION)
Status: DISCONTINUED | OUTPATIENT
Start: 2024-01-24 | End: 2024-01-29 | Stop reason: HOSPADM

## 2024-01-24 RX ORDER — AMOXICILLIN AND CLAVULANATE POTASSIUM 875; 125 MG/1; MG/1
1 TABLET, FILM COATED ORAL EVERY 12 HOURS SCHEDULED
Status: COMPLETED | OUTPATIENT
Start: 2024-01-24 | End: 2024-01-27

## 2024-01-24 RX ORDER — SODIUM CHLORIDE 9 MG/ML
75 INJECTION, SOLUTION INTRAVENOUS CONTINUOUS
Status: DISCONTINUED | OUTPATIENT
Start: 2024-01-24 | End: 2024-01-25

## 2024-01-24 RX ORDER — METHYLPREDNISOLONE SODIUM SUCCINATE 40 MG/ML
40 INJECTION, POWDER, LYOPHILIZED, FOR SOLUTION INTRAMUSCULAR; INTRAVENOUS DAILY
Status: DISCONTINUED | OUTPATIENT
Start: 2024-01-25 | End: 2024-01-27

## 2024-01-24 RX ADMIN — CARVEDILOL 12.5 MG: 6.25 TABLET, FILM COATED ORAL at 17:30

## 2024-01-24 RX ADMIN — ENOXAPARIN SODIUM 40 MG: 100 INJECTION SUBCUTANEOUS at 17:19

## 2024-01-24 RX ADMIN — INSULIN LISPRO 4 UNITS: 100 INJECTION, SOLUTION INTRAVENOUS; SUBCUTANEOUS at 17:19

## 2024-01-24 RX ADMIN — TRAMADOL HYDROCHLORIDE 100 MG: 50 TABLET, COATED ORAL at 08:53

## 2024-01-24 RX ADMIN — GABAPENTIN 100 MG: 100 CAPSULE ORAL at 08:53

## 2024-01-24 RX ADMIN — BUDESONIDE AND FORMOTEROL FUMARATE DIHYDRATE 2 PUFF: 160; 4.5 AEROSOL RESPIRATORY (INHALATION) at 10:45

## 2024-01-24 RX ADMIN — METHOCARBAMOL 500 MG: 500 TABLET ORAL at 12:56

## 2024-01-24 RX ADMIN — Medication 10 ML: at 08:55

## 2024-01-24 RX ADMIN — Medication 10 ML: at 21:31

## 2024-01-24 RX ADMIN — ALBUTEROL SULFATE 2 PUFF: 108 AEROSOL, METERED RESPIRATORY (INHALATION) at 19:50

## 2024-01-24 RX ADMIN — BUDESONIDE AND FORMOTEROL FUMARATE DIHYDRATE 2 PUFF: 160; 4.5 AEROSOL RESPIRATORY (INHALATION) at 19:48

## 2024-01-24 RX ADMIN — SODIUM PHOSPHATE, MONOBASIC, MONOHYDRATE AND SODIUM PHOSPHATE, DIBASIC, ANHYDROUS 15 MMOL: 142; 276 INJECTION, SOLUTION INTRAVENOUS at 21:31

## 2024-01-24 RX ADMIN — PANTOPRAZOLE SODIUM 40 MG: 40 TABLET, DELAYED RELEASE ORAL at 06:16

## 2024-01-24 RX ADMIN — GABAPENTIN 100 MG: 100 CAPSULE ORAL at 21:32

## 2024-01-24 RX ADMIN — GABAPENTIN 100 MG: 100 CAPSULE ORAL at 17:18

## 2024-01-24 RX ADMIN — LINAGLIPTIN 5 MG: 5 TABLET, FILM COATED ORAL at 08:54

## 2024-01-24 RX ADMIN — TERAZOSIN HYDROCHLORIDE 2 MG: 2 CAPSULE ORAL at 21:32

## 2024-01-24 RX ADMIN — TRAMADOL HYDROCHLORIDE 100 MG: 50 TABLET, COATED ORAL at 21:32

## 2024-01-24 RX ADMIN — AMLODIPINE BESYLATE 10 MG: 5 TABLET ORAL at 08:53

## 2024-01-24 RX ADMIN — HYDRALAZINE HYDROCHLORIDE 25 MG: 25 TABLET ORAL at 21:32

## 2024-01-24 RX ADMIN — INSULIN GLARGINE 10 UNITS: 100 INJECTION, SOLUTION SUBCUTANEOUS at 21:31

## 2024-01-24 RX ADMIN — AMPICILLIN SODIUM AND SULBACTAM SODIUM 3 G: 2; 1 INJECTION, POWDER, FOR SOLUTION INTRAMUSCULAR; INTRAVENOUS at 08:54

## 2024-01-24 RX ADMIN — SODIUM CHLORIDE 75 ML/HR: 9 INJECTION, SOLUTION INTRAVENOUS at 08:55

## 2024-01-24 RX ADMIN — METHOCARBAMOL 500 MG: 500 TABLET ORAL at 17:19

## 2024-01-24 RX ADMIN — AMPICILLIN SODIUM AND SULBACTAM SODIUM 3 G: 2; 1 INJECTION, POWDER, FOR SOLUTION INTRAMUSCULAR; INTRAVENOUS at 01:20

## 2024-01-24 RX ADMIN — ASPIRIN 81 MG: 81 TABLET, COATED ORAL at 08:54

## 2024-01-24 RX ADMIN — GUAIFENESIN 600 MG: 600 TABLET, MULTILAYER, EXTENDED RELEASE ORAL at 21:32

## 2024-01-24 RX ADMIN — AMOXICILLIN AND CLAVULANATE POTASSIUM 1 TABLET: 875; 125 TABLET, FILM COATED ORAL at 21:33

## 2024-01-24 RX ADMIN — METHYLPREDNISOLONE SODIUM SUCCINATE 80 MG: 125 INJECTION, POWDER, FOR SOLUTION INTRAMUSCULAR; INTRAVENOUS at 08:54

## 2024-01-24 RX ADMIN — METHOCARBAMOL 500 MG: 500 TABLET ORAL at 08:54

## 2024-01-24 RX ADMIN — HYDRALAZINE HYDROCHLORIDE 25 MG: 25 TABLET ORAL at 08:53

## 2024-01-24 RX ADMIN — METHOCARBAMOL 500 MG: 500 TABLET ORAL at 21:32

## 2024-01-24 RX ADMIN — AMPICILLIN SODIUM AND SULBACTAM SODIUM 3 G: 2; 1 INJECTION, POWDER, FOR SOLUTION INTRAMUSCULAR; INTRAVENOUS at 12:56

## 2024-01-24 RX ADMIN — GUAIFENESIN 600 MG: 600 TABLET, MULTILAYER, EXTENDED RELEASE ORAL at 08:53

## 2024-01-24 RX ADMIN — CARVEDILOL 12.5 MG: 6.25 TABLET, FILM COATED ORAL at 08:53

## 2024-01-24 NOTE — DISCHARGE PLACEMENT REQUEST
"Vianey Sterling (78 y.o. Female)       Date of Birth   1945    Social Security Number       Address   [address]  [place] [address]    Home Phone   [phone]    MRN   [id number]       Mandaen   None    Marital Status                               Admission Date   1/21/24    Admission Type   Emergency    Admitting Provider   Anny Garcia MD    Attending Provider   Anny Garcia MD    Department, Room/Bed   [facility] 3C MEDICAL INPATIENT, 372/1       Discharge Date       Discharge Disposition       Discharge Destination                                 Attending Provider: Anny Garcia MD    Allergies: Erythromycin, Naproxen Sodium, Statins, Latex, Metoclopramide, Rocephin [Ceftriaxone], Dicyclomine    Isolation: Enh Drop/Con   Infection: COVID (confirmed) (01/17/24)   Code Status: CPR    Ht: 165.1 cm (65\")   Wt: 85.8 kg (189 lb 2.5 oz)    Admission Cmt: None   Principal Problem: Pneumonia of right middle lobe due to infectious organism [J18.9]                   Active Insurance as of 1/21/2024       Primary Coverage       Payor Plan Insurance Group Employer/Plan Group    ANTHEM MEDICARE REPLACEMENT ANTHEM MEDICARE ADVANTAGE [id number]       Payor Plan Address Payor Plan Phone Number Payor Plan Fax Number Effective Dates    [address] [phone]  1/1/2022 - None Entered    [facility] [address]         Subscriber Name Subscriber Birth Date Member ID       VIANEY STERLING 1945 [id number]               Secondary Coverage       Payor Plan Insurance Group Employer/Plan Group    [place]        Payor Plan Address Payor Plan Phone Number Payor Plan Fax Number Effective Dates    [address]   10/1/2021 - None Entered    [place] [address]         Subscriber Name Subscriber Birth Date Member ID       VIANEY STERLING 1945 [id number]                     Emergency Contacts        (Rel.) Home Phone Work Phone Mobile Phone "    WALDO IRIZARRY (Daughter) [phone] -- [phone]    Carolina vanegas (Grandchild) -- -- [phone]    HANNAH VANEGAS (Grandchild) [phone] -- [phone]

## 2024-01-24 NOTE — PLAN OF CARE
Goal Outcome Evaluation:              Outcome Evaluation: Patient slept well during the night.  Still coughing.  Stable at this time.

## 2024-01-24 NOTE — CASE MANAGEMENT/SOCIAL WORK
Continued Stay Note  [facility]     Patient Name: Vianey Reeves  MRN: [id number]  Today's Date: 1/24/2024    Admit Date: 1/21/2024    Plan: Return home, referral to [facility] pending. Current home O2 5L w/ [place].   Discharge Plan       Row Name 01/24/24 1626       Plan    Plan Return home, referral to [facility] pending. Current home O2 5L w/ [place].    Plan Comments CM met with pt at bedside to provide IMM letter and review readmission questions. Pt notified CM that she is needing a nebulizer- she can't find hers and it's over 15 years old. Notified Diane HOGAN and added to [place] [employer] basket for rep [employer]. CM contacted patient's daughter to discuss possible HHC services. Daughter agreeable and is even interested in rehab if she qualifies. PT not signed in to work with pt today, but explained to daughter that therapy would have to recommend for SNF/rehab in order to get insurance approval. Pt previously had [facility] and daughter agreeable to referral there. Added to [employer] basket and liaison Darlene notified.             Megan Naegele, RN     Office Phone: [phone]  Office Cell: [phone]

## 2024-01-24 NOTE — CONSULTS
Nutrition Services    Patient Name: Vianey Reeves  YOB: 1945  MRN: [id number]  Admission date: 1/21/2024    Comment:    RD will continue to monitor.    CLINICAL NUTRITION ASSESSMENT      Reason for Assessment 1/24 - [facility]     H&P      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    Hypertension     IBS (irritable colon syndrome)     constipation    Mass of right breast     Neuropathy     Renal insufficiency     Sciatic leg pain     right    Simple chronic bronchitis     Solitary kidney     left       Past Surgical History:   Procedure Laterality Date    BREAST BIOPSY Right     CARDIAC CATHETERIZATION      CHOLECYSTECTOMY      COLON SURGERY      REMOVAL diverticular diseae    COLONOSCOPY N/A 08/12/2021    Procedure: COLONOSCOPY with polypectomy x 3;  Surgeon: Margarette Mcallister MD;  Location: [facility] ENDOSCOPY;  Service: Gastroenterology;  Laterality: N/A;  post op: hmorrhoids, diverticulosis, polyps    CORONARY STENT PLACEMENT      ENDOSCOPY N/A 08/12/2021    Procedure: ESOPHAGOGASTRODUODENOSCOPY with dilatation (18-20 mm balloon) and (54 bougie);  Surgeon: Margarette Mcallister MD;  Location: [facility] ENDOSCOPY;  Service: Gastroenterology;  Laterality: N/A;  post op: esophageal stricture, esophagitis, gastritis, history of nissen    ENDOSCOPY N/A 9/13/2023    Procedure: ESOPHAGOGASTRODUODENOSCOPY with biopsy x 1 area and dilation (50,54,56 non guided bougie);  Surgeon:  "Margarette Mcallister MD;  Location: [facility] ENDOSCOPY;  Service: Gastroenterology;  Laterality: N/A;  post op: esophageal stricture    EYE SURGERY Bilateral     cataracts    HIATAL HERNIA REPAIR      NEPHRECTOMY Right     cancer    UPPER ENDOSCOPIC ULTRASOUND W/ FNA N/A 09/22/2022    Procedure: EUS;  Surgeon: Margarette Mcallister MD;  Location: [facility] ENDOSCOPY;  Service: Gastroenterology;  Laterality: N/A;  post: normal pancreas, dilated pancreatic duct, hiatal hernia,         Current Problems   Pneumonia    COVID    CKD Stage 3    CAD    SOB       Encounter Information        Trending Narrative     1/24 - pt admitted 1/21 with complaints of SOB and fatigue following recent COVID dx. Pt reports eating two meals per day before admission, with not many snacks. Pt reports UBW of 190 lbs with no recent weight loss or weight gain. Pt drinks a supplement 2x/week at home, did not express interest in Boost while admitted. NFPE completed and not consistent with nutrition diagnosis of malnutrition at this time using AND/ASPEN criteria. RD will continue to monitor.      Anthropometrics        Current Height, Weight Height: 165.1 cm (65\")  Weight: 85.8 kg (189 lb 2.5 oz) (01/22/24 1608)       Usual Body Weight (UBW) 190 lbs       Trending Weight Hx     This admission: 1/24 - 189 lbs             PTA: 1/24 - 5.5% wt loss x 4 mo    Wt Readings from Last 30 Encounters:   01/22/24 1608 85.8 kg (189 lb 2.5 oz)   01/22/24 0454 82.2 kg (181 lb 3.5 oz)   01/21/24 2042 84.3 kg (185 lb 13.6 oz)   01/21/24 1503 85.3 kg (188 lb)   01/17/24 0500 84.9 kg (187 lb 2.7 oz)   01/16/24 0500 84.8 kg (186 lb 15.2 oz)   01/15/24 1109 84.4 kg (186 lb)   01/15/24 0500 84.5 kg (186 lb 4.6 oz)   01/14/24 1353 86 kg (189 lb 9.5 oz)   09/07/23 0957 90.7 kg (200 lb)   08/29/23 1413 88.5 kg (195 lb)   08/09/23 1432 88.5 kg (195 lb)   02/23/23 0607 78 kg (171 lb 15.3 oz)   02/22/23 1504 88.9 kg (196 lb)   02/20/23 0836 88.9 kg (196 lb)   02/16/23 1327 88.9 kg (196 lb) "   01/28/23 1105 88.5 kg (195 lb)   01/26/23 2200 88.6 kg (195 lb 5.2 oz)   01/26/23 1710 88.9 kg (196 lb)   01/23/23 2307 89.1 kg (196 lb 6.4 oz)   01/23/23 1102 90.3 kg (199 lb 1.2 oz)   01/19/23 1402 93.2 kg (205 lb 6.4 oz)   01/04/23 1351 91.4 kg (201 lb 6.4 oz)   12/05/22 1207 89.3 kg (196 lb 13.9 oz)   10/03/22 1436 92 kg (202 lb 12.8 oz)   09/23/22 1318 89.6 kg (197 lb 9.6 oz)   09/22/22 0910 90.1 kg (198 lb 10.2 oz)   09/13/22 1151 90.7 kg (200 lb)   09/01/22 0806 91 kg (200 lb 9.6 oz)   08/11/22 1446 88.5 kg (195 lb 3.2 oz)   07/01/22 1449 90.4 kg (199 lb 3.2 oz)   05/24/22 1441 89.9 kg (198 lb 3.2 oz)   05/17/22 1440 86.7 kg (191 lb 2.2 oz)   05/03/22 1355 86.7 kg (191 lb 3.2 oz)   04/14/22 0810 88.9 kg (196 lb)   04/07/22 1421 88.9 kg (196 lb)   03/29/22 1023 89.3 kg (196 lb 12.8 oz)   02/09/22 1334 87.3 kg (192 lb 8 oz)   01/21/22 1127 88.7 kg (195 lb 9.6 oz)   12/28/21 0950 88.5 kg (195 lb)   11/10/21 1345 88.9 kg (196 lb)   11/08/21 1530 88.7 kg (195 lb 9.6 oz)      BMI kg/m2 Body mass index is 31.48 kg/m².       Labs        Pertinent Labs    Results from last 7 days   Lab Units 01/22/24  2249 01/22/24  0453 01/21/24  1556   SODIUM mmol/L 129* 135* 134*   POTASSIUM mmol/L 4.6 4.5 4.5   CHLORIDE mmol/L 99 101 102   CO2 mmol/L 22.0 24.0 23.0   BUN mg/dL 31* 26* 22   CREATININE mg/dL 1.24* 1.33* 1.21*   CALCIUM mg/dL 8.6 8.1* 8.1*   BILIRUBIN mg/dL <0.2 0.2 0.2   ALK PHOS U/L 53 56 56   ALT (SGPT) U/L 19 21 18   AST (SGOT) U/L 20 18 19   GLUCOSE mg/dL 228* 257* 119*     Results from last 7 days   Lab Units 01/22/24  2249   MAGNESIUM mg/dL 2.3   PHOSPHORUS mg/dL 1.9*   HEMOGLOBIN g/dL 13.2   HEMATOCRIT % 38.9     Lab Results   Component Value Date    HGBA1C 7.10 (H) 01/21/2024        Medications    Scheduled Medications amLODIPine, 10 mg, Oral, Daily  ampicillin-sulbactam, 3 g, Intravenous, Q6H  aspirin, 81 mg, Oral, Daily  budesonide-formoterol, 2 puff, Inhalation, BID - RT  carvedilol, 12.5 mg, Oral, BID  With Meals  enoxaparin, 40 mg, Subcutaneous, Q24H  gabapentin, 100 mg, Oral, TID  guaiFENesin, 600 mg, Oral, Q12H  hydrALAZINE, 25 mg, Oral, BID  insulin glargine, 10 Units, Subcutaneous, Nightly  insulin lispro, 2-7 Units, Subcutaneous, TID With Meals  linagliptin, 5 mg, Oral, Daily  methocarbamol, 500 mg, Oral, 4x Daily  [START ON 1/25/2024] methylPREDNISolone sodium succinate, 40 mg, Intravenous, Daily  pantoprazole, 40 mg, Oral, Q AM  PATIENT SUPPLIED MEDICATION, 3,120 mcg, Subcutaneous, Daily  PATIENT SUPPLIED MEDICATION, 10 mg, Oral, Daily  polyethylene glycol, 17 g, Oral, Daily  senna-docusate sodium, 2 tablet, Oral, BID  [Held by provider] sertraline, 50 mg, Oral, Daily  sodium chloride, 10 mL, Intravenous, Q12H  terazosin, 2 mg, Oral, Nightly        Infusions Pharmacy to Dose enoxaparin (LOVENOX),   sodium chloride, 75 mL/hr, Last Rate: 75 mL/hr (01/24/24 0855)        PRN Medications   albuterol    senna-docusate sodium **AND** polyethylene glycol **AND** bisacodyl **AND** bisacodyl    Calcium Replacement - Follow Nurse / BPA Driven Protocol    dextrose    dextrose    glucagon (human recombinant)    HYDROcodone-acetaminophen    Magnesium Standard Dose Replacement - Follow Nurse / BPA Driven Protocol    nitroglycerin    ondansetron    Pharmacy to Dose enoxaparin (LOVENOX)    Phosphorus Replacement - Follow Nurse / BPA Driven Protocol    Potassium Replacement - Follow Nurse / BPA Driven Protocol    sodium chloride    sodium chloride    sodium chloride    traMADol     Physical Findings        Trending Physical   Appearance, NFPE 1/24 - NFPE completed and not consistent with nutrition diagnosis of malnutrition at this time using AND/ASPEN criteria      --  Edema  No documented edema     Bowel Function Last documented BM 1/22 - pericolace in place, pt refusing most doses     Tubes No feeding tubes placed     Chewing/Swallowing No documented chewing/swallowing issues     Skin Intact     --  Current Nutrition  Orders & Evaluation of Intake       Oral Nutrition     Food Allergies NKFA   Current PO Diet Diet: Cardiac Diets, Diabetic Diets; Healthy Heart (2-3 Na+); Consistent Carbohydrate; Texture: Regular Texture (IDDSI 7); Fluid Consistency: Thin (IDDSI 0)   Supplement No supplement ordered   PO Evaluation     Trending % PO Intake 1/24 - 62.5% PO intake since admission 1/21   --  Nutritional Risk Screening        NRS-2002 Score          Nutrition Diagnosis         Nutrition Dx Problem 1 Inadequate oral intake related to diminished appetite in the setting of an acute illness (COVID-19) as evidenced by fair PO intake since admission.       Nutrition Dx Problem 2        Intervention Goal         Intervention Goal(s) Encourage good PO intake.      Nutrition Intervention        RD Action NFPE completed, continue healthy heart diet as tolerated     Nutrition Prescription          Diet Prescription Healthy heart, diabetic, cardiac, consistent carbohydrate   Supplement Prescription No supplement ordered.   --  Monitor/Evaluation        Monitor Per protocol, PO intake, Weight       Electronically signed by:  Marry Will  01/24/24 10:05 EST

## 2024-01-24 NOTE — PROGRESS NOTES
"NEPHROLOGY PROGRESS NOTE------[facility]    [facility]  [phone]  Sonali Ordonez MD      Patient Care Team:  Anny Garcia MD as PCP - General (Internal Medicine)  José Luis, MD Sergio as Consulting Physician (Nephrology)      Provider:  Sonali Ordonez MD  Patient Name: Vianey Reeves  :  1945    SUBJECTIVE:    F/U CRF/CKD    Weak and malaised. Mild cough/congestion. No angina. No dysuria.     Medication:  amLODIPine, 10 mg, Oral, Daily  ampicillin-sulbactam, 3 g, Intravenous, Q6H  aspirin, 81 mg, Oral, Daily  carvedilol, 12.5 mg, Oral, BID With Meals  enoxaparin, 40 mg, Subcutaneous, Q24H  gabapentin, 100 mg, Oral, TID  guaiFENesin, 600 mg, Oral, Q12H  hydrALAZINE, 25 mg, Oral, BID  insulin glargine, 10 Units, Subcutaneous, Nightly  insulin lispro, 2-7 Units, Subcutaneous, TID With Meals  linagliptin, 5 mg, Oral, Daily  methocarbamol, 500 mg, Oral, 4x Daily  methylPREDNISolone sodium succinate, 80 mg, Intravenous, Daily  pantoprazole, 40 mg, Oral, Q AM  PATIENT SUPPLIED MEDICATION, 3,120 mcg, Subcutaneous, Daily  PATIENT SUPPLIED MEDICATION, 10 mg, Oral, Daily  polyethylene glycol, 17 g, Oral, Daily  senna-docusate sodium, 2 tablet, Oral, BID  sertraline, 50 mg, Oral, Daily  sodium chloride, 10 mL, Intravenous, Q12H  terazosin, 2 mg, Oral, Nightly      Pharmacy to Dose enoxaparin (LOVENOX),         OBJECTIVE    Vital Sign Min/Max for last 24 hours  Temp  Min: 96.8 °F (36 °C)  Max: 97.5 °F (36.4 °C)   BP  Min: 104/50  Max: 145/69   Pulse  Min: 62  Max: 77   Resp  Min: 14  Max: 18   SpO2  Min: 91 %  Max: 95 %   No data recorded   No data recorded     Flowsheet Rows      Flowsheet Row First Filed Value   Admission Height 165.1 cm (65\") Documented at 2024 1503   Admission Weight 85.3 kg (188 lb) Documented at 2024 1503            No intake/output data recorded.  I/O last 3 completed shifts:  In: 1040 [P.O.:840; IV " "Piggyback:200]  Out: -     Physical Exam:  General Appearance: alert, appears stated age and cooperative  Head: normocephalic, without obvious abnormality and atraumatic  Eyes: conjunctivae and sclerae normal and no icterus  Neck: supple and no JVD  Lungs: +RIGHT  RALES  Heart: regular rhythm & normal rate and normal S1, S2 +AMINA  Chest Wall: no abnormalities observed  Abdomen: normal bowel sounds and soft, nontender  Extremities: moves extremities well, no edema, no cyanosis +DJD  Skin: no bleeding, bruising or rash  Neurologic: Alert, and oriented. No focal deficits    Labs:    WBC WBC   Date Value Ref Range Status   01/22/2024 5.80 3.40 - 10.80 10*3/mm3 Final   01/22/2024 2.40 (L) 3.40 - 10.80 10*3/mm3 Final   01/21/2024 4.40 3.40 - 10.80 10*3/mm3 Final      HGB Hemoglobin   Date Value Ref Range Status   01/22/2024 13.2 12.0 - 15.9 g/dL Final   01/22/2024 13.2 12.0 - 15.9 g/dL Final   01/21/2024 13.6 12.0 - 15.9 g/dL Final      HCT Hematocrit   Date Value Ref Range Status   01/22/2024 38.9 34.0 - 46.6 % Final   01/22/2024 39.4 34.0 - 46.6 % Final   01/21/2024 40.9 34.0 - 46.6 % Final      Platelets No results found for: \"LABPLAT\"   MCV MCV   Date Value Ref Range Status   01/22/2024 99.0 (H) 79.0 - 97.0 fL Final   01/22/2024 99.3 (H) 79.0 - 97.0 fL Final   01/21/2024 99.6 (H) 79.0 - 97.0 fL Final          Sodium Sodium   Date Value Ref Range Status   01/22/2024 129 (L) 136 - 145 mmol/L Final   01/22/2024 135 (L) 136 - 145 mmol/L Final   01/21/2024 134 (L) 136 - 145 mmol/L Final      Potassium Potassium   Date Value Ref Range Status   01/22/2024 4.6 3.5 - 5.2 mmol/L Final     Comment:     Slight hemolysis detected by analyzer. Result may be falsely elevated.   01/22/2024 4.5 3.5 - 5.2 mmol/L Final   01/21/2024 4.5 3.5 - 5.2 mmol/L Final      Chloride Chloride   Date Value Ref Range Status   01/22/2024 99 98 - 107 mmol/L Final   01/22/2024 101 98 - 107 mmol/L Final   01/21/2024 102 98 - 107 mmol/L Final      CO2 CO2 " "  Date Value Ref Range Status   01/22/2024 22.0 22.0 - 29.0 mmol/L Final   01/22/2024 24.0 22.0 - 29.0 mmol/L Final   01/21/2024 23.0 22.0 - 29.0 mmol/L Final      BUN BUN   Date Value Ref Range Status   01/22/2024 31 (H) 8 - 23 mg/dL Final   01/22/2024 26 (H) 8 - 23 mg/dL Final   01/21/2024 22 8 - 23 mg/dL Final      Creatinine Creatinine   Date Value Ref Range Status   01/22/2024 1.24 (H) 0.57 - 1.00 mg/dL Final   01/22/2024 1.33 (H) 0.57 - 1.00 mg/dL Final   01/21/2024 1.21 (H) 0.57 - 1.00 mg/dL Final      Calcium Calcium   Date Value Ref Range Status   01/22/2024 8.6 8.6 - 10.5 mg/dL Final   01/22/2024 8.1 (L) 8.6 - 10.5 mg/dL Final   01/21/2024 8.1 (L) 8.6 - 10.5 mg/dL Final      PO4 No components found for: \"PO4\"   Albumin Albumin   Date Value Ref Range Status   01/22/2024 2.7 (L) 3.5 - 5.2 g/dL Final   01/22/2024 3.0 (L) 3.5 - 5.2 g/dL Final   01/21/2024 2.8 (L) 3.5 - 5.2 g/dL Final      Magnesium Magnesium   Date Value Ref Range Status   01/22/2024 2.3 1.6 - 2.4 mg/dL Final      Uric Acid No components found for: \"URIC ACID\"     Imaging Results (Last 72 Hours)       Procedure Component Value Units Date/Time    XR Chest 1 View [[id number]] Collected: 01/21/24 1612     Updated: 01/21/24 1616    Narrative:      XR CHEST 1 VW    Date of Exam: 1/21/2024 3:57 PM EST    Indication: CHF/COPD Protocol  CHF/COPD Protocol    Comparison: CT chest from January 15, 2024    Findings:  There is a consolidation within the right midlung. The heart and mediastinal contours appear stable. The pulmonary vasculature appears normal. The osseous structures appear intact.      Impression:      Impression:  Consolidation within right midlung, concerning for ongoing pneumonia.      Electronically Signed: Wilder Crenshaw MD    1/21/2024 4:14 PM EST    Workstation ID: [id number]            Results for orders placed during the hospital encounter of 01/21/24    XR Chest 1 View    Narrative  XR CHEST 1 VW    Date of Exam: 1/21/2024 3:57 PM " EST    Indication: CHF/COPD Protocol  CHF/COPD Protocol    Comparison: CT chest from January 15, 2024    Findings:  There is a consolidation within the right midlung. The heart and mediastinal contours appear stable. The pulmonary vasculature appears normal. The osseous structures appear intact.    Impression  Impression:  Consolidation within right midlung, concerning for ongoing pneumonia.      Electronically Signed: Wilder Crenshaw MD  1/21/2024 4:14 PM EST  Workstation ID: [id number]      Results for orders placed during the hospital encounter of 01/14/24    XR Chest PA & Lateral    Narrative  XR CHEST PA AND LATERAL    Date of Exam: 1/14/2024 9:20 PM CST    Indication: elevated d-dimer    Comparison: Chest x-ray 1/14/2024    Findings:  The heart is stable in size. There is trace fluid in the right minor fissure and trace blunting at the costophrenic angle. No evidence for pneumothorax. There are calcified hilar lymph nodes.    Impression  Impression:  Trace right pleural effusion.      Electronically Signed: Bia Lucero MD  1/15/2024 6:24 AM CST  Workstation ID: [id number]      XR Chest 1 View    Narrative  XR CHEST 1 VW    Date of Exam: 1/14/2024 3:22 PM EST    Indication: soa/covid +    Comparison: None available.    Findings: No focal consolidation. Resolving right midlung zone atelectasis. No pneumothorax or pleural effusion. Cardiac size is normal. The visualized clavicles appear intact. No displaced rib fractures. The visualized upper abdomen is normal.    Impression  Impression: No acute cardiopulmonary disease.    Electronically Signed: Kiran Burgess MD  1/14/2024 3:39 PM EST  Workstation ID: [id number]      Results for orders placed during the hospital encounter of 08/22/23    Doppler Ankle Brachial Index Single Level CAR    Interpretation Summary    Right Conclusion: The right IKE is normal. Normal digital pressures.    Left Conclusion: The left IKE is normal. Mild digital ischemia.        ASSESSMENT /  PLAN      Pneumonia of right middle lobe due to infectious organism    Generalized weakness    Dyspnea    Pneumonia      CRF/CKD-------Nonoliguric. 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 Cr stable. Lytes okay. Volume okay. Dose meds for CrCl 30-45 cc/min. No NSAIDs.  Current creatinine at [facility]     2. HTN WITH CKD------BP okay. Avoid hypotension     3. DMII WITH RENAL MANIFESTATIONS-----BS okay. Avoid hypoglycemia. Lantus, Glucometers, SSI. Follow with steroid exposure     4. COPD/COVID 19 + PNA/RML CONSOLIDATION-------Oxygen, Decadron, Supportive care. On abx per PCP     5. URETERAL CA S/P R NEPHRECTOMY/SOLITARY KIDNEY STATE     6. PULMONARY HTN     7. OA/DJD-----No NSAIDs.       8. DVT PROPHYLAXIS------Lovenox     9. ELEVATED D-DIMER-------CT PE protocol recent admission negative     10. DEPRESSION-----Hold Zoloft given hyponatremia     11. GERD/PUD PROPHYLAXIS--------PPI. Benefits outweigh risks despite renal dysfunction    12. HYPONATREMIA------Hypotonic and clinically hypovolemic. Hold Zoloft given PNA and likely SIADH. Give NS and follow      Sonali Ordonez MD  [facility]  [phone]  01/24/24  07:53 EST

## 2024-01-24 NOTE — PROGRESS NOTES
LOS: 1 day   Patient Care Team:  Anny Garcia MD as PCP - General (Internal Medicine)  Sergio Ordonez MD as Consulting Physician (Nephrology)    Subjective     Interval History:     Patient Complaints: Patient reports feeling worse today.  Continues with chest congestion, cough, and shortness of breath with any exertion.  She also reports feeling more weak    History taken from: patient    Review of Systems   Constitutional:  Positive for activity change, appetite change and fatigue. Negative for chills, diaphoresis and fever.   HENT:  Negative for facial swelling.    Eyes:  Negative for visual disturbance.   Respiratory:  Positive for cough and shortness of breath. Negative for stridor.    Cardiovascular:  Negative for chest pain, palpitations and leg swelling.   Gastrointestinal:  Negative for abdominal pain, constipation and diarrhea.   Genitourinary:  Negative for dysuria.   Musculoskeletal:  Positive for arthralgias and back pain.   Skin:  Negative for rash and wound.   Neurological:  Negative for dizziness, light-headedness and headaches.   Psychiatric/Behavioral:  Negative for confusion. Behavioral problem: Chr.           Objective     Vital Signs  Temp:  [96.8 °F (36 °C)-97.6 °F (36.4 °C)] 97.6 °F (36.4 °C)  Heart Rate:  [62-77] 77  Resp:  [14-20] 20  BP: (104-145)/(50-91) 127/68    Physical Exam:     General Appearance:    Alert, cooperative, in no acute distress,   Head:    Normocephalic, without obvious abnormality, atraumatic   Eyes:            Lids and lashes normal, conjunctivae and sclerae normal, no   icterus, no pallor, corneas clear, PERRLA   Ears:    Ears appear intact with no abnormalities noted   Throat:   No oral lesions, no thrush, oral mucosa moist   Neck:   No adenopathy, supple, trachea midline, no thyromegaly, no   carotid bruit, no JVD   Lungs:   Rhonchi right side    Heart:    Regular rhythm and normal rate, normal S1 and S2, no            murmur, no gallop, no rub, no  click   Chest Wall:    No abnormalities observed   Abdomen:     Normal bowel sounds, no masses, no organomegaly, soft        Non-tender non-distended, no guarding,   Extremities:   Moves all extremities well, no edema, no cyanosis, no             Redness   Pulses:   Pulses palpable and equal bilaterally   Skin:   No bleeding, bruising or rash   Lymph nodes:   No palpable adenopathy   Neurologic:   Cranial nerves 2 - 12 grossly intact, sensation intact, DTR       present and equal bilaterally        Results Review:    Lab Results (last 24 hours)       Procedure Component Value Units Date/Time    POC Glucose TID AC [[id number]]  (Abnormal) Collected: 01/24/24 0800    Specimen: Blood Updated: 01/24/24 0801     Glucose 127 mg/dL      Comment: Serial Number: [id number]:  [id number]       Respiratory Culture - Sputum, Cough [[id number]] Collected: 01/23/24 2050    Specimen: Sputum from Cough Updated: 01/24/24 0628     Respiratory Culture Rejected     Gram Stain Rare (1+) Epithelial cells per low power field      Rare (1+) WBCs per low power field      No organisms seen    Narrative:      Specimen rejected due to oropharyngeal contamination. Please reorder and recollect specimen if clinically necessary.    Blood Culture - Blood, Arm, Left [[id number]]  (Normal) Collected: 01/21/24 1725    Specimen: Blood from Arm, Left Updated: 01/23/24 1731     Blood Culture No growth at 2 days    Narrative:      Less than seven (7) mL's of blood was collected.  Insufficient quantity may yield false negative results.    Blood Culture - Blood, Hand, Right [[id number]]  (Normal) Collected: 01/21/24 1725    Specimen: Blood from Hand, Right Updated: 01/23/24 1731     Blood Culture No growth at 2 days    POC Glucose Once [[id number]]  (Abnormal) Collected: 01/23/24 1547    Specimen: Blood Updated: 01/23/24 1548     Glucose 255 mg/dL      Comment: Serial Number: [id number]:  [id number]       POC Glucose TID AC [[id number]]  (Abnormal)  Collected: 01/23/24 1140    Specimen: Blood Updated: 01/23/24 1142     Glucose 191 mg/dL      Comment: Serial Number: [id number]:  [id number]                Imaging Results (Last 24 Hours)       ** No results found for the last 24 hours. **                 I reviewed the patient's new clinical results.    Medication Review:   Scheduled Meds:amLODIPine, 10 mg, Oral, Daily  ampicillin-sulbactam, 3 g, Intravenous, Q6H  aspirin, 81 mg, Oral, Daily  budesonide-formoterol, 2 puff, Inhalation, BID - RT  carvedilol, 12.5 mg, Oral, BID With Meals  enoxaparin, 40 mg, Subcutaneous, Q24H  gabapentin, 100 mg, Oral, TID  guaiFENesin, 600 mg, Oral, Q12H  hydrALAZINE, 25 mg, Oral, BID  insulin glargine, 10 Units, Subcutaneous, Nightly  insulin lispro, 2-7 Units, Subcutaneous, TID With Meals  linagliptin, 5 mg, Oral, Daily  methocarbamol, 500 mg, Oral, 4x Daily  methylPREDNISolone sodium succinate, 80 mg, Intravenous, Daily  pantoprazole, 40 mg, Oral, Q AM  PATIENT SUPPLIED MEDICATION, 3,120 mcg, Subcutaneous, Daily  PATIENT SUPPLIED MEDICATION, 10 mg, Oral, Daily  polyethylene glycol, 17 g, Oral, Daily  senna-docusate sodium, 2 tablet, Oral, BID  [Held by provider] sertraline, 50 mg, Oral, Daily  sodium chloride, 10 mL, Intravenous, Q12H  terazosin, 2 mg, Oral, Nightly      Continuous Infusions:Pharmacy to Dose enoxaparin (LOVENOX),   sodium chloride, 75 mL/hr, Last Rate: 75 mL/hr (01/24/24 0855)      PRN Meds:.  albuterol    senna-docusate sodium **AND** polyethylene glycol **AND** bisacodyl **AND** bisacodyl    Calcium Replacement - Follow Nurse / BPA Driven Protocol    dextrose    dextrose    glucagon (human recombinant)    HYDROcodone-acetaminophen    Magnesium Standard Dose Replacement - Follow Nurse / BPA Driven Protocol    nitroglycerin    ondansetron    Pharmacy to Dose enoxaparin (LOVENOX)    Phosphorus Replacement - Follow Nurse / BPA Driven Protocol    Potassium Replacement - Follow Nurse / BPA Driven Protocol     sodium chloride    sodium chloride    sodium chloride    traMADol     Assessment & Plan       Pneumonia of right middle lobe due to infectious organism    Generalized weakness    Dyspnea    Pneumonia    Pneumonia:  DC Unasyn and start oral Augmentin  Wean steroids, decrease to 40 mg daily  Obtain chest x-ray today  Continue Mucinex twice daily  Continue Symbicort twice daily  Add Duo-neb and albuterol neb  Respiratory culture pending    Chronic back pain  Recurrent urinary tract infections  Mood disorder  Essential hypertension  Kidney disease stage III  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  Long discussion regarding safety at home.  Patient does have good family support.  Will evaluate for home health physical therapy  -Encourage patient to get out of bed for meals  -May need walker or cane for stability    Plan for disposition: ANABELL Ventura, APRN  01/24/24  09:47 EST

## 2024-01-25 LAB
ALBUMIN SERPL-MCNC: 2.5 G/DL (ref 3.5–5.2)
ALBUMIN/GLOB SERPL: 1 G/DL
ALP SERPL-CCNC: 48 U/L (ref 39–117)
ALT SERPL W P-5'-P-CCNC: 14 U/L (ref 1–33)
ANION GAP SERPL CALCULATED.3IONS-SCNC: 7 MMOL/L (ref 5–15)
AST SERPL-CCNC: 13 U/L (ref 1–32)
BASOPHILS # BLD AUTO: 0 10*3/MM3 (ref 0–0.2)
BASOPHILS NFR BLD AUTO: 0.1 % (ref 0–1.5)
BILIRUB SERPL-MCNC: <0.2 MG/DL (ref 0–1.2)
BUN SERPL-MCNC: 22 MG/DL (ref 8–23)
BUN/CREAT SERPL: 19.3 (ref 7–25)
CALCIUM SPEC-SCNC: 7.9 MG/DL (ref 8.6–10.5)
CHLORIDE SERPL-SCNC: 107 MMOL/L (ref 98–107)
CO2 SERPL-SCNC: 26 MMOL/L (ref 22–29)
CREAT SERPL-MCNC: 1.14 MG/DL (ref 0.57–1)
DEPRECATED RDW RBC AUTO: 49.4 FL (ref 37–54)
EGFRCR SERPLBLD CKD-EPI 2021: 48.8 ML/MIN/1.73
EOSINOPHIL # BLD AUTO: 0 10*3/MM3 (ref 0–0.4)
EOSINOPHIL NFR BLD AUTO: 0 % (ref 0.3–6.2)
ERYTHROCYTE [DISTWIDTH] IN BLOOD BY AUTOMATED COUNT: 13.7 % (ref 12.3–15.4)
GLOBULIN UR ELPH-MCNC: 2.5 GM/DL
GLUCOSE BLDC GLUCOMTR-MCNC: 151 MG/DL (ref 70–105)
GLUCOSE BLDC GLUCOMTR-MCNC: 265 MG/DL (ref 70–105)
GLUCOSE BLDC GLUCOMTR-MCNC: 387 MG/DL (ref 70–105)
GLUCOSE BLDC GLUCOMTR-MCNC: 390 MG/DL (ref 70–105)
GLUCOSE BLDC GLUCOMTR-MCNC: 81 MG/DL (ref 70–105)
GLUCOSE SERPL-MCNC: 156 MG/DL (ref 65–99)
HCT VFR BLD AUTO: 34.3 % (ref 34–46.6)
HGB BLD-MCNC: 11.3 G/DL (ref 12–15.9)
LYMPHOCYTES # BLD AUTO: 0.7 10*3/MM3 (ref 0.7–3.1)
LYMPHOCYTES NFR BLD AUTO: 12.8 % (ref 19.6–45.3)
MAGNESIUM SERPL-MCNC: 1.8 MG/DL (ref 1.6–2.4)
MCH RBC QN AUTO: 32.3 PG (ref 26.6–33)
MCHC RBC AUTO-ENTMCNC: 33 G/DL (ref 31.5–35.7)
MCV RBC AUTO: 97.9 FL (ref 79–97)
MONOCYTES # BLD AUTO: 0.3 10*3/MM3 (ref 0.1–0.9)
MONOCYTES NFR BLD AUTO: 5.9 % (ref 5–12)
NEUTROPHILS NFR BLD AUTO: 4.2 10*3/MM3 (ref 1.7–7)
NEUTROPHILS NFR BLD AUTO: 81.2 % (ref 42.7–76)
NRBC BLD AUTO-RTO: 0 /100 WBC (ref 0–0.2)
PHOSPHATE SERPL-MCNC: 3.1 MG/DL (ref 2.5–4.5)
PLATELET # BLD AUTO: 146 10*3/MM3 (ref 140–450)
PMV BLD AUTO: 8.9 FL (ref 6–12)
POTASSIUM SERPL-SCNC: 4.2 MMOL/L (ref 3.5–5.2)
PROT SERPL-MCNC: 5 G/DL (ref 6–8.5)
RBC # BLD AUTO: 3.5 10*6/MM3 (ref 3.77–5.28)
SODIUM SERPL-SCNC: 140 MMOL/L (ref 136–145)
WBC NRBC COR # BLD AUTO: 5.2 10*3/MM3 (ref 3.4–10.8)

## 2024-01-25 PROCEDURE — 94664 DEMO&/EVAL PT USE INHALER: CPT

## 2024-01-25 PROCEDURE — 63710000001 INSULIN LISPRO (HUMAN) PER 5 UNITS: Performed by: INTERNAL MEDICINE

## 2024-01-25 PROCEDURE — 94799 UNLISTED PULMONARY SVC/PX: CPT

## 2024-01-25 PROCEDURE — 94761 N-INVAS EAR/PLS OXIMETRY MLT: CPT

## 2024-01-25 PROCEDURE — 25010000002 CALCIUM GLUCONATE-NACL 1-0.675 GM/50ML-% SOLUTION: Performed by: INTERNAL MEDICINE

## 2024-01-25 PROCEDURE — 84100 ASSAY OF PHOSPHORUS: CPT | Performed by: INTERNAL MEDICINE

## 2024-01-25 PROCEDURE — 83735 ASSAY OF MAGNESIUM: CPT | Performed by: INTERNAL MEDICINE

## 2024-01-25 PROCEDURE — 80053 COMPREHEN METABOLIC PANEL: CPT | Performed by: NURSE PRACTITIONER

## 2024-01-25 PROCEDURE — 25010000002 METHYLPREDNISOLONE PER 40 MG

## 2024-01-25 PROCEDURE — 82948 REAGENT STRIP/BLOOD GLUCOSE: CPT | Performed by: INTERNAL MEDICINE

## 2024-01-25 PROCEDURE — 63710000001 INSULIN GLARGINE PER 5 UNITS: Performed by: NURSE PRACTITIONER

## 2024-01-25 PROCEDURE — 85025 COMPLETE CBC W/AUTO DIFF WBC: CPT | Performed by: NURSE PRACTITIONER

## 2024-01-25 PROCEDURE — 25010000002 MAGNESIUM SULFATE IN D5W 1G/100ML (PREMIX) 1-5 GM/100ML-% SOLUTION: Performed by: INTERNAL MEDICINE

## 2024-01-25 PROCEDURE — 82948 REAGENT STRIP/BLOOD GLUCOSE: CPT

## 2024-01-25 PROCEDURE — 25010000002 ENOXAPARIN PER 10 MG: Performed by: NURSE PRACTITIONER

## 2024-01-25 PROCEDURE — 25010000002 FUROSEMIDE PER 20 MG: Performed by: NURSE PRACTITIONER

## 2024-01-25 RX ORDER — CALCIUM GLUCONATE 20 MG/ML
1000 INJECTION, SOLUTION INTRAVENOUS EVERY 12 HOURS
Status: COMPLETED | OUTPATIENT
Start: 2024-01-25 | End: 2024-01-25

## 2024-01-25 RX ORDER — FUROSEMIDE 10 MG/ML
20 INJECTION INTRAMUSCULAR; INTRAVENOUS ONCE
Status: COMPLETED | OUTPATIENT
Start: 2024-01-25 | End: 2024-01-25

## 2024-01-25 RX ORDER — MAGNESIUM SULFATE 1 G/100ML
1 INJECTION INTRAVENOUS ONCE
Status: COMPLETED | OUTPATIENT
Start: 2024-01-25 | End: 2024-01-25

## 2024-01-25 RX ORDER — PREDNISONE 20 MG/1
20 TABLET ORAL
Status: CANCELLED | OUTPATIENT
Start: 2024-01-25 | End: 2024-01-27

## 2024-01-25 RX ADMIN — GABAPENTIN 100 MG: 100 CAPSULE ORAL at 08:32

## 2024-01-25 RX ADMIN — ALBUTEROL SULFATE 2 PUFF: 108 AEROSOL, METERED RESPIRATORY (INHALATION) at 16:06

## 2024-01-25 RX ADMIN — TRAMADOL HYDROCHLORIDE 100 MG: 50 TABLET, COATED ORAL at 08:31

## 2024-01-25 RX ADMIN — METHOCARBAMOL 500 MG: 500 TABLET ORAL at 18:05

## 2024-01-25 RX ADMIN — GUAIFENESIN 600 MG: 600 TABLET, MULTILAYER, EXTENDED RELEASE ORAL at 08:31

## 2024-01-25 RX ADMIN — DOCUSATE SODIUM AND SENNOSIDES 2 TABLET: 8.6; 5 TABLET, FILM COATED ORAL at 08:31

## 2024-01-25 RX ADMIN — AMLODIPINE BESYLATE 10 MG: 5 TABLET ORAL at 08:31

## 2024-01-25 RX ADMIN — LINAGLIPTIN 5 MG: 5 TABLET, FILM COATED ORAL at 08:40

## 2024-01-25 RX ADMIN — INSULIN GLARGINE 10 UNITS: 100 INJECTION, SOLUTION SUBCUTANEOUS at 20:55

## 2024-01-25 RX ADMIN — ALBUTEROL SULFATE 2 PUFF: 108 AEROSOL, METERED RESPIRATORY (INHALATION) at 08:02

## 2024-01-25 RX ADMIN — ALBUTEROL SULFATE 2 PUFF: 108 AEROSOL, METERED RESPIRATORY (INHALATION) at 19:17

## 2024-01-25 RX ADMIN — BUDESONIDE AND FORMOTEROL FUMARATE DIHYDRATE 2 PUFF: 160; 4.5 AEROSOL RESPIRATORY (INHALATION) at 19:17

## 2024-01-25 RX ADMIN — AMOXICILLIN AND CLAVULANATE POTASSIUM 1 TABLET: 875; 125 TABLET, FILM COATED ORAL at 08:31

## 2024-01-25 RX ADMIN — METHOCARBAMOL 500 MG: 500 TABLET ORAL at 08:40

## 2024-01-25 RX ADMIN — ASPIRIN 81 MG: 81 TABLET, COATED ORAL at 08:31

## 2024-01-25 RX ADMIN — CALCIUM GLUCONATE 1000 MG: 20 INJECTION, SOLUTION INTRAVENOUS at 08:41

## 2024-01-25 RX ADMIN — PANTOPRAZOLE SODIUM 40 MG: 40 TABLET, DELAYED RELEASE ORAL at 05:53

## 2024-01-25 RX ADMIN — BUDESONIDE AND FORMOTEROL FUMARATE DIHYDRATE 2 PUFF: 160; 4.5 AEROSOL RESPIRATORY (INHALATION) at 08:05

## 2024-01-25 RX ADMIN — HYDRALAZINE HYDROCHLORIDE 25 MG: 25 TABLET ORAL at 08:31

## 2024-01-25 RX ADMIN — HYDRALAZINE HYDROCHLORIDE 25 MG: 25 TABLET ORAL at 20:54

## 2024-01-25 RX ADMIN — MAGNESIUM SULFATE IN DEXTROSE 1 G: 10 INJECTION, SOLUTION INTRAVENOUS at 08:41

## 2024-01-25 RX ADMIN — AMOXICILLIN AND CLAVULANATE POTASSIUM 1 TABLET: 875; 125 TABLET, FILM COATED ORAL at 20:54

## 2024-01-25 RX ADMIN — GUAIFENESIN 600 MG: 600 TABLET, MULTILAYER, EXTENDED RELEASE ORAL at 20:54

## 2024-01-25 RX ADMIN — INSULIN LISPRO 6 UNITS: 100 INJECTION, SOLUTION INTRAVENOUS; SUBCUTANEOUS at 18:05

## 2024-01-25 RX ADMIN — ENOXAPARIN SODIUM 40 MG: 100 INJECTION SUBCUTANEOUS at 18:05

## 2024-01-25 RX ADMIN — Medication 10 ML: at 20:55

## 2024-01-25 RX ADMIN — CALCIUM GLUCONATE 1000 MG: 20 INJECTION, SOLUTION INTRAVENOUS at 20:48

## 2024-01-25 RX ADMIN — GABAPENTIN 100 MG: 100 CAPSULE ORAL at 18:05

## 2024-01-25 RX ADMIN — DOCUSATE SODIUM AND SENNOSIDES 2 TABLET: 8.6; 5 TABLET, FILM COATED ORAL at 20:54

## 2024-01-25 RX ADMIN — ALBUTEROL SULFATE 2 PUFF: 108 AEROSOL, METERED RESPIRATORY (INHALATION) at 11:48

## 2024-01-25 RX ADMIN — METHYLPREDNISOLONE SODIUM SUCCINATE 40 MG: 40 INJECTION, POWDER, FOR SOLUTION INTRAMUSCULAR; INTRAVENOUS at 08:40

## 2024-01-25 RX ADMIN — Medication 10 ML: at 08:32

## 2024-01-25 RX ADMIN — CARVEDILOL 12.5 MG: 6.25 TABLET, FILM COATED ORAL at 18:04

## 2024-01-25 RX ADMIN — FUROSEMIDE 20 MG: 10 INJECTION, SOLUTION INTRAMUSCULAR; INTRAVENOUS at 14:19

## 2024-01-25 RX ADMIN — CARVEDILOL 12.5 MG: 6.25 TABLET, FILM COATED ORAL at 08:31

## 2024-01-25 RX ADMIN — METHOCARBAMOL 500 MG: 500 TABLET ORAL at 14:18

## 2024-01-25 NOTE — CASE MANAGEMENT/SOCIAL WORK
Continued Stay Note  PRAKASH Tillman     Patient Name: Vianey Reeves  MRN: [id number]  Today's Date: 1/25/2024    Admit Date: 1/21/2024    Plan: Return home with [facility] (accepted, will need order). Current home O2 5L w/ [place].   Discharge Plan       Row Name 01/25/24 1508       Plan    Plan Return home with [facility] (accepted, will need order). Current home O2 5L w/ [place].    Patient/Family in Agreement with Plan yes    Plan Comments Pt was a potential discharge today pending walking oximetry test. RT reports pt requires 8L with exertion. Discharge held today and Pulm consulted. Nebulizer ordered but will need need processed/delivered at discharge.           Megan Naegele, RN     Office Phone: [phone]  Office Cell: [phone]

## 2024-01-25 NOTE — PROGRESS NOTES
"NEPHROLOGY PROGRESS NOTE------[facility]    [facility]  [phone]  Sonali Ordonez MD      Patient Care Team:  Anny Garcia MD as PCP - General (Internal Medicine)  Sergio Ordonez MD as Consulting Physician (Nephrology)      Provider:  Sonali Ordonez MD  Patient Name: Vianey Reeves  :  1945    SUBJECTIVE:    F/U CRF/CKD    Comfortable. Breathing better. No angina. No dysuria. No palpitations.      Medication:  albuterol sulfate HFA, 2 puff, Inhalation, 4x Daily - RT  amLODIPine, 10 mg, Oral, Daily  amoxicillin-clavulanate, 1 tablet, Oral, Q12H  aspirin, 81 mg, Oral, Daily  budesonide-formoterol, 2 puff, Inhalation, BID - RT  carvedilol, 12.5 mg, Oral, BID With Meals  enoxaparin, 40 mg, Subcutaneous, Q24H  gabapentin, 100 mg, Oral, TID  guaiFENesin, 600 mg, Oral, Q12H  hydrALAZINE, 25 mg, Oral, BID  insulin glargine, 10 Units, Subcutaneous, Nightly  insulin lispro, 2-7 Units, Subcutaneous, TID With Meals  linagliptin, 5 mg, Oral, Daily  methocarbamol, 500 mg, Oral, 4x Daily  methylPREDNISolone sodium succinate, 40 mg, Intravenous, Daily  pantoprazole, 40 mg, Oral, Q AM  polyethylene glycol, 17 g, Oral, Daily  senna-docusate sodium, 2 tablet, Oral, BID  [Held by provider] sertraline, 50 mg, Oral, Daily  sodium chloride, 10 mL, Intravenous, Q12H  terazosin, 2 mg, Oral, Nightly      Pharmacy to Dose enoxaparin (LOVENOX),   sodium chloride, 75 mL/hr, Last Rate: 75 mL/hr (24 0855)        OBJECTIVE    Vital Sign Min/Max for last 24 hours  Temp  Min: 97.4 °F (36.3 °C)  Max: 98.6 °F (37 °C)   BP  Min: 104/52  Max: 157/78   Pulse  Min: 67  Max: 78   Resp  Min: 15  Max: 21   SpO2  Min: 86 %  Max: 94 %   No data recorded   No data recorded     Flowsheet Rows      Flowsheet Row First Filed Value   Admission Height 165.1 cm (65\") Documented at 2024 1503   Admission Weight 85.3 kg (188 lb) Documented at 2024 1503      " "      No intake/output data recorded.  I/O last 3 completed shifts:  In: 390 [P.O.:290; IV Piggyback:100]  Out: -     Physical Exam:  General Appearance: alert, appears stated age and cooperative  Head: normocephalic, without obvious abnormality and atraumatic  Eyes: conjunctivae and sclerae normal and no icterus  Neck: supple and no JVD  Lungs: +RIGHT  RALES  Heart: regular rhythm & normal rate and normal S1, S2 +AMINA  Chest Wall: no abnormalities observed  Abdomen: normal bowel sounds and soft, nontender  Extremities: moves extremities well, no edema, no cyanosis +DJD  Skin: no bleeding, bruising or rash  Neurologic: Alert, and oriented. No focal deficits    Labs:    WBC WBC   Date Value Ref Range Status   01/25/2024 5.20 3.40 - 10.80 10*3/mm3 Final   01/24/2024 7.30 3.40 - 10.80 10*3/mm3 Final   01/22/2024 5.80 3.40 - 10.80 10*3/mm3 Final      HGB Hemoglobin   Date Value Ref Range Status   01/25/2024 11.3 (L) 12.0 - 15.9 g/dL Final   01/24/2024 12.4 12.0 - 15.9 g/dL Final   01/22/2024 13.2 12.0 - 15.9 g/dL Final      HCT Hematocrit   Date Value Ref Range Status   01/25/2024 34.3 34.0 - 46.6 % Final   01/24/2024 37.0 34.0 - 46.6 % Final   01/22/2024 38.9 34.0 - 46.6 % Final      Platelets No results found for: \"LABPLAT\"   MCV MCV   Date Value Ref Range Status   01/25/2024 97.9 (H) 79.0 - 97.0 fL Final   01/24/2024 98.1 (H) 79.0 - 97.0 fL Final   01/22/2024 99.0 (H) 79.0 - 97.0 fL Final          Sodium Sodium   Date Value Ref Range Status   01/25/2024 140 136 - 145 mmol/L Final   01/24/2024 140 136 - 145 mmol/L Final   01/22/2024 129 (L) 136 - 145 mmol/L Final      Potassium Potassium   Date Value Ref Range Status   01/25/2024 4.2 3.5 - 5.2 mmol/L Final   01/24/2024 4.4 3.5 - 5.2 mmol/L Final   01/22/2024 4.6 3.5 - 5.2 mmol/L Final     Comment:     Slight hemolysis detected by analyzer. Result may be falsely elevated.      Chloride Chloride   Date Value Ref Range Status   01/25/2024 107 98 - 107 mmol/L Final " "  01/24/2024 107 98 - 107 mmol/L Final   01/22/2024 99 98 - 107 mmol/L Final      CO2 CO2   Date Value Ref Range Status   01/25/2024 26.0 22.0 - 29.0 mmol/L Final   01/24/2024 28.0 22.0 - 29.0 mmol/L Final   01/22/2024 22.0 22.0 - 29.0 mmol/L Final      BUN BUN   Date Value Ref Range Status   01/25/2024 22 8 - 23 mg/dL Final   01/24/2024 25 (H) 8 - 23 mg/dL Final   01/22/2024 31 (H) 8 - 23 mg/dL Final      Creatinine Creatinine   Date Value Ref Range Status   01/25/2024 1.14 (H) 0.57 - 1.00 mg/dL Final   01/24/2024 1.09 (H) 0.57 - 1.00 mg/dL Final   01/22/2024 1.24 (H) 0.57 - 1.00 mg/dL Final      Calcium Calcium   Date Value Ref Range Status   01/25/2024 7.9 (L) 8.6 - 10.5 mg/dL Final   01/24/2024 8.3 (L) 8.6 - 10.5 mg/dL Final   01/22/2024 8.6 8.6 - 10.5 mg/dL Final      PO4 No components found for: \"PO4\"   Albumin Albumin   Date Value Ref Range Status   01/25/2024 2.5 (L) 3.5 - 5.2 g/dL Final   01/24/2024 2.6 (L) 3.5 - 5.2 g/dL Final   01/22/2024 2.7 (L) 3.5 - 5.2 g/dL Final      Magnesium Magnesium   Date Value Ref Range Status   01/25/2024 1.8 1.6 - 2.4 mg/dL Final   01/24/2024 1.8 1.6 - 2.4 mg/dL Final   01/22/2024 2.3 1.6 - 2.4 mg/dL Final      Uric Acid No components found for: \"URIC ACID\"     Imaging Results (Last 72 Hours)       Procedure Component Value Units Date/Time    XR Chest 1 View [[id number]] Collected: 01/24/24 1912     Updated: 01/24/24 1915    Narrative:      XR CHEST 1 VW    Date of Exam: 1/24/2024 6:46 PM EST    Indication: Worsening chest congestion, increasing cough    Comparison: None available.    Findings: Vascular congestion. No consolidation. No pneumothorax or pleural effusion. Cardiomegaly. Hiatal hernia. Calcified right hilar lymph node. Midthoracic vertebroplasty. The clavicles are intact.      Impression:      Vascular congestion.    Electronically Signed: Kiran Burgess MD    1/24/2024 7:13 PM EST    Workstation ID: [id number]            Results for orders placed during the hospital " encounter of 01/21/24    XR Chest 1 View    Narrative  XR CHEST 1 VW    Date of Exam: 1/24/2024 6:46 PM EST    Indication: Worsening chest congestion, increasing cough    Comparison: None available.    Findings: Vascular congestion. No consolidation. No pneumothorax or pleural effusion. Cardiomegaly. Hiatal hernia. Calcified right hilar lymph node. Midthoracic vertebroplasty. The clavicles are intact.    Impression  Vascular congestion.    Electronically Signed: Kiran Burgess MD  1/24/2024 7:13 PM EST  Workstation ID: [id number]      XR Chest 1 View    Narrative  XR CHEST 1 VW    Date of Exam: 1/21/2024 3:57 PM EST    Indication: CHF/COPD Protocol  CHF/COPD Protocol    Comparison: CT chest from January 15, 2024    Findings:  There is a consolidation within the right midlung. The heart and mediastinal contours appear stable. The pulmonary vasculature appears normal. The osseous structures appear intact.    Impression  Impression:  Consolidation within right midlung, concerning for ongoing pneumonia.      Electronically Signed: Wilder Crenshaw MD  1/21/2024 4:14 PM EST  Workstation ID: [id number]      Results for orders placed during the hospital encounter of 01/14/24    XR Chest PA & Lateral    Narrative  XR CHEST PA AND LATERAL    Date of Exam: 1/14/2024 9:20 PM CST    Indication: elevated d-dimer    Comparison: Chest x-ray 1/14/2024    Findings:  The heart is stable in size. There is trace fluid in the right minor fissure and trace blunting at the costophrenic angle. No evidence for pneumothorax. There are calcified hilar lymph nodes.    Impression  Impression:  Trace right pleural effusion.      Electronically Signed: Bia Lucero MD  1/15/2024 6:24 AM CST  Workstation ID: [id number]      Results for orders placed during the hospital encounter of 08/22/23    Doppler Ankle Brachial Index Single Level CAR    Interpretation Summary    Right Conclusion: The right IKE is normal. Normal digital pressures.    Left  Conclusion: The left IKE is normal. Mild digital ischemia.        ASSESSMENT / PLAN      Pneumonia of right middle lobe due to infectious organism    Generalized weakness    Dyspnea    Pneumonia      CRF/CKD-------Nonoliguric. 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 Cr stable. Lytes okay. Volume okay. Dose meds for CrCl 30-45 cc/min. No NSAIDs.  Current creatinine at [facility]     2. HTN WITH CKD------BP okay. Avoid hypotension     3. DMII WITH RENAL MANIFESTATIONS-----BS okay. Avoid hypoglycemia. Lantus, Glucometers, SSI. Follow with steroid exposure     4. COPD/COVID 19 + PNA/RML CONSOLIDATION-------Oxygen, Decadron, Supportive care. On abx per PCP     5. URETERAL CA S/P R NEPHRECTOMY/SOLITARY KIDNEY STATE     6. PULMONARY HTN     7. OA/DJD-----No NSAIDs.       8. DVT PROPHYLAXIS------Lovenox     9. ELEVATED D-DIMER-------CT PE protocol recent admission negative     10. DEPRESSION-----Hold Zoloft given hyponatremia     11. GERD/PUD PROPHYLAXIS--------PPI. Benefits outweigh risks despite renal dysfunction    12. HYPONATREMIA------Resolved. Follow off of normal saline    13. HYPOCALCEMIA------Replace IV    14. HYPOMAGNESEMIA------Replace IV      Sonali Ordonez MD  [facility]  [phone]  01/25/24  07:42 EST

## 2024-01-25 NOTE — PROGRESS NOTES
LOS: 2 days   Patient Care Team:  Anny Garcia MD as PCP - General (Internal Medicine)  Sergio Ordonez MD as Consulting Physician (Nephrology)    Subjective     She is feeling better and wants to go home      Review of Systems   Constitutional:  Positive for activity change and fatigue.   HENT: Negative.     Respiratory:  Positive for cough and shortness of breath.    Cardiovascular: Negative.    Gastrointestinal: Negative.    Genitourinary: Negative.    Musculoskeletal: Negative.    Neurological:  Positive for weakness.   Psychiatric/Behavioral: Negative.             Objective     Vital Signs  Temp:  [97.4 °F (36.3 °C)-98.8 °F (37.1 °C)] 97.6 °F (36.4 °C)  Heart Rate:  [67-87] 78  Resp:  [16-21] 16  BP: (124-157)/(60-79) 126/60      Physical Exam  Vitals reviewed.   Constitutional:       Appearance: She is not ill-appearing.   HENT:      Head: Normocephalic and atraumatic.      Right Ear: External ear normal.      Left Ear: External ear normal.      Nose: Nose normal.      Mouth/Throat:      Mouth: Mucous membranes are dry.   Eyes:      General:         Right eye: No discharge.         Left eye: No discharge.   Cardiovascular:      Rate and Rhythm: Normal rate and regular rhythm.      Pulses: Normal pulses.      Heart sounds: Normal heart sounds.   Pulmonary:      Effort: Pulmonary effort is normal.      Breath sounds: Normal breath sounds.   Abdominal:      General: Bowel sounds are normal.      Palpations: Abdomen is soft.   Musculoskeletal:         General: Normal range of motion.      Cervical back: Normal range of motion.   Skin:     General: Skin is warm.   Neurological:      Mental Status: She is alert and oriented to person, place, and time.   Psychiatric:         Behavior: Behavior normal.              Results Review:    Lab Results (last 24 hours)       Procedure Component Value Units Date/Time    POC Glucose TID AC [[id number]]  (Abnormal) Collected: 01/25/24 1122    Specimen: Blood  Updated: 01/25/24 1124     Glucose 151 mg/dL      Comment: Serial Number: [id number]:  [id number]       POC Glucose TID AC [[id number]]  (Normal) Collected: 01/25/24 0752    Specimen: Blood Updated: 01/25/24 0755     Glucose 81 mg/dL      Comment: Serial Number: [id number]:  [id number]       Phosphorus [[id number]]  (Normal) Collected: 01/25/24 0249    Specimen: Blood Updated: 01/25/24 0348     Phosphorus 3.1 mg/dL     Comprehensive Metabolic Panel [[id number]]  (Abnormal) Collected: 01/25/24 0249    Specimen: Blood Updated: 01/25/24 0334     Glucose 156 mg/dL      BUN 22 mg/dL      Creatinine 1.14 mg/dL      Sodium 140 mmol/L      Potassium 4.2 mmol/L      Chloride 107 mmol/L      CO2 26.0 mmol/L      Calcium 7.9 mg/dL      Total Protein 5.0 g/dL      Albumin 2.5 g/dL      ALT (SGPT) 14 U/L      AST (SGOT) 13 U/L      Alkaline Phosphatase 48 U/L      Total Bilirubin <0.2 mg/dL      Globulin 2.5 gm/dL      A/G Ratio 1.0 g/dL      BUN/Creatinine Ratio 19.3     Anion Gap 7.0 mmol/L      eGFR 48.8 mL/min/1.73     Narrative:      GFR Normal >60  Chronic Kidney Disease <60  Kidney Failure <15    The GFR formula is only valid for adults with stable renal function between ages 18 and 70.    Magnesium [[id number]]  (Normal) Collected: 01/25/24 0249    Specimen: Blood Updated: 01/25/24 0334     Magnesium 1.8 mg/dL     CBC & Differential [[id number]]  (Abnormal) Collected: 01/25/24 0249    Specimen: Blood Updated: 01/25/24 0308    Narrative:      The following orders were created for panel order CBC & Differential.  Procedure                               Abnormality         Status                     ---------                               -----------         ------                     CBC Auto Differential[[id number]]        Abnormal            Final result                 Please view results for these tests on the individual orders.    CBC Auto Differential [[id number]]  (Abnormal) Collected: 01/25/24 0249     Specimen: Blood Updated: 01/25/24 0308     WBC 5.20 10*3/mm3      RBC 3.50 10*6/mm3      Hemoglobin 11.3 g/dL      Hematocrit 34.3 %      MCV 97.9 fL      MCH 32.3 pg      MCHC 33.0 g/dL      RDW 13.7 %      RDW-SD 49.4 fl      MPV 8.9 fL      Platelets 146 10*3/mm3      Neutrophil % 81.2 %      Lymphocyte % 12.8 %      Monocyte % 5.9 %      Eosinophil % 0.0 %      Basophil % 0.1 %      Neutrophils, Absolute 4.20 10*3/mm3      Lymphocytes, Absolute 0.70 10*3/mm3      Monocytes, Absolute 0.30 10*3/mm3      Eosinophils, Absolute 0.00 10*3/mm3      Basophils, Absolute 0.00 10*3/mm3      nRBC 0.0 /100 WBC     POC Glucose Once [[id number]]  (Abnormal) Collected: 01/24/24 2126    Specimen: Blood Updated: 01/24/24 2128     Glucose 262 mg/dL      Comment: Serial Number: [id number]:  [id number]       Blood Culture - Blood, Arm, Left [[id number]]  (Normal) Collected: 01/21/24 1725    Specimen: Blood from Arm, Left Updated: 01/24/24 1731     Blood Culture No growth at 3 days    Narrative:      Less than seven (7) mL's of blood was collected.  Insufficient quantity may yield false negative results.    Blood Culture - Blood, Hand, Right [[id number]]  (Normal) Collected: 01/21/24 1725    Specimen: Blood from Hand, Right Updated: 01/24/24 1731     Blood Culture No growth at 3 days    POC Glucose Once [[id number]]  (Abnormal) Collected: 01/24/24 1621    Specimen: Blood Updated: 01/24/24 1622     Glucose 295 mg/dL      Comment: Serial Number: [id number]:  [id number]       Comprehensive Metabolic Panel [[id number]]  (Abnormal) Collected: 01/24/24 1203    Specimen: Blood Updated: 01/24/24 1255     Glucose 127 mg/dL      BUN 25 mg/dL      Creatinine 1.09 mg/dL      Sodium 140 mmol/L      Potassium 4.4 mmol/L      Chloride 107 mmol/L      CO2 28.0 mmol/L      Calcium 8.3 mg/dL      Total Protein 5.3 g/dL      Albumin 2.6 g/dL      ALT (SGPT) 16 U/L      AST (SGOT) 15 U/L      Alkaline Phosphatase 51 U/L      Total Bilirubin 0.2  mg/dL      Globulin 2.7 gm/dL      A/G Ratio 1.0 g/dL      BUN/Creatinine Ratio 22.9     Anion Gap 5.0 mmol/L      eGFR 51.5 mL/min/1.73     Narrative:      GFR Normal >60  Chronic Kidney Disease <60  Kidney Failure <15    The GFR formula is only valid for adults with stable renal function between ages 18 and 70.    Magnesium [[id number]]  (Normal) Collected: 01/24/24 1203    Specimen: Blood Updated: 01/24/24 1255     Magnesium 1.8 mg/dL     Phosphorus [[id number]]  (Abnormal) Collected: 01/24/24 1203    Specimen: Blood Updated: 01/24/24 1255     Phosphorus 2.1 mg/dL     CBC & Differential [[id number]]  (Abnormal) Collected: 01/24/24 1203    Specimen: Blood Updated: 01/24/24 1232    Narrative:      The following orders were created for panel order CBC & Differential.  Procedure                               Abnormality         Status                     ---------                               -----------         ------                     CBC Auto Differential[[id number]]        Abnormal            Final result                 Please view results for these tests on the individual orders.    CBC Auto Differential [[id number]]  (Abnormal) Collected: 01/24/24 1203    Specimen: Blood Updated: 01/24/24 1232     WBC 7.30 10*3/mm3      RBC 3.78 10*6/mm3      Hemoglobin 12.4 g/dL      Hematocrit 37.0 %      MCV 98.1 fL      MCH 32.9 pg      MCHC 33.5 g/dL      RDW 13.9 %      RDW-SD 50.3 fl      MPV 9.0 fL      Platelets 156 10*3/mm3      Neutrophil % 90.0 %      Lymphocyte % 4.1 %      Monocyte % 5.8 %      Eosinophil % 0.0 %      Basophil % 0.1 %      Neutrophils, Absolute 6.60 10*3/mm3      Lymphocytes, Absolute 0.30 10*3/mm3      Monocytes, Absolute 0.40 10*3/mm3      Eosinophils, Absolute 0.00 10*3/mm3      Basophils, Absolute 0.00 10*3/mm3      nRBC 0.0 /100 WBC              Imaging Results (Last 24 Hours)       Procedure Component Value Units Date/Time    XR Chest 1 View [[id number]] Collected: 01/24/24 1912     Updated:  01/24/24 1915    Narrative:      XR CHEST 1 VW    Date of Exam: 1/24/2024 6:46 PM EST    Indication: Worsening chest congestion, increasing cough    Comparison: None available.    Findings: Vascular congestion. No consolidation. No pneumothorax or pleural effusion. Cardiomegaly. Hiatal hernia. Calcified right hilar lymph node. Midthoracic vertebroplasty. The clavicles are intact.      Impression:      Vascular congestion.    Electronically Signed: Kiran Burgess MD    1/24/2024 7:13 PM EST    Workstation ID: [id number]                 I reviewed the patient's new clinical results.    Medication Review:   Scheduled Meds:albuterol sulfate HFA, 2 puff, Inhalation, 4x Daily - RT  amLODIPine, 10 mg, Oral, Daily  amoxicillin-clavulanate, 1 tablet, Oral, Q12H  aspirin, 81 mg, Oral, Daily  budesonide-formoterol, 2 puff, Inhalation, BID - RT  calcium gluconate, 1,000 mg, Intravenous, Q12H  carvedilol, 12.5 mg, Oral, BID With Meals  enoxaparin, 40 mg, Subcutaneous, Q24H  gabapentin, 100 mg, Oral, TID  guaiFENesin, 600 mg, Oral, Q12H  hydrALAZINE, 25 mg, Oral, BID  insulin glargine, 10 Units, Subcutaneous, Nightly  insulin lispro, 2-7 Units, Subcutaneous, TID With Meals  linagliptin, 5 mg, Oral, Daily  methocarbamol, 500 mg, Oral, 4x Daily  methylPREDNISolone sodium succinate, 40 mg, Intravenous, Daily  pantoprazole, 40 mg, Oral, Q AM  polyethylene glycol, 17 g, Oral, Daily  senna-docusate sodium, 2 tablet, Oral, BID  [Held by provider] sertraline, 50 mg, Oral, Daily  sodium chloride, 10 mL, Intravenous, Q12H  terazosin, 2 mg, Oral, Nightly      Continuous Infusions:Pharmacy to Dose enoxaparin (LOVENOX),       PRN Meds:.  albuterol    senna-docusate sodium **AND** polyethylene glycol **AND** bisacodyl **AND** bisacodyl    Calcium Replacement - Follow Nurse / BPA Driven Protocol    dextrose    dextrose    glucagon (human recombinant)    HYDROcodone-acetaminophen    Magnesium Standard Dose Replacement - Follow Nurse / BPA Driven  Protocol    nitroglycerin    ondansetron    Pharmacy to Dose enoxaparin (LOVENOX)    Phosphorus Replacement - Follow Nurse / BPA Driven Protocol    Potassium Replacement - Follow Nurse / BPA Driven Protocol    sodium chloride    sodium chloride    sodium chloride    traMADol     Interval History:    Assessment & Plan     Pneumonia  COVID 19 +  Generalized weakness  Dyspnea   DC Unasyn and start oral Augmentin  Wean steroids   CXR with vascular congestion  Mucinex /Symbicort/ Duo-neb and albuterol neb       Chronic back pain  Recurrent urinary tract infections  Mood disorder  Essential hypertension  Kidney disease stage III  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

## 2024-01-25 NOTE — PLAN OF CARE
Goal Outcome Evaluation:              Outcome Evaluation: Patient slept well during the night.  Requested pain meds see MAR. Patient O2 dropped and was found to be on 1liters.  O2 bumped to 3L NC.  PT resting and stable at this time.

## 2024-01-25 NOTE — PROGRESS NOTES
RT attempted to complete walk. Unable to complete assessment due to patient requiring such a high level of 02 while walking. On room air patient spo2 was 84-85%, patient required 3L while sitting to recover to 88%, while walking around patient required 8L to maintain 88%.

## 2024-01-26 LAB
ALBUMIN SERPL-MCNC: 2.5 G/DL (ref 3.5–5.2)
ALBUMIN/GLOB SERPL: 1.1 G/DL
ALP SERPL-CCNC: 43 U/L (ref 39–117)
ALT SERPL W P-5'-P-CCNC: 14 U/L (ref 1–33)
ANION GAP SERPL CALCULATED.3IONS-SCNC: 6 MMOL/L (ref 5–15)
AST SERPL-CCNC: 9 U/L (ref 1–32)
BACTERIA SPEC AEROBE CULT: NORMAL
BACTERIA SPEC AEROBE CULT: NORMAL
BASOPHILS # BLD AUTO: 0 10*3/MM3 (ref 0–0.2)
BASOPHILS NFR BLD AUTO: 0.1 % (ref 0–1.5)
BILIRUB SERPL-MCNC: 0.2 MG/DL (ref 0–1.2)
BUN SERPL-MCNC: 19 MG/DL (ref 8–23)
BUN/CREAT SERPL: 17.3 (ref 7–25)
CALCIUM SPEC-SCNC: 7.7 MG/DL (ref 8.6–10.5)
CHLORIDE SERPL-SCNC: 108 MMOL/L (ref 98–107)
CO2 SERPL-SCNC: 28 MMOL/L (ref 22–29)
CREAT SERPL-MCNC: 1.1 MG/DL (ref 0.57–1)
DEPRECATED RDW RBC AUTO: 49.4 FL (ref 37–54)
EGFRCR SERPLBLD CKD-EPI 2021: 50.9 ML/MIN/1.73
EOSINOPHIL # BLD AUTO: 0 10*3/MM3 (ref 0–0.4)
EOSINOPHIL NFR BLD AUTO: 0 % (ref 0.3–6.2)
ERYTHROCYTE [DISTWIDTH] IN BLOOD BY AUTOMATED COUNT: 13.5 % (ref 12.3–15.4)
GLOBULIN UR ELPH-MCNC: 2.3 GM/DL
GLUCOSE BLDC GLUCOMTR-MCNC: 199 MG/DL (ref 70–105)
GLUCOSE BLDC GLUCOMTR-MCNC: 235 MG/DL (ref 70–105)
GLUCOSE BLDC GLUCOMTR-MCNC: 330 MG/DL (ref 70–105)
GLUCOSE BLDC GLUCOMTR-MCNC: 87 MG/DL (ref 70–105)
GLUCOSE SERPL-MCNC: 98 MG/DL (ref 65–99)
HCT VFR BLD AUTO: 35 % (ref 34–46.6)
HGB BLD-MCNC: 11.6 G/DL (ref 12–15.9)
LYMPHOCYTES # BLD AUTO: 0.8 10*3/MM3 (ref 0.7–3.1)
LYMPHOCYTES NFR BLD AUTO: 14 % (ref 19.6–45.3)
MAGNESIUM SERPL-MCNC: 1.7 MG/DL (ref 1.6–2.4)
MCH RBC QN AUTO: 32.8 PG (ref 26.6–33)
MCHC RBC AUTO-ENTMCNC: 33.2 G/DL (ref 31.5–35.7)
MCV RBC AUTO: 98.5 FL (ref 79–97)
MONOCYTES # BLD AUTO: 0.4 10*3/MM3 (ref 0.1–0.9)
MONOCYTES NFR BLD AUTO: 6.3 % (ref 5–12)
NEUTROPHILS NFR BLD AUTO: 4.5 10*3/MM3 (ref 1.7–7)
NEUTROPHILS NFR BLD AUTO: 79.6 % (ref 42.7–76)
NRBC BLD AUTO-RTO: 0 /100 WBC (ref 0–0.2)
PHOSPHATE SERPL-MCNC: 2.2 MG/DL (ref 2.5–4.5)
PLATELET # BLD AUTO: 142 10*3/MM3 (ref 140–450)
PMV BLD AUTO: 8.8 FL (ref 6–12)
POTASSIUM SERPL-SCNC: 4 MMOL/L (ref 3.5–5.2)
PROT SERPL-MCNC: 4.8 G/DL (ref 6–8.5)
RBC # BLD AUTO: 3.55 10*6/MM3 (ref 3.77–5.28)
SODIUM SERPL-SCNC: 142 MMOL/L (ref 136–145)
WBC NRBC COR # BLD AUTO: 5.7 10*3/MM3 (ref 3.4–10.8)

## 2024-01-26 PROCEDURE — 84100 ASSAY OF PHOSPHORUS: CPT | Performed by: INTERNAL MEDICINE

## 2024-01-26 PROCEDURE — 82948 REAGENT STRIP/BLOOD GLUCOSE: CPT

## 2024-01-26 PROCEDURE — 25010000002 METHYLPREDNISOLONE PER 40 MG

## 2024-01-26 PROCEDURE — 36415 COLL VENOUS BLD VENIPUNCTURE: CPT | Performed by: NURSE PRACTITIONER

## 2024-01-26 PROCEDURE — P9047 ALBUMIN (HUMAN), 25%, 50ML: HCPCS | Performed by: INTERNAL MEDICINE

## 2024-01-26 PROCEDURE — 63710000001 INSULIN GLARGINE PER 5 UNITS: Performed by: NURSE PRACTITIONER

## 2024-01-26 PROCEDURE — 25010000002 MAGNESIUM SULFATE IN D5W 1G/100ML (PREMIX) 1-5 GM/100ML-% SOLUTION: Performed by: INTERNAL MEDICINE

## 2024-01-26 PROCEDURE — 25010000002 ENOXAPARIN PER 10 MG: Performed by: NURSE PRACTITIONER

## 2024-01-26 PROCEDURE — 25010000002 ALBUMIN HUMAN 25% PER 50 ML: Performed by: INTERNAL MEDICINE

## 2024-01-26 PROCEDURE — 25010000002 CALCIUM GLUCONATE 2-0.675 GM/100ML-% SOLUTION: Performed by: INTERNAL MEDICINE

## 2024-01-26 PROCEDURE — 82948 REAGENT STRIP/BLOOD GLUCOSE: CPT | Performed by: INTERNAL MEDICINE

## 2024-01-26 PROCEDURE — 83735 ASSAY OF MAGNESIUM: CPT | Performed by: INTERNAL MEDICINE

## 2024-01-26 PROCEDURE — 63710000001 INSULIN LISPRO (HUMAN) PER 5 UNITS: Performed by: INTERNAL MEDICINE

## 2024-01-26 PROCEDURE — 80053 COMPREHEN METABOLIC PANEL: CPT | Performed by: NURSE PRACTITIONER

## 2024-01-26 PROCEDURE — 85025 COMPLETE CBC W/AUTO DIFF WBC: CPT | Performed by: NURSE PRACTITIONER

## 2024-01-26 RX ORDER — MAGNESIUM SULFATE 1 G/100ML
1 INJECTION INTRAVENOUS EVERY 12 HOURS
Status: COMPLETED | OUTPATIENT
Start: 2024-01-26 | End: 2024-01-27

## 2024-01-26 RX ORDER — ALBUMIN (HUMAN) 12.5 G/50ML
25 SOLUTION INTRAVENOUS EVERY 8 HOURS
Qty: 200 ML | Refills: 0 | Status: COMPLETED | OUTPATIENT
Start: 2024-01-26 | End: 2024-01-27

## 2024-01-26 RX ORDER — CALCIUM GLUCONATE 20 MG/ML
2000 INJECTION, SOLUTION INTRAVENOUS EVERY 12 HOURS
Status: COMPLETED | OUTPATIENT
Start: 2024-01-26 | End: 2024-01-27

## 2024-01-26 RX ADMIN — MAGNESIUM SULFATE IN DEXTROSE 1 G: 10 INJECTION, SOLUTION INTRAVENOUS at 21:55

## 2024-01-26 RX ADMIN — GABAPENTIN 100 MG: 100 CAPSULE ORAL at 08:07

## 2024-01-26 RX ADMIN — LINAGLIPTIN 5 MG: 5 TABLET, FILM COATED ORAL at 08:07

## 2024-01-26 RX ADMIN — AMLODIPINE BESYLATE 10 MG: 5 TABLET ORAL at 08:07

## 2024-01-26 RX ADMIN — DOCUSATE SODIUM AND SENNOSIDES 2 TABLET: 8.6; 5 TABLET, FILM COATED ORAL at 08:07

## 2024-01-26 RX ADMIN — GUAIFENESIN 600 MG: 600 TABLET, MULTILAYER, EXTENDED RELEASE ORAL at 08:07

## 2024-01-26 RX ADMIN — GABAPENTIN 100 MG: 100 CAPSULE ORAL at 15:34

## 2024-01-26 RX ADMIN — CALCIUM GLUCONATE 2000 MG: 20 INJECTION, SOLUTION INTRAVENOUS at 23:13

## 2024-01-26 RX ADMIN — GUAIFENESIN 600 MG: 600 TABLET, MULTILAYER, EXTENDED RELEASE ORAL at 22:04

## 2024-01-26 RX ADMIN — Medication 10 ML: at 22:06

## 2024-01-26 RX ADMIN — INSULIN LISPRO 2 UNITS: 100 INJECTION, SOLUTION INTRAVENOUS; SUBCUTANEOUS at 11:45

## 2024-01-26 RX ADMIN — CARVEDILOL 12.5 MG: 6.25 TABLET, FILM COATED ORAL at 17:15

## 2024-01-26 RX ADMIN — AMOXICILLIN AND CLAVULANATE POTASSIUM 1 TABLET: 875; 125 TABLET, FILM COATED ORAL at 22:04

## 2024-01-26 RX ADMIN — HYDRALAZINE HYDROCHLORIDE 25 MG: 25 TABLET ORAL at 22:04

## 2024-01-26 RX ADMIN — CARVEDILOL 12.5 MG: 6.25 TABLET, FILM COATED ORAL at 08:07

## 2024-01-26 RX ADMIN — MAGNESIUM SULFATE IN DEXTROSE 1 G: 10 INJECTION, SOLUTION INTRAVENOUS at 08:08

## 2024-01-26 RX ADMIN — SERTRALINE HYDROCHLORIDE 50 MG: 50 TABLET ORAL at 22:04

## 2024-01-26 RX ADMIN — INSULIN GLARGINE 10 UNITS: 100 INJECTION, SOLUTION SUBCUTANEOUS at 22:03

## 2024-01-26 RX ADMIN — TERAZOSIN HYDROCHLORIDE 2 MG: 2 CAPSULE ORAL at 22:04

## 2024-01-26 RX ADMIN — ALBUMIN (HUMAN) 25 G: 0.25 INJECTION, SOLUTION INTRAVENOUS at 15:34

## 2024-01-26 RX ADMIN — METHOCARBAMOL 500 MG: 500 TABLET ORAL at 22:04

## 2024-01-26 RX ADMIN — ENOXAPARIN SODIUM 40 MG: 100 INJECTION SUBCUTANEOUS at 15:34

## 2024-01-26 RX ADMIN — METHYLPREDNISOLONE SODIUM SUCCINATE 40 MG: 40 INJECTION, POWDER, FOR SOLUTION INTRAMUSCULAR; INTRAVENOUS at 08:07

## 2024-01-26 RX ADMIN — INSULIN LISPRO 5 UNITS: 100 INJECTION, SOLUTION INTRAVENOUS; SUBCUTANEOUS at 17:15

## 2024-01-26 RX ADMIN — AMOXICILLIN AND CLAVULANATE POTASSIUM 1 TABLET: 875; 125 TABLET, FILM COATED ORAL at 08:07

## 2024-01-26 RX ADMIN — METHOCARBAMOL 500 MG: 500 TABLET ORAL at 08:07

## 2024-01-26 RX ADMIN — POLYETHYLENE GLYCOL 3350 17 G: 17 POWDER, FOR SOLUTION ORAL at 08:07

## 2024-01-26 RX ADMIN — Medication 400 MG: at 08:07

## 2024-01-26 RX ADMIN — PANTOPRAZOLE SODIUM 40 MG: 40 TABLET, DELAYED RELEASE ORAL at 06:00

## 2024-01-26 RX ADMIN — METHOCARBAMOL 500 MG: 500 TABLET ORAL at 11:45

## 2024-01-26 RX ADMIN — CALCIUM GLUCONATE 2000 MG: 20 INJECTION, SOLUTION INTRAVENOUS at 08:08

## 2024-01-26 RX ADMIN — Medication 10 ML: at 08:13

## 2024-01-26 RX ADMIN — METHOCARBAMOL 500 MG: 500 TABLET ORAL at 17:15

## 2024-01-26 RX ADMIN — ASPIRIN 81 MG: 81 TABLET, COATED ORAL at 08:06

## 2024-01-26 RX ADMIN — Medication 1000 MG: at 09:21

## 2024-01-26 RX ADMIN — GABAPENTIN 100 MG: 100 CAPSULE ORAL at 22:04

## 2024-01-26 RX ADMIN — HYDRALAZINE HYDROCHLORIDE 25 MG: 25 TABLET ORAL at 08:07

## 2024-01-26 NOTE — PROGRESS NOTES
LOS: 3 days   Patient Care Team:  Anyn Garcia MD as PCP - General (Internal Medicine)  Sergio Ordonez MD as Consulting Physician (Nephrology)    Subjective     Interval History: Less short of breath after diuresis yesterday    Patient Complaints: Slowly improving, still generally weak    History taken from: patient    Review of Systems   Constitutional:  Positive for activity change, appetite change and fatigue. Negative for chills, diaphoresis and fever.   HENT:  Negative for facial swelling.    Eyes:  Negative for visual disturbance.   Respiratory:  Positive for cough and wheezing.    Cardiovascular:  Negative for chest pain, palpitations and leg swelling.   Gastrointestinal:  Negative for abdominal pain.   Genitourinary:  Negative for dysuria.   Musculoskeletal:  Negative for arthralgias, back pain and gait problem.   Skin:  Negative for rash and wound.   Neurological:  Negative for dizziness, light-headedness and headaches.   Psychiatric/Behavioral:  Negative for confusion.            Objective     Vital Signs  Temp:  [96.9 °F (36.1 °C)-97.6 °F (36.4 °C)] 97.6 °F (36.4 °C)  Heart Rate:  [68-77] 68  Resp:  [20-22] 20  BP: (120-163)/(67-79) 138/69    Physical Exam:     General Appearance:    Alert, cooperative, in no acute distress, chronically ill-appearing   Head:    Normocephalic, without obvious abnormality, atraumatic   Eyes:            Lids and lashes normal, conjunctivae and sclerae normal, no   icterus, no pallor, corneas clear, PERRLA   Ears:    Ears appear intact with no abnormalities noted   Throat:   No oral lesions, no thrush, oral mucosa moist   Neck:   No adenopathy, supple, trachea midline, no thyromegaly, no   carotid bruit, no JVD   Lungs:   Improved aeration, mild scattered wheezing    Heart:    Regular rhythm and normal rate, normal S1 and S2, no            murmur, no gallop, no rub, no click   Chest Wall:    No abnormalities observed   Abdomen:     Normal bowel sounds, no  masses, no organomegaly, soft        Non-tender non-distended, no guarding,   Extremities:   Moves all extremities well, no edema, no cyanosis, no             Redness   Pulses:   Pulses palpable and equal bilaterally   Skin:   No bleeding, bruising or rash   Lymph nodes:   No palpable adenopathy   Neurologic:   Cranial nerves 2 - 12 grossly intact, sensation intact, DTR       present and equal bilaterally        Results Review:    Lab Results (last 24 hours)       Procedure Component Value Units Date/Time    POC Glucose TID AC [[id number]]  (Abnormal) Collected: 01/26/24 1132    Specimen: Blood Updated: 01/26/24 1134     Glucose 199 mg/dL      Comment: Serial Number: [id number]:  [id number]       POC Glucose TID AC [[id number]]  (Normal) Collected: 01/26/24 0742    Specimen: Blood Updated: 01/26/24 0743     Glucose 87 mg/dL      Comment: Serial Number: [id number]:  [id number]       Phosphorus [[id number]]  (Abnormal) Collected: 01/26/24 0507    Specimen: Blood Updated: 01/26/24 0559     Phosphorus 2.2 mg/dL     Comprehensive Metabolic Panel [[id number]]  (Abnormal) Collected: 01/26/24 0507    Specimen: Blood Updated: 01/26/24 0553     Glucose 98 mg/dL      BUN 19 mg/dL      Creatinine 1.10 mg/dL      Sodium 142 mmol/L      Potassium 4.0 mmol/L      Chloride 108 mmol/L      CO2 28.0 mmol/L      Calcium 7.7 mg/dL      Total Protein 4.8 g/dL      Albumin 2.5 g/dL      ALT (SGPT) 14 U/L      AST (SGOT) 9 U/L      Alkaline Phosphatase 43 U/L      Total Bilirubin 0.2 mg/dL      Globulin 2.3 gm/dL      A/G Ratio 1.1 g/dL      BUN/Creatinine Ratio 17.3     Anion Gap 6.0 mmol/L      eGFR 50.9 mL/min/1.73     Narrative:      GFR Normal >60  Chronic Kidney Disease <60  Kidney Failure <15    The GFR formula is only valid for adults with stable renal function between ages 18 and 70.    Magnesium [[id number]]  (Normal) Collected: 01/26/24 0507    Specimen: Blood Updated: 01/26/24 0553     Magnesium 1.7 mg/dL     CBC &  Differential [[id number]]  (Abnormal) Collected: 01/26/24 0507    Specimen: Blood Updated: 01/26/24 0537    Narrative:      The following orders were created for panel order CBC & Differential.  Procedure                               Abnormality         Status                     ---------                               -----------         ------                     CBC Auto Differential[[id number]]        Abnormal            Final result                 Please view results for these tests on the individual orders.    CBC Auto Differential [[id number]]  (Abnormal) Collected: 01/26/24 0507    Specimen: Blood Updated: 01/26/24 0537     WBC 5.70 10*3/mm3      RBC 3.55 10*6/mm3      Hemoglobin 11.6 g/dL      Hematocrit 35.0 %      MCV 98.5 fL      MCH 32.8 pg      MCHC 33.2 g/dL      RDW 13.5 %      RDW-SD 49.4 fl      MPV 8.8 fL      Platelets 142 10*3/mm3      Neutrophil % 79.6 %      Lymphocyte % 14.0 %      Monocyte % 6.3 %      Eosinophil % 0.0 %      Basophil % 0.1 %      Neutrophils, Absolute 4.50 10*3/mm3      Lymphocytes, Absolute 0.80 10*3/mm3      Monocytes, Absolute 0.40 10*3/mm3      Eosinophils, Absolute 0.00 10*3/mm3      Basophils, Absolute 0.00 10*3/mm3      nRBC 0.0 /100 WBC     POC Glucose Once [[id number]]  (Abnormal) Collected: 01/26/24 0022    Specimen: Blood Updated: 01/26/24 0025     Glucose 235 mg/dL      Comment: Serial Number: [id number]:  [id number]       POC Glucose Once [[id number]]  (Abnormal) Collected: 01/25/24 2041    Specimen: Blood Updated: 01/25/24 2043     Glucose 387 mg/dL      Comment: Serial Number: [id number]:  [id number]       POC Glucose Once [[id number]]  (Abnormal) Collected: 01/25/24 1755    Specimen: Blood Updated: 01/25/24 1758     Glucose 390 mg/dL      Comment: Serial Number: [id number]:  [id number]       Blood Culture - Blood, Arm, Left [[id number]]  (Normal) Collected: 01/21/24 1725    Specimen: Blood from Arm, Left Updated: 01/25/24 1732     Blood Culture  No growth at 4 days    Narrative:      Less than seven (7) mL's of blood was collected.  Insufficient quantity may yield false negative results.    Blood Culture - Blood, Hand, Right [[id number]]  (Normal) Collected: 01/21/24 1725    Specimen: Blood from Hand, Right Updated: 01/25/24 1732     Blood Culture No growth at 4 days             Imaging Results (Last 24 Hours)       ** No results found for the last 24 hours. **                 I reviewed the patient's new clinical results.    Medication Review:   Scheduled Meds:albumin human, 25 g, Intravenous, Q8H  albuterol sulfate HFA, 2 puff, Inhalation, 4x Daily - RT  amLODIPine, 10 mg, Oral, Daily  amoxicillin-clavulanate, 1 tablet, Oral, Q12H  aspirin, 81 mg, Oral, Daily  budesonide-formoterol, 2 puff, Inhalation, BID - RT  calcium gluconate, 2,000 mg, Intravenous, Q12H  carvedilol, 12.5 mg, Oral, BID With Meals  enoxaparin, 40 mg, Subcutaneous, Q24H  gabapentin, 100 mg, Oral, TID  guaiFENesin, 600 mg, Oral, Q12H  hydrALAZINE, 25 mg, Oral, BID  insulin glargine, 10 Units, Subcutaneous, Nightly  insulin lispro, 2-7 Units, Subcutaneous, TID With Meals  linagliptin, 5 mg, Oral, Daily  magnesium oxide, 400 mg, Oral, Daily  magnesium sulfate, 1 g, Intravenous, Q12H  methocarbamol, 500 mg, Oral, 4x Daily  methylPREDNISolone sodium succinate, 40 mg, Intravenous, Daily  pantoprazole, 40 mg, Oral, Q AM  polyethylene glycol, 17 g, Oral, Daily  senna-docusate sodium, 2 tablet, Oral, BID  [Held by provider] sertraline, 50 mg, Oral, Daily  sodium chloride, 10 mL, Intravenous, Q12H  terazosin, 2 mg, Oral, Nightly      Continuous Infusions:Pharmacy to Dose enoxaparin (LOVENOX),       PRN Meds:.  albuterol    senna-docusate sodium **AND** polyethylene glycol **AND** bisacodyl **AND** bisacodyl    Calcium Replacement - Follow Nurse / BPA Driven Protocol    dextrose    dextrose    glucagon (human recombinant)    HYDROcodone-acetaminophen    Magnesium Standard Dose Replacement - Follow  Nurse / BPA Driven Protocol    nitroglycerin    ondansetron    Pharmacy to Dose enoxaparin (LOVENOX)    Phosphorus Replacement - Follow Nurse / BPA Driven Protocol    Potassium Replacement - Follow Nurse / BPA Driven Protocol    sodium chloride    sodium chloride    sodium chloride    traMADol     Assessment & Plan       Pneumonia of right middle lobe due to infectious organism    Generalized weakness    Dyspnea    Pneumonia  COPD - improving with steroids, bronchodilators  Acute on chronic hypoxemia-will need to wear oxygen continuously at home  Mood disorder-Zoloft  Hypocalcemia-replacing  Type 2 diabetes with complication nephropathy  Hypomagnesemia - replacing  Chronic kidney disease stage III-creatinine is stable  Essential hypertension-terazosin, amlodipine, hydralazine, carvedilol  Chronic back pain-gabapentin    Plan for disposition: To be determined; family is concerned that she will not be able to care for herself at home.  Asking physical therapy to re-evaluate.    Anny Garcia MD  01/26/24  16:22 EST

## 2024-01-26 NOTE — PLAN OF CARE
Goal Outcome Evaluation:  Plan of Care Reviewed With: patient        Progress: no change  Outcome Evaluation: Pt has slept on and off tonight, no new complanits at this time, pt reminded to turn throughout the night, remains on 3L NC with O2 sats in mid to low 90s, will d/c home when appropriate, pulm to see in am

## 2024-01-26 NOTE — CASE MANAGEMENT/SOCIAL WORK
Continued Stay Note  PRAKASH Tillman     Patient Name: Vianey Reeves  MRN: [id number]  Today's Date: 1/26/2024    Admit Date: 1/21/2024    Plan: Return home with [facility] (accepted, will need order). Current home O2 5L w/ [place].   Discharge Plan       Row Name 01/26/24 1618       Plan    Plan Return home with [facility] (accepted, will need order). Current home O2 5L w/ [place].    Patient/Family in Agreement with Plan yes    Plan Comments CM received update from Hugh Payan that nebulizer delivered to pt at bedside 1/25. Pulm consulted, IV steroids, nebs. Pt will need home health order at discharge.             Megan Naegele, RN     Office Phone: [phone]  Office Cell: [phone]

## 2024-01-26 NOTE — PROGRESS NOTES
Nutrition Services    Patient Name: Vianey Reeves  YOB: 1945  MRN: [id number]  Admission date: 1/21/2024    PROGRESS NOTE      Encounter Information: Checking on PO intake. Pt's PO intake has increased to 75% since initial assessment. (62.5% PO intake 1/24)       PO Diet: Diet: Cardiac Diets, Diabetic Diets; Healthy Heart (2-3 Na+); Consistent Carbohydrate; Texture: Regular Texture (IDDSI 7); Fluid Consistency: Thin (IDDSI 0)   PO Supplements: No supplement ordered   PO Intake:  75% PO intake since 1/23        Current nutrition support:    Nutrition support review:        Labs (reviewed below): Hypophosphatemia - replaced       GI Function:  Last documented BM 1/22 - scheduled pericolace in place, multiple doses declined. PRN bowel regimen available.       Nutrition Intervention Updates: Encourage continued good PO intake.     RD will continue to monitor.       Results from last 7 days   Lab Units 01/26/24  0507 01/25/24  0249 01/24/24  1203   SODIUM mmol/L 142 140 140   POTASSIUM mmol/L 4.0 4.2 4.4   CHLORIDE mmol/L 108* 107 107   CO2 mmol/L 28.0 26.0 28.0   BUN mg/dL 19 22 25*   CREATININE mg/dL 1.10* 1.14* 1.09*   CALCIUM mg/dL 7.7* 7.9* 8.3*   BILIRUBIN mg/dL 0.2 <0.2 0.2   ALK PHOS U/L 43 48 51   ALT (SGPT) U/L 14 14 16   AST (SGOT) U/L 9 13 15   GLUCOSE mg/dL 98 156* 127*     Results from last 7 days   Lab Units 01/26/24  0507 01/25/24  0249 01/24/24  1203   MAGNESIUM mg/dL 1.7 1.8 1.8   PHOSPHORUS mg/dL 2.2* 3.1 2.1*   HEMOGLOBIN g/dL 11.6* 11.3* 12.4   HEMATOCRIT % 35.0 34.3 37.0     COVID19   Date Value Ref Range Status   01/22/2024 Detected (C) Not Detected - Ref. Range Final     Lab Results   Component Value Date    HGBA1C 7.10 (H) 01/21/2024       RD to follow up per protocol.    Electronically signed by:  Marry Will  01/26/24 09:09 EST

## 2024-01-26 NOTE — CONSULTS
[facility]: [facility]         CONSULT NOTE    Patient Identification:  Vianey Reeves  78 y.o.  female  1945  [id number]            Requesting physician: Attending physician    Reason for Consultation: Hypoxemia      History of Present Illness:  78-year-old female with history of COPD, CKD and hypertension who was diagnosed with COVID 1 week prior to admission and presented back 1/21/2024 with increased shortness of breath.  Chest x-ray showed new right middle lobe pneumonia.  She is currently afebrile and hemodynamically stable, oxygenating 92% on 3 L of oxygen.  WBCs 5.7, hemoglobin 11.6, respiratory panel 1/22/2024 positive for COVID-19.  Chest x-ray shows vascular congestion and decreased infiltrate on the right side.    Assessment:  Pneumonia of right middle lobe due to infectious organism  Hypoxemia  Recent COVID-19 infection 1/14/2024  Organizing pneumonia post COVID-19 infection  COPD  CKD  HTN  Diabetes mellitus      Recommendations:  Respiratory status is gradually improving  Antibiotics: Currently on Augmentin to finish 1/27/2024  Steroids: Currently on Solu-Medrol 40 mg daily, patient will need to stay on prednisone upon discharge until hypoxia resolves for residual organizing pneumonia post-COVID  Oxygen supplement and titration to maintain saturation 90 to 95%: Currently requiring 3 L per nasal cannula  Bronchodilators  Symbicort  Mucinex  Diuresis  BP control  Glucose control  Aspirin  DVT/GI prophylaxis  Check daily labs and correct electrolytes as needed  I personally reviewed the radiological studies      Review of Sytems:  Constitutional: Negative for chills, and fever and positive for malaise/fatigue.   HENT: Negative.    Eyes: Negative.    Cardiovascular: Negative.    Respiratory: Positive for cough and shortness of breath.    Skin: Negative.    Musculoskeletal: Negative.    Gastrointestinal: Negative.    Genitourinary: Negative.    Neurological: Negative.     Psychiatric/Behavioral: Negative.    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    Hypertension     IBS (irritable colon syndrome)     constipation    Mass of right breast     Neuropathy     Renal insufficiency     Sciatic leg pain     right    Simple chronic bronchitis     Solitary kidney     left       Past Surgical History:  Past Surgical History:   Procedure Laterality Date    BREAST BIOPSY Right     CARDIAC CATHETERIZATION      CHOLECYSTECTOMY      COLON SURGERY      REMOVAL diverticular diseae    COLONOSCOPY N/A 08/12/2021    Procedure: COLONOSCOPY with polypectomy x 3;  Surgeon: Margarette Mcallister MD;  Location: [facility] ENDOSCOPY;  Service: Gastroenterology;  Laterality: N/A;  post op: hmorrhoids, diverticulosis, polyps    CORONARY STENT PLACEMENT      ENDOSCOPY N/A 08/12/2021    Procedure: ESOPHAGOGASTRODUODENOSCOPY with dilatation (18-20 mm balloon) and (54 bougie);  Surgeon: Margarette Mcallister MD;  Location: [facility] ENDOSCOPY;  Service: Gastroenterology;  Laterality: N/A;  post op: esophageal stricture, esophagitis, gastritis, history of nissen    ENDOSCOPY N/A 9/13/2023    Procedure: ESOPHAGOGASTRODUODENOSCOPY with biopsy x 1 area and dilation (50,54,56 non guided bougie);  Surgeon: Margarette Mcallistre MD;  Location: [facility] ENDOSCOPY;  Service: Gastroenterology;  Laterality: N/A;  post op: esophageal stricture    EYE SURGERY Bilateral     cataracts     HIATAL HERNIA REPAIR      NEPHRECTOMY Right     cancer    UPPER ENDOSCOPIC ULTRASOUND W/ FNA N/A 09/22/2022    Procedure: EUS;  Surgeon: Margarette Mcallister MD;  Location: [facility] ENDOSCOPY;  Service: Gastroenterology;  Laterality: N/A;  post: normal pancreas, dilated pancreatic duct, hiatal hernia,         Home Meds:  Medications Prior to Admission   Medication Sig Dispense Refill Last Dose    Abaloparatide (Tymlos) 3120 MCG/1.56ML solution pen-injector Inject  under the skin into the appropriate area as directed Daily.       amLODIPine (NORVASC) 10 MG tablet Take 1 tablet by mouth Daily.       aspirin 81 MG tablet Take 1 tablet by mouth Every Night.       carvedilol (COREG) 12.5 MG tablet Take 1 tablet by mouth 2 (Two) Times a Day With Meals.       cyanocobalamin 1000 MCG/ML injection Inject 1 mL into the appropriate muscle as directed by prescriber Every 28 (Twenty-Eight) Days.   Past Month    Diclofenac Sodium (VOLTAREN) 1 % gel gel Apply 4 g topically to the appropriate area as directed 3 (Three) Times a Day As Needed (pain).       doxazosin (CARDURA) 2 MG tablet Take 1 tablet by mouth Every Night.       estradiol (ESTRACE) 0.1 MG/GM vaginal cream Insert 1 applicator into the vagina 3 (Three) Times a Week. Monday, Wednesday and Friday       Evolocumab (REPATHA) solution auto-injector SureClick injection Inject 1 mL under the skin into the appropriate area as directed Every 14 (Fourteen) Days.   Past Month    Finerenone 10 MG tablet Take 1 tablet by mouth Daily.       gabapentin (NEURONTIN) 100 MG capsule Take 1 capsule by mouth Every Night.       hydrALAZINE (APRESOLINE) 25 MG tablet Take 1 tablet by mouth 2 (Two) Times a Day.       HYDROcodone-acetaminophen (NORCO) 5-325 MG per tablet Take 1 tablet by mouth Every 8 (Eight) Hours As Needed for Moderate Pain. 15 tablet 0     Insulin Glargine (LANTUS SOLOSTAR) 100 UNIT/ML injection pen Inject 10 Units under the skin into the appropriate area as directed Every Night.  100 mL 12     Linzess 145 MCG capsule capsule Take 1 capsule by mouth Daily As Needed.       methocarbamol (ROBAXIN) 500 MG tablet Take 1 tablet by mouth 4 (Four) Times a Day. 30 tablet 0     omeprazole (priLOSEC) 40 MG capsule Take 1 capsule by mouth Every Morning. 30 capsule 0     polyethylene glycol (MIRALAX) 17 g packet Take 17 g by mouth Daily.       sertraline (ZOLOFT) 50 MG tablet Take 1 tablet by mouth Daily.       SITagliptin (JANUVIA) 100 MG tablet Take 1 tablet by mouth Daily.       terbinafine (lamiSIL) 250 MG tablet Take 1 tablet by mouth Daily.       traMADol (ULTRAM) 50 MG tablet Take 2 tablets by mouth 2 (Two) Times a Day As Needed.       albuterol (PROVENTIL) (2.5 MG/3ML) 0.083% nebulizer solution Take 2.5 mg by nebulization Every 4 (Four) Hours As Needed for Wheezing.       ipratropium-albuterol (DUO-NEB) 0.5-2.5 mg/3 ml nebulizer Take 3 mL by nebulization 4 (Four) Times a Day. 360 mL 0        Allergies:  Allergies   Allergen Reactions    Erythromycin Rash    Naproxen Sodium Other (See Comments)     Dizzy lightheaded    Statins Myalgia    Latex Itching and Swelling    Metoclopramide Other (See Comments)     Unsteady on feet    Rocephin [Ceftriaxone] Itching and Nausea Only    Dicyclomine Unknown - Low Severity       Social History:   Social History     Socioeconomic History    Marital status:    Tobacco Use    Smoking status: Some Days     Packs/day: 0.25     Years: 50.00     Additional pack years: 0.00     Total pack years: 12.50     Types: Cigarettes    Smokeless tobacco: Never    Tobacco comments:     Mostly patches, some days none, some days 1-2   Vaping Use    Vaping Use: Never used   Substance and Sexual Activity    Alcohol use: Yes     Comment: occasionally     Drug use: Never    Sexual activity: Defer       Family History:  Family History   Problem Relation Age of Onset    Arthritis Father     Prostate cancer Father     Heart disease Sister        Physical Exam:  /74 (BP  "Location: Left arm, Patient Position: Lying)   Pulse 75   Temp 96.9 °F (36.1 °C) (Oral)   Resp 22   Ht 165.1 cm (65\")   Wt 87.8 kg (193 lb 9 oz)   LMP  (LMP Unknown)   SpO2 92%   BMI 32.21 kg/m²  Body mass index is 32.21 kg/m². 92% 87.8 kg (193 lb 9 oz)  General Appearance:  Alert   HEENT:  Normocephalic, without obvious abnormality, Conjunctiva/corneas clear,.   Nares normal, no drainage     Neck:  Supple, symmetrical, trachea midline. No JVD.  Lungs /Chest wall:   Bilateral basal rhonchi, respirations unlabored, symmetrical wall movement.     Heart:  Regular rate and rhythm, S1 S2 normal  Abdomen: Soft, non-tender, no masses, no organomegaly.    Extremities: No edema, no clubbing or cyanosis    LABS:  Lab Results   Component Value Date    CALCIUM 7.7 (L) 01/26/2024    PHOS 2.2 (L) 01/26/2024     Results from last 7 days   Lab Units 01/26/24  0507 01/25/24  0249 01/24/24  1203 01/22/24  0453 01/21/24  1556   MAGNESIUM mg/dL 1.7 1.8 1.8   < >  --    SODIUM mmol/L 142 140 140   < > 134*   POTASSIUM mmol/L 4.0 4.2 4.4   < > 4.5   CHLORIDE mmol/L 108* 107 107   < > 102   CO2 mmol/L 28.0 26.0 28.0   < > 23.0   BUN mg/dL 19 22 25*   < > 22   CREATININE mg/dL 1.10* 1.14* 1.09*   < > 1.21*   GLUCOSE mg/dL 98 156* 127*   < > 119*   CALCIUM mg/dL 7.7* 7.9* 8.3*   < > 8.1*   WBC 10*3/mm3 5.70 5.20 7.30   < > 4.40   HEMOGLOBIN g/dL 11.6* 11.3* 12.4   < > 13.6   PLATELETS 10*3/mm3 142 146 156   < > 153   ALT (SGPT) U/L 14 14 16   < > 18   AST (SGOT) U/L 9 13 15   < > 19   PROBNP pg/mL  --   --   --   --  395.8   PROCALCITONIN ng/mL  --   --   --   --  0.09    < > = values in this interval not displayed.     Lab Results   Component Value Date    CKTOTAL 29 01/15/2024    TROPONINI 0.015 06/01/2021    TROPONINT 59 (C) 01/21/2024     Results from last 7 days   Lab Units 01/21/24  1804 01/21/24  1556   HSTROP T ng/L 59* 61*     Results from last 7 days   Lab Units 01/23/24 2050 01/21/24  1725   BLOODCX   --  No growth at 4 " days  No growth at 4 days   RESPCX  Rejected  --      Results from last 7 days   Lab Units 01/21/24  1556   PROCALCITONIN ng/mL 0.09     Results from last 7 days   Lab Units 01/21/24  1757   PH, ARTERIAL pH units 7.408   PCO2, ARTERIAL mm Hg 36.7   PO2 ART mm Hg 68.7*   O2 SATURATION ART % 93.7*   MODALITY  Cannula     Results from last 7 days   Lab Units 01/22/24  1425   ADENOVIRUS DETECTION BY PCR  Not Detected   CORONAVIRUS 229E  Not Detected   CORONAVIRUS HKU1  Not Detected   CORONAVIRUS NL63  Not Detected   CORONAVIRUS OC43  Not Detected   HUMAN METAPNEUMOVIRUS  Not Detected   HUMAN RHINOVIRUS/ENTEROVIRUS  Not Detected   INFLUENZA B PCR  Not Detected   PARAINFLUENZA 1  Not Detected   PARAINFLUENZA VIRUS 2  Not Detected   PARAINFLUENZA VIRUS 3  Not Detected   PARAINFLUENZA VIRUS 4  Not Detected   BORDETELLA PERTUSSIS PCR  Not Detected   CHLAMYDOPHILA PNEUMONIAE PCR  Not Detected   MYCOPLAMA PNEUMO PCR  Not Detected   INFLUENZA A PCR  Not Detected   RSV, PCR  Not Detected         Results from last 7 days   Lab Units 01/23/24 2050 01/21/24  1725   BLOODCX   --  No growth at 4 days  No growth at 4 days   RESPCX  Rejected  --      Lab Results   Component Value Date    TSH 0.261 (L) 01/15/2024     Estimated Creatinine Clearance: 44.6 mL/min (A) (by C-G formula based on SCr of 1.1 mg/dL (H)).         Imaging:  Imaging Results (Last 24 Hours)       ** No results found for the last 24 hours. **              Current Meds:   SCHEDULE  albuterol sulfate HFA, 2 puff, Inhalation, 4x Daily - RT  amLODIPine, 10 mg, Oral, Daily  amoxicillin-clavulanate, 1 tablet, Oral, Q12H  aspirin, 81 mg, Oral, Daily  budesonide-formoterol, 2 puff, Inhalation, BID - RT  calcium gluconate, 2,000 mg, Intravenous, Q12H  carvedilol, 12.5 mg, Oral, BID With Meals  enoxaparin, 40 mg, Subcutaneous, Q24H  gabapentin, 100 mg, Oral, TID  guaiFENesin, 600 mg, Oral, Q12H  hydrALAZINE, 25 mg, Oral, BID  insulin glargine, 10 Units, Subcutaneous,  Nightly  insulin lispro, 2-7 Units, Subcutaneous, TID With Meals  linagliptin, 5 mg, Oral, Daily  magnesium oxide, 400 mg, Oral, Daily  magnesium sulfate, 1 g, Intravenous, Q12H  methocarbamol, 500 mg, Oral, 4x Daily  methylPREDNISolone sodium succinate, 40 mg, Intravenous, Daily  pantoprazole, 40 mg, Oral, Q AM  polyethylene glycol, 17 g, Oral, Daily  senna-docusate sodium, 2 tablet, Oral, BID  [Held by provider] sertraline, 50 mg, Oral, Daily  sodium chloride, 10 mL, Intravenous, Q12H  terazosin, 2 mg, Oral, Nightly      Infusions  Pharmacy to Dose enoxaparin (LOVENOX),       PRNs    albuterol    senna-docusate sodium **AND** polyethylene glycol **AND** bisacodyl **AND** bisacodyl    Calcium Replacement - Follow Nurse / BPA Driven Protocol    dextrose    dextrose    glucagon (human recombinant)    HYDROcodone-acetaminophen    Magnesium Standard Dose Replacement - Follow Nurse / BPA Driven Protocol    nitroglycerin    ondansetron    Pharmacy to Dose enoxaparin (LOVENOX)    Phosphorus Replacement - Follow Nurse / BPA Driven Protocol    Potassium Replacement - Follow Nurse / BPA Driven Protocol    sodium chloride    sodium chloride    sodium chloride    traMADol        Marlin Ferguson MD  1/26/2024  09:45 EST      Much of this encounter note is an electronic transcription/translation of spoken language to printed text using Dragon Software.

## 2024-01-26 NOTE — PLAN OF CARE
Goal Outcome Evaluation:  Plan of Care Reviewed With: patient        Progress: no change  Outcome Evaluation: Patient has been resting well throughout the shift without any complaints. On 3L of oxygen. Remains on PO augmentin and IV lasix. Magnesium, Calcium, and Phos have been replaced. Plans to discharge home with VNA  with medically ready. No other issues at this time. Continuing to monitor.

## 2024-01-26 NOTE — PROGRESS NOTES
"NEPHROLOGY PROGRESS NOTE------[facility]    [facility]  [phone]  Sonali Ordonez MD      Patient Care Team:  Anny Garcia MD as PCP - General (Internal Medicine)  Sergio Ordonez MD as Consulting Physician (Nephrology)      Provider:  Sonali Ordonez MD  Patient Name: Vianey Reeves  :  1945    SUBJECTIVE:    F/U CRF/CKD    Feeling and breathing better. No angina. No dysuria. No palpitations. No real edema    Medication:  albuterol sulfate HFA, 2 puff, Inhalation, 4x Daily - RT  amLODIPine, 10 mg, Oral, Daily  amoxicillin-clavulanate, 1 tablet, Oral, Q12H  aspirin, 81 mg, Oral, Daily  budesonide-formoterol, 2 puff, Inhalation, BID - RT  carvedilol, 12.5 mg, Oral, BID With Meals  enoxaparin, 40 mg, Subcutaneous, Q24H  gabapentin, 100 mg, Oral, TID  guaiFENesin, 600 mg, Oral, Q12H  hydrALAZINE, 25 mg, Oral, BID  insulin glargine, 10 Units, Subcutaneous, Nightly  insulin lispro, 2-7 Units, Subcutaneous, TID With Meals  linagliptin, 5 mg, Oral, Daily  methocarbamol, 500 mg, Oral, 4x Daily  methylPREDNISolone sodium succinate, 40 mg, Intravenous, Daily  pantoprazole, 40 mg, Oral, Q AM  polyethylene glycol, 17 g, Oral, Daily  senna-docusate sodium, 2 tablet, Oral, BID  [Held by provider] sertraline, 50 mg, Oral, Daily  sodium chloride, 10 mL, Intravenous, Q12H  terazosin, 2 mg, Oral, Nightly      Pharmacy to Dose enoxaparin (LOVENOX),         OBJECTIVE    Vital Sign Min/Max for last 24 hours  Temp  Min: 97.4 °F (36.3 °C)  Max: 98.8 °F (37.1 °C)   BP  Min: 120/67  Max: 163/79   Pulse  Min: 68  Max: 87   Resp  Min: 16  Max: 22   SpO2  Min: 89 %  Max: 93 %   No data recorded   Weight  Min: 87.8 kg (193 lb 9 oz)  Max: 87.8 kg (193 lb 9 oz)     Flowsheet Rows      Flowsheet Row First Filed Value   Admission Height 165.1 cm (65\") Documented at 2024 1503   Admission Weight 85.3 kg (188 lb) Documented at 2024 1503      " "      No intake/output data recorded.  I/O last 3 completed shifts:  In: 580 [P.O.:580]  Out: -     Physical Exam:  General Appearance: alert, appears stated age and cooperative  Head: normocephalic, without obvious abnormality and atraumatic  Eyes: conjunctivae and sclerae normal and no icterus  Neck: supple and no JVD  Lungs: +RIGHT  RALES  Heart: regular rhythm & normal rate and normal S1, S2 +AMINA  Chest Wall: no abnormalities observed  Abdomen: normal bowel sounds and soft, nontender  Extremities: moves extremities well, no edema, no cyanosis +DJD  Skin: no bleeding, bruising or rash  Neurologic: Alert, and oriented. No focal deficits    Labs:    WBC WBC   Date Value Ref Range Status   01/26/2024 5.70 3.40 - 10.80 10*3/mm3 Final   01/25/2024 5.20 3.40 - 10.80 10*3/mm3 Final   01/24/2024 7.30 3.40 - 10.80 10*3/mm3 Final      HGB Hemoglobin   Date Value Ref Range Status   01/26/2024 11.6 (L) 12.0 - 15.9 g/dL Final   01/25/2024 11.3 (L) 12.0 - 15.9 g/dL Final   01/24/2024 12.4 12.0 - 15.9 g/dL Final      HCT Hematocrit   Date Value Ref Range Status   01/26/2024 35.0 34.0 - 46.6 % Final   01/25/2024 34.3 34.0 - 46.6 % Final   01/24/2024 37.0 34.0 - 46.6 % Final      Platelets No results found for: \"LABPLAT\"   MCV MCV   Date Value Ref Range Status   01/26/2024 98.5 (H) 79.0 - 97.0 fL Final   01/25/2024 97.9 (H) 79.0 - 97.0 fL Final   01/24/2024 98.1 (H) 79.0 - 97.0 fL Final          Sodium Sodium   Date Value Ref Range Status   01/26/2024 142 136 - 145 mmol/L Final   01/25/2024 140 136 - 145 mmol/L Final   01/24/2024 140 136 - 145 mmol/L Final      Potassium Potassium   Date Value Ref Range Status   01/26/2024 4.0 3.5 - 5.2 mmol/L Final   01/25/2024 4.2 3.5 - 5.2 mmol/L Final   01/24/2024 4.4 3.5 - 5.2 mmol/L Final      Chloride Chloride   Date Value Ref Range Status   01/26/2024 108 (H) 98 - 107 mmol/L Final   01/25/2024 107 98 - 107 mmol/L Final   01/24/2024 107 98 - 107 mmol/L Final      CO2 CO2   Date Value Ref " "Range Status   01/26/2024 28.0 22.0 - 29.0 mmol/L Final   01/25/2024 26.0 22.0 - 29.0 mmol/L Final   01/24/2024 28.0 22.0 - 29.0 mmol/L Final      BUN BUN   Date Value Ref Range Status   01/26/2024 19 8 - 23 mg/dL Final   01/25/2024 22 8 - 23 mg/dL Final   01/24/2024 25 (H) 8 - 23 mg/dL Final      Creatinine Creatinine   Date Value Ref Range Status   01/26/2024 1.10 (H) 0.57 - 1.00 mg/dL Final   01/25/2024 1.14 (H) 0.57 - 1.00 mg/dL Final   01/24/2024 1.09 (H) 0.57 - 1.00 mg/dL Final      Calcium Calcium   Date Value Ref Range Status   01/26/2024 7.7 (L) 8.6 - 10.5 mg/dL Final   01/25/2024 7.9 (L) 8.6 - 10.5 mg/dL Final   01/24/2024 8.3 (L) 8.6 - 10.5 mg/dL Final      PO4 No components found for: \"PO4\"   Albumin Albumin   Date Value Ref Range Status   01/26/2024 2.5 (L) 3.5 - 5.2 g/dL Final   01/25/2024 2.5 (L) 3.5 - 5.2 g/dL Final   01/24/2024 2.6 (L) 3.5 - 5.2 g/dL Final      Magnesium Magnesium   Date Value Ref Range Status   01/26/2024 1.7 1.6 - 2.4 mg/dL Final   01/25/2024 1.8 1.6 - 2.4 mg/dL Final   01/24/2024 1.8 1.6 - 2.4 mg/dL Final      Uric Acid No components found for: \"URIC ACID\"     Imaging Results (Last 72 Hours)       Procedure Component Value Units Date/Time    XR Chest 1 View [[id number]] Collected: 01/24/24 1912     Updated: 01/24/24 1915    Narrative:      XR CHEST 1 VW    Date of Exam: 1/24/2024 6:46 PM EST    Indication: Worsening chest congestion, increasing cough    Comparison: None available.    Findings: Vascular congestion. No consolidation. No pneumothorax or pleural effusion. Cardiomegaly. Hiatal hernia. Calcified right hilar lymph node. Midthoracic vertebroplasty. The clavicles are intact.      Impression:      Vascular congestion.    Electronically Signed: Kiran Burgess MD    1/24/2024 7:13 PM EST    Workstation ID: [id number]            Results for orders placed during the hospital encounter of 01/21/24    XR Chest 1 View    Narrative  XR CHEST 1 VW    Date of Exam: 1/24/2024 6:46 PM " EST    Indication: Worsening chest congestion, increasing cough    Comparison: None available.    Findings: Vascular congestion. No consolidation. No pneumothorax or pleural effusion. Cardiomegaly. Hiatal hernia. Calcified right hilar lymph node. Midthoracic vertebroplasty. The clavicles are intact.    Impression  Vascular congestion.    Electronically Signed: Kiran Burgess MD  1/24/2024 7:13 PM EST  Workstation ID: [id number]      XR Chest 1 View    Narrative  XR CHEST 1 VW    Date of Exam: 1/21/2024 3:57 PM EST    Indication: CHF/COPD Protocol  CHF/COPD Protocol    Comparison: CT chest from January 15, 2024    Findings:  There is a consolidation within the right midlung. The heart and mediastinal contours appear stable. The pulmonary vasculature appears normal. The osseous structures appear intact.    Impression  Impression:  Consolidation within right midlung, concerning for ongoing pneumonia.      Electronically Signed: Wilder Crenshaw MD  1/21/2024 4:14 PM EST  Workstation ID: [id number]      Results for orders placed during the hospital encounter of 01/14/24    XR Chest PA & Lateral    Narrative  XR CHEST PA AND LATERAL    Date of Exam: 1/14/2024 9:20 PM CST    Indication: elevated d-dimer    Comparison: Chest x-ray 1/14/2024    Findings:  The heart is stable in size. There is trace fluid in the right minor fissure and trace blunting at the costophrenic angle. No evidence for pneumothorax. There are calcified hilar lymph nodes.    Impression  Impression:  Trace right pleural effusion.      Electronically Signed: Bia Lucero MD  1/15/2024 6:24 AM CST  Workstation ID: [id number]      Results for orders placed during the hospital encounter of 08/22/23    Doppler Ankle Brachial Index Single Level CAR    Interpretation Summary    Right Conclusion: The right IKE is normal. Normal digital pressures.    Left Conclusion: The left IKE is normal. Mild digital ischemia.        ASSESSMENT / PLAN      Pneumonia of right  middle lobe due to infectious organism    Generalized weakness    Dyspnea    Pneumonia      CRF/CKD-------Nonoliguric. 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 Cr stable. Lytes okay. Volume okay. Dose meds for CrCl 30-45 cc/min. No NSAIDs.  Current creatinine at [facility]     2. HTN WITH CKD------BP okay. Avoid hypotension     3. DMII WITH RENAL MANIFESTATIONS-----BS okay. Avoid hypoglycemia. Lantus, Glucometers, SSI. Follow with steroid exposure     4. COPD/COVID 19 + PNA/RML CONSOLIDATION-------Oxygen, Decadron, Supportive care. On abx per PCP     5. URETERAL CA S/P R NEPHRECTOMY/SOLITARY KIDNEY STATE     6. PULMONARY HTN     7. OA/DJD-----No NSAIDs.       8. DVT PROPHYLAXIS------Lovenox     9. ELEVATED D-DIMER-------CT PE protocol recent admission negative     10. DEPRESSION-----Hold Zoloft given hyponatremia     11. GERD/PUD PROPHYLAXIS--------PPI. Benefits outweigh risks despite renal dysfunction    12. HYPONATREMIA------Resolved. Follow off of normal saline    13. HYPOCALCEMIA------Replace IV    14. HYPOMAGNESEMIA------Replace IV and po    15. HYPOALBUMINEMIA-----IV Albumin to temporize      Sonali Ordonez MD  [facility]  [phone]  01/26/24  07:17 EST

## 2024-01-27 LAB
ALBUMIN SERPL-MCNC: 3.4 G/DL (ref 3.5–5.2)
ALBUMIN/GLOB SERPL: 1.5 G/DL
ALP SERPL-CCNC: 44 U/L (ref 39–117)
ALT SERPL W P-5'-P-CCNC: 13 U/L (ref 1–33)
ANION GAP SERPL CALCULATED.3IONS-SCNC: 8 MMOL/L (ref 5–15)
AST SERPL-CCNC: 8 U/L (ref 1–32)
BASOPHILS # BLD AUTO: 0 10*3/MM3 (ref 0–0.2)
BASOPHILS NFR BLD AUTO: 0 % (ref 0–1.5)
BILIRUB SERPL-MCNC: 0.2 MG/DL (ref 0–1.2)
BUN SERPL-MCNC: 22 MG/DL (ref 8–23)
BUN/CREAT SERPL: 19.8 (ref 7–25)
CALCIUM SPEC-SCNC: 9.5 MG/DL (ref 8.6–10.5)
CHLORIDE SERPL-SCNC: 104 MMOL/L (ref 98–107)
CO2 SERPL-SCNC: 28 MMOL/L (ref 22–29)
CREAT SERPL-MCNC: 1.11 MG/DL (ref 0.57–1)
DEPRECATED RDW RBC AUTO: 45.9 FL (ref 37–54)
EGFRCR SERPLBLD CKD-EPI 2021: 50.4 ML/MIN/1.73
EOSINOPHIL # BLD AUTO: 0 10*3/MM3 (ref 0–0.4)
EOSINOPHIL NFR BLD AUTO: 0 % (ref 0.3–6.2)
ERYTHROCYTE [DISTWIDTH] IN BLOOD BY AUTOMATED COUNT: 13.3 % (ref 12.3–15.4)
GLOBULIN UR ELPH-MCNC: 2.2 GM/DL
GLUCOSE BLDC GLUCOMTR-MCNC: 101 MG/DL (ref 70–105)
GLUCOSE BLDC GLUCOMTR-MCNC: 227 MG/DL (ref 70–105)
GLUCOSE BLDC GLUCOMTR-MCNC: 342 MG/DL (ref 70–105)
GLUCOSE BLDC GLUCOMTR-MCNC: 347 MG/DL (ref 70–105)
GLUCOSE SERPL-MCNC: 190 MG/DL (ref 65–99)
HCT VFR BLD AUTO: 34.2 % (ref 34–46.6)
HGB BLD-MCNC: 11.5 G/DL (ref 12–15.9)
LYMPHOCYTES # BLD AUTO: 0.8 10*3/MM3 (ref 0.7–3.1)
LYMPHOCYTES NFR BLD AUTO: 13.7 % (ref 19.6–45.3)
MAGNESIUM SERPL-MCNC: 2.1 MG/DL (ref 1.6–2.4)
MCH RBC QN AUTO: 33.5 PG (ref 26.6–33)
MCHC RBC AUTO-ENTMCNC: 33.7 G/DL (ref 31.5–35.7)
MCV RBC AUTO: 99.2 FL (ref 79–97)
MONOCYTES # BLD AUTO: 0.5 10*3/MM3 (ref 0.1–0.9)
MONOCYTES NFR BLD AUTO: 9 % (ref 5–12)
NEUTROPHILS NFR BLD AUTO: 4.4 10*3/MM3 (ref 1.7–7)
NEUTROPHILS NFR BLD AUTO: 77.3 % (ref 42.7–76)
NRBC BLD AUTO-RTO: 0 /100 WBC (ref 0–0.2)
PHOSPHATE SERPL-MCNC: 2.1 MG/DL (ref 2.5–4.5)
PHOSPHATE SERPL-MCNC: 2.6 MG/DL (ref 2.5–4.5)
PLATELET # BLD AUTO: 168 10*3/MM3 (ref 140–450)
PMV BLD AUTO: 9.1 FL (ref 6–12)
POTASSIUM SERPL-SCNC: 3.9 MMOL/L (ref 3.5–5.2)
PROT SERPL-MCNC: 5.6 G/DL (ref 6–8.5)
RBC # BLD AUTO: 3.44 10*6/MM3 (ref 3.77–5.28)
SODIUM SERPL-SCNC: 140 MMOL/L (ref 136–145)
WBC NRBC COR # BLD AUTO: 5.7 10*3/MM3 (ref 3.4–10.8)

## 2024-01-27 PROCEDURE — 94799 UNLISTED PULMONARY SVC/PX: CPT

## 2024-01-27 PROCEDURE — 25010000002 ALBUMIN HUMAN 25% PER 50 ML: Performed by: INTERNAL MEDICINE

## 2024-01-27 PROCEDURE — P9047 ALBUMIN (HUMAN), 25%, 50ML: HCPCS | Performed by: INTERNAL MEDICINE

## 2024-01-27 PROCEDURE — 94664 DEMO&/EVAL PT USE INHALER: CPT

## 2024-01-27 PROCEDURE — 82948 REAGENT STRIP/BLOOD GLUCOSE: CPT

## 2024-01-27 PROCEDURE — 82948 REAGENT STRIP/BLOOD GLUCOSE: CPT | Performed by: INTERNAL MEDICINE

## 2024-01-27 PROCEDURE — 25010000002 ENOXAPARIN PER 10 MG: Performed by: NURSE PRACTITIONER

## 2024-01-27 PROCEDURE — 84100 ASSAY OF PHOSPHORUS: CPT | Performed by: INTERNAL MEDICINE

## 2024-01-27 PROCEDURE — 25010000002 METHYLPREDNISOLONE PER 40 MG

## 2024-01-27 PROCEDURE — 85025 COMPLETE CBC W/AUTO DIFF WBC: CPT | Performed by: NURSE PRACTITIONER

## 2024-01-27 PROCEDURE — 25010000002 FUROSEMIDE PER 20 MG: Performed by: INTERNAL MEDICINE

## 2024-01-27 PROCEDURE — 63710000001 INSULIN LISPRO (HUMAN) PER 5 UNITS: Performed by: INTERNAL MEDICINE

## 2024-01-27 PROCEDURE — 83735 ASSAY OF MAGNESIUM: CPT | Performed by: INTERNAL MEDICINE

## 2024-01-27 PROCEDURE — 80053 COMPREHEN METABOLIC PANEL: CPT | Performed by: NURSE PRACTITIONER

## 2024-01-27 PROCEDURE — 63710000001 INSULIN GLARGINE PER 5 UNITS: Performed by: NURSE PRACTITIONER

## 2024-01-27 RX ORDER — FUROSEMIDE 10 MG/ML
20 INJECTION INTRAMUSCULAR; INTRAVENOUS ONCE
Status: COMPLETED | OUTPATIENT
Start: 2024-01-27 | End: 2024-01-27

## 2024-01-27 RX ORDER — ALBUTEROL SULFATE 90 UG/1
2 AEROSOL, METERED RESPIRATORY (INHALATION) EVERY 4 HOURS PRN
Status: DISCONTINUED | OUTPATIENT
Start: 2024-01-27 | End: 2024-01-29 | Stop reason: HOSPADM

## 2024-01-27 RX ORDER — PREDNISONE 20 MG/1
20 TABLET ORAL
Status: DISCONTINUED | OUTPATIENT
Start: 2024-01-28 | End: 2024-01-29 | Stop reason: HOSPADM

## 2024-01-27 RX ADMIN — METHOCARBAMOL 500 MG: 500 TABLET ORAL at 17:21

## 2024-01-27 RX ADMIN — GABAPENTIN 100 MG: 100 CAPSULE ORAL at 08:52

## 2024-01-27 RX ADMIN — CARVEDILOL 12.5 MG: 6.25 TABLET, FILM COATED ORAL at 08:52

## 2024-01-27 RX ADMIN — TRAMADOL HYDROCHLORIDE 100 MG: 50 TABLET, COATED ORAL at 17:23

## 2024-01-27 RX ADMIN — CARVEDILOL 12.5 MG: 6.25 TABLET, FILM COATED ORAL at 17:21

## 2024-01-27 RX ADMIN — METHOCARBAMOL 500 MG: 500 TABLET ORAL at 08:51

## 2024-01-27 RX ADMIN — Medication 10 ML: at 08:51

## 2024-01-27 RX ADMIN — INSULIN LISPRO 5 UNITS: 100 INJECTION, SOLUTION INTRAVENOUS; SUBCUTANEOUS at 17:20

## 2024-01-27 RX ADMIN — PANTOPRAZOLE SODIUM 40 MG: 40 TABLET, DELAYED RELEASE ORAL at 05:38

## 2024-01-27 RX ADMIN — TERAZOSIN HYDROCHLORIDE 2 MG: 2 CAPSULE ORAL at 20:43

## 2024-01-27 RX ADMIN — GUAIFENESIN 600 MG: 600 TABLET, MULTILAYER, EXTENDED RELEASE ORAL at 08:52

## 2024-01-27 RX ADMIN — GUAIFENESIN 600 MG: 600 TABLET, MULTILAYER, EXTENDED RELEASE ORAL at 20:42

## 2024-01-27 RX ADMIN — AMOXICILLIN AND CLAVULANATE POTASSIUM 1 TABLET: 875; 125 TABLET, FILM COATED ORAL at 08:52

## 2024-01-27 RX ADMIN — METHOCARBAMOL 500 MG: 500 TABLET ORAL at 12:47

## 2024-01-27 RX ADMIN — Medication 10 ML: at 20:42

## 2024-01-27 RX ADMIN — HYDRALAZINE HYDROCHLORIDE 25 MG: 25 TABLET ORAL at 08:52

## 2024-01-27 RX ADMIN — Medication 400 MG: at 08:51

## 2024-01-27 RX ADMIN — ENOXAPARIN SODIUM 40 MG: 100 INJECTION SUBCUTANEOUS at 17:20

## 2024-01-27 RX ADMIN — DOCUSATE SODIUM AND SENNOSIDES 2 TABLET: 8.6; 5 TABLET, FILM COATED ORAL at 08:51

## 2024-01-27 RX ADMIN — METHYLPREDNISOLONE SODIUM SUCCINATE 40 MG: 40 INJECTION, POWDER, FOR SOLUTION INTRAMUSCULAR; INTRAVENOUS at 08:52

## 2024-01-27 RX ADMIN — ALBUTEROL SULFATE 2 PUFF: 108 AEROSOL, METERED RESPIRATORY (INHALATION) at 03:04

## 2024-01-27 RX ADMIN — INSULIN LISPRO 2 UNITS: 100 INJECTION, SOLUTION INTRAVENOUS; SUBCUTANEOUS at 12:47

## 2024-01-27 RX ADMIN — INSULIN GLARGINE 10 UNITS: 100 INJECTION, SOLUTION SUBCUTANEOUS at 20:42

## 2024-01-27 RX ADMIN — LINAGLIPTIN 5 MG: 5 TABLET, FILM COATED ORAL at 08:52

## 2024-01-27 RX ADMIN — ASPIRIN 81 MG: 81 TABLET, COATED ORAL at 08:52

## 2024-01-27 RX ADMIN — ALBUMIN (HUMAN) 25 G: 0.25 INJECTION, SOLUTION INTRAVENOUS at 00:46

## 2024-01-27 RX ADMIN — Medication 2 PACKET: at 05:38

## 2024-01-27 RX ADMIN — METHOCARBAMOL 500 MG: 500 TABLET ORAL at 20:42

## 2024-01-27 RX ADMIN — SERTRALINE HYDROCHLORIDE 50 MG: 50 TABLET ORAL at 20:42

## 2024-01-27 RX ADMIN — AMLODIPINE BESYLATE 10 MG: 5 TABLET ORAL at 08:51

## 2024-01-27 RX ADMIN — FUROSEMIDE 20 MG: 10 INJECTION, SOLUTION INTRAMUSCULAR; INTRAVENOUS at 08:52

## 2024-01-27 RX ADMIN — HYDRALAZINE HYDROCHLORIDE 25 MG: 25 TABLET ORAL at 20:42

## 2024-01-27 RX ADMIN — GABAPENTIN 100 MG: 100 CAPSULE ORAL at 17:21

## 2024-01-27 RX ADMIN — GABAPENTIN 100 MG: 100 CAPSULE ORAL at 20:42

## 2024-01-27 NOTE — PROGRESS NOTES
LOS: 4 days   Patient Care Team:  Anny Garcia MD as PCP - General (Internal Medicine)  Sergio Ordonez MD as Consulting Physician (Nephrology)    Subjective     She is feeling better and wants to go home      Review of Systems   Constitutional:  Positive for activity change and fatigue.   HENT: Negative.     Respiratory:  Positive for cough. Negative for shortness of breath.    Cardiovascular: Negative.    Gastrointestinal: Negative.    Genitourinary: Negative.    Musculoskeletal: Negative.    Neurological:  Positive for weakness.   Psychiatric/Behavioral: Negative.             Objective     Vital Signs  Temp:  [96.9 °F (36.1 °C)-97.6 °F (36.4 °C)] 97.2 °F (36.2 °C)  Heart Rate:  [67-80] 69  Resp:  [16-20] 16  BP: (135-151)/(69-76) 138/73      Physical Exam  Vitals reviewed.   Constitutional:       Appearance: She is not ill-appearing.   HENT:      Head: Normocephalic and atraumatic.      Right Ear: External ear normal.      Left Ear: External ear normal.      Nose: Nose normal.      Mouth/Throat:      Mouth: Mucous membranes are dry.   Eyes:      General:         Right eye: No discharge.         Left eye: No discharge.   Cardiovascular:      Rate and Rhythm: Normal rate and regular rhythm.      Pulses: Normal pulses.      Heart sounds: Normal heart sounds.   Pulmonary:      Effort: Pulmonary effort is normal.      Breath sounds: Normal breath sounds.   Abdominal:      General: Bowel sounds are normal.      Palpations: Abdomen is soft.   Musculoskeletal:         General: Normal range of motion.      Cervical back: Normal range of motion.   Skin:     General: Skin is warm.   Neurological:      Mental Status: She is alert and oriented to person, place, and time.   Psychiatric:         Behavior: Behavior normal.              Results Review:    Lab Results (last 24 hours)       Procedure Component Value Units Date/Time    POC Glucose TID AC [[id number]]  (Normal) Collected: 01/27/24 0737    Specimen:  Blood Updated: 01/27/24 0738     Glucose 101 mg/dL      Comment: Serial Number: [id number]:  [id number]       Comprehensive Metabolic Panel [[id number]]  (Abnormal) Collected: 01/27/24 0222    Specimen: Blood Updated: 01/27/24 0337     Glucose 190 mg/dL      BUN 22 mg/dL      Creatinine 1.11 mg/dL      Sodium 140 mmol/L      Potassium 3.9 mmol/L      Chloride 104 mmol/L      CO2 28.0 mmol/L      Calcium 9.5 mg/dL      Total Protein 5.6 g/dL      Albumin 3.4 g/dL      ALT (SGPT) 13 U/L      AST (SGOT) 8 U/L      Alkaline Phosphatase 44 U/L      Total Bilirubin 0.2 mg/dL      Globulin 2.2 gm/dL      A/G Ratio 1.5 g/dL      BUN/Creatinine Ratio 19.8     Anion Gap 8.0 mmol/L      eGFR 50.4 mL/min/1.73     Narrative:      GFR Normal >60  Chronic Kidney Disease <60  Kidney Failure <15    The GFR formula is only valid for adults with stable renal function between ages 18 and 70.    Magnesium [[id number]]  (Normal) Collected: 01/27/24 0222    Specimen: Blood Updated: 01/27/24 0337     Magnesium 2.1 mg/dL     Phosphorus [[id number]]  (Abnormal) Collected: 01/27/24 0222    Specimen: Blood Updated: 01/27/24 0337     Phosphorus 2.1 mg/dL     CBC & Differential [[id number]]  (Abnormal) Collected: 01/27/24 0222    Specimen: Blood Updated: 01/27/24 0318    Narrative:      The following orders were created for panel order CBC & Differential.  Procedure                               Abnormality         Status                     ---------                               -----------         ------                     CBC Auto Differential[[id number]]        Abnormal            Final result                 Please view results for these tests on the individual orders.    CBC Auto Differential [[id number]]  (Abnormal) Collected: 01/27/24 0222    Specimen: Blood Updated: 01/27/24 0318     WBC 5.70 10*3/mm3      RBC 3.44 10*6/mm3      Hemoglobin 11.5 g/dL      Hematocrit 34.2 %      MCV 99.2 fL      MCH 33.5 pg      MCHC 33.7 g/dL      RDW  13.3 %      RDW-SD 45.9 fl      MPV 9.1 fL      Platelets 168 10*3/mm3      Neutrophil % 77.3 %      Lymphocyte % 13.7 %      Monocyte % 9.0 %      Eosinophil % 0.0 %      Basophil % 0.0 %      Neutrophils, Absolute 4.40 10*3/mm3      Lymphocytes, Absolute 0.80 10*3/mm3      Monocytes, Absolute 0.50 10*3/mm3      Eosinophils, Absolute 0.00 10*3/mm3      Basophils, Absolute 0.00 10*3/mm3      nRBC 0.0 /100 WBC     Blood Culture - Blood, Arm, Left [[id number]]  (Normal) Collected: 01/21/24 1725    Specimen: Blood from Arm, Left Updated: 01/26/24 1731     Blood Culture No growth at 5 days    Narrative:      Less than seven (7) mL's of blood was collected.  Insufficient quantity may yield false negative results.    Blood Culture - Blood, Hand, Right [[id number]]  (Normal) Collected: 01/21/24 1725    Specimen: Blood from Hand, Right Updated: 01/26/24 1731     Blood Culture No growth at 5 days    POC Glucose Once [[id number]]  (Abnormal) Collected: 01/26/24 1647    Specimen: Blood Updated: 01/26/24 1648     Glucose 330 mg/dL      Comment: Serial Number: [id number]:  [id number]       POC Glucose TID AC [[id number]]  (Abnormal) Collected: 01/26/24 1132    Specimen: Blood Updated: 01/26/24 1134     Glucose 199 mg/dL      Comment: Serial Number: [id number]:  [id number]                Imaging Results (Last 24 Hours)       ** No results found for the last 24 hours. **                 I reviewed the patient's new clinical results.    Medication Review:   Scheduled Meds:albuterol sulfate HFA, 2 puff, Inhalation, 4x Daily - RT  amLODIPine, 10 mg, Oral, Daily  aspirin, 81 mg, Oral, Daily  budesonide-formoterol, 2 puff, Inhalation, BID - RT  carvedilol, 12.5 mg, Oral, BID With Meals  enoxaparin, 40 mg, Subcutaneous, Q24H  gabapentin, 100 mg, Oral, TID  guaiFENesin, 600 mg, Oral, Q12H  hydrALAZINE, 25 mg, Oral, BID  insulin glargine, 10 Units, Subcutaneous, Nightly  insulin lispro, 2-7 Units, Subcutaneous, TID With  Meals  linagliptin, 5 mg, Oral, Daily  magnesium oxide, 400 mg, Oral, Daily  methocarbamol, 500 mg, Oral, 4x Daily  methylPREDNISolone sodium succinate, 40 mg, Intravenous, Daily  pantoprazole, 40 mg, Oral, Q AM  polyethylene glycol, 17 g, Oral, Daily  senna-docusate sodium, 2 tablet, Oral, BID  sertraline, 50 mg, Oral, Nightly  sodium chloride, 10 mL, Intravenous, Q12H  terazosin, 2 mg, Oral, Nightly      Continuous Infusions:Pharmacy to Dose enoxaparin (LOVENOX),       PRN Meds:.  albuterol    albuterol sulfate HFA    senna-docusate sodium **AND** polyethylene glycol **AND** bisacodyl **AND** bisacodyl    Calcium Replacement - Follow Nurse / BPA Driven Protocol    dextrose    dextrose    glucagon (human recombinant)    HYDROcodone-acetaminophen    Magnesium Standard Dose Replacement - Follow Nurse / BPA Driven Protocol    nitroglycerin    ondansetron    Pharmacy to Dose enoxaparin (LOVENOX)    Phosphorus Replacement - Follow Nurse / BPA Driven Protocol    Potassium Replacement - Follow Nurse / BPA Driven Protocol    sodium chloride    sodium chloride    sodium chloride    traMADol     Interval History:    Assessment & Plan     Pneumonia  COVID 19 +  Generalized weakness  Dyspnea   DC Unasyn and start oral Augmentin  Wean steroids   CXR with vascular congestion  Mucinex /Symbicort/ Duo-neb and albuterol neb       Chronic back pain  Recurrent urinary tract infections  Mood disorder  Essential hypertension  Kidney disease stage III  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

## 2024-01-27 NOTE — PROGRESS NOTES
Daily Progress Note          Assessment  Pneumonia of right middle lobe due to infectious organism  Hypoxemia  Recent COVID-19 infection 1/14/2024  Organizing pneumonia post COVID-19 infection  COPD  CKD  HTN  Diabetes mellitus        Recommendations:  Respiratory status is gradually improving and possibly can be discharged home tomorrow  Antibiotics: Currently on Augmentin to finish 1/27/2024  Steroids: patient will need to stay on prednisone upon discharge until hypoxia resolves for residual organizing pneumonia post-COVID  Oxygen supplement and titration to maintain saturation 90 to 95%: Currently requiring 2-3 L per nasal cannula  Bronchodilators  Symbicort  Mucinex  Diuresis  BP control  Glucose control  Aspirin  DVT/GI prophylaxis  Check daily labs and correct electrolytes as needed  I personally reviewed the radiological studies               LOS: 4 days     Subjective     Patient reports mild cough and shortness of breath    Objective     Vital signs for last 24 hours:  Vitals:    01/27/24 0307 01/27/24 0353 01/27/24 0437 01/27/24 0738   BP:  135/70  138/73   BP Location:  Left arm  Left arm   Patient Position:  Lying  Lying   Pulse: 67   69   Resp: 18 18  16   Temp:    97.2 °F (36.2 °C)   TempSrc:    Oral   SpO2: 93%   94%   Weight:   87.2 kg (192 lb 3.9 oz)    Height:           Intake/Output last 3 shifts:  I/O last 3 completed shifts:  In: 480 [P.O.:480]  Out: -   Intake/Output this shift:  I/O this shift:  In: 240 [P.O.:240]  Out: -       Radiology  Imaging Results (Last 24 Hours)       ** No results found for the last 24 hours. **            Labs:  Results from last 7 days   Lab Units 01/27/24  0222   WBC 10*3/mm3 5.70   HEMOGLOBIN g/dL 11.5*   HEMATOCRIT % 34.2   PLATELETS 10*3/mm3 168     Results from last 7 days   Lab Units 01/27/24  0222   SODIUM mmol/L 140   POTASSIUM mmol/L 3.9   CHLORIDE mmol/L 104   CO2 mmol/L 28.0   BUN mg/dL 22   CREATININE mg/dL 1.11*   CALCIUM mg/dL 9.5   BILIRUBIN mg/dL 0.2    ALK PHOS U/L 44   ALT (SGPT) U/L 13   AST (SGOT) U/L 8   GLUCOSE mg/dL 190*     Results from last 7 days   Lab Units 01/21/24  1757   PH, ARTERIAL pH units 7.408   PO2 ART mm Hg 68.7*   PCO2, ARTERIAL mm Hg 36.7   HCO3 ART mmol/L 23.1     Results from last 7 days   Lab Units 01/27/24  0222 01/26/24  0507 01/25/24  0249   ALBUMIN g/dL 3.4* 2.5* 2.5*     Results from last 7 days   Lab Units 01/21/24  1804 01/21/24  1556   HSTROP T ng/L 59* 61*         Results from last 7 days   Lab Units 01/27/24  0222   MAGNESIUM mg/dL 2.1                   Meds:   SCHEDULE  albuterol sulfate HFA, 2 puff, Inhalation, 4x Daily - RT  amLODIPine, 10 mg, Oral, Daily  aspirin, 81 mg, Oral, Daily  budesonide-formoterol, 2 puff, Inhalation, BID - RT  carvedilol, 12.5 mg, Oral, BID With Meals  enoxaparin, 40 mg, Subcutaneous, Q24H  gabapentin, 100 mg, Oral, TID  guaiFENesin, 600 mg, Oral, Q12H  hydrALAZINE, 25 mg, Oral, BID  insulin glargine, 10 Units, Subcutaneous, Nightly  insulin lispro, 2-7 Units, Subcutaneous, TID With Meals  linagliptin, 5 mg, Oral, Daily  magnesium oxide, 400 mg, Oral, Daily  methocarbamol, 500 mg, Oral, 4x Daily  methylPREDNISolone sodium succinate, 40 mg, Intravenous, Daily  pantoprazole, 40 mg, Oral, Q AM  polyethylene glycol, 17 g, Oral, Daily  senna-docusate sodium, 2 tablet, Oral, BID  sertraline, 50 mg, Oral, Nightly  sodium chloride, 10 mL, Intravenous, Q12H  terazosin, 2 mg, Oral, Nightly      Infusions  Pharmacy to Dose enoxaparin (LOVENOX),       PRNs    albuterol    albuterol sulfate HFA    senna-docusate sodium **AND** polyethylene glycol **AND** bisacodyl **AND** bisacodyl    Calcium Replacement - Follow Nurse / BPA Driven Protocol    dextrose    dextrose    glucagon (human recombinant)    HYDROcodone-acetaminophen    Magnesium Standard Dose Replacement - Follow Nurse / BPA Driven Protocol    nitroglycerin    ondansetron    Pharmacy to Dose enoxaparin (LOVENOX)    Phosphorus Replacement - Follow Nurse  / BPA Driven Protocol    Potassium Replacement - Follow Nurse / BPA Driven Protocol    sodium chloride    sodium chloride    sodium chloride    traMADol    Physical Exam:  General Appearance:  Alert   HEENT:  Normocephalic, without obvious abnormality, Conjunctiva/corneas clear,.   Nares normal, no drainage     Neck:  Supple, symmetrical, trachea midline.   Lungs /Chest wall:   Bilateral basal rhonchi, respirations unlabored, symmetrical wall movement.     Heart:  Regular rate and rhythm, S1 S2 normal  Abdomen: Soft, non-tender, no masses, no organomegaly.    Extremities: No edema, no clubbing or cyanosis     ROS  Constitutional: Negative for chills, fever and malaise/fatigue.   HENT: Negative.    Eyes: Negative.    Cardiovascular: Negative.    Respiratory: Positive for cough and shortness of breath.    Skin: Negative.    Musculoskeletal: Negative.    Gastrointestinal: Negative.    Genitourinary: Negative.    Neurological: Negative.    Psychiatric/Behavioral: Negative.      I reviewed the recent clinical results  I personally reviewed the latest radiological studies    Part of this note may be an electronic transcription/translation of spoken language to printed text using the Dragon Dictation System.

## 2024-01-27 NOTE — PLAN OF CARE
Problem: Adult Inpatient Plan of Care  Goal: Plan of Care Review  Outcome: Ongoing, Not Progressing  Flowsheets (Taken 1/27/2024 0253)  Progress: no change  Plan of Care Reviewed With: patient  Goal: Patient-Specific Goal (Individualized)  Outcome: Ongoing, Not Progressing  Goal: Absence of Hospital-Acquired Illness or Injury  Outcome: Ongoing, Not Progressing  Intervention: Identify and Manage Fall Risk  Recent Flowsheet Documentation  Taken 1/27/2024 0246 by Jeanna Krueger RN  Safety Promotion/Fall Prevention:   assistive device/personal items within reach   clutter free environment maintained   nonskid shoes/slippers when out of bed   room organization consistent   safety round/check completed  Taken 1/27/2024 0016 by Jeanna Krueger RN  Safety Promotion/Fall Prevention:   assistive device/personal items within reach   clutter free environment maintained   nonskid shoes/slippers when out of bed   room organization consistent   safety round/check completed  Taken 1/26/2024 2207 by Jeanna Krueger RN  Safety Promotion/Fall Prevention:   assistive device/personal items within reach   clutter free environment maintained   nonskid shoes/slippers when out of bed   room organization consistent   safety round/check completed  Taken 1/26/2024 2046 by Jeanna Krueger RN  Safety Promotion/Fall Prevention:   assistive device/personal items within reach   clutter free environment maintained   nonskid shoes/slippers when out of bed   room organization consistent   safety round/check completed  Intervention: Prevent Skin Injury  Recent Flowsheet Documentation  Taken 1/26/2024 2046 by Jeanna Krueger RN  Body Position:   supine   position changed independently  Intervention: Prevent and Manage VTE (Venous Thromboembolism) Risk  Recent Flowsheet Documentation  Taken 1/26/2024 2046 by Jeanna Krueger RN  Activity Management: activity encouraged  VTE Prevention/Management: sequential compression devices off  Range of Motion:  active ROM (range of motion) encouraged  Intervention: Prevent Infection  Recent Flowsheet Documentation  Taken 1/27/2024 0246 by Jeanna Krueger RN  Infection Prevention:   environmental surveillance performed   hand hygiene promoted   personal protective equipment utilized   single patient room provided  Taken 1/27/2024 0016 by Jeanna Krueger RN  Infection Prevention:   environmental surveillance performed   hand hygiene promoted   personal protective equipment utilized   single patient room provided  Taken 1/26/2024 2207 by Jeanna Krueger RN  Infection Prevention:   environmental surveillance performed   hand hygiene promoted   personal protective equipment utilized   single patient room provided  Taken 1/26/2024 2046 by Jeanna Krueger RN  Infection Prevention:   environmental surveillance performed   hand hygiene promoted   personal protective equipment utilized   single patient room provided  Goal: Optimal Comfort and Wellbeing  Outcome: Ongoing, Not Progressing  Intervention: Provide Person-Centered Care  Recent Flowsheet Documentation  Taken 1/26/2024 2046 by Jeanna Krueger RN  Trust Relationship/Rapport:   care explained   choices provided  Goal: Readiness for Transition of Care  Outcome: Ongoing, Not Progressing     Problem: Breathing Pattern Ineffective  Goal: Effective Breathing Pattern  Outcome: Ongoing, Not Progressing  Intervention: Promote Improved Breathing Pattern  Recent Flowsheet Documentation  Taken 1/27/2024 0016 by Jeanna Krueger RN  Airway/Ventilation Management: airway patency maintained  Breathing Techniques/Airway Clearance: deep/controlled cough encouraged  Taken 1/26/2024 2046 by Jeanna Krueger RN  Supportive Measures: active listening utilized  Head of Bed (HOB) Positioning: HOB at 30-45 degrees  Airway/Ventilation Management: airway patency maintained  Breathing Techniques/Airway Clearance: deep/controlled cough encouraged     Problem: Fluid Imbalance (Pneumonia)  Goal: Fluid  Balance  Outcome: Ongoing, Not Progressing     Problem: Infection (Pneumonia)  Goal: Resolution of Infection Signs and Symptoms  Outcome: Ongoing, Not Progressing  Intervention: Prevent Infection Progression  Recent Flowsheet Documentation  Taken 1/27/2024 0246 by Jeanna Krueger RN  Isolation Precautions:   precautions maintained   droplet   enhanced contact  Taken 1/27/2024 0016 by Jeanna Krueger RN  Isolation Precautions:   precautions maintained   droplet   enhanced contact  Taken 1/26/2024 2207 by Jeanna Krueger RN  Isolation Precautions:   precautions maintained   droplet   enhanced contact  Taken 1/26/2024 2046 by Jeanna Krueger RN  Isolation Precautions:   precautions maintained   droplet   enhanced contact     Problem: Respiratory Compromise (Pneumonia)  Goal: Effective Oxygenation and Ventilation  Outcome: Ongoing, Not Progressing  Intervention: Promote Airway Secretion Clearance  Recent Flowsheet Documentation  Taken 1/27/2024 0016 by Jeanna Krueger RN  Breathing Techniques/Airway Clearance: deep/controlled cough encouraged  Cough And Deep Breathing: done independently per patient  Taken 1/26/2024 2046 by Jeanna Krueger RN  Breathing Techniques/Airway Clearance: deep/controlled cough encouraged  Cough And Deep Breathing: done independently per patient  Intervention: Optimize Oxygenation and Ventilation  Recent Flowsheet Documentation  Taken 1/27/2024 0016 by Jeanna Krueger RN  Airway/Ventilation Management: airway patency maintained  Taken 1/26/2024 2046 by Jeanna Krueger RN  Head of Bed (HOB) Positioning: HOB at 30-45 degrees  Airway/Ventilation Management: airway patency maintained     Problem: Fall Injury Risk  Goal: Absence of Fall and Fall-Related Injury  Outcome: Ongoing, Not Progressing  Intervention: Identify and Manage Contributors  Recent Flowsheet Documentation  Taken 1/27/2024 0246 by Jeanna Krueger RN  Medication Review/Management:   medications reviewed   high-risk medications  identified  Taken 1/27/2024 0016 by Jeanna Krueger RN  Medication Review/Management:   medications reviewed   high-risk medications identified  Taken 1/26/2024 2207 by Jeanna Krueger RN  Medication Review/Management:   medications reviewed   high-risk medications identified  Taken 1/26/2024 2046 by Jeanna Krueger RN  Medication Review/Management:   medications reviewed   high-risk medications identified  Self-Care Promotion: independence encouraged  Intervention: Promote Injury-Free Environment  Recent Flowsheet Documentation  Taken 1/27/2024 0246 by Jeanna Krueger RN  Safety Promotion/Fall Prevention:   assistive device/personal items within reach   clutter free environment maintained   nonskid shoes/slippers when out of bed   room organization consistent   safety round/check completed  Taken 1/27/2024 0016 by Jeanna Krueger RN  Safety Promotion/Fall Prevention:   assistive device/personal items within reach   clutter free environment maintained   nonskid shoes/slippers when out of bed   room organization consistent   safety round/check completed  Taken 1/26/2024 2207 by Jeanna Krueger RN  Safety Promotion/Fall Prevention:   assistive device/personal items within reach   clutter free environment maintained   nonskid shoes/slippers when out of bed   room organization consistent   safety round/check completed  Taken 1/26/2024 2046 by Jeanna Krueger RN  Safety Promotion/Fall Prevention:   assistive device/personal items within reach   clutter free environment maintained   nonskid shoes/slippers when out of bed   room organization consistent   safety round/check completed   Goal Outcome Evaluation:  Plan of Care Reviewed With: patient        Progress: no change     Alert and oriented x 4. Able to verbalize needs and wants. Takes medication whole and tolerates well. Continent of bowel and bladder. Continues to receive PO Augmentin, no s/s of adverse/allergic reaction noted. Calcium, Magnesium and Albumin  replaced, tolerated well. No s/s of hyper/hypoglycemia noted. Continues to be followed by pulmonology and nephrology. Family requested PT to re-evaluate. No tracheal deviation/neck vein distention noted. No s/s of clubbing/cyanosis noted. No decrease in appetite noted. No c/o pain/discomfort noted. Continues to require O2 therapy at 3 LPM via NC and tolerating well. Base line O2 need is room air. Currently in bed, awake. Call bell in reach. Discharge plan: home with home health (may change after PT re-eval).

## 2024-01-27 NOTE — PLAN OF CARE
Goal Outcome Evaluation:                                            Patient doing okay this shift. Patient remains on 2.5L O2. Patient may discharge tomorrow pending status. Patient family requesting PT to reevaluate, order placed. Patient A&O X4, takes medications whole, up with standby.

## 2024-01-27 NOTE — PROGRESS NOTES
"NEPHROLOGY PROGRESS NOTE------[facility]/OPT    [facility]  [phone]  Sonali Ordonez MD      Patient Care Team:  Anny Garcia MD as PCP - General (Internal Medicine)  Sergio Ordonez MD as Consulting Physician (Nephrology)      Provider:  Sonali Ordonez MD  Patient Name: Vianey Reeves  :  1945    SUBJECTIVE:    F/U CRF/CKD    Still with some chest congestion. No angina. No dysuria    Medication:  albuterol sulfate HFA, 2 puff, Inhalation, 4x Daily - RT  amLODIPine, 10 mg, Oral, Daily  amoxicillin-clavulanate, 1 tablet, Oral, Q12H  aspirin, 81 mg, Oral, Daily  budesonide-formoterol, 2 puff, Inhalation, BID - RT  carvedilol, 12.5 mg, Oral, BID With Meals  enoxaparin, 40 mg, Subcutaneous, Q24H  gabapentin, 100 mg, Oral, TID  guaiFENesin, 600 mg, Oral, Q12H  hydrALAZINE, 25 mg, Oral, BID  insulin glargine, 10 Units, Subcutaneous, Nightly  insulin lispro, 2-7 Units, Subcutaneous, TID With Meals  linagliptin, 5 mg, Oral, Daily  magnesium oxide, 400 mg, Oral, Daily  methocarbamol, 500 mg, Oral, 4x Daily  methylPREDNISolone sodium succinate, 40 mg, Intravenous, Daily  pantoprazole, 40 mg, Oral, Q AM  polyethylene glycol, 17 g, Oral, Daily  senna-docusate sodium, 2 tablet, Oral, BID  sertraline, 50 mg, Oral, Nightly  sodium chloride, 10 mL, Intravenous, Q12H  terazosin, 2 mg, Oral, Nightly      Pharmacy to Dose enoxaparin (LOVENOX),         OBJECTIVE    Vital Sign Min/Max for last 24 hours  Temp  Min: 96.9 °F (36.1 °C)  Max: 97.6 °F (36.4 °C)   BP  Min: 135/70  Max: 151/71   Pulse  Min: 67  Max: 80   Resp  Min: 18  Max: 22   SpO2  Min: 91 %  Max: 93 %   No data recorded   Weight  Min: 87.2 kg (192 lb 3.9 oz)  Max: 87.2 kg (192 lb 3.9 oz)     Flowsheet Rows      Flowsheet Row First Filed Value   Admission Height 165.1 cm (65\") Documented at 2024 1503   Admission Weight 85.3 kg (188 lb) Documented at 2024 1503      " "      No intake/output data recorded.  I/O last 3 completed shifts:  In: 820 [P.O.:820]  Out: -     Physical Exam:  General Appearance: alert, appears stated age and cooperative  Head: normocephalic, without obvious abnormality and atraumatic  Eyes: conjunctivae and sclerae normal and no icterus  Neck: supple and no JVD  Lungs: +RIGHT  RALES  Heart: regular rhythm & normal rate and normal S1, S2 +AMINA  Chest Wall: no abnormalities observed  Abdomen: normal bowel sounds and soft, nontender  Extremities: moves extremities well, no edema, no cyanosis +DJD  Skin: no bleeding, bruising or rash  Neurologic: Alert, and oriented. No focal deficits    Labs:    WBC WBC   Date Value Ref Range Status   01/27/2024 5.70 3.40 - 10.80 10*3/mm3 Final   01/26/2024 5.70 3.40 - 10.80 10*3/mm3 Final   01/25/2024 5.20 3.40 - 10.80 10*3/mm3 Final   01/24/2024 7.30 3.40 - 10.80 10*3/mm3 Final      HGB Hemoglobin   Date Value Ref Range Status   01/27/2024 11.5 (L) 12.0 - 15.9 g/dL Final   01/26/2024 11.6 (L) 12.0 - 15.9 g/dL Final   01/25/2024 11.3 (L) 12.0 - 15.9 g/dL Final   01/24/2024 12.4 12.0 - 15.9 g/dL Final      HCT Hematocrit   Date Value Ref Range Status   01/27/2024 34.2 34.0 - 46.6 % Final   01/26/2024 35.0 34.0 - 46.6 % Final   01/25/2024 34.3 34.0 - 46.6 % Final   01/24/2024 37.0 34.0 - 46.6 % Final      Platelets No results found for: \"LABPLAT\"   MCV MCV   Date Value Ref Range Status   01/27/2024 99.2 (H) 79.0 - 97.0 fL Final   01/26/2024 98.5 (H) 79.0 - 97.0 fL Final   01/25/2024 97.9 (H) 79.0 - 97.0 fL Final   01/24/2024 98.1 (H) 79.0 - 97.0 fL Final          Sodium Sodium   Date Value Ref Range Status   01/27/2024 140 136 - 145 mmol/L Final   01/26/2024 142 136 - 145 mmol/L Final   01/25/2024 140 136 - 145 mmol/L Final   01/24/2024 140 136 - 145 mmol/L Final      Potassium Potassium   Date Value Ref Range Status   01/27/2024 3.9 3.5 - 5.2 mmol/L Final   01/26/2024 4.0 3.5 - 5.2 mmol/L Final   01/25/2024 4.2 3.5 - 5.2 mmol/L " "Final   01/24/2024 4.4 3.5 - 5.2 mmol/L Final      Chloride Chloride   Date Value Ref Range Status   01/27/2024 104 98 - 107 mmol/L Final   01/26/2024 108 (H) 98 - 107 mmol/L Final   01/25/2024 107 98 - 107 mmol/L Final   01/24/2024 107 98 - 107 mmol/L Final      CO2 CO2   Date Value Ref Range Status   01/27/2024 28.0 22.0 - 29.0 mmol/L Final   01/26/2024 28.0 22.0 - 29.0 mmol/L Final   01/25/2024 26.0 22.0 - 29.0 mmol/L Final   01/24/2024 28.0 22.0 - 29.0 mmol/L Final      BUN BUN   Date Value Ref Range Status   01/27/2024 22 8 - 23 mg/dL Final   01/26/2024 19 8 - 23 mg/dL Final   01/25/2024 22 8 - 23 mg/dL Final   01/24/2024 25 (H) 8 - 23 mg/dL Final      Creatinine Creatinine   Date Value Ref Range Status   01/27/2024 1.11 (H) 0.57 - 1.00 mg/dL Final   01/26/2024 1.10 (H) 0.57 - 1.00 mg/dL Final   01/25/2024 1.14 (H) 0.57 - 1.00 mg/dL Final   01/24/2024 1.09 (H) 0.57 - 1.00 mg/dL Final      Calcium Calcium   Date Value Ref Range Status   01/27/2024 9.5 8.6 - 10.5 mg/dL Final   01/26/2024 7.7 (L) 8.6 - 10.5 mg/dL Final   01/25/2024 7.9 (L) 8.6 - 10.5 mg/dL Final   01/24/2024 8.3 (L) 8.6 - 10.5 mg/dL Final      PO4 No components found for: \"PO4\"   Albumin Albumin   Date Value Ref Range Status   01/27/2024 3.4 (L) 3.5 - 5.2 g/dL Final   01/26/2024 2.5 (L) 3.5 - 5.2 g/dL Final   01/25/2024 2.5 (L) 3.5 - 5.2 g/dL Final   01/24/2024 2.6 (L) 3.5 - 5.2 g/dL Final      Magnesium Magnesium   Date Value Ref Range Status   01/27/2024 2.1 1.6 - 2.4 mg/dL Final   01/26/2024 1.7 1.6 - 2.4 mg/dL Final   01/25/2024 1.8 1.6 - 2.4 mg/dL Final   01/24/2024 1.8 1.6 - 2.4 mg/dL Final      Uric Acid No components found for: \"URIC ACID\"     Imaging Results (Last 72 Hours)       Procedure Component Value Units Date/Time    XR Chest 1 View [[id number]] Collected: 01/24/24 1912     Updated: 01/24/24 1915    Narrative:      XR CHEST 1 VW    Date of Exam: 1/24/2024 6:46 PM EST    Indication: Worsening chest congestion, increasing " cough    Comparison: None available.    Findings: Vascular congestion. No consolidation. No pneumothorax or pleural effusion. Cardiomegaly. Hiatal hernia. Calcified right hilar lymph node. Midthoracic vertebroplasty. The clavicles are intact.      Impression:      Vascular congestion.    Electronically Signed: Kiran Burgess MD    1/24/2024 7:13 PM EST    Workstation ID: [id number]            Results for orders placed during the hospital encounter of 01/21/24    XR Chest 1 View    Narrative  XR CHEST 1 VW    Date of Exam: 1/24/2024 6:46 PM EST    Indication: Worsening chest congestion, increasing cough    Comparison: None available.    Findings: Vascular congestion. No consolidation. No pneumothorax or pleural effusion. Cardiomegaly. Hiatal hernia. Calcified right hilar lymph node. Midthoracic vertebroplasty. The clavicles are intact.    Impression  Vascular congestion.    Electronically Signed: Kiran Burgess MD  1/24/2024 7:13 PM EST  Workstation ID: [id number]      XR Chest 1 View    Narrative  XR CHEST 1 VW    Date of Exam: 1/21/2024 3:57 PM EST    Indication: CHF/COPD Protocol  CHF/COPD Protocol    Comparison: CT chest from January 15, 2024    Findings:  There is a consolidation within the right midlung. The heart and mediastinal contours appear stable. The pulmonary vasculature appears normal. The osseous structures appear intact.    Impression  Impression:  Consolidation within right midlung, concerning for ongoing pneumonia.      Electronically Signed: Wilder Crenshaw MD  1/21/2024 4:14 PM EST  Workstation ID: [id number]      Results for orders placed during the hospital encounter of 01/14/24    XR Chest PA & Lateral    Narrative  XR CHEST PA AND LATERAL    Date of Exam: 1/14/2024 9:20 PM CST    Indication: elevated d-dimer    Comparison: Chest x-ray 1/14/2024    Findings:  The heart is stable in size. There is trace fluid in the right minor fissure and trace blunting at the costophrenic angle. No evidence for  pneumothorax. There are calcified hilar lymph nodes.    Impression  Impression:  Trace right pleural effusion.      Electronically Signed: Bia Lucero MD  1/15/2024 6:24 AM CST  Workstation ID: [id number]      Results for orders placed during the hospital encounter of 08/22/23    Doppler Ankle Brachial Index Single Level CAR    Interpretation Summary    Right Conclusion: The right IKE is normal. Normal digital pressures.    Left Conclusion: The left IKE is normal. Mild digital ischemia.        ASSESSMENT / PLAN      Pneumonia of right middle lobe due to infectious organism    Generalized weakness    Dyspnea    Pneumonia      CRF/CKD-------Nonoliguric. 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 Cr stable. Lytes okay. Volume okay. Dose meds for CrCl 30-45 cc/min. No NSAIDs.  Current creatinine at [facility]     2. HTN WITH CKD------BP okay. Avoid hypotension     3. DMII WITH RENAL MANIFESTATIONS-----BS okay. Avoid hypoglycemia. Lantus, Glucometers, SSI. Follow with steroid exposure     4. COPD/COVID 19 + PNA/RML CONSOLIDATION-------Oxygen, Decadron, Supportive care. On abx per PCP     5. URETERAL CA S/P R NEPHRECTOMY/SOLITARY KIDNEY STATE     6. PULMONARY HTN/MILD VOLUME EXCESS------Lasix IV x one today     7. OA/DJD-----No NSAIDs.       8. DVT PROPHYLAXIS------Lovenox     9. ELEVATED D-DIMER-------CT PE protocol recent admission negative     10. DEPRESSION-----Hold Zoloft given hyponatremia     11. GERD/PUD PROPHYLAXIS--------PPI. Benefits outweigh risks despite renal dysfunction    12. HYPONATREMIA------Resolved. Follow off of normal saline    13. HYPOCALCEMIA------Replaced    14. HYPOMAGNESEMIA------Replaced    15. HYPOALBUMINEMIA-----Better post IV Albumin to temporize      Sonali Ordonez MD  [facility]  [phone]  01/27/24  06:42 EST

## 2024-01-28 LAB
ALBUMIN SERPL-MCNC: 3.2 G/DL (ref 3.5–5.2)
ALBUMIN/GLOB SERPL: 1.3 G/DL
ALP SERPL-CCNC: 46 U/L (ref 39–117)
ALT SERPL W P-5'-P-CCNC: 12 U/L (ref 1–33)
ANION GAP SERPL CALCULATED.3IONS-SCNC: 9 MMOL/L (ref 5–15)
AST SERPL-CCNC: 8 U/L (ref 1–32)
BASOPHILS # BLD AUTO: 0 10*3/MM3 (ref 0–0.2)
BASOPHILS NFR BLD AUTO: 0.3 % (ref 0–1.5)
BILIRUB SERPL-MCNC: 0.2 MG/DL (ref 0–1.2)
BUN SERPL-MCNC: 25 MG/DL (ref 8–23)
BUN/CREAT SERPL: 20 (ref 7–25)
CALCIUM SPEC-SCNC: 8.9 MG/DL (ref 8.6–10.5)
CHLORIDE SERPL-SCNC: 103 MMOL/L (ref 98–107)
CO2 SERPL-SCNC: 30 MMOL/L (ref 22–29)
CREAT SERPL-MCNC: 1.25 MG/DL (ref 0.57–1)
DEPRECATED RDW RBC AUTO: 45.5 FL (ref 37–54)
EGFRCR SERPLBLD CKD-EPI 2021: 43.7 ML/MIN/1.73
EOSINOPHIL # BLD AUTO: 0 10*3/MM3 (ref 0–0.4)
EOSINOPHIL NFR BLD AUTO: 0 % (ref 0.3–6.2)
ERYTHROCYTE [DISTWIDTH] IN BLOOD BY AUTOMATED COUNT: 13.2 % (ref 12.3–15.4)
GLOBULIN UR ELPH-MCNC: 2.4 GM/DL
GLUCOSE BLDC GLUCOMTR-MCNC: 131 MG/DL (ref 70–105)
GLUCOSE BLDC GLUCOMTR-MCNC: 234 MG/DL (ref 70–105)
GLUCOSE BLDC GLUCOMTR-MCNC: 287 MG/DL (ref 70–105)
GLUCOSE SERPL-MCNC: 220 MG/DL (ref 65–99)
HCT VFR BLD AUTO: 35.7 % (ref 34–46.6)
HGB BLD-MCNC: 11.7 G/DL (ref 12–15.9)
LYMPHOCYTES # BLD AUTO: 0.8 10*3/MM3 (ref 0.7–3.1)
LYMPHOCYTES NFR BLD AUTO: 17.2 % (ref 19.6–45.3)
MAGNESIUM SERPL-MCNC: 1.9 MG/DL (ref 1.6–2.4)
MCH RBC QN AUTO: 32.5 PG (ref 26.6–33)
MCHC RBC AUTO-ENTMCNC: 32.9 G/DL (ref 31.5–35.7)
MCV RBC AUTO: 98.9 FL (ref 79–97)
MONOCYTES # BLD AUTO: 0.6 10*3/MM3 (ref 0.1–0.9)
MONOCYTES NFR BLD AUTO: 12.6 % (ref 5–12)
NEUTROPHILS NFR BLD AUTO: 3.3 10*3/MM3 (ref 1.7–7)
NEUTROPHILS NFR BLD AUTO: 69.9 % (ref 42.7–76)
NRBC BLD AUTO-RTO: 0.1 /100 WBC (ref 0–0.2)
PHOSPHATE SERPL-MCNC: 2.5 MG/DL (ref 2.5–4.5)
PLATELET # BLD AUTO: 183 10*3/MM3 (ref 140–450)
PMV BLD AUTO: 8.9 FL (ref 6–12)
POTASSIUM SERPL-SCNC: 4.1 MMOL/L (ref 3.5–5.2)
PROT SERPL-MCNC: 5.6 G/DL (ref 6–8.5)
RBC # BLD AUTO: 3.61 10*6/MM3 (ref 3.77–5.28)
SODIUM SERPL-SCNC: 142 MMOL/L (ref 136–145)
WBC NRBC COR # BLD AUTO: 4.7 10*3/MM3 (ref 3.4–10.8)

## 2024-01-28 PROCEDURE — 80053 COMPREHEN METABOLIC PANEL: CPT | Performed by: NURSE PRACTITIONER

## 2024-01-28 PROCEDURE — 36415 COLL VENOUS BLD VENIPUNCTURE: CPT | Performed by: NURSE PRACTITIONER

## 2024-01-28 PROCEDURE — 25010000002 ENOXAPARIN PER 10 MG: Performed by: NURSE PRACTITIONER

## 2024-01-28 PROCEDURE — 63710000001 INSULIN GLARGINE PER 5 UNITS: Performed by: NURSE PRACTITIONER

## 2024-01-28 PROCEDURE — 84100 ASSAY OF PHOSPHORUS: CPT | Performed by: INTERNAL MEDICINE

## 2024-01-28 PROCEDURE — 63710000001 PREDNISONE PER 1 MG: Performed by: INTERNAL MEDICINE

## 2024-01-28 PROCEDURE — 85025 COMPLETE CBC W/AUTO DIFF WBC: CPT | Performed by: NURSE PRACTITIONER

## 2024-01-28 PROCEDURE — 63710000001 INSULIN LISPRO (HUMAN) PER 5 UNITS: Performed by: INTERNAL MEDICINE

## 2024-01-28 PROCEDURE — 94799 UNLISTED PULMONARY SVC/PX: CPT

## 2024-01-28 PROCEDURE — 82948 REAGENT STRIP/BLOOD GLUCOSE: CPT | Performed by: INTERNAL MEDICINE

## 2024-01-28 PROCEDURE — 83735 ASSAY OF MAGNESIUM: CPT | Performed by: INTERNAL MEDICINE

## 2024-01-28 PROCEDURE — 82948 REAGENT STRIP/BLOOD GLUCOSE: CPT

## 2024-01-28 RX ADMIN — INSULIN LISPRO 3 UNITS: 100 INJECTION, SOLUTION INTRAVENOUS; SUBCUTANEOUS at 11:49

## 2024-01-28 RX ADMIN — Medication 10 ML: at 07:47

## 2024-01-28 RX ADMIN — GUAIFENESIN 600 MG: 600 TABLET, MULTILAYER, EXTENDED RELEASE ORAL at 21:56

## 2024-01-28 RX ADMIN — NYSTATIN 500000 UNITS: 100000 SUSPENSION ORAL at 22:00

## 2024-01-28 RX ADMIN — CARVEDILOL 12.5 MG: 6.25 TABLET, FILM COATED ORAL at 17:20

## 2024-01-28 RX ADMIN — NYSTATIN 500000 UNITS: 100000 SUSPENSION ORAL at 11:49

## 2024-01-28 RX ADMIN — METHOCARBAMOL 500 MG: 500 TABLET ORAL at 21:57

## 2024-01-28 RX ADMIN — HYDRALAZINE HYDROCHLORIDE 25 MG: 25 TABLET ORAL at 07:50

## 2024-01-28 RX ADMIN — POLYETHYLENE GLYCOL 3350 17 G: 17 POWDER, FOR SOLUTION ORAL at 08:10

## 2024-01-28 RX ADMIN — CARVEDILOL 12.5 MG: 6.25 TABLET, FILM COATED ORAL at 07:47

## 2024-01-28 RX ADMIN — AMLODIPINE BESYLATE 10 MG: 5 TABLET ORAL at 07:46

## 2024-01-28 RX ADMIN — GABAPENTIN 100 MG: 100 CAPSULE ORAL at 21:56

## 2024-01-28 RX ADMIN — HYDRALAZINE HYDROCHLORIDE 25 MG: 25 TABLET ORAL at 21:56

## 2024-01-28 RX ADMIN — SERTRALINE HYDROCHLORIDE 50 MG: 50 TABLET ORAL at 21:57

## 2024-01-28 RX ADMIN — GABAPENTIN 100 MG: 100 CAPSULE ORAL at 17:20

## 2024-01-28 RX ADMIN — METHOCARBAMOL 500 MG: 500 TABLET ORAL at 11:49

## 2024-01-28 RX ADMIN — LINAGLIPTIN 5 MG: 5 TABLET, FILM COATED ORAL at 07:47

## 2024-01-28 RX ADMIN — TERAZOSIN HYDROCHLORIDE 2 MG: 2 CAPSULE ORAL at 21:56

## 2024-01-28 RX ADMIN — NYSTATIN 500000 UNITS: 100000 SUSPENSION ORAL at 18:11

## 2024-01-28 RX ADMIN — METHOCARBAMOL 500 MG: 500 TABLET ORAL at 17:20

## 2024-01-28 RX ADMIN — DOCUSATE SODIUM AND SENNOSIDES 2 TABLET: 8.6; 5 TABLET, FILM COATED ORAL at 07:46

## 2024-01-28 RX ADMIN — METHOCARBAMOL 500 MG: 500 TABLET ORAL at 07:47

## 2024-01-28 RX ADMIN — Medication 400 MG: at 07:47

## 2024-01-28 RX ADMIN — ENOXAPARIN SODIUM 40 MG: 100 INJECTION SUBCUTANEOUS at 17:30

## 2024-01-28 RX ADMIN — TRAMADOL HYDROCHLORIDE 100 MG: 50 TABLET, COATED ORAL at 17:29

## 2024-01-28 RX ADMIN — PREDNISONE 20 MG: 20 TABLET ORAL at 07:46

## 2024-01-28 RX ADMIN — GABAPENTIN 100 MG: 100 CAPSULE ORAL at 07:46

## 2024-01-28 RX ADMIN — Medication 10 ML: at 08:11

## 2024-01-28 RX ADMIN — INSULIN LISPRO 4 UNITS: 100 INJECTION, SOLUTION INTRAVENOUS; SUBCUTANEOUS at 17:30

## 2024-01-28 RX ADMIN — GUAIFENESIN 600 MG: 600 TABLET, MULTILAYER, EXTENDED RELEASE ORAL at 07:47

## 2024-01-28 RX ADMIN — Medication 10 ML: at 21:56

## 2024-01-28 RX ADMIN — INSULIN GLARGINE 10 UNITS: 100 INJECTION, SOLUTION SUBCUTANEOUS at 21:56

## 2024-01-28 RX ADMIN — POLYETHYLENE GLYCOL 3350 17 G: 17 POWDER, FOR SOLUTION ORAL at 07:46

## 2024-01-28 RX ADMIN — ASPIRIN 81 MG: 81 TABLET, COATED ORAL at 08:09

## 2024-01-28 RX ADMIN — PANTOPRAZOLE SODIUM 40 MG: 40 TABLET, DELAYED RELEASE ORAL at 06:16

## 2024-01-28 NOTE — PLAN OF CARE
Goal Outcome Evaluation:              Outcome Evaluation: Pt without issues this shift.  Covid precautions continue.  Will continue to monitor.

## 2024-01-28 NOTE — PLAN OF CARE
Goal Outcome Evaluation:   Pt alert and orient x 4. Pt able to make needs known verbally. Pt complaint with plan of care this shift. Pt no signs of distress noted.

## 2024-01-28 NOTE — PROGRESS NOTES
"NEPHROLOGY PROGRESS NOTE------[facility]    [facility]  [phone]  Rolando Miller MD      Patient Care Team:  Anny Garcia MD as PCP - General (Internal Medicine)  Sergio Ordonez MD as Consulting Physician (Nephrology)      Provider:  Rolando Miller MD  Patient Name: Vianey Reeves  :  1945    SUBJECTIVE:    F/U CRF/CKD    No chest pain or shortness of breath    Medication:  albuterol sulfate HFA, 2 puff, Inhalation, 4x Daily - RT  amLODIPine, 10 mg, Oral, Daily  aspirin, 81 mg, Oral, Daily  budesonide-formoterol, 2 puff, Inhalation, BID - RT  carvedilol, 12.5 mg, Oral, BID With Meals  enoxaparin, 40 mg, Subcutaneous, Q24H  gabapentin, 100 mg, Oral, TID  guaiFENesin, 600 mg, Oral, Q12H  hydrALAZINE, 25 mg, Oral, BID  insulin glargine, 10 Units, Subcutaneous, Nightly  insulin lispro, 2-7 Units, Subcutaneous, TID With Meals  linagliptin, 5 mg, Oral, Daily  magnesium oxide, 400 mg, Oral, Daily  methocarbamol, 500 mg, Oral, 4x Daily  pantoprazole, 40 mg, Oral, Q AM  polyethylene glycol, 17 g, Oral, Daily  predniSONE, 20 mg, Oral, Daily With Breakfast  senna-docusate sodium, 2 tablet, Oral, BID  sertraline, 50 mg, Oral, Nightly  sodium chloride, 10 mL, Intravenous, Q12H  terazosin, 2 mg, Oral, Nightly      Pharmacy to Dose enoxaparin (LOVENOX),         OBJECTIVE    Vital Sign Min/Max for last 24 hours  Temp  Min: 96.2 °F (35.7 °C)  Max: 98.7 °F (37.1 °C)   BP  Min: 118/60  Max: 146/60   Pulse  Min: 63  Max: 78   Resp  Min: 13  Max: 22   SpO2  Min: 91 %  Max: 94 %   No data recorded   No data recorded     Flowsheet Rows      Flowsheet Row First Filed Value   Admission Height 165.1 cm (65\") Documented at 2024 1503   Admission Weight 85.3 kg (188 lb) Documented at 2024 1503            No intake/output data recorded.  I/O last 3 completed shifts:  In: 960 [P.O.:960]  Out: -     Physical Exam:  General Appearance: alert, appears " "stated age and cooperative  Head: normocephalic, without obvious abnormality and atraumatic  Eyes: conjunctivae and sclerae normal and no icterus  Neck: supple and no JVD  Lungs: +RIGHT  RALES  Heart: regular rhythm & normal rate and normal S1, S2 +AMINA  Chest Wall: no abnormalities observed  Abdomen: normal bowel sounds and soft, nontender  Extremities: moves extremities well, no edema, no cyanosis +DJD  Skin: no bleeding, bruising or rash  Neurologic: Alert, and oriented. No focal deficits    Labs:    WBC WBC   Date Value Ref Range Status   01/28/2024 4.70 3.40 - 10.80 10*3/mm3 Final   01/27/2024 5.70 3.40 - 10.80 10*3/mm3 Final   01/26/2024 5.70 3.40 - 10.80 10*3/mm3 Final      HGB Hemoglobin   Date Value Ref Range Status   01/28/2024 11.7 (L) 12.0 - 15.9 g/dL Final   01/27/2024 11.5 (L) 12.0 - 15.9 g/dL Final   01/26/2024 11.6 (L) 12.0 - 15.9 g/dL Final      HCT Hematocrit   Date Value Ref Range Status   01/28/2024 35.7 34.0 - 46.6 % Final   01/27/2024 34.2 34.0 - 46.6 % Final   01/26/2024 35.0 34.0 - 46.6 % Final      Platelets No results found for: \"LABPLAT\"   MCV MCV   Date Value Ref Range Status   01/28/2024 98.9 (H) 79.0 - 97.0 fL Final   01/27/2024 99.2 (H) 79.0 - 97.0 fL Final   01/26/2024 98.5 (H) 79.0 - 97.0 fL Final          Sodium Sodium   Date Value Ref Range Status   01/28/2024 142 136 - 145 mmol/L Final   01/27/2024 140 136 - 145 mmol/L Final   01/26/2024 142 136 - 145 mmol/L Final      Potassium Potassium   Date Value Ref Range Status   01/28/2024 4.1 3.5 - 5.2 mmol/L Final   01/27/2024 3.9 3.5 - 5.2 mmol/L Final   01/26/2024 4.0 3.5 - 5.2 mmol/L Final      Chloride Chloride   Date Value Ref Range Status   01/28/2024 103 98 - 107 mmol/L Final   01/27/2024 104 98 - 107 mmol/L Final   01/26/2024 108 (H) 98 - 107 mmol/L Final      CO2 CO2   Date Value Ref Range Status   01/28/2024 30.0 (H) 22.0 - 29.0 mmol/L Final   01/27/2024 28.0 22.0 - 29.0 mmol/L Final   01/26/2024 28.0 22.0 - 29.0 mmol/L Final    " "  BUN BUN   Date Value Ref Range Status   01/28/2024 25 (H) 8 - 23 mg/dL Final   01/27/2024 22 8 - 23 mg/dL Final   01/26/2024 19 8 - 23 mg/dL Final      Creatinine Creatinine   Date Value Ref Range Status   01/28/2024 1.25 (H) 0.57 - 1.00 mg/dL Final   01/27/2024 1.11 (H) 0.57 - 1.00 mg/dL Final   01/26/2024 1.10 (H) 0.57 - 1.00 mg/dL Final      Calcium Calcium   Date Value Ref Range Status   01/28/2024 8.9 8.6 - 10.5 mg/dL Final   01/27/2024 9.5 8.6 - 10.5 mg/dL Final   01/26/2024 7.7 (L) 8.6 - 10.5 mg/dL Final      PO4 No components found for: \"PO4\"   Albumin Albumin   Date Value Ref Range Status   01/28/2024 3.2 (L) 3.5 - 5.2 g/dL Final   01/27/2024 3.4 (L) 3.5 - 5.2 g/dL Final   01/26/2024 2.5 (L) 3.5 - 5.2 g/dL Final      Magnesium Magnesium   Date Value Ref Range Status   01/28/2024 1.9 1.6 - 2.4 mg/dL Final   01/27/2024 2.1 1.6 - 2.4 mg/dL Final   01/26/2024 1.7 1.6 - 2.4 mg/dL Final      Uric Acid No components found for: \"URIC ACID\"     Imaging Results (Last 72 Hours)       ** No results found for the last 72 hours. **            Results for orders placed during the hospital encounter of 01/21/24    XR Chest 1 View    Narrative  XR CHEST 1 VW    Date of Exam: 1/24/2024 6:46 PM EST    Indication: Worsening chest congestion, increasing cough    Comparison: None available.    Findings: Vascular congestion. No consolidation. No pneumothorax or pleural effusion. Cardiomegaly. Hiatal hernia. Calcified right hilar lymph node. Midthoracic vertebroplasty. The clavicles are intact.    Impression  Vascular congestion.    Electronically Signed: Kiran Burgess MD  1/24/2024 7:13 PM EST  Workstation ID: [id number]      XR Chest 1 View    Narrative  XR CHEST 1 VW    Date of Exam: 1/21/2024 3:57 PM EST    Indication: CHF/COPD Protocol  CHF/COPD Protocol    Comparison: CT chest from January 15, 2024    Findings:  There is a consolidation within the right midlung. The heart and mediastinal contours appear stable. The pulmonary " vasculature appears normal. The osseous structures appear intact.    Impression  Impression:  Consolidation within right midlung, concerning for ongoing pneumonia.      Electronically Signed: Wilder Crenshaw MD  1/21/2024 4:14 PM EST  Workstation ID: [id number]      Results for orders placed during the hospital encounter of 01/14/24    XR Chest PA & Lateral    Narrative  XR CHEST PA AND LATERAL    Date of Exam: 1/14/2024 9:20 PM CST    Indication: elevated d-dimer    Comparison: Chest x-ray 1/14/2024    Findings:  The heart is stable in size. There is trace fluid in the right minor fissure and trace blunting at the costophrenic angle. No evidence for pneumothorax. There are calcified hilar lymph nodes.    Impression  Impression:  Trace right pleural effusion.      Electronically Signed: Bia Lucero MD  1/15/2024 6:24 AM CST  Workstation ID: [id number]      Results for orders placed during the hospital encounter of 08/22/23    Doppler Ankle Brachial Index Single Level CAR    Interpretation Summary    Right Conclusion: The right IKE is normal. Normal digital pressures.    Left Conclusion: The left IKE is normal. Mild digital ischemia.        ASSESSMENT / PLAN      Pneumonia of right middle lobe due to infectious organism    Generalized weakness    Dyspnea    Pneumonia      CRF/CKD-------Nonoliguric.  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  Lytes okay. Volume okay. Dose meds for CrCl 30-45 cc/min. No NSAIDs.  Current creatinine at [facility]     2. HTN WITH CKD------BP okay. Avoid hypotension     3. DMII WITH RENAL MANIFESTATIONS-----BS okay. Avoid hypoglycemia. Lantus, Glucometers, SSI. Follow with steroid exposure     4. COPD/COVID 19 + PNA/RML CONSOLIDATION-------Oxygen, Decadron, Supportive care. On abx per PCP     5. URETERAL CA S/P R NEPHRECTOMY/SOLITARY KIDNEY STATE     6. PULMONARY HTN/MILD VOLUME EXCESS------Lasix IV x one today      7. OA/DJD-----No NSAIDs.       8. DVT PROPHYLAXIS------Lovenox     9. ELEVATED D-DIMER-------CT PE protocol recent admission negative     10. DEPRESSION-----Hold Zoloft given hyponatremia     11. GERD/PUD PROPHYLAXIS--------PPI. Benefits outweigh risks despite renal dysfunction    12. HYPONATREMIA------Resolved. Follow off of normal saline    13. HYPOCALCEMIA------Replaced    14. HYPOMAGNESEMIA------Replaced    15. HYPOALBUMINEMIA-----Better post IV Albumin to temporize      Rolando Miller MD  [facility]  [phone]  01/28/24  06:35 EST

## 2024-01-28 NOTE — PLAN OF CARE
Goal Outcome Evaluation:  Plan of Care Reviewed With: patient                   Pt a/ox4, up in chair with assist x 2 indira well, plan is to return home after treatment, reviewed plan of care with patient, call light in reach, continues in enhanced precautions contact and droplet. Staff continues to assist with turning and repositioning pt q 2 hours and as needed, PT working with pt.

## 2024-01-28 NOTE — CASE MANAGEMENT/SOCIAL WORK
Continued Stay Note   [facility]     Patient Name: Vianey Reeves  MRN: [id number]  Today's Date: 1/28/2024    Admit Date: 1/21/2024    Plan: Home with [facility].  (NEED HH ORDER)  Current home o2 5L with goulds (neb already delivered.)   Discharge Plan       Row Name 01/28/24 1719       Plan    Plan Home with [facility].  (NEED HH ORDER)  Current home o2 5L with goulds (neb already delivered.)                      Expected Discharge Date and Time       Expected Discharge Date Expected Discharge Time    Jan 29, 2024               Taryn Dowling RN

## 2024-01-28 NOTE — PROGRESS NOTES
"Daily Progress Note          Assessment  Pneumonia of right middle lobe due to infectious organism  Hypoxemia  Recent COVID-19 infection 1/14/2024  Organizing pneumonia post COVID-19 infection  COPD  CKD  HTN  Diabetes mellitus        Recommendations:  Respiratory status is gradually improving and she can be discharged from pulmonary standpoint with follow-up in the pulmonary clinic in 3 weeks  Antibiotics: Completed Augmentin  Steroids: patient will need to stay on prednisone upon discharge until hypoxia resolves for residual organizing pneumonia post-COVID: Prednisone 20 mg daily x 1 week, 10 mg daily x 1 week, 5 mg daily x 1 week  Oxygen supplement and titration to maintain saturation 90 to 95%: Currently requiring 2-3 L per nasal cannula  Bronchodilators  Symbicort  Mucinex  Diuresis  BP control  Glucose control  Aspirin  DVT/GI prophylaxis  Check daily labs and correct electrolytes as needed  I personally reviewed the radiological studies               LOS: 5 days     Subjective     Patient reports improvement in the cough and shortness of breath    Objective     Vital signs for last 24 hours:  Vitals:    01/27/24 2350 01/28/24 0439 01/28/24 0751 01/28/24 0752   BP: 121/65 146/60 122/79 122/79   BP Location: Left arm Left arm Left arm    Patient Position: Lying Lying Sitting    Pulse: 71 78  80   Resp: 17 13 16    Temp:  96.2 °F (35.7 °C)     TempSrc:  Temporal     SpO2: 93% 94%  93%   Weight:    87.3 kg (192 lb 7.4 oz)   Height:    165.1 cm (65\")       Intake/Output last 3 shifts:  I/O last 3 completed shifts:  In: 480 [P.O.:480]  Out: -   Intake/Output this shift:  I/O this shift:  In: 240 [P.O.:240]  Out: -       Radiology  Imaging Results (Last 24 Hours)       ** No results found for the last 24 hours. **            Labs:  Results from last 7 days   Lab Units 01/28/24  0048   WBC 10*3/mm3 4.70   HEMOGLOBIN g/dL 11.7*   HEMATOCRIT % 35.7   PLATELETS 10*3/mm3 183     Results from last 7 days   Lab Units " 01/28/24  0049   SODIUM mmol/L 142   POTASSIUM mmol/L 4.1   CHLORIDE mmol/L 103   CO2 mmol/L 30.0*   BUN mg/dL 25*   CREATININE mg/dL 1.25*   CALCIUM mg/dL 8.9   BILIRUBIN mg/dL 0.2   ALK PHOS U/L 46   ALT (SGPT) U/L 12   AST (SGOT) U/L 8   GLUCOSE mg/dL 220*     Results from last 7 days   Lab Units 01/21/24  1757   PH, ARTERIAL pH units 7.408   PO2 ART mm Hg 68.7*   PCO2, ARTERIAL mm Hg 36.7   HCO3 ART mmol/L 23.1     Results from last 7 days   Lab Units 01/28/24  0049 01/27/24  0222 01/26/24  0507   ALBUMIN g/dL 3.2* 3.4* 2.5*     Results from last 7 days   Lab Units 01/21/24  1804 01/21/24  1556   HSTROP T ng/L 59* 61*         Results from last 7 days   Lab Units 01/28/24  0049   MAGNESIUM mg/dL 1.9                   Meds:   SCHEDULE  albuterol sulfate HFA, 2 puff, Inhalation, 4x Daily - RT  amLODIPine, 10 mg, Oral, Daily  aspirin, 81 mg, Oral, Daily  budesonide-formoterol, 2 puff, Inhalation, BID - RT  carvedilol, 12.5 mg, Oral, BID With Meals  enoxaparin, 40 mg, Subcutaneous, Q24H  gabapentin, 100 mg, Oral, TID  guaiFENesin, 600 mg, Oral, Q12H  hydrALAZINE, 25 mg, Oral, BID  insulin glargine, 10 Units, Subcutaneous, Nightly  insulin lispro, 2-7 Units, Subcutaneous, TID With Meals  linagliptin, 5 mg, Oral, Daily  magnesium oxide, 400 mg, Oral, Daily  methocarbamol, 500 mg, Oral, 4x Daily  pantoprazole, 40 mg, Oral, Q AM  polyethylene glycol, 17 g, Oral, Daily  predniSONE, 20 mg, Oral, Daily With Breakfast  senna-docusate sodium, 2 tablet, Oral, BID  sertraline, 50 mg, Oral, Nightly  sodium chloride, 10 mL, Intravenous, Q12H  terazosin, 2 mg, Oral, Nightly      Infusions  Pharmacy to Dose enoxaparin (LOVENOX),       PRNs    albuterol    albuterol sulfate HFA    senna-docusate sodium **AND** polyethylene glycol **AND** bisacodyl **AND** bisacodyl    Calcium Replacement - Follow Nurse / BPA Driven Protocol    dextrose    dextrose    glucagon (human recombinant)    HYDROcodone-acetaminophen    Magnesium Standard  Dose Replacement - Follow Nurse / BPA Driven Protocol    nitroglycerin    ondansetron    Pharmacy to Dose enoxaparin (LOVENOX)    Phosphorus Replacement - Follow Nurse / BPA Driven Protocol    Potassium Replacement - Follow Nurse / BPA Driven Protocol    sodium chloride    sodium chloride    sodium chloride    traMADol    Physical Exam:  General Appearance:  Alert   HEENT:  Normocephalic, without obvious abnormality, Conjunctiva/corneas clear,.   Nares normal, no drainage     Neck:  Supple, symmetrical, trachea midline.   Lungs /Chest wall:   Good air entry bilaterally with mild bilateral basal rhonchi, respirations unlabored, symmetrical wall movement.     Heart:  Regular rate and rhythm, S1 S2 normal  Abdomen: Soft, non-tender, no masses, no organomegaly.    Extremities: No edema, no clubbing or cyanosis     ROS  Constitutional: Negative for chills, fever and malaise/fatigue.   HENT: Negative.    Eyes: Negative.    Cardiovascular: Negative.    Respiratory: Positive for cough and shortness of breath.    Skin: Negative.    Musculoskeletal: Negative.    Gastrointestinal: Negative.    Genitourinary: Negative.    Neurological: Negative.    Psychiatric/Behavioral: Negative.      I reviewed the recent clinical results  I personally reviewed the latest radiological studies    Part of this note may be an electronic transcription/translation of spoken language to printed text using the Dragon Dictation System.

## 2024-01-29 ENCOUNTER — READMISSION MANAGEMENT (OUTPATIENT)
Dept: CALL CENTER | Facility: HOSPITAL | Age: 79
End: 2024-01-29
Payer: MEDICARE

## 2024-01-29 VITALS
BODY MASS INDEX: 31.18 KG/M2 | RESPIRATION RATE: 18 BRPM | TEMPERATURE: 98.1 F | OXYGEN SATURATION: 93 % | HEART RATE: 90 BPM | DIASTOLIC BLOOD PRESSURE: 73 MMHG | WEIGHT: 187.17 LBS | SYSTOLIC BLOOD PRESSURE: 159 MMHG | HEIGHT: 65 IN

## 2024-01-29 LAB
ALBUMIN SERPL-MCNC: 2.9 G/DL (ref 3.5–5.2)
ALBUMIN/GLOB SERPL: 1.3 G/DL
ALP SERPL-CCNC: 45 U/L (ref 39–117)
ALT SERPL W P-5'-P-CCNC: 10 U/L (ref 1–33)
ANION GAP SERPL CALCULATED.3IONS-SCNC: 8 MMOL/L (ref 5–15)
AST SERPL-CCNC: 7 U/L (ref 1–32)
BASOPHILS # BLD AUTO: 0 10*3/MM3 (ref 0–0.2)
BASOPHILS NFR BLD AUTO: 0.1 % (ref 0–1.5)
BILIRUB SERPL-MCNC: <0.2 MG/DL (ref 0–1.2)
BUN SERPL-MCNC: 21 MG/DL (ref 8–23)
BUN/CREAT SERPL: 18.1 (ref 7–25)
CALCIUM SPEC-SCNC: 8.5 MG/DL (ref 8.6–10.5)
CHLORIDE SERPL-SCNC: 103 MMOL/L (ref 98–107)
CO2 SERPL-SCNC: 30 MMOL/L (ref 22–29)
CREAT SERPL-MCNC: 1.16 MG/DL (ref 0.57–1)
DEPRECATED RDW RBC AUTO: 48.1 FL (ref 37–54)
EGFRCR SERPLBLD CKD-EPI 2021: 47.8 ML/MIN/1.73
EOSINOPHIL # BLD AUTO: 0 10*3/MM3 (ref 0–0.4)
EOSINOPHIL NFR BLD AUTO: 0.1 % (ref 0.3–6.2)
ERYTHROCYTE [DISTWIDTH] IN BLOOD BY AUTOMATED COUNT: 13.3 % (ref 12.3–15.4)
GLOBULIN UR ELPH-MCNC: 2.3 GM/DL
GLUCOSE BLDC GLUCOMTR-MCNC: 113 MG/DL (ref 70–105)
GLUCOSE BLDC GLUCOMTR-MCNC: 204 MG/DL (ref 70–105)
GLUCOSE SERPL-MCNC: 209 MG/DL (ref 65–99)
HCT VFR BLD AUTO: 34.3 % (ref 34–46.6)
HGB BLD-MCNC: 11.6 G/DL (ref 12–15.9)
LYMPHOCYTES # BLD AUTO: 1 10*3/MM3 (ref 0.7–3.1)
LYMPHOCYTES NFR BLD AUTO: 18.6 % (ref 19.6–45.3)
MCH RBC QN AUTO: 33.2 PG (ref 26.6–33)
MCHC RBC AUTO-ENTMCNC: 34 G/DL (ref 31.5–35.7)
MCV RBC AUTO: 97.6 FL (ref 79–97)
MONOCYTES # BLD AUTO: 0.7 10*3/MM3 (ref 0.1–0.9)
MONOCYTES NFR BLD AUTO: 13.1 % (ref 5–12)
NEUTROPHILS NFR BLD AUTO: 3.5 10*3/MM3 (ref 1.7–7)
NEUTROPHILS NFR BLD AUTO: 68.1 % (ref 42.7–76)
NRBC BLD AUTO-RTO: 0.2 /100 WBC (ref 0–0.2)
PLATELET # BLD AUTO: 178 10*3/MM3 (ref 140–450)
PMV BLD AUTO: 9 FL (ref 6–12)
POTASSIUM SERPL-SCNC: 4.1 MMOL/L (ref 3.5–5.2)
PROT SERPL-MCNC: 5.2 G/DL (ref 6–8.5)
RBC # BLD AUTO: 3.51 10*6/MM3 (ref 3.77–5.28)
SODIUM SERPL-SCNC: 141 MMOL/L (ref 136–145)
WBC NRBC COR # BLD AUTO: 5.2 10*3/MM3 (ref 3.4–10.8)

## 2024-01-29 PROCEDURE — 63710000001 PREDNISONE PER 1 MG: Performed by: INTERNAL MEDICINE

## 2024-01-29 PROCEDURE — 36415 COLL VENOUS BLD VENIPUNCTURE: CPT | Performed by: NURSE PRACTITIONER

## 2024-01-29 PROCEDURE — 85025 COMPLETE CBC W/AUTO DIFF WBC: CPT | Performed by: NURSE PRACTITIONER

## 2024-01-29 PROCEDURE — 94799 UNLISTED PULMONARY SVC/PX: CPT

## 2024-01-29 PROCEDURE — 80053 COMPREHEN METABOLIC PANEL: CPT | Performed by: NURSE PRACTITIONER

## 2024-01-29 PROCEDURE — 82948 REAGENT STRIP/BLOOD GLUCOSE: CPT | Performed by: INTERNAL MEDICINE

## 2024-01-29 PROCEDURE — 97164 PT RE-EVAL EST PLAN CARE: CPT

## 2024-01-29 PROCEDURE — 97116 GAIT TRAINING THERAPY: CPT

## 2024-01-29 RX ORDER — PREDNISONE 10 MG/1
TABLET ORAL
Qty: 12 TABLET | Refills: 0 | Status: SHIPPED | OUTPATIENT
Start: 2024-01-30

## 2024-01-29 RX ORDER — BUDESONIDE AND FORMOTEROL FUMARATE DIHYDRATE 160; 4.5 UG/1; UG/1
2 AEROSOL RESPIRATORY (INHALATION)
Qty: 10.2 G | Refills: 12 | Status: SHIPPED | OUTPATIENT
Start: 2024-01-29

## 2024-01-29 RX ORDER — GABAPENTIN 100 MG/1
100 CAPSULE ORAL NIGHTLY
Start: 2024-01-29

## 2024-01-29 RX ORDER — GUAIFENESIN 600 MG/1
600 TABLET, EXTENDED RELEASE ORAL EVERY 12 HOURS SCHEDULED
Qty: 28 TABLET | Refills: 0 | Status: SHIPPED | OUTPATIENT
Start: 2024-01-29

## 2024-01-29 RX ORDER — IPRATROPIUM BROMIDE AND ALBUTEROL SULFATE 2.5; .5 MG/3ML; MG/3ML
3 SOLUTION RESPIRATORY (INHALATION) 4 TIMES DAILY PRN
Qty: 360 ML | Refills: 1 | Status: SHIPPED | OUTPATIENT
Start: 2024-01-29

## 2024-01-29 RX ADMIN — Medication 10 ML: at 08:03

## 2024-01-29 RX ADMIN — GABAPENTIN 100 MG: 100 CAPSULE ORAL at 08:02

## 2024-01-29 RX ADMIN — Medication 400 MG: at 08:02

## 2024-01-29 RX ADMIN — METHOCARBAMOL 500 MG: 500 TABLET ORAL at 08:02

## 2024-01-29 RX ADMIN — ASPIRIN 81 MG: 81 TABLET, COATED ORAL at 08:02

## 2024-01-29 RX ADMIN — CARVEDILOL 12.5 MG: 6.25 TABLET, FILM COATED ORAL at 08:02

## 2024-01-29 RX ADMIN — NYSTATIN 500000 UNITS: 100000 SUSPENSION ORAL at 08:02

## 2024-01-29 RX ADMIN — ALBUTEROL SULFATE 2 PUFF: 108 AEROSOL, METERED RESPIRATORY (INHALATION) at 07:12

## 2024-01-29 RX ADMIN — GUAIFENESIN 600 MG: 600 TABLET, MULTILAYER, EXTENDED RELEASE ORAL at 08:02

## 2024-01-29 RX ADMIN — LINAGLIPTIN 5 MG: 5 TABLET, FILM COATED ORAL at 08:02

## 2024-01-29 RX ADMIN — PREDNISONE 20 MG: 20 TABLET ORAL at 08:02

## 2024-01-29 RX ADMIN — AMLODIPINE BESYLATE 10 MG: 5 TABLET ORAL at 08:02

## 2024-01-29 RX ADMIN — HYDRALAZINE HYDROCHLORIDE 25 MG: 25 TABLET ORAL at 08:02

## 2024-01-29 RX ADMIN — PANTOPRAZOLE SODIUM 40 MG: 40 TABLET, DELAYED RELEASE ORAL at 05:51

## 2024-01-29 NOTE — PROGRESS NOTES
"NEPHROLOGY PROGRESS NOTE------[facility]    [facility]  [phone]  Rolando Miller MD      Patient Care Team:  Anny Garcia MD as PCP - General (Internal Medicine)  Sergio Ordonez MD as Consulting Physician (Nephrology)      Provider:  Rolando Miller MD  Patient Name: Vianey Reeves  :  1945    SUBJECTIVE:    F/U CRF/CKD    No chest pain or shortness of breath    Medication:  albuterol sulfate HFA, 2 puff, Inhalation, 4x Daily - RT  amLODIPine, 10 mg, Oral, Daily  aspirin, 81 mg, Oral, Daily  budesonide-formoterol, 2 puff, Inhalation, BID - RT  carvedilol, 12.5 mg, Oral, BID With Meals  enoxaparin, 40 mg, Subcutaneous, Q24H  gabapentin, 100 mg, Oral, TID  guaiFENesin, 600 mg, Oral, Q12H  hydrALAZINE, 25 mg, Oral, BID  insulin glargine, 10 Units, Subcutaneous, Nightly  insulin lispro, 2-7 Units, Subcutaneous, TID With Meals  linagliptin, 5 mg, Oral, Daily  magnesium oxide, 400 mg, Oral, Daily  methocarbamol, 500 mg, Oral, 4x Daily  nystatin, 5 mL, Swish & Swallow, 4x Daily  pantoprazole, 40 mg, Oral, Q AM  polyethylene glycol, 17 g, Oral, Daily  predniSONE, 20 mg, Oral, Daily With Breakfast  senna-docusate sodium, 2 tablet, Oral, BID  sertraline, 50 mg, Oral, Nightly  sodium chloride, 10 mL, Intravenous, Q12H  terazosin, 2 mg, Oral, Nightly      Pharmacy to Dose enoxaparin (LOVENOX),         OBJECTIVE    Vital Sign Min/Max for last 24 hours  Temp  Min: 97.5 °F (36.4 °C)  Max: 98 °F (36.7 °C)   BP  Min: 103/63  Max: 159/73   Pulse  Min: 54  Max: 73   Resp  Min: 12  Max: 18   SpO2  Min: 90 %  Max: 98 %   No data recorded   Weight  Min: 84.9 kg (187 lb 2.7 oz)  Max: 84.9 kg (187 lb 2.7 oz)     Flowsheet Rows      Flowsheet Row First Filed Value   Admission Height 165.1 cm (65\") Documented at 2024 1503   Admission Weight 85.3 kg (188 lb) Documented at 2024 1503            No intake/output data recorded.  I/O last 3 completed " "shifts:  In: 1440 [P.O.:1440]  Out: -     Physical Exam:  General Appearance: alert, appears stated age and cooperative  Head: normocephalic, without obvious abnormality and atraumatic  Eyes: conjunctivae and sclerae normal and no icterus  Neck: supple and no JVD  Lungs: +RIGHT  RALES  Heart: regular rhythm & normal rate and normal S1, S2 +AMINA  Chest Wall: no abnormalities observed  Abdomen: normal bowel sounds and soft, nontender  Extremities: moves extremities well, no edema, no cyanosis +DJD  Skin: no bleeding, bruising or rash  Neurologic: Alert, and oriented. No focal deficits    Labs:    WBC WBC   Date Value Ref Range Status   01/29/2024 5.20 3.40 - 10.80 10*3/mm3 Final   01/28/2024 4.70 3.40 - 10.80 10*3/mm3 Final   01/27/2024 5.70 3.40 - 10.80 10*3/mm3 Final      HGB Hemoglobin   Date Value Ref Range Status   01/29/2024 11.6 (L) 12.0 - 15.9 g/dL Final   01/28/2024 11.7 (L) 12.0 - 15.9 g/dL Final   01/27/2024 11.5 (L) 12.0 - 15.9 g/dL Final      HCT Hematocrit   Date Value Ref Range Status   01/29/2024 34.3 34.0 - 46.6 % Final   01/28/2024 35.7 34.0 - 46.6 % Final   01/27/2024 34.2 34.0 - 46.6 % Final      Platelets No results found for: \"LABPLAT\"   MCV MCV   Date Value Ref Range Status   01/29/2024 97.6 (H) 79.0 - 97.0 fL Final   01/28/2024 98.9 (H) 79.0 - 97.0 fL Final   01/27/2024 99.2 (H) 79.0 - 97.0 fL Final          Sodium Sodium   Date Value Ref Range Status   01/29/2024 141 136 - 145 mmol/L Final   01/28/2024 142 136 - 145 mmol/L Final   01/27/2024 140 136 - 145 mmol/L Final      Potassium Potassium   Date Value Ref Range Status   01/29/2024 4.1 3.5 - 5.2 mmol/L Final   01/28/2024 4.1 3.5 - 5.2 mmol/L Final   01/27/2024 3.9 3.5 - 5.2 mmol/L Final      Chloride Chloride   Date Value Ref Range Status   01/29/2024 103 98 - 107 mmol/L Final   01/28/2024 103 98 - 107 mmol/L Final   01/27/2024 104 98 - 107 mmol/L Final      CO2 CO2   Date Value Ref Range Status   01/29/2024 30.0 (H) 22.0 - 29.0 mmol/L Final " "  01/28/2024 30.0 (H) 22.0 - 29.0 mmol/L Final   01/27/2024 28.0 22.0 - 29.0 mmol/L Final      BUN BUN   Date Value Ref Range Status   01/29/2024 21 8 - 23 mg/dL Final   01/28/2024 25 (H) 8 - 23 mg/dL Final   01/27/2024 22 8 - 23 mg/dL Final      Creatinine Creatinine   Date Value Ref Range Status   01/29/2024 1.16 (H) 0.57 - 1.00 mg/dL Final   01/28/2024 1.25 (H) 0.57 - 1.00 mg/dL Final   01/27/2024 1.11 (H) 0.57 - 1.00 mg/dL Final      Calcium Calcium   Date Value Ref Range Status   01/29/2024 8.5 (L) 8.6 - 10.5 mg/dL Final   01/28/2024 8.9 8.6 - 10.5 mg/dL Final   01/27/2024 9.5 8.6 - 10.5 mg/dL Final      PO4 No components found for: \"PO4\"   Albumin Albumin   Date Value Ref Range Status   01/29/2024 2.9 (L) 3.5 - 5.2 g/dL Final   01/28/2024 3.2 (L) 3.5 - 5.2 g/dL Final   01/27/2024 3.4 (L) 3.5 - 5.2 g/dL Final      Magnesium Magnesium   Date Value Ref Range Status   01/28/2024 1.9 1.6 - 2.4 mg/dL Final   01/27/2024 2.1 1.6 - 2.4 mg/dL Final      Uric Acid No components found for: \"URIC ACID\"     Imaging Results (Last 72 Hours)       ** No results found for the last 72 hours. **            Results for orders placed during the hospital encounter of 01/21/24    XR Chest 1 View    Narrative  XR CHEST 1 VW    Date of Exam: 1/24/2024 6:46 PM EST    Indication: Worsening chest congestion, increasing cough    Comparison: None available.    Findings: Vascular congestion. No consolidation. No pneumothorax or pleural effusion. Cardiomegaly. Hiatal hernia. Calcified right hilar lymph node. Midthoracic vertebroplasty. The clavicles are intact.    Impression  Vascular congestion.    Electronically Signed: Kiran Burgess MD  1/24/2024 7:13 PM EST  Workstation ID: [id number]      XR Chest 1 View    Narrative  XR CHEST 1 VW    Date of Exam: 1/21/2024 3:57 PM EST    Indication: CHF/COPD Protocol  CHF/COPD Protocol    Comparison: CT chest from January 15, 2024    Findings:  There is a consolidation within the right midlung. The heart " and mediastinal contours appear stable. The pulmonary vasculature appears normal. The osseous structures appear intact.    Impression  Impression:  Consolidation within right midlung, concerning for ongoing pneumonia.      Electronically Signed: Wilder Crensahw MD  1/21/2024 4:14 PM EST  Workstation ID: [id number]      Results for orders placed during the hospital encounter of 01/14/24    XR Chest PA & Lateral    Narrative  XR CHEST PA AND LATERAL    Date of Exam: 1/14/2024 9:20 PM CST    Indication: elevated d-dimer    Comparison: Chest x-ray 1/14/2024    Findings:  The heart is stable in size. There is trace fluid in the right minor fissure and trace blunting at the costophrenic angle. No evidence for pneumothorax. There are calcified hilar lymph nodes.    Impression  Impression:  Trace right pleural effusion.      Electronically Signed: Bia Lucero MD  1/15/2024 6:24 AM CST  Workstation ID: [id number]      Results for orders placed during the hospital encounter of 08/22/23    Doppler Ankle Brachial Index Single Level CAR    Interpretation Summary    Right Conclusion: The right IKE is normal. Normal digital pressures.    Left Conclusion: The left IKE is normal. Mild digital ischemia.        ASSESSMENT / PLAN      Pneumonia of right middle lobe due to infectious organism    Generalized weakness    Dyspnea    Pneumonia      CRF/CKD-------Nonoliguric.  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  Lytes okay. Volume okay. Dose meds for CrCl 30-45 cc/min. No NSAIDs.  Current creatinine at [facility], [place]     2. HTN WITH CKD------BP okay. Avoid hypotension     3. DMII WITH RENAL MANIFESTATIONS-----BS okay. Avoid hypoglycemia. Lantus, Glucometers, SSI. Follow with steroid exposure     4. COPD/COVID 19 + PNA/RML CONSOLIDATION-------Oxygen, Decadron, Supportive care. On abx per PCP     5. URETERAL CA S/P R NEPHRECTOMY/SOLITARY KIDNEY STATE     6.  PULMONARY HTN     7. OA/DJD-----No NSAIDs.       8. DVT PROPHYLAXIS------Lovenox     9. ELEVATED D-DIMER-------CT PE protocol recent admission negative     10. DEPRESSION-----Hold Zoloft given hyponatremia     11. GERD/PUD PROPHYLAXIS--------PPI. Benefits outweigh risks despite renal dysfunction    12. HYPONATREMIA------Resolved. Follow off of normal saline    13. HYPOCALCEMIA------Replaced    14. HYPOMAGNESEMIA------Replaced    15. HYPOALBUMINEMIA-----Better post IV Albumin to temporize    Cr stable  Ok for d/c from a renal standpoint      Rolando Miller MD  [facility]  [phone]  01/29/24  08:56 EST

## 2024-01-29 NOTE — PLAN OF CARE
Goal Outcome Evaluation:  Plan of Care Reviewed With: patient           Outcome Evaluation: Re-eval completed secondary to new PT order placed, assist of 1-2 people for mobility noted in chart and family worried about pt d/c home.    Pt initially evaluated on 1/23 with no skilled PT needs needed.  Pt on room air with telemetry this date.  Pt was mod I for bed moblity and independent for transfers.  Ambulated 100' with independence with 2 standing rest breaks required.  Pt continues to have no skilled PT needs.  Will d/c from PT caseload      Anticipated Discharge Disposition (PT): home

## 2024-01-29 NOTE — THERAPY RE-EVALUATION
Patient Name: Vianey Reeves  : 1945    MRN: [id number]                              Today's Date: 2024       Admit Date: 2024    Visit Dx:     ICD-10-CM ICD-9-CM   1. Pneumonia of right middle lobe due to infectious organism  J18.9 486   2. Generalized weakness  R53.1 780.79   3. Dyspnea, unspecified type  R06.00 786.09   4. COVID-19  U07.1 079.89     Patient Active Problem List   Diagnosis    Type 2 diabetes mellitus with chronic kidney disease    Essential (primary) hypertension    Mixed hyperlipidemia    Hiatal hernia    Generalized weakness    Long term current use of antithrombotics/antiplatelets    Stage 3b chronic kidney disease (CKD)    Simple chronic bronchitis    Atherosclerotic heart disease of native coronary artery without angina pectoris    Constipation    History of cancer of ureter    Solitary kidney    Atherosclerosis of native arteries of extremities with intermittent claudication, bilateral legs    H/O malignant neoplasm of ureter    Vertigo    Medicare annual wellness visit, subsequent    Presbyopia    Combined form of senile cataract    Cervical cancer screening    Postartificial menopausal syndrome    Left lower quadrant abdominal pain    Diverticula of colon    Ruptured tympanic membrane, left    Hearing loss, bilateral    TMJ pain dysfunction syndrome    Stage 4 chronic kidney disease    Other dysphagia    Lactose intolerance    Polyarthralgia    Acute right-sided thoracic back pain    Class 1 obesity due to excess calories with serious comorbidity and body mass index (BMI) of 34.0 to 34.9 in adult    Anxiety    Hypercalcemia    Screening mammogram, encounter for    Chronic bilateral low back pain without sciatica    Irregular heart beat    Palpitations    COPD (chronic obstructive pulmonary disease)    Gout of right foot    Plantar fasciitis of right foot    Lumbar radiculopathy    Overflow incontinence of urine    Urinary retention    Gastroesophageal reflux disease  without esophagitis    Leg swelling    Cellulitis of groin    Abdominal pain    Chest pain, unspecified type    Dyspnea    Hypoxia    CRF (chronic renal failure)    Pulmonary hypertension    Chest pain    Pneumonia of right middle lobe due to infectious organism    Dyspnea    Pneumonia     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    Hypertension     IBS (irritable colon syndrome)     constipation    Mass of right breast     Neuropathy     Renal insufficiency     Sciatic leg pain     right    Simple chronic bronchitis     Solitary kidney     left     Past Surgical History:   Procedure Laterality Date    BREAST BIOPSY Right     CARDIAC CATHETERIZATION      CHOLECYSTECTOMY      COLON SURGERY      REMOVAL diverticular diseae    COLONOSCOPY N/A 08/12/2021    Procedure: COLONOSCOPY with polypectomy x 3;  Surgeon: Margarette Mcallister MD;  Location: [facility] ENDOSCOPY;  Service: Gastroenterology;  Laterality: N/A;  post op: hmorrhoids, diverticulosis, polyps    CORONARY STENT PLACEMENT      ENDOSCOPY N/A 08/12/2021    Procedure: ESOPHAGOGASTRODUODENOSCOPY with dilatation (18-20 mm balloon) and (54 bougie);  Surgeon: Margarette Mcallister MD;  Location: [facility] ENDOSCOPY;  Service: Gastroenterology;  Laterality: N/A;  post op: esophageal stricture, esophagitis, gastritis, history of nissen    ENDOSCOPY N/A 9/13/2023    Procedure: ESOPHAGOGASTRODUODENOSCOPY with biopsy x 1 area and dilation  (50,54,56 non guided bougie);  Surgeon: Margarette Mcallister MD;  Location: [facility] ENDOSCOPY;  Service: Gastroenterology;  Laterality: N/A;  post op: esophageal stricture    EYE SURGERY Bilateral     cataracts    HIATAL HERNIA REPAIR      NEPHRECTOMY Right     cancer    UPPER ENDOSCOPIC ULTRASOUND W/ FNA N/A 09/22/2022    Procedure: EUS;  Surgeon: Margarette Mcallister MD;  Location: [facility] ENDOSCOPY;  Service: Gastroenterology;  Laterality: N/A;  post: normal pancreas, dilated pancreatic duct, hiatal hernia,       General Information       Row Name 01/29/24 1039          Physical Therapy Time and Intention    Document Type re-evaluation  -AM     Mode of Treatment physical therapy  -AM       Row Name 01/29/24 1039          General Information    Patient Profile Reviewed yes  -AM     Prior Level of Function independent:;community mobility;gait;transfer;bed mobility  -AM     Existing Precautions/Restrictions other (see comments)  Enhanced Droplet/Contact precuations  -AM     Barriers to Rehab medically complex  -AM       Row Name 01/29/24 1039          Living Environment    People in Home alone  -AM       Row Name 01/29/24 1039          Home Main Entrance    Number of Stairs, Main Entrance none  -AM       Row Name 01/29/24 1039          Stairs Within Home, Primary    Number of Stairs, Within Home, Primary none  -AM       Row Name 01/29/24 1039          Cognition    Orientation Status (Cognition) oriented x 4  -AM       Row Name 01/29/24 1039          Safety Issues, Functional Mobility    Impairments Affecting Function (Mobility) endurance/activity tolerance  -AM     Comment, Safety Issues/Impairments (Mobility) gait belt utilized  -AM               User Key  (r) = Recorded By, (t) = Taken By, (c) = Cosigned By      Initials Name Provider Type    AM Eusebio Mccall, PT Physical Therapist                   Mobility       Row Name 01/29/24 1040          Bed Mobility    Bed Mobility bed mobility (all) activities  -AM     All  Activities, [place] (Bed Mobility) independent  -AM       Row Name 01/29/24 1040          Sit-Stand Transfer    Sit-Stand [place] (Transfers) independent  -AM       Row Name 01/29/24 1040          Gait/Stairs (Locomotion)    [place] Level (Gait) independent  -AM     Distance in Feet (Gait) 100'  -AM     Deviations/Abnormal Patterns (Gait) gait speed decreased  -AM     Bilateral Gait Deviations forward flexed posture  -AM     Comment, (Gait/Stairs) decreased endurance, 2 standing rest breaks required  -AM               User Key  (r) = Recorded By, (t) = Taken By, (c) = Cosigned By      Initials Name Provider Type    AM Eusebio Mccall, PT Physical Therapist                   Obj/Interventions       Row Name 01/29/24 1040          Range of Motion Comprehensive    General Range of Motion no range of motion deficits identified  -AM       Row Name 01/29/24 1040          Strength Comprehensive (MMT)    General Manual Muscle Testing (MMT) Assessment no strength deficits identified  -AM       Row Name 01/29/24 1040          Balance    Balance Assessment sitting static balance;sitting dynamic balance;sit to stand dynamic balance;standing static balance;standing dynamic balance;system impairments  -AM     Static Sitting Balance independent  -AM     Dynamic Sitting Balance dependent  -AM     Position, Sitting Balance unsupported;sitting edge of bed  -AM     Sit to Stand Dynamic Balance independent  -AM     Static Standing Balance independent  -AM     Dynamic Standing Balance independent  -AM     Position/Device Used, Standing Balance supported  -AM       Row Name 01/29/24 1040          Sensory Assessment (Somatosensory)    Sensory Assessment (Somatosensory) sensation intact  -AM               User Key  (r) = Recorded By, (t) = Taken By, (c) = Cosigned By      Initials Name Provider Type    AM Eusebio Mccall, KIKI Physical Therapist                   Goals/Plan    No documentation.                  Clinical  Impression       Row Name 01/29/24 1041          Pain    Pretreatment Pain Rating 0/10 - no pain  -AM     Posttreatment Pain Rating 0/10 - no pain  -AM       Row Name 01/29/24 1041          Plan of Care Review    Plan of Care Reviewed With patient  -AM     Outcome Evaluation Re-eval completed secondary to new PT order placed, assist of 1-2 people for mobility noted in chart and family worried about pt d/c home.    Pt initially evaluated on 1/23 with no skilled PT needs needed.  Pt on room air with telemetry this date.  Pt was mod I for bed moblity and independent for transfers.  Ambulated 100' with independence with 2 standing rest breaks required.  Pt continues to have no skilled PT needs.  Will d/c from PT caseload  -AM       Row Name 01/29/24 1041          Therapy Assessment/Plan (PT)    Patient/Family Therapy Goals Statement (PT) to go home  -AM     Criteria for Skilled Interventions Met (PT) no;no problems identified which require skilled intervention  -AM     Therapy Frequency (PT) evaluation only  -AM       Row Name 01/29/24 1041          Vital Signs    Pre SpO2 (%) 96  -AM     O2 Delivery Pre Treatment room air  -AM     Intra SpO2 (%) 95  -AM     O2 Delivery Intra Treatment room air  -AM     Post SpO2 (%) 95  -AM     O2 Delivery Post Treatment room air  -AM     Pre Patient Position Supine  -AM     Intra Patient Position Standing  -AM     Post Patient Position Supine  -AM       Row Name 01/29/24 1041          Positioning and Restraints    Pre-Treatment Position in bed  -AM     Post Treatment Position bed  -AM     In Bed notified nsg;call light within reach;encouraged to call for assist;supine  -AM               User Key  (r) = Recorded By, (t) = Taken By, (c) = Cosigned By      Initials Name Provider Type    AM Eusebio Mccall, PT Physical Therapist                   Outcome Measures       Row Name 01/29/24 1046 01/29/24 0800       How much help from another person do you currently need...    Turning from your  back to your side while in flat bed without using bedrails? 4  -AM 4  -ES    Moving from lying on back to sitting on the side of a flat bed without bedrails? 4  -AM 4  -ES    Moving to and from a bed to a chair (including a wheelchair)? 4  -AM 4  -ES    Standing up from a chair using your arms (e.g., wheelchair, bedside chair)? 4  -AM 4  -ES    Climbing 3-5 steps with a railing? 4  -AM 4  -ES    To walk in hospital room? 4  -AM 4  -ES    AM-PAC 6 Clicks Score (PT) 24  -AM 24  -ES    Highest Level of Mobility Goal 8 --> Walked 250 feet or more  -AM 8 --> Walked 250 feet or more  -ES              User Key  (r) = Recorded By, (t) = Taken By, (c) = Cosigned By      Initials Name Provider Type    ES Brenda Ray LPN Licensed Nurse    Eusebio Javier, PT Physical Therapist                                 Physical Therapy Education       Title: PT OT SLP Therapies (Done)       Topic: Physical Therapy (Done)       Point: Mobility training (Done)       Learning Progress Summary             Patient Acceptance, E, VU by  at 1/29/2024 0920    Acceptance, E, VU by  at 1/26/2024 1025    Acceptance, E,TB, VU,DU by  at 1/23/2024 1608                                         User Key       Initials Effective Dates Name Provider Type Discipline    CM 06/16/21 -  Alecia Cheema, PT Physical Therapist PT     08/21/23 -  Brenda Ray LPN Licensed Nurse Nurse                  PT Recommendation and Plan     Plan of Care Reviewed With: patient  Outcome Evaluation: Re-eval completed secondary to new PT order placed, assist of 1-2 people for mobility noted in chart and family worried about pt d/c home.    Pt initially evaluated on 1/23 with no skilled PT needs needed.  Pt on room air with telemetry this date.  Pt was mod I for bed moblity and independent for transfers.  Ambulated 100' with independence with 2 standing rest breaks required.  Pt continues to have no skilled PT needs.  Will d/c from PT caseload     Time  Calculation:         PT Charges       Row Name 01/29/24 1047             Time Calculation    Start Time 0938  -AM      Stop Time 1000  -AM      Time Calculation (min) 22 min  -AM      PT Received On 01/29/24  -AM         Time Calculation- PT    Total Timed Code Minutes- PT 10 minute(s)  -AM                User Key  (r) = Recorded By, (t) = Taken By, (c) = Cosigned By      Initials Name Provider Type    AM Eusebio Mccall, PT Physical Therapist                  Therapy Charges for Today       Code Description Service Date Service Provider Modifiers Qty    26483173884 HC PT RE-EVAL ESTABLISHED PLAN 2 1/29/2024 Eusebio Mccall, PT GP 1    77636388233 HC GAIT TRAINING EA 15 MIN 1/29/2024 Eusebio Mccall, PT GP 1            PT G-Codes  AM-PAC 6 Clicks Score (PT): 24  PT Discharge Summary  Anticipated Discharge Disposition (PT): home    Eusebio Mccall, PT  1/29/2024

## 2024-01-29 NOTE — CASE MANAGEMENT/SOCIAL WORK
Case Management Discharge Note      Final Note: [facility].         Selected Continued Care - Discharged on 1/29/2024 Admission date: 1/21/2024 - Discharge disposition: Home or Self Care      Durable Medical Equipment Coordination complete.      Service Provider Selected Services Address Phone Fax Patient Preferred    [facility] [address], [place] [address] [phone] --              Home Medical Care Coordination complete.      Service Provider Selected Services Address Phone Fax Patient Preferred    [facility] ,  Home Nursing [address], [address], [place] [address] [phone] --             Transportation Services  Private: Car    Final Discharge Disposition Code: 06 - home with home health care

## 2024-01-29 NOTE — CASE MANAGEMENT/SOCIAL WORK
Continued Stay Note  PRAKASH Tillman     Patient Name: Vianey Reeves  MRN: [id number]  Today's Date: 1/29/2024    Admit Date: 1/21/2024    Plan: Return home with [facility] (accepted, order in). Home O2 current with [facility].   Discharge Plan       Row Name 01/29/24 1325       Plan    Plan Return home with [facility] (accepted, order in). Home O2 current with [facility].    Patient/Family in Agreement with Plan yes    Plan Comments Notified by PT that pt remains independent and safe to return home. HHC order placed by Dr Garcia and [facility] liaison Darlene notified. IMM copy delivered to pt.             Megan Naegele, RN     Office Phone: [phone]  Office Cell: [phone]

## 2024-01-29 NOTE — DISCHARGE SUMMARY
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]   Essential (primary) hypertension [I10]   Stage 3b chronic kidney disease (CKD) [N18.32]   Atherosclerotic heart disease of native coronary artery without angina pectoris [I25.10]   Thrush    Presenting Problem/History of Present Illness  Active Hospital Problems    Diagnosis  POA    **Pneumonia of right middle lobe due to infectious organism [J18.9]  Yes    Pneumonia [J18.9]  Yes    Dyspnea [R06.00]  Yes    COPD (chronic obstructive pulmonary disease) [J44.9]  Yes    Mixed hyperlipidemia [E78.2]  Yes    Generalized weakness [R53.1]  Yes    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          Hospital Course  Patient is a 78 y.o. female with COPD and history of recent COVID infection who presented with generalized weakness and progressive shortness of breath.  Chest x-ray showed new right middle lobe infiltrate and bandemia.  She was treated with IV antibiotics, steroids, bronchodilators, supplemental oxygen.  She slowly improved but was generally weak.  Renal function was stable for her.  She required as high as 8 L of oxygen at time of discharge this has been weaned to 2.5 L.  She is now oxygen dependent 24 hours a day, at least temporarily.  She is tolerating regular diet.  She is ambulating about the room independently.  She is safe to go home with family oversight.  She will complete a  short steroid taper.  She is being started on Symbicort and nebs.  She has completed her antibiotic course.  She has follow-up arranged with me later this week.    Procedures Performed         Consults:   Consults       Date and Time Order Name Status Description    1/25/2024 12:31 PM Inpatient Pulmonology Consult Completed     1/21/2024  6:50 PM Inpatient Nephrology Consult Completed     1/15/2024  9:44 AM Inpatient Nephrology Consult Completed     1/14/2024  3:33 PM Inpatient Cardiology Consult Completed             Pertinent Test Results:    Lab Results (most recent)       Procedure Component Value Units Date/Time    POC Glucose TID AC [[id number]]  (Abnormal) Collected: 01/29/24 1128    Specimen: Blood Updated: 01/29/24 1130     Glucose 204 mg/dL      Comment: Serial Number: [id number]:  [id number]       POC Glucose TID AC [[id number]]  (Abnormal) Collected: 01/29/24 0752    Specimen: Blood Updated: 01/29/24 0754     Glucose 113 mg/dL      Comment: Serial Number: [id number]:  [id number]       Comprehensive Metabolic Panel [[id number]]  (Abnormal) Collected: 01/29/24 0105    Specimen: Blood Updated: 01/29/24 0228     Glucose 209 mg/dL      BUN 21 mg/dL      Creatinine 1.16 mg/dL      Sodium 141 mmol/L      Potassium 4.1 mmol/L      Chloride 103 mmol/L      CO2 30.0 mmol/L      Calcium 8.5 mg/dL      Total Protein 5.2 g/dL      Albumin 2.9 g/dL      ALT (SGPT) 10 U/L      AST (SGOT) 7 U/L      Alkaline Phosphatase 45 U/L      Total Bilirubin <0.2 mg/dL      Globulin 2.3 gm/dL      A/G Ratio 1.3 g/dL      BUN/Creatinine Ratio 18.1     Anion Gap 8.0 mmol/L      eGFR 47.8 mL/min/1.73     Narrative:      GFR Normal >60  Chronic Kidney Disease <60  Kidney Failure <15    The GFR formula is only valid for adults with stable renal function between ages 18 and 70.    CBC & Differential [[id number]]  (Abnormal) Collected: 01/29/24 0105    Specimen: Blood Updated: 01/29/24 0139    Narrative:      The following orders  were created for panel order CBC & Differential.  Procedure                               Abnormality         Status                     ---------                               -----------         ------                     CBC Auto Differential[[id number]]        Abnormal            Final result                 Please view results for these tests on the individual orders.    CBC Auto Differential [[id number]]  (Abnormal) Collected: 01/29/24 0105    Specimen: Blood Updated: 01/29/24 0139     WBC 5.20 10*3/mm3      RBC 3.51 10*6/mm3      Hemoglobin 11.6 g/dL      Hematocrit 34.3 %      MCV 97.6 fL      MCH 33.2 pg      MCHC 34.0 g/dL      RDW 13.3 %      RDW-SD 48.1 fl      MPV 9.0 fL      Platelets 178 10*3/mm3      Neutrophil % 68.1 %      Lymphocyte % 18.6 %      Monocyte % 13.1 %      Eosinophil % 0.1 %      Basophil % 0.1 %      Neutrophils, Absolute 3.50 10*3/mm3      Lymphocytes, Absolute 1.00 10*3/mm3      Monocytes, Absolute 0.70 10*3/mm3      Eosinophils, Absolute 0.00 10*3/mm3      Basophils, Absolute 0.00 10*3/mm3      nRBC 0.2 /100 WBC     Comprehensive Metabolic Panel [[id number]]  (Abnormal) Collected: 01/28/24 0049    Specimen: Blood Updated: 01/28/24 0130     Glucose 220 mg/dL      BUN 25 mg/dL      Creatinine 1.25 mg/dL      Sodium 142 mmol/L      Potassium 4.1 mmol/L      Chloride 103 mmol/L      CO2 30.0 mmol/L      Calcium 8.9 mg/dL      Total Protein 5.6 g/dL      Albumin 3.2 g/dL      ALT (SGPT) 12 U/L      AST (SGOT) 8 U/L      Alkaline Phosphatase 46 U/L      Total Bilirubin 0.2 mg/dL      Globulin 2.4 gm/dL      A/G Ratio 1.3 g/dL      BUN/Creatinine Ratio 20.0     Anion Gap 9.0 mmol/L      eGFR 43.7 mL/min/1.73     Narrative:      GFR Normal >60  Chronic Kidney Disease <60  Kidney Failure <15    The GFR formula is only valid for adults with stable renal function between ages 18 and 70.    Magnesium [[id number]]  (Normal) Collected: 01/28/24 0049    Specimen: Blood Updated: 01/28/24 0130      Magnesium 1.9 mg/dL     Phosphorus [[id number]]  (Normal) Collected: 01/28/24 0049    Specimen: Blood Updated: 01/28/24 0130     Phosphorus 2.5 mg/dL     CBC & Differential [[id number]]  (Abnormal) Collected: 01/28/24 0048    Specimen: Blood Updated: 01/28/24 0058    Narrative:      The following orders were created for panel order CBC & Differential.  Procedure                               Abnormality         Status                     ---------                               -----------         ------                     CBC Auto Differential[[id number]]        Abnormal            Final result                 Please view results for these tests on the individual orders.    CBC Auto Differential [[id number]]  (Abnormal) Collected: 01/28/24 0048    Specimen: Blood Updated: 01/28/24 0058     WBC 4.70 10*3/mm3      RBC 3.61 10*6/mm3      Hemoglobin 11.7 g/dL      Hematocrit 35.7 %      MCV 98.9 fL      MCH 32.5 pg      MCHC 32.9 g/dL      RDW 13.2 %      RDW-SD 45.5 fl      MPV 8.9 fL      Platelets 183 10*3/mm3      Neutrophil % 69.9 %      Lymphocyte % 17.2 %      Monocyte % 12.6 %      Eosinophil % 0.0 %      Basophil % 0.3 %      Neutrophils, Absolute 3.30 10*3/mm3      Lymphocytes, Absolute 0.80 10*3/mm3      Monocytes, Absolute 0.60 10*3/mm3      Eosinophils, Absolute 0.00 10*3/mm3      Basophils, Absolute 0.00 10*3/mm3      nRBC 0.1 /100 WBC     Phosphorus [[id number]]  (Normal) Collected: 01/27/24 1525    Specimen: Blood Updated: 01/27/24 1705     Phosphorus 2.6 mg/dL     Magnesium [[id number]]  (Normal) Collected: 01/27/24 0222    Specimen: Blood Updated: 01/27/24 0337     Magnesium 2.1 mg/dL     Blood Culture - Blood, Arm, Left [[id number]]  (Normal) Collected: 01/21/24 1725    Specimen: Blood from Arm, Left Updated: 01/26/24 1731     Blood Culture No growth at 5 days    Narrative:      Less than seven (7) mL's of blood was collected.  Insufficient quantity may yield false negative results.    Blood Culture -  Blood, Hand, Right [[id number]]  (Normal) Collected: 01/21/24 1725    Specimen: Blood from Hand, Right Updated: 01/26/24 1731     Blood Culture No growth at 5 days    Respiratory Culture - Sputum, Cough [[id number]] Collected: 01/23/24 2050    Specimen: Sputum from Cough Updated: 01/24/24 0628     Respiratory Culture Rejected     Gram Stain Rare (1+) Epithelial cells per low power field      Rare (1+) WBCs per low power field      No organisms seen    Narrative:      Specimen rejected due to oropharyngeal contamination. Please reorder and recollect specimen if clinically necessary.    Calcium, Ionized [[id number]]  (Abnormal) Collected: 01/22/24 2249    Specimen: Blood Updated: 01/23/24 0007     Ionized Calcium 1.16 mmol/L     Respiratory Panel PCR w/COVID-19(SARS-CoV-2) NITA/KRISTEN/[facility]/PAD/COR/DYLON In-House, NP Swab in UTM/VTM, 2 HR TAT - Swab, Nasopharynx [[id number]]  (Abnormal) Collected: 01/22/24 1425    Specimen: Swab from Nasopharynx Updated: 01/22/24 1600     ADENOVIRUS, PCR Not Detected     Coronavirus 229E Not Detected     Coronavirus HKU1 Not Detected     Coronavirus NL63 Not Detected     Coronavirus OC43 Not Detected     COVID19 Detected     Human Metapneumovirus Not Detected     Human Rhinovirus/Enterovirus Not Detected     Influenza A PCR Not Detected     Influenza B PCR Not Detected     Parainfluenza Virus 1 Not Detected     Parainfluenza Virus 2 Not Detected     Parainfluenza Virus 3 Not Detected     Parainfluenza Virus 4 Not Detected     RSV, PCR Not Detected     Bordetella pertussis pcr Not Detected     Bordetella parapertussis PCR Not Detected     Chlamydophila pneumoniae PCR Not Detected     Mycoplasma pneumo by PCR Not Detected    Narrative:      In the setting of a positive respiratory panel with a viral infection PLUS a negative procalcitonin without other underlying concern for bacterial infection, consider observing off antibiotics or discontinuation of antibiotics and continue supportive care. If  the respiratory panel is positive for atypical bacterial infection (Bordetella pertussis, Chlamydophila pneumoniae, or Mycoplasma pneumoniae), consider antibiotic de-escalation to target atypical bacterial infection.    Scan Slide [[id number]] Collected: 01/22/24 0453    Specimen: Blood Updated: 01/22/24 0548     Scan Slide --     Comment: See Manual Differential Results       Manual Differential [[id number]]  (Abnormal) Collected: 01/22/24 0453    Specimen: Blood Updated: 01/22/24 0548     Neutrophil % 75.0 %      Lymphocyte % 18.0 %      Monocyte % 5.0 %      Metamyelocyte % 2.0 %      Neutrophils Absolute 1.80 10*3/mm3      Lymphocytes Absolute 0.43 10*3/mm3      Monocytes Absolute 0.12 10*3/mm3      Anisocytosis Slight/1+     WBC Morphology Normal     Platelet Estimate Decreased    Hemoglobin A1c [[id number]]  (Abnormal) Collected: 01/21/24 1556    Specimen: Blood from Arm, Left Updated: 01/21/24 2121     Hemoglobin A1C 7.10 %     High Sensitivity Troponin T 2Hr [[id number]]  (Abnormal) Collected: 01/21/24 1804    Specimen: Blood from Arm, Right Updated: 01/21/24 1830     HS Troponin T 59 ng/L      Troponin T Delta -2 ng/L     Narrative:      High Sensitive Troponin T Reference Range:  <14.0 ng/L- Negative Female for AMI  <22.0 ng/L- Negative Male for AMI  >=14 - Abnormal Female indicating possible myocardial injury.  >=22 - Abnormal Male indicating possible myocardial injury.   Clinicians would have to utilize clinical acumen, EKG, Troponin, and serial changes to determine if it is an Acute Myocardial Infarction or myocardial injury due to an underlying chronic condition.         Blood Gas, Arterial - [[id number]]  (Abnormal) Collected: 01/21/24 1757    Specimen: Arterial Blood Updated: 01/21/24 1759     Site Right Radial     Constantin's Test Positive     pH, Arterial 7.408 pH units      pCO2, Arterial 36.7 mm Hg      pO2, Arterial 68.7 mm Hg      HCO3, Arterial 23.1 mmol/L      Base Excess, Arterial -1.2 mmol/L       "Comment: Serial Number: [id number]:  [id number]        O2 Saturation, Arterial 93.7 %      CO2 Content 24.3 mmol/L      Barometric Pressure for Blood Gas --     Comment: N/A        Modality Cannula     FIO2 28 %      Hemodilution No    Procalcitonin [[id number]]  (Normal) Collected: 01/21/24 1556    Specimen: Blood from Arm, Left Updated: 01/21/24 1708     Procalcitonin 0.09 ng/mL     Narrative:      As a Marker for Sepsis (Non-Neonates):    1. <0.5 ng/mL represents a low risk of severe sepsis and/or septic shock.  2. >2 ng/mL represents a high risk of severe sepsis and/or septic shock.    As a Marker for Lower Respiratory Tract Infections that require antibiotic therapy:    PCT on Admission    Antibiotic Therapy       6-12 Hrs later    >0.5                Strongly Recommended  >0.25 - <0.5        Recommended   0.1 - 0.25          Discouraged              Remeasure/reassess PCT  <0.1                Strongly Discouraged     Remeasure/reassess PCT    As 28 day mortality risk marker: \"Change in Procalcitonin Result\" (>80% or <=80%) if Day 0 (or Day 1) and Day 4 values are available. Refer to http://www.[email or web address]pct[facility][email or web address]    Change in PCT <=80%  A decrease of PCT levels below or equal to 80% defines a positive change in PCT test result representing a higher risk for 28-day all-cause mortality of patients diagnosed with severe sepsis for septic shock.    Change in PCT >80%  A decrease of PCT levels of more than 80% defines a negative change in PCT result representing a lower risk for 28-day all-cause mortality of patients diagnosed with severe sepsis or septic shock.       [place] Draw [[id number]] Collected: 01/21/24 1556    Specimen: Blood from Arm, Left Updated: 01/21/24 1700    Narrative:      The following orders were created for panel order [place] Draw.  Procedure                               Abnormality         Status                     ---------                               -----------         ------      "                Green Top (Gel)[[id number]]                                  Final result               Lavender Top[[id number]]                                     Final result               Gold Top - SST[[id number]]                                   Final result               Light Blue Top[[id number]]                                   Final result                 Please view results for these tests on the individual orders.    Lavender Top [[id number]] Collected: 01/21/24 1556    Specimen: Blood from Arm, Left Updated: 01/21/24 1700     Extra Tube hold for add-on     Comment: Auto resulted       Green Top (Gel) [[id number]] Collected: 01/21/24 1556    Specimen: Blood from Arm, Left Updated: 01/21/24 1700     Extra Tube Hold for add-ons.     Comment: Auto resulted.       Gold Top - SST [[id number]] Collected: 01/21/24 1556    Specimen: Blood from Arm, Left Updated: 01/21/24 1700     Extra Tube Hold for add-ons.     Comment: Auto resulted.       Light Blue Top [[id number]] Collected: 01/21/24 1556    Specimen: Blood from Arm, Left Updated: 01/21/24 1700     Extra Tube Hold for add-ons.     Comment: Auto resulted       Scan Slide [[id number]] Collected: 01/21/24 1556    Specimen: Blood from Arm, Left Updated: 01/21/24 1633     Scan Slide --     Comment: See Manual Differential Results       Manual Differential [[id number]]  (Abnormal) Collected: 01/21/24 1556    Specimen: Blood from Arm, Left Updated: 01/21/24 1633     Neutrophil % 47.0 %      Lymphocyte % 20.0 %      Monocyte % 10.0 %      Basophil % 1.0 %      Bands %  19.0 %      Metamyelocyte % 1.0 %      Atypical Lymphocyte % 2.0 %      Neutrophils Absolute 2.90 10*3/mm3      Lymphocytes Absolute 0.97 10*3/mm3      Monocytes Absolute 0.44 10*3/mm3      Basophils Absolute 0.04 10*3/mm3      Macrocytes Slight/1+     WBC Morphology Normal     Platelet Morphology Normal    Single High Sensitivity Troponin T [[id number]]  (Abnormal) Collected: 01/21/24 1556    Specimen: Blood  from Arm, Left Updated: 01/21/24 1622     HS Troponin T 61 ng/L     Narrative:      High Sensitive Troponin T Reference Range:  <14.0 ng/L- Negative Female for AMI  <22.0 ng/L- Negative Male for AMI  >=14 - Abnormal Female indicating possible myocardial injury.  >=22 - Abnormal Male indicating possible myocardial injury.   Clinicians would have to utilize clinical acumen, EKG, Troponin, and serial changes to determine if it is an Acute Myocardial Infarction or myocardial injury due to an underlying chronic condition.         BNP [[id number]]  (Normal) Collected: 01/21/24 1556    Specimen: Blood from Arm, Left Updated: 01/21/24 1620     proBNP 395.8 pg/mL     Narrative:      This assay is used as an aid in the diagnosis of individuals suspected of having heart failure. It can be used as an aid in the diagnosis of acute decompensated heart failure (ADHF) in patients presenting with signs and symptoms of ADHF to the emergency department (ED). In addition, NT-proBNP of <300 pg/mL indicates ADHF is not likely.    Age Range Result Interpretation  NT-proBNP Concentration (pg/mL:      <50             Positive            >450                   Gray                 300-450                    Negative             <300    50-75           Positive            >900                  Gray                300-900                  Negative            <300      >75             Positive            >1800                  Gray                300-1800                  Negative            <300             Results for orders placed during the hospital encounter of 01/14/24    Adult Transthoracic Echo Complete W/ Cont if Necessary Per Protocol    Interpretation Summary    Left ventricular systolic function is normal. Calculated left ventricular EF = 51% Left ventricular ejection fraction appears to be 51 - 55%.    Left ventricular diastolic function is consistent with (grade I) impaired relaxation.    The left atrial cavity is mild to  moderately dilated.    There is mild calcification of the aortic valve mainly affecting the left coronary and right coronary cusp(s).    Moderate mitral valve regurgitation is present.    Estimated right ventricular systolic pressure from tricuspid regurgitation is normal (<35 mmHg).              Condition on Discharge: Improved, stable    Vital Signs  Temp:  [97.5 °F (36.4 °C)-98.1 °F (36.7 °C)] 98.1 °F (36.7 °C)  Heart Rate:  [54-73] 54  Resp:  [12-18] 18  BP: (110-159)/(60-73) 159/73    Physical Exam:     General Appearance:    Alert, cooperative, in no acute distress   Head:    Normocephalic, without obvious abnormality, atraumatic   Eyes:            Lids and lashes normal, conjunctivae and sclerae normal, no   icterus, no pallor, corneas clear, PERRLA   Ears:    Ears appear intact with no abnormalities noted   Throat:   No oral lesions, no thrush, oral mucosa moist   Neck:   No adenopathy, supple, trachea midline, no thyromegaly, no   carotid bruit, no JVD   Lungs:   Few scattered wheezes    Heart:    Regular rhythm and normal rate, normal S1 and S2, no            murmur, no gallop, no rub, no click   Chest Wall:    No abnormalities observed   Abdomen:     Normal bowel sounds, no masses, no organomegaly, soft        non-tender, non-distended, no guarding, no rebound                tenderness   Extremities:   Moves all extremities well, no edema, no cyanosis, no             redness   Pulses:   Pulses palpable and equal bilaterally   Skin:   No bleeding, bruising or rash   Lymph nodes:   No palpable adenopathy   Neurologic:   Cranial nerves 2 - 12 grossly intact, sensation intact, DTR       present and equal bilaterally       Discharge Disposition  Home or Self Care    Discharge Medications     Discharge Medications        New Medications        Instructions Start Date   budesonide-formoterol 160-4.5 MCG/ACT inhaler  Commonly known as: SYMBICORT   2 puffs, Inhalation, 2 Times Daily - RT      guaiFENesin 600 MG 12  hr tablet  Commonly known as: MUCINEX   600 mg, Oral, Every 12 Hours Scheduled      nystatin 100,000 unit/mL suspension  Commonly known as: MYCOSTATIN   500,000 Units, Swish & Swallow, 4 Times Daily      predniSONE 10 MG tablet  Commonly known as: DELTASONE   2 po q day x 4, 1 po q day x 4   Start Date: January 30, 2024            Changes to Medications        Instructions Start Date   ipratropium-albuterol 0.5-2.5 mg/3 ml nebulizer  Commonly known as: DUO-NEB  What changed:   when to take this  reasons to take this   3 mL, Nebulization, 4 Times Daily PRN             Continue These Medications        Instructions Start Date   amLODIPine 10 MG tablet  Commonly known as: NORVASC   10 mg, Oral, Daily      aspirin 81 MG tablet   81 mg, Oral, Nightly      carvedilol 12.5 MG tablet  Commonly known as: COREG   12.5 mg, Oral, 2 Times Daily With Meals      cyanocobalamin 1000 MCG/ML injection   Inject 1 mL into the appropriate muscle as directed by prescriber Every 28 (Twenty-Eight) Days.      Diclofenac Sodium 1 % gel gel  Commonly known as: VOLTAREN   Apply 4 g topically to the appropriate area as directed 3 (Three) Times a Day As Needed (pain).      doxazosin 2 MG tablet  Commonly known as: CARDURA   2 mg, Oral, Nightly      estradiol 0.1 MG/GM vaginal cream  Commonly known as: ESTRACE   Insert 1 applicator into the vagina 3 (Three) Times a Week. Monday, Wednesday and Friday      Evolocumab solution auto-injector SureClick injection  Commonly known as: REPATHA   140 mg, Subcutaneous, Every 14 Days      Finerenone 10 MG tablet   10 mg, Oral, Daily      gabapentin 100 MG capsule  Commonly known as: NEURONTIN   100 mg, Oral, Nightly      hydrALAZINE 25 MG tablet  Commonly known as: APRESOLINE   25 mg, Oral, 2 Times Daily      HYDROcodone-acetaminophen 5-325 MG per tablet  Commonly known as: NORCO   1 tablet, Oral, Every 8 Hours PRN      Lantus SoloStar 100 UNIT/ML injection pen  Generic drug: Insulin Glargine   10 Units,  Subcutaneous, Nightly      Linzess 145 MCG capsule capsule  Generic drug: linaclotide   145 mcg, Oral, Daily PRN      methocarbamol 500 MG tablet  Commonly known as: ROBAXIN   500 mg, Oral, 4 Times Daily      omeprazole 40 MG capsule  Commonly known as: priLOSEC   40 mg, Oral, Every Morning      polyethylene glycol 17 g packet  Commonly known as: MIRALAX   17 g, Oral, Daily      sertraline 50 MG tablet  Commonly known as: ZOLOFT   50 mg, Oral, Daily      SITagliptin 100 MG tablet  Commonly known as: JANUVIA   100 mg, Oral, Daily      terbinafine 250 MG tablet  Commonly known as: lamiSIL   250 mg, Oral, Daily      traMADol 50 MG tablet  Commonly known as: ULTRAM   100 mg, Oral, 2 Times Daily PRN      Tymlos 3120 MCG/1.56ML solution pen-injector  Generic drug: Abaloparatide   Subcutaneous, Daily             Stop These Medications      albuterol (2.5 MG/3ML) 0.083% nebulizer solution  Commonly known as: PROVENTIL              Discharge Diet: Consistent carbohydrate    Activity at Discharge: Gradually resume normal 70s    Follow-up Appointments  Future Appointments   Date Time Provider Department Center   2/15/2024  2:15 PM Leon Gonzalez, DO [facility]     Additional Instructions for the Follow-ups that You Need to Schedule       Discharge Follow-up with PCP   As directed       Currently Documented PCP:    Anny Garcia MD    PCP Phone Number:    [phone]     Follow Up Details: January 31 at 4 pm                Test Results Pending at Discharge       Anny Garcia MD  01/29/24  11:36 EST    Time: Discharge 35 min

## 2024-01-29 NOTE — PLAN OF CARE
Goal Outcome Evaluation:  Plan of Care Reviewed With: patient        Progress: no change  Outcome Evaluation: Pt has slept on and off tonight, no new complaints at this time, pt reminded to turn throughout the night, pt remains in covid precautions, possible d/c home with HH, PT to reevaluate

## 2024-01-29 NOTE — PLAN OF CARE
Goal Outcome Evaluation:  Plan of Care Reviewed With: patient, daughter        Progress: improving  Outcome Evaluation: Patient is discharging home with daughter via private vehicle. Will be going with oxygen and PO prednisone. TANIA FLEMING. Education complete.

## 2024-01-29 NOTE — PROGRESS NOTES
LOS: 6 days   Patient Care Team:  Anny Garcia MD as PCP - General (Internal Medicine)  Sergio Ordonez MD as Consulting Physician (Nephrology)    Subjective     She is feeling better and wants to go home      Review of Systems   Constitutional:  Positive for activity change and fatigue.   HENT: Negative.     Respiratory:  Positive for cough. Negative for shortness of breath.    Cardiovascular: Negative.    Gastrointestinal: Negative.    Genitourinary: Negative.    Musculoskeletal: Negative.    Neurological:  Positive for weakness.   Psychiatric/Behavioral: Negative.             Objective     Vital Signs  Temp:  [97.5 °F (36.4 °C)-98 °F (36.7 °C)] 97.5 °F (36.4 °C)  Heart Rate:  [54-73] 54  Resp:  [12-18] 18  BP: (103-159)/(60-73) 159/73      Physical Exam  Vitals reviewed.   Constitutional:       Appearance: She is not ill-appearing.   HENT:      Head: Normocephalic and atraumatic.      Right Ear: External ear normal.      Left Ear: External ear normal.      Nose: Nose normal.      Mouth/Throat:      Mouth: Mucous membranes are dry.   Eyes:      General:         Right eye: No discharge.         Left eye: No discharge.   Cardiovascular:      Rate and Rhythm: Normal rate and regular rhythm.      Pulses: Normal pulses.      Heart sounds: Normal heart sounds.   Pulmonary:      Effort: Pulmonary effort is normal.      Breath sounds: Normal breath sounds.   Abdominal:      General: Bowel sounds are normal.      Palpations: Abdomen is soft.   Musculoskeletal:         General: Normal range of motion.      Cervical back: Normal range of motion.   Skin:     General: Skin is warm.   Neurological:      Mental Status: She is alert and oriented to person, place, and time.   Psychiatric:         Behavior: Behavior normal.              Results Review:    Lab Results (last 24 hours)       Procedure Component Value Units Date/Time    POC Glucose TID AC [[id number]]  (Abnormal) Collected: 01/29/24 7004    Specimen:  Blood Updated: 01/29/24 0754     Glucose 113 mg/dL      Comment: Serial Number: [id number]:  [id number]       Comprehensive Metabolic Panel [[id number]]  (Abnormal) Collected: 01/29/24 0105    Specimen: Blood Updated: 01/29/24 0228     Glucose 209 mg/dL      BUN 21 mg/dL      Creatinine 1.16 mg/dL      Sodium 141 mmol/L      Potassium 4.1 mmol/L      Chloride 103 mmol/L      CO2 30.0 mmol/L      Calcium 8.5 mg/dL      Total Protein 5.2 g/dL      Albumin 2.9 g/dL      ALT (SGPT) 10 U/L      AST (SGOT) 7 U/L      Alkaline Phosphatase 45 U/L      Total Bilirubin <0.2 mg/dL      Globulin 2.3 gm/dL      A/G Ratio 1.3 g/dL      BUN/Creatinine Ratio 18.1     Anion Gap 8.0 mmol/L      eGFR 47.8 mL/min/1.73     Narrative:      GFR Normal >60  Chronic Kidney Disease <60  Kidney Failure <15    The GFR formula is only valid for adults with stable renal function between ages 18 and 70.    CBC & Differential [[id number]]  (Abnormal) Collected: 01/29/24 0105    Specimen: Blood Updated: 01/29/24 0139    Narrative:      The following orders were created for panel order CBC & Differential.  Procedure                               Abnormality         Status                     ---------                               -----------         ------                     CBC Auto Differential[[id number]]        Abnormal            Final result                 Please view results for these tests on the individual orders.    CBC Auto Differential [[id number]]  (Abnormal) Collected: 01/29/24 0105    Specimen: Blood Updated: 01/29/24 0139     WBC 5.20 10*3/mm3      RBC 3.51 10*6/mm3      Hemoglobin 11.6 g/dL      Hematocrit 34.3 %      MCV 97.6 fL      MCH 33.2 pg      MCHC 34.0 g/dL      RDW 13.3 %      RDW-SD 48.1 fl      MPV 9.0 fL      Platelets 178 10*3/mm3      Neutrophil % 68.1 %      Lymphocyte % 18.6 %      Monocyte % 13.1 %      Eosinophil % 0.1 %      Basophil % 0.1 %      Neutrophils, Absolute 3.50 10*3/mm3      Lymphocytes, Absolute  1.00 10*3/mm3      Monocytes, Absolute 0.70 10*3/mm3      Eosinophils, Absolute 0.00 10*3/mm3      Basophils, Absolute 0.00 10*3/mm3      nRBC 0.2 /100 WBC     POC Glucose Once [[id number]]  (Abnormal) Collected: 01/28/24 1642    Specimen: Blood Updated: 01/28/24 1643     Glucose 287 mg/dL      Comment: Serial Number: [id number]:  [id number]       POC Glucose TID AC [[id number]]  (Abnormal) Collected: 01/28/24 1120    Specimen: Blood Updated: 01/28/24 1122     Glucose 234 mg/dL      Comment: Serial Number: [id number]:  [id number]                Imaging Results (Last 24 Hours)       ** No results found for the last 24 hours. **                 I reviewed the patient's new clinical results.    Medication Review:   Scheduled Meds:albuterol sulfate HFA, 2 puff, Inhalation, 4x Daily - RT  amLODIPine, 10 mg, Oral, Daily  aspirin, 81 mg, Oral, Daily  budesonide-formoterol, 2 puff, Inhalation, BID - RT  carvedilol, 12.5 mg, Oral, BID With Meals  enoxaparin, 40 mg, Subcutaneous, Q24H  gabapentin, 100 mg, Oral, TID  guaiFENesin, 600 mg, Oral, Q12H  hydrALAZINE, 25 mg, Oral, BID  insulin glargine, 10 Units, Subcutaneous, Nightly  insulin lispro, 2-7 Units, Subcutaneous, TID With Meals  linagliptin, 5 mg, Oral, Daily  magnesium oxide, 400 mg, Oral, Daily  methocarbamol, 500 mg, Oral, 4x Daily  nystatin, 5 mL, Swish & Swallow, 4x Daily  pantoprazole, 40 mg, Oral, Q AM  polyethylene glycol, 17 g, Oral, Daily  predniSONE, 20 mg, Oral, Daily With Breakfast  senna-docusate sodium, 2 tablet, Oral, BID  sertraline, 50 mg, Oral, Nightly  sodium chloride, 10 mL, Intravenous, Q12H  terazosin, 2 mg, Oral, Nightly      Continuous Infusions:Pharmacy to Dose enoxaparin (LOVENOX),       PRN Meds:.  albuterol    albuterol sulfate HFA    senna-docusate sodium **AND** polyethylene glycol **AND** bisacodyl **AND** bisacodyl    Calcium Replacement - Follow Nurse / BPA Driven Protocol    dextrose    dextrose    glucagon (human  recombinant)    HYDROcodone-acetaminophen    Magnesium Standard Dose Replacement - Follow Nurse / BPA Driven Protocol    nitroglycerin    ondansetron    Pharmacy to Dose enoxaparin (LOVENOX)    Phosphorus Replacement - Follow Nurse / BPA Driven Protocol    Potassium Replacement - Follow Nurse / BPA Driven Protocol    sodium chloride    sodium chloride    sodium chloride    traMADol     Interval History:    Assessment & Plan     Pneumonia  COVID 19 +  Generalized weakness  Dyspnea   DC Unasyn and start oral Augmentin  Wean steroids   CXR with vascular congestion  Mucinex /Symbicort/ Duo-neb and albuterol neb       Chronic back pain  Recurrent urinary tract infections  Mood disorder  Essential hypertension  Kidney disease stage III  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

## 2024-01-29 NOTE — PROGRESS NOTES
Daily Progress Note          Assessment    Pneumonia of right middle lobe due to infectious organism  Hypoxemia  Recent COVID-19 infection 1/14/2024  Organizing pneumonia post COVID-19 infection  COPD  CKD  HTN  Diabetes mellitus        Recommendations:      Antibiotics: Completed Augmentin    Steroids: patient will need to stay on prednisone upon discharge until hypoxia resolves for residual organizing pneumonia post-COVID: Prednisone 20 mg daily x 1 week, 10 mg daily x 1 week, 5 mg daily x 1 week    Oxygen supplement and titration to maintain saturation 90 to 95%:     Currently requiring 2-3 L per nasal cannula  Bronchodilators  Symbicort  Mucinex  Diuresis  BP control  Glucose control  Aspirin  DVT/GI prophylaxis  Check daily labs and correct electrolytes as needed  I personally reviewed the radiological studies               LOS: 6 days     Subjective     Patient reports improvement in the cough and shortness of breath    Objective     Vital signs for last 24 hours:  Vitals:    01/28/24 2154 01/29/24 0019 01/29/24 0418 01/29/24 0749   BP: 122/69 128/69 139/71 159/73   BP Location:  Left arm Left arm Left arm   Patient Position:  Lying Lying Lying   Pulse: 68 59 69 54   Resp:  16 16 18   Temp:  98 °F (36.7 °C) 97.7 °F (36.5 °C) 97.5 °F (36.4 °C)   TempSrc:  Oral Oral Oral   SpO2: 91% 94% 98% 93%   Weight:   84.9 kg (187 lb 2.7 oz)    Height:           Intake/Output last 3 shifts:  I/O last 3 completed shifts:  In: 1440 [P.O.:1440]  Out: -   Intake/Output this shift:  No intake/output data recorded.      Radiology  Imaging Results (Last 24 Hours)       ** No results found for the last 24 hours. **            Labs:  Results from last 7 days   Lab Units 01/29/24  0105   WBC 10*3/mm3 5.20   HEMOGLOBIN g/dL 11.6*   HEMATOCRIT % 34.3   PLATELETS 10*3/mm3 178     Results from last 7 days   Lab Units 01/29/24  0105   SODIUM mmol/L 141   POTASSIUM mmol/L 4.1   CHLORIDE mmol/L 103   CO2 mmol/L 30.0*   BUN mg/dL 21    CREATININE mg/dL 1.16*   CALCIUM mg/dL 8.5*   BILIRUBIN mg/dL <0.2   ALK PHOS U/L 45   ALT (SGPT) U/L 10   AST (SGOT) U/L 7   GLUCOSE mg/dL 209*           Results from last 7 days   Lab Units 01/29/24  0105 01/28/24  0049 01/27/24  0222   ALBUMIN g/dL 2.9* 3.2* 3.4*               Results from last 7 days   Lab Units 01/28/24  0049   MAGNESIUM mg/dL 1.9                   Meds:   SCHEDULE  albuterol sulfate HFA, 2 puff, Inhalation, 4x Daily - RT  amLODIPine, 10 mg, Oral, Daily  aspirin, 81 mg, Oral, Daily  budesonide-formoterol, 2 puff, Inhalation, BID - RT  carvedilol, 12.5 mg, Oral, BID With Meals  enoxaparin, 40 mg, Subcutaneous, Q24H  gabapentin, 100 mg, Oral, TID  guaiFENesin, 600 mg, Oral, Q12H  hydrALAZINE, 25 mg, Oral, BID  insulin glargine, 10 Units, Subcutaneous, Nightly  insulin lispro, 2-7 Units, Subcutaneous, TID With Meals  linagliptin, 5 mg, Oral, Daily  magnesium oxide, 400 mg, Oral, Daily  methocarbamol, 500 mg, Oral, 4x Daily  nystatin, 5 mL, Swish & Swallow, 4x Daily  pantoprazole, 40 mg, Oral, Q AM  polyethylene glycol, 17 g, Oral, Daily  predniSONE, 20 mg, Oral, Daily With Breakfast  senna-docusate sodium, 2 tablet, Oral, BID  sertraline, 50 mg, Oral, Nightly  sodium chloride, 10 mL, Intravenous, Q12H  terazosin, 2 mg, Oral, Nightly      Infusions  Pharmacy to Dose enoxaparin (LOVENOX),       PRNs    albuterol    albuterol sulfate HFA    senna-docusate sodium **AND** polyethylene glycol **AND** bisacodyl **AND** bisacodyl    Calcium Replacement - Follow Nurse / BPA Driven Protocol    dextrose    dextrose    glucagon (human recombinant)    HYDROcodone-acetaminophen    Magnesium Standard Dose Replacement - Follow Nurse / BPA Driven Protocol    nitroglycerin    ondansetron    Pharmacy to Dose enoxaparin (LOVENOX)    Phosphorus Replacement - Follow Nurse / BPA Driven Protocol    Potassium Replacement - Follow Nurse / BPA Driven Protocol    sodium chloride    sodium chloride    sodium chloride     traMADol    Physical Exam:  General Appearance:  Alert   HEENT:  Normocephalic, without obvious abnormality, Conjunctiva/corneas clear,.   Nares normal, no drainage     Neck:  Supple, symmetrical, trachea midline.   Lungs /Chest wall:   Good air entry bilaterally with mild bilateral basal rhonchi, respirations unlabored, symmetrical wall movement.     Heart:  Regular rate and rhythm, S1 S2 normal  Abdomen: Soft, non-tender, no masses, no organomegaly.    Extremities: No edema, no clubbing or cyanosis     ROS  Constitutional: Negative for chills, fever and malaise/fatigue.   HENT: Negative.    Eyes: Negative.    Cardiovascular: Negative.    Respiratory: Positive for cough and shortness of breath.    Skin: Negative.    Musculoskeletal: Negative.    Gastrointestinal: Negative.    Genitourinary: Negative.    Neurological: Negative.    Psychiatric/Behavioral: Negative.      I reviewed the recent clinical results  I personally reviewed the latest radiological studies    Part of this note may be an electronic transcription/translation of spoken language to printed text using the Dragon Dictation System.

## 2024-01-30 NOTE — OUTREACH NOTE
Prep Survey      Flowsheet Row Responses   [facility] facility patient discharged from? [facility]   Is LACE score < 7 ? No   Eligibility Readm Mgmt   Discharge diagnosis PNA   Does the patient have one of the following disease processes/diagnoses(primary or secondary)? Pneumonia   Does the patient have Home health ordered? Yes   What is the Home health agency?  [facility]   Is there a DME ordered? Yes   What DME was ordered? Home O2 current with [facility]. home nebulizer from [facility]   Prep survey completed? Yes            Tammie BARR - Registered Nurse

## 2024-02-01 ENCOUNTER — READMISSION MANAGEMENT (OUTPATIENT)
Dept: CALL CENTER | Facility: HOSPITAL | Age: 79
End: 2024-02-01
Payer: MEDICARE

## 2024-02-01 NOTE — OUTREACH NOTE
COPD/PN Week 1 Survey      Flowsheet Row Responses   [facility] facility patient discharged from? [facility]   Does the patient have one of the following disease processes/diagnoses(primary or secondary)? Pneumonia   Week 1 attempt successful? No   Unsuccessful attempts Attempt 1            Li TERRY - Registered Nurse

## 2024-02-05 ENCOUNTER — READMISSION MANAGEMENT (OUTPATIENT)
Dept: CALL CENTER | Facility: HOSPITAL | Age: 79
End: 2024-02-05
Payer: MEDICARE

## 2024-02-05 NOTE — OUTREACH NOTE
COPD/PN Week 1 Survey      Flowsheet Row Responses   [facility] patient discharged from? [facility]   Does the patient have one of the following disease processes/diagnoses(primary or secondary)? Pneumonia   Week 1 attempt successful? Yes   Call start time 1551   Call end time 1552   Discharge diagnosis PNA   Person spoke with today (if not patient) and relationship dtr   Meds reviewed with patient/caregiver? Yes   Is the patient having any side effects they believe may be caused by any medication additions or changes? No   Does the patient have all medications ordered at discharge? Yes   Is the patient taking all medications as directed (includes completed medication regime)? Yes   Does the patient have a primary care provider?  Yes   Does the patient have an appointment with their PCP or specialist within 7 days of discharge? Yes   Comments regarding PCP Patient has been back to see his PCP   Has the patient kept scheduled appointments due by today? Yes   What is the Home health agency?  [facility]    Has home health visited the patient within 72 hours of discharge? Yes   Psychosocial issues? No   Did the patient receive a copy of their discharge instructions? Yes   Nursing interventions Reviewed instructions with patient   What is the patient's perception of their health status since discharge? Improving   Is the patient/caregiver able to teach back the hierarchy of who to call/visit for symptoms/problems? PCP, Specialist, Home health nurse, Urgent Care, ED, 911 Yes   Is the patient/caregiver able to teach back signs and symptoms of worsening condition: Fever/chills, Shortness of breath, Chest pain   Is the patient/caregiver able to teach back importance of completing antibiotic course of treatment? Yes   Week 1 call completed? Yes   Graduated Yes   Is the patient interested in additional calls from an ambulatory ? No   Would this patient benefit from a Referral to [facility] Social Work? No   Call end time 1552             Matheus OCHOA - Registered Nurse

## 2024-02-09 ENCOUNTER — LAB (OUTPATIENT)
Dept: LAB | Facility: HOSPITAL | Age: 79
End: 2024-02-09
Payer: MEDICARE

## 2024-02-09 DIAGNOSIS — E86.0 DEHYDRATION: ICD-10-CM

## 2024-02-09 DIAGNOSIS — T50.B95A FATIGUE AFTER COVID-19 VACCINATION: ICD-10-CM

## 2024-02-09 DIAGNOSIS — T50.B95A FATIGUE AFTER COVID-19 VACCINATION: Primary | ICD-10-CM

## 2024-02-09 DIAGNOSIS — R53.83 FATIGUE AFTER COVID-19 VACCINATION: Primary | ICD-10-CM

## 2024-02-09 DIAGNOSIS — N18.9 CHRONIC KIDNEY DISEASE, UNSPECIFIED CKD STAGE: ICD-10-CM

## 2024-02-09 DIAGNOSIS — R53.83 FATIGUE AFTER COVID-19 VACCINATION: ICD-10-CM

## 2024-02-09 LAB
ALBUMIN SERPL-MCNC: 3.6 G/DL (ref 3.5–5.2)
ALBUMIN/GLOB SERPL: 1.6 G/DL
ALP SERPL-CCNC: 64 U/L (ref 39–117)
ALT SERPL W P-5'-P-CCNC: 13 U/L (ref 1–33)
ANION GAP SERPL CALCULATED.3IONS-SCNC: 9 MMOL/L (ref 5–15)
AST SERPL-CCNC: 9 U/L (ref 1–32)
BILIRUB SERPL-MCNC: 0.3 MG/DL (ref 0–1.2)
BUN SERPL-MCNC: 18 MG/DL (ref 8–23)
BUN/CREAT SERPL: 11.6 (ref 7–25)
CALCIUM SPEC-SCNC: 9.3 MG/DL (ref 8.6–10.5)
CHLORIDE SERPL-SCNC: 104 MMOL/L (ref 98–107)
CO2 SERPL-SCNC: 27 MMOL/L (ref 22–29)
CREAT SERPL-MCNC: 1.55 MG/DL (ref 0.57–1)
DEPRECATED RDW RBC AUTO: 49 FL (ref 37–54)
EGFRCR SERPLBLD CKD-EPI 2021: 33.7 ML/MIN/1.73
ERYTHROCYTE [DISTWIDTH] IN BLOOD BY AUTOMATED COUNT: 13.9 % (ref 12.3–15.4)
GLOBULIN UR ELPH-MCNC: 2.2 GM/DL
GLUCOSE SERPL-MCNC: 199 MG/DL (ref 65–99)
HCT VFR BLD AUTO: 35.8 % (ref 34–46.6)
HGB BLD-MCNC: 11.6 G/DL (ref 12–15.9)
IRON 24H UR-MRATE: 30 MCG/DL (ref 37–145)
IRON SATN MFR SERPL: 12 % (ref 20–50)
LYMPHOCYTES # BLD MANUAL: 1.41 10*3/MM3 (ref 0.7–3.1)
LYMPHOCYTES NFR BLD MANUAL: 13 % (ref 5–12)
MACROCYTES BLD QL SMEAR: ABNORMAL
MCH RBC QN AUTO: 32.9 PG (ref 26.6–33)
MCHC RBC AUTO-ENTMCNC: 32.5 G/DL (ref 31.5–35.7)
MCV RBC AUTO: 101.2 FL (ref 79–97)
MONOCYTES # BLD: 0.83 10*3/MM3 (ref 0.1–0.9)
MYELOCYTES NFR BLD MANUAL: 1 % (ref 0–0)
NEUTROPHILS # BLD AUTO: 4.1 10*3/MM3 (ref 1.7–7)
NEUTROPHILS NFR BLD MANUAL: 64 % (ref 42.7–76)
PLAT MORPH BLD: NORMAL
PLATELET # BLD AUTO: 167 10*3/MM3 (ref 140–450)
PMV BLD AUTO: 8.9 FL (ref 6–12)
POLYCHROMASIA BLD QL SMEAR: ABNORMAL
POTASSIUM SERPL-SCNC: 4.4 MMOL/L (ref 3.5–5.2)
PROT SERPL-MCNC: 5.8 G/DL (ref 6–8.5)
RBC # BLD AUTO: 3.54 10*6/MM3 (ref 3.77–5.28)
SCAN SLIDE: NORMAL
SODIUM SERPL-SCNC: 140 MMOL/L (ref 136–145)
T4 FREE SERPL-MCNC: 1.41 NG/DL (ref 0.93–1.7)
TIBC SERPL-MCNC: 246 MCG/DL (ref 298–536)
TRANSFERRIN SERPL-MCNC: 165 MG/DL (ref 200–360)
TSH SERPL DL<=0.05 MIU/L-ACNC: 0.75 UIU/ML (ref 0.27–4.2)
VARIANT LYMPHS NFR BLD MANUAL: 22 % (ref 19.6–45.3)
WBC MORPH BLD: NORMAL
WBC NRBC COR # BLD AUTO: 6.4 10*3/MM3 (ref 3.4–10.8)

## 2024-02-09 PROCEDURE — 84439 ASSAY OF FREE THYROXINE: CPT

## 2024-02-09 PROCEDURE — 36415 COLL VENOUS BLD VENIPUNCTURE: CPT

## 2024-02-09 PROCEDURE — 84443 ASSAY THYROID STIM HORMONE: CPT

## 2024-02-09 PROCEDURE — 84466 ASSAY OF TRANSFERRIN: CPT

## 2024-02-09 PROCEDURE — 83540 ASSAY OF IRON: CPT

## 2024-02-09 PROCEDURE — 85025 COMPLETE CBC W/AUTO DIFF WBC: CPT

## 2024-02-09 PROCEDURE — 80053 COMPREHEN METABOLIC PANEL: CPT

## 2024-02-09 PROCEDURE — 85007 BL SMEAR W/DIFF WBC COUNT: CPT

## 2024-02-15 ENCOUNTER — OFFICE VISIT (OUTPATIENT)
Dept: CARDIOLOGY | Facility: CLINIC | Age: 79
End: 2024-02-15
Payer: MEDICARE

## 2024-02-15 VITALS
BODY MASS INDEX: 31.16 KG/M2 | WEIGHT: 187 LBS | SYSTOLIC BLOOD PRESSURE: 115 MMHG | DIASTOLIC BLOOD PRESSURE: 83 MMHG | HEIGHT: 65 IN | OXYGEN SATURATION: 93 % | HEART RATE: 102 BPM

## 2024-02-15 DIAGNOSIS — R00.2 PALPITATIONS: ICD-10-CM

## 2024-02-15 DIAGNOSIS — I49.9 IRREGULAR HEART BEAT: ICD-10-CM

## 2024-02-15 DIAGNOSIS — I25.10 ATHEROSCLEROSIS OF NATIVE CORONARY ARTERY OF NATIVE HEART WITHOUT ANGINA PECTORIS: ICD-10-CM

## 2024-02-15 DIAGNOSIS — I10 ESSENTIAL (PRIMARY) HYPERTENSION: ICD-10-CM

## 2024-02-15 DIAGNOSIS — Z79.02 LONG TERM CURRENT USE OF ANTITHROMBOTICS/ANTIPLATELETS: ICD-10-CM

## 2024-02-15 DIAGNOSIS — R55 NEAR SYNCOPE: Primary | ICD-10-CM

## 2024-02-15 DIAGNOSIS — E78.2 MIXED HYPERLIPIDEMIA: ICD-10-CM

## 2024-02-15 RX ORDER — FERROUS SULFATE 325(65) MG
1 TABLET ORAL DAILY
COMMUNITY
Start: 2024-02-10

## 2024-02-26 ENCOUNTER — HOSPITAL ENCOUNTER (OUTPATIENT)
Dept: RESPIRATORY THERAPY | Facility: HOSPITAL | Age: 79
Discharge: HOME | End: 2024-02-26
Payer: MEDICARE

## 2024-02-26 DIAGNOSIS — R55 NEAR SYNCOPE: ICD-10-CM

## 2024-03-05 ENCOUNTER — APPOINTMENT (OUTPATIENT)
Dept: GENERAL RADIOLOGY | Facility: HOSPITAL | Age: 79
End: 2024-03-05
Payer: MEDICARE

## 2024-03-05 ENCOUNTER — APPOINTMENT (OUTPATIENT)
Dept: CT IMAGING | Facility: HOSPITAL | Age: 79
End: 2024-03-05
Payer: MEDICARE

## 2024-03-05 ENCOUNTER — HOSPITAL ENCOUNTER (EMERGENCY)
Facility: HOSPITAL | Age: 79
Discharge: HOME | End: 2024-03-05
Attending: EMERGENCY MEDICINE | Admitting: EMERGENCY MEDICINE
Payer: MEDICARE

## 2024-03-05 VITALS
DIASTOLIC BLOOD PRESSURE: 84 MMHG | OXYGEN SATURATION: 95 % | BODY MASS INDEX: 31.16 KG/M2 | HEIGHT: 65 IN | WEIGHT: 187 LBS | RESPIRATION RATE: 16 BRPM | HEART RATE: 88 BPM | SYSTOLIC BLOOD PRESSURE: 148 MMHG | TEMPERATURE: 98 F
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
## 2024-03-05 DIAGNOSIS — S22.42XA CLOSED FRACTURE OF MULTIPLE RIBS OF LEFT SIDE, INITIAL ENCOUNTER: ICD-10-CM

## 2024-03-05 DIAGNOSIS — E07.9 MASS OF THYROID GLAND: ICD-10-CM

## 2024-03-05 DIAGNOSIS — S09.90XA MINOR CLOSED HEAD INJURY: ICD-10-CM

## 2024-03-05 DIAGNOSIS — S50.312A ABRASION OF LEFT ELBOW, INITIAL ENCOUNTER: ICD-10-CM

## 2024-03-05 DIAGNOSIS — S00.83XA CONTUSION OF FACE, INITIAL ENCOUNTER: ICD-10-CM

## 2024-03-05 DIAGNOSIS — S80.212A ABRASION OF LEFT KNEE, INITIAL ENCOUNTER: ICD-10-CM

## 2024-03-05 DIAGNOSIS — S63.502A SPRAIN OF LEFT WRIST, INITIAL ENCOUNTER: ICD-10-CM

## 2024-03-05 PROCEDURE — 73110 X-RAY EXAM OF WRIST: CPT

## 2024-03-05 PROCEDURE — 63710000001 ONDANSETRON ODT 4 MG TABLET DISPERSIBLE: Performed by: NURSE PRACTITIONER

## 2024-03-05 PROCEDURE — 73030 X-RAY EXAM OF SHOULDER: CPT

## 2024-03-05 PROCEDURE — 72125 CT NECK SPINE W/O DYE: CPT

## 2024-03-05 PROCEDURE — 99284 EMERGENCY DEPT VISIT MOD MDM: CPT

## 2024-03-05 PROCEDURE — 73562 X-RAY EXAM OF KNEE 3: CPT

## 2024-03-05 PROCEDURE — 70450 CT HEAD/BRAIN W/O DYE: CPT

## 2024-03-05 PROCEDURE — 71101 X-RAY EXAM UNILAT RIBS/CHEST: CPT

## 2024-03-05 PROCEDURE — 70486 CT MAXILLOFACIAL W/O DYE: CPT

## 2024-03-05 RX ORDER — DIAPER,BRIEF,INFANT-TODD,DISP
1 EACH MISCELLANEOUS ONCE
Status: COMPLETED | OUTPATIENT
Start: 2024-03-05 | End: 2024-03-05

## 2024-03-05 RX ORDER — ONDANSETRON 4 MG/1
4 TABLET, ORALLY DISINTEGRATING ORAL ONCE
Status: COMPLETED | OUTPATIENT
Start: 2024-03-05 | End: 2024-03-05

## 2024-03-05 RX ORDER — ACETAMINOPHEN 500 MG
1000 TABLET ORAL ONCE
Status: COMPLETED | OUTPATIENT
Start: 2024-03-05 | End: 2024-03-05

## 2024-03-05 RX ORDER — METHOCARBAMOL 500 MG/1
500 TABLET, FILM COATED ORAL 4 TIMES DAILY
Qty: 20 TABLET | Refills: 0 | Status: SHIPPED | OUTPATIENT
Start: 2024-03-05

## 2024-03-05 RX ADMIN — BACITRACIN 0.9 G: 500 OINTMENT TOPICAL at 16:56

## 2024-03-05 RX ADMIN — ACETAMINOPHEN 1000 MG: 500 TABLET, FILM COATED ORAL at 16:56

## 2024-03-05 RX ADMIN — ONDANSETRON 4 MG: 4 TABLET, ORALLY DISINTEGRATING ORAL at 16:56

## 2024-03-05 NOTE — DISCHARGE INSTRUCTIONS
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

## 2024-03-05 NOTE — ED PROVIDER NOTES
Subjective   History of Present Illness  Patient is an 78-year-old female who states she had a mechanical fall at home where she tripped and fell forward striking her face landing on the left side of her body-she is complaining of left-sided rib pain, left knee pain left wrist pain and facial pain.  She states she did not get knocked out she did not lose consciousness.      Review of Systems   Constitutional:  Negative for chills, fatigue and fever.   HENT:  Negative for congestion, tinnitus and trouble swallowing.         Facial bruising   Eyes:  Negative for photophobia, discharge and redness.   Respiratory:  Negative for cough and shortness of breath.    Cardiovascular:  Negative for chest pain and palpitations.   Gastrointestinal:  Negative for abdominal pain, diarrhea, nausea and vomiting.   Genitourinary:  Negative for dysuria, frequency and urgency.   Musculoskeletal:  Negative for back pain, joint swelling and myalgias.        Neck pain, left arm pain, left knee pain   Skin:  Positive for wound. Negative for rash.        Abrasions to the left knee and the left elbow   Neurological:  Negative for dizziness and headaches.   Psychiatric/Behavioral:  Negative for confusion.    All other systems reviewed and are negative.      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    Hypertension     IBS (irritable colon syndrome)     constipation    Mass of right breast      Neuropathy     Renal insufficiency     Sciatic leg pain     right    Simple chronic bronchitis     Solitary kidney     left       Allergies   Allergen Reactions    Erythromycin Rash    Naproxen Sodium Other (See Comments)     Dizzy lightheaded    Statins Myalgia    Latex Itching and Swelling    Metoclopramide Other (See Comments)     Unsteady on feet    Rocephin [Ceftriaxone] Itching and Nausea Only    Dicyclomine Unknown - Low Severity       Past Surgical History:   Procedure Laterality Date    BREAST BIOPSY Right     CARDIAC CATHETERIZATION      CHOLECYSTECTOMY      COLON SURGERY      REMOVAL diverticular diseae    COLONOSCOPY N/A 08/12/2021    Procedure: COLONOSCOPY with polypectomy x 3;  Surgeon: Margarette Mcallister MD;  Location: [facility] ENDOSCOPY;  Service: Gastroenterology;  Laterality: N/A;  post op: hmorrhoids, diverticulosis, polyps    CORONARY STENT PLACEMENT      ENDOSCOPY N/A 08/12/2021    Procedure: ESOPHAGOGASTRODUODENOSCOPY with dilatation (18-20 mm balloon) and (54 bougie);  Surgeon: Margarette Mcallister MD;  Location: [facility] ENDOSCOPY;  Service: Gastroenterology;  Laterality: N/A;  post op: esophageal stricture, esophagitis, gastritis, history of nissen    ENDOSCOPY N/A 9/13/2023    Procedure: ESOPHAGOGASTRODUODENOSCOPY with biopsy x 1 area and dilation (50,54,56 non guided bougie);  Surgeon: Margarette Mcallister MD;  Location: [facility] ENDOSCOPY;  Service: Gastroenterology;  Laterality: N/A;  post op: esophageal stricture    EYE SURGERY Bilateral     cataracts    HIATAL HERNIA REPAIR      NEPHRECTOMY Right     cancer    UPPER ENDOSCOPIC ULTRASOUND W/ FNA N/A 09/22/2022    Procedure: EUS;  Surgeon: Margarette Mcallister MD;  Location: [facility] ENDOSCOPY;  Service: Gastroenterology;  Laterality: N/A;  post: normal pancreas, dilated pancreatic duct, hiatal hernia,        Family History   Problem Relation Age of Onset    Arthritis Father     Prostate cancer Father     Heart disease Sister        Social History     Socioeconomic  History    Marital status:    Tobacco Use    Smoking status: Former     Current packs/day: 0.00     Average packs/day: 0.3 packs/day for 50.0 years (12.5 ttl pk-yrs)     Types: Cigarettes     Start date: 1973     Quit date: 2023     Years since quittin.3     Passive exposure: Past    Smokeless tobacco: Never    Tobacco comments:     Mostly patches, some days none, some days 1-2   Vaping Use    Vaping status: Never Used   Substance and Sexual Activity    Alcohol use: Yes     Comment: occasionally     Drug use: Never    Sexual activity: Defer           Objective   Physical Exam  Vitals reviewed.   Constitutional:       Appearance: Normal appearance. She is well-developed. She is obese.   HENT:      Head: Normocephalic and atraumatic.   Eyes:      Conjunctiva/sclera: Conjunctivae normal.      Pupils: Pupils are equal, round, and reactive to light.   Cardiovascular:      Rate and Rhythm: Normal rate and regular rhythm.      Heart sounds: Normal heart sounds.   Pulmonary:      Effort: Pulmonary effort is normal. No respiratory distress.      Breath sounds: Normal breath sounds. No wheezing.   Abdominal:      General: Bowel sounds are normal.      Palpations: Abdomen is soft.      Tenderness: There is no abdominal tenderness.   Musculoskeletal:         General: No deformity. Normal range of motion.      Right shoulder: Normal.      Left shoulder: Tenderness present. No swelling. Decreased range of motion.      Right upper arm: Normal.      Left upper arm: Tenderness present.      Right elbow: Normal.      Left elbow: Decreased range of motion. Tenderness present.      Right wrist: Normal.      Left wrist: Tenderness present. Decreased range of motion.      Cervical back: Normal range of motion and neck supple.   Skin:     General: Skin is warm and dry.      Capillary Refill: Capillary refill takes less than 2 seconds.   Neurological:      Mental Status: She is alert and oriented to person, place, and  "time.      GCS: GCS eye subscore is 4. GCS verbal subscore is 5. GCS motor subscore is 6.      Motor: No weakness.   Psychiatric:         Mood and Affect: Mood normal.         Behavior: Behavior normal.         Procedures       The wounds on the left knee and the left elbow were cleaned bacitracin and dressing was applied    ED Course  ED Course as of 03/06/24 0035   Tue Mar 05, 2024   1546 Marked ready for CT [KW]   8883 Tobias reviewed the patient CTs as well and also agrees with the left thyroid mass there are reactive nodes on the right which are palpable [KW]      ED Course User Index  [KW] Elva Bonner ANIBAL, APRN                                   /84   Pulse 88   Temp 98 °F (36.7 °C)   Resp 16   Ht 165.1 cm (65\")   Wt 84.8 kg (187 lb)   LMP  (LMP Unknown)   SpO2 95%   BMI 31.12 kg/m²   Labs Reviewed - No data to display  Medications   ondansetron ODT (ZOFRAN-ODT) disintegrating tablet 4 mg (4 mg Oral Given 3/5/24 1656)   acetaminophen (TYLENOL) tablet 1,000 mg (1,000 mg Oral Given 3/5/24 1656)   bacitracin ointment 0.9 g (0.9 g Topical Given 3/5/24 1656)     XR Ribs Left With PA Chest    Result Date: 3/5/2024  Impression: 1. Acute fractures of the left anterior seventh and eighth ribs. 2. Negative for pneumothorax. 3. Status post kyphoplasty at T6. Electronically Signed: López Cobos MD  3/5/2024 6:21 PM EST  Workstation ID: [id number]    CT Head Without Contrast    Result Date: 3/5/2024  Impression: 1.No acute traumatic injury identified. 2.There is a 5.3 cm left thyroid mass. Nonemergent follow-up thyroid ultrasound recommended if not already performed. 3.Other incidental nonemergent findings as detailed above. Electronically Signed: Tarun Lucero MD  3/5/2024 5:26 PM EST  Workstation ID: [id number]    CT Facial Bones Without Contrast    Result Date: 3/5/2024  Impression: 1.No acute traumatic injury identified. 2.There is a 5.3 cm left thyroid mass. Nonemergent follow-up thyroid ultrasound " recommended if not already performed. 3.Other incidental nonemergent findings as detailed above. Electronically Signed: Tarun Lucero MD  3/5/2024 5:26 PM EST  Workstation ID: [id number]    CT Cervical Spine Without Contrast    Result Date: 3/5/2024  Impression: 1.No acute traumatic injury identified. 2.There is a 5.3 cm left thyroid mass. Nonemergent follow-up thyroid ultrasound recommended if not already performed. 3.Other incidental nonemergent findings as detailed above. Electronically Signed: Tarun Lucero MD  3/5/2024 5:26 PM EST  Workstation ID: [id number]    XR Wrist 3+ View Left    Result Date: 3/5/2024  Impression: 1.Osseous demineralization. 2.There are some degenerative changes involving the left wrist and shoulder. 3.No definite acute osseous abnormality of the knee. Electronically Signed: Chris Proctor MD  3/5/2024 5:11 PM EST  Workstation ID: [id number]    XR Shoulder 2+ View Left    Result Date: 3/5/2024  Impression: 1.Osseous demineralization. 2.There are some degenerative changes involving the left wrist and shoulder. 3.No definite acute osseous abnormality of the knee. Electronically Signed: Chris Proctor MD  3/5/2024 5:11 PM EST  Workstation ID: [id number]    XR Knee 3 View Left    Result Date: 3/5/2024  Impression: 1.Osseous demineralization. 2.There are some degenerative changes involving the left wrist and shoulder. 3.No definite acute osseous abnormality of the knee. Electronically Signed: Chris Proctor MD  3/5/2024 5:11 PM EST  Workstation ID: [id number]             Medical Decision Making  Patient is a 78-year-old morbidly obese female who comes in after having a fall at home where she struck her face.  She also has an abrasion on her left elbow and her left knee which were cleaned bacitracin and a dressing was applied the patient had multiple x-rays of the left wrist left shoulder left knee which were all found to be within normal limits the patient had a CT of the head and neck which were all  interpreted by myself as well as the radiologist is within normal limits the patient had a left rib series which did show a fracture of the left seventh and eighth anterior ribs this was interpreted by the radiologist as well as myself the patient was treated for pain here in the emergency room and the patient will be going home with her daughter she was advised to use the pain medication she had at home she had some Norco and tramadol at home and given Zofran she was advised to return for any worsening symptoms we talked about coughing and deep breathing to help decrease likelihood of pneumonia and to follow-up with primary care in 3 to 5 days for recheck they verbalized understood discharge instructions patient was stable at discharge    Problems Addressed:  Abrasion of left elbow, initial encounter: complicated acute illness or injury  Abrasion of left knee, initial encounter: complicated acute illness or injury  Closed fracture of multiple ribs of left side, initial encounter: complicated acute illness or injury  Contusion of face, initial encounter: complicated acute illness or injury  Fall, initial encounter: complicated acute illness or injury  Mass of thyroid gland: complicated acute illness or injury  Minor closed head injury: complicated acute illness or injury  Sprain of left wrist, initial encounter: complicated acute illness or injury    Amount and/or Complexity of Data Reviewed  Independent Historian: caregiver     Details: Daughter at bedside  Radiology: ordered and independent interpretation performed. Decision-making details documented in ED Course.  ECG/medicine tests: ordered and independent interpretation performed. Decision-making details documented in ED Course.    Risk  OTC drugs.  Prescription drug management.        Final diagnoses:   Fall, initial encounter   Contusion of face, initial encounter   Sprain of left wrist, initial encounter   Abrasion of left knee, initial encounter   Abrasion  of left elbow, initial encounter   Minor closed head injury   Closed fracture of multiple ribs of left side, initial encounter   Mass of thyroid gland       ED Disposition  ED Disposition       ED Disposition   Discharge    Condition   Stable    Comment   --               Anny Garcia MD  [address] [facility] [place] [id number] [phone]    In 3 days  If symptoms worsen, As needed         Where to Get Your Medications        These medications were sent to [facility]/pharmacy #[id number] - [place], [place] - [address] - [phone]  - [phone] FX  [address] [place] [address]      Phone: [phone]   methocarbamol 500 MG tablet          Medication List      No changes were made to your prescriptions during this visit.            Elva Bonner, APRN  03/06/24 0035

## 2024-03-13 ENCOUNTER — TRANSCRIBE ORDERS (OUTPATIENT)
Dept: ADMINISTRATIVE | Facility: HOSPITAL | Age: 79
End: 2024-03-13
Payer: MEDICARE

## 2024-03-13 DIAGNOSIS — E07.9 THYROID MASS: Primary | ICD-10-CM

## 2024-03-20 ENCOUNTER — TRANSCRIBE ORDERS (OUTPATIENT)
Dept: ADMINISTRATIVE | Facility: HOSPITAL | Age: 79
End: 2024-03-20
Payer: MEDICARE

## 2024-03-20 ENCOUNTER — HOSPITAL ENCOUNTER (OUTPATIENT)
Dept: ULTRASOUND IMAGING | Facility: HOSPITAL | Age: 79
Discharge: HOME | End: 2024-03-20
Payer: MEDICARE

## 2024-03-20 ENCOUNTER — HOSPITAL ENCOUNTER (OUTPATIENT)
Dept: CT IMAGING | Facility: HOSPITAL | Age: 79
Discharge: HOME | End: 2024-03-20
Payer: MEDICARE

## 2024-03-20 DIAGNOSIS — E07.9 THYROID MASS: ICD-10-CM

## 2024-03-20 PROCEDURE — 76536 US EXAM OF HEAD AND NECK: CPT

## 2024-03-20 PROCEDURE — 70490 CT SOFT TISSUE NECK W/O DYE: CPT

## 2024-03-22 ENCOUNTER — TRANSCRIBE ORDERS (OUTPATIENT)
Dept: ADMINISTRATIVE | Facility: HOSPITAL | Age: 79
End: 2024-03-22
Payer: MEDICARE

## 2024-03-22 DIAGNOSIS — R22.1 LOCALIZED SWELLING, MASS AND LUMP, NECK: Primary | ICD-10-CM

## 2024-03-25 ENCOUNTER — OFFICE VISIT (OUTPATIENT)
Dept: ORTHOPEDIC SURGERY | Facility: CLINIC | Age: 79
End: 2024-03-25
Payer: MEDICARE

## 2024-03-25 VITALS — WEIGHT: 187 LBS | BODY MASS INDEX: 31.16 KG/M2 | OXYGEN SATURATION: 97 % | HEIGHT: 65 IN | HEART RATE: 47 BPM

## 2024-03-25 DIAGNOSIS — S42.255A NONDISPLACED FRACTURE OF GREATER TUBEROSITY OF LEFT HUMERUS, INITIAL ENCOUNTER FOR CLOSED FRACTURE: Primary | ICD-10-CM

## 2024-03-25 DIAGNOSIS — M75.102 TEAR OF LEFT SUPRASPINATUS TENDON: ICD-10-CM

## 2024-03-25 DIAGNOSIS — M25.512 ACUTE PAIN OF LEFT SHOULDER: ICD-10-CM

## 2024-03-25 PROCEDURE — 99203 OFFICE O/P NEW LOW 30 MIN: CPT | Performed by: STUDENT IN AN ORGANIZED HEALTH CARE EDUCATION/TRAINING PROGRAM

## 2024-03-25 NOTE — PROGRESS NOTES
NEW VISIT    Patient: Vianey Reeves  ?  YOB: 1945    MRN: [id number]  ?  Chief Complaint   Patient presents with    Left Shoulder - Initial Evaluation      ?  HPI: Patient is an 78-year-old female presents today for acute left shoulder pain.  She states this happened after a fall on 3/4/2024 where she landed on her left side.  Had multiple injuries and was evaluated at ER with x-rays obtained and negative for acute osseous abnormality at that time.  Was having persistent pain, so subsequently evaluated by PCP where MRI was obtained with results as noted below.  She states today with pain primarily over the lateral aspect of the shoulder, and worsening pain and weakness with any lifting activities, reaching arm overhead, or sleeping on affected side.  She is right-hand dominant.  Current pain management through ice/heat, activity modification, and as needed tramadol.     Allergies:   Allergies   Allergen Reactions    Erythromycin Rash    Naproxen Sodium Other (See Comments)     Dizzy lightheaded    Statins Myalgia    Latex Itching and Swelling    Metoclopramide Other (See Comments)     Unsteady on feet    Rocephin [Ceftriaxone] Itching and Nausea Only    Dicyclomine Unknown - Low Severity       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    Hypertension     IBS (irritable colon syndrome)     constipation    Mass of right breast     Neuropathy     Renal  insufficiency     Sciatic leg pain     right    Simple chronic bronchitis     Solitary kidney     left     Past Surgical History:   Procedure Laterality Date    BREAST BIOPSY Right     CARDIAC CATHETERIZATION      CHOLECYSTECTOMY      COLON SURGERY      REMOVAL diverticular diseae    COLONOSCOPY N/A 2021    Procedure: COLONOSCOPY with polypectomy x 3;  Surgeon: Margarette Mcallister MD;  Location: [facility] ENDOSCOPY;  Service: Gastroenterology;  Laterality: N/A;  post op: hmorrhoids, diverticulosis, polyps    CORONARY STENT PLACEMENT      ENDOSCOPY N/A 2021    Procedure: ESOPHAGOGASTRODUODENOSCOPY with dilatation (18-20 mm balloon) and (54 bougie);  Surgeon: Margarette Mcallister MD;  Location: [facility] ENDOSCOPY;  Service: Gastroenterology;  Laterality: N/A;  post op: esophageal stricture, esophagitis, gastritis, history of nissen    ENDOSCOPY N/A 2023    Procedure: ESOPHAGOGASTRODUODENOSCOPY with biopsy x 1 area and dilation (50,54,56 non guided bougie);  Surgeon: Margarette Mcallister MD;  Location: [facility] ENDOSCOPY;  Service: Gastroenterology;  Laterality: N/A;  post op: esophageal stricture    EYE SURGERY Bilateral     cataracts    HIATAL HERNIA REPAIR      NEPHRECTOMY Right     cancer    UPPER ENDOSCOPIC ULTRASOUND W/ FNA N/A 2022    Procedure: EUS;  Surgeon: Margarette Mcallister MD;  Location: [facility] ENDOSCOPY;  Service: Gastroenterology;  Laterality: N/A;  post: normal pancreas, dilated pancreatic duct, hiatal hernia,      Social History     Occupational History    Not on file   Tobacco Use    Smoking status: Former     Current packs/day: 0.00     Average packs/day: 0.3 packs/day for 50.0 years (12.5 ttl pk-yrs)     Types: Cigarettes     Start date: 1973     Quit date: 2023     Years since quittin.3     Passive exposure: Past    Smokeless tobacco: Never    Tobacco comments:     Mostly patches, some days none, some days 1-2   Vaping Use    Vaping status: Never Used   Substance and Sexual Activity    Alcohol  "use: Yes     Comment: occasionally     Drug use: Never    Sexual activity: Defer      Social History     Social History Narrative    Not on file     Family History   Problem Relation Age of Onset    Arthritis Father     Prostate cancer Father     Heart disease Sister        Review of Systems  Constitutional: Negative.  Negative for fever.   Musculoskeletal: Positive for joint pain  Skin: Negative.  Negative for rash and wound.    Neurological: Negative for numbness.     Vitals:    03/25/24 1028   Pulse: (!) 47   SpO2: 97%   Weight: 84.8 kg (187 lb)   Height: 165.1 cm (65\")        Physical Exam  Constitutional: Patient is oriented to person, place, and time. Appears well-developed and well-nourished.   Head: Normocephalic and atraumatic.   Pulmonary/Chest: Effort normal.   Musculoskeletal:   See detailed exam below   Neurological: Alert and oriented to person, place, and time. No sensory deficit. Coordination normal.   Skin: Skin is warm and dry. Capillary refill takes less than 2 seconds. No rash noted. No erythema.     The left shoulder is without obvious signs of acute bony deformity, swelling, erythema or ecchymosis.  There is mild tenderness over anterior and posterior joint line.  There is significant tenderness over greater tuberosity laterally, and subacromial bursa.  There is no tenderness at the AC joint. There is no tenderness at the SC joint.  Active range of motion forward flexion and abduction to 90 degrees with pain at end range.  Passive forward flexion 120 degrees with pain.  Patient with testing deferred.  Instability tests are negative with anterior and posterior drawer, and sulcus test. Speeds and Yergason's tests are negative. [place]'s test is positive for pain and weakness. Rigo's test is positive for pain and weakness.  Negative belly press.  Rotator cuff strength is 3+/5 and painful with supraspinatus testing, 4/5 with discomfort with internal/external rotation. The neck and opposite shoulder " are otherwise normal and stable. Gait is pain-free and tandem.    Diagnostics:  Reviewed MRI report of left shoulder, performed at  [facility] on 3/13/2024, summary of impression below:     Nondisplaced fracture greater tuberosity.  Full-thickness complete supraspinatus tendon tear without retraction.  Moderate glenohumeral/AC arthritis.  Subacromial subdeltoid fluid    Assessment:  Diagnoses and all orders for this visit:    1. Nondisplaced fracture of greater tuberosity of left humerus, initial encounter for closed fracture (Primary)    2. Tear of left supraspinatus tendon    3. Acute pain of left shoulder        Plan    Above diagnosis and plan discussed with the patient and her granddaughter in office today with patient permission.  I personally reviewed prior imaging including x-rays, and MRI as noted above with the patient, and discussed pathophysiology/anatomy of her injury.  She has nondisplaced greater tuberosity fracture as well as full-thickness supraspinatus rotator cuff tear.  She is currently 3 weeks from initial injury.  We discussed both conservative as well as surgical options.  She would like to avoid surgery at this time due to concern about recovery as well as medical co morbidities.  We discussed conservative treatment including bony healing for nondisplaced greater tuberosity fracture at likely 6 to 8-week timeframe.  Encouraged her to continue passive range of motion activities, but no substantial lifting, or repetitive overhead activities during this timeframe.  Utilize sling for comfort both at night, and out in public when at risk for reinjury.  Strongly encouraged spending time outside of the sling daily in order to avoid stiff/frozen shoulder.  Will plan follow-up in 4 weeks with repeat radiographs to monitor for bony healing.  Fracture was radial occult on initial imaging, discussed may need repeat MRI to evaluate for bony healing after follow-up visit.  Discussed risks of  conservative management of fracture site including nonunion, stiffness, persistent pain.   After fracture healing, could initiate conservative management for treatment of rotator cuff tear.  Specifically we discussed corticosteroid injections and formal physical therapy.  Rest, ice, compression, and elevation (RICE) therapy  Due to multiple medical comorbidities, oral pain control via PCP.  Unable to take oral NSAIDs due to renal disease.  Follow up in 4 week(s) with repeat x-rays.  If no noted bony healing on XR imaging will likely obtain MRI to evaluate for bony healing of nondisplaced greater tuberosity fracture    Date of encounter: 03/25/2024   Del Rivera DO    Electronically signed by Del Rivera DO, 03/25/24, 10:35 AM EDT.    Disclaimer: Please note that areas of this note were completed with computer voice recognition software.  Quite often unanticipated grammatical, syntax, homophones, and other interpretive errors are inadvertently transcribed by the computer software. Please excuse any errors that have escaped final proofreading.

## 2024-03-27 NOTE — PROGRESS NOTES
Cardiology Office Visit      Encounter Date:  2024    PATIENT IDENTIFICATION    Name: Vianey Reeves  Age: 78 y.o. Sex: female : 1945  MRN: [id number]    Reason For Followup:  Coronary artery disease  Hypertension  Hypertensive cardiovascular disease    Brief Clinical History:  Dear Anny Cleary MD    I had the pleasure of seeing Vianey Reeves today. As you are well aware, this is a 78 y.o. female with a known history of ischemic heart disease.  She underwent cardiac catheterization with PCI and drug-eluting stent placement on 2016 and follow-up PCI on 2016. She presents today for follow-up on the above conditions.      Interval History:  She denies any chest pain pressure heaviness or tightness.  She denies any shortness of breath out of character. She denies any PND orthopnea.  She denies any syncope or near syncope.  She reports feeling well today.     Her last visit she had notable increase in her calcium level.  We had some laboratory work performed and she followed up with nephrology.  Her calcium level actually returned to normal in the interim.     She continues to smoke.  She has been wearing patches.  She smokes between 4 cigarettes and a half a pack of cigarettes daily.     As you know, with regards to her hypertension, we have a limited repertoire of medications that can be utilized.  We have stayed away from ACE/ARB medications due to the presence of a solitary kidney and renal insufficiency.  Calcium channel blockers appear to cause edema requiring diuretic usage which can exacerbate renal insufficiency.  Potassium sparing diuretics can also be problematic with renal insufficiency.  Beta-blockers appear to be causing fatigue and bradycardia.  Clonidine results in significant lability in blood pressure and can also contribute to bradycardia.  Hydralazine is very cumbersome to take.       02/15/2024        Patient had seen Dr uD on past couple of visits. Was in the  hospital with COVID and she was sent home but bounced back because of the pneumonia. [facility] She quit smoking and has been free 3 months. She has no energy and has been an issue for a few months. Both legs are very weak and have been for a while. She was at an office visit and she got so weak she had to be held up and laid down. Had EKG and was told she was in atrial fibrillation.  She was accompanied by her granddaughter today who provided supplemental information pertinent to the visit.    She underwent a nuclear stress test and 2D echocardiogram in January 2024.  No evidence of myocardial ischemia was noted.  Low normal left ventricular systolic function with an ejection fraction of 50 to 55% was noted.  Grade 1 diastolic dysfunction.  Moderate mitral insufficiency was noted.    03/28/2024        She reports that she fell. She was walking down the hallway. No warning. She had fallen the week before that as well.  She has some pain residual from this.  She did have the monitor on at the time of one of the falls and she had no automatic triggers noted. She did have an episode on that day (3/5/2024) but it was SR with PAC and was done while she was at the hospital.     Assessment & Plan     Impressions:  Coronary artery disease status post PCI and drug-eluting stent placement in her mid RCA and Cx        Negative nuclear stress test May 2019  Diabetes mellitus.  Dyslipidemia with intolerance to statins.  Hypertension with hypertensive cardiovascular disease.      Suboptimal with limited medication repertoire  COPD  Solitary kidney.  Chronic renal failure with declining GFR     Followed by Dr. Julio  Tobacco abuse  Anti-platelet therapy.  Hypercalcemia-resolved  Fatigue  Weakness  Frequent falls     Recommendations:  Continuation of her current cardiovascular regimen at the present time.     This includes antihypertensives, and antiplatelets.  Discussed noncardiac reasons  "for falling with primary care physician  Continue to monitor blood pressure periodically  Follow-up in 6 months    Diagnoses and all orders for this visit:    1. Atherosclerosis of native coronary artery of native heart without angina pectoris (Primary)  Overview:  No chest pain, Cardiology apt pending 04/07/2022    Orders:  -     ECG 12 Lead    2. Essential (primary) hypertension  Overview:   doxazosin made her dizzy and fatigued. She would like to stay on amlodopine instead.   Amlodpine has caused the least problems for her she says. She is aware of the edema but she says she can live with it.     Orders:  -     ECG 12 Lead    3. Mixed hyperlipidemia  Overview:  She sees endocrinology  She can not tolerate statins    Orders:  -     ECG 12 Lead    4. Long term current use of antithrombotics/antiplatelets  -     ECG 12 Lead    Other orders  -     carvedilol (COREG) 3.125 MG tablet; Take 2 tablets by mouth 2 (Two) Times a Day With Meals.  Dispense: 180 tablet; Refill: 3            Objective:    Vitals:  Vitals:    03/28/24 1406   BP: 128/70   Pulse: 68   SpO2: 95%   Weight: 84.8 kg (187 lb)   Height: 165.1 cm (65\")       Body mass index is 31.12 kg/m².      Physical Exam:    General: Alert, cooperative, no distress, appears stated age  Head:  Normocephalic, atraumatic, mucous membranes moist  Eyes:  Conjunctiva/corneas clear, EOM's intact     Neck:  Supple,  no bruit    Lungs: Coarse and diminished bilaterally  Chest wall: No tenderness  Heart::  Regular rate and rhythm, S1 and S2 normal, 1/6 holosystolic murmur.  No rub or gallop  Abdomen: Soft, non-tender, nondistended bowel sounds active  Extremities: No cyanosis, clubbing, or edema  Pulses: 2+ and symmetric all extremities  Skin:  No rashes or lesions  Neuro/psych: A&O x3. CN II through XII are grossly intact with appropriate affect      Allergies:  Allergies   Allergen Reactions    Erythromycin Rash    Naproxen Sodium Other (See Comments)     Dizzy lightheaded "    Statins Myalgia    Latex Itching and Swelling    Metoclopramide Other (See Comments)     Unsteady on feet    Rocephin [Ceftriaxone] Itching and Nausea Only    Dicyclomine Unknown - Low Severity       Medication Review:     Current Outpatient Medications:     Abaloparatide (Tymlos) 3120 MCG/1.56ML solution pen-injector, Inject  under the skin into the appropriate area as directed Daily., Disp: , Rfl:     amLODIPine (NORVASC) 10 MG tablet, Take 1 tablet by mouth Daily., Disp: , Rfl:     aspirin 81 MG tablet, Take 1 tablet by mouth Every Night., Disp: , Rfl:     budesonide-formoterol (SYMBICORT) 160-4.5 MCG/ACT inhaler, Inhale 2 puffs 2 (Two) Times a Day., Disp: 10.2 g, Rfl: 12    carvedilol (COREG) 3.125 MG tablet, Take 2 tablets by mouth 2 (Two) Times a Day With Meals., Disp: 180 tablet, Rfl: 3    cyanocobalamin 1000 MCG/ML injection, Inject 1 mL into the appropriate muscle as directed by prescriber Every 28 (Twenty-Eight) Days., Disp: , Rfl:     Diclofenac Sodium (VOLTAREN) 1 % gel gel, Apply 4 g topically to the appropriate area as directed 3 (Three) Times a Day As Needed (pain)., Disp: , Rfl:     doxazosin (CARDURA) 2 MG tablet, Take 1 tablet by mouth Every Night., Disp: , Rfl:     estradiol (ESTRACE) 0.1 MG/GM vaginal cream, Insert 1 g into the vagina 3 (Three) Times a Week. Monday, Wednesday and Friday, Disp: , Rfl:     Evolocumab (REPATHA) solution auto-injector SureClick injection, Inject 1 mL under the skin into the appropriate area as directed Every 14 (Fourteen) Days., Disp: , Rfl:     Finerenone 10 MG tablet, Take 1 tablet by mouth Daily., Disp: , Rfl:     gabapentin (NEURONTIN) 100 MG capsule, Take 1 capsule by mouth Every Night., Disp: , Rfl:     guaiFENesin (MUCINEX) 600 MG 12 hr tablet, Take 1 tablet by mouth Every 12 (Twelve) Hours., Disp: 28 tablet, Rfl: 0    hydrALAZINE (APRESOLINE) 25 MG tablet, Take 1 tablet by mouth 2 (Two) Times a Day., Disp: , Rfl:     HYDROcodone-acetaminophen (NORCO) 5-325  MG per tablet, Take 1 tablet by mouth Every 8 (Eight) Hours As Needed for Moderate Pain., Disp: 15 tablet, Rfl: 0    Insulin Glargine (LANTUS SOLOSTAR) 100 UNIT/ML injection pen, Inject 10 Units under the skin into the appropriate area as directed Every Night., Disp: 100 mL, Rfl: 12    ipratropium-albuterol (DUO-NEB) 0.5-2.5 mg/3 ml nebulizer, Take 3 mL by nebulization 4 (Four) Times a Day As Needed for Wheezing., Disp: 360 mL, Rfl: 1    Linzess 145 MCG capsule capsule, Take 1 capsule by mouth Daily As Needed., Disp: , Rfl:     methocarbamol (ROBAXIN) 500 MG tablet, Take 1 tablet by mouth 4 (Four) Times a Day., Disp: 20 tablet, Rfl: 0    omeprazole (priLOSEC) 40 MG capsule, Take 1 capsule by mouth Every Morning., Disp: 30 capsule, Rfl: 0    polyethylene glycol (MIRALAX) 17 g packet, Take 17 g by mouth Daily., Disp: , Rfl:     sertraline (ZOLOFT) 50 MG tablet, Take 1 tablet by mouth Daily., Disp: , Rfl:     SITagliptin (JANUVIA) 100 MG tablet, Take 1 tablet by mouth Daily., Disp: , Rfl:     traMADol (ULTRAM) 50 MG tablet, Take 2 tablets by mouth 2 (Two) Times a Day As Needed., Disp: , Rfl:     ferrous sulfate 325 (65 FE) MG tablet, Take 1 tablet by mouth Daily. (Patient not taking: Reported on 3/28/2024), Disp: , Rfl:     Family History:  Family History   Problem Relation Age of Onset    Arthritis Father     Prostate cancer Father     Heart disease Sister        Past Medical History:  Past Medical History:   Diagnosis Date    Allergic     latex allergy    Allergies     Anxiety     Bilateral carotid bruits     Bulging lumbar disc     Bulging of thoracic intervertebral disc     Cancer     ureter    surgery    CKD (chronic kidney disease)     stage 3    Claudication, intermittent     COPD (chronic obstructive pulmonary disease)     Coronary artery disease     DDD (degenerative disc disease), lumbar     DDD (degenerative disc disease), thoracic     Diabetes mellitus     DJD (degenerative joint disease)     Dysphagia      Gout     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    Hypertension     IBS (irritable colon syndrome)     constipation    Mass of right breast     Neuropathy     Renal insufficiency     Sciatic leg pain     right    Simple chronic bronchitis     Solitary kidney     left       Past Surgical History:  Past Surgical History:   Procedure Laterality Date    BREAST BIOPSY Right     CARDIAC CATHETERIZATION      CHOLECYSTECTOMY      COLON SURGERY      REMOVAL diverticular diseae    COLONOSCOPY N/A 08/12/2021    Procedure: COLONOSCOPY with polypectomy x 3;  Surgeon: Margarette Mcallister MD;  Location: [facility] END[facility]COPY;  Service: Gastroenterology;  Laterality: N/A;  post op: hmorrhoids, diverticulosis, polyps    CORONARY STENT PLACEMENT      ENDOSCOPY N/A 08/12/2021    Procedure: ESOPHAGOGASTRODUODENOSCOPY with dilatation (18-20 mm balloon) and (54 bougie);  Surgeon: Margarette Mcallister MD;  Location: [facility] ENDOSCOPY;  Service: Gastroenterology;  Laterality: N/A;  post op: esophageal stricture, esophagitis, gastritis, history of nissen    ENDOSCOPY N/A 9/13/2023    Procedure: ESOPHAGOGASTRODUODENOSCOPY with biopsy x 1 area and dilation (50,54,56 non guided bougie);  Surgeon: Margarette Mcallister MD;  Location: [facility] ENDOSCOPY;  Service: Gastroenterology;  Laterality: N/A;  post op: esophageal stricture    EYE SURGERY Bilateral     cataracts    HIATAL HERNIA REPAIR      NEPHRECTOMY Right     cancer    UPPER ENDOSCOPIC ULTRASOUND W/ FNA N/A 09/22/2022    Procedure: EUS;  Surgeon: Margarette Mcallister MD;  Location: [facility] ENDOSCOPY;  Service: Gastroenterology;  Laterality: N/A;  post: normal pancreas, dilated pancreatic duct, hiatal hernia,        Social History:  Social History     Socioeconomic History    Marital status:    Tobacco Use    Smoking status: Former     Current packs/day: 0.00     Average packs/day: 0.3 packs/day for 50.0 years (12.5 ttl pk-yrs)     Types: Cigarettes     Start date: 11/1973     Quit date: 11/2023     Years  since quittin.4     Passive exposure: Past    Smokeless tobacco: Never    Tobacco comments:     Mostly patches, some days none, some days 1-2   Vaping Use    Vaping status: Never Used   Substance and Sexual Activity    Alcohol use: Yes     Comment: occasionally     Drug use: Never    Sexual activity: Defer       Review of Systems:  The following systems were reviewed as they relate to the cardiovascular system: Constitutional, Eyes, ENT, Cardiovascular, Respiratory, Gastrointestinal, Integumentary, Neurological, Psychiatric, Hematologic, Endocrine, Musculoskeletal, and Genitourinary. The pertinent cardiovascular findings are reported above with all other cardiovascular points within those systems being negative.    Diagnostic Study Review:     Current Electrocardiogram:    ECG 12 Lead    Date/Time: 3/28/2024 5:57 PM  Performed by: Leon Gonzalez DO    Authorized by: Leon Gonzalez DO  Comparison: not compared with previous ECG   Previous ECG: no previous ECG available  Comments: Normal sinus rhythm with a ventricular rate of 92 bpm.  PVCs.  Normal QT and QTc intervals.          Laboratory Data:  Lab Results   Component Value Date    GLUCOSE 199 (H) 2024    BUN 18 2024    CREATININE 1.55 (H) 2024    EGFRIFNONA 29 (L) 2021    EGFRIFAFRI 33 (L) 2021    BCR 11.6 2024    K 4.4 2024    CO2 27.0 2024    CALCIUM 9.3 2024    PROTENTOTREF 5.7 (L) 2022    ALBUMIN 3.6 2024    LABIL2 1.7 2022    AST 9 2024    ALT 13 2024     Lab Results   Component Value Date    GLUCOSE 199 (H) 2024    CALCIUM 9.3 2024     2024    K 4.4 2024    CO2 27.0 2024     2024    BUN 18 2024    CREATININE 1.55 (H) 2024    EGFRIFAFRI 33 (L) 2021    EGFRIFNONA 29 (L) 2021    BCR 11.6 2024    ANIONGAP 9.0 2024     Lab Results   Component Value Date    WBC 6.40  02/09/2024    HGB 11.6 (L) 02/09/2024    HCT 35.8 02/09/2024    .2 (H) 02/09/2024     02/09/2024     Lab Results   Component Value Date    CHOL 83 11/08/2018    CHLPL 206 (H) 12/16/2022    TRIG 60 12/16/2022    HDL 42 12/16/2022     (H) 12/16/2022     Lab Results   Component Value Date    HGBA1C 7.10 (H) 01/21/2024     Lab Results   Component Value Date    INR 1.00 02/20/2023    PROTIME 10.3 02/20/2023       Most Recent Echo:  Results for orders placed during the hospital encounter of 01/14/24    Adult Transthoracic Echo Complete W/ Cont if Necessary Per Protocol    Interpretation Summary    Left ventricular systolic function is normal. Calculated left ventricular EF = 51% Left ventricular ejection fraction appears to be 51 - 55%.    Left ventricular diastolic function is consistent with (grade I) impaired relaxation.    The left atrial cavity is mild to moderately dilated.    There is mild calcification of the aortic valve mainly affecting the left coronary and right coronary cusp(s).    Moderate mitral valve regurgitation is present.    Estimated right ventricular systolic pressure from tricuspid regurgitation is normal (<35 mmHg).       Most Recent Stress Test:  Results for orders placed during the hospital encounter of 01/14/24    Stress Test With Myocardial Perfusion One Day    Interpretation Summary    Lexiscan myocardial perfusion study shows moderate size severe intensity mostly fixed perfusion defect involving the basal inferior wall with no significant reversible ischemia.    Likely underlying perfusion abnormality secondary to attenuation artifact    Impressions are consistent with a low risk study.    Left ventricular ejection fraction is normal (Calculated EF = 50%).    Clinical correlation is recommended       Most Recent Cardiac Catheterization:   No results found for this or any previous visit.       NOTE: The following portions of the patient's note were reviewed, confirmed  "and/or updated this visit as appropriate: History of present illness/Interval history, physical examination, assessment & plan, allergies, current medications, past family history, past medical history, past social history, past surgical history and problem list.    Labs pertinent to today's visit on 03/28/2024 (including but not limited to CBC, CMP, and lipid profiles) were requested from the patient's primary care provider/hospital/clinical laboratory.  If the labs were available for the visit, they were reviewed with the patient.  If they were not available, when received, special interest will be made to the labs pertinent to this visit.  The patient's most recent \"in-house\" labs are noted below and have been reviewed.  Outside labs pertinent to this visit are scanned into the record and have been reviewed.    Discussions held today, 03/28/2024,regarding procedures included risk, benefits, and options including but not limited to: Death, MI, stroke, pain, bleeding, infection, and possible need for vascular/thoracic/cardiothoracic surgery.    Copied information within this note was reviewed and is current as of 03/28/2024.    Assessment and plan noted herein represents the current plan of care as of 03/28/2024.    Significant resources from our office and staff are inherent in engaging this patient in a continuous and active collaborative plan of care related to their chronic cardiovascular conditions outlined below.  The management of these conditions requires the direction of our service with specialized clinical knowledge, skills, and experience.  This collaborative care includes but is not limited to patient education, expectations and responsibilities, shared decision making around therapeutic goals, and shared commitments to achieve those goals.  " How Severe Are Your Bumps?: moderate Have Your Bumps Been Treated?: not been treated Is This A New Presentation, Or A Follow-Up?: Bump

## 2024-03-28 ENCOUNTER — OFFICE VISIT (OUTPATIENT)
Dept: CARDIOLOGY | Facility: CLINIC | Age: 79
End: 2024-03-28
Payer: MEDICARE

## 2024-03-28 VITALS
BODY MASS INDEX: 31.16 KG/M2 | HEART RATE: 68 BPM | OXYGEN SATURATION: 95 % | DIASTOLIC BLOOD PRESSURE: 70 MMHG | SYSTOLIC BLOOD PRESSURE: 128 MMHG | WEIGHT: 187 LBS | HEIGHT: 65 IN

## 2024-03-28 DIAGNOSIS — I25.10 ATHEROSCLEROSIS OF NATIVE CORONARY ARTERY OF NATIVE HEART WITHOUT ANGINA PECTORIS: Primary | ICD-10-CM

## 2024-03-28 DIAGNOSIS — I10 ESSENTIAL (PRIMARY) HYPERTENSION: ICD-10-CM

## 2024-03-28 DIAGNOSIS — E78.2 MIXED HYPERLIPIDEMIA: ICD-10-CM

## 2024-03-28 DIAGNOSIS — Z79.02 LONG TERM CURRENT USE OF ANTITHROMBOTICS/ANTIPLATELETS: ICD-10-CM

## 2024-03-28 RX ORDER — CARVEDILOL 3.12 MG/1
6.25 TABLET ORAL 2 TIMES DAILY WITH MEALS
Qty: 180 TABLET | Refills: 3 | Status: SHIPPED | OUTPATIENT
Start: 2024-03-28

## 2024-03-29 ENCOUNTER — PATIENT ROUNDING (BHMG ONLY) (OUTPATIENT)
Dept: ORTHOPEDIC SURGERY | Facility: CLINIC | Age: 79
End: 2024-03-29
Payer: MEDICARE

## 2024-03-29 NOTE — PROGRESS NOTES
A My-Chart message has been sent to the patient for PATIENT ROUNDING with [facility]

## 2024-04-24 ENCOUNTER — OFFICE VISIT (OUTPATIENT)
Dept: ORTHOPEDIC SURGERY | Facility: CLINIC | Age: 79
End: 2024-04-24
Payer: MEDICARE

## 2024-04-24 VITALS — BODY MASS INDEX: 31.16 KG/M2 | HEIGHT: 65 IN | HEART RATE: 94 BPM | OXYGEN SATURATION: 94 % | WEIGHT: 187 LBS

## 2024-04-24 DIAGNOSIS — S42.255D NONDISPLACED FRACTURE OF GREATER TUBEROSITY OF LEFT HUMERUS, SUBSEQUENT ENCOUNTER FOR FRACTURE WITH ROUTINE HEALING: Primary | ICD-10-CM

## 2024-04-24 PROCEDURE — 99213 OFFICE O/P EST LOW 20 MIN: CPT | Performed by: STUDENT IN AN ORGANIZED HEALTH CARE EDUCATION/TRAINING PROGRAM

## 2024-05-01 NOTE — PROGRESS NOTES
FOLLOW UP VISIT    Patient: Vianey Reeves  ?  YOB: 1945    MRN: [id number]  ?  Chief Complaint   Patient presents with    Left Shoulder - Follow-up      ?  HPI: Patient is an 78-year-old female presents today for acute left shoulder pain.  She states this happened after a fall on 3/4/2024 where she landed on her left side.  Had multiple injuries and was evaluated at ER with x-rays obtained and negative for acute osseous abnormality at that time.  Was having persistent pain, so subsequently evaluated by PCP where MRI was obtained with results as noted below.  She states today with pain primarily over the lateral aspect of the shoulder, and worsening pain and weakness with any lifting activities, reaching arm overhead, or sleeping on affected side.  She is right-hand dominant.  Current pain management through ice/heat, activity modification, and as needed tramadol.     Allergies:   Allergies   Allergen Reactions    Erythromycin Rash    Naproxen Sodium Other (See Comments)     Dizzy lightheaded    Statins Myalgia    Latex Itching and Swelling    Metoclopramide Other (See Comments)     Unsteady on feet    Rocephin [Ceftriaxone] Itching and Nausea Only    Dicyclomine Unknown - Low Severity       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    Hypertension     IBS (irritable colon syndrome)     constipation    Mass of right breast     Neuropathy     Renal  insufficiency     Sciatic leg pain     right    Simple chronic bronchitis     Solitary kidney     left     Past Surgical History:   Procedure Laterality Date    BREAST BIOPSY Right     CARDIAC CATHETERIZATION      CHOLECYSTECTOMY      COLON SURGERY      REMOVAL diverticular diseae    COLONOSCOPY N/A 2021    Procedure: COLONOSCOPY with polypectomy x 3;  Surgeon: Margarette Mcallister MD;  Location: [facility] ENDOSCOPY;  Service: Gastroenterology;  Laterality: N/A;  post op: hmorrhoids, diverticulosis, polyps    CORONARY STENT PLACEMENT      ENDOSCOPY N/A 2021    Procedure: ESOPHAGOGASTRODUODENOSCOPY with dilatation (18-20 mm balloon) and (54 bougie);  Surgeon: Margarette Mcallister MD;  Location: [facility] ENDOSCOPY;  Service: Gastroenterology;  Laterality: N/A;  post op: esophageal stricture, esophagitis, gastritis, history of nissen    ENDOSCOPY N/A 2023    Procedure: ESOPHAGOGASTRODUODENOSCOPY with biopsy x 1 area and dilation (50,54,56 non guided bougie);  Surgeon: Margarette Mcallister MD;  Location: [facility] ENDOSCOPY;  Service: Gastroenterology;  Laterality: N/A;  post op: esophageal stricture    EYE SURGERY Bilateral     cataracts    HIATAL HERNIA REPAIR      NEPHRECTOMY Right     cancer    UPPER ENDOSCOPIC ULTRASOUND W/ FNA N/A 2022    Procedure: EUS;  Surgeon: Margarette Mcallister MD;  Location: [facility] ENDOSCOPY;  Service: Gastroenterology;  Laterality: N/A;  post: normal pancreas, dilated pancreatic duct, hiatal hernia,      Social History     Occupational History    Not on file   Tobacco Use    Smoking status: Former     Current packs/day: 0.00     Average packs/day: 0.3 packs/day for 50.0 years (12.5 ttl pk-yrs)     Types: Cigarettes     Start date: 1973     Quit date: 2023     Years since quittin.4     Passive exposure: Past    Smokeless tobacco: Never    Tobacco comments:     Mostly patches, some days none, some days 1-2   Vaping Use    Vaping status: Never Used   Substance and Sexual Activity    Alcohol  "use: Yes     Comment: occasionally     Drug use: Never    Sexual activity: Defer      Social History     Social History Narrative    Not on file     Family History   Problem Relation Age of Onset    Arthritis Father     Prostate cancer Father     Heart disease Sister        Review of Systems  Constitutional: Negative.  Negative for fever.   Musculoskeletal: Positive for joint pain  Skin: Negative.  Negative for rash and wound.    Neurological: Negative for numbness.     Vitals:    04/24/24 1253   Pulse: 94   SpO2: 94%   Weight: 84.8 kg (187 lb)   Height: 165.1 cm (65\")        Physical Exam  Constitutional: Patient is oriented to person, place, and time. Appears well-developed and well-nourished.   Head: Normocephalic and atraumatic.   Pulmonary/Chest: Effort normal.   Musculoskeletal:   See detailed exam below   Neurological: Alert and oriented to person, place, and time. No sensory deficit. Coordination normal.   Skin: Skin is warm and dry. Capillary refill takes less than 2 seconds. No rash noted. No erythema.     The left shoulder is without obvious signs of acute bony deformity, swelling, erythema or ecchymosis.  There is mild tenderness over anterior and posterior joint line.  There is significant tenderness over greater tuberosity laterally, and subacromial bursa.  There is no tenderness at the AC joint. There is no tenderness at the SC joint.  Active range of motion forward flexion and abduction to 90 degrees with pain at end range.  Passive forward flexion 120 degrees with pain.  Patient with testing deferred.  Instability tests are negative with anterior and posterior drawer, and sulcus test. Speeds and Yergason's tests are negative. [place]'s test is positive for pain and weakness. Rigo's test is positive for pain and weakness.  Negative belly press.  Rotator cuff strength is 3+/5 and painful with supraspinatus testing, 4/5 with discomfort with internal/external rotation. The neck and opposite shoulder are " otherwise normal and stable. Gait is pain-free and tandem.    Diagnostics:    XR Shoulder 2+ View Left    Result Date: 4/26/2024  Impression: Known nondisplaced greater tuberosity fracture not well seen on this exam. No evidence for acute abnormality of the left shoulder. Chronic findings as above. Electronically Signed: Lyle Calderón MD  4/26/2024 11:58 AM EDT  Workstation ID: [id number]      Reviewed MRI report of left shoulder, performed at  [facility] on 3/13/2024, summary of impression below:     Nondisplaced fracture greater tuberosity.  Full-thickness complete supraspinatus tendon tear without retraction.  Moderate glenohumeral/AC arthritis.  Subacromial subdeltoid fluid    Assessment:  Diagnoses and all orders for this visit:    1. Nondisplaced fracture of greater tuberosity of left humerus, subsequent encounter for fracture with routine healing (Primary)  -     XR Shoulder 2+ View Left        Plan    Patient returning for 4-week follow-up of nondisplaced left humeral greater tuberosity fracture noted on prior MRI.  Patient has had mild clinical improvement, with advancing shoulder range of motion and less significant pain.  She still has soreness over the lateral shoulder, and with any shoulder lifting activity.  Pain is controlled at this time with current pain regimen, and regular ice.  Repeat x-rays obtained in office today as noted above with greater tuberosity fracture not well identified, but no interval loss of alignment or significant signs of bony healing.  As such we will continue current treatment plan with preference of shoulder range of motion, but no heavy lifting or repetitive overhead activity to allow for fracture healing.  Will plan follow-up in 4 weeks with repeat radiographs at that time to monitor for potential bony healing.  If remains radiopaque, will likely obtain follow-up MRI to evaluate for bony healing and allow patient to increase activity/strengthening.  Again discussed  after fracture healing, could initiate conservative management for treatment of rotator cuff tear.  Specifically we discussed corticosteroid injections and formal physical therapy.  Rest, ice, compression, and elevation (RICE) therapy  Due to multiple medical comorbidities, oral pain control via PCP.  Unable to take oral NSAIDs due to renal disease.  Follow up in 4 week(s) with repeat x-rays.  If no noted bony healing on XR imaging will likely obtain MRI to evaluate for bony healing of nondisplaced greater tuberosity fracture    Date of encounter: 4/24/2024  Del Rivera DO    Disclaimer: Please note that areas of this note were completed with computer voice recognition software.  Quite often unanticipated grammatical, syntax, homophones, and other interpretive errors are inadvertently transcribed by the computer software. Please excuse any errors that have escaped final proofreading.

## 2024-05-22 ENCOUNTER — OFFICE VISIT (OUTPATIENT)
Dept: ORTHOPEDIC SURGERY | Facility: CLINIC | Age: 79
End: 2024-05-22
Payer: MEDICARE

## 2024-05-22 VITALS — WEIGHT: 187 LBS | OXYGEN SATURATION: 96 % | HEIGHT: 65 IN | HEART RATE: 91 BPM | BODY MASS INDEX: 31.16 KG/M2

## 2024-05-22 DIAGNOSIS — M75.102 TEAR OF LEFT SUPRASPINATUS TENDON: ICD-10-CM

## 2024-05-22 DIAGNOSIS — S42.255D NONDISPLACED FRACTURE OF GREATER TUBEROSITY OF LEFT HUMERUS, SUBSEQUENT ENCOUNTER FOR FRACTURE WITH ROUTINE HEALING: Primary | ICD-10-CM

## 2024-05-22 DIAGNOSIS — M19.012 GLENOHUMERAL ARTHRITIS, LEFT: ICD-10-CM

## 2024-05-24 NOTE — PROGRESS NOTES
FOLLOW UP VISIT    Patient: Vianey Reeves  ?  YOB: 1945    MRN: [id number]  ?  Chief Complaint   Patient presents with    Left Shoulder - Follow-up      ?  HPI: Patient is returning for 8-week follow-up of left shoulder pain, and nondisplaced greater tuberosity fracture.  She has been out of immobilization at this time.  Continues to clinically improve, and now has full shoulder range of motion with minimal increase in pain.  She still continues to have mild focal pain over the lateral aspect of the left shoulder, with associated weakness with any lifting activity.  Despite this she says she has functionally been minimally impacted by the shoulder as she has compensated with her contralateral arm, and pain is minimal at this time.  She continues to utilize ice and heat, and as needed tramadol medication.      Allergies:   Allergies   Allergen Reactions    Erythromycin Rash    Naproxen Sodium Other (See Comments)     Dizzy lightheaded    Statins Myalgia    Latex Itching and Swelling    Metoclopramide Other (See Comments)     Unsteady on feet    Rocephin [Ceftriaxone] Itching and Nausea Only    Dicyclomine Unknown - Low Severity       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    Hypertension     IBS (irritable colon syndrome)     constipation    Mass of right breast     Neuropathy     Renal insufficiency     Sciatic leg pain     right    Simple chronic  bronchitis     Solitary kidney     left     Past Surgical History:   Procedure Laterality Date    BREAST BIOPSY Right     CARDIAC CATHETERIZATION      CHOLECYSTECTOMY      COLON SURGERY      REMOVAL diverticular diseae    COLONOSCOPY N/A 2021    Procedure: COLONOSCOPY with polypectomy x 3;  Surgeon: Margarette Mcallister MD;  Location: [facility] ENDOSCOPY;  Service: Gastroenterology;  Laterality: N/A;  post op: hmorrhoids, diverticulosis, polyps    CORONARY STENT PLACEMENT      ENDOSCOPY N/A 2021    Procedure: ESOPHAGOGASTRODUODENOSCOPY with dilatation (18-20 mm balloon) and (54 bougie);  Surgeon: Margarette Mcallister MD;  Location: [facility] ENDOSCOPY;  Service: Gastroenterology;  Laterality: N/A;  post op: esophageal stricture, esophagitis, gastritis, history of nissen    ENDOSCOPY N/A 2023    Procedure: ESOPHAGOGASTRODUODENOSCOPY with biopsy x 1 area and dilation (50,54,56 non guided bougie);  Surgeon: Margarette Mcallister MD;  Location: [facility] ENDOSCOPY;  Service: Gastroenterology;  Laterality: N/A;  post op: esophageal stricture    EYE SURGERY Bilateral     cataracts    HIATAL HERNIA REPAIR      NEPHRECTOMY Right     cancer    UPPER ENDOSCOPIC ULTRASOUND W/ FNA N/A 2022    Procedure: EUS;  Surgeon: Margarette Mcallister MD;  Location: [facility] ENDOSCOPY;  Service: Gastroenterology;  Laterality: N/A;  post: normal pancreas, dilated pancreatic duct, hiatal hernia,      Social History     Occupational History    Not on file   Tobacco Use    Smoking status: Former     Current packs/day: 0.00     Average packs/day: 0.3 packs/day for 50.0 years (12.5 ttl pk-yrs)     Types: Cigarettes     Start date: 1973     Quit date: 2023     Years since quittin.5     Passive exposure: Past    Smokeless tobacco: Never    Tobacco comments:     Mostly patches, some days none, some days 1-2   Vaping Use    Vaping status: Never Used   Substance and Sexual Activity    Alcohol use: Yes     Comment: occasionally     Drug use: Never     "Sexual activity: Defer      Social History     Social History Narrative    Not on file     Family History   Problem Relation Age of Onset    Arthritis Father     Prostate cancer Father     Heart disease Sister        Review of Systems  Constitutional: Negative.  Negative for fever.   Musculoskeletal: Positive for joint pain  Skin: Negative.  Negative for rash and wound.    Neurological: Negative for numbness.     Vitals:    05/22/24 1251   Pulse: 91   SpO2: 96%   Weight: 84.8 kg (187 lb)   Height: 165.1 cm (65\")        Physical Exam  Constitutional: Patient is oriented to person, place, and time. Appears well-developed and well-nourished.   Head: Normocephalic and atraumatic.   Pulmonary/Chest: Effort normal.   Musculoskeletal:   See detailed exam below   Neurological: Alert and oriented to person, place, and time. No sensory deficit. Coordination normal.   Skin: Skin is warm and dry. Capillary refill takes less than 2 seconds. No rash noted. No erythema.     The left shoulder is without obvious signs of acute bony deformity, swelling, erythema or ecchymosis.  There is mild tenderness over anterior and posterior joint line.  There is mild improved tenderness over greater tuberosity and subacromial bursa.  There is no tenderness at the AC joint. There is no tenderness at the SC joint.  Active and passive range of motion full with forward flexion and abduction, as well as external rotation.  Internal rotation to lower lumbar.  Instability tests are negative with anterior and posterior drawer, and sulcus test. Speeds and Yergason's tests are negative. [place]'s test is positive for pain and weakness. Rigo's test is positive for pain and weakness.  Negative belly press.  Rotator cuff strength is 3+/5 and painful with supraspinatus testing, 4+/5 with mild discomfort with internal/external rotation. The neck and opposite shoulder are otherwise normal and stable. Gait is pain-free and tandem.    Diagnostics:    XR Shoulder " 2+ View Left    Result Date: 5/23/2024  Impression: Healing nondisplaced fracture of the greater tuberosity, stable in alignment as compared to the previous study. Electronically Signed: Marisabel Roberts MD  5/23/2024 9:24 AM EDT  Workstation ID: [id number]    XR Shoulder 2+ View Left    Result Date: 4/26/2024  Impression: Known nondisplaced greater tuberosity fracture not well seen on this exam. No evidence for acute abnormality of the left shoulder. Chronic findings as above. Electronically Signed: Lyle Calderón MD  4/26/2024 11:58 AM EDT  Workstation ID: [id number]      Reviewed MRI report of left shoulder, performed at  [facility] on 3/13/2024, summary of impression below:     Nondisplaced fracture greater tuberosity.  Full-thickness complete supraspinatus tendon tear without retraction.  Moderate glenohumeral/AC arthritis.  Subacromial subdeltoid fluid    Assessment:  Diagnoses and all orders for this visit:    1. Nondisplaced fracture of greater tuberosity of left humerus, subsequent encounter for fracture with routine healing (Primary)  -     XR Shoulder 2+ View Left    2. Tear of left supraspinatus tendon    3. Glenohumeral arthritis, left        Plan    Patient returning for 8-week follow-up of nondisplaced left humeral greater tuberosity fracture noted on prior MRI.  She continues to clinically improve, and has gained full shoulder range of motion, with improved pain and swelling over fracture site.  She continues to have diffuse pain across the shoulder over subacromial region, greater tuberosity as well as anterior joint space.  We discussed that this is likely more related to her underlying glenohumeral arthritis as well as full-thickness supraspinatus tear, and likely lesser due to fracture healing at this time.  Repeat radiographs in office today again with maintained alignment.  There is noted sclerotic changes across fracture site on today's imaging to indicate interval healing which was not  visualized on prior x-ray imaging.  She is not interested in any surgical management at this time for either fracture healing or rotator cuff/glenohumeral arthritis.  She also continues to symptomatically and clinically improved with conservative management.  As such we discussed ongoing conservative management.  Given she is having fracture healing, will allow her to continue to gradually increase use of left upper extremity for ADLs, and lifting activity as tolerated.  She does have significant weakness which is likely related to her supraspinatus rotator cuff injury.  Encouraged her to continue both active and passive range of motion to avoid shoulder stiffness.  Will plan follow-up in 2 to 3 months to monitor progress with conservative management.  If she has continued significant pain at that time could consider subacromial or intra-articular corticosteroid injection, or course of either home rehab or formal physical therapy or targeted for treatment of her rotator cuff tear as well as glenohumeral arthritis.  Rest, ice, compression, and elevation (RICE) therapy  Due to multiple medical comorbidities, oral pain control via PCP.  Unable to take oral NSAIDs due to renal disease.  Follow-up in 2 months to monitor progress with conservative management.    Date of encounter: 5/22/2024  Del Rivera DO    Disclaimer: Please note that areas of this note were completed with computer voice recognition software.  Quite often unanticipated grammatical, syntax, homophones, and other interpretive errors are inadvertently transcribed by the computer software. Please excuse any errors that have escaped final proofreading.

## 2024-06-10 ENCOUNTER — HOSPITAL ENCOUNTER (OUTPATIENT)
Dept: MRI IMAGING | Facility: HOSPITAL | Age: 79
Discharge: HOME | End: 2024-06-10
Payer: MEDICARE

## 2024-06-10 ENCOUNTER — TRANSCRIBE ORDERS (OUTPATIENT)
Dept: ADMINISTRATIVE | Facility: HOSPITAL | Age: 79
End: 2024-06-10
Payer: MEDICARE

## 2024-06-10 DIAGNOSIS — M54.6 ACUTE THORACIC BACK PAIN, UNSPECIFIED BACK PAIN LATERALITY: Primary | ICD-10-CM

## 2024-06-10 DIAGNOSIS — M54.6 ACUTE THORACIC BACK PAIN, UNSPECIFIED BACK PAIN LATERALITY: ICD-10-CM

## 2024-06-10 PROCEDURE — 72146 MRI CHEST SPINE W/O DYE: CPT

## 2024-07-01 ENCOUNTER — OFFICE VISIT (OUTPATIENT)
Dept: ORTHOPEDIC SURGERY | Facility: CLINIC | Age: 79
End: 2024-07-01
Payer: MEDICARE

## 2024-07-01 VITALS — WEIGHT: 177 LBS | OXYGEN SATURATION: 95 % | HEART RATE: 105 BPM | BODY MASS INDEX: 29.49 KG/M2 | HEIGHT: 65 IN

## 2024-07-01 DIAGNOSIS — S42.255S: ICD-10-CM

## 2024-07-01 DIAGNOSIS — M19.012 GLENOHUMERAL ARTHRITIS, LEFT: ICD-10-CM

## 2024-07-01 DIAGNOSIS — M75.102 TEAR OF LEFT SUPRASPINATUS TENDON: Primary | ICD-10-CM

## 2024-07-01 PROCEDURE — 99213 OFFICE O/P EST LOW 20 MIN: CPT | Performed by: STUDENT IN AN ORGANIZED HEALTH CARE EDUCATION/TRAINING PROGRAM

## 2024-07-01 RX ORDER — DOXYCYCLINE HYCLATE 100 MG/1
1 CAPSULE ORAL EVERY 12 HOURS SCHEDULED
COMMUNITY
Start: 2024-06-26

## 2024-07-01 RX ORDER — FUROSEMIDE 20 MG/1
1 TABLET ORAL DAILY
COMMUNITY
Start: 2024-06-28

## 2024-07-01 NOTE — PROGRESS NOTES
FOLLOW UP VISIT    Patient: Vianey Reeves  ?  YOB: 1945    MRN: [id number]  ?  Chief Complaint   Patient presents with    Left Shoulder - Pain, Follow-up      ?  Pain      Returning for follow-up on chronic left shoulder pain.  She was previously treated for nondisplaced greater tuberosity fracture for which her symptoms were improving.  Subsequently 6 weeks ago, we allowed her to gradually restart ADLs, and lifting through left upper extremity.  She states she near immediately went back to cleaning, lifting and housework utilizing left upper extremity, which worsen her symptoms.  Specifically she states any overhead lifting or reaching behind her back is significantly painful over the anterior and lateral aspect of the shoulder.  Patient without pain with passive range of motion of the shoulder.  She is also currently being treated for pneumonia over the past 3 weeks by her PCP.  With this she has had decreased activity level and overall use of left shoulder.  She is presenting today for discussion of potential subacromial corticosteroid injection for her rotator cuff tear and shoulder pain as previously discussed      Allergies:   Allergies   Allergen Reactions    Erythromycin Rash    Naproxen Sodium Other (See Comments)     Dizzy lightheaded    Statins Myalgia    Latex Itching and Swelling    Metoclopramide Other (See Comments)     Unsteady on feet    Rocephin [Ceftriaxone] Itching and Nausea Only    Dicyclomine Unknown - Low Severity       Past Medical History:   Diagnosis Date    Allergic     latex allergy    Allergies     Anxiety     Bilateral carotid bruits     Bulging lumbar disc     Bulging of thoracic intervertebral disc     Cancer     ureter    surgery    CKD (chronic kidney disease)     stage 3    Claudication, intermittent     COPD (chronic obstructive pulmonary disease)     Coronary artery disease     DDD (degenerative disc disease), lumbar     DDD (degenerative disc disease), thoracic      Diabetes mellitus     DJD (degenerative joint disease)     Dysphagia     Gout     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    Hypertension     IBS (irritable colon syndrome)     constipation    Mass of right breast     Neuropathy     Renal insufficiency     Sciatic leg pain     right    Simple chronic bronchitis     Solitary kidney     left     Past Surgical History:   Procedure Laterality Date    BREAST BIOPSY Right     CARDIAC CATHETERIZATION      CHOLECYSTECTOMY      COLON SURGERY      REMOVAL diverticular diseae    COLONOSCOPY N/A 08/12/2021    Procedure: COLONOSCOPY with polypectomy x 3;  Surgeon: Margarette Mcallister MD;  Location: [facility] ENDOSCOPY;  Service: Gastroenterology;  Laterality: N/A;  post op: hmorrhoids, diverticulosis, polyps    CORONARY STENT PLACEMENT      ENDOSCOPY N/A 08/12/2021    Procedure: ESOPHAGOGASTRODUODENOSCOPY with dilatation (18-20 mm balloon) and (54 bougie);  Surgeon: Margarette Mcallister MD;  Location: [facility] ENDOSCOPY;  Service: Gastroenterology;  Laterality: N/A;  post op: esophageal stricture, esophagitis, gastritis, history of nissen    ENDOSCOPY N/A 9/13/2023    Procedure: ESOPHAGOGASTRODUODENOSCOPY with biopsy x 1 area and dilation (50,54,56 non guided bougie);  Surgeon: Margarette Mcallister MD;  Location: [facility] ENDOSCOPY;  Service: Gastroenterology;  Laterality: N/A;  post op: esophageal stricture    EYE SURGERY Bilateral     cataracts    HIATAL HERNIA REPAIR      NEPHRECTOMY Right     cancer    UPPER ENDOSCOPIC ULTRASOUND W/ FNA N/A 09/22/2022    Procedure: EUS;  Surgeon: Margarette Mcallister MD;  Location: [facility] ENDOSCOPY;  Service: Gastroenterology;  Laterality: N/A;  post: normal pancreas, dilated pancreatic duct, hiatal hernia,      Social History     Occupational History    Not on file   Tobacco Use    Smoking status: Former     Current packs/day: 0.00     Average packs/day: 0.3 packs/day for 50.0 years (12.5 ttl pk-yrs)     Types: Cigarettes     Start date: 11/1973     Quit date:  "2023     Years since quittin.6     Passive exposure: Past    Smokeless tobacco: Never    Tobacco comments:     Mostly patches, some days none, some days 1-2   Vaping Use    Vaping status: Never Used   Substance and Sexual Activity    Alcohol use: Yes     Comment: occasionally     Drug use: Never    Sexual activity: Defer      Social History     Social History Narrative    Not on file     Family History   Problem Relation Age of Onset    Arthritis Father     Prostate cancer Father     Heart disease Sister        Review of Systems  Constitutional: Negative.  Negative for fever.   Musculoskeletal: Positive for joint pain  Skin: Negative.  Negative for rash and wound.    Neurological: Negative for numbness.     Vitals:    24 1256   Pulse: 105   SpO2: 95%   Weight: 80.3 kg (177 lb)   Height: 165.1 cm (65\")        Physical Exam  Constitutional: Patient is oriented to person, place, and time. Appears well-developed and well-nourished.   Head: Normocephalic and atraumatic.   Pulmonary/Chest: Effort normal.   Musculoskeletal:   See detailed exam below   Neurological: Alert and oriented to person, place, and time. No sensory deficit. Coordination normal.   Skin: Skin is warm and dry. Capillary refill takes less than 2 seconds. No rash noted. No erythema.     The left shoulder is without obvious signs of acute bony deformity, swelling, erythema or ecchymosis.  There is mild tenderness over anterior and posterior joint line.  There is tenderness over supraspinatus tendon insertion on greater tuberosity over anterior shoulder.  There is no tenderness at the AC joint. There is no tenderness at the SC joint.  Active range of motion limited forward flexion and abduction to 90 degrees with pain.  Passive range of motion full.  Drop arm test positive. Instability tests are negative with anterior and posterior drawer, and sulcus test. Speeds and Yergason's tests are negative. Cottonwood's test is positive for pain and " weakness. Rigo's test is positive for pain and weakness.  Negative belly press.  Rotator cuff strength is 3-/5 and painful with supraspinatus testing, 4+/5 with mild discomfort with internal/external rotation. The neck and opposite shoulder are otherwise normal and stable.     Diagnostics:  No new imaging today.      XR Shoulder 2+ View Left    Result Date: 5/23/2024  Impression: Healing nondisplaced fracture of the greater tuberosity, stable in alignment as compared to the previous study. Electronically Signed: Marisabel Roberts MD  5/23/2024 9:24 AM EDT  Workstation ID: [id number]    XR Shoulder 2+ View Left    Result Date: 4/26/2024  Impression: Known nondisplaced greater tuberosity fracture not well seen on this exam. No evidence for acute abnormality of the left shoulder. Chronic findings as above. Electronically Signed: Lyle Calderón MD  4/26/2024 11:58 AM EDT  Workstation ID: [id number]      Reviewed MRI report of left shoulder, performed at  [facility] on 3/13/2024, summary of impression below:     Nondisplaced fracture greater tuberosity.  Full-thickness complete supraspinatus tendon tear without retraction.  Moderate glenohumeral/AC arthritis.  Subacromial subdeltoid fluid    Assessment:  Diagnoses and all orders for this visit:    1. Tear of left supraspinatus tendon (Primary)    2. Glenohumeral arthritis, left    3. Nondisplaced fracture of greater tuberosity of left humerus, sequela          Plan    Patient returning for follow-up of left shoulder pain.  She has had clinically worsening symptoms, specifically with supraspinatus activation and increased home work activity, likely related to full-thickness supraspinatus tear with retraction.  Specifically having difficulty with overhead activity or reaching away from her body.  We have previously discussed subacromial corticosteroid injection for pain relief, as the patient is not interested in surgical management at this time.  Unfortunately at this time  she is undergoing treatment for pneumonia through her primary care provider for the last 3 weeks.  We specifically discussed potential side effect of systemic immunosuppression from corticosteroid injection.  Although likely minimal absorption and small effect, could potentially affect immune response and treatment of her acute pneumonia.  As such I discussed and recommended holding on corticosteroid injection at this time, pending resolution of her active pneumonia.  Once pneumonia has resolved, and patient back to clinical baseline, can revisit subacromial corticosteroid injection for treatment of pain/inflammation from supraspinatus tear and bursitis.  Patient is agreeable.  Will continue gentle shoulder range of motion to avoid stiffness until follow-up visit.  Patient will call and schedule follow-up visit, once acute pneumonia has resolved.  Rest, ice, compression, and elevation (RICE) therapy  Patient to call for follow-up after pneumonia resolution, for likely subacromial corticosteroid injection    Date of encounter: 7/1/2024  Del Rivera DO    Disclaimer: Please note that areas of this note were completed with computer voice recognition software.  Quite often unanticipated grammatical, syntax, homophones, and other interpretive errors are inadvertently transcribed by the computer software. Please excuse any errors that have escaped final proofreading.

## 2024-07-11 ENCOUNTER — HOSPITAL ENCOUNTER (INPATIENT)
Facility: HOSPITAL | Age: 79
LOS: 6 days | Discharge: HOME HEALTH | End: 2024-07-17
Attending: FAMILY MEDICINE | Admitting: INTERNAL MEDICINE
Payer: MEDICARE

## 2024-07-11 ENCOUNTER — APPOINTMENT (OUTPATIENT)
Dept: CT IMAGING | Facility: HOSPITAL | Age: 79
End: 2024-07-11
Payer: MEDICARE

## 2024-07-11 DIAGNOSIS — J18.9 RECURRENT PNEUMONIA: ICD-10-CM

## 2024-07-11 DIAGNOSIS — T17.800A MULTIPLE TRACHEOBRONCHIAL MUCUS PLUGS: Primary | ICD-10-CM

## 2024-07-11 DIAGNOSIS — R06.02 SHORTNESS OF BREATH: ICD-10-CM

## 2024-07-11 LAB
ANION GAP SERPL CALCULATED.3IONS-SCNC: 11.2 MMOL/L (ref 5–15)
BASOPHILS # BLD AUTO: 0.03 10*3/MM3 (ref 0–0.2)
BASOPHILS NFR BLD AUTO: 0.4 % (ref 0–1.5)
BUN SERPL-MCNC: 25 MG/DL (ref 8–23)
BUN/CREAT SERPL: 15.9 (ref 7–25)
CALCIUM SPEC-SCNC: 9.7 MG/DL (ref 8.6–10.5)
CHLORIDE SERPL-SCNC: 109 MMOL/L (ref 98–107)
CO2 SERPL-SCNC: 24.8 MMOL/L (ref 22–29)
CREAT SERPL-MCNC: 1.57 MG/DL (ref 0.57–1)
DEPRECATED RDW RBC AUTO: 51 FL (ref 37–54)
EGFRCR SERPLBLD CKD-EPI 2021: 33.2 ML/MIN/1.73
EOSINOPHIL # BLD AUTO: 0.1 10*3/MM3 (ref 0–0.4)
EOSINOPHIL NFR BLD AUTO: 1.3 % (ref 0.3–6.2)
ERYTHROCYTE [DISTWIDTH] IN BLOOD BY AUTOMATED COUNT: 14.2 % (ref 12.3–15.4)
GLUCOSE BLDC GLUCOMTR-MCNC: 94 MG/DL (ref 70–105)
GLUCOSE SERPL-MCNC: 88 MG/DL (ref 65–99)
HCT VFR BLD AUTO: 42.6 % (ref 34–46.6)
HGB BLD-MCNC: 13.5 G/DL (ref 12–15.9)
HOLD SPECIMEN: NORMAL
HOLD SPECIMEN: NORMAL
IMM GRANULOCYTES # BLD AUTO: 0.06 10*3/MM3 (ref 0–0.05)
IMM GRANULOCYTES NFR BLD AUTO: 0.8 % (ref 0–0.5)
LYMPHOCYTES # BLD AUTO: 2.89 10*3/MM3 (ref 0.7–3.1)
LYMPHOCYTES NFR BLD AUTO: 37.6 % (ref 19.6–45.3)
MCH RBC QN AUTO: 31.3 PG (ref 26.6–33)
MCHC RBC AUTO-ENTMCNC: 31.7 G/DL (ref 31.5–35.7)
MCV RBC AUTO: 98.6 FL (ref 79–97)
MONOCYTES # BLD AUTO: 0.65 10*3/MM3 (ref 0.1–0.9)
MONOCYTES NFR BLD AUTO: 8.5 % (ref 5–12)
NEUTROPHILS NFR BLD AUTO: 3.96 10*3/MM3 (ref 1.7–7)
NEUTROPHILS NFR BLD AUTO: 51.4 % (ref 42.7–76)
NRBC BLD AUTO-RTO: 0 /100 WBC (ref 0–0.2)
NT-PROBNP SERPL-MCNC: 1827 PG/ML (ref 0–1800)
PLATELET # BLD AUTO: 196 10*3/MM3 (ref 140–450)
PMV BLD AUTO: 10.6 FL (ref 6–12)
POTASSIUM SERPL-SCNC: 4.3 MMOL/L (ref 3.5–5.2)
RBC # BLD AUTO: 4.32 10*6/MM3 (ref 3.77–5.28)
SODIUM SERPL-SCNC: 145 MMOL/L (ref 136–145)
WBC NRBC COR # BLD AUTO: 7.69 10*3/MM3 (ref 3.4–10.8)
WHOLE BLOOD HOLD COAG: NORMAL
WHOLE BLOOD HOLD SPECIMEN: NORMAL

## 2024-07-11 PROCEDURE — 85025 COMPLETE CBC W/AUTO DIFF WBC: CPT

## 2024-07-11 PROCEDURE — 93010 ELECTROCARDIOGRAM REPORT: CPT | Performed by: INTERNAL MEDICINE

## 2024-07-11 PROCEDURE — 80048 BASIC METABOLIC PNL TOTAL CA: CPT

## 2024-07-11 PROCEDURE — 93005 ELECTROCARDIOGRAM TRACING: CPT

## 2024-07-11 PROCEDURE — 71250 CT THORAX DX C-: CPT

## 2024-07-11 PROCEDURE — 83880 ASSAY OF NATRIURETIC PEPTIDE: CPT

## 2024-07-11 PROCEDURE — 82948 REAGENT STRIP/BLOOD GLUCOSE: CPT

## 2024-07-11 RX ORDER — POLYETHYLENE GLYCOL 3350 17 G/17G
17 POWDER, FOR SOLUTION ORAL DAILY
Status: DISCONTINUED | OUTPATIENT
Start: 2024-07-12 | End: 2024-07-17 | Stop reason: HOSPADM

## 2024-07-11 RX ORDER — FAMOTIDINE 20 MG/1
40 TABLET, FILM COATED ORAL DAILY
Status: DISCONTINUED | OUTPATIENT
Start: 2024-07-12 | End: 2024-07-11

## 2024-07-11 RX ORDER — FERROUS SULFATE 324(65)MG
324 TABLET, DELAYED RELEASE (ENTERIC COATED) ORAL
Status: DISCONTINUED | OUTPATIENT
Start: 2024-07-12 | End: 2024-07-17 | Stop reason: HOSPADM

## 2024-07-11 RX ORDER — CARVEDILOL 6.25 MG/1
6.25 TABLET ORAL 2 TIMES DAILY WITH MEALS
Status: DISCONTINUED | OUTPATIENT
Start: 2024-07-12 | End: 2024-07-17 | Stop reason: HOSPADM

## 2024-07-11 RX ORDER — SODIUM CHLORIDE 0.9 % (FLUSH) 0.9 %
10 SYRINGE (ML) INJECTION AS NEEDED
Status: DISCONTINUED | OUTPATIENT
Start: 2024-07-11 | End: 2024-07-17 | Stop reason: HOSPADM

## 2024-07-11 RX ORDER — ASPIRIN 81 MG/1
81 TABLET ORAL DAILY
Status: DISCONTINUED | OUTPATIENT
Start: 2024-07-12 | End: 2024-07-17 | Stop reason: HOSPADM

## 2024-07-11 RX ORDER — LANSOPRAZOLE 30 MG/1
30 TABLET, ORALLY DISINTEGRATING, DELAYED RELEASE ORAL
Status: DISCONTINUED | OUTPATIENT
Start: 2024-07-12 | End: 2024-07-17 | Stop reason: HOSPADM

## 2024-07-11 RX ORDER — AMLODIPINE BESYLATE 5 MG/1
10 TABLET ORAL DAILY
Status: DISCONTINUED | OUTPATIENT
Start: 2024-07-12 | End: 2024-07-17 | Stop reason: HOSPADM

## 2024-07-11 RX ORDER — BUDESONIDE AND FORMOTEROL FUMARATE DIHYDRATE 160; 4.5 UG/1; UG/1
2 AEROSOL RESPIRATORY (INHALATION)
Status: DISCONTINUED | OUTPATIENT
Start: 2024-07-12 | End: 2024-07-12

## 2024-07-11 RX ORDER — ONDANSETRON 2 MG/ML
4 INJECTION INTRAMUSCULAR; INTRAVENOUS EVERY 6 HOURS PRN
Status: DISCONTINUED | OUTPATIENT
Start: 2024-07-11 | End: 2024-07-17 | Stop reason: HOSPADM

## 2024-07-11 RX ORDER — GABAPENTIN 100 MG/1
100 CAPSULE ORAL NIGHTLY
Status: DISCONTINUED | OUTPATIENT
Start: 2024-07-12 | End: 2024-07-17 | Stop reason: HOSPADM

## 2024-07-11 RX ORDER — BISACODYL 5 MG/1
5 TABLET, DELAYED RELEASE ORAL DAILY PRN
Status: DISCONTINUED | OUTPATIENT
Start: 2024-07-11 | End: 2024-07-17 | Stop reason: HOSPADM

## 2024-07-11 RX ORDER — SODIUM CHLORIDE 0.9 % (FLUSH) 0.9 %
10 SYRINGE (ML) INJECTION EVERY 12 HOURS SCHEDULED
Status: DISCONTINUED | OUTPATIENT
Start: 2024-07-11 | End: 2024-07-17 | Stop reason: HOSPADM

## 2024-07-11 RX ORDER — HYDRALAZINE HYDROCHLORIDE 25 MG/1
25 TABLET, FILM COATED ORAL 2 TIMES DAILY
Status: DISCONTINUED | OUTPATIENT
Start: 2024-07-12 | End: 2024-07-17

## 2024-07-11 RX ORDER — IPRATROPIUM BROMIDE AND ALBUTEROL SULFATE 2.5; .5 MG/3ML; MG/3ML
3 SOLUTION RESPIRATORY (INHALATION) 4 TIMES DAILY PRN
Status: DISCONTINUED | OUTPATIENT
Start: 2024-07-11 | End: 2024-07-17 | Stop reason: HOSPADM

## 2024-07-11 RX ORDER — METHOCARBAMOL 500 MG/1
500 TABLET, FILM COATED ORAL 4 TIMES DAILY
Status: DISCONTINUED | OUTPATIENT
Start: 2024-07-12 | End: 2024-07-17 | Stop reason: HOSPADM

## 2024-07-11 RX ORDER — TERAZOSIN 2 MG/1
2 CAPSULE ORAL NIGHTLY
Status: DISCONTINUED | OUTPATIENT
Start: 2024-07-12 | End: 2024-07-17 | Stop reason: HOSPADM

## 2024-07-11 RX ORDER — GUAIFENESIN 600 MG/1
600 TABLET, EXTENDED RELEASE ORAL EVERY 12 HOURS SCHEDULED
Status: DISCONTINUED | OUTPATIENT
Start: 2024-07-12 | End: 2024-07-13

## 2024-07-11 RX ORDER — ESTRADIOL 0.1 MG/G
1 CREAM VAGINAL 3 TIMES WEEKLY
Status: DISCONTINUED | OUTPATIENT
Start: 2024-07-12 | End: 2024-07-17 | Stop reason: HOSPADM

## 2024-07-11 RX ORDER — AMOXICILLIN 250 MG
2 CAPSULE ORAL 2 TIMES DAILY PRN
Status: DISCONTINUED | OUTPATIENT
Start: 2024-07-11 | End: 2024-07-17 | Stop reason: HOSPADM

## 2024-07-11 RX ORDER — SODIUM CHLORIDE 9 MG/ML
40 INJECTION, SOLUTION INTRAVENOUS AS NEEDED
Status: DISCONTINUED | OUTPATIENT
Start: 2024-07-11 | End: 2024-07-17 | Stop reason: HOSPADM

## 2024-07-11 RX ORDER — TRAMADOL HYDROCHLORIDE 50 MG/1
100 TABLET ORAL 2 TIMES DAILY PRN
Status: DISCONTINUED | OUTPATIENT
Start: 2024-07-11 | End: 2024-07-17 | Stop reason: HOSPADM

## 2024-07-11 RX ORDER — POLYETHYLENE GLYCOL 3350 17 G/17G
17 POWDER, FOR SOLUTION ORAL DAILY PRN
Status: DISCONTINUED | OUTPATIENT
Start: 2024-07-11 | End: 2024-07-17 | Stop reason: HOSPADM

## 2024-07-11 RX ORDER — BISACODYL 10 MG
10 SUPPOSITORY, RECTAL RECTAL DAILY PRN
Status: DISCONTINUED | OUTPATIENT
Start: 2024-07-11 | End: 2024-07-17 | Stop reason: HOSPADM

## 2024-07-12 ENCOUNTER — APPOINTMENT (OUTPATIENT)
Dept: GENERAL RADIOLOGY | Facility: HOSPITAL | Age: 79
End: 2024-07-12
Payer: MEDICARE

## 2024-07-12 LAB
ANION GAP SERPL CALCULATED.3IONS-SCNC: 9.5 MMOL/L (ref 5–15)
B PARAPERT DNA SPEC QL NAA+PROBE: NOT DETECTED
B PERT DNA SPEC QL NAA+PROBE: NOT DETECTED
BACTERIA SPEC RESP CULT: NORMAL
BASOPHILS # BLD AUTO: 0.04 10*3/MM3 (ref 0–0.2)
BASOPHILS NFR BLD AUTO: 0.6 % (ref 0–1.5)
BUN SERPL-MCNC: 20 MG/DL (ref 8–23)
BUN/CREAT SERPL: 14.2 (ref 7–25)
C PNEUM DNA NPH QL NAA+NON-PROBE: NOT DETECTED
CALCIUM SPEC-SCNC: 9.1 MG/DL (ref 8.6–10.5)
CHLORIDE SERPL-SCNC: 110 MMOL/L (ref 98–107)
CO2 SERPL-SCNC: 23.5 MMOL/L (ref 22–29)
CREAT SERPL-MCNC: 1.41 MG/DL (ref 0.57–1)
DEPRECATED RDW RBC AUTO: 52.4 FL (ref 37–54)
EGFRCR SERPLBLD CKD-EPI 2021: 37.8 ML/MIN/1.73
EOSINOPHIL # BLD AUTO: 0.07 10*3/MM3 (ref 0–0.4)
EOSINOPHIL NFR BLD AUTO: 1 % (ref 0.3–6.2)
ERYTHROCYTE [DISTWIDTH] IN BLOOD BY AUTOMATED COUNT: 14.2 % (ref 12.3–15.4)
FLUAV SUBTYP SPEC NAA+PROBE: NOT DETECTED
FLUBV RNA ISLT QL NAA+PROBE: NOT DETECTED
GLUCOSE BLDC GLUCOMTR-MCNC: 268 MG/DL (ref 70–105)
GLUCOSE SERPL-MCNC: 123 MG/DL (ref 65–99)
GRAM STN SPEC: NORMAL
HADV DNA SPEC NAA+PROBE: NOT DETECTED
HCOV 229E RNA SPEC QL NAA+PROBE: NOT DETECTED
HCOV HKU1 RNA SPEC QL NAA+PROBE: NOT DETECTED
HCOV NL63 RNA SPEC QL NAA+PROBE: NOT DETECTED
HCOV OC43 RNA SPEC QL NAA+PROBE: NOT DETECTED
HCT VFR BLD AUTO: 42.2 % (ref 34–46.6)
HGB BLD-MCNC: 13.2 G/DL (ref 12–15.9)
HMPV RNA NPH QL NAA+NON-PROBE: NOT DETECTED
HPIV1 RNA ISLT QL NAA+PROBE: NOT DETECTED
HPIV2 RNA SPEC QL NAA+PROBE: NOT DETECTED
HPIV3 RNA NPH QL NAA+PROBE: NOT DETECTED
HPIV4 P GENE NPH QL NAA+PROBE: NOT DETECTED
IMM GRANULOCYTES # BLD AUTO: 0.05 10*3/MM3 (ref 0–0.05)
IMM GRANULOCYTES NFR BLD AUTO: 0.7 % (ref 0–0.5)
LYMPHOCYTES # BLD AUTO: 2.47 10*3/MM3 (ref 0.7–3.1)
LYMPHOCYTES NFR BLD AUTO: 34.5 % (ref 19.6–45.3)
M PNEUMO IGG SER IA-ACNC: NOT DETECTED
MCH RBC QN AUTO: 31.1 PG (ref 26.6–33)
MCHC RBC AUTO-ENTMCNC: 31.3 G/DL (ref 31.5–35.7)
MCV RBC AUTO: 99.3 FL (ref 79–97)
MONOCYTES # BLD AUTO: 0.66 10*3/MM3 (ref 0.1–0.9)
MONOCYTES NFR BLD AUTO: 9.2 % (ref 5–12)
NEUTROPHILS NFR BLD AUTO: 3.87 10*3/MM3 (ref 1.7–7)
NEUTROPHILS NFR BLD AUTO: 54 % (ref 42.7–76)
NRBC BLD AUTO-RTO: 0 /100 WBC (ref 0–0.2)
PLATELET # BLD AUTO: 176 10*3/MM3 (ref 140–450)
PMV BLD AUTO: 10.5 FL (ref 6–12)
POTASSIUM SERPL-SCNC: 4.3 MMOL/L (ref 3.5–5.2)
QT INTERVAL: 372 MS
QTC INTERVAL: 464 MS
RBC # BLD AUTO: 4.25 10*6/MM3 (ref 3.77–5.28)
RHINOVIRUS RNA SPEC NAA+PROBE: NOT DETECTED
RSV RNA NPH QL NAA+NON-PROBE: NOT DETECTED
SARS-COV-2 RNA NPH QL NAA+NON-PROBE: NOT DETECTED
SODIUM SERPL-SCNC: 143 MMOL/L (ref 136–145)
WBC NRBC COR # BLD AUTO: 7.16 10*3/MM3 (ref 3.4–10.8)

## 2024-07-12 PROCEDURE — 94799 UNLISTED PULMONARY SVC/PX: CPT

## 2024-07-12 PROCEDURE — 94640 AIRWAY INHALATION TREATMENT: CPT

## 2024-07-12 PROCEDURE — 80048 BASIC METABOLIC PNL TOTAL CA: CPT

## 2024-07-12 PROCEDURE — 94761 N-INVAS EAR/PLS OXIMETRY MLT: CPT

## 2024-07-12 PROCEDURE — 87040 BLOOD CULTURE FOR BACTERIA: CPT

## 2024-07-12 PROCEDURE — 25010000002 METHYLPREDNISOLONE PER 40 MG: Performed by: FAMILY MEDICINE

## 2024-07-12 PROCEDURE — 0202U NFCT DS 22 TRGT SARS-COV-2: CPT

## 2024-07-12 PROCEDURE — 73630 X-RAY EXAM OF FOOT: CPT

## 2024-07-12 PROCEDURE — 87205 SMEAR GRAM STAIN: CPT

## 2024-07-12 PROCEDURE — 82948 REAGENT STRIP/BLOOD GLUCOSE: CPT | Performed by: FAMILY MEDICINE

## 2024-07-12 PROCEDURE — 63710000001 INSULIN GLARGINE PER 5 UNITS

## 2024-07-12 PROCEDURE — 85025 COMPLETE CBC W/AUTO DIFF WBC: CPT

## 2024-07-12 RX ORDER — IBUPROFEN 600 MG/1
1 TABLET ORAL
Status: DISCONTINUED | OUTPATIENT
Start: 2024-07-12 | End: 2024-07-17 | Stop reason: HOSPADM

## 2024-07-12 RX ORDER — METHYLPREDNISOLONE SODIUM SUCCINATE 40 MG/ML
20 INJECTION, POWDER, LYOPHILIZED, FOR SOLUTION INTRAMUSCULAR; INTRAVENOUS EVERY 12 HOURS SCHEDULED
Status: DISCONTINUED | OUTPATIENT
Start: 2024-07-12 | End: 2024-07-15

## 2024-07-12 RX ORDER — FUROSEMIDE 40 MG/1
40 TABLET ORAL DAILY
Status: DISCONTINUED | OUTPATIENT
Start: 2024-07-12 | End: 2024-07-16

## 2024-07-12 RX ORDER — IPRATROPIUM BROMIDE AND ALBUTEROL SULFATE 2.5; .5 MG/3ML; MG/3ML
3 SOLUTION RESPIRATORY (INHALATION)
Status: DISCONTINUED | OUTPATIENT
Start: 2024-07-12 | End: 2024-07-17 | Stop reason: HOSPADM

## 2024-07-12 RX ORDER — METHYLPREDNISOLONE SODIUM SUCCINATE 40 MG/ML
20 INJECTION, POWDER, LYOPHILIZED, FOR SOLUTION INTRAMUSCULAR; INTRAVENOUS DAILY
Status: DISCONTINUED | OUTPATIENT
Start: 2024-07-12 | End: 2024-07-12

## 2024-07-12 RX ORDER — DEXTROSE MONOHYDRATE 25 G/50ML
25 INJECTION, SOLUTION INTRAVENOUS
Status: DISCONTINUED | OUTPATIENT
Start: 2024-07-12 | End: 2024-07-17 | Stop reason: HOSPADM

## 2024-07-12 RX ORDER — NICOTINE POLACRILEX 4 MG
15 LOZENGE BUCCAL
Status: DISCONTINUED | OUTPATIENT
Start: 2024-07-12 | End: 2024-07-17 | Stop reason: HOSPADM

## 2024-07-12 RX ADMIN — AMLODIPINE BESYLATE 10 MG: 5 TABLET ORAL at 09:47

## 2024-07-12 RX ADMIN — DOXYCYCLINE 100 MG: 100 INJECTION, POWDER, LYOPHILIZED, FOR SOLUTION INTRAVENOUS at 00:18

## 2024-07-12 RX ADMIN — METHOCARBAMOL 500 MG: 500 TABLET ORAL at 17:29

## 2024-07-12 RX ADMIN — GUAIFENESIN 600 MG: 600 TABLET, EXTENDED RELEASE ORAL at 22:00

## 2024-07-12 RX ADMIN — HYDRALAZINE HYDROCHLORIDE 25 MG: 25 TABLET ORAL at 09:45

## 2024-07-12 RX ADMIN — METHYLPREDNISOLONE SODIUM SUCCINATE 20 MG: 40 INJECTION, POWDER, FOR SOLUTION INTRAMUSCULAR; INTRAVENOUS at 22:00

## 2024-07-12 RX ADMIN — METHYLPREDNISOLONE SODIUM SUCCINATE 20 MG: 40 INJECTION, POWDER, FOR SOLUTION INTRAMUSCULAR; INTRAVENOUS at 13:26

## 2024-07-12 RX ADMIN — Medication 10 ML: at 00:05

## 2024-07-12 RX ADMIN — Medication 10 ML: at 22:00

## 2024-07-12 RX ADMIN — IPRATROPIUM BROMIDE AND ALBUTEROL SULFATE 3 ML: .5; 3 SOLUTION RESPIRATORY (INHALATION) at 18:40

## 2024-07-12 RX ADMIN — BUDESONIDE AND FORMOTEROL FUMARATE DIHYDRATE 2 PUFF: 160; 4.5 AEROSOL RESPIRATORY (INHALATION) at 08:06

## 2024-07-12 RX ADMIN — ASPIRIN 81 MG: 81 TABLET, COATED ORAL at 09:47

## 2024-07-12 RX ADMIN — HYDRALAZINE HYDROCHLORIDE 25 MG: 25 TABLET ORAL at 22:00

## 2024-07-12 RX ADMIN — TRAMADOL HYDROCHLORIDE 100 MG: 50 TABLET ORAL at 17:28

## 2024-07-12 RX ADMIN — CARVEDILOL 6.25 MG: 6.25 TABLET, FILM COATED ORAL at 17:28

## 2024-07-12 RX ADMIN — DOXYCYCLINE 100 MG: 100 INJECTION, POWDER, LYOPHILIZED, FOR SOLUTION INTRAVENOUS at 13:26

## 2024-07-12 RX ADMIN — METHOCARBAMOL 500 MG: 500 TABLET ORAL at 09:46

## 2024-07-12 RX ADMIN — LINAGLIPTIN 5 MG: 5 TABLET, FILM COATED ORAL at 09:46

## 2024-07-12 RX ADMIN — FERROUS SULFATE TAB EC 324 MG (65 MG FE EQUIVALENT) 324 MG: 324 (65 FE) TABLET DELAYED RESPONSE at 09:47

## 2024-07-12 RX ADMIN — IPRATROPIUM BROMIDE AND ALBUTEROL SULFATE 3 ML: .5; 3 SOLUTION RESPIRATORY (INHALATION) at 15:32

## 2024-07-12 RX ADMIN — ESTRADIOL 1 APPLICATOR: 0.1 CREAM VAGINAL at 09:50

## 2024-07-12 RX ADMIN — SERTRALINE 50 MG: 50 TABLET, FILM COATED ORAL at 09:47

## 2024-07-12 RX ADMIN — CARVEDILOL 6.25 MG: 6.25 TABLET, FILM COATED ORAL at 09:47

## 2024-07-12 RX ADMIN — LANSOPRAZOLE 30 MG: 30 TABLET, ORALLY DISINTEGRATING, DELAYED RELEASE ORAL at 05:39

## 2024-07-12 RX ADMIN — INSULIN GLARGINE 10 UNITS: 100 INJECTION, SOLUTION SUBCUTANEOUS at 22:00

## 2024-07-12 RX ADMIN — GUAIFENESIN 600 MG: 600 TABLET, EXTENDED RELEASE ORAL at 09:47

## 2024-07-12 RX ADMIN — POLYETHYLENE GLYCOL 3350 17 G: 17 POWDER, FOR SOLUTION ORAL at 09:48

## 2024-07-12 RX ADMIN — GABAPENTIN 100 MG: 100 CAPSULE ORAL at 00:05

## 2024-07-12 RX ADMIN — METHOCARBAMOL 500 MG: 500 TABLET ORAL at 22:00

## 2024-07-12 RX ADMIN — FUROSEMIDE 40 MG: 40 TABLET ORAL at 17:28

## 2024-07-12 RX ADMIN — DOXYCYCLINE 100 MG: 100 INJECTION, POWDER, LYOPHILIZED, FOR SOLUTION INTRAVENOUS at 23:28

## 2024-07-12 RX ADMIN — GABAPENTIN 100 MG: 100 CAPSULE ORAL at 22:28

## 2024-07-12 RX ADMIN — METHOCARBAMOL 500 MG: 500 TABLET ORAL at 00:18

## 2024-07-12 RX ADMIN — TERAZOSIN HYDROCHLORIDE 2 MG: 2 CAPSULE ORAL at 22:28

## 2024-07-12 RX ADMIN — Medication 10 ML: at 09:48

## 2024-07-12 RX ADMIN — METHOCARBAMOL 500 MG: 500 TABLET ORAL at 13:26

## 2024-07-12 RX ADMIN — POLYETHYLENE GLYCOL 3350 17 G: 17 POWDER, FOR SOLUTION ORAL at 00:05

## 2024-07-12 RX ADMIN — TRAMADOL HYDROCHLORIDE 100 MG: 50 TABLET ORAL at 00:05

## 2024-07-12 RX ADMIN — GUAIFENESIN 600 MG: 600 TABLET, EXTENDED RELEASE ORAL at 00:05

## 2024-07-12 NOTE — PLAN OF CARE
Goal Outcome Evaluation:              Outcome Evaluation: Pt expresses feeling better but not completely better. Steroids continued to assist breathing, Pt pain controlled, and educated that pneumonia takes time to recover from at increased age.

## 2024-07-12 NOTE — PROGRESS NOTES
LOS: 1 day   Patient Care Team:  Anny Garcia MD as PCP - General (Internal Medicine)  Sergio Ordonez MD as Consulting Physician (Nephrology)    Subjective     Interval History:     Patient Complaints: pt is feeling a little better.  Able to rest last night and is now coughing up sputum    History taken from: patient    Review of Systems   Constitutional:  Positive for activity change, appetite change and fatigue. Negative for fever.   HENT:  Positive for congestion.    Respiratory:  Positive for cough, chest tightness and shortness of breath.    Cardiovascular: Negative.    Gastrointestinal: Negative.    Musculoskeletal:  Positive for arthralgias.   Neurological:  Positive for weakness.           Objective     Vital Signs  Temp:  [97.7 °F (36.5 °C)-97.9 °F (36.6 °C)] 97.9 °F (36.6 °C)  Heart Rate:  [75-97] 77  Resp:  [16-18] 16  BP: (103-143)/(65-87) 119/65    Physical Exam:     General Appearance:    Alert, cooperative, in no acute distress, ill but not toxic   Head:    Normocephalic, without obvious abnormality, atraumatic   Eyes:            Lids and lashes normal, conjunctivae and sclerae normal, no   icterus, no pallor, corneas clear, PERRLA   Ears:    Ears appear intact with no abnormalities noted   Throat:   No oral lesions, no thrush, oral mucosa moist   Neck:   No adenopathy, supple, trachea midline, no thyromegaly, no   carotid bruit, no JVD   Lungs:     rhonchi    Heart:    Regular rhythm and normal rate, normal S1 and S2, no            murmur, no gallop, no rub, no click   Chest Wall:    No abnormalities observed   Abdomen:     Normal bowel sounds, no masses, no organomegaly, soft        non-tender, non-distended, no guarding, no rebound                tenderness   Extremities:   Moves all extremities well, no edema, no cyanosis, no             redness   Pulses:   Pulses palpable and equal bilaterally   Skin:   No bleeding, bruising or rash   Lymph nodes:   No palpable adenopathy    Neurologic:   Cranial nerves 2 - 12 grossly intact, sensation intact, DTR       present and equal bilaterally        Results Review:    Lab Results (last 24 hours)       Procedure Component Value Units Date/Time    Respiratory Culture - Sputum, Cough [[id number]] Collected: 07/12/24 0025    Specimen: Sputum from Cough Updated: 07/12/24 0647     Respiratory Culture Rejected     Gram Stain Few (2+) WBCs per low power field      Few (2+) Epithelial cells per low power field      Rare (1+) Gram positive cocci in clusters    Narrative:      Specimen rejected due to oropharyngeal contamination. Please reorder and recollect specimen if clinically necessary.    Basic Metabolic Panel [[id number]]  (Abnormal) Collected: 07/12/24 0420    Specimen: Blood Updated: 07/12/24 0529     Glucose 123 mg/dL      BUN 20 mg/dL      Creatinine 1.41 mg/dL      Sodium 143 mmol/L      Potassium 4.3 mmol/L      Comment: Specimen hemolyzed.  Result may be falsely elevated.        Chloride 110 mmol/L      CO2 23.5 mmol/L      Calcium 9.1 mg/dL      BUN/Creatinine Ratio 14.2     Anion Gap 9.5 mmol/L      eGFR 37.8 mL/min/1.73     Narrative:      GFR Normal >60  Chronic Kidney Disease <60  Kidney Failure <15    The GFR formula is only valid for adults with stable renal function between ages 18 and 70.    CBC & Differential [[id number]]  (Abnormal) Collected: 07/12/24 0420    Specimen: Blood Updated: 07/12/24 0501    Narrative:      The following orders were created for panel order CBC & Differential.  Procedure                               Abnormality         Status                     ---------                               -----------         ------                     CBC Auto Differential[[id number]]        Abnormal            Final result                 Please view results for these tests on the individual orders.    CBC Auto Differential [[id number]]  (Abnormal) Collected: 07/12/24 0420    Specimen: Blood Updated: 07/12/24 0501     WBC 7.16  10*3/mm3      RBC 4.25 10*6/mm3      Hemoglobin 13.2 g/dL      Hematocrit 42.2 %      MCV 99.3 fL      MCH 31.1 pg      MCHC 31.3 g/dL      RDW 14.2 %      RDW-SD 52.4 fl      MPV 10.5 fL      Platelets 176 10*3/mm3      Neutrophil % 54.0 %      Lymphocyte % 34.5 %      Monocyte % 9.2 %      Eosinophil % 1.0 %      Basophil % 0.6 %      Immature Grans % 0.7 %      Neutrophils, Absolute 3.87 10*3/mm3      Lymphocytes, Absolute 2.47 10*3/mm3      Monocytes, Absolute 0.66 10*3/mm3      Eosinophils, Absolute 0.07 10*3/mm3      Basophils, Absolute 0.04 10*3/mm3      Immature Grans, Absolute 0.05 10*3/mm3      nRBC 0.0 /100 WBC     Respiratory Panel PCR w/COVID-19(SARS-CoV-2) NITA/KRISTEN/NATE/PAD/COR/DYLON In-House, NP Swab in UTM/VTM, 2 HR TAT - Swab, Nasopharynx [[id number]]  (Normal) Collected: 07/12/24 0010    Specimen: Swab from Nasopharynx Updated: 07/12/24 0127     ADENOVIRUS, PCR Not Detected     Coronavirus 229E Not Detected     Coronavirus HKU1 Not Detected     Coronavirus NL63 Not Detected     Coronavirus OC43 Not Detected     COVID19 Not Detected     Human Metapneumovirus Not Detected     Human Rhinovirus/Enterovirus Not Detected     Influenza A PCR Not Detected     Influenza B PCR Not Detected     Parainfluenza Virus 1 Not Detected     Parainfluenza Virus 2 Not Detected     Parainfluenza Virus 3 Not Detected     Parainfluenza Virus 4 Not Detected     RSV, PCR Not Detected     Bordetella pertussis pcr Not Detected     Bordetella parapertussis PCR Not Detected     Chlamydophila pneumoniae PCR Not Detected     Mycoplasma pneumo by PCR Not Detected    Narrative:      In the setting of a positive respiratory panel with a viral infection PLUS a negative procalcitonin without other underlying concern for bacterial infection, consider observing off antibiotics or discontinuation of antibiotics and continue supportive care. If the respiratory panel is positive for atypical bacterial infection (Bordetella pertussis,  Chlamydophila pneumoniae, or Mycoplasma pneumoniae), consider antibiotic de-escalation to target atypical bacterial infection.    Blood Culture - Blood, Arm, Left [[id number]] Collected: 07/12/24 0010    Specimen: Blood from Arm, Left Updated: 07/12/24 0059    Blood Culture - Blood, Arm, Right [[id number]] Collected: 07/12/24 0016    Specimen: Blood from Arm, Right Updated: 07/12/24 0036    POC Glucose Once [[id number]]  (Normal) Collected: 07/11/24 2339    Specimen: Blood Updated: 07/11/24 2346     Glucose 94 mg/dL      Comment: Serial Number: [id number]:  [id number]       Basic Metabolic Panel [[id number]]  (Abnormal) Collected: 07/11/24 2201    Specimen: Blood Updated: 07/11/24 2247     Glucose 88 mg/dL      BUN 25 mg/dL      Creatinine 1.57 mg/dL      Sodium 145 mmol/L      Potassium 4.3 mmol/L      Comment: Specimen hemolyzed.  Result may be falsely elevated.        Chloride 109 mmol/L      CO2 24.8 mmol/L      Calcium 9.7 mg/dL      BUN/Creatinine Ratio 15.9     Anion Gap 11.2 mmol/L      eGFR 33.2 mL/min/1.73     Narrative:      GFR Normal >60  Chronic Kidney Disease <60  Kidney Failure <15    The GFR formula is only valid for adults with stable renal function between ages 18 and 70.    [facility] Draw [[id number]] Collected: 07/11/24 2114    Specimen: Blood Updated: 07/11/24 2243    Narrative:      The following orders were created for panel order [facility] Draw.  Procedure                               Abnormality         Status                     ---------                               -----------         ------                     Green Top (Gel)[[id number]]                                  Final result               Lavender Top[[id number]]                                     Final result               Red Top[[id number]]                                                                     Gold Top - SST[[id number]]                                   Final result               Green Top (No Gel)[[id number]]                                                           Light Blue Top[[id number]]                                   Final result                 Please view results for these tests on the individual orders.    CBC & Differential [[id number]]  (Abnormal) Collected: 07/11/24 2201    Specimen: Blood Updated: 07/11/24 2220    Narrative:      The following orders were created for panel order CBC & Differential.  Procedure                               Abnormality         Status                     ---------                               -----------         ------                     CBC Auto Differential[[id number]]        Abnormal            Final result                 Please view results for these tests on the individual orders.    CBC Auto Differential [[id number]]  (Abnormal) Collected: 07/11/24 2201    Specimen: Blood Updated: 07/11/24 2220     WBC 7.69 10*3/mm3      RBC 4.32 10*6/mm3      Hemoglobin 13.5 g/dL      Hematocrit 42.6 %      MCV 98.6 fL      MCH 31.3 pg      MCHC 31.7 g/dL      RDW 14.2 %      RDW-SD 51.0 fl      MPV 10.6 fL      Platelets 196 10*3/mm3      Neutrophil % 51.4 %      Lymphocyte % 37.6 %      Monocyte % 8.5 %      Eosinophil % 1.3 %      Basophil % 0.4 %      Immature Grans % 0.8 %      Neutrophils, Absolute 3.96 10*3/mm3      Lymphocytes, Absolute 2.89 10*3/mm3      Monocytes, Absolute 0.65 10*3/mm3      Eosinophils, Absolute 0.10 10*3/mm3      Basophils, Absolute 0.03 10*3/mm3      Immature Grans, Absolute 0.06 10*3/mm3      nRBC 0.0 /100 WBC     BNP [[id number]]  (Abnormal) Collected: 07/11/24 2114    Specimen: Blood Updated: 07/11/24 2215     proBNP 1,827.0 pg/mL     Narrative:      This assay is used as an aid in the diagnosis of individuals suspected of having heart failure. It can be used as an aid in the diagnosis of acute decompensated heart failure (ADHF) in patients presenting with signs and symptoms of ADHF to the emergency department (ED). In addition, NT-proBNP of <300 pg/mL indicates  ADHF is not likely.    Age Range Result Interpretation  NT-proBNP Concentration (pg/mL:      <50             Positive            >450                   Gray                 300-450                    Negative             <300    50-75           Positive            >900                  Gray                300-900                  Negative            <300      >75             Positive            >1800                  Gray                300-1800                  Negative            <300    Lavender Top [[id number]] Collected: 07/11/24 2114    Specimen: Blood Updated: 07/11/24 2132     Extra Tube hold for add-on     Comment: Auto resulted       Gold Top - SST [[id number]] Collected: 07/11/24 2114    Specimen: Blood Updated: 07/11/24 2132     Extra Tube Hold for add-ons.     Comment: Auto resulted.       Green Top (Gel) [[id number]] Collected: 07/11/24 2114    Specimen: Blood Updated: 07/11/24 2132     Extra Tube Hold for add-ons.     Comment: Auto resulted.       Light Blue Top [[id number]] Collected: 07/11/24 2114    Specimen: Blood Updated: 07/11/24 2132     Extra Tube Hold for add-ons.     Comment: Auto resulted                Imaging Results (Last 24 Hours)       Procedure Component Value Units Date/Time    CT Chest Without Contrast Diagnostic [[id number]] Collected: 07/11/24 2334     Updated: 07/11/24 2339    Narrative:      CT CHEST WO CONTRAST DIAGNOSTIC    Date of Exam: 7/11/2024 11:15 PM EDT    Indication: Failed outpatient PNA treatment x3.    Comparison: 3/5/2024 chest radiograph, MRI thoracic spine 6/10/2024 and chest radiograph 1/15/2024.    Technique: Axial CT images were obtained of the chest without contrast administration.  Sagittal and coronal reconstructions were performed.  Automated exposure control and iterative reconstruction methods were used.      Findings:  The left thyroid lobe is enlarged and heterogeneous compatible with multinodular goiter. The trachea and the esophagus appear within normal  limits. Heart size is normal. There are coronary stents and calcifications. No pericardial effusion. No   mediastinal lymphadenopathy. There are a few calcified mediastinal granulomas. There is mild aortic atherosclerosis. No aneurysm. Main pulmonary artery is normal diameter.    There is no pneumothorax, pleural effusion or focal airspace consolidation. There is atelectasis in the medial segment of the right middle lobe without obvious airway obstruction. There is mild linear scarring versus atelectasis in the left lung base and   lingula. Airways are patent. No suspicious lung nodules. There are calcified lingular and right lower lobe granulomas. No significant pleural disease.    There are no acute findings in the superficial soft tissues. No acute findings in the limited images of the upper abdomen. The patient is status post cholecystectomy and right nephrectomy. No acute osseous abnormalities or destructive bone lesions. There   is a chronic T6 compression fracture status post cement augmentation. There is a minimal segmentation anomaly at the T2-T3 level with incomplete anterior disc formation. There are old healed left-sided rib fractures. These do appear new from January 2024.      Impression:      Impression:  1.No acute cardiopulmonary abnormality.  2.There is atelectasis in the medial segment of the right middle lobe without obvious airway obstruction.  3.Coronary artery disease.  4.Old healed left-sided rib fractures.  5.Chronic T6 compression fracture status post cement augmentation.        Electronically Signed: Lyle Good MD    7/11/2024 11:37 PM EDT    Workstation ID: [id number]                 I reviewed the patient's new clinical results.    Medication Review:   Scheduled Meds:amLODIPine, 10 mg, Oral, Daily  aspirin, 81 mg, Oral, Daily  budesonide-formoterol, 2 puff, Inhalation, BID - RT  carvedilol, 6.25 mg, Oral, BID With Meals  doxycycline, 100 mg, Intravenous, Q12H  estradiol, 1 g,  Vaginal, Once per day on Monday Wednesday Friday  ferrous sulfate, 324 mg, Oral, Daily With Breakfast  gabapentin, 100 mg, Oral, Nightly  guaiFENesin, 600 mg, Oral, Q12H  hydrALAZINE, 25 mg, Oral, BID  insulin glargine, 10 Units, Subcutaneous, Nightly  lansoprazole, 30 mg, Oral, Q AM  linagliptin, 5 mg, Oral, Daily  methocarbamol, 500 mg, Oral, 4x Daily  polyethylene glycol, 17 g, Oral, Daily  sertraline, 50 mg, Oral, Daily  sodium chloride, 10 mL, Intravenous, Q12H  terazosin, 2 mg, Oral, Nightly      Continuous Infusions:   PRN Meds:.  senna-docusate sodium **AND** polyethylene glycol **AND** bisacodyl **AND** bisacodyl    Diclofenac Sodium    ipratropium-albuterol    ondansetron    sodium chloride    sodium chloride    traMADol     Assessment & Plan       Recurrent pneumonia  - continue IV abx, nebs, IV solumedrol  - sputum and blood cultures pending  - pulmonary consulted  - CT chest without acute abnormality  COPD  CAD  DDD  HTN with CKD3 - creatine improved to 1.41.  avoid nephrotoxic meds and continue home meds    DVT prophylaxis- SCD's  GI prophylaxis- ppi    Plan for disposition:home when able    Rosa London MD  07/12/24  09:28 EDT

## 2024-07-12 NOTE — H&P
Patient Care Team:  Anny Garcia MD as PCP - General (Internal Medicine)  Sergio Ordonez MD as Consulting Physician (Nephrology)    Chief complaint shortness of breath    Subjective     Patient is a 78 y.o. female with history of chronic kidney disease stage 3, COPD, diabetes mellitus, hypertension who was a direct admission from [facility] where she was seen today for continued shortness of breath. Patient has failed outpatient pneumonia treatment x3 antibiotics with no improvement. Patient has been feeling unwell for one month now. She denies any fever, chills, nausea, vomiting, diarrhea. Shortness of breath worse with exertion.       Onset of symptoms was 1 month    Review of Systems   Constitutional:  Positive for activity change.   HENT: Negative.     Eyes: Negative.    Respiratory:  Positive for cough and shortness of breath.    Cardiovascular: Negative.    Gastrointestinal: Negative.    Endocrine: Negative.    Genitourinary: Negative.    Musculoskeletal: Negative.    Skin: Negative.    Neurological: Negative.    Psychiatric/Behavioral: Negative.            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    Hypertension     IBS (irritable colon syndrome)     constipation    Mass of right breast     Neuropathy     Renal insufficiency     Sciatic leg pain     right    Simple chronic bronchitis     Solitary kidney     left     Past Surgical  History:   Procedure Laterality Date    BREAST BIOPSY Right     CARDIAC CATHETERIZATION      CHOLECYSTECTOMY      COLON SURGERY      REMOVAL diverticular diseae    COLONOSCOPY N/A 2021    Procedure: COLONOSCOPY with polypectomy x 3;  Surgeon: Margarette Mcallister MD;  Location: [facility] ENDOSCOPY;  Service: Gastroenterology;  Laterality: N/A;  post op: hmorrhoids, diverticulosis, polyps    CORONARY STENT PLACEMENT      ENDOSCOPY N/A 2021    Procedure: ESOPHAGOGASTRODUODENOSCOPY with dilatation (18-20 mm balloon) and (54 bougie);  Surgeon: Margarette Mcallister MD;  Location: [facility] ENDOSCOPY;  Service: Gastroenterology;  Laterality: N/A;  post op: esophageal stricture, esophagitis, gastritis, history of nissen    ENDOSCOPY N/A 2023    Procedure: ESOPHAGOGASTRODUODENOSCOPY with biopsy x 1 area and dilation (50,54,56 non guided bougie);  Surgeon: Margarette Mcallister MD;  Location: [facility] ENDOSCOPY;  Service: Gastroenterology;  Laterality: N/A;  post op: esophageal stricture    EYE SURGERY Bilateral     cataracts    HIATAL HERNIA REPAIR      NEPHRECTOMY Right     cancer    UPPER ENDOSCOPIC ULTRASOUND W/ FNA N/A 2022    Procedure: EUS;  Surgeon: Margarette Mcallister MD;  Location: [facility] ENDOSCOPY;  Service: Gastroenterology;  Laterality: N/A;  post: normal pancreas, dilated pancreatic duct, hiatal hernia,      Family History   Problem Relation Age of Onset    Arthritis Father     Prostate cancer Father     Heart disease Sister      Social History     Tobacco Use    Smoking status: Former     Current packs/day: 0.00     Average packs/day: 0.3 packs/day for 50.0 years (12.5 ttl pk-yrs)     Types: Cigarettes     Start date: 1973     Quit date: 2023     Years since quittin.6     Passive exposure: Past    Smokeless tobacco: Never    Tobacco comments:     Mostly patches, some days none, some days 1-2   Vaping Use    Vaping status: Never Used   Substance Use Topics    Alcohol use: Yes     Comment: occasionally     Drug  use: Never     Medications Prior to Admission   Medication Sig Dispense Refill Last Dose    amLODIPine (NORVASC) 10 MG tablet Take 1 tablet by mouth Daily.   7/11/2024    aspirin 81 MG tablet Take 1 tablet by mouth Every Night.   7/11/2024    budesonide-formoterol (SYMBICORT) 160-4.5 MCG/ACT inhaler Inhale 2 puffs 2 (Two) Times a Day. 10.2 g 12 7/11/2024    carvedilol (COREG) 3.125 MG tablet Take 2 tablets by mouth 2 (Two) Times a Day With Meals. 180 tablet 3 7/11/2024    cyanocobalamin 1000 MCG/ML injection Inject 1 mL into the appropriate muscle as directed by prescriber Every 28 (Twenty-Eight) Days.   7/11/2024    Diclofenac Sodium (VOLTAREN) 1 % gel gel Apply 4 g topically to the appropriate area as directed 3 (Three) Times a Day As Needed (pain).   7/11/2024    doxazosin (CARDURA) 2 MG tablet Take 1 tablet by mouth Every Night.   7/11/2024    estradiol (ESTRACE) 0.1 MG/GM vaginal cream Insert 1 g into the vagina 3 (Three) Times a Week. Monday, Wednesday and Friday 7/11/2024    Evolocumab (REPATHA) solution auto-injector SureClick injection Inject 1 mL under the skin into the appropriate area as directed Every 14 (Fourteen) Days.   7/11/2024    ferrous sulfate 325 (65 FE) MG tablet Take 1 tablet by mouth Daily.   7/11/2024    Finerenone 10 MG tablet Take 1 tablet by mouth Daily.   7/11/2024    gabapentin (NEURONTIN) 100 MG capsule Take 1 capsule by mouth Every Night.   7/11/2024    guaiFENesin (MUCINEX) 600 MG 12 hr tablet Take 1 tablet by mouth Every 12 (Twelve) Hours. 28 tablet 0 7/11/2024    hydrALAZINE (APRESOLINE) 25 MG tablet Take 1 tablet by mouth 2 (Two) Times a Day.   7/11/2024    Insulin Glargine (LANTUS SOLOSTAR) 100 UNIT/ML injection pen Inject 10 Units under the skin into the appropriate area as directed Every Night. 100 mL 12 7/11/2024    ipratropium-albuterol (DUO-NEB) 0.5-2.5 mg/3 ml nebulizer Take 3 mL by nebulization 4 (Four) Times a Day As Needed for Wheezing. 360 mL 1 7/11/2024    Linzess  145 MCG capsule capsule Take 1 capsule by mouth Daily As Needed.   7/11/2024    methocarbamol (ROBAXIN) 500 MG tablet Take 1 tablet by mouth 4 (Four) Times a Day. 20 tablet 0 7/11/2024    omeprazole (priLOSEC) 40 MG capsule Take 1 capsule by mouth Every Morning. 30 capsule 0 7/11/2024    polyethylene glycol (MIRALAX) 17 g packet Take 17 g by mouth Daily.   7/11/2024    sertraline (ZOLOFT) 50 MG tablet Take 1 tablet by mouth Daily.   7/11/2024    SITagliptin (JANUVIA) 100 MG tablet Take 1 tablet by mouth Daily.   7/11/2024    traMADol (ULTRAM) 50 MG tablet Take 2 tablets by mouth 2 (Two) Times a Day As Needed.   Past Month     Allergies:  Erythromycin, Naproxen sodium, Statins, Latex, Metoclopramide, Rocephin [ceftriaxone], and Dicyclomine    Objective     Vital Signs  Temp:  [97.9 °F (36.6 °C)] 97.9 °F (36.6 °C)  Heart Rate:  [97] 97  Resp:  [17] 17  BP: (142)/(87) 142/87     Physical Exam:      General Appearance:    Alert, cooperative, in no acute distress   Head:    Normocephalic, without obvious abnormality, atraumatic   Eyes:            Lids and lashes normal, conjunctivae and sclerae normal, no   icterus, no pallor, corneas clear, PERRLA   Ears:    Ears appear intact with no abnormalities noted   Throat:   No oral lesions, no thrush, oral mucosa moist   Neck:   No adenopathy, supple, trachea midline, no thyromegaly, no   carotid bruit, no JVD   Lungs:     Decreased breath sounds, rhonchi, respirations regular, even and                  unlabored    Heart:    Regular rhythm and normal rate, normal S1 and S2, no            murmur, no gallop, no rub, no click   Chest Wall:    No abnormalities observed   Abdomen:     Normal bowel sounds, no masses, no organomegaly, soft        non-tender, non-distended, no guarding, no rebound                tenderness   Extremities:   Moves all extremities well, no edema, no cyanosis, no             redness   Pulses:   Pulses palpable and equal bilaterally   Skin:   No bleeding,  bruising or rash   Lymph nodes:   No palpable adenopathy   Neurologic:   No focal deficits noted       Results Review:     Imaging Results (Last 24 Hours)       ** No results found for the last 24 hours. **             Lab Results (last 24 hours)       Procedure Component Value Units Date/Time    [place] Draw [[id number]] Collected: 07/11/24 2114    Specimen: Blood Updated: 07/11/24 2243    Narrative:      The following orders were created for panel order [place] Draw.  Procedure                               Abnormality         Status                     ---------                               -----------         ------                     Green Top (Gel)[[id number]]                                  Final result               Lavender Top[[id number]]                                     Final result               Red Top[[id number]]                                                                     Gold Top - SST[[id number]]                                   Final result               Green Top (No Gel)[[id number]]                                                          Light Blue Top[[id number]]                                   Final result                 Please view results for these tests on the individual orders.    CBC & Differential [[id number]]  (Abnormal) Collected: 07/11/24 2201    Specimen: Blood Updated: 07/11/24 2220    Narrative:      The following orders were created for panel order CBC & Differential.  Procedure                               Abnormality         Status                     ---------                               -----------         ------                     CBC Auto Differential[[id number]]        Abnormal            Final result                 Please view results for these tests on the individual orders.    CBC Auto Differential [[id number]]  (Abnormal) Collected: 07/11/24 2201    Specimen: Blood Updated: 07/11/24 2220     WBC 7.69 10*3/mm3      RBC 4.32 10*6/mm3      Hemoglobin 13.5 g/dL       Hematocrit 42.6 %      MCV 98.6 fL      MCH 31.3 pg      MCHC 31.7 g/dL      RDW 14.2 %      RDW-SD 51.0 fl      MPV 10.6 fL      Platelets 196 10*3/mm3      Neutrophil % 51.4 %      Lymphocyte % 37.6 %      Monocyte % 8.5 %      Eosinophil % 1.3 %      Basophil % 0.4 %      Immature Grans % 0.8 %      Neutrophils, Absolute 3.96 10*3/mm3      Lymphocytes, Absolute 2.89 10*3/mm3      Monocytes, Absolute 0.65 10*3/mm3      Eosinophils, Absolute 0.10 10*3/mm3      Basophils, Absolute 0.03 10*3/mm3      Immature Grans, Absolute 0.06 10*3/mm3      nRBC 0.0 /100 WBC     Basic Metabolic Panel [[id number]] Collected: 07/11/24 2201    Specimen: Blood Updated: 07/11/24 2216    BNP [[id number]]  (Abnormal) Collected: 07/11/24 2114    Specimen: Blood Updated: 07/11/24 2215     proBNP 1,827.0 pg/mL     Narrative:      This assay is used as an aid in the diagnosis of individuals suspected of having heart failure. It can be used as an aid in the diagnosis of acute decompensated heart failure (ADHF) in patients presenting with signs and symptoms of ADHF to the emergency department (ED). In addition, NT-proBNP of <300 pg/mL indicates ADHF is not likely.    Age Range Result Interpretation  NT-proBNP Concentration (pg/mL:      <50             Positive            >450                   Gray                 300-450                    Negative             <300    50-75           Positive            >900                  Gray                300-900                  Negative            <300      >75             Positive            >1800                  Gray                300-1800                  Negative            <300    Lavender Top [[id number]] Collected: 07/11/24 2114    Specimen: Blood Updated: 07/11/24 2132     Extra Tube hold for add-on     Comment: Auto resulted       Gold Top - SST [[id number]] Collected: 07/11/24 2114    Specimen: Blood Updated: 07/11/24 2132     Extra Tube Hold for add-ons.     Comment: Auto resulted.        Green Top (Gel) [[id number]] Collected: 07/11/24 2114    Specimen: Blood Updated: 07/11/24 2132     Extra Tube Hold for add-ons.     Comment: Auto resulted.       Light Blue Top [[id number]] Collected: 07/11/24 2114    Specimen: Blood Updated: 07/11/24 2132     Extra Tube Hold for add-ons.     Comment: Auto resulted                I reviewed the patient's new clinical results.    Recurrent Pneumonia  -bnp 1827  -BUN 25  -Creatinine 1.57  -CT chest pending  -IV Doxy   -Neb tx's  -respiratory panel pending  -sputum culture pending  -Pulmonary consulted    DVT prophylaxis- SCD's  GI prophylaxis- ppi    I discussed the patient's findings and my recommendations with patient.     Asha Almodovar, APRN  07/11/24  22:46 EDT

## 2024-07-12 NOTE — PLAN OF CARE
Problem: Adult Inpatient Plan of Care  Goal: Plan of Care Review  Outcome: Ongoing, Progressing  Goal: Patient-Specific Goal (Individualized)  Outcome: Ongoing, Progressing  Goal: Absence of Hospital-Acquired Illness or Injury  Outcome: Ongoing, Progressing  Intervention: Identify and Manage Fall Risk  Description: Perform standard risk assessment on admission using a validated tool or comprehensive approach appropriate to the patient; reassess fall risk frequently, with change in status or transfer to another level of care.  Communicate fall injury risk to interprofessional healthcare team.  Determine need for increased observation, equipment and environmental modification, such as low bed, signage and supportive, nonskid footwear.  Adjust safety measures to individual developmental age, stage and identified risk factors.  Reinforce the importance of safety and physical activity with patient and family.  Perform regular intentional rounding to assess need for position change, pain assessment and personal needs, including assistance with toileting.  Recent Flowsheet Documentation  Taken 7/12/2024 0602 by Shannan Nieves RN  Safety Promotion/Fall Prevention:   activity supervised   fall prevention program maintained   clutter free environment maintained   lighting adjusted   safety round/check completed   room organization consistent  Taken 7/12/2024 0400 by Shannan Nieves RN  Safety Promotion/Fall Prevention:   activity supervised   fall prevention program maintained   clutter free environment maintained   lighting adjusted   room organization consistent   safety round/check completed  Taken 7/12/2024 0200 by Shannan Nieves RN  Safety Promotion/Fall Prevention:   fall prevention program maintained   clutter free environment maintained   activity supervised   lighting adjusted   room organization consistent   safety round/check completed  Taken 7/12/2024 0000 by Shannan Nieves RN  Safety Promotion/Fall  Prevention:   activity supervised   fall prevention program maintained   clutter free environment maintained   lighting adjusted   safety round/check completed   room organization consistent  Taken 7/11/2024 2200 by Shanann Nieves RN  Safety Promotion/Fall Prevention:   activity supervised   fall prevention program maintained   clutter free environment maintained   safety round/check completed  Taken 7/11/2024 2059 by Shannan Nieves RN  Safety Promotion/Fall Prevention:   activity supervised   clutter free environment maintained   fall prevention program maintained   lighting adjusted   room organization consistent   safety round/check completed  Intervention: Prevent Infection  Description: Maintain skin and mucous membrane integrity; promote hand, oral and pulmonary hygiene.  Optimize fluid balance, nutrition, sleep and glycemic control to maximize infection resistance.  Identify potential sources of infection early to prevent or mitigate progression of infection (e.g., wound, lines, devices).  Evaluate ongoing need for invasive devices; remove promptly when no longer indicated.  Recent Flowsheet Documentation  Taken 7/12/2024 0602 by Shannan Nieves RN  Infection Prevention: rest/sleep promoted  Taken 7/12/2024 0400 by Shannan Nieves RN  Infection Prevention: rest/sleep promoted  Taken 7/12/2024 0200 by Shannan Nieves RN  Infection Prevention: rest/sleep promoted  Taken 7/11/2024 2200 by Shannan Nieves RN  Infection Prevention: rest/sleep promoted  Taken 7/11/2024 2059 by Shannan Nieves RN  Infection Prevention:   rest/sleep promoted   hand hygiene promoted  Goal: Optimal Comfort and Wellbeing  Outcome: Ongoing, Progressing  Intervention: Monitor Pain and Promote Comfort  Description: Assess pain level, treatment efficacy and patient response at regular intervals using a consistent pain scale.  Consider the presence and impact of preexisting chronic pain.  Encourage patient and caregiver  involvement in pain assessment, interventions and safety measures.  Recent Flowsheet Documentation  Taken 7/11/2024 2059 by Shannan Nieves RN  Pain Management Interventions:   see MAR   quiet environment facilitated  Intervention: Provide Person-Centered Care  Description: Use a family-focused approach to care.  Develop trust and rapport by proactively providing information, encouraging questions, addressing concerns and offering reassurance.  Acknowledge emotional response to hospitalization.  Recognize and utilize personal coping strategies.  Honor spiritual and cultural preferences.  Recent Flowsheet Documentation  Taken 7/11/2024 2059 by Shannan Nieves RN  Trust Relationship/Rapport:   questions encouraged   questions answered  Goal: Readiness for Transition of Care  Outcome: Ongoing, Progressing   Goal Outcome Evaluation:

## 2024-07-12 NOTE — CONSULTS
NEPHROLOGY CONSULTATION-----[facility]    [facility]  [phone]  Rolando Miller MD    Patient Care Team:  Anny Garcia MD as PCP - General (Internal Medicine)  Sergio Ordonez MD as Consulting Physician (Nephrology)    CC/REASON FOR CONSULTATION: Elevated creatinine    PHYSICIAN REQUESTING CONSULTATION: Dr. London    History of Present Illness  78-year-old female with past medical history of CKD stage III, diabetes, right nephrectomy for ureteral cancer, hypertension presented to hospital on 7/11/2024 shortness of breath.  She is admitted for treatment of pneumonia.  Her creatinine was 1.5 yesterday, 1.4 today.  Patient has known history of CKD stage III with baseline creatinine in the range of 1.2-1.5.  Her blood pressure was soft on admission denies any dysuria or gross hematuria.  No nausea vomiting or diarrhea.  Chest x-ray did not show acute findings.  Her BNP was elevated 1800.  She is not on any ACE inhibitors or ARB's or diuretics.  Echo from January this year showed EF around 50 to 55%.    Review of Systems   As noted above otherwise 10 systems reviewed and were negative.    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    Hypertension     IBS (irritable colon syndrome)     constipation    Mass of right breast     Neuropathy     Renal insufficiency     Sciatic leg pain     right    Simple  chronic bronchitis     Solitary kidney     left       Past Surgical History:   Procedure Laterality Date    BREAST BIOPSY Right     CARDIAC CATHETERIZATION      CHOLECYSTECTOMY      COLON SURGERY      REMOVAL diverticular diseae    COLONOSCOPY N/A 2021    Procedure: COLONOSCOPY with polypectomy x 3;  Surgeon: Margarette Mcallister MD;  Location: [facility] ENDOSCOPY;  Service: Gastroenterology;  Laterality: N/A;  post op: hmorrhoids, diverticulosis, polyps    CORONARY STENT PLACEMENT      ENDOSCOPY N/A 2021    Procedure: ESOPHAGOGASTRODUODENOSCOPY with dilatation (18-20 mm balloon) and (54 bougie);  Surgeon: Margarette Mcallister MD;  Location: [facility] ENDOSCOPY;  Service: Gastroenterology;  Laterality: N/A;  post op: esophageal stricture, esophagitis, gastritis, history of nissen    ENDOSCOPY N/A 2023    Procedure: ESOPHAGOGASTRODUODENOSCOPY with biopsy x 1 area and dilation (50,54,56 non guided bougie);  Surgeon: Margarette Mcallister MD;  Location: [facility] ENDOSCOPY;  Service: Gastroenterology;  Laterality: N/A;  post op: esophageal stricture    EYE SURGERY Bilateral     cataracts    HIATAL HERNIA REPAIR      NEPHRECTOMY Right     cancer    UPPER ENDOSCOPIC ULTRASOUND W/ FNA N/A 2022    Procedure: EUS;  Surgeon: Margarette Mcallister MD;  Location: [facility] ENDOSCOPY;  Service: Gastroenterology;  Laterality: N/A;  post: normal pancreas, dilated pancreatic duct, hiatal hernia,        Family History   Problem Relation Age of Onset    Arthritis Father     Prostate cancer Father     Heart disease Sister        Social History     Tobacco Use    Smoking status: Former     Current packs/day: 0.00     Average packs/day: 0.3 packs/day for 50.0 years (12.5 ttl pk-yrs)     Types: Cigarettes     Start date: 1973     Quit date: 2023     Years since quittin.6     Passive exposure: Past    Smokeless tobacco: Never    Tobacco comments:     Mostly patches, some days none, some days 1-2   Vaping Use    Vaping status: Never Used    Substance Use Topics    Alcohol use: Yes     Comment: occasionally     Drug use: Never       Home Meds:   Medications Prior to Admission   Medication Sig Dispense Refill Last Dose    amLODIPine (NORVASC) 10 MG tablet Take 1 tablet by mouth Daily.   7/11/2024    aspirin 81 MG tablet Take 1 tablet by mouth Every Night.   7/11/2024    budesonide-formoterol (SYMBICORT) 160-4.5 MCG/ACT inhaler Inhale 2 puffs 2 (Two) Times a Day. 10.2 g 12 7/11/2024    carvedilol (COREG) 3.125 MG tablet Take 2 tablets by mouth 2 (Two) Times a Day With Meals. 180 tablet 3 7/11/2024    cyanocobalamin 1000 MCG/ML injection Inject 1 mL into the appropriate muscle as directed by prescriber Every 28 (Twenty-Eight) Days.   7/11/2024    Diclofenac Sodium (VOLTAREN) 1 % gel gel Apply 4 g topically to the appropriate area as directed 3 (Three) Times a Day As Needed (pain).   7/11/2024    doxazosin (CARDURA) 2 MG tablet Take 1 tablet by mouth Every Night.   7/11/2024    estradiol (ESTRACE) 0.1 MG/GM vaginal cream Insert 1 g into the vagina 3 (Three) Times a Week. Monday, Wednesday and Friday 7/11/2024    Evolocumab (REPATHA) solution auto-injector SureClick injection Inject 1 mL under the skin into the appropriate area as directed Every 14 (Fourteen) Days.   7/11/2024    ferrous sulfate 325 (65 FE) MG tablet Take 1 tablet by mouth Daily.   7/11/2024    Finerenone 10 MG tablet Take 1 tablet by mouth Daily.   7/11/2024    gabapentin (NEURONTIN) 100 MG capsule Take 1 capsule by mouth Every Night.   7/11/2024    guaiFENesin (MUCINEX) 600 MG 12 hr tablet Take 1 tablet by mouth Every 12 (Twelve) Hours. 28 tablet 0 7/11/2024    hydrALAZINE (APRESOLINE) 25 MG tablet Take 1 tablet by mouth 2 (Two) Times a Day.   7/11/2024    Insulin Glargine (LANTUS SOLOSTAR) 100 UNIT/ML injection pen Inject 10 Units under the skin into the appropriate area as directed Every Night. 100 mL 12 7/11/2024    ipratropium-albuterol (DUO-NEB) 0.5-2.5 mg/3 ml nebulizer Take 3 mL  by nebulization 4 (Four) Times a Day As Needed for Wheezing. 360 mL 1 7/11/2024    Linzess 145 MCG capsule capsule Take 1 capsule by mouth Daily As Needed.   7/11/2024    methocarbamol (ROBAXIN) 500 MG tablet Take 1 tablet by mouth 4 (Four) Times a Day. 20 tablet 0 7/11/2024    omeprazole (priLOSEC) 40 MG capsule Take 1 capsule by mouth Every Morning. 30 capsule 0 7/11/2024    polyethylene glycol (MIRALAX) 17 g packet Take 17 g by mouth Daily.   7/11/2024    sertraline (ZOLOFT) 50 MG tablet Take 1 tablet by mouth Daily.   7/11/2024    SITagliptin (JANUVIA) 100 MG tablet Take 1 tablet by mouth Daily.   7/11/2024    traMADol (ULTRAM) 50 MG tablet Take 2 tablets by mouth 2 (Two) Times a Day As Needed.   Past Month       Scheduled Meds:  amLODIPine, 10 mg, Oral, Daily  aspirin, 81 mg, Oral, Daily  carvedilol, 6.25 mg, Oral, BID With Meals  doxycycline, 100 mg, Intravenous, Q12H  estradiol, 1 g, Vaginal, Once per day on Monday Wednesday Friday  ferrous sulfate, 324 mg, Oral, Daily With Breakfast  gabapentin, 100 mg, Oral, Nightly  guaiFENesin, 600 mg, Oral, Q12H  hydrALAZINE, 25 mg, Oral, BID  insulin glargine, 10 Units, Subcutaneous, Nightly  ipratropium-albuterol, 3 mL, Nebulization, 4x Daily - RT  lansoprazole, 30 mg, Oral, Q AM  linagliptin, 5 mg, Oral, Daily  methocarbamol, 500 mg, Oral, 4x Daily  methylPREDNISolone sodium succinate, 20 mg, Intravenous, Q12H  polyethylene glycol, 17 g, Oral, Daily  sertraline, 50 mg, Oral, Daily  sodium chloride, 10 mL, Intravenous, Q12H  terazosin, 2 mg, Oral, Nightly        Continuous Infusions:       PRN Meds:    senna-docusate sodium **AND** polyethylene glycol **AND** bisacodyl **AND** bisacodyl    dextrose    dextrose    Diclofenac Sodium    glucagon (human recombinant)    ipratropium-albuterol    ondansetron    sodium chloride    sodium chloride    traMADol    Allergies:  Erythromycin, Naproxen sodium, Statins, Latex, Metoclopramide, Rocephin [ceftriaxone], and  "Dicyclomine    OBJECTIVE    Vital Signs  Temp:  [97.7 °F (36.5 °C)-98 °F (36.7 °C)] 98 °F (36.7 °C)  Heart Rate:  [75-97] 77  Resp:  [16-18] 16  BP: (103-143)/(64-87) 119/64    No intake/output data recorded.  No intake/output data recorded.    Physical Exam:  General Appearance: alert, appears stated age and cooperative  Head: normocephalic, without obvious abnormality and atraumatic  Eyes: conjunctivae and sclerae normal and no icterus  Neck: supple and no JVD  Lungs: Diminished breath sounds  Heart: regular rhythm & normal rate and normal S1, S2  Chest Wall: no abnormalities observed  Abdomen: normal bowel sounds and soft, nontender  Extremities: moves extremities well, no edema, no cyanosis  Skin: no bleeding, bruising or rash  Neurologic: Alert, and oriented. No focal deficits    Results Review:    I reviewed the patient's new clinical results.    WBC WBC   Date Value Ref Range Status   07/12/2024 7.16 3.40 - 10.80 10*3/mm3 Final   07/11/2024 7.69 3.40 - 10.80 10*3/mm3 Final      HGB Hemoglobin   Date Value Ref Range Status   07/12/2024 13.2 12.0 - 15.9 g/dL Final   07/11/2024 13.5 12.0 - 15.9 g/dL Final      HCT Hematocrit   Date Value Ref Range Status   07/12/2024 42.2 34.0 - 46.6 % Final   07/11/2024 42.6 34.0 - 46.6 % Final      Platelets No results found for: \"LABPLAT\"   MCV MCV   Date Value Ref Range Status   07/12/2024 99.3 (H) 79.0 - 97.0 fL Final   07/11/2024 98.6 (H) 79.0 - 97.0 fL Final          Sodium Sodium   Date Value Ref Range Status   07/12/2024 143 136 - 145 mmol/L Final   07/11/2024 145 136 - 145 mmol/L Final      Potassium Potassium   Date Value Ref Range Status   07/12/2024 4.3 3.5 - 5.2 mmol/L Final     Comment:     Specimen hemolyzed.  Result may be falsely elevated.   07/11/2024 4.3 3.5 - 5.2 mmol/L Final     Comment:     Specimen hemolyzed.  Result may be falsely elevated.      Chloride Chloride   Date Value Ref Range Status   07/12/2024 110 (H) 98 - 107 mmol/L Final   07/11/2024 109 " "(H) 98 - 107 mmol/L Final      CO2 CO2   Date Value Ref Range Status   07/12/2024 23.5 22.0 - 29.0 mmol/L Final   07/11/2024 24.8 22.0 - 29.0 mmol/L Final      BUN BUN   Date Value Ref Range Status   07/12/2024 20 8 - 23 mg/dL Final   07/11/2024 25 (H) 8 - 23 mg/dL Final      Creatinine Creatinine   Date Value Ref Range Status   07/12/2024 1.41 (H) 0.57 - 1.00 mg/dL Final   07/11/2024 1.57 (H) 0.57 - 1.00 mg/dL Final      Calcium Calcium   Date Value Ref Range Status   07/12/2024 9.1 8.6 - 10.5 mg/dL Final   07/11/2024 9.7 8.6 - 10.5 mg/dL Final      PO4 No results found for: \"CAPO4\"   Albumin No results found for: \"ALBUMIN\"   Magnesium No results found for: \"MG\"   Uric Acid No results found for: \"URICACID\"       Imaging Results (Last 72 Hours)       Procedure Component Value Units Date/Time    CT Chest Without Contrast Diagnostic [[id number]] Collected: 07/11/24 2334     Updated: 07/11/24 2339    Narrative:      CT CHEST WO CONTRAST DIAGNOSTIC    Date of Exam: 7/11/2024 11:15 PM EDT    Indication: Failed outpatient PNA treatment x3.    Comparison: 3/5/2024 chest radiograph, MRI thoracic spine 6/10/2024 and chest radiograph 1/15/2024.    Technique: Axial CT images were obtained of the chest without contrast administration.  Sagittal and coronal reconstructions were performed.  Automated exposure control and iterative reconstruction methods were used.      Findings:  The left thyroid lobe is enlarged and heterogeneous compatible with multinodular goiter. The trachea and the esophagus appear within normal limits. Heart size is normal. There are coronary stents and calcifications. No pericardial effusion. No   mediastinal lymphadenopathy. There are a few calcified mediastinal granulomas. There is mild aortic atherosclerosis. No aneurysm. Main pulmonary artery is normal diameter.    There is no pneumothorax, pleural effusion or focal airspace consolidation. There is atelectasis in the medial segment of the right middle " lobe without obvious airway obstruction. There is mild linear scarring versus atelectasis in the left lung base and   lingula. Airways are patent. No suspicious lung nodules. There are calcified lingular and right lower lobe granulomas. No significant pleural disease.    There are no acute findings in the superficial soft tissues. No acute findings in the limited images of the upper abdomen. The patient is status post cholecystectomy and right nephrectomy. No acute osseous abnormalities or destructive bone lesions. There   is a chronic T6 compression fracture status post cement augmentation. There is a minimal segmentation anomaly at the T2-T3 level with incomplete anterior disc formation. There are old healed left-sided rib fractures. These do appear new from January 2024.      Impression:      Impression:  1.No acute cardiopulmonary abnormality.  2.There is atelectasis in the medial segment of the right middle lobe without obvious airway obstruction.  3.Coronary artery disease.  4.Old healed left-sided rib fractures.  5.Chronic T6 compression fracture status post cement augmentation.        Electronically Signed: Lyle Good MD    7/11/2024 11:37 PM EDT    Workstation ID: [id number]              Results for orders placed in visit on 05/22/24    XR Shoulder 2+ View Left    Narrative  XR SHOULDER 2+ VW LEFT    Date of Exam: 5/22/2024 1:19 PM EDT    Indication: left humerus fx after a fall on 3/4/24    Comparison: 4/24/2024, 3/13/2024.    Findings:  Healing nondisplaced fracture of the greater tuberosity present. Sclerotic changes are seen along the fracture margin.. The acromioclavicular and glenohumeral joints appear intact. No erosions. Acromiohumeral and coracoclavicular distances are  well-maintained. Mild to moderate acromioclavicular and glenohumeral osteoarthritic changes are present. The adjacent lung and ribs appear intact.    Impression  Impression:  Healing nondisplaced fracture of the greater  tuberosity, stable in alignment as compared to the previous study.      Electronically Signed: Marisabel Roberts MD  5/23/2024 9:24 AM EDT  Workstation ID: [id number]      Results for orders placed in visit on 04/24/24    XR Shoulder 2+ View Left    Narrative  XR SHOULDER 2+ VW LEFT    Date of Exam: 4/24/2024 12:56 PM EDT    Indication: left shoulder fx after a fall on 3/5/24    Comparison: 3/13/2024 MRI    Findings:  The known nondisplaced fracture of the greater tuberosity is not well seen on this exam. There is no evidence of new or additional fracture. Normal glenohumeral and acromioclavicular joint alignment. Mild glenohumeral and moderate acromioclavicular joint  arthritis. Partially visualized thoracic vertebroplasty. Soft tissues are unremarkable.    Impression  Impression:  Known nondisplaced greater tuberosity fracture not well seen on this exam. No evidence for acute abnormality of the left shoulder. Chronic findings as above.      Electronically Signed: Lyle Calderón MD  4/26/2024 11:58 AM EDT  Workstation ID: [id number]      Results for orders placed in visit on 03/13/24    IMAGING SCANNED        Results for orders placed during the hospital encounter of 08/22/23    Doppler Ankle Brachial Index Single Level CAR    Interpretation Summary    Right Conclusion: The right IKE is normal. Normal digital pressures.    Left Conclusion: The left IKE is normal. Mild digital ischemia.      ASSESSMENT / PLAN      Recurrent pneumonia      CKD stage III-CKD due to hypertensive nephrosclerosis and or diabetic glomerulosclerosis and reduced renal mass.  Hypertension  Diabetes type 2  Recurrent pneumonia-on IV antibiotics  History of coronary artery disease  COPD  History of ureteral cancer, status post right nephrectomy  Dyspnea-likely due to medical pneumonia.  There may be component of excess volume.  She is not on a diuretic.  Her BNP was elevated.  Added low-dose diuretic    Creatinine stable  Add Lasix 40 mg p.o. daily  BP  on admission was soft, currently better  Avoid hypotension/ACE inhibitors or ARB's        I discussed the patient's findings and my recommendations with patient and consulting provider    Will follow along closely. Thank you for allowing us to see this patient in renal consultation.    [facility]  [phone]  MD Rolando Coleman MD  07/12/24  15:34 EDT

## 2024-07-12 NOTE — CASE MANAGEMENT/SOCIAL WORK
Discharge Planning Assessment   [facility]     Patient Name: Vianey Reeves  MRN: [id number]  Today's Date: 7/12/2024    Admit Date: 7/11/2024    Plan: DC PLAN: Routine home alone. Current home 02 4L thru [place]     Discharge Needs Assessment       Row Name 07/12/24 1432       Living Environment    People in Home alone    Current Living Arrangements home    Potentially Unsafe Housing Conditions none    In the past 12 months has the electric, gas, oil, or water company threatened to shut off services in your home? No    Primary Care Provided by self    Provides Primary Care For no one    Quality of Family Relationships helpful;involved;supportive    Able to Return to Prior Arrangements yes       Resource/Environmental Concerns    Resource/Environmental Concerns none    Transportation Concerns none       Transportation Needs    In the past 12 months, has lack of transportation kept you from medical appointments or from getting medications? no    In the past 12 months, has lack of transportation kept you from meetings, work, or from getting things needed for daily living? No       Food Insecurity    Within the past 12 months, you worried that your food would run out before you got the money to buy more. Never true    Within the past 12 months, the food you bought just didn't last and you didn't have money to get more. Never true       Transition Planning    Patient/Family Anticipates Transition to home    Patient/Family Anticipated Services at Transition none    Transportation Anticipated car, drives self;family or friend will provide       Discharge Needs Assessment    Readmission Within the Last 30 Days no previous admission in last 30 days    Equipment Currently Used at Home cane, quad    Anticipated Changes Related to Illness none    Equipment Needed After Discharge none                   Discharge Plan       Row Name 07/12/24 1432       Plan    Plan DC PLAN: Routine home alone. Current home 02 4L thru [place]     Patient/Family in Agreement with Plan yes    Plan Comments Met with patient at bedside, from routine home alone. Independent with ADL's DME includes, cane and current 02 at 4L thru [place]. PCP is Radha, Pharmacy is [facility]. .Able to afford medications and denies any issues with food or utilities. Denies any transporation issues, Grand daughter will provide. Denies nay HHC or SNF needs. Denies any concerns about return home.                  Continued Care and Services - Admitted Since 7/11/2024    No active coordination exists for this encounter.       Expected Discharge Date and Time       Expected Discharge Date Expected Discharge Time    Jul 13, 2024            Demographic Summary       Row Name 07/12/24 1432       General Information    Admission Type inpatient    Arrived From emergency department    Required Notices Provided Important Message from Medicare    Referral Source admission list    Reason for Consult discharge planning    Preferred Language English                   Functional Status       Row Name 07/12/24 1432       Functional Status    Usual Activity Tolerance excellent    Current Activity Tolerance excellent       Physical Activity    On average, how many days per week do you engage in moderate to strenuous exercise (like a brisk walk)? 0 days       Assessment of Health Literacy    How often do you have someone help you read hospital materials? Sometimes    How often do you have problems learning about your medical condition because of difficulty understanding written information? Sometimes    How often do you have a problem understanding what is told to you about your medical condition? Sometimes    How confident are you filling out medical forms by yourself? Somewhat       Functional Status, IADL    Medications independent    Meal Preparation independent    Housekeeping independent    Laundry independent    Shopping independent       Mental Status    General Appearance WDL WDL       Mental Status  Summary    Recent Changes in Mental Status/Cognitive Functioning no changes                         Patient Forms       Row Name 07/12/24 1431       Patient Forms    Important Message from Medicare (IMM) Delivered  IMM 7/11/24 per registration    Delivered to Patient    Method of delivery In person                  Met with patient in room wearing PPE: \      Maintained distance greater than six feet and spent less than 15 minutes in the room.    Michaelle Gonzalez RN   Case management

## 2024-07-13 LAB
ALBUMIN SERPL-MCNC: 3.1 G/DL (ref 3.5–5.2)
ANION GAP SERPL CALCULATED.3IONS-SCNC: 7.1 MMOL/L (ref 5–15)
BASOPHILS # BLD AUTO: 0.02 10*3/MM3 (ref 0–0.2)
BASOPHILS NFR BLD AUTO: 0.3 % (ref 0–1.5)
BUN SERPL-MCNC: 25 MG/DL (ref 8–23)
BUN/CREAT SERPL: 17 (ref 7–25)
CALCIUM SPEC-SCNC: 7.8 MG/DL (ref 8.6–10.5)
CHLORIDE SERPL-SCNC: 110 MMOL/L (ref 98–107)
CO2 SERPL-SCNC: 21.9 MMOL/L (ref 22–29)
CREAT SERPL-MCNC: 1.47 MG/DL (ref 0.57–1)
DEPRECATED RDW RBC AUTO: 49.9 FL (ref 37–54)
EGFRCR SERPLBLD CKD-EPI 2021: 35.9 ML/MIN/1.73
EOSINOPHIL # BLD AUTO: 0 10*3/MM3 (ref 0–0.4)
EOSINOPHIL NFR BLD AUTO: 0 % (ref 0.3–6.2)
ERYTHROCYTE [DISTWIDTH] IN BLOOD BY AUTOMATED COUNT: 13.8 % (ref 12.3–15.4)
GLUCOSE BLDC GLUCOMTR-MCNC: 189 MG/DL (ref 70–105)
GLUCOSE BLDC GLUCOMTR-MCNC: 351 MG/DL (ref 70–105)
GLUCOSE SERPL-MCNC: 205 MG/DL (ref 65–99)
HCT VFR BLD AUTO: 40 % (ref 34–46.6)
HGB BLD-MCNC: 12.5 G/DL (ref 12–15.9)
IMM GRANULOCYTES # BLD AUTO: 0.06 10*3/MM3 (ref 0–0.05)
IMM GRANULOCYTES NFR BLD AUTO: 0.9 % (ref 0–0.5)
LYMPHOCYTES # BLD AUTO: 1.11 10*3/MM3 (ref 0.7–3.1)
LYMPHOCYTES NFR BLD AUTO: 16.3 % (ref 19.6–45.3)
MAGNESIUM SERPL-MCNC: 2.1 MG/DL (ref 1.6–2.4)
MCH RBC QN AUTO: 30.6 PG (ref 26.6–33)
MCHC RBC AUTO-ENTMCNC: 31.3 G/DL (ref 31.5–35.7)
MCV RBC AUTO: 98 FL (ref 79–97)
MONOCYTES # BLD AUTO: 0.21 10*3/MM3 (ref 0.1–0.9)
MONOCYTES NFR BLD AUTO: 3.1 % (ref 5–12)
NEUTROPHILS NFR BLD AUTO: 5.43 10*3/MM3 (ref 1.7–7)
NEUTROPHILS NFR BLD AUTO: 79.4 % (ref 42.7–76)
NRBC BLD AUTO-RTO: 0 /100 WBC (ref 0–0.2)
PHOSPHATE SERPL-MCNC: 2.3 MG/DL (ref 2.5–4.5)
PLATELET # BLD AUTO: 189 10*3/MM3 (ref 140–450)
PMV BLD AUTO: 10.8 FL (ref 6–12)
POTASSIUM SERPL-SCNC: 4 MMOL/L (ref 3.5–5.2)
RBC # BLD AUTO: 4.08 10*6/MM3 (ref 3.77–5.28)
SODIUM SERPL-SCNC: 139 MMOL/L (ref 136–145)
URATE SERPL-MCNC: 5.7 MG/DL (ref 2.4–5.7)
WBC NRBC COR # BLD AUTO: 6.83 10*3/MM3 (ref 3.4–10.8)

## 2024-07-13 PROCEDURE — 82948 REAGENT STRIP/BLOOD GLUCOSE: CPT

## 2024-07-13 PROCEDURE — 84550 ASSAY OF BLOOD/URIC ACID: CPT | Performed by: INTERNAL MEDICINE

## 2024-07-13 PROCEDURE — 80069 RENAL FUNCTION PANEL: CPT | Performed by: INTERNAL MEDICINE

## 2024-07-13 PROCEDURE — 82948 REAGENT STRIP/BLOOD GLUCOSE: CPT | Performed by: FAMILY MEDICINE

## 2024-07-13 PROCEDURE — 63710000001 INSULIN GLARGINE PER 5 UNITS

## 2024-07-13 PROCEDURE — 83735 ASSAY OF MAGNESIUM: CPT | Performed by: INTERNAL MEDICINE

## 2024-07-13 PROCEDURE — 25010000002 METHYLPREDNISOLONE PER 40 MG: Performed by: FAMILY MEDICINE

## 2024-07-13 PROCEDURE — 94761 N-INVAS EAR/PLS OXIMETRY MLT: CPT

## 2024-07-13 PROCEDURE — 85025 COMPLETE CBC W/AUTO DIFF WBC: CPT

## 2024-07-13 PROCEDURE — 25010000002 ENOXAPARIN PER 10 MG: Performed by: INTERNAL MEDICINE

## 2024-07-13 PROCEDURE — 94799 UNLISTED PULMONARY SVC/PX: CPT

## 2024-07-13 PROCEDURE — 94664 DEMO&/EVAL PT USE INHALER: CPT

## 2024-07-13 RX ORDER — BUDESONIDE AND FORMOTEROL FUMARATE DIHYDRATE 160; 4.5 UG/1; UG/1
2 AEROSOL RESPIRATORY (INHALATION)
Status: DISCONTINUED | OUTPATIENT
Start: 2024-07-13 | End: 2024-07-17 | Stop reason: HOSPADM

## 2024-07-13 RX ORDER — GUAIFENESIN 600 MG/1
1200 TABLET, EXTENDED RELEASE ORAL EVERY 12 HOURS SCHEDULED
Status: DISCONTINUED | OUTPATIENT
Start: 2024-07-13 | End: 2024-07-17 | Stop reason: HOSPADM

## 2024-07-13 RX ORDER — ACETYLCYSTEINE 200 MG/ML
3 SOLUTION ORAL; RESPIRATORY (INHALATION)
Status: DISCONTINUED | OUTPATIENT
Start: 2024-07-13 | End: 2024-07-15

## 2024-07-13 RX ORDER — ENOXAPARIN SODIUM 100 MG/ML
40 INJECTION SUBCUTANEOUS EVERY 24 HOURS
Status: DISCONTINUED | OUTPATIENT
Start: 2024-07-13 | End: 2024-07-17 | Stop reason: HOSPADM

## 2024-07-13 RX ADMIN — GUAIFENESIN 600 MG: 600 TABLET, EXTENDED RELEASE ORAL at 09:32

## 2024-07-13 RX ADMIN — METHOCARBAMOL 500 MG: 500 TABLET ORAL at 21:34

## 2024-07-13 RX ADMIN — Medication 10 ML: at 09:33

## 2024-07-13 RX ADMIN — ENOXAPARIN SODIUM 40 MG: 100 INJECTION SUBCUTANEOUS at 17:03

## 2024-07-13 RX ADMIN — HYDRALAZINE HYDROCHLORIDE 25 MG: 25 TABLET ORAL at 20:24

## 2024-07-13 RX ADMIN — FERROUS SULFATE TAB EC 324 MG (65 MG FE EQUIVALENT) 324 MG: 324 (65 FE) TABLET DELAYED RESPONSE at 09:31

## 2024-07-13 RX ADMIN — GABAPENTIN 100 MG: 100 CAPSULE ORAL at 20:24

## 2024-07-13 RX ADMIN — IPRATROPIUM BROMIDE AND ALBUTEROL SULFATE 3 ML: .5; 3 SOLUTION RESPIRATORY (INHALATION) at 18:20

## 2024-07-13 RX ADMIN — ASPIRIN 81 MG: 81 TABLET, COATED ORAL at 09:32

## 2024-07-13 RX ADMIN — INSULIN GLARGINE 10 UNITS: 100 INJECTION, SOLUTION SUBCUTANEOUS at 20:25

## 2024-07-13 RX ADMIN — METHOCARBAMOL 500 MG: 500 TABLET ORAL at 17:03

## 2024-07-13 RX ADMIN — TERAZOSIN HYDROCHLORIDE 2 MG: 2 CAPSULE ORAL at 21:30

## 2024-07-13 RX ADMIN — Medication 10 ML: at 20:29

## 2024-07-13 RX ADMIN — BUDESONIDE AND FORMOTEROL FUMARATE DIHYDRATE 2 PUFF: 160; 4.5 AEROSOL RESPIRATORY (INHALATION) at 18:25

## 2024-07-13 RX ADMIN — FUROSEMIDE 40 MG: 40 TABLET ORAL at 09:33

## 2024-07-13 RX ADMIN — IPRATROPIUM BROMIDE AND ALBUTEROL SULFATE 3 ML: .5; 3 SOLUTION RESPIRATORY (INHALATION) at 10:47

## 2024-07-13 RX ADMIN — ACETYLCYSTEINE 3 ML: 200 SOLUTION ORAL; RESPIRATORY (INHALATION) at 18:20

## 2024-07-13 RX ADMIN — DOXYCYCLINE 100 MG: 100 INJECTION, POWDER, LYOPHILIZED, FOR SOLUTION INTRAVENOUS at 12:54

## 2024-07-13 RX ADMIN — CARVEDILOL 6.25 MG: 6.25 TABLET, FILM COATED ORAL at 09:32

## 2024-07-13 RX ADMIN — GUAIFENESIN 1200 MG: 600 TABLET, EXTENDED RELEASE ORAL at 20:24

## 2024-07-13 RX ADMIN — METHOCARBAMOL 500 MG: 500 TABLET ORAL at 09:55

## 2024-07-13 RX ADMIN — IPRATROPIUM BROMIDE AND ALBUTEROL SULFATE 3 ML: .5; 3 SOLUTION RESPIRATORY (INHALATION) at 06:42

## 2024-07-13 RX ADMIN — AMLODIPINE BESYLATE 10 MG: 5 TABLET ORAL at 09:31

## 2024-07-13 RX ADMIN — METHYLPREDNISOLONE SODIUM SUCCINATE 20 MG: 40 INJECTION, POWDER, FOR SOLUTION INTRAMUSCULAR; INTRAVENOUS at 09:32

## 2024-07-13 RX ADMIN — LINAGLIPTIN 5 MG: 5 TABLET, FILM COATED ORAL at 09:55

## 2024-07-13 RX ADMIN — HYDRALAZINE HYDROCHLORIDE 25 MG: 25 TABLET ORAL at 09:31

## 2024-07-13 RX ADMIN — LANSOPRAZOLE 30 MG: 30 TABLET, ORALLY DISINTEGRATING, DELAYED RELEASE ORAL at 06:00

## 2024-07-13 RX ADMIN — IPRATROPIUM BROMIDE AND ALBUTEROL SULFATE 3 ML: .5; 3 SOLUTION RESPIRATORY (INHALATION) at 14:21

## 2024-07-13 RX ADMIN — CARVEDILOL 6.25 MG: 6.25 TABLET, FILM COATED ORAL at 17:04

## 2024-07-13 RX ADMIN — METHYLPREDNISOLONE SODIUM SUCCINATE 20 MG: 40 INJECTION, POWDER, FOR SOLUTION INTRAMUSCULAR; INTRAVENOUS at 20:25

## 2024-07-13 RX ADMIN — METHOCARBAMOL 500 MG: 500 TABLET ORAL at 13:07

## 2024-07-13 RX ADMIN — SERTRALINE 50 MG: 50 TABLET, FILM COATED ORAL at 09:31

## 2024-07-13 RX ADMIN — POLYETHYLENE GLYCOL 3350 17 G: 17 POWDER, FOR SOLUTION ORAL at 09:26

## 2024-07-13 NOTE — CONSULTS
[facility]: [facility]         CONSULT NOTE    Patient Identification:  Vianey Reeves  78 y.o.  female  1945  [id number]            Requesting physician: Attending physician    Reason for Consultation: Shortness of breath      History of Present Illness:  78-year-old female with history of COPD, HTN, CKD and DM who was admitted 7/11/2024 with complaints of shortness of breath and failing 3 courses of antibiotics as an outpatient to treat pneumonia.  Patient is afebrile and hemodynamically stable, initially she was requiring 3 L of oxygen but today oxygenating 94% on room air.  WBC 6.8, hemoglobin 12.5, respiratory panel is negative, CT scan of the chest revealed atelectasis in the right middle lobe, old healed left-sided rib fracture and a chronic T6 compression fracture    Assessment:  Recurrent pneumonia  History of COVID-19 infection 1/14/2024 with resulting organizing pneumonia in the right middle lobe.  Chronic cough, multiple mucous plugs  Hypoxemia  Hypertension  DM  CKD      Recommendations:  Patient needs to improve airway clearance and get rid of mucous plugging: Schedule bronchoscopy 7/14/2024  Mucomyst nebulized  Mucinex  Inhaled corticosteroids  Encourage use of incentive spirometry and flutter valve  Oxygen supplement and titration to maintain saturation 90 to 95%  Bronchodilators    Aspirin, BP control, glucose control  DVT prophylaxis  Check daily labs and correct electrolytes as needed  I personally reviewed the radiological studies        Review of Sytems:  Constitutional: Negative for chills, and fever and positive for malaise/fatigue.   HENT: Negative.    Eyes: Negative.    Cardiovascular: Negative.    Respiratory: Positive for cough and shortness of breath.    Skin: Negative.    Musculoskeletal: Negative.    Gastrointestinal: Negative.    Genitourinary: Negative.    Neurological: Negative.    Psychiatric/Behavioral: Negative.    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    Hypertension     IBS (irritable colon syndrome)     constipation    Mass of right breast     Neuropathy     Renal insufficiency     Sciatic leg pain     right    Simple chronic bronchitis     Solitary kidney     left       Past Surgical History:  Past Surgical History:   Procedure Laterality Date    BREAST BIOPSY Right     CARDIAC CATHETERIZATION      CHOLECYSTECTOMY      COLON SURGERY      REMOVAL diverticular diseae    COLONOSCOPY N/A 08/12/2021    Procedure: COLONOSCOPY with polypectomy x 3;  Surgeon: Margarette Mcallister MD;  Location: [facility] ENDOSCOPY;  Service: Gastroenterology;  Laterality: N/A;  post op: hmorrhoids, diverticulosis, polyps    CORONARY STENT PLACEMENT      ENDOSCOPY N/A 08/12/2021    Procedure: ESOPHAGOGASTRODUODENOSCOPY with dilatation (18-20 mm balloon) and (54 bougie);  Surgeon: Margarette Mcallister MD;  Location: [facility] ENDOSCOPY;  Service: Gastroenterology;  Laterality: N/A;  post op: esophageal stricture, esophagitis, gastritis, history of nissen    ENDOSCOPY N/A 9/13/2023    Procedure: ESOPHAGOGASTRODUODENOSCOPY with biopsy x 1 area and dilation (50,54,56 non guided bougie);  Surgeon: Margarette Mcallister MD;  Location: [facility] ENDOSCOPY;  Service: Gastroenterology;  Laterality: N/A;  post op: esophageal stricture    EYE SURGERY Bilateral     cataracts    HIATAL HERNIA REPAIR      NEPHRECTOMY Right     cancer    UPPER ENDOSCOPIC ULTRASOUND  W/ FNA N/A 09/22/2022    Procedure: EUS;  Surgeon: Margarette Mcallister MD;  Location: [facility] ENDOSCOPY;  Service: Gastroenterology;  Laterality: N/A;  post: normal pancreas, dilated pancreatic duct, hiatal hernia,         Home Meds:  Medications Prior to Admission   Medication Sig Dispense Refill Last Dose    amLODIPine (NORVASC) 10 MG tablet Take 1 tablet by mouth Daily.   7/11/2024    aspirin 81 MG tablet Take 1 tablet by mouth Every Night.   7/11/2024    budesonide-formoterol (SYMBICORT) 160-4.5 MCG/ACT inhaler Inhale 2 puffs 2 (Two) Times a Day. 10.2 g 12 7/11/2024    carvedilol (COREG) 3.125 MG tablet Take 2 tablets by mouth 2 (Two) Times a Day With Meals. 180 tablet 3 7/11/2024    cyanocobalamin 1000 MCG/ML injection Inject 1 mL into the appropriate muscle as directed by prescriber Every 28 (Twenty-Eight) Days.   7/11/2024    Diclofenac Sodium (VOLTAREN) 1 % gel gel Apply 4 g topically to the appropriate area as directed 3 (Three) Times a Day As Needed (pain).   7/11/2024    doxazosin (CARDURA) 2 MG tablet Take 1 tablet by mouth Every Night.   7/11/2024    estradiol (ESTRACE) 0.1 MG/GM vaginal cream Insert 1 g into the vagina 3 (Three) Times a Week. Monday, Wednesday and Friday 7/11/2024    Evolocumab (REPATHA) solution auto-injector SureClick injection Inject 1 mL under the skin into the appropriate area as directed Every 14 (Fourteen) Days.   7/11/2024    ferrous sulfate 325 (65 FE) MG tablet Take 1 tablet by mouth Daily.   7/11/2024    Finerenone 10 MG tablet Take 1 tablet by mouth Daily.   7/11/2024    gabapentin (NEURONTIN) 100 MG capsule Take 1 capsule by mouth Every Night.   7/11/2024    guaiFENesin (MUCINEX) 600 MG 12 hr tablet Take 1 tablet by mouth Every 12 (Twelve) Hours. 28 tablet 0 7/11/2024    hydrALAZINE (APRESOLINE) 25 MG tablet Take 1 tablet by mouth 2 (Two) Times a Day.   7/11/2024    Insulin Glargine (LANTUS SOLOSTAR) 100 UNIT/ML injection pen Inject 10 Units under the skin into the appropriate  area as directed Every Night. 100 mL 12 2024    ipratropium-albuterol (DUO-NEB) 0.5-2.5 mg/3 ml nebulizer Take 3 mL by nebulization 4 (Four) Times a Day As Needed for Wheezing. 360 mL 1 2024    Linzess 145 MCG capsule capsule Take 1 capsule by mouth Daily As Needed.   2024    methocarbamol (ROBAXIN) 500 MG tablet Take 1 tablet by mouth 4 (Four) Times a Day. 20 tablet 0 2024    omeprazole (priLOSEC) 40 MG capsule Take 1 capsule by mouth Every Morning. 30 capsule 0 2024    polyethylene glycol (MIRALAX) 17 g packet Take 17 g by mouth Daily.   2024    sertraline (ZOLOFT) 50 MG tablet Take 1 tablet by mouth Daily.   2024    SITagliptin (JANUVIA) 100 MG tablet Take 1 tablet by mouth Daily.   2024    traMADol (ULTRAM) 50 MG tablet Take 2 tablets by mouth 2 (Two) Times a Day As Needed.   Past Month       Allergies:  Allergies   Allergen Reactions    Erythromycin Rash    Naproxen Sodium Other (See Comments)     Dizzy lightheaded    Statins Myalgia    Latex Itching and Swelling    Metoclopramide Other (See Comments)     Unsteady on feet    Rocephin [Ceftriaxone] Itching and Nausea Only    Dicyclomine Unknown - Low Severity       Social History:   Social History     Socioeconomic History    Marital status:    Tobacco Use    Smoking status: Former     Current packs/day: 0.00     Average packs/day: 0.3 packs/day for 50.0 years (12.5 ttl pk-yrs)     Types: Cigarettes     Start date: 1973     Quit date: 2023     Years since quittin.6     Passive exposure: Past    Smokeless tobacco: Never    Tobacco comments:     Mostly patches, some days none, some days 1-2   Vaping Use    Vaping status: Never Used   Substance and Sexual Activity    Alcohol use: Yes     Comment: occasionally     Drug use: Never    Sexual activity: Defer       Family History:  Family History   Problem Relation Age of Onset    Arthritis Father     Prostate cancer Father     Heart disease Sister        Physical  "Exam:  /84 (BP Location: Right arm, Patient Position: Lying)   Pulse 90   Temp 97.7 °F (36.5 °C) (Oral)   Resp 20   Ht 165.1 cm (65\")   LMP  (LMP Unknown)   SpO2 94%   BMI 29.45 kg/m²  Body mass index is 29.45 kg/m². 94%    General Appearance:  Alert   HEENT:  Normocephalic, without obvious abnormality, Conjunctiva/corneas clear,.   Nares normal, no drainage     Neck:  Supple, symmetrical, trachea midline. No JVD.  Lungs /Chest wall:   Bilateral basal rhonchi, respirations unlabored, symmetrical wall movement.     Heart:  Regular rate and rhythm, S1 S2 normal  Abdomen: Soft, non-tender, no masses, no organomegaly.    Extremities: No edema, no clubbing or cyanosis    LABS:  Lab Results   Component Value Date    CALCIUM 9.1 07/12/2024    PHOS 2.5 01/28/2024     Results from last 7 days   Lab Units 07/13/24  0622 07/12/24  0420 07/11/24 2201 07/11/24 2201 07/11/24 2114   MAGNESIUM mg/dL 2.1  --   --   --   --    SODIUM mmol/L  --  143  --  145  --    POTASSIUM mmol/L  --  4.3  --  4.3  --    CHLORIDE mmol/L  --  110*  --  109*  --    CO2 mmol/L  --  23.5  --  24.8  --    BUN mg/dL  --  20  --  25*  --    CREATININE mg/dL  --  1.41*  --  1.57*  --    GLUCOSE mg/dL  --  123*   < > 88  --    CALCIUM mg/dL  --  9.1  --  9.7  --    WBC 10*3/mm3 6.83 7.16  --  7.69  --    HEMOGLOBIN g/dL 12.5 13.2  --  13.5  --    PLATELETS 10*3/mm3 189 176  --  196  --    PROBNP pg/mL  --   --   --   --  1,827.0*    < > = values in this interval not displayed.     Lab Results   Component Value Date    CKTOTAL 29 01/15/2024    TROPONINI 0.015 06/01/2021    TROPONINT 59 (C) 01/21/2024         Results from last 7 days   Lab Units 07/12/24  0025 07/12/24  0016 07/12/24  0010   BLOODCX   --  No growth at 24 hours No growth at 24 hours   RESPCX  Rejected  --   --              Results from last 7 days   Lab Units 07/12/24  0010   ADENOVIRUS DETECTION BY PCR  Not Detected   CORONAVIRUS 229E  Not Detected   CORONAVIRUS HKU1  Not " Detected   CORONAVIRUS NL63  Not Detected   CORONAVIRUS OC43  Not Detected   HUMAN METAPNEUMOVIRUS  Not Detected   HUMAN RHINOVIRUS/ENTEROVIRUS  Not Detected   INFLUENZA B PCR  Not Detected   PARAINFLUENZA 1  Not Detected   PARAINFLUENZA VIRUS 2  Not Detected   PARAINFLUENZA VIRUS 3  Not Detected   PARAINFLUENZA VIRUS 4  Not Detected   BORDETELLA PERTUSSIS PCR  Not Detected   CHLAMYDOPHILA PNEUMONIAE PCR  Not Detected   MYCOPLAMA PNEUMO PCR  Not Detected   INFLUENZA A PCR  Not Detected   RSV, PCR  Not Detected         Results from last 7 days   Lab Units 07/12/24  0025 07/12/24  0016 07/12/24  0010   BLOODCX   --  No growth at 24 hours No growth at 24 hours   RESPCX  Rejected  --   --      Lab Results   Component Value Date    TSH 0.746 02/09/2024     Estimated Creatinine Clearance: 33.3 mL/min (A) (by C-G formula based on SCr of 1.41 mg/dL (H)).         Imaging:  Imaging Results (Last 24 Hours)       Procedure Component Value Units Date/Time    XR Foot 3+ View Right [[id number]] Collected: 07/12/24 2048     Updated: 07/12/24 2052    Narrative:      XR FOOT 3+ VW RIGHT    Date of Exam: 7/12/2024 8:32 PM EDT    Indication: Concern for fractured 2nd toe    Comparison: 8/29/2020    Findings:  Subtle minimally displaced fracture through the distal aspect of the mid phalanx of the second digit with extension into the DIP joint.  The remainder the visualized osseous structures are intact.  Diffuse osteopenia.  Mild degenerative changes are noted at the midfoot and forefoot.  No focal soft tissue abnormality.  No suspicious erosive changes      Impression:      Impression:  Minimally displaced fracture through the distal aspect of the mid phalanx of the second digit with extension into the DIP joint.      Electronically Signed: Stevo Barry DO    7/12/2024 8:50 PM EDT    Workstation ID: [id number]              Current Meds:   SCHEDULE  amLODIPine, 10 mg, Oral, Daily  aspirin, 81 mg, Oral, Daily  carvedilol, 6.25 mg,  Oral, BID With Meals  doxycycline, 100 mg, Intravenous, Q12H  estradiol, 1 g, Vaginal, Once per day on Monday Wednesday Friday  ferrous sulfate, 324 mg, Oral, Daily With Breakfast  furosemide, 40 mg, Oral, Daily  gabapentin, 100 mg, Oral, Nightly  guaiFENesin, 600 mg, Oral, Q12H  hydrALAZINE, 25 mg, Oral, BID  insulin glargine, 10 Units, Subcutaneous, Nightly  ipratropium-albuterol, 3 mL, Nebulization, 4x Daily - RT  lansoprazole, 30 mg, Oral, Q AM  linagliptin, 5 mg, Oral, Daily  methocarbamol, 500 mg, Oral, 4x Daily  methylPREDNISolone sodium succinate, 20 mg, Intravenous, Q12H  polyethylene glycol, 17 g, Oral, Daily  sertraline, 50 mg, Oral, Daily  sodium chloride, 10 mL, Intravenous, Q12H  terazosin, 2 mg, Oral, Nightly      Infusions     PRNs    senna-docusate sodium **AND** polyethylene glycol **AND** bisacodyl **AND** bisacodyl    dextrose    dextrose    Diclofenac Sodium    glucagon (human recombinant)    ipratropium-albuterol    ondansetron    sodium chloride    sodium chloride    traMADol        Marlin Ferguson MD  7/13/2024  12:26 EDT      Much of this encounter note is an electronic transcription/translation of spoken language to printed text using Dragon Software.

## 2024-07-13 NOTE — PROGRESS NOTES
LOS: 2 days   Patient Care Team:  Anny Garcia MD as PCP - General (Internal Medicine)  Sergio Ordonez MD as Consulting Physician (Nephrology)    Subjective     Interval History: Stable overnight    Patient Complaints: Continues with cough and weakness, starting to feel somewhat better.  Does complain of toe pain    History taken from: patient    Review of Systems   Constitutional:  Positive for activity change, appetite change and fatigue. Negative for fever.   HENT:  Positive for congestion.    Respiratory:  Positive for cough, chest tightness and shortness of breath.    Cardiovascular: Negative.    Gastrointestinal: Negative.    Musculoskeletal:  Positive for arthralgias.   Neurological:  Positive for weakness.           Objective     Vital Signs  Temp:  [97.4 °F (36.3 °C)-98.2 °F (36.8 °C)] 97.6 °F (36.4 °C)  Heart Rate:  [67-94] 67  Resp:  [10-20] 10  BP: (119-148)/(64-75) 120/66    Physical Exam:     General Appearance:    Alert, cooperative, in no acute distress, ill but not toxic   Head:    Normocephalic, without obvious abnormality, atraumatic   Eyes:            Lids and lashes normal, conjunctivae and sclerae normal, no   icterus, no pallor, corneas clear, PERRLA   Ears:    Ears appear intact with no abnormalities noted   Throat:   No oral lesions, no thrush, oral mucosa moist   Neck:   No adenopathy, supple, trachea midline, no thyromegaly, no   carotid bruit, no JVD   Lungs:     rhonchi    Heart:    Regular rhythm and normal rate, normal S1 and S2, no            murmur, no gallop, no rub, no click   Chest Wall:    No abnormalities observed   Abdomen:     Normal bowel sounds, no masses, no organomegaly, soft        non-tender, non-distended, no guarding, no rebound                tenderness   Extremities:   Moves all extremities well, no edema, no cyanosis, no             redness   Pulses:   Pulses palpable and equal bilaterally   Skin:   No bleeding, bruising or rash   Lymph nodes:   No  palpable adenopathy   Neurologic:   Cranial nerves 2 - 12 grossly intact, sensation intact, DTR       present and equal bilaterally        Results Review:    Lab Results (last 24 hours)       Procedure Component Value Units Date/Time    Renal Function Panel [[id number]] Updated: 07/13/24 0759    Specimen: Blood from Arm, Left     Uric Acid [[id number]] Updated: 07/13/24 0759    Specimen: Blood from Arm, Left     Magnesium [[id number]]  (Normal) Collected: 07/13/24 0622    Specimen: Blood from Arm, Left Updated: 07/13/24 0751     Magnesium 2.1 mg/dL     POC Glucose 4x Daily Before Meals & at Bedtime [[id number]]  (Abnormal) Collected: 07/13/24 0729    Specimen: Blood Updated: 07/13/24 0731     Glucose 189 mg/dL      Comment: Serial Number: [id number]:  [id number]       CBC & Differential [[id number]]  (Abnormal) Collected: 07/13/24 0622    Specimen: Blood from Arm, Left Updated: 07/13/24 0717    Narrative:      The following orders were created for panel order CBC & Differential.  Procedure                               Abnormality         Status                     ---------                               -----------         ------                     CBC Auto Differential[[id number]]        Abnormal            Final result                 Please view results for these tests on the individual orders.    CBC Auto Differential [[id number]]  (Abnormal) Collected: 07/13/24 0622    Specimen: Blood from Arm, Left Updated: 07/13/24 0717     WBC 6.83 10*3/mm3      RBC 4.08 10*6/mm3      Hemoglobin 12.5 g/dL      Hematocrit 40.0 %      MCV 98.0 fL      MCH 30.6 pg      MCHC 31.3 g/dL      RDW 13.8 %      RDW-SD 49.9 fl      MPV 10.8 fL      Platelets 189 10*3/mm3      Neutrophil % 79.4 %      Lymphocyte % 16.3 %      Monocyte % 3.1 %      Eosinophil % 0.0 %      Basophil % 0.3 %      Immature Grans % 0.9 %      Neutrophils, Absolute 5.43 10*3/mm3      Lymphocytes, Absolute 1.11 10*3/mm3      Monocytes, Absolute 0.21 10*3/mm3       Eosinophils, Absolute 0.00 10*3/mm3      Basophils, Absolute 0.02 10*3/mm3      Immature Grans, Absolute 0.06 10*3/mm3      nRBC 0.0 /100 WBC     Blood Culture - Blood, Arm, Left [[id number]]  (Normal) Collected: 07/12/24 0010    Specimen: Blood from Arm, Left Updated: 07/13/24 0101     Blood Culture No growth at 24 hours    Blood Culture - Blood, Arm, Right [[id number]]  (Normal) Collected: 07/12/24 0016    Specimen: Blood from Arm, Right Updated: 07/13/24 0046     Blood Culture No growth at 24 hours    POC Glucose 4x Daily Before Meals & at Bedtime [[id number]]  (Abnormal) Collected: 07/12/24 2238    Specimen: Blood Updated: 07/12/24 2240     Glucose 268 mg/dL      Comment: Serial Number: [id number]:  [id number]                Imaging Results (Last 24 Hours)       Procedure Component Value Units Date/Time    XR Foot 3+ View Right [[id number]] Collected: 07/12/24 2048     Updated: 07/12/24 2052    Narrative:      XR FOOT 3+ VW RIGHT    Date of Exam: 7/12/2024 8:32 PM EDT    Indication: Concern for fractured 2nd toe    Comparison: 8/29/2020    Findings:  Subtle minimally displaced fracture through the distal aspect of the mid phalanx of the second digit with extension into the DIP joint.  The remainder the visualized osseous structures are intact.  Diffuse osteopenia.  Mild degenerative changes are noted at the midfoot and forefoot.  No focal soft tissue abnormality.  No suspicious erosive changes      Impression:      Impression:  Minimally displaced fracture through the distal aspect of the mid phalanx of the second digit with extension into the DIP joint.      Electronically Signed: Stevo Barry DO    7/12/2024 8:50 PM EDT    Workstation ID: [id number]                 I reviewed the patient's new clinical results.    Medication Review:   Scheduled Meds:amLODIPine, 10 mg, Oral, Daily  aspirin, 81 mg, Oral, Daily  carvedilol, 6.25 mg, Oral, BID With Meals  doxycycline, 100 mg, Intravenous,  Q12H  estradiol, 1 g, Vaginal, Once per day on Monday Wednesday Friday  ferrous sulfate, 324 mg, Oral, Daily With Breakfast  furosemide, 40 mg, Oral, Daily  gabapentin, 100 mg, Oral, Nightly  guaiFENesin, 600 mg, Oral, Q12H  hydrALAZINE, 25 mg, Oral, BID  insulin glargine, 10 Units, Subcutaneous, Nightly  ipratropium-albuterol, 3 mL, Nebulization, 4x Daily - RT  lansoprazole, 30 mg, Oral, Q AM  linagliptin, 5 mg, Oral, Daily  methocarbamol, 500 mg, Oral, 4x Daily  methylPREDNISolone sodium succinate, 20 mg, Intravenous, Q12H  polyethylene glycol, 17 g, Oral, Daily  sertraline, 50 mg, Oral, Daily  sodium chloride, 10 mL, Intravenous, Q12H  terazosin, 2 mg, Oral, Nightly      Continuous Infusions:   PRN Meds:.  senna-docusate sodium **AND** polyethylene glycol **AND** bisacodyl **AND** bisacodyl    dextrose    dextrose    Diclofenac Sodium    glucagon (human recombinant)    ipratropium-albuterol    ondansetron    sodium chloride    sodium chloride    traMADol     Assessment & Plan       Recurrent pneumonia  - continue IV abx, nebs, IV solumedrol  - sputum and blood cultures pending  - pulmonary following  - CT chest without acute abnormality  COPD  CAD  DDD  HTN with CKD3 - creatine improved to 1.41.  avoid nephrotoxic meds and continue home meds    DVT prophylaxis- SCD's  GI prophylaxis- ppi    Plan for disposition:home when able    SAMIRA Gomez  07/13/24  08:42 EDT

## 2024-07-13 NOTE — PLAN OF CARE
Problem: Adult Inpatient Plan of Care  Goal: Plan of Care Review  Outcome: Ongoing, Not Progressing  Flowsheets (Taken 7/13/2024 0416)  Progress: improving  Plan of Care Reviewed With: patient  Goal: Patient-Specific Goal (Individualized)  Outcome: Ongoing, Not Progressing  Goal: Absence of Hospital-Acquired Illness or Injury  Outcome: Ongoing, Not Progressing  Intervention: Identify and Manage Fall Risk  Recent Flowsheet Documentation  Taken 7/13/2024 0200 by Ady Mosley RN  Safety Promotion/Fall Prevention: safety round/check completed  Taken 7/13/2024 0000 by Ady Mosley RN  Safety Promotion/Fall Prevention: safety round/check completed  Taken 7/12/2024 2300 by Ady Mosley RN  Safety Promotion/Fall Prevention: safety round/check completed  Taken 7/12/2024 2200 by Ady Mosley RN  Safety Promotion/Fall Prevention: safety round/check completed  Taken 7/12/2024 2100 by Ady Mosley RN  Safety Promotion/Fall Prevention: safety round/check completed  Taken 7/12/2024 2030 by Ady Mosley RN  Safety Promotion/Fall Prevention: safety round/check completed  Intervention: Prevent Skin Injury  Recent Flowsheet Documentation  Taken 7/13/2024 0000 by Ady Mosley RN  Body Position: position changed independently  Taken 7/12/2024 2030 by Ady Mosley RN  Skin Protection:   adhesive use limited   tubing/devices free from skin contact  Intervention: Prevent and Manage VTE (Venous Thromboembolism) Risk  Recent Flowsheet Documentation  Taken 7/12/2024 2030 by Ady Mosley RN  VTE Prevention/Management: (Up ad felix)   other (see comments)   bilateral   sequential compression devices off  Intervention: Prevent Infection  Recent Flowsheet Documentation  Taken 7/13/2024 0200 by Ady Mosley RN  Infection Prevention: environmental surveillance performed  Taken 7/13/2024 0000 by Ady Mosley RN  Infection Prevention: environmental surveillance performed  Taken 7/12/2024 2300 by  Ady Mosley RN  Infection Prevention: environmental surveillance performed  Taken 7/12/2024 2200 by Ayd Mosley RN  Infection Prevention: environmental surveillance performed  Taken 7/12/2024 2100 by Ady Mosley RN  Infection Prevention: environmental surveillance performed  Taken 7/12/2024 2030 by Ady Mosley RN  Infection Prevention: environmental surveillance performed  Goal: Optimal Comfort and Wellbeing  Outcome: Ongoing, Not Progressing  Intervention: Provide Person-Centered Care  Recent Flowsheet Documentation  Taken 7/12/2024 2030 by Ady Mosley RN  Trust Relationship/Rapport:   care explained   choices provided   reassurance provided   questions encouraged   questions answered   thoughts/feelings acknowledged  Goal: Readiness for Transition of Care  Outcome: Ongoing, Not Progressing     Problem: Fluid Imbalance (Pneumonia)  Goal: Fluid Balance  Outcome: Ongoing, Not Progressing     Problem: Infection (Pneumonia)  Goal: Resolution of Infection Signs and Symptoms  Outcome: Ongoing, Not Progressing     Problem: Respiratory Compromise (Pneumonia)  Goal: Effective Oxygenation and Ventilation  Outcome: Ongoing, Not Progressing  Intervention: Promote Airway Secretion Clearance  Recent Flowsheet Documentation  Taken 7/12/2024 2030 by Ady Mosley RN  Cough And Deep Breathing: done independently per patient   Goal Outcome Evaluation:  Plan of Care Reviewed With: patient        Progress: improving

## 2024-07-13 NOTE — PROGRESS NOTES
"NEPHROLOGY PROGRESS NOTE------[facility]    [facility]  [phone]  Rolando Miller MD      Patient Care Team:  Anny Garcia MD as PCP - General (Internal Medicine)  Sergio Ordonez MD as Consulting Physician (Nephrology)      Provider:  Rolando Miller MD  Patient Name: Vianey Reeves  :  1945    SUBJECTIVE:  Follow-up CKD  No chest pain, breathing better.  Still has some cough    Medication:  acetylcysteine, 3 mL, Nebulization, BID - RT  amLODIPine, 10 mg, Oral, Daily  aspirin, 81 mg, Oral, Daily  budesonide-formoterol, 2 puff, Inhalation, BID - RT  carvedilol, 6.25 mg, Oral, BID With Meals  doxycycline, 100 mg, Intravenous, Q12H  enoxaparin, 40 mg, Subcutaneous, Q24H  estradiol, 1 g, Vaginal, Once per day on   ferrous sulfate, 324 mg, Oral, Daily With Breakfast  furosemide, 40 mg, Oral, Daily  gabapentin, 100 mg, Oral, Nightly  guaiFENesin, 1,200 mg, Oral, Q12H  hydrALAZINE, 25 mg, Oral, BID  insulin glargine, 10 Units, Subcutaneous, Nightly  ipratropium-albuterol, 3 mL, Nebulization, 4x Daily - RT  lansoprazole, 30 mg, Oral, Q AM  linagliptin, 5 mg, Oral, Daily  methocarbamol, 500 mg, Oral, 4x Daily  methylPREDNISolone sodium succinate, 20 mg, Intravenous, Q12H  polyethylene glycol, 17 g, Oral, Daily  sertraline, 50 mg, Oral, Daily  sodium chloride, 10 mL, Intravenous, Q12H  terazosin, 2 mg, Oral, Nightly           OBJECTIVE    Vital Sign Min/Max for last 24 hours  Temp  Min: 97.4 °F (36.3 °C)  Max: 98.2 °F (36.8 °C)   BP  Min: 115/64  Max: 148/75   Pulse  Min: 67  Max: 98   Resp  Min: 10  Max: 20   SpO2  Min: 90 %  Max: 100 %   No data recorded   No data recorded     Flowsheet Rows      Flowsheet Row First Filed Value   Admission Height 165.1 cm (65\") Documented at 2024 2104   Admission Weight --            I/O this shift:  In: 240 [P.O.:240]  Out: -   I/O last 3 completed shifts:  In: 640 [P.O.:540; IV " "Piggyback:100]  Out: -     Physical Exam:  General Appearance: alert, appears stated age and cooperative  Head: normocephalic, without obvious abnormality and atraumatic  Eyes: conjunctivae and sclerae normal and no icterus  Neck: supple and no JVD  Lungs: Scattered rhonchi  Heart: regular rhythm & normal rate and normal S1, S2  Chest: Wall no abnormalities observed  Abdomen: normal bowel sounds and soft, nontender  Extremities: moves extremities well, no edema, no cyanosis and no redness  Skin: no bleeding, bruising or rash, turgor normal, color normal and no lesions noted  Neurologic: Alert, and oriented. No focal deficits    Labs:    WBC WBC   Date Value Ref Range Status   07/13/2024 6.83 3.40 - 10.80 10*3/mm3 Final   07/12/2024 7.16 3.40 - 10.80 10*3/mm3 Final   07/11/2024 7.69 3.40 - 10.80 10*3/mm3 Final      HGB Hemoglobin   Date Value Ref Range Status   07/13/2024 12.5 12.0 - 15.9 g/dL Final   07/12/2024 13.2 12.0 - 15.9 g/dL Final   07/11/2024 13.5 12.0 - 15.9 g/dL Final      HCT Hematocrit   Date Value Ref Range Status   07/13/2024 40.0 34.0 - 46.6 % Final   07/12/2024 42.2 34.0 - 46.6 % Final   07/11/2024 42.6 34.0 - 46.6 % Final      Platelets No results found for: \"LABPLAT\"   MCV MCV   Date Value Ref Range Status   07/13/2024 98.0 (H) 79.0 - 97.0 fL Final   07/12/2024 99.3 (H) 79.0 - 97.0 fL Final   07/11/2024 98.6 (H) 79.0 - 97.0 fL Final          Sodium Sodium   Date Value Ref Range Status   07/13/2024 139 136 - 145 mmol/L Final   07/12/2024 143 136 - 145 mmol/L Final   07/11/2024 145 136 - 145 mmol/L Final      Potassium Potassium   Date Value Ref Range Status   07/13/2024 4.0 3.5 - 5.2 mmol/L Final     Comment:     Specimen hemolyzed.  Result may be falsely elevated.   07/12/2024 4.3 3.5 - 5.2 mmol/L Final     Comment:     Specimen hemolyzed.  Result may be falsely elevated.   07/11/2024 4.3 3.5 - 5.2 mmol/L Final     Comment:     Specimen hemolyzed.  Result may be falsely elevated.      Chloride " "Chloride   Date Value Ref Range Status   07/13/2024 110 (H) 98 - 107 mmol/L Final   07/12/2024 110 (H) 98 - 107 mmol/L Final   07/11/2024 109 (H) 98 - 107 mmol/L Final      CO2 CO2   Date Value Ref Range Status   07/13/2024 21.9 (L) 22.0 - 29.0 mmol/L Final   07/12/2024 23.5 22.0 - 29.0 mmol/L Final   07/11/2024 24.8 22.0 - 29.0 mmol/L Final      BUN BUN   Date Value Ref Range Status   07/13/2024 25 (H) 8 - 23 mg/dL Final   07/12/2024 20 8 - 23 mg/dL Final   07/11/2024 25 (H) 8 - 23 mg/dL Final      Creatinine Creatinine   Date Value Ref Range Status   07/13/2024 1.47 (H) 0.57 - 1.00 mg/dL Final   07/12/2024 1.41 (H) 0.57 - 1.00 mg/dL Final   07/11/2024 1.57 (H) 0.57 - 1.00 mg/dL Final      Calcium Calcium   Date Value Ref Range Status   07/13/2024 7.8 (L) 8.6 - 10.5 mg/dL Final   07/12/2024 9.1 8.6 - 10.5 mg/dL Final   07/11/2024 9.7 8.6 - 10.5 mg/dL Final      PO4 No components found for: \"PO4\"   Albumin Albumin   Date Value Ref Range Status   07/13/2024 3.1 (L) 3.5 - 5.2 g/dL Final      Magnesium Magnesium   Date Value Ref Range Status   07/13/2024 2.1 1.6 - 2.4 mg/dL Final      Uric Acid No components found for: \"URIC ACID\"     Imaging Results (Last 72 Hours)       Procedure Component Value Units Date/Time    XR Foot 3+ View Right [[id number]] Collected: 07/12/24 2048     Updated: 07/12/24 2052    Narrative:      XR FOOT 3+ VW RIGHT    Date of Exam: 7/12/2024 8:32 PM EDT    Indication: Concern for fractured 2nd toe    Comparison: 8/29/2020    Findings:  Subtle minimally displaced fracture through the distal aspect of the mid phalanx of the second digit with extension into the DIP joint.  The remainder the visualized osseous structures are intact.  Diffuse osteopenia.  Mild degenerative changes are noted at the midfoot and forefoot.  No focal soft tissue abnormality.  No suspicious erosive changes      Impression:      Impression:  Minimally displaced fracture through the distal aspect of the mid phalanx of the " second digit with extension into the DIP joint.      Electronically Signed: Stevo Barry DO    7/12/2024 8:50 PM EDT    Workstation ID: [id number]    CT Chest Without Contrast Diagnostic [[id number]] Collected: 07/11/24 2334     Updated: 07/11/24 2339    Narrative:      CT CHEST WO CONTRAST DIAGNOSTIC    Date of Exam: 7/11/2024 11:15 PM EDT    Indication: Failed outpatient PNA treatment x3.    Comparison: 3/5/2024 chest radiograph, MRI thoracic spine 6/10/2024 and chest radiograph 1/15/2024.    Technique: Axial CT images were obtained of the chest without contrast administration.  Sagittal and coronal reconstructions were performed.  Automated exposure control and iterative reconstruction methods were used.      Findings:  The left thyroid lobe is enlarged and heterogeneous compatible with multinodular goiter. The trachea and the esophagus appear within normal limits. Heart size is normal. There are coronary stents and calcifications. No pericardial effusion. No   mediastinal lymphadenopathy. There are a few calcified mediastinal granulomas. There is mild aortic atherosclerosis. No aneurysm. Main pulmonary artery is normal diameter.    There is no pneumothorax, pleural effusion or focal airspace consolidation. There is atelectasis in the medial segment of the right middle lobe without obvious airway obstruction. There is mild linear scarring versus atelectasis in the left lung base and   lingula. Airways are patent. No suspicious lung nodules. There are calcified lingular and right lower lobe granulomas. No significant pleural disease.    There are no acute findings in the superficial soft tissues. No acute findings in the limited images of the upper abdomen. The patient is status post cholecystectomy and right nephrectomy. No acute osseous abnormalities or destructive bone lesions. There   is a chronic T6 compression fracture status post cement augmentation. There is a minimal segmentation anomaly at the T2-T3  level with incomplete anterior disc formation. There are old healed left-sided rib fractures. These do appear new from January 2024.      Impression:      Impression:  1.No acute cardiopulmonary abnormality.  2.There is atelectasis in the medial segment of the right middle lobe without obvious airway obstruction.  3.Coronary artery disease.  4.Old healed left-sided rib fractures.  5.Chronic T6 compression fracture status post cement augmentation.        Electronically Signed: Lyle Good MD    7/11/2024 11:37 PM EDT    Workstation ID: [id number]            Results for orders placed during the hospital encounter of 07/11/24    XR Foot 3+ View Right    Narrative  XR FOOT 3+ VW RIGHT    Date of Exam: 7/12/2024 8:32 PM EDT    Indication: Concern for fractured 2nd toe    Comparison: 8/29/2020    Findings:  Subtle minimally displaced fracture through the distal aspect of the mid phalanx of the second digit with extension into the DIP joint.  The remainder the visualized osseous structures are intact.  Diffuse osteopenia.  Mild degenerative changes are noted at the midfoot and forefoot.  No focal soft tissue abnormality.  No suspicious erosive changes    Impression  Impression:  Minimally displaced fracture through the distal aspect of the mid phalanx of the second digit with extension into the DIP joint.      Electronically Signed: Stevo Barry DO  7/12/2024 8:50 PM EDT  Workstation ID: [id number]      Results for orders placed in visit on 05/22/24    XR Shoulder 2+ View Left    Narrative  XR SHOULDER 2+ VW LEFT    Date of Exam: 5/22/2024 1:19 PM EDT    Indication: left humerus fx after a fall on 3/4/24    Comparison: 4/24/2024, 3/13/2024.    Findings:  Healing nondisplaced fracture of the greater tuberosity present. Sclerotic changes are seen along the fracture margin.. The acromioclavicular and glenohumeral joints appear intact. No erosions. Acromiohumeral and coracoclavicular distances are  well-maintained. Mild  to moderate acromioclavicular and glenohumeral osteoarthritic changes are present. The adjacent lung and ribs appear intact.    Impression  Impression:  Healing nondisplaced fracture of the greater tuberosity, stable in alignment as compared to the previous study.      Electronically Signed: Marisabel Roberts MD  5/23/2024 9:24 AM EDT  Workstation ID: [id number]      Results for orders placed in visit on 04/24/24    XR Shoulder 2+ View Left    Narrative  XR SHOULDER 2+ VW LEFT    Date of Exam: 4/24/2024 12:56 PM EDT    Indication: left shoulder fx after a fall on 3/5/24    Comparison: 3/13/2024 MRI    Findings:  The known nondisplaced fracture of the greater tuberosity is not well seen on this exam. There is no evidence of new or additional fracture. Normal glenohumeral and acromioclavicular joint alignment. Mild glenohumeral and moderate acromioclavicular joint  arthritis. Partially visualized thoracic vertebroplasty. Soft tissues are unremarkable.    Impression  Impression:  Known nondisplaced greater tuberosity fracture not well seen on this exam. No evidence for acute abnormality of the left shoulder. Chronic findings as above.      Electronically Signed: Lyle Calderón MD  4/26/2024 11:58 AM EDT  Workstation ID: [id number]      Results for orders placed during the hospital encounter of 08/22/23    Doppler Ankle Brachial Index Single Level CAR    Interpretation Summary    Right Conclusion: The right IKE is normal. Normal digital pressures.    Left Conclusion: The left IKE is normal. Mild digital ischemia.        ASSESSMENT / PLAN      Recurrent pneumonia    CKD stage III-CKD due to hypertensive nephrosclerosis and or diabetic glomerulosclerosis and reduced renal mass.  Hypertension  Diabetes type 2  Recurrent pneumonia-on IV antibiotics  History of coronary artery disease  COPD  History of ureteral cancer, status post right nephrectomy  Dyspnea-likely due to medical pneumonia.  There may be component of excess volume.  She  is not on a diuretic.  Her BNP was elevated.  Added low-dose diuretic       Creatinine stable  Continue Lasix  Avoid hypotension/ACE inhibitors or ARB's  Monitor renal function fluid status electrolytes        Rolando Miller MD  [facility]  [phone]  07/13/24  18:30 EDT

## 2024-07-14 ENCOUNTER — ANESTHESIA (OUTPATIENT)
Dept: GASTROENTEROLOGY | Facility: HOSPITAL | Age: 79
End: 2024-07-14
Payer: MEDICARE

## 2024-07-14 ENCOUNTER — ANESTHESIA EVENT (OUTPATIENT)
Dept: GASTROENTEROLOGY | Facility: HOSPITAL | Age: 79
End: 2024-07-14
Payer: MEDICARE

## 2024-07-14 PROBLEM — T17.800A MULTIPLE TRACHEOBRONCHIAL MUCUS PLUGS: Status: ACTIVE | Noted: 2024-07-11

## 2024-07-14 LAB
ALBUMIN SERPL-MCNC: 3.7 G/DL (ref 3.5–5.2)
ANION GAP SERPL CALCULATED.3IONS-SCNC: 8.8 MMOL/L (ref 5–15)
B PARAPERT DNA SPEC QL NAA+PROBE: NOT DETECTED
B PERT DNA SPEC QL NAA+PROBE: NOT DETECTED
BUN SERPL-MCNC: 33 MG/DL (ref 8–23)
BUN/CREAT SERPL: 20.8 (ref 7–25)
C PNEUM DNA NPH QL NAA+NON-PROBE: NOT DETECTED
CALCIUM SPEC-SCNC: 9.4 MG/DL (ref 8.6–10.5)
CHLORIDE SERPL-SCNC: 107 MMOL/L (ref 98–107)
CO2 SERPL-SCNC: 25.2 MMOL/L (ref 22–29)
CREAT SERPL-MCNC: 1.59 MG/DL (ref 0.57–1)
EGFRCR SERPLBLD CKD-EPI 2021: 32.7 ML/MIN/1.73
FLUAV SUBTYP SPEC NAA+PROBE: NOT DETECTED
FLUBV RNA ISLT QL NAA+PROBE: NOT DETECTED
GLUCOSE BLDC GLUCOMTR-MCNC: 134 MG/DL (ref 70–105)
GLUCOSE BLDC GLUCOMTR-MCNC: 148 MG/DL (ref 70–105)
GLUCOSE BLDC GLUCOMTR-MCNC: 202 MG/DL (ref 70–105)
GLUCOSE BLDC GLUCOMTR-MCNC: 259 MG/DL (ref 70–105)
GLUCOSE BLDC GLUCOMTR-MCNC: 293 MG/DL (ref 70–105)
GLUCOSE SERPL-MCNC: 239 MG/DL (ref 65–99)
HADV DNA SPEC NAA+PROBE: NOT DETECTED
HCOV 229E RNA SPEC QL NAA+PROBE: NOT DETECTED
HCOV HKU1 RNA SPEC QL NAA+PROBE: NOT DETECTED
HCOV NL63 RNA SPEC QL NAA+PROBE: NOT DETECTED
HCOV OC43 RNA SPEC QL NAA+PROBE: NOT DETECTED
HMPV RNA NPH QL NAA+NON-PROBE: NOT DETECTED
HPIV1 RNA ISLT QL NAA+PROBE: NOT DETECTED
HPIV2 RNA SPEC QL NAA+PROBE: NOT DETECTED
HPIV3 RNA NPH QL NAA+PROBE: NOT DETECTED
HPIV4 P GENE NPH QL NAA+PROBE: NOT DETECTED
M PNEUMO IGG SER IA-ACNC: NOT DETECTED
PHOSPHATE SERPL-MCNC: 2.6 MG/DL (ref 2.5–4.5)
POTASSIUM SERPL-SCNC: 4.1 MMOL/L (ref 3.5–5.2)
RHINOVIRUS RNA SPEC NAA+PROBE: DETECTED
RSV RNA NPH QL NAA+NON-PROBE: NOT DETECTED
SARS-COV-2 RNA NPH QL NAA+NON-PROBE: NOT DETECTED
SODIUM SERPL-SCNC: 141 MMOL/L (ref 136–145)

## 2024-07-14 PROCEDURE — 94799 UNLISTED PULMONARY SVC/PX: CPT

## 2024-07-14 PROCEDURE — 25010000002 PROPOFOL 200 MG/20ML EMULSION: Performed by: NURSE ANESTHETIST, CERTIFIED REGISTERED

## 2024-07-14 PROCEDURE — 88108 CYTOPATH CONCENTRATE TECH: CPT | Performed by: INTERNAL MEDICINE

## 2024-07-14 PROCEDURE — 82948 REAGENT STRIP/BLOOD GLUCOSE: CPT

## 2024-07-14 PROCEDURE — 87116 MYCOBACTERIA CULTURE: CPT | Performed by: INTERNAL MEDICINE

## 2024-07-14 PROCEDURE — 87102 FUNGUS ISOLATION CULTURE: CPT | Performed by: INTERNAL MEDICINE

## 2024-07-14 PROCEDURE — 94761 N-INVAS EAR/PLS OXIMETRY MLT: CPT

## 2024-07-14 PROCEDURE — 0B9D8ZX DRAINAGE OF RIGHT MIDDLE LUNG LOBE, VIA NATURAL OR ARTIFICIAL OPENING ENDOSCOPIC, DIAGNOSTIC: ICD-10-PCS | Performed by: INTERNAL MEDICINE

## 2024-07-14 PROCEDURE — 82948 REAGENT STRIP/BLOOD GLUCOSE: CPT | Performed by: INTERNAL MEDICINE

## 2024-07-14 PROCEDURE — 87206 SMEAR FLUORESCENT/ACID STAI: CPT | Performed by: INTERNAL MEDICINE

## 2024-07-14 PROCEDURE — 63710000001 INSULIN GLARGINE PER 5 UNITS: Performed by: INTERNAL MEDICINE

## 2024-07-14 PROCEDURE — 0202U NFCT DS 22 TRGT SARS-COV-2: CPT | Performed by: INTERNAL MEDICINE

## 2024-07-14 PROCEDURE — 80069 RENAL FUNCTION PANEL: CPT | Performed by: INTERNAL MEDICINE

## 2024-07-14 PROCEDURE — 25010000002 ENOXAPARIN PER 10 MG: Performed by: INTERNAL MEDICINE

## 2024-07-14 PROCEDURE — 87205 SMEAR GRAM STAIN: CPT | Performed by: INTERNAL MEDICINE

## 2024-07-14 PROCEDURE — 94664 DEMO&/EVAL PT USE INHALER: CPT

## 2024-07-14 PROCEDURE — 25810000003 SODIUM CHLORIDE 0.9 % SOLUTION: Performed by: INTERNAL MEDICINE

## 2024-07-14 PROCEDURE — 25010000002 METHYLPREDNISOLONE PER 40 MG: Performed by: INTERNAL MEDICINE

## 2024-07-14 PROCEDURE — 82948 REAGENT STRIP/BLOOD GLUCOSE: CPT | Performed by: FAMILY MEDICINE

## 2024-07-14 PROCEDURE — 25010000002 GLYCOPYRROLATE 0.2 MG/ML SOLUTION: Performed by: NURSE ANESTHETIST, CERTIFIED REGISTERED

## 2024-07-14 PROCEDURE — 87798 DETECT AGENT NOS DNA AMP: CPT | Performed by: INTERNAL MEDICINE

## 2024-07-14 PROCEDURE — 87071 CULTURE AEROBIC QUANT OTHER: CPT | Performed by: INTERNAL MEDICINE

## 2024-07-14 RX ORDER — LIDOCAINE HYDROCHLORIDE 20 MG/ML
JELLY TOPICAL AS NEEDED
Status: DISCONTINUED | OUTPATIENT
Start: 2024-07-14 | End: 2024-07-14 | Stop reason: HOSPADM

## 2024-07-14 RX ORDER — DOXYCYCLINE 100 MG/1
100 CAPSULE ORAL EVERY 12 HOURS SCHEDULED
Status: COMPLETED | OUTPATIENT
Start: 2024-07-14 | End: 2024-07-16

## 2024-07-14 RX ORDER — LIDOCAINE HYDROCHLORIDE 10 MG/ML
1 INJECTION, SOLUTION INFILTRATION; PERINEURAL ONCE
Status: COMPLETED | OUTPATIENT
Start: 2024-07-14 | End: 2024-07-14

## 2024-07-14 RX ORDER — LIDOCAINE HYDROCHLORIDE 20 MG/ML
INJECTION, SOLUTION INFILTRATION; PERINEURAL AS NEEDED
Status: DISCONTINUED | OUTPATIENT
Start: 2024-07-14 | End: 2024-07-14 | Stop reason: HOSPADM

## 2024-07-14 RX ORDER — PROPOFOL 10 MG/ML
INJECTION, EMULSION INTRAVENOUS AS NEEDED
Status: DISCONTINUED | OUTPATIENT
Start: 2024-07-14 | End: 2024-07-14 | Stop reason: SURG

## 2024-07-14 RX ORDER — GLYCOPYRROLATE 0.2 MG/ML
INJECTION INTRAMUSCULAR; INTRAVENOUS AS NEEDED
Status: DISCONTINUED | OUTPATIENT
Start: 2024-07-14 | End: 2024-07-14 | Stop reason: SURG

## 2024-07-14 RX ORDER — LIDOCAINE HYDROCHLORIDE 20 MG/ML
INJECTION, SOLUTION EPIDURAL; INFILTRATION; INTRACAUDAL; PERINEURAL AS NEEDED
Status: DISCONTINUED | OUTPATIENT
Start: 2024-07-14 | End: 2024-07-14 | Stop reason: SURG

## 2024-07-14 RX ORDER — LIDOCAINE HYDROCHLORIDE 10 MG/ML
INJECTION, SOLUTION EPIDURAL; INFILTRATION; INTRACAUDAL; PERINEURAL
Status: COMPLETED
Start: 2024-07-14 | End: 2024-07-14

## 2024-07-14 RX ORDER — SODIUM CHLORIDE 9 MG/ML
10 INJECTION, SOLUTION INTRAVENOUS ONCE
Status: COMPLETED | OUTPATIENT
Start: 2024-07-14 | End: 2024-07-14

## 2024-07-14 RX ADMIN — DOXYCYCLINE 100 MG: 100 INJECTION, POWDER, LYOPHILIZED, FOR SOLUTION INTRAVENOUS at 14:17

## 2024-07-14 RX ADMIN — GABAPENTIN 100 MG: 100 CAPSULE ORAL at 20:37

## 2024-07-14 RX ADMIN — SODIUM CHLORIDE 100 ML/HR: 9 INJECTION, SOLUTION INTRAVENOUS at 10:42

## 2024-07-14 RX ADMIN — ENOXAPARIN SODIUM 40 MG: 100 INJECTION SUBCUTANEOUS at 17:28

## 2024-07-14 RX ADMIN — METHOCARBAMOL 500 MG: 500 TABLET ORAL at 13:42

## 2024-07-14 RX ADMIN — TERAZOSIN HYDROCHLORIDE 2 MG: 2 CAPSULE ORAL at 23:03

## 2024-07-14 RX ADMIN — METHOCARBAMOL 500 MG: 500 TABLET ORAL at 23:03

## 2024-07-14 RX ADMIN — GUAIFENESIN 1200 MG: 600 TABLET, EXTENDED RELEASE ORAL at 13:39

## 2024-07-14 RX ADMIN — FUROSEMIDE 40 MG: 40 TABLET ORAL at 13:43

## 2024-07-14 RX ADMIN — INSULIN GLARGINE 10 UNITS: 100 INJECTION, SOLUTION SUBCUTANEOUS at 20:37

## 2024-07-14 RX ADMIN — POLYETHYLENE GLYCOL 3350 17 G: 17 POWDER, FOR SOLUTION ORAL at 00:34

## 2024-07-14 RX ADMIN — CARVEDILOL 6.25 MG: 6.25 TABLET, FILM COATED ORAL at 13:40

## 2024-07-14 RX ADMIN — ACETYLCYSTEINE 3 ML: 200 SOLUTION ORAL; RESPIRATORY (INHALATION) at 07:12

## 2024-07-14 RX ADMIN — LIDOCAINE HYDROCHLORIDE 80 MG: 20 INJECTION, SOLUTION EPIDURAL; INFILTRATION; INTRACAUDAL; PERINEURAL at 10:46

## 2024-07-14 RX ADMIN — ASPIRIN 81 MG: 81 TABLET, COATED ORAL at 13:38

## 2024-07-14 RX ADMIN — LINAGLIPTIN 5 MG: 5 TABLET, FILM COATED ORAL at 13:43

## 2024-07-14 RX ADMIN — Medication 10 ML: at 13:44

## 2024-07-14 RX ADMIN — PROPOFOL 30 MG: 10 INJECTION, EMULSION INTRAVENOUS at 10:49

## 2024-07-14 RX ADMIN — IPRATROPIUM BROMIDE AND ALBUTEROL SULFATE 3 ML: .5; 3 SOLUTION RESPIRATORY (INHALATION) at 14:37

## 2024-07-14 RX ADMIN — GLYCOPYRROLATE 0.2 MG: 0.2 INJECTION, SOLUTION INTRAMUSCULAR; INTRAVENOUS at 10:43

## 2024-07-14 RX ADMIN — METHOCARBAMOL 500 MG: 500 TABLET ORAL at 17:28

## 2024-07-14 RX ADMIN — DICLOFENAC SODIUM 4 G: 10 GEL TOPICAL at 05:48

## 2024-07-14 RX ADMIN — HYDRALAZINE HYDROCHLORIDE 25 MG: 25 TABLET ORAL at 13:39

## 2024-07-14 RX ADMIN — AMLODIPINE BESYLATE 10 MG: 5 TABLET ORAL at 13:41

## 2024-07-14 RX ADMIN — ACETYLCYSTEINE 3 ML: 200 SOLUTION ORAL; RESPIRATORY (INHALATION) at 18:20

## 2024-07-14 RX ADMIN — IPRATROPIUM BROMIDE AND ALBUTEROL SULFATE 3 ML: .5; 3 SOLUTION RESPIRATORY (INHALATION) at 07:06

## 2024-07-14 RX ADMIN — DOXYCYCLINE 100 MG: 100 INJECTION, POWDER, LYOPHILIZED, FOR SOLUTION INTRAVENOUS at 00:26

## 2024-07-14 RX ADMIN — SODIUM CHLORIDE 10 ML/HR: 9 INJECTION, SOLUTION INTRAVENOUS at 10:08

## 2024-07-14 RX ADMIN — PROPOFOL 70 MG: 10 INJECTION, EMULSION INTRAVENOUS at 10:46

## 2024-07-14 RX ADMIN — METHYLPREDNISOLONE SODIUM SUCCINATE 20 MG: 40 INJECTION, POWDER, FOR SOLUTION INTRAMUSCULAR; INTRAVENOUS at 13:00

## 2024-07-14 RX ADMIN — METHYLPREDNISOLONE SODIUM SUCCINATE 20 MG: 40 INJECTION, POWDER, FOR SOLUTION INTRAMUSCULAR; INTRAVENOUS at 20:36

## 2024-07-14 RX ADMIN — HYDRALAZINE HYDROCHLORIDE 25 MG: 25 TABLET ORAL at 20:37

## 2024-07-14 RX ADMIN — LIDOCAINE HYDROCHLORIDE 1 ML: 10 INJECTION, SOLUTION INFILTRATION; PERINEURAL at 02:42

## 2024-07-14 RX ADMIN — BUDESONIDE AND FORMOTEROL FUMARATE DIHYDRATE 2 PUFF: 160; 4.5 AEROSOL RESPIRATORY (INHALATION) at 18:25

## 2024-07-14 RX ADMIN — CARVEDILOL 6.25 MG: 6.25 TABLET, FILM COATED ORAL at 17:28

## 2024-07-14 RX ADMIN — BUDESONIDE AND FORMOTEROL FUMARATE DIHYDRATE 2 PUFF: 160; 4.5 AEROSOL RESPIRATORY (INHALATION) at 07:18

## 2024-07-14 RX ADMIN — GUAIFENESIN 1200 MG: 600 TABLET, EXTENDED RELEASE ORAL at 20:37

## 2024-07-14 RX ADMIN — POLYETHYLENE GLYCOL 3350 17 G: 17 POWDER, FOR SOLUTION ORAL at 13:44

## 2024-07-14 RX ADMIN — DOXYCYCLINE 100 MG: 100 CAPSULE ORAL at 20:38

## 2024-07-14 RX ADMIN — IPRATROPIUM BROMIDE AND ALBUTEROL SULFATE 3 ML: .5; 3 SOLUTION RESPIRATORY (INHALATION) at 18:20

## 2024-07-14 RX ADMIN — FERROUS SULFATE TAB EC 324 MG (65 MG FE EQUIVALENT) 324 MG: 324 (65 FE) TABLET DELAYED RESPONSE at 13:45

## 2024-07-14 RX ADMIN — LIDOCAINE HYDROCHLORIDE 1 ML: 10 INJECTION, SOLUTION EPIDURAL; INFILTRATION; INTRACAUDAL; PERINEURAL at 02:42

## 2024-07-14 RX ADMIN — SERTRALINE 50 MG: 50 TABLET, FILM COATED ORAL at 13:43

## 2024-07-14 RX ADMIN — Medication 10 ML: at 20:39

## 2024-07-14 NOTE — ANESTHESIA PREPROCEDURE EVALUATION
Anesthesia Evaluation     Patient summary reviewed and Nursing notes reviewed   NPO Solid Status: > 8 hours  NPO Liquid Status: > 8 hours           Airway   Mallampati: II  TM distance: >3 FB  Neck ROM: full  No difficulty expected  Dental    (+) poor dentition    Pulmonary    (+) pneumonia improving , COPD moderate,home oxygen, shortness of breath, decreased breath sounds, wheezes  Cardiovascular - normal exam    (+) hypertension well controlled, CAD, PVD, hyperlipidemia      Neuro/Psych  (+) dizziness/light headedness, numbness, psychiatric history  GI/Hepatic/Renal/Endo    (+) obesity, morbid obesity, hiatal hernia, GERD, renal disease-, diabetes mellitus    Musculoskeletal     Abdominal  - normal exam   Substance History      OB/GYN          Other   arthritis,   history of cancer    ROS/Med Hx Other: 4L oxygen at night, room air during the day and with activity.               Anesthesia Plan    ASA 3 - emergent     general and MAC   total IV anesthesia  intravenous induction     Anesthetic plan, risks, benefits, and alternatives have been provided, discussed and informed consent has been obtained with: patient.  Pre-procedure education provided    CODE STATUS:    Level Of Support Discussed With: Patient  Code Status (Patient has no pulse and is not breathing): CPR (Attempt to Resuscitate)  Medical Interventions (Patient has pulse or is breathing): Full Support

## 2024-07-14 NOTE — OP NOTE
Bronchoscopy Procedure Note    Procedure:  Bronchoscopy, Diagnostic  Bronchoscopy, Therapeutic  Bronchoalveolar lavage, BAL    Pre-Operative Diagnosis: Atelectasis, recurrent pneumonia multiple mucous plugs    Post-Operative Diagnosis: Same and tracheobronchomalacia    Indication: Recurrent right middle lobe pneumonia, atelectasis and mucous plugs, patient unable to clear her secretions    Anesthesia: Monitored Anesthesia Care (MAC)    Procedure Details: Patient was consented for the procedure with all risk and benefit of the procedure explained in detail.  Patient was given the opportunity to ask questions and all concerns were answered.    Timeout was done in the standard manner   the bronchoscope was inserted into the main airway via the oropharynx. An anatomical survey was done of the main airways and the subsegmental bronchus of the 5 lobes.  The findings are consistent of tracheobronchomalacia throughout the airway tree, thick clear mucous plugs from right middle lobe and right lower lobe.  A therapeutic lavage was performed using aliquots of normal saline instilled into the airways then aspirated back until clear.    Diagnostic BAL was performed right middle lobe by instilling 90 mL sterile normal saline and return of 45 mL.    Estimated Blood Loss: None           Specimens: BAL from right middle lobe                Complications:  None; patient tolerated the procedure well.           Disposition: PACU - hemodynamically stable.    Post op plan:  Resume p.o. after 2 hours  Follow-up results    Patient tolerated the procedure well.

## 2024-07-14 NOTE — PROGRESS NOTES
LOS: 3 days   Patient Care Team:  Anny Garcia MD as PCP - General (Internal Medicine)  Sergio Ordonez MD as Consulting Physician (Nephrology)    Subjective     Interval History: Stable overnight    Patient Complaints: Status post bronchoscopy.  Complains of toe pain.  Daughter at bedside    History taken from: patient    Review of Systems   Constitutional:  Positive for activity change, appetite change and fatigue. Negative for fever.   HENT:  Positive for congestion.    Respiratory:  Positive for cough, chest tightness and shortness of breath.    Cardiovascular: Negative.    Gastrointestinal: Negative.    Musculoskeletal:  Positive for arthralgias.   Neurological:  Positive for weakness.           Objective     Vital Signs  Temp:  [97.7 °F (36.5 °C)-97.9 °F (36.6 °C)] 97.9 °F (36.6 °C)  Heart Rate:  [] 97  Resp:  [10-20] 20  BP: (115-164)/(64-89) 164/88    Physical Exam:     General Appearance:    Alert, cooperative, in no acute distress, ill but not toxic   Head:    Normocephalic, without obvious abnormality, atraumatic   Eyes:            Lids and lashes normal, conjunctivae and sclerae normal, no   icterus, no pallor, corneas clear, PERRLA   Ears:    Ears appear intact with no abnormalities noted   Throat:   No oral lesions, no thrush, oral mucosa moist   Neck:   No adenopathy, supple, trachea midline, no thyromegaly, no   carotid bruit, no JVD   Lungs:     rhonchi    Heart:    Regular rhythm and normal rate, normal S1 and S2, no            murmur, no gallop, no rub, no click   Chest Wall:    No abnormalities observed   Abdomen:     Normal bowel sounds, no masses, no organomegaly, soft        non-tender, non-distended, no guarding, no rebound                tenderness   Extremities:   Moves all extremities well, no edema, no cyanosis, no             redness   Pulses:   Pulses palpable and equal bilaterally   Skin:   No bleeding, bruising or rash   Lymph nodes:   No palpable adenopathy    Neurologic:   Cranial nerves 2 - 12 grossly intact, sensation intact, DTR       present and equal bilaterally        Results Review:    Lab Results (last 24 hours)       Procedure Component Value Units Date/Time    POC Glucose 4x Daily Before Meals & at Bedtime [[id number]]  (Abnormal) Collected: 07/14/24 0724    Specimen: Blood Updated: 07/14/24 0728     Glucose 202 mg/dL      Comment: Serial Number: [id number]:  [id number]       Renal Function Panel [[id number]]  (Abnormal) Collected: 07/14/24 0553    Specimen: Blood Updated: 07/14/24 0637     Glucose 239 mg/dL      BUN 33 mg/dL      Creatinine 1.59 mg/dL      Sodium 141 mmol/L      Potassium 4.1 mmol/L      Chloride 107 mmol/L      CO2 25.2 mmol/L      Calcium 9.4 mg/dL      Albumin 3.7 g/dL      Phosphorus 2.6 mg/dL      Anion Gap 8.8 mmol/L      BUN/Creatinine Ratio 20.8     eGFR 32.7 mL/min/1.73     Narrative:      GFR Normal >60  Chronic Kidney Disease <60  Kidney Failure <15    The GFR formula is only valid for adults with stable renal function between ages 18 and 70.    Blood Culture - Blood, Arm, Left [[id number]]  (Normal) Collected: 07/12/24 0010    Specimen: Blood from Arm, Left Updated: 07/14/24 0100     Blood Culture No growth at 2 days    Blood Culture - Blood, Arm, Right [[id number]]  (Normal) Collected: 07/12/24 0016    Specimen: Blood from Arm, Right Updated: 07/14/24 0045     Blood Culture No growth at 2 days    POC Glucose Once [[id number]]  (Abnormal) Collected: 07/13/24 2024    Specimen: Blood Updated: 07/13/24 2025     Glucose 351 mg/dL      Comment: Serial Number: [id number]:  [id number]       Renal Function Panel [[id number]]  (Abnormal) Collected: 07/13/24 1130    Specimen: Blood from Arm, Left Updated: 07/13/24 1249     Glucose 205 mg/dL      BUN 25 mg/dL      Creatinine 1.47 mg/dL      Sodium 139 mmol/L      Potassium 4.0 mmol/L      Comment: Specimen hemolyzed.  Result may be falsely elevated.        Chloride 110 mmol/L      CO2  21.9 mmol/L      Calcium 7.8 mg/dL      Albumin 3.1 g/dL      Phosphorus 2.3 mg/dL      Anion Gap 7.1 mmol/L      BUN/Creatinine Ratio 17.0     eGFR 35.9 mL/min/1.73     Narrative:      GFR Normal >60  Chronic Kidney Disease <60  Kidney Failure <15    The GFR formula is only valid for adults with stable renal function between ages 18 and 70.    Uric Acid [[id number]]  (Normal) Collected: 07/13/24 1130    Specimen: Blood from Arm, Left Updated: 07/13/24 1247     Uric Acid 5.7 mg/dL              Imaging Results (Last 24 Hours)       ** No results found for the last 24 hours. **                 I reviewed the patient's new clinical results.    Medication Review:   Scheduled Meds:acetylcysteine, 3 mL, Nebulization, BID - RT  amLODIPine, 10 mg, Oral, Daily  aspirin, 81 mg, Oral, Daily  budesonide-formoterol, 2 puff, Inhalation, BID - RT  carvedilol, 6.25 mg, Oral, BID With Meals  doxycycline, 100 mg, Intravenous, Q12H  enoxaparin, 40 mg, Subcutaneous, Q24H  estradiol, 1 g, Vaginal, Once per day on Monday Wednesday Friday  ferrous sulfate, 324 mg, Oral, Daily With Breakfast  furosemide, 40 mg, Oral, Daily  gabapentin, 100 mg, Oral, Nightly  guaiFENesin, 1,200 mg, Oral, Q12H  hydrALAZINE, 25 mg, Oral, BID  insulin glargine, 10 Units, Subcutaneous, Nightly  ipratropium-albuterol, 3 mL, Nebulization, 4x Daily - RT  lansoprazole, 30 mg, Oral, Q AM  linagliptin, 5 mg, Oral, Daily  methocarbamol, 500 mg, Oral, 4x Daily  methylPREDNISolone sodium succinate, 20 mg, Intravenous, Q12H  polyethylene glycol, 17 g, Oral, Daily  sertraline, 50 mg, Oral, Daily  sodium chloride, 10 mL, Intravenous, Q12H  terazosin, 2 mg, Oral, Nightly      Continuous Infusions:   PRN Meds:.  senna-docusate sodium **AND** polyethylene glycol **AND** bisacodyl **AND** bisacodyl    dextrose    dextrose    Diclofenac Sodium    glucagon (human recombinant)    ipratropium-albuterol    ondansetron    sodium chloride    sodium chloride    traMADol     Assessment  & Plan       Recurrent pneumonia    Multiple tracheobronchial mucus plugs  Rhinovirus  - continue IV abx, nebs, IV solumedrol  - pulmonary following, status post bronchoscopy 7/14/2024, follow BAL  -Mucomyst nebulized with albuterol, and Mucinex    COPD  -Discussed importance of continued smoking cessation    CAD  DDD  HTN with CKD3     DVT prophylaxis-Lovenox  GI prophylaxis- ppi    Plan for disposition:home in a.m. if stable    SAMIRA Gomez  07/14/24  07:56 EDT

## 2024-07-14 NOTE — PLAN OF CARE
Problem: Adult Inpatient Plan of Care  Goal: Plan of Care Review  Outcome: Ongoing, Progressing  Goal: Patient-Specific Goal (Individualized)  Outcome: Ongoing, Progressing  Goal: Absence of Hospital-Acquired Illness or Injury  Outcome: Ongoing, Progressing  Intervention: Identify and Manage Fall Risk  Recent Flowsheet Documentation  Taken 7/14/2024 0300 by Sky Simmons RN  Safety Promotion/Fall Prevention: safety round/check completed  Taken 7/14/2024 0200 by Sky Simmons RN  Safety Promotion/Fall Prevention: safety round/check completed  Taken 7/14/2024 0100 by Sky Simmons RN  Safety Promotion/Fall Prevention: safety round/check completed  Taken 7/14/2024 0000 by Sky Simmons RN  Safety Promotion/Fall Prevention: safety round/check completed  Taken 7/13/2024 2300 by Sky Simmons RN  Safety Promotion/Fall Prevention: safety round/check completed  Taken 7/13/2024 2200 by Sky Simmons RN  Safety Promotion/Fall Prevention:   safety round/check completed   room organization consistent   nonskid shoes/slippers when out of bed   clutter free environment maintained   assistive device/personal items within reach   fall prevention program maintained  Taken 7/13/2024 2100 by Sky Simmons RN  Safety Promotion/Fall Prevention:   safety round/check completed   room organization consistent   nonskid shoes/slippers when out of bed   clutter free environment maintained   assistive device/personal items within reach   fall prevention program maintained  Taken 7/13/2024 2000 by Sky Simmons RN  Safety Promotion/Fall Prevention:   safety round/check completed   room organization consistent   nonskid shoes/slippers when out of bed   clutter free environment maintained   assistive device/personal items within reach   fall prevention program maintained  Taken 7/13/2024 1900 by Sky Simmons RN  Safety Promotion/Fall Prevention:   safety round/check completed   room organization  consistent   nonskid shoes/slippers when out of bed   clutter free environment maintained   assistive device/personal items within reach   fall prevention program maintained  Intervention: Prevent Skin Injury  Recent Flowsheet Documentation  Taken 7/14/2024 0300 by Sky Simmons RN  Body Position: position changed independently  Taken 7/14/2024 0100 by Sky Simmons RN  Body Position: position changed independently  Taken 7/13/2024 2300 by Sky Simmons RN  Body Position: position changed independently  Taken 7/13/2024 2100 by Sky Simmons RN  Body Position: position changed independently  Taken 7/13/2024 2000 by Sky Simmons RN  Skin Protection:   adhesive use limited   tubing/devices free from skin contact  Taken 7/13/2024 1900 by Sky Simmons RN  Body Position: position changed independently  Intervention: Prevent and Manage VTE (Venous Thromboembolism) Risk  Recent Flowsheet Documentation  Taken 7/13/2024 2000 by Sky Simmons RN  VTE Prevention/Management: (up ad felix) patient refused intervention  Goal: Optimal Comfort and Wellbeing  Outcome: Ongoing, Progressing  Intervention: Monitor Pain and Promote Comfort  Recent Flowsheet Documentation  Taken 7/13/2024 2000 by Sky Simmons RN  Pain Management Interventions:   care clustered   quiet environment facilitated  Intervention: Provide Person-Centered Care  Recent Flowsheet Documentation  Taken 7/13/2024 2000 by Sky Simmons RN  Trust Relationship/Rapport:   care explained   choices provided   empathic listening provided   emotional support provided   questions answered   questions encouraged   reassurance provided  Goal: Readiness for Transition of Care  Outcome: Ongoing, Progressing     Problem: Fluid Imbalance (Pneumonia)  Goal: Fluid Balance  Outcome: Ongoing, Progressing     Problem: Infection (Pneumonia)  Goal: Resolution of Infection Signs and Symptoms  Outcome: Ongoing, Progressing     Problem: Respiratory  Compromise (Pneumonia)  Goal: Effective Oxygenation and Ventilation  Outcome: Ongoing, Progressing  Intervention: Promote Airway Secretion Clearance  Recent Flowsheet Documentation  Taken 7/13/2024 2000 by Sky Simmons RN  Cough And Deep Breathing: done independently per patient  Intervention: Optimize Oxygenation and Ventilation  Recent Flowsheet Documentation  Taken 7/14/2024 0300 by Sky Simmons RN  Head of Bed (HOB) Positioning: HOB elevated  Taken 7/14/2024 0100 by Sky Simmons RN  Head of Bed (HOB) Positioning: HOB elevated  Taken 7/13/2024 2300 by Sky Simmons RN  Head of Bed (HOB) Positioning: HOB elevated  Taken 7/13/2024 2100 by Sky Simmons RN  Head of Bed (HOB) Positioning: HOB elevated  Taken 7/13/2024 1900 by Sky Simmons RN  Head of Bed (HOB) Positioning: HOB elevated     Problem: Fall Injury Risk  Goal: Absence of Fall and Fall-Related Injury  Outcome: Ongoing, Progressing  Intervention: Promote Injury-Free Environment  Recent Flowsheet Documentation  Taken 7/14/2024 0300 by Sky Simmons RN  Safety Promotion/Fall Prevention: safety round/check completed  Taken 7/14/2024 0200 by Sky Simmons RN  Safety Promotion/Fall Prevention: safety round/check completed  Taken 7/14/2024 0100 by Sky Simmons RN  Safety Promotion/Fall Prevention: safety round/check completed  Taken 7/14/2024 0000 by Sky Simmons RN  Safety Promotion/Fall Prevention: safety round/check completed  Taken 7/13/2024 2300 by Sky Simmons RN  Safety Promotion/Fall Prevention: safety round/check completed  Taken 7/13/2024 2200 by Sky Simmons RN  Safety Promotion/Fall Prevention:   safety round/check completed   room organization consistent   nonskid shoes/slippers when out of bed   clutter free environment maintained   assistive device/personal items within reach   fall prevention program maintained  Taken 7/13/2024 2100 by Sky Simmons RN  Safety Promotion/Fall  Prevention:   safety round/check completed   room organization consistent   nonskid shoes/slippers when out of bed   clutter free environment maintained   assistive device/personal items within reach   fall prevention program maintained  Taken 7/13/2024 2000 by Sky Simmons, RN  Safety Promotion/Fall Prevention:   safety round/check completed   room organization consistent   nonskid shoes/slippers when out of bed   clutter free environment maintained   assistive device/personal items within reach   fall prevention program maintained  Taken 7/13/2024 1900 by Sky Simmons, RN  Safety Promotion/Fall Prevention:   safety round/check completed   room organization consistent   nonskid shoes/slippers when out of bed   clutter free environment maintained   assistive device/personal items within reach   fall prevention program maintained   Goal Outcome Evaluation:

## 2024-07-14 NOTE — PROGRESS NOTES
NEPHROLOGY PROGRESS NOTE------[facility]    [facility]  [phone]  Rolando Miller MD      Patient Care Team:  Anny Garcia MD as PCP - General (Internal Medicine)  Sergio Ordonez MD as Consulting Physician (Nephrology)      Provider:  Rolando Miller MD  Patient Name: Vianey Reeves  :  1945    SUBJECTIVE:  Follow-up CKD  No chest pain, breathing better.  Had bronc this morning    Medication:  [Transfer Hold] acetylcysteine, 3 mL, Nebulization, BID - RT  amLODIPine, 10 mg, Oral, Daily  [Transfer Hold] aspirin, 81 mg, Oral, Daily  [Transfer Hold] budesonide-formoterol, 2 puff, Inhalation, BID - RT  carvedilol, 6.25 mg, Oral, BID With Meals  doxycycline, 100 mg, Intravenous, Q12H  [Transfer Hold] enoxaparin, 40 mg, Subcutaneous, Q24H  [Transfer Hold] estradiol, 1 g, Vaginal, Once per day on   [Transfer Hold] ferrous sulfate, 324 mg, Oral, Daily With Breakfast  [Transfer Hold] furosemide, 40 mg, Oral, Daily  gabapentin, 100 mg, Oral, Nightly  [Transfer Hold] guaiFENesin, 1,200 mg, Oral, Q12H  hydrALAZINE, 25 mg, Oral, BID  [Transfer Hold] insulin glargine, 10 Units, Subcutaneous, Nightly  [Transfer Hold] ipratropium-albuterol, 3 mL, Nebulization, 4x Daily - RT  [Transfer Hold] lansoprazole, 30 mg, Oral, Q AM  [Transfer Hold] linagliptin, 5 mg, Oral, Daily  [Transfer Hold] methocarbamol, 500 mg, Oral, 4x Daily  [Transfer Hold] methylPREDNISolone sodium succinate, 20 mg, Intravenous, Q12H  [Transfer Hold] polyethylene glycol, 17 g, Oral, Daily  [Transfer Hold] sertraline, 50 mg, Oral, Daily  [Transfer Hold] sodium chloride, 10 mL, Intravenous, Q12H  terazosin, 2 mg, Oral, Nightly           OBJECTIVE    Vital Sign Min/Max for last 24 hours  Temp  Min: 97.7 °F (36.5 °C)  Max: 98.1 °F (36.7 °C)   BP  Min: 135/81  Max: 164/88   Pulse  Min: 89  Max: 137   Resp  Min: 10  Max: 20   SpO2  Min: 88 %  Max: 100 %   No data  "recorded   No data recorded     Flowsheet Rows      Flowsheet Row First Filed Value   Admission Height 165.1 cm (65\") Documented at 07/11/2024 2104   Admission Weight --            I/O this shift:  In: 100 [I.V.:100]  Out: -   I/O last 3 completed shifts:  In: 820 [P.O.:720; IV Piggyback:100]  Out: -     Physical Exam:  General Appearance: alert, appears stated age and cooperative  Head: normocephalic, without obvious abnormality and atraumatic  Eyes: conjunctivae and sclerae normal and no icterus  Neck: supple and no JVD  Lungs: Scattered rhonchi  Heart: regular rhythm & normal rate and normal S1, S2  Chest: Wall no abnormalities observed  Abdomen: normal bowel sounds and soft, nontender  Extremities: moves extremities well, no edema, no cyanosis and no redness  Skin: no bleeding, bruising or rash, turgor normal, color normal and no lesions noted  Neurologic: Alert, and oriented. No focal deficits    Labs:    WBC WBC   Date Value Ref Range Status   07/13/2024 6.83 3.40 - 10.80 10*3/mm3 Final   07/12/2024 7.16 3.40 - 10.80 10*3/mm3 Final   07/11/2024 7.69 3.40 - 10.80 10*3/mm3 Final      HGB Hemoglobin   Date Value Ref Range Status   07/13/2024 12.5 12.0 - 15.9 g/dL Final   07/12/2024 13.2 12.0 - 15.9 g/dL Final   07/11/2024 13.5 12.0 - 15.9 g/dL Final      HCT Hematocrit   Date Value Ref Range Status   07/13/2024 40.0 34.0 - 46.6 % Final   07/12/2024 42.2 34.0 - 46.6 % Final   07/11/2024 42.6 34.0 - 46.6 % Final      Platelets No results found for: \"LABPLAT\"   MCV MCV   Date Value Ref Range Status   07/13/2024 98.0 (H) 79.0 - 97.0 fL Final   07/12/2024 99.3 (H) 79.0 - 97.0 fL Final   07/11/2024 98.6 (H) 79.0 - 97.0 fL Final          Sodium Sodium   Date Value Ref Range Status   07/14/2024 141 136 - 145 mmol/L Final   07/13/2024 139 136 - 145 mmol/L Final   07/12/2024 143 136 - 145 mmol/L Final   07/11/2024 145 136 - 145 mmol/L Final      Potassium Potassium   Date Value Ref Range Status   07/14/2024 4.1 3.5 - 5.2 " "mmol/L Final   07/13/2024 4.0 3.5 - 5.2 mmol/L Final     Comment:     Specimen hemolyzed.  Result may be falsely elevated.   07/12/2024 4.3 3.5 - 5.2 mmol/L Final     Comment:     Specimen hemolyzed.  Result may be falsely elevated.   07/11/2024 4.3 3.5 - 5.2 mmol/L Final     Comment:     Specimen hemolyzed.  Result may be falsely elevated.      Chloride Chloride   Date Value Ref Range Status   07/14/2024 107 98 - 107 mmol/L Final   07/13/2024 110 (H) 98 - 107 mmol/L Final   07/12/2024 110 (H) 98 - 107 mmol/L Final   07/11/2024 109 (H) 98 - 107 mmol/L Final      CO2 CO2   Date Value Ref Range Status   07/14/2024 25.2 22.0 - 29.0 mmol/L Final   07/13/2024 21.9 (L) 22.0 - 29.0 mmol/L Final   07/12/2024 23.5 22.0 - 29.0 mmol/L Final   07/11/2024 24.8 22.0 - 29.0 mmol/L Final      BUN BUN   Date Value Ref Range Status   07/14/2024 33 (H) 8 - 23 mg/dL Final   07/13/2024 25 (H) 8 - 23 mg/dL Final   07/12/2024 20 8 - 23 mg/dL Final   07/11/2024 25 (H) 8 - 23 mg/dL Final      Creatinine Creatinine   Date Value Ref Range Status   07/14/2024 1.59 (H) 0.57 - 1.00 mg/dL Final   07/13/2024 1.47 (H) 0.57 - 1.00 mg/dL Final   07/12/2024 1.41 (H) 0.57 - 1.00 mg/dL Final   07/11/2024 1.57 (H) 0.57 - 1.00 mg/dL Final      Calcium Calcium   Date Value Ref Range Status   07/14/2024 9.4 8.6 - 10.5 mg/dL Final   07/13/2024 7.8 (L) 8.6 - 10.5 mg/dL Final   07/12/2024 9.1 8.6 - 10.5 mg/dL Final   07/11/2024 9.7 8.6 - 10.5 mg/dL Final      PO4 No components found for: \"PO4\"   Albumin Albumin   Date Value Ref Range Status   07/14/2024 3.7 3.5 - 5.2 g/dL Final   07/13/2024 3.1 (L) 3.5 - 5.2 g/dL Final      Magnesium Magnesium   Date Value Ref Range Status   07/13/2024 2.1 1.6 - 2.4 mg/dL Final      Uric Acid No components found for: \"URIC ACID\"     Imaging Results (Last 72 Hours)       Procedure Component Value Units Date/Time    XR Foot 3+ View Right [[id number]] Collected: 07/12/24 2048     Updated: 07/12/24 2052    Narrative:      XR FOOT " 3+ VW RIGHT    Date of Exam: 7/12/2024 8:32 PM EDT    Indication: Concern for fractured 2nd toe    Comparison: 8/29/2020    Findings:  Subtle minimally displaced fracture through the distal aspect of the mid phalanx of the second digit with extension into the DIP joint.  The remainder the visualized osseous structures are intact.  Diffuse osteopenia.  Mild degenerative changes are noted at the midfoot and forefoot.  No focal soft tissue abnormality.  No suspicious erosive changes      Impression:      Impression:  Minimally displaced fracture through the distal aspect of the mid phalanx of the second digit with extension into the DIP joint.      Electronically Signed: Stevo Barry DO    7/12/2024 8:50 PM EDT    Workstation ID: [id number]    CT Chest Without Contrast Diagnostic [[id number]] Collected: 07/11/24 2334     Updated: 07/11/24 2339    Narrative:      CT CHEST WO CONTRAST DIAGNOSTIC    Date of Exam: 7/11/2024 11:15 PM EDT    Indication: Failed outpatient PNA treatment x3.    Comparison: 3/5/2024 chest radiograph, MRI thoracic spine 6/10/2024 and chest radiograph 1/15/2024.    Technique: Axial CT images were obtained of the chest without contrast administration.  Sagittal and coronal reconstructions were performed.  Automated exposure control and iterative reconstruction methods were used.      Findings:  The left thyroid lobe is enlarged and heterogeneous compatible with multinodular goiter. The trachea and the esophagus appear within normal limits. Heart size is normal. There are coronary stents and calcifications. No pericardial effusion. No   mediastinal lymphadenopathy. There are a few calcified mediastinal granulomas. There is mild aortic atherosclerosis. No aneurysm. Main pulmonary artery is normal diameter.    There is no pneumothorax, pleural effusion or focal airspace consolidation. There is atelectasis in the medial segment of the right middle lobe without obvious airway obstruction. There is  mild linear scarring versus atelectasis in the left lung base and   lingula. Airways are patent. No suspicious lung nodules. There are calcified lingular and right lower lobe granulomas. No significant pleural disease.    There are no acute findings in the superficial soft tissues. No acute findings in the limited images of the upper abdomen. The patient is status post cholecystectomy and right nephrectomy. No acute osseous abnormalities or destructive bone lesions. There   is a chronic T6 compression fracture status post cement augmentation. There is a minimal segmentation anomaly at the T2-T3 level with incomplete anterior disc formation. There are old healed left-sided rib fractures. These do appear new from January 2024.      Impression:      Impression:  1.No acute cardiopulmonary abnormality.  2.There is atelectasis in the medial segment of the right middle lobe without obvious airway obstruction.  3.Coronary artery disease.  4.Old healed left-sided rib fractures.  5.Chronic T6 compression fracture status post cement augmentation.        Electronically Signed: Lyle Good MD    7/11/2024 11:37 PM EDT    Workstation ID: [id number]            Results for orders placed during the hospital encounter of 07/11/24    XR Foot 3+ View Right    Narrative  XR FOOT 3+ VW RIGHT    Date of Exam: 7/12/2024 8:32 PM EDT    Indication: Concern for fractured 2nd toe    Comparison: 8/29/2020    Findings:  Subtle minimally displaced fracture through the distal aspect of the mid phalanx of the second digit with extension into the DIP joint.  The remainder the visualized osseous structures are intact.  Diffuse osteopenia.  Mild degenerative changes are noted at the midfoot and forefoot.  No focal soft tissue abnormality.  No suspicious erosive changes    Impression  Impression:  Minimally displaced fracture through the distal aspect of the mid phalanx of the second digit with extension into the DIP joint.      Electronically  Signed: Stevo Barry DO  7/12/2024 8:50 PM EDT  Workstation ID: [id number]      Results for orders placed in visit on 05/22/24    XR Shoulder 2+ View Left    Narrative  XR SHOULDER 2+ VW LEFT    Date of Exam: 5/22/2024 1:19 PM EDT    Indication: left humerus fx after a fall on 3/4/24    Comparison: 4/24/2024, 3/13/2024.    Findings:  Healing nondisplaced fracture of the greater tuberosity present. Sclerotic changes are seen along the fracture margin.. The acromioclavicular and glenohumeral joints appear intact. No erosions. Acromiohumeral and coracoclavicular distances are  well-maintained. Mild to moderate acromioclavicular and glenohumeral osteoarthritic changes are present. The adjacent lung and ribs appear intact.    Impression  Impression:  Healing nondisplaced fracture of the greater tuberosity, stable in alignment as compared to the previous study.      Electronically Signed: Marisabel Roberts MD  5/23/2024 9:24 AM EDT  Workstation ID: [id number]      Results for orders placed in visit on 04/24/24    XR Shoulder 2+ View Left    Narrative  XR SHOULDER 2+ VW LEFT    Date of Exam: 4/24/2024 12:56 PM EDT    Indication: left shoulder fx after a fall on 3/5/24    Comparison: 3/13/2024 MRI    Findings:  The known nondisplaced fracture of the greater tuberosity is not well seen on this exam. There is no evidence of new or additional fracture. Normal glenohumeral and acromioclavicular joint alignment. Mild glenohumeral and moderate acromioclavicular joint  arthritis. Partially visualized thoracic vertebroplasty. Soft tissues are unremarkable.    Impression  Impression:  Known nondisplaced greater tuberosity fracture not well seen on this exam. No evidence for acute abnormality of the left shoulder. Chronic findings as above.      Electronically Signed: Lyle Calderón MD  4/26/2024 11:58 AM EDT  Workstation ID: [id number]      Results for orders placed during the hospital encounter of 08/22/23    Doppler Ankle Brachial Index  Single Level CAR    Interpretation Summary    Right Conclusion: The right IKE is normal. Normal digital pressures.    Left Conclusion: The left IKE is normal. Mild digital ischemia.        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  Hypertension  Diabetes type 2  Recurrent pneumonia-on IV antibiotics  History of coronary artery disease  COPD  History of ureteral cancer, status post right nephrectomy  Dyspnea-likely due to medical pneumonia.  There may be component of excess volume.  She is not on a diuretic.  Her BNP was elevated.  Added low-dose diuretic       Creatinine stable  Continue Lasix  Status post bron today  Likely home tomorrow if stable  Monitor renal function fluid status electrolytes        Rolando Miller MD  [facility]  [phone]  07/14/24  11:23 EDT

## 2024-07-14 NOTE — ANESTHESIA POSTPROCEDURE EVALUATION
Patient: Vianey Reeves    Procedure Summary       Date: 07/14/24 Room / Location: [facility] ENDOSCOPY 2 / [facility] ENDOSCOPY    Anesthesia Start: 1042 Anesthesia Stop: 1105    Procedure: BRONCHOSCOPY WITH BRONCHOALVEOLAR LAVAGE (Bronchus) Diagnosis:       Multiple tracheobronchial mucus plugs      Recurrent pneumonia      (Multiple tracheobronchial mucus plugs [T17.800A])      (Recurrent pneumonia [J18.9])    Surgeons: Marlin Ferguson MD Provider: Grace Isbell CRNA    Anesthesia Type: general, MAC ASA Status: 3 - Emergent            Anesthesia Type: general, MAC    Vitals  Vitals Value Taken Time   /102 07/14/24 1107   Temp     Pulse 135 07/14/24 1109   Resp 16 07/14/24 1105   SpO2 95 % 07/14/24 1109   Vitals shown include unfiled device data.        Post Anesthesia Care and Evaluation    Patient location during evaluation: PACU  Patient participation: complete - patient participated  Level of consciousness: awake and alert  Pain management: adequate    Airway patency: patent  Anesthetic complications: No anesthetic complications  PONV Status: none  Cardiovascular status: acceptable  Respiratory status: acceptable  Hydration status: acceptable

## 2024-07-14 NOTE — PROGRESS NOTES
Daily Progress Note          Assessment    Recurrent pneumonia  History of COVID-19 infection 1/14/2024 with resulting organizing pneumonia in the right middle lobe.  Chronic cough, multiple mucous plugs  Hypoxemia  Hypertension  DM  CKD  Snoring, excessive daytime sleepiness: Symptoms suggestive of FERNANDO but no prior sleep study        Recommendations:  Addendum: bronchoscopy 7/14/2024 showed mucous plugging right middle lobe and widespread tracheobronchomalacia, if the patient is improving tomorrow she can be discharged home with follow-up in the pulmonary clinic in 2 weeks  She has symptoms suggestive of of FERNANDO namely snoring and excessive daytime sleepiness: Patient would benefit from home sleep study     patient needs to improve airway clearance and get rid of mucous plugging: Schedule bronchoscopy 7/14/2024  Mucomyst nebulized  Mucinex  Inhaled corticosteroids  Encourage use of incentive spirometry and flutter valve  Oxygen supplement and titration to maintain saturation 90 to 95%  Bronchodilators     Aspirin, BP control, glucose control  DVT prophylaxis  Check daily labs and correct electrolytes as needed  I personally reviewed the radiological studies             LOS: 3 days     Subjective     Reports cough and shortness of breath    Objective     Vital signs for last 24 hours:  Vitals:    07/14/24 0715 07/14/24 0718 07/14/24 0721 07/14/24 0827   BP:    159/92   BP Location:    Left arm   Patient Position:    Lying   Pulse: 102 103 97 95   Resp: 16 16 20 14   Temp:    98 °F (36.7 °C)   TempSrc:    Oral   SpO2: 97% 98% 97% 92%   Height:           Intake/Output last 3 shifts:  I/O last 3 completed shifts:  In: 820 [P.O.:720; IV Piggyback:100]  Out: -   Intake/Output this shift:  No intake/output data recorded.      Radiology  Imaging Results (Last 24 Hours)       ** No results found for the last 24 hours. **            Labs:  Results from last 7 days   Lab Units 07/13/24  0622   WBC 10*3/mm3 6.83   HEMOGLOBIN  g/dL 12.5   HEMATOCRIT % 40.0   PLATELETS 10*3/mm3 189     Results from last 7 days   Lab Units 07/14/24  0553   SODIUM mmol/L 141   POTASSIUM mmol/L 4.1   CHLORIDE mmol/L 107   CO2 mmol/L 25.2   BUN mg/dL 33*   CREATININE mg/dL 1.59*   CALCIUM mg/dL 9.4   GLUCOSE mg/dL 239*         Results from last 7 days   Lab Units 07/14/24  0553 07/13/24  1130   ALBUMIN g/dL 3.7 3.1*             Results from last 7 days   Lab Units 07/13/24  0622   MAGNESIUM mg/dL 2.1                   Meds:   SCHEDULE  acetylcysteine, 3 mL, Nebulization, BID - RT  amLODIPine, 10 mg, Oral, Daily  aspirin, 81 mg, Oral, Daily  budesonide-formoterol, 2 puff, Inhalation, BID - RT  carvedilol, 6.25 mg, Oral, BID With Meals  doxycycline, 100 mg, Intravenous, Q12H  enoxaparin, 40 mg, Subcutaneous, Q24H  estradiol, 1 g, Vaginal, Once per day on Monday Wednesday Friday  ferrous sulfate, 324 mg, Oral, Daily With Breakfast  furosemide, 40 mg, Oral, Daily  gabapentin, 100 mg, Oral, Nightly  guaiFENesin, 1,200 mg, Oral, Q12H  hydrALAZINE, 25 mg, Oral, BID  insulin glargine, 10 Units, Subcutaneous, Nightly  ipratropium-albuterol, 3 mL, Nebulization, 4x Daily - RT  lansoprazole, 30 mg, Oral, Q AM  linagliptin, 5 mg, Oral, Daily  methocarbamol, 500 mg, Oral, 4x Daily  methylPREDNISolone sodium succinate, 20 mg, Intravenous, Q12H  polyethylene glycol, 17 g, Oral, Daily  sertraline, 50 mg, Oral, Daily  sodium chloride, 10 mL, Intravenous, Q12H  terazosin, 2 mg, Oral, Nightly      Infusions     PRNs    senna-docusate sodium **AND** polyethylene glycol **AND** bisacodyl **AND** bisacodyl    dextrose    dextrose    Diclofenac Sodium    glucagon (human recombinant)    ipratropium-albuterol    ondansetron    sodium chloride    sodium chloride    traMADol    Physical Exam:  General Appearance:  Alert   HEENT:  Normocephalic, without obvious abnormality, Conjunctiva/corneas clear,.   Nares normal, no drainage     Neck:  Supple, symmetrical, trachea midline.   Lungs  /Chest wall:   Bilateral basal rhonchi, respirations unlabored, symmetrical wall movement.     Heart:  Regular rate and rhythm, S1 S2 normal  Abdomen: Soft, non-tender, no masses, no organomegaly.    Extremities: No edema, no clubbing or cyanosis     ROS  Constitutional: Negative for chills, fever and malaise/fatigue.   HENT: Negative.    Eyes: Negative.    Cardiovascular: Negative.    Respiratory: Positive for cough and shortness of breath.    Skin: Negative.    Musculoskeletal: Negative.    Gastrointestinal: Negative.    Genitourinary: Negative.    Neurological: Negative.    Psychiatric/Behavioral: Negative.      I reviewed the recent clinical results  I personally reviewed the latest radiological studies    Part of this note may be an electronic transcription/translation of spoken language to printed text using the Dragon Dictation System.
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
## 2024-07-14 NOTE — PLAN OF CARE
Goal Outcome Evaluation:            Rhinovirus detected. Droplet isolation initiated. Completed Bronchoscopy.

## 2024-07-14 NOTE — ANESTHESIA POSTPROCEDURE EVALUATION
Patient: Vianey Reeves    Procedure Summary       Date: 07/14/24 Room / Location: [facility] END[facility]COPY 2 / [facility]    Anesthesia Start: 1042 Anesthesia Stop: 1105    Procedure: BRONCHOSCOPY WITH BRONCHOALVEOLAR LAVAGE (Bronchus) Diagnosis:       Multiple tracheobronchial mucus plugs      Recurrent pneumonia      (Multiple tracheobronchial mucus plugs [T17.800A])      (Recurrent pneumonia [J18.9])    Surgeons: Marlin Ferguson MD Provider: Grace Isbell CRNA    Anesthesia Type: general, MAC ASA Status: 3 - Emergent            Anesthesia Type: general, MAC    Vitals  Vitals Value Taken Time   /92 07/14/24 1104   Temp     Pulse 137 07/14/24 1105   Resp     SpO2 93 % 07/14/24 1105   Vitals shown include unfiled device data.        Post Anesthesia Care and Evaluation    Patient location during evaluation: PACU  Patient participation: complete - patient participated  Level of consciousness: awake and alert  Pain management: adequate    Airway patency: patent  Anesthetic complications: No anesthetic complications  PONV Status: none  Cardiovascular status: acceptable  Respiratory status: acceptable, room air and spontaneous ventilation  Hydration status: acceptable

## 2024-07-15 LAB
ALBUMIN SERPL-MCNC: 3.5 G/DL (ref 3.5–5.2)
ANION GAP SERPL CALCULATED.3IONS-SCNC: 8.2 MMOL/L (ref 5–15)
BUN SERPL-MCNC: 30 MG/DL (ref 8–23)
BUN/CREAT SERPL: 19.5 (ref 7–25)
CALCIUM SPEC-SCNC: 9.3 MG/DL (ref 8.6–10.5)
CHLORIDE SERPL-SCNC: 108 MMOL/L (ref 98–107)
CO2 SERPL-SCNC: 26.8 MMOL/L (ref 22–29)
CREAT SERPL-MCNC: 1.54 MG/DL (ref 0.57–1)
EGFRCR SERPLBLD CKD-EPI 2021: 34 ML/MIN/1.73
GLUCOSE BLDC GLUCOMTR-MCNC: 167 MG/DL (ref 70–105)
GLUCOSE BLDC GLUCOMTR-MCNC: 192 MG/DL (ref 70–105)
GLUCOSE BLDC GLUCOMTR-MCNC: 228 MG/DL (ref 70–105)
GLUCOSE BLDC GLUCOMTR-MCNC: 302 MG/DL (ref 70–105)
GLUCOSE SERPL-MCNC: 124 MG/DL (ref 65–99)
PHOSPHATE SERPL-MCNC: 3.3 MG/DL (ref 2.5–4.5)
POTASSIUM SERPL-SCNC: 4.4 MMOL/L (ref 3.5–5.2)
SODIUM SERPL-SCNC: 143 MMOL/L (ref 136–145)

## 2024-07-15 PROCEDURE — 94799 UNLISTED PULMONARY SVC/PX: CPT

## 2024-07-15 PROCEDURE — 82948 REAGENT STRIP/BLOOD GLUCOSE: CPT | Performed by: INTERNAL MEDICINE

## 2024-07-15 PROCEDURE — 94664 DEMO&/EVAL PT USE INHALER: CPT

## 2024-07-15 PROCEDURE — 80069 RENAL FUNCTION PANEL: CPT | Performed by: INTERNAL MEDICINE

## 2024-07-15 PROCEDURE — 63710000001 INSULIN LISPRO (HUMAN) PER 5 UNITS: Performed by: INTERNAL MEDICINE

## 2024-07-15 PROCEDURE — 25010000002 METHYLPREDNISOLONE PER 40 MG: Performed by: INTERNAL MEDICINE

## 2024-07-15 PROCEDURE — 97161 PT EVAL LOW COMPLEX 20 MIN: CPT

## 2024-07-15 PROCEDURE — 82948 REAGENT STRIP/BLOOD GLUCOSE: CPT

## 2024-07-15 PROCEDURE — 94761 N-INVAS EAR/PLS OXIMETRY MLT: CPT

## 2024-07-15 PROCEDURE — 25010000002 ENOXAPARIN PER 10 MG: Performed by: INTERNAL MEDICINE

## 2024-07-15 PROCEDURE — 63710000001 INSULIN GLARGINE PER 5 UNITS: Performed by: INTERNAL MEDICINE

## 2024-07-15 RX ORDER — PREDNISONE 20 MG/1
20 TABLET ORAL
Status: DISPENSED | OUTPATIENT
Start: 2024-07-15 | End: 2024-07-17

## 2024-07-15 RX ORDER — OXYCODONE HYDROCHLORIDE 5 MG/1
5 TABLET ORAL EVERY 4 HOURS PRN
Status: DISCONTINUED | OUTPATIENT
Start: 2024-07-15 | End: 2024-07-17 | Stop reason: HOSPADM

## 2024-07-15 RX ORDER — INSULIN LISPRO 100 [IU]/ML
2-7 INJECTION, SOLUTION INTRAVENOUS; SUBCUTANEOUS
Status: DISCONTINUED | OUTPATIENT
Start: 2024-07-15 | End: 2024-07-16

## 2024-07-15 RX ADMIN — Medication 10 ML: at 09:17

## 2024-07-15 RX ADMIN — HYDRALAZINE HYDROCHLORIDE 25 MG: 25 TABLET ORAL at 09:15

## 2024-07-15 RX ADMIN — GUAIFENESIN 1200 MG: 600 TABLET, EXTENDED RELEASE ORAL at 09:15

## 2024-07-15 RX ADMIN — CARVEDILOL 6.25 MG: 6.25 TABLET, FILM COATED ORAL at 09:15

## 2024-07-15 RX ADMIN — METHOCARBAMOL 500 MG: 500 TABLET ORAL at 17:18

## 2024-07-15 RX ADMIN — FUROSEMIDE 40 MG: 40 TABLET ORAL at 09:15

## 2024-07-15 RX ADMIN — INSULIN LISPRO 5 UNITS: 100 INJECTION, SOLUTION INTRAVENOUS; SUBCUTANEOUS at 11:47

## 2024-07-15 RX ADMIN — ESTRADIOL 1 APPLICATOR: 0.1 CREAM VAGINAL at 11:47

## 2024-07-15 RX ADMIN — METHOCARBAMOL 500 MG: 500 TABLET ORAL at 20:31

## 2024-07-15 RX ADMIN — DOXYCYCLINE 100 MG: 100 CAPSULE ORAL at 09:15

## 2024-07-15 RX ADMIN — IPRATROPIUM BROMIDE AND ALBUTEROL SULFATE 3 ML: .5; 3 SOLUTION RESPIRATORY (INHALATION) at 10:35

## 2024-07-15 RX ADMIN — GUAIFENESIN 1200 MG: 600 TABLET, EXTENDED RELEASE ORAL at 20:30

## 2024-07-15 RX ADMIN — LANSOPRAZOLE 30 MG: 30 TABLET, ORALLY DISINTEGRATING, DELAYED RELEASE ORAL at 09:15

## 2024-07-15 RX ADMIN — POLYETHYLENE GLYCOL 3350 17 G: 17 POWDER, FOR SOLUTION ORAL at 09:16

## 2024-07-15 RX ADMIN — ASPIRIN 81 MG: 81 TABLET, COATED ORAL at 09:15

## 2024-07-15 RX ADMIN — GABAPENTIN 100 MG: 100 CAPSULE ORAL at 20:31

## 2024-07-15 RX ADMIN — IPRATROPIUM BROMIDE AND ALBUTEROL SULFATE 3 ML: .5; 3 SOLUTION RESPIRATORY (INHALATION) at 14:45

## 2024-07-15 RX ADMIN — TERAZOSIN HYDROCHLORIDE 2 MG: 2 CAPSULE ORAL at 20:31

## 2024-07-15 RX ADMIN — IPRATROPIUM BROMIDE AND ALBUTEROL SULFATE 3 ML: .5; 3 SOLUTION RESPIRATORY (INHALATION) at 18:47

## 2024-07-15 RX ADMIN — FERROUS SULFATE TAB EC 324 MG (65 MG FE EQUIVALENT) 324 MG: 324 (65 FE) TABLET DELAYED RESPONSE at 09:15

## 2024-07-15 RX ADMIN — BUDESONIDE AND FORMOTEROL FUMARATE DIHYDRATE 2 PUFF: 160; 4.5 AEROSOL RESPIRATORY (INHALATION) at 10:38

## 2024-07-15 RX ADMIN — HYDRALAZINE HYDROCHLORIDE 25 MG: 25 TABLET ORAL at 20:35

## 2024-07-15 RX ADMIN — INSULIN GLARGINE 10 UNITS: 100 INJECTION, SOLUTION SUBCUTANEOUS at 20:31

## 2024-07-15 RX ADMIN — INSULIN LISPRO 3 UNITS: 100 INJECTION, SOLUTION INTRAVENOUS; SUBCUTANEOUS at 17:18

## 2024-07-15 RX ADMIN — BUDESONIDE AND FORMOTEROL FUMARATE DIHYDRATE 2 PUFF: 160; 4.5 AEROSOL RESPIRATORY (INHALATION) at 18:47

## 2024-07-15 RX ADMIN — TRAMADOL HYDROCHLORIDE 100 MG: 50 TABLET ORAL at 09:29

## 2024-07-15 RX ADMIN — INSULIN LISPRO 2 UNITS: 100 INJECTION, SOLUTION INTRAVENOUS; SUBCUTANEOUS at 20:32

## 2024-07-15 RX ADMIN — Medication 10 ML: at 20:31

## 2024-07-15 RX ADMIN — SERTRALINE 50 MG: 50 TABLET, FILM COATED ORAL at 09:15

## 2024-07-15 RX ADMIN — AMLODIPINE BESYLATE 10 MG: 5 TABLET ORAL at 09:15

## 2024-07-15 RX ADMIN — TRAMADOL HYDROCHLORIDE 100 MG: 50 TABLET ORAL at 20:56

## 2024-07-15 RX ADMIN — METHOCARBAMOL 500 MG: 500 TABLET ORAL at 11:47

## 2024-07-15 RX ADMIN — LINAGLIPTIN 5 MG: 5 TABLET, FILM COATED ORAL at 09:15

## 2024-07-15 RX ADMIN — CARVEDILOL 6.25 MG: 6.25 TABLET, FILM COATED ORAL at 17:18

## 2024-07-15 RX ADMIN — ENOXAPARIN SODIUM 40 MG: 100 INJECTION SUBCUTANEOUS at 17:18

## 2024-07-15 RX ADMIN — METHOCARBAMOL 500 MG: 500 TABLET ORAL at 09:15

## 2024-07-15 RX ADMIN — METHYLPREDNISOLONE SODIUM SUCCINATE 20 MG: 40 INJECTION, POWDER, FOR SOLUTION INTRAMUSCULAR; INTRAVENOUS at 09:15

## 2024-07-15 RX ADMIN — DOXYCYCLINE 100 MG: 100 CAPSULE ORAL at 20:31

## 2024-07-15 NOTE — PLAN OF CARE
Problem: Adult Inpatient Plan of Care  Goal: Plan of Care Review  Outcome: Ongoing, Progressing  Goal: Patient-Specific Goal (Individualized)  Outcome: Ongoing, Progressing  Goal: Absence of Hospital-Acquired Illness or Injury  Outcome: Ongoing, Progressing  Intervention: Identify and Manage Fall Risk  Recent Flowsheet Documentation  Taken 7/15/2024 1229 by Jacy Hagan RN  Safety Promotion/Fall Prevention:   assistive device/personal items within reach   clutter free environment maintained   nonskid shoes/slippers when out of bed   room organization consistent   safety round/check completed  Taken 7/15/2024 1000 by Jacy Hagan RN  Safety Promotion/Fall Prevention:   assistive device/personal items within reach   clutter free environment maintained   nonskid shoes/slippers when out of bed   room organization consistent   safety round/check completed  Taken 7/15/2024 0830 by Jacy Hagan RN  Safety Promotion/Fall Prevention:   assistive device/personal items within reach   clutter free environment maintained   nonskid shoes/slippers when out of bed   room organization consistent   safety round/check completed  Intervention: Prevent and Manage VTE (Venous Thromboembolism) Risk  Recent Flowsheet Documentation  Taken 7/15/2024 1229 by Jacy Hagan RN  Range of Motion: active ROM (range of motion) encouraged  Taken 7/15/2024 0830 by Jacy Hagan RN  Range of Motion: active ROM (range of motion) encouraged  Intervention: Prevent Infection  Recent Flowsheet Documentation  Taken 7/15/2024 1229 by Jacy Hagan RN  Infection Prevention: hand hygiene promoted  Taken 7/15/2024 1000 by Jacy Hagan RN  Infection Prevention: hand hygiene promoted  Taken 7/15/2024 0830 by Jacy Hagan RN  Infection Prevention: hand hygiene promoted  Goal: Optimal Comfort and Wellbeing  Outcome: Ongoing, Progressing  Intervention: Provide Person-Centered Care  Recent Flowsheet  Documentation  Taken 7/15/2024 1229 by Jacy Hagan RN  Trust Relationship/Rapport:   care explained   thoughts/feelings acknowledged  Taken 7/15/2024 0830 by Jacy Hagan RN  Trust Relationship/Rapport:   care explained   thoughts/feelings acknowledged  Goal: Readiness for Transition of Care  Outcome: Ongoing, Progressing     Problem: Fluid Imbalance (Pneumonia)  Goal: Fluid Balance  Outcome: Ongoing, Progressing     Problem: Infection (Pneumonia)  Goal: Resolution of Infection Signs and Symptoms  Outcome: Ongoing, Progressing  Intervention: Prevent Infection Progression  Recent Flowsheet Documentation  Taken 7/15/2024 1000 by Jacy Hagan RN  Isolation Precautions:   droplet   precautions maintained  Taken 7/15/2024 0830 by Jacy Hagan RN  Isolation Precautions:   droplet   precautions maintained     Problem: Respiratory Compromise (Pneumonia)  Goal: Effective Oxygenation and Ventilation  Outcome: Ongoing, Progressing  Intervention: Promote Airway Secretion Clearance  Recent Flowsheet Documentation  Taken 7/15/2024 1229 by Jacy Hagan RN  Cough And Deep Breathing: done independently per patient  Taken 7/15/2024 0830 by Jacy Hagan RN  Cough And Deep Breathing: done independently per patient     Problem: Fall Injury Risk  Goal: Absence of Fall and Fall-Related Injury  Outcome: Ongoing, Progressing  Intervention: Identify and Manage Contributors  Recent Flowsheet Documentation  Taken 7/15/2024 1229 by Jacy Hagan RN  Medication Review/Management: medications reviewed  Taken 7/15/2024 1000 by Jacy Hagan RN  Medication Review/Management: medications reviewed  Taken 7/15/2024 0830 by Jacy aHgan RN  Medication Review/Management: medications reviewed  Intervention: Promote Injury-Free Environment  Recent Flowsheet Documentation  Taken 7/15/2024 1229 by Jacy Hagan RN  Safety Promotion/Fall Prevention:   assistive device/personal items within  reach   clutter free environment maintained   nonskid shoes/slippers when out of bed   room organization consistent   safety round/check completed  Taken 7/15/2024 1000 by Jacy Hagan RN  Safety Promotion/Fall Prevention:   assistive device/personal items within reach   clutter free environment maintained   nonskid shoes/slippers when out of bed   room organization consistent   safety round/check completed  Taken 7/15/2024 0830 by Jacy Hagan RN  Safety Promotion/Fall Prevention:   assistive device/personal items within reach   clutter free environment maintained   nonskid shoes/slippers when out of bed   room organization consistent   safety round/check completed   Goal Outcome Evaluation:

## 2024-07-15 NOTE — PLAN OF CARE
Goal Outcome Evaluation:Patient pleasant and cooperative. Slept most of shift. Will monitor.
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
## 2024-07-15 NOTE — PROGRESS NOTES
Daily Progress Note          Assessment    Recurrent pneumonia  bronchoscopy 7/14/2024 showed mucous plugging right middle lobe and reactive airway disease    History of COVID-19 infection 1/14/2024 with resulting organizing pneumonia in the right middle lobe.    Chronic cough, multiple mucous plugs  Hypoxemia  Hypertension  DM  CKD  Snoring, excessive daytime sleepiness: Symptoms suggestive of FERNANDO but no prior sleep study        Recommendations:    Oxygen supplement and titration to maintain saturation 90 to 95%  Currently on 3 L    Mucomyst nebulized  Mucinex  Inhaled corticosteroids  Encourage use of incentive spirometry and flutter valve    Bronchodilators     Aspirin, BP control, glucose control  DVT prophylaxis  Check daily labs and correct electrolytes as needed  I personally reviewed the radiological studies             LOS: 4 days     Subjective     Reports cough and shortness of breath    Objective     Vital signs for last 24 hours:  Vitals:    07/14/24 1830 07/14/24 2008 07/15/24 0035 07/15/24 0434   BP:  109/60 123/76 144/98   BP Location:  Right arm Left arm Left arm   Patient Position:  Lying Lying Lying   Pulse: 85 78 80 89   Resp: 18 19 18 17   Temp:  98.5 °F (36.9 °C) 98.1 °F (36.7 °C)    TempSrc:  Oral Oral    SpO2: 94% 92% 94% 94%   Height:           Intake/Output last 3 shifts:  I/O last 3 completed shifts:  In: 580 [P.O.:480; I.V.:100]  Out: -   Intake/Output this shift:  No intake/output data recorded.      Radiology  Imaging Results (Last 24 Hours)       ** No results found for the last 24 hours. **            Labs:  Results from last 7 days   Lab Units 07/13/24  0622   WBC 10*3/mm3 6.83   HEMOGLOBIN g/dL 12.5   HEMATOCRIT % 40.0   PLATELETS 10*3/mm3 189     Results from last 7 days   Lab Units 07/15/24  0447   SODIUM mmol/L 143   POTASSIUM mmol/L 4.4   CHLORIDE mmol/L 108*   CO2 mmol/L 26.8   BUN mg/dL 30*   CREATININE mg/dL 1.54*   CALCIUM mg/dL 9.3   GLUCOSE mg/dL 124*         Results from  last 7 days   Lab Units 07/15/24  0447 07/14/24  0553 07/13/24  1130   ALBUMIN g/dL 3.5 3.7 3.1*             Results from last 7 days   Lab Units 07/13/24  0622   MAGNESIUM mg/dL 2.1                   Meds:   SCHEDULE  acetylcysteine, 3 mL, Nebulization, BID - RT  amLODIPine, 10 mg, Oral, Daily  aspirin, 81 mg, Oral, Daily  budesonide-formoterol, 2 puff, Inhalation, BID - RT  carvedilol, 6.25 mg, Oral, BID With Meals  doxycycline, 100 mg, Oral, Q12H  enoxaparin, 40 mg, Subcutaneous, Q24H  estradiol, 1 g, Vaginal, Once per day on Monday Wednesday Friday  ferrous sulfate, 324 mg, Oral, Daily With Breakfast  furosemide, 40 mg, Oral, Daily  gabapentin, 100 mg, Oral, Nightly  guaiFENesin, 1,200 mg, Oral, Q12H  hydrALAZINE, 25 mg, Oral, BID  insulin glargine, 10 Units, Subcutaneous, Nightly  ipratropium-albuterol, 3 mL, Nebulization, 4x Daily - RT  lansoprazole, 30 mg, Oral, Q AM  linaclotide, 145 mcg, Oral, QAM AC  linagliptin, 5 mg, Oral, Daily  methocarbamol, 500 mg, Oral, 4x Daily  methylPREDNISolone sodium succinate, 20 mg, Intravenous, Q12H  polyethylene glycol, 17 g, Oral, Daily  sertraline, 50 mg, Oral, Daily  sodium chloride, 10 mL, Intravenous, Q12H  terazosin, 2 mg, Oral, Nightly      Infusions     PRNs    senna-docusate sodium **AND** polyethylene glycol **AND** bisacodyl **AND** bisacodyl    dextrose    dextrose    Diclofenac Sodium    glucagon (human recombinant)    ipratropium-albuterol    ondansetron    sodium chloride    sodium chloride    traMADol    Physical Exam:  General Appearance:  Alert   HEENT:  Normocephalic, without obvious abnormality, Conjunctiva/corneas clear,.   Nares normal, no drainage     Neck:  Supple, symmetrical, trachea midline.   Lungs /Chest wall:   Bilateral basal rhonchi, respirations unlabored, symmetrical wall movement.     Heart:  Regular rate and rhythm, S1 S2 normal  Abdomen: Soft, non-tender, no masses, no organomegaly.    Extremities: No edema, no clubbing or cyanosis      ROS  Constitutional: Negative for chills, fever and malaise/fatigue.   HENT: Negative.    Eyes: Negative.    Cardiovascular: Negative.    Respiratory: Positive for cough and shortness of breath.    Skin: Negative.    Musculoskeletal: Negative.    Gastrointestinal: Negative.    Genitourinary: Negative.    Neurological: Negative.    Psychiatric/Behavioral: Negative.      I reviewed the recent clinical results  I personally reviewed the latest radiological studies    Part of this note may be an electronic transcription/translation of spoken language to printed text using the Dragon Dictation System.

## 2024-07-15 NOTE — PROGRESS NOTES
"NEPHROLOGY PROGRESS NOTE------[facility]    [facility]  [phone]  Rolando Miller MD      Patient Care Team:  Anny Garcia MD as PCP - General (Internal Medicine)  Sergio Ordonez MD as Consulting Physician (Nephrology)      Provider:  Rolando Miller MD  Patient Name: Vianey Reeves  :  1945    SUBJECTIVE:  Follow-up CKD  No chest pain, breathing better.    Medication:  acetylcysteine, 3 mL, Nebulization, BID - RT  amLODIPine, 10 mg, Oral, Daily  aspirin, 81 mg, Oral, Daily  budesonide-formoterol, 2 puff, Inhalation, BID - RT  carvedilol, 6.25 mg, Oral, BID With Meals  doxycycline, 100 mg, Oral, Q12H  enoxaparin, 40 mg, Subcutaneous, Q24H  estradiol, 1 g, Vaginal, Once per day on   ferrous sulfate, 324 mg, Oral, Daily With Breakfast  furosemide, 40 mg, Oral, Daily  gabapentin, 100 mg, Oral, Nightly  guaiFENesin, 1,200 mg, Oral, Q12H  hydrALAZINE, 25 mg, Oral, BID  insulin glargine, 10 Units, Subcutaneous, Nightly  ipratropium-albuterol, 3 mL, Nebulization, 4x Daily - RT  lansoprazole, 30 mg, Oral, Q AM  linaclotide, 145 mcg, Oral, QAM AC  linagliptin, 5 mg, Oral, Daily  methocarbamol, 500 mg, Oral, 4x Daily  methylPREDNISolone sodium succinate, 20 mg, Intravenous, Q12H  polyethylene glycol, 17 g, Oral, Daily  sertraline, 50 mg, Oral, Daily  sodium chloride, 10 mL, Intravenous, Q12H  terazosin, 2 mg, Oral, Nightly           OBJECTIVE    Vital Sign Min/Max for last 24 hours  Temp  Min: 98 °F (36.7 °C)  Max: 98.5 °F (36.9 °C)   BP  Min: 109/60  Max: 161/80   Pulse  Min: 69  Max: 137   Resp  Min: 12  Max: 20   SpO2  Min: 92 %  Max: 100 %   No data recorded   No data recorded     Flowsheet Rows      Flowsheet Row First Filed Value   Admission Height 165.1 cm (65\") Documented at 2024   Admission Weight --            No intake/output data recorded.  I/O last 3 completed shifts:  In: 580 [P.O.:480; " "I.V.:100]  Out: -     Physical Exam:  General Appearance: alert, appears stated age and cooperative  Head: normocephalic, without obvious abnormality and atraumatic  Eyes: conjunctivae and sclerae normal and no icterus  Neck: supple and no JVD  Lungs: Scattered rhonchi  Heart: regular rhythm & normal rate and normal S1, S2  Chest: Wall no abnormalities observed  Abdomen: normal bowel sounds and soft, nontender  Extremities: moves extremities well, no edema, no cyanosis and no redness  Skin: no bleeding, bruising or rash, turgor normal, color normal and no lesions noted  Neurologic: Alert, and oriented. No focal deficits    Labs:    WBC WBC   Date Value Ref Range Status   07/13/2024 6.83 3.40 - 10.80 10*3/mm3 Final      HGB Hemoglobin   Date Value Ref Range Status   07/13/2024 12.5 12.0 - 15.9 g/dL Final      HCT Hematocrit   Date Value Ref Range Status   07/13/2024 40.0 34.0 - 46.6 % Final      Platelets No results found for: \"LABPLAT\"   MCV MCV   Date Value Ref Range Status   07/13/2024 98.0 (H) 79.0 - 97.0 fL Final          Sodium Sodium   Date Value Ref Range Status   07/15/2024 143 136 - 145 mmol/L Final   07/14/2024 141 136 - 145 mmol/L Final   07/13/2024 139 136 - 145 mmol/L Final      Potassium Potassium   Date Value Ref Range Status   07/15/2024 4.4 3.5 - 5.2 mmol/L Final   07/14/2024 4.1 3.5 - 5.2 mmol/L Final   07/13/2024 4.0 3.5 - 5.2 mmol/L Final     Comment:     Specimen hemolyzed.  Result may be falsely elevated.      Chloride Chloride   Date Value Ref Range Status   07/15/2024 108 (H) 98 - 107 mmol/L Final   07/14/2024 107 98 - 107 mmol/L Final   07/13/2024 110 (H) 98 - 107 mmol/L Final      CO2 CO2   Date Value Ref Range Status   07/15/2024 26.8 22.0 - 29.0 mmol/L Final   07/14/2024 25.2 22.0 - 29.0 mmol/L Final   07/13/2024 21.9 (L) 22.0 - 29.0 mmol/L Final      BUN BUN   Date Value Ref Range Status   07/15/2024 30 (H) 8 - 23 mg/dL Final   07/14/2024 33 (H) 8 - 23 mg/dL Final   07/13/2024 25 (H) 8 - " "23 mg/dL Final      Creatinine Creatinine   Date Value Ref Range Status   07/15/2024 1.54 (H) 0.57 - 1.00 mg/dL Final   07/14/2024 1.59 (H) 0.57 - 1.00 mg/dL Final   07/13/2024 1.47 (H) 0.57 - 1.00 mg/dL Final      Calcium Calcium   Date Value Ref Range Status   07/15/2024 9.3 8.6 - 10.5 mg/dL Final   07/14/2024 9.4 8.6 - 10.5 mg/dL Final   07/13/2024 7.8 (L) 8.6 - 10.5 mg/dL Final      PO4 No components found for: \"PO4\"   Albumin Albumin   Date Value Ref Range Status   07/15/2024 3.5 3.5 - 5.2 g/dL Final   07/14/2024 3.7 3.5 - 5.2 g/dL Final   07/13/2024 3.1 (L) 3.5 - 5.2 g/dL Final      Magnesium Magnesium   Date Value Ref Range Status   07/13/2024 2.1 1.6 - 2.4 mg/dL Final      Uric Acid No components found for: \"URIC ACID\"     Imaging Results (Last 72 Hours)       Procedure Component Value Units Date/Time    XR Foot 3+ View Right [[id number]] Collected: 07/12/24 2048     Updated: 07/12/24 2052    Narrative:      XR FOOT 3+ VW RIGHT    Date of Exam: 7/12/2024 8:32 PM EDT    Indication: Concern for fractured 2nd toe    Comparison: 8/29/2020    Findings:  Subtle minimally displaced fracture through the distal aspect of the mid phalanx of the second digit with extension into the DIP joint.  The remainder the visualized osseous structures are intact.  Diffuse osteopenia.  Mild degenerative changes are noted at the midfoot and forefoot.  No focal soft tissue abnormality.  No suspicious erosive changes      Impression:      Impression:  Minimally displaced fracture through the distal aspect of the mid phalanx of the second digit with extension into the DIP joint.      Electronically Signed: Stevo Barry DO    7/12/2024 8:50 PM EDT    Workstation ID: [id number]            Results for orders placed during the hospital encounter of 07/11/24    XR Foot 3+ View Right    Narrative  XR FOOT 3+ VW RIGHT    Date of Exam: 7/12/2024 8:32 PM EDT    Indication: Concern for fractured 2nd toe    Comparison: " 8/29/2020    Findings:  Subtle minimally displaced fracture through the distal aspect of the mid phalanx of the second digit with extension into the DIP joint.  The remainder the visualized osseous structures are intact.  Diffuse osteopenia.  Mild degenerative changes are noted at the midfoot and forefoot.  No focal soft tissue abnormality.  No suspicious erosive changes    Impression  Impression:  Minimally displaced fracture through the distal aspect of the mid phalanx of the second digit with extension into the DIP joint.      Electronically Signed: Stevo Barry DO  7/12/2024 8:50 PM EDT  Workstation ID: [id number]      Results for orders placed in visit on 05/22/24    XR Shoulder 2+ View Left    Narrative  XR SHOULDER 2+ VW LEFT    Date of Exam: 5/22/2024 1:19 PM EDT    Indication: left humerus fx after a fall on 3/4/24    Comparison: 4/24/2024, 3/13/2024.    Findings:  Healing nondisplaced fracture of the greater tuberosity present. Sclerotic changes are seen along the fracture margin.. The acromioclavicular and glenohumeral joints appear intact. No erosions. Acromiohumeral and coracoclavicular distances are  well-maintained. Mild to moderate acromioclavicular and glenohumeral osteoarthritic changes are present. The adjacent lung and ribs appear intact.    Impression  Impression:  Healing nondisplaced fracture of the greater tuberosity, stable in alignment as compared to the previous study.      Electronically Signed: Marisabel Roberts MD  5/23/2024 9:24 AM EDT  Workstation ID: [id number]      Results for orders placed in visit on 04/24/24    XR Shoulder 2+ View Left    Narrative  XR SHOULDER 2+ VW LEFT    Date of Exam: 4/24/2024 12:56 PM EDT    Indication: left shoulder fx after a fall on 3/5/24    Comparison: 3/13/2024 MRI    Findings:  The known nondisplaced fracture of the greater tuberosity is not well seen on this exam. There is no evidence of new or additional fracture. Normal glenohumeral and  acromioclavicular joint alignment. Mild glenohumeral and moderate acromioclavicular joint  arthritis. Partially visualized thoracic vertebroplasty. Soft tissues are unremarkable.    Impression  Impression:  Known nondisplaced greater tuberosity fracture not well seen on this exam. No evidence for acute abnormality of the left shoulder. Chronic findings as above.      Electronically Signed: Lyle Calderón MD  4/26/2024 11:58 AM EDT  Workstation ID: [id number]      Results for orders placed during the hospital encounter of 08/22/23    Doppler Ankle Brachial Index Single Level CAR    Interpretation Summary    Right Conclusion: The right IKE is normal. Normal digital pressures.    Left Conclusion: The left IKE is normal. Mild digital ischemia.        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  Hypertension  Diabetes type 2  Recurrent pneumonia-on IV antibiotics. S/p broch  History of coronary artery disease  COPD  History of ureteral cancer, status post right nephrectomy  Dyspnea-likely due to medical pneumonia.  There may be component of excess volume.  She is not on a diuretic.  Her BNP was elevated.  Added low-dose diuretic       Creatinine stable  Continue Lasix  Likely home today or in am  F/u 2-4 weeks        Rolando Miller MD  [facility]  [phone]  07/15/24  07:57 EDT

## 2024-07-15 NOTE — PROGRESS NOTES
LOS: 4 days   Patient Care Team:  Anny Garcia MD as PCP - General (Internal Medicine)  Sergio Ordonez MD as Consulting Physician (Nephrology)    Subjective     Patient states she is not feeling well this am and has back pain; she would like pain medication    Review of Systems   Constitutional:  Positive for activity change and fatigue.   HENT: Negative.     Respiratory: Negative.     Cardiovascular: Negative.    Gastrointestinal: Negative.    Genitourinary: Negative.    Musculoskeletal:  Positive for back pain and gait problem.   Neurological:  Positive for weakness.   Psychiatric/Behavioral: Negative.             Objective     Vital Signs  Temp:  [98 °F (36.7 °C)-98.5 °F (36.9 °C)] 98.1 °F (36.7 °C)  Heart Rate:  [] 89  Resp:  [12-20] 17  BP: (109-161)/() 144/98      Physical Exam  Vitals reviewed.   Constitutional:       Appearance: She is not ill-appearing.   HENT:      Head: Normocephalic and atraumatic.      Right Ear: External ear normal.      Left Ear: External ear normal.      Nose: Nose normal.      Mouth/Throat:      Mouth: Mucous membranes are moist.   Eyes:      General:         Right eye: No discharge.         Left eye: No discharge.   Cardiovascular:      Rate and Rhythm: Normal rate and regular rhythm.      Pulses: Normal pulses.      Heart sounds: Normal heart sounds.   Pulmonary:      Effort: Pulmonary effort is normal.      Breath sounds: Normal breath sounds.   Abdominal:      General: Bowel sounds are normal.      Palpations: Abdomen is soft.   Musculoskeletal:         General: Normal range of motion.      Cervical back: Normal range of motion.   Skin:     General: Skin is warm.   Neurological:      Mental Status: She is alert and oriented to person, place, and time.   Psychiatric:         Behavior: Behavior normal.              Results Review:    Lab Results (last 24 hours)       Procedure Component Value Units Date/Time    POC Glucose 4x Daily Before Meals & at  Bedtime [[id number]]  (Abnormal) Collected: 07/15/24 0802    Specimen: Blood Updated: 07/15/24 0806     Glucose 167 mg/dL      Comment: Serial Number: [id number]:  [id number]       Renal Function Panel [[id number]]  (Abnormal) Collected: 07/15/24 0447    Specimen: Blood from Arm, Right Updated: 07/15/24 0533     Glucose 124 mg/dL      BUN 30 mg/dL      Creatinine 1.54 mg/dL      Sodium 143 mmol/L      Potassium 4.4 mmol/L      Chloride 108 mmol/L      CO2 26.8 mmol/L      Calcium 9.3 mg/dL      Albumin 3.5 g/dL      Phosphorus 3.3 mg/dL      Anion Gap 8.2 mmol/L      BUN/Creatinine Ratio 19.5     eGFR 34.0 mL/min/1.73     Narrative:      GFR Normal >60  Chronic Kidney Disease <60  Kidney Failure <15    The GFR formula is only valid for adults with stable renal function between ages 18 and 70.    Blood Culture - Blood, Arm, Left [[id number]]  (Normal) Collected: 07/12/24 0010    Specimen: Blood from Arm, Left Updated: 07/15/24 0100     Blood Culture No growth at 3 days    Blood Culture - Blood, Arm, Right [[id number]]  (Normal) Collected: 07/12/24 0016    Specimen: Blood from Arm, Right Updated: 07/15/24 0045     Blood Culture No growth at 3 days    POC Glucose Once [[id number]]  (Abnormal) Collected: 07/14/24 2009    Specimen: Blood Updated: 07/14/24 2012     Glucose 293 mg/dL      Comment: Serial Number: [id number]:  [id number]       POC Glucose Finger 4x Daily Before Meals & at Bedtime [[id number]]  (Abnormal) Collected: 07/14/24 1721    Specimen: Blood from Finger Updated: 07/14/24 1723     Glucose 259 mg/dL      Comment: Serial Number: [id number]:  [id number]       BAL Culture, Quantitative - Lavage, Lung, Right Middle Lobe [[id number]] Collected: 07/14/24 1048    Specimen: Lavage from Lung, Right Middle Lobe Updated: 07/14/24 1253     Gram Stain Moderate (3+) WBCs per low power field      Moderate (3+) Epithelial cells per low power field      Moderate (3+) Gram positive cocci in pairs and clusters     Respiratory Panel PCR w/COVID-19(SARS-CoV-2) NITA/KRISETN/NATE/PAD/COR/DYLON In-House, NP Swab in UTM/VTM, 2 HR TAT - Lavage, Lung, Right Middle Lobe [[id number]]  (Abnormal) Collected: 07/14/24 1048    Specimen: Lavage from Lung, Right Middle Lobe Updated: 07/14/24 1212     ADENOVIRUS, PCR Not Detected     Coronavirus 229E Not Detected     Coronavirus HKU1 Not Detected     Coronavirus NL63 Not Detected     Coronavirus OC43 Not Detected     COVID19 Not Detected     Human Metapneumovirus Not Detected     Human Rhinovirus/Enterovirus Detected     Influenza A PCR Not Detected     Influenza B PCR Not Detected     Parainfluenza Virus 1 Not Detected     Parainfluenza Virus 2 Not Detected     Parainfluenza Virus 3 Not Detected     Parainfluenza Virus 4 Not Detected     RSV, PCR Not Detected     Bordetella pertussis pcr Not Detected     Bordetella parapertussis PCR Not Detected     Chlamydophila pneumoniae PCR Not Detected     Mycoplasma pneumo by PCR Not Detected    Narrative:      In the setting of a positive respiratory panel with a viral infection PLUS a negative procalcitonin without other underlying concern for bacterial infection, consider observing off antibiotics or discontinuation of antibiotics and continue supportive care. If the respiratory panel is positive for atypical bacterial infection (Bordetella pertussis, Chlamydophila pneumoniae, or Mycoplasma pneumoniae), consider antibiotic de-escalation to target atypical bacterial infection.    POC Glucose 4x Daily Before Meals & at Bedtime [[id number]]  (Abnormal) Collected: 07/14/24 1143    Specimen: Blood Updated: 07/14/24 1149     Glucose 134 mg/dL      Comment: Serial Number: [id number]:  [id number]       Fungus Culture - Lavage, Lung, Right Middle Lobe [[id number]] Collected: 07/14/24 1048    Specimen: Lavage from Lung, Right Middle Lobe Updated: 07/14/24 1118    AFB Culture - Lavage, Lung, Right Middle Lobe [[id number]] Collected: 07/14/24 1048    Specimen:  Lavage from Lung, Right Middle Lobe Updated: 07/14/24 1118    Pneumocystis PCR - Lavage, Lung, Right Middle Lobe [[id number]] Collected: 07/14/24 1048    Specimen: Lavage from Lung, Right Middle Lobe Updated: 07/14/24 1118    POC Glucose Once [[id number]]  (Abnormal) Collected: 07/14/24 0958    Specimen: Blood Updated: 07/14/24 1001     Glucose 148 mg/dL      Comment: Serial Number: [id number]:  [id number]                Imaging Results (Last 24 Hours)       ** No results found for the last 24 hours. **                 I reviewed the patient's new clinical results.    Medication Review:   Scheduled Meds:acetylcysteine, 3 mL, Nebulization, BID - RT  amLODIPine, 10 mg, Oral, Daily  aspirin, 81 mg, Oral, Daily  budesonide-formoterol, 2 puff, Inhalation, BID - RT  carvedilol, 6.25 mg, Oral, BID With Meals  doxycycline, 100 mg, Oral, Q12H  enoxaparin, 40 mg, Subcutaneous, Q24H  estradiol, 1 g, Vaginal, Once per day on Monday Wednesday Friday  ferrous sulfate, 324 mg, Oral, Daily With Breakfast  furosemide, 40 mg, Oral, Daily  gabapentin, 100 mg, Oral, Nightly  guaiFENesin, 1,200 mg, Oral, Q12H  hydrALAZINE, 25 mg, Oral, BID  insulin glargine, 10 Units, Subcutaneous, Nightly  ipratropium-albuterol, 3 mL, Nebulization, 4x Daily - RT  lansoprazole, 30 mg, Oral, Q AM  linaclotide, 145 mcg, Oral, QAM AC  linagliptin, 5 mg, Oral, Daily  methocarbamol, 500 mg, Oral, 4x Daily  methylPREDNISolone sodium succinate, 20 mg, Intravenous, Q12H  polyethylene glycol, 17 g, Oral, Daily  sertraline, 50 mg, Oral, Daily  sodium chloride, 10 mL, Intravenous, Q12H  terazosin, 2 mg, Oral, Nightly      Continuous Infusions:   PRN Meds:.  senna-docusate sodium **AND** polyethylene glycol **AND** bisacodyl **AND** bisacodyl    dextrose    dextrose    Diclofenac Sodium    glucagon (human recombinant)    ipratropium-albuterol    ondansetron    sodium chloride    sodium chloride    traMADol     Interval History:    Assessment & Plan      Recurrent pneumonia  Multiple tracheobronchial mucus plugs  Rhinovirus  COPD  -s/p bronchoscopy on 7/14 with mucous plugging and therapeutic lavage  -abx, nebs,  steroid taper  - pulmonary following, status post bronchoscopy 7/14/2024, follow BAL  -Mucomyst nebulized with albuterol, and Mucinex  -Inhaled corticosteroids  -Encourage use of incentive spirometry and flutter valve     RT mid Toe fx  -right foot accident in wheelchair this admission  -right distal aspect of mid phalanx of 2nd digit with ext into DIP joint  -conservative treatment  -surgical shoe for rt foot  -PT to see for evaluation and equipment needs      CKD III  HX ureteral cancer, s/p right neprhectomy  -nephrology following    DM II  -lantus/tradjenta    Chronic back pain  -prn meds    HTN with CKD3   CAD  -norvasc/coreg/lasix/hydralazine/asa    Mood disorder  -zoloft    Anemia  -oral iron    Plan for disposition:ANABELL Ervin, SAMIRA  07/15/24  09:32 EDT

## 2024-07-16 LAB
ALBUMIN SERPL-MCNC: 3.5 G/DL (ref 3.5–5.2)
ANION GAP SERPL CALCULATED.3IONS-SCNC: 8.4 MMOL/L (ref 5–15)
BUN SERPL-MCNC: 36 MG/DL (ref 8–23)
BUN/CREAT SERPL: 21.1 (ref 7–25)
CALCIUM SPEC-SCNC: 9.2 MG/DL (ref 8.6–10.5)
CHLORIDE SERPL-SCNC: 107 MMOL/L (ref 98–107)
CO2 SERPL-SCNC: 25.6 MMOL/L (ref 22–29)
CREAT SERPL-MCNC: 1.71 MG/DL (ref 0.57–1)
EGFRCR SERPLBLD CKD-EPI 2021: 30 ML/MIN/1.73
GLUCOSE BLDC GLUCOMTR-MCNC: 102 MG/DL (ref 70–105)
GLUCOSE BLDC GLUCOMTR-MCNC: 250 MG/DL (ref 70–105)
GLUCOSE BLDC GLUCOMTR-MCNC: 310 MG/DL (ref 70–105)
GLUCOSE SERPL-MCNC: 85 MG/DL (ref 65–99)
LAB AP CASE REPORT: NORMAL
PATH REPORT.FINAL DX SPEC: NORMAL
PATH REPORT.GROSS SPEC: NORMAL
PHOSPHATE SERPL-MCNC: 3.5 MG/DL (ref 2.5–4.5)
POTASSIUM SERPL-SCNC: 4.7 MMOL/L (ref 3.5–5.2)
SODIUM SERPL-SCNC: 141 MMOL/L (ref 136–145)

## 2024-07-16 PROCEDURE — 63710000001 INSULIN LISPRO (HUMAN) PER 5 UNITS: Performed by: INTERNAL MEDICINE

## 2024-07-16 PROCEDURE — 63710000001 PREDNISONE PER 1 MG: Performed by: NURSE PRACTITIONER

## 2024-07-16 PROCEDURE — 63710000001 INSULIN GLARGINE PER 5 UNITS: Performed by: INTERNAL MEDICINE

## 2024-07-16 PROCEDURE — 94761 N-INVAS EAR/PLS OXIMETRY MLT: CPT

## 2024-07-16 PROCEDURE — 82948 REAGENT STRIP/BLOOD GLUCOSE: CPT | Performed by: INTERNAL MEDICINE

## 2024-07-16 PROCEDURE — 82948 REAGENT STRIP/BLOOD GLUCOSE: CPT

## 2024-07-16 PROCEDURE — 94799 UNLISTED PULMONARY SVC/PX: CPT

## 2024-07-16 PROCEDURE — 80069 RENAL FUNCTION PANEL: CPT | Performed by: INTERNAL MEDICINE

## 2024-07-16 PROCEDURE — 94664 DEMO&/EVAL PT USE INHALER: CPT

## 2024-07-16 PROCEDURE — 25010000002 ENOXAPARIN PER 10 MG: Performed by: INTERNAL MEDICINE

## 2024-07-16 RX ORDER — INSULIN LISPRO 100 [IU]/ML
2-9 INJECTION, SOLUTION INTRAVENOUS; SUBCUTANEOUS
Status: DISCONTINUED | OUTPATIENT
Start: 2024-07-16 | End: 2024-07-17 | Stop reason: DRUGHIGH

## 2024-07-16 RX ADMIN — Medication 10 ML: at 21:48

## 2024-07-16 RX ADMIN — INSULIN LISPRO 2 UNITS: 100 INJECTION, SOLUTION INTRAVENOUS; SUBCUTANEOUS at 21:47

## 2024-07-16 RX ADMIN — METHOCARBAMOL 500 MG: 500 TABLET ORAL at 17:26

## 2024-07-16 RX ADMIN — LANSOPRAZOLE 30 MG: 30 TABLET, ORALLY DISINTEGRATING, DELAYED RELEASE ORAL at 05:46

## 2024-07-16 RX ADMIN — HYDRALAZINE HYDROCHLORIDE 25 MG: 25 TABLET ORAL at 09:05

## 2024-07-16 RX ADMIN — LINAGLIPTIN 5 MG: 5 TABLET, FILM COATED ORAL at 09:05

## 2024-07-16 RX ADMIN — INSULIN GLARGINE 10 UNITS: 100 INJECTION, SOLUTION SUBCUTANEOUS at 21:47

## 2024-07-16 RX ADMIN — BUDESONIDE AND FORMOTEROL FUMARATE DIHYDRATE 2 PUFF: 160; 4.5 AEROSOL RESPIRATORY (INHALATION) at 19:05

## 2024-07-16 RX ADMIN — AMLODIPINE BESYLATE 10 MG: 5 TABLET ORAL at 09:05

## 2024-07-16 RX ADMIN — IPRATROPIUM BROMIDE AND ALBUTEROL SULFATE 3 ML: .5; 3 SOLUTION RESPIRATORY (INHALATION) at 06:09

## 2024-07-16 RX ADMIN — BUDESONIDE AND FORMOTEROL FUMARATE DIHYDRATE 2 PUFF: 160; 4.5 AEROSOL RESPIRATORY (INHALATION) at 06:15

## 2024-07-16 RX ADMIN — IPRATROPIUM BROMIDE AND ALBUTEROL SULFATE 3 ML: .5; 3 SOLUTION RESPIRATORY (INHALATION) at 10:09

## 2024-07-16 RX ADMIN — TRAMADOL HYDROCHLORIDE 100 MG: 50 TABLET ORAL at 21:56

## 2024-07-16 RX ADMIN — ASPIRIN 81 MG: 81 TABLET, COATED ORAL at 09:05

## 2024-07-16 RX ADMIN — HYDRALAZINE HYDROCHLORIDE 25 MG: 25 TABLET ORAL at 21:48

## 2024-07-16 RX ADMIN — IPRATROPIUM BROMIDE AND ALBUTEROL SULFATE 3 ML: .5; 3 SOLUTION RESPIRATORY (INHALATION) at 19:05

## 2024-07-16 RX ADMIN — ENOXAPARIN SODIUM 40 MG: 100 INJECTION SUBCUTANEOUS at 16:07

## 2024-07-16 RX ADMIN — CARVEDILOL 6.25 MG: 6.25 TABLET, FILM COATED ORAL at 17:26

## 2024-07-16 RX ADMIN — PREDNISONE 20 MG: 20 TABLET ORAL at 07:43

## 2024-07-16 RX ADMIN — GABAPENTIN 100 MG: 100 CAPSULE ORAL at 21:48

## 2024-07-16 RX ADMIN — CARVEDILOL 6.25 MG: 6.25 TABLET, FILM COATED ORAL at 07:41

## 2024-07-16 RX ADMIN — METHOCARBAMOL 500 MG: 500 TABLET ORAL at 21:48

## 2024-07-16 RX ADMIN — FERROUS SULFATE TAB EC 324 MG (65 MG FE EQUIVALENT) 324 MG: 324 (65 FE) TABLET DELAYED RESPONSE at 07:43

## 2024-07-16 RX ADMIN — TERAZOSIN HYDROCHLORIDE 2 MG: 2 CAPSULE ORAL at 21:48

## 2024-07-16 RX ADMIN — TRAMADOL HYDROCHLORIDE 100 MG: 50 TABLET ORAL at 09:10

## 2024-07-16 RX ADMIN — Medication 10 ML: at 09:04

## 2024-07-16 RX ADMIN — GUAIFENESIN 1200 MG: 600 TABLET, EXTENDED RELEASE ORAL at 21:48

## 2024-07-16 RX ADMIN — INSULIN LISPRO 4 UNITS: 100 INJECTION, SOLUTION INTRAVENOUS; SUBCUTANEOUS at 11:40

## 2024-07-16 RX ADMIN — FUROSEMIDE 40 MG: 40 TABLET ORAL at 09:05

## 2024-07-16 RX ADMIN — DOXYCYCLINE 100 MG: 100 CAPSULE ORAL at 09:05

## 2024-07-16 RX ADMIN — INSULIN LISPRO 7 UNITS: 100 INJECTION, SOLUTION INTRAVENOUS; SUBCUTANEOUS at 17:26

## 2024-07-16 RX ADMIN — IPRATROPIUM BROMIDE AND ALBUTEROL SULFATE 3 ML: .5; 3 SOLUTION RESPIRATORY (INHALATION) at 14:55

## 2024-07-16 RX ADMIN — SERTRALINE 50 MG: 50 TABLET, FILM COATED ORAL at 09:05

## 2024-07-16 RX ADMIN — GUAIFENESIN 1200 MG: 600 TABLET, EXTENDED RELEASE ORAL at 09:05

## 2024-07-16 RX ADMIN — METHOCARBAMOL 500 MG: 500 TABLET ORAL at 07:44

## 2024-07-16 RX ADMIN — METHOCARBAMOL 500 MG: 500 TABLET ORAL at 11:40

## 2024-07-16 NOTE — PROGRESS NOTES
LOS: 5 days   Patient Care Team:  Anny Garcia MD as PCP - General (Internal Medicine)  Sergio Ordonez MD as Consulting Physician (Nephrology)    Subjective     Interval History: Stable overnight    Patient Complaints: Generally weak, continues to have cough and wheezing    History taken from: patient    Review of Systems   Constitutional:  Positive for activity change, appetite change and fatigue. Negative for chills, diaphoresis and fever.   HENT:  Negative for facial swelling and sore throat.    Eyes:  Negative for visual disturbance.   Respiratory:  Positive for cough, shortness of breath and wheezing.    Cardiovascular:  Negative for chest pain, palpitations and leg swelling.   Gastrointestinal:  Negative for abdominal pain, constipation, diarrhea, nausea and vomiting.   Endocrine: Negative for polyuria.   Genitourinary:  Negative for dysuria.   Musculoskeletal:  Positive for arthralgias, back pain and gait problem.   Skin:  Negative for rash and wound.   Neurological:  Negative for dizziness, tremors, weakness, light-headedness and numbness.   Psychiatric/Behavioral:  Negative for confusion.            Objective     Vital Signs  Temp:  [97.7 °F (36.5 °C)-98.2 °F (36.8 °C)] 97.9 °F (36.6 °C)  Heart Rate:  [] 83  Resp:  [12-26] 14  BP: (110-123)/(51-79) 123/69    Physical Exam:     General Appearance:    Alert, cooperative, in no acute distress, chronically ill-appearing   Head:    Normocephalic, without obvious abnormality, atraumatic   Eyes:            Lids and lashes normal, conjunctivae and sclerae normal, no   icterus, no pallor, corneas clear, PERRLA   Ears:    Ears appear intact with no abnormalities noted   Throat:   No oral lesions, no thrush, oral mucosa moist   Neck:   No adenopathy, supple, trachea midline, no thyromegaly, no   carotid bruit, no JVD   Lungs:   Diffuse wheezing and rhonchi    Heart:    Regular rhythm and normal rate, normal S1 and S2, no            murmur, no  gallop, no rub, no click   Chest Wall:    No abnormalities observed   Abdomen:     Normal bowel sounds, no masses, no organomegaly, soft        Non-tender non-distended, no guarding,   Extremities:   Moves all extremities well, no edema, no cyanosis, no             Redness   Pulses:   Pulses palpable and equal bilaterally   Skin:   No bleeding, bruising or rash   Lymph nodes:   No palpable adenopathy   Neurologic:   Cranial nerves 2 - 12 grossly intact, sensation intact, DTR       present and equal bilaterally        Results Review:    Lab Results (last 24 hours)       Procedure Component Value Units Date/Time    POC Glucose 4x Daily Before Meals & at Bedtime [[id number]]  (Abnormal) Collected: 07/16/24 1121    Specimen: Blood Updated: 07/16/24 1122     Glucose 250 mg/dL      Comment: Serial Number: [id number]:  [id number]       BAL Culture, Quantitative - Lavage, Lung, Right Middle Lobe [[id number]] Collected: 07/14/24 1048    Specimen: Lavage from Lung, Right Middle Lobe Updated: 07/16/24 1053     BAL Culture >100,000 CFU/mL The culture consists of normal respiratory jaden. This is a preliminary report; final report to follow.     Gram Stain Moderate (3+) WBCs per low power field      Moderate (3+) Epithelial cells per low power field      Moderate (3+) Gram positive cocci in pairs and clusters    Narrative:      Organism under investigation.7/16/24        Non-gynecologic Cytology [[id number]] Collected: 07/14/24 1048    Specimen: Lavage from Lung, Right Middle Lobe Updated: 07/16/24 1040     Case Report --     Medical Cytology Report                           Case: [id number]                                  Authorizing Provider:  Marlin Ferguson MD         Collected:           07/14/2024 10:48 AM          Ordering Location:     [facility]  Received:            07/15/2024 11:08 AM                                 SUITES                                                                      "  Pathologist:           Wilder Max MD                                                            Specimen:    Lung, Right Middle Lobe                                                                     Final Diagnosis --     Right middle lobe, bronchial wash with smears and cytospin:  Acute inflammation, mature squamous cells, pulmonary macrophages and bronchial epithelial cells  No malignancy identified    CLAUDIO       Gross Description --     1. Lung, Right Middle Lobe.  Received in carbowax and designated \"Lung, Right Middle Lobe\" are 50 mL of cloudy dark green-colored fluid. Particulate matter is present. This specimen is processed as per protocol.          POC Glucose Once [[id number]]  (Normal) Collected: 07/16/24 0714    Specimen: Blood Updated: 07/16/24 0716     Glucose 102 mg/dL      Comment: Serial Number: [id number]:  [id number]       Renal Function Panel [[id number]]  (Abnormal) Collected: 07/16/24 0453    Specimen: Blood Updated: 07/16/24 0550     Glucose 85 mg/dL      BUN 36 mg/dL      Creatinine 1.71 mg/dL      Sodium 141 mmol/L      Potassium 4.7 mmol/L      Comment: Specimen hemolyzed.  Result may be falsely elevated.        Chloride 107 mmol/L      CO2 25.6 mmol/L      Calcium 9.2 mg/dL      Albumin 3.5 g/dL      Phosphorus 3.5 mg/dL      Anion Gap 8.4 mmol/L      BUN/Creatinine Ratio 21.1     eGFR 30.0 mL/min/1.73     Narrative:      GFR Normal >60  Chronic Kidney Disease <60  Kidney Failure <15    The GFR formula is only valid for adults with stable renal function between ages 18 and 70.    Blood Culture - Blood, Arm, Left [[id number]]  (Normal) Collected: 07/12/24 0010    Specimen: Blood from Arm, Left Updated: 07/16/24 0100     Blood Culture No growth at 4 days    Blood Culture - Blood, Arm, Right [[id number]]  (Normal) Collected: 07/12/24 0016    Specimen: Blood from Arm, Right Updated: 07/16/24 0045     Blood Culture No growth at 4 days    POC Glucose Once [[id number]]  (Abnormal) " Collected: 07/15/24 2014    Specimen: Blood Updated: 07/15/24 2016     Glucose 192 mg/dL      Comment: Serial Number: [id number]:  [id number]       POC Glucose Once [[id number]]  (Abnormal) Collected: 07/15/24 1644    Specimen: Blood Updated: 07/15/24 1647     Glucose 228 mg/dL      Comment: Serial Number: [id number]:  [id number]                Imaging Results (Last 24 Hours)       ** No results found for the last 24 hours. **                 I reviewed the patient's new clinical results.    Medication Review:   Scheduled Meds:amLODIPine, 10 mg, Oral, Daily  aspirin, 81 mg, Oral, Daily  budesonide-formoterol, 2 puff, Inhalation, BID - RT  carvedilol, 6.25 mg, Oral, BID With Meals  enoxaparin, 40 mg, Subcutaneous, Q24H  estradiol, 1 g, Vaginal, Once per day on Monday Wednesday Friday  ferrous sulfate, 324 mg, Oral, Daily With Breakfast  gabapentin, 100 mg, Oral, Nightly  guaiFENesin, 1,200 mg, Oral, Q12H  hydrALAZINE, 25 mg, Oral, BID  insulin glargine, 10 Units, Subcutaneous, Nightly  insulin lispro, 2-7 Units, Subcutaneous, 4x Daily AC & at Bedtime  ipratropium-albuterol, 3 mL, Nebulization, 4x Daily - RT  lansoprazole, 30 mg, Oral, Q AM  linaclotide, 145 mcg, Oral, QAM AC  linagliptin, 5 mg, Oral, Daily  methocarbamol, 500 mg, Oral, 4x Daily  polyethylene glycol, 17 g, Oral, Daily  predniSONE, 20 mg, Oral, Daily With Breakfast  sertraline, 50 mg, Oral, Daily  sodium chloride, 10 mL, Intravenous, Q12H  terazosin, 2 mg, Oral, Nightly      Continuous Infusions:   PRN Meds:.  senna-docusate sodium **AND** polyethylene glycol **AND** bisacodyl **AND** bisacodyl    dextrose    dextrose    Diclofenac Sodium    glucagon (human recombinant)    ipratropium-albuterol    ondansetron    oxyCODONE    sodium chloride    sodium chloride    traMADol     Assessment & Plan       Recurrent pneumonia    Multiple tracheobronchial mucus plugs  Rhinovirus infection  COPD  Osteoporosis  Essential hypertension  Type 2  diabetes  GERD  Chronic constipation    She is hemodynamically stable and slowly improving.  She is still quite weak.  Given that she lives alone, has an oxygen requirement with significant cough we will  treat her in the hospital for 1 more day.  This will also allow us to follow-up on the cultures from her bronchoscopy yesterday.  Anticipate discharge home tomorrow with home health and close family attention.    Lovenox for dvt prophy    Plan for disposition:home with home health    Anny Garcia MD  07/16/24  13:47 EDT

## 2024-07-16 NOTE — PROGRESS NOTES
Daily Progress Note          Assessment    Recurrent pneumonia  bronchoscopy 7/14/2024 showed mucous plugging right middle lobe and reactive airway disease    History of COVID-19 infection 1/14/2024 with resulting organizing pneumonia in the right middle lobe.    Chronic cough, multiple mucous plugs  Hypoxemia  Hypertension  DM  CKD  Snoring, excessive daytime sleepiness: Symptoms suggestive of FERNANDO but no prior sleep study        Recommendations:    Oxygen supplement and titration to maintain saturation 90 to 95%  Currently on 3 L    Mucomyst nebulized  Mucinex  Inhaled corticosteroids  Encourage use of incentive spirometry and flutter valve    Bronchodilators     Aspirin, BP control, glucose control  DVT prophylaxis  Check daily labs and correct electrolytes as needed  I personally reviewed the radiological studies             LOS: 5 days     Subjective     Reports cough and shortness of breath    Objective     Vital signs for last 24 hours:  Vitals:    07/16/24 0614 07/16/24 0615 07/16/24 0618 07/16/24 0741   BP:    120/56   BP Location:    Left arm   Patient Position:    Lying   Pulse: 83 84 88 76   Resp: 16 16 16 26   Temp:    97.9 °F (36.6 °C)   TempSrc:    Oral   SpO2: 99% 99% 96%    Weight:       Height:           Intake/Output last 3 shifts:  I/O last 3 completed shifts:  In: 240 [P.O.:240]  Out: -   Intake/Output this shift:  No intake/output data recorded.      Radiology  Imaging Results (Last 24 Hours)       ** No results found for the last 24 hours. **            Labs:  Results from last 7 days   Lab Units 07/13/24  0622   WBC 10*3/mm3 6.83   HEMOGLOBIN g/dL 12.5   HEMATOCRIT % 40.0   PLATELETS 10*3/mm3 189     Results from last 7 days   Lab Units 07/16/24  0453   SODIUM mmol/L 141   POTASSIUM mmol/L 4.7   CHLORIDE mmol/L 107   CO2 mmol/L 25.6   BUN mg/dL 36*   CREATININE mg/dL 1.71*   CALCIUM mg/dL 9.2   GLUCOSE mg/dL 85         Results from last 7 days   Lab Units 07/16/24  0453 07/15/24  0447  07/14/24  0553   ALBUMIN g/dL 3.5 3.5 3.7             Results from last 7 days   Lab Units 07/13/24  0622   MAGNESIUM mg/dL 2.1                   Meds:   SCHEDULE  amLODIPine, 10 mg, Oral, Daily  aspirin, 81 mg, Oral, Daily  budesonide-formoterol, 2 puff, Inhalation, BID - RT  carvedilol, 6.25 mg, Oral, BID With Meals  doxycycline, 100 mg, Oral, Q12H  enoxaparin, 40 mg, Subcutaneous, Q24H  estradiol, 1 g, Vaginal, Once per day on Monday Wednesday Friday  ferrous sulfate, 324 mg, Oral, Daily With Breakfast  furosemide, 40 mg, Oral, Daily  gabapentin, 100 mg, Oral, Nightly  guaiFENesin, 1,200 mg, Oral, Q12H  hydrALAZINE, 25 mg, Oral, BID  insulin glargine, 10 Units, Subcutaneous, Nightly  insulin lispro, 2-7 Units, Subcutaneous, 4x Daily AC & at Bedtime  ipratropium-albuterol, 3 mL, Nebulization, 4x Daily - RT  lansoprazole, 30 mg, Oral, Q AM  linaclotide, 145 mcg, Oral, QAM AC  linagliptin, 5 mg, Oral, Daily  methocarbamol, 500 mg, Oral, 4x Daily  polyethylene glycol, 17 g, Oral, Daily  predniSONE, 20 mg, Oral, Daily With Breakfast  sertraline, 50 mg, Oral, Daily  sodium chloride, 10 mL, Intravenous, Q12H  terazosin, 2 mg, Oral, Nightly      Infusions     PRNs    senna-docusate sodium **AND** polyethylene glycol **AND** bisacodyl **AND** bisacodyl    dextrose    dextrose    Diclofenac Sodium    glucagon (human recombinant)    ipratropium-albuterol    ondansetron    oxyCODONE    sodium chloride    sodium chloride    traMADol    Physical Exam:  General Appearance:  Alert   HEENT:  Normocephalic, without obvious abnormality, Conjunctiva/corneas clear,.   Nares normal, no drainage     Neck:  Supple, symmetrical, trachea midline.   Lungs /Chest wall:   Bilateral basal rhonchi, respirations unlabored, symmetrical wall movement.     Heart:  Regular rate and rhythm, S1 S2 normal  Abdomen: Soft, non-tender, no masses, no organomegaly.    Extremities: No edema, no clubbing or cyanosis     ROS  Constitutional: Negative for  chills, fever and malaise/fatigue.   HENT: Negative.    Eyes: Negative.    Cardiovascular: Negative.    Respiratory: Positive for cough and shortness of breath.    Skin: Negative.    Musculoskeletal: Negative.    Gastrointestinal: Negative.    Genitourinary: Negative.    Neurological: Negative.    Psychiatric/Behavioral: Negative.      I reviewed the recent clinical results  I personally reviewed the latest radiological studies    Part of this note may be an electronic transcription/translation of spoken language to printed text using the Dragon Dictation System.

## 2024-07-16 NOTE — PROGRESS NOTES
"NEPHROLOGY PROGRESS NOTE------[facility]    [facility]  [phone]  Rolando Miller MD      Patient Care Team:  Anny Garcia MD as PCP - General (Internal Medicine)  Sergio Ordonez MD as Consulting Physician (Nephrology)      Provider:  Rolando Miller MD  Patient Name: Vianey Reeves  :  1945    SUBJECTIVE:  Follow-up CKD  No chest pain, breathing better.    Medication:  amLODIPine, 10 mg, Oral, Daily  aspirin, 81 mg, Oral, Daily  budesonide-formoterol, 2 puff, Inhalation, BID - RT  carvedilol, 6.25 mg, Oral, BID With Meals  enoxaparin, 40 mg, Subcutaneous, Q24H  estradiol, 1 g, Vaginal, Once per day on   ferrous sulfate, 324 mg, Oral, Daily With Breakfast  furosemide, 40 mg, Oral, Daily  gabapentin, 100 mg, Oral, Nightly  guaiFENesin, 1,200 mg, Oral, Q12H  hydrALAZINE, 25 mg, Oral, BID  insulin glargine, 10 Units, Subcutaneous, Nightly  insulin lispro, 2-7 Units, Subcutaneous, 4x Daily AC & at Bedtime  ipratropium-albuterol, 3 mL, Nebulization, 4x Daily - RT  lansoprazole, 30 mg, Oral, Q AM  linaclotide, 145 mcg, Oral, QAM AC  linagliptin, 5 mg, Oral, Daily  methocarbamol, 500 mg, Oral, 4x Daily  polyethylene glycol, 17 g, Oral, Daily  predniSONE, 20 mg, Oral, Daily With Breakfast  sertraline, 50 mg, Oral, Daily  sodium chloride, 10 mL, Intravenous, Q12H  terazosin, 2 mg, Oral, Nightly           OBJECTIVE    Vital Sign Min/Max for last 24 hours  Temp  Min: 97.7 °F (36.5 °C)  Max: 98.2 °F (36.8 °C)   BP  Min: 110/51  Max: 123/69   Pulse  Min: 75  Max: 104   Resp  Min: 12  Max: 26   SpO2  Min: 91 %  Max: 100 %   No data recorded   Weight  Min: 74.8 kg (165 lb)  Max: 74.8 kg (165 lb)     Flowsheet Rows      Flowsheet Row First Filed Value   Admission Height 165.1 cm (65\") Documented at 2024   Admission Weight --            No intake/output data recorded.  I/O last 3 completed shifts:  In: 240 " "[P.O.:240]  Out: -     Physical Exam:  General Appearance: alert, appears stated age and cooperative  Head: normocephalic, without obvious abnormality and atraumatic  Eyes: conjunctivae and sclerae normal and no icterus  Neck: supple and no JVD  Lungs: Scattered rhonchi  Heart: regular rhythm & normal rate and normal S1, S2  Chest: Wall no abnormalities observed  Abdomen: normal bowel sounds and soft, nontender  Extremities: moves extremities well, no edema, no cyanosis and no redness  Skin: no bleeding, bruising or rash, turgor normal, color normal and no lesions noted  Neurologic: Alert, and oriented. No focal deficits    Labs:    WBC No results found for: \"WBC\"     HGB No results found for: \"HGB\"     HCT No results found for: \"HCT\"     Platelets No results found for: \"LABPLAT\"   MCV No results found for: \"MCV\"         Sodium Sodium   Date Value Ref Range Status   07/16/2024 141 136 - 145 mmol/L Final   07/15/2024 143 136 - 145 mmol/L Final   07/14/2024 141 136 - 145 mmol/L Final      Potassium Potassium   Date Value Ref Range Status   07/16/2024 4.7 3.5 - 5.2 mmol/L Final     Comment:     Specimen hemolyzed.  Result may be falsely elevated.   07/15/2024 4.4 3.5 - 5.2 mmol/L Final   07/14/2024 4.1 3.5 - 5.2 mmol/L Final      Chloride Chloride   Date Value Ref Range Status   07/16/2024 107 98 - 107 mmol/L Final   07/15/2024 108 (H) 98 - 107 mmol/L Final   07/14/2024 107 98 - 107 mmol/L Final      CO2 CO2   Date Value Ref Range Status   07/16/2024 25.6 22.0 - 29.0 mmol/L Final   07/15/2024 26.8 22.0 - 29.0 mmol/L Final   07/14/2024 25.2 22.0 - 29.0 mmol/L Final      BUN BUN   Date Value Ref Range Status   07/16/2024 36 (H) 8 - 23 mg/dL Final   07/15/2024 30 (H) 8 - 23 mg/dL Final   07/14/2024 33 (H) 8 - 23 mg/dL Final      Creatinine Creatinine   Date Value Ref Range Status   07/16/2024 1.71 (H) 0.57 - 1.00 mg/dL Final   07/15/2024 1.54 (H) 0.57 - 1.00 mg/dL Final   07/14/2024 1.59 (H) 0.57 - 1.00 mg/dL Final    " "  Calcium Calcium   Date Value Ref Range Status   07/16/2024 9.2 8.6 - 10.5 mg/dL Final   07/15/2024 9.3 8.6 - 10.5 mg/dL Final   07/14/2024 9.4 8.6 - 10.5 mg/dL Final      PO4 No components found for: \"PO4\"   Albumin Albumin   Date Value Ref Range Status   07/16/2024 3.5 3.5 - 5.2 g/dL Final   07/15/2024 3.5 3.5 - 5.2 g/dL Final   07/14/2024 3.7 3.5 - 5.2 g/dL Final      Magnesium No results found for: \"MG\"     Uric Acid No components found for: \"URIC ACID\"     Imaging Results (Last 72 Hours)       ** No results found for the last 72 hours. **            Results for orders placed during the hospital encounter of 07/11/24    XR Foot 3+ View Right    Narrative  XR FOOT 3+ VW RIGHT    Date of Exam: 7/12/2024 8:32 PM EDT    Indication: Concern for fractured 2nd toe    Comparison: 8/29/2020    Findings:  Subtle minimally displaced fracture through the distal aspect of the mid phalanx of the second digit with extension into the DIP joint.  The remainder the visualized osseous structures are intact.  Diffuse osteopenia.  Mild degenerative changes are noted at the midfoot and forefoot.  No focal soft tissue abnormality.  No suspicious erosive changes    Impression  Impression:  Minimally displaced fracture through the distal aspect of the mid phalanx of the second digit with extension into the DIP joint.      Electronically Signed: Stevo Barry DO  7/12/2024 8:50 PM EDT  Workstation ID: [id number]      Results for orders placed in visit on 05/22/24    XR Shoulder 2+ View Left    Narrative  XR SHOULDER 2+ VW LEFT    Date of Exam: 5/22/2024 1:19 PM EDT    Indication: left humerus fx after a fall on 3/4/24    Comparison: 4/24/2024, 3/13/2024.    Findings:  Healing nondisplaced fracture of the greater tuberosity present. Sclerotic changes are seen along the fracture margin.. The acromioclavicular and glenohumeral joints appear intact. No erosions. Acromiohumeral and coracoclavicular distances are  well-maintained. Mild to " moderate acromioclavicular and glenohumeral osteoarthritic changes are present. The adjacent lung and ribs appear intact.    Impression  Impression:  Healing nondisplaced fracture of the greater tuberosity, stable in alignment as compared to the previous study.      Electronically Signed: Marisabel Roberts MD  5/23/2024 9:24 AM EDT  Workstation ID: [id number]      Results for orders placed in visit on 04/24/24    XR Shoulder 2+ View Left    Narrative  XR SHOULDER 2+ VW LEFT    Date of Exam: 4/24/2024 12:56 PM EDT    Indication: left shoulder fx after a fall on 3/5/24    Comparison: 3/13/2024 MRI    Findings:  The known nondisplaced fracture of the greater tuberosity is not well seen on this exam. There is no evidence of new or additional fracture. Normal glenohumeral and acromioclavicular joint alignment. Mild glenohumeral and moderate acromioclavicular joint  arthritis. Partially visualized thoracic vertebroplasty. Soft tissues are unremarkable.    Impression  Impression:  Known nondisplaced greater tuberosity fracture not well seen on this exam. No evidence for acute abnormality of the left shoulder. Chronic findings as above.      Electronically Signed: Lyle Calderón MD  4/26/2024 11:58 AM EDT  Workstation ID: [id number]      Results for orders placed during the hospital encounter of 08/22/23    Doppler Ankle Brachial Index Single Level CAR    Interpretation Summary    Right Conclusion: The right IKE is normal. Normal digital pressures.    Left Conclusion: The left IKE is normal. Mild digital ischemia.        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  Hypertension  Diabetes type 2  Recurrent pneumonia-on IV antibiotics. S/p broch  History of coronary artery disease  COPD  History of ureteral cancer, status post right nephrectomy  Dyspnea-likely due to medical  pneumonia.  There may be component of excess volume.  She is not on a diuretic.  Her BNP was elevated.  Added low-dose diuretic       Creatinine trending up we will  Stop Lasix  Labs in am  F/u 2-4 weeks on d/c        Rolando Miller MD  [facility]  [phone]  07/16/24  12:03 EDT

## 2024-07-17 ENCOUNTER — DOCUMENTATION (OUTPATIENT)
Dept: HOME HEALTH SERVICES | Facility: HOME HEALTHCARE | Age: 79
End: 2024-07-17
Payer: MEDICARE

## 2024-07-17 ENCOUNTER — TRANSCRIBE ORDERS (OUTPATIENT)
Dept: HOME HEALTH SERVICES | Facility: HOME HEALTHCARE | Age: 79
End: 2024-07-17
Payer: MEDICARE

## 2024-07-17 ENCOUNTER — READMISSION MANAGEMENT (OUTPATIENT)
Dept: CALL CENTER | Facility: HOSPITAL | Age: 79
End: 2024-07-17
Payer: MEDICARE

## 2024-07-17 ENCOUNTER — HOME HEALTH ADMISSION (OUTPATIENT)
Dept: HOME HEALTH SERVICES | Facility: HOME HEALTHCARE | Age: 79
End: 2024-07-17
Payer: COMMERCIAL

## 2024-07-17 VITALS
HEART RATE: 74 BPM | RESPIRATION RATE: 13 BRPM | WEIGHT: 165 LBS | HEIGHT: 65 IN | SYSTOLIC BLOOD PRESSURE: 107 MMHG | OXYGEN SATURATION: 91 % | TEMPERATURE: 97.4 F | DIASTOLIC BLOOD PRESSURE: 57 MMHG | BODY MASS INDEX: 27.49 KG/M2

## 2024-07-17 DIAGNOSIS — J18.9 PNEUMONIA OF RIGHT MIDDLE LOBE DUE TO INFECTIOUS ORGANISM: Primary | ICD-10-CM

## 2024-07-17 LAB
ALBUMIN SERPL-MCNC: 3.4 G/DL (ref 3.5–5.2)
ANION GAP SERPL CALCULATED.3IONS-SCNC: 7.9 MMOL/L (ref 5–15)
BACTERIA SPEC AEROBE CULT: NORMAL
BUN SERPL-MCNC: 39 MG/DL (ref 8–23)
BUN/CREAT SERPL: 22.4 (ref 7–25)
CALCIUM SPEC-SCNC: 9.3 MG/DL (ref 8.6–10.5)
CHLORIDE SERPL-SCNC: 106 MMOL/L (ref 98–107)
CO2 SERPL-SCNC: 28.1 MMOL/L (ref 22–29)
CREAT SERPL-MCNC: 1.74 MG/DL (ref 0.57–1)
EGFRCR SERPLBLD CKD-EPI 2021: 29.4 ML/MIN/1.73
GLUCOSE BLDC GLUCOMTR-MCNC: 159 MG/DL (ref 70–105)
GLUCOSE BLDC GLUCOMTR-MCNC: 187 MG/DL (ref 70–105)
GLUCOSE BLDC GLUCOMTR-MCNC: 96 MG/DL (ref 70–105)
GLUCOSE SERPL-MCNC: 87 MG/DL (ref 65–99)
GRAM STN SPEC: NORMAL
PHOSPHATE SERPL-MCNC: 3.9 MG/DL (ref 2.5–4.5)
POTASSIUM SERPL-SCNC: 4.4 MMOL/L (ref 3.5–5.2)
SODIUM SERPL-SCNC: 142 MMOL/L (ref 136–145)

## 2024-07-17 PROCEDURE — 80069 RENAL FUNCTION PANEL: CPT | Performed by: INTERNAL MEDICINE

## 2024-07-17 PROCEDURE — 63710000001 PREDNISONE PER 1 MG: Performed by: INTERNAL MEDICINE

## 2024-07-17 PROCEDURE — 63710000001 INSULIN LISPRO (HUMAN) PER 5 UNITS: Performed by: INTERNAL MEDICINE

## 2024-07-17 PROCEDURE — 82948 REAGENT STRIP/BLOOD GLUCOSE: CPT | Performed by: INTERNAL MEDICINE

## 2024-07-17 PROCEDURE — 94761 N-INVAS EAR/PLS OXIMETRY MLT: CPT

## 2024-07-17 PROCEDURE — 94664 DEMO&/EVAL PT USE INHALER: CPT

## 2024-07-17 PROCEDURE — 94799 UNLISTED PULMONARY SVC/PX: CPT

## 2024-07-17 RX ORDER — PREDNISONE 20 MG/1
20 TABLET ORAL
Qty: 2 TABLET | Refills: 0 | Status: SHIPPED | OUTPATIENT
Start: 2024-07-18 | End: 2024-07-20

## 2024-07-17 RX ORDER — PREDNISONE 20 MG/1
20 TABLET ORAL
Status: DISCONTINUED | OUTPATIENT
Start: 2024-07-17 | End: 2024-07-17 | Stop reason: HOSPADM

## 2024-07-17 RX ORDER — CARVEDILOL 6.25 MG/1
6.25 TABLET ORAL 2 TIMES DAILY WITH MEALS
Qty: 60 TABLET | Refills: 1 | Status: SHIPPED | OUTPATIENT
Start: 2024-07-17

## 2024-07-17 RX ORDER — INSULIN LISPRO 100 [IU]/ML
2-7 INJECTION, SOLUTION INTRAVENOUS; SUBCUTANEOUS
Status: DISCONTINUED | OUTPATIENT
Start: 2024-07-17 | End: 2024-07-17 | Stop reason: HOSPADM

## 2024-07-17 RX ADMIN — LINAGLIPTIN 5 MG: 5 TABLET, FILM COATED ORAL at 08:53

## 2024-07-17 RX ADMIN — SERTRALINE 50 MG: 50 TABLET, FILM COATED ORAL at 08:53

## 2024-07-17 RX ADMIN — FERROUS SULFATE TAB EC 324 MG (65 MG FE EQUIVALENT) 324 MG: 324 (65 FE) TABLET DELAYED RESPONSE at 08:53

## 2024-07-17 RX ADMIN — PREDNISONE 20 MG: 20 TABLET ORAL at 09:00

## 2024-07-17 RX ADMIN — IPRATROPIUM BROMIDE AND ALBUTEROL SULFATE 3 ML: .5; 3 SOLUTION RESPIRATORY (INHALATION) at 06:53

## 2024-07-17 RX ADMIN — METHOCARBAMOL 500 MG: 500 TABLET ORAL at 08:53

## 2024-07-17 RX ADMIN — BUDESONIDE AND FORMOTEROL FUMARATE DIHYDRATE 2 PUFF: 160; 4.5 AEROSOL RESPIRATORY (INHALATION) at 06:53

## 2024-07-17 RX ADMIN — OXYCODONE 5 MG: 5 TABLET ORAL at 09:00

## 2024-07-17 RX ADMIN — AMLODIPINE BESYLATE 10 MG: 5 TABLET ORAL at 08:53

## 2024-07-17 RX ADMIN — METHOCARBAMOL 500 MG: 500 TABLET ORAL at 11:49

## 2024-07-17 RX ADMIN — ESTRADIOL 1 APPLICATOR: 0.1 CREAM VAGINAL at 08:54

## 2024-07-17 RX ADMIN — Medication 10 ML: at 08:52

## 2024-07-17 RX ADMIN — GUAIFENESIN 1200 MG: 600 TABLET, EXTENDED RELEASE ORAL at 08:53

## 2024-07-17 RX ADMIN — INSULIN LISPRO 2 UNITS: 100 INJECTION, SOLUTION INTRAVENOUS; SUBCUTANEOUS at 11:49

## 2024-07-17 RX ADMIN — IPRATROPIUM BROMIDE AND ALBUTEROL SULFATE 3 ML: .5; 3 SOLUTION RESPIRATORY (INHALATION) at 10:33

## 2024-07-17 RX ADMIN — CARVEDILOL 6.25 MG: 6.25 TABLET, FILM COATED ORAL at 08:53

## 2024-07-17 RX ADMIN — ASPIRIN 81 MG: 81 TABLET, COATED ORAL at 08:53

## 2024-07-17 NOTE — OUTREACH NOTE
Prep Survey      Flowsheet Row Responses   [facility] facility patient discharged from? [facility]   Is LACE score < 7 ? No   Eligibility Readm Mgmt   Discharge diagnosis Recurrent pneumonia   Does the patient have one of the following disease processes/diagnoses(primary or secondary)? Pneumonia   Does the patient have Home health ordered? Yes   What is the Home health agency?  [facility]   Prep survey completed? Yes            Li BARNETT - Registered Nurse

## 2024-07-17 NOTE — PROGRESS NOTES
Daily Progress Note          Assessment    Recurrent pneumonia    Respiratory viral panel positive for rhinovirus on 7/14/2024    bronchoscopy 7/14/2024 showed mucous plugging right middle lobe and reactive airway disease    History of COVID-19 infection 1/14/2024 with resulting organizing pneumonia in the right middle lobe.    Chronic cough, multiple mucous plugs  Hypoxemia  Hypertension  DM  CKD  Snoring, excessive daytime sleepiness: Symptoms suggestive of FERNANDO but no prior sleep study        Recommendations:    Prednisone 20 mg daily for 3 days for sinus congestion   Continue Symbicort  oxygen supplement and titration to maintain saturation 90 to 95%  Currently on 2 L  Mucinex    Aspirin, BP control, glucose control  DVT prophylaxis    I personally reviewed the radiological studies             LOS: 6 days     Subjective     Reports cough and shortness of breath    Objective     Vital signs for last 24 hours:  Vitals:    07/17/24 0653 07/17/24 0657 07/17/24 0658 07/17/24 0700   BP:       BP Location:       Patient Position:       Pulse: 75 76 71 76   Resp: 12 12 12 14   Temp:       TempSrc:       SpO2: 96% 98% 99% 98%   Weight:       Height:           Intake/Output last 3 shifts:  No intake/output data recorded.  Intake/Output this shift:  No intake/output data recorded.      Radiology  Imaging Results (Last 24 Hours)       ** No results found for the last 24 hours. **            Labs:  Results from last 7 days   Lab Units 07/13/24  0622   WBC 10*3/mm3 6.83   HEMOGLOBIN g/dL 12.5   HEMATOCRIT % 40.0   PLATELETS 10*3/mm3 189     Results from last 7 days   Lab Units 07/17/24  0549   SODIUM mmol/L 142   POTASSIUM mmol/L 4.4   CHLORIDE mmol/L 106   CO2 mmol/L 28.1   BUN mg/dL 39*   CREATININE mg/dL 1.74*   CALCIUM mg/dL 9.3   GLUCOSE mg/dL 87         Results from last 7 days   Lab Units 07/17/24  0549 07/16/24  0453 07/15/24  0447   ALBUMIN g/dL 3.4* 3.5 3.5             Results from last 7 days   Lab Units  07/13/24  0622   MAGNESIUM mg/dL 2.1                   Meds:   SCHEDULE  amLODIPine, 10 mg, Oral, Daily  aspirin, 81 mg, Oral, Daily  budesonide-formoterol, 2 puff, Inhalation, BID - RT  carvedilol, 6.25 mg, Oral, BID With Meals  enoxaparin, 40 mg, Subcutaneous, Q24H  estradiol, 1 g, Vaginal, Once per day on Monday Wednesday Friday  ferrous sulfate, 324 mg, Oral, Daily With Breakfast  gabapentin, 100 mg, Oral, Nightly  guaiFENesin, 1,200 mg, Oral, Q12H  hydrALAZINE, 25 mg, Oral, BID  insulin glargine, 10 Units, Subcutaneous, Nightly  insulin lispro, 2-9 Units, Subcutaneous, 4x Daily AC & at Bedtime  ipratropium-albuterol, 3 mL, Nebulization, 4x Daily - RT  lansoprazole, 30 mg, Oral, Q AM  linaclotide, 145 mcg, Oral, QAM AC  linagliptin, 5 mg, Oral, Daily  methocarbamol, 500 mg, Oral, 4x Daily  polyethylene glycol, 17 g, Oral, Daily  predniSONE, 20 mg, Oral, Daily With Breakfast  sertraline, 50 mg, Oral, Daily  sodium chloride, 10 mL, Intravenous, Q12H  terazosin, 2 mg, Oral, Nightly      Infusions     PRNs    senna-docusate sodium **AND** polyethylene glycol **AND** bisacodyl **AND** bisacodyl    dextrose    dextrose    Diclofenac Sodium    glucagon (human recombinant)    ipratropium-albuterol    ondansetron    oxyCODONE    sodium chloride    sodium chloride    traMADol    Physical Exam:  General Appearance:  Alert   HEENT:  Normocephalic, without obvious abnormality, Conjunctiva/corneas clear,.   Nares normal, no drainage     Neck:  Supple, symmetrical, trachea midline.   Lungs /Chest wall:   Bilateral basal rhonchi, respirations unlabored, symmetrical wall movement.     Heart:  Regular rate and rhythm, S1 S2 normal  Abdomen: Soft, non-tender, no masses, no organomegaly.    Extremities: No edema, no clubbing or cyanosis     ROS  Constitutional: Negative for chills, fever and malaise/fatigue.   HENT: Negative.    Eyes: Negative.    Cardiovascular: Negative.    Respiratory: Positive for cough and shortness of breath.     Skin: Negative.    Musculoskeletal: Negative.    Gastrointestinal: Negative.    Genitourinary: Negative.    Neurological: Negative.    Psychiatric/Behavioral: Negative.      I reviewed the recent clinical results  I personally reviewed the latest radiological studies    Part of this note may be an electronic transcription/translation of spoken language to printed text using the Dragon Dictation System.

## 2024-07-17 NOTE — PLAN OF CARE
Goal Outcome Evaluation:   Pt due to discharge home. Pt on 2L of O2. A&Ox4.

## 2024-07-17 NOTE — PLAN OF CARE
Problem: Adult Inpatient Plan of Care  Goal: Plan of Care Review  Outcome: Ongoing, Progressing  Goal: Patient-Specific Goal (Individualized)  Outcome: Ongoing, Progressing  Goal: Absence of Hospital-Acquired Illness or Injury  Outcome: Ongoing, Progressing  Intervention: Identify and Manage Fall Risk  Recent Flowsheet Documentation  Taken 7/17/2024 0621 by Vanessa Barreto, RN  Safety Promotion/Fall Prevention:   clutter free environment maintained   room organization consistent   safety round/check completed  Taken 7/17/2024 0500 by Vanessa Barreto RN  Safety Promotion/Fall Prevention:   clutter free environment maintained   room organization consistent   safety round/check completed  Taken 7/17/2024 0201 by Vanessa Barreto RN  Safety Promotion/Fall Prevention:   clutter free environment maintained   safety round/check completed   room organization consistent  Taken 7/17/2024 0045 by Vanessa Barreto RN  Safety Promotion/Fall Prevention:   clutter free environment maintained   safety round/check completed   room organization consistent  Taken 7/16/2024 2201 by Vanessa Barreto, RN  Safety Promotion/Fall Prevention:   clutter free environment maintained   safety round/check completed   room organization consistent  Taken 7/16/2024 2156 by Vanessa Barreto RN  Safety Promotion/Fall Prevention:   clutter free environment maintained   room organization consistent   safety round/check completed  Intervention: Prevent Infection  Recent Flowsheet Documentation  Taken 7/16/2024 2156 by Vanessa Barreto, RN  Infection Prevention:   environmental surveillance performed   single patient room provided   rest/sleep promoted  Goal: Optimal Comfort and Wellbeing  Outcome: Ongoing, Progressing  Intervention: Provide Person-Centered Care  Recent Flowsheet Documentation  Taken 7/16/2024 2156 by Vanessa Barreto, RN  Trust Relationship/Rapport:   care explained   reassurance provided   thoughts/feelings acknowledged  Goal: Readiness  for Transition of Care  Outcome: Ongoing, Progressing   Goal Outcome Evaluation:

## 2024-07-17 NOTE — CASE MANAGEMENT/SOCIAL WORK
Continued Stay Note  PRAKASH Tillman     Patient Name: Vianey Reeves  MRN: [id number]  Today's Date: 7/17/2024    Admit Date: 7/11/2024    Plan: DC PLAN: Routine home alone. Current home 02 4L thru [place]. Referral for [facility], awaiting response.       Discharge Plan       Row Name 07/17/24 1059       Plan    Plan DC PLAN: Routine home alone. Current home 02 4L thru [place]. Referral for [facility], awaiting response.    Patient/Family in Agreement with Plan yes    Plan Comments Dr. Garcia wants [facility].Patient agreeable.  DCP report sent and message sent to Varsha with [facility], awaiting response. DC BARRIERS: Bronch cultures pending, Possible discharge today.                      Expected Discharge Date and Time       Expected Discharge Date Expected Discharge Time    Jul 17, 2024           Michaelle Glass RN  Case Management  [phone] cell.

## 2024-07-17 NOTE — DISCHARGE SUMMARY
Date of Discharge:  7/17/2024    Discharge Diagnosis:      Recurrent pneumonia    Multiple tracheobronchial mucus plugs  Rhinovirus infection  COPD  Osteoporosis  Essential hypertension  Type 2 diabetes  GERD  Chronic constipation    Presenting Problem/History of Present Illness  Active Hospital Problems    Diagnosis  POA    **Recurrent pneumonia [J18.9]  Yes    Multiple tracheobronchial mucus plugs [T17.800A]  Unknown      Resolved Hospital Problems   No resolved problems to display.          Hospital Course    Vianey Reeves is a 78-year-old female who presented to [facility] ED on 7/11/2024 Emmah discharge on 7/17/2024.  She presented as a direct admission from [facility] where she was seen for shortness of breath.  Patient has completed 3 rounds of antibiotics with no improvement in her symptoms.  She does have a significant past medical history of COPD and tobacco dependency.  She also has a past medical history of chronic kidney disease stage III, diabetes mellitus, and hypertension.  Patient was treated with IV antibiotics.  Pulmonology followed closely.  Patient did have bronchoscopy on 7/14/2024 that showed thick clear mucous plugs, as well as consistency of tracheobronchial malacia.  Positive cultures thus far show rhinovirus.  Pulmonology recommend home sleep study to rule out obstructive sleep apnea.  She will follow-up with pulmonology 6 weeks to review bronchoscopy results as well as HST.  Patient has had improvement in her shortness of breath and cough.  She has oxygen at home.  She does continue with generalized weakness.  She will discharge with home health services and family support.  Patient agrees with above plan.  She will follow-up with PCP in 1 to 2 weeks, nephrology in 1 month, and pulmonology in 6 weeks.    Procedures Performed    Procedure(s):  BRONCHOSCOPY WITH BRONCHOALVEOLAR LAVAGE  -------------------       Consults:   Consults       Date and Time Order Name Status  Description    7/12/2024  1:13 PM Inpatient Nephrology Consult Completed     7/12/2024  1:12 PM Inpatient Pulmonology Consult Completed             Pertinent Test Results:    Lab Results (most recent)       Procedure Component Value Units Date/Time    POC Glucose 4x Daily Before Meals & at Bedtime [[id number]]  (Abnormal) Collected: 07/17/24 1113    Specimen: Blood Updated: 07/17/24 1114     Glucose 159 mg/dL      Comment: Serial Number: [id number]:  [id number]       BAL Culture, Quantitative - Lavage, Lung, Right Middle Lobe [[id number]] Collected: 07/14/24 1048    Specimen: Lavage from Lung, Right Middle Lobe Updated: 07/17/24 0939     BAL Culture >100,000 CFU/mL Normal respiratory jaden. No S. aureus or Pseudomonas aeruginosa detected. Final report.     Gram Stain Moderate (3+) WBCs per low power field      Moderate (3+) Epithelial cells per low power field      Moderate (3+) Gram positive cocci in pairs and clusters    Narrative:              POC Glucose 4x Daily Before Meals & at Bedtime [[id number]]  (Normal) Collected: 07/17/24 0726    Specimen: Blood Updated: 07/17/24 0727     Glucose 96 mg/dL      Comment: Serial Number: [id number]:  [id number]       Renal Function Panel [[id number]]  (Abnormal) Collected: 07/17/24 0549    Specimen: Blood Updated: 07/17/24 0640     Glucose 87 mg/dL      BUN 39 mg/dL      Creatinine 1.74 mg/dL      Sodium 142 mmol/L      Potassium 4.4 mmol/L      Comment: Specimen hemolyzed.  Result may be falsely elevated.        Chloride 106 mmol/L      CO2 28.1 mmol/L      Calcium 9.3 mg/dL      Albumin 3.4 g/dL      Phosphorus 3.9 mg/dL      Anion Gap 7.9 mmol/L      BUN/Creatinine Ratio 22.4     eGFR 29.4 mL/min/1.73     Narrative:      GFR Normal >60  Chronic Kidney Disease <60  Kidney Failure <15    The GFR formula is only valid for adults with stable renal function between ages 18 and 70.    Blood Culture - Blood, Arm, Left [[id number]]  (Normal) Collected: 07/12/24 0010     "Specimen: Blood from Arm, Left Updated: 07/17/24 0101     Blood Culture No growth at 5 days    Blood Culture - Blood, Arm, Right [[id number]]  (Normal) Collected: 07/12/24 0016    Specimen: Blood from Arm, Right Updated: 07/17/24 0046     Blood Culture No growth at 5 days    Non-gynecologic Cytology [[id number]] Collected: 07/14/24 1048    Specimen: Lavage from Lung, Right Middle Lobe Updated: 07/16/24 1040     Case Report --     Medical Cytology Report                           Case: [id number]                                  Authorizing Provider:  Marlin Ferguson MD         Collected:           07/14/2024 10:48 AM          Ordering Location:     [facility]  Received:            07/15/2024 11:08 AM                                 SUITES                                                                       Pathologist:           Wilder Max MD                                                            Specimen:    Lung, Right Middle Lobe                                                                     Final Diagnosis --     Right middle lobe, bronchial wash with smears and cytospin:  Acute inflammation, mature squamous cells, pulmonary macrophages and bronchial epithelial cells  No malignancy identified    CLAUDIO       Gross Description --     1. Lung, Right Middle Lobe.  Received in carbowax and designated \"Lung, Right Middle Lobe\" are 50 mL of cloudy dark green-colored fluid. Particulate matter is present. This specimen is processed as per protocol.          Renal Function Panel [[id number]]  (Abnormal) Collected: 07/16/24 0453    Specimen: Blood Updated: 07/16/24 0550     Glucose 85 mg/dL      BUN 36 mg/dL      Creatinine 1.71 mg/dL      Sodium 141 mmol/L      Potassium 4.7 mmol/L      Comment: Specimen hemolyzed.  Result may be falsely elevated.        Chloride 107 mmol/L      CO2 25.6 mmol/L      Calcium 9.2 mg/dL      Albumin 3.5 g/dL      Phosphorus 3.5 mg/dL      Anion Gap 8.4 mmol/L      " BUN/Creatinine Ratio 21.1     eGFR 30.0 mL/min/1.73     Narrative:      GFR Normal >60  Chronic Kidney Disease <60  Kidney Failure <15    The GFR formula is only valid for adults with stable renal function between ages 18 and 70.    AFB Culture - Lavage, Lung, Right Middle Lobe [[id number]] Collected: 07/14/24 1048    Specimen: Lavage from Lung, Right Middle Lobe Updated: 07/15/24 1049     AFB Stain No acid fast bacilli seen on concentrated smear    Respiratory Panel PCR w/COVID-19(SARS-CoV-2) NITA/KRISTEN/NATE/PAD/COR/DYLON In-House, NP Swab in UTM/VTM, 2 HR TAT - Lavage, Lung, Right Middle Lobe [[id number]]  (Abnormal) Collected: 07/14/24 1048    Specimen: Lavage from Lung, Right Middle Lobe Updated: 07/14/24 1212     ADENOVIRUS, PCR Not Detected     Coronavirus 229E Not Detected     Coronavirus HKU1 Not Detected     Coronavirus NL63 Not Detected     Coronavirus OC43 Not Detected     COVID19 Not Detected     Human Metapneumovirus Not Detected     Human Rhinovirus/Enterovirus Detected     Influenza A PCR Not Detected     Influenza B PCR Not Detected     Parainfluenza Virus 1 Not Detected     Parainfluenza Virus 2 Not Detected     Parainfluenza Virus 3 Not Detected     Parainfluenza Virus 4 Not Detected     RSV, PCR Not Detected     Bordetella pertussis pcr Not Detected     Bordetella parapertussis PCR Not Detected     Chlamydophila pneumoniae PCR Not Detected     Mycoplasma pneumo by PCR Not Detected    Narrative:      In the setting of a positive respiratory panel with a viral infection PLUS a negative procalcitonin without other underlying concern for bacterial infection, consider observing off antibiotics or discontinuation of antibiotics and continue supportive care. If the respiratory panel is positive for atypical bacterial infection (Bordetella pertussis, Chlamydophila pneumoniae, or Mycoplasma pneumoniae), consider antibiotic de-escalation to target atypical bacterial infection.    Fungus Culture - Lavage, Lung,  Right Middle Lobe [[id number]] Collected: 07/14/24 1048    Specimen: Lavage from Lung, Right Middle Lobe Updated: 07/14/24 1118    Pneumocystis PCR - Lavage, Lung, Right Middle Lobe [[id number]] Collected: 07/14/24 1048    Specimen: Lavage from Lung, Right Middle Lobe Updated: 07/14/24 1118    Uric Acid [[id number]]  (Normal) Collected: 07/13/24 1130    Specimen: Blood from Arm, Left Updated: 07/13/24 1247     Uric Acid 5.7 mg/dL     Magnesium [[id number]]  (Normal) Collected: 07/13/24 0622    Specimen: Blood from Arm, Left Updated: 07/13/24 0751     Magnesium 2.1 mg/dL     CBC & Differential [[id number]]  (Abnormal) Collected: 07/13/24 0622    Specimen: Blood from Arm, Left Updated: 07/13/24 0717    Narrative:      The following orders were created for panel order CBC & Differential.  Procedure                               Abnormality         Status                     ---------                               -----------         ------                     CBC Auto Differential[[id number]]        Abnormal            Final result                 Please view results for these tests on the individual orders.    CBC Auto Differential [[id number]]  (Abnormal) Collected: 07/13/24 0622    Specimen: Blood from Arm, Left Updated: 07/13/24 0717     WBC 6.83 10*3/mm3      RBC 4.08 10*6/mm3      Hemoglobin 12.5 g/dL      Hematocrit 40.0 %      MCV 98.0 fL      MCH 30.6 pg      MCHC 31.3 g/dL      RDW 13.8 %      RDW-SD 49.9 fl      MPV 10.8 fL      Platelets 189 10*3/mm3      Neutrophil % 79.4 %      Lymphocyte % 16.3 %      Monocyte % 3.1 %      Eosinophil % 0.0 %      Basophil % 0.3 %      Immature Grans % 0.9 %      Neutrophils, Absolute 5.43 10*3/mm3      Lymphocytes, Absolute 1.11 10*3/mm3      Monocytes, Absolute 0.21 10*3/mm3      Eosinophils, Absolute 0.00 10*3/mm3      Basophils, Absolute 0.02 10*3/mm3      Immature Grans, Absolute 0.06 10*3/mm3      nRBC 0.0 /100 WBC     Respiratory Culture - Sputum, Cough [[id number]]  Collected: 07/12/24 0025    Specimen: Sputum from Cough Updated: 07/12/24 0647     Respiratory Culture Rejected     Gram Stain Few (2+) WBCs per low power field      Few (2+) Epithelial cells per low power field      Rare (1+) Gram positive cocci in clusters    Narrative:      Specimen rejected due to oropharyngeal contamination. Please reorder and recollect specimen if clinically necessary.    Basic Metabolic Panel [[id number]]  (Abnormal) Collected: 07/12/24 0420    Specimen: Blood Updated: 07/12/24 0529     Glucose 123 mg/dL      BUN 20 mg/dL      Creatinine 1.41 mg/dL      Sodium 143 mmol/L      Potassium 4.3 mmol/L      Comment: Specimen hemolyzed.  Result may be falsely elevated.        Chloride 110 mmol/L      CO2 23.5 mmol/L      Calcium 9.1 mg/dL      BUN/Creatinine Ratio 14.2     Anion Gap 9.5 mmol/L      eGFR 37.8 mL/min/1.73     Narrative:      GFR Normal >60  Chronic Kidney Disease <60  Kidney Failure <15    The GFR formula is only valid for adults with stable renal function between ages 18 and 70.    CBC & Differential [[id number]]  (Abnormal) Collected: 07/12/24 0420    Specimen: Blood Updated: 07/12/24 0501    Narrative:      The following orders were created for panel order CBC & Differential.  Procedure                               Abnormality         Status                     ---------                               -----------         ------                     CBC Auto Differential[[id number]]        Abnormal            Final result                 Please view results for these tests on the individual orders.    CBC Auto Differential [[id number]]  (Abnormal) Collected: 07/12/24 0420    Specimen: Blood Updated: 07/12/24 0501     WBC 7.16 10*3/mm3      RBC 4.25 10*6/mm3      Hemoglobin 13.2 g/dL      Hematocrit 42.2 %      MCV 99.3 fL      MCH 31.1 pg      MCHC 31.3 g/dL      RDW 14.2 %      RDW-SD 52.4 fl      MPV 10.5 fL      Platelets 176 10*3/mm3      Neutrophil % 54.0 %      Lymphocyte % 34.5 %       Monocyte % 9.2 %      Eosinophil % 1.0 %      Basophil % 0.6 %      Immature Grans % 0.7 %      Neutrophils, Absolute 3.87 10*3/mm3      Lymphocytes, Absolute 2.47 10*3/mm3      Monocytes, Absolute 0.66 10*3/mm3      Eosinophils, Absolute 0.07 10*3/mm3      Basophils, Absolute 0.04 10*3/mm3      Immature Grans, Absolute 0.05 10*3/mm3      nRBC 0.0 /100 WBC     Respiratory Panel PCR w/COVID-19(SARS-CoV-2) NITA/KRISTEN/NATE/PAD/COR/DYLON In-House, NP Swab in UTM/VTM, 2 HR TAT - Swab, Nasopharynx [[id number]]  (Normal) Collected: 07/12/24 0010    Specimen: Swab from Nasopharynx Updated: 07/12/24 0127     ADENOVIRUS, PCR Not Detected     Coronavirus 229E Not Detected     Coronavirus HKU1 Not Detected     Coronavirus NL63 Not Detected     Coronavirus OC43 Not Detected     COVID19 Not Detected     Human Metapneumovirus Not Detected     Human Rhinovirus/Enterovirus Not Detected     Influenza A PCR Not Detected     Influenza B PCR Not Detected     Parainfluenza Virus 1 Not Detected     Parainfluenza Virus 2 Not Detected     Parainfluenza Virus 3 Not Detected     Parainfluenza Virus 4 Not Detected     RSV, PCR Not Detected     Bordetella pertussis pcr Not Detected     Bordetella parapertussis PCR Not Detected     Chlamydophila pneumoniae PCR Not Detected     Mycoplasma pneumo by PCR Not Detected    Narrative:      In the setting of a positive respiratory panel with a viral infection PLUS a negative procalcitonin without other underlying concern for bacterial infection, consider observing off antibiotics or discontinuation of antibiotics and continue supportive care. If the respiratory panel is positive for atypical bacterial infection (Bordetella pertussis, Chlamydophila pneumoniae, or Mycoplasma pneumoniae), consider antibiotic de-escalation to target atypical bacterial infection.    Basic Metabolic Panel [[id number]]  (Abnormal) Collected: 07/11/24 2201    Specimen: Blood Updated: 07/11/24 2247     Glucose 88 mg/dL      BUN 25  mg/dL      Creatinine 1.57 mg/dL      Sodium 145 mmol/L      Potassium 4.3 mmol/L      Comment: Specimen hemolyzed.  Result may be falsely elevated.        Chloride 109 mmol/L      CO2 24.8 mmol/L      Calcium 9.7 mg/dL      BUN/Creatinine Ratio 15.9     Anion Gap 11.2 mmol/L      eGFR 33.2 mL/min/1.73     Narrative:      GFR Normal >60  Chronic Kidney Disease <60  Kidney Failure <15    The GFR formula is only valid for adults with stable renal function between ages 18 and 70.    [place] Draw [[id number]] Collected: 07/11/24 2114    Specimen: Blood Updated: 07/11/24 2243    Narrative:      The following orders were created for panel order [place] Draw.  Procedure                               Abnormality         Status                     ---------                               -----------         ------                     Green Top (Gel)[[id number]]                                  Final result               Lavender Top[[id number]]                                     Final result               Red Top[[id number]]                                                                     Gold Top - SST[[id number]]                                   Final result               Green Top (No Gel)[[id number]]                                                          Light Blue Top[[id number]]                                   Final result                 Please view results for these tests on the individual orders.    BNP [[id number]]  (Abnormal) Collected: 07/11/24 2114    Specimen: Blood Updated: 07/11/24 2215     proBNP 1,827.0 pg/mL     Narrative:      This assay is used as an aid in the diagnosis of individuals suspected of having heart failure. It can be used as an aid in the diagnosis of acute decompensated heart failure (ADHF) in patients presenting with signs and symptoms of ADHF to the emergency department (ED). In addition, NT-proBNP of <300 pg/mL indicates ADHF is not likely.    Age Range Result Interpretation  NT-proBNP  Concentration (pg/mL:      <50             Positive            >450                   Gray                 300-450                    Negative             <300    50-75           Positive            >900                  Gray                300-900                  Negative            <300      >75             Positive            >1800                  Gray                300-1800                  Negative            <300    Lavender Top [[id number]] Collected: 07/11/24 2114    Specimen: Blood Updated: 07/11/24 2132     Extra Tube hold for add-on     Comment: Auto resulted       Gold Top - SST [[id number]] Collected: 07/11/24 2114    Specimen: Blood Updated: 07/11/24 2132     Extra Tube Hold for add-ons.     Comment: Auto resulted.       Green Top (Gel) [[id number]] Collected: 07/11/24 2114    Specimen: Blood Updated: 07/11/24 2132     Extra Tube Hold for add-ons.     Comment: Auto resulted.       Light Blue Top [[id number]] Collected: 07/11/24 2114    Specimen: Blood Updated: 07/11/24 2132     Extra Tube Hold for add-ons.     Comment: Auto resulted                Results for orders placed during the hospital encounter of 01/14/24    Adult Transthoracic Echo Complete W/ Cont if Necessary Per Protocol    Interpretation Summary    Left ventricular systolic function is normal. Calculated left ventricular EF = 51% Left ventricular ejection fraction appears to be 51 - 55%.    Left ventricular diastolic function is consistent with (grade I) impaired relaxation.    The left atrial cavity is mild to moderately dilated.    There is mild calcification of the aortic valve mainly affecting the left coronary and right coronary cusp(s).    Moderate mitral valve regurgitation is present.    Estimated right ventricular systolic pressure from tricuspid regurgitation is normal (<35 mmHg).            Condition on Discharge:  stabe    Vital Signs  Temp:  [97.4 °F (36.3 °C)-98.7 °F (37.1 °C)] 97.4 °F (36.3 °C)  Heart Rate:  []  74  Resp:  [11-20] 13  BP: ()/(54-79) 107/57      Physical Exam  Vitals reviewed.   HENT:      Head: Normocephalic and atraumatic.      Left Ear: External ear normal.      Nose: Congestion present.   Eyes:      General:         Right eye: No discharge.         Left eye: No discharge.   Cardiovascular:      Rate and Rhythm: Normal rate.      Pulses: Normal pulses.   Pulmonary:      Effort: Pulmonary effort is normal.      Breath sounds: Rhonchi present.   Abdominal:      General: Bowel sounds are normal.      Palpations: Abdomen is soft.   Musculoskeletal:         General: Normal range of motion.   Skin:     General: Skin is warm and dry.   Neurological:      Mental Status: She is alert and oriented to person, place, and time.   Psychiatric:         Mood and Affect: Mood normal.         Behavior: Behavior normal.              Discharge Disposition  Home-Health Care [facility]    Discharge Medications     Discharge Medications        New Medications        Instructions Start Date   predniSONE 20 MG tablet  Commonly known as: DELTASONE   20 mg, Oral, Daily With Breakfast   Start Date: July 18, 2024            Changes to Medications        Instructions Start Date   carvedilol 6.25 MG tablet  Commonly known as: COREG  What changed: medication strength   6.25 mg, Oral, 2 Times Daily With Meals             Continue These Medications        Instructions Start Date   amLODIPine 10 MG tablet  Commonly known as: NORVASC   10 mg, Oral, Daily      aspirin 81 MG tablet   81 mg, Oral, Nightly      budesonide-formoterol 160-4.5 MCG/ACT inhaler  Commonly known as: SYMBICORT   2 puffs, Inhalation, 2 Times Daily - RT      cyanocobalamin 1000 MCG/ML injection   Inject 1 mL into the appropriate muscle as directed by prescriber Every 28 (Twenty-Eight) Days.      Diclofenac Sodium 1 % gel gel  Commonly known as: VOLTAREN   Apply 4 g topically to the appropriate area as directed 3 (Three) Times a Day As Needed (pain).      doxazosin 2 MG  tablet  Commonly known as: CARDURA   2 mg, Oral, Nightly      estradiol 0.1 MG/GM vaginal cream  Commonly known as: ESTRACE   Insert 1 g into the vagina 3 (Three) Times a Week. Monday, Wednesday and Friday      Evolocumab solution auto-injector SureClick injection  Commonly known as: REPATHA   140 mg, Subcutaneous, Every 14 Days      ferrous sulfate 325 (65 FE) MG tablet   1 tablet, Oral, Daily      Finerenone 10 MG tablet   10 mg, Oral, Daily      gabapentin 100 MG capsule  Commonly known as: NEURONTIN   100 mg, Oral, Nightly      guaiFENesin 600 MG 12 hr tablet  Commonly known as: MUCINEX   600 mg, Oral, Every 12 Hours Scheduled      ipratropium-albuterol 0.5-2.5 mg/3 ml nebulizer  Commonly known as: DUO-NEB   3 mL, Nebulization, 4 Times Daily PRN      Lantus SoloStar 100 UNIT/ML injection pen  Generic drug: Insulin Glargine   10 Units, Subcutaneous, Nightly      Linzess 145 MCG capsule capsule  Generic drug: linaclotide   145 mcg, Oral, Daily      methocarbamol 500 MG tablet  Commonly known as: ROBAXIN   500 mg, Oral, 4 Times Daily      omeprazole 40 MG capsule  Commonly known as: priLOSEC   40 mg, Oral, Every Morning      polyethylene glycol 17 g packet  Commonly known as: MIRALAX   17 g, Oral, Daily      sertraline 50 MG tablet  Commonly known as: ZOLOFT   50 mg, Oral, Daily      SITagliptin 100 MG tablet  Commonly known as: JANUVIA   100 mg, Oral, Daily      traMADol 50 MG tablet  Commonly known as: ULTRAM   100 mg, Oral, 2 Times Daily PRN             Stop These Medications      hydrALAZINE 25 MG tablet  Commonly known as: APRESOLINE              Discharge Diet:   Diet Instructions       Diet: Cardiac Diets, Diabetic Diets; Healthy Heart (2-3 Na+); Regular (IDDSI 7); Thin (IDDSI 0); Consistent Carbohydrate      Discharge Diet:  Cardiac Diets  Diabetic Diets       Cardiac Diet: Healthy Heart (2-3 Na+)    Texture: Regular (IDDSI 7)    Fluid Consistency: Thin (IDDSI 0)    Diabetic Diet: Consistent Carbohydrate             Activity at Discharge:   Activity Instructions       Activity as Tolerated              Follow-up Appointments  Future Appointments   Date Time Provider Department Center   10/24/2024  2:00 PM Leon Gonzalez,  [facility]     Additional Instructions for the Follow-ups that You Need to Schedule       Ambulatory Referral to Home Health (Hospital)   As directed      Face to Face Visit Date: 7/17/2024   Follow-up provider for Plan of Care?: I will be treating the patient on an ongoing basis.  Please send me the Plan of Care for signature.   Follow-up provider: FREDRICK GARCIA [[id number]]   Reason/Clinical Findings: Evaluate and treat   Describe mobility limitations that make leaving home difficult: Below Baseline   Nursing/Therapeutic Services Requested: Physical Therapy Occupational Therapy   Frequency: 1 Week 1        Discharge Follow-up with PCP   As directed       Currently Documented PCP:    Fredrick Garcia MD    PCP Phone Number:    [phone]     Follow Up Details: 1-2 weeks        Discharge Follow-up with Specified Provider: Dr Ferguson; 6 Weeks   As directed      To: Dr Ferguson   Follow Up: 6 Weeks        Discharge Follow-up with Specified Provider: Dr Miller; 1 Month   As directed      To: Dr Miller   Follow Up: 1 Month                Test Results Pending at Discharge  Pending Labs       Order Current Status    Fungus Culture - Lavage, Lung, Right Middle Lobe In process    Pneumocystis PCR - Lavage, Lung, Right Middle Lobe In process    AFB Culture - Lavage, Lung, Right Middle Lobe Preliminary result             SAMIRA Gomez  07/17/24  11:54 EDT    Time: Discharge 29 min

## 2024-07-17 NOTE — PROGRESS NOTES
"NEPHROLOGY PROGRESS NOTE------[facility]    [facility]  [phone]  Rolando Miller MD      Patient Care Team:  Anny Garcia MD as PCP - General (Internal Medicine)  Sergio Ordonez MD as Consulting Physician (Nephrology)      Provider:  Rolando Miller MD  Patient Name: Vianey Reeves  :  1945    SUBJECTIVE:  Follow-up CKD  No chest pain, breathing better.    Medication:  amLODIPine, 10 mg, Oral, Daily  aspirin, 81 mg, Oral, Daily  budesonide-formoterol, 2 puff, Inhalation, BID - RT  carvedilol, 6.25 mg, Oral, BID With Meals  enoxaparin, 40 mg, Subcutaneous, Q24H  estradiol, 1 g, Vaginal, Once per day on   ferrous sulfate, 324 mg, Oral, Daily With Breakfast  gabapentin, 100 mg, Oral, Nightly  guaiFENesin, 1,200 mg, Oral, Q12H  hydrALAZINE, 25 mg, Oral, BID  insulin glargine, 10 Units, Subcutaneous, Nightly  insulin lispro, 2-7 Units, Subcutaneous, 4x Daily AC & at Bedtime  ipratropium-albuterol, 3 mL, Nebulization, 4x Daily - RT  lansoprazole, 30 mg, Oral, Q AM  linaclotide, 145 mcg, Oral, QAM AC  linagliptin, 5 mg, Oral, Daily  methocarbamol, 500 mg, Oral, 4x Daily  polyethylene glycol, 17 g, Oral, Daily  predniSONE, 20 mg, Oral, Daily With Breakfast  sertraline, 50 mg, Oral, Daily  sodium chloride, 10 mL, Intravenous, Q12H  terazosin, 2 mg, Oral, Nightly           OBJECTIVE    Vital Sign Min/Max for last 24 hours  Temp  Min: 97.4 °F (36.3 °C)  Max: 98.7 °F (37.1 °C)   BP  Min: 94/74  Max: 177/79   Pulse  Min: 62  Max: 103   Resp  Min: 11  Max: 20   SpO2  Min: 91 %  Max: 99 %   No data recorded   No data recorded     Flowsheet Rows      Flowsheet Row First Filed Value   Admission Height 165.1 cm (65\") Documented at 2024 2104   Admission Weight --            No intake/output data recorded.  No intake/output data recorded.    Physical Exam:  General Appearance: alert, appears stated age and " "cooperative  Head: normocephalic, without obvious abnormality and atraumatic  Eyes: conjunctivae and sclerae normal and no icterus  Neck: supple and no JVD  Lungs: Scattered rhonchi  Heart: regular rhythm & normal rate and normal S1, S2  Chest: Wall no abnormalities observed  Abdomen: normal bowel sounds and soft, nontender  Extremities: moves extremities well, no edema, no cyanosis and no redness  Skin: no bleeding, bruising or rash, turgor normal, color normal and no lesions noted  Neurologic: Alert, and oriented. No focal deficits    Labs:    WBC No results found for: \"WBC\"     HGB No results found for: \"HGB\"     HCT No results found for: \"HCT\"     Platelets No results found for: \"LABPLAT\"   MCV No results found for: \"MCV\"         Sodium Sodium   Date Value Ref Range Status   07/17/2024 142 136 - 145 mmol/L Final   07/16/2024 141 136 - 145 mmol/L Final   07/15/2024 143 136 - 145 mmol/L Final      Potassium Potassium   Date Value Ref Range Status   07/17/2024 4.4 3.5 - 5.2 mmol/L Final     Comment:     Specimen hemolyzed.  Result may be falsely elevated.   07/16/2024 4.7 3.5 - 5.2 mmol/L Final     Comment:     Specimen hemolyzed.  Result may be falsely elevated.   07/15/2024 4.4 3.5 - 5.2 mmol/L Final      Chloride Chloride   Date Value Ref Range Status   07/17/2024 106 98 - 107 mmol/L Final   07/16/2024 107 98 - 107 mmol/L Final   07/15/2024 108 (H) 98 - 107 mmol/L Final      CO2 CO2   Date Value Ref Range Status   07/17/2024 28.1 22.0 - 29.0 mmol/L Final   07/16/2024 25.6 22.0 - 29.0 mmol/L Final   07/15/2024 26.8 22.0 - 29.0 mmol/L Final      BUN BUN   Date Value Ref Range Status   07/17/2024 39 (H) 8 - 23 mg/dL Final   07/16/2024 36 (H) 8 - 23 mg/dL Final   07/15/2024 30 (H) 8 - 23 mg/dL Final      Creatinine Creatinine   Date Value Ref Range Status   07/17/2024 1.74 (H) 0.57 - 1.00 mg/dL Final   07/16/2024 1.71 (H) 0.57 - 1.00 mg/dL Final   07/15/2024 1.54 (H) 0.57 - 1.00 mg/dL Final      Calcium Calcium " "  Date Value Ref Range Status   07/17/2024 9.3 8.6 - 10.5 mg/dL Final   07/16/2024 9.2 8.6 - 10.5 mg/dL Final   07/15/2024 9.3 8.6 - 10.5 mg/dL Final      PO4 No components found for: \"PO4\"   Albumin Albumin   Date Value Ref Range Status   07/17/2024 3.4 (L) 3.5 - 5.2 g/dL Final   07/16/2024 3.5 3.5 - 5.2 g/dL Final   07/15/2024 3.5 3.5 - 5.2 g/dL Final      Magnesium No results found for: \"MG\"     Uric Acid No components found for: \"URIC ACID\"     Imaging Results (Last 72 Hours)       ** No results found for the last 72 hours. **            Results for orders placed during the hospital encounter of 07/11/24    XR Foot 3+ View Right    Narrative  XR FOOT 3+ VW RIGHT    Date of Exam: 7/12/2024 8:32 PM EDT    Indication: Concern for fractured 2nd toe    Comparison: 8/29/2020    Findings:  Subtle minimally displaced fracture through the distal aspect of the mid phalanx of the second digit with extension into the DIP joint.  The remainder the visualized osseous structures are intact.  Diffuse osteopenia.  Mild degenerative changes are noted at the midfoot and forefoot.  No focal soft tissue abnormality.  No suspicious erosive changes    Impression  Impression:  Minimally displaced fracture through the distal aspect of the mid phalanx of the second digit with extension into the DIP joint.      Electronically Signed: Stevo Barry DO  7/12/2024 8:50 PM EDT  Workstation ID: [id number]      Results for orders placed in visit on 05/22/24    XR Shoulder 2+ View Left    Narrative  XR SHOULDER 2+ VW LEFT    Date of Exam: 5/22/2024 1:19 PM EDT    Indication: left humerus fx after a fall on 3/4/24    Comparison: 4/24/2024, 3/13/2024.    Findings:  Healing nondisplaced fracture of the greater tuberosity present. Sclerotic changes are seen along the fracture margin.. The acromioclavicular and glenohumeral joints appear intact. No erosions. Acromiohumeral and coracoclavicular distances are  well-maintained. Mild to moderate " acromioclavicular and glenohumeral osteoarthritic changes are present. The adjacent lung and ribs appear intact.    Impression  Impression:  Healing nondisplaced fracture of the greater tuberosity, stable in alignment as compared to the previous study.      Electronically Signed: Marisabel Roberts MD  5/23/2024 9:24 AM EDT  Workstation ID: [id number]      Results for orders placed in visit on 04/24/24    XR Shoulder 2+ View Left    Narrative  XR SHOULDER 2+ VW LEFT    Date of Exam: 4/24/2024 12:56 PM EDT    Indication: left shoulder fx after a fall on 3/5/24    Comparison: 3/13/2024 MRI    Findings:  The known nondisplaced fracture of the greater tuberosity is not well seen on this exam. There is no evidence of new or additional fracture. Normal glenohumeral and acromioclavicular joint alignment. Mild glenohumeral and moderate acromioclavicular joint  arthritis. Partially visualized thoracic vertebroplasty. Soft tissues are unremarkable.    Impression  Impression:  Known nondisplaced greater tuberosity fracture not well seen on this exam. No evidence for acute abnormality of the left shoulder. Chronic findings as above.      Electronically Signed: Lyle Calderón MD  4/26/2024 11:58 AM EDT  Workstation ID: [id number]      Results for orders placed during the hospital encounter of 08/22/23    Doppler Ankle Brachial Index Single Level CAR    Interpretation Summary    Right Conclusion: The right IKE is normal. Normal digital pressures.    Left Conclusion: The left IKE is normal. Mild digital ischemia.        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  Hypertension  Diabetes type 2  Recurrent pneumonia-on IV antibiotics. S/p broch  History of coronary artery disease  COPD  History of ureteral cancer, status post right nephrectomy  Dyspnea-likely due to medical pneumonia.  There  may be component of excess volume.  She is not on a diuretic.  Her BNP was elevated.  Added low-dose diuretic       Creatinine still up but stable  Off Lasix  Blood pressure soft, will stop hydralazine  F/u 2-4 weeks on d/c        Rolando Miller MD  [facility]  [phone]  07/17/24  11:41 EDT

## 2024-07-17 NOTE — PROGRESS NOTES
Spoke with patient.  Demographics verified and agreeable to Home Health services.Verified no other agency in home.   Disciplines: Agreeable to Nursing at present. Unsure about Therapy.  Will discuss with the nurse in her home.  Pharmacy: [facility].  PCP:  Anny Garcia MD is agreeable to follow for Home Health orders and sign the POC.

## 2024-07-18 ENCOUNTER — HOME CARE VISIT (OUTPATIENT)
Dept: HOME HEALTH SERVICES | Facility: HOME HEALTHCARE | Age: 79
End: 2024-07-18

## 2024-07-18 ENCOUNTER — HOME CARE VISIT (OUTPATIENT)
Dept: HOME HEALTH SERVICES | Facility: HOME HEALTHCARE | Age: 79
End: 2024-07-18
Payer: COMMERCIAL

## 2024-07-18 ENCOUNTER — HOME CARE VISIT (OUTPATIENT)
Dept: HOME HEALTH SERVICES | Facility: HOME HEALTHCARE | Age: 79
End: 2024-07-18
Payer: MEDICARE

## 2024-07-18 LAB — FUNGUS WND CULT: ABNORMAL

## 2024-07-18 PROCEDURE — G0299 HHS/HOSPICE OF RN EA 15 MIN: HCPCS

## 2024-07-18 NOTE — Clinical Note
"SOC Note: ERI SEEN 7/18/24 FOR NURSING START OF CARE VISIT. PATIENT WAS RECENTLY HOSPITALIZED FOR PNEUMONIA.    Home Health ordered for: disciplines SN    Reason for Hosp/Primary Dx/Co-morbidities: PNEUMONIA    Focus of Care: PNEUMONIA    Patient's goal(s): \" TO GET BETTER\"    Current Functional status/mobility/DME: OXYGEN    HB status/Living Arrangements: PATIENT LIVES ALONE     Skin Integrity/wound status: NA    Code Status: FULL CODE    Fall Risk/Safety concerns:HIGH    Educated on Emergency Plan, steps to take prior to going to the ER and when to Call Home Health First:  PATIENT TOLD TO CALL HH BEFORE GOING TO ER     Medication issues/Concerns:SEPERATE NOTE    Additional Problems/Concerns: NA    SDOH Barriers (i.e. caregiver concerns, social isolation, transportation, food insecurity, environment, income etc.)/Need for MSW: NA    FREQUENCY: 1WK4    PATIENT IS THINKING ABOUT REMOTE PATIENT MONITORING SYSTEM "

## 2024-07-18 NOTE — CASE MANAGEMENT/SOCIAL WORK
Case Management Discharge Note      Final Note: Home with [facility]         Selected Continued Care - Discharged on 7/17/2024 Admission date: 7/11/2024 - Discharge disposition: Home-Health Care c        Home Medical Care Coordination complete.      Service Provider Selected Services Address Phone Fax Patient Preferred    [facility] ,  [facility] [address] [facility] [place] [address] [phone] --                      Transportation Services  Private: Car    Final Discharge Disposition Code: 06 - home with home health care

## 2024-07-19 VITALS
SYSTOLIC BLOOD PRESSURE: 132 MMHG | RESPIRATION RATE: 18 BRPM | TEMPERATURE: 98 F | OXYGEN SATURATION: 92 % | DIASTOLIC BLOOD PRESSURE: 60 MMHG | HEART RATE: 73 BPM

## 2024-07-19 NOTE — HOME HEALTH
"SOC Note: ERI SEEN 7/18/24 FOR NURSING START OF CARE VISIT. PATIENT WAS RECENTLY HOSPITALIZED FOR PNEUMONIA.    Home Health ordered for: disciplines SN    Reason for Hosp/Primary Dx/Co-morbidities: PNEUMONIA    Focus of Care: PNEUMONIA    Patient's goal(s): \" TO GET BETTER\"    Current Functional status/mobility/DME: OXYGEN    HB status/Living Arrangements: PATIENT LIVES ALONE     Skin Integrity/wound status: NA    Code Status: FULL CODE    Fall Risk/Safety concerns:HIGH    Educated on Emergency Plan, steps to take prior to going to the ER and when to Call Home Health First:  PATIENT TOLD TO CALL HH BEFORE GOING TO ER     Medication issues/Concerns:SEPERATE NOTE    Additional Problems/Concerns: NA    SDOH Barriers (i.e. caregiver concerns, social isolation, transportation, food insecurity, environment, income etc.)/Need for MSW: NA    FREQUENCY: 1WK4    PATIENT IS THINKING ABOUT REMOTE PATIENT MONITORING SYSTEM"

## 2024-07-21 LAB
MYCOBACTERIUM SPEC CULT: NORMAL
NIGHT BLUE STAIN TISS: NORMAL

## 2024-07-22 LAB
P JIROVECII DNA L RESP QL NAA+NON-PROBE: NEGATIVE
REF LAB TEST METHOD: NORMAL

## 2024-07-23 ENCOUNTER — READMISSION MANAGEMENT (OUTPATIENT)
Dept: CALL CENTER | Facility: HOSPITAL | Age: 79
End: 2024-07-23
Payer: MEDICARE

## 2024-07-23 NOTE — OUTREACH NOTE
COPD/PN Week 1 Survey      Flowsheet Row Responses   [facility] patient discharged from? [facility]   Does the patient have one of the following disease processes/diagnoses(primary or secondary)? Pneumonia   Week 1 attempt successful? No   Unsuccessful attempts Attempt 1            Neha Celestin Registered Nurse

## 2024-07-25 ENCOUNTER — HOME CARE VISIT (OUTPATIENT)
Dept: HOME HEALTH SERVICES | Facility: HOME HEALTHCARE | Age: 79
End: 2024-07-25
Payer: COMMERCIAL

## 2024-07-25 PROCEDURE — G0299 HHS/HOSPICE OF RN EA 15 MIN: HCPCS

## 2024-07-26 VITALS
RESPIRATION RATE: 17 BRPM | OXYGEN SATURATION: 97 % | SYSTOLIC BLOOD PRESSURE: 134 MMHG | TEMPERATURE: 97 F | HEART RATE: 71 BPM | DIASTOLIC BLOOD PRESSURE: 62 MMHG

## 2024-07-28 LAB
MYCOBACTERIUM SPEC CULT: NORMAL
NIGHT BLUE STAIN TISS: NORMAL

## 2024-07-31 ENCOUNTER — READMISSION MANAGEMENT (OUTPATIENT)
Dept: CALL CENTER | Facility: HOSPITAL | Age: 79
End: 2024-07-31
Payer: MEDICARE

## 2024-07-31 NOTE — OUTREACH NOTE
COPD/PN Week 3 Survey      Flowsheet Row Responses   [facility] patient discharged from? [facility]   Does the patient have one of the following disease processes/diagnoses(primary or secondary)? Pneumonia   Week 3 attempt successful? Yes   Call start time 1702   Call end time 1705   Discharge diagnosis Recurrent pneumonia   Meds reviewed with patient/caregiver? Yes   Is the patient taking all medications as directed (includes completed medication regime)? Yes   Has the patient kept scheduled appointments due by today? Yes   What is the Home health agency?   [facility]   Has home health visited the patient within 72 hours of discharge? Yes   Home health comments Pt provided number to  as she requested info.   DME comments Pt wears home O2 at @L.   Pulse Ox monitoring Intermittent   Pulse Ox device source Patient   O2 Sat: education provided Sat levels, Monitoring frequency, When to seek care   Comments Pt reports that she is doing well, her cough and congestion has resolved.   What is the patient's perception of their health status since discharge? Improving   Nursing Interventions Nurse provided patient education   Is the patient/caregiver able to teach back the hierarchy of who to call/visit for symptoms/problems? PCP, Specialist, Home health nurse, Urgent Care, ED, 911 Yes   Is the patient/caregiver able to teach back signs and symptoms of worsening condition: Fever/chills, Chest pain, Shortness of breath   Is the patient/caregiver able to teach back importance of completing antibiotic course of treatment? Yes   Week 3 call completed? Yes   Graduated Yes   Call end time 1705            Kay HERNÁNDEZ - Registered Nurse

## 2024-08-01 ENCOUNTER — OFFICE VISIT (OUTPATIENT)
Dept: ORTHOPEDIC SURGERY | Facility: CLINIC | Age: 79
End: 2024-08-01
Payer: MEDICARE

## 2024-08-01 VITALS — HEIGHT: 65 IN | OXYGEN SATURATION: 90 % | HEART RATE: 91 BPM | BODY MASS INDEX: 27.49 KG/M2 | WEIGHT: 165 LBS

## 2024-08-01 DIAGNOSIS — M25.512 CHRONIC LEFT SHOULDER PAIN: ICD-10-CM

## 2024-08-01 DIAGNOSIS — G89.29 CHRONIC LEFT SHOULDER PAIN: ICD-10-CM

## 2024-08-01 DIAGNOSIS — M19.012 GLENOHUMERAL ARTHRITIS, LEFT: ICD-10-CM

## 2024-08-01 DIAGNOSIS — S42.255S: ICD-10-CM

## 2024-08-01 DIAGNOSIS — M75.102 TEAR OF LEFT SUPRASPINATUS TENDON: Primary | ICD-10-CM

## 2024-08-01 RX ORDER — NITROFURANTOIN 25; 75 MG/1; MG/1
1 CAPSULE ORAL EVERY 12 HOURS SCHEDULED
COMMUNITY
Start: 2024-07-25

## 2024-08-01 RX ORDER — TRIAMCINOLONE ACETONIDE 40 MG/ML
40 INJECTION, SUSPENSION INTRA-ARTICULAR; INTRAMUSCULAR
Status: COMPLETED | OUTPATIENT
Start: 2024-08-01 | End: 2024-08-01

## 2024-08-01 RX ORDER — LIDOCAINE HYDROCHLORIDE 10 MG/ML
4 INJECTION, SOLUTION EPIDURAL; INFILTRATION; INTRACAUDAL; PERINEURAL
Status: COMPLETED | OUTPATIENT
Start: 2024-08-01 | End: 2024-08-01

## 2024-08-01 RX ORDER — KETOCONAZOLE 20 MG/ML
SHAMPOO TOPICAL
COMMUNITY
Start: 2024-07-20

## 2024-08-01 RX ADMIN — LIDOCAINE HYDROCHLORIDE 4 ML: 10 INJECTION, SOLUTION EPIDURAL; INFILTRATION; INTRACAUDAL; PERINEURAL at 10:32

## 2024-08-01 RX ADMIN — TRIAMCINOLONE ACETONIDE 40 MG: 40 INJECTION, SUSPENSION INTRA-ARTICULAR; INTRAMUSCULAR at 10:32

## 2024-08-02 ENCOUNTER — HOME CARE VISIT (OUTPATIENT)
Dept: HOME HEALTH SERVICES | Facility: HOME HEALTHCARE | Age: 79
End: 2024-08-02
Payer: COMMERCIAL

## 2024-08-02 VITALS
RESPIRATION RATE: 16 BRPM | HEART RATE: 77 BPM | OXYGEN SATURATION: 98 % | DIASTOLIC BLOOD PRESSURE: 66 MMHG | SYSTOLIC BLOOD PRESSURE: 122 MMHG | TEMPERATURE: 97 F

## 2024-08-02 PROCEDURE — G0299 HHS/HOSPICE OF RN EA 15 MIN: HCPCS

## 2024-08-04 LAB
MYCOBACTERIUM SPEC CULT: NORMAL
NIGHT BLUE STAIN TISS: NORMAL

## 2024-08-07 ENCOUNTER — HOME CARE VISIT (OUTPATIENT)
Dept: HOME HEALTH SERVICES | Facility: HOME HEALTHCARE | Age: 79
End: 2024-08-07
Payer: COMMERCIAL

## 2024-08-08 ENCOUNTER — HOME CARE VISIT (OUTPATIENT)
Dept: HOME HEALTH SERVICES | Facility: HOME HEALTHCARE | Age: 79
End: 2024-08-08
Payer: MEDICARE

## 2024-08-08 LAB — FUNGUS WND CULT: ABNORMAL

## 2024-08-08 PROCEDURE — G0162 HHC RN E&M PLAN SVS, 15 MIN: HCPCS

## 2024-08-11 LAB
MYCOBACTERIUM SPEC CULT: NORMAL
NIGHT BLUE STAIN TISS: NORMAL

## 2024-08-18 LAB
MYCOBACTERIUM SPEC CULT: NORMAL
NIGHT BLUE STAIN TISS: NORMAL

## 2024-08-25 LAB
MYCOBACTERIUM SPEC CULT: NORMAL
NIGHT BLUE STAIN TISS: NORMAL

## 2024-09-03 ENCOUNTER — OFFICE (AMBULATORY)
Age: 79
End: 2024-09-03
Payer: MEDICAID

## 2024-09-03 ENCOUNTER — OFFICE (AMBULATORY)
Dept: URBAN - METROPOLITAN AREA CLINIC 64 | Facility: CLINIC | Age: 79
End: 2024-09-03
Payer: MEDICAID

## 2024-09-03 VITALS
SYSTOLIC BLOOD PRESSURE: 143 MMHG | HEART RATE: 69 BPM | HEART RATE: 69 BPM | DIASTOLIC BLOOD PRESSURE: 88 MMHG | SYSTOLIC BLOOD PRESSURE: 143 MMHG | HEIGHT: 65 IN | HEIGHT: 65 IN | HEIGHT: 65 IN | WEIGHT: 187 LBS | DIASTOLIC BLOOD PRESSURE: 88 MMHG | WEIGHT: 187 LBS | HEIGHT: 65 IN | HEART RATE: 69 BPM | WEIGHT: 187 LBS | HEIGHT: 65 IN | DIASTOLIC BLOOD PRESSURE: 88 MMHG | DIASTOLIC BLOOD PRESSURE: 88 MMHG | SYSTOLIC BLOOD PRESSURE: 143 MMHG | SYSTOLIC BLOOD PRESSURE: 143 MMHG | HEART RATE: 69 BPM | HEART RATE: 69 BPM | WEIGHT: 187 LBS | HEIGHT: 65 IN | HEART RATE: 69 BPM | HEART RATE: 69 BPM | WEIGHT: 187 LBS | WEIGHT: 187 LBS | HEIGHT: 65 IN | DIASTOLIC BLOOD PRESSURE: 88 MMHG | SYSTOLIC BLOOD PRESSURE: 143 MMHG | DIASTOLIC BLOOD PRESSURE: 88 MMHG | SYSTOLIC BLOOD PRESSURE: 143 MMHG | WEIGHT: 187 LBS | SYSTOLIC BLOOD PRESSURE: 143 MMHG | DIASTOLIC BLOOD PRESSURE: 88 MMHG

## 2024-09-03 DIAGNOSIS — R10.12 LEFT UPPER QUADRANT PAIN: ICD-10-CM

## 2024-09-03 DIAGNOSIS — K59.00 CONSTIPATION, UNSPECIFIED: ICD-10-CM

## 2024-09-03 DIAGNOSIS — R14.0 ABDOMINAL DISTENSION (GASEOUS): ICD-10-CM

## 2024-09-03 PROCEDURE — 99213 OFFICE O/P EST LOW 20 MIN: CPT | Performed by: INTERNAL MEDICINE

## 2024-09-03 RX ORDER — SIMETHICONE 500 MG
CAPSULE ORAL
Qty: 30 | Refills: -1 | Status: ACTIVE
Start: 2024-09-03

## 2024-09-03 RX ORDER — OMEPRAZOLE 20 MG/1
TABLET, DELAYED RELEASE ORAL
Qty: 90 | Refills: 3 | Status: COMPLETED
Start: 2024-09-03 | End: 2024-10-16

## 2024-09-23 ENCOUNTER — APPOINTMENT (OUTPATIENT)
Dept: MRI IMAGING | Facility: HOSPITAL | Age: 79
DRG: 543 | End: 2024-09-23
Payer: MEDICARE

## 2024-09-23 ENCOUNTER — HOSPITAL ENCOUNTER (INPATIENT)
Facility: HOSPITAL | Age: 79
LOS: 6 days | Discharge: HOME | DRG: 543 | End: 2024-09-29
Attending: FAMILY MEDICINE | Admitting: FAMILY MEDICINE
Payer: MEDICARE

## 2024-09-23 ENCOUNTER — APPOINTMENT (OUTPATIENT)
Dept: GENERAL RADIOLOGY | Facility: HOSPITAL | Age: 79
DRG: 543 | End: 2024-09-23
Payer: MEDICARE

## 2024-09-23 DIAGNOSIS — R53.1 GENERALIZED WEAKNESS: ICD-10-CM

## 2024-09-23 DIAGNOSIS — M48.50XD: ICD-10-CM

## 2024-09-23 DIAGNOSIS — M54.16 LUMBAR RADICULOPATHY: ICD-10-CM

## 2024-09-23 DIAGNOSIS — M54.50 LOW BACK PAIN, UNSPECIFIED BACK PAIN LATERALITY, UNSPECIFIED CHRONICITY, UNSPECIFIED WHETHER SCIATICA PRESENT: Primary | ICD-10-CM

## 2024-09-23 DIAGNOSIS — M54.6 THORACIC BACK PAIN, UNSPECIFIED BACK PAIN LATERALITY, UNSPECIFIED CHRONICITY: ICD-10-CM

## 2024-09-23 PROBLEM — M19.90 OSTEOARTHRITIS, UNSPECIFIED OSTEOARTHRITIS TYPE, UNSPECIFIED SITE: Status: ACTIVE | Noted: 2024-02-01

## 2024-09-23 PROBLEM — E11.22 TYPE 2 DIABETES MELLITUS WITH DIABETIC CHRONIC KIDNEY DISEASE: Status: ACTIVE | Noted: 2024-02-01

## 2024-09-23 PROBLEM — Z72.0 TOBACCO USE: Status: ACTIVE | Noted: 2024-06-15

## 2024-09-23 PROBLEM — K21.00 GASTRO-ESOPHAGEAL REFLUX DISEASE WITH ESOPHAGITIS: Status: ACTIVE | Noted: 2024-09-23

## 2024-09-23 PROBLEM — F41.9 ANXIETY DISORDER: Status: ACTIVE | Noted: 2024-02-01

## 2024-09-23 PROBLEM — M81.0 AGE-RELATED OSTEOPOROSIS WITHOUT CURRENT PATHOLOGICAL FRACTURE: Status: ACTIVE | Noted: 2024-06-15

## 2024-09-23 PROBLEM — E11.40 TYPE 2 DIABETES MELLITUS WITH DIABETIC NEUROPATHY, UNSPECIFIED: Status: ACTIVE | Noted: 2024-02-01

## 2024-09-23 PROBLEM — K44.9 DIAPHRAGMATIC HERNIA WITHOUT OBSTRUCTION OR GANGRENE: Status: ACTIVE | Noted: 2020-01-17

## 2024-09-23 PROBLEM — Z85.54 HISTORY OF CANCER OF URETER: Status: ACTIVE | Noted: 2024-06-15

## 2024-09-23 PROBLEM — M48.50XA COMPRESSION FRACTURE OF VERTEBRAL COLUMN: Status: ACTIVE | Noted: 2023-02-14

## 2024-09-23 PROBLEM — M51.369 DEGENERATION OF LUMBAR INTERVERTEBRAL DISC: Status: ACTIVE | Noted: 2023-02-14

## 2024-09-23 PROBLEM — M54.9 BACK PAIN: Status: ACTIVE | Noted: 2024-09-23

## 2024-09-23 PROBLEM — E55.9 VITAMIN D DEFICIENCY: Status: ACTIVE | Noted: 2024-06-15

## 2024-09-23 LAB
ALBUMIN SERPL-MCNC: 3.7 G/DL (ref 3.5–5.2)
ALBUMIN/GLOB SERPL: 1.4 G/DL
ALP SERPL-CCNC: 73 U/L (ref 39–117)
ALT SERPL W P-5'-P-CCNC: 14 U/L (ref 1–33)
ANION GAP SERPL CALCULATED.3IONS-SCNC: 11 MMOL/L (ref 5–15)
AST SERPL-CCNC: 14 U/L (ref 1–32)
BASOPHILS # BLD AUTO: 0.03 10*3/MM3 (ref 0–0.2)
BASOPHILS NFR BLD AUTO: 0.6 % (ref 0–1.5)
BILIRUB SERPL-MCNC: 0.2 MG/DL (ref 0–1.2)
BUN SERPL-MCNC: 20 MG/DL (ref 8–23)
BUN/CREAT SERPL: 12.8 (ref 7–25)
CA-I SERPL ISE-MCNC: 1.21 MMOL/L (ref 1.15–1.3)
CALCIUM SPEC-SCNC: 9.8 MG/DL (ref 8.6–10.5)
CHLORIDE SERPL-SCNC: 105 MMOL/L (ref 98–107)
CO2 SERPL-SCNC: 27 MMOL/L (ref 22–29)
CREAT SERPL-MCNC: 1.56 MG/DL (ref 0.57–1)
CRP SERPL-MCNC: 2.66 MG/DL (ref 0–0.5)
DEPRECATED RDW RBC AUTO: 48.4 FL (ref 37–54)
EGFRCR SERPLBLD CKD-EPI 2021: 33.5 ML/MIN/1.73
EOSINOPHIL # BLD AUTO: 0.05 10*3/MM3 (ref 0–0.4)
EOSINOPHIL NFR BLD AUTO: 0.9 % (ref 0.3–6.2)
ERYTHROCYTE [DISTWIDTH] IN BLOOD BY AUTOMATED COUNT: 12.9 % (ref 12.3–15.4)
ERYTHROCYTE [SEDIMENTATION RATE] IN BLOOD: 32 MM/HR (ref 0–30)
GLOBULIN UR ELPH-MCNC: 2.6 GM/DL
GLUCOSE BLDC GLUCOMTR-MCNC: 256 MG/DL (ref 70–105)
GLUCOSE SERPL-MCNC: 273 MG/DL (ref 65–99)
HCT VFR BLD AUTO: 44.2 % (ref 34–46.6)
HGB BLD-MCNC: 14.4 G/DL (ref 12–15.9)
HOLD SPECIMEN: NORMAL
IMM GRANULOCYTES # BLD AUTO: 0.05 10*3/MM3 (ref 0–0.05)
IMM GRANULOCYTES NFR BLD AUTO: 0.9 % (ref 0–0.5)
LYMPHOCYTES # BLD AUTO: 1.48 10*3/MM3 (ref 0.7–3.1)
LYMPHOCYTES NFR BLD AUTO: 27.6 % (ref 19.6–45.3)
MCH RBC QN AUTO: 32.8 PG (ref 26.6–33)
MCHC RBC AUTO-ENTMCNC: 32.6 G/DL (ref 31.5–35.7)
MCV RBC AUTO: 100.7 FL (ref 79–97)
MONOCYTES # BLD AUTO: 0.59 10*3/MM3 (ref 0.1–0.9)
MONOCYTES NFR BLD AUTO: 11 % (ref 5–12)
NEUTROPHILS NFR BLD AUTO: 3.16 10*3/MM3 (ref 1.7–7)
NEUTROPHILS NFR BLD AUTO: 59 % (ref 42.7–76)
NRBC BLD AUTO-RTO: 0 /100 WBC (ref 0–0.2)
PLATELET # BLD AUTO: 226 10*3/MM3 (ref 140–450)
PMV BLD AUTO: 10.4 FL (ref 6–12)
POTASSIUM SERPL-SCNC: 3.6 MMOL/L (ref 3.5–5.2)
PROT SERPL-MCNC: 6.3 G/DL (ref 6–8.5)
PTH-INTACT SERPL-MCNC: 36.1 PG/ML (ref 15–65)
RBC # BLD AUTO: 4.39 10*6/MM3 (ref 3.77–5.28)
SODIUM SERPL-SCNC: 143 MMOL/L (ref 136–145)
TSH SERPL DL<=0.05 MIU/L-ACNC: 0.64 UIU/ML (ref 0.27–4.2)
WBC NRBC COR # BLD AUTO: 5.36 10*3/MM3 (ref 3.4–10.8)

## 2024-09-23 PROCEDURE — 25810000003 SODIUM CHLORIDE 0.9 % SOLUTION: Performed by: FAMILY MEDICINE

## 2024-09-23 PROCEDURE — 25010000002 MORPHINE PER 10 MG: Performed by: FAMILY MEDICINE

## 2024-09-23 PROCEDURE — 72148 MRI LUMBAR SPINE W/O DYE: CPT

## 2024-09-23 PROCEDURE — 94799 UNLISTED PULMONARY SVC/PX: CPT

## 2024-09-23 PROCEDURE — 84443 ASSAY THYROID STIM HORMONE: CPT | Performed by: FAMILY MEDICINE

## 2024-09-23 PROCEDURE — 82948 REAGENT STRIP/BLOOD GLUCOSE: CPT | Performed by: FAMILY MEDICINE

## 2024-09-23 PROCEDURE — 80053 COMPREHEN METABOLIC PANEL: CPT | Performed by: FAMILY MEDICINE

## 2024-09-23 PROCEDURE — 85652 RBC SED RATE AUTOMATED: CPT | Performed by: FAMILY MEDICINE

## 2024-09-23 PROCEDURE — 71045 X-RAY EXAM CHEST 1 VIEW: CPT

## 2024-09-23 PROCEDURE — 82330 ASSAY OF CALCIUM: CPT | Performed by: FAMILY MEDICINE

## 2024-09-23 PROCEDURE — 63710000001 INSULIN LISPRO (HUMAN) PER 5 UNITS: Performed by: FAMILY MEDICINE

## 2024-09-23 PROCEDURE — 94640 AIRWAY INHALATION TREATMENT: CPT

## 2024-09-23 PROCEDURE — 83970 ASSAY OF PARATHORMONE: CPT | Performed by: FAMILY MEDICINE

## 2024-09-23 PROCEDURE — 85025 COMPLETE CBC W/AUTO DIFF WBC: CPT | Performed by: FAMILY MEDICINE

## 2024-09-23 PROCEDURE — 72146 MRI CHEST SPINE W/O DYE: CPT

## 2024-09-23 PROCEDURE — 86140 C-REACTIVE PROTEIN: CPT | Performed by: FAMILY MEDICINE

## 2024-09-23 PROCEDURE — 82306 VITAMIN D 25 HYDROXY: CPT | Performed by: FAMILY MEDICINE

## 2024-09-23 PROCEDURE — 94664 DEMO&/EVAL PT USE INHALER: CPT

## 2024-09-23 PROCEDURE — 25010000002 METHYLPREDNISOLONE PER 40 MG: Performed by: FAMILY MEDICINE

## 2024-09-23 RX ORDER — PANTOPRAZOLE SODIUM 40 MG/1
40 TABLET, DELAYED RELEASE ORAL
Status: DISCONTINUED | OUTPATIENT
Start: 2024-09-24 | End: 2024-09-29 | Stop reason: HOSPADM

## 2024-09-23 RX ORDER — GABAPENTIN 100 MG/1
100 CAPSULE ORAL NIGHTLY
Status: DISCONTINUED | OUTPATIENT
Start: 2024-09-23 | End: 2024-09-25

## 2024-09-23 RX ORDER — METHYLPREDNISOLONE SODIUM SUCCINATE 40 MG/ML
20 INJECTION, POWDER, LYOPHILIZED, FOR SOLUTION INTRAMUSCULAR; INTRAVENOUS ONCE
Status: COMPLETED | OUTPATIENT
Start: 2024-09-23 | End: 2024-09-23

## 2024-09-23 RX ORDER — IBUPROFEN 600 MG/1
1 TABLET ORAL
Status: DISCONTINUED | OUTPATIENT
Start: 2024-09-23 | End: 2024-09-29 | Stop reason: HOSPADM

## 2024-09-23 RX ORDER — DEXTROSE MONOHYDRATE 25 G/50ML
25 INJECTION, SOLUTION INTRAVENOUS
Status: DISCONTINUED | OUTPATIENT
Start: 2024-09-23 | End: 2024-09-29 | Stop reason: HOSPADM

## 2024-09-23 RX ORDER — ONDANSETRON 2 MG/ML
4 INJECTION INTRAMUSCULAR; INTRAVENOUS EVERY 6 HOURS PRN
Status: DISCONTINUED | OUTPATIENT
Start: 2024-09-23 | End: 2024-09-29 | Stop reason: HOSPADM

## 2024-09-23 RX ORDER — AMOXICILLIN 250 MG
1 CAPSULE ORAL NIGHTLY
Status: DISCONTINUED | OUTPATIENT
Start: 2024-09-23 | End: 2024-09-29 | Stop reason: HOSPADM

## 2024-09-23 RX ORDER — METHOCARBAMOL 500 MG/1
500 TABLET, FILM COATED ORAL EVERY 6 HOURS PRN
Status: DISCONTINUED | OUTPATIENT
Start: 2024-09-23 | End: 2024-09-29 | Stop reason: HOSPADM

## 2024-09-23 RX ORDER — CARVEDILOL 6.25 MG/1
6.25 TABLET ORAL 2 TIMES DAILY WITH MEALS
Status: DISCONTINUED | OUTPATIENT
Start: 2024-09-23 | End: 2024-09-25

## 2024-09-23 RX ORDER — AMLODIPINE BESYLATE 5 MG/1
10 TABLET ORAL DAILY
Status: DISCONTINUED | OUTPATIENT
Start: 2024-09-24 | End: 2024-09-29 | Stop reason: HOSPADM

## 2024-09-23 RX ORDER — INSULIN LISPRO 100 [IU]/ML
2-7 INJECTION, SOLUTION INTRAVENOUS; SUBCUTANEOUS
Status: DISCONTINUED | OUTPATIENT
Start: 2024-09-23 | End: 2024-09-29 | Stop reason: HOSPADM

## 2024-09-23 RX ORDER — IPRATROPIUM BROMIDE AND ALBUTEROL SULFATE 2.5; .5 MG/3ML; MG/3ML
3 SOLUTION RESPIRATORY (INHALATION)
Status: DISCONTINUED | OUTPATIENT
Start: 2024-09-23 | End: 2024-09-25

## 2024-09-23 RX ORDER — SODIUM CHLORIDE 9 MG/ML
30 INJECTION, SOLUTION INTRAVENOUS CONTINUOUS
Status: DISCONTINUED | OUTPATIENT
Start: 2024-09-23 | End: 2024-09-29 | Stop reason: HOSPADM

## 2024-09-23 RX ORDER — MORPHINE SULFATE 2 MG/ML
2 INJECTION, SOLUTION INTRAMUSCULAR; INTRAVENOUS EVERY 4 HOURS PRN
Status: DISCONTINUED | OUTPATIENT
Start: 2024-09-23 | End: 2024-09-29 | Stop reason: HOSPADM

## 2024-09-23 RX ORDER — ACETAMINOPHEN 325 MG/1
325 TABLET ORAL EVERY 4 HOURS PRN
Status: DISCONTINUED | OUTPATIENT
Start: 2024-09-23 | End: 2024-09-29 | Stop reason: HOSPADM

## 2024-09-23 RX ORDER — NICOTINE POLACRILEX 4 MG
15 LOZENGE BUCCAL
Status: DISCONTINUED | OUTPATIENT
Start: 2024-09-23 | End: 2024-09-29 | Stop reason: HOSPADM

## 2024-09-23 RX ORDER — TRAMADOL HYDROCHLORIDE 50 MG/1
100 TABLET ORAL EVERY 6 HOURS PRN
Status: DISCONTINUED | OUTPATIENT
Start: 2024-09-23 | End: 2024-09-29 | Stop reason: HOSPADM

## 2024-09-23 RX ORDER — TERAZOSIN 1 MG/1
1 CAPSULE ORAL NIGHTLY
Status: DISCONTINUED | OUTPATIENT
Start: 2024-09-23 | End: 2024-09-29 | Stop reason: HOSPADM

## 2024-09-23 RX ADMIN — SENNOSIDES AND DOCUSATE SODIUM 1 TABLET: 50; 8.6 TABLET ORAL at 22:42

## 2024-09-23 RX ADMIN — MORPHINE SULFATE 2 MG: 2 INJECTION, SOLUTION INTRAMUSCULAR; INTRAVENOUS at 21:22

## 2024-09-23 RX ADMIN — SODIUM CHLORIDE 30 ML/HR: 9 INJECTION, SOLUTION INTRAVENOUS at 23:19

## 2024-09-23 RX ADMIN — METHYLPREDNISOLONE SODIUM SUCCINATE 20 MG: 40 INJECTION, POWDER, FOR SOLUTION INTRAMUSCULAR; INTRAVENOUS at 22:42

## 2024-09-23 RX ADMIN — IPRATROPIUM BROMIDE AND ALBUTEROL SULFATE 3 ML: .5; 3 SOLUTION RESPIRATORY (INHALATION) at 21:02

## 2024-09-23 RX ADMIN — INSULIN LISPRO 4 UNITS: 100 INJECTION, SOLUTION INTRAVENOUS; SUBCUTANEOUS at 23:26

## 2024-09-24 ENCOUNTER — APPOINTMENT (OUTPATIENT)
Dept: GENERAL RADIOLOGY | Facility: HOSPITAL | Age: 79
DRG: 543 | End: 2024-09-24
Payer: MEDICARE

## 2024-09-24 LAB
25(OH)D3 SERPL-MCNC: 49.7 NG/ML (ref 30–100)
ANION GAP SERPL CALCULATED.3IONS-SCNC: 7.6 MMOL/L (ref 5–15)
BUN SERPL-MCNC: 21 MG/DL (ref 8–23)
BUN/CREAT SERPL: 15.7 (ref 7–25)
CALCIUM SPEC-SCNC: 9.5 MG/DL (ref 8.6–10.5)
CHLORIDE SERPL-SCNC: 106 MMOL/L (ref 98–107)
CO2 SERPL-SCNC: 27.4 MMOL/L (ref 22–29)
CREAT SERPL-MCNC: 1.34 MG/DL (ref 0.57–1)
EGFRCR SERPLBLD CKD-EPI 2021: 40.2 ML/MIN/1.73
GLUCOSE BLDC GLUCOMTR-MCNC: 139 MG/DL (ref 70–105)
GLUCOSE BLDC GLUCOMTR-MCNC: 203 MG/DL (ref 70–105)
GLUCOSE BLDC GLUCOMTR-MCNC: 204 MG/DL (ref 70–105)
GLUCOSE BLDC GLUCOMTR-MCNC: 211 MG/DL (ref 70–105)
GLUCOSE SERPL-MCNC: 193 MG/DL (ref 65–99)
POTASSIUM SERPL-SCNC: 4.4 MMOL/L (ref 3.5–5.2)
SODIUM SERPL-SCNC: 141 MMOL/L (ref 136–145)

## 2024-09-24 PROCEDURE — 94799 UNLISTED PULMONARY SVC/PX: CPT

## 2024-09-24 PROCEDURE — 74018 RADEX ABDOMEN 1 VIEW: CPT

## 2024-09-24 PROCEDURE — 82948 REAGENT STRIP/BLOOD GLUCOSE: CPT

## 2024-09-24 PROCEDURE — 82948 REAGENT STRIP/BLOOD GLUCOSE: CPT | Performed by: FAMILY MEDICINE

## 2024-09-24 PROCEDURE — 63710000001 INSULIN LISPRO (HUMAN) PER 5 UNITS: Performed by: FAMILY MEDICINE

## 2024-09-24 PROCEDURE — 94761 N-INVAS EAR/PLS OXIMETRY MLT: CPT

## 2024-09-24 PROCEDURE — 94664 DEMO&/EVAL PT USE INHALER: CPT

## 2024-09-24 PROCEDURE — 63710000001 INSULIN GLARGINE PER 5 UNITS: Performed by: FAMILY MEDICINE

## 2024-09-24 PROCEDURE — 80048 BASIC METABOLIC PNL TOTAL CA: CPT | Performed by: FAMILY MEDICINE

## 2024-09-24 PROCEDURE — 25010000002 MORPHINE PER 10 MG: Performed by: FAMILY MEDICINE

## 2024-09-24 RX ORDER — METRONIDAZOLE 500 MG/1
500 TABLET ORAL 3 TIMES DAILY
COMMUNITY
End: 2024-09-29 | Stop reason: HOSPADM

## 2024-09-24 RX ORDER — FUROSEMIDE 20 MG
20 TABLET ORAL DAILY PRN
COMMUNITY

## 2024-09-24 RX ORDER — ALBUTEROL SULFATE 90 UG/1
2 INHALANT RESPIRATORY (INHALATION) EVERY 4 HOURS PRN
COMMUNITY

## 2024-09-24 RX ORDER — GABAPENTIN 100 MG/1
100 CAPSULE ORAL NIGHTLY PRN
Status: ON HOLD | COMMUNITY
End: 2024-10-07

## 2024-09-24 RX ORDER — FINERENONE 10 MG/1
1 TABLET, FILM COATED ORAL DAILY
COMMUNITY

## 2024-09-24 RX ORDER — SENNOSIDES 8.6 MG
1300 CAPSULE ORAL EVERY 8 HOURS PRN
COMMUNITY

## 2024-09-24 RX ORDER — CARVEDILOL 3.12 MG/1
3.12 TABLET ORAL 2 TIMES DAILY WITH MEALS
COMMUNITY
End: 2024-09-29 | Stop reason: HOSPADM

## 2024-09-24 RX ORDER — UMECLIDINIUM BROMIDE AND VILANTEROL TRIFENATATE 62.5; 25 UG/1; UG/1
1 POWDER RESPIRATORY (INHALATION)
COMMUNITY

## 2024-09-24 RX ORDER — FERROUS SULFATE 325(65) MG
325 TABLET ORAL
Status: ON HOLD | COMMUNITY
End: 2024-10-08

## 2024-09-24 RX ORDER — METHOCARBAMOL 500 MG/1
500 TABLET, FILM COATED ORAL 4 TIMES DAILY PRN
Status: ON HOLD | COMMUNITY
End: 2024-10-08

## 2024-09-24 RX ORDER — FLUTICASONE PROPIONATE AND SALMETEROL XINAFOATE 230; 21 UG/1; UG/1
1 AEROSOL, METERED RESPIRATORY (INHALATION) TAKE AS DIRECTED
Status: ON HOLD | COMMUNITY
End: 2024-10-08

## 2024-09-24 RX ORDER — CIPROFLOXACIN 250 MG/1
250 TABLET, FILM COATED ORAL 2 TIMES DAILY
COMMUNITY
End: 2024-09-29 | Stop reason: HOSPADM

## 2024-09-24 RX ORDER — GUAIFENESIN 600 MG/1
1200 TABLET, EXTENDED RELEASE ORAL 2 TIMES DAILY PRN
COMMUNITY
End: 2024-10-11 | Stop reason: HOSPADM

## 2024-09-24 RX ORDER — LIDOCAINE 50 MG/G
1 PATCH TOPICAL DAILY PRN
COMMUNITY

## 2024-09-24 RX ADMIN — INSULIN GLARGINE 10 UNITS: 100 INJECTION, SOLUTION SUBCUTANEOUS at 20:26

## 2024-09-24 RX ADMIN — MORPHINE SULFATE 2 MG: 2 INJECTION, SOLUTION INTRAMUSCULAR; INTRAVENOUS at 02:23

## 2024-09-24 RX ADMIN — CARVEDILOL 6.25 MG: 6.25 TABLET, FILM COATED ORAL at 09:06

## 2024-09-24 RX ADMIN — AMLODIPINE BESYLATE 10 MG: 5 TABLET ORAL at 09:06

## 2024-09-24 RX ADMIN — INSULIN LISPRO 3 UNITS: 100 INJECTION, SOLUTION INTRAVENOUS; SUBCUTANEOUS at 09:06

## 2024-09-24 RX ADMIN — PANTOPRAZOLE SODIUM 40 MG: 40 TABLET, DELAYED RELEASE ORAL at 05:15

## 2024-09-24 RX ADMIN — IPRATROPIUM BROMIDE AND ALBUTEROL SULFATE 3 ML: .5; 3 SOLUTION RESPIRATORY (INHALATION) at 12:15

## 2024-09-24 RX ADMIN — GABAPENTIN 100 MG: 100 CAPSULE ORAL at 20:07

## 2024-09-24 RX ADMIN — INSULIN LISPRO 3 UNITS: 100 INJECTION, SOLUTION INTRAVENOUS; SUBCUTANEOUS at 12:14

## 2024-09-24 RX ADMIN — SENNOSIDES AND DOCUSATE SODIUM 1 TABLET: 50; 8.6 TABLET ORAL at 20:07

## 2024-09-24 RX ADMIN — TERAZOSIN HYDROCHLORIDE 1 MG: 1 CAPSULE ORAL at 20:07

## 2024-09-24 RX ADMIN — IPRATROPIUM BROMIDE AND ALBUTEROL SULFATE 3 ML: .5; 3 SOLUTION RESPIRATORY (INHALATION) at 08:14

## 2024-09-24 RX ADMIN — SERTRALINE 50 MG: 50 TABLET, FILM COATED ORAL at 09:06

## 2024-09-24 RX ADMIN — MORPHINE SULFATE 2 MG: 2 INJECTION, SOLUTION INTRAMUSCULAR; INTRAVENOUS at 09:06

## 2024-09-24 RX ADMIN — INSULIN LISPRO 3 UNITS: 100 INJECTION, SOLUTION INTRAVENOUS; SUBCUTANEOUS at 20:26

## 2024-09-24 RX ADMIN — IPRATROPIUM BROMIDE AND ALBUTEROL SULFATE 3 ML: .5; 3 SOLUTION RESPIRATORY (INHALATION) at 18:24

## 2024-09-24 RX ADMIN — MORPHINE SULFATE 2 MG: 2 INJECTION, SOLUTION INTRAMUSCULAR; INTRAVENOUS at 20:07

## 2024-09-24 NOTE — CONSULTS
visited patient in regards to spiritual care consult.   provided supportive presence, supportive conversation and prayer.

## 2024-09-24 NOTE — PLAN OF CARE
Goal Outcome Evaluation:   Pt is resting in bed, vss, c/o pain, med given, see mar. NS@30ml hr. Call light within reach, plan of care ongoing

## 2024-09-24 NOTE — CASE MANAGEMENT/SOCIAL WORK
Discharge Planning Assessment   [facility]     Patient Name: Vianey Reeves  MRN: [id number]  Today's Date: 9/24/2024    Admit Date: 9/23/2024    Plan: Return home alone   Discharge Needs Assessment       Row Name 09/24/24 1038       Living Environment    People in Home alone    Current Living Arrangements apartment    Potentially Unsafe Housing Conditions none    In the past 12 months has the electric, gas, oil, or water company threatened to shut off services in your home? No    Primary Care Provided by self    Provides Primary Care For no one    Family Caregiver if Needed grandchild(erin), adult;child(erin), adult    Family Caregiver Names daughter Janice    Quality of Family Relationships helpful;involved;supportive    Able to Return to Prior Arrangements yes       Resource/Environmental Concerns    Resource/Environmental Concerns none    Transportation Concerns none       Transportation Needs    In the past 12 months, has lack of transportation kept you from medical appointments or from getting medications? no    In the past 12 months, has lack of transportation kept you from meetings, work, or from getting things needed for daily living? No       Food Insecurity    Within the past 12 months, you worried that your food would run out before you got the money to buy more. Never true    Within the past 12 months, the food you bought just didn't last and you didn't have money to get more. Never true       Transition Planning    Patient/Family Anticipates Transition to home    Patient/Family Anticipated Services at Transition none    Transportation Anticipated car, drives self;family or friend will provide       Discharge Needs Assessment    Readmission Within the Last 30 Days no previous admission in last 30 days    Equipment Currently Used at Home cane, straight;cpap  CPAP ([place]-to deliver soon)    Concerns to be Addressed denies needs/concerns at this time;no discharge needs identified    Anticipated Changes Related  to Illness none    Equipment Needed After Discharge none                   Discharge Plan       Row Name 09/24/24 1040       Plan    Plan Return home alone    Patient/Family in Agreement with Plan yes    Plan Comments CM met with pt at bedside to discuss discharge needs. Pt lives at home alone, drives and is IADLs. PCP and pharmacy verified- pt enrolled in [facility] as requested. No issues stated affording food/ medications or transport, and home environment is safe. Current DME: cane and soon to be delivered CPAP ([place] No current HHC and none anticipated on discharge. Family will provide transportation home.                    Demographic Summary       Row Name 09/24/24 1036       General Information    Admission Type inpatient    Arrived From emergency department    Required Notices Provided Important Message from Medicare    Referral Source admission list    Reason for Consult discharge planning    Preferred Language English       Contact Information    Permission Granted to Share Info With                    Functional Status       Row Name 09/24/24 1038       Functional Status    Usual Activity Tolerance moderate    Current Activity Tolerance fair       Functional Status, IADL    Medications independent    Meal Preparation independent    Housekeeping independent    Laundry independent    Shopping independent       Mental Status    General Appearance WDL WDL       Mental Status Summary    Recent Changes in Mental Status/Cognitive Functioning no changes       Employment/    Current or Previous Occupation not applicable                  Patient Forms       Row Name 09/24/24 0812       Patient Forms    Important Message from Medicare (IMM) Delivered  IMM 9/23 per VALERIE Dinh RN      Office phone: [phone]  Office fax: [phone]

## 2024-09-24 NOTE — PAYOR COMM NOTE
"    Eri Sterling (78 y.o. Female)       Date of Birth   1945    Social Security Number       Address   [address]  [place] [address]    Home Phone   [phone]    MRN   [id number]       Orthodox   None    Marital Status                               Admission Date   9/23/24    Admission Type   Elective    Admitting Provider   Del Iverson MD    Attending Provider   Del Iverson MD    Department, Room/Bed   [facility] 3A MEDICAL INPATIENT, 302/1       Discharge Date       Discharge Disposition       Discharge Destination                                 Attending Provider: Del Iverson MD    Allergies: Erythromycin, Naproxen Sodium, Statins, Latex, Metoclopramide, Dicyclomine    Isolation: None   Infection: None   Code Status: CPR    Ht: 165.1 cm (65\")   Wt: 82.7 kg (182 lb 5.1 oz)    Admission Cmt: None   Principal Problem: Thoracic back pain [M54.6]                   Active Insurance as of 9/23/2024       Primary Coverage       Payor Plan Insurance Group Employer/Plan Group    ANTHEM MEDICARE REPLACEMENT ANTHEM MEDICARE ADVANTAGE [id number]       Payor Plan Address Payor Plan Phone Number Payor Plan Fax Number Effective Dates    [address] [phone]  1/1/2022 - None Entered    [facility] [id number]         Subscriber Name Subscriber Birth Date Member ID       ERI STERLING 1945 [id number]               Secondary Coverage       Payor Plan Insurance Group Employer/Plan Group    [id number]        Payor Plan Address Payor Plan Phone Number Payor Plan Fax Number Effective Dates    [address] [address]   9/1/2024 - None Entered    [place] [address]         Subscriber Name Subscriber Birth Date Member ID       ERI STERLING 1945 [id number]                     Emergency Contacts        (Rel.) Home Phone Work Phone Mobile Phone    JUAN CSUSI LOVEJA (Daughter) [phone] -- [phone]    Carolina schneider " (Grandchild) -- -- [phone]    HANNAH VANEGAS (Grandchild) [phone] -- [phone]                 History & Physical        Del Iverson MD at 09/23/24 2036          Patient Care Team:  Anny Garcia MD as PCP - General (Internal Medicine)  Sergio Ordonez MD as Consulting Physician (Nephrology)    Chief Complaint  Subjective    The patient is a 78 y.o. female who presents with 2 week HX intractable thoracic pain    HPI  2 week history pain thoracic spine at T11 with radiations to R flank.  Pain refractory to intervention.  Plain films found osteoporotic changes but no Fx.  Decompensation of pain with immobility secondary to discomfort with need to directly admit for appropriate intervention.  No BRBPR, Hematuria, Exanthem, hemoptysis, LYNN, F, C, N, V, D.   Review of Systems  Review of Systems   Musculoskeletal:  Positive for back pain.   Neurological:  Positive for weakness.       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    Hypertension     IBS (irritable colon syndrome)     constipation    Mass of right breast     Neuropathy     Renal insufficiency     Sciatic leg pain     right    Simple chronic bronchitis     Solitary kidney     left     Past Surgical History:   Procedure Laterality Date    BREAST BIOPSY Right     BRONCHOSCOPY N/A 7/14/2024    Procedure: BRONCHOSCOPY WITH BRONCHOALVEOLAR LAVAGE;  Surgeon: Marlin Ferguson MD;  Location:  [facility] ENDOSCOPY;  Service: Pulmonary;  Laterality: N/A;  POST: RML ATELECTASIS    CARDIAC CATHETERIZATION      CHOLECYSTECTOMY      COLON SURGERY      REMOVAL diverticular diseae    COLONOSCOPY N/A 2021    Procedure: COLONOSCOPY with polypectomy x 3;  Surgeon: Margarette Mcallister MD;  Location: [facility] ENDOSCOPY;  Service: Gastroenterology;  Laterality: N/A;  post op: hmorrhoids, diverticulosis, polyps    CORONARY STENT PLACEMENT      ENDOSCOPY N/A 2021    Procedure: ESOPHAGOGASTRODUODENOSCOPY with dilatation (18-20 mm balloon) and (54 bougie);  Surgeon: Margarette Mcallister MD;  Location: [facility] ENDOSCOPY;  Service: Gastroenterology;  Laterality: N/A;  post op: esophageal stricture, esophagitis, gastritis, history of nissen    ENDOSCOPY N/A 2023    Procedure: ESOPHAGOGASTRODUODENOSCOPY with biopsy x 1 area and dilation (50,54,56 non guided bougie);  Surgeon: Margarette Mcallister MD;  Location: [facility] ENDOSCOPY;  Service: Gastroenterology;  Laterality: N/A;  post op: esophageal stricture    EYE SURGERY Bilateral     cataracts    HIATAL HERNIA REPAIR      NEPHRECTOMY Right     cancer    UPPER ENDOSCOPIC ULTRASOUND W/ FNA N/A 2022    Procedure: EUS;  Surgeon: Margarette Mcallister MD;  Location: [facility] ENDOSCOPY;  Service: Gastroenterology;  Laterality: N/A;  post: normal pancreas, dilated pancreatic duct, hiatal hernia,      Family History   Problem Relation Age of Onset    Arthritis Father     Prostate cancer Father     Heart disease Sister      Social History     Tobacco Use    Smoking status: Former     Current packs/day: 0.00     Average packs/day: 0.3 packs/day for 50.0 years (12.5 ttl pk-yrs)     Types: Cigarettes     Start date: 1973     Quit date: 2023     Years since quittin.8     Passive exposure: Past    Smokeless tobacco: Never    Tobacco comments:     Mostly patches, some days none, some days 1-2   Vaping Use    Vaping status: Never Used   Substance Use Topics    Alcohol use: Yes     Comment:  "occasionally     Drug use: Never     Allergies:  Erythromycin, Naproxen sodium, Statins, Latex, Metoclopramide, Rocephin [ceftriaxone], and Dicyclomine    Objective    Vital Signs         Physical Exam:   Physical Exam  Vitals reviewed.   Constitutional:       General: She is in acute distress.      Appearance: She is ill-appearing. She is not toxic-appearing or diaphoretic.   Cardiovascular:      Rate and Rhythm: Rhythm irregular.      Heart sounds: Normal heart sounds.   Pulmonary:      Breath sounds: Normal breath sounds.   Abdominal:      Palpations: Abdomen is soft.   Musculoskeletal:         General: Tenderness present.   Neurological:      General: No focal deficit present.      Mental Status: She is alert.              Results Review:   CBC      BMP     Cr Clearance CrCl cannot be calculated (Patient's most recent lab result is older than the maximum 30 days allowed.).  Coag     HbA1C   Lab Results   Component Value Date    HGBA1C 7.10 (H) 01/21/2024    HGBA1C 7.0 (H) 12/16/2022    HGBA1C 7.3 08/11/2022     Blood Glucose No results found for: \"POCGLU\"  Infection     CMP     UA      Radiology(recent) No radiology results for the last day     Assessment:    Acute thoracic back pain, intractable with failure of outpatient treatment  Acute R flank pain T11/T12  Acute L1 back pain  Osteoporotic compression fracture  History of kyphoplasty  Osteoporosis  Pulmonary HTN  History of nephrectomy  Panlobular COPD with emphysema  Chronic mucopurulent bronchitis  DMII with renal manifestations   DMII with neuropathic manifestations  Diabetic dyslipidemia  GERD with esophagitis  Myofascia pain syndrome  CKDIV  HH  HTN with CKDIV  Chronic constipation  Nicotine dependency with nicotine use disorder, cigarettes  DDD L/S  Endometrial CA HX with ureteral involvement    Plan:  Pain control//MRI//renal and pulmonary support//additional plans to follow  Expected Length of Stay 3 days    I discussed the patient's findings and my " recommendations with patient and nursing staff.     Del Iverson MD  09/23/24  20:36 EDT          Electronically signed by Del Iverson MD at 09/23/24 2049       Emergency Department Notes    No notes of this type exist for this encounter.       Vital Signs (last day)       Date/Time Temp Temp src Pulse Resp BP Patient Position SpO2    09/24/24 0816 -- -- 75 16 -- -- 99    09/24/24 0814 -- -- 83 16 -- -- 99    09/24/24 0807 97.9 (36.6) Oral 86 16 150/86 Lying 97    09/24/24 0421 98.4 (36.9) Oral -- -- -- Lying --    09/24/24 0100 98.8 (37.1) Oral 81 14 153/83 -- --    09/23/24 2355 98.8 (37.1) Oral -- 14 153/83 Lying --    09/23/24 2105 -- -- 81 14 -- -- 95    09/23/24 2102 -- -- 87 12 -- -- 94          Oxygen Therapy (last day)       Date/Time SpO2 Device (Oxygen Therapy) Flow (L/min) Oxygen Concentration (%) ETCO2 (mmHg)    09/24/24 0850 -- nasal cannula 2 -- --    09/24/24 0816 99 nasal cannula 2 -- --    09/24/24 0814 99 nasal cannula 2 -- --    09/24/24 0807 97 room air -- -- --    09/23/24 2105 95 room air -- -- --    09/23/24 2102 94 room air -- -- --    09/23/24 2030 -- room air -- -- --          Facility-Administered Medications as of 9/24/2024   Medication Dose Route Frequency Provider Last Rate Last Admin    acetaminophen (TYLENOL) tablet 325 mg  325 mg Oral Q4H PRN Del Iverson MD        amLODIPine (NORVASC) tablet 10 mg  10 mg Oral Daily Del Iverson MD   10 mg at 09/24/24 0906    carvedilol (COREG) tablet 6.25 mg  6.25 mg Oral BID With Meals Del Iverson MD   6.25 mg at 09/24/24 0906    dextrose (D50W) (25 g/50 mL) IV injection 25 g  25 g Intravenous Q15 Min PRN Del Iverson MD        dextrose (GLUTOSE) oral gel 15 g  15 g Oral Q15 Min PRN Del Iverson MD        gabapentin (NEURONTIN) capsule 100 mg  100 mg Oral Nightly Del Iverson MD        glucagon (GLUCAGEN) injection 1 mg  1 mg Intramuscular Q15 Min PRN Del Iverson MD        insulin glargine (LANTUS, SEMGLEE)  injection 10 Units  10 Units Subcutaneous Nightly Del Iverson MD        insulin lispro (HUMALOG/ADMELOG) injection 2-7 Units  2-7 Units Subcutaneous 4x Daily AC & at Bedtime Del Iverson MD   3 Units at 09/24/24 0906    ipratropium-albuterol (DUO-NEB) nebulizer solution 3 mL  3 mL Nebulization 4x Daily - RT Del Iverson MD   3 mL at 09/24/24 0814    methocarbamol (ROBAXIN) tablet 500 mg  500 mg Oral Q6H PRN Del Iverson MD        [COMPLETED] methylPREDNISolone sodium succinate (SOLU-Medrol) injection 20 mg  20 mg Intravenous Once Del Iverson MD   20 mg at 09/23/24 2242    morphine injection 2 mg  2 mg Intravenous Q4H PRN Del Iverson MD   2 mg at 09/24/24 0906    ondansetron (ZOFRAN) injection 4 mg  4 mg Intravenous Q6H PRN Del Iverson MD        pantoprazole (PROTONIX) EC tablet 40 mg  40 mg Oral Q AM Del Iverson MD   40 mg at 09/24/24 0515    sennosides-docusate (PERICOLACE) 8.6-50 MG per tablet 1 tablet  1 tablet Oral Nightly Del Iverson MD   1 tablet at 09/23/24 2242    sertraline (ZOLOFT) tablet 50 mg  50 mg Oral Daily Del Iverson MD   50 mg at 09/24/24 0906    sodium chloride 0.9 % infusion  30 mL/hr Intravenous Continuous Del Iverson MD 30 mL/hr at 09/23/24 2319 30 mL/hr at 09/23/24 2319    terazosin (HYTRIN) capsule 1 mg  1 mg Oral Nightly Del Iverson MD        traMADol (ULTRAM) tablet 100 mg  100 mg Oral Q6H PRN Del Iverson MD         Lab Results (last 24 hours)       Procedure Component Value Units Date/Time    POC Glucose 4x Daily Before Meals & at Bedtime [[id number]]  (Abnormal) Collected: 09/24/24 0809    Specimen: Blood Updated: 09/24/24 0811     Glucose 211 mg/dL      Comment: Serial Number: [id number]:  [id number]       Basic Metabolic Panel [[id number]]  (Abnormal) Collected: 09/24/24 0455    Specimen: Blood Updated: 09/24/24 0653     Glucose 193 mg/dL      BUN 21 mg/dL      Creatinine 1.34 mg/dL      Sodium 141 mmol/L      Potassium  4.4 mmol/L      Chloride 106 mmol/L      CO2 27.4 mmol/L      Calcium 9.5 mg/dL      BUN/Creatinine Ratio 15.7     Anion Gap 7.6 mmol/L      eGFR 40.2 mL/min/1.73     Narrative:      GFR Normal >60  Chronic Kidney Disease <60  Kidney Failure <15    The GFR formula is only valid for adults with stable renal function between ages 18 and 70.    POC Glucose 4x Daily Before Meals & at Bedtime [[id number]]  (Abnormal) Collected: 09/23/24 2321    Specimen: Blood Updated: 09/23/24 2323     Glucose 256 mg/dL      Comment: Serial Number: [id number]:  [id number]       Comprehensive Metabolic Panel [[id number]]  (Abnormal) Collected: 09/23/24 2058    Specimen: Blood from Arm, Right Updated: 09/23/24 2315     Glucose 273 mg/dL      BUN 20 mg/dL      Creatinine 1.56 mg/dL      Sodium 143 mmol/L      Potassium 3.6 mmol/L      Chloride 105 mmol/L      CO2 27.0 mmol/L      Calcium 9.8 mg/dL      Total Protein 6.3 g/dL      Albumin 3.7 g/dL      ALT (SGPT) 14 U/L      AST (SGOT) 14 U/L      Alkaline Phosphatase 73 U/L      Total Bilirubin 0.2 mg/dL      Globulin 2.6 gm/dL      A/G Ratio 1.4 g/dL      BUN/Creatinine Ratio 12.8     Anion Gap 11.0 mmol/L      eGFR 33.5 mL/min/1.73     Narrative:      GFR Normal >60  Chronic Kidney Disease <60  Kidney Failure <15    The GFR formula is only valid for adults with stable renal function between ages 18 and 70.    TSH [[id number]]  (Normal) Collected: 09/23/24 2058    Specimen: Blood from Arm, Right Updated: 09/23/24 2315     TSH 0.643 uIU/mL     C-reactive Protein [[id number]]  (Abnormal) Collected: 09/23/24 2058    Specimen: Blood from Arm, Right Updated: 09/23/24 2315     C-Reactive Protein 2.66 mg/dL     PTH, Intact [[id number]]  (Normal) Collected: 09/23/24 2058    Specimen: Blood from Arm, Right Updated: 09/23/24 2307     PTH, Intact 36.1 pg/mL     Narrative:      Results may be falsely decreased if patient taking Biotin.      Extra Tubes [[id number]] Collected: 09/23/24 7828     Specimen: Blood from Arm, Right Updated: 09/23/24 2300    Narrative:      The following orders were created for panel order Extra Tubes.  Procedure                               Abnormality         Status                     ---------                               -----------         ------                     Gold Top - SST[[id number]]                                   Final result                 Please view results for these tests on the individual orders.    Gold Top - SST [[id number]] Collected: 09/23/24 2058    Specimen: Blood from Arm, Right Updated: 09/23/24 2300     Extra Tube Hold for add-ons.     Comment: Auto resulted.       Sedimentation Rate [[id number]]  (Abnormal) Collected: 09/23/24 2058    Specimen: Blood from Arm, Right Updated: 09/23/24 2259     Sed Rate 32 mm/hr     CBC & Differential [[id number]]  (Abnormal) Collected: 09/23/24 2058    Specimen: Blood from Arm, Right Updated: 09/23/24 2249    Narrative:      The following orders were created for panel order CBC & Differential.  Procedure                               Abnormality         Status                     ---------                               -----------         ------                     CBC Auto Differential[[id number]]        Abnormal            Final result                 Please view results for these tests on the individual orders.    CBC Auto Differential [[id number]]  (Abnormal) Collected: 09/23/24 2058    Specimen: Blood from Arm, Right Updated: 09/23/24 2249     WBC 5.36 10*3/mm3      RBC 4.39 10*6/mm3      Hemoglobin 14.4 g/dL      Hematocrit 44.2 %      .7 fL      MCH 32.8 pg      MCHC 32.6 g/dL      RDW 12.9 %      RDW-SD 48.4 fl      MPV 10.4 fL      Platelets 226 10*3/mm3      Neutrophil % 59.0 %      Lymphocyte % 27.6 %      Monocyte % 11.0 %      Eosinophil % 0.9 %      Basophil % 0.6 %      Immature Grans % 0.9 %      Neutrophils, Absolute 3.16 10*3/mm3      Lymphocytes, Absolute 1.48 10*3/mm3      Monocytes,  Absolute 0.59 10*3/mm3      Eosinophils, Absolute 0.05 10*3/mm3      Basophils, Absolute 0.03 10*3/mm3      Immature Grans, Absolute 0.05 10*3/mm3      nRBC 0.0 /100 WBC     Calcium, Ionized [[id number]]  (Normal) Collected: 09/23/24 2058    Specimen: Blood from Arm, Right Updated: 09/23/24 2248     Ionized Calcium 1.21 mmol/L     Vitamin D,25-Hydroxy [[id number]] Collected: 09/23/24 2058    Specimen: Blood from Arm, Right Updated: 09/23/24 2246          Imaging Results (Last 24 Hours)       Procedure Component Value Units Date/Time    MRI Lumbar Spine Without Contrast [[id number]] Collected: 09/24/24 0835     Updated: 09/24/24 0851    Narrative:      MRI LUMBAR SPINE WO CONTRAST, MRI THORACIC SPINE WO CONTRAST    Date of Exam: 9/23/2024 9:55 PM EDT    Indication: T/L spine pain with known h/o osteoporotic compression FX.     Comparison: MR thoracic spine dated 5/17/2023    Technique:  Routine multiplanar/multisequence sequence images of the thoracic and lumbar spine were obtained without contrast administration.        Findings:  MRI thoracic spine: The thoracic vertebral bodies are aligned with no evidence of acute fractures or subluxations. No interval change in appearance of a chronic compression fracture deformity of T6 with kyphoplasty changes. There is straightening of the   mid thoracic kyphosis. There is bony bridging along the anterior aspect of the T2-T3 disc space with no interval change. Stable vertebral body hemangioma within the T10 vertebral body.    Again noticed desiccation of the mid thoracic discs. The remainder of the discs and vertebral bodies maintain normal height. Mild multilevel thoracic spondylosis with spur formation. No significant bone marrow signal changes. The paravertebral soft   tissue are preserved.    The spinal cord shows normal signal intensity with no expansion or compression.    Mild posterior disc osteophyte complex at T1-T2. Mild left paracentral disc osteophyte complex at  T3-T4. Stable mild multilevel facet hypertrophy changes. The central canal is patent. There is multilevel bilateral foraminal stenosis    MRI lumbar spine: The lumbar vertebral bodies are aligned with no acute fractures or subluxations. There are no significant bone marrow signal changes. The conus medullaris shows normal signal intensity and ends at L1-L2. There are multilevel disc   desiccation.    At L1-L2 no significant disc disease. The central canal and bilateral foramina are patent    At L2-3 mild decrease is height and bulge. The central canal is patent. There is mild bilateral foraminal stenosis at the subarticular recess. Mild bilateral facet hypertrophy. Vertebral body hemangioma within the left posterior third of the L2 vertebral   body.    At L3-L4 mild disc bulge. The central canal is patent. There is mild bilateral foraminal stenosis at the subarticular recess with facet hypertrophy    At L3-4, broad-based bulge resulting in mild central canal stenosis with ligamenta flava hypertrophy. There is moderate bilateral foraminal stenosis with facet hypertrophy slightly encroaching upon the exiting bilateral L4 nerve roots    At L5-S1, severe decreased height and mild bulge. The central canal is patent. There is severe bilateral foraminal stenosis with spur and facet hypertrophy encroaching upon the exiting bilateral L5 nerve roots.    The paravertebral soft tissue are preserved. The right kidney is not seen in the right renal fossa. Incidentally noticed dilatation of the common bile duct incompletely evaluated which was correlated with a CT abdomen pelvis dated 2/22/2023 and appears   stable and postcholecystectomy related.      Impression:      Impression:    1. Stable chronic anterior wedging compression fracture deformity of the T6 with kyphoplasty changes    2. No evidence of acute fractures or significant bone marrow signal changes in the thoracic or lumbar spine    3. Mild thoracic spondylosis    4.  Multilevel disc bulges and lumbar spondylosis as above          Electronically Signed: Aubrey Ramirez MD    9/24/2024 8:49 AM EDT    Workstation ID: [id number]    MRI Thoracic Spine Without Contrast [[id number]] Collected: 09/24/24 0835     Updated: 09/24/24 0851    Narrative:      MRI LUMBAR SPINE WO CONTRAST, MRI THORACIC SPINE WO CONTRAST    Date of Exam: 9/23/2024 9:55 PM EDT    Indication: T/L spine pain with known h/o osteoporotic compression FX.     Comparison: MR thoracic spine dated 5/17/2023    Technique:  Routine multiplanar/multisequence sequence images of the thoracic and lumbar spine were obtained without contrast administration.        Findings:  MRI thoracic spine: The thoracic vertebral bodies are aligned with no evidence of acute fractures or subluxations. No interval change in appearance of a chronic compression fracture deformity of T6 with kyphoplasty changes. There is straightening of the   mid thoracic kyphosis. There is bony bridging along the anterior aspect of the T2-T3 disc space with no interval change. Stable vertebral body hemangioma within the T10 vertebral body.    Again noticed desiccation of the mid thoracic discs. The remainder of the discs and vertebral bodies maintain normal height. Mild multilevel thoracic spondylosis with spur formation. No significant bone marrow signal changes. The paravertebral soft   tissue are preserved.    The spinal cord shows normal signal intensity with no expansion or compression.    Mild posterior disc osteophyte complex at T1-T2. Mild left paracentral disc osteophyte complex at T3-T4. Stable mild multilevel facet hypertrophy changes. The central canal is patent. There is multilevel bilateral foraminal stenosis    MRI lumbar spine: The lumbar vertebral bodies are aligned with no acute fractures or subluxations. There are no significant bone marrow signal changes. The conus medullaris shows normal signal intensity and ends at L1-L2. There are multilevel  disc   desiccation.    At L1-L2 no significant disc disease. The central canal and bilateral foramina are patent    At L2-3 mild decrease is height and bulge. The central canal is patent. There is mild bilateral foraminal stenosis at the subarticular recess. Mild bilateral facet hypertrophy. Vertebral body hemangioma within the left posterior third of the L2 vertebral   body.    At L3-L4 mild disc bulge. The central canal is patent. There is mild bilateral foraminal stenosis at the subarticular recess with facet hypertrophy    At L3-4, broad-based bulge resulting in mild central canal stenosis with ligamenta flava hypertrophy. There is moderate bilateral foraminal stenosis with facet hypertrophy slightly encroaching upon the exiting bilateral L4 nerve roots    At L5-S1, severe decreased height and mild bulge. The central canal is patent. There is severe bilateral foraminal stenosis with spur and facet hypertrophy encroaching upon the exiting bilateral L5 nerve roots.    The paravertebral soft tissue are preserved. The right kidney is not seen in the right renal fossa. Incidentally noticed dilatation of the common bile duct incompletely evaluated which was correlated with a CT abdomen pelvis dated 2/22/2023 and appears   stable and postcholecystectomy related.      Impression:      Impression:    1. Stable chronic anterior wedging compression fracture deformity of the T6 with kyphoplasty changes    2. No evidence of acute fractures or significant bone marrow signal changes in the thoracic or lumbar spine    3. Mild thoracic spondylosis    4. Multilevel disc bulges and lumbar spondylosis as above          Electronically Signed: Aubrey Ramirez MD    9/24/2024 8:49 AM EDT    Workstation ID: [id number]    XR Chest 1 View [[id number]] Collected: 09/23/24 2234     Updated: 09/23/24 2237    Narrative:      XR CHEST 1 VW    Date of Exam: 9/23/2024 8:54 PM EDT    Indication: flank pain    Comparison: Noncontrast chest CT  performed on July 11, 2024    Findings:  No focal consolidation or effusion.  There is no evidence of pneumothorax. Small hiatal hernia is visualized. The pulmonary vasculature appears within normal limits.  The cardiac and mediastinal silhouette appear unremarkable.  No acute osseous   abnormality identified.      Impression:      Impression:  No radiographic evidence of acute chest process.      Electronically Signed: Arthur Natarajan MD    9/23/2024 10:35 PM EDT    Workstation ID: [id number]          Operative/Procedure Notes (all)    No notes of this type exist for this encounter.       Physician Progress Notes (all)    No notes of this type exist for this encounter.       Consult Notes (all)    No notes of this type exist for this encounter.

## 2024-09-24 NOTE — H&P
Patient Care Team:  Anny Garcia MD as PCP - General (Internal Medicine)  Sergio Ordonez MD as Consulting Physician (Nephrology)    Chief Complaint  Subjective     The patient is a 78 y.o. female who presents with 2 week HX intractable thoracic pain    HPI  2 week history pain thoracic spine at T11 with radiations to R flank.  Pain refractory to intervention.  Plain films found osteoporotic changes but no Fx.  Decompensation of pain with immobility secondary to discomfort with need to directly admit for appropriate intervention.  No BRBPR, Hematuria, Exanthem, hemoptysis, LYNN, F, C, N, V, D.   Review of Systems  Review of Systems   Musculoskeletal:  Positive for back pain.   Neurological:  Positive for weakness.       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    Hypertension     IBS (irritable colon syndrome)     constipation    Mass of right breast     Neuropathy     Renal insufficiency     Sciatic leg pain     right    Simple chronic bronchitis     Solitary kidney     left     Past Surgical History:   Procedure Laterality Date    BREAST BIOPSY Right     BRONCHOSCOPY N/A 7/14/2024    Procedure: BRONCHOSCOPY WITH BRONCHOALVEOLAR LAVAGE;  Surgeon: Marlin Ferguson MD;  Location: [facility] ENDOSCOPY;  Service: Pulmonary;  Laterality: N/A;  POST: RML ATELECTASIS    CARDIAC CATHETERIZATION      CHOLECYSTECTOMY      COLON SURGERY      REMOVAL diverticular  diseae    COLONOSCOPY N/A 2021    Procedure: COLONOSCOPY with polypectomy x 3;  Surgeon: Margarette Mcallister MD;  Location: [facility] ENDOSCOPY;  Service: Gastroenterology;  Laterality: N/A;  post op: hmorrhoids, diverticulosis, polyps    CORONARY STENT PLACEMENT      ENDOSCOPY N/A 2021    Procedure: ESOPHAGOGASTRODUODENOSCOPY with dilatation (18-20 mm balloon) and (54 bougie);  Surgeon: Margarette Mcallister MD;  Location: [facility] ENDOSCOPY;  Service: Gastroenterology;  Laterality: N/A;  post op: esophageal stricture, esophagitis, gastritis, history of nissen    ENDOSCOPY N/A 2023    Procedure: ESOPHAGOGASTRODUODENOSCOPY with biopsy x 1 area and dilation (50,54,56 non guided bougie);  Surgeon: Margarette Mcallister MD;  Location: [facility] ENDOSCOPY;  Service: Gastroenterology;  Laterality: N/A;  post op: esophageal stricture    EYE SURGERY Bilateral     cataracts    HIATAL HERNIA REPAIR      NEPHRECTOMY Right     cancer    UPPER ENDOSCOPIC ULTRASOUND W/ FNA N/A 2022    Procedure: EUS;  Surgeon: Margarette Mcallister MD;  Location: [facility] ENDOSCOPY;  Service: Gastroenterology;  Laterality: N/A;  post: normal pancreas, dilated pancreatic duct, hiatal hernia,      Family History   Problem Relation Age of Onset    Arthritis Father     Prostate cancer Father     Heart disease Sister      Social History     Tobacco Use    Smoking status: Former     Current packs/day: 0.00     Average packs/day: 0.3 packs/day for 50.0 years (12.5 ttl pk-yrs)     Types: Cigarettes     Start date: 1973     Quit date: 2023     Years since quittin.8     Passive exposure: Past    Smokeless tobacco: Never    Tobacco comments:     Mostly patches, some days none, some days 1-2   Vaping Use    Vaping status: Never Used   Substance Use Topics    Alcohol use: Yes     Comment: occasionally     Drug use: Never     Allergies:  Erythromycin, Naproxen sodium, Statins, Latex, Metoclopramide, Rocephin [ceftriaxone], and Dicyclomine    Objective     Vital  "Signs         Physical Exam:   Physical Exam  Vitals reviewed.   Constitutional:       General: She is in acute distress.      Appearance: She is ill-appearing. She is not toxic-appearing or diaphoretic.   Cardiovascular:      Rate and Rhythm: Rhythm irregular.      Heart sounds: Normal heart sounds.   Pulmonary:      Breath sounds: Normal breath sounds.   Abdominal:      Palpations: Abdomen is soft.   Musculoskeletal:         General: Tenderness present.   Neurological:      General: No focal deficit present.      Mental Status: She is alert.              Results Review:   CBC      BMP     Cr Clearance CrCl cannot be calculated (Patient's most recent lab result is older than the maximum 30 days allowed.).  Coag     HbA1C   Lab Results   Component Value Date    HGBA1C 7.10 (H) 01/21/2024    HGBA1C 7.0 (H) 12/16/2022    HGBA1C 7.3 08/11/2022     Blood Glucose No results found for: \"POCGLU\"  Infection     CMP     UA      Radiology(recent) No radiology results for the last day     Assessment:    Acute thoracic back pain, intractable with failure of outpatient treatment  Acute R flank pain T11/T12  Acute L1 back pain  Osteoporotic compression fracture  History of kyphoplasty  Osteoporosis  Pulmonary HTN  History of nephrectomy  Panlobular COPD with emphysema  Chronic mucopurulent bronchitis  DMII with renal manifestations   DMII with neuropathic manifestations  Diabetic dyslipidemia  GERD with esophagitis  Myofascia pain syndrome  CKDIV  HH  HTN with CKDIV  Chronic constipation  Nicotine dependency with nicotine use disorder, cigarettes  DDD L/S  Endometrial CA HX with ureteral involvement    Plan:  Pain control//MRI//renal and pulmonary support//additional plans to follow  Expected Length of Stay 3 days    I discussed the patient's findings and my recommendations with patient and nursing staff.     Del Iverson MD  09/23/24  20:36 EDT        "

## 2024-09-24 NOTE — PROGRESS NOTES
LOS: 1 day   Patient Care Team:  Anny Garcia MD as PCP - General (Internal Medicine)  Sergio Ordonez MD as Consulting Physician (Nephrology)    Subjective:  Refractory pain    Objective:   afebrile      Review of Systems:   Review of Systems   Musculoskeletal:  Positive for back pain.           Vital Signs  Temp:  [97.9 °F (36.6 °C)-98.8 °F (37.1 °C)] 98 °F (36.7 °C)  Heart Rate:  [66-87] 66  Resp:  [12-16] 16  BP: (110-153)/(70-86) 110/70    Physical Exam:  Physical Exam  Cardiovascular:      Rate and Rhythm: Rhythm irregular.      Heart sounds: Normal heart sounds.   Pulmonary:      Breath sounds: Normal breath sounds.   Skin:     General: Skin is warm.   Neurological:      Mental Status: She is alert.          Radiology:  MRI Thoracic Spine Without Contrast    Result Date: 9/24/2024  Impression: 1. Stable chronic anterior wedging compression fracture deformity of the T6 with kyphoplasty changes 2. No evidence of acute fractures or significant bone marrow signal changes in the thoracic or lumbar spine 3. Mild thoracic spondylosis 4. Multilevel disc bulges and lumbar spondylosis as above Electronically Signed: Aubrey Ramirez MD  9/24/2024 8:49 AM EDT  Workstation ID: [id number]    MRI Lumbar Spine Without Contrast    Result Date: 9/24/2024  Impression: 1. Stable chronic anterior wedging compression fracture deformity of the T6 with kyphoplasty changes 2. No evidence of acute fractures or significant bone marrow signal changes in the thoracic or lumbar spine 3. Mild thoracic spondylosis 4. Multilevel disc bulges and lumbar spondylosis as above Electronically Signed: Aubrey Ramirez MD  9/24/2024 8:49 AM EDT  Workstation ID: [id number]    XR Chest 1 View    Result Date: 9/23/2024  Impression: No radiographic evidence of acute chest process. Electronically Signed: Arthur Natarajan MD  9/23/2024 10:35 PM EDT  Workstation ID: [id number]        Results Review:     I reviewed the patient's new clinical  results.  I reviewed the patient's new imaging results and agree with the interpretation.    Medication Review:   Scheduled Meds:amLODIPine, 10 mg, Oral, Daily  carvedilol, 6.25 mg, Oral, BID With Meals  gabapentin, 100 mg, Oral, Nightly  insulin glargine, 10 Units, Subcutaneous, Nightly  insulin lispro, 2-7 Units, Subcutaneous, 4x Daily AC & at Bedtime  ipratropium-albuterol, 3 mL, Nebulization, 4x Daily - RT  pantoprazole, 40 mg, Oral, Q AM  senna-docusate sodium, 1 tablet, Oral, Nightly  sertraline, 50 mg, Oral, Daily  terazosin, 1 mg, Oral, Nightly      Continuous Infusions:sodium chloride, 30 mL/hr, Last Rate: 30 mL/hr (09/23/24 2319)      PRN Meds:.  acetaminophen    dextrose    dextrose    glucagon (human recombinant)    methocarbamol    Morphine    ondansetron    traMADol    Labs:    CBC    Results from last 7 days   Lab Units 09/23/24 2058   WBC 10*3/mm3 5.36   HEMOGLOBIN g/dL 14.4   PLATELETS 10*3/mm3 226     BMP   Results from last 7 days   Lab Units 09/24/24  0455 09/23/24 2058   SODIUM mmol/L 141 143   POTASSIUM mmol/L 4.4 3.6   CHLORIDE mmol/L 106 105   CO2 mmol/L 27.4 27.0   BUN mg/dL 21 20   CREATININE mg/dL 1.34* 1.56*   GLUCOSE mg/dL 193* 273*     Cr Clearance Estimated Creatinine Clearance: 35.6 mL/min (A) (by C-G formula based on SCr of 1.34 mg/dL (H)).  Coag     HbA1C   Lab Results   Component Value Date    HGBA1C 7.10 (H) 01/21/2024    HGBA1C 7.0 (H) 12/16/2022    HGBA1C 7.3 08/11/2022     Blood Glucose   Glucose   Date/Time Value Ref Range Status   09/24/2024 1152 203 (H) 70 - 105 mg/dL Final     Comment:     Serial Number: [id number]:  [id number]   09/24/2024 0809 211 (H) 70 - 105 mg/dL Final     Comment:     Serial Number: [id number]:  [id number]   09/23/2024 2321 256 (H) 70 - 105 mg/dL Final     Comment:     Serial Number: [id number]:  [id number]     Infection     CMP   Results from last 7 days   Lab Units 09/24/24  0455 09/23/24  2058   SODIUM mmol/L 141 143   POTASSIUM  mmol/L 4.4 3.6   CHLORIDE mmol/L 106 105   CO2 mmol/L 27.4 27.0   BUN mg/dL 21 20   CREATININE mg/dL 1.34* 1.56*   GLUCOSE mg/dL 193* 273*   ALBUMIN g/dL  --  3.7   BILIRUBIN mg/dL  --  0.2   ALK PHOS U/L  --  73   AST (SGOT) U/L  --  14   ALT (SGPT) U/L  --  14     UA      Radiology(recent) MRI Thoracic Spine Without Contrast    Result Date: 9/24/2024  Impression: 1. Stable chronic anterior wedging compression fracture deformity of the T6 with kyphoplasty changes 2. No evidence of acute fractures or significant bone marrow signal changes in the thoracic or lumbar spine 3. Mild thoracic spondylosis 4. Multilevel disc bulges and lumbar spondylosis as above Electronically Signed: Aubrey Ramirez MD  9/24/2024 8:49 AM EDT  Workstation ID: [id number]    MRI Lumbar Spine Without Contrast    Result Date: 9/24/2024  Impression: 1. Stable chronic anterior wedging compression fracture deformity of the T6 with kyphoplasty changes 2. No evidence of acute fractures or significant bone marrow signal changes in the thoracic or lumbar spine 3. Mild thoracic spondylosis 4. Multilevel disc bulges and lumbar spondylosis as above Electronically Signed: Aubrey Ramirez MD  9/24/2024 8:49 AM EDT  Workstation ID: [id number]    XR Chest 1 View    Result Date: 9/23/2024  Impression: No radiographic evidence of acute chest process. Electronically Signed: Arthur Natarajan MD  9/23/2024 10:35 PM EDT  Workstation ID: [id number]    Assessment:    Acute thoracic back pain, intractable with failure of outpatient treatment  Acute R flank pain T11/T12  Acute L1 back pain  Osteoporotic compression fracture  History of kyphoplasty  Osteoporosis  Pulmonary HTN  History of nephrectomy  Panlobular COPD with emphysema  Chronic mucopurulent bronchitis  DMII with renal manifestations   DMII with neuropathic manifestations  Diabetic dyslipidemia  GERD with esophagitis  Myofascia pain syndrome  CKDIV  HH  HTN with CKDIV  Chronic constipation  Nicotine dependency  with nicotine use disorder, cigarettes  DDD L/S  Endometrial CA HX with ureteral involvement      Plan:  W/U to continue//will consider abdominal adhesions//pain control        Del Iverson MD  09/24/24  13:41 EDT

## 2024-09-24 NOTE — PLAN OF CARE
Problem: Adult Inpatient Plan of Care  Goal: Plan of Care Review  Outcome: Progressing  Goal: Patient-Specific Goal (Individualized)  Outcome: Progressing  Goal: Absence of Hospital-Acquired Illness or Injury  Outcome: Progressing  Intervention: Identify and Manage Fall Risk  Recent Flowsheet Documentation  Taken 9/24/2024 1400 by Ora Davis RN  Safety Promotion/Fall Prevention: safety round/check completed  Taken 9/24/2024 1219 by Ora Davis RN  Safety Promotion/Fall Prevention: safety round/check completed  Taken 9/24/2024 1000 by Ora Davis RN  Safety Promotion/Fall Prevention: safety round/check completed  Taken 9/24/2024 0850 by Ora Davis RN  Safety Promotion/Fall Prevention: safety round/check completed  Intervention: Prevent Skin Injury  Recent Flowsheet Documentation  Taken 9/24/2024 0850 by Ora Davis RN  Body Position: position changed independently  Skin Protection:   adhesive use limited   transparent dressing maintained   tubing/devices free from skin contact  Intervention: Prevent and Manage VTE (Venous Thromboembolism) Risk  Recent Flowsheet Documentation  Taken 9/24/2024 0850 by Ora Davis RN  Activity Management: activity encouraged  VTE Prevention/Management:   patient refused intervention   sequential compression devices off  Range of Motion: active ROM (range of motion) encouraged  Intervention: Prevent Infection  Recent Flowsheet Documentation  Taken 9/24/2024 0850 by Ora Davis RN  Infection Prevention: rest/sleep promoted  Goal: Optimal Comfort and Wellbeing  Outcome: Progressing  Intervention: Monitor Pain and Promote Comfort  Recent Flowsheet Documentation  Taken 9/24/2024 0906 by Ora Davis RN  Pain Management Interventions: see MAR  Taken 9/24/2024 0850 by Ora Davis RN  Pain Management Interventions: see MAR  Intervention: Provide Person-Centered Care  Recent Flowsheet Documentation  Taken 9/24/2024 0850 by  Ora Davis RN  Trust Relationship/Rapport:   care explained   choices provided  Goal: Readiness for Transition of Care  Outcome: Progressing     Problem: COPD (Chronic Obstructive Pulmonary Disease) Comorbidity  Goal: Maintenance of COPD Symptom Control  Outcome: Progressing  Intervention: Maintain COPD-Symptom Control  Recent Flowsheet Documentation  Taken 9/24/2024 0850 by Ora Davis RN  Medication Review/Management: medications reviewed     Problem: Diabetes Comorbidity  Goal: Blood Glucose Level Within Targeted Range  Outcome: Progressing     Problem: Hypertension Comorbidity  Goal: Blood Pressure in Desired Range  Outcome: Progressing  Intervention: Maintain Blood Pressure Management  Recent Flowsheet Documentation  Taken 9/24/2024 0850 by Ora Davis RN  Medication Review/Management: medications reviewed     Problem: Obstructive Sleep Apnea Risk or Actual Comorbidity Management  Goal: Unobstructed Breathing During Sleep  Outcome: Progressing   Goal Outcome Evaluation:   Pt received prn morphine and sliding scale insulin. Pt had breathing treatments completed today. She had a KUB completed today. Hospitalist states general surgery might need to be consulted, waiting for results of KUB. No concerns and call light within reach.

## 2024-09-25 ENCOUNTER — APPOINTMENT (OUTPATIENT)
Dept: CT IMAGING | Facility: HOSPITAL | Age: 79
DRG: 543 | End: 2024-09-25
Payer: MEDICARE

## 2024-09-25 LAB
GLUCOSE BLDC GLUCOMTR-MCNC: 139 MG/DL (ref 70–105)
GLUCOSE BLDC GLUCOMTR-MCNC: 167 MG/DL (ref 70–105)
GLUCOSE BLDC GLUCOMTR-MCNC: 205 MG/DL (ref 70–105)
GLUCOSE BLDC GLUCOMTR-MCNC: 273 MG/DL (ref 70–105)

## 2024-09-25 PROCEDURE — 63710000001 INSULIN GLARGINE PER 5 UNITS: Performed by: FAMILY MEDICINE

## 2024-09-25 PROCEDURE — 94799 UNLISTED PULMONARY SVC/PX: CPT

## 2024-09-25 PROCEDURE — 63710000001 INSULIN LISPRO (HUMAN) PER 5 UNITS: Performed by: FAMILY MEDICINE

## 2024-09-25 PROCEDURE — 99222 1ST HOSP IP/OBS MODERATE 55: CPT | Performed by: STUDENT IN AN ORGANIZED HEALTH CARE EDUCATION/TRAINING PROGRAM

## 2024-09-25 PROCEDURE — 74176 CT ABD & PELVIS W/O CONTRAST: CPT

## 2024-09-25 PROCEDURE — 25810000003 SODIUM CHLORIDE 0.9 % SOLUTION: Performed by: FAMILY MEDICINE

## 2024-09-25 PROCEDURE — 94664 DEMO&/EVAL PT USE INHALER: CPT

## 2024-09-25 PROCEDURE — 82948 REAGENT STRIP/BLOOD GLUCOSE: CPT

## 2024-09-25 PROCEDURE — 25010000002 MORPHINE PER 10 MG: Performed by: FAMILY MEDICINE

## 2024-09-25 PROCEDURE — 97161 PT EVAL LOW COMPLEX 20 MIN: CPT

## 2024-09-25 PROCEDURE — 82948 REAGENT STRIP/BLOOD GLUCOSE: CPT | Performed by: FAMILY MEDICINE

## 2024-09-25 RX ORDER — POLYETHYLENE GLYCOL 3350 17 G/17G
17 POWDER, FOR SOLUTION ORAL DAILY
Status: DISCONTINUED | OUTPATIENT
Start: 2024-09-25 | End: 2024-09-29 | Stop reason: HOSPADM

## 2024-09-25 RX ORDER — IPRATROPIUM BROMIDE AND ALBUTEROL SULFATE 2.5; .5 MG/3ML; MG/3ML
3 SOLUTION RESPIRATORY (INHALATION) EVERY 4 HOURS PRN
Status: DISCONTINUED | OUTPATIENT
Start: 2024-09-25 | End: 2024-09-29 | Stop reason: HOSPADM

## 2024-09-25 RX ORDER — GABAPENTIN 100 MG/1
100 CAPSULE ORAL NIGHTLY PRN
Status: DISCONTINUED | OUTPATIENT
Start: 2024-09-25 | End: 2024-09-29 | Stop reason: HOSPADM

## 2024-09-25 RX ADMIN — PANTOPRAZOLE SODIUM 40 MG: 40 TABLET, DELAYED RELEASE ORAL at 05:29

## 2024-09-25 RX ADMIN — SENNOSIDES AND DOCUSATE SODIUM 1 TABLET: 50; 8.6 TABLET ORAL at 20:03

## 2024-09-25 RX ADMIN — IPRATROPIUM BROMIDE AND ALBUTEROL SULFATE 3 ML: .5; 3 SOLUTION RESPIRATORY (INHALATION) at 07:16

## 2024-09-25 RX ADMIN — POLYETHYLENE GLYCOL 3350 17 G: 17 POWDER, FOR SOLUTION ORAL at 09:19

## 2024-09-25 RX ADMIN — TERAZOSIN HYDROCHLORIDE 1 MG: 1 CAPSULE ORAL at 20:03

## 2024-09-25 RX ADMIN — MORPHINE SULFATE 2 MG: 2 INJECTION, SOLUTION INTRAMUSCULAR; INTRAVENOUS at 12:19

## 2024-09-25 RX ADMIN — SODIUM CHLORIDE 30 ML/HR: 9 INJECTION, SOLUTION INTRAVENOUS at 07:32

## 2024-09-25 RX ADMIN — SERTRALINE 50 MG: 50 TABLET, FILM COATED ORAL at 09:20

## 2024-09-25 RX ADMIN — MORPHINE SULFATE 2 MG: 2 INJECTION, SOLUTION INTRAMUSCULAR; INTRAVENOUS at 07:32

## 2024-09-25 RX ADMIN — INSULIN LISPRO 2 UNITS: 100 INJECTION, SOLUTION INTRAVENOUS; SUBCUTANEOUS at 20:19

## 2024-09-25 RX ADMIN — INSULIN GLARGINE 10 UNITS: 100 INJECTION, SOLUTION SUBCUTANEOUS at 20:19

## 2024-09-25 RX ADMIN — MORPHINE SULFATE 2 MG: 2 INJECTION, SOLUTION INTRAMUSCULAR; INTRAVENOUS at 22:49

## 2024-09-25 RX ADMIN — AMLODIPINE BESYLATE 10 MG: 5 TABLET ORAL at 09:19

## 2024-09-25 RX ADMIN — INSULIN LISPRO 3 UNITS: 100 INJECTION, SOLUTION INTRAVENOUS; SUBCUTANEOUS at 17:05

## 2024-09-25 RX ADMIN — INSULIN LISPRO 4 UNITS: 100 INJECTION, SOLUTION INTRAVENOUS; SUBCUTANEOUS at 12:18

## 2024-09-25 NOTE — THERAPY EVALUATION
Patient Name: Vianey Reeves  : 1945    MRN: [id number]                              Today's Date: 2024       Admit Date: 2024    Visit Dx: No diagnosis found.  Patient Active Problem List   Diagnosis    Type 2 diabetes mellitus with chronic kidney disease    Essential (primary) hypertension    Mixed hyperlipidemia    Hiatal hernia    Generalized weakness    Long term current use of antithrombotics/antiplatelets    Stage 3b chronic kidney disease (CKD)    Simple chronic bronchitis    Atherosclerotic heart disease of native coronary artery without angina pectoris    Constipation    History of cancer of ureter    Solitary kidney    Atherosclerosis of native arteries of extremities with intermittent claudication, bilateral legs    H/O malignant neoplasm of ureter    Vertigo    Medicare annual wellness visit, subsequent    Presbyopia    Combined form of senile cataract    Cervical cancer screening    Postartificial menopausal syndrome    Left lower quadrant abdominal pain    Diverticula of colon    Ruptured tympanic membrane, left    Hearing loss, bilateral    TMJ pain dysfunction syndrome    Stage 4 chronic kidney disease    Other dysphagia    Lactose intolerance    Polyarthralgia    Acute right-sided thoracic back pain    Class 1 obesity due to excess calories with serious comorbidity and body mass index (BMI) of 34.0 to 34.9 in adult    Anxiety    Hypercalcemia    Screening mammogram, encounter for    Chronic bilateral low back pain without sciatica    Irregular heart beat    Palpitations    COPD (chronic obstructive pulmonary disease)    Gout of right foot    Plantar fasciitis of right foot    Lumbar radiculopathy    Overflow incontinence of urine    Urinary retention    Gastroesophageal reflux disease without esophagitis    Leg swelling    Cellulitis of groin    Abdominal pain    Chest pain, unspecified type    Dyspnea    Hypoxia    CRF (chronic renal failure)    Pulmonary hypertension    Chest  pain    Pneumonia of right middle lobe due to infectious organism    Dyspnea    Pneumonia    Recurrent pneumonia    Multiple tracheobronchial mucus plugs    Thoracic back pain    Anxiety disorder    Gastro-esophageal reflux disease with esophagitis    Tobacco use    Vitamin D deficiency    Degeneration of lumbar intervertebral disc    Compression fracture of vertebral column    Compression fracture of spine, non-traumatic, with routine healing, subsequent encounter    Osteoarthritis, unspecified osteoarthritis type, unspecified site    Age-related osteoporosis without current pathological fracture    Type 2 diabetes mellitus with diabetic neuropathy, unspecified    Type 2 diabetes mellitus with diabetic chronic kidney disease    History of cancer of ureter    Diaphragmatic hernia without obstruction or gangrene    Back pain     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    Hypertension     IBS (irritable colon syndrome)     constipation    Mass of right breast     Neuropathy     Renal insufficiency     Sciatic leg pain     right    Simple chronic bronchitis     Solitary kidney     left     Past Surgical History:   Procedure Laterality Date    BREAST BIOPSY Right     BRONCHOSCOPY N/A 7/14/2024    Procedure: BRONCHOSCOPY WITH BRONCHOALVEOLAR LAVAGE;  Surgeon: Marlin Ferguson MD;  Location: [facility] ENDOSCOPY;  Service: Pulmonary;  Laterality: N/A;  POST: RML ATELECTASIS     CARDIAC CATHETERIZATION      CHOLECYSTECTOMY      COLON SURGERY      REMOVAL diverticular diseae    COLONOSCOPY N/A 08/12/2021    Procedure: COLONOSCOPY with polypectomy x 3;  Surgeon: Margarette Mcallister MD;  Location: [facility] ENDOSCOPY;  Service: Gastroenterology;  Laterality: N/A;  post op: hmorrhoids, diverticulosis, polyps    CORONARY STENT PLACEMENT      ENDOSCOPY N/A 08/12/2021    Procedure: ESOPHAGOGASTRODUODENOSCOPY with dilatation (18-20 mm balloon) and (54 bougie);  Surgeon: Margarette Mcallister MD;  Location: [facility] ENDOSCOPY;  Service: Gastroenterology;  Laterality: N/A;  post op: esophageal stricture, esophagitis, gastritis, history of nissen    ENDOSCOPY N/A 9/13/2023    Procedure: ESOPHAGOGASTRODUODENOSCOPY with biopsy x 1 area and dilation (50,54,56 non guided bougie);  Surgeon: Margarette Mcallister MD;  Location: [facility] ENDOSCOPY;  Service: Gastroenterology;  Laterality: N/A;  post op: esophageal stricture    EYE SURGERY Bilateral     cataracts    HIATAL HERNIA REPAIR      NEPHRECTOMY Right     cancer    UPPER ENDOSCOPIC ULTRASOUND W/ FNA N/A 09/22/2022    Procedure: EUS;  Surgeon: Margarette Mcallister MD;  Location: [facility] ENDOSCOPY;  Service: Gastroenterology;  Laterality: N/A;  post: normal pancreas, dilated pancreatic duct, hiatal hernia,       General Information       Row Name 09/25/24 1054          Physical Therapy Time and Intention    Document Type evaluation  -CM     Mode of Treatment physical therapy  -CM       Row Name 09/25/24 1054          General Information    Patient Profile Reviewed yes  -CM     Prior Level of Function independent:;community mobility;gait;work  works part time at [employer]; drives  -CM     Existing Precautions/Restrictions no known precautions/restrictions  -CM     Barriers to Rehab medically complex  -CM       Row Name 09/25/24 1054          Living Environment    People in Home alone  -CM       Row Name 09/25/24 1054          Home Main Entrance    Number of Stairs, Main Entrance one  -CM        Row Name 09/25/24 1054          Stairs Within Home, Primary    Number of Stairs, Within Home, Primary none  -CM       Row Name 09/25/24 1054          Cognition    Orientation Status (Cognition) oriented x 4  -CM       Row Name 09/25/24 1054          Safety Issues, Functional Mobility    Impairments Affecting Function (Mobility) pain  -CM               User Key  (r) = Recorded By, (t) = Taken By, (c) = Cosigned By      Initials Name Provider Type    Alecia Juarez, PT Physical Therapist                   Mobility       Row Name 09/25/24 1055          Bed Mobility    Bed Mobility bed mobility (all) activities  -CM     All Activities, [place] (Bed Mobility) not tested  -CM     Comment, (Bed Mobility) pt sitting at eob eating when PT arrived.  -CM       Row Name 09/25/24 1055          Sit-Stand Transfer    Sit-Stand [place] (Transfers) independent  -CM       Row Name 09/25/24 1055          Gait/Stairs (Locomotion)    [place] Level (Gait) independent  -CM     Assistive Device (Gait) other (see comments)  gait belt, non skid socks  -CM     Patient was able to Ambulate yes  -CM     Distance in Feet (Gait) 120  no significant gait deviations; very mild lateral sway, for which pt is able to manage independently.  -CM               User Key  (r) = Recorded By, (t) = Taken By, (c) = Cosigned By      Initials Name Provider Type    Alecia Juarez, PT Physical Therapist                   Obj/Interventions       Row Name 09/25/24 1056          Range of Motion Comprehensive    General Range of Motion bilateral upper extremity ROM WFL;bilateral lower extremity ROM WNL  -CM       Row Name 09/25/24 1056          Strength Comprehensive (MMT)    General Manual Muscle Testing (MMT) Assessment no strength deficits identified  -CM     Comment, General Manual Muscle Testing (MMT) Assessment BUEs 4/5, BLEs 5/5  -CM       Row Name 09/25/24 1056          Balance    Balance Assessment sitting static  balance;sitting dynamic balance;standing static balance;standing dynamic balance  -CM     Static Sitting Balance independent  -CM     Dynamic Sitting Balance independent  -CM     Position, Sitting Balance unsupported;sitting edge of bed  -CM     Static Standing Balance independent  -CM     Dynamic Standing Balance independent  -CM       Row Name 09/25/24 1056          Sensory Assessment (Somatosensory)    Sensory Assessment (Somatosensory) sensation intact  -CM               User Key  (r) = Recorded By, (t) = Taken By, (c) = Cosigned By      Initials Name Provider Type    Alecia Juarez, PT Physical Therapist                   Goals/Plan    No documentation.                  Clinical Impression       Row Name 09/25/24 1056          Pain    Pretreatment Pain Rating 3/10  -CM     Posttreatment Pain Rating 3/10  -CM     Pain Location - Side/Orientation Right  -CM     Pain Location - back;flank  -CM     Pre/Posttreatment Pain Comment pain begins at ~T11 and radiates around back to R flank.; pt notes she had pain meds via iv just prior to PT, and she feels they are helping a lot  -CM     Pain Intervention(s) Medication (See MAR);Ambulation/increased activity;Emotional support;Repositioned  -CM       Row Name 09/25/24 1056          Plan of Care Review    Plan of Care Reviewed With patient  -CM     Progress improving  -CM     Outcome Evaluation 79 yo female adm 9/23/24 for acute midback pain which radiates to R flank which has persisted x 2 weeks. MRI of thoracic and lumbar spines (-) for any acute change. Pt has hx of T6 compression fx, which was addressed w/ kyphoplasty in the past. Only new finding was mild thoracic spondylosis. General sx has been consulted to r/o adhesions vs. richters hernia. PMH: dm, ckkd4, cad, [place], copd, ureteral ca. At baseline, pt lives alone in single level home w/ one stair to enter. She is very active and works part time at the , then spends time after her shift doing stretches and  exercises. Ambulates independently for community distances. Today, pt is in less pain, reporting her pain level is now down to 3/10 following iv pain meds. She presents w/ normal strength, though her UEs are mildy weak proximally. She is completely independent for all mobility and can ambulate independently x 120 ft. Recommend OP PT, where an orthopedic PT can more appropriately address her thoracic pain. No need for IP PT. Will sign off.  -CM       Row Name 09/25/24 1103          Therapy Assessment/Plan (PT)    Criteria for Skilled Interventions Met (PT) no;does not meet criteria for skilled intervention  -CM     Therapy Frequency (PT) evaluation only  -CM       Row Name 09/25/24 1103          Vital Signs    O2 Delivery Pre Treatment room air  -CM     O2 Delivery Intra Treatment room air  -CM     O2 Delivery Post Treatment room air  -CM     Recovery Time no sign of distress during PT; not on any monitors  -CM       Row Name 09/25/24 1103          Positioning and Restraints    Pre-Treatment Position in bed  -CM     Post Treatment Position chair  -CM     In Chair notified nsg;sitting;call light within reach  sitting w/ pillow vertically to support entire spine in chair; educated pt on importance of sitting in supported position to decrease pressure on discs  -CM               User Key  (r) = Recorded By, (t) = Taken By, (c) = Cosigned By      Initials Name Provider Type    Alecia Juarez, PT Physical Therapist                   Outcome Measures       Row Name 09/25/24 1104 09/25/24 0802       How much help from another person do you currently need...    Turning from your back to your side while in flat bed without using bedrails? 4  -CM 4  -BT    Moving from lying on back to sitting on the side of a flat bed without bedrails? 4  -CM 4  -BT    Moving to and from a bed to a chair (including a wheelchair)? 4  -CM 4  -BT    Standing up from a chair using your arms (e.g., wheelchair, bedside chair)? 4  -CM 4  -BT     Climbing 3-5 steps with a railing? 4  -CM 4  -BT    To walk in hospital room? 4  -CM 4  -BT    AM-PAC 6 Clicks Score (PT) 24  -CM 24  -BT    Highest Level of Mobility Goal 8 --> Walked 250 feet or more  -CM 8 --> Walked 250 feet or more  -BT              User Key  (r) = Recorded By, (t) = Taken By, (c) = Cosigned By      Initials Name Provider Type    Alecia Juarez, PT Physical Therapist    Ora Narayan, RN Registered Nurse                                 Physical Therapy Education       Title: PT OT SLP Therapies (Done)       Topic: Physical Therapy (Done)       Point: Mobility training (Done)       Learning Progress Summary             Patient Acceptance, E,TB, VU,DU by  at 9/25/2024 1104                                         User Key       Initials Effective Dates Name Provider Type Discipline     06/16/21 -  Alecia Cheema, PT Physical Therapist PT                  PT Recommendation and Plan     Plan of Care Reviewed With: patient  Progress: improving  Outcome Evaluation: 77 yo female adm 9/23/24 for acute midback pain which radiates to R flank which has persisted x 2 weeks. MRI of thoracic and lumbar spines (-) for any acute change. Pt has hx of T6 compression fx, which was addressed w/ kyphoplasty in the past. Only new finding was mild thoracic spondylosis. General sx has been consulted to r/o adhesions vs. richters hernia. PMH: dm, ckkd4, cad, [place], copd, ureteral ca. At baseline, pt lives alone in single level home w/ one stair to enter. She is very active and works part time at the , then spends time after her shift doing stretches and exercises. Ambulates independently for community distances. Today, pt is in less pain, reporting her pain level is now down to 3/10 following iv pain meds. She presents w/ normal strength, though her UEs are mildy weak proximally. She is completely independent for all mobility and can ambulate independently x 120 ft. Recommend OP PT, where an  orthopedic PT can more appropriately address her thoracic pain. No need for IP PT. Will sign off.     Time Calculation:   PT Evaluation Complexity  History, PT Evaluation Complexity: 3 or more personal factors and/or comorbidities  Examination of Body Systems (PT Eval Complexity): total of 4 or more elements  Clinical Presentation (PT Evaluation Complexity): stable  Clinical Decision Making (PT Evaluation Complexity): low complexity  Overall Complexity (PT Evaluation Complexity): low complexity     PT Charges       Row Name 09/25/24 1104             Time Calculation    Start Time 0952  -CM      Stop Time 1008  -CM      Time Calculation (min) 16 min  -CM      PT Received On 09/25/24  -CM         Time Calculation- PT    Total Timed Code Minutes- PT 0 minute(s)  -CM                User Key  (r) = Recorded By, (t) = Taken By, (c) = Cosigned By      Initials Name Provider Type    Alecia Juarez, PT Physical Therapist                  Therapy Charges for Today       Code Description Service Date Service Provider Modifiers Qty    86770002793 HC PT EVAL LOW COMPLEXITY 3 9/25/2024 Alecia Cheema, PT GP 1            PT G-Codes  AM-PAC 6 Clicks Score (PT): 24  PT Discharge Summary  Anticipated Discharge Disposition (PT): home with outpatient therapy services    Alecia Cheema PT  9/25/2024

## 2024-09-25 NOTE — PLAN OF CARE
Goal Outcome Evaluation:   Patient has had some complaints of pain, see MAR. Resting comfortably. Vital signs stable. Call light and bedside table within reach. Plan of care ongoing.

## 2024-09-25 NOTE — PLAN OF CARE
Problem: Adult Inpatient Plan of Care  Goal: Plan of Care Review  Outcome: Progressing  Goal: Patient-Specific Goal (Individualized)  Outcome: Progressing  Goal: Absence of Hospital-Acquired Illness or Injury  Outcome: Progressing  Intervention: Identify and Manage Fall Risk  Recent Flowsheet Documentation  Taken 9/25/2024 1400 by Ora Davis RN  Safety Promotion/Fall Prevention: safety round/check completed  Taken 9/25/2024 1219 by Ora Davis RN  Safety Promotion/Fall Prevention: safety round/check completed  Taken 9/25/2024 1000 by Ora Davis RN  Safety Promotion/Fall Prevention: safety round/check completed  Taken 9/25/2024 0802 by Ora Davis RN  Safety Promotion/Fall Prevention: safety round/check completed  Intervention: Prevent Skin Injury  Recent Flowsheet Documentation  Taken 9/25/2024 0802 by Ora Davis RN  Body Position: position changed independently  Skin Protection:   adhesive use limited   transparent dressing maintained   tubing/devices free from skin contact  Intervention: Prevent and Manage VTE (Venous Thromboembolism) Risk  Recent Flowsheet Documentation  Taken 9/25/2024 0802 by Ora Davis RN  Activity Management: activity encouraged  VTE Prevention/Management:   sequential compression devices on   patient refused intervention  Range of Motion: active ROM (range of motion) encouraged  Intervention: Prevent Infection  Recent Flowsheet Documentation  Taken 9/25/2024 0802 by Ora Davis RN  Infection Prevention: single patient room provided  Goal: Optimal Comfort and Wellbeing  Outcome: Progressing  Intervention: Monitor Pain and Promote Comfort  Recent Flowsheet Documentation  Taken 9/25/2024 1219 by Ora Davis RN  Pain Management Interventions: see MAR  Taken 9/25/2024 0732 by Ora Davis RN  Pain Management Interventions: see MAR  Intervention: Provide Person-Centered Care  Recent Flowsheet Documentation  Taken 9/25/2024 0802 by  Ora Davis RN  Trust Relationship/Rapport:   care explained   choices provided  Goal: Readiness for Transition of Care  Outcome: Progressing     Problem: COPD (Chronic Obstructive Pulmonary Disease) Comorbidity  Goal: Maintenance of COPD Symptom Control  Outcome: Progressing  Intervention: Maintain COPD-Symptom Control  Recent Flowsheet Documentation  Taken 9/25/2024 0802 by Ora Davis RN  Medication Review/Management: medications reviewed     Problem: Diabetes Comorbidity  Goal: Blood Glucose Level Within Targeted Range  Outcome: Progressing     Problem: Hypertension Comorbidity  Goal: Blood Pressure in Desired Range  Outcome: Progressing  Intervention: Maintain Blood Pressure Management  Recent Flowsheet Documentation  Taken 9/25/2024 0802 by Ora Davis RN  Medication Review/Management: medications reviewed     Problem: Obstructive Sleep Apnea Risk or Actual Comorbidity Management  Goal: Unobstructed Breathing During Sleep  Outcome: Progressing   Goal Outcome Evaluation:   Pt received PRN pain medicine and sliding scale insulin. Pt to have a CT of abdomen with contrast completed today. Pt received scheduled miralax. General surgery was consulted for pt per verbal order from Dr. Iverson. Sat down with pt and discussed her oral contrast. No concerns and call light within reach.

## 2024-09-25 NOTE — PLAN OF CARE
Goal Outcome Evaluation:  Plan of Care Reviewed With: patient        Progress: improving  Outcome Evaluation: 79 yo female adm 9/23/24 for acute midback pain which radiates to R flank which has persisted x 2 weeks. MRI of thoracic and lumbar spines (-) for any acute change. Pt has hx of T6 compression fx, which was addressed w/ kyphoplasty in the past. Only new finding was mild thoracic spondylosis. General sx has been consulted to r/o adhesions vs. richters hernia. PMH: dm, ckkd4, cad, [place], copd, ureteral ca. At baseline, pt lives alone in single level home w/ one stair to enter. She is very active and works part time at the , then spends time after her shift doing stretches and exercises. Ambulates independently for community distances. Today, pt is in less pain, reporting her pain level is now down to 3/10 following iv pain meds. She presents w/ normal strength, though her UEs are mildy weak proximally. She is completely independent for all mobility and can ambulate independently x 120 ft. Recommend OP PT, where an orthopedic PT can more appropriately address her thoracic pain. No need for IP PT. Will sign off.

## 2024-09-25 NOTE — PROGRESS NOTES
LOS: 2 days   Patient Care Team:  Anny Garcia MD as PCP - General (Internal Medicine)  Sergio Ordonez MD as Consulting Physician (Nephrology)    Subjective:  Continued pain    Objective:   Afebrile      Review of Systems:   Review of Systems   Gastrointestinal:  Positive for abdominal pain.   Musculoskeletal:  Positive for back pain.           Vital Signs  Temp:  [97.5 °F (36.4 °C)-98 °F (36.7 °C)] 98 °F (36.7 °C)  Heart Rate:  [66-84] 77  Resp:  [12-16] 15  BP: (110-145)/(70-83) 143/79    Physical Exam:  Physical Exam  Vitals reviewed.   Cardiovascular:      Rate and Rhythm: Regular rhythm.      Heart sounds: Normal heart sounds.   Pulmonary:      Breath sounds: Normal breath sounds.   Neurological:      Mental Status: She is alert.          Radiology:  XR Abdomen KUB    Result Date: 9/24/2024  Impression: No evidence of bowel obstruction. Mild/physiologic stool burden. Electronically Signed: Emre Stevenson MD  9/24/2024 8:24 PM EDT  Workstation ID: [id number]    MRI Thoracic Spine Without Contrast    Result Date: 9/24/2024  Impression: 1. Stable chronic anterior wedging compression fracture deformity of the T6 with kyphoplasty changes 2. No evidence of acute fractures or significant bone marrow signal changes in the thoracic or lumbar spine 3. Mild thoracic spondylosis 4. Multilevel disc bulges and lumbar spondylosis as above Electronically Signed: Aubrey Ramirez MD  9/24/2024 8:49 AM EDT  Workstation ID: [id number]    MRI Lumbar Spine Without Contrast    Result Date: 9/24/2024  Impression: 1. Stable chronic anterior wedging compression fracture deformity of the T6 with kyphoplasty changes 2. No evidence of acute fractures or significant bone marrow signal changes in the thoracic or lumbar spine 3. Mild thoracic spondylosis 4. Multilevel disc bulges and lumbar spondylosis as above Electronically Signed: Aubrey Ramirez MD  9/24/2024 8:49 AM EDT  Workstation ID: [id number]    XR Chest 1 View    Result  Date: 9/23/2024  Impression: No radiographic evidence of acute chest process. Electronically Signed: Arthur Natarajan MD  9/23/2024 10:35 PM EDT  Workstation ID: [id number]        Results Review:     I reviewed the patient's new clinical results.  I reviewed the patient's new imaging results and agree with the interpretation.    Medication Review:   Scheduled Meds:amLODIPine, 10 mg, Oral, Daily  carvedilol, 6.25 mg, Oral, BID With Meals  gabapentin, 100 mg, Oral, Nightly  insulin glargine, 10 Units, Subcutaneous, Nightly  insulin lispro, 2-7 Units, Subcutaneous, 4x Daily AC & at Bedtime  ipratropium-albuterol, 3 mL, Nebulization, 4x Daily - RT  pantoprazole, 40 mg, Oral, Q AM  polyethylene glycol, 17 g, Oral, Daily  senna-docusate sodium, 1 tablet, Oral, Nightly  sertraline, 50 mg, Oral, Daily  terazosin, 1 mg, Oral, Nightly      Continuous Infusions:sodium chloride, 30 mL/hr, Last Rate: 30 mL/hr (09/25/24 0732)      PRN Meds:.  acetaminophen    dextrose    dextrose    glucagon (human recombinant)    methocarbamol    Morphine    ondansetron    traMADol    Labs:    CBC    Results from last 7 days   Lab Units 09/23/24 2058   WBC 10*3/mm3 5.36   HEMOGLOBIN g/dL 14.4   PLATELETS 10*3/mm3 226     BMP   Results from last 7 days   Lab Units 09/24/24  0455 09/23/24 2058   SODIUM mmol/L 141 143   POTASSIUM mmol/L 4.4 3.6   CHLORIDE mmol/L 106 105   CO2 mmol/L 27.4 27.0   BUN mg/dL 21 20   CREATININE mg/dL 1.34* 1.56*   GLUCOSE mg/dL 193* 273*     Cr Clearance Estimated Creatinine Clearance: 35.6 mL/min (A) (by C-G formula based on SCr of 1.34 mg/dL (H)).  Coag     HbA1C   Lab Results   Component Value Date    HGBA1C 7.10 (H) 01/21/2024    HGBA1C 7.0 (H) 12/16/2022    HGBA1C 7.3 08/11/2022     Blood Glucose   Glucose   Date/Time Value Ref Range Status   09/25/2024 0725 139 (H) 70 - 105 mg/dL Final     Comment:     Serial Number: [id number]:  [id number]   09/24/2024 2006 204 (H) 70 - 105 mg/dL Final     Comment:     Serial  Number: [id number]:  [id number]   09/24/2024 1615 139 (H) 70 - 105 mg/dL Final     Comment:     Serial Number: [id number]:  [id number]   09/24/2024 1152 203 (H) 70 - 105 mg/dL Final     Comment:     Serial Number: [id number]:  [id number]   09/24/2024 0809 211 (H) 70 - 105 mg/dL Final     Comment:     Serial Number: [id number]:  [id number]   09/23/2024 2321 256 (H) 70 - 105 mg/dL Final     Comment:     Serial Number: [id number]:  [id number]     Infection     CMP   Results from last 7 days   Lab Units 09/24/24  0455 09/23/24  2058   SODIUM mmol/L 141 143   POTASSIUM mmol/L 4.4 3.6   CHLORIDE mmol/L 106 105   CO2 mmol/L 27.4 27.0   BUN mg/dL 21 20   CREATININE mg/dL 1.34* 1.56*   GLUCOSE mg/dL 193* 273*   ALBUMIN g/dL  --  3.7   BILIRUBIN mg/dL  --  0.2   ALK PHOS U/L  --  73   AST (SGOT) U/L  --  14   ALT (SGPT) U/L  --  14     UA      Radiology(recent) XR Abdomen KUB    Result Date: 9/24/2024  Impression: No evidence of bowel obstruction. Mild/physiologic stool burden. Electronically Signed: Emre Stevensno MD  9/24/2024 8:24 PM EDT  Workstation ID: [id number]    MRI Thoracic Spine Without Contrast    Result Date: 9/24/2024  Impression: 1. Stable chronic anterior wedging compression fracture deformity of the T6 with kyphoplasty changes 2. No evidence of acute fractures or significant bone marrow signal changes in the thoracic or lumbar spine 3. Mild thoracic spondylosis 4. Multilevel disc bulges and lumbar spondylosis as above Electronically Signed: Aubrey Ramirez MD  9/24/2024 8:49 AM EDT  Workstation ID: [id number]    MRI Lumbar Spine Without Contrast    Result Date: 9/24/2024  Impression: 1. Stable chronic anterior wedging compression fracture deformity of the T6 with kyphoplasty changes 2. No evidence of acute fractures or significant bone marrow signal changes in the thoracic or lumbar spine 3. Mild thoracic spondylosis 4. Multilevel disc bulges and lumbar spondylosis as above  Electronically Signed: Aubrey Ramirez MD  9/24/2024 8:49 AM EDT  Workstation ID: [id number]    XR Chest 1 View    Result Date: 9/23/2024  Impression: No radiographic evidence of acute chest process. Electronically Signed: Arthur Natarajan MD  9/23/2024 10:35 PM EDT  Workstation ID: [id number]    Assessment:    Acute thoracic back pain, intractable with failure of outpatient treatment  Acute R flank pain T11/T12  Acute L1 back pain  Osteoporotic compression fracture  History of kyphoplasty  Osteoporosis  Pulmonary HTN  History of nephrectomy  Panlobular COPD with emphysema  Chronic mucopurulent bronchitis  DMII with renal manifestations   DMII with neuropathic manifestations  Diabetic dyslipidemia  GERD with esophagitis  Myofascia pain syndrome  CKDIV  HH  HTN with CKDIV  Chronic constipation  Nicotine dependency with nicotine use disorder, cigarettes  DDD L/S  Endometrial CA HX with ureteral involvement  Constipation    Plan:  Bowel program//may request general surgery evaluation as I am concerned about the possibility of adhesions or Arzola's hernia        Del Iverson MD  09/25/24  09:04 EDT

## 2024-09-25 NOTE — CONSULTS
General Surgery Consult Note      Name: Vianey Reeves ADMIT: 2024   : 1945  PCP: Anny Garcia MD    MRN: [id number] LOS: 2 days   AGE/SEX: 78 y.o. female  ROOM: [address] [place] [address]      Patient Care Team:  Anny Garcia MD as PCP - General (Internal Medicine)  Sergio Ordonez MD as Consulting Physician (Nephrology)  No chief complaint on file.      Subjective   78-year-old lady with history of CKD, right nephrectomy, COPD, diabetes, cholecystectomy, hiatal hernia repair who is currently admitted for right flank pain.  This has been a chronic issue but worsened this week so she is currently admitted for pain control.  Has been tolerating a diet without issue no nausea does have some chronic dysphagia from her hiatal hernia repair, having daily bowel movements.  Patient says pain starts in her right lateral back and radiates along the level of her ribs to her right upper quadrant and then down the whole right side of her abdomen.  Pain feels like muscle spasm.  She is seeing pain management for sciatic nerve pain in the past she is also done physical therapy which she said did not help.    Past Medical History:   Diagnosis Date    Allergic     latex allergy    Allergies     Anxiety     Bilateral carotid bruits     Bulging lumbar disc     Bulging of thoracic intervertebral disc     Cancer     ureter    surgery    CKD (chronic kidney disease)     stage 3    Claudication, intermittent     COPD (chronic obstructive pulmonary disease)     Coronary artery disease     DDD (degenerative disc disease), lumbar     DDD (degenerative disc disease), thoracic     Diabetes mellitus     DJD (degenerative joint disease)     Dysphagia     Gout     H/O malignant neoplasm of ureter 2020    Hypertension     IBS (irritable colon syndrome)     constipation    Mass of right breast     Neuropathy     Renal insufficiency     Sciatic leg pain     right    Simple chronic bronchitis     Solitary kidney     left      Past Surgical History:   Procedure Laterality Date    BREAST BIOPSY Right     BRONCHOSCOPY N/A 2024    Procedure: BRONCHOSCOPY WITH BRONCHOALVEOLAR LAVAGE;  Surgeon: Marlin Ferguson MD;  Location: [facility] ENDOSCOPY;  Service: Pulmonary;  Laterality: N/A;  POST: RML ATELECTASIS    CARDIAC CATHETERIZATION      CHOLECYSTECTOMY      COLON SURGERY      REMOVAL diverticular diseae    COLONOSCOPY N/A 2021    Procedure: COLONOSCOPY with polypectomy x 3;  Surgeon: Margarette Mcallister MD;  Location: [facility] ENDOSCOPY;  Service: Gastroenterology;  Laterality: N/A;  post op: hmorrhoids, diverticulosis, polyps    CORONARY STENT PLACEMENT      ENDOSCOPY N/A 2021    Procedure: ESOPHAGOGASTRODUODENOSCOPY with dilatation (18-20 mm balloon) and (54 bougie);  Surgeon: Margarette Mcallister MD;  Location: [facility] ENDOSCOPY;  Service: Gastroenterology;  Laterality: N/A;  post op: esophageal stricture, esophagitis, gastritis, history of nissen    ENDOSCOPY N/A 2023    Procedure: ESOPHAGOGASTRODUODENOSCOPY with biopsy x 1 area and dilation (50,54,56 non guided bougie);  Surgeon: Margarette Mcallister MD;  Location: [facility] ENDOSCOPY;  Service: Gastroenterology;  Laterality: N/A;  post op: esophageal stricture    EYE SURGERY Bilateral     cataracts    HIATAL HERNIA REPAIR      NEPHRECTOMY Right     cancer    UPPER ENDOSCOPIC ULTRASOUND W/ FNA N/A 2022    Procedure: EUS;  Surgeon: Margarette Mcallister MD;  Location: [facility] ENDOSCOPY;  Service: Gastroenterology;  Laterality: N/A;  post: normal pancreas, dilated pancreatic duct, hiatal hernia,      Family History   Problem Relation Age of Onset    Arthritis Father     Prostate cancer Father     Heart disease Sister        Social History     Tobacco Use    Smoking status: Former     Current packs/day: 0.00     Average packs/day: 0.3 packs/day for 50.0 years (12.5 ttl pk-yrs)     Types: Cigarettes     Start date: 1973     Quit date: 2023     Years since quittin.9     Passive  exposure: Past    Smokeless tobacco: Never    Tobacco comments:     Mostly patches, some days none, some days 1-2   Vaping Use    Vaping status: Never Used   Substance Use Topics    Alcohol use: Not Currently     Comment: occasionally     Drug use: Never     Medications Prior to Admission   Medication Sig Dispense Refill Last Dose    acetaminophen (TYLENOL) 650 MG 8 hr tablet Take 2 tablets by mouth Every 8 (Eight) Hours As Needed for Mild Pain.       albuterol sulfate  (90 Base) MCG/ACT inhaler Inhale 2 puffs Every 4 (Four) Hours As Needed for Wheezing or Shortness of Air.       amLODIPine (NORVASC) 10 MG tablet Take 1 tablet by mouth Daily. Indications: High Blood Pressure Disorder   9/23/2024    aspirin 81 MG tablet Take 1 tablet by mouth Every Night. Indications: ANTIPLATELET   9/23/2024    carvedilol (COREG) 3.125 MG tablet Take 1 tablet by mouth 2 (Two) Times a Day With Meals.   Past Month    ciprofloxacin (CIPRO) 250 MG tablet Take 1 tablet by mouth 2 (Two) Times a Day.   9/23/2024    cyanocobalamin 1000 MCG/ML injection Inject 1 mL into the appropriate muscle as directed by prescriber Every 28 (Twenty-Eight) Days. Indications: Inadequate Vitamin B12       Diclofenac Sodium (VOLTAREN) 1 % gel gel Apply 4 g topically to the appropriate area as directed 3 (Three) Times a Day As Needed (pain). Indications: Joint Damage causing Pain and Loss of Function       doxazosin (CARDURA) 2 MG tablet Take 1 tablet by mouth Every Night. Indications: High Blood Pressure   9/23/2024    estradiol (ESTRACE) 0.1 MG/GM vaginal cream Insert 1 g into the vagina 3 (Three) Times a Week. Monday, Wednesday and Friday  Indications: VAGINAL HEALTH   9/23/2024    Evolocumab (REPATHA) solution auto-injector SureClick injection Inject 1 mL under the skin into the appropriate area as directed Every 14 (Fourteen) Days. Indications: High Amount of Fats in the Blood       ferrous sulfate 325 (65 FE) MG tablet Take 1 tablet by mouth Daily  With Breakfast.   9/23/2024    Finerenone (Kerendia) 10 MG tablet Take 1 tablet by mouth Daily.   9/23/2024    fluticasone-salmeterol (ADVAIR HFA) 230-21 MCG/ACT inhaler Inhale 1 puff Take As Directed. 1 inhalation gram every 6-8 hours   9/23/2024    furosemide (LASIX) 20 MG tablet Take 1 tablet by mouth Daily.   9/23/2024    Insulin Glargine (LANTUS SOLOSTAR) 100 UNIT/ML injection pen Inject 10 Units under the skin into the appropriate area as directed Every Night. 100 mL 12 9/23/2024    ipratropium-albuterol (DUO-NEB) 0.5-2.5 mg/3 ml nebulizer Take 3 mL by nebulization 4 (Four) Times a Day As Needed for Wheezing. 360 mL 1     ketoconazole (NIZORAL) 2 % shampoo Apply 1 Application topically to the appropriate area as directed 2 (Two) Times a Week.       lidocaine (LIDODERM) 5 % Place 1 patch on the skin as directed by provider Daily As Needed for Mild Pain. Remove & Discard patch within 12 hours or as directed by MD Mohr 145 MCG capsule capsule Take 1 capsule by mouth Daily. Indications: CONSTIPATION   9/23/2024    metroNIDAZOLE (FLAGYL) 500 MG tablet Take 1 tablet by mouth 3 (Three) Times a Day.   9/23/2024    omeprazole (priLOSEC) 20 MG capsule Take 1 capsule by mouth Daily.   9/23/2024    polyethylene glycol (MIRALAX) 17 g packet Take 17 g by mouth Daily. Indications: Constipation   9/23/2024    sertraline (ZOLOFT) 50 MG tablet Take 1 tablet by mouth Daily. Indications: MOOD   9/23/2024    SITagliptin (JANUVIA) 100 MG tablet Take 1 tablet by mouth Daily. Indications: Type 2 Diabetes   9/23/2024    Teriparatide, Recombinant, (Forteo) 600 MCG/2.4ML injection Inject 0.08 mL under the skin into the appropriate area as directed Daily. Indications: Osteoporosis   9/23/2024    traMADol (ULTRAM) 50 MG tablet Take 2 tablets by mouth 2 (Two) Times a Day As Needed for Moderate Pain. Indications: Pain       Umeclidinium-Vilanterol (Anoro Ellipta) 62.5-25 MCG/ACT aerosol powder  inhaler Inhale 1 puff Daily. At the  same time each day.   9/23/2024    gabapentin (NEURONTIN) 100 MG capsule Take 1 capsule by mouth At Night As Needed (pain).       guaiFENesin (MUCINEX) 600 MG 12 hr tablet Take 2 tablets by mouth 2 (Two) Times a Day As Needed for Cough or Congestion.       methocarbamol (ROBAXIN) 500 MG tablet Take 1 tablet by mouth 4 (Four) Times a Day As Needed for Muscle Spasms.        amLODIPine, 10 mg, Oral, Daily  insulin glargine, 10 Units, Subcutaneous, Nightly  insulin lispro, 2-7 Units, Subcutaneous, 4x Daily AC & at Bedtime  pantoprazole, 40 mg, Oral, Q AM  polyethylene glycol, 17 g, Oral, Daily  senna-docusate sodium, 1 tablet, Oral, Nightly  sertraline, 50 mg, Oral, Daily  terazosin, 1 mg, Oral, Nightly      sodium chloride, 30 mL/hr, Last Rate: 30 mL/hr (09/25/24 0732)        acetaminophen    dextrose    dextrose    gabapentin    glucagon (human recombinant)    ipratropium-albuterol    methocarbamol    Morphine    ondansetron    traMADol  Erythromycin, Naproxen sodium, Statins, Latex, Metoclopramide, and Dicyclomine    Review of Systems   Constitutional:  Negative for chills and fever.   HENT:  Negative for rhinorrhea and trouble swallowing.    Eyes:  Negative for blurred vision and double vision.   Respiratory:  Negative for cough and shortness of breath.    Cardiovascular:  Negative for chest pain and palpitations.   Gastrointestinal:  Negative for abdominal distention and abdominal pain.   Musculoskeletal:  Negative for back pain and myalgias.   Skin:  Negative for color change and dry skin.   Neurological:  Negative for headache and confusion.   Psychiatric/Behavioral:  Negative for agitation and hallucinations.         Objective     Vital Signs and Labs:  Vital Signs Patient Vitals for the past 24 hrs:   BP Temp Temp src Pulse Resp SpO2   09/25/24 1125 132/63 98 °F (36.7 °C) Oral 86 16 94 %   09/25/24 0720 -- -- -- 77 15 95 %   09/25/24 0716 -- -- -- 79 15 95 %   09/25/24 0655 143/79 98 °F (36.7 °C) Oral 81 16 92  %   09/25/24 0023 -- 97.5 °F (36.4 °C) Oral -- 16 --   09/24/24 1827 -- -- -- 84 16 95 %   09/24/24 1824 -- -- -- 84 16 95 %   09/24/24 1616 145/83 97.9 °F (36.6 °C) Oral 72 14 94 %     I/O:  No intake/output data recorded.    Physical Exam:  Physical Exam  Constitutional:       General: She is not in acute distress.     Appearance: Normal appearance. She is not ill-appearing.   HENT:      Head: Normocephalic and atraumatic.      Right Ear: External ear normal.      Left Ear: External ear normal.   Eyes:      Extraocular Movements: Extraocular movements intact.      Conjunctiva/sclera: Conjunctivae normal.   Cardiovascular:      Rate and Rhythm: Normal rate and regular rhythm.   Pulmonary:      Effort: Pulmonary effort is normal. No respiratory distress.   Abdominal:      General: There is no distension.      Palpations: Abdomen is soft.      Tenderness: There is no abdominal tenderness.   Musculoskeletal:         General: No swelling or deformity.   Skin:     General: Skin is warm and dry.   Neurological:      Mental Status: She is alert and oriented to person, place, and time. Mental status is at baseline.         CBC    Results from last 7 days   Lab Units 09/23/24 2058   WBC 10*3/mm3 5.36   HEMOGLOBIN g/dL 14.4   PLATELETS 10*3/mm3 226     BMP   Results from last 7 days   Lab Units 09/24/24  0455 09/23/24 2058   SODIUM mmol/L 141 143   POTASSIUM mmol/L 4.4 3.6   CHLORIDE mmol/L 106 105   CO2 mmol/L 27.4 27.0   BUN mg/dL 21 20   CREATININE mg/dL 1.34* 1.56*   GLUCOSE mg/dL 193* 273*     Radiology(recent) XR Abdomen KUB    Result Date: 9/24/2024  Impression: No evidence of bowel obstruction. Mild/physiologic stool burden. Electronically Signed: Emre Stevenson MD  9/24/2024 8:24 PM EDT  Workstation ID: [id number]    MRI Thoracic Spine Without Contrast    Result Date: 9/24/2024  Impression: 1. Stable chronic anterior wedging compression fracture deformity of the T6 with kyphoplasty changes 2. No evidence of acute  fractures or significant bone marrow signal changes in the thoracic or lumbar spine 3. Mild thoracic spondylosis 4. Multilevel disc bulges and lumbar spondylosis as above Electronically Signed: Aubrey Ramirez MD  9/24/2024 8:49 AM EDT  Workstation ID: [id number]    MRI Lumbar Spine Without Contrast    Result Date: 9/24/2024  Impression: 1. Stable chronic anterior wedging compression fracture deformity of the T6 with kyphoplasty changes 2. No evidence of acute fractures or significant bone marrow signal changes in the thoracic or lumbar spine 3. Mild thoracic spondylosis 4. Multilevel disc bulges and lumbar spondylosis as above Electronically Signed: Aubrey Ramirez MD  9/24/2024 8:49 AM EDT  Workstation ID: [id number]    XR Chest 1 View    Result Date: 9/23/2024  Impression: No radiographic evidence of acute chest process. Electronically Signed: Arthur Natarajan MD  9/23/2024 10:35 PM EDT  Workstation ID: [id number]     I reviewed the patient's new clinical results.    Assessment & Plan       Thoracic back pain      78-year-old lady with history of CKD, right nephrectomy, COPD, diabetes, cholecystectomy, hiatal hernia repair who is currently admitted for right flank pain.  This has been a chronic issue but worsened this week so she is currently admitted for pain control.  Has been tolerating a diet without issue no nausea does have some chronic dysphagia from her hiatal hernia repair, having daily bowel movements.  Patient says pain starts in her right lateral back and radiates along the level of her ribs to her right upper quadrant and then down the whole right side of her abdomen.  Pain feels like muscle spasm.  She is seeing pain management for sciatic nerve pain in the past she is also done physical therapy which she said did not help.  Will get CT abdomen pelvis to look for flank hernia or other causes of right upper quadrant abdominal pain.  Her symptoms sound like referred pain from her back, if no intra-abdominal  pathology on her CT scan may benefit from pain management for pain injections and further physical therapy.  Will reassess tomorrow after her images are complete.      This note was created using Dragon Voice Recognition software.    Christo Stuart MD  09/25/24  15:28 EDT

## 2024-09-25 NOTE — NURSING NOTE
Pt had a CT with contrast ordered by Dr. Stuart today. When the contrast was offered to the pt, she stated that she cannot drink the contrast due to CKD and having 1 kidney. Dr. Stuart was notified  of this. Dr. Stuart stated that oral contrast does not hurt the kidneys, only IV contrast does. Pt was notified of this and was agreeable to drink the contrast.

## 2024-09-25 NOTE — CASE MANAGEMENT/SOCIAL WORK
Continued Stay Note  PRAKASH Tillman     Patient Name: Vianey Reeves  MRN: [id number]  Today's Date: 9/25/2024    Admit Date: 9/23/2024    Plan: Return home alone   Discharge Plan       Row Name 09/25/24 1344       Plan    Plan Comments DC barriers: Pain control, IV morphine, IV fluids. General surgery consult               Rhiannon Dinh RN     Office phone: [phone]  Office fax: [phone]

## 2024-09-25 NOTE — CASE MANAGEMENT/SOCIAL WORK
Continued Stay Note   [facility]     Patient Name: Vianey Reeves  MRN: [id number]  Today's Date: 9/25/2024    Admit Date: 9/23/2024    Plan: Return home alone with  Outpatient PT  ([place]- will need order and facility notified.)   Discharge Plan       Row Name 09/25/24 1606       Plan    Plan Return home alone with  Outpatient PT  ([place]- will need order and facility notified.)                       Rhiannon Dinh RN     Office phone: [phone]  Office fax: [phone]

## 2024-09-25 NOTE — PAYOR COMM NOTE
"This is a clinical update for Vianey Reeves   Reference/Auth # [id number]    Please correct inpatient start date to 9/23/24. Upon review on Avail[facility], admit date is listed as 9/24/24 which is incorrect. I have attached a copy of inpatient order.       Thank you.    Sowmya Salas, RN, BSN  Utilization Review Nurse  [facility]  Direct & confidential phone # [phone]  Fax # [phone]      09/23/24 2038  Inpatient Admission  Once     Completed     Level of Care: Med/Surg  Diagnosis: Thoracic back pain [054615]  Admitting Physician: LUDWIN CHARLES [[id number]]  Attending Physician: LUDWIN CHARLES [[id number]]  Isolate for COVID?: No  Certification: I Certify That Inpatient Hospital Services Are Medically Necessary For Greater Than 2 Midnights              Vianey Reeves (78 y.o. Female)       Date of Birth   1945    Social Security Number       Address   [address] [place] [address] [place] [facility]    Home Phone   [phone]    N   [id number]       Amish   None    Marital Status                               Admission Date   9/23/24    Admission Type   Elective    Admitting Provider   Ludwin Charles MD    Attending Provider   Ludwin Charles MD    Department, Room/Bed   [facility] 3A MEDICAL INPATIENT, 302/1       Discharge Date       Discharge Disposition       Discharge Destination                                 Attending Provider: Ludwin Charles MD    Allergies: Erythromycin, Naproxen Sodium, Statins, Latex, Metoclopramide, Dicyclomine    Isolation: None   Infection: None   Code Status: CPR    Ht: 165.1 cm (65\")   Wt: 82.7 kg (182 lb 5.1 oz)    Admission Cmt: None   Principal Problem: Thoracic back pain [M54.6]                   Active Insurance as of 9/23/2024       Primary Coverage       Payor Plan Insurance Group Employer/Plan Group    ANTH MEDICARE REPLACEMENT ANTH MEDICARE ADVANTAGE [id number]       Payor Plan Address Payor Plan Phone Number " Payor Plan Fax Number Effective Dates    [address] [phone]  1/1/2022 - None Entered    [facility] [address]         Subscriber Name Subscriber Birth Date Member ID       ERI STERLING 1945 [id number]               Secondary Coverage       Payor Plan Insurance Group Employer/Plan Group    [id number]        Payor Plan Address Payor Plan Phone Number Payor Plan Fax Number Effective Dates    [address] [address]   9/1/2024 - None Entered    [place] [address]         Subscriber Name Subscriber Birth Date Member ID       ERI STERLING 1945 [id number]                     Emergency Contacts        (Rel.) Home Phone Work Phone Mobile Phone    WALDO IRIZARRY (Daughter) [phone] -- [phone]    Carolina vanegas (Grandchild) -- -- [phone]    HANNAH VANEGAS (Grandchild) [phone] -- [phone]

## 2024-09-26 LAB
BACTERIA UR QL AUTO: ABNORMAL /HPF
BILIRUB UR QL STRIP: NEGATIVE
CLARITY UR: CLEAR
COLOR UR: YELLOW
GLUCOSE BLDC GLUCOMTR-MCNC: 118 MG/DL (ref 70–105)
GLUCOSE BLDC GLUCOMTR-MCNC: 155 MG/DL (ref 70–105)
GLUCOSE BLDC GLUCOMTR-MCNC: 163 MG/DL (ref 70–105)
GLUCOSE BLDC GLUCOMTR-MCNC: 198 MG/DL (ref 70–105)
GLUCOSE UR STRIP-MCNC: NEGATIVE MG/DL
HGB UR QL STRIP.AUTO: NEGATIVE
HYALINE CASTS UR QL AUTO: ABNORMAL /LPF
KETONES UR QL STRIP: NEGATIVE
LEUKOCYTE ESTERASE UR QL STRIP.AUTO: ABNORMAL
NITRITE UR QL STRIP: NEGATIVE
PH UR STRIP.AUTO: 5.5 [PH] (ref 5–8)
PROT UR QL STRIP: NEGATIVE
RBC # UR STRIP: ABNORMAL /HPF
REF LAB TEST METHOD: ABNORMAL
RENAL EPI CELLS #/AREA URNS HPF: ABNORMAL /HPF
SP GR UR STRIP: 1.01 (ref 1–1.03)
SQUAMOUS #/AREA URNS HPF: ABNORMAL /HPF
TRANS CELLS #/AREA URNS HPF: ABNORMAL /HPF
UROBILINOGEN UR QL STRIP: ABNORMAL
WBC # UR STRIP: ABNORMAL /HPF

## 2024-09-26 PROCEDURE — 25010000002 MORPHINE PER 10 MG: Performed by: FAMILY MEDICINE

## 2024-09-26 PROCEDURE — 87186 SC STD MICRODIL/AGAR DIL: CPT | Performed by: FAMILY MEDICINE

## 2024-09-26 PROCEDURE — 63710000001 INSULIN LISPRO (HUMAN) PER 5 UNITS: Performed by: FAMILY MEDICINE

## 2024-09-26 PROCEDURE — 87086 URINE CULTURE/COLONY COUNT: CPT | Performed by: FAMILY MEDICINE

## 2024-09-26 PROCEDURE — 99232 SBSQ HOSP IP/OBS MODERATE 35: CPT | Performed by: STUDENT IN AN ORGANIZED HEALTH CARE EDUCATION/TRAINING PROGRAM

## 2024-09-26 PROCEDURE — 87088 URINE BACTERIA CULTURE: CPT | Performed by: FAMILY MEDICINE

## 2024-09-26 PROCEDURE — 82948 REAGENT STRIP/BLOOD GLUCOSE: CPT | Performed by: FAMILY MEDICINE

## 2024-09-26 PROCEDURE — 82948 REAGENT STRIP/BLOOD GLUCOSE: CPT

## 2024-09-26 PROCEDURE — 81001 URINALYSIS AUTO W/SCOPE: CPT | Performed by: FAMILY MEDICINE

## 2024-09-26 PROCEDURE — 63710000001 INSULIN GLARGINE PER 5 UNITS: Performed by: FAMILY MEDICINE

## 2024-09-26 RX ORDER — METHYLPREDNISOLONE SODIUM SUCCINATE 40 MG/ML
20 INJECTION, POWDER, LYOPHILIZED, FOR SOLUTION INTRAMUSCULAR; INTRAVENOUS ONCE
Status: COMPLETED | OUTPATIENT
Start: 2024-09-27 | End: 2024-09-27

## 2024-09-26 RX ADMIN — MORPHINE SULFATE 2 MG: 2 INJECTION, SOLUTION INTRAMUSCULAR; INTRAVENOUS at 04:08

## 2024-09-26 RX ADMIN — TERAZOSIN HYDROCHLORIDE 1 MG: 1 CAPSULE ORAL at 20:18

## 2024-09-26 RX ADMIN — INSULIN GLARGINE 10 UNITS: 100 INJECTION, SOLUTION SUBCUTANEOUS at 21:13

## 2024-09-26 RX ADMIN — SENNOSIDES AND DOCUSATE SODIUM 1 TABLET: 50; 8.6 TABLET ORAL at 20:17

## 2024-09-26 RX ADMIN — PANTOPRAZOLE SODIUM 40 MG: 40 TABLET, DELAYED RELEASE ORAL at 05:16

## 2024-09-26 RX ADMIN — INSULIN LISPRO 2 UNITS: 100 INJECTION, SOLUTION INTRAVENOUS; SUBCUTANEOUS at 21:13

## 2024-09-26 RX ADMIN — POLYETHYLENE GLYCOL 3350 17 G: 17 POWDER, FOR SOLUTION ORAL at 08:53

## 2024-09-26 RX ADMIN — AMLODIPINE BESYLATE 10 MG: 5 TABLET ORAL at 08:53

## 2024-09-26 RX ADMIN — MORPHINE SULFATE 2 MG: 2 INJECTION, SOLUTION INTRAMUSCULAR; INTRAVENOUS at 22:56

## 2024-09-26 RX ADMIN — SERTRALINE 50 MG: 50 TABLET, FILM COATED ORAL at 08:53

## 2024-09-26 RX ADMIN — INSULIN LISPRO 2 UNITS: 100 INJECTION, SOLUTION INTRAVENOUS; SUBCUTANEOUS at 12:26

## 2024-09-26 RX ADMIN — INSULIN LISPRO 2 UNITS: 100 INJECTION, SOLUTION INTRAVENOUS; SUBCUTANEOUS at 17:38

## 2024-09-26 NOTE — PLAN OF CARE
Goal Outcome Evaluation:   Patient has had some complaints of pain, see MAR. Has been resting comfortably. Vital signs stable. Wearing 2L nasal cannula while sleeping. Patient has new IV. Call light and bedside table within reach. Plan of care ongoing.

## 2024-09-26 NOTE — PROGRESS NOTES
LOS: 3 days   Patient Care Team:  Anny Garcia MD as PCP - General (Internal Medicine)  Sergio Ordonez MD as Consulting Physician (Nephrology)    Subjective:  Continued discomfort    Objective:   Afebrile      Review of Systems:   Review of Systems   Constitutional:  Positive for activity change.   Gastrointestinal:  Positive for abdominal pain.           Vital Signs  Temp:  [97.6 °F (36.4 °C)-98 °F (36.7 °C)] 97.7 °F (36.5 °C)  Heart Rate:  [66-86] 74  Resp:  [14-16] 16  BP: (132-143)/(63-85) 141/85    Physical Exam:  Physical Exam  Vitals reviewed.   Cardiovascular:      Rate and Rhythm: Rhythm irregular.      Heart sounds: Normal heart sounds.   Pulmonary:      Breath sounds: Normal breath sounds.   Skin:     General: Skin is warm.   Neurological:      Mental Status: She is alert.          Radiology:  CT Abdomen Pelvis Without Contrast    Result Date: 9/25/2024  Impression: 1. Status post right nephrectomy. 2. Changes of Nissen fundoplication. 3. No acute process seen within the abdomen or pelvis. 4. Status post cholecystectomy. Electronically Signed: Srinath Lock MD  9/25/2024 10:20 PM EDT  Workstation ID: [id number]    XR Abdomen KUB    Result Date: 9/24/2024  Impression: No evidence of bowel obstruction. Mild/physiologic stool burden. Electronically Signed: Emre Stevenson MD  9/24/2024 8:24 PM EDT  Workstation ID: [id number]    MRI Thoracic Spine Without Contrast    Result Date: 9/24/2024  Impression: 1. Stable chronic anterior wedging compression fracture deformity of the T6 with kyphoplasty changes 2. No evidence of acute fractures or significant bone marrow signal changes in the thoracic or lumbar spine 3. Mild thoracic spondylosis 4. Multilevel disc bulges and lumbar spondylosis as above Electronically Signed: Aubrey Ramirez MD  9/24/2024 8:49 AM EDT  Workstation ID: [id number]    MRI Lumbar Spine Without Contrast    Result Date: 9/24/2024  Impression: 1. Stable chronic anterior wedging  compression fracture deformity of the T6 with kyphoplasty changes 2. No evidence of acute fractures or significant bone marrow signal changes in the thoracic or lumbar spine 3. Mild thoracic spondylosis 4. Multilevel disc bulges and lumbar spondylosis as above Electronically Signed: Aubrey Ramirez MD  9/24/2024 8:49 AM EDT  Workstation ID: [id number]    XR Chest 1 View    Result Date: 9/23/2024  Impression: No radiographic evidence of acute chest process. Electronically Signed: Arthur Natarajan MD  9/23/2024 10:35 PM EDT  Workstation ID: [id number]        Results Review:     I reviewed the patient's new clinical results.  I reviewed the patient's new imaging results and agree with the interpretation.    Medication Review:   Scheduled Meds:amLODIPine, 10 mg, Oral, Daily  insulin glargine, 10 Units, Subcutaneous, Nightly  insulin lispro, 2-7 Units, Subcutaneous, 4x Daily AC & at Bedtime  pantoprazole, 40 mg, Oral, Q AM  polyethylene glycol, 17 g, Oral, Daily  senna-docusate sodium, 1 tablet, Oral, Nightly  sertraline, 50 mg, Oral, Daily  terazosin, 1 mg, Oral, Nightly      Continuous Infusions:sodium chloride, 30 mL/hr, Last Rate: Stopped (09/26/24 0753)      PRN Meds:.  acetaminophen    dextrose    dextrose    gabapentin    glucagon (human recombinant)    ipratropium-albuterol    methocarbamol    Morphine    ondansetron    traMADol    Labs:    CBC    Results from last 7 days   Lab Units 09/23/24 2058   WBC 10*3/mm3 5.36   HEMOGLOBIN g/dL 14.4   PLATELETS 10*3/mm3 226     BMP   Results from last 7 days   Lab Units 09/24/24  0455 09/23/24 2058   SODIUM mmol/L 141 143   POTASSIUM mmol/L 4.4 3.6   CHLORIDE mmol/L 106 105   CO2 mmol/L 27.4 27.0   BUN mg/dL 21 20   CREATININE mg/dL 1.34* 1.56*   GLUCOSE mg/dL 193* 273*     Cr Clearance Estimated Creatinine Clearance: 35.6 mL/min (A) (by C-G formula based on SCr of 1.34 mg/dL (H)).  Coag     HbA1C   Lab Results   Component Value Date    HGBA1C 7.10 (H) 01/21/2024    HGBA1C 7.0  (H) 12/16/2022    HGBA1C 7.3 08/11/2022     Blood Glucose   Glucose   Date/Time Value Ref Range Status   09/26/2024 0757 118 (H) 70 - 105 mg/dL Final     Comment:     Serial Number: [id number]:  [id number]   09/25/2024 2009 167 (H) 70 - 105 mg/dL Final     Comment:     Serial Number: [id number]:  [id number]   09/25/2024 1701 205 (H) 70 - 105 mg/dL Final     Comment:     Serial Number: [id number]:  [id number]   09/25/2024 1151 273 (H) 70 - 105 mg/dL Final     Comment:     Serial Number: [id number]:  [id number]   09/25/2024 0725 139 (H) 70 - 105 mg/dL Final     Comment:     Serial Number: [id number]:  [id number]   09/24/2024 2006 204 (H) 70 - 105 mg/dL Final     Comment:     Serial Number: [id number]:  [id number]   09/24/2024 1615 139 (H) 70 - 105 mg/dL Final     Comment:     Serial Number: [id number]:  [id number]   09/24/2024 1152 203 (H) 70 - 105 mg/dL Final     Comment:     Serial Number: [id number]:  [id number]     Infection     CMP   Results from last 7 days   Lab Units 09/24/24  0455 09/23/24  2058   SODIUM mmol/L 141 143   POTASSIUM mmol/L 4.4 3.6   CHLORIDE mmol/L 106 105   CO2 mmol/L 27.4 27.0   BUN mg/dL 21 20   CREATININE mg/dL 1.34* 1.56*   GLUCOSE mg/dL 193* 273*   ALBUMIN g/dL  --  3.7   BILIRUBIN mg/dL  --  0.2   ALK PHOS U/L  --  73   AST (SGOT) U/L  --  14   ALT (SGPT) U/L  --  14     UA      Radiology(recent) CT Abdomen Pelvis Without Contrast    Result Date: 9/25/2024  Impression: 1. Status post right nephrectomy. 2. Changes of Nissen fundoplication. 3. No acute process seen within the abdomen or pelvis. 4. Status post cholecystectomy. Electronically Signed: Srinath Lock MD  9/25/2024 10:20 PM EDT  Workstation ID: [id number]    XR Abdomen KUB    Result Date: 9/24/2024  Impression: No evidence of bowel obstruction. Mild/physiologic stool burden. Electronically Signed: Emre Stevenson MD  9/24/2024 8:24 PM EDT  Workstation ID: [id number]     Assessment:    Acute thoracic back pain, intractable with failure of outpatient treatment  Acute R flank pain T11/T12  Acute L1 back pain  Osteoporotic compression fracture  History of kyphoplasty  Osteoporosis  Pulmonary HTN  History of nephrectomy  Panlobular COPD with emphysema  Chronic mucopurulent bronchitis  DMII with renal manifestations   DMII with neuropathic manifestations  Diabetic dyslipidemia  GERD with esophagitis  Myofascia pain syndrome  CKDIV  HH  HTN with CKDIV  Chronic constipation  Nicotine dependency with nicotine use disorder, cigarettes  DDD L/S  Endometrial CA HX with ureteral involvement  Constipation    Plan:  Will review CT scan//may require diagnostic laparoscopy given persistence of discomfort        Del Iverson MD  09/26/24  08:53 EDT

## 2024-09-26 NOTE — PLAN OF CARE
Problem: Adult Inpatient Plan of Care  Goal: Plan of Care Review  Outcome: Progressing  Goal: Patient-Specific Goal (Individualized)  Outcome: Progressing  Goal: Absence of Hospital-Acquired Illness or Injury  Outcome: Progressing  Intervention: Identify and Manage Fall Risk  Recent Flowsheet Documentation  Taken 9/26/2024 1609 by Christina Lopez RN  Safety Promotion/Fall Prevention: safety round/check completed  Taken 9/26/2024 1459 by Christina Lopez RN  Safety Promotion/Fall Prevention: safety round/check completed  Taken 9/26/2024 1240 by Christina Lopez RN  Safety Promotion/Fall Prevention: safety round/check completed  Taken 9/26/2024 1000 by Christina Lopez RN  Safety Promotion/Fall Prevention: safety round/check completed  Taken 9/26/2024 0859 by Christina Lopez RN  Safety Promotion/Fall Prevention: safety round/check completed  Intervention: Prevent Skin Injury  Recent Flowsheet Documentation  Taken 9/26/2024 0859 by Christina Loepz RN  Body Position: position changed independently  Intervention: Prevent and Manage VTE (Venous Thromboembolism) Risk  Recent Flowsheet Documentation  Taken 9/26/2024 0859 by Christina Lopez RN  VTE Prevention/Management:   bilateral   sequential compression devices off  Range of Motion: active ROM (range of motion) encouraged  Intervention: Prevent Infection  Recent Flowsheet Documentation  Taken 9/26/2024 0859 by Christina Lopez RN  Infection Prevention: single patient room provided  Goal: Optimal Comfort and Wellbeing  Outcome: Progressing  Intervention: Provide Person-Centered Care  Recent Flowsheet Documentation  Taken 9/26/2024 0859 by Christina Lopez RN  Trust Relationship/Rapport:   choices provided   care explained   emotional support provided   empathic listening provided   questions encouraged   questions answered  Goal: Readiness for Transition of Care  Outcome: Progressing     Problem: COPD (Chronic Obstructive  Pulmonary Disease) Comorbidity  Goal: Maintenance of COPD Symptom Control  Outcome: Progressing  Intervention: Maintain COPD-Symptom Control  Recent Flowsheet Documentation  Taken 9/26/2024 0859 by Christina Lopez RN  Medication Review/Management: medications reviewed     Problem: Diabetes Comorbidity  Goal: Blood Glucose Level Within Targeted Range  Outcome: Progressing     Problem: Hypertension Comorbidity  Goal: Blood Pressure in Desired Range  Outcome: Progressing  Intervention: Maintain Blood Pressure Management  Recent Flowsheet Documentation  Taken 9/26/2024 0859 by Christina Lopez RN  Medication Review/Management: medications reviewed     Problem: Obstructive Sleep Apnea Risk or Actual Comorbidity Management  Goal: Unobstructed Breathing During Sleep  Outcome: Progressing   Goal Outcome Evaluation:   Pt has been relaxing in bed and recliner through out the day. She has had her BG managed through [facility] as well. Her continuous fluids have been discontinued per provider orders. No concerns and call light within reach.

## 2024-09-26 NOTE — CONSULTS
"Nutrition Services    Patient Name: Vianey Reeves  YOB: 1945  MRN: [id number]  Admission date: 9/23/2024      NUTRITION SCREENING      Trending Narrative: 9/26: Nutrition screening r/t Malnutrition screening tool score of 2. Pt presented to ED on 9/23 with complaints of a 2 week history of intractable thoracic pain. Mri revealed chronic anterior wedging compression fracture deformity of the T6 with kyphoplasty changes.        PO Diet: Diet: Diabetic; Consistent Carbohydrate; Fluid Consistency: Thin (IDDSI 0)   PO Supplements:    Trending PO Intake:  9/26: 63% average po intake x last 2 documented meals       Nutritionally-Pertinent Medications RDN Reviewed, C/W clinical course         Labs (reviewed below): Reviewed. Management per attending.      Results from last 7 days   Lab Units 09/24/24 0455 09/23/24 2058   SODIUM mmol/L 141 143   POTASSIUM mmol/L 4.4 3.6   CHLORIDE mmol/L 106 105   CO2 mmol/L 27.4 27.0   BUN mg/dL 21 20   CREATININE mg/dL 1.34* 1.56*   CALCIUM mg/dL 9.5 9.8   BILIRUBIN mg/dL  --  0.2   ALK PHOS U/L  --  73   ALT (SGPT) U/L  --  14   AST (SGOT) U/L  --  14   GLUCOSE mg/dL 193* 273*     Results from last 7 days   Lab Units 09/23/24 2058   HEMOGLOBIN g/dL 14.4   HEMATOCRIT % 44.2     Lab Results   Component Value Date    HGBA1C 7.10 (H) 01/21/2024          GI Function:  Last documented BM 9/23       Skin: No PI documented        Weight Review: Estimated body mass index is 30.34 kg/m² as calculated from the following:    Height as of this encounter: 165.1 cm (65\").    Weight as of this encounter: 82.7 kg (182 lb 5.1 oz).    Comment:   9/26: (last weight 9/24) 182#  2.6% weight loss in the last 6 months    Wt Readings from Last 30 Encounters:   09/24/24 0100 82.7 kg (182 lb 5.1 oz)   09/23/24 2300 82.7 kg (182 lb 5.1 oz)   08/01/24 0949 74.8 kg (165 lb)   07/15/24 2035 74.8 kg (165 lb)   07/01/24 1256 80.3 kg (177 lb)   06/10/24 1400 80.3 kg (177 lb)   05/22/24 1251 84.8 kg " (187 lb)   04/24/24 1253 84.8 kg (187 lb)   03/28/24 1406 84.8 kg (187 lb)   03/25/24 1028 84.8 kg (187 lb)   03/05/24 1530 84.8 kg (187 lb)   02/15/24 1404 84.8 kg (187 lb)   01/29/24 0418 84.9 kg (187 lb 2.7 oz)   01/28/24 0752 87.3 kg (192 lb 7.4 oz)   01/27/24 0437 87.2 kg (192 lb 3.9 oz)   01/26/24 0450 87.8 kg (193 lb 9 oz)   01/22/24 1608 85.8 kg (189 lb 2.5 oz)   01/22/24 0454 82.2 kg (181 lb 3.5 oz)   01/21/24 2042 84.3 kg (185 lb 13.6 oz)   01/21/24 1503 85.3 kg (188 lb)   01/17/24 0500 84.9 kg (187 lb 2.7 oz)   01/16/24 0500 84.8 kg (186 lb 15.2 oz)   01/15/24 1109 84.4 kg (186 lb)   01/15/24 0500 84.5 kg (186 lb 4.6 oz)   01/14/24 1353 86 kg (189 lb 9.5 oz)   09/07/23 0957 90.7 kg (200 lb)   08/29/23 1413 88.5 kg (195 lb)   08/09/23 1432 88.5 kg (195 lb)   02/23/23 0607 78 kg (171 lb 15.3 oz)   02/22/23 1504 88.9 kg (196 lb)   02/20/23 0836 88.9 kg (196 lb)   02/16/23 1327 88.9 kg (196 lb)   01/28/23 1105 88.5 kg (195 lb)   01/26/23 2200 88.6 kg (195 lb 5.2 oz)   01/26/23 1710 88.9 kg (196 lb)   01/23/23 2307 89.1 kg (196 lb 6.4 oz)   01/23/23 1102 90.3 kg (199 lb 1.2 oz)   01/19/23 1402 93.2 kg (205 lb 6.4 oz)   01/04/23 1351 91.4 kg (201 lb 6.4 oz)   12/05/22 1207 89.3 kg (196 lb 13.9 oz)   10/03/22 1436 92 kg (202 lb 12.8 oz)   09/23/22 1318 89.6 kg (197 lb 9.6 oz)   09/22/22 0910 90.1 kg (198 lb 10.2 oz)   09/13/22 1151 90.7 kg (200 lb)   09/01/22 0806 91 kg (200 lb 9.6 oz)   08/11/22 1446 88.5 kg (195 lb 3.2 oz)   07/01/22 1449 90.4 kg (199 lb 3.2 oz)          --       Nutrition Problem Statement: Decreased appetite related to increased pain in the setting of fracture as evidenced by po intake < 75% intake.        Nutrition Intervention: Continue to encourage good po intake.           Monitoring/Evaluation Per protocol, I&O, PO intake, Pertinent labs, Weight, Skin status, GI status            RD to follow up per protocol.    Electronically signed by:  Kelsea Ramirez, VIVIENNE  09/26/24  13:48 EDT

## 2024-09-27 ENCOUNTER — INPATIENT HOSPITAL (AMBULATORY)
Age: 79
End: 2024-09-27
Payer: MEDICAID

## 2024-09-27 ENCOUNTER — INPATIENT HOSPITAL (AMBULATORY)
Dept: URBAN - METROPOLITAN AREA HOSPITAL 84 | Facility: HOSPITAL | Age: 79
End: 2024-09-27
Payer: MEDICAID

## 2024-09-27 DIAGNOSIS — R10.11 RIGHT UPPER QUADRANT PAIN: ICD-10-CM

## 2024-09-27 DIAGNOSIS — R11.0 NAUSEA: ICD-10-CM

## 2024-09-27 DIAGNOSIS — Z90.49 ACQUIRED ABSENCE OF OTHER SPECIFIED PARTS OF DIGESTIVE TRACT: ICD-10-CM

## 2024-09-27 LAB
GLUCOSE BLDC GLUCOMTR-MCNC: 140 MG/DL (ref 70–105)
GLUCOSE BLDC GLUCOMTR-MCNC: 189 MG/DL (ref 70–105)
GLUCOSE BLDC GLUCOMTR-MCNC: 259 MG/DL (ref 70–105)
GLUCOSE BLDC GLUCOMTR-MCNC: 286 MG/DL (ref 70–105)

## 2024-09-27 PROCEDURE — 99222 1ST HOSP IP/OBS MODERATE 55: CPT | Performed by: INTERNAL MEDICINE

## 2024-09-27 PROCEDURE — 25010000002 MORPHINE PER 10 MG: Performed by: FAMILY MEDICINE

## 2024-09-27 PROCEDURE — 63710000001 INSULIN LISPRO (HUMAN) PER 5 UNITS: Performed by: FAMILY MEDICINE

## 2024-09-27 PROCEDURE — 63710000001 INSULIN GLARGINE PER 5 UNITS: Performed by: FAMILY MEDICINE

## 2024-09-27 PROCEDURE — 82948 REAGENT STRIP/BLOOD GLUCOSE: CPT

## 2024-09-27 PROCEDURE — 82948 REAGENT STRIP/BLOOD GLUCOSE: CPT | Performed by: FAMILY MEDICINE

## 2024-09-27 PROCEDURE — 25010000002 METHYLPREDNISOLONE PER 40 MG: Performed by: FAMILY MEDICINE

## 2024-09-27 RX ADMIN — SENNOSIDES AND DOCUSATE SODIUM 1 TABLET: 50; 8.6 TABLET ORAL at 21:09

## 2024-09-27 RX ADMIN — INSULIN GLARGINE 10 UNITS: 100 INJECTION, SOLUTION SUBCUTANEOUS at 21:10

## 2024-09-27 RX ADMIN — INSULIN LISPRO 4 UNITS: 100 INJECTION, SOLUTION INTRAVENOUS; SUBCUTANEOUS at 11:47

## 2024-09-27 RX ADMIN — MORPHINE SULFATE 2 MG: 2 INJECTION, SOLUTION INTRAMUSCULAR; INTRAVENOUS at 23:12

## 2024-09-27 RX ADMIN — SERTRALINE 50 MG: 50 TABLET, FILM COATED ORAL at 08:53

## 2024-09-27 RX ADMIN — AMLODIPINE BESYLATE 10 MG: 5 TABLET ORAL at 08:53

## 2024-09-27 RX ADMIN — GABAPENTIN 100 MG: 100 CAPSULE ORAL at 16:06

## 2024-09-27 RX ADMIN — METHYLPREDNISOLONE SODIUM SUCCINATE 20 MG: 40 INJECTION, POWDER, FOR SOLUTION INTRAMUSCULAR; INTRAVENOUS at 05:39

## 2024-09-27 RX ADMIN — PANTOPRAZOLE SODIUM 40 MG: 40 TABLET, DELAYED RELEASE ORAL at 05:38

## 2024-09-27 RX ADMIN — INSULIN LISPRO 2 UNITS: 100 INJECTION, SOLUTION INTRAVENOUS; SUBCUTANEOUS at 17:25

## 2024-09-27 RX ADMIN — TERAZOSIN HYDROCHLORIDE 1 MG: 1 CAPSULE ORAL at 21:10

## 2024-09-27 RX ADMIN — POLYETHYLENE GLYCOL 3350 17 G: 17 POWDER, FOR SOLUTION ORAL at 08:53

## 2024-09-27 RX ADMIN — AMITRIPTYLINE HYDROCHLORIDE 25 MG: 25 TABLET, FILM COATED ORAL at 21:09

## 2024-09-27 RX ADMIN — INSULIN LISPRO 4 UNITS: 100 INJECTION, SOLUTION INTRAVENOUS; SUBCUTANEOUS at 21:09

## 2024-09-27 NOTE — PLAN OF CARE
Goal Outcome Evaluation:   Patient has had complaints of pain throughout the shift, see MAR. Resting comfortably. Vital signs stable. Call light and bedside table within reach. Plan of care ongoing.

## 2024-09-27 NOTE — CASE MANAGEMENT/SOCIAL WORK
Continued Stay Note  PRAKASH Tillman     Patient Name: Vianey Reeves  MRN: [id number]  Today's Date: 9/27/2024    Admit Date: 9/23/2024    Plan: Return home alone with  Outpatient PT  (Leticia- will need order and facility notified.)   Discharge Plan       Row Name 09/27/24 9983       Plan    Plan Comments DC barriers: Pain control, urine culture pending results. GI, General surgery following               Rhiannon Dinh RN     Office phone: [phone]  Office fax: [phone]

## 2024-09-27 NOTE — PROGRESS NOTES
General Surgery Progress Note    Name: Vianey Reeves ADMIT: 2024   : 1945  PCP: Anny Garcia MD    MRN: [id number] LOS: 4 days   AGE/SEX: 78 y.o. female  ROOM: [address] [place] [address]    No chief complaint on file.    Subjective     Still having right flank and right-sided abdominal pain, tolerating a diet, chronic dysphagia from previous hiatal hernia repair, having bowel movements    Objective     Scheduled Medications:   amLODIPine, 10 mg, Oral, Daily  insulin glargine, 10 Units, Subcutaneous, Nightly  insulin lispro, 2-7 Units, Subcutaneous, 4x Daily AC & at Bedtime  pantoprazole, 40 mg, Oral, Q AM  polyethylene glycol, 17 g, Oral, Daily  senna-docusate sodium, 1 tablet, Oral, Nightly  sertraline, 50 mg, Oral, Daily  terazosin, 1 mg, Oral, Nightly        Active Infusions:  sodium chloride, 30 mL/hr, Last Rate: Stopped (24 0753)        As Needed Medications:    acetaminophen    dextrose    dextrose    gabapentin    glucagon (human recombinant)    ipratropium-albuterol    methocarbamol    Morphine    ondansetron    traMADol    Vital Signs  Vital Signs Patient Vitals for the past 24 hrs:   BP Temp Temp src Pulse Resp SpO2   24 0004 133/66 98.4 °F (36.9 °C) Oral 72 18 90 %   24 165/85 -- -- 68 -- --   24 1547 141/69 98.2 °F (36.8 °C) Oral -- 16 94 %   24 0758 141/85 97.7 °F (36.5 °C) Oral 74 16 91 %     I/O:  I/O last 3 completed shifts:  In: 120 [P.O.:120]  Out: -     Physical Exam:  Physical Exam  Constitutional:       General: She is not in acute distress.     Appearance: Normal appearance. She is not ill-appearing.   HENT:      Head: Normocephalic and atraumatic.      Right Ear: External ear normal.      Left Ear: External ear normal.   Eyes:      Extraocular Movements: Extraocular movements intact.      Conjunctiva/sclera: Conjunctivae normal.   Cardiovascular:      Rate and Rhythm: Normal rate and regular rhythm.   Pulmonary:      Effort: Pulmonary effort is normal.  No respiratory distress.   Abdominal:      General: There is no distension.      Palpations: Abdomen is soft.      Tenderness: There is no abdominal tenderness.   Musculoskeletal:         General: No swelling or deformity.   Skin:     General: Skin is warm and dry.   Neurological:      Mental Status: She is alert and oriented to person, place, and time. Mental status is at baseline.         Results Review:     CBC    Results from last 7 days   Lab Units 09/23/24 2058   WBC 10*3/mm3 5.36   HEMOGLOBIN g/dL 14.4   PLATELETS 10*3/mm3 226     BMP   Results from last 7 days   Lab Units 09/24/24  0455 09/23/24 2058   SODIUM mmol/L 141 143   POTASSIUM mmol/L 4.4 3.6   CHLORIDE mmol/L 106 105   CO2 mmol/L 27.4 27.0   BUN mg/dL 21 20   CREATININE mg/dL 1.34* 1.56*   GLUCOSE mg/dL 193* 273*     Radiology(recent) CT Abdomen Pelvis Without Contrast    Result Date: 9/25/2024  Impression: 1. Status post right nephrectomy. 2. Changes of Nissen fundoplication. 3. No acute process seen within the abdomen or pelvis. 4. Status post cholecystectomy. Electronically Signed: Srinath Lock MD  9/25/2024 10:20 PM EDT  Workstation ID: [id number]     I reviewed the patient's new clinical results.    Assessment & Plan       Thoracic back pain      78 y.o. female with right flank and right-sided abdominal pain.  CT reviewed no evidence of adhesions, no hernias, no intra-abdominal pathology to explain her pain.  Given the fact that pain is worse with positioning and improves with pressure on her right lateral back I suspect this is either musculoskeletal or nerve related pain.  No evidence of obstruction.  No role for surgical invention.  Recommend physical therapy and pain management for injections.  I will be available inpatient as needed please call with changes questions or concerns.        This note was created using Dragon Voice Recognition software.    Christo Stuart MD  09/27/24  07:26 EDT

## 2024-09-27 NOTE — PROGRESS NOTES
LOS: 4 days   Patient Care Team:  Anny Garcia MD as PCP - General (Internal Medicine)  Sergio Ordonez MD as Consulting Physician (Nephrology)    Subjective:  Continued refractory pain    Objective:   Afebrile      Review of Systems:   Review of Systems   Constitutional:  Positive for activity change.   Gastrointestinal:  Positive for abdominal pain.   Musculoskeletal:  Positive for back pain.           Vital Signs  Temp:  [98.2 °F (36.8 °C)-98.4 °F (36.9 °C)] 98.2 °F (36.8 °C)  Heart Rate:  [67-72] 67  Resp:  [16-20] 20  BP: (133-165)/(66-85) 148/83    Physical Exam:  Physical Exam  Vitals reviewed.   Constitutional:       General: She is in acute distress.   Cardiovascular:      Heart sounds: Normal heart sounds.   Pulmonary:      Breath sounds: Normal breath sounds.   Skin:     General: Skin is warm.   Neurological:      Mental Status: She is alert.          Radiology:  CT Abdomen Pelvis Without Contrast    Result Date: 9/25/2024  Impression: 1. Status post right nephrectomy. 2. Changes of Nissen fundoplication. 3. No acute process seen within the abdomen or pelvis. 4. Status post cholecystectomy. Electronically Signed: Srinath Lock MD  9/25/2024 10:20 PM EDT  Workstation ID: [id number]    XR Abdomen KUB    Result Date: 9/24/2024  Impression: No evidence of bowel obstruction. Mild/physiologic stool burden. Electronically Signed: Emre Stevenson MD  9/24/2024 8:24 PM EDT  Workstation ID: [id number]    MRI Thoracic Spine Without Contrast    Result Date: 9/24/2024  Impression: 1. Stable chronic anterior wedging compression fracture deformity of the T6 with kyphoplasty changes 2. No evidence of acute fractures or significant bone marrow signal changes in the thoracic or lumbar spine 3. Mild thoracic spondylosis 4. Multilevel disc bulges and lumbar spondylosis as above Electronically Signed: Aubrey Ramirez MD  9/24/2024 8:49 AM EDT  Workstation ID: [id number]    MRI Lumbar Spine Without Contrast    Result  Date: 9/24/2024  Impression: 1. Stable chronic anterior wedging compression fracture deformity of the T6 with kyphoplasty changes 2. No evidence of acute fractures or significant bone marrow signal changes in the thoracic or lumbar spine 3. Mild thoracic spondylosis 4. Multilevel disc bulges and lumbar spondylosis as above Electronically Signed: Aubrey Ramirez MD  9/24/2024 8:49 AM EDT  Workstation ID: [id number]    XR Chest 1 View    Result Date: 9/23/2024  Impression: No radiographic evidence of acute chest process. Electronically Signed: Arthur Natarajan MD  9/23/2024 10:35 PM EDT  Workstation ID: [id number]        Results Review:     I reviewed the patient's new clinical results.  I reviewed the patient's new imaging results and agree with the interpretation.    Medication Review:   Scheduled Meds:amLODIPine, 10 mg, Oral, Daily  insulin glargine, 10 Units, Subcutaneous, Nightly  insulin lispro, 2-7 Units, Subcutaneous, 4x Daily AC & at Bedtime  pantoprazole, 40 mg, Oral, Q AM  polyethylene glycol, 17 g, Oral, Daily  senna-docusate sodium, 1 tablet, Oral, Nightly  sertraline, 50 mg, Oral, Daily  terazosin, 1 mg, Oral, Nightly      Continuous Infusions:sodium chloride, 30 mL/hr, Last Rate: Stopped (09/26/24 0753)      PRN Meds:.  acetaminophen    dextrose    dextrose    gabapentin    glucagon (human recombinant)    ipratropium-albuterol    methocarbamol    Morphine    ondansetron    traMADol    Labs:    CBC    Results from last 7 days   Lab Units 09/23/24 2058   WBC 10*3/mm3 5.36   HEMOGLOBIN g/dL 14.4   PLATELETS 10*3/mm3 226     BMP   Results from last 7 days   Lab Units 09/24/24  0455 09/23/24 2058   SODIUM mmol/L 141 143   POTASSIUM mmol/L 4.4 3.6   CHLORIDE mmol/L 106 105   CO2 mmol/L 27.4 27.0   BUN mg/dL 21 20   CREATININE mg/dL 1.34* 1.56*   GLUCOSE mg/dL 193* 273*     Cr Clearance Estimated Creatinine Clearance: 35.6 mL/min (A) (by C-G formula based on SCr of 1.34 mg/dL (H)).  Coag     HbA1C   Lab Results    Component Value Date    HGBA1C 7.10 (H) 01/21/2024    HGBA1C 7.0 (H) 12/16/2022    HGBA1C 7.3 08/11/2022     Blood Glucose   Glucose   Date/Time Value Ref Range Status   09/27/2024 0738 140 (H) 70 - 105 mg/dL Final     Comment:     Serial Number: [id number]:  [id number]   09/26/2024 2105 198 (H) 70 - 105 mg/dL Final     Comment:     Serial Number: [id number]:  [id number]   09/26/2024 1648 163 (H) 70 - 105 mg/dL Final     Comment:     Serial Number: [id number]:  [id number]   09/26/2024 1150 155 (H) 70 - 105 mg/dL Final     Comment:     Serial Number: [id number]:  [id number]   09/26/2024 0757 118 (H) 70 - 105 mg/dL Final     Comment:     Serial Number: [id number]:  [id number]   09/25/2024 2009 167 (H) 70 - 105 mg/dL Final     Comment:     Serial Number: [id number]:  [id number]   09/25/2024 1701 205 (H) 70 - 105 mg/dL Final     Comment:     Serial Number: [id number]:  [id number]   09/25/2024 1151 273 (H) 70 - 105 mg/dL Final     Comment:     Serial Number: [id number]:  [id number]     Infection     CMP   Results from last 7 days   Lab Units 09/24/24  0455 09/23/24  2058   SODIUM mmol/L 141 143   POTASSIUM mmol/L 4.4 3.6   CHLORIDE mmol/L 106 105   CO2 mmol/L 27.4 27.0   BUN mg/dL 21 20   CREATININE mg/dL 1.34* 1.56*   GLUCOSE mg/dL 193* 273*   ALBUMIN g/dL  --  3.7   BILIRUBIN mg/dL  --  0.2   ALK PHOS U/L  --  73   AST (SGOT) U/L  --  14   ALT (SGPT) U/L  --  14     UA    Results from last 7 days   Lab Units 09/26/24  1852   NITRITE UA  Negative   WBC UA /HPF 11-20*   BACTERIA UA /HPF Trace*   SQUAM EPITHEL UA /HPF 3-6*     Radiology(recent) CT Abdomen Pelvis Without Contrast    Result Date: 9/25/2024  Impression: 1. Status post right nephrectomy. 2. Changes of Nissen fundoplication. 3. No acute process seen within the abdomen or pelvis. 4. Status post cholecystectomy. Electronically Signed: Srinath Lock MD  9/25/2024 10:20 PM EDT  Workstation ID: [id number]     Assessment:    Acute thoracic back pain, intractable with failure of outpatient treatment  Acute R flank pain T11/T12  Acute L1 back pain  Osteoporotic compression fracture  History of kyphoplasty  Osteoporosis  Pulmonary HTN  History of nephrectomy  Panlobular COPD with emphysema  Chronic mucopurulent bronchitis  DMII with renal manifestations   DMII with neuropathic manifestations  Diabetic dyslipidemia  GERD with esophagitis  Myofascia pain syndrome  CKDIV  HH  HTN with CKDIV  Chronic constipation  Nicotine dependency with nicotine use disorder, cigarettes  DDD L/S  Endometrial CA HX with ureteral involvement  Constipation    Plan:  Events noted and CT reviewed//GI consultation acuity of symptoms and refractory discomfort with extension to the right upper quadrant        Del Iverson MD  09/27/24  08:30 EDT

## 2024-09-27 NOTE — PLAN OF CARE
Problem: Adult Inpatient Plan of Care  Goal: Plan of Care Review  Outcome: Progressing  Goal: Patient-Specific Goal (Individualized)  Outcome: Progressing  Goal: Absence of Hospital-Acquired Illness or Injury  Outcome: Progressing  Intervention: Identify and Manage Fall Risk  Recent Flowsheet Documentation  Taken 9/27/2024 1400 by Ora Davis RN  Safety Promotion/Fall Prevention: safety round/check completed  Taken 9/27/2024 1200 by Ora Davis RN  Safety Promotion/Fall Prevention: safety round/check completed  Taken 9/27/2024 1000 by Ora Davis RN  Safety Promotion/Fall Prevention: safety round/check completed  Taken 9/27/2024 0845 by Ora Davis RN  Safety Promotion/Fall Prevention: safety round/check completed  Intervention: Prevent Skin Injury  Recent Flowsheet Documentation  Taken 9/27/2024 0845 by Ora Davis RN  Body Position: position changed independently  Skin Protection: adhesive use limited  Intervention: Prevent and Manage VTE (Venous Thromboembolism) Risk  Recent Flowsheet Documentation  Taken 9/27/2024 0845 by Ora Davis RN  Activity Management: up ad felix  VTE Prevention/Management:   bilateral   sequential compression devices off   patient refused intervention  Range of Motion: active ROM (range of motion) encouraged  Intervention: Prevent Infection  Recent Flowsheet Documentation  Taken 9/27/2024 0845 by Ora Davis RN  Infection Prevention: single patient room provided  Goal: Optimal Comfort and Wellbeing  Outcome: Progressing  Intervention: Provide Person-Centered Care  Recent Flowsheet Documentation  Taken 9/27/2024 0845 by Ora Davis RN  Trust Relationship/Rapport:   care explained   choices provided  Goal: Readiness for Transition of Care  Outcome: Progressing     Problem: COPD (Chronic Obstructive Pulmonary Disease) Comorbidity  Goal: Maintenance of COPD Symptom Control  Outcome: Progressing  Intervention: Maintain COPD-Symptom  Control  Recent Flowsheet Documentation  Taken 9/27/2024 0845 by Ora Davis RN  Medication Review/Management: medications reviewed     Problem: Diabetes Comorbidity  Goal: Blood Glucose Level Within Targeted Range  Outcome: Progressing     Problem: Hypertension Comorbidity  Goal: Blood Pressure in Desired Range  Outcome: Progressing  Intervention: Maintain Blood Pressure Management  Recent Flowsheet Documentation  Taken 9/27/2024 0845 by Ora Davis RN  Medication Review/Management: medications reviewed     Problem: Obstructive Sleep Apnea Risk or Actual Comorbidity Management  Goal: Unobstructed Breathing During Sleep  Outcome: Progressing   Goal Outcome Evaluation:   Pt was seen by GI today and the instructed pt to begin Elavil and Fdguard. GI signed off otherwise and recommends pt be seen out pt. Pt received sliding scale insulin and scheduled Miralax. Pt denied pain today. Sat down with pt and discussed medications. Call light in reach and no concerns.

## 2024-09-27 NOTE — CONSULTS
GI CONSULT  NOTE:    Referring Provider:  Dr. Iverson     Chief complaint: Abdominal pain    Subjective .     History of present illness: Vianey Reeves is a 78 y.o. female with history of chronic abdominal pain, CKD, COPD, DMII, hypertension, cholecystectomy, CAD s/p stent placement, diverticulitis s/p sigmoid resection, right nephrectomy secondary to cancer, and hiatal hernia repair who presented on 9/23 with complaints of intractable back pain.  She continues to complain of right flank pain that radiates around to the right upper quadrant.  The patient reports that she has had this pain for years, but the pain significantly worsened in the past week.  She describes the pain as constant and worse with oral intake.  It is sharp/gnawing in nature.  Unable to identify any alleviating factors.  She reports associated nausea, but denies vomiting.  Also reports occasional heartburn and dysphagia to solids.  Denies NSAID use.  States that she has been having regular bowel movements without bright red blood per rectum or melena.  No recent fever.      Endo History:  9/2023 EGD by Dr. Mcallister - small hiatal hernia with esophageal ring and stricture that was progressively dilated to 56 Icelandic, chronic gastritis negative for H. pylori.  9/2022 EUS by Dr. Mcallister - normal pancreas, CBD 7 mm, Nissen slightly slipped, gastritis.  8/2021 EGD/colonoscopy by Dr. Mcallister - Nissen fundoplication which is slightly slipped, empiric esophageal dilation, gastritis, tubular adenoma colon polyps, surgical changes of sigmoid resection, moderate hemorrhoids.  1/17/2020 EGD With dilation (Dr. Mcallister) Gastritis. Hiatal hernia in cardia. Stricture in GE junction.  7/2/2018 EGD with dilation/colonoscopy (Dr. Mcallister) ring in cricopharyngeus. TA ascending colon polyps. TA/TVA proximal ascending colon polyp. Mucosal transverse colon polyp. TA and TVA anastomosis polyps at 20 cm. recall 7/2021.  2011 EGD Dr. Shell - distal esophageal ring status  post dilation, large hiatal hernia, and excessive fluid in the stomach.  2011 colonoscopy Dr. Ceja - 2 hyperplastic colon polyps.    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    Hypertension     IBS (irritable colon syndrome)     constipation    Mass of right breast     Neuropathy     Renal insufficiency     Sciatic leg pain     right    Simple chronic bronchitis     Solitary kidney     left       Past Surgical History:  Past Surgical History:   Procedure Laterality Date    BREAST BIOPSY Right     BRONCHOSCOPY N/A 7/14/2024    Procedure: BRONCHOSCOPY WITH BRONCHOALVEOLAR LAVAGE;  Surgeon: Marlin Ferguson MD;  Location: [facility] ENDOSCOPY;  Service: Pulmonary;  Laterality: N/A;  POST: RML ATELECTASIS    CARDIAC CATHETERIZATION      CHOLECYSTECTOMY      COLON SURGERY      REMOVAL diverticular diseae    COLONOSCOPY N/A 08/12/2021    Procedure: COLONOSCOPY with polypectomy x 3;  Surgeon: Margarette Mcallister MD;  Location: [facility] ENDOSCOPY;  Service: Gastroenterology;  Laterality: N/A;  post op: hmorrhoids, diverticulosis, polyps    CORONARY STENT PLACEMENT      ENDOSCOPY N/A 08/12/2021    Procedure: ESOPHAGOGASTRODUODENOSCOPY with dilatation (18-20 mm balloon) and (54 bougie);  Surgeon: Margarette Mcallister MD;  Location: [facility] ENDOSCOPY;  Service: Gastroenterology;  Laterality: N/A;  post op: esophageal stricture, esophagitis, gastritis, history of nissen     ENDOSCOPY N/A 2023    Procedure: ESOPHAGOGASTRODUODENOSCOPY with biopsy x 1 area and dilation (50,54,56 non guided bougie);  Surgeon: Margarette Mcallister MD;  Location: [facility] ENDOSCOPY;  Service: Gastroenterology;  Laterality: N/A;  post op: esophageal stricture    EYE SURGERY Bilateral     cataracts    HIATAL HERNIA REPAIR      NEPHRECTOMY Right     cancer    UPPER ENDOSCOPIC ULTRASOUND W/ FNA N/A 2022    Procedure: EUS;  Surgeon: Margarette Mcallister MD;  Location: [facility] ENDOSCOPY;  Service: Gastroenterology;  Laterality: N/A;  post: normal pancreas, dilated pancreatic duct, hiatal hernia,        Social History:  Social History     Tobacco Use    Smoking status: Former     Current packs/day: 0.00     Average packs/day: 0.3 packs/day for 50.0 years (12.5 ttl pk-yrs)     Types: Cigarettes     Start date: 1973     Quit date: 2023     Years since quittin.9     Passive exposure: Past    Smokeless tobacco: Never    Tobacco comments:     Mostly patches, some days none, some days 1-2   Vaping Use    Vaping status: Never Used   Substance Use Topics    Alcohol use: Not Currently     Comment: occasionally     Drug use: Never       Family History:  Family History   Problem Relation Age of Onset    Arthritis Father     Prostate cancer Father     Heart disease Sister        Medications:  Medications Prior to Admission   Medication Sig Dispense Refill Last Dose    acetaminophen (TYLENOL) 650 MG 8 hr tablet Take 2 tablets by mouth Every 8 (Eight) Hours As Needed for Mild Pain.       albuterol sulfate  (90 Base) MCG/ACT inhaler Inhale 2 puffs Every 4 (Four) Hours As Needed for Wheezing or Shortness of Air.       amLODIPine (NORVASC) 10 MG tablet Take 1 tablet by mouth Daily. Indications: High Blood Pressure Disorder   2024    aspirin 81 MG tablet Take 1 tablet by mouth Every Night. Indications: ANTIPLATELET   2024    carvedilol (COREG) 3.125 MG tablet Take 1 tablet by mouth 2 (Two) Times a Day With  Meals.   Past Month    ciprofloxacin (CIPRO) 250 MG tablet Take 1 tablet by mouth 2 (Two) Times a Day.   9/23/2024    cyanocobalamin 1000 MCG/ML injection Inject 1 mL into the appropriate muscle as directed by prescriber Every 28 (Twenty-Eight) Days. Indications: Inadequate Vitamin B12       Diclofenac Sodium (VOLTAREN) 1 % gel gel Apply 4 g topically to the appropriate area as directed 3 (Three) Times a Day As Needed (pain). Indications: Joint Damage causing Pain and Loss of Function       doxazosin (CARDURA) 2 MG tablet Take 1 tablet by mouth Every Night. Indications: High Blood Pressure   9/23/2024    estradiol (ESTRACE) 0.1 MG/GM vaginal cream Insert 1 g into the vagina 3 (Three) Times a Week. Monday, Wednesday and Friday  Indications: VAGINAL HEALTH   9/23/2024    Evolocumab (REPATHA) solution auto-injector SureClick injection Inject 1 mL under the skin into the appropriate area as directed Every 14 (Fourteen) Days. Indications: High Amount of Fats in the Blood       ferrous sulfate 325 (65 FE) MG tablet Take 1 tablet by mouth Daily With Breakfast.   9/23/2024    Finerenone (Kerendia) 10 MG tablet Take 1 tablet by mouth Daily.   9/23/2024    fluticasone-salmeterol (ADVAIR HFA) 230-21 MCG/ACT inhaler Inhale 1 puff Take As Directed. 1 inhalation gram every 6-8 hours   9/23/2024    furosemide (LASIX) 20 MG tablet Take 1 tablet by mouth Daily.   9/23/2024    Insulin Glargine (LANTUS SOLOSTAR) 100 UNIT/ML injection pen Inject 10 Units under the skin into the appropriate area as directed Every Night. 100 mL 12 9/23/2024    ipratropium-albuterol (DUO-NEB) 0.5-2.5 mg/3 ml nebulizer Take 3 mL by nebulization 4 (Four) Times a Day As Needed for Wheezing. 360 mL 1     ketoconazole (NIZORAL) 2 % shampoo Apply 1 Application topically to the appropriate area as directed 2 (Two) Times a Week.       lidocaine (LIDODERM) 5 % Place 1 patch on the skin as directed by provider Daily As Needed for Mild Pain. Remove & Discard patch  within 12 hours or as directed by MD Mohr 145 MCG capsule capsule Take 1 capsule by mouth Daily. Indications: CONSTIPATION   9/23/2024    metroNIDAZOLE (FLAGYL) 500 MG tablet Take 1 tablet by mouth 3 (Three) Times a Day.   9/23/2024    omeprazole (priLOSEC) 20 MG capsule Take 1 capsule by mouth Daily.   9/23/2024    polyethylene glycol (MIRALAX) 17 g packet Take 17 g by mouth Daily. Indications: Constipation   9/23/2024    sertraline (ZOLOFT) 50 MG tablet Take 1 tablet by mouth Daily. Indications: MOOD   9/23/2024    SITagliptin (JANUVIA) 100 MG tablet Take 1 tablet by mouth Daily. Indications: Type 2 Diabetes   9/23/2024    Teriparatide, Recombinant, (Forteo) 600 MCG/2.4ML injection Inject 0.08 mL under the skin into the appropriate area as directed Daily. Indications: Osteoporosis   9/23/2024    traMADol (ULTRAM) 50 MG tablet Take 2 tablets by mouth 2 (Two) Times a Day As Needed for Moderate Pain. Indications: Pain       Umeclidinium-Vilanterol (Anoro Ellipta) 62.5-25 MCG/ACT aerosol powder  inhaler Inhale 1 puff Daily. At the same time each day.   9/23/2024    gabapentin (NEURONTIN) 100 MG capsule Take 1 capsule by mouth At Night As Needed (pain).       guaiFENesin (MUCINEX) 600 MG 12 hr tablet Take 2 tablets by mouth 2 (Two) Times a Day As Needed for Cough or Congestion.       methocarbamol (ROBAXIN) 500 MG tablet Take 1 tablet by mouth 4 (Four) Times a Day As Needed for Muscle Spasms.          Scheduled Meds:amitriptyline, 25 mg, Oral, Nightly  amLODIPine, 10 mg, Oral, Daily  insulin glargine, 10 Units, Subcutaneous, Nightly  insulin lispro, 2-7 Units, Subcutaneous, 4x Daily AC & at Bedtime  pantoprazole, 40 mg, Oral, Q AM  polyethylene glycol, 17 g, Oral, Daily  senna-docusate sodium, 1 tablet, Oral, Nightly  sertraline, 50 mg, Oral, Daily  terazosin, 1 mg, Oral, Nightly      Continuous Infusions:sodium chloride, 30 mL/hr, Last Rate: Stopped (09/26/24 0753)      PRN Meds:.  acetaminophen    dextrose     dextrose    gabapentin    glucagon (human recombinant)    ipratropium-albuterol    methocarbamol    Morphine    ondansetron    traMADol    ALLERGIES:  Erythromycin, Naproxen sodium, Statins, Latex, Metoclopramide, and Dicyclomine    ROS:  The following systems were reviewed and negative;   Constitution:  No fevers, chills, no unintentional weight loss  Skin: no rash, no jaundice  Eyes:  No blurry vision, no eye pain  HENT:  No change in hearing or smell  Resp:  No dyspnea or cough  CV:  No chest pain or palpitations  :  No dysuria, hematuria  Musculoskeletal:  No leg cramps or arthralgias  Neuro:  No tremor, no numbness  Psych:  No depression or confusion    Objective     Vital Signs:   Vitals:    09/27/24 0004 09/27/24 0738 09/27/24 1121 09/27/24 1150   BP: 133/66 148/83 158/91 131/74   BP Location: Right arm Right arm Right arm    Patient Position: Lying Lying Lying    Pulse: 72 67     Resp: 18 20 18    Temp: 98.4 °F (36.9 °C) 98.2 °F (36.8 °C) 98 °F (36.7 °C)    TempSrc: Oral Oral Oral    SpO2: 90% 93%     Weight:       Height:           Physical Exam:       General Appearance:    Awake and alert, in no acute distress   Head:    Normocephalic, without obvious abnormality, atraumatic   Throat:   No oral lesions, no thrush, oral mucosa moist   Lungs:     Respirations regular, even and unlabored   Chest Wall:    No abnormalities observed   Abdomen:     Soft, mild RUQ tenderness, no rebound or guarding, non-distended   Rectal:     Deferred   Extremities:   Moves all extremities, no edema, no cyanosis   Pulses:   Pulses palpable and equal bilaterally   Skin:   No rash, no jaundice, normal palpation   Lymph nodes:   No cervical, supraclavicular or submandibular palpable adenopathy   Neurologic:   Cranial nerves 2 - 12 grossly intact, no asterixis       Results Review:   I reviewed the patient's labs and imaging.  CBC    Results from last 7 days   Lab Units 09/23/24  2058   WBC 10*3/mm3 5.36   HEMOGLOBIN g/dL 14.4    PLATELETS 10*3/mm3 226     CMP   Results from last 7 days   Lab Units 09/24/24  0455 09/23/24  2058   SODIUM mmol/L 141 143   POTASSIUM mmol/L 4.4 3.6   CHLORIDE mmol/L 106 105   CO2 mmol/L 27.4 27.0   BUN mg/dL 21 20   CREATININE mg/dL 1.34* 1.56*   GLUCOSE mg/dL 193* 273*   ALBUMIN g/dL  --  3.7   BILIRUBIN mg/dL  --  0.2   ALK PHOS U/L  --  73   AST (SGOT) U/L  --  14   ALT (SGPT) U/L  --  14     Cr Clearance Estimated Creatinine Clearance: 35.6 mL/min (A) (by C-G formula based on SCr of 1.34 mg/dL (H)).  Coag     HbA1C   Lab Results   Component Value Date    HGBA1C 7.10 (H) 01/21/2024    HGBA1C 7.0 (H) 12/16/2022    HGBA1C 7.3 08/11/2022     Blood Glucose   Glucose   Date/Time Value Ref Range Status   09/27/2024 1139 286 (H) 70 - 105 mg/dL Final     Comment:     Serial Number: [id number]:  [id number]   09/27/2024 0738 140 (H) 70 - 105 mg/dL Final     Comment:     Serial Number: [id number]:  [id number]   09/26/2024 2105 198 (H) 70 - 105 mg/dL Final     Comment:     Serial Number: [id number]:  [id number]   09/26/2024 1648 163 (H) 70 - 105 mg/dL Final     Comment:     Serial Number: [id number]:  [id number]   09/26/2024 1150 155 (H) 70 - 105 mg/dL Final     Comment:     Serial Number: [id number]:  [id number]   09/26/2024 0757 118 (H) 70 - 105 mg/dL Final     Comment:     Serial Number: [id number]:  [id number]   09/25/2024 2009 167 (H) 70 - 105 mg/dL Final     Comment:     Serial Number: [id number]:  [id number]   09/25/2024 1701 205 (H) 70 - 105 mg/dL Final     Comment:     Serial Number: [id number]:  [id number]     Infection     UA    Results from last 7 days   Lab Units 09/26/24  1852   NITRITE UA  Negative   WBC UA /HPF 11-20*   BACTERIA UA /HPF Trace*   SQUAM EPITHEL UA /HPF 3-6*     Imaging Results (Last 72 Hours)       Procedure Component Value Units Date/Time    CT Abdomen Pelvis Without Contrast [[id number]] Collected: 09/25/24 2211     Updated: 09/25/24 2222     Narrative:      CT ABDOMEN PELVIS WO CONTRAST    Date of Exam: 9/25/2024 5:47 PM EDT    Indication: evaluate for cause of right upper quadrant and right flank pain.    Comparison: 2/22/2023    Technique: Axial CT images were obtained of the abdomen and pelvis without the administration of contrast. Sagittal and coronal reconstructions were performed.  Automated exposure control and iterative reconstruction methods were used.      Findings:  Calcified granuloma seen within the right lower lobe. Visualized lung bases are clear.    Liver, pancreas, and spleen are within normal limits. Patient is status post cholecystectomy.    Bilateral adrenal glands are within normal limits. Patient is status post right nephrectomy. Left kidney appears normal. No evidence of left-sided renal or ureteral stone or hydronephrosis.    No abdominal or retroperitoneal adenopathy. There are postoperative changes near the fundus of the stomach which appear to be related to Nissen fundoplication. The upper GI tract is otherwise unremarkable.    Pelvis: Urinary bladder is within normal limits. Uterus and ovaries appear within normal limits. There are postoperative changes of the rectosigmoid colon. No evidence of bowel obstruction. The appendix is normal. No pelvic or inguinal adenopathy.    No pelvic or inguinal adenopathy. No free intraperitoneal fluid.    There is diffuse osteopenia and degenerative change of the spine. There are no lytic or sclerotic bony lesions.      Impression:      Impression:    1. Status post right nephrectomy.  2. Changes of Nissen fundoplication.  3. No acute process seen within the abdomen or pelvis.  4. Status post cholecystectomy.            Electronically Signed: Srinath Lock MD    9/25/2024 10:20 PM EDT    Workstation ID: [id number]    XR Abdomen KUB [[id number]] Collected: 09/24/24 2022     Updated: 09/24/24 2026    Narrative:      XR ABDOMEN KUB    Date of Exam: 9/24/2024 3:10 PM EDT    Indication: R flank  pain/thoracic pain    Comparison: CT abdomen pelvis 2/22/2023, abdominal radiograph 1/28/2023.    Findings:  Nonobstructive bowel gas pattern. Mild/physiologic stool burden. No distinct stones project over the left renal shadow. Scattered surgical clips. Degenerative change of the spine.      Impression:      Impression:  No evidence of bowel obstruction. Mild/physiologic stool burden.      Electronically Signed: Emre Stevenson MD    9/24/2024 8:24 PM EDT    Workstation ID: [id number]            ASSESSMENT:  -Right flank/RUQ pain  -Nausea  -Thoracic back pain  -CKD  -COPD  -DMII  -Hypertension  -History of cholecystectomy  -CAD s/p stent placement  -History of diverticulitis s/p sigmoid resection  -History of right nephrectomy secondary to cancer  -History of Nissen fundoplication    PLAN:  Patient is an 78-year-old female with history of COPD, cholecystectomy, and CAD s/p stent placement who presented on 9/23 with complaints of back pain.  Throughout admission, she has continued to complain of back pain along with right flank pain and right upper quadrant pain.  General surgery has evaluated the patient and suspect the patient's pain is due to musculoskeletal versus nerve related pain.  GI asked to consult for input as well.    CT abdomen/pelvis WO shows no acute abnormality.  CBC unremarkable.  LFTs normal.  Patient had EGD in 9/2023 which showed hiatal hernia and gastritis.  No plans for repeat EGD.  Due to acute on chronic symptoms, would recommend starting Elavil and monitoring for improvement.  Would also recommend FDguard on an outpatient basis for possible functional dyspepsia.  Continue PPI and daily bowel regimen.  Diet as tolerated.  Not much else to add from a GI standpoint as an inpatient at this time.  Our office will contact the patient to arrange outpatient follow-up after discharge.  Will be available as needed.       I discussed the patients findings and my recommendations with the patient.  I will  discuss case Dr. Mcallister and change plan accordingly.    We appreciate the referral.    Electronically signed by SAMIRA Levy, 09/27/24, 12:55 PM EDT.

## 2024-09-28 LAB
BACTERIA SPEC AEROBE CULT: ABNORMAL
GLUCOSE BLDC GLUCOMTR-MCNC: 122 MG/DL (ref 70–105)
GLUCOSE BLDC GLUCOMTR-MCNC: 128 MG/DL (ref 70–105)
GLUCOSE BLDC GLUCOMTR-MCNC: 172 MG/DL (ref 70–105)
GLUCOSE BLDC GLUCOMTR-MCNC: 275 MG/DL (ref 70–105)

## 2024-09-28 PROCEDURE — 63710000001 INSULIN GLARGINE PER 5 UNITS: Performed by: FAMILY MEDICINE

## 2024-09-28 PROCEDURE — 25010000002 MORPHINE PER 10 MG: Performed by: FAMILY MEDICINE

## 2024-09-28 PROCEDURE — 82948 REAGENT STRIP/BLOOD GLUCOSE: CPT

## 2024-09-28 PROCEDURE — 82948 REAGENT STRIP/BLOOD GLUCOSE: CPT | Performed by: FAMILY MEDICINE

## 2024-09-28 PROCEDURE — 63710000001 INSULIN LISPRO (HUMAN) PER 5 UNITS: Performed by: FAMILY MEDICINE

## 2024-09-28 RX ADMIN — SERTRALINE 50 MG: 50 TABLET, FILM COATED ORAL at 09:44

## 2024-09-28 RX ADMIN — POLYETHYLENE GLYCOL 3350 17 G: 17 POWDER, FOR SOLUTION ORAL at 09:44

## 2024-09-28 RX ADMIN — TERAZOSIN HYDROCHLORIDE 1 MG: 1 CAPSULE ORAL at 21:08

## 2024-09-28 RX ADMIN — PANTOPRAZOLE SODIUM 40 MG: 40 TABLET, DELAYED RELEASE ORAL at 06:13

## 2024-09-28 RX ADMIN — AMLODIPINE BESYLATE 10 MG: 5 TABLET ORAL at 09:44

## 2024-09-28 RX ADMIN — INSULIN LISPRO 4 UNITS: 100 INJECTION, SOLUTION INTRAVENOUS; SUBCUTANEOUS at 21:07

## 2024-09-28 RX ADMIN — INSULIN LISPRO 2 UNITS: 100 INJECTION, SOLUTION INTRAVENOUS; SUBCUTANEOUS at 13:10

## 2024-09-28 RX ADMIN — AMITRIPTYLINE HYDROCHLORIDE 25 MG: 25 TABLET, FILM COATED ORAL at 21:08

## 2024-09-28 RX ADMIN — MORPHINE SULFATE 2 MG: 2 INJECTION, SOLUTION INTRAMUSCULAR; INTRAVENOUS at 21:16

## 2024-09-28 RX ADMIN — SENNOSIDES AND DOCUSATE SODIUM 1 TABLET: 50; 8.6 TABLET ORAL at 21:08

## 2024-09-28 RX ADMIN — INSULIN GLARGINE 10 UNITS: 100 INJECTION, SOLUTION SUBCUTANEOUS at 21:07

## 2024-09-28 NOTE — PROGRESS NOTES
LOS: 5 days   Patient Care Team:  Anny Garcia MD as PCP - General (Internal Medicine)  Sergio Ordonez MD as Consulting Physician (Nephrology)    Subjective     Interval History:     Patient Complaints: pt states that pain is about the same.  Lives alone.  Trying to ambulate around the room    History taken from: patient    Review of Systems   Constitutional:  Positive for activity change and fatigue.   HENT: Negative.     Respiratory: Negative.     Cardiovascular: Negative.    Gastrointestinal:  Positive for abdominal pain. Negative for constipation, diarrhea, nausea and vomiting.   Genitourinary:  Positive for flank pain.   Musculoskeletal:  Positive for arthralgias, back pain and gait problem.   Neurological:  Positive for weakness.           Objective     Vital Signs  Temp:  [97.7 °F (36.5 °C)-98.4 °F (36.9 °C)] 97.7 °F (36.5 °C)  Heart Rate:  [73-88] 77  Resp:  [14-16] 14  BP: (128-166)/(74-95) 149/95    Physical Exam:     General Appearance:    Alert, cooperative, in no acute distress   Head:    Normocephalic, without obvious abnormality, atraumatic   Eyes:            Lids and lashes normal, conjunctivae and sclerae normal, no   icterus, no pallor, corneas clear, PERRLA   Ears:    Ears appear intact with no abnormalities noted   Throat:   No oral lesions, no thrush, oral mucosa moist   Neck:   No adenopathy, supple, trachea midline, no thyromegaly, no   carotid bruit, no JVD   Lungs:     Clear to auscultation,respirations regular, even and                  unlabored    Heart:    Regular rhythm and normal rate, normal S1 and S2, no            murmur, no gallop, no rub, no click   Chest Wall:    No abnormalities observed   Abdomen:     Normal bowel sounds, no masses, no organomegaly, soft        non-tender, non-distended, no guarding, no rebound                tenderness   Extremities:   Moves all extremities well, no edema, no cyanosis, no             redness   Pulses:   Pulses palpable and equal  bilaterally   Skin:   No bleeding, bruising or rash   Lymph nodes:   No palpable adenopathy   Neurologic:   Cranial nerves 2 - 12 grossly intact, sensation intact, DTR       present and equal bilaterally        Results Review:    Lab Results (last 24 hours)       Procedure Component Value Units Date/Time    POC Glucose 4x Daily Before Meals & at Bedtime [[id number]]  (Abnormal) Collected: 09/28/24 1112    Specimen: Blood Updated: 09/28/24 1115     Glucose 172 mg/dL      Comment: Serial Number: [id number]:  [id number]       POC Glucose Once [[id number]]  (Abnormal) Collected: 09/28/24 0728    Specimen: Blood Updated: 09/28/24 0730     Glucose 122 mg/dL      Comment: Serial Number: [id number]:  [id number]       POC Glucose Once [[id number]]  (Abnormal) Collected: 09/27/24 2043    Specimen: Blood Updated: 09/27/24 2045     Glucose 259 mg/dL      Comment: Serial Number: [id number]:  [id number]       POC Glucose Once [[id number]]  (Abnormal) Collected: 09/27/24 1614    Specimen: Blood Updated: 09/27/24 1616     Glucose 189 mg/dL      Comment: Serial Number: [id number]:  [id number]       Urine Culture - Urine, Urine, Clean Catch [[id number]]  (Abnormal) Collected: 09/26/24 1852    Specimen: Urine, Clean Catch Updated: 09/27/24 1301     Urine Culture 50,000 CFU/mL Escherichia coli    Narrative:      Colonization of the urinary tract without infection is common. Treatment is discouraged unless the patient is symptomatic, pregnant, or undergoing an invasive urologic procedure.             Imaging Results (Last 24 Hours)       ** No results found for the last 24 hours. **                 I reviewed the patient's new clinical results.    Medication Review:   Scheduled Meds:amitriptyline, 25 mg, Oral, Nightly  amLODIPine, 10 mg, Oral, Daily  insulin glargine, 10 Units, Subcutaneous, Nightly  insulin lispro, 2-7 Units, Subcutaneous, 4x Daily AC & at Bedtime  pantoprazole, 40 mg, Oral, Q AM  polyethylene  glycol, 17 g, Oral, Daily  senna-docusate sodium, 1 tablet, Oral, Nightly  sertraline, 50 mg, Oral, Daily  terazosin, 1 mg, Oral, Nightly      Continuous Infusions:sodium chloride, 30 mL/hr, Last Rate: Stopped (09/26/24 0753)      PRN Meds:.  acetaminophen    dextrose    dextrose    gabapentin    glucagon (human recombinant)    ipratropium-albuterol    methocarbamol    Morphine    ondansetron    traMADol     Assessment & Plan     Acute thoracic back pain, intractable with failure of outpatient treatment  Acute R flank pain T11/T12  Acute L1 back pain  Osteoporotic compression fracture  History of kyphoplasty  Osteoporosis  Pulmonary HTN  History of nephrectomy  Panlobular COPD with emphysema  Chronic mucopurulent bronchitis  DMII with renal manifestations   DMII with neuropathic manifestations  Diabetic dyslipidemia  GERD with esophagitis  Myofascia pain syndrome  CKDIV  HH  HTN with CKDIV  Chronic constipation  Nicotine dependency with nicotine use disorder, cigarettes  DDD L/S  Endometrial CA HX with ureteral involvement  Constipation    Events noted and CT reviewed//GI consultation - started on amitriptyline - not much improvement yet.  Will continue current tx and follow closely    Plan for disposition:home when able    Rosa London MD  09/28/24  11:46 EDT

## 2024-09-28 NOTE — PLAN OF CARE
Problem: Adult Inpatient Plan of Care  Goal: Plan of Care Review  Outcome: Not Progressing  Flowsheets (Taken 9/28/2024 0516)  Plan of Care Reviewed With: patient  Goal: Patient-Specific Goal (Individualized)  Outcome: Not Progressing  Goal: Absence of Hospital-Acquired Illness or Injury  Outcome: Not Progressing  Intervention: Identify and Manage Fall Risk  Description: Perform standard risk assessment on admission using a validated tool or comprehensive approach appropriate to the patient; reassess fall risk frequently, with change in status or transfer to another level of care.  Communicate fall injury risk to interprofessional healthcare team.  Determine need for increased observation, equipment and environmental modification, such as low bed, signage and supportive, nonskid footwear.  Adjust safety measures to individual developmental age, stage and identified risk factors.  Reinforce the importance of safety and physical activity with patient and family.  Perform regular intentional rounding to assess need for position change, pain assessment and personal needs, including assistance with toileting.  Recent Flowsheet Documentation  Taken 9/28/2024 0320 by Joe Sellers LPN  Safety Promotion/Fall Prevention:   assistive device/personal items within reach   clutter free environment maintained   room organization consistent   safety round/check completed  Taken 9/28/2024 0253 by Joe Sellers LPN  Safety Promotion/Fall Prevention:   assistive device/personal items within reach   clutter free environment maintained   room organization consistent   safety round/check completed  Taken 9/28/2024 0154 by Joe Sellers LPN  Safety Promotion/Fall Prevention:   assistive device/personal items within reach   clutter free environment maintained   room organization consistent   safety round/check completed  Taken 9/28/2024 0057 by Joe Sellers LPN  Safety Promotion/Fall Prevention:   assistive device/personal  items within reach   clutter free environment maintained   room organization consistent   safety round/check completed  Taken 9/27/2024 2256 by Joe Sellers LPN  Safety Promotion/Fall Prevention:   assistive device/personal items within reach   clutter free environment maintained   room organization consistent   safety round/check completed  Taken 9/27/2024 2109 by Joe Sellers LPN  Safety Promotion/Fall Prevention:   assistive device/personal items within reach   clutter free environment maintained   room organization consistent   safety round/check completed  Intervention: Prevent Skin Injury  Description: Perform a screening for skin injury risk, such as pressure or moisture associated skin damage on admission and at regular intervals throughout hospital stay.  Keep all areas of skin (especially folds) clean and dry.  Maintain adequate skin hydration.  Relieve and redistribute pressure and protect bony prominences; implement measures based on patient-specific risk factors.  Match turning and repositioning schedule to clinical condition.  Encourage weight shift frequently; assist with reposition if unable to complete independently.  Float heels off bed; avoid pressure on the Achilles tendon.  Keep skin free from extended contact with medical devices.  Encourage functional activity and mobility, as early as tolerated.  Use aids (e.g., slide boards, mechanical lift) during transfer.  Recent Flowsheet Documentation  Taken 9/28/2024 0320 by Joe Sellers LPN  Body Position:   position changed independently   left   weight shifting  Taken 9/28/2024 0253 by Joe Sellers LPN  Body Position:   position changed independently   right  Taken 9/28/2024 0154 by Joe Sellers LPN  Body Position:   position changed independently   left  Taken 9/28/2024 0057 by Joe Sellers LPN  Body Position:   position changed independently   right  Taken 9/27/2024 2256 by Joe Sellers LPN  Body Position:   position  changed independently   left  Taken 9/27/2024 2109 by Joe Sellers LPN  Body Position:   position changed independently   left  Intervention: Prevent and Manage VTE (Venous Thromboembolism) Risk  Description: Assess for VTE (venous thromboembolism) risk.  Encourage and assist with early ambulation.  Initiate and maintain compression or other therapy, as indicated, based on identified risk in accordance with organizational protocol and provider order.  Encourage both active and passive leg exercises while in bed, if unable to ambulate.  Recent Flowsheet Documentation  Taken 9/28/2024 0320 by Joe Sellers LPN  Activity Management: activity encouraged  Taken 9/28/2024 0253 by Joe Sellers LPN  Activity Management: activity encouraged  Taken 9/28/2024 0154 by Joe Sellers LPN  Activity Management: activity encouraged  Taken 9/28/2024 0057 by Joe Sellers LPN  Activity Management: activity encouraged  Taken 9/27/2024 2256 by Joe Sellers LPN  Activity Management: activity encouraged  Taken 9/27/2024 2109 by Joe Sellers LPN  Activity Management: activity encouraged  Taken 9/27/2024 1923 by Joe Sellers LPN  VTE Prevention/Management:   bilateral   sequential compression devices off   patient refused intervention  Range of Motion: active ROM (range of motion) encouraged  Goal: Optimal Comfort and Wellbeing  Outcome: Not Progressing  Intervention: Provide Person-Centered Care  Description: Use a family-focused approach to care.  Develop trust and rapport by proactively providing information, encouraging questions, addressing concerns and offering reassurance.  Acknowledge emotional response to hospitalization.  Recognize and utilize personal coping strategies.  Honor spiritual and cultural preferences.  Recent Flowsheet Documentation  Taken 9/27/2024 1923 by Joe Sellers LPN  Trust Relationship/Rapport:   care explained   thoughts/feelings acknowledged  Goal: Readiness for Transition of  Care  Outcome: Not Progressing     Problem: COPD (Chronic Obstructive Pulmonary Disease) Comorbidity  Goal: Maintenance of COPD Symptom Control  Outcome: Not Progressing     Problem: Diabetes Comorbidity  Goal: Blood Glucose Level Within Targeted Range  Outcome: Not Progressing     Problem: Hypertension Comorbidity  Goal: Blood Pressure in Desired Range  Outcome: Not Progressing     Problem: Obstructive Sleep Apnea Risk or Actual Comorbidity Management  Goal: Unobstructed Breathing During Sleep  Outcome: Not Progressing   Goal Outcome Evaluation:  Plan of Care Reviewed With: patient

## 2024-09-29 ENCOUNTER — READMISSION MANAGEMENT (OUTPATIENT)
Dept: CALL CENTER | Facility: HOSPITAL | Age: 79
End: 2024-09-29
Payer: MEDICARE

## 2024-09-29 VITALS
RESPIRATION RATE: 14 BRPM | SYSTOLIC BLOOD PRESSURE: 123 MMHG | OXYGEN SATURATION: 91 % | HEART RATE: 73 BPM | DIASTOLIC BLOOD PRESSURE: 77 MMHG | TEMPERATURE: 97.6 F | BODY MASS INDEX: 30.38 KG/M2 | HEIGHT: 65 IN | WEIGHT: 182.32 LBS

## 2024-09-29 LAB
GLUCOSE BLDC GLUCOMTR-MCNC: 120 MG/DL (ref 70–105)
GLUCOSE BLDC GLUCOMTR-MCNC: 150 MG/DL (ref 70–105)

## 2024-09-29 PROCEDURE — 82948 REAGENT STRIP/BLOOD GLUCOSE: CPT | Performed by: FAMILY MEDICINE

## 2024-09-29 RX ORDER — AMITRIPTYLINE HYDROCHLORIDE 50 MG/1
50 TABLET ORAL NIGHTLY
Status: DISCONTINUED | OUTPATIENT
Start: 2024-09-29 | End: 2024-09-29 | Stop reason: HOSPADM

## 2024-09-29 RX ORDER — AMITRIPTYLINE HYDROCHLORIDE 50 MG/1
50 TABLET ORAL NIGHTLY
Qty: 30 TABLET | Refills: 1 | Status: ON HOLD | OUTPATIENT
Start: 2024-09-29 | End: 2024-10-07

## 2024-09-29 RX ORDER — PANTOPRAZOLE SODIUM 40 MG/1
40 TABLET, DELAYED RELEASE ORAL
Qty: 30 TABLET | Refills: 1 | Status: SHIPPED | OUTPATIENT
Start: 2024-09-30

## 2024-09-29 RX ADMIN — PANTOPRAZOLE SODIUM 40 MG: 40 TABLET, DELAYED RELEASE ORAL at 05:42

## 2024-09-29 RX ADMIN — AMLODIPINE BESYLATE 10 MG: 5 TABLET ORAL at 08:08

## 2024-09-29 RX ADMIN — POLYETHYLENE GLYCOL 3350 17 G: 17 POWDER, FOR SOLUTION ORAL at 08:06

## 2024-09-29 RX ADMIN — SERTRALINE 50 MG: 50 TABLET, FILM COATED ORAL at 08:08

## 2024-09-29 NOTE — PLAN OF CARE
Goal Outcome Evaluation:              Outcome Evaluation: pt to be d/c'd

## 2024-09-29 NOTE — PLAN OF CARE
Problem: Adult Inpatient Plan of Care  Goal: Plan of Care Review  Outcome: Progressing  Flowsheets (Taken 9/29/2024 0507)  Progress: no change  Plan of Care Reviewed With: patient  Outcome Evaluation: patient appears to be sleep at this time.  Goal: Patient-Specific Goal (Individualized)  Outcome: Progressing  Goal: Absence of Hospital-Acquired Illness or Injury  Outcome: Progressing  Intervention: Identify and Manage Fall Risk  Description: Perform standard risk assessment on admission using a validated tool or comprehensive approach appropriate to the patient; reassess fall risk frequently, with change in status or transfer to another level of care.  Communicate fall injury risk to interprofessional healthcare team.  Determine need for increased observation, equipment and environmental modification, such as low bed, signage and supportive, nonskid footwear.  Adjust safety measures to individual developmental age, stage and identified risk factors.  Reinforce the importance of safety and physical activity with patient and family.  Perform regular intentional rounding to assess need for position change, pain assessment and personal needs, including assistance with toileting.  Recent Flowsheet Documentation  Taken 9/29/2024 0449 by Joe Sellers LPN  Safety Promotion/Fall Prevention:   assistive device/personal items within reach   clutter free environment maintained   room organization consistent   safety round/check completed  Taken 9/29/2024 0246 by Joe Sellers LPN  Safety Promotion/Fall Prevention:   assistive device/personal items within reach   clutter free environment maintained   room organization consistent   safety round/check completed  Taken 9/29/2024 0026 by Joe Sellers LPN  Safety Promotion/Fall Prevention:   assistive device/personal items within reach   clutter free environment maintained   room organization consistent   safety round/check completed  Taken 9/28/2024 2222 by Marlo  Joe, LPN  Safety Promotion/Fall Prevention:   assistive device/personal items within reach   clutter free environment maintained   room organization consistent   safety round/check completed  Taken 9/28/2024 2108 by Joe Sellers LPN  Safety Promotion/Fall Prevention:   assistive device/personal items within reach   clutter free environment maintained   room organization consistent   safety round/check completed  Intervention: Prevent Skin Injury  Description: Perform a screening for skin injury risk, such as pressure or moisture associated skin damage on admission and at regular intervals throughout hospital stay.  Keep all areas of skin (especially folds) clean and dry.  Maintain adequate skin hydration.  Relieve and redistribute pressure and protect bony prominences; implement measures based on patient-specific risk factors.  Match turning and repositioning schedule to clinical condition.  Encourage weight shift frequently; assist with reposition if unable to complete independently.  Float heels off bed; avoid pressure on the Achilles tendon.  Keep skin free from extended contact with medical devices.  Encourage functional activity and mobility, as early as tolerated.  Use aids (e.g., slide boards, mechanical lift) during transfer.  Recent Flowsheet Documentation  Taken 9/29/2024 0449 by Joe eSllers LPN  Body Position:   position changed independently   left  Taken 9/29/2024 0246 by Joe Sellers LPN  Body Position:   position changed independently   left  Taken 9/29/2024 0026 by Joe Sellers LPN  Body Position:   position changed independently   left  Taken 9/28/2024 2222 by Joe Sellers LPN  Body Position:   position changed independently   left  Taken 9/28/2024 2108 by Joe Sellers LPN  Body Position:   position changed independently   left  Skin Protection: adhesive use limited  Intervention: Prevent and Manage VTE (Venous Thromboembolism) Risk  Description: Assess for VTE (venous  thromboembolism) risk.  Encourage and assist with early ambulation.  Initiate and maintain compression or other therapy, as indicated, based on identified risk in accordance with organizational protocol and provider order.  Encourage both active and passive leg exercises while in bed, if unable to ambulate.  Recent Flowsheet Documentation  Taken 9/29/2024 0449 by Joe Sellers LPN  Activity Management: activity encouraged  Taken 9/29/2024 0246 by Joe Sellers LPN  Activity Management: activity encouraged  Taken 9/29/2024 0026 by Joe Sellers LPN  Activity Management: activity encouraged  Taken 9/28/2024 2222 by Joe Sellers LPN  Activity Management: activity encouraged  Taken 9/28/2024 2108 by Joe Sellers LPN  Activity Management: activity encouraged  VTE Prevention/Management: patient refused intervention  Range of Motion: active ROM (range of motion) encouraged  Intervention: Prevent Infection  Description: Maintain skin and mucous membrane integrity; promote hand, oral and pulmonary hygiene.  Optimize fluid balance, nutrition, sleep and glycemic control to maximize infection resistance.  Identify potential sources of infection early to prevent or mitigate progression of infection (e.g., wound, lines, devices).  Evaluate ongoing need for invasive devices; remove promptly when no longer indicated.  Recent Flowsheet Documentation  Taken 9/28/2024 2108 by Joe Sellers LPN  Infection Prevention: single patient room provided  Goal: Optimal Comfort and Wellbeing  Outcome: Progressing  Intervention: Provide Person-Centered Care  Description: Use a family-focused approach to care.  Develop trust and rapport by proactively providing information, encouraging questions, addressing concerns and offering reassurance.  Acknowledge emotional response to hospitalization.  Recognize and utilize personal coping strategies.  Honor spiritual and cultural preferences.  Recent Flowsheet Documentation  Taken  9/28/2024 2108 by Joe Sellers LPN  Trust Relationship/Rapport:   care explained   thoughts/feelings acknowledged   questions answered  Goal: Readiness for Transition of Care  Outcome: Progressing     Problem: COPD (Chronic Obstructive Pulmonary Disease) Comorbidity  Goal: Maintenance of COPD Symptom Control  Outcome: Progressing     Problem: Diabetes Comorbidity  Goal: Blood Glucose Level Within Targeted Range  Outcome: Progressing     Problem: Hypertension Comorbidity  Goal: Blood Pressure in Desired Range  Outcome: Progressing     Problem: Obstructive Sleep Apnea Risk or Actual Comorbidity Management  Goal: Unobstructed Breathing During Sleep  Outcome: Progressing   Goal Outcome Evaluation:  Plan of Care Reviewed With: patient        Progress: no change  Outcome Evaluation: patient appears to be sleep at this time.

## 2024-09-30 NOTE — CASE MANAGEMENT/SOCIAL WORK
Case Management Discharge Note      Final Note: Home with [facility] (order obtained)         Selected Continued Care - Discharged on 9/29/2024 Admission date: 9/23/2024 - Discharge disposition: Home or Self Care     Transportation Services  Private: Car    Final Discharge Disposition Code: 01 - home or self-care

## 2024-09-30 NOTE — DISCHARGE PLACEMENT REQUEST
"Eri Sterling (78 y.o. Female)       Date of Birth   1945    Social Security Number       Address   [address]  [place] [address]    Home Phone   [phone]    MRN   [id number]       Jain   None    Marital Status                               Admission Date   9/23/24    Admission Type   Elective    Admitting Provider   Del Iverson MD    Attending Provider       Department, Room/Bed   [facility] 3A MEDICAL INPATIENT, 302/1       Discharge Date   9/29/2024    Discharge Disposition   Home or Self Care    Discharge Destination   Home                              Attending Provider: (none)   Allergies: Erythromycin, Naproxen Sodium, Statins, Latex, Metoclopramide, Dicyclomine    Isolation: None   Infection: ESBL E coli (09/28/24)   Code Status: Prior    Ht: 165.1 cm (65\")   Wt: 82.7 kg (182 lb 5.1 oz)    Admission Cmt: None   Principal Problem: Thoracic back pain [M54.6]                   Active Insurance as of 9/23/2024       Primary Coverage       Payor Plan Insurance Group Employer/Plan Group    ANTHEM MEDICARE REPLACEMENT ANTHEM MEDICARE ADVANTAGE [id number]       Payor Plan Address Payor Plan Phone Number Payor Plan Fax Number Effective Dates    [address] [phone]  1/1/2022 - None Entered    [facility] [address]         Subscriber Name Subscriber Birth Date Member ID       ERI STERLING 1945 [id number]               Secondary Coverage       Payor Plan Insurance Group Employer/Plan Group    [id number]        Payor Plan Address Payor Plan Phone Number Payor Plan Fax Number Effective Dates    [address] [address]   9/1/2024 - None Entered    [place] [address]         Subscriber Name Subscriber Birth Date Member ID       ERI STERLING 1945 [id number]                     Emergency Contacts        (Rel.) Home Phone Work Phone Mobile Phone    WALDO IRIZARRY (Daughter) [phone] -- [phone]    " Carolina vanegas (Grandchild) -- -- [phone]    HANNAH VANEGAS (Grandchild) [phone] -- [phone]              [address] MEDICAL INPATIENT  [address] [facility] [place] [address]  Phone:  [phone]  Fax:  [phone] Date: Sep 30, 2024      Ambulatory Referral to Physical Therapy for Evaluation & Treatment     Patient:  Vianey Reeves MRN:  [id number]   [address]    [place] [address] :  1945  SSN:    Phone: [phone] Sex:  F      INSURANCE PAYOR PLAN GROUP # SUBSCRIBER ID   Primary:  Secondary:    ANTHEM MEDICARE REPLACEMENT  [place] [id number]  [id number] [id number]    [id number]  [id number]      Referring Provider Information:  JR ROBERTSON Phone: [phone] Fax: [phone]       Referral Information:   # Visits:  1 Referral Type: Physical Therapy [AE1]   Urgency:  Routine Referral Reason: Specialty Services Required   Start Date: Sep 30, 2024 End Date:  To be determined by Insurer   Diagnosis: Thoracic back pain, unspecified back pain laterality, unspecified chronicity (M54.6 [ICD-10-CM] 724.1 [ICD-9-CM])  Compression fracture of spine, non-traumatic, with routine healing, subsequent encounter (M48.50XD [ICD-10-CM] V54.27 [ICD-9-CM])  Lumbar radiculopathy (M54.16 [ICD-10-CM] 724.4 [ICD-9-CM])      Refer to Dept:   Refer to Provider:   Refer to Provider Phone:   Refer to Facility:       Specialty needed: Evaluate and treat  Exercises: Stretching  Exercises: Strengthening  Exercises: ROM  Gait Training: Right  Gait Training: Left  Weight Bearing Status: Full weight bearing  Follow-up needed: Yes     This document serves as a request of services and does not constitute Insurance authorization or approval of services.  To determine eligibility, please contact the members Insurance carrier to verify and review coverage.     If you have medical questions regarding this request for services. Please contact [address]  INPATIENT at [phone] during normal business hours.        Authorizing Provider:Diane Ventura APRN  Authorizing Provider's NPI: [id number]  Order Entered By: Diane Ventura APRN 9/30/2024  8:52 AM     Electronically signed by: Diane Ventura APRN 9/30/2024  8:52 AM

## 2024-10-03 ENCOUNTER — READMISSION MANAGEMENT (OUTPATIENT)
Dept: CALL CENTER | Facility: HOSPITAL | Age: 79
End: 2024-10-03
Payer: MEDICARE

## 2024-10-03 NOTE — OUTREACH NOTE
Medical Week 1 Survey      Flowsheet Row Responses   [facility] patient discharged from? [facility]   Does the patient have one of the following disease processes/diagnoses(primary or secondary)? Other   Week 1 attempt successful? Yes   Call start time 1242   Call end time 1243   Discharge diagnosis Thoracic back pain   Person spoke with today (if not patient) and relationship dtr   Meds reviewed with patient/caregiver? Yes   Is the patient having any side effects they believe may be caused by any medication additions or changes? No   Does the patient have all medications ordered at discharge? Yes   Is the patient taking all medications as directed (includes completed medication regime)? Yes   Does the patient have a primary care provider?  Yes   Does the patient have an appointment with their PCP within 7 days of discharge? Yes   Comments regarding PCP Anny Garcia, patient has f/u with another provider in the office per daughter   Has the patient kept scheduled appointments due by today? Yes   Psychosocial issues? No   Did the patient receive a copy of their discharge instructions? Yes   Nursing interventions Reviewed instructions with patient   What is the patient's perception of their health status since discharge? Improving   If the patient is a current smoker, are they able to teach back resources for cessation? Not a smoker   Week 1 call completed? Yes   Would this patient benefit from a Referral to Amb Social Work? No   Is the patient interested in additional calls from an ambulatory ? No   Wrap up additional comments dtr states patient is doing well.  Denies needs.  Dtr is RN and managing care.   Call end time 1243            MARGIE CAREY - Registered Nurse

## 2024-10-07 ENCOUNTER — HOSPITAL ENCOUNTER (OUTPATIENT)
Facility: HOSPITAL | Age: 79
Setting detail: OBSERVATION
Discharge: HOME | End: 2024-10-11
Attending: FAMILY MEDICINE | Admitting: FAMILY MEDICINE
Payer: MEDICARE

## 2024-10-07 ENCOUNTER — APPOINTMENT (OUTPATIENT)
Dept: GENERAL RADIOLOGY | Facility: HOSPITAL | Age: 79
End: 2024-10-07
Payer: MEDICARE

## 2024-10-07 ENCOUNTER — READMISSION MANAGEMENT (OUTPATIENT)
Dept: CALL CENTER | Facility: HOSPITAL | Age: 79
End: 2024-10-07
Payer: MEDICARE

## 2024-10-07 LAB
ALBUMIN SERPL-MCNC: 3.8 G/DL (ref 3.5–5.2)
ALBUMIN/GLOB SERPL: 1.4 G/DL
ALP SERPL-CCNC: 76 U/L (ref 39–117)
ALT SERPL W P-5'-P-CCNC: 28 U/L (ref 1–33)
AMYLASE SERPL-CCNC: 74 U/L (ref 28–100)
ANION GAP SERPL CALCULATED.3IONS-SCNC: 11.4 MMOL/L (ref 5–15)
AST SERPL-CCNC: 31 U/L (ref 1–32)
BASOPHILS # BLD AUTO: 0.04 10*3/MM3 (ref 0–0.2)
BASOPHILS NFR BLD AUTO: 0.4 % (ref 0–1.5)
BILIRUB SERPL-MCNC: 0.3 MG/DL (ref 0–1.2)
BUN SERPL-MCNC: 22 MG/DL (ref 8–23)
BUN/CREAT SERPL: 15.7 (ref 7–25)
CALCIUM SPEC-SCNC: 9.7 MG/DL (ref 8.6–10.5)
CHLORIDE SERPL-SCNC: 106 MMOL/L (ref 98–107)
CO2 SERPL-SCNC: 24.6 MMOL/L (ref 22–29)
CREAT SERPL-MCNC: 1.4 MG/DL (ref 0.57–1)
DEPRECATED RDW RBC AUTO: 45.5 FL (ref 37–54)
EGFRCR SERPLBLD CKD-EPI 2021: 38.1 ML/MIN/1.73
EOSINOPHIL # BLD AUTO: 0.06 10*3/MM3 (ref 0–0.4)
EOSINOPHIL NFR BLD AUTO: 0.6 % (ref 0.3–6.2)
ERYTHROCYTE [DISTWIDTH] IN BLOOD BY AUTOMATED COUNT: 12.2 % (ref 12.3–15.4)
GLOBULIN UR ELPH-MCNC: 2.7 GM/DL
GLUCOSE BLDC GLUCOMTR-MCNC: 161 MG/DL (ref 70–105)
GLUCOSE SERPL-MCNC: 146 MG/DL (ref 65–99)
HCT VFR BLD AUTO: 46.8 % (ref 34–46.6)
HGB BLD-MCNC: 15.3 G/DL (ref 12–15.9)
IMM GRANULOCYTES # BLD AUTO: 0.05 10*3/MM3 (ref 0–0.05)
IMM GRANULOCYTES NFR BLD AUTO: 0.5 % (ref 0–0.5)
LIPASE SERPL-CCNC: 24 U/L (ref 13–60)
LYMPHOCYTES # BLD AUTO: 2.9 10*3/MM3 (ref 0.7–3.1)
LYMPHOCYTES NFR BLD AUTO: 29.5 % (ref 19.6–45.3)
MCH RBC QN AUTO: 32.8 PG (ref 26.6–33)
MCHC RBC AUTO-ENTMCNC: 32.7 G/DL (ref 31.5–35.7)
MCV RBC AUTO: 100.4 FL (ref 79–97)
MONOCYTES # BLD AUTO: 0.84 10*3/MM3 (ref 0.1–0.9)
MONOCYTES NFR BLD AUTO: 8.5 % (ref 5–12)
NEUTROPHILS NFR BLD AUTO: 5.94 10*3/MM3 (ref 1.7–7)
NEUTROPHILS NFR BLD AUTO: 60.5 % (ref 42.7–76)
NRBC BLD AUTO-RTO: 0 /100 WBC (ref 0–0.2)
PLATELET # BLD AUTO: 219 10*3/MM3 (ref 140–450)
PMV BLD AUTO: 9.5 FL (ref 6–12)
POTASSIUM SERPL-SCNC: 4.1 MMOL/L (ref 3.5–5.2)
PROT SERPL-MCNC: 6.5 G/DL (ref 6–8.5)
RBC # BLD AUTO: 4.66 10*6/MM3 (ref 3.77–5.28)
SODIUM SERPL-SCNC: 142 MMOL/L (ref 136–145)
WBC NRBC COR # BLD AUTO: 9.83 10*3/MM3 (ref 3.4–10.8)

## 2024-10-07 PROCEDURE — 86140 C-REACTIVE PROTEIN: CPT | Performed by: NURSE PRACTITIONER

## 2024-10-07 PROCEDURE — 96375 TX/PRO/DX INJ NEW DRUG ADDON: CPT

## 2024-10-07 PROCEDURE — 82150 ASSAY OF AMYLASE: CPT | Performed by: FAMILY MEDICINE

## 2024-10-07 PROCEDURE — 83690 ASSAY OF LIPASE: CPT | Performed by: FAMILY MEDICINE

## 2024-10-07 PROCEDURE — 80053 COMPREHEN METABOLIC PANEL: CPT | Performed by: FAMILY MEDICINE

## 2024-10-07 PROCEDURE — 74018 RADEX ABDOMEN 1 VIEW: CPT

## 2024-10-07 PROCEDURE — 25010000002 ONDANSETRON PER 1 MG: Performed by: FAMILY MEDICINE

## 2024-10-07 PROCEDURE — 85652 RBC SED RATE AUTOMATED: CPT | Performed by: NURSE PRACTITIONER

## 2024-10-07 PROCEDURE — 85025 COMPLETE CBC W/AUTO DIFF WBC: CPT | Performed by: FAMILY MEDICINE

## 2024-10-07 PROCEDURE — 71045 X-RAY EXAM CHEST 1 VIEW: CPT

## 2024-10-07 PROCEDURE — G0378 HOSPITAL OBSERVATION PER HR: HCPCS

## 2024-10-07 PROCEDURE — 25010000002 HYDROMORPHONE PER 4 MG: Performed by: FAMILY MEDICINE

## 2024-10-07 PROCEDURE — 82948 REAGENT STRIP/BLOOD GLUCOSE: CPT

## 2024-10-07 PROCEDURE — 63710000001 INSULIN GLARGINE PER 5 UNITS: Performed by: FAMILY MEDICINE

## 2024-10-07 RX ORDER — AMLODIPINE BESYLATE 5 MG/1
10 TABLET ORAL DAILY
Status: DISCONTINUED | OUTPATIENT
Start: 2024-10-08 | End: 2024-10-11 | Stop reason: HOSPADM

## 2024-10-07 RX ORDER — PANTOPRAZOLE SODIUM 40 MG/1
40 TABLET, DELAYED RELEASE ORAL
Status: DISCONTINUED | OUTPATIENT
Start: 2024-10-08 | End: 2024-10-11 | Stop reason: HOSPADM

## 2024-10-07 RX ORDER — POLYETHYLENE GLYCOL 3350 17 G/17G
17 POWDER, FOR SOLUTION ORAL DAILY
Status: DISCONTINUED | OUTPATIENT
Start: 2024-10-07 | End: 2024-10-11 | Stop reason: HOSPADM

## 2024-10-07 RX ORDER — HYDROCODONE BITARTRATE AND ACETAMINOPHEN 10; 325 MG/1; MG/1
1 TABLET ORAL EVERY 6 HOURS PRN
Status: DISCONTINUED | OUTPATIENT
Start: 2024-10-07 | End: 2024-10-11 | Stop reason: HOSPADM

## 2024-10-07 RX ORDER — CLONIDINE HYDROCHLORIDE 0.1 MG/1
0.1 TABLET ORAL 2 TIMES DAILY PRN
COMMUNITY
End: 2024-10-11 | Stop reason: HOSPADM

## 2024-10-07 RX ORDER — HYDROCODONE BITARTRATE AND ACETAMINOPHEN 10; 325 MG/1; MG/1
1 TABLET ORAL EVERY 6 HOURS PRN
COMMUNITY

## 2024-10-07 RX ORDER — HYDROMORPHONE HYDROCHLORIDE 1 MG/ML
0.5 INJECTION, SOLUTION INTRAMUSCULAR; INTRAVENOUS; SUBCUTANEOUS EVERY 4 HOURS PRN
Status: DISCONTINUED | OUTPATIENT
Start: 2024-10-07 | End: 2024-10-11 | Stop reason: HOSPADM

## 2024-10-07 RX ORDER — ONDANSETRON 2 MG/ML
4 INJECTION INTRAMUSCULAR; INTRAVENOUS EVERY 6 HOURS PRN
Status: DISCONTINUED | OUTPATIENT
Start: 2024-10-07 | End: 2024-10-11 | Stop reason: HOSPADM

## 2024-10-07 RX ORDER — IPRATROPIUM BROMIDE AND ALBUTEROL SULFATE 2.5; .5 MG/3ML; MG/3ML
3 SOLUTION RESPIRATORY (INHALATION) 4 TIMES DAILY PRN
Status: DISCONTINUED | OUTPATIENT
Start: 2024-10-07 | End: 2024-10-11 | Stop reason: HOSPADM

## 2024-10-07 RX ADMIN — HYDROMORPHONE HYDROCHLORIDE 0.5 MG: 1 INJECTION, SOLUTION INTRAMUSCULAR; INTRAVENOUS; SUBCUTANEOUS at 17:38

## 2024-10-07 RX ADMIN — POLYETHYLENE GLYCOL 3350 17 G: 17 POWDER, FOR SOLUTION ORAL at 22:00

## 2024-10-07 RX ADMIN — ONDANSETRON 4 MG: 2 INJECTION, SOLUTION INTRAMUSCULAR; INTRAVENOUS at 19:30

## 2024-10-07 RX ADMIN — INSULIN GLARGINE 10 UNITS: 100 INJECTION, SOLUTION SUBCUTANEOUS at 22:00

## 2024-10-07 NOTE — PLAN OF CARE
Goal Outcome Evaluation:                                            Pt Direct Admit per Dr. Iverson. Plan of care initiated and ongoing.    Normal

## 2024-10-07 NOTE — H&P
Patient Care Team:  Anny Garcia MD as PCP - General (Internal Medicine)  Sergio Ordonez MD as Consulting Physician (Nephrology)    Chief Complaint  Subjective     The patient is a 78 y.o. female who presents with progressive abdominal pain    HPI  Several month history of pain localizaed to the midepigastrum with radiations to the RUQ and R flank.  Recent hospitalization for similar presentation although at that time, the patient related more of a musculoskeletal component and was known to have DDD T/S with Hx comp fracture.  The pain has evolved and now has a definitive post-prandial component.  She has a Hx cholecystectomy and fundoplication.  No F/C   +loss of appetite +N/V   She clains that she cannot go on with this pain which is rated 10/10  Hx nephrectomy and multiple intra-abdominal surgeries.  This is an active patient who is robust in significant distress.  Review of Systems  Review of Systems   Gastrointestinal:  Positive for abdominal pain and nausea. Negative for abdominal distention.   Musculoskeletal:  Positive for back pain.   Neurological:  Positive for weakness.       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    Hypertension     IBS (irritable colon syndrome)     constipation    Mass of right breast     Neuropathy     Renal insufficiency     Sciatic leg pain     right    Simple chronic bronchitis     Solitary kidney     left     Past  Surgical History:   Procedure Laterality Date    BREAST BIOPSY Right     BRONCHOSCOPY N/A 2024    Procedure: BRONCHOSCOPY WITH BRONCHOALVEOLAR LAVAGE;  Surgeon: Marlin Ferguson MD;  Location: [facility] ENDOSCOPY;  Service: Pulmonary;  Laterality: N/A;  POST: RML ATELECTASIS    CARDIAC CATHETERIZATION      CHOLECYSTECTOMY      COLON SURGERY      REMOVAL diverticular diseae    COLONOSCOPY N/A 2021    Procedure: COLONOSCOPY with polypectomy x 3;  Surgeon: Margarette Mcallister MD;  Location: [facility] ENDOSCOPY;  Service: Gastroenterology;  Laterality: N/A;  post op: hmorrhoids, diverticulosis, polyps    CORONARY STENT PLACEMENT      ENDOSCOPY N/A 2021    Procedure: ESOPHAGOGASTRODUODENOSCOPY with dilatation (18-20 mm balloon) and (54 bougie);  Surgeon: Margarette Mcallister MD;  Location: [facility] ENDOSCOPY;  Service: Gastroenterology;  Laterality: N/A;  post op: esophageal stricture, esophagitis, gastritis, history of nissen    ENDOSCOPY N/A 2023    Procedure: ESOPHAGOGASTRODUODENOSCOPY with biopsy x 1 area and dilation (50,54,56 non guided bougie);  Surgeon: Margarette Mcallister MD;  Location: [facility] ENDOSCOPY;  Service: Gastroenterology;  Laterality: N/A;  post op: esophageal stricture    EYE SURGERY Bilateral     cataracts    HIATAL HERNIA REPAIR      NEPHRECTOMY Right     cancer    UPPER ENDOSCOPIC ULTRASOUND W/ FNA N/A 2022    Procedure: EUS;  Surgeon: Margarette Mcallister MD;  Location: [facility] ENDOSCOPY;  Service: Gastroenterology;  Laterality: N/A;  post: normal pancreas, dilated pancreatic duct, hiatal hernia,      Family History   Problem Relation Age of Onset    Arthritis Father     Prostate cancer Father     Heart disease Sister      Social History     Tobacco Use    Smoking status: Former     Current packs/day: 0.00     Average packs/day: 0.3 packs/day for 50.0 years (12.5 ttl pk-yrs)     Types: Cigarettes     Start date: 1973     Quit date: 2023     Years since quittin.9     Passive exposure: Past     "Smokeless tobacco: Never    Tobacco comments:     Mostly patches, some days none, some days 1-2   Vaping Use    Vaping status: Never Used   Substance Use Topics    Alcohol use: Not Currently     Comment: occasionally     Drug use: Never     Allergies:  Erythromycin, Naproxen sodium, Statins, Latex, Metoclopramide, and Dicyclomine    Objective     Vital Signs  Temp:  [98.1 °F (36.7 °C)] 98.1 °F (36.7 °C)  Heart Rate:  [79] 79  Resp:  [16] 16  BP: (159)/(83) 159/83      Physical Exam:   Physical Exam  Vitals reviewed.   Constitutional:       General: She is in acute distress.      Appearance: She is ill-appearing. She is not toxic-appearing or diaphoretic.   Cardiovascular:      Rate and Rhythm: Rhythm irregular.      Heart sounds: Normal heart sounds.   Pulmonary:      Breath sounds: Normal breath sounds.   Abdominal:      General: There is no distension.      Tenderness: There is abdominal tenderness. There is guarding. There is no rebound.   Skin:     General: Skin is warm.   Neurological:      Mental Status: She is alert.              Results Review:   CBC      BMP     Cr Clearance Estimated Creatinine Clearance: 35.6 mL/min (A) (by C-G formula based on SCr of 1.34 mg/dL (H)).  Coag     HbA1C   Lab Results   Component Value Date    HGBA1C 7.10 (H) 01/21/2024    HGBA1C 7.0 (H) 12/16/2022    HGBA1C 7.3 08/11/2022     Blood Glucose No results found for: \"POCGLU\"  Infection     CMP     UA      Radiology(recent) No radiology results for the last day     Assessment:  Acute midepigastric and RUQ abdominal pain, refractory  Osteoporosis with compression fracture  History of kyphoplasty  Pulmonary HTN  History of nephrectomy  Panlobular COPD with emphysema  Chronic mucopurulent bronchitis  DMII with renal manifestations   DMII with neuropathic manifestations  Diabetic dyslipidemia  GERD with esophagitis  Myofascia pain syndrome  CKDIV  HH  HTN with CKDIV  Chronic constipation  Nicotine dependency with nicotine use " disorder, cigarettes  DDD L/S  Endometrial CA HX with ureteral involvement  Constipation    Plan:  GI + General surgery evaluation//must consider diagnostic laparoscopy  Expected Length of Stay 3 days    I discussed the patient's findings and my recommendations with patient and nursing staff.     Del Iverson MD  10/07/24  19:30 EDT

## 2024-10-08 ENCOUNTER — ON CAMPUS - OUTPATIENT (AMBULATORY)
Age: 79
End: 2024-10-08
Payer: MEDICAID

## 2024-10-08 ENCOUNTER — ON CAMPUS - OUTPATIENT (AMBULATORY)
Dept: URBAN - METROPOLITAN AREA HOSPITAL 85 | Facility: HOSPITAL | Age: 79
End: 2024-10-08
Payer: MEDICAID

## 2024-10-08 ENCOUNTER — APPOINTMENT (OUTPATIENT)
Dept: CT IMAGING | Facility: HOSPITAL | Age: 79
End: 2024-10-08
Payer: MEDICARE

## 2024-10-08 DIAGNOSIS — M54.9 DORSALGIA, UNSPECIFIED: ICD-10-CM

## 2024-10-08 DIAGNOSIS — R10.13 EPIGASTRIC PAIN: ICD-10-CM

## 2024-10-08 LAB
BACTERIA UR QL AUTO: ABNORMAL /HPF
BILIRUB UR QL STRIP: NEGATIVE
CLARITY UR: ABNORMAL
COLOR UR: YELLOW
CRP SERPL-MCNC: 0.64 MG/DL (ref 0–0.5)
ERYTHROCYTE [SEDIMENTATION RATE] IN BLOOD: 32 MM/HR (ref 0–30)
GLUCOSE BLDC GLUCOMTR-MCNC: 198 MG/DL (ref 70–105)
GLUCOSE UR STRIP-MCNC: NEGATIVE MG/DL
HGB UR QL STRIP.AUTO: ABNORMAL
HYALINE CASTS UR QL AUTO: ABNORMAL /LPF
KETONES UR QL STRIP: NEGATIVE
LEUKOCYTE ESTERASE UR QL STRIP.AUTO: ABNORMAL
NITRITE UR QL STRIP: NEGATIVE
PH UR STRIP.AUTO: 5.5 [PH] (ref 5–8)
PROT UR QL STRIP: ABNORMAL
RBC # UR STRIP: ABNORMAL /HPF
REF LAB TEST METHOD: ABNORMAL
SP GR UR STRIP: 1.02 (ref 1–1.03)
SQUAMOUS #/AREA URNS HPF: ABNORMAL /HPF
UROBILINOGEN UR QL STRIP: ABNORMAL
WBC # UR STRIP: ABNORMAL /HPF

## 2024-10-08 PROCEDURE — G0378 HOSPITAL OBSERVATION PER HR: HCPCS

## 2024-10-08 PROCEDURE — 99204 OFFICE O/P NEW MOD 45 MIN: CPT | Performed by: SURGERY

## 2024-10-08 PROCEDURE — 99232 SBSQ HOSP IP/OBS MODERATE 35: CPT | Performed by: NURSE PRACTITIONER

## 2024-10-08 PROCEDURE — 87086 URINE CULTURE/COLONY COUNT: CPT | Performed by: NURSE PRACTITIONER

## 2024-10-08 PROCEDURE — 25510000001 IOPAMIDOL PER 1 ML: Performed by: FAMILY MEDICINE

## 2024-10-08 PROCEDURE — 96361 HYDRATE IV INFUSION ADD-ON: CPT

## 2024-10-08 PROCEDURE — 82948 REAGENT STRIP/BLOOD GLUCOSE: CPT

## 2024-10-08 PROCEDURE — 25810000003 SODIUM CHLORIDE 0.9 % SOLUTION: Performed by: INTERNAL MEDICINE

## 2024-10-08 PROCEDURE — 87186 SC STD MICRODIL/AGAR DIL: CPT | Performed by: NURSE PRACTITIONER

## 2024-10-08 PROCEDURE — 74177 CT ABD & PELVIS W/CONTRAST: CPT

## 2024-10-08 PROCEDURE — 87088 URINE BACTERIA CULTURE: CPT | Performed by: NURSE PRACTITIONER

## 2024-10-08 PROCEDURE — 63710000001 INSULIN GLARGINE PER 5 UNITS: Performed by: FAMILY MEDICINE

## 2024-10-08 PROCEDURE — 81001 URINALYSIS AUTO W/SCOPE: CPT | Performed by: NURSE PRACTITIONER

## 2024-10-08 RX ORDER — IOPAMIDOL 755 MG/ML
100 INJECTION, SOLUTION INTRAVASCULAR
Status: COMPLETED | OUTPATIENT
Start: 2024-10-08 | End: 2024-10-08

## 2024-10-08 RX ORDER — SODIUM CHLORIDE 9 MG/ML
100 INJECTION, SOLUTION INTRAVENOUS CONTINUOUS
Status: DISCONTINUED | OUTPATIENT
Start: 2024-10-08 | End: 2024-10-09

## 2024-10-08 RX ADMIN — SODIUM CHLORIDE 100 ML/HR: 9 INJECTION, SOLUTION INTRAVENOUS at 12:54

## 2024-10-08 RX ADMIN — IOPAMIDOL 100 ML: 755 INJECTION, SOLUTION INTRAVENOUS at 11:39

## 2024-10-08 RX ADMIN — AMLODIPINE BESYLATE 10 MG: 5 TABLET ORAL at 13:00

## 2024-10-08 RX ADMIN — INSULIN GLARGINE 10 UNITS: 100 INJECTION, SOLUTION SUBCUTANEOUS at 20:04

## 2024-10-08 RX ADMIN — Medication 600 MG: at 20:04

## 2024-10-08 RX ADMIN — SERTRALINE 50 MG: 50 TABLET, FILM COATED ORAL at 13:00

## 2024-10-08 RX ADMIN — HYDROCODONE BITARTRATE AND ACETAMINOPHEN 1 TABLET: 10; 325 TABLET ORAL at 20:04

## 2024-10-08 RX ADMIN — Medication 600 MG: at 12:54

## 2024-10-08 RX ADMIN — SODIUM CHLORIDE 100 ML/HR: 9 INJECTION, SOLUTION INTRAVENOUS at 23:00

## 2024-10-08 NOTE — PROGRESS NOTES
LOS: 0 days   Patient Care Team:  Anny Garcia MD as PCP - General (Internal Medicine)  Sergio Ordonez MD as Consulting Physician (Nephrology)    Subjective:  Continued pain    Objective:   afebrile      Review of Systems:   Review of Systems   Constitutional:  Positive for activity change.   Gastrointestinal:  Positive for abdominal pain.   Neurological:  Positive for weakness.           Vital Signs  Temp:  [97.7 °F (36.5 °C)-98.2 °F (36.8 °C)] 98.1 °F (36.7 °C)  Heart Rate:  [76-89] 89  Resp:  [16-18] 18  BP: (146-194)/(77-98) 157/86    Physical Exam:  Physical Exam  Vitals and nursing note reviewed.   Abdominal:      Tenderness: There is abdominal tenderness.   Neurological:      Mental Status: She is alert.          Radiology:  CT Enterography Abdomen Pelvis w Contrast    Result Date: 10/8/2024  Impression: 1.No acute abnormality identified within the abdomen or pelvis. 2.Nonemergent findings as detailed above. Electronically Signed: Pa Sweet MD  10/8/2024 12:03 PM EDT  Workstation ID: [id number]    XR Chest 1 View    Result Date: 10/7/2024  Impression: 1. No acute cardiopulmonary abnormality. 2. Postprocedural changes of kyphoplasty at the mid thoracic spine level. Electronically Signed: Freddy Park  10/7/2024 9:45 PM EDT  Workstation ID: [id number]    XR Abdomen KUB    Result Date: 10/7/2024  Impression: 1. Nonspecific bowel gas pattern with gas-filled dilated loop of small bowel measuring up to 4.6 cm centrally. Findings could relate to ileus or enteritis. Obstruction is less likely. 2. Mild colonic stool burden. Electronically Signed: Freddy Park  10/7/2024 9:43 PM EDT  Workstation ID: [id number]        Results Review:     I reviewed the patient's new clinical results.  I reviewed the patient's new imaging results and agree with the interpretation.    Medication Review:   Scheduled Meds:Acetylcysteine, 600 mg, Oral, BID  amLODIPine, 10 mg, Oral, Daily  insulin glargine, 10 Units,  Subcutaneous, Nightly  pantoprazole, 40 mg, Oral, Q AM  polyethylene glycol, 17 g, Oral, Daily  sertraline, 50 mg, Oral, Daily      Continuous Infusions:sodium chloride, 100 mL/hr, Last Rate: 100 mL/hr (10/08/24 1254)      PRN Meds:.  HYDROcodone-acetaminophen    HYDROmorphone    ipratropium-albuterol    ondansetron    Labs:    CBC    Results from last 7 days   Lab Units 10/07/24  1943   WBC 10*3/mm3 9.83   HEMOGLOBIN g/dL 15.3   PLATELETS 10*3/mm3 219     BMP   Results from last 7 days   Lab Units 10/07/24  1943   SODIUM mmol/L 142   POTASSIUM mmol/L 4.1   CHLORIDE mmol/L 106   CO2 mmol/L 24.6   BUN mg/dL 22   CREATININE mg/dL 1.40*   GLUCOSE mg/dL 146*     Cr Clearance Estimated Creatinine Clearance: 34.1 mL/min (A) (by C-G formula based on SCr of 1.4 mg/dL (H)).  Coag     HbA1C   Lab Results   Component Value Date    HGBA1C 7.10 (H) 01/21/2024    HGBA1C 7.0 (H) 12/16/2022    HGBA1C 7.3 08/11/2022     Blood Glucose   Glucose   Date/Time Value Ref Range Status   10/07/2024 2140 161 (H) 70 - 105 mg/dL Final     Comment:     Serial Number: [id number]:  [id number]     Infection     CMP   Results from last 7 days   Lab Units 10/07/24  1943   SODIUM mmol/L 142   POTASSIUM mmol/L 4.1   CHLORIDE mmol/L 106   CO2 mmol/L 24.6   BUN mg/dL 22   CREATININE mg/dL 1.40*   GLUCOSE mg/dL 146*   ALBUMIN g/dL 3.8   BILIRUBIN mg/dL 0.3   ALK PHOS U/L 76   AST (SGOT) U/L 31   ALT (SGPT) U/L 28   AMYLASE U/L 74   LIPASE U/L 24     UA    Results from last 7 days   Lab Units 10/08/24  1257   NITRITE UA  Negative   WBC UA /HPF Too Numerous to Count*   BACTERIA UA /HPF Trace*   SQUAM EPITHEL UA /HPF 0-2     Radiology(recent) CT Enterography Abdomen Pelvis w Contrast    Result Date: 10/8/2024  Impression: 1.No acute abnormality identified within the abdomen or pelvis. 2.Nonemergent findings as detailed above. Electronically Signed: Pa Sweet MD  10/8/2024 12:03 PM EDT  Workstation ID: [id number]    XR Chest 1 View    Result Date:  10/7/2024  Impression: 1. No acute cardiopulmonary abnormality. 2. Postprocedural changes of kyphoplasty at the mid thoracic spine level. Electronically Signed: Freddy Claytonelor  10/7/2024 9:45 PM EDT  Workstation ID: [id number]    XR Abdomen KUB    Result Date: 10/7/2024  Impression: 1. Nonspecific bowel gas pattern with gas-filled dilated loop of small bowel measuring up to 4.6 cm centrally. Findings could relate to ileus or enteritis. Obstruction is less likely. 2. Mild colonic stool burden. Electronically Signed: Freddy Park  10/7/2024 9:43 PM EDT  Workstation ID: [id number]    Assessment:    Acute midepigastric and RUQ abdominal pain, refractory  Enteritis  Osteoporosis with compression fracture  History of kyphoplasty  Pulmonary HTN  History of nephrectomy  Panlobular COPD with emphysema  Chronic mucopurulent bronchitis  DMII with renal manifestations   DMII with neuropathic manifestations  Diabetic dyslipidemia  GERD with esophagitis  Myofascia pain syndrome  CKDIV  HH  HTN with CKDIV  Chronic constipation  Nicotine dependency with nicotine use disorder, cigarettes  DDD L/S  Endometrial CA HX with ureteral involvement  Constipation    Plan:  CTE/supportive care//laparoscopy to be considered        Del Iverson MD  10/08/24  18:28 EDT

## 2024-10-08 NOTE — OUTREACH NOTE
Medical Week 2 Survey      Flowsheet Row Responses   [facility] facility patient discharged from? [facility]   Does the patient have one of the following disease processes/diagnoses(primary or secondary)? Other   Week 2 attempt successful? No   Unsuccessful attempts Attempt 1   Revoke Readmitted            Tammie BARR - Registered Nurse

## 2024-10-08 NOTE — CONSULTS
NEPHROLOGY CONSULTATION-----[facility]    [facility]  [phone]  Rolando Miller MD    Patient Care Team:  Anny Garcia MD as PCP - General (Internal Medicine)  Sergio Ordonez MD as Consulting Physician (Nephrology)    CC/REASON FOR CONSULTATION: Elevated creatinine Dr. Iverson    PHYSICIAN REQUESTING CONSULTATION:     History of Present Illness  78-year-old female with past medical history of CKD stage III, right nephrectomy, coronary artery disease, hypertension presents hospital complains of right upper quadrant and right flank pain.  Her creatinine is 1.4.  She is being seen by GI and needs CT enterography.  Denies dysuria or gross hematuria.  No vomiting or diarrhea.     Review of Systems   As noted above otherwise 10 systems reviewed and were negative.    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    Hypertension     IBS (irritable colon syndrome)     constipation    Mass of right breast     Neuropathy     Renal insufficiency     Sciatic leg pain     right    Simple chronic bronchitis     Solitary kidney     left       Past Surgical History:   Procedure Laterality Date    BREAST BIOPSY Right     BRONCHOSCOPY N/A 7/14/2024    Procedure: BRONCHOSCOPY WITH BRONCHOALVEOLAR LAVAGE;  Surgeon: Marlin Ferguson MD;  Location: [facility] ENDOSCOPY;  Service: Pulmonary;  Laterality: N/A;  POST: RML ATELECTASIS     CARDIAC CATHETERIZATION      CHOLECYSTECTOMY      COLON SURGERY      REMOVAL diverticular diseae    COLONOSCOPY N/A 2021    Procedure: COLONOSCOPY with polypectomy x 3;  Surgeon: Margarette Mcallister MD;  Location: [facility] ENDOSCOPY;  Service: Gastroenterology;  Laterality: N/A;  post op: hmorrhoids, diverticulosis, polyps    CORONARY STENT PLACEMENT      ENDOSCOPY N/A 2021    Procedure: ESOPHAGOGASTRODUODENOSCOPY with dilatation (18-20 mm balloon) and (54 bougie);  Surgeon: Margarette Mcallister MD;  Location: [facility] ENDOSCOPY;  Service: Gastroenterology;  Laterality: N/A;  post op: esophageal stricture, esophagitis, gastritis, history of nissen    ENDOSCOPY N/A 2023    Procedure: ESOPHAGOGASTRODUODENOSCOPY with biopsy x 1 area and dilation (50,54,56 non guided bougie);  Surgeon: Margarette Mcallister MD;  Location: [facility] ENDOSCOPY;  Service: Gastroenterology;  Laterality: N/A;  post op: esophageal stricture    EYE SURGERY Bilateral     cataracts    HIATAL HERNIA REPAIR      NEPHRECTOMY Right     cancer    UPPER ENDOSCOPIC ULTRASOUND W/ FNA N/A 2022    Procedure: EUS;  Surgeon: Margarette Mcallister MD;  Location: [facility] ENDOSCOPY;  Service: Gastroenterology;  Laterality: N/A;  post: normal pancreas, dilated pancreatic duct, hiatal hernia,        Family History   Problem Relation Age of Onset    Arthritis Father     Prostate cancer Father     Heart disease Sister        Social History     Tobacco Use    Smoking status: Former     Current packs/day: 0.00     Average packs/day: 0.3 packs/day for 50.0 years (12.5 ttl pk-yrs)     Types: Cigarettes     Start date: 1973     Quit date: 2023     Years since quittin.9     Passive exposure: Past    Smokeless tobacco: Never    Tobacco comments:     Mostly patches, some days none, some days 1-2   Vaping Use    Vaping status: Never Used   Substance Use Topics    Alcohol use: Not Currently     Comment: occasionally     Drug use: Never       Home Meds:   Medications  Prior to Admission   Medication Sig Dispense Refill Last Dose    acetaminophen (TYLENOL) 650 MG 8 hr tablet Take 2 tablets by mouth Every 8 (Eight) Hours As Needed for Mild Pain.   10/6/2024    albuterol sulfate  (90 Base) MCG/ACT inhaler Inhale 2 puffs Every 4 (Four) Hours As Needed for Wheezing or Shortness of Air.   10/6/2024    amLODIPine (NORVASC) 10 MG tablet Take 1 tablet by mouth Daily. Indications: High Blood Pressure   10/7/2024    aspirin 81 MG tablet Take 1 tablet by mouth Every Night. Indications: ANTIPLATELET   10/7/2024    cloNIDine (CATAPRES) 0.1 MG tablet Take 1 tablet by mouth 2 (Two) Times a Day As Needed for High Blood Pressure.   10/6/2024    Diclofenac Sodium (VOLTAREN) 1 % gel gel Apply 4 g topically to the appropriate area as directed 3 (Three) Times a Day As Needed (pain). Indications: Joint Damage causing Pain and Loss of Function   Past Month    Evolocumab (REPATHA) solution auto-injector SureClick injection Inject 1 mL under the skin into the appropriate area as directed Every 14 (Fourteen) Days. Indications: High Amount of Fats in the Blood   10/7/2024    ferrous sulfate 325 (65 FE) MG tablet Take 1 tablet by mouth Daily With Breakfast.   10/7/2024    Finerenone (Kerendia) 10 MG tablet Take 1 tablet by mouth Daily.   10/7/2024    fluticasone-salmeterol (ADVAIR HFA) 230-21 MCG/ACT inhaler Inhale 1 puff Take As Directed. 1 inhalation gram every 6-8 hours   10/7/2024    furosemide (LASIX) 20 MG tablet Take 1 tablet by mouth Daily.   10/7/2024    guaiFENesin (MUCINEX) 600 MG 12 hr tablet Take 2 tablets by mouth 2 (Two) Times a Day As Needed for Cough or Congestion.   10/7/2024    HYDROcodone-acetaminophen (NORCO)  MG per tablet Take 1 tablet by mouth Every 6 (Six) Hours As Needed for Moderate Pain.   10/7/2024 at 1200    Insulin Glargine (LANTUS SOLOSTAR) 100 UNIT/ML injection pen Inject 10 Units under the skin into the appropriate area as directed Every Night. 100 mL 12  10/6/2024    ipratropium-albuterol (DUO-NEB) 0.5-2.5 mg/3 ml nebulizer Take 3 mL by nebulization 4 (Four) Times a Day As Needed for Wheezing. 360 mL 1 10/6/2024    ketoconazole (NIZORAL) 2 % shampoo Apply 1 Application topically to the appropriate area as directed 2 (Two) Times a Week.   Past Month    lidocaine (LIDODERM) 5 % Place 1 patch on the skin as directed by provider Daily As Needed for Mild Pain. Remove & Discard patch within 12 hours or as directed by MD   10/6/2024    Linzess 145 MCG capsule capsule Take 1 capsule by mouth Daily. Indications: CONSTIPATION   10/6/2024    pantoprazole (PROTONIX) 40 MG EC tablet Take 1 tablet by mouth Every Morning. 30 tablet 1 10/7/2024    polyethylene glycol (MIRALAX) 17 g packet Take 17 g by mouth Daily. Indications: Constipation   10/6/2024    sertraline (ZOLOFT) 50 MG tablet Take 1 tablet by mouth Daily. Indications: MOOD   10/7/2024    Umeclidinium-Vilanterol (Anoro Ellipta) 62.5-25 MCG/ACT aerosol powder  inhaler Inhale 1 puff Daily. At the same time each day.   10/7/2024    cyanocobalamin 1000 MCG/ML injection Inject 1 mL into the appropriate muscle as directed by prescriber Every 28 (Twenty-Eight) Days. Indications: Inadequate Vitamin B12   More than a month    estradiol (ESTRACE) 0.1 MG/GM vaginal cream Insert 1 g into the vagina 3 (Three) Times a Week. Monday, Wednesday and Friday  Indications: VAGINAL HEALTH   More than a month    methocarbamol (ROBAXIN) 500 MG tablet Take 1 tablet by mouth 4 (Four) Times a Day As Needed for Muscle Spasms.   More than a month       Scheduled Meds:  amLODIPine, 10 mg, Oral, Daily  insulin glargine, 10 Units, Subcutaneous, Nightly  pantoprazole, 40 mg, Oral, Q AM  polyethylene glycol, 17 g, Oral, Daily  sertraline, 50 mg, Oral, Daily        Continuous Infusions:       PRN Meds:    HYDROcodone-acetaminophen    HYDROmorphone    ipratropium-albuterol    ondansetron    Allergies:  Erythromycin, Naproxen sodium, Statins, Latex,  "Metoclopramide, and Dicyclomine    OBJECTIVE    Vital Signs  Temp:  [97.7 °F (36.5 °C)-98.2 °F (36.8 °C)] 98.1 °F (36.7 °C)  Heart Rate:  [76-83] 76  Resp:  [16-18] 18  BP: (146-194)/(82-98) 146/82    No intake/output data recorded.  I/O last 3 completed shifts:  In: -   Out: 50 [Urine:50]    Physical Exam:  General Appearance: alert, appears stated age and cooperative  Head: normocephalic, without obvious abnormality and atraumatic  Eyes: conjunctivae and sclerae normal and no icterus  Neck: supple and no JVD  Lungs: clear to auscultation and respirations regular  Heart: regular rhythm & normal rate and normal S1, S2  Chest Wall: no abnormalities observed  Abdomen: normal bowel sounds and soft, nontender  Extremities: moves extremities well, no edema, no cyanosis  Skin: no bleeding, bruising or rash  Neurologic: Alert, and oriented. No focal deficits    Results Review:    I reviewed the patient's new clinical results.    WBC WBC   Date Value Ref Range Status   10/07/2024 9.83 3.40 - 10.80 10*3/mm3 Final      HGB Hemoglobin   Date Value Ref Range Status   10/07/2024 15.3 12.0 - 15.9 g/dL Final      HCT Hematocrit   Date Value Ref Range Status   10/07/2024 46.8 (H) 34.0 - 46.6 % Final      Platelets No results found for: \"LABPLAT\"   MCV MCV   Date Value Ref Range Status   10/07/2024 100.4 (H) 79.0 - 97.0 fL Final          Sodium Sodium   Date Value Ref Range Status   10/07/2024 142 136 - 145 mmol/L Final      Potassium Potassium   Date Value Ref Range Status   10/07/2024 4.1 3.5 - 5.2 mmol/L Final     Comment:     Slight hemolysis detected by analyzer. Result may be falsely elevated.      Chloride Chloride   Date Value Ref Range Status   10/07/2024 106 98 - 107 mmol/L Final      CO2 CO2   Date Value Ref Range Status   10/07/2024 24.6 22.0 - 29.0 mmol/L Final      BUN BUN   Date Value Ref Range Status   10/07/2024 22 8 - 23 mg/dL Final      Creatinine Creatinine   Date Value Ref Range Status   10/07/2024 1.40 (H) 0.57 " "- 1.00 mg/dL Final      Calcium Calcium   Date Value Ref Range Status   10/07/2024 9.7 8.6 - 10.5 mg/dL Final      PO4 No results found for: \"CAPO4\"   Albumin Albumin   Date Value Ref Range Status   10/07/2024 3.8 3.5 - 5.2 g/dL Final      Magnesium No results found for: \"MG\"   Uric Acid No results found for: \"URICACID\"       Imaging Results (Last 72 Hours)       Procedure Component Value Units Date/Time    XR Chest 1 View [[id number]] Collected: 10/07/24 2144     Updated: 10/07/24 2147    Narrative:      XR CHEST 1 VW    Date of Exam: 10/7/2024 8:56 PM EDT    Indication: abdominal pain    Comparison: Chest radiograph dated 9/23/2024    Findings:  The cardiomediastinal silhouette is within normal limits. Pulmonary vascularity appears normal. There our paratracheal and subcarinal calcified lymph nodes likely related to chronic granulomatous disease. There is evidence of prior kyphoplasty. There is   no acute consolidation or pleural effusion. There is no evidence of pneumothorax.      Impression:      Impression:  1. No acute cardiopulmonary abnormality.  2. Postprocedural changes of kyphoplasty at the mid thoracic spine level.      Electronically Signed: Freddy Claytonelor    10/7/2024 9:45 PM EDT    Workstation ID: [id number]    XR Abdomen KUB [[id number]] Collected: 10/07/24 2141     Updated: 10/07/24 2145    Narrative:      XR ABDOMEN KUB    Date of Exam: 10/7/2024 6:54 PM EDT    Indication: abdominal pain/post-prandial    Comparison: CT abdomen and pelvis dated 9/25/2024.    Findings:  There are postsurgical changes near the GE junction likely related to prior hiatal hernia repair. There are right upper quadrant cholecystectomy clips. There is nonspecific bowel gas pattern with gaseous distention of the small bowel centrally up to 4.6   cm. There is linear chain suture within the pelvis likely related to prior colonic resection. There is an overall mild colonic stool burden. There are no abnormal renal " calcifications.      Impression:      Impression:  1. Nonspecific bowel gas pattern with gas-filled dilated loop of small bowel measuring up to 4.6 cm centrally. Findings could relate to ileus or enteritis. Obstruction is less likely.  2. Mild colonic stool burden.      Electronically Signed: Freddy Park    10/7/2024 9:43 PM EDT    Workstation ID: [id number]              Results for orders placed during the hospital encounter of 10/07/24    XR Abdomen KUB    Narrative  XR ABDOMEN KUB    Date of Exam: 10/7/2024 6:54 PM EDT    Indication: abdominal pain/post-prandial    Comparison: CT abdomen and pelvis dated 9/25/2024.    Findings:  There are postsurgical changes near the GE junction likely related to prior hiatal hernia repair. There are right upper quadrant cholecystectomy clips. There is nonspecific bowel gas pattern with gaseous distention of the small bowel centrally up to 4.6  cm. There is linear chain suture within the pelvis likely related to prior colonic resection. There is an overall mild colonic stool burden. There are no abnormal renal calcifications.    Impression  Impression:  1. Nonspecific bowel gas pattern with gas-filled dilated loop of small bowel measuring up to 4.6 cm centrally. Findings could relate to ileus or enteritis. Obstruction is less likely.  2. Mild colonic stool burden.      Electronically Signed: Freddy Claytonelor  10/7/2024 9:43 PM EDT  Workstation ID: [id number]      XR Chest 1 View    Narrative  XR CHEST 1 VW    Date of Exam: 10/7/2024 8:56 PM EDT    Indication: abdominal pain    Comparison: Chest radiograph dated 9/23/2024    Findings:  The cardiomediastinal silhouette is within normal limits. Pulmonary vascularity appears normal. There our paratracheal and subcarinal calcified lymph nodes likely related to chronic granulomatous disease. There is evidence of prior kyphoplasty. There is  no acute consolidation or pleural effusion. There is no evidence of  pneumothorax.    Impression  Impression:  1. No acute cardiopulmonary abnormality.  2. Postprocedural changes of kyphoplasty at the mid thoracic spine level.      Electronically Signed: Freddy Claytonelor  10/7/2024 9:45 PM EDT  Workstation ID: [id number]      Results for orders placed during the hospital encounter of 09/23/24    XR Abdomen KUB    Narrative  XR ABDOMEN KUB    Date of Exam: 9/24/2024 3:10 PM EDT    Indication: R flank pain/thoracic pain    Comparison: CT abdomen pelvis 2/22/2023, abdominal radiograph 1/28/2023.    Findings:  Nonobstructive bowel gas pattern. Mild/physiologic stool burden. No distinct stones project over the left renal shadow. Scattered surgical clips. Degenerative change of the spine.    Impression  Impression:  No evidence of bowel obstruction. Mild/physiologic stool burden.      Electronically Signed: Emre Stevenson MD  9/24/2024 8:24 PM EDT  Workstation ID: [id number]        Results for orders placed during the hospital encounter of 08/22/23    Doppler Ankle Brachial Index Single Level CAR    Interpretation Summary    Right Conclusion: The right IKE is normal. Normal digital pressures.    Left Conclusion: The left IKE is normal. Mild digital ischemia.      ASSESSMENT / PLAN      Abdominal pain    CKD stage IIIa-CKD due to hypertensive nephrosclerosis and/or diabetic glomerulosclerosis  History of right nephrectomy/ureteral cancer  Diabetes type 2  Hypertension  History of coronary disease  COPD  Abdominal pain-per GI.  CT enterography today    Creatinine stable and close to baseline  Hydrate with normal saline  Premedicate with oral Mucomyst  Monitor renal function fluid status electrolytes        I discussed the patient's findings and my recommendations with patient and consulting provider    Will follow along closely. Thank you for allowing us to see this patient in renal consultation.    [facility]  [phone]  MD Rolando Coleman,  MD  10/08/24  11:18 EDT

## 2024-10-08 NOTE — CASE MANAGEMENT/SOCIAL WORK
Case Management Readmission Assessment Note    Case Management Readmission Assessment (all recorded)       Readmission Interview       Row Name 10/08/24 1144             Readmission Indications    Is the patient and/or family able to complete the readmission assessment questions? Yes      Is this hospitalization related to the prior hospital diagnosis? Yes        Row Name 10/08/24 1144             Recommendation for rehospitalization    Did you speak with your physician prior to coming to the hospital Yes      If yes, what physician did you speak with? Dr. Iverson        Row Name 10/08/24 1144             Follow-up Appointments    Do you have a PCP? Yes      Did you have an appointment with PCP after your hospitalization? No      Did you have an appointment with a Specialist? Yes  Dr. Simons      When was your appointment scheduled? 10/24/24      Did you go to appointment? --  NA      Are you current with the Pulmonary Clinic? No      Are you current with the CHF Clinic? No        Row Name 10/08/24 1144             Medications    Did you have newly prescribed medications at discharge? Yes  Protonix      Did you understand the reasons for your medications at discharge and how to take them? Yes      Did you understand the side effects of your medications? Yes      Are you taking all of you prescribed medications? Yes      What pharmacy was used to fill prescription(s)? [facility] [place]      Were medications picked up? Yes        Row Name 10/08/24 1144             Discharge Instructions    Did you understand your discharge instructions? Yes      Did your family/caregiver hear your instructions? Yes      Were you told to eat a special diet? No      Did you adhere to the diet? No      Were you given a number of someone to call if you had questions or concerns? Yes        Row Name 10/08/24 1144             Index discharge location/services    Where did you go upon discharge? Home  Oupatient PT      Do you have supportive  family or friends in the home? No        Row Name 10/08/24 1144             Discharge Readiness    On a scale of 1-5 (5 being well prepared), how ready were you for discharge 4      Recommendation based on interview Education on diagnosis/self management;Goals of care discussion/advanced care planning        Row Name 10/08/24 1144             Palliative Care/Hospice    Are you current with Palliative Care? No      Are you current with Hospice Care? No        Row Name 10/08/24 1144 10/07/24 1631          Advance Directives (For Healthcare)    Pre-existing AND/MOST/POLST Order No No     Advance Directive Status Patient does not have advance directive Patient does not have advance directive     Have you reviewed your Advance Directive and is it valid for this stay? No No     Literature Provided on Advance Directives No No     Patient Requests Assistance on Advance Directives Patient Declined Patient Declined       Row Name 10/08/24 1144             Readmission Assessment Final Comments    Final Comments HX: Kyphoplasty, Osteoporisis, HTN,DM.   PREVIOUS ADMISSION: 9/23- ED with back pain, failed outpatient treatment. KUB- Stool [place]. Surgery Consult, CT abdomne completed, no surgical intervention. Started on Elavil. 9/29- DC'd Routine home with outpatient Therapy.        CURRENT ADMISSION: 10/7- Direct Admit from Dr. Iverson with Abdominal pain. KUB pending. GI and General surgery consult's pending. NPO. Pending Clinical Progress.

## 2024-10-08 NOTE — CASE MANAGEMENT/SOCIAL WORK
Discharge Planning Assessment   [facility]     Patient Name: Vianey Reeves  MRN: [id number]  Today's Date: 10/8/2024    Admit Date: 10/7/2024    Plan: DC PLAN; Routine home alone.     Discharge Needs Assessment       Row Name 10/08/24 1303       Living Environment    People in Home alone    Current Living Arrangements home    Potentially Unsafe Housing Conditions none    In the past 12 months has the electric, gas, oil, or water company threatened to shut off services in your home? No    Primary Care Provided by self    Provides Primary Care For no one    Quality of Family Relationships helpful;involved;supportive    Able to Return to Prior Arrangements yes       Resource/Environmental Concerns    Resource/Environmental Concerns none    Transportation Concerns none       Transportation Needs    In the past 12 months, has lack of transportation kept you from medical appointments or from getting medications? no    In the past 12 months, has lack of transportation kept you from meetings, work, or from getting things needed for daily living? No       Food Insecurity    Within the past 12 months, you worried that your food would run out before you got the money to buy more. Never true    Within the past 12 months, the food you bought just didn't last and you didn't have money to get more. Never true       Transition Planning    Patient/Family Anticipates Transition to home    Patient/Family Anticipated Services at Transition none    Transportation Anticipated car, drives self;family or friend will provide       Discharge Needs Assessment    Readmission Within the Last 30 Days no previous admission in last 30 days    Equipment Currently Used at Home none    Anticipated Changes Related to Illness none    Equipment Needed After Discharge none                   Discharge Plan       Row Name 10/08/24 1303       Plan    Plan DC PLAN; Routine home alone.    Patient/Family in Agreement with Plan yes    Plan Comments CM Met with  patient at bedside, from routine home alone. Independent with ADL's no DME. PCP is [facility] and [facility]acy [facility]. Able to afford medications and denies any issues with food or utilities other than electric bill is expensive. Denies any issues with transportation, grand daughter will provide. Denies any HHC or SNF needs. Denies any concerns about return home. Pending GI and General Surgery consults and CT abdomen.                  Continued Care and Services - Admitted Since 10/7/2024    No active coordination exists for this encounter.       Selected Continued Care - Prior Encounters Includes continued care and service providers with selected services from prior encounters from 7/9/2024 to 10/8/2024                       Expected Discharge Date and Time       Expected Discharge Date Expected Discharge Time    Oct 8, 2024            Demographic Summary       Row Name 10/08/24 1302       General Information    Admission Type inpatient    Arrived From emergency department    Required Notices Provided Important Message from Medicare    Referral Source admission list    Reason for Consult discharge planning    Preferred Language English       Contact Information    Permission Granted to Share Info With     Contact Information Obtained for                    Functional Status       Row Name 10/08/24 1303       Functional Status    Usual Activity Tolerance excellent    Current Activity Tolerance excellent       Physical Activity    On average, how many days per week do you engage in moderate to strenuous exercise (like a brisk walk)? 0 days    On average, how many minutes do you engage in exercise at this level? 0 min    Number of minutes of exercise per week 0       Assessment of Health Literacy    How often do you have someone help you read hospital materials? Sometimes    How often do you have problems learning about your medical condition because of difficulty understanding written information?  Sometimes    How often do you have a problem understanding what is told to you about your medical condition? Sometimes    How confident are you filling out medical forms by yourself? Somewhat    Health Literacy Moderate       Functional Status, IADL    Medications independent    Meal Preparation independent    Housekeeping independent    Laundry independent    Shopping independent       Mental Status    General Appearance WDL WDL       Mental Status Summary    Recent Changes in Mental Status/Cognitive Functioning no changes                       Patient Forms       Row Name 10/08/24 1023       Patient Forms    Important Message from Medicare (IMM) Delivered  [facility] 10/7/24 per registration    Delivered to Patient    Method of delivery In person                  Michaelle Gonzalez RN   Case Management  [phone]

## 2024-10-08 NOTE — PROGRESS NOTES
LOS: 1 day   Patient Care Team:  Anny Garcia MD as PCP - General (Internal Medicine)  Sergio Ordonez MD as Consulting Physician (Nephrology)      Subjective     Interval History:     Subjective: Vianey Reeves is a 78 y.o. female with history of chronic abdominal pain, CKD, COPD, DMII, hypertension, cholecystectomy, CAD s/p stent placement, diverticulitis s/p sigmoid resection, right nephrectomy secondary to cancer, and hiatal hernia repair who presented on 10/7 with complaints of abdominal pain.  The patient is well-known to our practice and was recently seen during inpatient mission on 9/23 for similar complaints.  At that time, she was also evaluated by general surgery and it was felt that her pain was related to nerve or musculoskeletal etiology.  She was started on Elavil and discharged home.  However, she presents once again with complaints of abdominal pain.  The patient reports that she has pain that begins in her mid back and radiates around to her abdomen.  The pain is intermittent and sharp in nature.  Will last for a couple hours at a time.  She states that the pain begins after eating solid food, but is also worsened with movement and reaching.  Applying pressure to her back helps to alleviate the pain.  She states that she has been having regular bowel movements without bright red blood per rectum or melena.  No vomiting.  Occasional heartburn and dysphagia to solid foods.  Denies NSAID use.    Endo History:  9/2023 EGD by Dr. Mcallister - small hiatal hernia with esophageal ring and stricture that was progressively dilated to 56 Italian, chronic gastritis negative for H. pylori.  9/2022 EUS by Dr. Mcallsiter - normal pancreas, CBD 7 mm, Nissen slightly slipped, gastritis.  8/2021 EGD/colonoscopy by Dr. Mcallister - Nissen fundoplication which is slightly slipped, empiric esophageal dilation, gastritis, tubular adenoma colon polyps, surgical changes of sigmoid resection, moderate  hemorrhoids.  1/17/2020 EGD With dilation (Dr. Mcallister) Gastritis. Hiatal hernia in cardia. Stricture in GE junction.  7/2/2018 EGD with dilation/colonoscopy (Dr. Mcallister) ring in cricopharyngeus. TA ascending colon polyps. TA/TVA proximal ascending colon polyp. Mucosal transverse colon polyp. TA and TVA anastomosis polyps at 20 cm. recall 7/2021.  2011 EGD Dr. hSell - distal esophageal ring status post dilation, large hiatal hernia, and excessive fluid in the stomach.  2011 colonoscopy Dr. Ceja - 2 hyperplastic colon polyps.      ROS:   No chest pain, shortness of breath, or cough.        Medication Review:     Current Facility-Administered Medications:     amLODIPine (NORVASC) tablet 10 mg, 10 mg, Oral, Daily, Del Iverson MD    HYDROcodone-acetaminophen (NORCO)  MG per tablet 1 tablet, 1 tablet, Oral, Q6H PRN, Del Iverson MD    HYDROmorphone (DILAUDID) injection 0.5 mg, 0.5 mg, Intravenous, Q4H PRN, Del Iverson MD, 0.5 mg at 10/07/24 1738    insulin glargine (LANTUS, SEMGLEE) injection 10 Units, 10 Units, Subcutaneous, Nightly, Del Iverson MD, 10 Units at 10/07/24 2200    ipratropium-albuterol (DUO-NEB) nebulizer solution 3 mL, 3 mL, Nebulization, 4x Daily PRN, Del Iverson MD    ondansetron (ZOFRAN) injection 4 mg, 4 mg, Intravenous, Q6H PRN, Del Iverson MD, 4 mg at 10/07/24 1930    pantoprazole (PROTONIX) EC tablet 40 mg, 40 mg, Oral, Q AM, Del Iverson MD    polyethylene glycol (MIRALAX) packet 17 g, 17 g, Oral, Daily, Del Iverson MD, 17 g at 10/07/24 2200    sertraline (ZOLOFT) tablet 50 mg, 50 mg, Oral, Daily, Del Iverson MD      Objective     Vital Signs  Vitals:    10/07/24 1700 10/07/24 2112 10/08/24 0428 10/08/24 0836   BP: 159/83 (!) 194/83 166/98 146/82   BP Location: Right arm Right arm  Left arm   Patient Position: Lying Lying  Lying   Pulse: 79 80 83 76   Resp: 16 16  18   Temp: 98.1 °F (36.7 °C) 98.2 °F (36.8 °C) 97.7 °F (36.5 °C) 98.1 °F (36.7  °C)   TempSrc: Oral Oral  Oral   SpO2: 92% 92% 90% 95%       Physical Exam:     General Appearance:    Awake and alert, in no acute distress   Head:    Normocephalic, without obvious abnormality   Eyes:          Conjunctivae normal, anicteric sclera   Throat:   No oral lesions, no thrush, oral mucosa moist   Neck:   No adenopathy, supple, no JVD   Lungs:     respirations regular, even and unlabored   Abdomen:     Soft, epigastric tenderness, no rebound or guarding, non-distended   Rectal:     Deferred   Extremities:   No edema, no cyanosis   Skin:   No bruising or rash, no jaundice        Results Review:    CBC    Results from last 7 days   Lab Units 10/07/24  1943   WBC 10*3/mm3 9.83   HEMOGLOBIN g/dL 15.3   PLATELETS 10*3/mm3 219     CMP   Results from last 7 days   Lab Units 10/07/24  1943   SODIUM mmol/L 142   POTASSIUM mmol/L 4.1   CHLORIDE mmol/L 106   CO2 mmol/L 24.6   BUN mg/dL 22   CREATININE mg/dL 1.40*   GLUCOSE mg/dL 146*   ALBUMIN g/dL 3.8   BILIRUBIN mg/dL 0.3   ALK PHOS U/L 76   AST (SGOT) U/L 31   ALT (SGPT) U/L 28   AMYLASE U/L 74   LIPASE U/L 24     Cr Clearance Estimated Creatinine Clearance: 34.1 mL/min (A) (by C-G formula based on SCr of 1.4 mg/dL (H)).  Coag     HbA1C   Lab Results   Component Value Date    HGBA1C 7.10 (H) 01/21/2024    HGBA1C 7.0 (H) 12/16/2022    HGBA1C 7.3 08/11/2022         Infection     UA      Microbiology Results (last 10 days)       ** No results found for the last 240 hours. **          Imaging Results (Last 72 Hours)       Procedure Component Value Units Date/Time    XR Chest 1 View [[id number]] Collected: 10/07/24 2144     Updated: 10/07/24 2147    Narrative:      XR CHEST 1 VW    Date of Exam: 10/7/2024 8:56 PM EDT    Indication: abdominal pain    Comparison: Chest radiograph dated 9/23/2024    Findings:  The cardiomediastinal silhouette is within normal limits. Pulmonary vascularity appears normal. There our paratracheal and subcarinal calcified lymph nodes likely  related to chronic granulomatous disease. There is evidence of prior kyphoplasty. There is   no acute consolidation or pleural effusion. There is no evidence of pneumothorax.      Impression:      Impression:  1. No acute cardiopulmonary abnormality.  2. Postprocedural changes of kyphoplasty at the mid thoracic spine level.      Electronically Signed: Freddy Park    10/7/2024 9:45 PM EDT    Workstation ID: [id number]    XR Abdomen KUB [[id number]] Collected: 10/07/24 2141     Updated: 10/07/24 2145    Narrative:      XR ABDOMEN KUB    Date of Exam: 10/7/2024 6:54 PM EDT    Indication: abdominal pain/post-prandial    Comparison: CT abdomen and pelvis dated 9/25/2024.    Findings:  There are postsurgical changes near the GE junction likely related to prior hiatal hernia repair. There are right upper quadrant cholecystectomy clips. There is nonspecific bowel gas pattern with gaseous distention of the small bowel centrally up to 4.6   cm. There is linear chain suture within the pelvis likely related to prior colonic resection. There is an overall mild colonic stool burden. There are no abnormal renal calcifications.      Impression:      Impression:  1. Nonspecific bowel gas pattern with gas-filled dilated loop of small bowel measuring up to 4.6 cm centrally. Findings could relate to ileus or enteritis. Obstruction is less likely.  2. Mild colonic stool burden.      Electronically Signed: Freddy Park    10/7/2024 9:43 PM EDT    Workstation ID: [id number]            Assessment & Plan     ASSESSMENT:  -Epigastric pain  -Nausea  -Thoracic back pain  -CKD  -COPD  -DMII  -Hypertension  -History of cholecystectomy  -CAD s/p stent placement  -History of diverticulitis s/p sigmoid resection  -History of right nephrectomy secondary to cancer  -History of Nissen fundoplication     PLAN:  Patient is an 78-year-old female with history of COPD, cholecystectomy, and CAD s/p stent placement who presented on 10/7 with complaints of  back pain and abdominal pain.  The patient was recently seen during an inpatient admission on 9/27 for similar complaints.  At that time, suspected that pain was nerve or musculoskeletal in etiology and she was started on Elavil.  Due to persistent back pain and abdominal pain, the patient was readmitted by primary care with repeat GI and general surgery consultation.    LFTs, lipase, and WBC count normal.  KUB this admission shows nonspecific bowel gas pattern with gas-filled dilated loop of small bowel measuring 4.6 cm which could be due to ileus versus enteritis.  Mild colonic stool burden noted.  CT on 9/25/2024 during last admission did not show any acute abnormality.  We will proceed with CT enterography to further evaluate the small bowel.  Check ESR and CRP.  No plans for repeat endoscopy as patient just had EGD in 9/2023.  Patient has also had previous EUS in 9/2022 for persistent abdominal pain.  Await general surgery recommendation.  Analgesics as needed.  Supportive care.      Electronically signed by SAMIRA Levy, 10/08/24, 9:21 AM EDT.

## 2024-10-08 NOTE — PLAN OF CARE
Goal Outcome Evaluation:      Patient is doing well. Has been somewhat painful tonight but not requested any pain meds. Xrays done tonight. Has been up ad felix in her room with no issues. NPO since midnight. Care continuing.

## 2024-10-09 LAB
ALBUMIN SERPL-MCNC: 3.5 G/DL (ref 3.5–5.2)
ALBUMIN SERPL-MCNC: 3.6 G/DL (ref 3.5–5.2)
ALBUMIN/GLOB SERPL: 1.5 G/DL
ALP SERPL-CCNC: 73 U/L (ref 39–117)
ALT SERPL W P-5'-P-CCNC: 28 U/L (ref 1–33)
ANION GAP SERPL CALCULATED.3IONS-SCNC: 8.5 MMOL/L (ref 5–15)
ANION GAP SERPL CALCULATED.3IONS-SCNC: 9.8 MMOL/L (ref 5–15)
AST SERPL-CCNC: 21 U/L (ref 1–32)
BACTERIA UR QL AUTO: ABNORMAL /HPF
BASOPHILS # BLD AUTO: 0.03 10*3/MM3 (ref 0–0.2)
BASOPHILS NFR BLD AUTO: 0.4 % (ref 0–1.5)
BILIRUB SERPL-MCNC: 0.3 MG/DL (ref 0–1.2)
BILIRUB UR QL STRIP: NEGATIVE
BUN SERPL-MCNC: 20 MG/DL (ref 8–23)
BUN SERPL-MCNC: 20 MG/DL (ref 8–23)
BUN/CREAT SERPL: 14.7 (ref 7–25)
BUN/CREAT SERPL: 16 (ref 7–25)
CALCIUM SPEC-SCNC: 9.2 MG/DL (ref 8.6–10.5)
CALCIUM SPEC-SCNC: 9.8 MG/DL (ref 8.6–10.5)
CHLORIDE SERPL-SCNC: 106 MMOL/L (ref 98–107)
CHLORIDE SERPL-SCNC: 107 MMOL/L (ref 98–107)
CLARITY UR: CLEAR
CO2 SERPL-SCNC: 25.2 MMOL/L (ref 22–29)
CO2 SERPL-SCNC: 26.5 MMOL/L (ref 22–29)
COLOR UR: YELLOW
CREAT SERPL-MCNC: 1.25 MG/DL (ref 0.57–1)
CREAT SERPL-MCNC: 1.36 MG/DL (ref 0.57–1)
D-LACTATE SERPL-SCNC: 0.9 MMOL/L (ref 0.5–2)
DEPRECATED RDW RBC AUTO: 45 FL (ref 37–54)
EGFRCR SERPLBLD CKD-EPI 2021: 39.2 ML/MIN/1.73
EGFRCR SERPLBLD CKD-EPI 2021: 43.4 ML/MIN/1.73
EOSINOPHIL # BLD AUTO: 0.03 10*3/MM3 (ref 0–0.4)
EOSINOPHIL NFR BLD AUTO: 0.4 % (ref 0.3–6.2)
ERYTHROCYTE [DISTWIDTH] IN BLOOD BY AUTOMATED COUNT: 12.2 % (ref 12.3–15.4)
GLOBULIN UR ELPH-MCNC: 2.3 GM/DL
GLUCOSE BLDC GLUCOMTR-MCNC: 193 MG/DL (ref 70–105)
GLUCOSE SERPL-MCNC: 128 MG/DL (ref 65–99)
GLUCOSE SERPL-MCNC: 173 MG/DL (ref 65–99)
GLUCOSE UR STRIP-MCNC: NEGATIVE MG/DL
HCT VFR BLD AUTO: 43 % (ref 34–46.6)
HGB BLD-MCNC: 13.9 G/DL (ref 12–15.9)
HGB UR QL STRIP.AUTO: ABNORMAL
HYALINE CASTS UR QL AUTO: ABNORMAL /LPF
IMM GRANULOCYTES # BLD AUTO: 0.03 10*3/MM3 (ref 0–0.05)
IMM GRANULOCYTES NFR BLD AUTO: 0.4 % (ref 0–0.5)
KETONES UR QL STRIP: NEGATIVE
LEUKOCYTE ESTERASE UR QL STRIP.AUTO: ABNORMAL
LYMPHOCYTES # BLD AUTO: 2.08 10*3/MM3 (ref 0.7–3.1)
LYMPHOCYTES NFR BLD AUTO: 26.5 % (ref 19.6–45.3)
MCH RBC QN AUTO: 32.4 PG (ref 26.6–33)
MCHC RBC AUTO-ENTMCNC: 32.3 G/DL (ref 31.5–35.7)
MCV RBC AUTO: 100.2 FL (ref 79–97)
MONOCYTES # BLD AUTO: 0.69 10*3/MM3 (ref 0.1–0.9)
MONOCYTES NFR BLD AUTO: 8.8 % (ref 5–12)
NEUTROPHILS NFR BLD AUTO: 4.98 10*3/MM3 (ref 1.7–7)
NEUTROPHILS NFR BLD AUTO: 63.5 % (ref 42.7–76)
NITRITE UR QL STRIP: NEGATIVE
NRBC BLD AUTO-RTO: 0 /100 WBC (ref 0–0.2)
PH UR STRIP.AUTO: <=5 [PH] (ref 5–8)
PHOSPHATE SERPL-MCNC: 3.1 MG/DL (ref 2.5–4.5)
PHOSPHATE SERPL-MCNC: 3.4 MG/DL (ref 2.5–4.5)
PLATELET # BLD AUTO: 227 10*3/MM3 (ref 140–450)
PMV BLD AUTO: 9.6 FL (ref 6–12)
POTASSIUM SERPL-SCNC: 4.1 MMOL/L (ref 3.5–5.2)
POTASSIUM SERPL-SCNC: 4.2 MMOL/L (ref 3.5–5.2)
PROT SERPL-MCNC: 5.8 G/DL (ref 6–8.5)
PROT UR QL STRIP: NEGATIVE
RBC # BLD AUTO: 4.29 10*6/MM3 (ref 3.77–5.28)
RBC # UR STRIP: ABNORMAL /HPF
REF LAB TEST METHOD: ABNORMAL
SODIUM SERPL-SCNC: 141 MMOL/L (ref 136–145)
SODIUM SERPL-SCNC: 142 MMOL/L (ref 136–145)
SP GR UR STRIP: 1.01 (ref 1–1.03)
SQUAMOUS #/AREA URNS HPF: ABNORMAL /HPF
UROBILINOGEN UR QL STRIP: ABNORMAL
WBC # UR STRIP: ABNORMAL /HPF
WBC NRBC COR # BLD AUTO: 7.84 10*3/MM3 (ref 3.4–10.8)

## 2024-10-09 PROCEDURE — 87086 URINE CULTURE/COLONY COUNT: CPT | Performed by: FAMILY MEDICINE

## 2024-10-09 PROCEDURE — 80053 COMPREHEN METABOLIC PANEL: CPT | Performed by: NURSE PRACTITIONER

## 2024-10-09 PROCEDURE — 87186 SC STD MICRODIL/AGAR DIL: CPT | Performed by: FAMILY MEDICINE

## 2024-10-09 PROCEDURE — 87040 BLOOD CULTURE FOR BACTERIA: CPT | Performed by: FAMILY MEDICINE

## 2024-10-09 PROCEDURE — 81001 URINALYSIS AUTO W/SCOPE: CPT | Performed by: FAMILY MEDICINE

## 2024-10-09 PROCEDURE — G0378 HOSPITAL OBSERVATION PER HR: HCPCS

## 2024-10-09 PROCEDURE — 84100 ASSAY OF PHOSPHORUS: CPT | Performed by: NURSE PRACTITIONER

## 2024-10-09 PROCEDURE — 84100 ASSAY OF PHOSPHORUS: CPT | Performed by: INTERNAL MEDICINE

## 2024-10-09 PROCEDURE — 25010000002 CEFTRIAXONE PER 250 MG: Performed by: FAMILY MEDICINE

## 2024-10-09 PROCEDURE — 82948 REAGENT STRIP/BLOOD GLUCOSE: CPT

## 2024-10-09 PROCEDURE — 87088 URINE BACTERIA CULTURE: CPT | Performed by: FAMILY MEDICINE

## 2024-10-09 PROCEDURE — 85025 COMPLETE CBC W/AUTO DIFF WBC: CPT | Performed by: NURSE PRACTITIONER

## 2024-10-09 PROCEDURE — 87147 CULTURE TYPE IMMUNOLOGIC: CPT | Performed by: FAMILY MEDICINE

## 2024-10-09 PROCEDURE — 87154 CUL TYP ID BLD PTHGN 6+ TRGT: CPT | Performed by: FAMILY MEDICINE

## 2024-10-09 PROCEDURE — 83605 ASSAY OF LACTIC ACID: CPT | Performed by: FAMILY MEDICINE

## 2024-10-09 PROCEDURE — 63710000001 INSULIN GLARGINE PER 5 UNITS: Performed by: FAMILY MEDICINE

## 2024-10-09 PROCEDURE — 96365 THER/PROPH/DIAG IV INF INIT: CPT

## 2024-10-09 RX ADMIN — INSULIN GLARGINE 10 UNITS: 100 INJECTION, SOLUTION SUBCUTANEOUS at 20:16

## 2024-10-09 RX ADMIN — POLYETHYLENE GLYCOL 3350 17 G: 17 POWDER, FOR SOLUTION ORAL at 08:51

## 2024-10-09 RX ADMIN — PANTOPRAZOLE SODIUM 40 MG: 40 TABLET, DELAYED RELEASE ORAL at 06:00

## 2024-10-09 RX ADMIN — SERTRALINE 50 MG: 50 TABLET, FILM COATED ORAL at 08:52

## 2024-10-09 RX ADMIN — Medication 600 MG: at 20:16

## 2024-10-09 RX ADMIN — Medication 600 MG: at 08:52

## 2024-10-09 RX ADMIN — HYDROCODONE BITARTRATE AND ACETAMINOPHEN 1 TABLET: 10; 325 TABLET ORAL at 20:16

## 2024-10-09 RX ADMIN — CEFTRIAXONE 2000 MG: 2 INJECTION, POWDER, FOR SOLUTION INTRAMUSCULAR; INTRAVENOUS at 23:44

## 2024-10-09 RX ADMIN — AMLODIPINE BESYLATE 10 MG: 5 TABLET ORAL at 08:52

## 2024-10-09 NOTE — CASE MANAGEMENT/SOCIAL WORK
Continued Stay Note  PRAKASH Tillman     Patient Name: Vianey Reeves  MRN: [id number]  Today's Date: 10/9/2024    Admit Date: 10/7/2024    Plan: DC PLAN: Routine home alone.     Discharge Plan       Row Name 10/09/24 1513       Plan    Plan DC PLAN: Routine home alone.        Patient/Family in Agreement with Plan yes    Plan Comments KUB results, Pain Management IV Dilaudid as needed. Probable Discharge later today or tomorrow.                      Expected Discharge Date and Time       Expected Discharge Date Expected Discharge Time    Oct 10, 2024           Michaelle Gonzalez RN   Case Management  [phone]

## 2024-10-09 NOTE — PLAN OF CARE
Goal Outcome Evaluation:         Patient alert, oriented, painful.  VSS, call light in reach

## 2024-10-09 NOTE — PLAN OF CARE
Goal Outcome Evaluation:      Pt VSS. Pt has back and abdominal pain, Pain treated with prn medication, UTI, Pt is able to make needs known. Call light in reach ,plan on going

## 2024-10-09 NOTE — PROGRESS NOTES
"NEPHROLOGY PROGRESS NOTE------[facility]    [facility]  [phone]  Rolando Miller MD      Patient Care Team:  Anny Garcia MD as PCP - General (Internal Medicine)  Sergio Ordonez MD as Consulting Physician (Nephrology)      Provider:  Rolando Miller MD  Patient Name: Vianey Reeves  :  1945    SUBJECTIVE:  F/U CKD  No chest pain or SOA    Medication:  Acetylcysteine, 600 mg, Oral, BID  amLODIPine, 10 mg, Oral, Daily  insulin glargine, 10 Units, Subcutaneous, Nightly  pantoprazole, 40 mg, Oral, Q AM  polyethylene glycol, 17 g, Oral, Daily  sertraline, 50 mg, Oral, Daily      sodium chloride, 100 mL/hr, Last Rate: 100 mL/hr (10/08/24 2300)        OBJECTIVE    Vital Sign Min/Max for last 24 hours  Temp  Min: 97.4 °F (36.3 °C)  Max: 98.1 °F (36.7 °C)   BP  Min: 144/91  Max: 159/90   Pulse  Min: 67  Max: 89   Resp  Min: 16  Max: 18   SpO2  Min: 92 %  Max: 93 %   No data recorded   Weight  Min: 82.7 kg (182 lb 5.1 oz)  Max: 82.7 kg (182 lb 5.1 oz)     Flowsheet Rows      Flowsheet Row First Filed Value   Admission Height 165.1 cm (65\") Documented at 10/08/2024 2011   Admission Weight 82.7 kg (182 lb 5.1 oz) Documented at 10/08/2024 2011            No intake/output data recorded.  I/O last 3 completed shifts:  In: -   Out: 250 [Urine:250]    Physical Exam:  General Appearance: alert, appears stated age and cooperative  Head: normocephalic, without obvious abnormality and atraumatic  Eyes: conjunctivae and sclerae normal and no icterus  Neck: supple and no JVD  Lungs: clear to auscultation and respirations regular  Heart: regular rhythm & normal rate and normal S1, S2  Chest: Wall no abnormalities observed  Abdomen: normal bowel sounds and soft, nontender  Extremities: moves extremities well, no edema, no cyanosis and no redness  Skin: no bleeding, bruising or rash, turgor normal, color normal and no lesions noted  Neurologic: Alert, and " "oriented. No focal deficits    Labs:    WBC WBC   Date Value Ref Range Status   10/09/2024 7.84 3.40 - 10.80 10*3/mm3 Final   10/07/2024 9.83 3.40 - 10.80 10*3/mm3 Final      HGB Hemoglobin   Date Value Ref Range Status   10/09/2024 13.9 12.0 - 15.9 g/dL Final   10/07/2024 15.3 12.0 - 15.9 g/dL Final      HCT Hematocrit   Date Value Ref Range Status   10/09/2024 43.0 34.0 - 46.6 % Final   10/07/2024 46.8 (H) 34.0 - 46.6 % Final      Platelets No results found for: \"LABPLAT\"   MCV MCV   Date Value Ref Range Status   10/09/2024 100.2 (H) 79.0 - 97.0 fL Final   10/07/2024 100.4 (H) 79.0 - 97.0 fL Final          Sodium Sodium   Date Value Ref Range Status   10/09/2024 142 136 - 145 mmol/L Final   10/07/2024 142 136 - 145 mmol/L Final      Potassium Potassium   Date Value Ref Range Status   10/09/2024 4.1 3.5 - 5.2 mmol/L Final     Comment:     Slight hemolysis detected by analyzer. Result may be falsely elevated.   10/07/2024 4.1 3.5 - 5.2 mmol/L Final     Comment:     Slight hemolysis detected by analyzer. Result may be falsely elevated.      Chloride Chloride   Date Value Ref Range Status   10/09/2024 107 98 - 107 mmol/L Final   10/07/2024 106 98 - 107 mmol/L Final      CO2 CO2   Date Value Ref Range Status   10/09/2024 25.2 22.0 - 29.0 mmol/L Final   10/07/2024 24.6 22.0 - 29.0 mmol/L Final      BUN BUN   Date Value Ref Range Status   10/09/2024 20 8 - 23 mg/dL Final   10/07/2024 22 8 - 23 mg/dL Final      Creatinine Creatinine   Date Value Ref Range Status   10/09/2024 1.36 (H) 0.57 - 1.00 mg/dL Final   10/07/2024 1.40 (H) 0.57 - 1.00 mg/dL Final      Calcium Calcium   Date Value Ref Range Status   10/09/2024 9.2 8.6 - 10.5 mg/dL Final   10/07/2024 9.7 8.6 - 10.5 mg/dL Final      PO4 No components found for: \"PO4\"   Albumin Albumin   Date Value Ref Range Status   10/09/2024 3.5 3.5 - 5.2 g/dL Final   10/07/2024 3.8 3.5 - 5.2 g/dL Final      Magnesium No results found for: \"MG\"   Uric Acid No components found for: " "\"URIC ACID\"     Imaging Results (Last 72 Hours)       Procedure Component Value Units Date/Time    CT Enterography Abdomen Pelvis w Contrast [[id number]] Collected: 10/08/24 1156     Updated: 10/08/24 1205    Narrative:      CT ABDOMEN PELVIS W CONTRAST ENTEROGRAPHY    Date of Exam: 10/8/2024 11:38 AM EDT    Indication: abnormal KUB, persistent abdominal pain, enteritis.    Comparison: CT of the abdomen and pelvis dated 9/25/2024    Technique: Axial CT images were obtained of the abdomen and pelvis after the uneventful intravenous administration of iodinated contrast.  Neutral oral contrast was also administered for the enterography protocol.  Sagittal and coronal reconstructions   were performed.  Automated exposure control and iterative reconstruction methods were used.      Findings:    Liver: The liver is unremarkable in morphology. No focal liver lesion is seen. No biliary dilation is seen.    Gallbladder: Surgically absent.    Pancreas: Unremarkable.    Spleen: Unremarkable.    Adrenal glands: Small low-density left adrenal nodule, unchanged, likely an adenoma. Right adrenal gland is unremarkable    Genitourinary tract: Status post right nephrectomy. The left kidney is unremarkable. No hydronephrosis is seen. The visualized left ureter is unremarkable. Urinary bladder is unremarkable. Pelvic organs demonstrate no acute abnormality.    Gastrointestinal tract: Surgical changes of the rectosigmoid colon. Surgical changes of the gastroesophageal junction. Hollow viscera appear otherwise unremarkable. No evidence of bowel obstruction.    Appendix: No findings to suggest acute appendicitis.    Other findings: No free air or free fluid is identified. No pathologically enlarged lymph nodes are seen. Vascular calcifications are present. The IVC is unremarkable.    Bones and soft tissues: No acute or suspicious osseous or soft tissue lesion is identified.    Lung bases: Tiny right lung base granuloma. Calcified right " hilar lymph nodes noted. Mild left basilar atelectasis. Coronary artery calcifications.      Impression:      Impression:  1.No acute abnormality identified within the abdomen or pelvis.  2.Nonemergent findings as detailed above.      Electronically Signed: Pa Sweet MD    10/8/2024 12:03 PM EDT    Workstation ID: [id number]    XR Chest 1 View [[id number]] Collected: 10/07/24 2144     Updated: 10/07/24 2147    Narrative:      XR CHEST 1 VW    Date of Exam: 10/7/2024 8:56 PM EDT    Indication: abdominal pain    Comparison: Chest radiograph dated 9/23/2024    Findings:  The cardiomediastinal silhouette is within normal limits. Pulmonary vascularity appears normal. There our paratracheal and subcarinal calcified lymph nodes likely related to chronic granulomatous disease. There is evidence of prior kyphoplasty. There is   no acute consolidation or pleural effusion. There is no evidence of pneumothorax.      Impression:      Impression:  1. No acute cardiopulmonary abnormality.  2. Postprocedural changes of kyphoplasty at the mid thoracic spine level.      Electronically Signed: Freddy Park    10/7/2024 9:45 PM EDT    Workstation ID: [id number]    XR Abdomen KUB [[id number]] Collected: 10/07/24 2141     Updated: 10/07/24 2145    Narrative:      XR ABDOMEN KUB    Date of Exam: 10/7/2024 6:54 PM EDT    Indication: abdominal pain/post-prandial    Comparison: CT abdomen and pelvis dated 9/25/2024.    Findings:  There are postsurgical changes near the GE junction likely related to prior hiatal hernia repair. There are right upper quadrant cholecystectomy clips. There is nonspecific bowel gas pattern with gaseous distention of the small bowel centrally up to 4.6   cm. There is linear chain suture within the pelvis likely related to prior colonic resection. There is an overall mild colonic stool burden. There are no abnormal renal calcifications.      Impression:      Impression:  1. Nonspecific bowel gas pattern with  gas-filled dilated loop of small bowel measuring up to 4.6 cm centrally. Findings could relate to ileus or enteritis. Obstruction is less likely.  2. Mild colonic stool burden.      Electronically Signed: Freddy Park    10/7/2024 9:43 PM EDT    Workstation ID: [id number]            Results for orders placed during the hospital encounter of 10/07/24    XR Abdomen KUB    Narrative  XR ABDOMEN KUB    Date of Exam: 10/7/2024 6:54 PM EDT    Indication: abdominal pain/post-prandial    Comparison: CT abdomen and pelvis dated 9/25/2024.    Findings:  There are postsurgical changes near the GE junction likely related to prior hiatal hernia repair. There are right upper quadrant cholecystectomy clips. There is nonspecific bowel gas pattern with gaseous distention of the small bowel centrally up to 4.6  cm. There is linear chain suture within the pelvis likely related to prior colonic resection. There is an overall mild colonic stool burden. There are no abnormal renal calcifications.    Impression  Impression:  1. Nonspecific bowel gas pattern with gas-filled dilated loop of small bowel measuring up to 4.6 cm centrally. Findings could relate to ileus or enteritis. Obstruction is less likely.  2. Mild colonic stool burden.      Electronically Signed: Freddy Park  10/7/2024 9:43 PM EDT  Workstation ID: [id number]      XR Chest 1 View    Narrative  XR CHEST 1 VW    Date of Exam: 10/7/2024 8:56 PM EDT    Indication: abdominal pain    Comparison: Chest radiograph dated 9/23/2024    Findings:  The cardiomediastinal silhouette is within normal limits. Pulmonary vascularity appears normal. There our paratracheal and subcarinal calcified lymph nodes likely related to chronic granulomatous disease. There is evidence of prior kyphoplasty. There is  no acute consolidation or pleural effusion. There is no evidence of pneumothorax.    Impression  Impression:  1. No acute cardiopulmonary abnormality.  2. Postprocedural changes of  kyphoplasty at the mid thoracic spine level.      Electronically Signed: Freddy Claytonelor  10/7/2024 9:45 PM EDT  Workstation ID: [id number]      Results for orders placed during the hospital encounter of 09/23/24    XR Abdomen KUB    Narrative  XR ABDOMEN KUB    Date of Exam: 9/24/2024 3:10 PM EDT    Indication: R flank pain/thoracic pain    Comparison: CT abdomen pelvis 2/22/2023, abdominal radiograph 1/28/2023.    Findings:  Nonobstructive bowel gas pattern. Mild/physiologic stool burden. No distinct stones project over the left renal shadow. Scattered surgical clips. Degenerative change of the spine.    Impression  Impression:  No evidence of bowel obstruction. Mild/physiologic stool burden.      Electronically Signed: Emre Stevenson MD  9/24/2024 8:24 PM EDT  Workstation ID: [id number]      Results for orders placed during the hospital encounter of 08/22/23    Doppler Ankle Brachial Index Single Level CAR    Interpretation Summary    Right Conclusion: The right IKE is normal. Normal digital pressures.    Left Conclusion: The left IKE is normal. Mild digital ischemia.        ASSESSMENT / PLAN      Abdominal pain    CKD stage IIIa-CKD due to hypertensive nephrosclerosis and/or diabetic glomerulosclerosis  History of right nephrectomy/ureteral cancer  Diabetes type 2  Hypertension  History of coronary disease  COPD  Abdominal pain-per GI.  CT enterography today     Creatinine stable  post dye  CT enterography unremarkable  Check Urine for dysuria.  Monitor renal function fluid status electrolytes        Rolando Miller MD  [facility]  [phone]  10/09/24  12:32 EDT

## 2024-10-09 NOTE — CONSULTS
General Surgery Consult Note      Name: Vianey Reeves ADMIT: 10/7/2024   : 1945  PCP: Anny Garcia MD    MRN: [id number] LOS: 0 days   AGE/SEX: 78 y.o. female  ROOM: [address] [place] [address]      Patient Care Team:  Anny Garcia MD as PCP - General (Internal Medicine)  Serigo Ordonez MD as Consulting Physician (Nephrology)  No chief complaint on file.      HPI  78 y.o. female with a history of COPD, CAD, prior abdominal surgeries including cholecystectomy, right nephrectomy, sigmoid resection, Nissen who presents with right sided abdominal pain.  She reports the pain wraps around her right flank region to the back.  She reports it is worse with p.o. intake.  This has been ongoing.  Imaging without any acute intra-abdominal findings.    Past Medical History:   Diagnosis Date    Allergic     latex allergy    Allergies     Anxiety     Bilateral carotid bruits     Bulging lumbar disc     Bulging of thoracic intervertebral disc     Cancer     ureter    surgery    CKD (chronic kidney disease)     stage 3    Claudication, intermittent     COPD (chronic obstructive pulmonary disease)     Coronary artery disease     DDD (degenerative disc disease), lumbar     DDD (degenerative disc disease), thoracic     Diabetes mellitus     DJD (degenerative joint disease)     Dysphagia     Gout     H/O malignant neoplasm of ureter 2020    Hypertension     IBS (irritable colon syndrome)     constipation    Mass of right breast     Neuropathy     Renal insufficiency     Sciatic leg pain     right    Simple chronic bronchitis     Solitary kidney     left     Past Surgical History:   Procedure Laterality Date    BREAST BIOPSY Right     BRONCHOSCOPY N/A 2024    Procedure: BRONCHOSCOPY WITH BRONCHOALVEOLAR LAVAGE;  Surgeon: Marlin Ferguson MD;  Location: [facility];  Service: Pulmonary;  Laterality: N/A;  POST: RML ATELECTASIS    CARDIAC CATHETERIZATION      CHOLECYSTECTOMY      COLON SURGERY      REMOVAL  diverticular diseae    COLONOSCOPY N/A 2021    Procedure: COLONOSCOPY with polypectomy x 3;  Surgeon: Margarette Mcallister MD;  Location: [facility] ENDOSCOPY;  Service: Gastroenterology;  Laterality: N/A;  post op: hmorrhoids, diverticulosis, polyps    CORONARY STENT PLACEMENT      ENDOSCOPY N/A 2021    Procedure: ESOPHAGOGASTRODUODENOSCOPY with dilatation (18-20 mm balloon) and (54 bougie);  Surgeon: Margarette Mcallister MD;  Location: [facility] ENDOSCOPY;  Service: Gastroenterology;  Laterality: N/A;  post op: esophageal stricture, esophagitis, gastritis, history of nissen    ENDOSCOPY N/A 2023    Procedure: ESOPHAGOGASTRODUODENOSCOPY with biopsy x 1 area and dilation (50,54,56 non guided bougie);  Surgeon: Margarette Mcallister MD;  Location: [facility] ENDOSCOPY;  Service: Gastroenterology;  Laterality: N/A;  post op: esophageal stricture    EYE SURGERY Bilateral     cataracts    HIATAL HERNIA REPAIR      NEPHRECTOMY Right     cancer    UPPER ENDOSCOPIC ULTRASOUND W/ FNA N/A 2022    Procedure: EUS;  Surgeon: Margarette Mcallister MD;  Location: [facility] ENDOSCOPY;  Service: Gastroenterology;  Laterality: N/A;  post: normal pancreas, dilated pancreatic duct, hiatal hernia,      Family History   Problem Relation Age of Onset    Arthritis Father     Prostate cancer Father     Heart disease Sister        Social History     Tobacco Use    Smoking status: Former     Current packs/day: 0.00     Average packs/day: 0.3 packs/day for 50.0 years (12.5 ttl pk-yrs)     Types: Cigarettes     Start date: 1973     Quit date: 2023     Years since quittin.9     Passive exposure: Past    Smokeless tobacco: Never    Tobacco comments:     Mostly patches, some days none, some days 1-2   Vaping Use    Vaping status: Never Used   Substance Use Topics    Alcohol use: Not Currently     Comment: occasionally     Drug use: Never     Medications Prior to Admission   Medication Sig Dispense Refill Last Dose    acetaminophen (TYLENOL) 650 MG 8 hr  tablet Take 2 tablets by mouth Every 8 (Eight) Hours As Needed for Mild Pain.   10/6/2024    albuterol sulfate  (90 Base) MCG/ACT inhaler Inhale 2 puffs Every 4 (Four) Hours As Needed for Wheezing or Shortness of Air.   10/6/2024    amLODIPine (NORVASC) 10 MG tablet Take 1 tablet by mouth Daily. Indications: High Blood Pressure   10/7/2024    aspirin 81 MG tablet Take 1 tablet by mouth Every 72 (Seventy-Two) Hours. Indications: ANTIPLATELET   10/7/2024    cloNIDine (CATAPRES) 0.1 MG tablet Take 1 tablet by mouth 2 (Two) Times a Day As Needed for High Blood Pressure.   10/6/2024    Diclofenac Sodium (VOLTAREN) 1 % gel gel Apply 4 g topically to the appropriate area as directed 3 (Three) Times a Day As Needed (pain). Indications: Joint Damage causing Pain and Loss of Function   Past Month    Finerenone (Kerendia) 10 MG tablet Take 1 tablet by mouth Daily.   10/7/2024    furosemide (LASIX) 20 MG tablet Take 1 tablet by mouth Daily As Needed.   10/7/2024    guaiFENesin (MUCINEX) 600 MG 12 hr tablet Take 2 tablets by mouth 2 (Two) Times a Day As Needed for Cough or Congestion.   10/7/2024    HYDROcodone-acetaminophen (NORCO)  MG per tablet Take 1 tablet by mouth Every 6 (Six) Hours As Needed for Moderate Pain.   10/7/2024 at 1200    Insulin Glargine (LANTUS SOLOSTAR) 100 UNIT/ML injection pen Inject 10 Units under the skin into the appropriate area as directed Every Night. 100 mL 12 10/6/2024    ipratropium-albuterol (DUO-NEB) 0.5-2.5 mg/3 ml nebulizer Take 3 mL by nebulization 4 (Four) Times a Day As Needed for Wheezing. 360 mL 1 10/6/2024    ketoconazole (NIZORAL) 2 % shampoo Apply 1 Application topically to the appropriate area as directed 2 (Two) Times a Week.   Past Month    lidocaine (LIDODERM) 5 % Place 1 patch on the skin as directed by provider Daily As Needed for Mild Pain. Remove & Discard patch within 12 hours or as directed by MD   10/6/2024    Linzess 145 MCG capsule capsule Take 1 capsule by  mouth Daily. Indications: CONSTIPATION   10/6/2024    pantoprazole (PROTONIX) 40 MG EC tablet Take 1 tablet by mouth Every Morning. 30 tablet 1 10/7/2024    polyethylene glycol (MIRALAX) 17 g packet Take 17 g by mouth Daily. Indications: Constipation   10/6/2024    sertraline (ZOLOFT) 50 MG tablet Take 1 tablet by mouth Daily. Indications: MOOD   10/7/2024    Umeclidinium-Vilanterol (Anoro Ellipta) 62.5-25 MCG/ACT aerosol powder  inhaler Inhale 1 puff Daily. At the same time each day.   10/7/2024    estradiol (ESTRACE) 0.1 MG/GM vaginal cream Insert 1 g into the vagina 3 (Three) Times a Week. Monday, Wednesday and Friday  Indications: VAGINAL HEALTH   More than a month     Acetylcysteine, 600 mg, Oral, BID  amLODIPine, 10 mg, Oral, Daily  insulin glargine, 10 Units, Subcutaneous, Nightly  pantoprazole, 40 mg, Oral, Q AM  polyethylene glycol, 17 g, Oral, Daily  sertraline, 50 mg, Oral, Daily      sodium chloride, 100 mL/hr, Last Rate: 100 mL/hr (10/08/24 1254)        HYDROcodone-acetaminophen    HYDROmorphone    ipratropium-albuterol    ondansetron  Erythromycin, Naproxen sodium, Statins, Latex, Metoclopramide, and Dicyclomine    Review of Systems:   As noted above in HPI    Vitals:  Temp:  [97.4 °F (36.3 °C)-98.1 °F (36.7 °C)] 97.4 °F (36.3 °C)  Heart Rate:  [76-89] 80  Resp:  [16-18] 18  BP: (146-166)/(77-98) 159/90     Physical Exam:   No acute distress, alert  Nonlabored respirations  Abdomen soft, nontender, nondistended  Extremities with no gross deformities    Labs:  Results from last 7 days   Lab Units 10/07/24  1943   WBC 10*3/mm3 9.83   HEMOGLOBIN g/dL 15.3   HEMATOCRIT % 46.8*   PLATELETS 10*3/mm3 219     Results from last 7 days   Lab Units 10/07/24  1943   SODIUM mmol/L 142   POTASSIUM mmol/L 4.1   CHLORIDE mmol/L 106   CO2 mmol/L 24.6   BUN mg/dL 22   CREATININE mg/dL 1.40*   CALCIUM mg/dL 9.7   BILIRUBIN mg/dL 0.3   ALK PHOS U/L 76   ALT (SGPT) U/L 28   AST (SGOT) U/L 31   GLUCOSE mg/dL 146*      Imaging:  CT enterography abdomen/pelvis 10/8/2024  Impression:  1.No acute abnormality identified within the abdomen or pelvis.  2.Nonemergent findings as detailed above.    Assessment and Plan:  78 y.o. female with chronic right-sided abdominal pain, normal imaging.    -Imaging without any acute findings to explain the patient's pain  - No indication for any surgical intervention  - Consider referral to pain service for evaluation for possible block to rule out musculoskeletal origin    This note was created using Dragon Voice Recognition software.    Anny Rolle MD  10/08/24  22:23 EDT

## 2024-10-09 NOTE — PROGRESS NOTES
LOS: 0 days   Patient Care Team:  Anny Garcia MD as PCP - General (Internal Medicine)  Sergio Ordonez MD as Consulting Physician (Nephrology)    Subjective:  Events noted//continued pain    Objective:   Afebrile      Review of Systems:   Review of Systems   Gastrointestinal:  Positive for abdominal pain.           Vital Signs  Temp:  [97.4 °F (36.3 °C)-98.1 °F (36.7 °C)] 97.7 °F (36.5 °C)  Heart Rate:  [67-89] 67  Resp:  [16-18] 16  BP: (150-159)/(77-90) 150/80    Physical Exam:  Physical Exam  Constitutional:       General: She is in acute distress.   Cardiovascular:      Rate and Rhythm: Normal rate.      Heart sounds: Normal heart sounds.   Pulmonary:      Breath sounds: Normal breath sounds.   Abdominal:      Tenderness: There is abdominal tenderness.   Skin:     General: Skin is warm.   Neurological:      Mental Status: She is alert.          Radiology:  CT Enterography Abdomen Pelvis w Contrast    Result Date: 10/8/2024  Impression: 1.No acute abnormality identified within the abdomen or pelvis. 2.Nonemergent findings as detailed above. Electronically Signed: Pa Sweet MD  10/8/2024 12:03 PM EDT  Workstation ID: [id number]    XR Chest 1 View    Result Date: 10/7/2024  Impression: 1. No acute cardiopulmonary abnormality. 2. Postprocedural changes of kyphoplasty at the mid thoracic spine level. Electronically Signed: Freddy Park  10/7/2024 9:45 PM EDT  Workstation ID: [id number]    XR Abdomen KUB    Result Date: 10/7/2024  Impression: 1. Nonspecific bowel gas pattern with gas-filled dilated loop of small bowel measuring up to 4.6 cm centrally. Findings could relate to ileus or enteritis. Obstruction is less likely. 2. Mild colonic stool burden. Electronically Signed: Freddy Park  10/7/2024 9:43 PM EDT  Workstation ID: [id number]        Results Review:     I reviewed the patient's new clinical results.  I reviewed the patient's new imaging results and agree with the  interpretation.    Medication Review:   Scheduled Meds:Acetylcysteine, 600 mg, Oral, BID  amLODIPine, 10 mg, Oral, Daily  insulin glargine, 10 Units, Subcutaneous, Nightly  pantoprazole, 40 mg, Oral, Q AM  polyethylene glycol, 17 g, Oral, Daily  sertraline, 50 mg, Oral, Daily      Continuous Infusions:sodium chloride, 100 mL/hr, Last Rate: 100 mL/hr (10/08/24 2300)      PRN Meds:.  HYDROcodone-acetaminophen    HYDROmorphone    ipratropium-albuterol    ondansetron    Labs:    CBC    Results from last 7 days   Lab Units 10/09/24  0204 10/07/24  1943   WBC 10*3/mm3 7.84 9.83   HEMOGLOBIN g/dL 13.9 15.3   PLATELETS 10*3/mm3 227 219     BMP   Results from last 7 days   Lab Units 10/09/24  0204 10/07/24  1943   SODIUM mmol/L 142 142   POTASSIUM mmol/L 4.1 4.1   CHLORIDE mmol/L 107 106   CO2 mmol/L 25.2 24.6   BUN mg/dL 20 22   CREATININE mg/dL 1.36* 1.40*   GLUCOSE mg/dL 128* 146*   PHOSPHORUS mg/dL 3.4  --      Cr Clearance Estimated Creatinine Clearance: 34.5 mL/min (A) (by C-G formula based on SCr of 1.36 mg/dL (H)).  Coag     HbA1C   Lab Results   Component Value Date    HGBA1C 7.10 (H) 01/21/2024    HGBA1C 7.0 (H) 12/16/2022    HGBA1C 7.3 08/11/2022     Blood Glucose   Glucose   Date/Time Value Ref Range Status   10/08/2024 2006 198 (H) 70 - 105 mg/dL Final     Comment:     Serial Number: [id number]:  [id number]   10/07/2024 2140 161 (H) 70 - 105 mg/dL Final     Comment:     Serial Number: [id number]:  [id number]     Infection     CMP   Results from last 7 days   Lab Units 10/09/24  0204 10/07/24  1943   SODIUM mmol/L 142 142   POTASSIUM mmol/L 4.1 4.1   CHLORIDE mmol/L 107 106   CO2 mmol/L 25.2 24.6   BUN mg/dL 20 22   CREATININE mg/dL 1.36* 1.40*   GLUCOSE mg/dL 128* 146*   ALBUMIN g/dL 3.5 3.8   BILIRUBIN mg/dL 0.3 0.3   ALK PHOS U/L 73 76   AST (SGOT) U/L 21 31   ALT (SGPT) U/L 28 28   AMYLASE U/L  --  74   LIPASE U/L  --  24     UA    Results from last 7 days   Lab Units 10/08/24  1257   NITRITE UA   Negative   WBC UA /HPF Too Numerous to Count*   BACTERIA UA /HPF Trace*   SQUAM EPITHEL UA /HPF 0-2     Radiology(recent) CT Enterography Abdomen Pelvis w Contrast    Result Date: 10/8/2024  Impression: 1.No acute abnormality identified within the abdomen or pelvis. 2.Nonemergent findings as detailed above. Electronically Signed: Pa Sweet MD  10/8/2024 12:03 PM EDT  Workstation ID: [id number]    XR Chest 1 View    Result Date: 10/7/2024  Impression: 1. No acute cardiopulmonary abnormality. 2. Postprocedural changes of kyphoplasty at the mid thoracic spine level. Electronically Signed: Freddy Park  10/7/2024 9:45 PM EDT  Workstation ID: [id number]    XR Abdomen KUB    Result Date: 10/7/2024  Impression: 1. Nonspecific bowel gas pattern with gas-filled dilated loop of small bowel measuring up to 4.6 cm centrally. Findings could relate to ileus or enteritis. Obstruction is less likely. 2. Mild colonic stool burden. Electronically Signed: Freddy Park  10/7/2024 9:43 PM EDT  Workstation ID: [id number]    Assessment:      Acute midepigastric and RUQ abdominal pain, refractory  Enteritis  Osteoporosis with compression fracture  History of kyphoplasty  Pulmonary HTN  History of nephrectomy  Panlobular COPD with emphysema  Chronic mucopurulent bronchitis  DMII with renal manifestations   DMII with neuropathic manifestations  Diabetic dyslipidemia  GERD with esophagitis  Myofascia pain syndrome  CKDIV  HH  HTN with CKDIV  Chronic constipation  Nicotine dependency with nicotine use disorder, cigarettes  DDD L/S  Endometrial CA HX with ureteral involvement  Constipation    Plan:    Pain management evaluation//additional plans to follow        Del Iverson MD  10/09/24  09:04 EDT

## 2024-10-10 LAB
BACTERIA SPEC AEROBE CULT: ABNORMAL
GLUCOSE BLDC GLUCOMTR-MCNC: 194 MG/DL (ref 70–105)

## 2024-10-10 PROCEDURE — G0378 HOSPITAL OBSERVATION PER HR: HCPCS

## 2024-10-10 PROCEDURE — 25010000002 GENTAMICIN PER 80 MG: Performed by: FAMILY MEDICINE

## 2024-10-10 PROCEDURE — 25010000002 DIPHENHYDRAMINE PER 50 MG

## 2024-10-10 PROCEDURE — 82948 REAGENT STRIP/BLOOD GLUCOSE: CPT

## 2024-10-10 PROCEDURE — 63710000001 INSULIN GLARGINE PER 5 UNITS: Performed by: FAMILY MEDICINE

## 2024-10-10 PROCEDURE — 96375 TX/PRO/DX INJ NEW DRUG ADDON: CPT

## 2024-10-10 RX ORDER — DIPHENHYDRAMINE HYDROCHLORIDE 50 MG/ML
50 INJECTION INTRAMUSCULAR; INTRAVENOUS ONCE
Status: COMPLETED | OUTPATIENT
Start: 2024-10-10 | End: 2024-10-10

## 2024-10-10 RX ADMIN — POLYETHYLENE GLYCOL 3350 17 G: 17 POWDER, FOR SOLUTION ORAL at 08:38

## 2024-10-10 RX ADMIN — GENTAMICIN SULFATE 410 MG: 40 INJECTION, SOLUTION INTRAMUSCULAR; INTRAVENOUS at 12:00

## 2024-10-10 RX ADMIN — DIPHENHYDRAMINE HYDROCHLORIDE 50 MG: 50 INJECTION, SOLUTION INTRAMUSCULAR; INTRAVENOUS at 00:23

## 2024-10-10 RX ADMIN — INSULIN GLARGINE 10 UNITS: 100 INJECTION, SOLUTION SUBCUTANEOUS at 22:05

## 2024-10-10 RX ADMIN — AMLODIPINE BESYLATE 10 MG: 5 TABLET ORAL at 08:38

## 2024-10-10 RX ADMIN — PANTOPRAZOLE SODIUM 40 MG: 40 TABLET, DELAYED RELEASE ORAL at 05:51

## 2024-10-10 RX ADMIN — HYDROCODONE BITARTRATE AND ACETAMINOPHEN 1 TABLET: 10; 325 TABLET ORAL at 22:05

## 2024-10-10 RX ADMIN — HYDROCODONE BITARTRATE AND ACETAMINOPHEN 1 TABLET: 10; 325 TABLET ORAL at 02:29

## 2024-10-10 RX ADMIN — SERTRALINE 50 MG: 50 TABLET, FILM COATED ORAL at 08:38

## 2024-10-10 NOTE — PROGRESS NOTES
"NEPHROLOGY PROGRESS NOTE------[facility]    [facility]  [phone]  Rolando Miller MD      Patient Care Team:  Anny Garcia MD as PCP - General (Internal Medicine)  Sergio Ordonez MD as Consulting Physician (Nephrology)      Provider:  Rolando Miller MD  Patient Name: Vianey Reeves  :  1945    SUBJECTIVE:  F/U CKD  No chest pain or SOA    Medication:  amLODIPine, 10 mg, Oral, Daily  insulin glargine, 10 Units, Subcutaneous, Nightly  pantoprazole, 40 mg, Oral, Q AM  polyethylene glycol, 17 g, Oral, Daily  sertraline, 50 mg, Oral, Daily             OBJECTIVE    Vital Sign Min/Max for last 24 hours  Temp  Min: 97.9 °F (36.6 °C)  Max: 98.4 °F (36.9 °C)   BP  Min: 118/74  Max: 153/91   Pulse  Min: 62  Max: 76   Resp  Min: 13  Max: 16   SpO2  Min: 90 %  Max: 94 %   No data recorded   No data recorded     Flowsheet Rows      Flowsheet Row First Filed Value   Admission Height 165.1 cm (65\") Documented at 10/08/2024 2011   Admission Weight 82.7 kg (182 lb 5.1 oz) Documented at 10/08/2024 2011            I/O this shift:  In: 240 [P.O.:240]  Out: -   I/O last 3 completed shifts:  In: 120 [P.O.:120]  Out: 450 [Urine:450]    Physical Exam:  General Appearance: alert, appears stated age and cooperative  Head: normocephalic, without obvious abnormality and atraumatic  Eyes: conjunctivae and sclerae normal and no icterus  Neck: supple and no JVD  Lungs: clear to auscultation and respirations regular  Heart: regular rhythm & normal rate and normal S1, S2  Chest: Wall no abnormalities observed  Abdomen: normal bowel sounds and soft, nontender  Extremities: moves extremities well, no edema, no cyanosis and no redness  Skin: no bleeding, bruising or rash, turgor normal, color normal and no lesions noted  Neurologic: Alert, and oriented. No focal deficits    Labs:    WBC WBC   Date Value Ref Range Status   10/09/2024 7.84 3.40 - 10.80 10*3/mm3 Final " "  10/07/2024 9.83 3.40 - 10.80 10*3/mm3 Final      HGB Hemoglobin   Date Value Ref Range Status   10/09/2024 13.9 12.0 - 15.9 g/dL Final   10/07/2024 15.3 12.0 - 15.9 g/dL Final      HCT Hematocrit   Date Value Ref Range Status   10/09/2024 43.0 34.0 - 46.6 % Final   10/07/2024 46.8 (H) 34.0 - 46.6 % Final      Platelets No results found for: \"LABPLAT\"   MCV MCV   Date Value Ref Range Status   10/09/2024 100.2 (H) 79.0 - 97.0 fL Final   10/07/2024 100.4 (H) 79.0 - 97.0 fL Final          Sodium Sodium   Date Value Ref Range Status   10/09/2024 141 136 - 145 mmol/L Final   10/09/2024 142 136 - 145 mmol/L Final   10/07/2024 142 136 - 145 mmol/L Final      Potassium Potassium   Date Value Ref Range Status   10/09/2024 4.2 3.5 - 5.2 mmol/L Final     Comment:     Specimen hemolyzed.  Result may be falsely elevated.   10/09/2024 4.1 3.5 - 5.2 mmol/L Final     Comment:     Slight hemolysis detected by analyzer. Result may be falsely elevated.   10/07/2024 4.1 3.5 - 5.2 mmol/L Final     Comment:     Slight hemolysis detected by analyzer. Result may be falsely elevated.      Chloride Chloride   Date Value Ref Range Status   10/09/2024 106 98 - 107 mmol/L Final   10/09/2024 107 98 - 107 mmol/L Final   10/07/2024 106 98 - 107 mmol/L Final      CO2 CO2   Date Value Ref Range Status   10/09/2024 26.5 22.0 - 29.0 mmol/L Final   10/09/2024 25.2 22.0 - 29.0 mmol/L Final   10/07/2024 24.6 22.0 - 29.0 mmol/L Final      BUN BUN   Date Value Ref Range Status   10/09/2024 20 8 - 23 mg/dL Final   10/09/2024 20 8 - 23 mg/dL Final   10/07/2024 22 8 - 23 mg/dL Final      Creatinine Creatinine   Date Value Ref Range Status   10/09/2024 1.25 (H) 0.57 - 1.00 mg/dL Final   10/09/2024 1.36 (H) 0.57 - 1.00 mg/dL Final   10/07/2024 1.40 (H) 0.57 - 1.00 mg/dL Final      Calcium Calcium   Date Value Ref Range Status   10/09/2024 9.8 8.6 - 10.5 mg/dL Final   10/09/2024 9.2 8.6 - 10.5 mg/dL Final   10/07/2024 9.7 8.6 - 10.5 mg/dL Final      PO4 No " "components found for: \"PO4\"   Albumin Albumin   Date Value Ref Range Status   10/09/2024 3.6 3.5 - 5.2 g/dL Final   10/09/2024 3.5 3.5 - 5.2 g/dL Final   10/07/2024 3.8 3.5 - 5.2 g/dL Final      Magnesium No results found for: \"MG\"   Uric Acid No components found for: \"URIC ACID\"     Imaging Results (Last 72 Hours)       Procedure Component Value Units Date/Time    CT Enterography Abdomen Pelvis w Contrast [[id number]] Collected: 10/08/24 1156     Updated: 10/08/24 1205    Narrative:      CT ABDOMEN PELVIS W CONTRAST ENTEROGRAPHY    Date of Exam: 10/8/2024 11:38 AM EDT    Indication: abnormal KUB, persistent abdominal pain, enteritis.    Comparison: CT of the abdomen and pelvis dated 9/25/2024    Technique: Axial CT images were obtained of the abdomen and pelvis after the uneventful intravenous administration of iodinated contrast.  Neutral oral contrast was also administered for the enterography protocol.  Sagittal and coronal reconstructions   were performed.  Automated exposure control and iterative reconstruction methods were used.      Findings:    Liver: The liver is unremarkable in morphology. No focal liver lesion is seen. No biliary dilation is seen.    Gallbladder: Surgically absent.    Pancreas: Unremarkable.    Spleen: Unremarkable.    Adrenal glands: Small low-density left adrenal nodule, unchanged, likely an adenoma. Right adrenal gland is unremarkable    Genitourinary tract: Status post right nephrectomy. The left kidney is unremarkable. No hydronephrosis is seen. The visualized left ureter is unremarkable. Urinary bladder is unremarkable. Pelvic organs demonstrate no acute abnormality.    Gastrointestinal tract: Surgical changes of the rectosigmoid colon. Surgical changes of the gastroesophageal junction. Hollow viscera appear otherwise unremarkable. No evidence of bowel obstruction.    Appendix: No findings to suggest acute appendicitis.    Other findings: No free air or free fluid is identified. No " pathologically enlarged lymph nodes are seen. Vascular calcifications are present. The IVC is unremarkable.    Bones and soft tissues: No acute or suspicious osseous or soft tissue lesion is identified.    Lung bases: Tiny right lung base granuloma. Calcified right hilar lymph nodes noted. Mild left basilar atelectasis. Coronary artery calcifications.      Impression:      Impression:  1.No acute abnormality identified within the abdomen or pelvis.  2.Nonemergent findings as detailed above.      Electronically Signed: Pa Sweet MD    10/8/2024 12:03 PM EDT    Workstation ID: [id number]    XR Chest 1 View [[id number]] Collected: 10/07/24 2144     Updated: 10/07/24 2147    Narrative:      XR CHEST 1 VW    Date of Exam: 10/7/2024 8:56 PM EDT    Indication: abdominal pain    Comparison: Chest radiograph dated 9/23/2024    Findings:  The cardiomediastinal silhouette is within normal limits. Pulmonary vascularity appears normal. There our paratracheal and subcarinal calcified lymph nodes likely related to chronic granulomatous disease. There is evidence of prior kyphoplasty. There is   no acute consolidation or pleural effusion. There is no evidence of pneumothorax.      Impression:      Impression:  1. No acute cardiopulmonary abnormality.  2. Postprocedural changes of kyphoplasty at the mid thoracic spine level.      Electronically Signed: Freddy Park    10/7/2024 9:45 PM EDT    Workstation ID: [id number]    XR Abdomen KUB [[id number]] Collected: 10/07/24 2141     Updated: 10/07/24 2145    Narrative:      XR ABDOMEN KUB    Date of Exam: 10/7/2024 6:54 PM EDT    Indication: abdominal pain/post-prandial    Comparison: CT abdomen and pelvis dated 9/25/2024.    Findings:  There are postsurgical changes near the GE junction likely related to prior hiatal hernia repair. There are right upper quadrant cholecystectomy clips. There is nonspecific bowel gas pattern with gaseous distention of the small bowel centrally up to  4.6   cm. There is linear chain suture within the pelvis likely related to prior colonic resection. There is an overall mild colonic stool burden. There are no abnormal renal calcifications.      Impression:      Impression:  1. Nonspecific bowel gas pattern with gas-filled dilated loop of small bowel measuring up to 4.6 cm centrally. Findings could relate to ileus or enteritis. Obstruction is less likely.  2. Mild colonic stool burden.      Electronically Signed: Freddy Vivian    10/7/2024 9:43 PM EDT    Workstation ID: [id number]            Results for orders placed during the hospital encounter of 10/07/24    XR Abdomen KUB    Narrative  XR ABDOMEN KUB    Date of Exam: 10/7/2024 6:54 PM EDT    Indication: abdominal pain/post-prandial    Comparison: CT abdomen and pelvis dated 9/25/2024.    Findings:  There are postsurgical changes near the GE junction likely related to prior hiatal hernia repair. There are right upper quadrant cholecystectomy clips. There is nonspecific bowel gas pattern with gaseous distention of the small bowel centrally up to 4.6  cm. There is linear chain suture within the pelvis likely related to prior colonic resection. There is an overall mild colonic stool burden. There are no abnormal renal calcifications.    Impression  Impression:  1. Nonspecific bowel gas pattern with gas-filled dilated loop of small bowel measuring up to 4.6 cm centrally. Findings could relate to ileus or enteritis. Obstruction is less likely.  2. Mild colonic stool burden.      Electronically Signed: Freddy Vivian  10/7/2024 9:43 PM EDT  Workstation ID: [id number]      XR Chest 1 View    Narrative  XR CHEST 1 VW    Date of Exam: 10/7/2024 8:56 PM EDT    Indication: abdominal pain    Comparison: Chest radiograph dated 9/23/2024    Findings:  The cardiomediastinal silhouette is within normal limits. Pulmonary vascularity appears normal. There our paratracheal and subcarinal calcified lymph nodes likely related to  chronic granulomatous disease. There is evidence of prior kyphoplasty. There is  no acute consolidation or pleural effusion. There is no evidence of pneumothorax.    Impression  Impression:  1. No acute cardiopulmonary abnormality.  2. Postprocedural changes of kyphoplasty at the mid thoracic spine level.      Electronically Signed: Freddy Claytonelor  10/7/2024 9:45 PM EDT  Workstation ID: [id number]      Results for orders placed during the hospital encounter of 09/23/24    XR Abdomen KUB    Narrative  XR ABDOMEN KUB    Date of Exam: 9/24/2024 3:10 PM EDT    Indication: R flank pain/thoracic pain    Comparison: CT abdomen pelvis 2/22/2023, abdominal radiograph 1/28/2023.    Findings:  Nonobstructive bowel gas pattern. Mild/physiologic stool burden. No distinct stones project over the left renal shadow. Scattered surgical clips. Degenerative change of the spine.    Impression  Impression:  No evidence of bowel obstruction. Mild/physiologic stool burden.      Electronically Signed: Emre Stevenson MD  9/24/2024 8:24 PM EDT  Workstation ID: [id number]      Results for orders placed during the hospital encounter of 08/22/23    Doppler Ankle Brachial Index Single Level CAR    Interpretation Summary    Right Conclusion: The right IKE is normal. Normal digital pressures.    Left Conclusion: The left IKE is normal. Mild digital ischemia.        ASSESSMENT / PLAN      Abdominal pain    CKD stage IIIa-CKD due to hypertensive nephrosclerosis and/or diabetic glomerulosclerosis  History of right nephrectomy/ureteral cancer  Diabetes type 2  Hypertension  History of coronary disease  COPD  Abdominal pain-per GI.  CT enterography today     Creatinine stable  Possible UTI, started antibiotics  Monitor renal function fluid status electrolytes        Rolando Miller MD  [facility]  [phone]  10/10/24  12:22 EDT

## 2024-10-10 NOTE — CONSULTS
[facility]      Patient Identification:  NAME:  Vianey Reeves  Age:  79 y.o.   Sex:  female   :  1945   MRN:  [id number]       Chief complaint/Reason for consult: UTI and incontinence    History of present illness:  79 y.o. female with history of of renal cancer status post right nephrectomy, frequency of urination, and recurrent UTIs, who has been on Myrbetriq and prophylactic antibiotics for the last 3 months, admitted 10/7/2024 with abdominal pain.  Patient currently sees Dr. Ray at [facility] urology.      Past medical history:  Past Medical History:   Diagnosis Date    Allergic     latex allergy    Allergies     Anxiety     Bilateral carotid bruits     Bulging lumbar disc     Bulging of thoracic intervertebral disc     Cancer     ureter    surgery    CKD (chronic kidney disease)     stage 3    Claudication, intermittent     COPD (chronic obstructive pulmonary disease)     Coronary artery disease     DDD (degenerative disc disease), lumbar     DDD (degenerative disc disease), thoracic     Diabetes mellitus     DJD (degenerative joint disease)     Dysphagia     Gout     H/O malignant neoplasm of ureter 2020    Hypertension     IBS (irritable colon syndrome)     constipation    Mass of right breast     Neuropathy     Renal insufficiency     Sciatic leg pain     right    Simple chronic bronchitis     Solitary kidney     left       Past surgical history:  Past Surgical History:   Procedure Laterality Date    BREAST BIOPSY Right     BRONCHOSCOPY N/A 2024    Procedure: BRONCHOSCOPY WITH BRONCHOALVEOLAR LAVAGE;  Surgeon: Marlin Ferguson MD;  Location: [facility] ENDOSCOPY;  Service: Pulmonary;  Laterality: N/A;  POST: RML ATELECTASIS    CARDIAC CATHETERIZATION      CHOLECYSTECTOMY      COLON SURGERY      REMOVAL diverticular diseae    COLONOSCOPY N/A 2021    Procedure: COLONOSCOPY with polypectomy x 3;  Surgeon: Margarette Mcallister MD;  Location: [facility] ENDOSCOPY;  Service:  Gastroenterology;  Laterality: N/A;  post op: hmorrhoids, diverticulosis, polyps    CORONARY STENT PLACEMENT      ENDOSCOPY N/A 08/12/2021    Procedure: ESOPHAGOGASTRODUODENOSCOPY with dilatation (18-20 mm balloon) and (54 bougie);  Surgeon: Margarette Mcallister MD;  Location: [facility] ENDOSCOPY;  Service: Gastroenterology;  Laterality: N/A;  post op: esophageal stricture, esophagitis, gastritis, history of nissen    ENDOSCOPY N/A 9/13/2023    Procedure: ESOPHAGOGASTRODUODENOSCOPY with biopsy x 1 area and dilation (50,54,56 non guided bougie);  Surgeon: Margarette Mcallister MD;  Location: [facility] ENDOSCOPY;  Service: Gastroenterology;  Laterality: N/A;  post op: esophageal stricture    EYE SURGERY Bilateral     cataracts    HIATAL HERNIA REPAIR      NEPHRECTOMY Right     cancer    UPPER ENDOSCOPIC ULTRASOUND W/ FNA N/A 09/22/2022    Procedure: EUS;  Surgeon: Margarette Mcallister MD;  Location: [facility] ENDOSCOPY;  Service: Gastroenterology;  Laterality: N/A;  post: normal pancreas, dilated pancreatic duct, hiatal hernia,        Allergies:  Erythromycin, Naproxen sodium, Statins, Latex, Metoclopramide, and Dicyclomine    Home medications:  Medications Prior to Admission   Medication Sig Dispense Refill Last Dose    acetaminophen (TYLENOL) 650 MG 8 hr tablet Take 2 tablets by mouth Every 8 (Eight) Hours As Needed for Mild Pain.   10/6/2024    albuterol sulfate  (90 Base) MCG/ACT inhaler Inhale 2 puffs Every 4 (Four) Hours As Needed for Wheezing or Shortness of Air.   10/6/2024    amLODIPine (NORVASC) 10 MG tablet Take 1 tablet by mouth Daily. Indications: High Blood Pressure   10/7/2024    aspirin 81 MG tablet Take 1 tablet by mouth Every 72 (Seventy-Two) Hours. Indications: ANTIPLATELET   10/7/2024    cloNIDine (CATAPRES) 0.1 MG tablet Take 1 tablet by mouth 2 (Two) Times a Day As Needed for High Blood Pressure.   10/6/2024    Diclofenac Sodium (VOLTAREN) 1 % gel gel Apply 4 g topically to the appropriate area as directed 3 (Three)  Times a Day As Needed (pain). Indications: Joint Damage causing Pain and Loss of Function   Past Month    Finerenone (Kerendia) 10 MG tablet Take 1 tablet by mouth Daily.   10/7/2024    furosemide (LASIX) 20 MG tablet Take 1 tablet by mouth Daily As Needed.   10/7/2024    guaiFENesin (MUCINEX) 600 MG 12 hr tablet Take 2 tablets by mouth 2 (Two) Times a Day As Needed for Cough or Congestion.   10/7/2024    HYDROcodone-acetaminophen (NORCO)  MG per tablet Take 1 tablet by mouth Every 6 (Six) Hours As Needed for Moderate Pain.   10/7/2024 at 1200    Insulin Glargine (LANTUS SOLOSTAR) 100 UNIT/ML injection pen Inject 10 Units under the skin into the appropriate area as directed Every Night. 100 mL 12 10/6/2024    ipratropium-albuterol (DUO-NEB) 0.5-2.5 mg/3 ml nebulizer Take 3 mL by nebulization 4 (Four) Times a Day As Needed for Wheezing. 360 mL 1 10/6/2024    ketoconazole (NIZORAL) 2 % shampoo Apply 1 Application topically to the appropriate area as directed 2 (Two) Times a Week.   Past Month    lidocaine (LIDODERM) 5 % Place 1 patch on the skin as directed by provider Daily As Needed for Mild Pain. Remove & Discard patch within 12 hours or as directed by MD   10/6/2024    Linzess 145 MCG capsule capsule Take 1 capsule by mouth Daily. Indications: CONSTIPATION   10/6/2024    pantoprazole (PROTONIX) 40 MG EC tablet Take 1 tablet by mouth Every Morning. 30 tablet 1 10/7/2024    polyethylene glycol (MIRALAX) 17 g packet Take 17 g by mouth Daily. Indications: Constipation   10/6/2024    sertraline (ZOLOFT) 50 MG tablet Take 1 tablet by mouth Daily. Indications: MOOD   10/7/2024    Umeclidinium-Vilanterol (Anoro Ellipta) 62.5-25 MCG/ACT aerosol powder  inhaler Inhale 1 puff Daily. At the same time each day.   10/7/2024    estradiol (ESTRACE) 0.1 MG/GM vaginal cream Insert 1 g into the vagina 3 (Three) Times a Week. Monday, Wednesday and Friday  Indications: VAGINAL HEALTH   More than a month        Hospital  medications:  amLODIPine, 10 mg, Oral, Daily  insulin glargine, 10 Units, Subcutaneous, Nightly  pantoprazole, 40 mg, Oral, Q AM  polyethylene glycol, 17 g, Oral, Daily  sertraline, 50 mg, Oral, Daily           HYDROcodone-acetaminophen    HYDROmorphone    ipratropium-albuterol    ondansetron    Family history:  Family History   Problem Relation Age of Onset    Arthritis Father     Prostate cancer Father     Heart disease Sister        Social history:  Social History     Tobacco Use    Smoking status: Former     Current packs/day: 0.00     Average packs/day: 0.3 packs/day for 50.0 years (12.5 ttl pk-yrs)     Types: Cigarettes     Start date: 1973     Quit date: 2023     Years since quittin.9     Passive exposure: Past    Smokeless tobacco: Never    Tobacco comments:     Mostly patches, some days none, some days 1-2   Vaping Use    Vaping status: Never Used   Substance Use Topics    Alcohol use: Not Currently     Comment: occasionally     Drug use: Never       Objective:  TMax 24 hours:   Temp (24hrs), Av.1 °F (36.7 °C), Min:97.9 °F (36.6 °C), Max:98.4 °F (36.9 °C)      Vitals Ranges:   Temp:  [97.9 °F (36.6 °C)-98.4 °F (36.9 °C)] 98 °F (36.7 °C)  Heart Rate:  [62-76] 62  Resp:  [13-16] 13  BP: (118-153)/(74-91) 123/75    Intake/Output Last 3 shifts:  I/O last 3 completed shifts:  In: 120 [P.O.:120]  Out: 450 [Urine:450]     Physical Exam:    General Appearance:    Alert, cooperative, NAD   Lungs:     Respirations unlabored, no audible wheezing    Heart:    No cyanosis   Abdomen:     Soft, ND    :    No suprapubic distention              Results review:   I reviewed the patient's new clinical results.    Data review:  Lab Results (last 24 hours)       Procedure Component Value Units Date/Time    Blood Culture - Blood, Arm, Right [[id number]] Collected: 10/09/24 2318    Specimen: Blood from Arm, Right Updated: 10/09/24 2323    Renal Function Panel [[id number]]  (Abnormal) Collected: 10/09/24 2205     Specimen: Blood from Arm, Right Updated: 10/09/24 2250     Glucose 173 mg/dL      BUN 20 mg/dL      Creatinine 1.25 mg/dL      Sodium 141 mmol/L      Potassium 4.2 mmol/L      Comment: Specimen hemolyzed.  Result may be falsely elevated.        Chloride 106 mmol/L      CO2 26.5 mmol/L      Calcium 9.8 mg/dL      Albumin 3.6 g/dL      Phosphorus 3.1 mg/dL      Anion Gap 8.5 mmol/L      BUN/Creatinine Ratio 16.0     eGFR 43.4 mL/min/1.73     Narrative:      GFR Normal >60  Chronic Kidney Disease <60  Kidney Failure <15    The GFR formula is only valid for adults with stable renal function between ages 18 and 70.    Lactic Acid, Plasma [[id number]]  (Normal) Collected: 10/09/24 2159    Specimen: Blood from Arm, Right Updated: 10/09/24 2231     Lactate 0.9 mmol/L     Blood Culture - Blood, Blood, Venous [[id number]] Collected: 10/09/24 2218    Specimen: Blood, Venous Updated: 10/09/24 2220    POC Glucose Once [[id number]]  (Abnormal) Collected: 10/09/24 2001    Specimen: Blood Updated: 10/09/24 2004     Glucose 193 mg/dL      Comment: Serial Number: [id number]:  [id number]       Urinalysis, Microscopic Only - Urine, Clean Catch [[id number]]  (Abnormal) Collected: 10/09/24 1443    Specimen: Urine, Clean Catch Updated: 10/09/24 1455     RBC, UA 0-2 /HPF      WBC, UA Too Numerous to Count /HPF      Bacteria, UA None Seen /HPF      Squamous Epithelial Cells, UA 0-2 /HPF      Hyaline Casts, UA 0-2 /LPF      Methodology Automated Microscopy    Urinalysis With Culture If Indicated - Urine, Clean Catch [[id number]]  (Abnormal) Collected: 10/09/24 1443    Specimen: Urine, Clean Catch Updated: 10/09/24 1455     Color, UA Yellow     Appearance, UA Clear     pH, UA <=5.0     Specific Gravity, UA 1.008     Glucose, UA Negative     Ketones, UA Negative     Bilirubin, UA Negative     Blood, UA Small (1+)     Protein, UA Negative     Leuk Esterase, UA Large (3+)     Nitrite, UA Negative     Urobilinogen, UA 0.2 E.U./dL    Narrative:       In absence of clinical symptoms, the presence of pyuria, bacteria, and/or nitrites on the urinalysis result does not correlate with infection.    Urine Culture - Urine, Urine, Clean Catch [[id number]] Collected: 10/09/24 1443    Specimen: Urine, Clean Catch Updated: 10/09/24 1455             Imaging:  Imaging Results (Last 24 Hours)       ** No results found for the last 24 hours. **               Assessment:       Abdominal pain      UTI  Urge incontinence  Frequency    Plan:   Urine culture shows greater than 100,000 E. Coli  Continue IV antibiotics  Follow-up in office in 1 to 2 weeks after discharge        SAMIRA Martinez  [facility]  [facility], [address]  [place] [address]  Office: [phone]  Available via [employer] Secure Chat  10/10/24  12:57 EDT

## 2024-10-10 NOTE — CASE MANAGEMENT/SOCIAL WORK
Continued Stay Note  PRAKASH Carrascoyd     Patient Name: Vianey Reeves  MRN: [id number]  Today's Date: 10/10/2024    Admit Date: 10/7/2024    Plan: DC PLAN: Routine home alone.       Discharge Plan       Row Name 10/10/24 1600       Plan    Plan DC PLAN: Routine home alone.    Patient/Family in Agreement with Plan yes    Plan Comments DC BARRIERS: KUB results, Pain Management IV Dilaudid as needed. Awaiting Urology to sign off, Bladder Scan. Discharge Later today or tomorrow.                         Expected Discharge Date and Time       Expected Discharge Date Expected Discharge Time    Oct 10, 2024           Michaelle Gonzalez RN    Case Management  [phone]

## 2024-10-10 NOTE — PLAN OF CARE
Goal Outcome Evaluation:      Patient doing well, up in chair this afternoon, no complaints of pain, care plan ongoing

## 2024-10-10 NOTE — PROGRESS NOTES
LOS: 0 days   Patient Care Team:  Anny Garcia MD as PCP - General (Internal Medicine)  Sergio Ordonez MD as Consulting Physician (Nephrology)    Subjective:  Some improvement noted    Objective:   Afebrile      Review of Systems:   Review of Systems   Constitutional:  Positive for activity change.   Gastrointestinal:  Positive for abdominal pain. Negative for abdominal distention.           Vital Signs  Temp:  [97.9 °F (36.6 °C)-98.4 °F (36.9 °C)] 97.9 °F (36.6 °C)  Heart Rate:  [71-76] 73  Resp:  [16] 16  BP: (118-153)/(74-91) 118/74    Physical Exam:  Physical Exam     Radiology:  CT Enterography Abdomen Pelvis w Contrast    Result Date: 10/8/2024  Impression: 1.No acute abnormality identified within the abdomen or pelvis. 2.Nonemergent findings as detailed above. Electronically Signed: Pa Sweet MD  10/8/2024 12:03 PM EDT  Workstation ID: [id number]    XR Chest 1 View    Result Date: 10/7/2024  Impression: 1. No acute cardiopulmonary abnormality. 2. Postprocedural changes of kyphoplasty at the mid thoracic spine level. Electronically Signed: Freddy Park  10/7/2024 9:45 PM EDT  Workstation ID: [id number]    XR Abdomen KUB    Result Date: 10/7/2024  Impression: 1. Nonspecific bowel gas pattern with gas-filled dilated loop of small bowel measuring up to 4.6 cm centrally. Findings could relate to ileus or enteritis. Obstruction is less likely. 2. Mild colonic stool burden. Electronically Signed: Freddy Park  10/7/2024 9:43 PM EDT  Workstation ID: [id number]        Results Review:     I reviewed the patient's new clinical results.  I reviewed the patient's new imaging results and agree with the interpretation.    Medication Review:   Scheduled Meds:amLODIPine, 10 mg, Oral, Daily  cefTRIAXone, 2,000 mg, Intravenous, Q24H  insulin glargine, 10 Units, Subcutaneous, Nightly  pantoprazole, 40 mg, Oral, Q AM  polyethylene glycol, 17 g, Oral, Daily  sertraline, 50 mg, Oral, Daily      Continuous  Infusions:   PRN Meds:.  HYDROcodone-acetaminophen    HYDROmorphone    ipratropium-albuterol    ondansetron    Labs:    CBC    Results from last 7 days   Lab Units 10/09/24  0204 10/07/24  1943   WBC 10*3/mm3 7.84 9.83   HEMOGLOBIN g/dL 13.9 15.3   PLATELETS 10*3/mm3 227 219     BMP   Results from last 7 days   Lab Units 10/09/24  2205 10/09/24  0204 10/07/24  1943   SODIUM mmol/L 141 142 142   POTASSIUM mmol/L 4.2 4.1 4.1   CHLORIDE mmol/L 106 107 106   CO2 mmol/L 26.5 25.2 24.6   BUN mg/dL 20 20 22   CREATININE mg/dL 1.25* 1.36* 1.40*   GLUCOSE mg/dL 173* 128* 146*   PHOSPHORUS mg/dL 3.1 3.4  --      Cr Clearance Estimated Creatinine Clearance: 37.5 mL/min (A) (by C-G formula based on SCr of 1.25 mg/dL (H)).  Coag     HbA1C   Lab Results   Component Value Date    HGBA1C 7.10 (H) 01/21/2024    HGBA1C 7.0 (H) 12/16/2022    HGBA1C 7.3 08/11/2022     Blood Glucose   Glucose   Date/Time Value Ref Range Status   10/09/2024 2001 193 (H) 70 - 105 mg/dL Final     Comment:     Serial Number: [id number]:  [id number]   10/08/2024 2006 198 (H) 70 - 105 mg/dL Final     Comment:     Serial Number: [id number]:  [id number]   10/07/2024 2140 161 (H) 70 - 105 mg/dL Final     Comment:     Serial Number: [id number]:  [id number]     Infection   Results from last 7 days   Lab Units 10/08/24  1257   URINECX  >100,000 CFU/mL Escherichia coli*     CMP   Results from last 7 days   Lab Units 10/09/24  2205 10/09/24  0204 10/07/24  1943   SODIUM mmol/L 141 142 142   POTASSIUM mmol/L 4.2 4.1 4.1   CHLORIDE mmol/L 106 107 106   CO2 mmol/L 26.5 25.2 24.6   BUN mg/dL 20 20 22   CREATININE mg/dL 1.25* 1.36* 1.40*   GLUCOSE mg/dL 173* 128* 146*   ALBUMIN g/dL 3.6 3.5 3.8   BILIRUBIN mg/dL  --  0.3 0.3   ALK PHOS U/L  --  73 76   AST (SGOT) U/L  --  21 31   ALT (SGPT) U/L  --  28 28   AMYLASE U/L  --   --  74   LIPASE U/L  --   --  24     UA    Results from last 7 days   Lab Units 10/09/24  1443 10/08/24  1257   NITRITE UA   Negative Negative   WBC UA /HPF Too Numerous to Count* Too Numerous to Count*   BACTERIA UA /HPF None Seen Trace*   SQUAM EPITHEL UA /HPF 0-2 0-2   URINECX   --  >100,000 CFU/mL Escherichia coli*     Radiology(recent) CT Enterography Abdomen Pelvis w Contrast    Result Date: 10/8/2024  Impression: 1.No acute abnormality identified within the abdomen or pelvis. 2.Nonemergent findings as detailed above. Electronically Signed: Pa Sweet MD  10/8/2024 12:03 PM EDT  Workstation ID: [id number]    Assessment:  Acute urinary tract infection present upon admission/E. coli species  Acute midepigastric and RUQ abdominal pain, refractory  Enteritis  Osteoporosis with compression fracture  History of kyphoplasty  Pulmonary HTN  History of nephrectomy  Panlobular COPD with emphysema  Chronic mucopurulent bronchitis  DMII with renal manifestations   DMII with neuropathic manifestations  Diabetic dyslipidemia  GERD with esophagitis  Myofascia pain syndrome  CKDIIIa  HH  HTN with CKDI!!a  Chronic constipation  Nicotine dependency with nicotine use disorder, cigarettes  DDD L/S  Endometrial CA HX with ureteral involvement  Constipation    Plan:  Parenteral antimicrobial therapy        Del Iverson MD  10/10/24  06:55 EDT

## 2024-10-10 NOTE — PLAN OF CARE
Goal Outcome Evaluation:         Pt VSS, Pt got blood cultures and labs done this shift started IV Rocephin and Pt had a bad reaction burning and nausea and vomiting, Stopped medication and called Asha Almodovar and she ordered benadryl and Pt stated that it helped and Bree said to hold medication . Pt is able to make needs known. Call light in reach, Plan on going

## 2024-10-11 ENCOUNTER — READMISSION MANAGEMENT (OUTPATIENT)
Dept: CALL CENTER | Facility: HOSPITAL | Age: 79
End: 2024-10-11
Payer: MEDICARE

## 2024-10-11 VITALS
HEIGHT: 65 IN | WEIGHT: 182.32 LBS | BODY MASS INDEX: 30.38 KG/M2 | DIASTOLIC BLOOD PRESSURE: 77 MMHG | SYSTOLIC BLOOD PRESSURE: 146 MMHG | TEMPERATURE: 97.8 F | OXYGEN SATURATION: 93 % | HEART RATE: 91 BPM | RESPIRATION RATE: 14 BRPM

## 2024-10-11 PROBLEM — Z79.891 LONG TERM (CURRENT) USE OF OPIATE ANALGESIC: Status: ACTIVE | Noted: 2024-02-01

## 2024-10-11 PROBLEM — K58.1 IRRITABLE BOWEL SYNDROME WITH PREDOMINANT CONSTIPATION: Status: ACTIVE | Noted: 2024-02-01

## 2024-10-11 PROBLEM — Z99.81 OXYGEN DEPENDENT: Status: ACTIVE | Noted: 2024-02-01

## 2024-10-11 PROBLEM — F17.210 NICOTINE DEPENDENCE, CIGARETTES, UNCOMPLICATED: Status: ACTIVE | Noted: 2024-02-01

## 2024-10-11 PROBLEM — J43.1 PANLOBULAR EMPHYSEMA: Status: ACTIVE | Noted: 2024-02-01

## 2024-10-11 PROBLEM — I25.10 ATHSCL HEART DISEASE OF NATIVE CORONARY ARTERY W/O ANG PCTRS: Status: ACTIVE | Noted: 2024-02-01

## 2024-10-11 PROBLEM — E78.2 MIXED HYPERLIPIDEMIA: Status: ACTIVE | Noted: 2024-02-01

## 2024-10-11 PROBLEM — E78.00 HYPERCHOLESTEROLEMIA: Status: ACTIVE | Noted: 2024-10-11

## 2024-10-11 PROBLEM — Z85.54: Status: ACTIVE | Noted: 2024-02-01

## 2024-10-11 PROBLEM — Z95.5 PRESENCE OF CORONARY ANGIOPLASTY IMPLANT AND GRAFT: Status: ACTIVE | Noted: 2024-02-01

## 2024-10-11 LAB
ALBUMIN SERPL-MCNC: 3.3 G/DL (ref 3.5–5.2)
ANION GAP SERPL CALCULATED.3IONS-SCNC: 9.1 MMOL/L (ref 5–15)
BACTERIA BLD CULT: ABNORMAL
BACTERIA SPEC AEROBE CULT: ABNORMAL
BOTTLE TYPE: ABNORMAL
BUN SERPL-MCNC: 20 MG/DL (ref 8–23)
BUN/CREAT SERPL: 13.2 (ref 7–25)
CALCIUM SPEC-SCNC: 9.2 MG/DL (ref 8.6–10.5)
CHLORIDE SERPL-SCNC: 107 MMOL/L (ref 98–107)
CO2 SERPL-SCNC: 25.9 MMOL/L (ref 22–29)
CREAT SERPL-MCNC: 1.51 MG/DL (ref 0.57–1)
EGFRCR SERPLBLD CKD-EPI 2021: 34.6 ML/MIN/1.73
GLUCOSE SERPL-MCNC: 159 MG/DL (ref 65–99)
PHOSPHATE SERPL-MCNC: 3.4 MG/DL (ref 2.5–4.5)
POTASSIUM SERPL-SCNC: 4 MMOL/L (ref 3.5–5.2)
SODIUM SERPL-SCNC: 142 MMOL/L (ref 136–145)

## 2024-10-11 PROCEDURE — 80069 RENAL FUNCTION PANEL: CPT | Performed by: INTERNAL MEDICINE

## 2024-10-11 PROCEDURE — G0378 HOSPITAL OBSERVATION PER HR: HCPCS

## 2024-10-11 RX ADMIN — PANTOPRAZOLE SODIUM 40 MG: 40 TABLET, DELAYED RELEASE ORAL at 06:27

## 2024-10-11 RX ADMIN — AMLODIPINE BESYLATE 10 MG: 5 TABLET ORAL at 08:11

## 2024-10-11 RX ADMIN — SERTRALINE 50 MG: 50 TABLET, FILM COATED ORAL at 08:11

## 2024-10-11 RX ADMIN — HYDROCODONE BITARTRATE AND ACETAMINOPHEN 1 TABLET: 10; 325 TABLET ORAL at 06:27

## 2024-10-11 RX ADMIN — POLYETHYLENE GLYCOL 3350 17 G: 17 POWDER, FOR SOLUTION ORAL at 08:12

## 2024-10-11 NOTE — OUTREACH NOTE
Prep Survey      Flowsheet Row Responses   Congregational facility patient discharged from? [facility]   Is LACE score < 7 ? No   Eligibility Readm Mgmt   Discharge diagnosis Abdominal pain   Does the patient have one of the following disease processes/diagnoses(primary or secondary)? Other   Prep survey completed? Yes            Li BARNETT - Registered Nurse

## 2024-10-11 NOTE — DISCHARGE SUMMARY
Date of Discharge:  10/11/2024    Discharge Diagnosis:     Acute urinary tract infection present upon admission/E. coli species  Acute midepigastric and RUQ abdominal pain, refractory  Enteritis  Osteoporosis with compression fracture  History of kyphoplasty  Pulmonary HTN  History of nephrectomy  Panlobular COPD with emphysema  Chronic mucopurulent bronchitis  DMII with renal manifestations   DMII with neuropathic manifestations  Diabetic dyslipidemia  GERD with esophagitis  Myofascia pain syndrome  CKDIIIa  HH  HTN with CKDI!!a  Chronic constipation  Nicotine dependency with nicotine use disorder, cigarettes  DDD L/S  Endometrial CA HX with ureteral involvement  Constipation    Presenting Problem/History of Present Illness  Active Hospital Problems    Diagnosis  POA    **Abdominal pain [R10.9]  Yes      Resolved Hospital Problems   No resolved problems to display.          Hospital Course  Patient is a 79 y.o. female who presented with refractory abdominal and flank pain.  Films revealed ileus versus enteritis.  Supportive care was provided and medications were modified.  Parenteral pain control medicine was initiated and she was evaluated by general surgery gastroenterology.  CTE demonstrated resolution without other new abnormality and general surgery failed to find any acute intervention for diagnostic laparoscopy.  She was found to have urinary tract infection present upon admission which did reveal E. coli species and parenteral microbial therapy was initiated.  Pain management was consulted but did not evaluate the patient while in the inpatient setting and the patient will be sent home for close outpatient follow-up with gastroenterology, ourselves, surgery, and pain management for evaluation.  Her pain at this point remains refractory to current treatment and diagnostic evaluation has been fruitless.  We will continue to seek additional opinions in order to find the root cause of her pain but will  transition to the outpatient setting.    Procedures Performed         Consults:   Consults       Date and Time Order Name Status Description    10/10/2024  8:34 AM Inpatient Urology Consult Completed     10/9/2024  9:06 AM Inpatient Pain Medicine Consult      10/8/2024  9:54 AM Inpatient Nephrology Consult Completed     10/8/2024  9:09 AM Inpatient General Surgery Consult Completed     10/7/2024  7:17 PM Inpatient Gastroenterology Consult      9/27/2024  8:33 AM Inpatient Gastroenterology Consult Completed     9/25/2024  9:39 AM Inpatient General Surgery Consult Completed             Pertinent Test Results:CT Enterography Abdomen Pelvis w Contrast    Result Date: 10/8/2024  Impression: 1.No acute abnormality identified within the abdomen or pelvis. 2.Nonemergent findings as detailed above. Electronically Signed: Pa Sweet MD  10/8/2024 12:03 PM EDT  Workstation ID: [id number]    XR Chest 1 View    Result Date: 10/7/2024  Impression: 1. No acute cardiopulmonary abnormality. 2. Postprocedural changes of kyphoplasty at the mid thoracic spine level. Electronically Signed: Freddy Park  10/7/2024 9:45 PM EDT  Workstation ID: [id number]    XR Abdomen KUB    Result Date: 10/7/2024  Impression: 1. Nonspecific bowel gas pattern with gas-filled dilated loop of small bowel measuring up to 4.6 cm centrally. Findings could relate to ileus or enteritis. Obstruction is less likely. 2. Mild colonic stool burden. Electronically Signed: Freddy Park  10/7/2024 9:43 PM EDT  Workstation ID: [id number]     Imaging Results (Last 7 Days)       Procedure Component Value Units Date/Time    CT Enterography Abdomen Pelvis w Contrast [[id number]] Collected: 10/08/24 1156     Updated: 10/08/24 1205    Narrative:      CT ABDOMEN PELVIS W CONTRAST ENTEROGRAPHY    Date of Exam: 10/8/2024 11:38 AM EDT    Indication: abnormal KUB, persistent abdominal pain, enteritis.    Comparison: CT of the abdomen and pelvis dated 9/25/2024    Technique:  Axial CT images were obtained of the abdomen and pelvis after the uneventful intravenous administration of iodinated contrast.  Neutral oral contrast was also administered for the enterography protocol.  Sagittal and coronal reconstructions   were performed.  Automated exposure control and iterative reconstruction methods were used.      Findings:    Liver: The liver is unremarkable in morphology. No focal liver lesion is seen. No biliary dilation is seen.    Gallbladder: Surgically absent.    Pancreas: Unremarkable.    Spleen: Unremarkable.    Adrenal glands: Small low-density left adrenal nodule, unchanged, likely an adenoma. Right adrenal gland is unremarkable    Genitourinary tract: Status post right nephrectomy. The left kidney is unremarkable. No hydronephrosis is seen. The visualized left ureter is unremarkable. Urinary bladder is unremarkable. Pelvic organs demonstrate no acute abnormality.    Gastrointestinal tract: Surgical changes of the rectosigmoid colon. Surgical changes of the gastroesophageal junction. Hollow viscera appear otherwise unremarkable. No evidence of bowel obstruction.    Appendix: No findings to suggest acute appendicitis.    Other findings: No free air or free fluid is identified. No pathologically enlarged lymph nodes are seen. Vascular calcifications are present. The IVC is unremarkable.    Bones and soft tissues: No acute or suspicious osseous or soft tissue lesion is identified.    Lung bases: Tiny right lung base granuloma. Calcified right hilar lymph nodes noted. Mild left basilar atelectasis. Coronary artery calcifications.      Impression:      Impression:  1.No acute abnormality identified within the abdomen or pelvis.  2.Nonemergent findings as detailed above.      Electronically Signed: Pa Sweet MD    10/8/2024 12:03 PM EDT    Workstation ID: [id number]    XR Chest 1 View [[id number]] Collected: 10/07/24 2144     Updated: 10/07/24 2147    Narrative:      XR CHEST 1  VW    Date of Exam: 10/7/2024 8:56 PM EDT    Indication: abdominal pain    Comparison: Chest radiograph dated 9/23/2024    Findings:  The cardiomediastinal silhouette is within normal limits. Pulmonary vascularity appears normal. There our paratracheal and subcarinal calcified lymph nodes likely related to chronic granulomatous disease. There is evidence of prior kyphoplasty. There is   no acute consolidation or pleural effusion. There is no evidence of pneumothorax.      Impression:      Impression:  1. No acute cardiopulmonary abnormality.  2. Postprocedural changes of kyphoplasty at the mid thoracic spine level.      Electronically Signed: Freddy Park    10/7/2024 9:45 PM EDT    Workstation ID: [id number]    XR Abdomen KUB [[id number]] Collected: 10/07/24 2141     Updated: 10/07/24 2145    Narrative:      XR ABDOMEN KUB    Date of Exam: 10/7/2024 6:54 PM EDT    Indication: abdominal pain/post-prandial    Comparison: CT abdomen and pelvis dated 9/25/2024.    Findings:  There are postsurgical changes near the GE junction likely related to prior hiatal hernia repair. There are right upper quadrant cholecystectomy clips. There is nonspecific bowel gas pattern with gaseous distention of the small bowel centrally up to 4.6   cm. There is linear chain suture within the pelvis likely related to prior colonic resection. There is an overall mild colonic stool burden. There are no abnormal renal calcifications.      Impression:      Impression:  1. Nonspecific bowel gas pattern with gas-filled dilated loop of small bowel measuring up to 4.6 cm centrally. Findings could relate to ileus or enteritis. Obstruction is less likely.  2. Mild colonic stool burden.      Electronically Signed: Freddy Park    10/7/2024 9:43 PM EDT    Workstation ID: [id number]                Condition on Discharge:  Fair    Vital Signs  Temp:  [97.8 °F (36.6 °C)-98 °F (36.7 °C)] 97.8 °F (36.6 °C)  Heart Rate:  [62-91] 91  Resp:  [13-14] 14  BP:  (123-167)/(75-86) 146/77    Physical Exam:     General Appearance:    Alert, cooperative, in no acute distress   Head:    Normocephalic, without obvious abnormality, atraumatic   Eyes:           Conjunctivae and sclerae normal, no   icterus, no pallor, corneas clear, PERRLA   Throat:   No oral lesions, no thrush, oral mucosa moist   Neck:   No adenopathy, supple, trachea midline, no thyromegaly, no   carotid bruit, no JVD   Lungs:     Clear to auscultation,respirations regular, even and                  unlabored    Heart:    Regular rhythm and normal rate, normal S1 and S2, no            murmur, no gallop, no rub, no click   Chest Wall:    No abnormalities observed   Abdomen:     Normal bowel sounds, no masses, no organomegaly, soft        non-tender, non-distended, no guarding, no rebound                tenderness   Rectal:     Deferred   Extremities:   Moves all extremities well, no edema, no cyanosis, no             redness   Pulses:   Pulses palpable and equal bilaterally   Skin:   No bleeding, bruising or rash   Lymph nodes:   No palpable adenopathy   Neurologic:   Cranial nerves 2 - 12 grossly intact, sensation intact, DTR       present and equal bilaterally         Discharge Disposition  Home or Self Care    Discharge Medications     Discharge Medications        Continue These Medications        Instructions Start Date   acetaminophen 650 MG 8 hr tablet  Commonly known as: TYLENOL   1,300 mg, Oral, Every 8 Hours PRN      albuterol sulfate  (90 Base) MCG/ACT inhaler  Commonly known as: PROVENTIL HFA;VENTOLIN HFA;PROAIR HFA   2 puffs, Inhalation, Every 4 Hours PRN      amLODIPine 10 MG tablet  Commonly known as: NORVASC   1 tablet, Oral, Daily      Anoro Ellipta 62.5-25 MCG/ACT aerosol powder  inhaler  Generic drug: Umeclidinium-Vilanterol   1 puff, Inhalation, Daily - RT, At the same time each day.      aspirin 81 MG tablet   1 tablet, Oral, Every 72 Hours      Diclofenac Sodium 1 % gel gel  Commonly  known as: VOLTAREN   Apply 4 g topically to the appropriate area as directed 3 (Three) Times a Day As Needed (pain). Indications: Joint Damage causing Pain and Loss of Function      estradiol 0.1 MG/GM vaginal cream  Commonly known as: ESTRACE   Insert 1 g into the vagina 3 (Three) Times a Week. Monday, Wednesday and Friday  Indications: VAGINAL HEALTH      furosemide 20 MG tablet  Commonly known as: LASIX   20 mg, Oral, Daily PRN      HYDROcodone-acetaminophen  MG per tablet  Commonly known as: NORCO   1 tablet, Oral, Every 6 Hours PRN      ipratropium-albuterol 0.5-2.5 mg/3 ml nebulizer  Commonly known as: DUO-NEB   3 mL, Nebulization, 4 Times Daily PRN      Kerendia 10 MG tablet  Generic drug: Finerenone   1 tablet, Oral, Daily      Lantus SoloStar 100 UNIT/ML injection pen  Generic drug: Insulin Glargine   10 Units, Subcutaneous, Nightly      lidocaine 5 %  Commonly known as: LIDODERM   1 patch, Transdermal, Daily PRN, Remove & Discard patch within 12 hours or as directed by MD Mohr 145 MCG capsule capsule  Generic drug: linaclotide   1 capsule, Oral, Daily      pantoprazole 40 MG EC tablet  Commonly known as: PROTONIX   40 mg, Oral, Every Early Morning      polyethylene glycol 17 g packet  Commonly known as: MIRALAX   17 g, Oral, Daily      sertraline 50 MG tablet  Commonly known as: ZOLOFT   1 tablet, Oral, Daily             Stop These Medications      cloNIDine 0.1 MG tablet  Commonly known as: CATAPRES     guaiFENesin 600 MG 12 hr tablet  Commonly known as: MUCINEX     ketoconazole 2 % shampoo  Commonly known as: NIZORAL              Discharge Diet:   Cardiac  Activity at Discharge:   As tolerated  Follow-up Appointments  Future Appointments   Date Time Provider Department Center   10/24/2024  2:00 PM Leon Gonzalez DO [facility]   11/5/2024  1:45 PM Geneva Duarte APRN [facility]         Test Results Pending at Discharge  Pending Labs       Order Current Status     Blood Culture - Blood, Arm, Right Preliminary result    Blood Culture - Blood, Blood, Venous Preliminary result    Urine Culture - Urine, Urine, Clean Catch Preliminary result             Del Iverson MD  10/11/24  07:52 EDT

## 2024-10-11 NOTE — PROGRESS NOTES
Urology Progress Note    Patient Identification:  Name:  Vianey Reeves  Age:  79 y.o.  Sex:  female  :  1945  MRN:  [id number]    Chief Complaint:  Patient feels quite a bit better today    History of Present Illness: Patient still has some frequency and urgency but she is better able to control her urine.  Urine culture is growing E. coli greater than 100,000 colonies though sensitivities are still pending.  Postvoid residual is 0 ml.    Problem List:    Abdominal pain     Past Medical History:  Past Medical History:   Diagnosis Date    Allergic     latex allergy    Allergies     Anxiety     Bilateral carotid bruits     Bulging lumbar disc     Bulging of thoracic intervertebral disc     Cancer     ureter    surgery    CKD (chronic kidney disease)     stage 3    Claudication, intermittent     COPD (chronic obstructive pulmonary disease)     Coronary artery disease     DDD (degenerative disc disease), lumbar     DDD (degenerative disc disease), thoracic     Diabetes mellitus     DJD (degenerative joint disease)     Dysphagia     Gout     H/O malignant neoplasm of ureter 2020    Hypertension     IBS (irritable colon syndrome)     constipation    Mass of right breast     Neuropathy     Renal insufficiency     Sciatic leg pain     right    Simple chronic bronchitis     Solitary kidney     left     Past Surgical History:  Past Surgical History:   Procedure Laterality Date    BREAST BIOPSY Right     BRONCHOSCOPY N/A 2024    Procedure: BRONCHOSCOPY WITH BRONCHOALVEOLAR LAVAGE;  Surgeon: Marlin Ferguson MD;  Location: [facility] ENDOSCOPY;  Service: Pulmonary;  Laterality: N/A;  POST: RML ATELECTASIS    CARDIAC CATHETERIZATION      CHOLECYSTECTOMY      COLON SURGERY      REMOVAL diverticular diseae    COLONOSCOPY N/A 2021    Procedure: COLONOSCOPY with polypectomy x 3;  Surgeon: Margarette Mcallister MD;  Location: [facility] ENDOSCOPY;  Service: Gastroenterology;  Laterality: N/A;  post op: hmorrhoids,  diverticulosis, polyps    CORONARY STENT PLACEMENT      ENDOSCOPY N/A 08/12/2021    Procedure: ESOPHAGOGASTRODUODENOSCOPY with dilatation (18-20 mm balloon) and (54 bougie);  Surgeon: Margarette Mcallister MD;  Location: [facility] ENDOSCOPY;  Service: Gastroenterology;  Laterality: N/A;  post op: esophageal stricture, esophagitis, gastritis, history of nissen    ENDOSCOPY N/A 9/13/2023    Procedure: ESOPHAGOGASTRODUODENOSCOPY with biopsy x 1 area and dilation (50,54,56 non guided bougie);  Surgeon: Margarette Mcallister MD;  Location: [facility] ENDOSCOPY;  Service: Gastroenterology;  Laterality: N/A;  post op: esophageal stricture    EYE SURGERY Bilateral     cataracts    HIATAL HERNIA REPAIR      NEPHRECTOMY Right     cancer    UPPER ENDOSCOPIC ULTRASOUND W/ FNA N/A 09/22/2022    Procedure: EUS;  Surgeon: Margarette Mcallister MD;  Location: [facility] ENDOSCOPY;  Service: Gastroenterology;  Laterality: N/A;  post: normal pancreas, dilated pancreatic duct, hiatal hernia,      Home Meds:  Medications Prior to Admission   Medication Sig Dispense Refill Last Dose    acetaminophen (TYLENOL) 650 MG 8 hr tablet Take 2 tablets by mouth Every 8 (Eight) Hours As Needed for Mild Pain.   10/6/2024    albuterol sulfate  (90 Base) MCG/ACT inhaler Inhale 2 puffs Every 4 (Four) Hours As Needed for Wheezing or Shortness of Air.   10/6/2024    amLODIPine (NORVASC) 10 MG tablet Take 1 tablet by mouth Daily. Indications: High Blood Pressure   10/7/2024    aspirin 81 MG tablet Take 1 tablet by mouth Every 72 (Seventy-Two) Hours. Indications: ANTIPLATELET   10/7/2024    cloNIDine (CATAPRES) 0.1 MG tablet Take 1 tablet by mouth 2 (Two) Times a Day As Needed for High Blood Pressure.   10/6/2024    Diclofenac Sodium (VOLTAREN) 1 % gel gel Apply 4 g topically to the appropriate area as directed 3 (Three) Times a Day As Needed (pain). Indications: Joint Damage causing Pain and Loss of Function   Past Month    Finerenone (Kerendia) 10 MG tablet Take 1 tablet by  mouth Daily.   10/7/2024    furosemide (LASIX) 20 MG tablet Take 1 tablet by mouth Daily As Needed.   10/7/2024    guaiFENesin (MUCINEX) 600 MG 12 hr tablet Take 2 tablets by mouth 2 (Two) Times a Day As Needed for Cough or Congestion.   10/7/2024    HYDROcodone-acetaminophen (NORCO)  MG per tablet Take 1 tablet by mouth Every 6 (Six) Hours As Needed for Moderate Pain.   10/7/2024 at 1200    Insulin Glargine (LANTUS SOLOSTAR) 100 UNIT/ML injection pen Inject 10 Units under the skin into the appropriate area as directed Every Night. 100 mL 12 10/6/2024    ipratropium-albuterol (DUO-NEB) 0.5-2.5 mg/3 ml nebulizer Take 3 mL by nebulization 4 (Four) Times a Day As Needed for Wheezing. 360 mL 1 10/6/2024    ketoconazole (NIZORAL) 2 % shampoo Apply 1 Application topically to the appropriate area as directed 2 (Two) Times a Week.   Past Month    lidocaine (LIDODERM) 5 % Place 1 patch on the skin as directed by provider Daily As Needed for Mild Pain. Remove & Discard patch within 12 hours or as directed by MD   10/6/2024    Linzess 145 MCG capsule capsule Take 1 capsule by mouth Daily. Indications: CONSTIPATION   10/6/2024    pantoprazole (PROTONIX) 40 MG EC tablet Take 1 tablet by mouth Every Morning. 30 tablet 1 10/7/2024    polyethylene glycol (MIRALAX) 17 g packet Take 17 g by mouth Daily. Indications: Constipation   10/6/2024    sertraline (ZOLOFT) 50 MG tablet Take 1 tablet by mouth Daily. Indications: MOOD   10/7/2024    Umeclidinium-Vilanterol (Anoro Ellipta) 62.5-25 MCG/ACT aerosol powder  inhaler Inhale 1 puff Daily. At the same time each day.   10/7/2024    estradiol (ESTRACE) 0.1 MG/GM vaginal cream Insert 1 g into the vagina 3 (Three) Times a Week. Monday, Wednesday and Friday  Indications: VAGINAL HEALTH   More than a month     Current Meds:    Current Facility-Administered Medications:     amLODIPine (NORVASC) tablet 10 mg, 10 mg, Oral, Daily, Del Iverson MD, 10 mg at 10/10/24 0814     "HYDROcodone-acetaminophen (NORCO)  MG per tablet 1 tablet, 1 tablet, Oral, Q6H PRN, Del Iverson MD, 1 tablet at 10/11/24 0627    HYDROmorphone (DILAUDID) injection 0.5 mg, 0.5 mg, Intravenous, Q4H PRN, Del Iverson MD, 0.5 mg at 10/07/24 1738    insulin glargine (LANTUS, SEMGLEE) injection 10 Units, 10 Units, Subcutaneous, Nightly, Del Iverson MD, 10 Units at 10/10/24 2205    ipratropium-albuterol (DUO-NEB) nebulizer solution 3 mL, 3 mL, Nebulization, 4x Daily PRN, Del Iverson MD    ondansetron (ZOFRAN) injection 4 mg, 4 mg, Intravenous, Q6H PRN, Del Iverson MD, 4 mg at 10/07/24 1930    pantoprazole (PROTONIX) EC tablet 40 mg, 40 mg, Oral, Q AM, Del Iverson MD, 40 mg at 10/11/24 0627    polyethylene glycol (MIRALAX) packet 17 g, 17 g, Oral, Daily, Del Iverson MD, 17 g at 10/10/24 0838    sertraline (ZOLOFT) tablet 50 mg, 50 mg, Oral, Daily, Del Iverson MD, 50 mg at 10/10/24 0838  Allergies:  Erythromycin, Naproxen sodium, Statins, Latex, Metoclopramide, and Dicyclomine    Review of Systems     Objective:  tMax 24 hours:  Temp (24hrs), Av.9 °F (36.6 °C), Min:97.8 °F (36.6 °C), Max:98 °F (36.7 °C)    Vital Sign Ranges:  Temp:  [97.8 °F (36.6 °C)-98 °F (36.7 °C)] 97.8 °F (36.6 °C)  Heart Rate:  [62-91] 91  Resp:  [13-14] 14  BP: (123-167)/(75-86) 146/77  Intake and Output Last 3 Shifts:  I/O last 3 completed shifts:  In: 480 [P.O.:480]  Out: 500 [Urine:500]    Exam:  /77 (BP Location: Right arm, Patient Position: Lying)   Pulse 91   Temp 97.8 °F (36.6 °C) (Oral)   Resp 14   Ht 165.1 cm (65\")   Wt 82.7 kg (182 lb 5.1 oz)   LMP  (LMP Unknown)   SpO2 93%   BMI 30.34 kg/m²    General Appearance:    Alert, cooperative, no acute distress, general         appearance is normal   Head:    Normocephalic, without obvious abnormality, atraumatic   Eyes:            Pupils/Irises normal. Exterior inspection conjunctivae       and lids normal.   Ears:    Normal external " inspection   Nose:   Exterior inspection of nose is normal   Throat:   Lips, mucosa, and tongue normal   Lungs:     Respirations unlabored; normal effort, no audible     abnormality   CV:   Regular rhythm and normal rate, no edema   Abdomen:     examination of the abdomen is normal with     no masses, tenderness, or distension    :        Data Review:  All labs (24hrs):    Lab Results (last 24 hours)       Procedure Component Value Units Date/Time    Renal Function Panel [[id number]]  (Abnormal) Collected: 10/11/24 0254    Specimen: Blood from Hand, Left Updated: 10/11/24 0331     Glucose 159 mg/dL      BUN 20 mg/dL      Creatinine 1.51 mg/dL      Sodium 142 mmol/L      Potassium 4.0 mmol/L      Comment: Specimen hemolyzed.  Result may be falsely elevated.        Chloride 107 mmol/L      CO2 25.9 mmol/L      Calcium 9.2 mg/dL      Albumin 3.3 g/dL      Phosphorus 3.4 mg/dL      Anion Gap 9.1 mmol/L      BUN/Creatinine Ratio 13.2     eGFR 34.6 mL/min/1.73     Narrative:      GFR Normal >60  Chronic Kidney Disease <60  Kidney Failure <15    The GFR formula is only valid for adults with stable renal function between ages 18 and 70.    Blood Culture ID, PCR - Blood, Blood, Venous [[id number]]  (Abnormal) Collected: 10/09/24 2218    Specimen: Blood, Venous Updated: 10/11/24 0014     BCID, PCR Staph spp, not aureus or lugdunensis. Identification by BCID2 PCR.     BOTTLE TYPE Aerobic Bottle    Blood Culture - Blood, Blood, Venous [[id number]]  (Abnormal) Collected: 10/09/24 2218    Specimen: Blood, Venous Updated: 10/11/24 0014     Blood Culture Abnormal Stain     Gram Stain Aerobic Bottle Gram positive cocci in clusters    Blood Culture - Blood, Arm, Right [[id number]]  (Normal) Collected: 10/09/24 2318    Specimen: Blood from Arm, Right Updated: 10/10/24 2331     Blood Culture No growth at 24 hours    POC Glucose Once [[id number]]  (Abnormal) Collected: 10/10/24 2156    Specimen: Blood Updated: 10/10/24 2158     Glucose 194  mg/dL      Comment: Serial Number: [id number]:  [id number]       Urine Culture - Urine, Urine, Clean Catch [[id number]]  (Abnormal)  (Susceptibility) Collected: 10/08/24 1257    Specimen: Urine, Clean Catch Updated: 10/10/24 1413     Urine Culture >100,000 CFU/mL Escherichia coli ESBL    Narrative:      Colonization of the urinary tract without infection is common. Treatment is discouraged unless the patient is symptomatic, pregnant, or undergoing an invasive urologic procedure.    Recent outcomes data supports the use of pip/tazo in the treatment of susceptible ESBL infections for uncomplicated UTI. Consider use of pip/tazo as a carbapenem-sparing regimen in applicable patients.    Susceptibility        Escherichia coli ESBL      DEON      Ertapenem Susceptible      Gentamicin Susceptible      Levofloxacin Resistant      Meropenem Susceptible      Nitrofurantoin Susceptible      Piperacillin + Tazobactam Susceptible      Trimethoprim + Sulfamethoxazole Resistant                           Urine Culture - Urine, Urine, Clean Catch [[id number]]  (Abnormal) Collected: 10/09/24 1443    Specimen: Urine, Clean Catch Updated: 10/10/24 1303     Urine Culture >100,000 CFU/mL Escherichia coli    Narrative:      Colonization of the urinary tract without infection is common. Treatment is discouraged unless the patient is symptomatic, pregnant, or undergoing an invasive urologic procedure.          Radiology:   Imaging Results (Last 72 Hours)       Procedure Component Value Units Date/Time    CT Enterography Abdomen Pelvis w Contrast [[id number]] Collected: 10/08/24 1156     Updated: 10/08/24 1205    Narrative:      CT ABDOMEN PELVIS W CONTRAST ENTEROGRAPHY    Date of Exam: 10/8/2024 11:38 AM EDT    Indication: abnormal KUB, persistent abdominal pain, enteritis.    Comparison: CT of the abdomen and pelvis dated 9/25/2024    Technique: Axial CT images were obtained of the abdomen and pelvis after the uneventful intravenous  administration of iodinated contrast.  Neutral oral contrast was also administered for the enterography protocol.  Sagittal and coronal reconstructions   were performed.  Automated exposure control and iterative reconstruction methods were used.      Findings:    Liver: The liver is unremarkable in morphology. No focal liver lesion is seen. No biliary dilation is seen.    Gallbladder: Surgically absent.    Pancreas: Unremarkable.    Spleen: Unremarkable.    Adrenal glands: Small low-density left adrenal nodule, unchanged, likely an adenoma. Right adrenal gland is unremarkable    Genitourinary tract: Status post right nephrectomy. The left kidney is unremarkable. No hydronephrosis is seen. The visualized left ureter is unremarkable. Urinary bladder is unremarkable. Pelvic organs demonstrate no acute abnormality.    Gastrointestinal tract: Surgical changes of the rectosigmoid colon. Surgical changes of the gastroesophageal junction. Hollow viscera appear otherwise unremarkable. No evidence of bowel obstruction.    Appendix: No findings to suggest acute appendicitis.    Other findings: No free air or free fluid is identified. No pathologically enlarged lymph nodes are seen. Vascular calcifications are present. The IVC is unremarkable.    Bones and soft tissues: No acute or suspicious osseous or soft tissue lesion is identified.    Lung bases: Tiny right lung base granuloma. Calcified right hilar lymph nodes noted. Mild left basilar atelectasis. Coronary artery calcifications.      Impression:      Impression:  1.No acute abnormality identified within the abdomen or pelvis.  2.Nonemergent findings as detailed above.      Electronically Signed: Pa Sweet MD    10/8/2024 12:03 PM EDT    Workstation ID: [id number]            Assessment/Plan:    Principal Problem:    Abdominal pain    Irritative voiding symptoms which are managed with Myrbetriq.  Symptoms much worse with current urinary tract infection but improving  after 1 dose of Rocephin and 1 dose of gentamicin.  Sensitivities still pending.    Plan  Will defer antibiotic choice to primary based on culture when available  Nothing further to add at this time so we will sign off  Patient to follow-up with me in 2 weeks      Bautista Ray MD  10/11/2024  06:47 EDT

## 2024-10-11 NOTE — PLAN OF CARE
Goal Outcome Evaluation:      Patient doing well, resting most of day, patient to dc home via daughter around noon, dc education provided

## 2024-10-11 NOTE — PROGRESS NOTES
"NEPHROLOGY PROGRESS NOTE------[facility]    [facility]  [phone]  Rolando Miller MD      Patient Care Team:  Anny Garcia MD as PCP - General (Internal Medicine)  Sergio Ordonez MD as Consulting Physician (Nephrology)      Provider:  Rolando Miller MD  Patient Name: Vianey Reeves  :  1945    SUBJECTIVE:  F/U CKD  No chest pain or SOA    Medication:  amLODIPine, 10 mg, Oral, Daily  insulin glargine, 10 Units, Subcutaneous, Nightly  pantoprazole, 40 mg, Oral, Q AM  polyethylene glycol, 17 g, Oral, Daily  sertraline, 50 mg, Oral, Daily             OBJECTIVE    Vital Sign Min/Max for last 24 hours  Temp  Min: 97.8 °F (36.6 °C)  Max: 98 °F (36.7 °C)   BP  Min: 146/77  Max: 167/86   Pulse  Min: 64  Max: 91   Resp  Min: 14  Max: 14   SpO2  Min: 92 %  Max: 93 %   No data recorded   No data recorded     Flowsheet Rows      Flowsheet Row First Filed Value   Admission Height 165.1 cm (65\") Documented at 10/08/2024 2011   Admission Weight 82.7 kg (182 lb 5.1 oz) Documented at 10/08/2024 2011            No intake/output data recorded.  I/O last 3 completed shifts:  In: 360 [P.O.:360]  Out: 250 [Urine:250]    Physical Exam:  General Appearance: alert, appears stated age and cooperative  Head: normocephalic, without obvious abnormality and atraumatic  Eyes: conjunctivae and sclerae normal and no icterus  Neck: supple and no JVD  Lungs: clear to auscultation and respirations regular  Heart: regular rhythm & normal rate and normal S1, S2  Chest: Wall no abnormalities observed  Abdomen: normal bowel sounds and soft, nontender  Extremities: moves extremities well, no edema, no cyanosis and no redness  Skin: no bleeding, bruising or rash, turgor normal, color normal and no lesions noted  Neurologic: Alert, and oriented. No focal deficits    Labs:    WBC WBC   Date Value Ref Range Status   10/09/2024 7.84 3.40 - 10.80 10*3/mm3 Final      HGB " "Hemoglobin   Date Value Ref Range Status   10/09/2024 13.9 12.0 - 15.9 g/dL Final      HCT Hematocrit   Date Value Ref Range Status   10/09/2024 43.0 34.0 - 46.6 % Final      Platelets No results found for: \"LABPLAT\"   MCV MCV   Date Value Ref Range Status   10/09/2024 100.2 (H) 79.0 - 97.0 fL Final          Sodium Sodium   Date Value Ref Range Status   10/11/2024 142 136 - 145 mmol/L Final   10/09/2024 141 136 - 145 mmol/L Final   10/09/2024 142 136 - 145 mmol/L Final      Potassium Potassium   Date Value Ref Range Status   10/11/2024 4.0 3.5 - 5.2 mmol/L Final     Comment:     Specimen hemolyzed.  Result may be falsely elevated.   10/09/2024 4.2 3.5 - 5.2 mmol/L Final     Comment:     Specimen hemolyzed.  Result may be falsely elevated.   10/09/2024 4.1 3.5 - 5.2 mmol/L Final     Comment:     Slight hemolysis detected by analyzer. Result may be falsely elevated.      Chloride Chloride   Date Value Ref Range Status   10/11/2024 107 98 - 107 mmol/L Final   10/09/2024 106 98 - 107 mmol/L Final   10/09/2024 107 98 - 107 mmol/L Final      CO2 CO2   Date Value Ref Range Status   10/11/2024 25.9 22.0 - 29.0 mmol/L Final   10/09/2024 26.5 22.0 - 29.0 mmol/L Final   10/09/2024 25.2 22.0 - 29.0 mmol/L Final      BUN BUN   Date Value Ref Range Status   10/11/2024 20 8 - 23 mg/dL Final   10/09/2024 20 8 - 23 mg/dL Final   10/09/2024 20 8 - 23 mg/dL Final      Creatinine Creatinine   Date Value Ref Range Status   10/11/2024 1.51 (H) 0.57 - 1.00 mg/dL Final   10/09/2024 1.25 (H) 0.57 - 1.00 mg/dL Final   10/09/2024 1.36 (H) 0.57 - 1.00 mg/dL Final      Calcium Calcium   Date Value Ref Range Status   10/11/2024 9.2 8.6 - 10.5 mg/dL Final   10/09/2024 9.8 8.6 - 10.5 mg/dL Final   10/09/2024 9.2 8.6 - 10.5 mg/dL Final      PO4 No components found for: \"PO4\"   Albumin Albumin   Date Value Ref Range Status   10/11/2024 3.3 (L) 3.5 - 5.2 g/dL Final   10/09/2024 3.6 3.5 - 5.2 g/dL Final   10/09/2024 3.5 3.5 - 5.2 g/dL Final    " "  Magnesium No results found for: \"MG\"   Uric Acid No components found for: \"URIC ACID\"     Imaging Results (Last 72 Hours)       Procedure Component Value Units Date/Time    CT Enterography Abdomen Pelvis w Contrast [[id number]] Collected: 10/08/24 1156     Updated: 10/08/24 1205    Narrative:      CT ABDOMEN PELVIS W CONTRAST ENTEROGRAPHY    Date of Exam: 10/8/2024 11:38 AM EDT    Indication: abnormal KUB, persistent abdominal pain, enteritis.    Comparison: CT of the abdomen and pelvis dated 9/25/2024    Technique: Axial CT images were obtained of the abdomen and pelvis after the uneventful intravenous administration of iodinated contrast.  Neutral oral contrast was also administered for the enterography protocol.  Sagittal and coronal reconstructions   were performed.  Automated exposure control and iterative reconstruction methods were used.      Findings:    Liver: The liver is unremarkable in morphology. No focal liver lesion is seen. No biliary dilation is seen.    Gallbladder: Surgically absent.    Pancreas: Unremarkable.    Spleen: Unremarkable.    Adrenal glands: Small low-density left adrenal nodule, unchanged, likely an adenoma. Right adrenal gland is unremarkable    Genitourinary tract: Status post right nephrectomy. The left kidney is unremarkable. No hydronephrosis is seen. The visualized left ureter is unremarkable. Urinary bladder is unremarkable. Pelvic organs demonstrate no acute abnormality.    Gastrointestinal tract: Surgical changes of the rectosigmoid colon. Surgical changes of the gastroesophageal junction. Hollow viscera appear otherwise unremarkable. No evidence of bowel obstruction.    Appendix: No findings to suggest acute appendicitis.    Other findings: No free air or free fluid is identified. No pathologically enlarged lymph nodes are seen. Vascular calcifications are present. The IVC is unremarkable.    Bones and soft tissues: No acute or suspicious osseous or soft tissue lesion is " identified.    Lung bases: Tiny right lung base granuloma. Calcified right hilar lymph nodes noted. Mild left basilar atelectasis. Coronary artery calcifications.      Impression:      Impression:  1.No acute abnormality identified within the abdomen or pelvis.  2.Nonemergent findings as detailed above.      Electronically Signed: Pa Sweet MD    10/8/2024 12:03 PM EDT    Workstation ID: [id number]            Results for orders placed during the hospital encounter of 10/07/24    XR Abdomen KUB    Narrative  XR ABDOMEN KUB    Date of Exam: 10/7/2024 6:54 PM EDT    Indication: abdominal pain/post-prandial    Comparison: CT abdomen and pelvis dated 9/25/2024.    Findings:  There are postsurgical changes near the GE junction likely related to prior hiatal hernia repair. There are right upper quadrant cholecystectomy clips. There is nonspecific bowel gas pattern with gaseous distention of the small bowel centrally up to 4.6  cm. There is linear chain suture within the pelvis likely related to prior colonic resection. There is an overall mild colonic stool burden. There are no abnormal renal calcifications.    Impression  Impression:  1. Nonspecific bowel gas pattern with gas-filled dilated loop of small bowel measuring up to 4.6 cm centrally. Findings could relate to ileus or enteritis. Obstruction is less likely.  2. Mild colonic stool burden.      Electronically Signed: Freddy Park  10/7/2024 9:43 PM EDT  Workstation ID: [id number]      XR Chest 1 View    Narrative  XR CHEST 1 VW    Date of Exam: 10/7/2024 8:56 PM EDT    Indication: abdominal pain    Comparison: Chest radiograph dated 9/23/2024    Findings:  The cardiomediastinal silhouette is within normal limits. Pulmonary vascularity appears normal. There our paratracheal and subcarinal calcified lymph nodes likely related to chronic granulomatous disease. There is evidence of prior kyphoplasty. There is  no acute consolidation or pleural effusion. There is no  evidence of pneumothorax.    Impression  Impression:  1. No acute cardiopulmonary abnormality.  2. Postprocedural changes of kyphoplasty at the mid thoracic spine level.      Electronically Signed: Freddy Park  10/7/2024 9:45 PM EDT  Workstation ID: [id number]      Results for orders placed during the hospital encounter of 09/23/24    XR Abdomen KUB    Narrative  XR ABDOMEN KUB    Date of Exam: 9/24/2024 3:10 PM EDT    Indication: R flank pain/thoracic pain    Comparison: CT abdomen pelvis 2/22/2023, abdominal radiograph 1/28/2023.    Findings:  Nonobstructive bowel gas pattern. Mild/physiologic stool burden. No distinct stones project over the left renal shadow. Scattered surgical clips. Degenerative change of the spine.    Impression  Impression:  No evidence of bowel obstruction. Mild/physiologic stool burden.      Electronically Signed: Emre Stevenson MD  9/24/2024 8:24 PM EDT  Workstation ID: [id number]      Results for orders placed during the hospital encounter of 08/22/23    Doppler Ankle Brachial Index Single Level CAR    Interpretation Summary    Right Conclusion: The right IKE is normal. Normal digital pressures.    Left Conclusion: The left IKE is normal. Mild digital ischemia.        ASSESSMENT / PLAN      Abdominal pain    CKD stage IIIa-CKD due to hypertensive nephrosclerosis and/or diabetic glomerulosclerosis  History of right nephrectomy/ureteral cancer  Diabetes type 2  Hypertension  History of coronary disease  COPD  Abdominal pain-per GI.  CT enterography today     Creatinine slightly up        Rolando Miller MD  [facility]  [phone]  10/11/24  11:45 EDT

## 2024-10-12 LAB
BACTERIA SPEC AEROBE CULT: ABNORMAL
GRAM STN SPEC: ABNORMAL
ISOLATED FROM: ABNORMAL

## 2024-10-14 ENCOUNTER — READMISSION MANAGEMENT (OUTPATIENT)
Dept: CALL CENTER | Facility: HOSPITAL | Age: 79
End: 2024-10-14
Payer: MEDICARE

## 2024-10-14 LAB — BACTERIA SPEC AEROBE CULT: NORMAL

## 2024-10-14 NOTE — CASE MANAGEMENT/SOCIAL WORK
Case Management Discharge Note      Final Note: Routine home         Selected Continued Care - Discharged on 10/11/2024 Admission date: 10/7/2024 - Discharge disposition: Home or Self Care                  Selected Continued Care - Prior Encounters Includes continued care and service providers with selected services from prior encounters from 7/9/2024 to 10/11/2024                Transportation Services  Private: Car    Final Discharge Disposition Code: 01 - home or self-care

## 2024-10-14 NOTE — OUTREACH NOTE
Medical Week 1 Survey      Flowsheet Row Responses   [facility] facility patient discharged from? [facility]   Does the patient have one of the following disease processes/diagnoses(primary or secondary)? Other   Week 1 attempt successful? No   Unsuccessful attempts Attempt 1            Shaila Celestin Registered Nurse

## 2024-10-16 ENCOUNTER — OFFICE (AMBULATORY)
Age: 79
End: 2024-10-16
Payer: MEDICAID

## 2024-10-16 ENCOUNTER — READMISSION MANAGEMENT (OUTPATIENT)
Dept: CALL CENTER | Facility: HOSPITAL | Age: 79
End: 2024-10-16
Payer: MEDICARE

## 2024-10-16 ENCOUNTER — OFFICE (AMBULATORY)
Dept: URBAN - METROPOLITAN AREA CLINIC 64 | Facility: CLINIC | Age: 79
End: 2024-10-16
Payer: MEDICAID

## 2024-10-16 VITALS
HEART RATE: 88 BPM | HEART RATE: 88 BPM | DIASTOLIC BLOOD PRESSURE: 91 MMHG | SYSTOLIC BLOOD PRESSURE: 139 MMHG | HEIGHT: 65 IN | HEART RATE: 88 BPM | SYSTOLIC BLOOD PRESSURE: 139 MMHG | DIASTOLIC BLOOD PRESSURE: 91 MMHG | DIASTOLIC BLOOD PRESSURE: 91 MMHG | HEIGHT: 65 IN | WEIGHT: 174 LBS | WEIGHT: 174 LBS | SYSTOLIC BLOOD PRESSURE: 139 MMHG | SYSTOLIC BLOOD PRESSURE: 139 MMHG | HEART RATE: 88 BPM | HEART RATE: 88 BPM | SYSTOLIC BLOOD PRESSURE: 139 MMHG | DIASTOLIC BLOOD PRESSURE: 91 MMHG | DIASTOLIC BLOOD PRESSURE: 91 MMHG | HEIGHT: 65 IN | WEIGHT: 174 LBS | HEIGHT: 65 IN | WEIGHT: 174 LBS | HEIGHT: 65 IN | HEIGHT: 65 IN | SYSTOLIC BLOOD PRESSURE: 139 MMHG | HEART RATE: 88 BPM | WEIGHT: 174 LBS | DIASTOLIC BLOOD PRESSURE: 91 MMHG | DIASTOLIC BLOOD PRESSURE: 91 MMHG | SYSTOLIC BLOOD PRESSURE: 139 MMHG | HEIGHT: 65 IN | HEART RATE: 88 BPM | WEIGHT: 174 LBS | WEIGHT: 174 LBS

## 2024-10-16 DIAGNOSIS — M54.9 DORSALGIA, UNSPECIFIED: ICD-10-CM

## 2024-10-16 DIAGNOSIS — R10.11 RIGHT UPPER QUADRANT PAIN: ICD-10-CM

## 2024-10-16 PROCEDURE — 99214 OFFICE O/P EST MOD 30 MIN: CPT

## 2024-10-16 NOTE — OUTREACH NOTE
Medical Week 1 Survey      Flowsheet Row Responses   [facility] facility patient discharged from? [facility]   Does the patient have one of the following disease processes/diagnoses(primary or secondary)? Other   Week 1 attempt successful? No   Unsuccessful attempts Attempt 2            Rhiannon MANRIQUE - Registered Nurse

## 2024-10-21 ENCOUNTER — READMISSION MANAGEMENT (OUTPATIENT)
Dept: CALL CENTER | Facility: HOSPITAL | Age: 79
End: 2024-10-21
Payer: MEDICARE

## 2024-10-21 NOTE — OUTREACH NOTE
Medical Week 1 Survey      Flowsheet Row Responses   [facility] patient discharged from? [facility]   Does the patient have one of the following disease processes/diagnoses(primary or secondary)? Other   Week 1 attempt successful? Yes   Call start time 1053   Call end time 1059   Is patient permission given to speak with other caregiver? Yes   Person spoke with today (if not patient) and relationship Carolina-granddaughter.   Meds reviewed with patient/caregiver? Yes   Is the patient having any side effects they believe may be caused by any medication additions or changes? No   Does the patient have all medications ordered at discharge? N/A   Is the patient taking all medications as directed (includes completed medication regime)? Yes   Comments regarding appointments Dr Iverson appt 10/24/24, scope on 10/25/24.   Does the patient have a primary care provider?  Yes   Does the patient have an appointment with their PCP within 7 days of discharge? Yes   Has the patient kept scheduled appointments due by today? N/A   Has home health visited the patient within 72 hours of discharge? N/A   Psychosocial issues? No   Did the patient receive a copy of their discharge instructions? Yes   Nursing interventions Reviewed instructions with patient   What is the patient's perception of their health status since discharge? Same   Is the patient/caregiver able to teach back signs and symptoms related to disease process for when to call PCP? Yes   Is the patient/caregiver able to teach back signs and symptoms related to disease process for when to call 911? Yes   Is the patient/caregiver able to teach back the hierarchy of who to call/visit for symptoms/problems? PCP, Specialist, Home health nurse, Urgent Care, ED, 911 Yes   If the patient is a current smoker, are they able to teach back resources for cessation? Not a smoker   Week 1 call completed? Yes   Would this patient benefit from a Referral to [facility] Social Work? No   Is the  patient interested in additional calls from an ambulatory ? No   Wrap up additional comments Granddaughter states patient is still having some abdominal pain. States patient is eating very little due to pain after eating. States also has pain upon movement. Denies any vomiting/diarrhea or s/s of GI bleeding. States will see Dr Iverson later this week, as well as will have scope done on Friday. Denies any needs at this time.   Call end time [id number]            Joanna BARNETT - Registered Nurse

## 2024-10-25 ENCOUNTER — ON CAMPUS - OUTPATIENT (AMBULATORY)
Dept: URBAN - METROPOLITAN AREA HOSPITAL 2 | Facility: HOSPITAL | Age: 79
End: 2024-10-25
Payer: COMMERCIAL

## 2024-10-25 ENCOUNTER — OFFICE (AMBULATORY)
Age: 79
End: 2024-10-25
Payer: COMMERCIAL

## 2024-10-25 ENCOUNTER — ON CAMPUS - OUTPATIENT (AMBULATORY)
Age: 79
End: 2024-10-25
Payer: COMMERCIAL

## 2024-10-25 ENCOUNTER — OFFICE (AMBULATORY)
Dept: URBAN - METROPOLITAN AREA PATHOLOGY 19 | Facility: PATHOLOGY | Age: 79
End: 2024-10-25
Payer: COMMERCIAL

## 2024-10-25 VITALS
SYSTOLIC BLOOD PRESSURE: 155 MMHG | SYSTOLIC BLOOD PRESSURE: 145 MMHG | WEIGHT: 175 LBS | HEIGHT: 65 IN | OXYGEN SATURATION: 98 % | RESPIRATION RATE: 14 BRPM | HEART RATE: 79 BPM | SYSTOLIC BLOOD PRESSURE: 145 MMHG | DIASTOLIC BLOOD PRESSURE: 90 MMHG | SYSTOLIC BLOOD PRESSURE: 155 MMHG | RESPIRATION RATE: 18 BRPM | SYSTOLIC BLOOD PRESSURE: 156 MMHG | RESPIRATION RATE: 14 BRPM | DIASTOLIC BLOOD PRESSURE: 68 MMHG | DIASTOLIC BLOOD PRESSURE: 82 MMHG | SYSTOLIC BLOOD PRESSURE: 144 MMHG | DIASTOLIC BLOOD PRESSURE: 83 MMHG | TEMPERATURE: 98.5 F | DIASTOLIC BLOOD PRESSURE: 82 MMHG | HEART RATE: 87 BPM | SYSTOLIC BLOOD PRESSURE: 160 MMHG | OXYGEN SATURATION: 98 % | DIASTOLIC BLOOD PRESSURE: 86 MMHG | HEART RATE: 87 BPM | OXYGEN SATURATION: 99 % | HEART RATE: 82 BPM | DIASTOLIC BLOOD PRESSURE: 116 MMHG | OXYGEN SATURATION: 96 % | HEART RATE: 79 BPM | DIASTOLIC BLOOD PRESSURE: 116 MMHG | HEART RATE: 81 BPM | HEART RATE: 81 BPM | SYSTOLIC BLOOD PRESSURE: 156 MMHG | TEMPERATURE: 98.5 F | DIASTOLIC BLOOD PRESSURE: 68 MMHG | HEART RATE: 80 BPM | OXYGEN SATURATION: 97 % | OXYGEN SATURATION: 96 % | OXYGEN SATURATION: 99 % | HEART RATE: 82 BPM | OXYGEN SATURATION: 95 % | RESPIRATION RATE: 18 BRPM | DIASTOLIC BLOOD PRESSURE: 83 MMHG | SYSTOLIC BLOOD PRESSURE: 144 MMHG | SYSTOLIC BLOOD PRESSURE: 114 MMHG | OXYGEN SATURATION: 97 % | OXYGEN SATURATION: 95 % | HEART RATE: 80 BPM | WEIGHT: 175 LBS | HEIGHT: 65 IN | DIASTOLIC BLOOD PRESSURE: 86 MMHG | SYSTOLIC BLOOD PRESSURE: 114 MMHG | DIASTOLIC BLOOD PRESSURE: 90 MMHG | SYSTOLIC BLOOD PRESSURE: 160 MMHG

## 2024-10-25 DIAGNOSIS — K92.2 GASTROINTESTINAL HEMORRHAGE, UNSPECIFIED: ICD-10-CM

## 2024-10-25 DIAGNOSIS — Z48.815 ENCOUNTER FOR SURGICAL AFTERCARE FOLLOWING SURGERY ON THE DI: ICD-10-CM

## 2024-10-25 DIAGNOSIS — R10.11 RIGHT UPPER QUADRANT PAIN: ICD-10-CM

## 2024-10-25 DIAGNOSIS — K31.89 OTHER DISEASES OF STOMACH AND DUODENUM: ICD-10-CM

## 2024-10-25 LAB
GI HISTOLOGY: A. STOMACH ANTRUM: (no result)
GI HISTOLOGY: A. STOMACH ANTRUM: (no result)
GI HISTOLOGY: PDF REPORT: (no result)
GI HISTOLOGY: PDF REPORT: (no result)

## 2024-10-25 PROCEDURE — 43239 EGD BIOPSY SINGLE/MULTIPLE: CPT | Performed by: INTERNAL MEDICINE

## 2024-10-25 PROCEDURE — 88305 TISSUE EXAM BY PATHOLOGIST: CPT | Mod: 26 | Performed by: PATHOLOGY

## 2024-10-25 RX ORDER — PANTOPRAZOLE 40 MG/1
40 TABLET, DELAYED RELEASE ORAL
Qty: 90 | Refills: 3 | Status: ACTIVE

## 2024-10-25 RX ORDER — SUCRALFATE 1 G/1
3 TABLET ORAL
Qty: 30 | Refills: 0 | Status: ACTIVE
Start: 2024-10-25

## 2024-10-29 ENCOUNTER — READMISSION MANAGEMENT (OUTPATIENT)
Dept: CALL CENTER | Facility: HOSPITAL | Age: 79
End: 2024-10-29
Payer: MEDICARE

## 2024-10-29 NOTE — OUTREACH NOTE
Medical Week 2 Survey      Flowsheet Row Responses   [facility] facility patient discharged from? [facility]   Does the patient have one of the following disease processes/diagnoses(primary or secondary)? Other   Week 2 attempt successful? No   Unsuccessful attempts Attempt 2  [All numbers listed were attempted-no answer]            Shannan H - Registered Nurse

## 2024-11-05 ENCOUNTER — OFFICE VISIT (OUTPATIENT)
Dept: ORTHOPEDIC SURGERY | Facility: CLINIC | Age: 79
End: 2024-11-05
Payer: MEDICARE

## 2024-11-05 VITALS — WEIGHT: 182 LBS | OXYGEN SATURATION: 96 % | BODY MASS INDEX: 30.32 KG/M2 | RESPIRATION RATE: 20 BRPM | HEIGHT: 65 IN

## 2024-11-05 DIAGNOSIS — M19.012 GLENOHUMERAL ARTHRITIS, LEFT: ICD-10-CM

## 2024-11-05 DIAGNOSIS — M75.102 TEAR OF LEFT SUPRASPINATUS TENDON: Primary | ICD-10-CM

## 2024-11-05 RX ORDER — LIDOCAINE HYDROCHLORIDE 10 MG/ML
2 INJECTION, SOLUTION INFILTRATION; PERINEURAL
Status: COMPLETED | OUTPATIENT
Start: 2024-11-05 | End: 2024-11-05

## 2024-11-05 RX ORDER — TRIAMCINOLONE ACETONIDE 40 MG/ML
80 INJECTION, SUSPENSION INTRA-ARTICULAR; INTRAMUSCULAR
Status: COMPLETED | OUTPATIENT
Start: 2024-11-05 | End: 2024-11-05

## 2024-11-05 RX ORDER — ALLOPURINOL 100 MG/1
TABLET ORAL
COMMUNITY
End: 2024-11-08

## 2024-11-05 RX ORDER — SITAGLIPTIN 100 MG/1
TABLET, FILM COATED ORAL
COMMUNITY
Start: 2024-11-04 | End: 2024-11-08

## 2024-11-05 RX ADMIN — LIDOCAINE HYDROCHLORIDE 2 ML: 10 INJECTION, SOLUTION INFILTRATION; PERINEURAL at 14:03

## 2024-11-05 RX ADMIN — TRIAMCINOLONE ACETONIDE 80 MG: 40 INJECTION, SUSPENSION INTRA-ARTICULAR; INTRAMUSCULAR at 14:03

## 2024-11-05 NOTE — PROGRESS NOTES
INJECTION VISIT    Patient: Vianey Reeves    YOB: 1945    MRN: [id number]    Chief Complaint   Patient presents with    Left Shoulder - Follow-up     Pain with ROM     Pain for a couple weeks            History of Present Illness:   Vianey Reeves is a 79 y.o. year old who presents today for injection of the left shoulder. Her last injection was 3 months ago and provided good relief of symptoms with no adverse reactions.         Allergies:   Allergies   Allergen Reactions    Erythromycin Rash    Naproxen Sodium Other (See Comments)     Dizzy lightheaded    Statins Myalgia    Latex Itching, Swelling and Hives    Metoclopramide Other (See Comments)     Unsteady on feet    Dicyclomine Unknown - Low Severity       Medications:   Home Medications:  Current Outpatient Medications on File Prior to Visit   Medication Sig    acetaminophen (TYLENOL) 650 MG 8 hr tablet Take 2 tablets by mouth Every 8 (Eight) Hours As Needed for Mild Pain.    albuterol sulfate  (90 Base) MCG/ACT inhaler Inhale 2 puffs Every 4 (Four) Hours As Needed for Wheezing or Shortness of Air.    allopurinol (ZYLOPRIM) 100 MG tablet 0    amLODIPine (NORVASC) 10 MG tablet Take 1 tablet by mouth Daily. Indications: High Blood Pressure    aspirin 81 MG tablet Take 1 tablet by mouth Every 72 (Seventy-Two) Hours. Indications: ANTIPLATELET    Diclofenac Sodium (VOLTAREN) 1 % gel gel Apply 4 g topically to the appropriate area as directed 3 (Three) Times a Day As Needed (pain). Indications: Joint Damage causing Pain and Loss of Function    estradiol (ESTRACE) 0.1 MG/GM vaginal cream Insert 1 g into the vagina 3 (Three) Times a Week. Monday, Wednesday and Friday  Indications: VAGINAL HEALTH    Finerenone (Kerendia) 10 MG tablet Take 1 tablet by mouth Daily.    furosemide (LASIX) 20 MG tablet Take 1 tablet by mouth Daily As Needed.    HYDROcodone-acetaminophen (NORCO)  MG per tablet Take 1 tablet by mouth Every 6 (Six) Hours As Needed  for Moderate Pain.    Insulin Glargine (LANTUS SOLOSTAR) 100 UNIT/ML injection pen Inject 10 Units under the skin into the appropriate area as directed Every Night.    ipratropium-albuterol (DUO-NEB) 0.5-2.5 mg/3 ml nebulizer Take 3 mL by nebulization 4 (Four) Times a Day As Needed for Wheezing.    Januvia 100 MG tablet     lidocaine (LIDODERM) 5 % Place 1 patch on the skin as directed by provider Daily As Needed for Mild Pain. Remove & Discard patch within 12 hours or as directed by MD Mohr 145 MCG capsule capsule Take 1 capsule by mouth Daily. Indications: CONSTIPATION    pantoprazole (PROTONIX) 40 MG EC tablet Take 1 tablet by mouth Every Morning.    polyethylene glycol (MIRALAX) 17 g packet Take 17 g by mouth Daily. Indications: Constipation    sertraline (ZOLOFT) 50 MG tablet Take 1 tablet by mouth Daily. Indications: MOOD    Umeclidinium-Vilanterol (Anoro Ellipta) 62.5-25 MCG/ACT aerosol powder  inhaler Inhale 1 puff Daily. At the same time each day.     No current facility-administered medications on file prior to visit.         I have reviewed the patient's medical history in detail and updated the computerized patient record.  Review and summarization of old records include:    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    Hypertension     IBS (irritable colon syndrome)     constipation    Mass of right breast     Neuropathy     Renal insufficiency     Sciatic  leg pain     right    Simple chronic bronchitis     Solitary kidney     left     Past Surgical History:   Procedure Laterality Date    BREAST BIOPSY Right     BRONCHOSCOPY N/A 2024    Procedure: BRONCHOSCOPY WITH BRONCHOALVEOLAR LAVAGE;  Surgeon: Marlin Ferguson MD;  Location: [facility] ENDOSCOPY;  Service: Pulmonary;  Laterality: N/A;  POST: RML ATELECTASIS    CARDIAC CATHETERIZATION      CHOLECYSTECTOMY      COLON SURGERY      REMOVAL diverticular diseae    COLONOSCOPY N/A 2021    Procedure: COLONOSCOPY with polypectomy x 3;  Surgeon: Margarette Mcallister MD;  Location: [facility] ENDOSCOPY;  Service: Gastroenterology;  Laterality: N/A;  post op: hmorrhoids, diverticulosis, polyps    CORONARY STENT PLACEMENT      ENDOSCOPY N/A 2021    Procedure: ESOPHAGOGASTRODUODENOSCOPY with dilatation (18-20 mm balloon) and (54 bougie);  Surgeon: Margarette Mcallister MD;  Location: [facility] ENDOSCOPY;  Service: Gastroenterology;  Laterality: N/A;  post op: esophageal stricture, esophagitis, gastritis, history of nissen    ENDOSCOPY N/A 2023    Procedure: ESOPHAGOGASTRODUODENOSCOPY with biopsy x 1 area and dilation (50,54,56 non guided bougie);  Surgeon: Margarette Mcallister MD;  Location: [facility] ENDOSCOPY;  Service: Gastroenterology;  Laterality: N/A;  post op: esophageal stricture    EYE SURGERY Bilateral     cataracts    HIATAL HERNIA REPAIR      NEPHRECTOMY Right     cancer    UPPER ENDOSCOPIC ULTRASOUND W/ FNA N/A 2022    Procedure: EUS;  Surgeon: Margarette Mcallister MD;  Location: [facility] ENDOSCOPY;  Service: Gastroenterology;  Laterality: N/A;  post: normal pancreas, dilated pancreatic duct, hiatal hernia,      Social History     Occupational History    Not on file   Tobacco Use    Smoking status: Former     Current packs/day: 0.00     Average packs/day: 0.3 packs/day for 50.0 years (12.5 ttl pk-yrs)     Types: Cigarettes     Start date: 1973     Quit date: 2023     Years since quittin.0     Passive exposure: Past     Smokeless tobacco: Never    Tobacco comments:     Mostly patches, some days none, some days 1-2   Vaping Use    Vaping status: Never Used   Substance and Sexual Activity    Alcohol use: Not Currently     Comment: occasionally     Drug use: Never    Sexual activity: Defer      Social History     Social History Narrative    Not on file     Family History   Problem Relation Age of Onset    Arthritis Father     Prostate cancer Father     Heart disease Sister                ROS  Negative unless listed in the HPI        Physical Exam  79 y.o. female  Body mass index is 30.29 kg/m²., 82.6 kg (182 lb)  Vitals:    11/05/24 1329   Resp: 20   SpO2: 96%     General: Alert, cooperative, appears well and in no observable distress.   HEENT: Normocephalic, atraumatic on external visual inspection. No icterus.   CV: No significant peripheral edema.   Respiratory: Normal respiratory effort.   Skin: Warm & well perfused; appropriate skin turgor.  Psych: Appropriate mood & affect.  Neuro: Gross sensation and motor intact in affected extremity/extremities.      Procedure:  Large Joint Arthrocentesis: L subacromial bursa  Date/Time: 11/5/2024 2:03 PM  Consent given by: patient  Site marked: site marked  Timeout: Immediately prior to procedure a time out was called to verify the correct patient, procedure, equipment, support staff and site/side marked as required   Supporting Documentation  Indications: pain   Procedure Details  Location: shoulder - L subacromial bursa  Needle size: 25 G  Approach: posterior  Medications administered: 2 mL lidocaine 1 %; 80 mg triamcinolone acetonide 40 MG/ML  Patient tolerance: patient tolerated the procedure well with no immediate complications            Assessment:   Diagnoses and all orders for this visit:    1. Tear of left supraspinatus tendon (Primary)    2. Glenohumeral arthritis, left    Other orders  -     Large Joint Arthrocentesis      Body mass index is 30.29 kg/m².  BMI consistent with  Obese Class I: 30-34.9kg/m2        Plan:   -     Risks and benefits of injection therapy discussed with patient.  Possibility of bruising, pain, swelling, infection, increased blood sugar levels, cortisol flare and soft tissue atrophy discussed in detail.  Injected patient's left shoulder joint(s)with Kenalog from an posterior approach   Compression/brace   Rest, ice, compression, and elevation (RICE) therapy  OTC Tylenol for pain PRN  BMI reviewed  Follow up in 3 months with possible repeat injection   Please call with questions or concerns in the interim        Date of encounter: 11/05/2024   SAMIRA Han

## 2024-11-08 ENCOUNTER — HOSPITAL ENCOUNTER (INPATIENT)
Facility: HOSPITAL | Age: 79
LOS: 4 days | Discharge: HOME HEALTH | End: 2024-11-14
Attending: FAMILY MEDICINE | Admitting: FAMILY MEDICINE
Payer: MEDICARE

## 2024-11-08 ENCOUNTER — APPOINTMENT (OUTPATIENT)
Dept: CT IMAGING | Facility: HOSPITAL | Age: 79
End: 2024-11-08
Payer: MEDICARE

## 2024-11-08 ENCOUNTER — APPOINTMENT (OUTPATIENT)
Dept: GENERAL RADIOLOGY | Facility: HOSPITAL | Age: 79
End: 2024-11-08
Payer: MEDICARE

## 2024-11-08 DIAGNOSIS — R53.1 GENERALIZED WEAKNESS: ICD-10-CM

## 2024-11-08 DIAGNOSIS — R11.0 NAUSEA: ICD-10-CM

## 2024-11-08 DIAGNOSIS — I21.4 NON-STEMI (NON-ST ELEVATED MYOCARDIAL INFARCTION): ICD-10-CM

## 2024-11-08 DIAGNOSIS — R53.1 WEAKNESS: ICD-10-CM

## 2024-11-08 DIAGNOSIS — R42 DIZZINESS: ICD-10-CM

## 2024-11-08 DIAGNOSIS — N18.32 STAGE 3B CHRONIC KIDNEY DISEASE: Primary | ICD-10-CM

## 2024-11-08 LAB
ALBUMIN SERPL-MCNC: 3.8 G/DL (ref 3.5–5.2)
ALBUMIN/GLOB SERPL: 1.4 G/DL
ALP SERPL-CCNC: 81 U/L (ref 39–117)
ALT SERPL W P-5'-P-CCNC: 10 U/L (ref 1–33)
ANION GAP SERPL CALCULATED.3IONS-SCNC: 9.7 MMOL/L (ref 5–15)
AST SERPL-CCNC: 14 U/L (ref 1–32)
BACTERIA UR QL AUTO: ABNORMAL /HPF
BASOPHILS # BLD AUTO: 0.04 10*3/MM3 (ref 0–0.2)
BASOPHILS NFR BLD AUTO: 0.4 % (ref 0–1.5)
BILIRUB SERPL-MCNC: 0.2 MG/DL (ref 0–1.2)
BILIRUB UR QL STRIP: NEGATIVE
BUN SERPL-MCNC: 26 MG/DL (ref 8–23)
BUN/CREAT SERPL: 20.5 (ref 7–25)
CALCIUM SPEC-SCNC: 9.6 MG/DL (ref 8.6–10.5)
CHLORIDE SERPL-SCNC: 103 MMOL/L (ref 98–107)
CK SERPL-CCNC: 38 U/L (ref 20–180)
CLARITY UR: CLEAR
CO2 SERPL-SCNC: 26.3 MMOL/L (ref 22–29)
COLOR UR: YELLOW
CREAT SERPL-MCNC: 1.27 MG/DL (ref 0.57–1)
DEPRECATED RDW RBC AUTO: 43.2 FL (ref 37–54)
EGFRCR SERPLBLD CKD-EPI 2021: 42.6 ML/MIN/1.73
EOSINOPHIL # BLD AUTO: 0.02 10*3/MM3 (ref 0–0.4)
EOSINOPHIL NFR BLD AUTO: 0.2 % (ref 0.3–6.2)
ERYTHROCYTE [DISTWIDTH] IN BLOOD BY AUTOMATED COUNT: 11.8 % (ref 12.3–15.4)
GEN 5 2HR TROPONIN T REFLEX: 166 NG/L
GLOBULIN UR ELPH-MCNC: 2.7 GM/DL
GLUCOSE SERPL-MCNC: 159 MG/DL (ref 65–99)
GLUCOSE UR STRIP-MCNC: NEGATIVE MG/DL
HCT VFR BLD AUTO: 46.1 % (ref 34–46.6)
HGB BLD-MCNC: 15.5 G/DL (ref 12–15.9)
HGB UR QL STRIP.AUTO: NEGATIVE
HOLD SPECIMEN: NORMAL
HYALINE CASTS UR QL AUTO: ABNORMAL /LPF
IMM GRANULOCYTES # BLD AUTO: 0.16 10*3/MM3 (ref 0–0.05)
IMM GRANULOCYTES NFR BLD AUTO: 1.5 % (ref 0–0.5)
KETONES UR QL STRIP: NEGATIVE
LEUKOCYTE ESTERASE UR QL STRIP.AUTO: NEGATIVE
LYMPHOCYTES # BLD AUTO: 1.89 10*3/MM3 (ref 0.7–3.1)
LYMPHOCYTES NFR BLD AUTO: 18 % (ref 19.6–45.3)
MCH RBC QN AUTO: 33.1 PG (ref 26.6–33)
MCHC RBC AUTO-ENTMCNC: 33.6 G/DL (ref 31.5–35.7)
MCV RBC AUTO: 98.5 FL (ref 79–97)
MONOCYTES # BLD AUTO: 1 10*3/MM3 (ref 0.1–0.9)
MONOCYTES NFR BLD AUTO: 9.5 % (ref 5–12)
NEUTROPHILS NFR BLD AUTO: 7.38 10*3/MM3 (ref 1.7–7)
NEUTROPHILS NFR BLD AUTO: 70.4 % (ref 42.7–76)
NITRITE UR QL STRIP: NEGATIVE
NRBC BLD AUTO-RTO: 0 /100 WBC (ref 0–0.2)
PH UR STRIP.AUTO: 6.5 [PH] (ref 5–8)
PLATELET # BLD AUTO: 220 10*3/MM3 (ref 140–450)
PMV BLD AUTO: 9.8 FL (ref 6–12)
POTASSIUM SERPL-SCNC: 4 MMOL/L (ref 3.5–5.2)
PROT SERPL-MCNC: 6.5 G/DL (ref 6–8.5)
PROT UR QL STRIP: ABNORMAL
RBC # BLD AUTO: 4.68 10*6/MM3 (ref 3.77–5.28)
RBC # UR STRIP: ABNORMAL /HPF
REF LAB TEST METHOD: ABNORMAL
SODIUM SERPL-SCNC: 139 MMOL/L (ref 136–145)
SP GR UR STRIP: 1.02 (ref 1–1.03)
SQUAMOUS #/AREA URNS HPF: ABNORMAL /HPF
TROPONIN T DELTA: 82 NG/L
TROPONIN T SERPL HS-MCNC: 84 NG/L
UROBILINOGEN UR QL STRIP: ABNORMAL
WBC # UR STRIP: ABNORMAL /HPF
WBC NRBC COR # BLD AUTO: 10.49 10*3/MM3 (ref 3.4–10.8)

## 2024-11-08 PROCEDURE — P9612 CATHETERIZE FOR URINE SPEC: HCPCS

## 2024-11-08 PROCEDURE — 93005 ELECTROCARDIOGRAM TRACING: CPT

## 2024-11-08 PROCEDURE — 70450 CT HEAD/BRAIN W/O DYE: CPT

## 2024-11-08 PROCEDURE — 80053 COMPREHEN METABOLIC PANEL: CPT | Performed by: NURSE PRACTITIONER

## 2024-11-08 PROCEDURE — 93005 ELECTROCARDIOGRAM TRACING: CPT | Performed by: FAMILY MEDICINE

## 2024-11-08 PROCEDURE — 82550 ASSAY OF CK (CPK): CPT | Performed by: FAMILY MEDICINE

## 2024-11-08 PROCEDURE — 85025 COMPLETE CBC W/AUTO DIFF WBC: CPT | Performed by: NURSE PRACTITIONER

## 2024-11-08 PROCEDURE — 25010000002 ONDANSETRON PER 1 MG: Performed by: NURSE PRACTITIONER

## 2024-11-08 PROCEDURE — 36415 COLL VENOUS BLD VENIPUNCTURE: CPT

## 2024-11-08 PROCEDURE — 81001 URINALYSIS AUTO W/SCOPE: CPT | Performed by: NURSE PRACTITIONER

## 2024-11-08 PROCEDURE — 99285 EMERGENCY DEPT VISIT HI MDM: CPT

## 2024-11-08 PROCEDURE — 71045 X-RAY EXAM CHEST 1 VIEW: CPT

## 2024-11-08 PROCEDURE — 93010 ELECTROCARDIOGRAM REPORT: CPT | Performed by: INTERNAL MEDICINE

## 2024-11-08 PROCEDURE — 84484 ASSAY OF TROPONIN QUANT: CPT | Performed by: NURSE PRACTITIONER

## 2024-11-08 RX ORDER — CLONIDINE HYDROCHLORIDE 0.1 MG/1
0.1 TABLET ORAL 2 TIMES DAILY
COMMUNITY
End: 2024-11-14 | Stop reason: HOSPADM

## 2024-11-08 RX ORDER — SODIUM CHLORIDE 0.9 % (FLUSH) 0.9 %
10 SYRINGE (ML) INJECTION AS NEEDED
Status: DISCONTINUED | OUTPATIENT
Start: 2024-11-08 | End: 2024-11-14 | Stop reason: HOSPADM

## 2024-11-08 RX ORDER — ONDANSETRON 2 MG/ML
4 INJECTION INTRAMUSCULAR; INTRAVENOUS ONCE
Status: COMPLETED | OUTPATIENT
Start: 2024-11-08 | End: 2024-11-08

## 2024-11-08 RX ORDER — AMLODIPINE BESYLATE 5 MG/1
10 TABLET ORAL DAILY
Status: DISCONTINUED | OUTPATIENT
Start: 2024-11-09 | End: 2024-11-14 | Stop reason: HOSPADM

## 2024-11-08 RX ORDER — CLONIDINE HYDROCHLORIDE 0.1 MG/1
0.1 TABLET ORAL 2 TIMES DAILY
Status: DISCONTINUED | OUTPATIENT
Start: 2024-11-08 | End: 2024-11-09

## 2024-11-08 RX ORDER — ONDANSETRON 2 MG/ML
4 INJECTION INTRAMUSCULAR; INTRAVENOUS EVERY 6 HOURS PRN
Status: DISCONTINUED | OUTPATIENT
Start: 2024-11-08 | End: 2024-11-11 | Stop reason: SDUPTHER

## 2024-11-08 RX ORDER — TRAMADOL HYDROCHLORIDE 50 MG/1
100 TABLET ORAL 2 TIMES DAILY PRN
Status: ON HOLD | COMMUNITY
End: 2024-11-09

## 2024-11-08 RX ORDER — HYDROCODONE BITARTRATE AND ACETAMINOPHEN 10; 325 MG/1; MG/1
1 TABLET ORAL EVERY 6 HOURS PRN
Status: DISCONTINUED | OUTPATIENT
Start: 2024-11-08 | End: 2024-11-14 | Stop reason: HOSPADM

## 2024-11-08 RX ORDER — ASPIRIN 81 MG/1
81 TABLET ORAL DAILY
Status: DISCONTINUED | OUTPATIENT
Start: 2024-11-09 | End: 2024-11-11 | Stop reason: SDUPTHER

## 2024-11-08 RX ORDER — POLYETHYLENE GLYCOL 3350 17 G/17G
17 POWDER, FOR SOLUTION ORAL DAILY
Status: DISCONTINUED | OUTPATIENT
Start: 2024-11-09 | End: 2024-11-14 | Stop reason: HOSPADM

## 2024-11-08 RX ORDER — IPRATROPIUM BROMIDE AND ALBUTEROL SULFATE 2.5; .5 MG/3ML; MG/3ML
3 SOLUTION RESPIRATORY (INHALATION)
Status: DISCONTINUED | OUTPATIENT
Start: 2024-11-08 | End: 2024-11-14 | Stop reason: HOSPADM

## 2024-11-08 RX ORDER — PANTOPRAZOLE SODIUM 40 MG/1
40 TABLET, DELAYED RELEASE ORAL
Status: DISCONTINUED | OUTPATIENT
Start: 2024-11-09 | End: 2024-11-14 | Stop reason: HOSPADM

## 2024-11-08 RX ORDER — FERROUS SULFATE 325(65) MG
325 TABLET ORAL
Status: ON HOLD | COMMUNITY
End: 2024-11-09

## 2024-11-08 RX ORDER — ACETAMINOPHEN 325 MG/1
325 TABLET ORAL EVERY 4 HOURS PRN
Status: DISCONTINUED | OUTPATIENT
Start: 2024-11-08 | End: 2024-11-11 | Stop reason: SDUPTHER

## 2024-11-08 RX ORDER — PANTOPRAZOLE SODIUM 40 MG/1
40 TABLET, DELAYED RELEASE ORAL
Status: DISCONTINUED | OUTPATIENT
Start: 2024-11-09 | End: 2024-11-08

## 2024-11-08 RX ADMIN — ONDANSETRON 4 MG: 2 INJECTION, SOLUTION INTRAMUSCULAR; INTRAVENOUS at 20:45

## 2024-11-08 NOTE — Clinical Note
First balloon inflation max pressure = 17 aleksey. First balloon inflation duration = 20 seconds. Second inflation of balloon - Max pressure = 20 aleksey. 2nd Inflation of balloon - Duration = 10 seconds.

## 2024-11-08 NOTE — Clinical Note
Suture was used to secure the sheath post procedure. Transparent Dressing was used to secure the sheath post procedure.  Pressure Bag was used to stabalize the sheath post procedure.

## 2024-11-08 NOTE — Clinical Note
A 6 fr sheath was successfully inserted using micropuncture technique into the right femoral artery.

## 2024-11-08 NOTE — Clinical Note
First balloon inflation max pressure = 18 aleksey. First balloon inflation duration = 24 seconds. Second inflation of balloon - Max pressure = 18 aleksey. 2nd Inflation of balloon - Duration = 16 seconds. Third inflation of balloon - Max pressure = 18 aleksey. 3rd Inflation of balloon - Duration = 12 seconds.

## 2024-11-08 NOTE — Clinical Note
First balloon inflation max pressure = 12 aleksey. First balloon inflation duration = 16 seconds. Second inflation of balloon - Max pressure = 18 aleksey. 2nd Inflation of balloon - Duration = 12 seconds.

## 2024-11-08 NOTE — Clinical Note
First balloon inflation max pressure = 18 aleksey. First balloon inflation duration = 22 seconds. Second inflation of balloon - Max pressure = 18 aleksey. 2nd Inflation of balloon - Duration = 14 seconds. Third inflation of balloon - Max pressure = 20 aleksey. 3rd Inflation of balloon - Duration = 12 seconds.

## 2024-11-09 ENCOUNTER — APPOINTMENT (OUTPATIENT)
Dept: CARDIOLOGY | Facility: HOSPITAL | Age: 79
End: 2024-11-09
Payer: MEDICARE

## 2024-11-09 PROBLEM — I20.89 ANGINAL EQUIVALENT: Status: ACTIVE | Noted: 2024-11-09

## 2024-11-09 LAB
ANION GAP SERPL CALCULATED.3IONS-SCNC: 8.6 MMOL/L (ref 5–15)
BH CV ECHO LEFT VENTRICLE GLOBAL LONGITUDINAL STRAIN: -9.7 %
BH CV ECHO MEAS - AO MAX PG: 9.2 MMHG
BH CV ECHO MEAS - AO MEAN PG: 5 MMHG
BH CV ECHO MEAS - AO V2 MAX: 152 CM/SEC
BH CV ECHO MEAS - AO V2 VTI: 28.7 CM
BH CV ECHO MEAS - AVA(I,D): 1.56 CM2
BH CV ECHO MEAS - EDV(CUBED): 157.5 ML
BH CV ECHO MEAS - EDV(MOD-SP4): 139 ML
BH CV ECHO MEAS - EF(MOD-BP): 42 %
BH CV ECHO MEAS - EF(MOD-SP4): 41.9 %
BH CV ECHO MEAS - ESV(CUBED): 68.9 ML
BH CV ECHO MEAS - ESV(MOD-SP4): 80.8 ML
BH CV ECHO MEAS - FS: 24.1 %
BH CV ECHO MEAS - IVS/LVPW: 1.11 CM
BH CV ECHO MEAS - IVSD: 1 CM
BH CV ECHO MEAS - LA DIMENSION: 3 CM
BH CV ECHO MEAS - LAT PEAK E' VEL: 3.3 CM/SEC
BH CV ECHO MEAS - LV DIASTOLIC VOL/BSA (35-75): 74 CM2
BH CV ECHO MEAS - LV MASS(C)D: 193.3 GRAMS
BH CV ECHO MEAS - LV MAX PG: 3.4 MMHG
BH CV ECHO MEAS - LV MEAN PG: 2 MMHG
BH CV ECHO MEAS - LV SYSTOLIC VOL/BSA (12-30): 43 CM2
BH CV ECHO MEAS - LV V1 MAX: 91.7 CM/SEC
BH CV ECHO MEAS - LV V1 VTI: 17.6 CM
BH CV ECHO MEAS - LVIDD: 5.4 CM
BH CV ECHO MEAS - LVIDS: 4.1 CM
BH CV ECHO MEAS - LVOT AREA: 2.5 CM2
BH CV ECHO MEAS - LVOT DIAM: 1.8 CM
BH CV ECHO MEAS - LVPWD: 0.9 CM
BH CV ECHO MEAS - MED PEAK E' VEL: 2.7 CM/SEC
BH CV ECHO MEAS - MV A MAX VEL: 136 CM/SEC
BH CV ECHO MEAS - MV DEC SLOPE: 941 CM/SEC2
BH CV ECHO MEAS - MV DEC TIME: 0.14 SEC
BH CV ECHO MEAS - MV E MAX VEL: 49.3 CM/SEC
BH CV ECHO MEAS - MV E/A: 0.36
BH CV ECHO MEAS - MV MAX PG: 10 MMHG
BH CV ECHO MEAS - MV MEAN PG: 4 MMHG
BH CV ECHO MEAS - MV P1/2T: 49.2 MSEC
BH CV ECHO MEAS - MV V2 VTI: 21.4 CM
BH CV ECHO MEAS - MVA(P1/2T): 4.5 CM2
BH CV ECHO MEAS - MVA(VTI): 2.09 CM2
BH CV ECHO MEAS - PA ACC TIME: 0.1 SEC
BH CV ECHO MEAS - PA V2 MAX: 97 CM/SEC
BH CV ECHO MEAS - RAP SYSTOLE: 3 MMHG
BH CV ECHO MEAS - RV MAX PG: 1.8 MMHG
BH CV ECHO MEAS - RV V1 MAX: 67.1 CM/SEC
BH CV ECHO MEAS - RV V1 VTI: 11.3 CM
BH CV ECHO MEAS - RVDD: 3.4 CM
BH CV ECHO MEAS - SV(LVOT): 44.8 ML
BH CV ECHO MEAS - SV(MOD-SP4): 58.2 ML
BH CV ECHO MEAS - SVI(LVOT): 23.8 ML/M2
BH CV ECHO MEAS - SVI(MOD-SP4): 31 ML/M2
BH CV ECHO MEAS - TAPSE (>1.6): 1.53 CM
BH CV ECHO MEASUREMENTS AVERAGE E/E' RATIO: 16.43
BH CV XLRA - TDI S': 9 CM/SEC
BUN SERPL-MCNC: 25 MG/DL (ref 8–23)
BUN/CREAT SERPL: 19.8 (ref 7–25)
CALCIUM SPEC-SCNC: 9.6 MG/DL (ref 8.6–10.5)
CHLORIDE SERPL-SCNC: 104 MMOL/L (ref 98–107)
CHOLEST SERPL-MCNC: 205 MG/DL (ref 0–200)
CK SERPL-CCNC: 40 U/L (ref 20–180)
CO2 SERPL-SCNC: 28.4 MMOL/L (ref 22–29)
CREAT SERPL-MCNC: 1.26 MG/DL (ref 0.57–1)
DEPRECATED RDW RBC AUTO: 43.5 FL (ref 37–54)
EGFRCR SERPLBLD CKD-EPI 2021: 43 ML/MIN/1.73
ERYTHROCYTE [DISTWIDTH] IN BLOOD BY AUTOMATED COUNT: 11.9 % (ref 12.3–15.4)
GLUCOSE BLDC GLUCOMTR-MCNC: 409 MG/DL (ref 70–105)
GLUCOSE SERPL-MCNC: 174 MG/DL (ref 65–99)
HCT VFR BLD AUTO: 48.2 % (ref 34–46.6)
HDLC SERPL-MCNC: 49 MG/DL (ref 40–60)
HGB BLD-MCNC: 15.8 G/DL (ref 12–15.9)
LDLC SERPL CALC-MCNC: 134 MG/DL (ref 0–100)
LDLC/HDLC SERPL: 2.69 {RATIO}
MCH RBC QN AUTO: 32.5 PG (ref 26.6–33)
MCHC RBC AUTO-ENTMCNC: 32.8 G/DL (ref 31.5–35.7)
MCV RBC AUTO: 99.2 FL (ref 79–97)
PLATELET # BLD AUTO: 222 10*3/MM3 (ref 140–450)
PMV BLD AUTO: 10.8 FL (ref 6–12)
POTASSIUM SERPL-SCNC: 3.9 MMOL/L (ref 3.5–5.2)
QT INTERVAL: 389 MS
QT INTERVAL: 421 MS
QTC INTERVAL: 458 MS
QTC INTERVAL: 488 MS
RBC # BLD AUTO: 4.86 10*6/MM3 (ref 3.77–5.28)
SINUS: 2.8 CM
SODIUM SERPL-SCNC: 141 MMOL/L (ref 136–145)
STJ: 2.3 CM
TRIGL SERPL-MCNC: 120 MG/DL (ref 0–150)
TROPONIN T SERPL HS-MCNC: 104 NG/L
TROPONIN T SERPL HS-MCNC: 180 NG/L
VLDLC SERPL-MCNC: 22 MG/DL (ref 5–40)
WBC NRBC COR # BLD AUTO: 11.63 10*3/MM3 (ref 3.4–10.8)

## 2024-11-09 PROCEDURE — 63710000001 INSULIN LISPRO (HUMAN) PER 5 UNITS: Performed by: PHYSICIAN ASSISTANT

## 2024-11-09 PROCEDURE — 25010000002 HYDROMORPHONE 1 MG/ML SOLUTION: Performed by: INTERNAL MEDICINE

## 2024-11-09 PROCEDURE — 99215 OFFICE O/P EST HI 40 MIN: CPT | Performed by: INTERNAL MEDICINE

## 2024-11-09 PROCEDURE — 80048 BASIC METABOLIC PNL TOTAL CA: CPT | Performed by: FAMILY MEDICINE

## 2024-11-09 PROCEDURE — 93010 ELECTROCARDIOGRAM REPORT: CPT | Performed by: INTERNAL MEDICINE

## 2024-11-09 PROCEDURE — 80061 LIPID PANEL: CPT | Performed by: FAMILY MEDICINE

## 2024-11-09 PROCEDURE — 25010000002 METHYLPREDNISOLONE PER 40 MG: Performed by: FAMILY MEDICINE

## 2024-11-09 PROCEDURE — 25010000002 ONDANSETRON PER 1 MG: Performed by: FAMILY MEDICINE

## 2024-11-09 PROCEDURE — 82085 ASSAY OF ALDOLASE: CPT | Performed by: FAMILY MEDICINE

## 2024-11-09 PROCEDURE — 25010000002 HYDROMORPHONE 1 MG/ML SOLUTION: Performed by: FAMILY MEDICINE

## 2024-11-09 PROCEDURE — 94640 AIRWAY INHALATION TREATMENT: CPT

## 2024-11-09 PROCEDURE — G0378 HOSPITAL OBSERVATION PER HR: HCPCS

## 2024-11-09 PROCEDURE — 82550 ASSAY OF CK (CPK): CPT | Performed by: FAMILY MEDICINE

## 2024-11-09 PROCEDURE — 93306 TTE W/DOPPLER COMPLETE: CPT | Performed by: INTERNAL MEDICINE

## 2024-11-09 PROCEDURE — 94799 UNLISTED PULMONARY SVC/PX: CPT

## 2024-11-09 PROCEDURE — 85027 COMPLETE CBC AUTOMATED: CPT | Performed by: FAMILY MEDICINE

## 2024-11-09 PROCEDURE — 93306 TTE W/DOPPLER COMPLETE: CPT

## 2024-11-09 PROCEDURE — 84484 ASSAY OF TROPONIN QUANT: CPT | Performed by: FAMILY MEDICINE

## 2024-11-09 PROCEDURE — 94664 DEMO&/EVAL PT USE INHALER: CPT

## 2024-11-09 PROCEDURE — 93356 MYOCRD STRAIN IMG SPCKL TRCK: CPT

## 2024-11-09 PROCEDURE — 63710000001 INSULIN GLARGINE PER 5 UNITS: Performed by: PHYSICIAN ASSISTANT

## 2024-11-09 PROCEDURE — 82948 REAGENT STRIP/BLOOD GLUCOSE: CPT

## 2024-11-09 PROCEDURE — 93356 MYOCRD STRAIN IMG SPCKL TRCK: CPT | Performed by: INTERNAL MEDICINE

## 2024-11-09 PROCEDURE — 94761 N-INVAS EAR/PLS OXIMETRY MLT: CPT

## 2024-11-09 RX ORDER — IBUPROFEN 600 MG/1
1 TABLET ORAL
Status: DISCONTINUED | OUTPATIENT
Start: 2024-11-09 | End: 2024-11-14 | Stop reason: HOSPADM

## 2024-11-09 RX ORDER — NICOTINE POLACRILEX 4 MG
15 LOZENGE BUCCAL
Status: DISCONTINUED | OUTPATIENT
Start: 2024-11-09 | End: 2024-11-14 | Stop reason: HOSPADM

## 2024-11-09 RX ORDER — METHYLPREDNISOLONE SODIUM SUCCINATE 40 MG/ML
20 INJECTION, POWDER, LYOPHILIZED, FOR SOLUTION INTRAMUSCULAR; INTRAVENOUS ONCE
Status: COMPLETED | OUTPATIENT
Start: 2024-11-09 | End: 2024-11-09

## 2024-11-09 RX ORDER — INSULIN LISPRO 100 [IU]/ML
2-7 INJECTION, SOLUTION INTRAVENOUS; SUBCUTANEOUS
Status: DISCONTINUED | OUTPATIENT
Start: 2024-11-09 | End: 2024-11-14 | Stop reason: HOSPADM

## 2024-11-09 RX ORDER — DEXTROSE MONOHYDRATE 25 G/50ML
25 INJECTION, SOLUTION INTRAVENOUS
Status: DISCONTINUED | OUTPATIENT
Start: 2024-11-09 | End: 2024-11-14 | Stop reason: HOSPADM

## 2024-11-09 RX ADMIN — INSULIN LISPRO 7 UNITS: 100 INJECTION, SOLUTION INTRAVENOUS; SUBCUTANEOUS at 23:27

## 2024-11-09 RX ADMIN — SERTRALINE 50 MG: 50 TABLET, FILM COATED ORAL at 08:02

## 2024-11-09 RX ADMIN — HYDROCODONE BITARTRATE AND ACETAMINOPHEN 1 TABLET: 10; 325 TABLET ORAL at 07:56

## 2024-11-09 RX ADMIN — INSULIN GLARGINE 10 UNITS: 100 INJECTION, SOLUTION SUBCUTANEOUS at 23:27

## 2024-11-09 RX ADMIN — ONDANSETRON 4 MG: 2 INJECTION, SOLUTION INTRAMUSCULAR; INTRAVENOUS at 12:22

## 2024-11-09 RX ADMIN — IPRATROPIUM BROMIDE AND ALBUTEROL SULFATE 3 ML: .5; 3 SOLUTION RESPIRATORY (INHALATION) at 08:17

## 2024-11-09 RX ADMIN — CLONIDINE HYDROCHLORIDE 0.1 MG: 0.1 TABLET ORAL at 00:53

## 2024-11-09 RX ADMIN — POLYETHYLENE GLYCOL 3350 17 G: 17 POWDER, FOR SOLUTION ORAL at 08:02

## 2024-11-09 RX ADMIN — IPRATROPIUM BROMIDE AND ALBUTEROL SULFATE 3 ML: .5; 3 SOLUTION RESPIRATORY (INHALATION) at 20:30

## 2024-11-09 RX ADMIN — IPRATROPIUM BROMIDE AND ALBUTEROL SULFATE 3 ML: .5; 3 SOLUTION RESPIRATORY (INHALATION) at 15:38

## 2024-11-09 RX ADMIN — AMLODIPINE BESYLATE 10 MG: 5 TABLET ORAL at 08:02

## 2024-11-09 RX ADMIN — CLONIDINE HYDROCHLORIDE 0.1 MG: 0.1 TABLET ORAL at 20:45

## 2024-11-09 RX ADMIN — ASPIRIN 81 MG: 81 TABLET, COATED ORAL at 08:02

## 2024-11-09 RX ADMIN — CLONIDINE HYDROCHLORIDE 0.1 MG: 0.1 TABLET ORAL at 08:02

## 2024-11-09 RX ADMIN — METHYLPREDNISOLONE SODIUM SUCCINATE 20 MG: 40 INJECTION, POWDER, FOR SOLUTION INTRAMUSCULAR; INTRAVENOUS at 15:52

## 2024-11-09 RX ADMIN — PANTOPRAZOLE SODIUM 40 MG: 40 TABLET, DELAYED RELEASE ORAL at 06:13

## 2024-11-09 RX ADMIN — HYDROMORPHONE HYDROCHLORIDE 0.5 MG: 1 INJECTION, SOLUTION INTRAMUSCULAR; INTRAVENOUS; SUBCUTANEOUS at 00:53

## 2024-11-09 RX ADMIN — ONDANSETRON 4 MG: 2 INJECTION, SOLUTION INTRAMUSCULAR; INTRAVENOUS at 01:04

## 2024-11-09 NOTE — ED NOTES
Nursing report ED to floor  Vianey Reeves  79 y.o.  female    HPI:   Chief Complaint   Patient presents with    Vomiting       Admitting doctor:   Del Iverson MD    Admitting diagnosis:   The primary encounter diagnosis was Weakness. Diagnoses of Nausea and Dizziness were also pertinent to this visit.    Code status:   Current Code Status       Date Active Code Status Order ID Comments User Context       Prior            Allergies:   Erythromycin, Naproxen sodium, Statins, Latex, Metoclopramide, and Dicyclomine    Isolation:  No active isolations     Fall Risk:  Fall Risk Assessment was completed, and patient is at low risk for falls.   Predictive Model Details         9 (Low) Factor Value    Calculated 11/8/2024 23:05 Age 79    Risk of Fall Model Active Peripheral IV Present     Imaging order in this encounter Present     Respiratory Rate 18     Magnesium not on file     Zack Scale not on file     Creatinine 1.27 mg/dL     Albumin 3.8 g/dL     Number of Distinct Medication Classes administered 1     Diastolic BP 86     Gastrointestinal Assessment X     Days after Admission 0.168     Chloride 103 mmol/L     Tobacco Use Quit     ALT 10 U/L     Calcium 9.6 mg/dL     Potassium 4 mmol/L     Total Bilirubin 0.2 mg/dL         Weight:       11/08/24 1917   Weight: 80.3 kg (177 lb)       Intake and Output  No intake or output data in the 24 hours ending 11/08/24 2307    Diet:        Most recent vitals:   Vitals:    11/08/24 2201 11/08/24 2216 11/08/24 2246 11/08/24 2301   BP: 139/86 149/94 138/86 131/88   Patient Position:       Pulse: 82 82 79 95   Resp:       Temp:       TempSrc:       SpO2:  90%  92%   Weight:       Height:           Active LDAs/IV Access:   Lines, Drains & Airways       Active LDAs       Name Placement date Placement time Site Days    Peripheral IV 11/08/24 1922 Anterior;Right Forearm 11/08/24 1922  Forearm  less than 1                    Skin Condition:   Skin Assessments (last day)        None             Labs (abnormal labs have a star):   Labs Reviewed   COMPREHENSIVE METABOLIC PANEL - Abnormal; Notable for the following components:       Result Value    Glucose 159 (*)     BUN 26 (*)     Creatinine 1.27 (*)     eGFR 42.6 (*)     All other components within normal limits    Narrative:     GFR Normal >60  Chronic Kidney Disease <60  Kidney Failure <15    The GFR formula is only valid for adults with stable renal function between ages 18 and 70.   SINGLE HS TROPONIN T - Abnormal; Notable for the following components:    HS Troponin T 84 (*)     All other components within normal limits    Narrative:     High Sensitive Troponin T Reference Range:  <14.0 ng/L- Negative Female for AMI  <22.0 ng/L- Negative Male for AMI  >=14 - Abnormal Female indicating possible myocardial injury.  >=22 - Abnormal Male indicating possible myocardial injury.   Clinicians would have to utilize clinical acumen, EKG, Troponin, and serial changes to determine if it is an Acute Myocardial Infarction or myocardial injury due to an underlying chronic condition.        URINALYSIS W/ MICROSCOPIC IF INDICATED (NO CULTURE) - Abnormal; Notable for the following components:    Protein,  mg/dL (2+) (*)     All other components within normal limits   CBC WITH AUTO DIFFERENTIAL - Abnormal; Notable for the following components:    MCV 98.5 (*)     MCH 33.1 (*)     RDW 11.8 (*)     Lymphocyte % 18.0 (*)     Eosinophil % 0.2 (*)     Immature Grans % 1.5 (*)     Neutrophils, Absolute 7.38 (*)     Monocytes, Absolute 1.00 (*)     Immature Grans, Absolute 0.16 (*)     All other components within normal limits   HIGH SENSITIVITIY TROPONIN T 2HR - Abnormal; Notable for the following components:    HS Troponin T 166 (*)     Troponin T Delta 82 (*)     All other components within normal limits    Narrative:     High Sensitive Troponin T Reference Range:  <14.0 ng/L- Negative Female for AMI  <22.0 ng/L- Negative Male for AMI  >=14 - Abnormal  Female indicating possible myocardial injury.  >=22 - Abnormal Male indicating possible myocardial injury.   Clinicians would have to utilize clinical acumen, EKG, Troponin, and serial changes to determine if it is an Acute Myocardial Infarction or myocardial injury due to an underlying chronic condition.        URINALYSIS, MICROSCOPIC ONLY - Abnormal; Notable for the following components:    WBC, UA 3-5 (*)     All other components within normal limits   CBC AND DIFFERENTIAL    Narrative:     The following orders were created for panel order CBC & Differential.  Procedure                               Abnormality         Status                     ---------                               -----------         ------                     CBC Auto Differential[[id number]]        Abnormal            Final result                 Please view results for these tests on the individual orders.   EXTRA TUBES    Narrative:     The following orders were created for panel order Extra Tubes.  Procedure                               Abnormality         Status                     ---------                               -----------         ------                     Gold Top - SST[[id number]]                                   Final result                 Please view results for these tests on the individual orders.   GOLD TOP - SST       LOC: Person, Place, Time, and Situation    Telemetry:  Telemetry    Cardiac Monitoring Ordered: yes    EKG:   ECG 12 Lead Other; trop   Preliminary Result   HEART RATE=83  bpm   RR Excrrgjb=495  ms   TX Fmkqdige=658  ms   P Horizontal Axis=-30  deg   P Front Axis=34  deg   QRSD Zuixugzi=947  ms   QT Lronfflw=750  ms   GIdC=584  ms   QRS Axis=88  deg   T Wave Axis=46  deg   - ABNORMAL ECG -   Sinus rhythm   Right bundle branch block   Date and Time of Study:2024-11-08 23:03:07      ECG 12 Lead Other; Dizziness   Preliminary Result   HEART RATE=81  bpm   RR Sbuovuie=942  ms   TX Mbfrzhof=468  ms   P  Horizontal Axis=11  deg   P Front Axis=40  deg   QRSD Crsxdotw=581  ms   QT Szafbxmk=867  ms   VArY=868  ms   QRS Axis=126  deg   T Wave Axis=-11  deg   - ABNORMAL ECG -   Sinus rhythm   Nonspecific intraventricular conduction delay   Inferior infarct, age indeterminate   Date and Time of Study:2024 18:27:55          Medications Given in the ED:   Medications   sodium chloride 0.9 % flush 10 mL (has no administration in time range)   ondansetron (ZOFRAN) injection 4 mg (4 mg Intravenous Given 24)       Imaging results:  CT Head Without Contrast    Result Date: 2024  Impression: 1. Volume loss secondary to cerebral atrophy. 2. Chronic ischemia. 3. No acute intracranial findings. Electronically Signed: Del Willoughby MD  2024 8:25 PM EST  Workstation ID: [id number]    XR Chest 1 View    Result Date: 2024  Impression: No active pulmonary process. Electronically Signed: Federico Carrion MD  2024 8:21 PM EST  Workstation ID: [id number]     Social issues:   Social History     Socioeconomic History    Marital status:    Tobacco Use    Smoking status: Former     Current packs/day: 0.00     Average packs/day: 0.3 packs/day for 50.0 years (12.5 ttl pk-yrs)     Types: Cigarettes     Start date: 1973     Quit date: 2023     Years since quittin.0     Passive exposure: Past    Smokeless tobacco: Never    Tobacco comments:     Mostly patches, some days none, some days 1-2   Vaping Use    Vaping status: Never Used   Substance and Sexual Activity    Alcohol use: Not Currently     Comment: occasionally     Drug use: Never    Sexual activity: Defer       NIH Stroke Scale:  Interval: (not recorded)  1a. Level of Consciousness: (not recorded)  1b. LOC Questions: (not recorded)  1c. LOC Commands: (not recorded)  2. Best Gaze: (not recorded)  3. Visual: (not recorded)  4. Facial Palsy: (not recorded)  5a. Motor Arm, Left: (not recorded)  5b. Motor Arm, Right: (not recorded)  6a. Motor Leg,  Left: (not recorded)  6b. Motor Leg, Right: (not recorded)  7. Limb Ataxia: (not recorded)  8. Sensory: (not recorded)  9. Best Language: (not recorded)  10. Dysarthria: (not recorded)  11. Extinction and Inattention (formerly Neglect): (not recorded)    Total (NIH Stroke Scale): (not recorded)     Additional notable assessment information:     Nursing report ED to floor:  omega Calderón RN   11/08/24 23:07 EST

## 2024-11-09 NOTE — ED PROVIDER NOTES
"Subjective   Chief Complaint   Patient presents with    Vomiting       History of Present Illness  Patient is an 79-year-old female presents the emergency department today for evaluation of dizziness, vomiting, nausea.   Daughter at bedside provides additional history stating that they have been changing her patient, she is been having elevated blood pressures in the morning, was taking clonidine as needed.  She had been feeling \"swimmy\", the past 2 to 3 days, but it appeared to improve with her blood pressure decreasing after taking her clonidine twice daily.    Denies any chest pain.  No dyspnea.  No episodes of syncope.    Reports her symptoms are worse with movement, sitting up and position changes, she reports they feel better when she is lying down with her eyes closed.  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    Hypertension     IBS (irritable colon syndrome)     constipation    Mass of right breast     Neuropathy     Renal insufficiency     Sciatic leg pain     right    Simple chronic bronchitis     Solitary kidney     left       Allergies   Allergen Reactions    Erythromycin Rash    Naproxen Sodium Other (See Comments)     Dizzy lightheaded    Statins Myalgia    Latex Itching, Swelling and Hives    Metoclopramide Other (See Comments)     Unsteady on feet    Dicyclomine Unknown - Low Severity       Past Surgical History:   Procedure Laterality " Date    BREAST BIOPSY Right     BRONCHOSCOPY N/A 2024    Procedure: BRONCHOSCOPY WITH BRONCHOALVEOLAR LAVAGE;  Surgeon: Marlin Ferguson MD;  Location: [facility] ENDOSCOPY;  Service: Pulmonary;  Laterality: N/A;  POST: RML ATELECTASIS    CARDIAC CATHETERIZATION      CHOLECYSTECTOMY      COLON SURGERY      REMOVAL diverticular diseae    COLONOSCOPY N/A 2021    Procedure: COLONOSCOPY with polypectomy x 3;  Surgeon: Margarette Mcallister MD;  Location: [facility] ENDOSCOPY;  Service: Gastroenterology;  Laterality: N/A;  post op: hmorrhoids, diverticulosis, polyps    CORONARY STENT PLACEMENT      ENDOSCOPY N/A 2021    Procedure: ESOPHAGOGASTRODUODENOSCOPY with dilatation (18-20 mm balloon) and (54 bougie);  Surgeon: Margarette Mcallister MD;  Location: [facility] ENDOSCOPY;  Service: Gastroenterology;  Laterality: N/A;  post op: esophageal stricture, esophagitis, gastritis, history of nissen    ENDOSCOPY N/A 2023    Procedure: ESOPHAGOGASTRODUODENOSCOPY with biopsy x 1 area and dilation (50,54,56 non guided bougie);  Surgeon: Margarette Mcallister MD;  Location: [facility] ENDOSCOPY;  Service: Gastroenterology;  Laterality: N/A;  post op: esophageal stricture    EYE SURGERY Bilateral     cataracts    HIATAL HERNIA REPAIR      NEPHRECTOMY Right     cancer    UPPER ENDOSCOPIC ULTRASOUND W/ FNA N/A 2022    Procedure: EUS;  Surgeon: Margarette Mcallister MD;  Location: [facility] ENDOSCOPY;  Service: Gastroenterology;  Laterality: N/A;  post: normal pancreas, dilated pancreatic duct, hiatal hernia,        Family History   Problem Relation Age of Onset    Arthritis Father     Prostate cancer Father     Heart disease Sister        Social History     Socioeconomic History    Marital status:    Tobacco Use    Smoking status: Former     Current packs/day: 0.00     Average packs/day: 0.3 packs/day for 50.0 years (12.5 ttl pk-yrs)     Types: Cigarettes     Start date: 1973     Quit date: 2023     Years since quittin.0     Passive  exposure: Past    Smokeless tobacco: Never    Tobacco comments:     Mostly patches, some days none, some days 1-2   Vaping Use    Vaping status: Never Used   Substance and Sexual Activity    Alcohol use: Not Currently     Comment: occasionally     Drug use: Never    Sexual activity: Defer           Objective   Physical Exam  Vitals and nursing note reviewed.   Constitutional:       Appearance: She is ill-appearing. She is not toxic-appearing.   HENT:      Head: Normocephalic and atraumatic.      Nose: Nose normal.      Mouth/Throat:      Mouth: Mucous membranes are moist.      Pharynx: Oropharynx is clear.   Eyes:      Extraocular Movements: Extraocular movements intact.      Conjunctiva/sclera: Conjunctivae normal.      Pupils: Pupils are equal, round, and reactive to light.   Cardiovascular:      Rate and Rhythm: Normal rate and regular rhythm.      Heart sounds: Normal heart sounds. No murmur heard.     No friction rub. No gallop.   Pulmonary:      Effort: Pulmonary effort is normal.      Breath sounds: Normal breath sounds.   Abdominal:      General: Bowel sounds are normal.      Palpations: Abdomen is soft.   Musculoskeletal:         General: Normal range of motion.      Cervical back: Normal range of motion and neck supple.      Right lower leg: No edema.      Left lower leg: No edema.   Skin:     General: Skin is warm and dry.      Capillary Refill: Capillary refill takes less than 2 seconds.      Findings: No erythema or rash.   Neurological:      General: No focal deficit present.      Mental Status: She is alert and oriented to person, place, and time.   Psychiatric:         Mood and Affect: Mood normal.         Behavior: Behavior normal.         Procedures           ED Course  ED Course as of 11/08/24 2203 Fri Nov 08, 2024 2130 Discussed with Dr. Iverson [LB]      ED Course User Index  [LB] Lyndsay Xie, SAMIRA                    No data recorded                             Medical Decision  Making  Chart Review: Discharge summary 10/11/2024    Imaging reviewed and interpreted by  Dr. Villar. ED attending physician. CXR negative for acute findings.   Further radiologist interpretation as above.     EKG interpreted independently per ED attending physican-Dr. Villar sinus rhythm rate of 81.  IVCD.  , QTc 488.  Compared to 7/11/2024.      Pt was Placed on appropriate monitoring.  Differential diagnoses considered for patient presentation, this list is not all inclusive of diagnoses considered: Electrolyte imbalance, arrhythmia, stroke  Patient presents to the ED for the above complaint, underwent the above, exam and workup.  CBC CMP reviewed.  Creatinine 1.27, Decreased from previous.  UA shows no signs of infection.  Initial troponin 84, patient's not having any chest pain, no dyspnea.  No signs of ACS at this time. No focal deficits on exam.   Repeat pending at time of admission.  Imaging reviewed as above.  Given patient's generalized weakness, nausea, dizziness will admit to family medicine, discussed with Dr. Blair who is the patient's PCP.  Patient denies visual disturbances, speech disturbances, facial droop, unilateral weakness, numbness or tingling.  Denies difficulty walking or lethargy.      Note Disclaimer: At [facility], we believe that sharing information builds trust and better relationships. You are receiving this note because you recently visited [facility]. It is possible you will see health information before a provider has talked with you about it. This kind of information can be easy to misunderstand. To help you fully understand what it means for your health, we urge you to discuss this note with your provider  Note dictated utilizing Dragon Dictation.  Appropriate PPE worn during patient interactions.        Final diagnoses:   Weakness   Nausea   Dizziness       ED Disposition  ED Disposition       ED Disposition   Decision to Admit    Condition   --    Comment   Level  of Care: Telemetry [5]   Admitting Physician: LUDWIN CHARLES [[id number]]   Attending Physician: LUDWIN CHARLES [[id number]]                 No follow-up provider specified.       Medication List      No changes were made to your prescriptions during this visit.            Lyndsay Xie, APRN  11/08/24 [id number]

## 2024-11-09 NOTE — H&P
Patient Care Team:  Anny Garcia MD as PCP - General (Internal Medicine)  Sergio Ordonez MD as Consulting Physician (Nephrology)    Chief Complaint  Subjective     The patient is a 79 y.o. female who presents with acute anginal equivalent    HPI  Acute onset progressive weakness with substernal chest discomfort which was mild associate with palpitations with the gradual development of some vertigo described as dizziness with progressive lower extremity weakness.  Very complex medical history with multiple comorbidities resulting in some progressive decompensation as above and a trip to the [facility] emergency room for evaluation where she was admitted.  At the time my evaluation she appears to be in no apparent distress but does relate some significant weakness of the bilateral lower extremities with some feelings of vertigo without ocular pain or cephalgia.  She began to have some nausea and vomiting which prompted her to go to the [facility] emergency room and ultimately led to her admission  Review of Systems  Review of Systems   Respiratory:  Positive for chest tightness and shortness of breath.    Neurological:  Positive for weakness and light-headedness.       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    Hypertension     IBS (irritable colon syndrome)     constipation    Mass of right breast     Neuropathy     Renal  insufficiency     Sciatic leg pain     right    Simple chronic bronchitis     Solitary kidney     left     Past Surgical History:   Procedure Laterality Date    BREAST BIOPSY Right     BRONCHOSCOPY N/A 7/14/2024    Procedure: BRONCHOSCOPY WITH BRONCHOALVEOLAR LAVAGE;  Surgeon: Marlin Ferguson MD;  Location: [facility] ENDOSCOPY;  Service: Pulmonary;  Laterality: N/A;  POST: RML ATELECTASIS    CARDIAC CATHETERIZATION      CHOLECYSTECTOMY      COLON SURGERY      REMOVAL diverticular diseae    COLONOSCOPY N/A 08/12/2021    Procedure: COLONOSCOPY with polypectomy x 3;  Surgeon: Margarette Mcallister MD;  Location: [facility] ENDOSCOPY;  Service: Gastroenterology;  Laterality: N/A;  post op: hmorrhoids, diverticulosis, polyps    CORONARY STENT PLACEMENT      ENDOSCOPY N/A 08/12/2021    Procedure: ESOPHAGOGASTRODUODENOSCOPY with dilatation (18-20 mm balloon) and (54 bougie);  Surgeon: Margarette Mcallister MD;  Location: [facility] ENDOSCOPY;  Service: Gastroenterology;  Laterality: N/A;  post op: esophageal stricture, esophagitis, gastritis, history of nissen    ENDOSCOPY N/A 9/13/2023    Procedure: ESOPHAGOGASTRODUODENOSCOPY with biopsy x 1 area and dilation (50,54,56 non guided bougie);  Surgeon: Margarette Mcallister MD;  Location: [facility] ENDOSCOPY;  Service: Gastroenterology;  Laterality: N/A;  post op: esophageal stricture    EYE SURGERY Bilateral     cataracts    HIATAL HERNIA REPAIR      NEPHRECTOMY Right     cancer    UPPER ENDOSCOPIC ULTRASOUND W/ FNA N/A 09/22/2022    Procedure: EUS;  Surgeon: Margarette Mcallister MD;  Location: [facility] ENDOSCOPY;  Service: Gastroenterology;  Laterality: N/A;  post: normal pancreas, dilated pancreatic duct, hiatal hernia,      Family History   Problem Relation Age of Onset    Arthritis Father     Prostate cancer Father     Heart disease Sister      Social History     Tobacco Use    Smoking status: Former     Current packs/day: 0.00     Average packs/day: 0.3 packs/day for 50.0 years (12.5 ttl pk-yrs)     Types:  "Cigarettes     Start date: 1973     Quit date: 2023     Years since quittin.0     Passive exposure: Past    Smokeless tobacco: Never    Tobacco comments:     Mostly patches, some days none, some days 1-2   Vaping Use    Vaping status: Never Used   Substance Use Topics    Alcohol use: Not Currently     Comment: occasionally     Drug use: Never     Allergies:  Erythromycin, Naproxen sodium, Statins, Latex, Metoclopramide, and Dicyclomine    Objective     Vital Signs  Temp:  [97.5 °F (36.4 °C)-97.8 °F (36.6 °C)] 97.5 °F (36.4 °C)  Heart Rate:  [66-95] 69  Resp:  [11-20] 11  BP: (121-193)/() 121/69      Physical Exam:   Physical Exam  Vitals reviewed.   Cardiovascular:      Rate and Rhythm: Rhythm irregular.      Heart sounds: Normal heart sounds.   Pulmonary:      Breath sounds: Normal breath sounds.   Skin:     General: Skin is warm.   Neurological:      Mental Status: She is alert.              Results Review:   CBC    Results from last 7 days   Lab Units 24   WBC 10*3/mm3 11.63* 10.49   HEMOGLOBIN g/dL 15.8 15.5   PLATELETS 10*3/mm3 222 220     BMP   Results from last 7 days   Lab Units 24   SODIUM mmol/L 141 139   POTASSIUM mmol/L 3.9 4.0   CHLORIDE mmol/L 104 103   CO2 mmol/L 28.4 26.3   BUN mg/dL 25* 26*   CREATININE mg/dL 1.26* 1.27*   GLUCOSE mg/dL 174* 159*     Cr Clearance Estimated Creatinine Clearance: 36.7 mL/min (A) (by C-G formula based on SCr of 1.26 mg/dL (H)).  Coag     HbA1C   Lab Results   Component Value Date    HGBA1C 7.10 (H) 2024    HGBA1C 7.0 (H) 2022    HGBA1C 7.3 2022     Blood Glucose No results found for: \"POCGLU\"  Infection     CMP   Results from last 7 days   Lab Units 24   SODIUM mmol/L 141 139   POTASSIUM mmol/L 3.9 4.0   CHLORIDE mmol/L 104 103   CO2 mmol/L 28.4 26.3   BUN mg/dL 25* 26*   CREATININE mg/dL 1.26* 1.27*   GLUCOSE mg/dL 174* 159*   ALBUMIN g/dL  --  3.8 "   BILIRUBIN mg/dL  --  0.2   ALK PHOS U/L  --  81   AST (SGOT) U/L  --  14   ALT (SGPT) U/L  --  10     UA    Results from last 7 days   Lab Units 11/08/24 2107   NITRITE UA  Negative   WBC UA /HPF 3-5*   BACTERIA UA /HPF None Seen   SQUAM EPITHEL UA /HPF 0-2     Radiology(recent) CT Head Without Contrast    Result Date: 11/8/2024  Impression: 1. Volume loss secondary to cerebral atrophy. 2. Chronic ischemia. 3. No acute intracranial findings. Electronically Signed: Del Willoughby MD  11/8/2024 8:25 PM EST  Workstation ID: [id number]    XR Chest 1 View    Result Date: 11/8/2024  Impression: No active pulmonary process. Electronically Signed: Federico Carrion MD  11/8/2024 8:21 PM EST  Workstation ID: [id number]      Assessment:    Substernal chest pain worrisome for unstable angina  Palpitations  Acute vertigo  Lower extremity weakness  Osteoporosis with compression fracture  History of kyphoplasty  Pulmonary HTN  History of nephrectomy  Panlobular COPD with emphysema  Chronic mucopurulent bronchitis  DMII with renal manifestations   DMII with neuropathic manifestations  Diabetic dyslipidemia  GERD with esophagitis  Myofascia pain syndrome  CKDIIIa  HH  HTN with CKDI!!a  Chronic constipation  Nicotine dependency with nicotine use disorder, cigarettes  DDD L/S  Endometrial CA HX with ureteral involvement  Constipation        Plan:  Check orthostatics//medication modification//cardiac evaluation//PT  expected Length of Stay 3 days    I discussed the patient's findings and my recommendations with patient and nursing staff.     Del Iverson MD  11/09/24  12:51 EST

## 2024-11-09 NOTE — PLAN OF CARE
Goal Outcome Evaluation:         Wheezes in LL bilateral. Orthos on previous note. Trop trending down

## 2024-11-09 NOTE — CONSULTS
CARDIOLOGY CONSULT      Requesting Provider    Del Iverson MD      PATIENT IDENTIFICATION    Name: Vianey Reeves  Age: 79 y.o. Sex: female : 1945  MRN: [id number]    REASON FOR CONSULTATION:  Chest pain  Elevated troponin    CHIEF COMPLAINT:  Chest pain    HISTORY OF PRESENT ILLNESS:   This is a pleasant 79-year-old female with an established history of ischemic heart disease.  She presents with a complaint of chest discomfort.  She describes it as a substernal discomfort that is mild and is associated with palpitations.  She also reports that she has had some dizziness and progressive lower extremity weakness.      She reports that some of her blood pressure medications were changed recently.  She was started on clonidine.  She reports that after taking the clonidine she began having a swimming feeling in her head and that precipitated the nausea.  We have limited options with regards to treating the patient's blood pressure medications.  She has been tried on multiple antihypertensives.  She was most recently tried on an alpha-blocker but there was some concern about orthostasis in the evening hours.  Additionally, the patient had been on carvedilol and had some bradycardia associated with that.  Clonidine can cause bradycardia as well.    During the course of her workup she had a troponin ordered that was abnormal.  Initial troponin was 84 and it ameya to 180.  It is since declined to 104.    She has had some intermittent chest pain in the past.  She had a nuclear stress test performed in 2024 that demonstrated no significant reversible ischemia.    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  Patient would benefit from  cardiac catheterization.  Will have nephrology evaluate patient had prep for catheterization early this week.  Would avoid clonidine due to blood pressure lability and potential for bradycardia    Medical History    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    Hypertension     IBS (irritable colon syndrome)     constipation    Mass of right breast     Neuropathy     Renal insufficiency     Sciatic leg pain     right    Simple chronic bronchitis     Solitary kidney     left        Surgical History    Past Surgical History:   Procedure Laterality Date    BREAST BIOPSY Right     BRONCHOSCOPY N/A 7/14/2024    Procedure: BRONCHOSCOPY WITH BRONCHOALVEOLAR LAVAGE;  Surgeon: Marlin Ferguson MD;  Location: [facility] ENDOSCOPY;  Service: Pulmonary;  Laterality: N/A;  POST: RML ATELECTASIS    CARDIAC CATHETERIZATION      CHOLECYSTECTOMY      COLON SURGERY      REMOVAL diverticular diseae    COLONOSCOPY N/A 08/12/2021    Procedure: COLONOSCOPY with polypectomy x 3;  Surgeon: Margarette Mcallister MD;  Location: [facility] ENDOSCOPY;  Service: Gastroenterology;  Laterality: N/A;  post op: hmorrhoids, diverticulosis, polyps    CORONARY STENT PLACEMENT      ENDOSCOPY N/A 08/12/2021    Procedure: ESOPHAGOGASTRODUODENOSCOPY with dilatation (18-20 mm balloon) and (54 bougie);  Surgeon: Margarette Mcallister MD;  Location: [facility] ENDOSCOPY;  Service: Gastroenterology;  Laterality: N/A;  post op: esophageal  "stricture, esophagitis, gastritis, history of nissen    ENDOSCOPY N/A 2023    Procedure: ESOPHAGOGASTRODUODENOSCOPY with biopsy x 1 area and dilation (50,54,56 non guided bougie);  Surgeon: Margarette Mcallister MD;  Location: [facility] ENDOSCOPY;  Service: Gastroenterology;  Laterality: N/A;  post op: esophageal stricture    EYE SURGERY Bilateral     cataracts    HIATAL HERNIA REPAIR      NEPHRECTOMY Right     cancer    UPPER ENDOSCOPIC ULTRASOUND W/ FNA N/A 2022    Procedure: EUS;  Surgeon: Margarette Mcallister MD;  Location: [facility] ENDOSCOPY;  Service: Gastroenterology;  Laterality: N/A;  post: normal pancreas, dilated pancreatic duct, hiatal hernia,         Family History    Family History   Problem Relation Age of Onset    Arthritis Father     Prostate cancer Father     Heart disease Sister        Social History    Social History     Tobacco Use    Smoking status: Former     Current packs/day: 0.00     Average packs/day: 0.3 packs/day for 50.0 years (12.5 ttl pk-yrs)     Types: Cigarettes     Start date: 1973     Quit date: 2023     Years since quittin.0     Passive exposure: Past    Smokeless tobacco: Never    Tobacco comments:     Mostly patches, some days none, some days 1-2   Substance Use Topics    Alcohol use: Not Currently     Comment: occasionally         Allergies    Allergies   Allergen Reactions    Erythromycin Rash    Naproxen Sodium Other (See Comments)     Dizzy lightheaded    Statins Myalgia    Latex Itching, Swelling and Hives    Metoclopramide Other (See Comments)     Unsteady on feet    Dicyclomine Unknown - Low Severity       REVIEW OF SYSTEMS:  Pertinent items are noted in HPI, all other systems reviewed and negative    OBJECTIVE     VITAL SIGNS:  Visit Vitals  BP 91/51 (BP Location: Right arm, Patient Position: Lying)   Pulse 75   Temp 97.1 °F (36.2 °C) (Oral)   Resp 15   Ht 165.1 cm (65\")   Wt 80.3 kg (177 lb)   LMP  (LMP Unknown)   SpO2 99%   BMI 29.45 kg/m²     Oxygen Therapy  SpO2: " "99 %  Pulse Oximetry Type: Continuous  Device (Oxygen Therapy): room air  Flow (L/min) (Oxygen Therapy): 2  Flowsheet Rows      Flowsheet Row First Filed Value   Admission Height 165.1 cm (65\") Documented at 11/08/2024 1917   Admission Weight 80.3 kg (177 lb) Documented at 11/08/2024 1917          Intake & Output (last 3 days)         11/06 0701  11/07 0700 11/07 0701  11/08 0700 11/08 0701  11/09 0700 11/09 0701  11/10 0700            Urine Unmeasured Occurrence    1 x          Lines, Drains & Airways       Active LDAs       Name Placement date Placement time Site Days    Peripheral IV 11/09/24 1409 Anterior;Proximal;Right Forearm 11/09/24  1409  Forearm  less than 1                  BP 91/51 (BP Location: Right arm, Patient Position: Lying)   Pulse 75   Temp 97.1 °F (36.2 °C) (Oral)   Resp 15   Ht 165.1 cm (65\")   Wt 80.3 kg (177 lb)   LMP  (LMP Unknown)   SpO2 99%   BMI 29.45 kg/m²     INTAKE/OUTPUT:    Intake/Output this shift:  No intake/output data recorded.  Intake/Output last 3 shifts:  No intake/output data recorded.      PHYSICAL EXAM:    General: Alert, cooperative, no distress, appears stated age  Head:  Normocephalic, atraumatic, mucous membranes moist  Eyes:  Conjunctivae/corneas clear, EOM's intact     Neck:  Supple,  no bruit  Lungs: Coarse and diminished bilaterally  Chest wall: No tenderness  Heart::  Regular rate and rhythm, S1 and S2 normal, 1/6 holosystolic murmur.  No rub or gallop  Abdomen: Soft, nontender, nondistended, bowel sounds active  Extremities: No cyanosis, clubbing, or edema  Pulses: Diminished pedal pulses  Skin:  No rashes or lesions  Neuro/psych: A&O x3. CN II through XII are grossly intact with appropriate affect    Scheduled Meds:      amLODIPine, 10 mg, Oral, Daily  aspirin, 81 mg, Oral, Daily  cloNIDine, 0.1 mg, Oral, BID  ipratropium-albuterol, 3 mL, Nebulization, 4x Daily - RT  pantoprazole, 40 mg, Oral, Q AM  polyethylene glycol, 17 g, Oral, Daily  sertraline, 50 " "mg, Oral, Daily        Continuous Infusions:         PRN Meds:      acetaminophen    HYDROcodone-acetaminophen    HYDROmorphone    ondansetron    [COMPLETED] Insert Peripheral IV **AND** sodium chloride     Data Review:     X-rays, labs reviewed personally by provider.    CBC    Results from last 7 days   Lab Units 11/09/24 0218 11/08/24 1940   WBC 10*3/mm3 11.63* 10.49   HEMOGLOBIN g/dL 15.8 15.5   PLATELETS 10*3/mm3 222 220     Cr Clearance Estimated Creatinine Clearance: 36.7 mL/min (A) (by C-G formula based on SCr of 1.26 mg/dL (H)).  Coag     HbA1C   Lab Results   Component Value Date    HGBA1C 7.10 (H) 01/21/2024    HGBA1C 7.0 (H) 12/16/2022    HGBA1C 7.3 08/11/2022     Blood Glucose No results found for: \"POCGLU\"  Infection     CMP   Results from last 7 days   Lab Units 11/09/24 0218 11/08/24 1940   SODIUM mmol/L 141 139   POTASSIUM mmol/L 3.9 4.0   CHLORIDE mmol/L 104 103   CO2 mmol/L 28.4 26.3   BUN mg/dL 25* 26*   CREATININE mg/dL 1.26* 1.27*   GLUCOSE mg/dL 174* 159*   ALBUMIN g/dL  --  3.8   BILIRUBIN mg/dL  --  0.2   ALK PHOS U/L  --  81   AST (SGOT) U/L  --  14   ALT (SGPT) U/L  --  10     ABG      UA    Results from last 7 days   Lab Units 11/08/24  2107   NITRITE UA  Negative   WBC UA /HPF 3-5*   BACTERIA UA /HPF None Seen   SQUAM EPITHEL UA /HPF 0-2     TANISHA  No results found for: \"POCMETH\", \"POCAMPHET\", \"POCBARBITUR\", \"POCBENZO\", \"POCCOCAINE\", \"POCOPIATES\", \"POCOXYCODO\", \"POCPHENCYC\", \"POCPROPOXY\", \"POCTHC\", \"POCTRICYC\"  Lysis Labs   Results from last 7 days   Lab Units 11/09/24 0218 11/08/24 1940   HEMOGLOBIN g/dL 15.8 15.5   PLATELETS 10*3/mm3 222 220   CREATININE mg/dL 1.26* 1.27*     Radiology(recent) CT Head Without Contrast    Result Date: 11/8/2024  Impression: 1. Volume loss secondary to cerebral atrophy. 2. Chronic ischemia. 3. No acute intracranial findings. Electronically Signed: Del Willoughby MD  11/8/2024 8:25 PM EST  Workstation ID: [id number]    XR Chest 1 View    Result Date: " 11/8/2024  Impression: No active pulmonary process. Electronically Signed: Federico Carrion MD  11/8/2024 8:21 PM EST  Workstation ID: [id number]       Results from last 7 days   Lab Units 11/09/24  1410   CK TOTAL U/L 40   HSTROP T ng/L 104*       ECG/EMG Results (most recent)       Procedure Component Value Units Date/Time    ECG 12 Lead Other; trop [[id number]] Collected: 11/08/24 2303     Updated: 11/08/24 2304     QT Interval 389 ms      QTC Interval 458 ms     Narrative:      HEART RATE=83  bpm  RR Fkrvgpvl=819  ms  AL Wphdvbps=147  ms  P Horizontal Axis=-30  deg  P Front Axis=34  deg  QRSD Ocfnryxv=901  ms  QT Xprntabw=896  ms  NOhV=877  ms  QRS Axis=88  deg  T Wave Axis=46  deg  - ABNORMAL ECG -  Sinus rhythm  Right bundle branch block  Date and Time of Study:2024-11-08 23:03:07    ECG 12 Lead Other; Dizziness [[id number]] Collected: 11/08/24 1827     Updated: 11/09/24 0846     QT Interval 421 ms      QTC Interval 488 ms     Narrative:      HEART RATE=81  bpm  RR Lyokdatl=153  ms  AL Dzqiulph=680  ms  P Horizontal Axis=11  deg  P Front Axis=40  deg  QRSD Atwjyfyk=493  ms  QT Wpcsaaqg=680  ms  TJaU=582  ms  QRS Axis=126  deg  T Wave Axis=-11  deg  - ABNORMAL ECG -  Sinus rhythm  IVCD, consider RBBB  Inferior  infarct, age indeterminate  When compared with ECG of 11-Jul-2024 22:55:08,  New or worsened ischemia or infarction  Electronically Signed By: Gonzalo Villar (NATE) 2024-11-09 08:46:20  Date and Time of Study:2024-11-08 18:27:55    Adult Transthoracic Echo Complete W/ Cont if Necessary Per Protocol [[id number]] Resulted: 11/09/24 1157     Updated: 11/09/24 1157     LV GLOBAL STRAIN  -9.7 %      LVIDd 5.4 cm      LVIDs 4.1 cm      IVSd 1.00 cm      LVPWd 0.90 cm      FS 24.1 %      IVS/LVPW 1.11 cm      ESV(cubed) 68.9 ml      LV Sys Vol (BSA corrected) 43.0 cm2      EDV(cubed) 157.5 ml      LV Cordero Vol (BSA corrected) 74.0 cm2      LV mass(C)d 193.3 grams      LVOT area 2.5 cm2      LVOT diam 1.80 cm       EDV(MOD-sp4) 139.0 ml      ESV(MOD-sp4) 80.8 ml      SV(MOD-sp4) 58.2 ml      SVi(MOD-SP4) 31.0 ml/m2      SVi (LVOT) 23.8 ml/m2      EF(MOD-sp4) 41.9 %      MV E max sergey 49.3 cm/sec      MV A max sergey 136.0 cm/sec      MV dec time 0.14 sec      MV E/A 0.36     Med Peak E' Sergey 2.7 cm/sec      Lat Peak E' Sergey 3.3 cm/sec      Avg E/e' ratio 16.43     SV(LVOT) 44.8 ml      RVIDd 3.4 cm      TAPSE (>1.6) 1.53 cm      RV S' 9.0 cm/sec      LA dimension (2D)  3.0 cm      LV V1 max 91.7 cm/sec      LV V1 max PG 3.4 mmHg      LV V1 mean PG 2.00 mmHg      LV V1 VTI 17.6 cm      Ao pk sergey 152.0 cm/sec      Ao max PG 9.2 mmHg      Ao mean PG 5.0 mmHg      Ao V2 VTI 28.7 cm      PAPO(I,D) 1.56 cm2      MV max PG 10.0 mmHg      MV mean PG 4.0 mmHg      MV V2 VTI 21.4 cm      MV P1/2t 49.2 msec      MVA(P1/2t) 4.5 cm2      MVA(VTI) 2.09 cm2      MV dec slope 941.0 cm/sec2      RAP systole 3.0 mmHg      RV V1 max PG 1.80 mmHg      RV V1 max 67.1 cm/sec      RV V1 VTI 11.3 cm      PA V2 max 97.0 cm/sec      PA acc time 0.10 sec      Sinus 2.8 cm      STJ 2.30 cm      EF(MOD-bp) 42.0 %     Narrative:        Left ventricular ejection fraction appears to be 36 - 40%.    Indications  Valvular function.    Technically satisfactory study.  Mitral valve is structurally normal.  Mild to moderate mitral   regurgitation is present.  Tricuspid valve is structurally normal.  Aortic valve is thickened with adequate opening motion.  Pulmonic valve could not be well visualized.  Left atrium is normal in size.  Right atrium is normal in size.  Left ventricle is enlarged with diffuse hypocontractility with ejection   fraction of 35 to 40%.  Left ventricular peak systolic global longitudinal strain (GLS) is   abnormal at -10%.  Grade 1 left ventricular diastolic dysfunction is present.  (MV E/A ratio   0.36).  Right ventricle is enlarged with hypocontractility with TAPSE of 1.53 cm.  Atrial septum is intact.  Aorta is normal.  No pericardial effusion  or intracardiac thrombus is seen.    Impression  Mild to moderate mitral regurgitation.  Aortic valve is thickened with adequate opening motion.  Left ventricle is enlarged with diffuse hypocontractility with ejection   fraction of 35 to 40%.  Left ventricular peak systolic global longitudinal strain (GLS) is   abnormal at -10%.  Grade 1 left ventricular diastolic dysfunction is present.  (MV E/A ratio   0.36).  Right ventricle is enlarged with hypocontractility with TAPSE of 1.53 cm.      ECG 12 Lead Chest Pain [[id number]] Collected: 11/09/24 1323     Updated: 11/09/24 1418     QT Interval 471 ms      QTC Interval 499 ms     Narrative:      HEART RATE=67  bpm  RR Moyxavpz=447  ms  SD Mumgeaho=281  ms  P Horizontal Axis=5  deg  P Front Axis=46  deg  QRSD Iutkjdxk=944  ms  QT Iqkwsmlm=640  ms  XTgT=714  ms  QRS Axis=125  deg  T Wave Axis=162  deg  - ABNORMAL ECG -  Sinus rhythm  Nonspecific  intraventricular conduction delay  Abnrm T, consider ischemia, anterolateral lds  Date and Time of Study:2024-11-09 13:23:15            Imaging:  Imaging Results (Last 72 Hours)       Procedure Component Value Units Date/Time    CT Head Without Contrast [[id number]] Collected: 11/08/24 2019     Updated: 11/08/24 2027    Narrative:      CT HEAD WO CONTRAST    Date of Exam: 11/8/2024 7:52 PM EST    Indication: Dizziness.    Comparison: 3/5/2024.    Technique: Axial CT images were obtained of the head without contrast administration.  Coronal reconstructions were performed.  Automated exposure control and iterative reconstruction methods were used.      Findings:  The sulci, fissures, and ventricles are prominent indicating volume loss secondary to cerebral atrophy. The basal cisterns are well-maintained. There are areas of decreased density in the periventricular and subcortical white matter felt to represent   chronic microvascular ischemia. The gray-white matter differentiation is preserved. There are atherosclerotic vascular  calcifications in the distal right vertebral artery and both cavernous carotid arteries. There is thinning of the lenses which can be   seen with previous cataract surgery. The visualized paranasal and mastoid sinuses are clear. The calvarium is unremarkable.      Impression:      Impression:  1. Volume loss secondary to cerebral atrophy.  2. Chronic ischemia.  3. No acute intracranial findings.      Electronically Signed: Del Willoughby MD    11/8/2024 8:25 PM EST    Workstation ID: [id number]    XR Chest 1 View [[id number]] Collected: 11/08/24 2017     Updated: 11/08/24 2023    Narrative:      XR CHEST 1 VW    Date of Exam: 11/8/2024 7:48 PM EST    Indication: dizziness    Comparison: Chest radiograph 10/7/2024    Findings:  Stable cardiomegaly. Negative for lobar consolidation, edema, effusion or pneumothorax. Aortic atherosclerotic disease. Prior kyphoplasty changes in the thoracic spine. Degenerative related osseous findings.      Impression:      Impression:  No active pulmonary process.      Electronically Signed: Federico Carrion MD    11/8/2024 8:21 PM EST    Workstation ID: [id number]              Leon Gonzalez DO  11/09/24  16:57 EST

## 2024-11-09 NOTE — PLAN OF CARE
Goal Outcome Evaluation:      Pt deonstrated n/v overnight r/t pain medication. Zofran given and effective.

## 2024-11-10 PROBLEM — I21.4 NON-STEMI (NON-ST ELEVATED MYOCARDIAL INFARCTION): Status: ACTIVE | Noted: 2024-11-08

## 2024-11-10 LAB
ANION GAP SERPL CALCULATED.3IONS-SCNC: 10.4 MMOL/L (ref 5–15)
BUN SERPL-MCNC: 35 MG/DL (ref 8–23)
BUN/CREAT SERPL: 22.9 (ref 7–25)
CALCIUM SPEC-SCNC: 9.6 MG/DL (ref 8.6–10.5)
CHLORIDE SERPL-SCNC: 102 MMOL/L (ref 98–107)
CO2 SERPL-SCNC: 28.6 MMOL/L (ref 22–29)
CREAT SERPL-MCNC: 1.53 MG/DL (ref 0.57–1)
DEPRECATED RDW RBC AUTO: 43.7 FL (ref 37–54)
EGFRCR SERPLBLD CKD-EPI 2021: 34 ML/MIN/1.73
ERYTHROCYTE [DISTWIDTH] IN BLOOD BY AUTOMATED COUNT: 11.7 % (ref 12.3–15.4)
GLUCOSE BLDC GLUCOMTR-MCNC: 176 MG/DL (ref 70–105)
GLUCOSE BLDC GLUCOMTR-MCNC: 211 MG/DL (ref 70–105)
GLUCOSE BLDC GLUCOMTR-MCNC: 228 MG/DL (ref 70–105)
GLUCOSE BLDC GLUCOMTR-MCNC: 239 MG/DL (ref 70–105)
GLUCOSE SERPL-MCNC: 208 MG/DL (ref 65–99)
HCT VFR BLD AUTO: 47.1 % (ref 34–46.6)
HGB BLD-MCNC: 14.5 G/DL (ref 12–15.9)
MCH RBC QN AUTO: 31.1 PG (ref 26.6–33)
MCHC RBC AUTO-ENTMCNC: 30.8 G/DL (ref 31.5–35.7)
MCV RBC AUTO: 101.1 FL (ref 79–97)
PLATELET # BLD AUTO: 240 10*3/MM3 (ref 140–450)
PMV BLD AUTO: 10.5 FL (ref 6–12)
POTASSIUM SERPL-SCNC: 4.7 MMOL/L (ref 3.5–5.2)
RBC # BLD AUTO: 4.66 10*6/MM3 (ref 3.77–5.28)
SODIUM SERPL-SCNC: 141 MMOL/L (ref 136–145)
WBC NRBC COR # BLD AUTO: 11.19 10*3/MM3 (ref 3.4–10.8)

## 2024-11-10 PROCEDURE — 25810000003 SODIUM CHLORIDE 0.9 % SOLUTION: Performed by: INTERNAL MEDICINE

## 2024-11-10 PROCEDURE — 63710000001 INSULIN GLARGINE PER 5 UNITS: Performed by: PHYSICIAN ASSISTANT

## 2024-11-10 PROCEDURE — 82948 REAGENT STRIP/BLOOD GLUCOSE: CPT

## 2024-11-10 PROCEDURE — 99232 SBSQ HOSP IP/OBS MODERATE 35: CPT | Performed by: INTERNAL MEDICINE

## 2024-11-10 PROCEDURE — 82948 REAGENT STRIP/BLOOD GLUCOSE: CPT | Performed by: PHYSICIAN ASSISTANT

## 2024-11-10 PROCEDURE — 80048 BASIC METABOLIC PNL TOTAL CA: CPT | Performed by: FAMILY MEDICINE

## 2024-11-10 PROCEDURE — 94799 UNLISTED PULMONARY SVC/PX: CPT

## 2024-11-10 PROCEDURE — 63710000001 INSULIN LISPRO (HUMAN) PER 5 UNITS: Performed by: PHYSICIAN ASSISTANT

## 2024-11-10 PROCEDURE — 94664 DEMO&/EVAL PT USE INHALER: CPT

## 2024-11-10 PROCEDURE — 85027 COMPLETE CBC AUTOMATED: CPT | Performed by: FAMILY MEDICINE

## 2024-11-10 RX ORDER — SODIUM CHLORIDE 9 MG/ML
75 INJECTION, SOLUTION INTRAVENOUS CONTINUOUS
Status: DISPENSED | OUTPATIENT
Start: 2024-11-10 | End: 2024-11-11

## 2024-11-10 RX ADMIN — INSULIN LISPRO 3 UNITS: 100 INJECTION, SOLUTION INTRAVENOUS; SUBCUTANEOUS at 09:59

## 2024-11-10 RX ADMIN — Medication 600 MG: at 21:05

## 2024-11-10 RX ADMIN — Medication 600 MG: at 10:32

## 2024-11-10 RX ADMIN — IPRATROPIUM BROMIDE AND ALBUTEROL SULFATE 3 ML: .5; 3 SOLUTION RESPIRATORY (INHALATION) at 15:05

## 2024-11-10 RX ADMIN — INSULIN LISPRO 2 UNITS: 100 INJECTION, SOLUTION INTRAVENOUS; SUBCUTANEOUS at 18:00

## 2024-11-10 RX ADMIN — POLYETHYLENE GLYCOL 3350 17 G: 17 POWDER, FOR SOLUTION ORAL at 10:34

## 2024-11-10 RX ADMIN — PANTOPRAZOLE SODIUM 40 MG: 40 TABLET, DELAYED RELEASE ORAL at 06:35

## 2024-11-10 RX ADMIN — AMLODIPINE BESYLATE 10 MG: 5 TABLET ORAL at 10:32

## 2024-11-10 RX ADMIN — IPRATROPIUM BROMIDE AND ALBUTEROL SULFATE 3 ML: .5; 3 SOLUTION RESPIRATORY (INHALATION) at 07:40

## 2024-11-10 RX ADMIN — SERTRALINE 50 MG: 50 TABLET, FILM COATED ORAL at 10:32

## 2024-11-10 RX ADMIN — INSULIN LISPRO 3 UNITS: 100 INJECTION, SOLUTION INTRAVENOUS; SUBCUTANEOUS at 13:07

## 2024-11-10 RX ADMIN — ASPIRIN 81 MG: 81 TABLET, COATED ORAL at 10:32

## 2024-11-10 RX ADMIN — INSULIN GLARGINE 10 UNITS: 100 INJECTION, SOLUTION SUBCUTANEOUS at 21:04

## 2024-11-10 RX ADMIN — INSULIN LISPRO 3 UNITS: 100 INJECTION, SOLUTION INTRAVENOUS; SUBCUTANEOUS at 21:04

## 2024-11-10 RX ADMIN — HYDROCODONE BITARTRATE AND ACETAMINOPHEN 1 TABLET: 10; 325 TABLET ORAL at 21:05

## 2024-11-10 RX ADMIN — SODIUM CHLORIDE 75 ML/HR: 9 INJECTION, SOLUTION INTRAVENOUS at 10:34

## 2024-11-10 RX ADMIN — IPRATROPIUM BROMIDE AND ALBUTEROL SULFATE 3 ML: .5; 3 SOLUTION RESPIRATORY (INHALATION) at 19:11

## 2024-11-10 NOTE — PLAN OF CARE
Goal Outcome Evaluation:  Plan of Care Reviewed With: patient        Progress: improving  Outcome Evaluation: patient up with stand by assist, IVF started and mucomyst anticipating cardiac cath tomorrow. Pt will be NPO after midnight. VSS, no concerns at this time.

## 2024-11-10 NOTE — SIGNIFICANT NOTE
11/10/24 1340   OTHER   Discipline physical therapist   Rehab Time/Intention   Session Not Performed other (see comments)  (Pt sleeping soundly, does not respond to therapist calling out to her. Will f/u as time allows.)   Therapy Assessment/Plan (PT)   Criteria for Skilled Interventions Met (PT) yes;meets criteria   Recommendation   PT - Next Appointment 11/11/24

## 2024-11-10 NOTE — CONSULTS
NEPHROLOGY CONSULTATION-----[facility]    [facility]  [phone]  Rolando Miller MD    Patient Care Team:  Anny Garcia MD as PCP - General (Internal Medicine)  Sergio Ordonez MD as Consulting Physician (Nephrology)    CC/REASON FOR CONSULTATION: Elevated creatinine/needs heart cath    PHYSICIAN REQUESTING CONSULTATION: Dr. London    History of Present Illness  79-year-old female with past medical CKD stage III, diabetes type 2, right nephrectomy for ureteral cancer, hypertension to the hospital on 11/8/2024 with complaints of chest tightness.  Her troponin is elevated.  She is seen by cardiology, needs heart cath.  Her creatinine 1.2 on admission, increased to 1.5 today.  Denies dysuria or gross hematuria.  No vomiting or diarrhea.  Her blood pressure has been labile, now on the soft side.  She was started on normal saline this morning.  .    Review of Systems   As noted above otherwise systems reviewed and were negative    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    Hypertension     IBS (irritable colon syndrome)     constipation    Mass of right breast     Neuropathy     Renal insufficiency     Sciatic leg pain     right    Simple chronic bronchitis     Solitary kidney     left       Past Surgical History:   Procedure Laterality Date    BREAST BIOPSY Right     BRONCHOSCOPY N/A 7/14/2024     Procedure: BRONCHOSCOPY WITH BRONCHOALVEOLAR LAVAGE;  Surgeon: Marlin Ferguson MD;  Location: [facility] ENDOSCOPY;  Service: Pulmonary;  Laterality: N/A;  POST: RML ATELECTASIS    CARDIAC CATHETERIZATION      CHOLECYSTECTOMY      COLON SURGERY      REMOVAL diverticular diseae    COLONOSCOPY N/A 2021    Procedure: COLONOSCOPY with polypectomy x 3;  Surgeon: Margarette Mcallister MD;  Location: [facility] ENDOSCOPY;  Service: Gastroenterology;  Laterality: N/A;  post op: hmorrhoids, diverticulosis, polyps    CORONARY STENT PLACEMENT      ENDOSCOPY N/A 2021    Procedure: ESOPHAGOGASTRODUODENOSCOPY with dilatation (18-20 mm balloon) and (54 bougie);  Surgeon: Margarette Mcallister MD;  Location: [facility] ENDOSCOPY;  Service: Gastroenterology;  Laterality: N/A;  post op: esophageal stricture, esophagitis, gastritis, history of nissen    ENDOSCOPY N/A 2023    Procedure: ESOPHAGOGASTRODUODENOSCOPY with biopsy x 1 area and dilation (50,54,56 non guided bougie);  Surgeon: Margarette Mcallister MD;  Location: [facility] ENDOSCOPY;  Service: Gastroenterology;  Laterality: N/A;  post op: esophageal stricture    EYE SURGERY Bilateral     cataracts    HIATAL HERNIA REPAIR      NEPHRECTOMY Right     cancer    UPPER ENDOSCOPIC ULTRASOUND W/ FNA N/A 2022    Procedure: EUS;  Surgeon: Margarette Mcallister MD;  Location: [facility] ENDOSCOPY;  Service: Gastroenterology;  Laterality: N/A;  post: normal pancreas, dilated pancreatic duct, hiatal hernia,        Family History   Problem Relation Age of Onset    Arthritis Father     Prostate cancer Father     Heart disease Sister        Social History     Tobacco Use    Smoking status: Former     Current packs/day: 0.00     Average packs/day: 0.3 packs/day for 50.0 years (12.5 ttl pk-yrs)     Types: Cigarettes     Start date: 1973     Quit date: 2023     Years since quittin.0     Passive exposure: Past    Smokeless tobacco: Never    Tobacco comments:     Mostly patches, some days none, some days 1-2    Vaping Use    Vaping status: Never Used   Substance Use Topics    Alcohol use: Not Currently     Comment: occasionally     Drug use: Never       Home Meds:   Medications Prior to Admission   Medication Sig Dispense Refill Last Dose/Taking    acetaminophen (TYLENOL) 650 MG 8 hr tablet Take 2 tablets by mouth Every 8 (Eight) Hours As Needed for Mild Pain.   Taking As Needed    albuterol sulfate  (90 Base) MCG/ACT inhaler Inhale 2 puffs Every 4 (Four) Hours As Needed for Wheezing or Shortness of Air.   Taking As Needed    amLODIPine (NORVASC) 10 MG tablet Take 1 tablet by mouth Daily. Indications: High Blood Pressure   Taking    aspirin 81 MG tablet Take 1 tablet by mouth Every 72 (Seventy-Two) Hours. Indications: ANTIPLATELET   Taking    cloNIDine (CATAPRES) 0.1 MG tablet Take 1 tablet by mouth 2 (Two) Times a Day.   Taking    Finerenone (Kerendia) 10 MG tablet Take 1 tablet by mouth Daily.   Taking    furosemide (LASIX) 20 MG tablet Take 1 tablet by mouth Daily As Needed.   Taking As Needed    HYDROcodone-acetaminophen (NORCO)  MG per tablet Take 1 tablet by mouth Every 6 (Six) Hours As Needed for Moderate Pain.   Taking As Needed    Insulin Glargine (LANTUS SOLOSTAR) 100 UNIT/ML injection pen Inject 10 Units under the skin into the appropriate area as directed Every Night. (Patient taking differently: Inject 10 Units under the skin into the appropriate area as directed Every Night. Or Lantus 15 units as needed   Indications: Type 2 Diabetes) 100 mL 12 Taking Differently    ipratropium-albuterol (DUO-NEB) 0.5-2.5 mg/3 ml nebulizer Take 3 mL by nebulization 4 (Four) Times a Day As Needed for Wheezing. 360 mL 1 Taking As Needed    Linzess 145 MCG capsule capsule Take 1 capsule by mouth Daily. Indications: CONSTIPATION   Taking    pantoprazole (PROTONIX) 40 MG EC tablet Take 1 tablet by mouth Every Morning. 30 tablet 1 Taking    polyethylene glycol (MIRALAX) 17 g packet Take 17 g by mouth Daily.  Indications: Constipation   Taking    sertraline (ZOLOFT) 50 MG tablet Take 1 tablet by mouth Daily. Indications: MOOD   Taking    Umeclidinium-Vilanterol (Anoro Ellipta) 62.5-25 MCG/ACT aerosol powder  inhaler Inhale 1 puff Daily. At the same time each day.   Taking       Scheduled Meds:  Acetylcysteine, 600 mg, Oral, BID  amLODIPine, 10 mg, Oral, Daily  aspirin, 81 mg, Oral, Daily  insulin glargine, 10 Units, Subcutaneous, Nightly  insulin lispro, 2-7 Units, Subcutaneous, 4x Daily AC & at Bedtime  ipratropium-albuterol, 3 mL, Nebulization, 4x Daily - RT  pantoprazole, 40 mg, Oral, Q AM  polyethylene glycol, 17 g, Oral, Daily  sertraline, 50 mg, Oral, Daily        Continuous Infusions:  sodium chloride, 75 mL/hr, Last Rate: 75 mL/hr (11/10/24 1034)        PRN Meds:    acetaminophen    dextrose    dextrose    glucagon (human recombinant)    HYDROcodone-acetaminophen    HYDROmorphone    ondansetron    [COMPLETED] Insert Peripheral IV **AND** sodium chloride    Allergies:  Erythromycin, Naproxen sodium, Statins, Latex, Metoclopramide, and Dicyclomine    OBJECTIVE    Vital Signs  Temp:  [97.1 °F (36.2 °C)-98.4 °F (36.9 °C)] 97.8 °F (36.6 °C)  Heart Rate:  [61-91] 77  Resp:  [12-20] 18  BP: ()/(51-78) 120/70    No intake/output data recorded.  I/O last 3 completed shifts:  In: 240 [P.O.:240]  Out: -     Physical Exam:  General Appearance: alert, appears stated age and cooperative  Head: normocephalic, without obvious abnormality and atraumatic  Eyes: conjunctivae and sclerae normal and no icterus  Neck: supple and no JVD  Lungs: clear to auscultation and respirations regular  Heart: regular rhythm & normal rate and normal S1, S2  Chest Wall: no abnormalities observed  Abdomen: normal bowel sounds and soft, nontender  Extremities: moves extremities well, no edema, no cyanosis  Skin: no bleeding, bruising or rash  Neurologic: Alert, and oriented. No focal deficits    Results Review:    I reviewed the patient's new  "clinical results.    WBC WBC   Date Value Ref Range Status   11/10/2024 11.19 (H) 3.40 - 10.80 10*3/mm3 Final   11/09/2024 11.63 (H) 3.40 - 10.80 10*3/mm3 Final   11/08/2024 10.49 3.40 - 10.80 10*3/mm3 Final      HGB Hemoglobin   Date Value Ref Range Status   11/10/2024 14.5 12.0 - 15.9 g/dL Final   11/09/2024 15.8 12.0 - 15.9 g/dL Final   11/08/2024 15.5 12.0 - 15.9 g/dL Final      HCT Hematocrit   Date Value Ref Range Status   11/10/2024 47.1 (H) 34.0 - 46.6 % Final   11/09/2024 48.2 (H) 34.0 - 46.6 % Final   11/08/2024 46.1 34.0 - 46.6 % Final      Platelets No results found for: \"LABPLAT\"   MCV MCV   Date Value Ref Range Status   11/10/2024 101.1 (H) 79.0 - 97.0 fL Final   11/09/2024 99.2 (H) 79.0 - 97.0 fL Final   11/08/2024 98.5 (H) 79.0 - 97.0 fL Final          Sodium Sodium   Date Value Ref Range Status   11/10/2024 141 136 - 145 mmol/L Final   11/09/2024 141 136 - 145 mmol/L Final   11/08/2024 139 136 - 145 mmol/L Final      Potassium Potassium   Date Value Ref Range Status   11/10/2024 4.7 3.5 - 5.2 mmol/L Final   11/09/2024 3.9 3.5 - 5.2 mmol/L Final   11/08/2024 4.0 3.5 - 5.2 mmol/L Final     Comment:     Slight hemolysis detected by analyzer. Result may be falsely elevated.      Chloride Chloride   Date Value Ref Range Status   11/10/2024 102 98 - 107 mmol/L Final   11/09/2024 104 98 - 107 mmol/L Final   11/08/2024 103 98 - 107 mmol/L Final      CO2 CO2   Date Value Ref Range Status   11/10/2024 28.6 22.0 - 29.0 mmol/L Final   11/09/2024 28.4 22.0 - 29.0 mmol/L Final   11/08/2024 26.3 22.0 - 29.0 mmol/L Final      BUN BUN   Date Value Ref Range Status   11/10/2024 35 (H) 8 - 23 mg/dL Final   11/09/2024 25 (H) 8 - 23 mg/dL Final   11/08/2024 26 (H) 8 - 23 mg/dL Final      Creatinine Creatinine   Date Value Ref Range Status   11/10/2024 1.53 (H) 0.57 - 1.00 mg/dL Final   11/09/2024 1.26 (H) 0.57 - 1.00 mg/dL Final   11/08/2024 1.27 (H) 0.57 - 1.00 mg/dL Final      Calcium Calcium   Date Value Ref Range " "Status   11/10/2024 9.6 8.6 - 10.5 mg/dL Final   11/09/2024 9.6 8.6 - 10.5 mg/dL Final   11/08/2024 9.6 8.6 - 10.5 mg/dL Final      PO4 No results found for: \"CAPO4\"   Albumin Albumin   Date Value Ref Range Status   11/08/2024 3.8 3.5 - 5.2 g/dL Final      Magnesium No results found for: \"MG\"   Uric Acid No results found for: \"URICACID\"       Imaging Results (Last 72 Hours)       Procedure Component Value Units Date/Time    CT Head Without Contrast [[id number]] Collected: 11/08/24 2019     Updated: 11/08/24 2027    Narrative:      CT HEAD WO CONTRAST    Date of Exam: 11/8/2024 7:52 PM EST    Indication: Dizziness.    Comparison: 3/5/2024.    Technique: Axial CT images were obtained of the head without contrast administration.  Coronal reconstructions were performed.  Automated exposure control and iterative reconstruction methods were used.      Findings:  The sulci, fissures, and ventricles are prominent indicating volume loss secondary to cerebral atrophy. The basal cisterns are well-maintained. There are areas of decreased density in the periventricular and subcortical white matter felt to represent   chronic microvascular ischemia. The gray-white matter differentiation is preserved. There are atherosclerotic vascular calcifications in the distal right vertebral artery and both cavernous carotid arteries. There is thinning of the lenses which can be   seen with previous cataract surgery. The visualized paranasal and mastoid sinuses are clear. The calvarium is unremarkable.      Impression:      Impression:  1. Volume loss secondary to cerebral atrophy.  2. Chronic ischemia.  3. No acute intracranial findings.      Electronically Signed: Del Willoughby MD    11/8/2024 8:25 PM EST    Workstation ID: [id number]    XR Chest 1 View [[id number]] Collected: 11/08/24 2017     Updated: 11/08/24 2023    Narrative:      XR CHEST 1 VW    Date of Exam: 11/8/2024 7:48 PM EST    Indication: dizziness    Comparison: Chest radiograph " 10/7/2024    Findings:  Stable cardiomegaly. Negative for lobar consolidation, edema, effusion or pneumothorax. Aortic atherosclerotic disease. Prior kyphoplasty changes in the thoracic spine. Degenerative related osseous findings.      Impression:      Impression:  No active pulmonary process.      Electronically Signed: Federico Carrion MD    11/8/2024 8:21 PM EST    Workstation ID: [id number]              Results for orders placed during the hospital encounter of 11/08/24    XR Chest 1 View    Narrative  XR CHEST 1 VW    Date of Exam: 11/8/2024 7:48 PM EST    Indication: dizziness    Comparison: Chest radiograph 10/7/2024    Findings:  Stable cardiomegaly. Negative for lobar consolidation, edema, effusion or pneumothorax. Aortic atherosclerotic disease. Prior kyphoplasty changes in the thoracic spine. Degenerative related osseous findings.    Impression  Impression:  No active pulmonary process.      Electronically Signed: Federico Carrion MD  11/8/2024 8:21 PM EST  Workstation ID: [id number]      Results for orders placed during the hospital encounter of 10/07/24    XR Abdomen KUB    Narrative  XR ABDOMEN KUB    Date of Exam: 10/7/2024 6:54 PM EDT    Indication: abdominal pain/post-prandial    Comparison: CT abdomen and pelvis dated 9/25/2024.    Findings:  There are postsurgical changes near the GE junction likely related to prior hiatal hernia repair. There are right upper quadrant cholecystectomy clips. There is nonspecific bowel gas pattern with gaseous distention of the small bowel centrally up to 4.6  cm. There is linear chain suture within the pelvis likely related to prior colonic resection. There is an overall mild colonic stool burden. There are no abnormal renal calcifications.    Impression  Impression:  1. Nonspecific bowel gas pattern with gas-filled dilated loop of small bowel measuring up to 4.6 cm centrally. Findings could relate to ileus or enteritis. Obstruction is less likely.  2. Mild colonic  stool burden.      Electronically Signed: Freddy Vivian  10/7/2024 9:43 PM EDT  Workstation ID: [id number]      XR Chest 1 View    Narrative  XR CHEST 1 VW    Date of Exam: 10/7/2024 8:56 PM EDT    Indication: abdominal pain    Comparison: Chest radiograph dated 9/23/2024    Findings:  The cardiomediastinal silhouette is within normal limits. Pulmonary vascularity appears normal. There our paratracheal and subcarinal calcified lymph nodes likely related to chronic granulomatous disease. There is evidence of prior kyphoplasty. There is  no acute consolidation or pleural effusion. There is no evidence of pneumothorax.    Impression  Impression:  1. No acute cardiopulmonary abnormality.  2. Postprocedural changes of kyphoplasty at the mid thoracic spine level.      Electronically Signed: Freddy Vivian  10/7/2024 9:45 PM EDT  Workstation ID: [id number]        Results for orders placed during the hospital encounter of 08/22/23    Doppler Ankle Brachial Index Single Level CAR    Interpretation Summary    Right Conclusion: The right IKE is normal. Normal digital pressures.    Left Conclusion: The left IKE is normal. Mild digital ischemia.      ASSESSMENT / PLAN      Anginal equivalent    CKD stage III-CKD due to hypertensive nephrosclerosis and/or reduced renal mass from previous right nephrectomy  Hypertension  Type 2 diabetes  History coronary artery disease  Acute NSTEMI-seen by cardiology.  Heart cath planned    Creatinine slightly up, likely hemodynamic from labile blood pressure.  Scan bladder to exclude urinary retention.  Urine is bland  Hydrate with normal saline and premedicate with Mucomyst  Provided creatinine remains stable, okay for heart cath tomorrow.        I discussed the patient's findings and my recommendations with patient and consulting provider    Will follow along closely. Thank you for allowing us to see this patient in renal consultation.    [facility]  [phone]  MD Rolando Coleman MD  11/10/24  13:11 EST

## 2024-11-10 NOTE — PROGRESS NOTES
LOS: 1 day   Patient Care Team:  Anny Garcia MD as PCP - General (Internal Medicine)  Sergio Ordonez MD as Consulting Physician (Nephrology)    Subjective:  Minimal substernal chest pressure    Objective:   Afebrile      Review of Systems:   Review of Systems   Constitutional:  Positive for activity change.   Cardiovascular:  Positive for chest pain and palpitations.   Musculoskeletal:  Positive for back pain.           Vital Signs  Temp:  [97.1 °F (36.2 °C)-98.4 °F (36.9 °C)] 97.8 °F (36.6 °C)  Heart Rate:  [61-91] 77  Resp:  [12-20] 18  BP: ()/(51-78) 120/70    Physical Exam:  Physical Exam  Vitals reviewed.   Cardiovascular:      Rate and Rhythm: Rhythm irregular.      Heart sounds: Normal heart sounds.   Pulmonary:      Breath sounds: Normal breath sounds.   Neurological:      Mental Status: She is alert.          Radiology:  CT Head Without Contrast    Result Date: 11/8/2024  Impression: 1. Volume loss secondary to cerebral atrophy. 2. Chronic ischemia. 3. No acute intracranial findings. Electronically Signed: Del Willoughby MD  11/8/2024 8:25 PM EST  Workstation ID: [id number]    XR Chest 1 View    Result Date: 11/8/2024  Impression: No active pulmonary process. Electronically Signed: Federico Carrion MD  11/8/2024 8:21 PM EST  Workstation ID: [id number]        Results Review:     I reviewed the patient's new clinical results.  I reviewed the patient's new imaging results and agree with the interpretation.    Medication Review:   Scheduled Meds:Acetylcysteine, 600 mg, Oral, BID  amLODIPine, 10 mg, Oral, Daily  aspirin, 81 mg, Oral, Daily  insulin glargine, 10 Units, Subcutaneous, Nightly  insulin lispro, 2-7 Units, Subcutaneous, 4x Daily AC & at Bedtime  ipratropium-albuterol, 3 mL, Nebulization, 4x Daily - RT  pantoprazole, 40 mg, Oral, Q AM  polyethylene glycol, 17 g, Oral, Daily  sertraline, 50 mg, Oral, Daily      Continuous Infusions:sodium chloride, 75 mL/hr, Last Rate: 75 mL/hr  (11/10/24 1034)      PRN Meds:.  acetaminophen    dextrose    dextrose    glucagon (human recombinant)    HYDROcodone-acetaminophen    HYDROmorphone    ondansetron    [COMPLETED] Insert Peripheral IV **AND** sodium chloride    Labs:    CBC    Results from last 7 days   Lab Units 11/10/24  0350 11/09/24 0218 11/08/24 1940   WBC 10*3/mm3 11.19* 11.63* 10.49   HEMOGLOBIN g/dL 14.5 15.8 15.5   PLATELETS 10*3/mm3 240 222 220     BMP   Results from last 7 days   Lab Units 11/10/24  0350 11/09/24 0218 11/08/24 1940   SODIUM mmol/L 141 141 139   POTASSIUM mmol/L 4.7 3.9 4.0   CHLORIDE mmol/L 102 104 103   CO2 mmol/L 28.6 28.4 26.3   BUN mg/dL 35* 25* 26*   CREATININE mg/dL 1.53* 1.26* 1.27*   GLUCOSE mg/dL 208* 174* 159*     Cr Clearance Estimated Creatinine Clearance: 30.2 mL/min (A) (by C-G formula based on SCr of 1.53 mg/dL (H)).  Coag     HbA1C   Lab Results   Component Value Date    HGBA1C 7.10 (H) 01/21/2024    HGBA1C 7.0 (H) 12/16/2022    HGBA1C 7.3 08/11/2022     Blood Glucose   Glucose   Date/Time Value Ref Range Status   11/10/2024 1150 228 (H) 70 - 105 mg/dL Final     Comment:     Serial Number: [id number]:  [id number]   11/10/2024 0904 211 (H) 70 - 105 mg/dL Final     Comment:     Serial Number: [id number]:  [id number]   11/09/2024 2301 409 (C) 70 - 105 mg/dL Final     Comment:     Serial Number: [id number]:  [id number]     Infection     CMP   Results from last 7 days   Lab Units 11/10/24  0350 11/09/24 0218 11/08/24 1940   SODIUM mmol/L 141 141 139   POTASSIUM mmol/L 4.7 3.9 4.0   CHLORIDE mmol/L 102 104 103   CO2 mmol/L 28.6 28.4 26.3   BUN mg/dL 35* 25* 26*   CREATININE mg/dL 1.53* 1.26* 1.27*   GLUCOSE mg/dL 208* 174* 159*   ALBUMIN g/dL  --   --  3.8   BILIRUBIN mg/dL  --   --  0.2   ALK PHOS U/L  --   --  81   AST (SGOT) U/L  --   --  14   ALT (SGPT) U/L  --   --  10     UA    Results from last 7 days   Lab Units 11/08/24  2107   NITRITE UA  Negative   WBC UA /HPF 3-5*   BACTERIA UA  /HPF None Seen   JUANA TIFFANIELIBBY UA /HPF 0-2     Radiology(recent) CT Head Without Contrast    Result Date: 11/8/2024  Impression: 1. Volume loss secondary to cerebral atrophy. 2. Chronic ischemia. 3. No acute intracranial findings. Electronically Signed: Del Willoughby MD  11/8/2024 8:25 PM EST  Workstation ID: [id number]    XR Chest 1 View    Result Date: 11/8/2024  Impression: No active pulmonary process. Electronically Signed: Federico Carrion MD  11/8/2024 8:21 PM EST  Workstation ID: [id number]    Assessment:    Acute non-ST segment elevation myocardial infarction  Substernal chest pain secondary to the above  Atherosclerotic heart disease of native coronary arteries of native heart with angina pectoris  History of percutaneous coronary intervention with stent placement  Palpitations  Acute vertigo  Lower extremity weakness  Osteoporosis with compression fracture  History of kyphoplasty  Pulmonary HTN  History of nephrectomy  Panlobular COPD with emphysema  Chronic mucopurulent bronchitis  DMII with renal manifestations   DMII with neuropathic manifestations  Diabetic dyslipidemia  GERD with esophagitis  Myofascia pain syndrome  CKDIIIa  HH  HTN with CKDI!!a  History of right nephrectomy  Chronic constipation  Nicotine dependency with nicotine use disorder, cigarettes  DDD L/S  Endometrial CA HX with ureteral involvement  Constipation    Plan:  Nephrology to participate//elective coronary catheterization        Del Iverson MD  11/10/24  12:01 EST

## 2024-11-10 NOTE — PLAN OF CARE
Goal Outcome Evaluation:            Pt resting abed watching TV. No s/s of distress noted. Pt up standby to BSC. Aox4 on 3L NC.

## 2024-11-10 NOTE — PROGRESS NOTES
Cardiology Progress Note      PATIENT IDENTIFICATION    Name: Vianey Reeves  Age: 79 y.o. Sex: female : 1945  MRN: [id number]    Requesting Provider    Del Iverson MD     LOS: 1 day       Reason For Followup:  Non-STEMI      Subjective:    Interval History:  Seen and examined.  Chart and labs reviewed.  Patient denies any chest pain pressure heaviness or tightness.  No shortness of breath.  She reports feeling better today.  Discussed with nephrology.  Plan for hydration today and catheterization tomorrow.    Review of Systems:  A complete review of systems was undertaken with the pertinent cardiovascular findings listed in history of present illness and all other systems being negative.     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  Patient would benefit from cardiac catheterization.     Tentatively plan for tomorrow  Nephrology evaluation currently underway  Would avoid clonidine due to blood pressure lability and potential for bradycardia        Objective:    Medication Review:   Scheduled Meds:Acetylcysteine, 600 mg, Oral, BID  amLODIPine, 10 mg, Oral, Daily  aspirin, 81 mg, Oral, Daily  insulin glargine, 10 Units, Subcutaneous, Nightly  insulin lispro, 2-7 Units, Subcutaneous, 4x Daily AC & at Bedtime  ipratropium-albuterol, 3 mL, Nebulization, 4x Daily - RT  pantoprazole, 40 mg, Oral, Q AM  polyethylene glycol, 17 g, Oral, Daily  sertraline, 50 mg, Oral, Daily      Continuous Infusions:sodium chloride, 75 mL/hr, Last Rate: 75 mL/hr (11/10/24 1034)      PRN Meds:.  acetaminophen    dextrose    dextrose    glucagon (human recombinant)    HYDROcodone-acetaminophen    HYDROmorphone    ondansetron     [COMPLETED] Insert Peripheral IV **AND** sodium chloride      Anginal equivalent         Physical Exam:    General: Alert, cooperative, no distress, appears stated age  Head:  Normocephalic, atraumatic, mucous membranes moist  Eyes:  Conjunctivae/corneas clear, EOMs intact     Neck:  Supple,  no bruit  Lungs:  Clear to auscultation bilaterally, no wheezes, rhonchi or rales are noted  Chest wall: No tenderness  Heart::  Regular rate and rhythm, S1 and S2 normal, 1/6 holosystolic murmur.  No rub or gallop  Abdomen: Soft, nontender, nondistended, bowel sounds active  Extremities: No cyanosis, clubbing, or edema  Pulses: 2+ and symmetric all extremities  Skin:  No rashes or lesions  Neuro/psych: A&O x3. CN II through XII are grossly intact with appropriate affect    Vital Signs:  Vitals:    11/10/24 0600 11/10/24 0740 11/10/24 0744 11/10/24 0906   BP:    116/66   BP Location:    Right arm   Patient Position:    Lying   Pulse: 61 67 66 80   Resp:  12 15 18   Temp:    97.7 °F (36.5 °C)   TempSrc:    Oral   SpO2: 93% 91% 100% 91%   Weight:       Height:         Wt Readings from Last 1 Encounters:   11/09/24 80.3 kg (177 lb)       Intake/Output Summary (Last 24 hours) at 11/10/2024 1128  Last data filed at 11/9/2024 1300  Gross per 24 hour   Intake 240 ml   Output --   Net 240 ml         Results Review:     CBC    Results from last 7 days   Lab Units 11/10/24  0350 11/09/24  0218 11/08/24  1940   WBC 10*3/mm3 11.19* 11.63* 10.49   HEMOGLOBIN g/dL 14.5 15.8 15.5   PLATELETS 10*3/mm3 240 222 220     Cr Clearance Estimated Creatinine Clearance: 30.2 mL/min (A) (by C-G formula based on SCr of 1.53 mg/dL (H)).  Coag     HbA1C   Lab Results   Component Value Date    HGBA1C 7.10 (H) 01/21/2024    HGBA1C 7.0 (H) 12/16/2022    HGBA1C 7.3 08/11/2022     Blood Glucose   Glucose   Date/Time Value Ref Range Status   11/10/2024 0904 211 (H) 70 - 105 mg/dL Final     Comment:     Serial Number: [id number]:  [id number]   11/09/2024  "2301 409 (C) 70 - 105 mg/dL Final     Comment:     Serial Number: [id number]:  [id number]     Infection     CMP   Results from last 7 days   Lab Units 11/10/24  0350 11/09/24 0218 11/08/24 1940   SODIUM mmol/L 141 141 139   POTASSIUM mmol/L 4.7 3.9 4.0   CHLORIDE mmol/L 102 104 103   CO2 mmol/L 28.6 28.4 26.3   BUN mg/dL 35* 25* 26*   CREATININE mg/dL 1.53* 1.26* 1.27*   GLUCOSE mg/dL 208* 174* 159*   ALBUMIN g/dL  --   --  3.8   BILIRUBIN mg/dL  --   --  0.2   ALK PHOS U/L  --   --  81   AST (SGOT) U/L  --   --  14   ALT (SGPT) U/L  --   --  10     ABG      UA    Results from last 7 days   Lab Units 11/08/24  2107   NITRITE UA  Negative   WBC UA /HPF 3-5*   BACTERIA UA /HPF None Seen   SQUAM EPITHEL UA /HPF 0-2     TANISHA  No results found for: \"POCMETH\", \"POCAMPHET\", \"POCBARBITUR\", \"POCBENZO\", \"POCCOCAINE\", \"POCOPIATES\", \"POCOXYCODO\", \"POCPHENCYC\", \"POCPROPOXY\", \"POCTHC\", \"POCTRICYC\"  Lysis Labs   Results from last 7 days   Lab Units 11/10/24  0350 11/09/24 0218 11/08/24 1940   HEMOGLOBIN g/dL 14.5 15.8 15.5   PLATELETS 10*3/mm3 240 222 220   CREATININE mg/dL 1.53* 1.26* 1.27*     Radiology(recent) CT Head Without Contrast    Result Date: 11/8/2024  Impression: 1. Volume loss secondary to cerebral atrophy. 2. Chronic ischemia. 3. No acute intracranial findings. Electronically Signed: Del Willoughby MD  11/8/2024 8:25 PM EST  Workstation ID: [id number]    XR Chest 1 View    Result Date: 11/8/2024  Impression: No active pulmonary process. Electronically Signed: Federico Carrion MD  11/8/2024 8:21 PM EST  Workstation ID: [id number]       Results from last 7 days   Lab Units 11/09/24  1410   CK TOTAL U/L 40   HSTROP T ng/L 104*       Imaging Results (Last 24 Hours)       ** No results found for the last 24 hours. **            Cardiac Studies:  Echo- Results for orders placed during the hospital encounter of 11/08/24    Adult Transthoracic Echo Complete W/ Cont if Necessary Per Protocol    Interpretation Summary    " Left ventricular ejection fraction appears to be 36 - 40%.    Indications  Valvular function.    Technically satisfactory study.  Mitral valve is structurally normal.  Mild to moderate mitral regurgitation is present.  Tricuspid valve is structurally normal.  Aortic valve is thickened with adequate opening motion.  Pulmonic valve could not be well visualized.  Left atrium is normal in size.  Right atrium is normal in size.  Left ventricle is enlarged with diffuse hypocontractility with ejection fraction of 35 to 40%.  Left ventricular peak systolic global longitudinal strain (GLS) is abnormal at -10%.  Grade 1 left ventricular diastolic dysfunction is present.  (MV E/A ratio 0.36).  Right ventricle is enlarged with hypocontractility with TAPSE of 1.53 cm.  Atrial septum is intact.  Aorta is normal.  No pericardial effusion or intracardiac thrombus is seen.    Impression  Mild to moderate mitral regurgitation.  Aortic valve is thickened with adequate opening motion.  Left ventricle is enlarged with diffuse hypocontractility with ejection fraction of 35 to 40%.  Left ventricular peak systolic global longitudinal strain (GLS) is abnormal at -10%.  Grade 1 left ventricular diastolic dysfunction is present.  (MV E/A ratio 0.36).  Right ventricle is enlarged with hypocontractility with TAPSE of 1.53 cm.    Stress Myoview-  Cath-        Leon Gonzalez,   11/10/24  11:28 EST    Copied information has been reviewed and is current as of 11/10/24

## 2024-11-10 NOTE — NURSING NOTE
Received a call from pt daughter Janice Johnson (daughter) inquiring about pt Lantus not ordered. This nurse and pt daughter reviewed and updated pt home med list.

## 2024-11-11 LAB
ACT BLD: 147 SECONDS (ref 89–137)
ACT BLD: 187 SECONDS (ref 89–137)
ACT BLD: 193 SECONDS (ref 89–137)
ALBUMIN SERPL-MCNC: 3.3 G/DL (ref 3.5–5.2)
ALDOLASE SERPL-CCNC: 3.7 U/L (ref 3.3–10.3)
ANION GAP SERPL CALCULATED.3IONS-SCNC: 8.5 MMOL/L (ref 5–15)
BASOPHILS # BLD AUTO: 0.03 10*3/MM3 (ref 0–0.2)
BASOPHILS NFR BLD AUTO: 0.3 % (ref 0–1.5)
BUN SERPL-MCNC: 35 MG/DL (ref 8–23)
BUN/CREAT SERPL: 29.2 (ref 7–25)
CALCIUM SPEC-SCNC: 9.1 MG/DL (ref 8.6–10.5)
CHLORIDE SERPL-SCNC: 108 MMOL/L (ref 98–107)
CO2 SERPL-SCNC: 24.5 MMOL/L (ref 22–29)
CREAT SERPL-MCNC: 1.2 MG/DL (ref 0.57–1)
DEPRECATED RDW RBC AUTO: 43.8 FL (ref 37–54)
EGFRCR SERPLBLD CKD-EPI 2021: 45.6 ML/MIN/1.73
EOSINOPHIL # BLD AUTO: 0.02 10*3/MM3 (ref 0–0.4)
EOSINOPHIL NFR BLD AUTO: 0.2 % (ref 0.3–6.2)
ERYTHROCYTE [DISTWIDTH] IN BLOOD BY AUTOMATED COUNT: 11.9 % (ref 12.3–15.4)
GLUCOSE BLDC GLUCOMTR-MCNC: 115 MG/DL (ref 70–105)
GLUCOSE BLDC GLUCOMTR-MCNC: 150 MG/DL (ref 70–105)
GLUCOSE BLDC GLUCOMTR-MCNC: 160 MG/DL (ref 70–105)
GLUCOSE BLDC GLUCOMTR-MCNC: 233 MG/DL (ref 70–105)
GLUCOSE SERPL-MCNC: 178 MG/DL (ref 65–99)
HCT VFR BLD AUTO: 45.9 % (ref 34–46.6)
HGB BLD-MCNC: 14.4 G/DL (ref 12–15.9)
IMM GRANULOCYTES # BLD AUTO: 0.14 10*3/MM3 (ref 0–0.05)
IMM GRANULOCYTES NFR BLD AUTO: 1.5 % (ref 0–0.5)
LYMPHOCYTES # BLD AUTO: 1.36 10*3/MM3 (ref 0.7–3.1)
LYMPHOCYTES NFR BLD AUTO: 14.2 % (ref 19.6–45.3)
MCH RBC QN AUTO: 31.7 PG (ref 26.6–33)
MCHC RBC AUTO-ENTMCNC: 31.4 G/DL (ref 31.5–35.7)
MCV RBC AUTO: 101.1 FL (ref 79–97)
MONOCYTES # BLD AUTO: 0.7 10*3/MM3 (ref 0.1–0.9)
MONOCYTES NFR BLD AUTO: 7.3 % (ref 5–12)
NEUTROPHILS NFR BLD AUTO: 7.32 10*3/MM3 (ref 1.7–7)
NEUTROPHILS NFR BLD AUTO: 76.5 % (ref 42.7–76)
NRBC BLD AUTO-RTO: 0 /100 WBC (ref 0–0.2)
PHOSPHATE SERPL-MCNC: 3 MG/DL (ref 2.5–4.5)
PLATELET # BLD AUTO: 227 10*3/MM3 (ref 140–450)
PMV BLD AUTO: 10 FL (ref 6–12)
POTASSIUM SERPL-SCNC: 4.5 MMOL/L (ref 3.5–5.2)
QT INTERVAL: 471 MS
QTC INTERVAL: 499 MS
RBC # BLD AUTO: 4.54 10*6/MM3 (ref 3.77–5.28)
SODIUM SERPL-SCNC: 141 MMOL/L (ref 136–145)
WBC NRBC COR # BLD AUTO: 9.57 10*3/MM3 (ref 3.4–10.8)

## 2024-11-11 PROCEDURE — 4A023N7 MEASUREMENT OF CARDIAC SAMPLING AND PRESSURE, LEFT HEART, PERCUTANEOUS APPROACH: ICD-10-PCS | Performed by: INTERNAL MEDICINE

## 2024-11-11 PROCEDURE — C9600 PERC DRUG-EL COR STENT SING: HCPCS | Performed by: INTERNAL MEDICINE

## 2024-11-11 PROCEDURE — 82948 REAGENT STRIP/BLOOD GLUCOSE: CPT | Performed by: PHYSICIAN ASSISTANT

## 2024-11-11 PROCEDURE — 25810000003 SODIUM CHLORIDE 0.9 % SOLUTION: Performed by: INTERNAL MEDICINE

## 2024-11-11 PROCEDURE — C1725 CATH, TRANSLUMIN NON-LASER: HCPCS | Performed by: INTERNAL MEDICINE

## 2024-11-11 PROCEDURE — C1769 GUIDE WIRE: HCPCS | Performed by: INTERNAL MEDICINE

## 2024-11-11 PROCEDURE — 93458 L HRT ARTERY/VENTRICLE ANGIO: CPT | Performed by: INTERNAL MEDICINE

## 2024-11-11 PROCEDURE — 63710000001 INSULIN GLARGINE PER 5 UNITS: Performed by: INTERNAL MEDICINE

## 2024-11-11 PROCEDURE — 93005 ELECTROCARDIOGRAM TRACING: CPT | Performed by: INTERNAL MEDICINE

## 2024-11-11 PROCEDURE — 82948 REAGENT STRIP/BLOOD GLUCOSE: CPT

## 2024-11-11 PROCEDURE — 92928 PRQ TCAT PLMT NTRAC ST 1 LES: CPT | Performed by: INTERNAL MEDICINE

## 2024-11-11 PROCEDURE — 027035Z DILATION OF CORONARY ARTERY, ONE ARTERY WITH TWO DRUG-ELUTING INTRALUMINAL DEVICES, PERCUTANEOUS APPROACH: ICD-10-PCS | Performed by: INTERNAL MEDICINE

## 2024-11-11 PROCEDURE — 25010000002 HYDRALAZINE PER 20 MG: Performed by: INTERNAL MEDICINE

## 2024-11-11 PROCEDURE — 85025 COMPLETE CBC W/AUTO DIFF WBC: CPT | Performed by: INTERNAL MEDICINE

## 2024-11-11 PROCEDURE — C1874 STENT, COATED/COV W/DEL SYS: HCPCS | Performed by: INTERNAL MEDICINE

## 2024-11-11 PROCEDURE — 94799 UNLISTED PULMONARY SVC/PX: CPT

## 2024-11-11 PROCEDURE — B2111ZZ FLUOROSCOPY OF MULTIPLE CORONARY ARTERIES USING LOW OSMOLAR CONTRAST: ICD-10-PCS | Performed by: INTERNAL MEDICINE

## 2024-11-11 PROCEDURE — 63710000001 INSULIN LISPRO (HUMAN) PER 5 UNITS: Performed by: PHYSICIAN ASSISTANT

## 2024-11-11 PROCEDURE — 94761 N-INVAS EAR/PLS OXIMETRY MLT: CPT

## 2024-11-11 PROCEDURE — C1887 CATHETER, GUIDING: HCPCS | Performed by: INTERNAL MEDICINE

## 2024-11-11 PROCEDURE — 99153 MOD SED SAME PHYS/QHP EA: CPT | Performed by: INTERNAL MEDICINE

## 2024-11-11 PROCEDURE — 25010000002 FENTANYL CITRATE (PF) 100 MCG/2ML SOLUTION: Performed by: INTERNAL MEDICINE

## 2024-11-11 PROCEDURE — 25010000002 DIPHENHYDRAMINE PER 50 MG: Performed by: INTERNAL MEDICINE

## 2024-11-11 PROCEDURE — C1894 INTRO/SHEATH, NON-LASER: HCPCS | Performed by: INTERNAL MEDICINE

## 2024-11-11 PROCEDURE — 80069 RENAL FUNCTION PANEL: CPT | Performed by: INTERNAL MEDICINE

## 2024-11-11 PROCEDURE — 25010000002 MIDAZOLAM PER 1 MG: Performed by: INTERNAL MEDICINE

## 2024-11-11 PROCEDURE — 25010000002 NICARDIPINE 2.5 MG/ML SOLUTION 10 ML VIAL: Performed by: INTERNAL MEDICINE

## 2024-11-11 PROCEDURE — 93010 ELECTROCARDIOGRAM REPORT: CPT | Performed by: INTERNAL MEDICINE

## 2024-11-11 PROCEDURE — 94664 DEMO&/EVAL PT USE INHALER: CPT

## 2024-11-11 PROCEDURE — 85347 COAGULATION TIME ACTIVATED: CPT

## 2024-11-11 PROCEDURE — 25010000002 NITROGLYCERIN 5 MG/ML SOLUTION: Performed by: INTERNAL MEDICINE

## 2024-11-11 PROCEDURE — 25010000002 METHYLPREDNISOLONE PER 125 MG: Performed by: INTERNAL MEDICINE

## 2024-11-11 PROCEDURE — 25510000001 IOPAMIDOL PER 1 ML: Performed by: INTERNAL MEDICINE

## 2024-11-11 PROCEDURE — 25010000002 LIDOCAINE 2% SOLUTION: Performed by: INTERNAL MEDICINE

## 2024-11-11 PROCEDURE — 99152 MOD SED SAME PHYS/QHP 5/>YRS: CPT | Performed by: INTERNAL MEDICINE

## 2024-11-11 PROCEDURE — 25010000002 HEPARIN (PORCINE) PER 1000 UNITS: Performed by: INTERNAL MEDICINE

## 2024-11-11 PROCEDURE — 25810000003 SODIUM CHLORIDE 0.9 % SOLUTION 250 ML FLEX CONT: Performed by: INTERNAL MEDICINE

## 2024-11-11 PROCEDURE — 63710000001 INSULIN LISPRO (HUMAN) PER 5 UNITS: Performed by: INTERNAL MEDICINE

## 2024-11-11 DEVICE — XIENCE SKYPOINT™ EVEROLIMUS ELUTING CORONARY STENT SYSTEM 2.75 MM X 33 MM / RAPID-EXCHANGE
Type: IMPLANTABLE DEVICE | Site: CORONARY | Status: FUNCTIONAL
Brand: XIENCE SKYPOINT™

## 2024-11-11 DEVICE — XIENCE SKYPOINT™ EVEROLIMUS ELUTING CORONARY STENT SYSTEM 2.75 MM X 18 MM / RAPID-EXCHANGE
Type: IMPLANTABLE DEVICE | Site: CORONARY | Status: FUNCTIONAL
Brand: XIENCE SKYPOINT™

## 2024-11-11 RX ORDER — NITROGLYCERIN 0.4 MG/1
0.4 TABLET SUBLINGUAL
Status: DISCONTINUED | OUTPATIENT
Start: 2024-11-11 | End: 2024-11-14 | Stop reason: HOSPADM

## 2024-11-11 RX ORDER — METHYLPREDNISOLONE SODIUM SUCCINATE 125 MG/2ML
125 INJECTION, POWDER, LYOPHILIZED, FOR SOLUTION INTRAMUSCULAR; INTRAVENOUS ONCE
Status: COMPLETED | OUTPATIENT
Start: 2024-11-11 | End: 2024-11-11

## 2024-11-11 RX ORDER — MIDAZOLAM HYDROCHLORIDE 1 MG/ML
INJECTION, SOLUTION INTRAMUSCULAR; INTRAVENOUS
Status: DISCONTINUED | OUTPATIENT
Start: 2024-11-11 | End: 2024-11-11 | Stop reason: HOSPADM

## 2024-11-11 RX ORDER — ONDANSETRON 2 MG/ML
4 INJECTION INTRAMUSCULAR; INTRAVENOUS EVERY 6 HOURS PRN
Status: DISCONTINUED | OUTPATIENT
Start: 2024-11-11 | End: 2024-11-14 | Stop reason: HOSPADM

## 2024-11-11 RX ORDER — SODIUM CHLORIDE 9 MG/ML
250 INJECTION, SOLUTION INTRAVENOUS ONCE AS NEEDED
Status: DISPENSED | OUTPATIENT
Start: 2024-11-11 | End: 2024-11-12

## 2024-11-11 RX ORDER — ALUMINA, MAGNESIA, AND SIMETHICONE 2400; 2400; 240 MG/30ML; MG/30ML; MG/30ML
15 SUSPENSION ORAL EVERY 6 HOURS PRN
Status: DISCONTINUED | OUTPATIENT
Start: 2024-11-11 | End: 2024-11-14 | Stop reason: HOSPADM

## 2024-11-11 RX ORDER — FENTANYL CITRATE 50 UG/ML
INJECTION, SOLUTION INTRAMUSCULAR; INTRAVENOUS
Status: DISCONTINUED | OUTPATIENT
Start: 2024-11-11 | End: 2024-11-11 | Stop reason: HOSPADM

## 2024-11-11 RX ORDER — ASPIRIN 81 MG/1
81 TABLET ORAL DAILY
Status: DISCONTINUED | OUTPATIENT
Start: 2024-11-12 | End: 2024-11-14 | Stop reason: HOSPADM

## 2024-11-11 RX ORDER — HEPARIN SODIUM 1000 [USP'U]/ML
INJECTION, SOLUTION INTRAVENOUS; SUBCUTANEOUS
Status: DISCONTINUED | OUTPATIENT
Start: 2024-11-11 | End: 2024-11-11 | Stop reason: HOSPADM

## 2024-11-11 RX ORDER — DIPHENHYDRAMINE HCL 25 MG
25 CAPSULE ORAL EVERY 6 HOURS PRN
Status: DISCONTINUED | OUTPATIENT
Start: 2024-11-11 | End: 2024-11-14 | Stop reason: HOSPADM

## 2024-11-11 RX ORDER — ACETAMINOPHEN 325 MG/1
650 TABLET ORAL EVERY 4 HOURS PRN
Status: DISCONTINUED | OUTPATIENT
Start: 2024-11-11 | End: 2024-11-14 | Stop reason: HOSPADM

## 2024-11-11 RX ORDER — SODIUM CHLORIDE 9 MG/ML
100 INJECTION, SOLUTION INTRAVENOUS CONTINUOUS
Status: DISPENSED | OUTPATIENT
Start: 2024-11-11 | End: 2024-11-11

## 2024-11-11 RX ORDER — DIPHENHYDRAMINE HYDROCHLORIDE 50 MG/ML
25 INJECTION INTRAMUSCULAR; INTRAVENOUS ONCE
Status: COMPLETED | OUTPATIENT
Start: 2024-11-11 | End: 2024-11-11

## 2024-11-11 RX ORDER — ONDANSETRON 4 MG/1
4 TABLET, ORALLY DISINTEGRATING ORAL EVERY 6 HOURS PRN
Status: DISCONTINUED | OUTPATIENT
Start: 2024-11-11 | End: 2024-11-14 | Stop reason: HOSPADM

## 2024-11-11 RX ORDER — HYDRALAZINE HYDROCHLORIDE 20 MG/ML
10 INJECTION INTRAMUSCULAR; INTRAVENOUS ONCE
Status: COMPLETED | OUTPATIENT
Start: 2024-11-11 | End: 2024-11-11

## 2024-11-11 RX ORDER — ASPIRIN 81 MG/1
TABLET, CHEWABLE ORAL
Status: DISCONTINUED | OUTPATIENT
Start: 2024-11-11 | End: 2024-11-11 | Stop reason: HOSPADM

## 2024-11-11 RX ORDER — NITROGLYCERIN 5 MG/ML
INJECTION, SOLUTION INTRAVENOUS
Status: DISCONTINUED | OUTPATIENT
Start: 2024-11-11 | End: 2024-11-11 | Stop reason: HOSPADM

## 2024-11-11 RX ORDER — SODIUM CHLORIDE 9 MG/ML
INJECTION, SOLUTION INTRAVENOUS
Status: COMPLETED | OUTPATIENT
Start: 2024-11-11 | End: 2024-11-11

## 2024-11-11 RX ORDER — SODIUM CHLORIDE 9 MG/ML
30 INJECTION, SOLUTION INTRAVENOUS CONTINUOUS
Status: DISCONTINUED | OUTPATIENT
Start: 2024-11-11 | End: 2024-11-12

## 2024-11-11 RX ORDER — NICOTINE 21 MG/24HR
1 PATCH, TRANSDERMAL 24 HOURS TRANSDERMAL DAILY PRN
Status: DISCONTINUED | OUTPATIENT
Start: 2024-11-11 | End: 2024-11-14 | Stop reason: HOSPADM

## 2024-11-11 RX ORDER — SODIUM CHLORIDE 9 MG/ML
3 INJECTION, SOLUTION INTRAVENOUS CONTINUOUS
Status: DISPENSED | OUTPATIENT
Start: 2024-11-11 | End: 2024-11-11

## 2024-11-11 RX ORDER — LIDOCAINE HYDROCHLORIDE 20 MG/ML
INJECTION, SOLUTION INFILTRATION; PERINEURAL
Status: DISCONTINUED | OUTPATIENT
Start: 2024-11-11 | End: 2024-11-11 | Stop reason: HOSPADM

## 2024-11-11 RX ORDER — IOPAMIDOL 755 MG/ML
INJECTION, SOLUTION INTRAVASCULAR
Status: DISCONTINUED | OUTPATIENT
Start: 2024-11-11 | End: 2024-11-11 | Stop reason: HOSPADM

## 2024-11-11 RX ORDER — TERAZOSIN 5 MG/1
5 CAPSULE ORAL ONCE
Status: COMPLETED | OUTPATIENT
Start: 2024-11-11 | End: 2024-11-11

## 2024-11-11 RX ADMIN — Medication 600 MG: at 08:38

## 2024-11-11 RX ADMIN — POLYETHYLENE GLYCOL 3350 17 G: 17 POWDER, FOR SOLUTION ORAL at 08:38

## 2024-11-11 RX ADMIN — INSULIN GLARGINE 10 UNITS: 100 INJECTION, SOLUTION SUBCUTANEOUS at 21:02

## 2024-11-11 RX ADMIN — AMLODIPINE BESYLATE 10 MG: 5 TABLET ORAL at 08:38

## 2024-11-11 RX ADMIN — INSULIN LISPRO 3 UNITS: 100 INJECTION, SOLUTION INTRAVENOUS; SUBCUTANEOUS at 21:02

## 2024-11-11 RX ADMIN — IPRATROPIUM BROMIDE AND ALBUTEROL SULFATE 3 ML: .5; 3 SOLUTION RESPIRATORY (INHALATION) at 07:24

## 2024-11-11 RX ADMIN — TICAGRELOR 90 MG: 90 TABLET ORAL at 23:16

## 2024-11-11 RX ADMIN — SODIUM CHLORIDE 100 ML/HR: 9 INJECTION, SOLUTION INTRAVENOUS at 13:34

## 2024-11-11 RX ADMIN — INSULIN LISPRO 2 UNITS: 100 INJECTION, SOLUTION INTRAVENOUS; SUBCUTANEOUS at 08:38

## 2024-11-11 RX ADMIN — IPRATROPIUM BROMIDE AND ALBUTEROL SULFATE 3 ML: .5; 3 SOLUTION RESPIRATORY (INHALATION) at 19:26

## 2024-11-11 RX ADMIN — HYDROCODONE BITARTRATE AND ACETAMINOPHEN 1 TABLET: 10; 325 TABLET ORAL at 04:56

## 2024-11-11 RX ADMIN — SODIUM CHLORIDE 75 ML/HR: 9 INJECTION, SOLUTION INTRAVENOUS at 08:42

## 2024-11-11 RX ADMIN — NICARDIPINE HYDROCHLORIDE 5 MG/HR: 25 INJECTION, SOLUTION INTRAVENOUS at 17:22

## 2024-11-11 RX ADMIN — SERTRALINE 50 MG: 50 TABLET, FILM COATED ORAL at 08:38

## 2024-11-11 RX ADMIN — DIPHENHYDRAMINE HYDROCHLORIDE 25 MG: 50 INJECTION, SOLUTION INTRAMUSCULAR; INTRAVENOUS at 18:21

## 2024-11-11 RX ADMIN — SODIUM CHLORIDE 30 ML/HR: 9 INJECTION, SOLUTION INTRAVENOUS at 20:06

## 2024-11-11 RX ADMIN — HYDRALAZINE HYDROCHLORIDE 10 MG: 20 INJECTION INTRAMUSCULAR; INTRAVENOUS at 17:16

## 2024-11-11 RX ADMIN — ASPIRIN 81 MG: 81 TABLET, COATED ORAL at 08:38

## 2024-11-11 RX ADMIN — METHYLPREDNISOLONE SODIUM SUCCINATE 125 MG: 125 INJECTION, POWDER, FOR SOLUTION INTRAMUSCULAR; INTRAVENOUS at 18:21

## 2024-11-11 RX ADMIN — SODIUM CHLORIDE 75 ML/HR: 9 INJECTION, SOLUTION INTRAVENOUS at 02:20

## 2024-11-11 RX ADMIN — TERAZOSIN HYDROCHLORIDE 5 MG: 5 CAPSULE ORAL at 19:23

## 2024-11-11 RX ADMIN — ACETAMINOPHEN 650 MG: 325 TABLET, FILM COATED ORAL at 21:02

## 2024-11-11 NOTE — CASE MANAGEMENT/SOCIAL WORK
Discharge Planning Assessment   [facility]     Patient Name: Vianey Reeves  MRN: [id number]  Today's Date: 11/11/2024    Admit Date: 11/8/2024    Plan: Routine home   Discharge Needs Assessment       Row Name 11/11/24 1449       Living Environment    People in Home alone    Current Living Arrangements home    Potentially Unsafe Housing Conditions none    In the past 12 months has the electric, gas, oil, or water company threatened to shut off services in your home? No    Primary Care Provided by self    Provides Primary Care For no one    Family Caregiver if Needed child(erin), adult    Family Caregiver Names daughter Janice    Quality of Family Relationships helpful;involved;supportive    Able to Return to Prior Arrangements yes       Resource/Environmental Concerns    Resource/Environmental Concerns none    Transportation Concerns none       Transportation Needs    In the past 12 months, has lack of transportation kept you from medical appointments or from getting medications? no    In the past 12 months, has lack of transportation kept you from meetings, work, or from getting things needed for daily living? No       Food Insecurity    Within the past 12 months, you worried that your food would run out before you got the money to buy more. Never true    Within the past 12 months, the food you bought just didn't last and you didn't have money to get more. Never true       Transition Planning    Patient/Family Anticipates Transition to home    Patient/Family Anticipated Services at Transition none    Transportation Anticipated family or friend will provide;car, drives self       Discharge Needs Assessment    Readmission Within the Last 30 Days no previous admission in last 30 days    Equipment Currently Used at Home cane, straight;cpap  [place]    Concerns to be Addressed denies needs/concerns at this time    Anticipated Changes Related to Illness none    Equipment Needed After Discharge none                   Discharge  Plan       Row Name 11/11/24 1847       Plan    Plan Comments CM met with patient and daughter Janice at the bedside. Confirmed PCP, insurance, and pharmacy. Patient agreeable in M2B. Patient denies any difficulty affording medications. Patient is not current with any HHC/OPPT/OT services. Patient lives at home alone, is indep with ADLS/IADLS, and drives. Confirmed family is able to provide DC transport. DC Barriers: Cardiology, Nephrology, and RD following, NPO for cardiac cath today.      Row Name 11/11/24 1458       Plan    Plan Routine home                Demographic Summary       Row Name 11/11/24 1443       General Information    Admission Type inpatient    Arrived From emergency department    Required Notices Provided Important Message from Medicare    Referral Source admission list    Reason for Consult discharge planning    Preferred Language English       Contact Information    Permission Granted to Share Info With     Contact Information Obtained for                    Functional Status       Row Name 11/11/24 1446       Functional Status    Usual Activity Tolerance good    Current Activity Tolerance good       Functional Status, IADL    Medications independent    Meal Preparation independent    Housekeeping independent    Laundry independent    Shopping independent;assistive person           Tesfaye Wise RN     Cell number [phone]  Office number [phone]

## 2024-11-11 NOTE — SIGNIFICANT NOTE
11/11/24 1314   OTHER   Discipline physical therapist   Rehab Time/Intention   Session Not Performed other (see comments)  (Pt to have heart cath at 1400.  PT to f/u 11/12.)   Therapy Assessment/Plan (PT)   Criteria for Skilled Interventions Met (PT) yes;meets criteria   Recommendation   PT - Next Appointment 11/12/24

## 2024-11-11 NOTE — CONSULTS
"Nutrition Services  Patient Name: Vianey Reeves  YOB: 1945  MRN: [id number]  Admission date: 11/8/2024      NUTRITION SCREENING      Trending Narrative: Pt assessed due to MST for reported weight loss. Pt was off the floor x 2 attempted visits -- will follow up for NFPE when pt available.       PO Diet: NPO Diet NPO Type: Strict NPO   PO Supplements: None ordered   Trending PO Intake:  When diet active, eating about 75% at recent meal(s)       Nutritionally-Pertinent Medications RDN Reviewed, C/W clinical course         Labs (reviewed below): Hyperglycemia - noted elevated A1C - will suggest CHO-controlled diet when diet resumes      Results from last 7 days   Lab Units 11/11/24  0442 11/10/24  0350 11/09/24  0218 11/08/24  1940   SODIUM mmol/L 141 141 141 139   POTASSIUM mmol/L 4.5 4.7 3.9 4.0   CHLORIDE mmol/L 108* 102 104 103   CO2 mmol/L 24.5 28.6 28.4 26.3   BUN mg/dL 35* 35* 25* 26*   CREATININE mg/dL 1.20* 1.53* 1.26* 1.27*   CALCIUM mg/dL 9.1 9.6 9.6 9.6   BILIRUBIN mg/dL  --   --   --  0.2   ALK PHOS U/L  --   --   --  81   ALT (SGPT) U/L  --   --   --  10   AST (SGOT) U/L  --   --   --  14   GLUCOSE mg/dL 178* 208* 174* 159*     Results from last 7 days   Lab Units 11/11/24  0442 11/10/24  0350 11/09/24  0218   PHOSPHORUS mg/dL 3.0  --   --    HEMOGLOBIN g/dL 14.4   < > 15.8   HEMATOCRIT % 45.9   < > 48.2*   TRIGLYCERIDES mg/dL  --   --  120    < > = values in this interval not displayed.     Lab Results   Component Value Date    HGBA1C 7.10 (H) 01/21/2024          GI Function:  Having some ongoing nausea - has PRN meds available   Last BM 11/8 (x3 days ago) - received scheduled Miralax today         Skin: No pressure injuries documented        Weight Review: Estimated body mass index is 29.45 kg/m² as calculated from the following:    Height as of this encounter: 165.1 cm (65\").    Weight as of this encounter: 80.3 kg (177 lb).    Comment:   11/11: Scale weight 80.3 kg - 11.5% loss in one " year; 3% loss in 1 month - neither significant for timeframe     Wt Readings from Last 30 Encounters:   11/09/24 0735 80.3 kg (177 lb)   11/08/24 1917 80.3 kg (177 lb)   11/05/24 1329 82.6 kg (182 lb)   10/08/24 2011 82.7 kg (182 lb 5.1 oz)   09/24/24 0100 82.7 kg (182 lb 5.1 oz)   09/23/24 2300 82.7 kg (182 lb 5.1 oz)   08/01/24 0949 74.8 kg (165 lb)   07/15/24 2035 74.8 kg (165 lb)   07/01/24 1256 80.3 kg (177 lb)   06/10/24 1400 80.3 kg (177 lb)   05/22/24 1251 84.8 kg (187 lb)   04/24/24 1253 84.8 kg (187 lb)   03/28/24 1406 84.8 kg (187 lb)   03/25/24 1028 84.8 kg (187 lb)   03/05/24 1530 84.8 kg (187 lb)   02/15/24 1404 84.8 kg (187 lb)   01/29/24 0418 84.9 kg (187 lb 2.7 oz)   01/28/24 0752 87.3 kg (192 lb 7.4 oz)   01/27/24 0437 87.2 kg (192 lb 3.9 oz)   01/26/24 0450 87.8 kg (193 lb 9 oz)   01/22/24 1608 85.8 kg (189 lb 2.5 oz)   01/22/24 0454 82.2 kg (181 lb 3.5 oz)   01/21/24 2042 84.3 kg (185 lb 13.6 oz)   01/21/24 1503 85.3 kg (188 lb)   01/17/24 0500 84.9 kg (187 lb 2.7 oz)   01/16/24 0500 84.8 kg (186 lb 15.2 oz)   01/15/24 1109 84.4 kg (186 lb)   01/15/24 0500 84.5 kg (186 lb 4.6 oz)   01/14/24 1353 86 kg (189 lb 9.5 oz)   09/07/23 0957 90.7 kg (200 lb)   08/29/23 1413 88.5 kg (195 lb)   08/09/23 1432 88.5 kg (195 lb)   02/23/23 0607 78 kg (171 lb 15.3 oz)   02/22/23 1504 88.9 kg (196 lb)   02/20/23 0836 88.9 kg (196 lb)   02/16/23 1327 88.9 kg (196 lb)   01/28/23 1105 88.5 kg (195 lb)   01/26/23 2200 88.6 kg (195 lb 5.2 oz)   01/26/23 1710 88.9 kg (196 lb)   01/23/23 2307 89.1 kg (196 lb 6.4 oz)   01/23/23 1102 90.3 kg (199 lb 1.2 oz)   01/19/23 1402 93.2 kg (205 lb 6.4 oz)   01/04/23 1351 91.4 kg (201 lb 6.4 oz)   12/05/22 1207 89.3 kg (196 lb 13.9 oz)   10/03/22 1436 92 kg (202 lb 12.8 oz)   09/23/22 1318 89.6 kg (197 lb 9.6 oz)   09/22/22 0910 90.1 kg (198 lb 10.2 oz)   09/13/22 1151 90.7 kg (200 lb)              Trending Physical   Appearance, NFPE 11/11: NFPE pending - pt off the floor at  attempted visits today            Nutrition Problem Statement: No nutrition Dx identified at this time. Will continue to follow up to determine if Dx arises.        Nutrition Intervention: Continue NPO for procedure.     When resuming diet, suggest Consistent Carbohydrate diet to help optimize glucose control.           Monitoring/Evaluation PO intake, Pertinent labs, Weight, Skin status, GI status, POC/GOC        RD to follow up per protocol.    Electronically signed by:  Ivy Merritt RD  11/11/24 13:17 EST

## 2024-11-11 NOTE — PLAN OF CARE
Goal Outcome Evaluation:              Outcome Evaluation: Patient has been resting throughout this shift with complaints of back pain - see MAR. Patient remains on 3L humidified O2; has been NPO since midnight awaiting heart cath today.

## 2024-11-11 NOTE — PROGRESS NOTES
LOS: 1 day   Patient Care Team:  Anny Garcia MD as PCP - General (Internal Medicine)  Sergio Ordonez MD as Consulting Physician (Nephrology)    Subjective     Interval History:     Patient Complaints: no new complaints.  Getting ready for heart cath    History taken from: patient    Review of Systems   Constitutional:  Positive for activity change and fatigue.   Respiratory: Negative.     Cardiovascular:  Positive for chest pain and palpitations.   Gastrointestinal: Negative.    Musculoskeletal:  Positive for arthralgias.   Neurological:  Positive for dizziness, weakness and light-headedness.           Objective     Vital Signs  Temp:  [96.4 °F (35.8 °C)-97.9 °F (36.6 °C)] 96.4 °F (35.8 °C)  Heart Rate:  [] 67  Resp:  [10-19] 12  BP: (120-165)/(70-85) 141/78    Physical Exam:     General Appearance:    Alert, cooperative, in no acute distress   Head:    Normocephalic, without obvious abnormality, atraumatic   Eyes:            Lids and lashes normal, conjunctivae and sclerae normal, no   icterus, no pallor, corneas clear, PERRLA   Ears:    Ears appear intact with no abnormalities noted   Throat:   No oral lesions, no thrush, oral mucosa moist   Neck:   No adenopathy, supple, trachea midline, no thyromegaly, no   carotid bruit, no JVD   Lungs:     Clear to auscultation,respirations regular, even and                  unlabored    Heart:    Regular rhythm and normal rate, normal S1 and S2, no            murmur, no gallop, no rub, no click   Chest Wall:    No abnormalities observed   Abdomen:     Normal bowel sounds, no masses, no organomegaly, soft        non-tender, non-distended, no guarding, no rebound                tenderness   Extremities:   Moves all extremities well, no edema, no cyanosis, no             redness   Pulses:   Pulses palpable and equal bilaterally   Skin:   No bleeding, bruising or rash   Lymph nodes:   No palpable adenopathy   Neurologic:   Cranial nerves 2 - 12 grossly  intact, sensation intact, DTR       present and equal bilaterally        Results Review:    Lab Results (last 24 hours)       Procedure Component Value Units Date/Time    POC Glucose 4x Daily Before Meals & at Bedtime [[id number]]  (Abnormal) Collected: 11/11/24 0708    Specimen: Blood Updated: 11/11/24 0709     Glucose 160 mg/dL      Comment: Serial Number: [id number]:  [id number]       Renal Function Panel [[id number]]  (Abnormal) Collected: 11/11/24 0442    Specimen: Blood from Arm, Right Updated: 11/11/24 0629     Glucose 178 mg/dL      BUN 35 mg/dL      Creatinine 1.20 mg/dL      Sodium 141 mmol/L      Potassium 4.5 mmol/L      Chloride 108 mmol/L      CO2 24.5 mmol/L      Calcium 9.1 mg/dL      Albumin 3.3 g/dL      Phosphorus 3.0 mg/dL      Anion Gap 8.5 mmol/L      BUN/Creatinine Ratio 29.2     eGFR 45.6 mL/min/1.73     Narrative:      GFR Normal >60  Chronic Kidney Disease <60  Kidney Failure <15    The GFR formula is only valid for adults with stable renal function between ages 18 and 70.    CBC & Differential [[id number]]  (Abnormal) Collected: 11/11/24 0442    Specimen: Blood from Arm, Right Updated: 11/11/24 0606    Narrative:      The following orders were created for panel order CBC & Differential.  Procedure                               Abnormality         Status                     ---------                               -----------         ------                     CBC Auto Differential[[id number]]        Abnormal            Final result                 Please view results for these tests on the individual orders.    CBC Auto Differential [[id number]]  (Abnormal) Collected: 11/11/24 0442    Specimen: Blood from Arm, Right Updated: 11/11/24 0606     WBC 9.57 10*3/mm3      RBC 4.54 10*6/mm3      Hemoglobin 14.4 g/dL      Hematocrit 45.9 %      .1 fL      MCH 31.7 pg      MCHC 31.4 g/dL      RDW 11.9 %      RDW-SD 43.8 fl      MPV 10.0 fL      Platelets 227 10*3/mm3      Neutrophil % 76.5 %       Lymphocyte % 14.2 %      Monocyte % 7.3 %      Eosinophil % 0.2 %      Basophil % 0.3 %      Immature Grans % 1.5 %      Neutrophils, Absolute 7.32 10*3/mm3      Lymphocytes, Absolute 1.36 10*3/mm3      Monocytes, Absolute 0.70 10*3/mm3      Eosinophils, Absolute 0.02 10*3/mm3      Basophils, Absolute 0.03 10*3/mm3      Immature Grans, Absolute 0.14 10*3/mm3      nRBC 0.0 /100 WBC     POC Glucose Once [[id number]]  (Abnormal) Collected: 11/10/24 2050    Specimen: Blood Updated: 11/10/24 2052     Glucose 239 mg/dL      Comment: Serial Number: [id number]:  [id number]       POC Glucose Once [[id number]]  (Abnormal) Collected: 11/10/24 1645    Specimen: Blood Updated: 11/10/24 1646     Glucose 176 mg/dL      Comment: Serial Number: [id number]:  [id number]       POC Glucose 4x Daily Before Meals & at Bedtime [[id number]]  (Abnormal) Collected: 11/10/24 1150    Specimen: Blood Updated: 11/10/24 1152     Glucose 228 mg/dL      Comment: Serial Number: [id number]:  [id number]                Imaging Results (Last 24 Hours)       ** No results found for the last 24 hours. **                 I reviewed the patient's new clinical results.    Medication Review:   Scheduled Meds:Acetylcysteine, 600 mg, Oral, BID  amLODIPine, 10 mg, Oral, Daily  aspirin, 81 mg, Oral, Daily  insulin glargine, 10 Units, Subcutaneous, Nightly  insulin lispro, 2-7 Units, Subcutaneous, 4x Daily AC & at Bedtime  ipratropium-albuterol, 3 mL, Nebulization, 4x Daily - RT  pantoprazole, 40 mg, Oral, Q AM  polyethylene glycol, 17 g, Oral, Daily  sertraline, 50 mg, Oral, Daily      Continuous Infusions:sodium chloride, 75 mL/hr, Last Rate: 75 mL/hr (11/11/24 0842)      PRN Meds:.  acetaminophen    dextrose    dextrose    glucagon (human recombinant)    HYDROcodone-acetaminophen    HYDROmorphone    ondansetron    [COMPLETED] Insert Peripheral IV **AND** sodium chloride     Assessment & Plan       Anginal equivalent    Non-STEMI (non-ST  elevated myocardial infarction)    Acute non-ST segment elevation myocardial infarction  Substernal chest pain secondary to the above  Atherosclerotic heart disease of native coronary arteries of native heart with angina pectoris  History of percutaneous coronary intervention with stent placement  Palpitations  Acute vertigo  Lower extremity weakness  Osteoporosis with compression fracture  History of kyphoplasty  Pulmonary HTN  History of nephrectomy  Panlobular COPD with emphysema  Chronic mucopurulent bronchitis  DMII with renal manifestations   DMII with neuropathic manifestations  Diabetic dyslipidemia  GERD with esophagitis  Myofascia pain syndrome  CKDIIIa  HH  HTN with CKDI!!a  History of right nephrectomy  Chronic constipation  Nicotine dependency with nicotine use disorder, cigarettes  DDD L/S  Endometrial CA HX with ureteral involvement  Constipation    Heart cath today  Nephrology and cardiology following    Plan for disposition:ANABELL London MD  11/11/24  10:00 EST

## 2024-11-11 NOTE — PLAN OF CARE
Goal Outcome Evaluation:  Plan of Care Reviewed With: patient        Progress: improving  Outcome Evaluation: Patient is a walkie talkie who presents with angina. Currently does not have pain. Patient has a left-sided heart cath scheduled at 1400. She will get fluids afterwards to flush contrast dye out of her. Patient should discharge within the next couple days.

## 2024-11-11 NOTE — PROGRESS NOTES
"NEPHROLOGY PROGRESS NOTE------[facility]    [facility]  [phone]  Rolando Miller MD      Patient Care Team:  Anny Garcia MD as PCP - General (Internal Medicine)  Sergio Ordonez MD as Consulting Physician (Nephrology)      Provider:  Rolando Miller MD  Patient Name: Vianey Reeves  :  1945    SUBJECTIVE:  Follow CKD  No chest pain or shortness of breath    Medication:  Acetylcysteine, 600 mg, Oral, BID  amLODIPine, 10 mg, Oral, Daily  aspirin, 81 mg, Oral, Daily  insulin glargine, 10 Units, Subcutaneous, Nightly  insulin lispro, 2-7 Units, Subcutaneous, 4x Daily AC & at Bedtime  ipratropium-albuterol, 3 mL, Nebulization, 4x Daily - RT  pantoprazole, 40 mg, Oral, Q AM  polyethylene glycol, 17 g, Oral, Daily  sertraline, 50 mg, Oral, Daily           OBJECTIVE    Vital Sign Min/Max for last 24 hours  Temp  Min: 96.4 °F (35.8 °C)  Max: 97.9 °F (36.6 °C)   BP  Min: 125/81  Max: 165/85   Pulse  Min: 67  Max: 100   Resp  Min: 10  Max: 19   SpO2  Min: 90 %  Max: 100 %   No data recorded   No data recorded     Flowsheet Rows      Flowsheet Row First Filed Value   Admission Height 165.1 cm (65\") Documented at 2024   Admission Weight 80.3 kg (177 lb) Documented at 2024            I/O this shift:  In: 1534 [I.V.:1534]  Out: -   I/O last 3 completed shifts:  In: -   Out: 650 [Urine:650]    Physical Exam:  General Appearance: alert, appears stated age and cooperative  Head: normocephalic, without obvious abnormality and atraumatic  Eyes: conjunctivae and sclerae normal and no icterus  Neck: supple and no JVD  Lungs: clear to auscultation and respirations regular  Heart: regular rhythm & normal rate and normal S1, S2  Chest: Wall no abnormalities observed  Abdomen: normal bowel sounds and soft, nontender  Extremities: moves extremities well, no edema, no cyanosis and no redness  Skin: no bleeding, bruising or rash, turgor " "normal, color normal and no lesions noted  Neurologic: Alert, and oriented. No focal deficits    Labs:    WBC WBC   Date Value Ref Range Status   11/11/2024 9.57 3.40 - 10.80 10*3/mm3 Final   11/10/2024 11.19 (H) 3.40 - 10.80 10*3/mm3 Final   11/09/2024 11.63 (H) 3.40 - 10.80 10*3/mm3 Final   11/08/2024 10.49 3.40 - 10.80 10*3/mm3 Final      HGB Hemoglobin   Date Value Ref Range Status   11/11/2024 14.4 12.0 - 15.9 g/dL Final   11/10/2024 14.5 12.0 - 15.9 g/dL Final   11/09/2024 15.8 12.0 - 15.9 g/dL Final   11/08/2024 15.5 12.0 - 15.9 g/dL Final      HCT Hematocrit   Date Value Ref Range Status   11/11/2024 45.9 34.0 - 46.6 % Final   11/10/2024 47.1 (H) 34.0 - 46.6 % Final   11/09/2024 48.2 (H) 34.0 - 46.6 % Final   11/08/2024 46.1 34.0 - 46.6 % Final      Platelets No results found for: \"LABPLAT\"   MCV MCV   Date Value Ref Range Status   11/11/2024 101.1 (H) 79.0 - 97.0 fL Final   11/10/2024 101.1 (H) 79.0 - 97.0 fL Final   11/09/2024 99.2 (H) 79.0 - 97.0 fL Final   11/08/2024 98.5 (H) 79.0 - 97.0 fL Final          Sodium Sodium   Date Value Ref Range Status   11/11/2024 141 136 - 145 mmol/L Final   11/10/2024 141 136 - 145 mmol/L Final   11/09/2024 141 136 - 145 mmol/L Final   11/08/2024 139 136 - 145 mmol/L Final      Potassium Potassium   Date Value Ref Range Status   11/11/2024 4.5 3.5 - 5.2 mmol/L Final   11/10/2024 4.7 3.5 - 5.2 mmol/L Final   11/09/2024 3.9 3.5 - 5.2 mmol/L Final   11/08/2024 4.0 3.5 - 5.2 mmol/L Final     Comment:     Slight hemolysis detected by analyzer. Result may be falsely elevated.      Chloride Chloride   Date Value Ref Range Status   11/11/2024 108 (H) 98 - 107 mmol/L Final   11/10/2024 102 98 - 107 mmol/L Final   11/09/2024 104 98 - 107 mmol/L Final   11/08/2024 103 98 - 107 mmol/L Final      CO2 CO2   Date Value Ref Range Status   11/11/2024 24.5 22.0 - 29.0 mmol/L Final   11/10/2024 28.6 22.0 - 29.0 mmol/L Final   11/09/2024 28.4 22.0 - 29.0 mmol/L Final   11/08/2024 26.3 22.0 - " "29.0 mmol/L Final      BUN BUN   Date Value Ref Range Status   11/11/2024 35 (H) 8 - 23 mg/dL Final   11/10/2024 35 (H) 8 - 23 mg/dL Final   11/09/2024 25 (H) 8 - 23 mg/dL Final   11/08/2024 26 (H) 8 - 23 mg/dL Final      Creatinine Creatinine   Date Value Ref Range Status   11/11/2024 1.20 (H) 0.57 - 1.00 mg/dL Final   11/10/2024 1.53 (H) 0.57 - 1.00 mg/dL Final   11/09/2024 1.26 (H) 0.57 - 1.00 mg/dL Final   11/08/2024 1.27 (H) 0.57 - 1.00 mg/dL Final      Calcium Calcium   Date Value Ref Range Status   11/11/2024 9.1 8.6 - 10.5 mg/dL Final   11/10/2024 9.6 8.6 - 10.5 mg/dL Final   11/09/2024 9.6 8.6 - 10.5 mg/dL Final   11/08/2024 9.6 8.6 - 10.5 mg/dL Final      PO4 No components found for: \"PO4\"   Albumin Albumin   Date Value Ref Range Status   11/11/2024 3.3 (L) 3.5 - 5.2 g/dL Final   11/08/2024 3.8 3.5 - 5.2 g/dL Final      Magnesium No results found for: \"MG\"   Uric Acid No components found for: \"URIC ACID\"     Imaging Results (Last 72 Hours)       Procedure Component Value Units Date/Time    CT Head Without Contrast [[id number]] Collected: 11/08/24 2019     Updated: 11/08/24 2027    Narrative:      CT HEAD WO CONTRAST    Date of Exam: 11/8/2024 7:52 PM EST    Indication: Dizziness.    Comparison: 3/5/2024.    Technique: Axial CT images were obtained of the head without contrast administration.  Coronal reconstructions were performed.  Automated exposure control and iterative reconstruction methods were used.      Findings:  The sulci, fissures, and ventricles are prominent indicating volume loss secondary to cerebral atrophy. The basal cisterns are well-maintained. There are areas of decreased density in the periventricular and subcortical white matter felt to represent   chronic microvascular ischemia. The gray-white matter differentiation is preserved. There are atherosclerotic vascular calcifications in the distal right vertebral artery and both cavernous carotid arteries. There is thinning of the lenses " which can be   seen with previous cataract surgery. The visualized paranasal and mastoid sinuses are clear. The calvarium is unremarkable.      Impression:      Impression:  1. Volume loss secondary to cerebral atrophy.  2. Chronic ischemia.  3. No acute intracranial findings.      Electronically Signed: Del Willoughby MD    11/8/2024 8:25 PM EST    Workstation ID: [id number]    XR Chest 1 View [[id number]] Collected: 11/08/24 2017     Updated: 11/08/24 2023    Narrative:      XR CHEST 1 VW    Date of Exam: 11/8/2024 7:48 PM EST    Indication: dizziness    Comparison: Chest radiograph 10/7/2024    Findings:  Stable cardiomegaly. Negative for lobar consolidation, edema, effusion or pneumothorax. Aortic atherosclerotic disease. Prior kyphoplasty changes in the thoracic spine. Degenerative related osseous findings.      Impression:      Impression:  No active pulmonary process.      Electronically Signed: Federico Carrion MD    11/8/2024 8:21 PM EST    Workstation ID: [id number]            Results for orders placed during the hospital encounter of 11/08/24    XR Chest 1 View    Narrative  XR CHEST 1 VW    Date of Exam: 11/8/2024 7:48 PM EST    Indication: dizziness    Comparison: Chest radiograph 10/7/2024    Findings:  Stable cardiomegaly. Negative for lobar consolidation, edema, effusion or pneumothorax. Aortic atherosclerotic disease. Prior kyphoplasty changes in the thoracic spine. Degenerative related osseous findings.    Impression  Impression:  No active pulmonary process.      Electronically Signed: Federico Carrion MD  11/8/2024 8:21 PM EST  Workstation ID: [id number]      Results for orders placed during the hospital encounter of 10/07/24    XR Abdomen KUB    Narrative  XR ABDOMEN KUB    Date of Exam: 10/7/2024 6:54 PM EDT    Indication: abdominal pain/post-prandial    Comparison: CT abdomen and pelvis dated 9/25/2024.    Findings:  There are postsurgical changes near the GE junction likely related to prior hiatal hernia  repair. There are right upper quadrant cholecystectomy clips. There is nonspecific bowel gas pattern with gaseous distention of the small bowel centrally up to 4.6  cm. There is linear chain suture within the pelvis likely related to prior colonic resection. There is an overall mild colonic stool burden. There are no abnormal renal calcifications.    Impression  Impression:  1. Nonspecific bowel gas pattern with gas-filled dilated loop of small bowel measuring up to 4.6 cm centrally. Findings could relate to ileus or enteritis. Obstruction is less likely.  2. Mild colonic stool burden.      Electronically Signed: Freddy Fletcheror  10/7/2024 9:43 PM EDT  Workstation ID: [id number]      XR Chest 1 View    Narrative  XR CHEST 1 VW    Date of Exam: 10/7/2024 8:56 PM EDT    Indication: abdominal pain    Comparison: Chest radiograph dated 9/23/2024    Findings:  The cardiomediastinal silhouette is within normal limits. Pulmonary vascularity appears normal. There our paratracheal and subcarinal calcified lymph nodes likely related to chronic granulomatous disease. There is evidence of prior kyphoplasty. There is  no acute consolidation or pleural effusion. There is no evidence of pneumothorax.    Impression  Impression:  1. No acute cardiopulmonary abnormality.  2. Postprocedural changes of kyphoplasty at the mid thoracic spine level.      Electronically Signed: Freddy Claytonelor  10/7/2024 9:45 PM EDT  Workstation ID: [id number]      Results for orders placed during the hospital encounter of 08/22/23    Doppler Ankle Brachial Index Single Level CAR    Interpretation Summary    Right Conclusion: The right IKE is normal. Normal digital pressures.    Left Conclusion: The left IKE is normal. Mild digital ischemia.        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  UA  bland.  Creatinine had slightly increased likely hemodynamic from labile blood pressure and/or volume depletion.  Received IV fluids.  Hypertension  Type 2 diabetes  History coronary artery disease  Acute NSTEMI-seen by cardiology.  Heart cath planned     Cr better, ok for heart cath today  Hydrate with normal saline and premedicate with Mucomyst        Rolando Miller MD  [facility]  [phone]  11/11/24  11:54 EST

## 2024-11-12 LAB
ALBUMIN SERPL-MCNC: 3.4 G/DL (ref 3.5–5.2)
ANION GAP SERPL CALCULATED.3IONS-SCNC: 9.5 MMOL/L (ref 5–15)
BASOPHILS # BLD AUTO: 0.02 10*3/MM3 (ref 0–0.2)
BASOPHILS NFR BLD AUTO: 0.2 % (ref 0–1.5)
BUN SERPL-MCNC: 36 MG/DL (ref 8–23)
BUN/CREAT SERPL: 27.1 (ref 7–25)
CALCIUM SPEC-SCNC: 9.2 MG/DL (ref 8.6–10.5)
CHLORIDE SERPL-SCNC: 107 MMOL/L (ref 98–107)
CO2 SERPL-SCNC: 22.5 MMOL/L (ref 22–29)
CREAT SERPL-MCNC: 1.33 MG/DL (ref 0.57–1)
DEPRECATED RDW RBC AUTO: 42.7 FL (ref 37–54)
EGFRCR SERPLBLD CKD-EPI 2021: 40.3 ML/MIN/1.73
EOSINOPHIL # BLD AUTO: 0 10*3/MM3 (ref 0–0.4)
EOSINOPHIL NFR BLD AUTO: 0 % (ref 0.3–6.2)
ERYTHROCYTE [DISTWIDTH] IN BLOOD BY AUTOMATED COUNT: 11.7 % (ref 12.3–15.4)
GLUCOSE BLDC GLUCOMTR-MCNC: 171 MG/DL (ref 70–105)
GLUCOSE BLDC GLUCOMTR-MCNC: 241 MG/DL (ref 70–105)
GLUCOSE BLDC GLUCOMTR-MCNC: 248 MG/DL (ref 70–105)
GLUCOSE BLDC GLUCOMTR-MCNC: 311 MG/DL (ref 70–105)
GLUCOSE SERPL-MCNC: 281 MG/DL (ref 65–99)
HCT VFR BLD AUTO: 43 % (ref 34–46.6)
HGB BLD-MCNC: 14.2 G/DL (ref 12–15.9)
IMM GRANULOCYTES # BLD AUTO: 0.11 10*3/MM3 (ref 0–0.05)
IMM GRANULOCYTES NFR BLD AUTO: 1 % (ref 0–0.5)
LYMPHOCYTES # BLD AUTO: 0.38 10*3/MM3 (ref 0.7–3.1)
LYMPHOCYTES NFR BLD AUTO: 3.6 % (ref 19.6–45.3)
MCH RBC QN AUTO: 32.4 PG (ref 26.6–33)
MCHC RBC AUTO-ENTMCNC: 33 G/DL (ref 31.5–35.7)
MCV RBC AUTO: 98.2 FL (ref 79–97)
MONOCYTES # BLD AUTO: 0.1 10*3/MM3 (ref 0.1–0.9)
MONOCYTES NFR BLD AUTO: 0.9 % (ref 5–12)
NEUTROPHILS NFR BLD AUTO: 10.04 10*3/MM3 (ref 1.7–7)
NEUTROPHILS NFR BLD AUTO: 94.3 % (ref 42.7–76)
NRBC BLD AUTO-RTO: 0 /100 WBC (ref 0–0.2)
PHOSPHATE SERPL-MCNC: 2.8 MG/DL (ref 2.5–4.5)
PLATELET # BLD AUTO: 207 10*3/MM3 (ref 140–450)
PMV BLD AUTO: 10.1 FL (ref 6–12)
POTASSIUM SERPL-SCNC: 4.6 MMOL/L (ref 3.5–5.2)
RBC # BLD AUTO: 4.38 10*6/MM3 (ref 3.77–5.28)
SODIUM SERPL-SCNC: 139 MMOL/L (ref 136–145)
WBC NRBC COR # BLD AUTO: 10.65 10*3/MM3 (ref 3.4–10.8)

## 2024-11-12 PROCEDURE — 94761 N-INVAS EAR/PLS OXIMETRY MLT: CPT

## 2024-11-12 PROCEDURE — 85025 COMPLETE CBC W/AUTO DIFF WBC: CPT | Performed by: INTERNAL MEDICINE

## 2024-11-12 PROCEDURE — 94799 UNLISTED PULMONARY SVC/PX: CPT

## 2024-11-12 PROCEDURE — 93010 ELECTROCARDIOGRAM REPORT: CPT | Performed by: INTERNAL MEDICINE

## 2024-11-12 PROCEDURE — 93005 ELECTROCARDIOGRAM TRACING: CPT | Performed by: INTERNAL MEDICINE

## 2024-11-12 PROCEDURE — 63710000001 INSULIN GLARGINE PER 5 UNITS: Performed by: INTERNAL MEDICINE

## 2024-11-12 PROCEDURE — 80069 RENAL FUNCTION PANEL: CPT | Performed by: INTERNAL MEDICINE

## 2024-11-12 PROCEDURE — 63710000001 INSULIN LISPRO (HUMAN) PER 5 UNITS: Performed by: INTERNAL MEDICINE

## 2024-11-12 PROCEDURE — 82948 REAGENT STRIP/BLOOD GLUCOSE: CPT

## 2024-11-12 PROCEDURE — 94664 DEMO&/EVAL PT USE INHALER: CPT

## 2024-11-12 PROCEDURE — 97162 PT EVAL MOD COMPLEX 30 MIN: CPT

## 2024-11-12 PROCEDURE — 82948 REAGENT STRIP/BLOOD GLUCOSE: CPT | Performed by: INTERNAL MEDICINE

## 2024-11-12 RX ORDER — MECLIZINE HYDROCHLORIDE 25 MG/1
25 TABLET ORAL 3 TIMES DAILY PRN
Status: DISCONTINUED | OUTPATIENT
Start: 2024-11-12 | End: 2024-11-14 | Stop reason: HOSPADM

## 2024-11-12 RX ADMIN — INSULIN GLARGINE 10 UNITS: 100 INJECTION, SOLUTION SUBCUTANEOUS at 20:54

## 2024-11-12 RX ADMIN — INSULIN LISPRO 3 UNITS: 100 INJECTION, SOLUTION INTRAVENOUS; SUBCUTANEOUS at 11:56

## 2024-11-12 RX ADMIN — HYDROCODONE BITARTRATE AND ACETAMINOPHEN 1 TABLET: 10; 325 TABLET ORAL at 14:39

## 2024-11-12 RX ADMIN — INSULIN LISPRO 5 UNITS: 100 INJECTION, SOLUTION INTRAVENOUS; SUBCUTANEOUS at 09:08

## 2024-11-12 RX ADMIN — ACETAMINOPHEN 650 MG: 325 TABLET, FILM COATED ORAL at 21:09

## 2024-11-12 RX ADMIN — IPRATROPIUM BROMIDE AND ALBUTEROL SULFATE 3 ML: .5; 3 SOLUTION RESPIRATORY (INHALATION) at 14:21

## 2024-11-12 RX ADMIN — PANTOPRAZOLE SODIUM 40 MG: 40 TABLET, DELAYED RELEASE ORAL at 06:24

## 2024-11-12 RX ADMIN — IPRATROPIUM BROMIDE AND ALBUTEROL SULFATE 3 ML: .5; 3 SOLUTION RESPIRATORY (INHALATION) at 18:39

## 2024-11-12 RX ADMIN — INSULIN LISPRO 2 UNITS: 100 INJECTION, SOLUTION INTRAVENOUS; SUBCUTANEOUS at 16:49

## 2024-11-12 RX ADMIN — TICAGRELOR 90 MG: 90 TABLET ORAL at 09:14

## 2024-11-12 RX ADMIN — AMLODIPINE BESYLATE 10 MG: 5 TABLET ORAL at 09:11

## 2024-11-12 RX ADMIN — TICAGRELOR 90 MG: 90 TABLET ORAL at 20:54

## 2024-11-12 RX ADMIN — SERTRALINE 50 MG: 50 TABLET, FILM COATED ORAL at 09:08

## 2024-11-12 RX ADMIN — IPRATROPIUM BROMIDE AND ALBUTEROL SULFATE 3 ML: .5; 3 SOLUTION RESPIRATORY (INHALATION) at 06:02

## 2024-11-12 RX ADMIN — INSULIN LISPRO 3 UNITS: 100 INJECTION, SOLUTION INTRAVENOUS; SUBCUTANEOUS at 20:54

## 2024-11-12 RX ADMIN — MECLIZINE HYDROCHLORIDE 25 MG: 25 TABLET ORAL at 14:56

## 2024-11-12 RX ADMIN — ASPIRIN 81 MG: 81 TABLET, COATED ORAL at 09:08

## 2024-11-12 RX ADMIN — IPRATROPIUM BROMIDE AND ALBUTEROL SULFATE 3 ML: .5; 3 SOLUTION RESPIRATORY (INHALATION) at 10:18

## 2024-11-12 RX ADMIN — POLYETHYLENE GLYCOL 3350 17 G: 17 POWDER, FOR SOLUTION ORAL at 09:08

## 2024-11-12 NOTE — PLAN OF CARE
Goal Outcome Evaluation:        Problem: Adult Inpatient Plan of Care  Goal: Plan of Care Review  Outcome: Progressing  Goal: Patient-Specific Goal (Individualized)  Outcome: Progressing  Goal: Absence of Hospital-Acquired Illness or Injury  Outcome: Progressing  Intervention: Identify and Manage Fall Risk  Recent Flowsheet Documentation  Taken 11/12/2024 0400 by Alyssa Pereira RN  Safety Promotion/Fall Prevention:   safety round/check completed   room organization consistent  Taken 11/12/2024 0200 by Alyssa Pereira RN  Safety Promotion/Fall Prevention:   safety round/check completed   room organization consistent  Taken 11/12/2024 0000 by Alyssa Pereira RN  Safety Promotion/Fall Prevention:   safety round/check completed   room organization consistent  Taken 11/11/2024 2200 by Alyssa Pereira RN  Safety Promotion/Fall Prevention:   safety round/check completed   room organization consistent  Taken 11/11/2024 2003 by Alyssa Pereira RN  Safety Promotion/Fall Prevention:   safety round/check completed   room organization consistent  Intervention: Prevent Skin Injury  Recent Flowsheet Documentation  Taken 11/12/2024 0400 by Alyssa Pereira RN  Skin Protection:   transparent dressing maintained   incontinence pads utilized  Taken 11/11/2024 2102 by Alyssa Pereira RN  Skin Protection:   transparent dressing maintained   incontinence pads utilized   protective footwear used  Intervention: Prevent and Manage VTE (Venous Thromboembolism) Risk  Recent Flowsheet Documentation  Taken 11/11/2024 2102 by Alyssa Pereira RN  VTE Prevention/Management:   bilateral   SCDs (sequential compression devices) off   patient refused intervention  Intervention: Prevent Infection  Recent Flowsheet Documentation  Taken 11/12/2024 0400 by Alyssa Pereira RN  Infection Prevention:   single patient room provided   rest/sleep promoted   hand hygiene promoted   personal protective equipment utilized  Taken 11/12/2024 0200 by Alyssa Pereira RN  Infection  Prevention:   single patient room provided   rest/sleep promoted   hand hygiene promoted  Taken 11/12/2024 0000 by Alyssa Pereira RN  Infection Prevention:   single patient room provided   rest/sleep promoted   hand hygiene promoted   personal protective equipment utilized  Taken 11/11/2024 2200 by Alyssa Pereira RN  Infection Prevention:   single patient room provided   rest/sleep promoted   hand hygiene promoted   personal protective equipment utilized  Taken 11/11/2024 2003 by Alyssa Pereira RN  Infection Prevention:   single patient room provided   rest/sleep promoted   personal protective equipment utilized   hand hygiene promoted  Goal: Optimal Comfort and Wellbeing  Outcome: Progressing  Intervention: Monitor Pain and Promote Comfort  Recent Flowsheet Documentation  Taken 11/11/2024 2102 by Alyssa Pereira RN  Pain Management Interventions: pain medication given  Intervention: Provide Person-Centered Care  Recent Flowsheet Documentation  Taken 11/11/2024 2102 by Alyssa Pereira RN  Trust Relationship/Rapport:   care explained   choices provided   questions answered   questions encouraged  Goal: Readiness for Transition of Care  Outcome: Progressing     Problem: Comorbidity Management  Goal: Maintenance of COPD Symptom Control  Outcome: Progressing  Intervention: Maintain COPD (Chronic Obstructive Pulmonary Disease) Symptom Control  Recent Flowsheet Documentation  Taken 11/12/2024 0400 by Alyssa Pereira RN  Medication Review/Management: medications reviewed  Taken 11/12/2024 0200 by Alyssa Pereira RN  Medication Review/Management: medications reviewed  Taken 11/12/2024 0000 by Alyssa Pereira RN  Medication Review/Management: medications reviewed  Taken 11/11/2024 2200 by Alyssa Pereira RN  Medication Review/Management: medications reviewed  Taken 11/11/2024 2102 by Alyssa Pereira RN  Breathing Techniques/Airway Clearance: deep/controlled cough encouraged  Taken 11/11/2024 2003 by Alyssa Pereira RN  Medication  Review/Management: medications reviewed  Goal: Maintenance of Heart Failure Symptom Control  Outcome: Progressing  Intervention: Maintain Heart Failure Management  Recent Flowsheet Documentation  Taken 11/12/2024 0400 by Alyssa Pereira RN  Medication Review/Management: medications reviewed  Taken 11/12/2024 0200 by Alyssa Pereira RN  Medication Review/Management: medications reviewed  Taken 11/12/2024 0000 by Alyssa Pereira RN  Medication Review/Management: medications reviewed  Taken 11/11/2024 2200 by Alyssa Pereira RN  Medication Review/Management: medications reviewed  Taken 11/11/2024 2003 by Alyssa Pereira RN  Medication Review/Management: medications reviewed  Goal: Blood Pressure in Desired Range  Outcome: Progressing  Intervention: Maintain Blood Pressure Management  Recent Flowsheet Documentation  Taken 11/12/2024 0400 by Alyssa Pereira RN  Medication Review/Management: medications reviewed  Taken 11/12/2024 0200 by Alyssa Pereira RN  Medication Review/Management: medications reviewed  Taken 11/12/2024 0000 by Alyssa Pereira RN  Medication Review/Management: medications reviewed  Taken 11/11/2024 2200 by Alyssa Pereira RN  Medication Review/Management: medications reviewed  Taken 11/11/2024 2003 by Alyssa Pereira RN  Medication Review/Management: medications reviewed     Problem: Skin Injury Risk Increased  Goal: Skin Health and Integrity  Outcome: Progressing  Intervention: Optimize Skin Protection  Recent Flowsheet Documentation  Taken 11/12/2024 0400 by Alyssa Pereira RN  Pressure Reduction Techniques:   frequent weight shift encouraged   sit time limited to 2 hours   weight shift assistance provided  Pressure Reduction Devices: pressure-redistributing mattress utilized  Skin Protection:   transparent dressing maintained   incontinence pads utilized  Taken 11/11/2024 2102 by Alyssa Pereira RN  Pressure Reduction Techniques:   frequent weight shift encouraged   sit time limited to 2 hours   weight shift assistance  provided  Pressure Reduction Devices: pressure-redistributing mattress utilized  Skin Protection:   transparent dressing maintained   incontinence pads utilized   protective footwear used  Intervention: Promote and Optimize Oral Intake  Recent Flowsheet Documentation  Taken 11/11/2024 2102 by Alyssa Pereira RN  Oral Nutrition Promotion: rest periods promoted     Problem: Fall Injury Risk  Goal: Absence of Fall and Fall-Related Injury  Outcome: Progressing  Intervention: Identify and Manage Contributors  Recent Flowsheet Documentation  Taken 11/12/2024 0400 by Alyssa Pereira RN  Medication Review/Management: medications reviewed  Taken 11/12/2024 0200 by Alyssa Pereira RN  Medication Review/Management: medications reviewed  Taken 11/12/2024 0000 by Alyssa Pereira RN  Medication Review/Management: medications reviewed  Taken 11/11/2024 2200 by Alyssa Pereira RN  Medication Review/Management: medications reviewed  Taken 11/11/2024 2003 by Alyssa Pereira RN  Medication Review/Management: medications reviewed  Intervention: Promote Injury-Free Environment  Recent Flowsheet Documentation  Taken 11/12/2024 0400 by Alyssa Pereira RN  Safety Promotion/Fall Prevention:   safety round/check completed   room organization consistent  Taken 11/12/2024 0200 by Alyssa Pereira RN  Safety Promotion/Fall Prevention:   safety round/check completed   room organization consistent  Taken 11/12/2024 0000 by Alyssa Pereira RN  Safety Promotion/Fall Prevention:   safety round/check completed   room organization consistent  Taken 11/11/2024 2200 by Alyssa Pereira RN  Safety Promotion/Fall Prevention:   safety round/check completed   room organization consistent  Taken 11/11/2024 2003 by Alyssa Pereira RN  Safety Promotion/Fall Prevention:   safety round/check completed   room organization consistent

## 2024-11-12 NOTE — PROGRESS NOTES
"NEPHROLOGY PROGRESS NOTE------[facility]    [facility]  [phone]  Rolando Miller MD      Patient Care Team:  Anny Garcia MD as PCP - General (Internal Medicine)  Sergio Ordonez MD as Consulting Physician (Nephrology)      Provider:  Rolando Miller MD  Patient Name: Vianey Reeves  :  1945    SUBJECTIVE:  Follow CKD  No chest pain or shortness of breath  S/p HC and PCI    Medication:  amLODIPine, 10 mg, Oral, Daily  aspirin, 81 mg, Oral, Daily  insulin glargine, 10 Units, Subcutaneous, Nightly  insulin lispro, 2-7 Units, Subcutaneous, 4x Daily AC & at Bedtime  ipratropium-albuterol, 3 mL, Nebulization, 4x Daily - RT  pantoprazole, 40 mg, Oral, Q AM  polyethylene glycol, 17 g, Oral, Daily  sertraline, 50 mg, Oral, Daily  ticagrelor, 90 mg, Oral, BID           OBJECTIVE    Vital Sign Min/Max for last 24 hours  Temp  Min: 97.5 °F (36.4 °C)  Max: 97.9 °F (36.6 °C)   BP  Min: 106/91  Max: 179/95   Pulse  Min: 72  Max: 109   Resp  Min: 14  Max: 20   SpO2  Min: 89 %  Max: 100 %   No data recorded   Weight  Min: 80.6 kg (177 lb 11.1 oz)  Max: 80.6 kg (177 lb 11.1 oz)     Flowsheet Rows      Flowsheet Row First Filed Value   Admission Height 165.1 cm (65\") Documented at 2024   Admission Weight 80.3 kg (177 lb) Documented at 2024            I/O this shift:  In: 480 [P.O.:480]  Out: -   I/O last 3 completed shifts:  In:  [I.V.:]  Out:  [Urine:]    Physical Exam:  General Appearance: alert, appears stated age and cooperative  Head: normocephalic, without obvious abnormality and atraumatic  Eyes: conjunctivae and sclerae normal and no icterus  Neck: supple and no JVD  Lungs: clear to auscultation and respirations regular  Heart: regular rhythm & normal rate and normal S1, S2  Chest: Wall no abnormalities observed  Abdomen: normal bowel sounds and soft, nontender  Extremities: moves extremities well, no " "edema, no cyanosis and no redness  Skin: no bleeding, bruising or rash, turgor normal, color normal and no lesions noted  Neurologic: Alert, and oriented. No focal deficits    Labs:    WBC WBC   Date Value Ref Range Status   11/12/2024 10.65 3.40 - 10.80 10*3/mm3 Final   11/11/2024 9.57 3.40 - 10.80 10*3/mm3 Final   11/10/2024 11.19 (H) 3.40 - 10.80 10*3/mm3 Final      HGB Hemoglobin   Date Value Ref Range Status   11/12/2024 14.2 12.0 - 15.9 g/dL Final   11/11/2024 14.4 12.0 - 15.9 g/dL Final   11/10/2024 14.5 12.0 - 15.9 g/dL Final      HCT Hematocrit   Date Value Ref Range Status   11/12/2024 43.0 34.0 - 46.6 % Final   11/11/2024 45.9 34.0 - 46.6 % Final   11/10/2024 47.1 (H) 34.0 - 46.6 % Final      Platelets No results found for: \"LABPLAT\"   MCV MCV   Date Value Ref Range Status   11/12/2024 98.2 (H) 79.0 - 97.0 fL Final   11/11/2024 101.1 (H) 79.0 - 97.0 fL Final   11/10/2024 101.1 (H) 79.0 - 97.0 fL Final          Sodium Sodium   Date Value Ref Range Status   11/12/2024 139 136 - 145 mmol/L Final   11/11/2024 141 136 - 145 mmol/L Final   11/10/2024 141 136 - 145 mmol/L Final      Potassium Potassium   Date Value Ref Range Status   11/12/2024 4.6 3.5 - 5.2 mmol/L Final     Comment:     Specimen hemolyzed.  Result may be falsely elevated.   11/11/2024 4.5 3.5 - 5.2 mmol/L Final   11/10/2024 4.7 3.5 - 5.2 mmol/L Final      Chloride Chloride   Date Value Ref Range Status   11/12/2024 107 98 - 107 mmol/L Final   11/11/2024 108 (H) 98 - 107 mmol/L Final   11/10/2024 102 98 - 107 mmol/L Final      CO2 CO2   Date Value Ref Range Status   11/12/2024 22.5 22.0 - 29.0 mmol/L Final   11/11/2024 24.5 22.0 - 29.0 mmol/L Final   11/10/2024 28.6 22.0 - 29.0 mmol/L Final      BUN BUN   Date Value Ref Range Status   11/12/2024 36 (H) 8 - 23 mg/dL Final   11/11/2024 35 (H) 8 - 23 mg/dL Final   11/10/2024 35 (H) 8 - 23 mg/dL Final      Creatinine Creatinine   Date Value Ref Range Status   11/12/2024 1.33 (H) 0.57 - 1.00 mg/dL " "Final   11/11/2024 1.20 (H) 0.57 - 1.00 mg/dL Final   11/10/2024 1.53 (H) 0.57 - 1.00 mg/dL Final      Calcium Calcium   Date Value Ref Range Status   11/12/2024 9.2 8.6 - 10.5 mg/dL Final   11/11/2024 9.1 8.6 - 10.5 mg/dL Final   11/10/2024 9.6 8.6 - 10.5 mg/dL Final      PO4 No components found for: \"PO4\"   Albumin Albumin   Date Value Ref Range Status   11/12/2024 3.4 (L) 3.5 - 5.2 g/dL Final   11/11/2024 3.3 (L) 3.5 - 5.2 g/dL Final      Magnesium No results found for: \"MG\"   Uric Acid No components found for: \"URIC ACID\"     Imaging Results (Last 72 Hours)       ** No results found for the last 72 hours. **            Results for orders placed during the hospital encounter of 11/08/24    XR Chest 1 View    Narrative  XR CHEST 1 VW    Date of Exam: 11/8/2024 7:48 PM EST    Indication: dizziness    Comparison: Chest radiograph 10/7/2024    Findings:  Stable cardiomegaly. Negative for lobar consolidation, edema, effusion or pneumothorax. Aortic atherosclerotic disease. Prior kyphoplasty changes in the thoracic spine. Degenerative related osseous findings.    Impression  Impression:  No active pulmonary process.      Electronically Signed: Federico Carrion MD  11/8/2024 8:21 PM EST  Workstation ID: [id number]      Results for orders placed during the hospital encounter of 10/07/24    XR Abdomen KUB    Narrative  XR ABDOMEN KUB    Date of Exam: 10/7/2024 6:54 PM EDT    Indication: abdominal pain/post-prandial    Comparison: CT abdomen and pelvis dated 9/25/2024.    Findings:  There are postsurgical changes near the GE junction likely related to prior hiatal hernia repair. There are right upper quadrant cholecystectomy clips. There is nonspecific bowel gas pattern with gaseous distention of the small bowel centrally up to 4.6  cm. There is linear chain suture within the pelvis likely related to prior colonic resection. There is an overall mild colonic stool burden. There are no abnormal renal " calcifications.    Impression  Impression:  1. Nonspecific bowel gas pattern with gas-filled dilated loop of small bowel measuring up to 4.6 cm centrally. Findings could relate to ileus or enteritis. Obstruction is less likely.  2. Mild colonic stool burden.      Electronically Signed: Freddy Park  10/7/2024 9:43 PM EDT  Workstation ID: [id number]      XR Chest 1 View    Narrative  XR CHEST 1 VW    Date of Exam: 10/7/2024 8:56 PM EDT    Indication: abdominal pain    Comparison: Chest radiograph dated 9/23/2024    Findings:  The cardiomediastinal silhouette is within normal limits. Pulmonary vascularity appears normal. There our paratracheal and subcarinal calcified lymph nodes likely related to chronic granulomatous disease. There is evidence of prior kyphoplasty. There is  no acute consolidation or pleural effusion. There is no evidence of pneumothorax.    Impression  Impression:  1. No acute cardiopulmonary abnormality.  2. Postprocedural changes of kyphoplasty at the mid thoracic spine level.      Electronically Signed: Freddy Park  10/7/2024 9:45 PM EDT  Workstation ID: [id number]      Results for orders placed during the hospital encounter of 08/22/23    Doppler Ankle Brachial Index Single Level CAR    Interpretation Summary    Right Conclusion: The right IKE is normal. Normal digital pressures.    Left Conclusion: The left IKE is normal. Mild digital ischemia.        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  UA bland.  Creatinine had slightly increased likely hemodynamic from labile blood pressure and/or volume depletion.  Received IV fluids.  Hypertension  Type 2 diabetes  History coronary artery disease  Acute NSTEMI-seen by cardiology.  Heart cath and PCI completed.     CR stable post cath/PCI  Stop IVF  Labs in am        Rolando HARP  MD Angela  [facility]  [phone]  11/12/24  11:39 EST

## 2024-11-12 NOTE — PROGRESS NOTES
Nutrition Services    Patient Name: Vianey Reeves  YOB: 1945  MRN: [id number]  Admission date: 11/8/2024    PROGRESS NOTE      Encounter Information: Progress note to check on diet advancement and intakes. Pt has resumed diet following procedure and is eating well with 75% or better intake at meals. Does not currently meet AND/ASPEN criteria for malnutrition.       PO Diet: Diet: Cardiac; Healthy Heart (2-3 Na+); Fluid Consistency: Thin (IDDSI 0)   PO Supplements: None ordered   PO Intake:  % intake since diet resumed        Current nutrition support:    Nutrition support review:        Labs (reviewed below): Hyperglycemia -- will modify diet to include consistent carbohydrate modification to help optimize glucose        GI Function:  BM 11/10 (x2 days ago)         Nutrition Intervention Updates: Add consistent carbohydrate diet modification.       Results from last 7 days   Lab Units 11/12/24  0348 11/11/24  0442 11/10/24  0350 11/09/24  0218 11/08/24  1940   SODIUM mmol/L 139 141 141   < > 139   POTASSIUM mmol/L 4.6 4.5 4.7   < > 4.0   CHLORIDE mmol/L 107 108* 102   < > 103   CO2 mmol/L 22.5 24.5 28.6   < > 26.3   BUN mg/dL 36* 35* 35*   < > 26*   CREATININE mg/dL 1.33* 1.20* 1.53*   < > 1.27*   CALCIUM mg/dL 9.2 9.1 9.6   < > 9.6   BILIRUBIN mg/dL  --   --   --   --  0.2   ALK PHOS U/L  --   --   --   --  81   ALT (SGPT) U/L  --   --   --   --  10   AST (SGOT) U/L  --   --   --   --  14   GLUCOSE mg/dL 281* 178* 208*   < > 159*    < > = values in this interval not displayed.     Results from last 7 days   Lab Units 11/12/24  0348 11/10/24  0350 11/09/24  0218   PHOSPHORUS mg/dL 2.8   < >  --    HEMOGLOBIN g/dL 14.2   < > 15.8   HEMATOCRIT % 43.0   < > 48.2*   TRIGLYCERIDES mg/dL  --   --  120    < > = values in this interval not displayed.     COVID19   Date Value Ref Range Status   07/14/2024 Not Detected Not Detected - Ref. Range Final     Lab Results   Component Value Date    HGBA1C 7.10  (H) 01/21/2024     Wt Readings from Last 10 Encounters:   11/12/24 0609 80.6 kg (177 lb 11.1 oz)   11/11/24 1900 80.6 kg (177 lb 11.1 oz)   11/09/24 0735 80.3 kg (177 lb)   11/08/24 1917 80.3 kg (177 lb)   11/05/24 1329 82.6 kg (182 lb)   10/08/24 2011 82.7 kg (182 lb 5.1 oz)   09/24/24 0100 82.7 kg (182 lb 5.1 oz)   09/23/24 2300 82.7 kg (182 lb 5.1 oz)   08/01/24 0949 74.8 kg (165 lb)   07/15/24 2035 74.8 kg (165 lb)   07/01/24 1256 80.3 kg (177 lb)   06/10/24 1400 80.3 kg (177 lb)   05/22/24 1251 84.8 kg (187 lb)   04/24/24 1253 84.8 kg (187 lb)     RD to follow up per protocol.    Electronically signed by:  Ivy Merritt RD  11/12/24 16:31 EST

## 2024-11-12 NOTE — THERAPY EVALUATION
Patient Name: Vianey Reeves  : 1945    MRN: [id number]                              Today's Date: 2024       Admit Date: 2024    Visit Dx:     ICD-10-CM ICD-9-CM   1. Weakness  R53.1 780.79   2. Nausea  R11.0 787.02   3. Dizziness  R42 780.4   4. Non-STEMI (non-ST elevated myocardial infarction)  I21.4 410.70     Patient Active Problem List   Diagnosis    Type 2 diabetes mellitus with chronic kidney disease    Essential (primary) hypertension    Mixed hyperlipidemia    Hiatal hernia    Generalized weakness    Long term current use of antithrombotics/antiplatelets    Stage 3b chronic kidney disease (CKD)    Simple chronic bronchitis    Atherosclerotic heart disease of native coronary artery without angina pectoris    Constipation    History of cancer of ureter    Solitary kidney    Atherosclerosis of native arteries of extremities with intermittent claudication, bilateral legs    H/O malignant neoplasm of ureter    Vertigo    Medicare annual wellness visit, subsequent    Presbyopia    Combined form of senile cataract    Cervical cancer screening    Postartificial menopausal syndrome    Left lower quadrant abdominal pain    Diverticula of colon    Ruptured tympanic membrane, left    Hearing loss, bilateral    TMJ pain dysfunction syndrome    Stage 4 chronic kidney disease    Other dysphagia    Lactose intolerance    Polyarthralgia    Acute right-sided thoracic back pain    Class 1 obesity due to excess calories with serious comorbidity and body mass index (BMI) of 34.0 to 34.9 in adult    Anxiety    Hypercalcemia    Screening mammogram, encounter for    Chronic bilateral low back pain without sciatica    Irregular heart beat    Palpitations    COPD (chronic obstructive pulmonary disease)    Gout of right foot    Plantar fasciitis of right foot    Lumbar radiculopathy    Overflow incontinence of urine    Urinary retention    Gastroesophageal reflux disease without esophagitis    Leg swelling     Cellulitis of groin    Abdominal pain    Chest pain, unspecified type    Dyspnea    Hypoxia    CRF (chronic renal failure)    Pulmonary hypertension    Chest pain    Pneumonia of right middle lobe due to infectious organism    Dyspnea    Pneumonia    Recurrent pneumonia    Multiple tracheobronchial mucus plugs    Thoracic back pain    Anxiety disorder    Gastro-esophageal reflux disease with esophagitis    Tobacco use    Vitamin D deficiency    Degeneration of lumbar intervertebral disc    Compression fracture of vertebral column    Compression fracture of spine, non-traumatic, with routine healing, subsequent encounter    Osteoarthritis, unspecified osteoarthritis type, unspecified site    Age-related osteoporosis without current pathological fracture    Type 2 diabetes mellitus with diabetic neuropathy, unspecified    Type 2 diabetes mellitus with diabetic chronic kidney disease    History of cancer of ureter    Diaphragmatic hernia without obstruction or gangrene    Back pain    Athscl heart disease of native coronary artery w/o ang pctrs    Panlobular emphysema    Personal history of malignant neoplasm of ureter    Presence of coronary angioplasty implant and graft    Oxygen dependent    Nicotine dependence, cigarettes, uncomplicated    Mixed hyperlipidemia    Irritable bowel syndrome with predominant constipation    Long term (current) use of opiate analgesic    Hypercholesterolemia    Anginal equivalent    Non-STEMI (non-ST elevated myocardial infarction)     Past Medical History:   Diagnosis Date    Allergic     latex allergy    Allergies     Anxiety     Bilateral carotid bruits     Bulging lumbar disc     Bulging of thoracic intervertebral disc     Cancer     ureter    surgery    CKD (chronic kidney disease)     stage 3    Claudication, intermittent     COPD (chronic obstructive pulmonary disease)     Coronary artery disease     DDD (degenerative disc disease), lumbar     DDD (degenerative disc disease),  thoracic     Diabetes mellitus     DJD (degenerative joint disease)     Dysphagia     Gout     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    Hypertension     IBS (irritable colon syndrome)     constipation    Mass of right breast     Neuropathy     Renal insufficiency     Sciatic leg pain     right    Simple chronic bronchitis     Solitary kidney     left     Past Surgical History:   Procedure Laterality Date    BREAST BIOPSY Right     BRONCHOSCOPY N/A 7/14/2024    Procedure: BRONCHOSCOPY WITH BRONCHOALVEOLAR LAVAGE;  Surgeon: Marlin Ferguson MD;  Location: [facility] ENDOSCOPY;  Service: Pulmonary;  Laterality: N/A;  POST: RML ATELECTASIS    CARDIAC CATHETERIZATION      CARDIAC CATHETERIZATION N/A 11/11/2024    Procedure: Left Heart Cath;  Surgeon: Leon Gonzalez DO;  Location: [facility] CATH INVASIVE LOCATION;  Service: Cardiovascular;  Laterality: N/A;    CHOLECYSTECTOMY      COLON SURGERY      REMOVAL diverticular diseae    COLONOSCOPY N/A 08/12/2021    Procedure: COLONOSCOPY with polypectomy x 3;  Surgeon: Margarette Mcallister MD;  Location: [facility] ENDOSCOPY;  Service: Gastroenterology;  Laterality: N/A;  post op: hmorrhoids, diverticulosis, polyps    CORONARY STENT PLACEMENT      ENDOSCOPY N/A 08/12/2021    Procedure: ESOPHAGOGASTRODUODENOSCOPY with dilatation (18-20 mm balloon) and (54 bougie);  Surgeon: Margarette Mcallister MD;  Location: [facility] ENDOSCOPY;  Service: Gastroenterology;  Laterality: N/A;  post op: esophageal stricture, esophagitis, gastritis, history of nissen    ENDOSCOPY N/A 9/13/2023    Procedure: ESOPHAGOGASTRODUODENOSCOPY with biopsy x 1 area and dilation (50,54,56 non guided bougie);  Surgeon: Margarette Mcallister MD;  Location: [facility] ENDOSCOPY;  Service: Gastroenterology;  Laterality: N/A;  post op: esophageal stricture    EYE SURGERY Bilateral     cataracts    HIATAL HERNIA REPAIR      NEPHRECTOMY Right     cancer    UPPER ENDOSCOPIC ULTRASOUND W/ FNA N/A 09/22/2022    Procedure: EUS;  Surgeon:  Margarette Mcallister MD;  Location: [facility] ENDOSCOPY;  Service: Gastroenterology;  Laterality: N/A;  post: normal pancreas, dilated pancreatic duct, hiatal hernia,       General Information       Row Name 11/12/24 1529          Physical Therapy Time and Intention    Document Type evaluation  -RR     Mode of Treatment physical therapy  -RR       Row Name 11/12/24 1529          General Information    Patient Profile Reviewed yes  -RR     Prior Level of Function independent:;gait;transfer;ADL's;driving  owns cane but does not use; no recent falls  -RR     Existing Precautions/Restrictions fall;cardiac  -RR     Barriers to Rehab medically complex  -RR       Row Name 11/12/24 1529          Living Environment    People in Home alone  SR apartment  -RR       Row Name 11/12/24 1529          Home Main Entrance    Number of Stairs, Main Entrance none  -RR       Row Name 11/12/24 1529          Stairs Within Home, Primary    Number of Stairs, Within Home, Primary none  -RR       Row Name 11/12/24 1529          Cognition    Orientation Status (Cognition) oriented x 4  -RR       Row Name 11/12/24 1556 11/12/24 1529       Safety Issues/Impairments Affecting Functional Mobility    Safety Issues Affecting Function (Mobility) -- insight into deficits/self-awareness;judgment;safety precaution awareness  -RR    Impairments Affecting Function (Mobility) -- balance;cognition  -RR    Cognitive Impairments, Mobility Safety/Performance safety precaution awareness;problem-solving/reasoning  -RR --              User Key  (r) = Recorded By, (t) = Taken By, (c) = Cosigned By      Initials Name Provider Type    RR Iona Alvarenga PT Physical Therapist                   Mobility       Row Name 11/12/24 1556          Bed Mobility    Bed Mobility bed mobility (all) activities  -RR     All Activities, [place] (Bed Mobility) supervision  -RR       Row Name 11/12/24 1556          Bed-Chair Transfer    Bed-Chair [place] (Transfers) minimum  assist (75% patient effort)  -RR       Row Name 11/12/24 1556          Sit-Stand Transfer    Sit-Stand [place] (Transfers) minimum assist (75% patient effort)  -       Row Name 11/12/24 1556          Gait/Stairs (Locomotion)    Patient was able to Ambulate yes  -RR     Distance in Feet (Gait) --  min-modA with HHA/ STC; patient has just returned from toileting upon therapist arrival.  Gait deferred due to elevated HR and BP hypotensive.  -RR     [place] Level (Stairs) not tested  -RR               User Key  (r) = Recorded By, (t) = Taken By, (c) = Cosigned By      Initials Name Provider Type    RR Iona Alvarenga, PT Physical Therapist                   Obj/Interventions       Row Name 11/12/24 1559          Range of Motion Comprehensive    General Range of Motion bilateral lower extremity ROM WFL  -[facility] Name 11/12/24 1559          Strength Comprehensive (MMT)    Comment, General Manual Muscle Testing (MMT) Assessment BLE grossly 4-/5BLE  -       Row Name 11/12/24 1559          Balance    Balance Assessment sitting static balance;sitting dynamic balance;standing static balance;standing dynamic balance  -RR     Static Sitting Balance supervision  -RR     Dynamic Sitting Balance supervision  -RR     Position, Sitting Balance unsupported  -RR     Static Standing Balance minimal assist  -RR     Dynamic Standing Balance moderate assist  -RR     Position/Device Used, Standing Balance unsupported  -RR     Comment, Balance roll test: increased symptoms of dizziness with L rolling no nystagmus present; increased dizziness with vertical head turns  -       Row Name 11/12/24 1555          Sensory Assessment (Somatosensory)    Sensory Assessment (Somatosensory) --  reports numbness and tingling but able to detect light touch  -RR               User Key  (r) = Recorded By, (t) = Taken By, (c) = Cosigned By      Initials Name Provider Type    RR Iona Alvarenga, PT Physical Therapist                    Goals/Plan       Row Name 11/12/24 1611          Bed Mobility Goal 1 (PT)    Activity/Assistive Device (Bed Mobility Goal 1, PT) bed mobility activities, all  -RR     Smith Level/Cues Needed (Bed Mobility Goal 1, PT) modified independence  -RR     Time Frame (Bed Mobility Goal 1, PT) long term goal (LTG);2 weeks  -RR       Row Name 11/12/24 1611          Transfer Goal 1 (PT)    Activity/Assistive Device (Transfer Goal 1, PT) transfers, all;walker, rolling  -RR     [place] Level/Cues Needed (Transfer Goal 1, PT) modified independence  -RR     Time Frame (Transfer Goal 1, PT) long term goal (LTG);2 weeks  -RR       Row Name 11/12/24 1611          Gait Training Goal 1 (PT)    Activity/Assistive Device (Gait Training Goal 1, PT) gait (walking locomotion);walker, rolling  -RR     Smith Level (Gait Training Goal 1, PT) modified independence  -RR     Distance (Gait Training Goal 1, PT) 50  -RR     Time Frame (Gait Training Goal 1, PT) long term goal (LTG);2 weeks  -RR       Row Name 11/12/24 1611          Therapy Assessment/Plan (PT)    Planned Therapy Interventions (PT) balance training;bed mobility training;gait training;home exercise program;vestibular therapy;transfer training;strengthening;stair training;patient/family education;neuromuscular re-education  -RR               User Key  (r) = Recorded By, (t) = Taken By, (c) = Cosigned By      Initials Name Provider Type    RR Iona Alvarenga, PT Physical Therapist                   Clinical Impression       Row Name 11/12/24 1601          Pain    Pretreatment Pain Rating 0/10 - no pain  -RR     Posttreatment Pain Rating 0/10 - no pain  -RR       Row Name 11/12/24 1602 11/12/24 1601       Plan of Care Review    Plan of Care Reviewed With -- patient  Simultaneous filing. User may be unaware of other data.  -RR    Outcome Evaluation Patient is an 79-year-old female presents the emergency department 11/8/24 for evaluation of dizziness, vomiting, nausea.  She has had recent BP medication changes.  NSTEMI s/p heart cath with PCI x2 to circumflex artery 11/11/24.  Patient is cleared for evaluation by nursing.   In her normal state she is independent with mobility, no AD, + driving, resides in SR apartment without stairs.  She is pleasant and agreeable to therapy and A&Ox4 though difficult to follow at times with symptomatology. She has been up with nursing with min-modA and has just returned with self only to/from restroom--RN notified and patient educated on call light use and falls risk.  Patient reports dizziness which has been present since last week and has not improved since procedure yesterday.  Patient reports 8/10 baseline dizziness when resting in bed.  Completed vestibular screen with no nystagmus with roll test, minor increase in symptoms when rolling L but resolves within seconds.  She does report increase in symptoms with vertical head movements.  Patient is monitored for orthostatics and is found to be 137/84mmHG 103bpm supine; 126/56mmHG 104bpm sitting; and 93/68mmHG and 127mmHG standing.  RN is notified.  Patient reports still 8/10 dizziness in standing.  Given current mobility status anticipate patient is not safe to return to her apartment independently.  In her current state will require SNF rehab but may progress once dizziness resolves.  Will continue to progress mobility and assess vestibular involvement.  -RR --      Row Name 11/12/24 1602          Therapy Assessment/Plan (PT)    Patient/Family Therapy Goals Statement (PT) improve mobility before going home  -RR     Rehab Potential (PT) good  -RR     Criteria for Skilled Interventions Met (PT) yes;meets criteria;skilled treatment is necessary  -RR     Therapy Frequency (PT) 5 times/wk  -RR     Predicted Duration of Therapy Intervention (PT) dc  -RR       Row Name 11/12/24 1602          Vital Signs    Pre Systolic BP Rehab 137  -RR     Pre Treatment Diastolic BP 84  supine  -RR     Intra Systolic  BP Rehab 126  -RR     Intra Treatment Diastolic BP 56  sitting  -RR     Post Systolic BP Rehab 93  -RR     Post Treatment Diastolic BP 68  standing  -RR     Pretreatment Heart Rate (beats/min) 103  -RR     Intratreatment Heart Rate (beats/min) 104  -RR     Posttreatment Heart Rate (beats/min) 127  -RR       Row Name 11/12/24 1602          Positioning and Restraints    Pre-Treatment Position in bed  -RR     Post Treatment Position bed  -RR     In Bed call light within reach;exit alarm on  -RR               User Key  (r) = Recorded By, (t) = Taken By, (c) = Cosigned By      Initials Name Provider Type    RR Iona Alvarenga, PT Physical Therapist                   Outcome Measures       Row Name 11/12/24 1612 11/12/24 1200       How much help from another person do you currently need...    Turning from your back to your side while in flat bed without using bedrails? 4  -RR 4  -HS    Moving from lying on back to sitting on the side of a flat bed without bedrails? 3  -RR 3  -HS    Moving to and from a bed to a chair (including a wheelchair)? 3  -RR 3  -HS    Standing up from a chair using your arms (e.g., wheelchair, bedside chair)? 3  -RR 3  -HS    Climbing 3-5 steps with a railing? 1  -RR 3  -HS    To walk in hospital room? 2  -RR 3  -HS    AM-PAC 6 Clicks Score (PT) 16  -RR 19  -HS      Row Name 11/12/24 0800          How much help from another person do you currently need...    Turning from your back to your side while in flat bed without using bedrails? 4  -HS     Moving from lying on back to sitting on the side of a flat bed without bedrails? 3  -HS     Moving to and from a bed to a chair (including a wheelchair)? 3  -HS     Standing up from a chair using your arms (e.g., wheelchair, bedside chair)? 3  -HS     Climbing 3-5 steps with a railing? 3  -HS     To walk in hospital room? 3  -HS     AM-PAC 6 Clicks Score (PT) 19  -HS               User Key  (r) = Recorded By, (t) = Taken By, (c) = Cosigned By       Initials Name Provider Type     Sofia StevensonROMIE Licensed Nurse    Iona Simon, PT Physical Therapist                                 Physical Therapy Education       Title: PT OT SLP Therapies (Done)       Topic: Physical Therapy (Done)       Point: Mobility training (Done)       Learning Progress Summary            Patient Acceptance, E,TB, VU by RR at 11/12/2024 1613                      Point: Home exercise program (Done)       Learning Progress Summary            Patient Acceptance, E,TB, VU by RR at 11/12/2024 1613                                      User Key       Initials Effective Dates Name Provider Type Discipline     07/01/24 -  Iona Alvarenga, PT Physical Therapist PT                  PT Recommendation and Plan  Planned Therapy Interventions (PT): balance training, bed mobility training, gait training, home exercise program, vestibular therapy, transfer training, strengthening, stair training, patient/family education, neuromuscular re-education  Outcome Evaluation: Patient is an 79-year-old female presents the emergency department 11/8/24 for evaluation of dizziness, vomiting, nausea. She has had recent BP medication changes.  NSTEMI s/p heart cath with PCI x2 to circumflex artery 11/11/24.  Patient is cleared for evaluation by nursing.   In her normal state she is independent with mobility, no AD, + driving, resides in SR apartment without stairs.  She is pleasant and agreeable to therapy and A&Ox4 though difficult to follow at times with symptomatology. She has been up with nursing with min-modA and has just returned with self only to/from restroom--RN notified and patient educated on call light use and falls risk.  Patient reports dizziness which has been present since last week and has not improved since procedure yesterday.  Patient reports 8/10 baseline dizziness when resting in bed.  Completed vestibular screen with no nystagmus with roll test, minor increase in symptoms when  rolling L but resolves within seconds.  She does report increase in symptoms with vertical head movements.  Patient is monitored for orthostatics and is found to be 137/84mmHG 103bpm supine; 126/56mmHG 104bpm sitting; and 93/68mmHG and 127mmHG standing.  RN is notified.  Patient reports still 8/10 dizziness in standing.  Given current mobility status anticipate patient is not safe to return to her apartment independently.  In her current state will require SNF rehab but may progress once dizziness resolves.  Will continue to progress mobility and assess vestibular involvement.     Time Calculation:              PT G-Codes  AM-PAC 6 Clicks Score (PT): 16  PT Discharge Summary  Anticipated Discharge Disposition (PT): skilled nursing facility    Iona Alvarenga, PT  11/12/2024

## 2024-11-12 NOTE — CASE MANAGEMENT/SOCIAL WORK
Case Management Readmission Assessment Note    Case Management Readmission Assessment (all recorded)       Readmission Interview       Row Name 11/12/24 0816             Readmission Indications    Is the patient and/or family able to complete the readmission assessment questions? Yes  called patient's daughter Janice      Is this hospitalization related to the prior hospital diagnosis? No        Row Name 11/12/24 0816             Recommendation for rehospitalization    Did you speak with your physician prior to coming to the hospital No      If yes, what physician did you speak with? patient was unable        Row Name 11/12/24 0816             Follow-up Appointments    Did you have an appointment with PCP after your hospitalization? No  daughter works in [employer] and says even she could not get her mother a follow-up appointment. Patient has been seeing [facility] due to lack of [employer] apts.      Did you have an appointment with a Specialist? Yes      When was your appointment scheduled? 10/24/24  General Surgery- Dr. Mims and GI for colonoscopy 10/25      Did you go to appointment? Yes      Are you current with the Pulmonary Clinic? No      Are you current with the CHF Clinic? No        Row Name 11/12/24 0816             Medications    Did you have newly prescribed medications at discharge? No  she was told to stop the clonidine, mucinex, and nizoral shampoo.      What pharmacy was used to fill prescription(s)? [facility] [place] but did not have any new medicines. Daughter wants meds to bed this admission.      Were medications picked up? --  N/A        Row Name 11/12/24 0816             Discharge Instructions    Did you understand your discharge instructions? Yes      Did your family/caregiver hear your instructions? No      Were you told to eat a special diet? Yes  daughter says diabetic and healthy heart and follows mostly      Were you given a number of someone to call if you had questions or concerns? Yes         Row Name 11/12/24 0816             Index discharge location/services    Where did you go upon discharge? Home      Do you have supportive family or friends in the home? --  patient lives at home alone but her family lives close by to assist and help take care of her when needed.        Row Name 11/12/24 0816             Discharge Readiness    On a scale of 1-5 (5 being well prepared), how ready were you for discharge 4  daughter states her abdominal pain was better but she wasn't quite herself.      Recommendation based on interview Other (comment)  daughter states patients health has been declining over the last few months and has been admitted to the hospital each time.        Row Name 11/12/24 0816             Palliative Care/Hospice    Are you current with Palliative Care? No      Are you current with Hospice Care? No        Row Name 11/08/24 2347 11/08/24 2346          Advance Directives (For Healthcare)    Pre-existing AND/MOST/POLST Order -- Yes, notify physician for order     Advance Directive Status Patient has advance directive, copy requested Patient has advance directive, copy requested     Type of Advance Directive -- Health care directive/Living will     Have you reviewed your Advance Directive and is it valid for this stay? -- Yes     Literature Provided on Advance Directives -- No     Patient Requests Assistance on Advance Directives -- Patient Declined       Row Name 11/12/24 0816             Readmission Assessment Final Comments    Final Comments 305 Vianey Reeves - [employer] - Admitted 11/9 in Obs status, changed to inpatient on 11/10/2024 with an NSTEMI. On 3 liters NC. Pending heart cath today. [facility] [place]. Inpatient precert submitted to [employer]. Prior stay: 10/7 - 10/11/2024 - Was changed to observation status and discharged.

## 2024-11-12 NOTE — PROGRESS NOTES
LOS: 2 days   Patient Care Team:  Anny Garcia MD as PCP - General (Internal Medicine)  Sergio Ordonez MD as Consulting Physician (Nephrology)    Subjective     Interval History: s/p heart cath with PCI x 2 to circumflex artery    Patient Complaints: Intermittent spinning sensation, worse when sitting up    History taken from: patient    Review of Systems   Constitutional:  Positive for activity change. Negative for appetite change, chills, diaphoresis and fatigue.   HENT:  Negative for facial swelling.    Eyes:  Negative for visual disturbance.   Respiratory:  Negative for cough, shortness of breath, wheezing and stridor.    Cardiovascular:  Negative for chest pain, palpitations and leg swelling.   Gastrointestinal:  Negative for abdominal pain, blood in stool, constipation and diarrhea.   Endocrine: Negative for polyuria.   Genitourinary:  Negative for dysuria.   Musculoskeletal:  Negative for arthralgias, back pain and gait problem.   Neurological:  Positive for dizziness. Negative for tremors, seizures, weakness, light-headedness, numbness and headaches.   Psychiatric/Behavioral:  Negative for confusion.            Objective     Vital Signs  Temp:  [97.5 °F (36.4 °C)-97.9 °F (36.6 °C)] 97.5 °F (36.4 °C)  Heart Rate:  [] 93  Resp:  [14-20] 18  BP: (106-179)/() 145/77    Physical Exam:     General Appearance:    Alert, cooperative, in no acute distress,   Head:    Normocephalic, without obvious abnormality, atraumatic   Eyes:            Lids and lashes normal, conjunctivae and sclerae normal, no   icterus, no pallor, corneas clear, PERRLA   Ears:    Ears appear intact with no abnormalities noted   Throat:   No oral lesions, no thrush, oral mucosa moist   Neck:   No adenopathy, supple, trachea midline, no thyromegaly, no   carotid bruit, no JVD   Lungs:     Clear to auscultation,respirations regular, even and                  unlabored    Heart:    Regular rhythm and normal rate,  normal S1 and S2, no            murmur, no gallop, no rub, no click   Chest Wall:    No abnormalities observed   Abdomen:     Normal bowel sounds, no masses, no organomegaly, soft        Non-tender non-distended, no guarding,   Extremities:   Moves all extremities well, no edema, no cyanosis, no             Redness   Pulses:   Pulses palpable and equal bilaterally   Skin:   No bleeding, bruising or rash   Lymph nodes:   No palpable adenopathy   Neurologic: No nystagmus, no localizing neurologic deficit, gait not assessed        Results Review:    Lab Results (last 24 hours)       Procedure Component Value Units Date/Time    POC Glucose 4x Daily Before Meals & at Bedtime [[id number]]  (Abnormal) Collected: 11/12/24 1133    Specimen: Blood Updated: 11/12/24 1135     Glucose 241 mg/dL      Comment: Serial Number: [id number]:  [id number]       POC Glucose Once [[id number]]  (Abnormal) Collected: 11/12/24 0855    Specimen: Blood Updated: 11/12/24 0858     Glucose 311 mg/dL      Comment: Serial Number: [id number]:  [id number]       Renal Function Panel [[id number]]  (Abnormal) Collected: 11/12/24 0348    Specimen: Blood from Arm, Right Updated: 11/12/24 0430     Glucose 281 mg/dL      BUN 36 mg/dL      Creatinine 1.33 mg/dL      Sodium 139 mmol/L      Potassium 4.6 mmol/L      Comment: Specimen hemolyzed.  Result may be falsely elevated.        Chloride 107 mmol/L      CO2 22.5 mmol/L      Calcium 9.2 mg/dL      Albumin 3.4 g/dL      Phosphorus 2.8 mg/dL      Anion Gap 9.5 mmol/L      BUN/Creatinine Ratio 27.1     eGFR 40.3 mL/min/1.73     Narrative:      GFR Normal >60  Chronic Kidney Disease <60  Kidney Failure <15    The GFR formula is only valid for adults with stable renal function between ages 18 and 70.    CBC & Differential [[id number]]  (Abnormal) Collected: 11/12/24 0348    Specimen: Blood from Arm, Right Updated: 11/12/24 0359    Narrative:      The following orders were created for panel order CBC &  Differential.  Procedure                               Abnormality         Status                     ---------                               -----------         ------                     CBC Auto Differential[[id number]]        Abnormal            Final result                 Please view results for these tests on the individual orders.    CBC Auto Differential [[id number]]  (Abnormal) Collected: 11/12/24 0348    Specimen: Blood from Arm, Right Updated: 11/12/24 0359     WBC 10.65 10*3/mm3      RBC 4.38 10*6/mm3      Hemoglobin 14.2 g/dL      Hematocrit 43.0 %      MCV 98.2 fL      MCH 32.4 pg      MCHC 33.0 g/dL      RDW 11.7 %      RDW-SD 42.7 fl      MPV 10.1 fL      Platelets 207 10*3/mm3      Neutrophil % 94.3 %      Lymphocyte % 3.6 %      Monocyte % 0.9 %      Eosinophil % 0.0 %      Basophil % 0.2 %      Immature Grans % 1.0 %      Neutrophils, Absolute 10.04 10*3/mm3      Lymphocytes, Absolute 0.38 10*3/mm3      Monocytes, Absolute 0.10 10*3/mm3      Eosinophils, Absolute 0.00 10*3/mm3      Basophils, Absolute 0.02 10*3/mm3      Immature Grans, Absolute 0.11 10*3/mm3      nRBC 0.0 /100 WBC     POC Glucose Once [[id number]]  (Abnormal) Collected: 11/11/24 2050    Specimen: Blood Updated: 11/11/24 2052     Glucose 233 mg/dL      Comment: Serial Number: [id number]:  [id number]       POC Activated Clotting Time [[id number]]  (Abnormal) Collected: 11/11/24 1948    Specimen: Blood Updated: 11/11/24 1956     Activated Clotting Time  147 Seconds      Comment: Serial Number: [id number]:  [id number]       POC Activated Clotting Time [[id number]]  (Abnormal) Collected: 11/11/24 1611    Specimen: Arterial Blood Updated: 11/11/24 1851     Activated Clotting Time  193 Seconds      Comment: Serial Number: [id number]:  [id number]       POC Activated Clotting Time [[id number]]  (Abnormal) Collected: 11/11/24 1803    Specimen: Arterial Blood Updated: 11/11/24 1826     Activated Clotting Time  187 Seconds      Comment:  Serial Number: [id number]:  [id number]       POC Glucose 4x Daily Before Meals & at Bedtime [[id number]]  (Abnormal) Collected: 11/11/24 1800    Specimen: Blood Updated: 11/11/24 1802     Glucose 150 mg/dL      Comment: Serial Number: [id number]:  [id number]       Aldolase [[id number]] Collected: 11/09/24 0512    Specimen: Blood Updated: 11/11/24 1634     Aldolase 3.7 U/L     Narrative:      Performed at:  [address]  [id number]  : Michel Bay PhD, Phone:  [phone]             Imaging Results (Last 24 Hours)       ** No results found for the last 24 hours. **                 I reviewed the patient's new clinical results.    Medication Review:   Scheduled Meds:amLODIPine, 10 mg, Oral, Daily  aspirin, 81 mg, Oral, Daily  insulin glargine, 10 Units, Subcutaneous, Nightly  insulin lispro, 2-7 Units, Subcutaneous, 4x Daily AC & at Bedtime  ipratropium-albuterol, 3 mL, Nebulization, 4x Daily - RT  pantoprazole, 40 mg, Oral, Q AM  polyethylene glycol, 17 g, Oral, Daily  sertraline, 50 mg, Oral, Daily  ticagrelor, 90 mg, Oral, BID      Continuous Infusions:   PRN Meds:.  acetaminophen    aluminum-magnesium hydroxide-simethicone    atropine    dextrose    dextrose    diphenhydrAMINE    glucagon (human recombinant)    HYDROcodone-acetaminophen    HYDROmorphone    meclizine    nicotine    nitroglycerin    ondansetron ODT **OR** ondansetron    [COMPLETED] Insert Peripheral IV **AND** sodium chloride    sodium chloride     Assessment & Plan       Anginal equivalent    Non-STEMI (non-ST elevated myocardial infarction)  Coronary artery disease  - Brilinta, aspirin;  intolerant of statins  Myalgias due to statins  COPD - continue home meds  Chronic hypoxemia - supplemental oxygen  Dizziness/vertigo - prn meclizine, PT eval  CKD stage 3 - creatinine stable after intervention  Essential hypertension - controlled with amlodipine  Mood disorder - continue home meds  GERD -  PPI  DM-II - basal/bolus insulin          Plan for disposition:Home soon    Anny Garcia MD  11/12/24  14:48 EST

## 2024-11-12 NOTE — PLAN OF CARE
Goal Outcome Evaluation:  Plan of Care Reviewed With: patient (Simultaneous filing. User may be unaware of other data.)           Outcome Evaluation: Patient is an 79-year-old female presents the emergency department 11/8/24 for evaluation of dizziness, vomiting, nausea. She has had recent BP medication changes.  NSTEMI s/p heart cath with PCI x2 to circumflex artery 11/11/24.  Patient is cleared for evaluation by nursing.   In her normal state she is independent with mobility, no AD, + driving, resides in SR apartment without stairs.  She is pleasant and agreeable to therapy and A&Ox4 though difficult to follow at times with symptomatology. She has been up with nursing with min-modA and has just returned with self only to/from restroom--RN notified and patient educated on call light use and falls risk.  Patient reports dizziness which has been present since last week and has not improved since procedure yesterday.  Patient reports 8/10 baseline dizziness when resting in bed.  Completed vestibular screen with no nystagmus with roll test, minor increase in symptoms when rolling L but resolves within seconds.  She does report increase in symptoms with vertical head movements.  Patient is monitored for orthostatics and is found to be 137/84mmHG 103bpm supine; 126/56mmHG 104bpm sitting; and 93/68mmHG and 127mmHG standing.  RN is notified.  Patient reports still 8/10 dizziness in standing.  Given current mobility status anticipate patient is not safe to return to her apartment independently.  In her current state will require SNF rehab but may progress once dizziness resolves.  Will continue to progress mobility and assess vestibular involvement.    Anticipated Discharge Disposition (PT): skilled nursing facility

## 2024-11-12 NOTE — PLAN OF CARE
Goal Outcome Evaluation:              Outcome Evaluation: Pt did not complain of any chest pain today. Pt worked with PT and did well with 1 assist and walker. Pt has been ambulating to the bathroom with help. Pt did complain of being dizzy whenever she moved, was given PRN Antivert. Intervention was effective. Pt complained of severe pain in back and legs. Pain medication was given and pt was able to rest comfortably.

## 2024-11-12 NOTE — NURSING NOTE
Patient complains of shortness of breath, throat, neck and jaw pain. Patient states that the pain does not radiate anywhere. Patient symptoms began shortly after Cardene gtt was started. RN stopped Cardene, due to possible chance of reaction. Dr. Gonzalez called.    See MAR for ordered medications for suspected reaction to medication. MD states possible reaction to contrast dye used in procedure.    RN to DC cardene gtt.    One time dose of oral BP med to be given when ACT is within range and sheath can be pulled.

## 2024-11-13 LAB
ALBUMIN SERPL-MCNC: 3.3 G/DL (ref 3.5–5.2)
ANION GAP SERPL CALCULATED.3IONS-SCNC: 7.6 MMOL/L (ref 5–15)
BASOPHILS # BLD AUTO: 0.02 10*3/MM3 (ref 0–0.2)
BASOPHILS NFR BLD AUTO: 0.2 % (ref 0–1.5)
BUN SERPL-MCNC: 38 MG/DL (ref 8–23)
BUN/CREAT SERPL: 26.4 (ref 7–25)
CALCIUM SPEC-SCNC: 9.5 MG/DL (ref 8.6–10.5)
CHLORIDE SERPL-SCNC: 109 MMOL/L (ref 98–107)
CO2 SERPL-SCNC: 24.4 MMOL/L (ref 22–29)
CREAT SERPL-MCNC: 1.44 MG/DL (ref 0.57–1)
DEPRECATED RDW RBC AUTO: 43.6 FL (ref 37–54)
EGFRCR SERPLBLD CKD-EPI 2021: 36.6 ML/MIN/1.73
EOSINOPHIL # BLD AUTO: 0.01 10*3/MM3 (ref 0–0.4)
EOSINOPHIL NFR BLD AUTO: 0.1 % (ref 0.3–6.2)
ERYTHROCYTE [DISTWIDTH] IN BLOOD BY AUTOMATED COUNT: 12.1 % (ref 12.3–15.4)
GLUCOSE BLDC GLUCOMTR-MCNC: 151 MG/DL (ref 70–105)
GLUCOSE BLDC GLUCOMTR-MCNC: 183 MG/DL (ref 70–105)
GLUCOSE BLDC GLUCOMTR-MCNC: 184 MG/DL (ref 70–105)
GLUCOSE BLDC GLUCOMTR-MCNC: 255 MG/DL (ref 70–105)
GLUCOSE SERPL-MCNC: 118 MG/DL (ref 65–99)
HCT VFR BLD AUTO: 41.4 % (ref 34–46.6)
HGB BLD-MCNC: 13.6 G/DL (ref 12–15.9)
IMM GRANULOCYTES # BLD AUTO: 0.1 10*3/MM3 (ref 0–0.05)
IMM GRANULOCYTES NFR BLD AUTO: 0.9 % (ref 0–0.5)
LYMPHOCYTES # BLD AUTO: 1.34 10*3/MM3 (ref 0.7–3.1)
LYMPHOCYTES NFR BLD AUTO: 12.3 % (ref 19.6–45.3)
MCH RBC QN AUTO: 32.3 PG (ref 26.6–33)
MCHC RBC AUTO-ENTMCNC: 32.9 G/DL (ref 31.5–35.7)
MCV RBC AUTO: 98.3 FL (ref 79–97)
MONOCYTES # BLD AUTO: 0.88 10*3/MM3 (ref 0.1–0.9)
MONOCYTES NFR BLD AUTO: 8.1 % (ref 5–12)
NEUTROPHILS NFR BLD AUTO: 78.4 % (ref 42.7–76)
NEUTROPHILS NFR BLD AUTO: 8.55 10*3/MM3 (ref 1.7–7)
NRBC BLD AUTO-RTO: 0 /100 WBC (ref 0–0.2)
PHOSPHATE SERPL-MCNC: 3.6 MG/DL (ref 2.5–4.5)
PLATELET # BLD AUTO: 225 10*3/MM3 (ref 140–450)
PMV BLD AUTO: 9.9 FL (ref 6–12)
POTASSIUM SERPL-SCNC: 4.5 MMOL/L (ref 3.5–5.2)
RBC # BLD AUTO: 4.21 10*6/MM3 (ref 3.77–5.28)
SODIUM SERPL-SCNC: 141 MMOL/L (ref 136–145)
WBC NRBC COR # BLD AUTO: 10.9 10*3/MM3 (ref 3.4–10.8)

## 2024-11-13 PROCEDURE — 94799 UNLISTED PULMONARY SVC/PX: CPT

## 2024-11-13 PROCEDURE — 97116 GAIT TRAINING THERAPY: CPT

## 2024-11-13 PROCEDURE — 63710000001 INSULIN GLARGINE PER 5 UNITS: Performed by: INTERNAL MEDICINE

## 2024-11-13 PROCEDURE — 82948 REAGENT STRIP/BLOOD GLUCOSE: CPT | Performed by: INTERNAL MEDICINE

## 2024-11-13 PROCEDURE — 82948 REAGENT STRIP/BLOOD GLUCOSE: CPT

## 2024-11-13 PROCEDURE — 85025 COMPLETE CBC W/AUTO DIFF WBC: CPT | Performed by: INTERNAL MEDICINE

## 2024-11-13 PROCEDURE — 80069 RENAL FUNCTION PANEL: CPT | Performed by: INTERNAL MEDICINE

## 2024-11-13 PROCEDURE — 63710000001 ONDANSETRON ODT 4 MG TABLET DISPERSIBLE: Performed by: INTERNAL MEDICINE

## 2024-11-13 PROCEDURE — 97535 SELF CARE MNGMENT TRAINING: CPT

## 2024-11-13 PROCEDURE — 94761 N-INVAS EAR/PLS OXIMETRY MLT: CPT

## 2024-11-13 PROCEDURE — 94664 DEMO&/EVAL PT USE INHALER: CPT

## 2024-11-13 PROCEDURE — 95992 CANALITH REPOSITIONING PROC: CPT

## 2024-11-13 PROCEDURE — 63710000001 INSULIN LISPRO (HUMAN) PER 5 UNITS: Performed by: INTERNAL MEDICINE

## 2024-11-13 PROCEDURE — 99232 SBSQ HOSP IP/OBS MODERATE 35: CPT | Performed by: INTERNAL MEDICINE

## 2024-11-13 RX ADMIN — INSULIN LISPRO 2 UNITS: 100 INJECTION, SOLUTION INTRAVENOUS; SUBCUTANEOUS at 08:26

## 2024-11-13 RX ADMIN — ASPIRIN 81 MG: 81 TABLET, COATED ORAL at 08:26

## 2024-11-13 RX ADMIN — IPRATROPIUM BROMIDE AND ALBUTEROL SULFATE 3 ML: .5; 3 SOLUTION RESPIRATORY (INHALATION) at 07:20

## 2024-11-13 RX ADMIN — ACETAMINOPHEN 650 MG: 325 TABLET, FILM COATED ORAL at 22:48

## 2024-11-13 RX ADMIN — ONDANSETRON 4 MG: 4 TABLET, ORALLY DISINTEGRATING ORAL at 15:25

## 2024-11-13 RX ADMIN — IPRATROPIUM BROMIDE AND ALBUTEROL SULFATE 3 ML: .5; 3 SOLUTION RESPIRATORY (INHALATION) at 11:06

## 2024-11-13 RX ADMIN — TICAGRELOR 90 MG: 90 TABLET ORAL at 22:37

## 2024-11-13 RX ADMIN — SERTRALINE 50 MG: 50 TABLET, FILM COATED ORAL at 08:26

## 2024-11-13 RX ADMIN — PANTOPRAZOLE SODIUM 40 MG: 40 TABLET, DELAYED RELEASE ORAL at 05:43

## 2024-11-13 RX ADMIN — INSULIN LISPRO 2 UNITS: 100 INJECTION, SOLUTION INTRAVENOUS; SUBCUTANEOUS at 12:50

## 2024-11-13 RX ADMIN — AMLODIPINE BESYLATE 10 MG: 5 TABLET ORAL at 08:26

## 2024-11-13 RX ADMIN — Medication 10 ML: at 08:23

## 2024-11-13 RX ADMIN — INSULIN LISPRO 2 UNITS: 100 INJECTION, SOLUTION INTRAVENOUS; SUBCUTANEOUS at 17:18

## 2024-11-13 RX ADMIN — TICAGRELOR 90 MG: 90 TABLET ORAL at 08:26

## 2024-11-13 RX ADMIN — IPRATROPIUM BROMIDE AND ALBUTEROL SULFATE 3 ML: .5; 3 SOLUTION RESPIRATORY (INHALATION) at 18:55

## 2024-11-13 RX ADMIN — INSULIN LISPRO 4 UNITS: 100 INJECTION, SOLUTION INTRAVENOUS; SUBCUTANEOUS at 22:37

## 2024-11-13 RX ADMIN — POLYETHYLENE GLYCOL 3350 17 G: 17 POWDER, FOR SOLUTION ORAL at 08:23

## 2024-11-13 RX ADMIN — INSULIN GLARGINE 10 UNITS: 100 INJECTION, SOLUTION SUBCUTANEOUS at 22:37

## 2024-11-13 RX ADMIN — MECLIZINE HYDROCHLORIDE 25 MG: 25 TABLET ORAL at 08:26

## 2024-11-13 NOTE — CASE MANAGEMENT/SOCIAL WORK
Continued Stay Note  [facility]     Patient Name: Vianey Reeves  MRN: [id number]  Today's Date: 11/13/2024    Admit Date: 11/8/2024    Plan: Return home w/ family, OP PT [facility]   Discharge Plan       Row Name 11/13/24 1541       Plan    Plan Return home w/ family, OP PT [facility]    Plan Comments PT contacted CM regarding vestibular therapy and her session today, patient wants to go to OP PT at  in [place] vs [facility]. Notified MD to place OP PT order.               Leslie Funes RN      [facility]  Office: [phone]  Cell: [phone]  Fax # [phone]

## 2024-11-13 NOTE — PROGRESS NOTES
LOS: 3 days   Patient Care Team:  Anny Garcia MD as PCP - General (Internal Medicine)  Sergio Ordonez MD as Consulting Physician (Nephrology)    Subjective     Interval History: s/p heart cath with PCI x 2 to circumflex artery, continues with vertigo with position changes but does report some improvement    Patient Complaints: Intermittent spinning sensation, worse when sitting up mild improvement    History taken from: patient    Review of Systems   Constitutional:  Positive for activity change. Negative for appetite change, chills, diaphoresis and fatigue.   HENT:  Negative for facial swelling.    Eyes:  Negative for visual disturbance.   Respiratory:  Negative for cough, shortness of breath, wheezing and stridor.    Cardiovascular:  Negative for chest pain, palpitations and leg swelling.   Gastrointestinal:  Positive for nausea. Negative for abdominal pain, blood in stool, constipation and diarrhea.   Endocrine: Negative for polyuria.   Genitourinary:  Negative for dysuria.   Musculoskeletal:  Negative for arthralgias, back pain and gait problem.   Neurological:  Positive for dizziness. Negative for tremors, seizures, weakness, light-headedness, numbness and headaches.   Psychiatric/Behavioral:  Negative for confusion.            Objective     Vital Signs  Temp:  [97.5 °F (36.4 °C)-97.8 °F (36.6 °C)] 97.7 °F (36.5 °C)  Heart Rate:  [] 85  Resp:  [12-19] 14  BP: (114-151)/(52-77) 114/52    Physical Exam:     General Appearance:    Alert, cooperative, in no acute distress,   Head:    Normocephalic, without obvious abnormality, atraumatic   Eyes:            Lids and lashes normal, conjunctivae and sclerae normal, no   icterus, no pallor, corneas clear, PERRLA   Ears:    Ears appear intact with no abnormalities noted   Throat:   No oral lesions, no thrush, oral mucosa moist   Neck:   No adenopathy, supple, trachea midline, no thyromegaly, no   carotid bruit, no JVD   Lungs:     Clear to  auscultation,respirations regular, even and                  unlabored    Heart:    Regular rhythm and normal rate, normal S1 and S2, no            murmur, no gallop, no rub, no click   Chest Wall:    No abnormalities observed   Abdomen:     Normal bowel sounds, no masses, no organomegaly, soft        Non-tender non-distended, no guarding,   Extremities:   Moves all extremities well, no edema, no cyanosis, no             Redness   Pulses:   Pulses palpable and equal bilaterally   Skin:   No bleeding, bruising or rash   Lymph nodes:   No palpable adenopathy   Neurologic: No nystagmus, no localizing neurologic deficit, gait not assessed        Results Review:    Lab Results (last 24 hours)       Procedure Component Value Units Date/Time    POC Glucose 4x Daily Before Meals & at Bedtime [[id number]]  (Abnormal) Collected: 11/13/24 1134    Specimen: Blood Updated: 11/13/24 1136     Glucose 183 mg/dL      Comment: Serial Number: [id number]:  [id number]       POC Glucose 4x Daily Before Meals & at Bedtime [[id number]]  (Abnormal) Collected: 11/13/24 0718    Specimen: Blood Updated: 11/13/24 0720     Glucose 151 mg/dL      Comment: Serial Number: [id number]:  [id number]       Renal Function Panel [[id number]]  (Abnormal) Collected: 11/13/24 0205    Specimen: Blood from Arm, Right Updated: 11/13/24 0246     Glucose 118 mg/dL      BUN 38 mg/dL      Creatinine 1.44 mg/dL      Sodium 141 mmol/L      Potassium 4.5 mmol/L      Comment: Specimen hemolyzed.  Result may be falsely elevated.        Chloride 109 mmol/L      CO2 24.4 mmol/L      Calcium 9.5 mg/dL      Albumin 3.3 g/dL      Phosphorus 3.6 mg/dL      Anion Gap 7.6 mmol/L      BUN/Creatinine Ratio 26.4     eGFR 36.6 mL/min/1.73     Narrative:      GFR Normal >60  Chronic Kidney Disease <60  Kidney Failure <15    The GFR formula is only valid for adults with stable renal function between ages 18 and 70.    CBC & Differential [[id number]]  (Abnormal) Collected:  11/13/24 0205    Specimen: Blood from Arm, Right Updated: 11/13/24 0211    Narrative:      The following orders were created for panel order CBC & Differential.  Procedure                               Abnormality         Status                     ---------                               -----------         ------                     CBC Auto Differential[[id number]]        Abnormal            Final result                 Please view results for these tests on the individual orders.    CBC Auto Differential [[id number]]  (Abnormal) Collected: 11/13/24 0205    Specimen: Blood from Arm, Right Updated: 11/13/24 0211     WBC 10.90 10*3/mm3      RBC 4.21 10*6/mm3      Hemoglobin 13.6 g/dL      Hematocrit 41.4 %      MCV 98.3 fL      MCH 32.3 pg      MCHC 32.9 g/dL      RDW 12.1 %      RDW-SD 43.6 fl      MPV 9.9 fL      Platelets 225 10*3/mm3      Neutrophil % 78.4 %      Lymphocyte % 12.3 %      Monocyte % 8.1 %      Eosinophil % 0.1 %      Basophil % 0.2 %      Immature Grans % 0.9 %      Neutrophils, Absolute 8.55 10*3/mm3      Lymphocytes, Absolute 1.34 10*3/mm3      Monocytes, Absolute 0.88 10*3/mm3      Eosinophils, Absolute 0.01 10*3/mm3      Basophils, Absolute 0.02 10*3/mm3      Immature Grans, Absolute 0.10 10*3/mm3      nRBC 0.0 /100 WBC     POC Glucose Once [[id number]]  (Abnormal) Collected: 11/12/24 2023    Specimen: Blood Updated: 11/12/24 2024     Glucose 248 mg/dL      Comment: Serial Number: [id number]:  [id number]       POC Glucose Once [[id number]]  (Abnormal) Collected: 11/12/24 1623    Specimen: Blood Updated: 11/12/24 1625     Glucose 171 mg/dL      Comment: Serial Number: [id number]:  [id number]                Imaging Results (Last 24 Hours)       ** No results found for the last 24 hours. **                 I reviewed the patient's new clinical results.    Medication Review:   Scheduled Meds:amLODIPine, 10 mg, Oral, Daily  aspirin, 81 mg, Oral, Daily  insulin glargine, 10 Units,  Subcutaneous, Nightly  insulin lispro, 2-7 Units, Subcutaneous, 4x Daily AC & at Bedtime  ipratropium-albuterol, 3 mL, Nebulization, 4x Daily - RT  pantoprazole, 40 mg, Oral, Q AM  polyethylene glycol, 17 g, Oral, Daily  sertraline, 50 mg, Oral, Daily  ticagrelor, 90 mg, Oral, BID      Continuous Infusions:   PRN Meds:.  acetaminophen    aluminum-magnesium hydroxide-simethicone    atropine    dextrose    dextrose    diphenhydrAMINE    glucagon (human recombinant)    HYDROcodone-acetaminophen    HYDROmorphone    meclizine    nicotine    nitroglycerin    ondansetron ODT **OR** ondansetron    [COMPLETED] Insert Peripheral IV **AND** sodium chloride     Assessment & Plan       Anginal equivalent    Non-STEMI (non-ST elevated myocardial infarction)  Coronary artery disease  - Brilinta, aspirin;  intolerant of statins  Myalgias due to statins  COPD - continue home meds  Chronic hypoxemia - supplemental oxygen  Dizziness/vertigo - prn meclizine, PT following  CKD stage 3 - creatinine stable after intervention  Essential hypertension - controlled with amlodipine  Mood disorder - continue home meds  GERD - PPI  DM-II - basal/bolus insulin      Plan for disposition:Home soon with outpatient PT, vestibular therapy    Diane Ventura, SAMIRA  11/13/24  16:07 EST

## 2024-11-13 NOTE — DISCHARGE PLACEMENT REQUEST
"Eri Sterling (79 y.o. Female)       Date of Birth   1945    Social Security Number       Address   [address]  [place] [address]    Home Phone   [phone]    MRN   [id number]       Scientology   None    Marital Status                               Admission Date   11/8/24    Admission Type   Emergency    Admitting Provider   Del Iverson MD    Attending Provider   Rosa London MD    Department, Room/Bed   [facility], 2103/1       Discharge Date       Discharge Disposition       Discharge Destination                                 Attending Provider: Rosa London MD    Allergies: Erythromycin, Naproxen Sodium, Statins, Latex, Metoclopramide, Cardene [Nicardipine], Dicyclomine    Isolation: Contact   Infection: ESBL E coli (09/28/24)   Code Status: CPR    Ht: 165.1 cm (65\")   Wt: 80.5 kg (177 lb 7.5 oz)    Admission Cmt: None   Principal Problem: Anginal equivalent [I20.89]                   Active Insurance as of 11/8/2024       Primary Coverage       Payor Plan Insurance Group Employer/Plan Group    ANTHEM MEDICARE REPLACEMENT ANTHEM MEDICARE ADVANTAGE [id number]       Payor Plan Address Payor Plan Phone Number Payor Plan Fax Number Effective Dates    [address] [phone]  1/1/2022 - None Entered    [facility] [address]         Subscriber Name Subscriber Birth Date Member ID       ERI STERLING 1945 [id number]               Secondary Coverage       Payor Plan Insurance Group Employer/Plan Group    [id number]        Payor Plan Address Payor Plan Phone Number Payor Plan Fax Number Effective Dates    [address] [address]   9/1/2024 - None Entered    [place] [address]         Subscriber Name Subscriber Birth Date Member ID       ERI STERLING 1945 [id number]                     Emergency Contacts        (Rel.) Home Phone Work Phone Mobile Phone    SUSI IRIZARRYJA (Daughter) " [phone] -- [phone]    Carolina vanegas (Grandchild) -- -- [phone]    HANNAH VANEGAS (Grandchild) [phone] -- [phone]

## 2024-11-13 NOTE — THERAPY TREATMENT NOTE
"Subjective: Pt agreeable to therapeutic plan of care.     Objective:         Bed mobility - Min-A  Transfers - CGA  Ambulation - 100 feet CGA with STC    Roll test for horizontal canal BPPV: (-)  Sidelying test for PC BPPV: (+) on L    Modified epley for L PC BPPV x 2     Vitals: WNL    Pain: 0 VAS   Location: n/a  Intervention for pain: N/A    Education: Provided education on the importance of mobility in the acute care setting  Education on BPPV    Assessment: Vianey Reeves  is an 79-year-old female who presented with dizziness, vomiting, nausea. She has had recent BP medication changes. NSTEMI s/p heart cath with PCI x2 to circumflex artery 11/11/24. Following resolution of cardiac symptoms, dizziness persisted and vestibular assessment was requested. Patient was (+) for L PC BPPV and was treated x 2 with modified epley with great success and apparent resolution. Symptoms of dizziness have significantly lessened patient is now safe to return home as balance has improved. Educated on prevention with vitamin D and to discuss with PCP. Also educated on potential for recurrence. Patient will; benefit from OPPT to address any residual issues and also address any balance concerns as she has a co-morbidity of diabetic peripheral neuropathy which increases her falls risk and may complicate recovery post resolution of BPPV. Will continue to follow and progress as tolerated.     Plan/Recommendations:   If medically appropriate, Low Intensity Therapy recommended post-acute care - This is recommended as therapy feels this patient would require 2-3 visits per week. OP or HH would be the best option depending on patient's home bound status. Consider, if the patient has other  \"skilled\" needs such as wounds, IV antibiotics, etc. Combined with \"low intensity\" could also equate to a SNF. If patient is medically complex, consider LTAC. Pt requires no DME at discharge.     Pt desires Outpatient therapy at discharge. Pt " cooperative; agreeable to therapeutic recommendations and plan of care.       Post-Tx Position: Supine with HOB Elevated and Call light and personal items within reach  PPE: gloves and gown

## 2024-11-13 NOTE — CASE MANAGEMENT/SOCIAL WORK
Continued Stay Note  [facility]     Patient Name: Vianey Reeves  MRN: [id number]  Today's Date: 11/13/2024    Admit Date: 11/8/2024    Plan: Return home with [facility] (pending)   Discharge Plan       Row Name 11/13/24 1142       Plan    Plan Return home with [facility] (pending)    Plan Comments CM met with patient at bedside to discuss discharge planning. Patient does not want SNF but is agreeable to [facility]. Requested referral sent to [facility], GAMALIEL sent inbasket and called liaison Li to notify               Leslie Funes RN      [facility]  Office: [phone]  Cell: [phone]  Fax # [phone]

## 2024-11-13 NOTE — PLAN OF CARE
Goal Outcome Evaluation:                 Vianey Reeves  is an 79-year-old female who presented with dizziness, vomiting, nausea. She has had recent BP medication changes. NSTEMI s/p heart cath with PCI x2 to circumflex artery 11/11/24. Following resolution of cardiac symptoms, dizziness persisted and vestibular assessment was requested. Patient was (+) for L PC BPPV and was treated x 2 with modified epley with great success and apparent resolution. Symptoms of dizziness have significantly lessened patient is now safe to return home as balance has improved. Educated on prevention with vitamin D and to discuss with PCP. Also educated on potential for recurrence. Patient will; benefit from OPPT to address any residual issues and also address any balance concerns as she has a co-morbidity of diabetic peripheral neuropathy which increases her falls risk and may complicate recovery post resolution of BPPV. Will continue to follow and progress as tolerated.

## 2024-11-13 NOTE — PROGRESS NOTES
"  Cardiology Progress  Note      Patient Care Team:  Anny Garcia MD as PCP - General (Internal Medicine)  Sergio Ordonez MD as Consulting Physician (Nephrology)    PATIENT IDENTIFICATION  Name: Vianey Reeves  Age: 79 y.o.  Sex: female  :  1945  MRN: [id number]      Length of stay:    LOS: 3 days           REASON FOR FOLLOW-UP:  Coronary artery disease status post PCI      INTERVAL HISTORY  Patient seen and examined, chart and labs reviewed.  Patient was started on meclizine for dizziness with some improvement.  PT has evaluated her as well.    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  Continue CCB for BP.  CV status appears stable for discharge when okay with other disciplines.  Plans for home with home health.  Follow-up in our office 1-2 weeks          CHF Guideline Directed Medical Therapy  Beta Blocker:   ARNI/ACE/ARB:   SGLT 2 inhibitors:   Diuretics:   Aldosterone Antagonist:   Vasodilators & Nitrates:       Vital Signs  Visit Vitals  /52 (BP Location: Left arm, Patient Position: Lying)   Pulse 85   Temp 97.7 °F (36.5 °C) (Oral)   Resp 14   Ht 165.1 cm (65\")   Wt 80.5 kg (177 lb 7.5 oz)   LMP  (LMP Unknown)   SpO2 92%   BMI 29.53 kg/m²     Oxygen Therapy  SpO2: 92 %  Pulse Oximetry Type: Continuous  Device (Oxygen Therapy): room air  Flow (L/min) (Oxygen " "Therapy): 1.5  Oxygen Concentration (%): 21  Flowsheet Rows      Flowsheet Row First Filed Value   Admission Height 165.1 cm (65\") Documented at 11/08/2024 1917   Admission Weight 80.3 kg (177 lb) Documented at 11/08/2024 1917          Intake & Output (last 3 days)         11/10 0701  11/11 0700 11/11 0701  11/12 0700 11/12 0701 11/13 0700 11/13 0701 11/14 0700    P.O.   1560 600    I.V. (mL/kg)  2034 (25.2)      Total Intake(mL/kg)  2034 (25.2) 1560 (19.4) 600 (7.5)    Urine (mL/kg/hr) 650 (0.3) 1350 (0.7)      Total Output 650 1350      Net -650 +684 +1560 +600            Urine Unmeasured Occurrence 2 x 2 x 5 x 2 x          Lines, Drains & Airways       Active LDAs       Name Placement date Placement time Site Days    Peripheral IV 11/10/24 1230 Anterior;Left Forearm 11/10/24  1230  Forearm  3    Arterial Sheath 6 Fr. Right Femoral 11/11/24  1539  Femoral  1                           /52 (BP Location: Left arm, Patient Position: Lying)   Pulse 85   Temp 97.7 °F (36.5 °C) (Oral)   Resp 14   Ht 165.1 cm (65\")   Wt 80.5 kg (177 lb 7.5 oz)   LMP  (LMP Unknown)   SpO2 92%   BMI 29.53 kg/m²   Intake/Output last 3 shifts:  I/O last 3 completed shifts:  In: 1560 [P.O.:1560]  Out: 1050 [Urine:1050]  Intake/Output this shift:  I/O this shift:  In: 600 [P.O.:600]  Out: -     PHYSICAL EXAM:    General: Alert, cooperative, no distress, appears stated age  Head:  Normocephalic, atraumatic, mucous membranes moist  Eyes:  Conjunctivae/corneas clear, EOM's intact     Neck:  Supple,  no adenopathy; no JVD or bruit  Lungs:  Clear to auscultation bilaterally, no wheezes, rhonchi or rales are noted  Chest wall: No tenderness  Heart::  Regular rate and rhythm, S1 and S2 normal, no murmur, rub or gallop  Abdomen: Soft, nontender, nondistended, bowel sounds active  Extremities: No cyanosis, clubbing, or edema   Pulses: 2+ and symmetric all extremities  Skin:  No rashes or lesions  Neuro/psych: A&O x3. CN II through XII " are grossly intact with appropriate affect        Scheduled Meds:      amLODIPine, 10 mg, Oral, Daily  aspirin, 81 mg, Oral, Daily  insulin glargine, 10 Units, Subcutaneous, Nightly  insulin lispro, 2-7 Units, Subcutaneous, 4x Daily AC & at Bedtime  ipratropium-albuterol, 3 mL, Nebulization, 4x Daily - RT  pantoprazole, 40 mg, Oral, Q AM  polyethylene glycol, 17 g, Oral, Daily  sertraline, 50 mg, Oral, Daily  ticagrelor, 90 mg, Oral, BID        Continuous Infusions:         PRN Meds:      acetaminophen    aluminum-magnesium hydroxide-simethicone    atropine    dextrose    dextrose    diphenhydrAMINE    glucagon (human recombinant)    HYDROcodone-acetaminophen    HYDROmorphone    meclizine    nicotine    nitroglycerin    ondansetron ODT **OR** ondansetron    [COMPLETED] Insert Peripheral IV **AND** sodium chloride        Results Review:     I reviewed the patient's new clinical results.    CBC    Results from last 7 days   Lab Units 11/13/24  0205 11/12/24  0348 11/11/24  0442 11/10/24  0350 11/09/24  0218 11/08/24  1940   WBC 10*3/mm3 10.90* 10.65 9.57 11.19* 11.63* 10.49   HEMOGLOBIN g/dL 13.6 14.2 14.4 14.5 15.8 15.5   PLATELETS 10*3/mm3 225 207 227 240 222 220     Cr Clearance Estimated Creatinine Clearance: 32.1 mL/min (A) (by C-G formula based on SCr of 1.44 mg/dL (H)).  Coag     HbA1C   Lab Results   Component Value Date    HGBA1C 7.10 (H) 01/21/2024    HGBA1C 7.0 (H) 12/16/2022    HGBA1C 7.3 08/11/2022     Blood Glucose   Glucose   Date/Time Value Ref Range Status   11/13/2024 1134 183 (H) 70 - 105 mg/dL Final     Comment:     Serial Number: [id number]:  [id number]   11/13/2024 0718 151 (H) 70 - 105 mg/dL Final     Comment:     Serial Number: [id number]:  [id number]   11/12/2024 2023 248 (H) 70 - 105 mg/dL Final     Comment:     Serial Number: [id number]:  [id number]   11/12/2024 1623 171 (H) 70 - 105 mg/dL Final     Comment:     Serial Number: [id number]:  [id number]   11/12/2024 1133  "241 (H) 70 - 105 mg/dL Final     Comment:     Serial Number: [id number]:  [id number]   11/12/2024 0855 311 (H) 70 - 105 mg/dL Final     Comment:     Serial Number: [id number]:  [id number]   11/11/2024 2050 233 (H) 70 - 105 mg/dL Final     Comment:     Serial Number: [id number]:  [id number]   11/11/2024 1800 150 (H) 70 - 105 mg/dL Final     Comment:     Serial Number: [id number]:  [id number]     Infection     CMP   Results from last 7 days   Lab Units 11/13/24  0205 11/12/24  0348 11/11/24  0442 11/10/24  0350 11/09/24  0218 11/08/24  1940   SODIUM mmol/L 141 139 141 141 141 139   POTASSIUM mmol/L 4.5 4.6 4.5 4.7 3.9 4.0   CHLORIDE mmol/L 109* 107 108* 102 104 103   CO2 mmol/L 24.4 22.5 24.5 28.6 28.4 26.3   BUN mg/dL 38* 36* 35* 35* 25* 26*   CREATININE mg/dL 1.44* 1.33* 1.20* 1.53* 1.26* 1.27*   GLUCOSE mg/dL 118* 281* 178* 208* 174* 159*   ALBUMIN g/dL 3.3* 3.4* 3.3*  --   --  3.8   BILIRUBIN mg/dL  --   --   --   --   --  0.2   ALK PHOS U/L  --   --   --   --   --  81   AST (SGOT) U/L  --   --   --   --   --  14   ALT (SGPT) U/L  --   --   --   --   --  10     ABG      UA    Results from last 7 days   Lab Units 11/08/24  2107   NITRITE UA  Negative   WBC UA /HPF 3-5*   BACTERIA UA /HPF None Seen   SQUAM EPITHEL UA /HPF 0-2     TANISHA  No results found for: \"POCMETH\", \"POCAMPHET\", \"POCBARBITUR\", \"POCBENZO\", \"POCCOCAINE\", \"POCOPIATES\", \"POCOXYCODO\", \"POCPHENCYC\", \"POCPROPOXY\", \"POCTHC\", \"POCTRICYC\"  Lysis Labs   Results from last 7 days   Lab Units 11/13/24  0205 11/12/24  0348 11/11/24  0442 11/10/24  0350 11/09/24  0218 11/08/24  1940   HEMOGLOBIN g/dL 13.6 14.2 14.4 14.5 15.8 15.5   PLATELETS 10*3/mm3 225 207 227 240 222 220   CREATININE mg/dL 1.44* 1.33* 1.20* 1.53* 1.26* 1.27*     Radiology(recent) No radiology results for the last day      Results from last 7 days   Lab Units 11/09/24  1410   CK TOTAL U/L 40   HSTROP T ng/L 104*       X-rays, labs reviewed personally by " physician.    ECG/EMG Results (most recent)       Procedure Component Value Units Date/Time    ECG 12 Lead Other; Dizziness [[id number]] Collected: 11/08/24 1827     Updated: 11/09/24 0846     QT Interval 421 ms      QTC Interval 488 ms     Narrative:      HEART RATE=81  bpm  RR Ocooymip=189  ms  WV Sjxefzna=655  ms  P Horizontal Axis=11  deg  P Front Axis=40  deg  QRSD Bdwzwbvx=645  ms  QT Qfthpypq=326  ms  AWsH=223  ms  QRS Axis=126  deg  T Wave Axis=-11  deg  - ABNORMAL ECG -  Sinus rhythm  IVCD, consider RBBB  Inferior  infarct, age indeterminate  When compared with ECG of 11-Jul-2024 22:55:08,  New or worsened ischemia or infarction  Electronically Signed By: Gonzalo Villar ([facility]) 2024-11-09 08:46:20  Date and Time of Study:2024-11-08 18:27:55    Adult Transthoracic Echo Complete W/ Cont if Necessary Per Protocol [[id number]] Resulted: 11/09/24 1157     Updated: 11/09/24 1157     LV GLOBAL STRAIN  -9.7 %      LVIDd 5.4 cm      LVIDs 4.1 cm      IVSd 1.00 cm      LVPWd 0.90 cm      FS 24.1 %      IVS/LVPW 1.11 cm      ESV(cubed) 68.9 ml      LV Sys Vol (BSA corrected) 43.0 cm2      EDV(cubed) 157.5 ml      LV Cordero Vol (BSA corrected) 74.0 cm2      LV mass(C)d 193.3 grams      LVOT area 2.5 cm2      LVOT diam 1.80 cm      EDV(MOD-sp4) 139.0 ml      ESV(MOD-sp4) 80.8 ml      SV(MOD-sp4) 58.2 ml      SVi(MOD-SP4) 31.0 ml/m2      SVi (LVOT) 23.8 ml/m2      EF(MOD-sp4) 41.9 %      MV E max sergey 49.3 cm/sec      MV A max sergey 136.0 cm/sec      MV dec time 0.14 sec      MV E/A 0.36     Med Peak E' Sergey 2.7 cm/sec      Lat Peak E' Sergey 3.3 cm/sec      Avg E/e' ratio 16.43     SV(LVOT) 44.8 ml      RVIDd 3.4 cm      TAPSE (>1.6) 1.53 cm      RV S' 9.0 cm/sec      LA dimension (2D)  3.0 cm      LV V1 max 91.7 cm/sec      LV V1 max PG 3.4 mmHg      LV V1 mean PG 2.00 mmHg      LV V1 VTI 17.6 cm      Ao pk sergey 152.0 cm/sec      Ao max PG 9.2 mmHg      Ao mean PG 5.0 mmHg      Ao V2 VTI 28.7 cm      PAPO(I,D) 1.56 cm2      MV max  PG 10.0 mmHg      MV mean PG 4.0 mmHg      MV V2 VTI 21.4 cm      MV P1/2t 49.2 msec      MVA(P1/2t) 4.5 cm2      MVA(VTI) 2.09 cm2      MV dec slope 941.0 cm/sec2      RAP systole 3.0 mmHg      RV V1 max PG 1.80 mmHg      RV V1 max 67.1 cm/sec      RV V1 VTI 11.3 cm      PA V2 max 97.0 cm/sec      PA acc time 0.10 sec      Sinus 2.8 cm      STJ 2.30 cm      EF(MOD-bp) 42.0 %     Narrative:        Left ventricular ejection fraction appears to be 36 - 40%.    Indications  Valvular function.    Technically satisfactory study.  Mitral valve is structurally normal.  Mild to moderate mitral   regurgitation is present.  Tricuspid valve is structurally normal.  Aortic valve is thickened with adequate opening motion.  Pulmonic valve could not be well visualized.  Left atrium is normal in size.  Right atrium is normal in size.  Left ventricle is enlarged with diffuse hypocontractility with ejection   fraction of 35 to 40%.  Left ventricular peak systolic global longitudinal strain (GLS) is   abnormal at -10%.  Grade 1 left ventricular diastolic dysfunction is present.  (MV E/A ratio   0.36).  Right ventricle is enlarged with hypocontractility with TAPSE of 1.53 cm.  Atrial septum is intact.  Aorta is normal.  No pericardial effusion or intracardiac thrombus is seen.    Impression  Mild to moderate mitral regurgitation.  Aortic valve is thickened with adequate opening motion.  Left ventricle is enlarged with diffuse hypocontractility with ejection   fraction of 35 to 40%.  Left ventricular peak systolic global longitudinal strain (GLS) is   abnormal at -10%.  Grade 1 left ventricular diastolic dysfunction is present.  (MV E/A ratio   0.36).  Right ventricle is enlarged with hypocontractility with TAPSE of 1.53 cm.      ECG 12 Lead Other; trop [[id number]] Collected: 11/08/24 2303     Updated: 11/09/24 2006     QT Interval 389 ms      QTC Interval 458 ms     Narrative:      HEART RATE=83  bpm  RR Ondtvtrf=020  ms  AR  Yrqtrnen=816  ms  P Horizontal Axis=-30  deg  P Front Axis=34  deg  QRSD Tiovlmpq=620  ms  QT Oxptbpsr=820  ms  XSmL=842  ms  QRS Axis=88  deg  T Wave Axis=46  deg  - ABNORMAL ECG -  Sinus rhythm  Right bundle branch block  When compared with ECG of 08-Nov-2024 18:27:55,  NO SIGNIFICANT CHANGE FROM PREVIOUS ECG  Electronically Signed By: Francisca Huff ([facility]) 2024-11-09 20:06:04  Date and Time of Study:2024-11-08 23:03:07    ECG 12 Lead Chest Pain [[id number]] Collected: 11/09/24 1323     Updated: 11/11/24 1750     QT Interval 471 ms      QTC Interval 499 ms     Narrative:      HEART RATE=67  bpm  RR Gkmdbqya=355  ms  OK Lyltldbl=507  ms  P Horizontal Axis=5  deg  P Front Axis=46  deg  QRSD Qcpjlxon=444  ms  QT Dpqheabv=487  ms  MQuE=535  ms  QRS Axis=125  deg  T Wave Axis=162  deg  - ABNORMAL ECG -  Sinus rhythm  Right bundle branch block  Abnrm T, consider ischemia, anterolateral lds  When compared with ECG of 08-Nov-2024 23:03:07,  New or worsened ischemia or infarction  Electronically Signed By: Varun Nava ([facility]) 2024-11-11 17:07:50  Date and Time of Study:2024-11-09 13:23:15    ECG 12 Lead Other; Post Procedure [[id number]] Collected: 11/11/24 1930     Updated: 11/11/24 1932     QT Interval 357 ms      QTC Interval 453 ms     Narrative:      HEART RATE=97  bpm  RR Bfofpmxt=384  ms  OK Pmksdnry=442  ms  P Horizontal Axis=-15  deg  P Front Axis=62  deg  QRSD Mieuewhn=983  ms  QT Beufaqvn=632  ms  LDjZ=618  ms  QRS Axis=123  deg  T Wave Axis=-27  deg  - ABNORMAL ECG -  Sinus rhythm  Ventricular premature complex  RBBB and LPFB  Date and Time of Study:2024-11-11 19:30:40    ECG 12 Lead Pre-Op / Pre-Procedure [[id number]] Collected: 11/12/24 0555     Updated: 11/12/24 0607     QT Interval 393 ms      QTC Interval 477 ms     Narrative:      HEART RATE=88  bpm  RR Gfgnunsk=179  ms  OK Yqzlpnnu=069  ms  P Horizontal Axis=-2  deg  P Front Axis=62  deg  QRSD Owtmfilr=477  ms  QT Uuvgrxjl=556  ms  RNuC=246  ms  QRS Axis=91  " deg  T Wave Axis=105  deg  - ABNORMAL ECG -  Sinus rhythm  Ventricular premature complex  RBBB and LPFB  Abnrm T, consider ischemia, anterolateral lds  Date and Time of Study:2024-11-12 05:55:18              Medication Review:   I have reviewed the patient's current medication list  Scheduled Meds:amLODIPine, 10 mg, Oral, Daily  aspirin, 81 mg, Oral, Daily  insulin glargine, 10 Units, Subcutaneous, Nightly  insulin lispro, 2-7 Units, Subcutaneous, 4x Daily AC & at Bedtime  ipratropium-albuterol, 3 mL, Nebulization, 4x Daily - RT  pantoprazole, 40 mg, Oral, Q AM  polyethylene glycol, 17 g, Oral, Daily  sertraline, 50 mg, Oral, Daily  ticagrelor, 90 mg, Oral, BID      Continuous Infusions:   PRN Meds:.  acetaminophen    aluminum-magnesium hydroxide-simethicone    atropine    dextrose    dextrose    diphenhydrAMINE    glucagon (human recombinant)    HYDROcodone-acetaminophen    HYDROmorphone    meclizine    nicotine    nitroglycerin    ondansetron ODT **OR** ondansetron    [COMPLETED] Insert Peripheral IV **AND** sodium chloride    Imaging:  Imaging Results (Last 72 Hours)       ** No results found for the last 72 hours. **              SAMIRA Galvan  11/13/24  14:11 EST       EMR Dragon/Transcription:   \"Dictated utilizing Dragon dictation\".       Electronically signed by SAMIRA Galvan, 11/13/24, 2:11 PM EST.  Copied text in this note has been reviewed by me and is accurate as of 11/13/24.    Cardiology attending:  Seen and examined.  Chart and labs reviewed. Independent interpretations of cardiac testing was performed. History and exam findings are verified with above changes noted.  Assessment and plan notated by APC after being formulated by attending consultant.  Note that greater than 50% of the time spent in care of the patient was provided by attending consultant.    Patient denies any chest pain pressure heaviness or tightness.  She reports that physical therapy came in and performed Epley " maneuvers and she reports that this did help with her swimming feeling significantly.  She reports that they have referred her for outpatient rehab to continue working on this.  No issues from her cath site.    Cardiology status is appropriate for discharge next destination of care when okay with other services.  Follow-up with our office in 2 weeks.  Continue with dual antiplatelet therapy consisting of aspirin and ticagrelor for a minimum of 12 months as she presented as unstable angina/non-STEMI.    Physical Exam:    General: Alert, cooperative, no distress, appears stated age  Head:  Normocephalic, atraumatic, mucous membranes moist  Eyes:  Conjunctivae/corneas clear, EOM's intact     Neck:  Supple,  no bruit  Lungs:            Clear to auscultation bilaterally, no wheezes rhonchi rales are noted  Chest wall: No tenderness  Heart::  Regular rate and rhythm, S1 and S2 normal, 1/6 holosystolic murmur.  No rub or gallop  Abdomen: Soft, nontender, nondistended bowel sounds active  Extremities: No cyanosis, clubbing, or edema  Pulses:            2+ and symmetric all extremities  Skin:  No rashes or lesions  Neuro/psych: A&O x3. CN II through XII are grossly intact with appropriate affect

## 2024-11-13 NOTE — PLAN OF CARE
Goal Outcome Evaluation:        Problem: Adult Inpatient Plan of Care  Goal: Plan of Care Review  Outcome: Progressing  Goal: Patient-Specific Goal (Individualized)  Outcome: Progressing  Goal: Absence of Hospital-Acquired Illness or Injury  Outcome: Progressing  Intervention: Identify and Manage Fall Risk  Recent Flowsheet Documentation  Taken 11/13/2024 0604 by Alyssa Pereira RN  Safety Promotion/Fall Prevention:   safety round/check completed   room organization consistent  Taken 11/13/2024 0400 by Alyssa Pereira RN  Safety Promotion/Fall Prevention:   safety round/check completed   room organization consistent  Taken 11/13/2024 0000 by Alyssa Pereira RN  Safety Promotion/Fall Prevention:   safety round/check completed   room organization consistent  Taken 11/12/2024 2200 by Alyssa Pereira RN  Safety Promotion/Fall Prevention:   safety round/check completed   room organization consistent  Taken 11/12/2024 2000 by Alyssa Pereira RN  Safety Promotion/Fall Prevention:   safety round/check completed   room organization consistent  Intervention: Prevent Skin Injury  Recent Flowsheet Documentation  Taken 11/13/2024 0400 by Alyssa Pereira RN  Skin Protection:   transparent dressing maintained   incontinence pads utilized  Taken 11/13/2024 0000 by Alyssa Pereira RN  Skin Protection:   transparent dressing maintained   incontinence pads utilized   protective footwear used  Taken 11/12/2024 2000 by Alyssa Pereira RN  Skin Protection:   transparent dressing maintained   incontinence pads utilized   protective footwear used  Intervention: Prevent and Manage VTE (Venous Thromboembolism) Risk  Recent Flowsheet Documentation  Taken 11/12/2024 2000 by Alyssa Pereira RN  VTE Prevention/Management:   bilateral   SCDs (sequential compression devices) on  Intervention: Prevent Infection  Recent Flowsheet Documentation  Taken 11/13/2024 0000 by Alyssa Pereira RN  Infection Prevention:   single patient room provided   rest/sleep promoted   hand  hygiene promoted  Taken 11/12/2024 2200 by Alyssa Pereira RN  Infection Prevention:   single patient room provided   rest/sleep promoted   hand hygiene promoted  Taken 11/12/2024 2000 by Alyssa Pereira RN  Infection Prevention:   single patient room provided   rest/sleep promoted   hand hygiene promoted  Goal: Optimal Comfort and Wellbeing  Outcome: Progressing  Intervention: Provide Person-Centered Care  Recent Flowsheet Documentation  Taken 11/13/2024 0400 by Alyssa Pereira RN  Trust Relationship/Rapport:   choices provided   care explained  Taken 11/13/2024 0000 by Alyssa Pereira RN  Trust Relationship/Rapport:   care explained   choices provided  Taken 11/12/2024 2000 by Alyssa Pereira RN  Trust Relationship/Rapport:   care explained   choices provided   questions answered   questions encouraged  Goal: Readiness for Transition of Care  Outcome: Progressing     Problem: Comorbidity Management  Goal: Maintenance of COPD Symptom Control  Outcome: Progressing  Intervention: Maintain COPD (Chronic Obstructive Pulmonary Disease) Symptom Control  Recent Flowsheet Documentation  Taken 11/13/2024 0400 by Alyssa Pereira RN  Breathing Techniques/Airway Clearance: deep/controlled cough encouraged  Taken 11/13/2024 0000 by Alyssa Pereira RN  Breathing Techniques/Airway Clearance: deep/controlled cough encouraged  Medication Review/Management: medications reviewed  Taken 11/12/2024 2200 by Alyssa Pereira RN  Medication Review/Management: medications reviewed  Taken 11/12/2024 2000 by Alyssa Pereira RN  Breathing Techniques/Airway Clearance: deep/controlled cough encouraged  Medication Review/Management: medications reviewed  Goal: Maintenance of Heart Failure Symptom Control  Outcome: Progressing  Intervention: Maintain Heart Failure Management  Recent Flowsheet Documentation  Taken 11/13/2024 0000 by Alyssa Pereira RN  Medication Review/Management: medications reviewed  Taken 11/12/2024 2200 by Alyssa Pereira RN  Medication  Review/Management: medications reviewed  Taken 11/12/2024 2000 by Alyssa Pereira RN  Medication Review/Management: medications reviewed  Goal: Blood Pressure in Desired Range  Outcome: Progressing  Intervention: Maintain Blood Pressure Management  Recent Flowsheet Documentation  Taken 11/13/2024 0000 by Alyssa Pereira RN  Medication Review/Management: medications reviewed  Taken 11/12/2024 2200 by Alyssa Pereira RN  Medication Review/Management: medications reviewed  Taken 11/12/2024 2000 by Alyssa Pereira RN  Medication Review/Management: medications reviewed     Problem: Skin Injury Risk Increased  Goal: Skin Health and Integrity  Outcome: Progressing  Intervention: Optimize Skin Protection  Recent Flowsheet Documentation  Taken 11/13/2024 0400 by Alyssa Pereira RN  Pressure Reduction Techniques:   frequent weight shift encouraged   weight shift assistance provided   sit time limited to 2 hours  Pressure Reduction Devices:   positioning supports utilized   pressure-redistributing mattress utilized  Skin Protection:   transparent dressing maintained   incontinence pads utilized  Taken 11/13/2024 0000 by Alyssa Pereira RN  Pressure Reduction Techniques:   frequent weight shift encouraged   sit time limited to 2 hours   weight shift assistance provided  Pressure Reduction Devices:   positioning supports utilized   pressure-redistributing mattress utilized  Skin Protection:   transparent dressing maintained   incontinence pads utilized   protective footwear used  Taken 11/12/2024 2000 by Alyssa Pereira RN  Pressure Reduction Techniques: frequent weight shift encouraged  Pressure Reduction Devices: pressure-redistributing mattress utilized  Skin Protection:   transparent dressing maintained   incontinence pads utilized   protective footwear used  Intervention: Promote and Optimize Oral Intake  Recent Flowsheet Documentation  Taken 11/12/2024 2000 by Alyssa Pereira RN  Oral Nutrition Promotion: rest periods promoted      Problem: Fall Injury Risk  Goal: Absence of Fall and Fall-Related Injury  Outcome: Progressing  Intervention: Identify and Manage Contributors  Recent Flowsheet Documentation  Taken 11/13/2024 0000 by Alyssa Pereira RN  Medication Review/Management: medications reviewed  Taken 11/12/2024 2200 by Alyssa Pereira RN  Medication Review/Management: medications reviewed  Taken 11/12/2024 2000 by Alyssa Pereira RN  Medication Review/Management: medications reviewed  Intervention: Promote Injury-Free Environment  Recent Flowsheet Documentation  Taken 11/13/2024 0604 by Alyssa Pereira RN  Safety Promotion/Fall Prevention:   safety round/check completed   room organization consistent  Taken 11/13/2024 0400 by Alyssa Pereira RN  Safety Promotion/Fall Prevention:   safety round/check completed   room organization consistent  Taken 11/13/2024 0000 by Alyssa Pereira RN  Safety Promotion/Fall Prevention:   safety round/check completed   room organization consistent  Taken 11/12/2024 2200 by Alyssa Pereira RN  Safety Promotion/Fall Prevention:   safety round/check completed   room organization consistent  Taken 11/12/2024 2000 by Alyssa Pereira RN  Safety Promotion/Fall Prevention:   safety round/check completed   room organization consistent

## 2024-11-13 NOTE — PROGRESS NOTES
LOS: 3 days   Patient Care Team:  Anny Garcia MD as PCP - General (Internal Medicine)  Sergio Ordonez MD as Consulting Physician (Nephrology)    Subjective     Interval History: s/p heart cath with PCI x 2 to circumflex artery    Patient Complaints: Intermittent spinning sensation, worse when sitting up    History taken from: patient    Review of Systems   Constitutional:  Positive for activity change. Negative for appetite change, chills, diaphoresis and fatigue.   HENT:  Negative for facial swelling.    Eyes:  Negative for visual disturbance.   Respiratory:  Negative for cough, shortness of breath, wheezing and stridor.    Cardiovascular:  Negative for chest pain, palpitations and leg swelling.   Gastrointestinal:  Negative for abdominal pain, blood in stool, constipation and diarrhea.   Endocrine: Negative for polyuria.   Genitourinary:  Negative for dysuria.   Musculoskeletal:  Negative for arthralgias, back pain and gait problem.   Neurological:  Positive for dizziness. Negative for tremors, seizures, weakness, light-headedness, numbness and headaches.   Psychiatric/Behavioral:  Negative for confusion.            Objective     Vital Signs  Temp:  [97.5 °F (36.4 °C)-98 °F (36.7 °C)] 97.8 °F (36.6 °C)  Heart Rate:  [] 97  Resp:  [12-20] 14  BP: (124-151)/(67-77) 124/77    Physical Exam:     General Appearance:    Alert, cooperative, in no acute distress,   Head:    Normocephalic, without obvious abnormality, atraumatic   Eyes:            Lids and lashes normal, conjunctivae and sclerae normal, no   icterus, no pallor, corneas clear, PERRLA   Ears:    Ears appear intact with no abnormalities noted   Throat:   No oral lesions, no thrush, oral mucosa moist   Neck:   No adenopathy, supple, trachea midline, no thyromegaly, no   carotid bruit, no JVD   Lungs:     Clear to auscultation,respirations regular, even and                  unlabored    Heart:    Regular rhythm and normal rate, normal  S1 and S2, no            murmur, no gallop, no rub, no click   Chest Wall:    No abnormalities observed   Abdomen:     Normal bowel sounds, no masses, no organomegaly, soft        Non-tender non-distended, no guarding,   Extremities:   Moves all extremities well, no edema, no cyanosis, no             Redness   Pulses:   Pulses palpable and equal bilaterally   Skin:   No bleeding, bruising or rash   Lymph nodes:   No palpable adenopathy   Neurologic: No nystagmus, no localizing neurologic deficit, gait not assessed        Results Review:    Lab Results (last 24 hours)       Procedure Component Value Units Date/Time    POC Glucose 4x Daily Before Meals & at Bedtime [[id number]]  (Abnormal) Collected: 11/13/24 0718    Specimen: Blood Updated: 11/13/24 0720     Glucose 151 mg/dL      Comment: Serial Number: [id number]:  [id number]       Renal Function Panel [[id number]]  (Abnormal) Collected: 11/13/24 0205    Specimen: Blood from Arm, Right Updated: 11/13/24 0246     Glucose 118 mg/dL      BUN 38 mg/dL      Creatinine 1.44 mg/dL      Sodium 141 mmol/L      Potassium 4.5 mmol/L      Comment: Specimen hemolyzed.  Result may be falsely elevated.        Chloride 109 mmol/L      CO2 24.4 mmol/L      Calcium 9.5 mg/dL      Albumin 3.3 g/dL      Phosphorus 3.6 mg/dL      Anion Gap 7.6 mmol/L      BUN/Creatinine Ratio 26.4     eGFR 36.6 mL/min/1.73     Narrative:      GFR Normal >60  Chronic Kidney Disease <60  Kidney Failure <15    The GFR formula is only valid for adults with stable renal function between ages 18 and 70.    CBC & Differential [[id number]]  (Abnormal) Collected: 11/13/24 0205    Specimen: Blood from Arm, Right Updated: 11/13/24 0211    Narrative:      The following orders were created for panel order CBC & Differential.  Procedure                               Abnormality         Status                     ---------                               -----------         ------                     CBC Auto  Differential[[id number]]        Abnormal            Final result                 Please view results for these tests on the individual orders.    CBC Auto Differential [[id number]]  (Abnormal) Collected: 11/13/24 0205    Specimen: Blood from Arm, Right Updated: 11/13/24 0211     WBC 10.90 10*3/mm3      RBC 4.21 10*6/mm3      Hemoglobin 13.6 g/dL      Hematocrit 41.4 %      MCV 98.3 fL      MCH 32.3 pg      MCHC 32.9 g/dL      RDW 12.1 %      RDW-SD 43.6 fl      MPV 9.9 fL      Platelets 225 10*3/mm3      Neutrophil % 78.4 %      Lymphocyte % 12.3 %      Monocyte % 8.1 %      Eosinophil % 0.1 %      Basophil % 0.2 %      Immature Grans % 0.9 %      Neutrophils, Absolute 8.55 10*3/mm3      Lymphocytes, Absolute 1.34 10*3/mm3      Monocytes, Absolute 0.88 10*3/mm3      Eosinophils, Absolute 0.01 10*3/mm3      Basophils, Absolute 0.02 10*3/mm3      Immature Grans, Absolute 0.10 10*3/mm3      nRBC 0.0 /100 WBC     POC Glucose Once [[id number]]  (Abnormal) Collected: 11/12/24 2023    Specimen: Blood Updated: 11/12/24 2024     Glucose 248 mg/dL      Comment: Serial Number: [id number]:  [id number]       POC Glucose Once [[id number]]  (Abnormal) Collected: 11/12/24 1623    Specimen: Blood Updated: 11/12/24 1625     Glucose 171 mg/dL      Comment: Serial Number: [id number]:  [id number]       POC Glucose 4x Daily Before Meals & at Bedtime [[id number]]  (Abnormal) Collected: 11/12/24 1133    Specimen: Blood Updated: 11/12/24 1135     Glucose 241 mg/dL      Comment: Serial Number: [id number]:  [id number]                Imaging Results (Last 24 Hours)       ** No results found for the last 24 hours. **                 I reviewed the patient's new clinical results.    Medication Review:   Scheduled Meds:amLODIPine, 10 mg, Oral, Daily  aspirin, 81 mg, Oral, Daily  insulin glargine, 10 Units, Subcutaneous, Nightly  insulin lispro, 2-7 Units, Subcutaneous, 4x Daily AC & at Bedtime  ipratropium-albuterol, 3 mL,  Nebulization, 4x Daily - RT  pantoprazole, 40 mg, Oral, Q AM  polyethylene glycol, 17 g, Oral, Daily  sertraline, 50 mg, Oral, Daily  ticagrelor, 90 mg, Oral, BID      Continuous Infusions:   PRN Meds:.  acetaminophen    aluminum-magnesium hydroxide-simethicone    atropine    dextrose    dextrose    diphenhydrAMINE    glucagon (human recombinant)    HYDROcodone-acetaminophen    HYDROmorphone    meclizine    nicotine    nitroglycerin    ondansetron ODT **OR** ondansetron    [COMPLETED] Insert Peripheral IV **AND** sodium chloride     Assessment & Plan       Anginal equivalent    Non-STEMI (non-ST elevated myocardial infarction)  Coronary artery disease  - Brilinta, aspirin;  intolerant of statins  Myalgias due to statins  COPD - continue home meds  Chronic hypoxemia - supplemental oxygen  Dizziness/vertigo - prn meclizine, PT eval  CKD stage 3 - creatinine stable after intervention  Essential hypertension - controlled with amlodipine  Mood disorder - continue home meds  GERD - PPI  DM-II - basal/bolus insulin          Plan for disposition:Home soon    Diane Ventura, SAMIRA  11/13/24  10:05 EST

## 2024-11-13 NOTE — PROGRESS NOTES
"NEPHROLOGY PROGRESS NOTE------[facility]    [facility]  [phone]  Rolando Miller MD      Patient Care Team:  Anny Garcia MD as PCP - General (Internal Medicine)  Sergio Ordonez MD as Consulting Physician (Nephrology)      Provider:  Rolando Miller MD  Patient Name: Vianey Reeves  :  1945    SUBJECTIVE:  Follow CKD  No chest pain or shortness of breath      Medication:  amLODIPine, 10 mg, Oral, Daily  aspirin, 81 mg, Oral, Daily  insulin glargine, 10 Units, Subcutaneous, Nightly  insulin lispro, 2-7 Units, Subcutaneous, 4x Daily AC & at Bedtime  ipratropium-albuterol, 3 mL, Nebulization, 4x Daily - RT  pantoprazole, 40 mg, Oral, Q AM  polyethylene glycol, 17 g, Oral, Daily  sertraline, 50 mg, Oral, Daily  ticagrelor, 90 mg, Oral, BID           OBJECTIVE    Vital Sign Min/Max for last 24 hours  Temp  Min: 97.5 °F (36.4 °C)  Max: 98 °F (36.7 °C)   BP  Min: 124/77  Max: 151/74   Pulse  Min: 76  Max: 103   Resp  Min: 12  Max: 20   SpO2  Min: 94 %  Max: 99 %   No data recorded   Weight  Min: 80.5 kg (177 lb 7.5 oz)  Max: 80.5 kg (177 lb 7.5 oz)     Flowsheet Rows      Flowsheet Row First Filed Value   Admission Height 165.1 cm (65\") Documented at 2024   Admission Weight 80.3 kg (177 lb) Documented at 2024            I/O this shift:  In: 360 [P.O.:360]  Out: -   I/O last 3 completed shifts:  In: 1560 [P.O.:1560]  Out: 1050 [Urine:1050]    Physical Exam:  General Appearance: alert, appears stated age and cooperative  Head: normocephalic, without obvious abnormality and atraumatic  Eyes: conjunctivae and sclerae normal and no icterus  Neck: supple and no JVD  Lungs: clear to auscultation and respirations regular  Heart: regular rhythm & normal rate and normal S1, S2  Chest: Wall no abnormalities observed  Abdomen: normal bowel sounds and soft, nontender  Extremities: moves extremities well, no edema, no cyanosis and " "no redness  Skin: no bleeding, bruising or rash, turgor normal, color normal and no lesions noted  Neurologic: Alert, and oriented. No focal deficits    Labs:    WBC WBC   Date Value Ref Range Status   11/13/2024 10.90 (H) 3.40 - 10.80 10*3/mm3 Final   11/12/2024 10.65 3.40 - 10.80 10*3/mm3 Final   11/11/2024 9.57 3.40 - 10.80 10*3/mm3 Final      HGB Hemoglobin   Date Value Ref Range Status   11/13/2024 13.6 12.0 - 15.9 g/dL Final   11/12/2024 14.2 12.0 - 15.9 g/dL Final   11/11/2024 14.4 12.0 - 15.9 g/dL Final      HCT Hematocrit   Date Value Ref Range Status   11/13/2024 41.4 34.0 - 46.6 % Final   11/12/2024 43.0 34.0 - 46.6 % Final   11/11/2024 45.9 34.0 - 46.6 % Final      Platelets No results found for: \"LABPLAT\"   MCV MCV   Date Value Ref Range Status   11/13/2024 98.3 (H) 79.0 - 97.0 fL Final   11/12/2024 98.2 (H) 79.0 - 97.0 fL Final   11/11/2024 101.1 (H) 79.0 - 97.0 fL Final          Sodium Sodium   Date Value Ref Range Status   11/13/2024 141 136 - 145 mmol/L Final   11/12/2024 139 136 - 145 mmol/L Final   11/11/2024 141 136 - 145 mmol/L Final      Potassium Potassium   Date Value Ref Range Status   11/13/2024 4.5 3.5 - 5.2 mmol/L Final     Comment:     Specimen hemolyzed.  Result may be falsely elevated.   11/12/2024 4.6 3.5 - 5.2 mmol/L Final     Comment:     Specimen hemolyzed.  Result may be falsely elevated.   11/11/2024 4.5 3.5 - 5.2 mmol/L Final      Chloride Chloride   Date Value Ref Range Status   11/13/2024 109 (H) 98 - 107 mmol/L Final   11/12/2024 107 98 - 107 mmol/L Final   11/11/2024 108 (H) 98 - 107 mmol/L Final      CO2 CO2   Date Value Ref Range Status   11/13/2024 24.4 22.0 - 29.0 mmol/L Final   11/12/2024 22.5 22.0 - 29.0 mmol/L Final   11/11/2024 24.5 22.0 - 29.0 mmol/L Final      BUN BUN   Date Value Ref Range Status   11/13/2024 38 (H) 8 - 23 mg/dL Final   11/12/2024 36 (H) 8 - 23 mg/dL Final   11/11/2024 35 (H) 8 - 23 mg/dL Final      Creatinine Creatinine   Date Value Ref Range " "Status   11/13/2024 1.44 (H) 0.57 - 1.00 mg/dL Final   11/12/2024 1.33 (H) 0.57 - 1.00 mg/dL Final   11/11/2024 1.20 (H) 0.57 - 1.00 mg/dL Final      Calcium Calcium   Date Value Ref Range Status   11/13/2024 9.5 8.6 - 10.5 mg/dL Final   11/12/2024 9.2 8.6 - 10.5 mg/dL Final   11/11/2024 9.1 8.6 - 10.5 mg/dL Final      PO4 No components found for: \"PO4\"   Albumin Albumin   Date Value Ref Range Status   11/13/2024 3.3 (L) 3.5 - 5.2 g/dL Final   11/12/2024 3.4 (L) 3.5 - 5.2 g/dL Final   11/11/2024 3.3 (L) 3.5 - 5.2 g/dL Final      Magnesium No results found for: \"MG\"   Uric Acid No components found for: \"URIC ACID\"     Imaging Results (Last 72 Hours)       ** No results found for the last 72 hours. **            Results for orders placed during the hospital encounter of 11/08/24    XR Chest 1 View    Narrative  XR CHEST 1 VW    Date of Exam: 11/8/2024 7:48 PM EST    Indication: dizziness    Comparison: Chest radiograph 10/7/2024    Findings:  Stable cardiomegaly. Negative for lobar consolidation, edema, effusion or pneumothorax. Aortic atherosclerotic disease. Prior kyphoplasty changes in the thoracic spine. Degenerative related osseous findings.    Impression  Impression:  No active pulmonary process.      Electronically Signed: Federico Carrion MD  11/8/2024 8:21 PM EST  Workstation ID: [id number]      Results for orders placed during the hospital encounter of 10/07/24    XR Abdomen KUB    Narrative  XR ABDOMEN KUB    Date of Exam: 10/7/2024 6:54 PM EDT    Indication: abdominal pain/post-prandial    Comparison: CT abdomen and pelvis dated 9/25/2024.    Findings:  There are postsurgical changes near the GE junction likely related to prior hiatal hernia repair. There are right upper quadrant cholecystectomy clips. There is nonspecific bowel gas pattern with gaseous distention of the small bowel centrally up to 4.6  cm. There is linear chain suture within the pelvis likely related to prior colonic resection. There is an " overall mild colonic stool burden. There are no abnormal renal calcifications.    Impression  Impression:  1. Nonspecific bowel gas pattern with gas-filled dilated loop of small bowel measuring up to 4.6 cm centrally. Findings could relate to ileus or enteritis. Obstruction is less likely.  2. Mild colonic stool burden.      Electronically Signed: Freddy Vivian  10/7/2024 9:43 PM EDT  Workstation ID: [id number]      XR Chest 1 View    Narrative  XR CHEST 1 VW    Date of Exam: 10/7/2024 8:56 PM EDT    Indication: abdominal pain    Comparison: Chest radiograph dated 9/23/2024    Findings:  The cardiomediastinal silhouette is within normal limits. Pulmonary vascularity appears normal. There our paratracheal and subcarinal calcified lymph nodes likely related to chronic granulomatous disease. There is evidence of prior kyphoplasty. There is  no acute consolidation or pleural effusion. There is no evidence of pneumothorax.    Impression  Impression:  1. No acute cardiopulmonary abnormality.  2. Postprocedural changes of kyphoplasty at the mid thoracic spine level.      Electronically Signed: Freddy Park  10/7/2024 9:45 PM EDT  Workstation ID: [id number]      Results for orders placed during the hospital encounter of 08/22/23    Doppler Ankle Brachial Index Single Level CAR    Interpretation Summary    Right Conclusion: The right IKE is normal. Normal digital pressures.    Left Conclusion: The left IKE is normal. Mild digital ischemia.        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  UA bland.  Creatinine had slightly increased likely hemodynamic from labile blood pressure and/or volume depletion.  Received IV fluids.  Hypertension  Type 2 diabetes  History coronary artery disease  Acute NSTEMI-seen by cardiology.  Heart cath and PCI completed.     CR  stable post cath/PCI  Creatinine 1.4 today  Labs in am        Rolando Miller MD  [facility]  [phone]  11/13/24  11:49 EST

## 2024-11-13 NOTE — PLAN OF CARE
Goal Outcome Evaluation:              Outcome Evaluation: Pt has not complained of dizzieness as much today and no chest pain. Pt worked with PT and was able to walk the nice and ambulate to the bathroom without becoming dizzy. Pt did have some nausea after PT, improved with Zofran PO. Pt is resting comfortably in bed at this time.

## 2024-11-14 ENCOUNTER — READMISSION MANAGEMENT (OUTPATIENT)
Dept: CALL CENTER | Facility: HOSPITAL | Age: 79
End: 2024-11-14
Payer: MEDICARE

## 2024-11-14 VITALS
SYSTOLIC BLOOD PRESSURE: 123 MMHG | RESPIRATION RATE: 16 BRPM | OXYGEN SATURATION: 92 % | HEART RATE: 90 BPM | DIASTOLIC BLOOD PRESSURE: 77 MMHG | HEIGHT: 65 IN | TEMPERATURE: 98.3 F | WEIGHT: 175.71 LBS | BODY MASS INDEX: 29.27 KG/M2

## 2024-11-14 PROBLEM — E11.59 TYPE 2 DIABETES MELLITUS WITH CIRCULATORY DISORDER, WITHOUT LONG-TERM CURRENT USE OF INSULIN: Status: ACTIVE | Noted: 2024-11-14

## 2024-11-14 PROBLEM — M79.10 MYALGIA DUE TO STATIN: Status: ACTIVE | Noted: 2024-11-14

## 2024-11-14 PROBLEM — I5A NONISCHEMIC NONTRAUMATIC MYOCARDIAL INJURY: Status: ACTIVE | Noted: 2024-11-14

## 2024-11-14 PROBLEM — Z86.79 HISTORY OF CORONARY ARTERY DISEASE: Status: ACTIVE | Noted: 2024-11-14

## 2024-11-14 PROBLEM — F39 MOOD DISORDER: Status: ACTIVE | Noted: 2024-11-14

## 2024-11-14 PROBLEM — T46.6X5A MYALGIA DUE TO STATIN: Status: ACTIVE | Noted: 2024-11-14

## 2024-11-14 LAB
ALBUMIN SERPL-MCNC: 3.4 G/DL (ref 3.5–5.2)
ANION GAP SERPL CALCULATED.3IONS-SCNC: 9 MMOL/L (ref 5–15)
BUN SERPL-MCNC: 38 MG/DL (ref 8–23)
BUN/CREAT SERPL: 24.8 (ref 7–25)
CALCIUM SPEC-SCNC: 9.3 MG/DL (ref 8.6–10.5)
CHLORIDE SERPL-SCNC: 108 MMOL/L (ref 98–107)
CO2 SERPL-SCNC: 26 MMOL/L (ref 22–29)
CREAT SERPL-MCNC: 1.53 MG/DL (ref 0.57–1)
EGFRCR SERPLBLD CKD-EPI 2021: 34 ML/MIN/1.73
GLUCOSE BLDC GLUCOMTR-MCNC: 140 MG/DL (ref 70–105)
GLUCOSE BLDC GLUCOMTR-MCNC: 244 MG/DL (ref 70–105)
GLUCOSE SERPL-MCNC: 114 MG/DL (ref 65–99)
PHOSPHATE SERPL-MCNC: 3.7 MG/DL (ref 2.5–4.5)
POTASSIUM SERPL-SCNC: 4.3 MMOL/L (ref 3.5–5.2)
SODIUM SERPL-SCNC: 143 MMOL/L (ref 136–145)

## 2024-11-14 PROCEDURE — 95992 CANALITH REPOSITIONING PROC: CPT

## 2024-11-14 PROCEDURE — 63710000001 INSULIN LISPRO (HUMAN) PER 5 UNITS: Performed by: INTERNAL MEDICINE

## 2024-11-14 PROCEDURE — 97116 GAIT TRAINING THERAPY: CPT

## 2024-11-14 PROCEDURE — 80069 RENAL FUNCTION PANEL: CPT | Performed by: INTERNAL MEDICINE

## 2024-11-14 PROCEDURE — 82948 REAGENT STRIP/BLOOD GLUCOSE: CPT | Performed by: INTERNAL MEDICINE

## 2024-11-14 RX ADMIN — POLYETHYLENE GLYCOL 3350 17 G: 17 POWDER, FOR SOLUTION ORAL at 09:59

## 2024-11-14 RX ADMIN — PANTOPRAZOLE SODIUM 40 MG: 40 TABLET, DELAYED RELEASE ORAL at 06:16

## 2024-11-14 RX ADMIN — AMLODIPINE BESYLATE 10 MG: 5 TABLET ORAL at 09:59

## 2024-11-14 RX ADMIN — ACETAMINOPHEN 650 MG: 325 TABLET, FILM COATED ORAL at 10:04

## 2024-11-14 RX ADMIN — INSULIN LISPRO 3 UNITS: 100 INJECTION, SOLUTION INTRAVENOUS; SUBCUTANEOUS at 12:23

## 2024-11-14 RX ADMIN — ASPIRIN 81 MG: 81 TABLET, COATED ORAL at 09:59

## 2024-11-14 RX ADMIN — TICAGRELOR 90 MG: 90 TABLET ORAL at 10:00

## 2024-11-14 RX ADMIN — SERTRALINE 50 MG: 50 TABLET, FILM COATED ORAL at 09:59

## 2024-11-14 NOTE — CASE MANAGEMENT/SOCIAL WORK
Case Management Discharge Note      Final Note: Routine home with OP PT PRAKASH Kelly         Selected Continued Care - Discharged on 11/14/2024 Admission date: 11/8/2024 - Discharge disposition: Home-Health Care Svc      Therapy Coordination complete.      Service Provider Services Address Phone Fax Patient Preferred    [facility] [place] [facility] [address] [place] [id number] [phone] --               Transportation Services  Private: Car    Final Discharge Disposition Code: 01 - home or self-care

## 2024-11-14 NOTE — PLAN OF CARE
Goal Outcome Evaluation:           Problem: Adult Inpatient Plan of Care  Goal: Plan of Care Review  Outcome: Progressing  Goal: Patient-Specific Goal (Individualized)  Outcome: Progressing  Goal: Absence of Hospital-Acquired Illness or Injury  Outcome: Progressing  Intervention: Identify and Manage Fall Risk  Recent Flowsheet Documentation  Taken 11/14/2024 0600 by Alyssa Pereira RN  Safety Promotion/Fall Prevention:   safety round/check completed   room organization consistent  Taken 11/14/2024 0400 by Alyssa Pereira RN  Safety Promotion/Fall Prevention:   safety round/check completed   room organization consistent  Taken 11/14/2024 0200 by Alyssa Pereira RN  Safety Promotion/Fall Prevention:   safety round/check completed   room organization consistent  Taken 11/14/2024 0000 by Alyssa Pereira RN  Safety Promotion/Fall Prevention:   safety round/check completed   room organization consistent  Taken 11/13/2024 2000 by Alyssa Pereira RN  Safety Promotion/Fall Prevention:   safety round/check completed   room organization consistent  Intervention: Prevent Skin Injury  Recent Flowsheet Documentation  Taken 11/14/2024 0400 by Alyssa Pereira RN  Skin Protection:   transparent dressing maintained   incontinence pads utilized  Taken 11/14/2024 0000 by Alyssa Pereira RN  Skin Protection:   incontinence pads utilized   transparent dressing maintained   protective footwear used  Taken 11/13/2024 2000 by Alyssa Pereira RN  Skin Protection:   transparent dressing maintained   incontinence pads utilized  Intervention: Prevent and Manage VTE (Venous Thromboembolism) Risk  Recent Flowsheet Documentation  Taken 11/13/2024 2000 by Alyssa Pereira RN  VTE Prevention/Management:   bilateral   SCDs (sequential compression devices) off   patient refused intervention  Intervention: Prevent Infection  Recent Flowsheet Documentation  Taken 11/14/2024 0600 by Alyssa Pereira RN  Infection Prevention:   single patient room provided   rest/sleep  promoted   personal protective equipment utilized   hand hygiene promoted  Taken 11/14/2024 0400 by Alyssa Pereira RN  Infection Prevention:   single patient room provided   rest/sleep promoted   personal protective equipment utilized   hand hygiene promoted  Taken 11/14/2024 0200 by Alyssa Pereira RN  Infection Prevention:   rest/sleep promoted   single patient room provided   personal protective equipment utilized   hand hygiene promoted  Taken 11/14/2024 0000 by Alyssa Pereira RN  Infection Prevention:   single patient room provided   rest/sleep promoted   personal protective equipment utilized   hand hygiene promoted  Taken 11/13/2024 2000 by Alyssa Pereira RN  Infection Prevention:   single patient room provided   rest/sleep promoted   hand hygiene promoted   personal protective equipment utilized  Goal: Optimal Comfort and Wellbeing  Outcome: Progressing  Intervention: Provide Person-Centered Care  Recent Flowsheet Documentation  Taken 11/14/2024 0400 by Alyssa Pereira RN  Trust Relationship/Rapport:   care explained   choices provided  Taken 11/14/2024 0000 by Alyssa Pereira RN  Trust Relationship/Rapport:   care explained   choices provided  Taken 11/13/2024 2000 by Alyssa Pereira RN  Trust Relationship/Rapport:   care explained   choices provided   questions answered   questions encouraged  Goal: Readiness for Transition of Care  Outcome: Progressing     Problem: Comorbidity Management  Goal: Maintenance of COPD Symptom Control  Outcome: Progressing  Intervention: Maintain COPD (Chronic Obstructive Pulmonary Disease) Symptom Control  Recent Flowsheet Documentation  Taken 11/14/2024 0600 by Alyssa Pereira RN  Medication Review/Management: medications reviewed  Taken 11/14/2024 0400 by Alyssa Pereira RN  Breathing Techniques/Airway Clearance: deep/controlled cough encouraged  Medication Review/Management: medications reviewed  Taken 11/14/2024 0200 by Alyssa Pereira RN  Medication Review/Management: medications  reviewed  Taken 11/14/2024 0000 by Alyssa Pereira RN  Medication Review/Management: medications reviewed  Taken 11/13/2024 2000 by Alyssa Pereira RN  Breathing Techniques/Airway Clearance: deep/controlled cough encouraged  Medication Review/Management: medications reviewed  Goal: Maintenance of Heart Failure Symptom Control  Outcome: Progressing  Intervention: Maintain Heart Failure Management  Recent Flowsheet Documentation  Taken 11/14/2024 0600 by Alyssa Pereira RN  Medication Review/Management: medications reviewed  Taken 11/14/2024 0400 by Alyssa Pereira RN  Medication Review/Management: medications reviewed  Taken 11/14/2024 0200 by Alyssa Pereira RN  Medication Review/Management: medications reviewed  Taken 11/14/2024 0000 by Alyssa Pereira RN  Medication Review/Management: medications reviewed  Taken 11/13/2024 2000 by Alyssa Pereira RN  Medication Review/Management: medications reviewed  Goal: Blood Pressure in Desired Range  Outcome: Progressing  Intervention: Maintain Blood Pressure Management  Recent Flowsheet Documentation  Taken 11/14/2024 0600 by Alyssa Pereira RN  Medication Review/Management: medications reviewed  Taken 11/14/2024 0400 by Alyssa Pereira RN  Medication Review/Management: medications reviewed  Taken 11/14/2024 0200 by Alyssa Pereira RN  Medication Review/Management: medications reviewed  Taken 11/14/2024 0000 by Alyssa Pereira RN  Medication Review/Management: medications reviewed  Taken 11/13/2024 2000 by Alyssa Pereira RN  Medication Review/Management: medications reviewed     Problem: Skin Injury Risk Increased  Goal: Skin Health and Integrity  Outcome: Progressing  Intervention: Optimize Skin Protection  Recent Flowsheet Documentation  Taken 11/14/2024 0400 by Alyssa Pereira RN  Pressure Reduction Techniques: frequent weight shift encouraged  Pressure Reduction Devices: pressure-redistributing mattress utilized  Skin Protection:   transparent dressing maintained   incontinence pads  utilized  Taken 11/14/2024 0000 by Alyssa Pereira RN  Pressure Reduction Techniques: frequent weight shift encouraged  Pressure Reduction Devices: pressure-redistributing mattress utilized  Skin Protection:   incontinence pads utilized   transparent dressing maintained   protective footwear used  Taken 11/13/2024 2000 by Alyssa Pereira RN  Pressure Reduction Techniques: (pateint turns well independently) frequent weight shift encouraged  Pressure Reduction Devices: pressure-redistributing mattress utilized  Skin Protection:   transparent dressing maintained   incontinence pads utilized     Problem: Fall Injury Risk  Goal: Absence of Fall and Fall-Related Injury  Outcome: Progressing  Intervention: Identify and Manage Contributors  Recent Flowsheet Documentation  Taken 11/14/2024 0600 by Alyssa Pereira RN  Medication Review/Management: medications reviewed  Taken 11/14/2024 0400 by Alyssa Pereira RN  Medication Review/Management: medications reviewed  Taken 11/14/2024 0200 by Alyssa Pereira RN  Medication Review/Management: medications reviewed  Taken 11/14/2024 0000 by Alyssa Pereira RN  Medication Review/Management: medications reviewed  Taken 11/13/2024 2000 by Alyssa Pereira RN  Medication Review/Management: medications reviewed  Intervention: Promote Injury-Free Environment  Recent Flowsheet Documentation  Taken 11/14/2024 0600 by Aylssa Pereira RN  Safety Promotion/Fall Prevention:   safety round/check completed   room organization consistent  Taken 11/14/2024 0400 by Alyssa Pereira RN  Safety Promotion/Fall Prevention:   safety round/check completed   room organization consistent  Taken 11/14/2024 0200 by Alyssa Pereira RN  Safety Promotion/Fall Prevention:   safety round/check completed   room organization consistent  Taken 11/14/2024 0000 by Alyssa Pereira RN  Safety Promotion/Fall Prevention:   safety round/check completed   room organization consistent  Taken 11/13/2024 2000 by Alyssa Pereira RN  Safety Promotion/Fall  Prevention:   safety round/check completed   room organization consistent

## 2024-11-14 NOTE — THERAPY TREATMENT NOTE
"Subjective: Pt agreeable to therapeutic plan of care. Patient requested PT treatment prior to d/c. Still slightly off balance but denies room spinning dizziness     Objective:         Bed mobility - Independent  Transfers - Independent  Ambulation - 400 feet Supervision and CGA        Vitals: WNL    Pain: 0 VAS   Location: n/a  Intervention for pain: N/A    Education: Provided education on the importance of mobility in the acute care setting  Provided education on BPPV, HEP- walking in hallway with rail with head turns, head nods, walking program    Assessment: Vianey Reeves  is an 79-year-old female who presented with dizziness, vomiting, nausea. She has had recent BP medication changes. NSTEMI s/p heart cath with PCI x2 to circumflex artery 11/11/24. Following resolution of cardiac symptoms, dizziness persisted and vestibular assessment was requested. Yesterday, pt was (+) for BPPV. Retested today and was (-). Treated with modified epley x 1 in case BPPV was still present but nystagmus just not visible in room light. Performed ambulation without AD x 400'. She has some veering and variability of gait with head turns. Slowed gait speed with eyes closed, backward gait and difficulty with tandem gait. Safe to d/c home but would benefit from OPPT for balance deficits. Recommended use of STC for community mobility. Provided HEP to promote return of balance function. Will continue to follow and progress as tolerated.     Plan/Recommendations:   If medically appropriate, Low Intensity Therapy recommended post-acute care - This is recommended as therapy feels this patient would require 2-3 visits per week. OP or HH would be the best option depending on patient's home bound status. Consider, if the patient has other  \"skilled\" needs such as wounds, IV antibiotics, etc. Combined with \"low intensity\" could also equate to a SNF. If patient is medically complex, consider LTAC. Pt requires no DME at discharge.     Pt desires " Outpatient therapy at discharge. Pt cooperative; agreeable to therapeutic recommendations and plan of care.         Post-Tx Position: Up in Chair  PPE: gloves and gown    Therapy Charges for Today       Code Description Service Date Service Provider Modifiers Qty    41425776815 HC GAIT TRAINING EA 15 MIN 11/13/2024 Toshia Rivera, PT GP 1    [id number] HC PT CANALITH REPOSITIONING PER DAY 11/13/2024 Toshia Rivera, PT GP 1    60277445524 HC PT SELF CARE/MGMT/TRAIN EA 15 MIN 11/13/2024 Toshia Rivera, PT GP 1    [id number] HC PT CANALITH REPOSITIONING PER DAY 11/14/2024 Toshia Rivera, PT GP 1    68594922682 HC GAIT TRAINING EA 15 MIN 11/14/2024 Toshia Rivera, PT GP 1           PT Charges       Row Name 11/14/24 1701             Time Calculation    Start Time 1420  -SS      Stop Time 1445  -SS      Time Calculation (min) 25 min  -SS      PT Received On 11/14/24  -SS         Time Calculation- PT    Total Timed Code Minutes- PT 15 minute(s)  -SS                User Key  (r) = Recorded By, (t) = Taken By, (c) = Cosigned By      Initials Name Provider Type    SS Toshia Rivera PT Physical Therapist

## 2024-11-14 NOTE — SIGNIFICANT NOTE
Patient dc'd to home. Granddaughter picking pt up. Pt wished to walk to lobby. This RN walked pt to lobby where she waited for granddaughter to arrive.

## 2024-11-14 NOTE — DISCHARGE SUMMARY
Date of Discharge:  11/14/2024    Discharge Diagnosis:   Anginal equivalent    Non-STEMI (non-ST elevated myocardial infarction)  Coronary artery disease  - Brilinta, aspirin;  intolerant of statins  Myalgias due to statins  COPD - continue home meds  Chronic hypoxemia - supplemental oxygen  Dizziness/vertigo - prn meclizine, PT following  CKD stage 3 - creatinine stable after intervention  Essential hypertension - controlled with amlodipine  Mood disorder - continue home meds  GERD - PPI  DM-II - basal/bolus insulin    Presenting Problem/History of Present Illness  Active Hospital Problems    Diagnosis  POA   • **Anginal equivalent [I20.89]  Yes   • Non-STEMI (non-ST elevated myocardial infarction) [I21.4]  Unknown      Resolved Hospital Problems   No resolved problems to display.          Hospital Course  Patient is a 79 y.o. female presented with ***.      Procedures Performed    Procedure(s):  Left Heart Cath  -------------------       Consults:   Consults       Date and Time Order Name Status Description    11/10/2024 12:05 PM Inpatient Nephrology Consult Completed     11/9/2024 12:58 PM Inpatient Cardiology Consult Completed     11/8/2024 10:59 PM IP Consult to Cardiology      11/8/2024  9:28 PM Family Medicine Consult      10/10/2024  8:34 AM Inpatient Urology Consult Completed     10/8/2024  9:54 AM Inpatient Nephrology Consult Completed     10/8/2024  9:09 AM Inpatient General Surgery Consult Completed             Pertinent Test Results:    Lab Results (most recent)       Procedure Component Value Units Date/Time    POC Glucose 4x Daily Before Meals & at Bedtime [[id number]]  (Abnormal) Collected: 11/14/24 0723    Specimen: Blood Updated: 11/14/24 0725     Glucose 140 mg/dL      Comment: Serial Number: [id number]:  [id number]       Renal Function Panel [[id number]]  (Abnormal) Collected: 11/14/24 0157    Specimen: Blood from Arm, Left Updated: 11/14/24 0226     Glucose 114 mg/dL      BUN 38 mg/dL       Creatinine 1.53 mg/dL      Sodium 143 mmol/L      Potassium 4.3 mmol/L      Chloride 108 mmol/L      CO2 26.0 mmol/L      Calcium 9.3 mg/dL      Albumin 3.4 g/dL      Phosphorus 3.7 mg/dL      Anion Gap 9.0 mmol/L      BUN/Creatinine Ratio 24.8     eGFR 34.0 mL/min/1.73     Narrative:      GFR Normal >60  Chronic Kidney Disease <60  Kidney Failure <15    The GFR formula is only valid for adults with stable renal function between ages 18 and 70.    POC Glucose 4x Daily Before Meals & at Bedtime [[id number]]  (Abnormal) Collected: 11/13/24 2210    Specimen: Blood Updated: 11/13/24 2212     Glucose 255 mg/dL      Comment: Serial Number: [id number]:  [id number]       Renal Function Panel [[id number]]  (Abnormal) Collected: 11/13/24 0205    Specimen: Blood from Arm, Right Updated: 11/13/24 0246     Glucose 118 mg/dL      BUN 38 mg/dL      Creatinine 1.44 mg/dL      Sodium 141 mmol/L      Potassium 4.5 mmol/L      Comment: Specimen hemolyzed.  Result may be falsely elevated.        Chloride 109 mmol/L      CO2 24.4 mmol/L      Calcium 9.5 mg/dL      Albumin 3.3 g/dL      Phosphorus 3.6 mg/dL      Anion Gap 7.6 mmol/L      BUN/Creatinine Ratio 26.4     eGFR 36.6 mL/min/1.73     Narrative:      GFR Normal >60  Chronic Kidney Disease <60  Kidney Failure <15    The GFR formula is only valid for adults with stable renal function between ages 18 and 70.    CBC & Differential [[id number]]  (Abnormal) Collected: 11/13/24 0205    Specimen: Blood from Arm, Right Updated: 11/13/24 0211    Narrative:      The following orders were created for panel order CBC & Differential.  Procedure                               Abnormality         Status                     ---------                               -----------         ------                     CBC Auto Differential[[id number]]        Abnormal            Final result                 Please view results for these tests on the individual orders.    CBC Auto Differential [[id number]]   (Abnormal) Collected: 11/13/24 0205    Specimen: Blood from Arm, Right Updated: 11/13/24 0211     WBC 10.90 10*3/mm3      RBC 4.21 10*6/mm3      Hemoglobin 13.6 g/dL      Hematocrit 41.4 %      MCV 98.3 fL      MCH 32.3 pg      MCHC 32.9 g/dL      RDW 12.1 %      RDW-SD 43.6 fl      MPV 9.9 fL      Platelets 225 10*3/mm3      Neutrophil % 78.4 %      Lymphocyte % 12.3 %      Monocyte % 8.1 %      Eosinophil % 0.1 %      Basophil % 0.2 %      Immature Grans % 0.9 %      Neutrophils, Absolute 8.55 10*3/mm3      Lymphocytes, Absolute 1.34 10*3/mm3      Monocytes, Absolute 0.88 10*3/mm3      Eosinophils, Absolute 0.01 10*3/mm3      Basophils, Absolute 0.02 10*3/mm3      Immature Grans, Absolute 0.10 10*3/mm3      nRBC 0.0 /100 WBC     CBC & Differential [[id number]]  (Abnormal) Collected: 11/12/24 0348    Specimen: Blood from Arm, Right Updated: 11/12/24 0359    Narrative:      The following orders were created for panel order CBC & Differential.  Procedure                               Abnormality         Status                     ---------                               -----------         ------                     CBC Auto Differential[[id number]]        Abnormal            Final result                 Please view results for these tests on the individual orders.    CBC Auto Differential [[id number]]  (Abnormal) Collected: 11/12/24 0348    Specimen: Blood from Arm, Right Updated: 11/12/24 0359     WBC 10.65 10*3/mm3      RBC 4.38 10*6/mm3      Hemoglobin 14.2 g/dL      Hematocrit 43.0 %      MCV 98.2 fL      MCH 32.4 pg      MCHC 33.0 g/dL      RDW 11.7 %      RDW-SD 42.7 fl      MPV 10.1 fL      Platelets 207 10*3/mm3      Neutrophil % 94.3 %      Lymphocyte % 3.6 %      Monocyte % 0.9 %      Eosinophil % 0.0 %      Basophil % 0.2 %      Immature Grans % 1.0 %      Neutrophils, Absolute 10.04 10*3/mm3      Lymphocytes, Absolute 0.38 10*3/mm3      Monocytes, Absolute 0.10 10*3/mm3      Eosinophils, Absolute 0.00  10*3/mm3      Basophils, Absolute 0.02 10*3/mm3      Immature Grans, Absolute 0.11 10*3/mm3      nRBC 0.0 /100 WBC     POC Activated Clotting Time [[id number]]  (Abnormal) Collected: 11/11/24 1948    Specimen: Blood Updated: 11/11/24 1956     Activated Clotting Time  147 Seconds      Comment: Serial Number: [id number]:  [id number]       POC Activated Clotting Time [[id number]]  (Abnormal) Collected: 11/11/24 1611    Specimen: Arterial Blood Updated: 11/11/24 1851     Activated Clotting Time  193 Seconds      Comment: Serial Number: [id number]:  [id number]       Aldolase [[id number]] Collected: 11/09/24 0512    Specimen: Blood Updated: 11/11/24 1634     Aldolase 3.7 U/L     Narrative:      Performed at:  [address] [place] [address]  [id number]  : Michel Bay PhD, Phone:  [phone]    Basic Metabolic Panel [[id number]]  (Abnormal) Collected: 11/10/24 0350    Specimen: Blood from Arm, Left Updated: 11/10/24 0621     Glucose 208 mg/dL      BUN 35 mg/dL      Creatinine 1.53 mg/dL      Sodium 141 mmol/L      Potassium 4.7 mmol/L      Chloride 102 mmol/L      CO2 28.6 mmol/L      Calcium 9.6 mg/dL      BUN/Creatinine Ratio 22.9     Anion Gap 10.4 mmol/L      eGFR 34.0 mL/min/1.73     Narrative:      GFR Normal >60  Chronic Kidney Disease <60  Kidney Failure <15    The GFR formula is only valid for adults with stable renal function between ages 18 and 70.    CBC (No Diff) [[id number]]  (Abnormal) Collected: 11/10/24 0350    Specimen: Blood from Arm, Left Updated: 11/10/24 0607     WBC 11.19 10*3/mm3      RBC 4.66 10*6/mm3      Hemoglobin 14.5 g/dL      Hematocrit 47.1 %      .1 fL      MCH 31.1 pg      MCHC 30.8 g/dL      RDW 11.7 %      RDW-SD 43.7 fl      MPV 10.5 fL      Platelets 240 10*3/mm3     High Sensitivity Troponin T [[id number]]  (Abnormal) Collected: 11/09/24 1410    Specimen: Blood from Hand, Right Updated: 11/09/24 1509     HS Troponin T 104 ng/L     Narrative:       High Sensitive Troponin T Reference Range:  <14.0 ng/L- Negative Female for AMI  <22.0 ng/L- Negative Male for AMI  >=14 - Abnormal Female indicating possible myocardial injury.  >=22 - Abnormal Male indicating possible myocardial injury.   Clinicians would have to utilize clinical acumen, EKG, Troponin, and serial changes to determine if it is an Acute Myocardial Infarction or myocardial injury due to an underlying chronic condition.         CK [[id number]]  (Normal) Collected: 11/09/24 1410    Specimen: Blood from Hand, Right Updated: 11/09/24 1503     Creatine Kinase 40 U/L     High Sensitivity Troponin T [[id number]]  (Abnormal) Collected: 11/09/24 0218    Specimen: Blood Updated: 11/09/24 0358     HS Troponin T 180 ng/L     Narrative:      High Sensitive Troponin T Reference Range:  <14.0 ng/L- Negative Female for AMI  <22.0 ng/L- Negative Male for AMI  >=14 - Abnormal Female indicating possible myocardial injury.  >=22 - Abnormal Male indicating possible myocardial injury.   Clinicians would have to utilize clinical acumen, EKG, Troponin, and serial changes to determine if it is an Acute Myocardial Infarction or myocardial injury due to an underlying chronic condition.         Basic Metabolic Panel [[id number]]  (Abnormal) Collected: 11/09/24 0218    Specimen: Blood Updated: 11/09/24 0356     Glucose 174 mg/dL      BUN 25 mg/dL      Creatinine 1.26 mg/dL      Sodium 141 mmol/L      Potassium 3.9 mmol/L      Chloride 104 mmol/L      CO2 28.4 mmol/L      Calcium 9.6 mg/dL      BUN/Creatinine Ratio 19.8     Anion Gap 8.6 mmol/L      eGFR 43.0 mL/min/1.73     Narrative:      GFR Normal >60  Chronic Kidney Disease <60  Kidney Failure <15    The GFR formula is only valid for adults with stable renal function between ages 18 and 70.    Lipid Panel [[id number]]  (Abnormal) Collected: 11/09/24 0218    Specimen: Blood Updated: 11/09/24 0356     Total Cholesterol 205 mg/dL      Triglycerides 120 mg/dL      HDL Cholesterol 49  mg/dL      LDL Cholesterol  134 mg/dL      VLDL Cholesterol 22 mg/dL      LDL/HDL Ratio 2.69    Narrative:      Cholesterol Reference Ranges  (U.S. Department of Health and Human Services ATP III Classifications)    Desirable          <200 mg/dL  Borderline High    200-239 mg/dL  High Risk          >240 mg/dL      Triglyceride Reference Ranges  (U.S. Department of Health and Human Services ATP III Classifications)    Normal           <150 mg/dL  Borderline High  150-199 mg/dL  High             200-499 mg/dL  Very High        >500 mg/dL    HDL Reference Ranges  (U.S. Department of Health and Human Services ATP III Classifications)    Low     <40 mg/dl (major risk factor for CHD)  High    >60 mg/dl ('negative' risk factor for CHD)        LDL Reference Ranges  (U.S. Department of Health and Human Services ATP III Classifications)    Optimal          <100 mg/dL  Near Optimal     100-129 mg/dL  Borderline High  130-159 mg/dL  High             160-189 mg/dL  Very High        >189 mg/dL    CBC (No Diff) [[id number]]  (Abnormal) Collected: 11/09/24 0218    Specimen: Blood Updated: 11/09/24 0334     WBC 11.63 10*3/mm3      RBC 4.86 10*6/mm3      Hemoglobin 15.8 g/dL      Hematocrit 48.2 %      MCV 99.2 fL      MCH 32.5 pg      MCHC 32.8 g/dL      RDW 11.9 %      RDW-SD 43.5 fl      MPV 10.8 fL      Platelets 222 10*3/mm3     CK [[id number]]  (Normal) Collected: 11/08/24 2213    Specimen: Blood Updated: 11/08/24 2348     Creatine Kinase 38 U/L     High Sensitivity Troponin T 2Hr [[id number]]  (Abnormal) Collected: 11/08/24 2213    Specimen: Blood Updated: 11/08/24 2239     HS Troponin T 166 ng/L      Troponin T Delta 82 ng/L     Narrative:      High Sensitive Troponin T Reference Range:  <14.0 ng/L- Negative Female for AMI  <22.0 ng/L- Negative Male for AMI  >=14 - Abnormal Female indicating possible myocardial injury.  >=22 - Abnormal Male indicating possible myocardial injury.   Clinicians would have to utilize clinical  acumen, EKG, Troponin, and serial changes to determine if it is an Acute Myocardial Infarction or myocardial injury due to an underlying chronic condition.         Urinalysis, Microscopic Only - Straight Cath [[id number]]  (Abnormal) Collected: 11/08/24 2107    Specimen: Urine from Straight Cath Updated: 11/08/24 2136     RBC, UA None Seen /HPF      WBC, UA 3-5 /HPF      Bacteria, UA None Seen /HPF      Squamous Epithelial Cells, UA 0-2 /HPF      Hyaline Casts, UA None Seen /LPF      Methodology Manual Light Microscopy    Urinalysis With Microscopic If Indicated (No Culture) - Straight Cath [[id number]]  (Abnormal) Collected: 11/08/24 2107    Specimen: Urine from Straight Cath Updated: 11/08/24 2117     Color, UA Yellow     Appearance, UA Clear     pH, UA 6.5     Specific Gravity, UA 1.020     Glucose, UA Negative     Ketones, UA Negative     Bilirubin, UA Negative     Blood, UA Negative     Protein,  mg/dL (2+)     Leuk Esterase, UA Negative     Nitrite, UA Negative     Urobilinogen, UA 1.0 E.U./dL    Single High Sensitivity Troponin T [[id number]]  (Abnormal) Collected: 11/08/24 1940    Specimen: Blood Updated: 11/08/24 2013     HS Troponin T 84 ng/L     Narrative:      High Sensitive Troponin T Reference Range:  <14.0 ng/L- Negative Female for AMI  <22.0 ng/L- Negative Male for AMI  >=14 - Abnormal Female indicating possible myocardial injury.  >=22 - Abnormal Male indicating possible myocardial injury.   Clinicians would have to utilize clinical acumen, EKG, Troponin, and serial changes to determine if it is an Acute Myocardial Infarction or myocardial injury due to an underlying chronic condition.         Comprehensive Metabolic Panel [[id number]]  (Abnormal) Collected: 11/08/24 1940    Specimen: Blood Updated: 11/08/24 2012     Glucose 159 mg/dL      BUN 26 mg/dL      Creatinine 1.27 mg/dL      Sodium 139 mmol/L      Potassium 4.0 mmol/L      Comment: Slight hemolysis detected by analyzer. Result may be  falsely elevated.        Chloride 103 mmol/L      CO2 26.3 mmol/L      Calcium 9.6 mg/dL      Total Protein 6.5 g/dL      Albumin 3.8 g/dL      ALT (SGPT) 10 U/L      AST (SGOT) 14 U/L      Comment: Slight hemolysis detected by analyzer. Result may be falsely elevated.        Alkaline Phosphatase 81 U/L      Total Bilirubin 0.2 mg/dL      Globulin 2.7 gm/dL      A/G Ratio 1.4 g/dL      BUN/Creatinine Ratio 20.5     Anion Gap 9.7 mmol/L      eGFR 42.6 mL/min/1.73     Narrative:      GFR Normal >60  Chronic Kidney Disease <60  Kidney Failure <15    The GFR formula is only valid for adults with stable renal function between ages 18 and 70.    Extra Tubes [[id number]] Collected: 11/08/24 1940    Specimen: Blood, Venous Line Updated: 11/08/24 1946    Narrative:      The following orders were created for panel order Extra Tubes.  Procedure                               Abnormality         Status                     ---------                               -----------         ------                     Gold Top - SST[[id number]]                                   Final result                 Please view results for these tests on the individual orders.    Gold Top - SST [[id number]] Collected: 11/08/24 1940    Specimen: Blood Updated: 11/08/24 1946     Extra Tube Hold for add-ons.     Comment: Auto resulted.                Results for orders placed during the hospital encounter of 11/08/24    Adult Transthoracic Echo Complete W/ Cont if Necessary Per Protocol    Interpretation Summary  •  Left ventricular ejection fraction appears to be 36 - 40%.    Indications  Valvular function.    Technically satisfactory study.  Mitral valve is structurally normal.  Mild to moderate mitral regurgitation is present.  Tricuspid valve is structurally normal.  Aortic valve is thickened with adequate opening motion.  Pulmonic valve could not be well visualized.  Left atrium is normal in size.  Right atrium is normal in size.  Left ventricle is  enlarged with diffuse hypocontractility with ejection fraction of 35 to 40%.  Left ventricular peak systolic global longitudinal strain (GLS) is abnormal at -10%.  Grade 1 left ventricular diastolic dysfunction is present.  (MV E/A ratio 0.36).  Right ventricle is enlarged with hypocontractility with TAPSE of 1.53 cm.  Atrial septum is intact.  Aorta is normal.  No pericardial effusion or intracardiac thrombus is seen.    Impression  Mild to moderate mitral regurgitation.  Aortic valve is thickened with adequate opening motion.  Left ventricle is enlarged with diffuse hypocontractility with ejection fraction of 35 to 40%.  Left ventricular peak systolic global longitudinal strain (GLS) is abnormal at -10%.  Grade 1 left ventricular diastolic dysfunction is present.  (MV E/A ratio 0.36).  Right ventricle is enlarged with hypocontractility with TAPSE of 1.53 cm.       ***       Condition on Discharge:  ***    Vital Signs  Temp:  [97.7 °F (36.5 °C)-98.7 °F (37.1 °C)] 98 °F (36.7 °C)  Heart Rate:  [77-99] 81  Resp:  [12-18] 12  BP: (114-143)/(52-82) 143/69      Physical Exam         Discharge Disposition      Discharge Medications     Discharge Medications        ASK your doctor about these medications        Instructions Start Date   acetaminophen 650 MG 8 hr tablet  Commonly known as: TYLENOL   1,300 mg, Every 8 Hours PRN      albuterol sulfate  (90 Base) MCG/ACT inhaler  Commonly known as: PROVENTIL HFA;VENTOLIN HFA;PROAIR HFA   2 puffs, Every 4 Hours PRN      amLODIPine 10 MG tablet  Commonly known as: NORVASC   1 tablet, Daily      Anoro Ellipta 62.5-25 MCG/ACT aerosol powder  inhaler  Generic drug: Umeclidinium-Vilanterol   1 puff, Daily - RT      aspirin 81 MG tablet   1 tablet, Every 72 Hours      cloNIDine 0.1 MG tablet  Commonly known as: CATAPRES   0.1 mg, 2 Times Daily      furosemide 20 MG tablet  Commonly known as: LASIX   20 mg, Daily PRN      HYDROcodone-acetaminophen  MG per  tablet  Commonly known as: NORCO   1 tablet, Every 6 Hours PRN      ipratropium-albuterol 0.5-2.5 mg/3 ml nebulizer  Commonly known as: DUO-NEB   3 mL, Nebulization, 4 Times Daily PRN      Kerendia 10 MG tablet  Generic drug: Finerenone   1 tablet, Daily      Lantus SoloStar 100 UNIT/ML injection pen  Generic drug: Insulin Glargine   10 Units, Subcutaneous, Nightly      Linzess 145 MCG capsule capsule  Generic drug: linaclotide   1 capsule, Daily      pantoprazole 40 MG EC tablet  Commonly known as: PROTONIX   40 mg, Oral, Every Early Morning      polyethylene glycol 17 g packet  Commonly known as: MIRALAX   17 g, Daily      sertraline 50 MG tablet  Commonly known as: ZOLOFT   1 tablet, Daily               Discharge Diet:     Activity at Discharge:     Follow-up Appointments  Future Appointments   Date Time Provider Department Center   12/19/2024  1:30 PM Leon Gonzalez DO [facility]   2/7/2025  1:45 PM Geneva Duarte APRN [facility]     Additional Instructions for the Follow-ups that You Need to Schedule       Ambulatory Referral to Home Health   As directed      Face to Face Visit Date: 11/13/2024   Follow-up provider for Plan of Care?: I treated the patient in an acute care facility and will not continue treatment after discharge.   Follow-up provider: LUDWIN CHARLES [[id number]]   Reason/Clinical Findings: Deconditioning   Describe mobility limitations that make leaving home difficult: Deconditioning, status post cardiac stent x 2   Nursing/Therapeutic Services Requested: Physical Therapy   Frequency: 1 Week 1        Ambulatory Referral to Physical Therapy for Evaluation & Treatment   As directed      Specialty needed: Vestibular   Follow-up needed: Yes                Test Results Pending at Discharge  Pending Results       Procedure [Order ID] Specimen - Date/Time    ECG 12 Lead Chest Pain [[id number]]              SAMIRA Flowers  11/14/24  09:49 EST    Time: Discharge ***  min

## 2024-11-14 NOTE — PROGRESS NOTES
"NEPHROLOGY PROGRESS NOTE------[facility]    [facility]  [phone]  Rolando Miller MD      Patient Care Team:  Anny Garcia MD as PCP - General (Internal Medicine)  Sergio Ordonez MD as Consulting Physician (Nephrology)      Provider:  Rolando Miller MD  Patient Name: Vianey Reeves  :  1945    SUBJECTIVE:  Follow CKD  No chest pain or shortness of breath      Medication:  amLODIPine, 10 mg, Oral, Daily  aspirin, 81 mg, Oral, Daily  insulin glargine, 10 Units, Subcutaneous, Nightly  insulin lispro, 2-7 Units, Subcutaneous, 4x Daily AC & at Bedtime  ipratropium-albuterol, 3 mL, Nebulization, 4x Daily - RT  pantoprazole, 40 mg, Oral, Q AM  polyethylene glycol, 17 g, Oral, Daily  sertraline, 50 mg, Oral, Daily  ticagrelor, 90 mg, Oral, BID           OBJECTIVE    Vital Sign Min/Max for last 24 hours  Temp  Min: 97.7 °F (36.5 °C)  Max: 98.7 °F (37.1 °C)   BP  Min: 114/52  Max: 143/69   Pulse  Min: 77  Max: 99   Resp  Min: 12  Max: 18   SpO2  Min: 92 %  Max: 98 %   No data recorded   Weight  Min: 79.7 kg (175 lb 11.3 oz)  Max: 79.7 kg (175 lb 11.3 oz)     Flowsheet Rows      Flowsheet Row First Filed Value   Admission Height 165.1 cm (65\") Documented at 2024   Admission Weight 80.3 kg (177 lb) Documented at 2024            I/O this shift:  In: 180 [P.O.:180]  Out: -   I/O last 3 completed shifts:  In: 1800 [P.O.:1800]  Out: 100 [Urine:100]    Physical Exam:  General Appearance: alert, appears stated age and cooperative  Head: normocephalic, without obvious abnormality and atraumatic  Eyes: conjunctivae and sclerae normal and no icterus  Neck: supple and no JVD  Lungs: clear to auscultation and respirations regular  Heart: regular rhythm & normal rate and normal S1, S2  Chest: Wall no abnormalities observed  Abdomen: normal bowel sounds and soft, nontender  Extremities: moves extremities well, no edema, no cyanosis and " "no redness  Skin: no bleeding, bruising or rash, turgor normal, color normal and no lesions noted  Neurologic: Alert, and oriented. No focal deficits    Labs:    WBC WBC   Date Value Ref Range Status   11/13/2024 10.90 (H) 3.40 - 10.80 10*3/mm3 Final   11/12/2024 10.65 3.40 - 10.80 10*3/mm3 Final      HGB Hemoglobin   Date Value Ref Range Status   11/13/2024 13.6 12.0 - 15.9 g/dL Final   11/12/2024 14.2 12.0 - 15.9 g/dL Final      HCT Hematocrit   Date Value Ref Range Status   11/13/2024 41.4 34.0 - 46.6 % Final   11/12/2024 43.0 34.0 - 46.6 % Final      Platelets No results found for: \"LABPLAT\"   MCV MCV   Date Value Ref Range Status   11/13/2024 98.3 (H) 79.0 - 97.0 fL Final   11/12/2024 98.2 (H) 79.0 - 97.0 fL Final          Sodium Sodium   Date Value Ref Range Status   11/14/2024 143 136 - 145 mmol/L Final   11/13/2024 141 136 - 145 mmol/L Final   11/12/2024 139 136 - 145 mmol/L Final      Potassium Potassium   Date Value Ref Range Status   11/14/2024 4.3 3.5 - 5.2 mmol/L Final   11/13/2024 4.5 3.5 - 5.2 mmol/L Final     Comment:     Specimen hemolyzed.  Result may be falsely elevated.   11/12/2024 4.6 3.5 - 5.2 mmol/L Final     Comment:     Specimen hemolyzed.  Result may be falsely elevated.      Chloride Chloride   Date Value Ref Range Status   11/14/2024 108 (H) 98 - 107 mmol/L Final   11/13/2024 109 (H) 98 - 107 mmol/L Final   11/12/2024 107 98 - 107 mmol/L Final      CO2 CO2   Date Value Ref Range Status   11/14/2024 26.0 22.0 - 29.0 mmol/L Final   11/13/2024 24.4 22.0 - 29.0 mmol/L Final   11/12/2024 22.5 22.0 - 29.0 mmol/L Final      BUN BUN   Date Value Ref Range Status   11/14/2024 38 (H) 8 - 23 mg/dL Final   11/13/2024 38 (H) 8 - 23 mg/dL Final   11/12/2024 36 (H) 8 - 23 mg/dL Final      Creatinine Creatinine   Date Value Ref Range Status   11/14/2024 1.53 (H) 0.57 - 1.00 mg/dL Final   11/13/2024 1.44 (H) 0.57 - 1.00 mg/dL Final   11/12/2024 1.33 (H) 0.57 - 1.00 mg/dL Final      Calcium Calcium   Date " "Value Ref Range Status   11/14/2024 9.3 8.6 - 10.5 mg/dL Final   11/13/2024 9.5 8.6 - 10.5 mg/dL Final   11/12/2024 9.2 8.6 - 10.5 mg/dL Final      PO4 No components found for: \"PO4\"   Albumin Albumin   Date Value Ref Range Status   11/14/2024 3.4 (L) 3.5 - 5.2 g/dL Final   11/13/2024 3.3 (L) 3.5 - 5.2 g/dL Final   11/12/2024 3.4 (L) 3.5 - 5.2 g/dL Final      Magnesium No results found for: \"MG\"   Uric Acid No components found for: \"URIC ACID\"     Imaging Results (Last 72 Hours)       ** No results found for the last 72 hours. **            Results for orders placed during the hospital encounter of 11/08/24    XR Chest 1 View    Narrative  XR CHEST 1 VW    Date of Exam: 11/8/2024 7:48 PM EST    Indication: dizziness    Comparison: Chest radiograph 10/7/2024    Findings:  Stable cardiomegaly. Negative for lobar consolidation, edema, effusion or pneumothorax. Aortic atherosclerotic disease. Prior kyphoplasty changes in the thoracic spine. Degenerative related osseous findings.    Impression  Impression:  No active pulmonary process.      Electronically Signed: Federico Carrion MD  11/8/2024 8:21 PM EST  Workstation ID: [id number]      Results for orders placed during the hospital encounter of 10/07/24    XR Abdomen KUB    Narrative  XR ABDOMEN KUB    Date of Exam: 10/7/2024 6:54 PM EDT    Indication: abdominal pain/post-prandial    Comparison: CT abdomen and pelvis dated 9/25/2024.    Findings:  There are postsurgical changes near the GE junction likely related to prior hiatal hernia repair. There are right upper quadrant cholecystectomy clips. There is nonspecific bowel gas pattern with gaseous distention of the small bowel centrally up to 4.6  cm. There is linear chain suture within the pelvis likely related to prior colonic resection. There is an overall mild colonic stool burden. There are no abnormal renal calcifications.    Impression  Impression:  1. Nonspecific bowel gas pattern with gas-filled dilated loop of " small bowel measuring up to 4.6 cm centrally. Findings could relate to ileus or enteritis. Obstruction is less likely.  2. Mild colonic stool burden.      Electronically Signed: Freddy Park  10/7/2024 9:43 PM EDT  Workstation ID: [id number]      XR Chest 1 View    Narrative  XR CHEST 1 VW    Date of Exam: 10/7/2024 8:56 PM EDT    Indication: abdominal pain    Comparison: Chest radiograph dated 9/23/2024    Findings:  The cardiomediastinal silhouette is within normal limits. Pulmonary vascularity appears normal. There our paratracheal and subcarinal calcified lymph nodes likely related to chronic granulomatous disease. There is evidence of prior kyphoplasty. There is  no acute consolidation or pleural effusion. There is no evidence of pneumothorax.    Impression  Impression:  1. No acute cardiopulmonary abnormality.  2. Postprocedural changes of kyphoplasty at the mid thoracic spine level.      Electronically Signed: Freddy Park  10/7/2024 9:45 PM EDT  Workstation ID: [id number]      Results for orders placed during the hospital encounter of 08/22/23    Doppler Ankle Brachial Index Single Level CAR    Interpretation Summary    Right Conclusion: The right IKE is normal. Normal digital pressures.    Left Conclusion: The left IKE is normal. Mild digital ischemia.        ASSESSMENT / PLAN      Anginal equivalent    Essential hypertension    CKD (chronic kidney disease) stage 3, GFR 30-59 ml/min    Vertigo    COPD (chronic obstructive pulmonary disease)    GERD without esophagitis    Non-STEMI (non-ST elevated myocardial infarction)    History of coronary artery disease    Myalgia due to statin    Mood disorder    Type 2 diabetes mellitus with circulatory disorder, without long-term current use of insulin    Nonischemic nontraumatic myocardial injury    CKD stage III-CKD due to hypertensive nephrosclerosis and/or reduced renal mass from previous right nephrectomy.  UA bland.  Creatinine had slightly increased likely  hemodynamic from labile blood pressure and/or volume depletion.  Received IV fluids.  Hypertension  Type 2 diabetes  History coronary artery disease  Acute NSTEMI-seen by cardiology.  Heart cath and PCI completed.     Creatinine slightly up at 1.5  Status post heart cath and PCI  Okay for DC from renal standpoint  Follow-up in office 1 to 2 weeks  BMP next week        Rolando Miller MD  [facility]  [phone]  11/14/24  11:11 EST

## 2024-11-14 NOTE — PLAN OF CARE
Goal Outcome Evaluation:            Vianey Reeves  is an 79-year-old female who presented with dizziness, vomiting, nausea. She has had recent BP medication changes. NSTEMI s/p heart cath with PCI x2 to circumflex artery 11/11/24. Following resolution of cardiac symptoms, dizziness persisted and vestibular assessment was requested. Yesterday, pt was (+) for BPPV. Retested today and was (-). Treated with modified epley x 1 in case BPPV was still present but nystagmus just not visible in room light. Performed ambulation without AD x 400'. She has some veering and variability of gait with head turns. Slowed gait speed with eyes closed, backward gait and difficulty with tandem gait. Safe to d/c home but would benefit from OPPT for balance deficits. Recommended use of STC for community mobility. Provided HEP to promote return of balance function. Will continue to follow and progress as tolerated.

## 2024-11-14 NOTE — DISCHARGE SUMMARY
Date of Discharge:  11/14/2024    Discharge Diagnosis:   Anginal equivalent  Non-STEMI (non-ST elevated myocardial infarction)  Coronary artery disease  Myalgias    COPD   Chronic hypoxemia -   Dizziness/vertigo   CKD stage 3   Essential hypertension    Mood disorder   GERD    DM-II      Presenting Problem/History of Present Illness  Active Hospital Problems    Diagnosis  POA    **Anginal equivalent [I20.89]  Yes    History of coronary artery disease [Z86.79]  Not Applicable    Myalgia due to statin [M79.10, T46.6X5A]  Yes    Mood disorder [F39]  Unknown    Type 2 diabetes mellitus with circulatory disorder, without long-term current use of insulin [E11.59]  Unknown    Nonischemic nontraumatic myocardial injury [I5A]  Yes    Non-STEMI (non-ST elevated myocardial infarction) [I21.4]  Yes    GERD without esophagitis [K21.9]  Yes    COPD (chronic obstructive pulmonary disease) [J44.9]  Yes    Vertigo [R42]  Yes    CKD (chronic kidney disease) stage 3, GFR 30-59 ml/min [N18.30]  Yes    Essential hypertension [I10]  Yes      Resolved Hospital Problems   No resolved problems to display.          Hospital Course    Patient is a 79 y.o. female presented with substernal chest discomfort which was mild associate with palpitations with the gradual development of some vertigo described as dizziness with progressive lower extremity weakness. Patient was found to have NSTEMI and had a heart cath with 2 stents placed. PT worked with the patient for vertigo and she will have outpatient PT for vestibular therapy which has helped the patient. She has been hemodynamically stable and will need to follow up with PCP, cardiology, and nephrology in 2 weeks. She was also found to be intolerant of statins due to myalgia.      Procedures Performed    Procedure(s):  Left Heart Cath  -------------------       Consults:   Consults       Date and Time Order Name Status Description    11/10/2024 12:05 PM Inpatient Nephrology Consult Completed      11/9/2024 12:58 PM Inpatient Cardiology Consult Completed     11/8/2024 10:59 PM IP Consult to Cardiology      11/8/2024  9:28 PM Family Medicine Consult      10/10/2024  8:34 AM Inpatient Urology Consult Completed     10/8/2024  9:54 AM Inpatient Nephrology Consult Completed     10/8/2024  9:09 AM Inpatient General Surgery Consult Completed             Pertinent Test Results:    Lab Results (most recent)       Procedure Component Value Units Date/Time    POC Glucose 4x Daily Before Meals & at Bedtime [[id number]]  (Abnormal) Collected: 11/14/24 0723    Specimen: Blood Updated: 11/14/24 0725     Glucose 140 mg/dL      Comment: Serial Number: [id number]:  [id number]       Renal Function Panel [[id number]]  (Abnormal) Collected: 11/14/24 0157    Specimen: Blood from Arm, Left Updated: 11/14/24 0226     Glucose 114 mg/dL      BUN 38 mg/dL      Creatinine 1.53 mg/dL      Sodium 143 mmol/L      Potassium 4.3 mmol/L      Chloride 108 mmol/L      CO2 26.0 mmol/L      Calcium 9.3 mg/dL      Albumin 3.4 g/dL      Phosphorus 3.7 mg/dL      Anion Gap 9.0 mmol/L      BUN/Creatinine Ratio 24.8     eGFR 34.0 mL/min/1.73     Narrative:      GFR Normal >60  Chronic Kidney Disease <60  Kidney Failure <15    The GFR formula is only valid for adults with stable renal function between ages 18 and 70.    POC Glucose 4x Daily Before Meals & at Bedtime [[id number]]  (Abnormal) Collected: 11/13/24 2210    Specimen: Blood Updated: 11/13/24 2212     Glucose 255 mg/dL      Comment: Serial Number: [id number]:  [id number]       Renal Function Panel [[id number]]  (Abnormal) Collected: 11/13/24 0205    Specimen: Blood from Arm, Right Updated: 11/13/24 0246     Glucose 118 mg/dL      BUN 38 mg/dL      Creatinine 1.44 mg/dL      Sodium 141 mmol/L      Potassium 4.5 mmol/L      Comment: Specimen hemolyzed.  Result may be falsely elevated.        Chloride 109 mmol/L      CO2 24.4 mmol/L      Calcium 9.5 mg/dL      Albumin 3.3 g/dL       Phosphorus 3.6 mg/dL      Anion Gap 7.6 mmol/L      BUN/Creatinine Ratio 26.4     eGFR 36.6 mL/min/1.73     Narrative:      GFR Normal >60  Chronic Kidney Disease <60  Kidney Failure <15    The GFR formula is only valid for adults with stable renal function between ages 18 and 70.    CBC & Differential [[id number]]  (Abnormal) Collected: 11/13/24 0205    Specimen: Blood from Arm, Right Updated: 11/13/24 0211    Narrative:      The following orders were created for panel order CBC & Differential.  Procedure                               Abnormality         Status                     ---------                               -----------         ------                     CBC Auto Differential[[id number]]        Abnormal            Final result                 Please view results for these tests on the individual orders.    CBC Auto Differential [[id number]]  (Abnormal) Collected: 11/13/24 0205    Specimen: Blood from Arm, Right Updated: 11/13/24 0211     WBC 10.90 10*3/mm3      RBC 4.21 10*6/mm3      Hemoglobin 13.6 g/dL      Hematocrit 41.4 %      MCV 98.3 fL      MCH 32.3 pg      MCHC 32.9 g/dL      RDW 12.1 %      RDW-SD 43.6 fl      MPV 9.9 fL      Platelets 225 10*3/mm3      Neutrophil % 78.4 %      Lymphocyte % 12.3 %      Monocyte % 8.1 %      Eosinophil % 0.1 %      Basophil % 0.2 %      Immature Grans % 0.9 %      Neutrophils, Absolute 8.55 10*3/mm3      Lymphocytes, Absolute 1.34 10*3/mm3      Monocytes, Absolute 0.88 10*3/mm3      Eosinophils, Absolute 0.01 10*3/mm3      Basophils, Absolute 0.02 10*3/mm3      Immature Grans, Absolute 0.10 10*3/mm3      nRBC 0.0 /100 WBC     CBC & Differential [[id number]]  (Abnormal) Collected: 11/12/24 0348    Specimen: Blood from Arm, Right Updated: 11/12/24 0359    Narrative:      The following orders were created for panel order CBC & Differential.  Procedure                               Abnormality         Status                     ---------                                -----------         ------                     CBC Auto Differential[[id number]]        Abnormal            Final result                 Please view results for these tests on the individual orders.    CBC Auto Differential [[id number]]  (Abnormal) Collected: 11/12/24 0348    Specimen: Blood from Arm, Right Updated: 11/12/24 0359     WBC 10.65 10*3/mm3      RBC 4.38 10*6/mm3      Hemoglobin 14.2 g/dL      Hematocrit 43.0 %      MCV 98.2 fL      MCH 32.4 pg      MCHC 33.0 g/dL      RDW 11.7 %      RDW-SD 42.7 fl      MPV 10.1 fL      Platelets 207 10*3/mm3      Neutrophil % 94.3 %      Lymphocyte % 3.6 %      Monocyte % 0.9 %      Eosinophil % 0.0 %      Basophil % 0.2 %      Immature Grans % 1.0 %      Neutrophils, Absolute 10.04 10*3/mm3      Lymphocytes, Absolute 0.38 10*3/mm3      Monocytes, Absolute 0.10 10*3/mm3      Eosinophils, Absolute 0.00 10*3/mm3      Basophils, Absolute 0.02 10*3/mm3      Immature Grans, Absolute 0.11 10*3/mm3      nRBC 0.0 /100 WBC     POC Activated Clotting Time [[id number]]  (Abnormal) Collected: 11/11/24 1948    Specimen: Blood Updated: 11/11/24 1956     Activated Clotting Time  147 Seconds      Comment: Serial Number: [id number]:  [id number]       POC Activated Clotting Time [[id number]]  (Abnormal) Collected: 11/11/24 1611    Specimen: Arterial Blood Updated: 11/11/24 1851     Activated Clotting Time  193 Seconds      Comment: Serial Number: [id number]:  [id number]       Aldolase [[id number]] Collected: 11/09/24 0512    Specimen: Blood Updated: 11/11/24 1634     Aldolase 3.7 U/L     Narrative:      Performed at:  [address] [place] [address]  [id number]  : Michel Bay PhD, Phone:  [phone]    Basic Metabolic Panel [[id number]]  (Abnormal) Collected: 11/10/24 0350    Specimen: Blood from Arm, Left Updated: 11/10/24 0621     Glucose 208 mg/dL      BUN 35 mg/dL      Creatinine 1.53 mg/dL      Sodium 141 mmol/L      Potassium 4.7 mmol/L      Chloride  102 mmol/L      CO2 28.6 mmol/L      Calcium 9.6 mg/dL      BUN/Creatinine Ratio 22.9     Anion Gap 10.4 mmol/L      eGFR 34.0 mL/min/1.73     Narrative:      GFR Normal >60  Chronic Kidney Disease <60  Kidney Failure <15    The GFR formula is only valid for adults with stable renal function between ages 18 and 70.    CBC (No Diff) [[id number]]  (Abnormal) Collected: 11/10/24 0350    Specimen: Blood from Arm, Left Updated: 11/10/24 0607     WBC 11.19 10*3/mm3      RBC 4.66 10*6/mm3      Hemoglobin 14.5 g/dL      Hematocrit 47.1 %      .1 fL      MCH 31.1 pg      MCHC 30.8 g/dL      RDW 11.7 %      RDW-SD 43.7 fl      MPV 10.5 fL      Platelets 240 10*3/mm3     High Sensitivity Troponin T [[id number]]  (Abnormal) Collected: 11/09/24 1410    Specimen: Blood from Hand, Right Updated: 11/09/24 1509     HS Troponin T 104 ng/L     Narrative:      High Sensitive Troponin T Reference Range:  <14.0 ng/L- Negative Female for AMI  <22.0 ng/L- Negative Male for AMI  >=14 - Abnormal Female indicating possible myocardial injury.  >=22 - Abnormal Male indicating possible myocardial injury.   Clinicians would have to utilize clinical acumen, EKG, Troponin, and serial changes to determine if it is an Acute Myocardial Infarction or myocardial injury due to an underlying chronic condition.         CK [[id number]]  (Normal) Collected: 11/09/24 1410    Specimen: Blood from Hand, Right Updated: 11/09/24 1503     Creatine Kinase 40 U/L     High Sensitivity Troponin T [[id number]]  (Abnormal) Collected: 11/09/24 0218    Specimen: Blood Updated: 11/09/24 0358     HS Troponin T 180 ng/L     Narrative:      High Sensitive Troponin T Reference Range:  <14.0 ng/L- Negative Female for AMI  <22.0 ng/L- Negative Male for AMI  >=14 - Abnormal Female indicating possible myocardial injury.  >=22 - Abnormal Male indicating possible myocardial injury.   Clinicians would have to utilize clinical acumen, EKG, Troponin, and serial changes to determine  if it is an Acute Myocardial Infarction or myocardial injury due to an underlying chronic condition.         Basic Metabolic Panel [[id number]]  (Abnormal) Collected: 11/09/24 0218    Specimen: Blood Updated: 11/09/24 0356     Glucose 174 mg/dL      BUN 25 mg/dL      Creatinine 1.26 mg/dL      Sodium 141 mmol/L      Potassium 3.9 mmol/L      Chloride 104 mmol/L      CO2 28.4 mmol/L      Calcium 9.6 mg/dL      BUN/Creatinine Ratio 19.8     Anion Gap 8.6 mmol/L      eGFR 43.0 mL/min/1.73     Narrative:      GFR Normal >60  Chronic Kidney Disease <60  Kidney Failure <15    The GFR formula is only valid for adults with stable renal function between ages 18 and 70.    Lipid Panel [[id number]]  (Abnormal) Collected: 11/09/24 0218    Specimen: Blood Updated: 11/09/24 0356     Total Cholesterol 205 mg/dL      Triglycerides 120 mg/dL      HDL Cholesterol 49 mg/dL      LDL Cholesterol  134 mg/dL      VLDL Cholesterol 22 mg/dL      LDL/HDL Ratio 2.69    Narrative:      Cholesterol Reference Ranges  (U.S. Department of Health and Human Services ATP III Classifications)    Desirable          <200 mg/dL  Borderline High    200-239 mg/dL  High Risk          >240 mg/dL      Triglyceride Reference Ranges  (U.S. Department of Health and Human Services ATP III Classifications)    Normal           <150 mg/dL  Borderline High  150-199 mg/dL  High             200-499 mg/dL  Very High        >500 mg/dL    HDL Reference Ranges  (U.S. Department of Health and Human Services ATP III Classifications)    Low     <40 mg/dl (major risk factor for CHD)  High    >60 mg/dl ('negative' risk factor for CHD)        LDL Reference Ranges  (U.S. Department of Health and Human Services ATP III Classifications)    Optimal          <100 mg/dL  Near Optimal     100-129 mg/dL  Borderline High  130-159 mg/dL  High             160-189 mg/dL  Very High        >189 mg/dL    CBC (No Diff) [[id number]]  (Abnormal) Collected: 11/09/24 0218    Specimen: Blood Updated:  11/09/24 0334     WBC 11.63 10*3/mm3      RBC 4.86 10*6/mm3      Hemoglobin 15.8 g/dL      Hematocrit 48.2 %      MCV 99.2 fL      MCH 32.5 pg      MCHC 32.8 g/dL      RDW 11.9 %      RDW-SD 43.5 fl      MPV 10.8 fL      Platelets 222 10*3/mm3     CK [[id number]]  (Normal) Collected: 11/08/24 2213    Specimen: Blood Updated: 11/08/24 2348     Creatine Kinase 38 U/L     High Sensitivity Troponin T 2Hr [[id number]]  (Abnormal) Collected: 11/08/24 2213    Specimen: Blood Updated: 11/08/24 2239     HS Troponin T 166 ng/L      Troponin T Delta 82 ng/L     Narrative:      High Sensitive Troponin T Reference Range:  <14.0 ng/L- Negative Female for AMI  <22.0 ng/L- Negative Male for AMI  >=14 - Abnormal Female indicating possible myocardial injury.  >=22 - Abnormal Male indicating possible myocardial injury.   Clinicians would have to utilize clinical acumen, EKG, Troponin, and serial changes to determine if it is an Acute Myocardial Infarction or myocardial injury due to an underlying chronic condition.         Urinalysis, Microscopic Only - Straight Cath [[id number]]  (Abnormal) Collected: 11/08/24 2107    Specimen: Urine from Straight Cath Updated: 11/08/24 2136     RBC, UA None Seen /HPF      WBC, UA 3-5 /HPF      Bacteria, UA None Seen /HPF      Squamous Epithelial Cells, UA 0-2 /HPF      Hyaline Casts, UA None Seen /LPF      Methodology Manual Light Microscopy    Urinalysis With Microscopic If Indicated (No Culture) - Straight Cath [[id number]]  (Abnormal) Collected: 11/08/24 2107    Specimen: Urine from Straight Cath Updated: 11/08/24 2117     Color, UA Yellow     Appearance, UA Clear     pH, UA 6.5     Specific Gravity, UA 1.020     Glucose, UA Negative     Ketones, UA Negative     Bilirubin, UA Negative     Blood, UA Negative     Protein,  mg/dL (2+)     Leuk Esterase, UA Negative     Nitrite, UA Negative     Urobilinogen, UA 1.0 E.U./dL    Single High Sensitivity Troponin T [[id number]]  (Abnormal) Collected:  11/08/24 1940    Specimen: Blood Updated: 11/08/24 2013     HS Troponin T 84 ng/L     Narrative:      High Sensitive Troponin T Reference Range:  <14.0 ng/L- Negative Female for AMI  <22.0 ng/L- Negative Male for AMI  >=14 - Abnormal Female indicating possible myocardial injury.  >=22 - Abnormal Male indicating possible myocardial injury.   Clinicians would have to utilize clinical acumen, EKG, Troponin, and serial changes to determine if it is an Acute Myocardial Infarction or myocardial injury due to an underlying chronic condition.         Comprehensive Metabolic Panel [[id number]]  (Abnormal) Collected: 11/08/24 1940    Specimen: Blood Updated: 11/08/24 2012     Glucose 159 mg/dL      BUN 26 mg/dL      Creatinine 1.27 mg/dL      Sodium 139 mmol/L      Potassium 4.0 mmol/L      Comment: Slight hemolysis detected by analyzer. Result may be falsely elevated.        Chloride 103 mmol/L      CO2 26.3 mmol/L      Calcium 9.6 mg/dL      Total Protein 6.5 g/dL      Albumin 3.8 g/dL      ALT (SGPT) 10 U/L      AST (SGOT) 14 U/L      Comment: Slight hemolysis detected by analyzer. Result may be falsely elevated.        Alkaline Phosphatase 81 U/L      Total Bilirubin 0.2 mg/dL      Globulin 2.7 gm/dL      A/G Ratio 1.4 g/dL      BUN/Creatinine Ratio 20.5     Anion Gap 9.7 mmol/L      eGFR 42.6 mL/min/1.73     Narrative:      GFR Normal >60  Chronic Kidney Disease <60  Kidney Failure <15    The GFR formula is only valid for adults with stable renal function between ages 18 and 70.    Extra Tubes [[id number]] Collected: 11/08/24 1940    Specimen: Blood, Venous Line Updated: 11/08/24 1946    Narrative:      The following orders were created for panel order Extra Tubes.  Procedure                               Abnormality         Status                     ---------                               -----------         ------                     Gold Top - [facility][[id number]]                                   Final result                  Please view results for these tests on the individual orders.    Gold Top - [facility] [[id number]] Collected: 11/08/24 1940    Specimen: Blood Updated: 11/08/24 1946     Extra Tube Hold for add-ons.     Comment: Auto resulted.                Results for orders placed during the hospital encounter of 11/08/24    Adult Transthoracic Echo Complete W/ Cont if Necessary Per Protocol    Interpretation Summary    Left ventricular ejection fraction appears to be 36 - 40%.    Indications  Valvular function.    Technically satisfactory study.  Mitral valve is structurally normal.  Mild to moderate mitral regurgitation is present.  Tricuspid valve is structurally normal.  Aortic valve is thickened with adequate opening motion.  Pulmonic valve could not be well visualized.  Left atrium is normal in size.  Right atrium is normal in size.  Left ventricle is enlarged with diffuse hypocontractility with ejection fraction of 35 to 40%.  Left ventricular peak systolic global longitudinal strain (GLS) is abnormal at -10%.  Grade 1 left ventricular diastolic dysfunction is present.  (MV E/A ratio 0.36).  Right ventricle is enlarged with hypocontractility with TAPSE of 1.53 cm.  Atrial septum is intact.  Aorta is normal.  No pericardial effusion or intracardiac thrombus is seen.    Impression  Mild to moderate mitral regurgitation.  Aortic valve is thickened with adequate opening motion.  Left ventricle is enlarged with diffuse hypocontractility with ejection fraction of 35 to 40%.  Left ventricular peak systolic global longitudinal strain (GLS) is abnormal at -10%.  Grade 1 left ventricular diastolic dysfunction is present.  (MV E/A ratio 0.36).  Right ventricle is enlarged with hypocontractility with TAPSE of 1.53 cm.         Condition on Discharge:  Stable    Vital Signs  Temp:  [97.7 °F (36.5 °C)-98.7 °F (37.1 °C)] 98 °F (36.7 °C)  Heart Rate:  [77-99] 81  Resp:  [12-18] 12  BP: (114-143)/(52-82) 143/69      Physical  Exam         Discharge Disposition  Home-Health Care [facility]    Discharge Medications     Discharge Medications        New Medications        Instructions Start Date   ticagrelor 90 MG tablet tablet  Commonly known as: BRILINTA   90 mg, Oral, 2 Times Daily             Changes to Medications        Instructions Start Date   Lantus SoloStar 100 UNIT/ML injection pen  Generic drug: Insulin Glargine  What changed: additional instructions   10 Units, Subcutaneous, Nightly             Continue These Medications        Instructions Start Date   acetaminophen 650 MG 8 hr tablet  Commonly known as: TYLENOL   1,300 mg, Every 8 Hours PRN      albuterol sulfate  (90 Base) MCG/ACT inhaler  Commonly known as: PROVENTIL HFA;VENTOLIN HFA;PROAIR HFA   2 puffs, Every 4 Hours PRN      amLODIPine 10 MG tablet  Commonly known as: NORVASC   1 tablet, Daily      Anoro Ellipta 62.5-25 MCG/ACT aerosol powder  inhaler  Generic drug: Umeclidinium-Vilanterol   1 puff, Daily - RT      aspirin 81 MG tablet   1 tablet, Every 72 Hours      HYDROcodone-acetaminophen  MG per tablet  Commonly known as: NORCO   1 tablet, Every 6 Hours PRN      ipratropium-albuterol 0.5-2.5 mg/3 ml nebulizer  Commonly known as: DUO-NEB   3 mL, Nebulization, 4 Times Daily PRN      Linzess 145 MCG capsule capsule  Generic drug: linaclotide   1 capsule, Daily      pantoprazole 40 MG EC tablet  Commonly known as: PROTONIX   40 mg, Oral, Every Early Morning      polyethylene glycol 17 g packet  Commonly known as: MIRALAX   17 g, Daily      sertraline 50 MG tablet  Commonly known as: ZOLOFT   1 tablet, Daily             Stop These Medications      cloNIDine 0.1 MG tablet  Commonly known as: CATAPRES     furosemide 20 MG tablet  Commonly known as: LASIX     Kerendia 10 MG tablet  Generic drug: Finerenone              Discharge Diet:   Diet Instructions       Diet: Diabetic Diets; Consistent Carbohydrate; Regular (IDDSI 7); Thin (IDDSI 0)      Discharge Diet: Diabetic  Diets    Diabetic Diet: Consistent Carbohydrate    Texture: Regular (IDDSI 7)    Fluid Consistency: Thin (IDDSI 0)            Activity at Discharge:   Activity Instructions       Gradually Increase Activity Until at Pre-Hospitalization Level              Follow-up Appointments  Future Appointments   Date Time Provider Department Center   12/19/2024  1:30 PM Leon Gonzalez DO [facility]   2/7/2025  1:45 PM Geneva Duarte APRN [facility]     Additional Instructions for the Follow-ups that You Need to Schedule       Ambulatory Referral to Home Health   As directed      Face to Face Visit Date: 11/13/2024   Follow-up provider for Plan of Care?: I treated the patient in an acute care facility and will not continue treatment after discharge.   Follow-up provider: LUDWIN CHARLES [[id number]]   Reason/Clinical Findings: Deconditioning   Describe mobility limitations that make leaving home difficult: Deconditioning, status post cardiac stent x 2   Nursing/Therapeutic Services Requested: Physical Therapy   Frequency: 1 Week 1        Ambulatory Referral to Physical Therapy for Evaluation & Treatment   As directed      Specialty needed: Vestibular   Follow-up needed: Yes        Discharge Follow-up with PCP   As directed       Currently Documented PCP:    Anny Garcia MD    PCP Phone Number:    [phone]     Follow Up Details: 2 weeks        Discharge Follow-up with Specified Provider: Dr Ceron; 2 Weeks   As directed      To: Dr Ceron   Follow Up: 2 Weeks        Discharge Follow-up with Specified Provider: Dr Meza; 2 Weeks   As directed      To: Dr Meza   Follow Up: 2 Weeks                Test Results Pending at Discharge  Pending Results       Procedure [Order ID] Specimen - Date/Time    ECG 12 Lead Chest Pain [[id number]]              SAMIRA Flowers  11/14/24  10:19 EST    Time: Discharge 25 min

## 2024-11-14 NOTE — SIGNIFICANT NOTE
11/14/24 1147   OTHER   Discipline physical therapist   Rehab Time/Intention   Session Not Performed other (see comments)  (Pt with discharge orders pending transport to SNF)   Therapy Assessment/Plan (PT)   Criteria for Skilled Interventions Met (PT) yes;meets criteria;skilled treatment is necessary

## 2024-11-15 NOTE — OUTREACH NOTE
Prep Survey      Flowsheet Row Responses   [facility] facility patient discharged from? [facility]   Is LACE score < 7 ? No   Eligibility Readm Mgmt   Discharge diagnosis Anginal equivalent, NSTheart cath with stent x 2   Does the patient have one of the following disease processes/diagnoses(primary or secondary)? Acute MI (STEMI,NSTEMI)   Does the patient have Home health ordered? No   Is there a DME ordered? No   Comments regarding appointments out pt PT   Prep survey completed? Yes            Tammie BARR - Registered Nurse

## 2024-11-17 LAB
QT INTERVAL: 357 MS
QT INTERVAL: 393 MS
QTC INTERVAL: 453 MS
QTC INTERVAL: 477 MS

## 2024-11-18 ENCOUNTER — TELEPHONE (OUTPATIENT)
Dept: CARDIOLOGY | Facility: CLINIC | Age: 79
End: 2024-11-18

## 2024-11-18 NOTE — TELEPHONE ENCOUNTER
Caller: HUSSEIN    Relationship: DAUGHTER    Best call back number: [phone]    CHRISTIANO [phone]    What is the best time to reach you: BEFORE 10:45 OR THIS AFTERNOON        What was the call regardin.  PT MAYBE IN AFIB- YESTERDAY PT'S HEART RATE RANGED    THIS MORING HR IN THE 80'S- ALSO HAD SHORTNESS OF BREATH WHILE AT REST    2.  168/98 BP YESTERDAY- MENTIONED CLONADINE STOPPED,  SHOULD SHE RESTART ANY DISCONTINUED BP MEDS OR START SOMETHING NEW?

## 2024-11-19 ENCOUNTER — TREATMENT (OUTPATIENT)
Dept: PHYSICAL THERAPY | Facility: CLINIC | Age: 79
End: 2024-11-19
Payer: MEDICARE

## 2024-11-19 ENCOUNTER — READMISSION MANAGEMENT (OUTPATIENT)
Dept: CALL CENTER | Facility: HOSPITAL | Age: 79
End: 2024-11-19
Payer: MEDICARE

## 2024-11-19 DIAGNOSIS — R26.2 DIFFICULTY WALKING: ICD-10-CM

## 2024-11-19 DIAGNOSIS — R53.81 PHYSICAL DECONDITIONING: ICD-10-CM

## 2024-11-19 DIAGNOSIS — R26.89 NEED FOR ASSISTANCE DUE TO UNSTEADY GAIT: ICD-10-CM

## 2024-11-19 DIAGNOSIS — H81.12 BPPV (BENIGN PAROXYSMAL POSITIONAL VERTIGO), LEFT: Primary | ICD-10-CM

## 2024-11-19 DIAGNOSIS — Z92.89 HISTORY OF RECENT HOSPITALIZATION: ICD-10-CM

## 2024-11-19 NOTE — TELEPHONE ENCOUNTER
Patient's Daughter called the office back. Wanting to see if the patient could be seen sooner then the 19th of Dec. Dr. Gonzalez had a cancellation tomorrow at 10:15 am. Patient's daughter stated that she will bring her in at that time.

## 2024-11-19 NOTE — PROGRESS NOTES
"   11/19/24 1704   Tinetti Assessment   Tinetti Assessment yes   Sitting Balance 1   Arises 1   Attempts to Rise 2   Immediate Standing Balance (first 5 sec) 1   Standing Balance 2   Sternal Nudge (feet close together) 1   Eyes Closed (feet close together) 1   Turning 360 Degrees- Steps 1   Turning 360 Degrees- Steadiness 1   Sitting Down 1   Tinetti Balance Score 12   Gait Initiation (immediate after told \"go\") 0   Step Length- Right Swing 1   Step Length- Left Swing 0   Foot Clearance- Right Foot 1   Foot Clearance- Left Foot 1   Step Symmetry 0   Step Continuity 1   Path (excursion) 1   Trunk 0   Base of Support 1   Gait Score 6   Tinetti Total Score 18   Tinetti Assistive Device straight cane   Functional Assessment   Functional Tests Options Roel       "

## 2024-11-19 NOTE — PROGRESS NOTES
Physical Therapy Initial Evaluation and Plan of Care                           [address] [place] [address] Dr. LEONARDO, [address], [place]  [address]    Patient: Vianey Reeves   : 1945  Diagnosis/ICD-10 Code:  BPPV (benign paroxysmal positional vertigo), left [H81.12]  Referring practitioner: SAMIRA Rouse  Date of Initial Visit: 2024  Today's Date: 2024  Patient seen for 1 sessions           Subjective Questionnaire: DHI: 62% impairment      Subjective Evaluation    History of Present Illness  Mechanism of injury: Vianey was admitted to hospital through ED on 24 d/t chest pains, dizziness, and LE weakness. She ultimately had L heart cath & 2 stents on 24. She notes she was DC on  or 15.   She was also treated in hospital for vertigo.   Anny reports she has not had vertigo in the past but that's her main concern right now. She also notes her balance is not good. She uses [facility] just since being in the hospital bc her balance isn't great. She had been indep prior to hospitalization.       PMH: CAD, COPD, chronic hypoxemia, vertigo, CKD stage 3, essential HTN, GERD, DM2    Treatments  Current treatment: physical therapy           Objective          Ambulation   Weight-Bearing Status   Assistive device used: single point cane    Additional Weight-Bearing Status Details  PLOF: indep amb    Observational Gait   Gait: asymmetric   Decreased walking speed and stride length.       BP seated 162/84; HI 94     Cervical AROM is limited to 40 deg R, 35 deg L without symptom aggravation.  Cervical instability negative.  Negative central vestibular signs (resting nystagmus). However, smooth pursuit & saccades noted during oculomotor exam.  Positive peripheral vestibular signs (VOR, head thrust).   Tinetti total 18/28 indicating fall risk.  SLS = 0 L, 0 R.    BPPV testing: [place] Halpike L +  Performed CRT Epley L    Assessment & Plan       Assessment  Impairments: abnormal coordination, abnormal gait,  abnormal muscle tone, abnormal or restricted ROM, activity intolerance, impaired balance, impaired physical strength, lacks appropriate home exercise program, pain with function, safety issue and weight-bearing intolerance   Functional limitations: walking and standing   Assessment details: Pt is an 80 yo female presenting to OP PT w/hx dizziness, instability, & chronic cardiac issues. She exhibits B ankle & hip weakness, gait and balance deficits (18/28 Tinetti - high fall risk), and functional limitations (unable to rise from chair w/o UE support). Pt currently amb w/[facility] (PLOF indep). She also exhibits limited & provocative cervical AROM in addition to suboccipital hypertonia possibly contributing to vertigo sxs.   The patient is an appropriate candidate for physical therapy and will benefit from skilled patient intervention in order address the above impairments/limitations.  The plan of care, goals and treatment plan were discussed with the patient and the patient is agreeable to participating in patient services.   Prognosis: good    Goals  Plan Goals: STG to be achieved in 3 wks  1. Pt will achieve rising from standard chair without UE support to reflect improved LE strength and general stability.  2. Pt will demonstrate negative Sven Hallpike for rolling in bed without dizziness.   3. Pt will improve Tinetti score from 18/28 to at least 19/28 to reflect improvement from high fall risk to moderate.    LTG to be achieved in 12 wks  1. Pt will improve 30s STS to at least 3 for rising from restaurant chairs.  2. Pt will improve cervical rotation AROM at least 60 deg R & L for driving.  3. Pt will improve Tinetti score from 18/28 to at least 24/28 to reflect improvement from high fall risk to low.  4. Pt will demonstrate standing balance w/eyes closed for at least 10s to reduce fall risk in low light situations e.g. showering.   5. Pt will improve DHI from 62% impairment to no greater than 50%    Plan  Therapy  options: will be seen for skilled therapy services  Planned modality interventions: electrical stimulation/Russian stimulation, TENS, cryotherapy, traction, thermotherapy (hydrocollator packs), high voltage pulsed current (spasm management), dry needling and ultrasound  Planned therapy interventions: ADL retraining, balance/weight-bearing training, body mechanics training, flexibility, functional ROM exercises, gait training, home exercise program, IADL retraining, joint mobilization, manual therapy, neuromuscular re-education, postural training, soft tissue mobilization, strengthening, stretching, therapeutic activities, motor coordination training and transfer training  Frequency: 2x week  Duration in weeks: 12  Treatment plan discussed with: patient      Timed:         Manual Therapy:    10     mins  69328;     Therapeutic Exercise:    15     mins  00551;     Neuromuscular Lucia:        mins  91651;    Therapeutic Activity:     15     mins  06095;     Gait Training:           mins  60759;     Ultrasound:          mins  73775;    Self-care  ____ mins 17069  Tests & Measures              mins  35198      Un-Timed:  Electrical Stimulation:    20     mins  16653 ( );  Dry Needling          mins self-pay 14162  Traction          mins 21004  Low Eval          mins  98574  Mod Eval          mins  55723  High Eval                        15    mins  29774  Canal repositioning ___  mins  63365        Timed Treatment:   40   mins   Total Treatment:     75   mins    PT SIGNATURE: Ria Burnham PT, DPT, cert. DN  [place] license # [id number]  Electronically signed by Ria Burnham PT, 11/19/24, 11:01 AM EST    Initial Certification  Certification Period: 11/19/2024 through 2/16/2025  I certify that the therapy services are furnished while this patient is under my care.  The services outlined above are required by this patient, and will be reviewed every 90 days.     PHYSICIAN: Diane Ventura APRN    NPI:  [id number]                                       DATE: ______________________________________________________________________________________________     Please sign and return via fax to [phone]. Thank you, [facility].

## 2024-11-19 NOTE — OUTREACH NOTE
AMI Week 1 Survey      Flowsheet Row Responses   [facility] patient discharged from? [facility]   Does the patient have one of the following disease processes/diagnoses(primary or secondary)? Acute MI (STEMI,NSTEMI)   Week 1 attempt successful? Yes   Call start time 1516   Call end time 1521   Discharge diagnosis Anginal equivalent, NSTheart cath with stent x 2   Is patient permission given to speak with other caregiver? Yes   Person spoke with today (if not patient) and relationship daughterJanice   Meds reviewed with patient/caregiver? Yes   Does the patient have all prescriptions related to this admission filled (includes statins,anticoagulants,HTN meds,anti-arrhythmia meds) Yes   Is the patient taking all medications as directed (includes completed medication regime)? Yes   Does the patient have a primary care provider?  Yes   Does the patient have an appointment with their PCP,cardiologist,or clinic within 7 days of discharge? Yes  [Dr Gonzalez appt tomorrow 11/20  1015am]   Comments regarding PCP Follow up with Anny Garcia   Has the patient kept scheduled appointments due by today? N/A   Has home health visited the patient within 72 hours of discharge? N/A  [Outpt PT  [facility]]   Psychosocial issues? No   Did the patient receive a copy of their discharge instructions? Yes   Nursing interventions Reviewed instructions with patient   What is the patient's perception of their health status since discharge? Same  [Daughter reports that she contacted cardiologist office regarding patient still showing symptoms of afib. Patient seeing cardiologist tomorrow]   Is the patient/caregiver able to teach back ways to prevent a second heart attack: Take medications, Follow up with MD   If the patient is a current smoker, are they able to teach back resources for cessation? Not a smoker   Is the patient/caregiver able to teach back the hierarchy of who to call/visit for symptoms/problems? PCP, Specialist, Home health  nurse, Urgent Care, ED, 911 Yes   Week 1 call completed? Yes   Is the patient interested in additional calls from an ambulatory ? No   Would this patient benefit from a Referral to [facility] Social Work? No   Call end time 1521            SULEMA DERAS - Registered Nurse

## 2024-11-19 NOTE — TELEPHONE ENCOUNTER
Called and LM for the patient to call the office back. To inform her of what Dr. Gonzalez want's her to do. I will call her back again in a few.

## 2024-11-20 ENCOUNTER — OFFICE VISIT (OUTPATIENT)
Dept: CARDIOLOGY | Facility: CLINIC | Age: 79
End: 2024-11-20
Payer: MEDICARE

## 2024-11-20 VITALS
OXYGEN SATURATION: 98 % | SYSTOLIC BLOOD PRESSURE: 182 MMHG | BODY MASS INDEX: 28.89 KG/M2 | HEART RATE: 98 BPM | HEIGHT: 65 IN | WEIGHT: 173.4 LBS | DIASTOLIC BLOOD PRESSURE: 90 MMHG | RESPIRATION RATE: 20 BRPM

## 2024-11-20 DIAGNOSIS — E78.2 MIXED HYPERLIPIDEMIA: ICD-10-CM

## 2024-11-20 DIAGNOSIS — I10 ESSENTIAL HYPERTENSION: ICD-10-CM

## 2024-11-20 DIAGNOSIS — E78.00 HYPERCHOLESTEROLEMIA: ICD-10-CM

## 2024-11-20 DIAGNOSIS — R06.2 WHEEZING: ICD-10-CM

## 2024-11-20 DIAGNOSIS — R10.84 GENERALIZED ABDOMINAL PAIN: ICD-10-CM

## 2024-11-20 DIAGNOSIS — Z79.02 LONG TERM CURRENT USE OF ANTITHROMBOTICS/ANTIPLATELETS: ICD-10-CM

## 2024-11-20 DIAGNOSIS — I25.10 ATHEROSCLEROSIS OF NATIVE CORONARY ARTERY OF NATIVE HEART WITHOUT ANGINA PECTORIS: Primary | ICD-10-CM

## 2024-11-20 RX ORDER — DOXAZOSIN 2 MG/1
2 TABLET ORAL NIGHTLY
Qty: 90 TABLET | Refills: 3 | Status: SHIPPED | OUTPATIENT
Start: 2024-11-20

## 2024-11-20 NOTE — PROGRESS NOTES
Cardiology Office Visit      Encounter Date:  2024    PATIENT IDENTIFICATION    Name: Vianey Reeves  Age: 79 y.o. Sex: female : 1945  MRN: [id number]    Reason For Followup:  Coronary artery disease  Hypertension  Hypertensive cardiovascular disease    Brief Clinical History:  Dear Anny Cleary MD    I had the pleasure of seeing Vianey Reeves today. As you are well aware, this is a 79 y.o. female with a known history of ischemic heart disease.  She underwent cardiac catheterization with PCI and drug-eluting stent placement on 2016 and follow-up PCI on 2016. She presents today for follow-up on the above conditions.      Interval History:  She denies any chest pain pressure heaviness or tightness.  She denies any shortness of breath out of character. She denies any PND orthopnea.  She denies any syncope or near syncope.  She reports feeling well today.     Her last visit she had notable increase in her calcium level.  We had some laboratory work performed and she followed up with nephrology.  Her calcium level actually returned to normal in the interim.     She continues to smoke.  She has been wearing patches.  She smokes between 4 cigarettes and a half a pack of cigarettes daily.     As you know, with regards to her hypertension, we have a limited repertoire of medications that can be utilized.  We have stayed away from ACE/ARB medications due to the presence of a solitary kidney and renal insufficiency.  Calcium channel blockers appear to cause edema requiring diuretic usage which can exacerbate renal insufficiency.  Potassium sparing diuretics can also be problematic with renal insufficiency.  Beta-blockers appear to be causing fatigue and bradycardia.  Clonidine results in significant lability in blood pressure and can also contribute to bradycardia.  Hydralazine is very cumbersome to take.       02/15/2024        Patient had seen Dr Du on past couple of visits. Was in the  "hospital with COVID and she was sent home but bounced back because of the pneumonia. She has been on oxygen since discharge from [facility]. She quit smoking and has been free 3 months. She has no energy and has been an issue for a few months. Both legs are very weak and have been for a while. She was at an office visit and she got so weak she had to be held up and laid down. Had EKG and was told she was in atrial fibrillation.  She was accompanied by her granddaughter today who provided supplemental information pertinent to the visit.    She underwent a nuclear stress test and 2D echocardiogram in January 2024.  No evidence of myocardial ischemia was noted.  Low normal left ventricular systolic function with an ejection fraction of 50 to 55% was noted.  Grade 1 diastolic dysfunction.  Moderate mitral insufficiency was noted.    03/28/2024        She reports that she fell. She was walking down the hallway. No warning. She had fallen the week before that as well.  She has some pain residual from this.  She did have the monitor on at the time of one of the falls and she had no automatic triggers noted. She did have an episode on that day (3/5/2024) but it was SR with PAC and was done while she was at the hospital.    11/20/2024        She was hospitalized recently and ultimately had a cardiac cath and had PCI of the Circumflex. She had been having some issues with \"swimming\" feeling in her head as well. That is better and she started PT for that and she reports that it is helping significantly.     She does have some issues with pressley and red meat so we will test her for alpha-gal. She was supposed to have exploratory surgery but that will have to be postponed.      Assessment & Plan     Impressions:  Coronary artery disease status post PCI and drug-eluting stent placement in her mid RCA and Cx        Negative nuclear stress test May 2019  Diabetes mellitus.  Dyslipidemia with intolerance to statins.  Hypertension with " hypertensive cardiovascular disease.      Suboptimal with limited medication repertoire  COPD  Solitary kidney.  Chronic renal failure with declining GFR     Followed by Dr. Julio  Tobacco abuse  Anti-platelet therapy.  Hypercalcemia-resolved  Fatigue  Weakness  Frequent falls  Abdominal pain and discomfort after eating pressley     Recommendations:  Continuation of her current cardiovascular regimen at the present time.     This includes antihypertensives, and antiplatelets.  Alpha gal panel  Red meat and pork abstinence  Doxazosin 2 mg daily  Continue to monitor blood pressure periodically  Follow-up in 6 months    Diagnoses and all orders for this visit:    1. Atherosclerosis of native coronary artery of native heart without angina pectoris (Primary)  Overview:  No chest pain, Cardiology apt pending 04/07/2022    Orders:  -     Alpha-Gal IgE Panel; Future  -     ECG 12 Lead    2. Essential hypertension  Overview:   doxazosin made her dizzy and fatigued. She would like to stay on amlodopine instead.   Amlodpine has caused the least problems for her she says. She is aware of the edema but she says she can live with it.     Orders:  -     Alpha-Gal IgE Panel; Future  -     ECG 12 Lead    3. Hypercholesterolemia  -     Alpha-Gal IgE Panel; Future  -     ECG 12 Lead    4. Mixed hyperlipidemia  Overview:  She sees endocrinology  She can not tolerate statins    Orders:  -     Alpha-Gal IgE Panel; Future  -     ECG 12 Lead    5. Long term current use of antithrombotics/antiplatelets  -     Alpha-Gal IgE Panel; Future  -     ECG 12 Lead    6. Generalized abdominal pain  -     Alpha-Gal IgE Panel; Future  -     ECG 12 Lead    7. Wheezing  -     Alpha-Gal IgE Panel; Future  -     ECG 12 Lead    Other orders  -     doxazosin (Cardura) 2 MG tablet; Take 1 tablet by mouth Every Night.  Dispense: 90 tablet; Refill: 3              Objective:    Vitals:  Vitals:    11/20/24 1018   BP: (!) 182/90   BP Location: Left arm   Patient  "Position: Sitting   Cuff Size: Large Adult   Pulse: 98   Resp: 20   SpO2: 98%   Weight: 78.7 kg (173 lb 6.4 oz)   Height: 165.1 cm (65\")         Body mass index is 28.86 kg/m².      Physical Exam:    General: Alert, cooperative, no distress, appears stated age  Head:  Normocephalic, atraumatic, mucous membranes moist  Eyes:  Conjunctiva/corneas clear, EOM's intact     Neck:  Supple,  no bruit    Lungs: Coarse and diminished bilaterally  Chest wall: No tenderness  Heart::  Regular rate and rhythm, S1 and S2 normal, 1/6 holosystolic murmur.  No rub or gallop  Abdomen: Soft, non-tender, nondistended bowel sounds active  Extremities: No cyanosis, clubbing, or edema  Pulses: 2+ and symmetric all extremities  Skin:  No rashes or lesions  Neuro/psych: A&O x3. CN II through XII are grossly intact with appropriate affect      Allergies:  Allergies   Allergen Reactions    Erythromycin Rash    Naproxen Sodium Other (See Comments)     Dizzy lightheaded    Statins Myalgia    Latex Itching, Swelling and Hives    Metoclopramide Other (See Comments)     Unsteady on feet    Cardene [Nicardipine] Other (See Comments)     Shortness of breath, crushing pain in throat/neck.    Dicyclomine Unknown - Low Severity       Medication Review:     Current Outpatient Medications:     acetaminophen (TYLENOL) 650 MG 8 hr tablet, Take 2 tablets by mouth Every 8 (Eight) Hours As Needed for Mild Pain., Disp: , Rfl:     albuterol sulfate  (90 Base) MCG/ACT inhaler, Inhale 2 puffs Every 4 (Four) Hours As Needed for Wheezing or Shortness of Air., Disp: , Rfl:     amLODIPine (NORVASC) 10 MG tablet, Take 1 tablet by mouth Daily. Indications: High Blood Pressure, Disp: , Rfl:     aspirin 81 MG tablet, Take 1 tablet by mouth Every 72 (Seventy-Two) Hours. Indications: ANTIPLATELET, Disp: , Rfl:     HYDROcodone-acetaminophen (NORCO)  MG per tablet, Take 1 tablet by mouth Every 6 (Six) Hours As Needed for Moderate Pain., Disp: , Rfl:     Insulin " Glargine (LANTUS SOLOSTAR) 100 UNIT/ML injection pen, Inject 10 Units under the skin into the appropriate area as directed Every Night., Disp: 100 mL, Rfl: 12    ipratropium-albuterol (DUO-NEB) 0.5-2.5 mg/3 ml nebulizer, Take 3 mL by nebulization 4 (Four) Times a Day As Needed for Wheezing., Disp: 360 mL, Rfl: 1    Linzess 145 MCG capsule capsule, Take 1 capsule by mouth Daily. Indications: CONSTIPATION, Disp: , Rfl:     pantoprazole (PROTONIX) 40 MG EC tablet, Take 1 tablet by mouth Every Morning., Disp: 30 tablet, Rfl: 1    polyethylene glycol (MIRALAX) 17 g packet, Take 17 g by mouth Daily. Indications: Constipation, Disp: , Rfl:     sertraline (ZOLOFT) 50 MG tablet, Take 1 tablet by mouth Daily. Indications: MOOD, Disp: , Rfl:     ticagrelor (BRILINTA) 90 MG tablet tablet, Take 1 tablet by mouth 2 (Two) Times a Day., Disp: 60 tablet, Rfl: 1    Umeclidinium-Vilanterol (Anoro Ellipta) 62.5-25 MCG/ACT aerosol powder  inhaler, Inhale 1 puff Daily. At the same time each day., Disp: , Rfl:     doxazosin (Cardura) 2 MG tablet, Take 1 tablet by mouth Every Night., Disp: 90 tablet, Rfl: 3    Family History:  Family History   Problem Relation Age of Onset    Arthritis Father     Prostate cancer Father     Heart disease Sister        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    Hypertension     IBS (irritable colon syndrome)     constipation    Mass of right  breast     Neuropathy     Renal insufficiency     Sciatic leg pain     right    Simple chronic bronchitis     Solitary kidney     left       Past Surgical History:  Past Surgical History:   Procedure Laterality Date    BREAST BIOPSY Right     BRONCHOSCOPY N/A 7/14/2024    Procedure: BRONCHOSCOPY WITH BRONCHOALVEOLAR LAVAGE;  Surgeon: Marlin Ferguson MD;  Location: [facility] ENDOSCOPY;  Service: Pulmonary;  Laterality: N/A;  POST: RML ATELECTASIS    CARDIAC CATHETERIZATION      CARDIAC CATHETERIZATION N/A 11/11/2024    Procedure: Left Heart Cath;  Surgeon: Leon Gonzalez DO;  Location: [facility] CATH INVASIVE LOCATION;  Service: Cardiovascular;  Laterality: N/A;    CHOLECYSTECTOMY      COLON SURGERY      REMOVAL diverticular diseae    COLONOSCOPY N/A 08/12/2021    Procedure: COLONOSCOPY with polypectomy x 3;  Surgeon: Margarette Mcallister MD;  Location: [facility] ENDOSCOPY;  Service: Gastroenterology;  Laterality: N/A;  post op: hmorrhoids, diverticulosis, polyps    CORONARY STENT PLACEMENT      ENDOSCOPY N/A 08/12/2021    Procedure: ESOPHAGOGASTRODUODENOSCOPY with dilatation (18-20 mm balloon) and (54 bougie);  Surgeon: Margarette Mcallister MD;  Location: [facility] ENDOSCOPY;  Service: Gastroenterology;  Laterality: N/A;  post op: esophageal stricture, esophagitis, gastritis, history of nissen    ENDOSCOPY N/A 9/13/2023    Procedure: ESOPHAGOGASTRODUODENOSCOPY with biopsy x 1 area and dilation (50,54,56 non guided bougie);  Surgeon: Margarette Mcallister MD;  Location: [facility] ENDOSCOPY;  Service: Gastroenterology;  Laterality: N/A;  post op: esophageal stricture    EYE SURGERY Bilateral     cataracts    HIATAL HERNIA REPAIR      NEPHRECTOMY Right     cancer    UPPER ENDOSCOPIC ULTRASOUND W/ FNA N/A 09/22/2022    Procedure: EUS;  Surgeon: Margarette Mcallister MD;  Location: [facility] ENDOSCOPY;  Service: Gastroenterology;  Laterality: N/A;  post: normal pancreas, dilated pancreatic duct, hiatal hernia,        Social History:  Social History      Socioeconomic History    Marital status:    Tobacco Use    Smoking status: Former     Current packs/day: 0.00     Average packs/day: 0.3 packs/day for 50.0 years (12.5 ttl pk-yrs)     Types: Cigarettes     Start date: 1973     Quit date: 2023     Years since quittin.0     Passive exposure: Past    Smokeless tobacco: Never    Tobacco comments:     Mostly patches, some days none, some days 1-2   Vaping Use    Vaping status: Never Used   Substance and Sexual Activity    Alcohol use: Not Currently     Comment: occasionally     Drug use: Never    Sexual activity: Defer       Review of Systems:  The following systems were reviewed as they relate to the cardiovascular system: Constitutional, Eyes, ENT, Cardiovascular, Respiratory, Gastrointestinal, Integumentary, Neurological, Psychiatric, Hematologic, Endocrine, Musculoskeletal, and Genitourinary. The pertinent cardiovascular findings are reported above with all other cardiovascular points within those systems being negative.    Diagnostic Study Review:     Current Electrocardiogram:    ECG 12 Lead    Date/Time: 2024 6:17 PM  Performed by: Leon Gonzalez DO    Authorized by: Leon Gonzalez DO  Comparison: not compared with previous ECG   Previous ECG: no previous ECG available  Comments: Normal sinus rhythm with a ventricular rate of 98 bpm.  Atrial premature complex.  Right bundle branch block and left posterior fascicular block.  Normal QT and QTc intervals.          Laboratory Data:  Lab Results   Component Value Date    GLUCOSE 114 (H) 2024    BUN 38 (H) 2024    CREATININE 1.53 (H) 2024    EGFRIFNONA 29 (L) 2021    EGFRIFAFRI 33 (L) 2021    BCR 24.8 2024    K 4.3 2024    CO2 26.0 2024    CALCIUM 9.3 2024    PROTENTOTREF 5.7 (L) 2022    ALBUMIN 3.4 (L) 2024    LABIL2 1.7 2022    AST 14 2024    ALT 10 2024     Lab Results   Component  Value Date    GLUCOSE 114 (H) 11/14/2024    CALCIUM 9.3 11/14/2024     11/14/2024    K 4.3 11/14/2024    CO2 26.0 11/14/2024     (H) 11/14/2024    BUN 38 (H) 11/14/2024    CREATININE 1.53 (H) 11/14/2024    EGFRIFAFRI 33 (L) 11/11/2021    EGFRIFNONA 29 (L) 11/11/2021    BCR 24.8 11/14/2024    ANIONGAP 9.0 11/14/2024     Lab Results   Component Value Date    WBC 10.90 (H) 11/13/2024    HGB 13.6 11/13/2024    HCT 41.4 11/13/2024    MCV 98.3 (H) 11/13/2024     11/13/2024     Lab Results   Component Value Date    CHOL 205 (H) 11/09/2024    CHLPL 206 (H) 12/16/2022    TRIG 120 11/09/2024    HDL 49 11/09/2024     (H) 11/09/2024     Lab Results   Component Value Date    HGBA1C 7.10 (H) 01/21/2024     Lab Results   Component Value Date    INR 1.00 02/20/2023    PROTIME 10.3 02/20/2023       Most Recent Echo:  Results for orders placed during the hospital encounter of 11/08/24    Adult Transthoracic Echo Complete W/ Cont if Necessary Per Protocol    Interpretation Summary    Left ventricular ejection fraction appears to be 36 - 40%.    Indications  Valvular function.    Technically satisfactory study.  Mitral valve is structurally normal.  Mild to moderate mitral regurgitation is present.  Tricuspid valve is structurally normal.  Aortic valve is thickened with adequate opening motion.  Pulmonic valve could not be well visualized.  Left atrium is normal in size.  Right atrium is normal in size.  Left ventricle is enlarged with diffuse hypocontractility with ejection fraction of 35 to 40%.  Left ventricular peak systolic global longitudinal strain (GLS) is abnormal at -10%.  Grade 1 left ventricular diastolic dysfunction is present.  (MV E/A ratio 0.36).  Right ventricle is enlarged with hypocontractility with TAPSE of 1.53 cm.  Atrial septum is intact.  Aorta is normal.  No pericardial effusion or intracardiac thrombus is seen.    Impression  Mild to moderate mitral regurgitation.  Aortic valve is  "thickened with adequate opening motion.  Left ventricle is enlarged with diffuse hypocontractility with ejection fraction of 35 to 40%.  Left ventricular peak systolic global longitudinal strain (GLS) is abnormal at -10%.  Grade 1 left ventricular diastolic dysfunction is present.  (MV E/A ratio 0.36).  Right ventricle is enlarged with hypocontractility with TAPSE of 1.53 cm.       Most Recent Stress Test:  Results for orders placed during the hospital encounter of 01/14/24    Stress Test With Myocardial Perfusion One Day    Interpretation Summary    Lexiscan myocardial perfusion study shows moderate size severe intensity mostly fixed perfusion defect involving the basal inferior wall with no significant reversible ischemia.    Likely underlying perfusion abnormality secondary to attenuation artifact    Impressions are consistent with a low risk study.    Left ventricular ejection fraction is normal (Calculated EF = 50%).    Clinical correlation is recommended       Most Recent Cardiac Catheterization:   No results found for this or any previous visit.       NOTE: The following portions of the patient's note were reviewed, confirmed and/or updated this visit as appropriate: History of present illness/Interval history, physical examination, assessment & plan, allergies, current medications, past family history, past medical history, past social history, past surgical history and problem list.    Labs pertinent to today's visit on 11/20/2024 (including but not limited to CBC, CMP, and lipid profiles) were requested from the patient's primary care provider/hospital/clinical laboratory.  If the labs were available for the visit, they were reviewed with the patient.  If they were not available, when received, special interest will be made to the labs pertinent to this visit.  The patient's most recent \"in-house\" labs are noted below and have been reviewed.  Outside labs pertinent to this visit are scanned into the record " and have been reviewed.    Discussions held today, 11/20/2024,regarding procedures included risk, benefits, and options including but not limited to: Death, MI, stroke, pain, bleeding, infection, and possible need for vascular/thoracic/cardiothoracic surgery.    Copied information within this note was reviewed and is current as of 11/20/2024.    Assessment and plan noted herein represents the current plan of care as of 11/20/2024.    Significant resources from our office and staff are inherent in engaging this patient in a continuous and active collaborative plan of care related to their chronic cardiovascular conditions outlined below.  The management of these conditions requires the direction of our service with specialized clinical knowledge, skills, and experience.  This collaborative care includes but is not limited to patient education, expectations and responsibilities, shared decision making around therapeutic goals, and shared commitments to achieve those goals.

## 2024-11-21 ENCOUNTER — LAB (OUTPATIENT)
Dept: LAB | Facility: HOSPITAL | Age: 79
End: 2024-11-21
Payer: MEDICARE

## 2024-11-21 DIAGNOSIS — I25.10 ATHEROSCLEROSIS OF NATIVE CORONARY ARTERY OF NATIVE HEART WITHOUT ANGINA PECTORIS: ICD-10-CM

## 2024-11-21 DIAGNOSIS — Z79.02 LONG TERM CURRENT USE OF ANTITHROMBOTICS/ANTIPLATELETS: ICD-10-CM

## 2024-11-21 DIAGNOSIS — I10 ESSENTIAL HYPERTENSION: ICD-10-CM

## 2024-11-21 DIAGNOSIS — R06.2 WHEEZING: ICD-10-CM

## 2024-11-21 DIAGNOSIS — E78.00 HYPERCHOLESTEROLEMIA: ICD-10-CM

## 2024-11-21 DIAGNOSIS — E78.2 MIXED HYPERLIPIDEMIA: ICD-10-CM

## 2024-11-21 DIAGNOSIS — R10.84 GENERALIZED ABDOMINAL PAIN: ICD-10-CM

## 2024-11-21 PROCEDURE — 86003 ALLG SPEC IGE CRUDE XTRC EA: CPT

## 2024-11-21 PROCEDURE — 86008 ALLG SPEC IGE RECOMB EA: CPT

## 2024-11-21 PROCEDURE — 82785 ASSAY OF IGE: CPT

## 2024-11-21 PROCEDURE — 36415 COLL VENOUS BLD VENIPUNCTURE: CPT

## 2024-11-22 ENCOUNTER — TELEPHONE (OUTPATIENT)
Dept: PHYSICAL THERAPY | Facility: CLINIC | Age: 79
End: 2024-11-22

## 2024-11-27 LAB
ALPHA-GAL IGE QN: 0.42 KU/L
BEEF IGE QN: 0.28 KU/L
CONV CLASS DESCRIPTION: ABNORMAL
IGE SERPL-ACNC: 46 IU/ML (ref 6–495)
LAMB IGE QN: <0.1 KU/L
PORK IGE QN: 0.1 KU/L

## 2024-12-03 ENCOUNTER — TREATMENT (OUTPATIENT)
Dept: PHYSICAL THERAPY | Facility: CLINIC | Age: 79
End: 2024-12-03
Payer: MEDICARE

## 2024-12-03 ENCOUNTER — READMISSION MANAGEMENT (OUTPATIENT)
Dept: CALL CENTER | Facility: HOSPITAL | Age: 79
End: 2024-12-03
Payer: MEDICARE

## 2024-12-03 DIAGNOSIS — H81.12 BPPV (BENIGN PAROXYSMAL POSITIONAL VERTIGO), LEFT: Primary | ICD-10-CM

## 2024-12-03 DIAGNOSIS — R26.89 NEED FOR ASSISTANCE DUE TO UNSTEADY GAIT: ICD-10-CM

## 2024-12-03 DIAGNOSIS — R53.81 PHYSICAL DECONDITIONING: ICD-10-CM

## 2024-12-03 DIAGNOSIS — R26.2 DIFFICULTY WALKING: ICD-10-CM

## 2024-12-03 DIAGNOSIS — Z92.89 HISTORY OF RECENT HOSPITALIZATION: ICD-10-CM

## 2024-12-03 PROCEDURE — 97530 THERAPEUTIC ACTIVITIES: CPT | Performed by: PHYSICAL THERAPIST

## 2024-12-03 PROCEDURE — 97140 MANUAL THERAPY 1/> REGIONS: CPT | Performed by: PHYSICAL THERAPIST

## 2024-12-03 PROCEDURE — 97112 NEUROMUSCULAR REEDUCATION: CPT | Performed by: PHYSICAL THERAPIST

## 2024-12-03 NOTE — PROGRESS NOTES
Physical Therapy Daily Treatment Note  [address], [address], [place]  [address]    Patient: Vianey Reeves   : 1945  Diagnosis/ICD-10 Code:  BPPV (benign paroxysmal positional vertigo), left [H81.12]   Problems Addressed this Visit    None  Visit Diagnoses       BPPV (benign paroxysmal positional vertigo), left    -  Primary    Need for assistance due to unsteady gait        Difficulty walking        Physical deconditioning        History of recent hospitalization              Diagnoses         Codes Comments    BPPV (benign paroxysmal positional vertigo), left    -  Primary ICD-10-CM: H81.12  ICD-9-CM: 386.11     Need for assistance due to unsteady gait     ICD-10-CM: R26.89  ICD-9-CM: 781.2     Difficulty walking     ICD-10-CM: R26.2  ICD-9-CM: 719.7     Physical deconditioning     ICD-10-CM: R53.81  ICD-9-CM: 799.3     History of recent hospitalization     ICD-10-CM: Z92.89  ICD-9-CM: V13.9           Referring practitioner: SAMIRA Rouse  Date of Initial Visit: Type: THERAPY  Noted: 2024  Today's Date: 12/3/2024    VISIT#: 2    Subjective   Anny reports her dizziness was significantly better after IE & tx. However, she had to have 2 stents in hospital.     Objective     See Exercise, Manual, and Modality Logs for complete treatment.     [place] Hallpike test: negative     Resting , SpO2: 97% on room air    Assessment/Plan  Gait belt donned for upright dynamic challenges. Anny demonstrated slight instability with balance & vestibular challenges today, though did not have LOB & did not use SPC. She does however, cont to demonstrate fall risk & would benefit from continued skilled PT to focus on balance, confidence, & cervical mobility.   Progress per Plan of Care         Timed:         Manual Therapy:    10     mins  64665;     Therapeutic Exercise:         mins  77609;     Neuromuscular Lucia:    15    mins  31063;    Therapeutic Activity:     15     mins  83005;     Gait  Training:           mins  44494;     Ultrasound:          mins  35077;    Ionto                                   mins   38636  Self Care                            mins   87097  Tests & Measures              mins   73878  [place] stim                    mins   60839    Un-Timed:  Canalith Repos                   mins  52236  Electrical Stimulation:         mins  05276 ( );  Dry Needling          mins 23255/23959  Traction          mins 99764  Low Eval          Mins  00973  Mod Eval          Mins  94732  High Eval                            Mins  60954  Re-Eval                               mins  64061    Timed Treatment:   40   mins   Total Treatment:     40   mins    Ria Burnham, PT, DPT, cert. DN  Physical Therapist  [place] Lic # [id number]

## 2024-12-03 NOTE — OUTREACH NOTE
AMI Week 3 Survey      Flowsheet Row Responses   [facility] patient discharged from? [facility]   Does the patient have one of the following disease processes/diagnoses(primary or secondary)? Acute MI (STEMI,NSTEMI)   Week 3 attempt successful? Yes   Call start time 0911   Call end time 0916   Meds reviewed with patient/caregiver? Yes   Is the patient having any side effects they believe may be caused by any medication additions or changes? No   Does the patient have all prescriptions related to this admission filled (includes statins,anticoagulants,HTN meds,anti-arrhythmia meds) Yes   Is the patient taking all medications as directed (includes completed medication regime)? Yes   Comments regarding appointments States has seen her PCP and cardiologist already. Nephrologist and pulmonologist appts tomorrow.   Does the patient have a primary care provider?  Yes   Does the patient have an appointment with their PCP,cardiologist,or clinic within 7 days of discharge? Yes   Has the patient kept scheduled appointments due by today? Yes   Has home health visited the patient within 72 hours of discharge? N/A   Psychosocial issues? No   Did the patient receive a copy of their discharge instructions? Yes   Nursing interventions Reviewed instructions with patient   What is the patient's perception of their health status since discharge? Improving   Nursing interventions Nurse provided patient education   Is the patient/caregiver able to teach back signs and symptoms of when to call for help immediately: Sudden chest discomfort, Sudden discomfort in arms, back, neck or jaw, Shortness of breath at any time, Sudden sweating or clammy skin, Nausea or vomiting, Dizziness or lightheadedness, Irregular or rapid heart rate   Nursing interventions Nurse provided patient education   Is the pateint /caregiver able to teach back the importance of cardiac rehab? Yes  [States is attending outpatient vestibular rehab.]   Nursing  "interventions Provided education on importance of cardiac rehab   Is the patient/caregiver able to teach back lifestyle changes to help prevent MIs Regular exercise as approved by provider, Heart healthy diet, Maintaining a healthy weight, Reducing stress, Managing diabetes   Is the patient/caregiver able to teach back ways to prevent a second heart attack: Take medications, Follow up with MD, Participate in Cardiac Rehab, Manage risk factors   If the patient is a current smoker, are they able to teach back resources for cessation? Not a smoker   Is the patient/caregiver able to teach back the hierarchy of who to call/visit for symptoms/problems? PCP, Specialist, Home health nurse, Urgent Care, ED, 911 Yes   Week 3 call completed? Yes   Graduated Yes   Is the patient interested in additional calls from an ambulatory ? No   Would this patient benefit from a Referral to [facility] Social Work? No   Wrap up additional comments Patient states is improving.  has started on new diet prescribed by her PCP for low back/stomach pain which has helped with the pain. Reports BG \"still sometimes run high\". Advised to contact PCP to discuss BG. Denies any needs today.  will f/u with nephrologist and pulmonologist tomorrow.   Call end time 0916            Joanna BARNETT - Registered Nurse  "

## 2024-12-05 ENCOUNTER — TELEPHONE (OUTPATIENT)
Dept: PHYSICAL THERAPY | Facility: OTHER | Age: 79
End: 2024-12-05
Payer: MEDICARE

## 2024-12-10 ENCOUNTER — TREATMENT (OUTPATIENT)
Dept: PHYSICAL THERAPY | Facility: CLINIC | Age: 79
End: 2024-12-10
Payer: MEDICARE

## 2024-12-10 DIAGNOSIS — R53.81 PHYSICAL DECONDITIONING: ICD-10-CM

## 2024-12-10 DIAGNOSIS — Z92.89 HISTORY OF RECENT HOSPITALIZATION: ICD-10-CM

## 2024-12-10 DIAGNOSIS — R26.89 NEED FOR ASSISTANCE DUE TO UNSTEADY GAIT: Primary | ICD-10-CM

## 2024-12-10 DIAGNOSIS — R26.2 DIFFICULTY WALKING: ICD-10-CM

## 2024-12-10 NOTE — PROGRESS NOTES
Physical Therapy Daily Treatment Note  [address], [address], [place]  [address]    Patient: Vianey Reeves   : 1945  Diagnosis/ICD-10 Code:  Need for assistance due to unsteady gait [R26.89]   Problems Addressed this Visit    None  Visit Diagnoses       Need for assistance due to unsteady gait    -  Primary    Difficulty walking        Physical deconditioning        History of recent hospitalization              Diagnoses         Codes Comments    Need for assistance due to unsteady gait    -  Primary ICD-10-CM: R26.89  ICD-9-CM: 781.2     Difficulty walking     ICD-10-CM: R26.2  ICD-9-CM: 719.7     Physical deconditioning     ICD-10-CM: R53.81  ICD-9-CM: 799.3     History of recent hospitalization     ICD-10-CM: Z92.89  ICD-9-CM: V13.9           Referring practitioner: SAMIRA Rouse  Date of Initial Visit: Type: THERAPY  Noted: 2024  Today's Date: 12/10/2024    VISIT#: 3    Subjective   Anny reports she had physician appt just before PT and they have dx her w/Afib. She'd like to hold off on resisted recumbent stepper as it made her legs feel really weak last session.     Objective     See Exercise, Manual, and Modality Logs for complete treatment.     ID inc'd to 120 during seated rest which reduced back to 52 bpm after 1 min     Assessment/Plan  Anny was able to tolerate a few min on seated elliptical. She is experiencing bouts of Afib, will cont to monitor. She responds well to MT to neck including distraction, may benefit from traction trial. Gait belt donned for upright challenges. R>L cervical paraspinal hypertonia. Pt was encouraged to use recumbent bike at home, but 30s intervals at no resistance to start.   Progress strengthening /stabilization /functional activity         Timed:         Manual Therapy:    15     mins  62506;     Therapeutic Exercise:    8     mins  07392;     Neuromuscular Lucia:    15    mins  60476;    Therapeutic Activity:     15     mins  88433;     Gait  Training:           mins  93968;     Ultrasound:          mins  73845;    Ionto                                   mins   73230  Self Care                            mins   07495  Tests & Measures              mins   39238  Bruneian stim                    mins   23267    Un-Timed:  Canalith Repos                   mins  84134  Electrical Stimulation:         mins  95399 ( );  Dry Needling          mins 97255/67218  Traction          mins 62531  Low Eval          Mins  72725  Mod Eval          Mins  46831  High Eval                            Mins  54799  Re-Eval                               mins  25223    Timed Treatment:   45   mins   Total Treatment:     53   mins    Ria Burnham, PT, DPT, cert. DN  Physical Therapist  [place] Lic # [id number]

## 2024-12-12 ENCOUNTER — TREATMENT (OUTPATIENT)
Dept: PHYSICAL THERAPY | Facility: CLINIC | Age: 79
End: 2024-12-12
Payer: MEDICARE

## 2024-12-12 DIAGNOSIS — R26.89 NEED FOR ASSISTANCE DUE TO UNSTEADY GAIT: Primary | ICD-10-CM

## 2024-12-12 DIAGNOSIS — R53.81 PHYSICAL DECONDITIONING: ICD-10-CM

## 2024-12-12 DIAGNOSIS — R26.2 DIFFICULTY WALKING: ICD-10-CM

## 2024-12-12 DIAGNOSIS — Z92.89 HISTORY OF RECENT HOSPITALIZATION: ICD-10-CM

## 2024-12-12 DIAGNOSIS — H81.12 BPPV (BENIGN PAROXYSMAL POSITIONAL VERTIGO), LEFT: ICD-10-CM

## 2024-12-12 NOTE — PROGRESS NOTES
Physical Therapy Daily Treatment Note  [address], [address], [place]  [address]    Patient: Vianey Reeves   : 1945  Diagnosis/ICD-10 Code:  Need for assistance due to unsteady gait [R26.89]   Problems Addressed this Visit    None  Visit Diagnoses       Need for assistance due to unsteady gait    -  Primary    Difficulty walking        Physical deconditioning        History of recent hospitalization        BPPV (benign paroxysmal positional vertigo), left              Diagnoses         Codes Comments    Need for assistance due to unsteady gait    -  Primary ICD-10-CM: R26.89  ICD-9-CM: 781.2     Difficulty walking     ICD-10-CM: R26.2  ICD-9-CM: 719.7     Physical deconditioning     ICD-10-CM: R53.81  ICD-9-CM: 799.3     History of recent hospitalization     ICD-10-CM: Z92.89  ICD-9-CM: V13.9     BPPV (benign paroxysmal positional vertigo), left     ICD-10-CM: H81.12  ICD-9-CM: 386.11           Referring practitioner: SAMIRA Rouse  Date of Initial Visit: Type: THERAPY  Noted: 2024  Today's Date: 2024    VISIT#: 4    Subjective   Anny reports she has cardio follow up next week. She would like to do some vertigo tx today she was a bit dizzy getting up today.     Objective     See Exercise, Manual, and Modality Logs for complete treatment.     DHP + L post canal, performed Epley    SpO2: >94% throughout, UT:  throughout    Assessment/Plan  Cont to monitor UT throughout d/t recent dx of Afib. She indicated she will see chiro soon.   Progress per Plan of Care         Timed:         Manual Therapy:         mins  17061;     Therapeutic Exercise:    10     mins  30969;     Neuromuscular Lucia:    15    mins  01242;    Therapeutic Activity:     15     mins  80201;     Gait Training:           mins  07933;     Ultrasound:          mins  25678;    Ionto                                   mins   93499  Self Care                            mins   02770  Tests & Measures               mins   16690  Bolivian stim                    mins   14930    Un-Timed:  Canalith Repos               15    mins  66175  Electrical Stimulation:         mins  37195 ( );  Dry Needling          mins 59437/62304  Traction          mins 38112  Low Eval          Mins  68635  Mod Eval          Mins  53123  High Eval                            Mins  60855  Re-Eval                               mins  82624    Timed Treatment:   40   mins   Total Treatment:     55   mins    Ria Burnham, PT, DPT, cert. DN  Physical Therapist  [place] Lic # [id number]

## 2024-12-19 ENCOUNTER — TELEPHONE (OUTPATIENT)
Dept: PHYSICAL THERAPY | Facility: CLINIC | Age: 79
End: 2024-12-19

## 2024-12-19 ENCOUNTER — TELEPHONE (OUTPATIENT)
Dept: CARDIOLOGY | Facility: CLINIC | Age: 79
End: 2024-12-19

## 2024-12-19 NOTE — TELEPHONE ENCOUNTER
"Caller: Vianey Reeves \"FREDRICK\"    Relationship: Self    Best call back number: [phone]     What form or medical record are you requesting: LETTER TO RETURN TO VOLUNTEERING    Who is requesting this form or medical record from you: PATIENT AND [facility]     How would you like to receive the form or medical records (pick-up, mail, fax): MAILED    If mail, what is the address:   [address] [place] [facility]     Timeframe paperwork needed: ASAP    Additional notes: PT VOLUNTEERS AT THE [facility] AND PT IS NEEDING A LETTER TO RELEASE HER BACK TO ANY DUTIES SHE MAY HAVE WHILE VOLUNTEERING - PT WAS OFF A MONTH DUE TO HEALTH ISSUES AND BECAUSE OF THAT THEY ARE NEEDING THE CLEARANCE AND OKAY FROM A DOCTOR FOR HER TO RETURN - NOTE CAN GO TO WHOM IT MAY CONCERN, NO ONE SPECIFIC, AND SHE IS ASKING IF POSSIBLE TO MAIL IT TO HER -     "

## 2025-01-02 ENCOUNTER — OFFICE VISIT (OUTPATIENT)
Age: 80
End: 2025-01-02
Payer: MEDICARE

## 2025-01-02 VITALS — OXYGEN SATURATION: 97 % | RESPIRATION RATE: 20 BRPM | HEIGHT: 65 IN | WEIGHT: 173 LBS | BODY MASS INDEX: 28.82 KG/M2

## 2025-01-02 DIAGNOSIS — M75.102 TEAR OF LEFT SUPRASPINATUS TENDON: Primary | ICD-10-CM

## 2025-01-02 RX ORDER — HYDROCODONE BITARTRATE AND ACETAMINOPHEN 10; 325 MG/1; MG/1
1 TABLET ORAL NIGHTLY PRN
Qty: 30 TABLET | Refills: 0 | Status: SHIPPED | OUTPATIENT
Start: 2025-01-02

## 2025-01-02 RX ORDER — TRIAMCINOLONE ACETONIDE 40 MG/ML
80 INJECTION, SUSPENSION INTRA-ARTICULAR; INTRAMUSCULAR
Status: COMPLETED | OUTPATIENT
Start: 2025-01-02 | End: 2025-01-02

## 2025-01-02 RX ORDER — LIDOCAINE HYDROCHLORIDE 10 MG/ML
2 INJECTION, SOLUTION INFILTRATION; PERINEURAL
Status: COMPLETED | OUTPATIENT
Start: 2025-01-02 | End: 2025-01-02

## 2025-01-02 RX ADMIN — TRIAMCINOLONE ACETONIDE 80 MG: 40 INJECTION, SUSPENSION INTRA-ARTICULAR; INTRAMUSCULAR at 10:05

## 2025-01-02 RX ADMIN — LIDOCAINE HYDROCHLORIDE 2 ML: 10 INJECTION, SOLUTION INFILTRATION; PERINEURAL at 10:05

## 2025-01-02 NOTE — PROGRESS NOTES
INJECTION VISIT    Patient: Vianey Reeves    YOB: 1945    MRN: [id number]    Chief Complaint   Patient presents with    Left Shoulder - Follow-up       History of Present Illness:   Vianey Reeves is a 79 y.o. year old who presents today for injection of the left shoulder. Her last injection was 3 months ago and provided moderate relief of symptoms.     She had a fall in the spring 2024. MRI identified a non displaced greater tuberosity fracture and complete tear of the supra and infraspinatus muscles. She was evaluated by surgery and deemed a poor surgical candidate based on co morbidities. She has been managing her RC pain symptoms with injections, rest and Norco qhs PRN.         Allergies:   Allergies   Allergen Reactions    Erythromycin Rash    Naproxen Sodium Other (See Comments)     Dizzy lightheaded    Statins Myalgia    Latex Itching, Swelling and Hives    Metoclopramide Other (See Comments)     Unsteady on feet    Cardene [Nicardipine] Other (See Comments)     Shortness of breath, crushing pain in throat/neck.    Dicyclomine Unknown - Low Severity       Medications:   Home Medications:  Current Outpatient Medications on File Prior to Visit   Medication Sig    acetaminophen (TYLENOL) 650 MG 8 hr tablet Take 2 tablets by mouth Every 8 (Eight) Hours As Needed for Mild Pain.    albuterol sulfate  (90 Base) MCG/ACT inhaler Inhale 2 puffs Every 4 (Four) Hours As Needed for Wheezing or Shortness of Air.    amLODIPine (NORVASC) 10 MG tablet Take 1 tablet by mouth Daily. Indications: High Blood Pressure    aspirin 81 MG tablet Take 1 tablet by mouth Every 72 (Seventy-Two) Hours. Indications: ANTIPLATELET    doxazosin (Cardura) 2 MG tablet Take 1 tablet by mouth Every Night.    Insulin Glargine (LANTUS SOLOSTAR) 100 UNIT/ML injection pen Inject 10 Units under the skin into the appropriate area as directed Every Night.    ipratropium-albuterol (DUO-NEB) 0.5-2.5 mg/3 ml nebulizer Take 3 mL by  nebulization 4 (Four) Times a Day As Needed for Wheezing.    Linzess 145 MCG capsule capsule Take 1 capsule by mouth Daily. Indications: CONSTIPATION    pantoprazole (PROTONIX) 40 MG EC tablet Take 1 tablet by mouth Every Morning.    polyethylene glycol (MIRALAX) 17 g packet Take 17 g by mouth Daily. Indications: Constipation    sertraline (ZOLOFT) 50 MG tablet Take 1 tablet by mouth Daily. Indications: MOOD    ticagrelor (Brilinta) 90 MG tablet tablet Take 1 tablet by mouth 2 (Two) Times a Day.    Umeclidinium-Vilanterol (Anoro Ellipta) 62.5-25 MCG/ACT aerosol powder  inhaler Inhale 1 puff Daily. At the same time each day.    [DISCONTINUED] HYDROcodone-acetaminophen (NORCO)  MG per tablet Take 1 tablet by mouth Every 6 (Six) Hours As Needed for Moderate Pain.     No current facility-administered medications on file prior to visit.         I have reviewed the patient's medical history in detail and updated the computerized patient record.  Review and summarization of old records include:    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    Hypertension     IBS (irritable colon syndrome)     constipation    Mass of right breast     Neuropathy     Renal insufficiency     Sciatic leg pain     right    Simple chronic bronchitis     Solitary kidney     left     Past Surgical History:   Procedure Laterality Date    BREAST BIOPSY Right     BRONCHOSCOPY N/A 7/14/2024    Procedure:  BRONCHOSCOPY WITH BRONCHOALVEOLAR LAVAGE;  Surgeon: Marlin Ferguson MD;  Location: [facility] ENDOSCOPY;  Service: Pulmonary;  Laterality: N/A;  POST: RML ATELECTASIS    CARDIAC CATHETERIZATION      CARDIAC CATHETERIZATION N/A 2024    Procedure: Left Heart Cath;  Surgeon: Leon Gonzalez DO;  Location: [facility] CATH INVASIVE LOCATION;  Service: Cardiovascular;  Laterality: N/A;    CHOLECYSTECTOMY      COLON SURGERY      REMOVAL diverticular diseae    COLONOSCOPY N/A 2021    Procedure: COLONOSCOPY with polypectomy x 3;  Surgeon: Margarette Mcallister MD;  Location: [facility] ENDOSCOPY;  Service: Gastroenterology;  Laterality: N/A;  post op: hmorrhoids, diverticulosis, polyps    CORONARY STENT PLACEMENT      ENDOSCOPY N/A 2021    Procedure: ESOPHAGOGASTRODUODENOSCOPY with dilatation (18-20 mm balloon) and (54 bougie);  Surgeon: Margarette Mcallister MD;  Location: [facility] ENDOSCOPY;  Service: Gastroenterology;  Laterality: N/A;  post op: esophageal stricture, esophagitis, gastritis, history of nissen    ENDOSCOPY N/A 2023    Procedure: ESOPHAGOGASTRODUODENOSCOPY with biopsy x 1 area and dilation (50,54,56 non guided bougie);  Surgeon: Margarette Mcallister MD;  Location: [facility] ENDOSCOPY;  Service: Gastroenterology;  Laterality: N/A;  post op: esophageal stricture    EYE SURGERY Bilateral     cataracts    HIATAL HERNIA REPAIR      NEPHRECTOMY Right     cancer    UPPER ENDOSCOPIC ULTRASOUND W/ FNA N/A 2022    Procedure: EUS;  Surgeon: Margarette Mcallister MD;  Location: [facility] ENDOSCOPY;  Service: Gastroenterology;  Laterality: N/A;  post: normal pancreas, dilated pancreatic duct, hiatal hernia,      Social History     Occupational History    Not on file   Tobacco Use    Smoking status: Some Days     Current packs/day: 0.00     Average packs/day: 0.3 packs/day for 50.0 years (12.5 ttl pk-yrs)     Types: Cigarettes     Start date: 1973     Last attempt to quit: 2023     Years since quittin.1     Passive  exposure: Past    Smokeless tobacco: Never    Tobacco comments:     Mostly patches, some days none, some days 1-2   Vaping Use    Vaping status: Never Used   Substance and Sexual Activity    Alcohol use: Not Currently     Comment: occasionally     Drug use: Never    Sexual activity: Defer      Social History     Social History Narrative    Not on file     Family History   Problem Relation Age of Onset    Arthritis Father     Prostate cancer Father     Heart disease Sister                ROS  Negative unless listed in the HPI        Physical Exam  79 y.o. female  Body mass index is 28.79 kg/m²., 78.5 kg (173 lb)  Vitals:    01/02/25 0938   Resp: 20   SpO2: 97%     General: Alert, cooperative, appears well and in no observable distress.   HEENT: Normocephalic, atraumatic on external visual inspection. No icterus.   CV: No significant peripheral edema.   Respiratory: Normal respiratory effort.   Skin: Warm & well perfused; appropriate skin turgor.  Psych: Appropriate mood & affect.  Neuro: Gross sensation and motor intact in affected extremity/extremities.  Vascular: Peripheral pulses palpable in affected extremity/extremities.         Procedure:  Large Joint Arthrocentesis: L subacromial bursa  Date/Time: 1/2/2025 10:05 AM  Consent given by: patient  Site marked: site marked  Timeout: Immediately prior to procedure a time out was called to verify the correct patient, procedure, equipment, support staff and site/side marked as required   Supporting Documentation  Indications: pain and diagnostic evaluation   Procedure Details  Location: shoulder - L subacromial bursa  Needle size: 25 G  Approach: posterior  Medications administered: 2 mL lidocaine 1 %; 80 mg triamcinolone acetonide 40 MG/ML  Patient tolerance: patient tolerated the procedure well with no immediate complications            Assessment:   Diagnoses and all orders for this visit:    1. Tear of left supraspinatus tendon (Primary)  -      HYDROcodone-acetaminophen (NORCO)  MG per tablet; Take 1 tablet by mouth At Night As Needed for Moderate Pain.  Dispense: 30 tablet; Refill: 0      Body mass index is 28.79 kg/m².  BMI consistent with Overweight: 25.0-29.9kg/m2         Plan:   -     Risks and benefits of injection therapy discussed with patient.  Possibility of bruising, pain, swelling, infection, increased blood sugar levels, cortisol flare and soft tissue atrophy discussed in detail.  Injected patient's left shoulder joint(s)with Kenalog from an posterior approach   Compression/brace   Rest, ice, compression, and elevation (RICE) therapy  OTC Tylenol for pain PRN  Follow up in 3 months for possible repeat injection  Discussed PT referral - patient declined for now. Will call if she changes her mind.   Please call with questions or concerns in the interim        Date of encounter: 01/02/2025   SAMIRA Han

## 2025-01-11 NOTE — PROGRESS NOTES
"                         HEMATOLOGY ONCOLOGY OUTPATIENT CONSULTATION       Patient name: Vianey Reeves  : 1945  MRN: [id number]  Primary Care Physician: Anny Garcia MD  Referring Physician: Bautista Ray MD  Reason For Consult: \"non traumatic hematoma\"      History of Present Illness:  Patient is a 79 y.o. CAD, hx of 2 stents placed that became occluded, COPD, HTN, T2DM and CKD, frequent UTIs and a-fib presenting for nontraumatic hematoma (on aspirin 81).    -2024 with WBC 10.9 (slight left shift), hemoglobin 13.6/hematocrit 41.4 with an MCV of 98.3 and platelets of 225K OF NOTE: Patient usually with mild elevation of WBCs during infections, normally without anemia and with normal platelets.  No recent PT or PTT  -2024 referral for \"nontraumatic hematoma\" by Dr Bautista Ray, who had seen the patient while she was inpatient for UTI of E. coli > thousand colonies 10/11/2024.    Subjective:  -2025 Patient presents for initial consultation for easy bruising.  She reports \"I barely touch anything I can get close to something and I will bruise, my hands and my arms are completely bruised, I only want to wear long sleeves because a person much\".  Specifically denies any bleeding: specifically hemoptysis, hematemesis, epistaxis, postmenopausal bleeding, hematuria, bright red stools per rectum, melena.   -on Brilinta: for the 2nd time, first month since she has just been re-stented and her cardiologist told her she has to be on asa/Brilinta for 12 mo straight (had been on it prior for one year-bruised heavily)  -on ASA: for multiple years since her NSTEMI  -Before her heart attack (before antiplatelet therapy) wasn't an easy bruiser  -uses OTC Excedrin Arthritis a couple times a week (for her left shoulder, lower back, right hip and leg-uses vibrating massage on that hip-doesn't get bruising from that), only takes her tramadol if pain really bad because of constipation  -has been on zoloft 50 " "mg for at least one year...started bruising more  -hx of steroids q3 mo as injections in the bad shoulders, prior to that, got steroid injections in her back. Been over a year since PO steroids (PNA?).   -Has very thin skin as she ages, can see her blood vessels just below the surface on her hands, arms, fronts of her legs.  -She does not drink etoh (\"well, maybe if I go out with my GF I may get one indigo\").       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    Hypertension     IBS (irritable colon syndrome)     constipation    Mass of right breast     Neuropathy     Renal insufficiency     Sciatic leg pain     right    Simple chronic bronchitis     Solitary kidney     left       Past Surgical History:   Procedure Laterality Date    BREAST BIOPSY Right     BRONCHOSCOPY N/A 7/14/2024    Procedure: BRONCHOSCOPY WITH BRONCHOALVEOLAR LAVAGE;  Surgeon: Marlin Ferguson MD;  Location: [facility] ENDOSCOPY;  Service: Pulmonary;  Laterality: N/A;  POST: RML ATELECTASIS    CARDIAC CATHETERIZATION      CARDIAC CATHETERIZATION N/A 11/11/2024    Procedure: Left Heart Cath;  Surgeon: Leon Gonzalez DO;  Location: [facility] CATH INVASIVE LOCATION;  Service: Cardiovascular;  Laterality: N/A;    CHOLECYSTECTOMY      COLON SURGERY      REMOVAL diverticular diseae    COLONOSCOPY N/A 08/12/2021    Procedure: COLONOSCOPY with polypectomy x 3;  Surgeon: Margarette Mcallister MD;  Location: [facility]" [facility] ENDOSCOPY;  Service: Gastroenterology;  Laterality: N/A;  post op: hmorrhoids, diverticulosis, polyps    CORONARY STENT PLACEMENT      ENDOSCOPY N/A 08/12/2021    Procedure: ESOPHAGOGASTRODUODENOSCOPY with dilatation (18-20 mm balloon) and (54 bougie);  Surgeon: Margarette Mcallister MD;  Location: [facility] ENDOSCOPY;  Service: Gastroenterology;  Laterality: N/A;  post op: esophageal stricture, esophagitis, gastritis, history of nissen    ENDOSCOPY N/A 9/13/2023    Procedure: ESOPHAGOGASTRODUODENOSCOPY with biopsy x 1 area and dilation (50,54,56 non guided bougie);  Surgeon: Margarette Mcallister MD;  Location: [facility] ENDOSCOPY;  Service: Gastroenterology;  Laterality: N/A;  post op: esophageal stricture    EYE SURGERY Bilateral     cataracts    HIATAL HERNIA REPAIR      NEPHRECTOMY Right     cancer    UPPER ENDOSCOPIC ULTRASOUND W/ FNA N/A 09/22/2022    Procedure: EUS;  Surgeon: Margarette Mcallister MD;  Location: [facility] ENDOSCOPY;  Service: Gastroenterology;  Laterality: N/A;  post: normal pancreas, dilated pancreatic duct, hiatal hernia,          Current Outpatient Medications:     acetaminophen (TYLENOL) 650 MG 8 hr tablet, Take 2 tablets by mouth Every 8 (Eight) Hours As Needed for Mild Pain., Disp: , Rfl:     albuterol sulfate  (90 Base) MCG/ACT inhaler, Inhale 2 puffs Every 4 (Four) Hours As Needed for Wheezing or Shortness of Air., Disp: , Rfl:     amLODIPine (NORVASC) 10 MG tablet, Take 1 tablet by mouth Daily. Indications: High Blood Pressure, Disp: , Rfl:     amoxicillin (AMOXIL) 500 MG tablet, , Disp: , Rfl:     aspirin 81 MG tablet, Take 1 tablet by mouth Every 72 (Seventy-Two) Hours. Indications: ANTIPLATELET, Disp: , Rfl:     doxazosin (Cardura) 2 MG tablet, Take 1 tablet by mouth Every Night., Disp: 90 tablet, Rfl: 3    HYDROcodone-acetaminophen (NORCO)  MG per tablet, Take 1 tablet by mouth At Night As Needed for Moderate Pain., Disp: 30 tablet, Rfl: 0    Insulin Glargine (LANTUS SOLOSTAR) 100 UNIT/ML  injection pen, Inject 10 Units under the skin into the appropriate area as directed Every Night., Disp: 100 mL, Rfl: 12    ipratropium-albuterol (DUO-NEB) 0.5-2.5 mg/3 ml nebulizer, Take 3 mL by nebulization 4 (Four) Times a Day As Needed for Wheezing., Disp: 360 mL, Rfl: 1    Linzess 145 MCG capsule capsule, Take 1 capsule by mouth Daily. Indications: CONSTIPATION, Disp: , Rfl:     pantoprazole (PROTONIX) 40 MG EC tablet, Take 1 tablet by mouth Every Morning., Disp: 30 tablet, Rfl: 1    polyethylene glycol (MIRALAX) 17 g packet, Take 17 g by mouth Daily. Indications: Constipation, Disp: , Rfl:     sertraline (ZOLOFT) 50 MG tablet, Take 1 tablet by mouth Daily. Indications: MOOD, Disp: , Rfl:     ticagrelor (Brilinta) 90 MG tablet tablet, Take 1 tablet by mouth 2 (Two) Times a Day., Disp: 60 tablet, Rfl: 1    Umeclidinium-Vilanterol (Anoro Ellipta) 62.5-25 MCG/ACT aerosol powder  inhaler, Inhale 1 puff Daily. At the same time each day., Disp: , Rfl:     Allergies   Allergen Reactions    Erythromycin Rash    Naproxen Sodium Other (See Comments)     Dizzy lightheaded    Statins Myalgia    Latex Itching, Swelling and Hives    Metoclopramide Other (See Comments)     Unsteady on feet    Cardene [Nicardipine] Other (See Comments)     Shortness of breath, crushing pain in throat/neck.    Dicyclomine Unknown - Low Severity       Family History   Problem Relation Age of Onset    Arthritis Father     Prostate cancer Father     Heart disease Sister        Cancer-related family history includes Prostate cancer in her father.      Social History     Tobacco Use    Smoking status: Some Days     Current packs/day: 0.00     Average packs/day: 0.3 packs/day for 50.0 years (12.5 ttl pk-yrs)     Types: Cigarettes     Start date: 1973     Last attempt to quit: 2023     Years since quittin.2     Passive exposure: Past    Smokeless tobacco: Never    Tobacco comments:     Mostly patches, some days none, some days 1-2   Vaping  Use    Vaping status: Never Used   Substance Use Topics    Alcohol use: Not Currently     Comment: occasionally     Drug use: Never     Social History     Social History Narrative    Not on file     Review of Systems:  Constitutional: Denies any weakness, fatigue, lack of appetite, excessive appetite, weight change, chills or fever.  Eyes: Reports no blurry vision, no double vision, no pain in the eyes, dry eyes or tearing.  ENT: Reports no decreased hearing capacity, ear pain or tinnitus. Denies any epistaxis, nasal congestion, mouth sores or bleeding, tooth or jaw pain, sore throat or hoarseness.  Respiratory: Denies any cough, sputum production or hemoptysis. There is no wheezing, shortness of breath or any other respiratory complaints.  Cardiovascular: Denies any chest pain, shortness of breath when lying down, shortness of breath with activity, palpitations, or leg swelling.  Breasts: Denies any lumps, bumps, pain, nipple changes or discharge in the breasts.  Gastrointestinal: There are no complaints of abdominal pain, difficulty swallowing or painful swallowing, anorexia, nausea, vomiting, diarrhea, constipation, heartburn, blood in the stools, dark stools, or change in bowel habits or hemorrhoids.  Genitourinary: Denies any pain or burning on urination, blood in the urine or frequent urination.  Musculoskeletal: Denies painful joints, no swelling of the joints, muscle or back pain. Denies any pain or tingling in the legs, hands or feet.  Neuropsychiatric: Denies any nervousness, depressed mood, headaches, blackouts, dizziness, weakness of limbs, loss of sensation, loss of balance, loss of coordination or difficulty in speaking.  Cutaneous: Denies any dry skin, itching, rash, change in the color of the skin, or any other cutaneous complaints.  Hematologic/Lymphatic: +bruising on hands, arms, legs. Denies any bleeding. Report no recent development of palpable or painful lymph nodes.  Allergy/Immunology: Denies  "rash/hayfever symptoms or any recent history of pneumonia, recurrent sinusitis, urinary infection or any other history of an ongoing infection.  Endocrine: Reports no intolerance to heat or cold, excessive thirst or excessive urination.      Objective:    Vital Signs:  Vitals:    25 1320   BP: 144/92   Pulse: 90   Temp: 97.5 °F (36.4 °C)   TempSrc: Oral   SpO2: 93%   Weight: 81.6 kg (180 lb)   Height: 165.1 cm (65\")   PainSc:   6   PainLoc: Shoulder  Comment: left     Body mass index is 29.95 kg/m².      Physical Exam:   ECO  GENERAL: The patient is a pleasant elderly overweight white female who appears their stated age and is awake, in no acute distress, alert and oriented x3.  SKIN: No rash or ulcers.  Very thin skin with small blood vessels just beneath the surface raising up the skin on the backs of her hands and on her lower leg specifically subcutaneous with no palpable nodules.  HEME/LYMPHATICS: Significant bruising in all sizes and shapes and all areas stages of bruising from the proximal ends of her fingers across the extensor surface of her hands all the way up to her forearms on extensor surfaces only, also on the anterior and sides of her legs (not on the backs of her legs) bilaterally.  No bruising noted on her chest or back or on her face.  No petechiae on visual inspection. No lymphadenopathy on visual inspection and palpation of cervical, supraclavicular, infraclavicular lymph nodes.  HEAD/FACE: atraumatic and normocephalic. Normal hair for age.  EYES: PERRLA/EOMI. No scleral icterus. No tearing or dry eyes.  ENT: External ears normal with no evidence of discharge. No evidence of ulceration or bleeding in the nostrils. Mouth has no ulcers, bleeding or inflammation of the gums, floor or roof of the mouth with normal dentition for age with no bleeding or bruising.  NECK: Supple, symmetric.   CHEST/RESPIRATORY: Expansion maintained bilaterally and symmetrically. On auscultation, clear breath " sounds in both lungs. No wheezes, rhonchi or rales.  BREAST: Deferred.  CARDIOVASCULAR: On auscultation, regular rate and rhythm. No murmurs, gallops or rubs are heard.  GASTROINTESTINAL/ABDOMEN: Symmetric. On auscultation of the abdomen, there are normal bowel sounds in all four quadrants. There are no surgical scars in the abdomen. Nontender to palpation. No palpable organomegaly (liver or spleen) or masses.  GENITOURINARY: Deferred.  NEUROLOGIC: Alert and oriented time, person, and place, with no apparent changes in recent or remote memory. Cranial nerves II-XII are grossly intact. Sensation is maintained in the extremities. Strength and tone appear normal for age and equal in the extremities. Gait is normal for age.  MUSCULOSKELETAL: No cyanosis, clubbing or edema in the extremities. There are no visible surgical scars on the extremities.  PSYCHIATRIC: The patient maintains judgment and has good insight. The patient has no changes in mood or affection.    Lab Results - Last 18 Months   Lab Units 11/13/24  0205 11/12/24  0348 11/11/24  0442   WBC 10*3/mm3 10.90* 10.65 9.57   HEMOGLOBIN g/dL 13.6 14.2 14.4   HEMATOCRIT % 41.4 43.0 45.9   PLATELETS 10*3/mm3 225 207 227   MCV fL 98.3* 98.2* 101.1*     Lab Results - Last 18 Months   Lab Units 11/14/24  0157 11/13/24  0205 11/12/24  0348 11/09/24  0218 11/08/24  1940 10/09/24  2205 10/09/24  0204 10/07/24  1945   SODIUM mmol/L 143 141 139   < > 139   < > 142 142   POTASSIUM mmol/L 4.3 4.5 4.6   < > 4.0   < > 4.1 4.1   CHLORIDE mmol/L 108* 109* 107   < > 103   < > 107 106   CO2 mmol/L 26.0 24.4 22.5   < > 26.3   < > 25.2 24.6   BUN mg/dL 38* 38* 36*   < > 26*   < > 20 22   CREATININE mg/dL 1.53* 1.44* 1.33*   < > 1.27*   < > 1.36* 1.40*   CALCIUM mg/dL 9.3 9.5 9.2   < > 9.6   < > 9.2 9.7   BILIRUBIN mg/dL  --   --   --   --  0.2  --  0.3 0.3   ALK PHOS U/L  --   --   --   --  81  --  73 76   ALT (SGPT) U/L  --   --   --   --  10  --  28 28   AST (SGOT) U/L  --   --    "--   --  14  --  21 31   GLUCOSE mg/dL 114* 118* 281*   < > 159*   < > 128* 146*    < > = values in this interval not displayed.       Lab Results   Component Value Date    GLUCOSE 114 (H) 11/14/2024    BUN 38 (H) 11/14/2024    CREATININE 1.53 (H) 11/14/2024    EGFRIFNONA 29 (L) 11/11/2021    EGFRIFAFRI 33 (L) 11/11/2021    BCR 24.8 11/14/2024    K 4.3 11/14/2024    CO2 26.0 11/14/2024    CALCIUM 9.3 11/14/2024    PROTENTOTREF 5.7 (L) 12/16/2022    ALBUMIN 3.4 (L) 11/14/2024    LABIL2 1.7 12/16/2022    AST 14 11/08/2024    ALT 10 11/08/2024       No results for input(s): \"APTT\", \"INR\", \"PTT\" in the last 10084 hours.    Lab Results   Component Value Date    IRON 30 (L) 02/09/2024    TIBC 246 (L) 02/09/2024       No results found for: \"FOLATE\"    No results found for: \"OCCULTBLD\"    No results found for: \"RETICCTPCT\"  No results found for: \"PVCUXUGT62\"  No results found for: \"SPEP\", \"UPEP\"  Uric Acid   Date Value Ref Range Status   07/13/2024 5.7 2.4 - 5.7 mg/dL Final     Lab Results   Component Value Date    NATALYA Negative 05/06/2022    SEDRATE 32 (H) 10/07/2024     No results found for: \"FIBRINOGEN\", \"HAPTOGLOBIN\"  Lab Results   Component Value Date    PTT 21.1 (L) 02/20/2023    INR 1.00 02/20/2023     No results found for: \"\"  No results found for: \"CEA\"  No components found for: \"CA-19-9\"  No results found for: \"PSA\"  Lab Results   Component Value Date    SEDRATE 32 (H) 10/07/2024          Assessment & Plan     Patient is a 79 y.o. CAD, hx of 2 stents placed that became occluded, COPD, HTN, T2DM and CKD, frequent UTIs and a-fib presenting for very easy bruising, \"nontraumatic hematoma\".    1. very easy bruising, \"nontraumatic hematoma\": arms, legs    -This is drug effect. No further w/u or hematology f/u needed. I instructed her to call her cardiologist if she continues to have further bruising/bleeding issues.    1) No family hx of easy bruising.  2) Before her heart attack (before antiplatelet therapy) wasn't " "an easy bruiser, had no issues with pregnancy deliveries or surgeries  3) not having mucosal bleeding/coagulopathy s/s, only easy bruising, no hemoptysis, hematemesis, epistaxis, postmenopausal bleeding, hematuria, bright red stools per rectum, melena.   4) Has very thin skin, accelerated by frequent steroid use in years past; the thinner the skin, the more exposed the smaller the blood vessels particularly on extensor surfaces, the more likely they are bumped/damaged, the more bruising.  5) on DAPT: on ASA: for multiple years since her NSTEMI, when the bruising began.  -on Brilinta: for the 2nd time, first month since she has just been re-stented and her cardiologist told her she has to be on asa/Brilinta for 12 mo straight (had been on it prior for one year-bruised heavily), now bruising heavily again on it- I explained the mechanism of APT and it's importance but the side effects they have; instructed her to call her cardiologist if she continues to have further bruising/bleeding issues.  6) uses OTC Excedrin Arthritis a couple times a week (for her left shoulder, lower back, right hip and leg-uses vibrating massage on that hip-doesn't get bruising from that), -counseled patient that Excedrin arthritis has an extra 250 mg of aspirin, she was unintentionally giving herself more NSAIDs which would increase her bruising, we discussed about her doing Tylenol arthritis moving forward.  -has been on zoloft 50 mg for at least one year...started bruising more after addition-counseled patient that addition of SNRIs or SSRIs can also add to the bruising issue as a can also affect platelet activity.  -occasional etoh (\"well, maybe if I go out with my GF I may get one indigo\")-counseled patient not to drink alcohol as this will also increased bruising    Thank you very much for providing the opportunity to participate in this patient’s care. Please do not hesitate to call if there are any other questions.    "

## 2025-01-14 ENCOUNTER — CONSULT (OUTPATIENT)
Dept: ONCOLOGY | Facility: CLINIC | Age: 80
End: 2025-01-14
Payer: MEDICARE

## 2025-01-14 VITALS
OXYGEN SATURATION: 93 % | SYSTOLIC BLOOD PRESSURE: 144 MMHG | HEART RATE: 90 BPM | DIASTOLIC BLOOD PRESSURE: 92 MMHG | WEIGHT: 180 LBS | BODY MASS INDEX: 29.99 KG/M2 | TEMPERATURE: 97.5 F | HEIGHT: 65 IN

## 2025-01-14 DIAGNOSIS — R23.3 EASY BRUISING: Primary | ICD-10-CM

## 2025-01-14 RX ORDER — AMOXICILLIN 500 MG/1
TABLET, FILM COATED ORAL
COMMUNITY
Start: 2025-01-08

## 2025-01-27 ENCOUNTER — TELEPHONE (OUTPATIENT)
Dept: CARDIOLOGY | Facility: CLINIC | Age: 80
End: 2025-01-27

## 2025-01-27 NOTE — TELEPHONE ENCOUNTER
"  Caller: Vianey Reeves \"FREDRICK\"    Relationship: Self    Best call back number: [phone]    PATIENT IS NEEDING A RETURN TO WORK NOTE FOR  AT THE [place] OFFICE, SHE IS NEEDING IT THIS WEEK.  SHE WILL BE IN [place] TODAY  "

## 2025-01-27 NOTE — TELEPHONE ENCOUNTER
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 Will fax to [phone]

## 2025-02-20 ENCOUNTER — TELEPHONE (OUTPATIENT)
Dept: FAMILY MEDICINE CLINIC | Facility: CLINIC | Age: 80
End: 2025-02-20
Payer: MEDICARE

## 2025-02-27 ENCOUNTER — OFFICE VISIT (OUTPATIENT)
Dept: CARDIOLOGY | Facility: CLINIC | Age: 80
End: 2025-02-27
Payer: MEDICARE

## 2025-02-27 VITALS
HEIGHT: 65 IN | HEART RATE: 95 BPM | WEIGHT: 177 LBS | DIASTOLIC BLOOD PRESSURE: 81 MMHG | BODY MASS INDEX: 29.49 KG/M2 | OXYGEN SATURATION: 96 % | SYSTOLIC BLOOD PRESSURE: 138 MMHG

## 2025-02-27 DIAGNOSIS — I25.10 ATHEROSCLEROSIS OF NATIVE CORONARY ARTERY OF NATIVE HEART WITHOUT ANGINA PECTORIS: Primary | ICD-10-CM

## 2025-02-27 DIAGNOSIS — E78.2 MIXED HYPERLIPIDEMIA: ICD-10-CM

## 2025-02-27 RX ORDER — FLUTICASONE PROPIONATE AND SALMETEROL XINAFOATE 230; 21 UG/1; UG/1
2 AEROSOL, METERED RESPIRATORY (INHALATION) 2 TIMES DAILY
COMMUNITY
Start: 2025-01-23 | End: 2025-02-27

## 2025-02-27 RX ORDER — FUROSEMIDE 20 MG/1
1 TABLET ORAL
COMMUNITY
Start: 2025-02-05

## 2025-02-27 RX ORDER — FINERENONE 10 MG/1
1 TABLET, FILM COATED ORAL DAILY
COMMUNITY
Start: 2025-01-16

## 2025-02-27 RX ORDER — SITAGLIPTIN 100 MG/1
TABLET, FILM COATED ORAL
COMMUNITY
Start: 2025-02-22

## 2025-02-27 NOTE — PROGRESS NOTES
Cardiology Office Visit      Encounter Date:  2025    PATIENT IDENTIFICATION    Name: Vianey Reeves  Age: 79 y.o. Sex: female : 1945  MRN: [id number]    Reason For Followup:  Coronary artery disease  Hypertension  Hypertensive cardiovascular disease    Brief Clinical History:  Dear Anny Cleary MD    I had the pleasure of seeing Vianey Reeves today. As you are well aware, this is a 79 y.o. female with a known history of ischemic heart disease.  She underwent cardiac catheterization with PCI and drug-eluting stent placement on 2016 and follow-up PCI on 2016. She presents today for follow-up on the above conditions.      Interval History:  She denies any chest pain pressure heaviness or tightness.  She denies any shortness of breath out of character. She denies any PND orthopnea.  She denies any syncope or near syncope.  She reports feeling well today.     Her last visit she had notable increase in her calcium level.  We had some laboratory work performed and she followed up with nephrology.  Her calcium level actually returned to normal in the interim.     She continues to smoke.  She has been wearing patches.  She smokes between 4 cigarettes and a half a pack of cigarettes daily.     As you know, with regards to her hypertension, we have a limited repertoire of medications that can be utilized.  We have stayed away from ACE/ARB medications due to the presence of a solitary kidney and renal insufficiency.  Calcium channel blockers appear to cause edema requiring diuretic usage which can exacerbate renal insufficiency.  Potassium sparing diuretics can also be problematic with renal insufficiency.  Beta-blockers appear to be causing fatigue and bradycardia.  Clonidine results in significant lability in blood pressure and can also contribute to bradycardia.  Hydralazine is very cumbersome to take.       02/15/2024        Patient had seen Dr Du on past couple of visits. Was in the  "hospital with COVID and she was sent home but bounced back because of the pneumonia. She has been on oxygen since discharge from \[facility]\"". She quit smoking and has been free 3 months. She has no energy and has been an issue for a few months. Both legs are very weak and have been for a while. She was at an office visit and she got so weak she had to be held up and laid down. Had EKG and was told she was in atrial fibrillation.  She was accompanied by her granddaughter today who provided supplemental information pertinent to the visit.    She underwent a nuclear stress test and 2D echocardiogram in January 2024.  No evidence of myocardial ischemia was noted.  Low normal left ventricular systolic function with an ejection fraction of 50 to 55% was noted.  Grade 1 diastolic dysfunction.  Moderate mitral insufficiency was noted.    03/28/2024        She reports that she fell. She was walking down the hallway. No warning. She had fallen the week before that as well.  She has some pain residual from this.  She did have the monitor on at the time of one of the falls and she had no automatic triggers noted. She did have an episode on that day (3/5/2024) but it was SR with PAC and was done while she was at the hospital.    11/20/2024        She was hospitalized recently and ultimately had a cardiac cath and had PCI of the Circumflex. She had been having some issues with \"swimming\" feeling in her head as well. That is better and she started PT for that and she reports that it is helping significantly.     She does have some issues with pressley and red meat so we will test her for alpha-gal. She was supposed to have exploratory surgery but that will have to be postponed.     02/27/2025        She is having some back pain and shoulder pain. She saw pain management and they are wanting to do some epidural injections. The problem is that she had PCI for a NSTEMI and guidelines recommend 1 year of DAPT for NSTEMI.    She has not had " "any red meat and she is no longer having any digestive issues.      Assessment & Plan     Impressions:  Coronary artery disease status post PCI and drug-eluting stent placement in her mid RCA and Cx November 2024       Diabetes mellitus.  Dyslipidemia with intolerance to statins.  Hypertension with hypertensive cardiovascular disease.      Limited medication repertoire  COPD  Solitary kidney.  Chronic renal failure with declining GFR     Followed by Dr. Julio  Tobacco abuse  Anti-platelet therapy.  Hypercalcemia-resolved  Fatigue  Weakness  Frequent falls  Alpha gal sensitivity     Recommendations:  Continuation of her current cardiovascular regimen at the present time.     This includes antihypertensives, and antiplatelets.  1 year uninterrupted dual antiplatelet therapy due to presentation for non-STEMI and PCI  Follow-up in 6 months time sooner should there be difficulties    Diagnoses and all orders for this visit:    1. Atherosclerosis of native coronary artery of native heart without angina pectoris (Primary)  Overview:  No chest pain, Cardiology apt pending 04/07/2022      2. Mixed hyperlipidemia  Overview:  She sees endocrinology  She can not tolerate statins      Other orders  -     ticagrelor (Brilinta) 90 MG tablet tablet; Take 1 tablet by mouth 2 (Two) Times a Day.  Dispense: 180 tablet; Refill: 3                Objective:    Vitals:  Vitals:    02/27/25 1511   BP: 138/81   Pulse: 95   SpO2: 96%   Weight: 80.3 kg (177 lb)   Height: 165.1 cm (65\")           Body mass index is 29.45 kg/m².      Physical Exam:    General: Alert, cooperative, no distress, appears stated age  Head:  Normocephalic, atraumatic, mucous membranes moist  Eyes:  Conjunctiva/corneas clear, EOM's intact     Neck:  Supple,  no bruit    Lungs: Coarse and diminished bilaterally  Chest wall: No tenderness  Heart::  Regular rate and rhythm, S1 and S2 normal, 1/6 holosystolic murmur.  No rub or gallop  Abdomen: Soft, non-tender, " nondistended bowel sounds active  Extremities: No cyanosis, clubbing, or edema  Pulses: 2+ and symmetric all extremities  Skin:  No rashes or lesions  Neuro/psych: A&O x3. CN II through XII are grossly intact with appropriate affect      Allergies:  Allergies   Allergen Reactions    Erythromycin Rash    Naproxen Sodium Other (See Comments)     Dizzy lightheaded    Statins Myalgia    Latex Itching, Swelling and Hives    Metoclopramide Other (See Comments)     Unsteady on feet    Cardene [Nicardipine] Other (See Comments)     Shortness of breath, crushing pain in throat/neck.    Dicyclomine Unknown - Low Severity       Medication Review:     Current Outpatient Medications:     acetaminophen (TYLENOL) 650 MG 8 hr tablet, Take 2 tablets by mouth Every 8 (Eight) Hours As Needed for Mild Pain., Disp: , Rfl:     albuterol sulfate  (90 Base) MCG/ACT inhaler, Inhale 2 puffs Every 4 (Four) Hours As Needed for Wheezing or Shortness of Air., Disp: , Rfl:     amLODIPine (NORVASC) 10 MG tablet, Take 1 tablet by mouth Daily. Indications: High Blood Pressure, Disp: , Rfl:     aspirin 81 MG tablet, Take 1 tablet by mouth Every 72 (Seventy-Two) Hours. Indications: ANTIPLATELET, Disp: , Rfl:     doxazosin (Cardura) 2 MG tablet, Take 1 tablet by mouth Every Night., Disp: 90 tablet, Rfl: 3    furosemide (LASIX) 20 MG tablet, Take 1 tablet by mouth Every Other Day., Disp: , Rfl:     HYDROcodone-acetaminophen (NORCO)  MG per tablet, Take 1 tablet by mouth At Night As Needed for Moderate Pain., Disp: 30 tablet, Rfl: 0    Insulin Glargine (LANTUS SOLOSTAR) 100 UNIT/ML injection pen, Inject 10 Units under the skin into the appropriate area as directed Every Night., Disp: 100 mL, Rfl: 12    Januvia 100 MG tablet, , Disp: , Rfl:     Kerendia 10 MG tablet, Take 1 tablet by mouth Daily., Disp: , Rfl:     Linzess 145 MCG capsule capsule, Take 1 capsule by mouth Daily. Indications: CONSTIPATION, Disp: , Rfl:     pantoprazole  (PROTONIX) 40 MG EC tablet, Take 1 tablet by mouth Every Morning., Disp: 30 tablet, Rfl: 1    polyethylene glycol (MIRALAX) 17 g packet, Take 17 g by mouth Daily. Indications: Constipation, Disp: , Rfl:     sertraline (ZOLOFT) 50 MG tablet, Take 1 tablet by mouth Daily. Indications: MOOD, Disp: , Rfl:     ticagrelor (Brilinta) 90 MG tablet tablet, Take 1 tablet by mouth 2 (Two) Times a Day., Disp: 180 tablet, Rfl: 3    Umeclidinium-Vilanterol (Anoro Ellipta) 62.5-25 MCG/ACT aerosol powder  inhaler, Inhale 1 puff Daily. At the same time each day., Disp: , Rfl:     amoxicillin (AMOXIL) 500 MG tablet, , Disp: , Rfl:     ipratropium-albuterol (DUO-NEB) 0.5-2.5 mg/3 ml nebulizer, Take 3 mL by nebulization 4 (Four) Times a Day As Needed for Wheezing., Disp: 360 mL, Rfl: 1    Family History:  Family History   Problem Relation Age of Onset    Arthritis Father     Prostate cancer Father     Heart disease Sister        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    Hypertension     IBS (irritable colon syndrome)     constipation    Mass of right breast     Neuropathy     Renal insufficiency     Sciatic leg pain     right    Simple chronic bronchitis     Solitary kidney     left       Past Surgical History:  Past Surgical History:   Procedure Laterality Date    BREAST BIOPSY Right     BRONCHOSCOPY N/A 7/14/2024    Procedure: BRONCHOSCOPY WITH BRONCHOALVEOLAR  LAVAGE;  Surgeon: Marlin Ferguson MD;  Location: [facility] ENDOSCOPY;  Service: Pulmonary;  Laterality: N/A;  POST: RML ATELECTASIS    CARDIAC CATHETERIZATION      CARDIAC CATHETERIZATION N/A 2024    Procedure: Left Heart Cath;  Surgeon: Leon Gonzalez DO;  Location: [facility] CATH INVASIVE LOCATION;  Service: Cardiovascular;  Laterality: N/A;    CHOLECYSTECTOMY      COLON SURGERY      REMOVAL diverticular diseae    COLONOSCOPY N/A 2021    Procedure: COLONOSCOPY with polypectomy x 3;  Surgeon: Margarette Mcallister MD;  Location: [facility] ENDOSCOPY;  Service: Gastroenterology;  Laterality: N/A;  post op: hmorrhoids, diverticulosis, polyps    CORONARY STENT PLACEMENT      ENDOSCOPY N/A 2021    Procedure: ESOPHAGOGASTRODUODENOSCOPY with dilatation (18-20 mm balloon) and (54 bougie);  Surgeon: Margarette Mcallister MD;  Location: [facility] ENDOSCOPY;  Service: Gastroenterology;  Laterality: N/A;  post op: esophageal stricture, esophagitis, gastritis, history of nissen    ENDOSCOPY N/A 2023    Procedure: ESOPHAGOGASTRODUODENOSCOPY with biopsy x 1 area and dilation (50,54,56 non guided bougie);  Surgeon: Margarette Mcallister MD;  Location: [facility] ENDOSCOPY;  Service: Gastroenterology;  Laterality: N/A;  post op: esophageal stricture    EYE SURGERY Bilateral     cataracts    HIATAL HERNIA REPAIR      NEPHRECTOMY Right     cancer    UPPER ENDOSCOPIC ULTRASOUND W/ FNA N/A 2022    Procedure: EUS;  Surgeon: Margarette Mcallister MD;  Location: [facility] ENDOSCOPY;  Service: Gastroenterology;  Laterality: N/A;  post: normal pancreas, dilated pancreatic duct, hiatal hernia,        Social History:  Social History     Socioeconomic History    Marital status:    Tobacco Use    Smoking status: Some Days     Current packs/day: 0.00     Average packs/day: 0.3 packs/day for 50.0 years (12.5 ttl pk-yrs)     Types: Cigarettes     Start date: 1973     Last attempt to quit: 2023     Years since quittin.3     Passive  exposure: Past    Smokeless tobacco: Never    Tobacco comments:     Mostly patches, some days none, some days 1-2   Vaping Use    Vaping status: Never Used   Substance and Sexual Activity    Alcohol use: Not Currently     Comment: occasionally     Drug use: Never    Sexual activity: Defer       Review of Systems:  The following systems were reviewed as they relate to the cardiovascular system: Constitutional, Eyes, ENT, Cardiovascular, Respiratory, Gastrointestinal, Integumentary, Neurological, Psychiatric, Hematologic, Endocrine, Musculoskeletal, and Genitourinary. The pertinent cardiovascular findings are reported above with all other cardiovascular points within those systems being negative.    Diagnostic Study Review:     Current Electrocardiogram:  Procedures normal sinus rhythm with a ventricular rate of 86 bpm.  Normal QT and QTc intervals.    Laboratory Data:  Lab Results   Component Value Date    GLUCOSE 114 (H) 11/14/2024    BUN 38 (H) 11/14/2024    CREATININE 1.53 (H) 11/14/2024    EGFRIFNONA 29 (L) 11/11/2021    EGFRIFAFRI 33 (L) 11/11/2021    BCR 24.8 11/14/2024    K 4.3 11/14/2024    CO2 26.0 11/14/2024    CALCIUM 9.3 11/14/2024    ALBUMIN 3.4 (L) 11/14/2024    LABIL2 1.2 05/31/2021    AST 14 11/08/2024    ALT 10 11/08/2024     Lab Results   Component Value Date    GLUCOSE 114 (H) 11/14/2024    CALCIUM 9.3 11/14/2024     11/14/2024    K 4.3 11/14/2024    CO2 26.0 11/14/2024     (H) 11/14/2024    BUN 38 (H) 11/14/2024    CREATININE 1.53 (H) 11/14/2024    EGFRIFAFRI 33 (L) 11/11/2021    EGFRIFNONA 29 (L) 11/11/2021    BCR 24.8 11/14/2024    ANIONGAP 9.0 11/14/2024     Lab Results   Component Value Date    WBC 10.90 (H) 11/13/2024    HGB 13.6 11/13/2024    HCT 41.4 11/13/2024    MCV 98.3 (H) 11/13/2024     11/13/2024     Lab Results   Component Value Date    CHOL 205 (H) 11/09/2024    CHLPL 206 (H) 12/16/2022    TRIG 120 11/09/2024    HDL 49 11/09/2024     (H) 11/09/2024     Lab  Results   Component Value Date    HGBA1C 7.10 (H) 01/21/2024     Lab Results   Component Value Date    INR 1.00 02/20/2023    PROTIME 10.3 02/20/2023       Most Recent Echo:  Results for orders placed during the hospital encounter of 11/08/24    Adult Transthoracic Echo Complete W/ Cont if Necessary Per Protocol    Interpretation Summary    Left ventricular ejection fraction appears to be 36 - 40%.    Indications  Valvular function.    Technically satisfactory study.  Mitral valve is structurally normal.  Mild to moderate mitral regurgitation is present.  Tricuspid valve is structurally normal.  Aortic valve is thickened with adequate opening motion.  Pulmonic valve could not be well visualized.  Left atrium is normal in size.  Right atrium is normal in size.  Left ventricle is enlarged with diffuse hypocontractility with ejection fraction of 35 to 40%.  Left ventricular peak systolic global longitudinal strain (GLS) is abnormal at -10%.  Grade 1 left ventricular diastolic dysfunction is present.  (MV E/A ratio 0.36).  Right ventricle is enlarged with hypocontractility with TAPSE of 1.53 cm.  Atrial septum is intact.  Aorta is normal.  No pericardial effusion or intracardiac thrombus is seen.    Impression  Mild to moderate mitral regurgitation.  Aortic valve is thickened with adequate opening motion.  Left ventricle is enlarged with diffuse hypocontractility with ejection fraction of 35 to 40%.  Left ventricular peak systolic global longitudinal strain (GLS) is abnormal at -10%.  Grade 1 left ventricular diastolic dysfunction is present.  (MV E/A ratio 0.36).  Right ventricle is enlarged with hypocontractility with TAPSE of 1.53 cm.       Most Recent Stress Test:  Results for orders placed during the hospital encounter of 01/14/24    Stress Test With Myocardial Perfusion One Day    Interpretation Summary    Lexiscan myocardial perfusion study shows moderate size severe intensity mostly fixed perfusion defect  "involving the basal inferior wall with no significant reversible ischemia.    Likely underlying perfusion abnormality secondary to attenuation artifact    Impressions are consistent with a low risk study.    Left ventricular ejection fraction is normal (Calculated EF = 50%).    Clinical correlation is recommended       Most Recent Cardiac Catheterization:   No results found for this or any previous visit.       NOTE: The following portions of the patient's note were reviewed, confirmed and/or updated this visit as appropriate: History of present illness/Interval history, physical examination, assessment & plan, allergies, current medications, past family history, past medical history, past social history, past surgical history and problem list.    Labs pertinent to today's visit on 02/27/2025 (including but not limited to CBC, CMP, and lipid profiles) were requested from the patient's primary care provider/hospital/clinical laboratory.  If the labs were available for the visit, they were reviewed with the patient.  If they were not available, when received, special interest will be made to the labs pertinent to this visit.  The patient's most recent \"in-house\" labs are noted below and have been reviewed.  Outside labs pertinent to this visit are scanned into the record and have been reviewed.    Discussions held today, 02/27/2025,regarding procedures included risk, benefits, and options including but not limited to: Death, MI, stroke, pain, bleeding, infection, and possible need for vascular/thoracic/cardiothoracic surgery.    Copied information within this note was reviewed and is current as of 02/27/2025.    Assessment and plan noted herein represents the current plan of care as of 02/27/2025.    Significant resources from our office and staff are inherent in engaging this patient in a continuous and active collaborative plan of care related to their chronic cardiovascular conditions outlined below.  The " management of these conditions requires the direction of our service with specialized clinical knowledge, skills, and experience.  This collaborative care includes but is not limited to patient education, expectations and responsibilities, shared decision making around therapeutic goals, and shared commitments to achieve those goals.

## 2025-04-03 ENCOUNTER — TELEPHONE (OUTPATIENT)
Age: 80
End: 2025-04-03

## 2025-04-09 ENCOUNTER — TREATMENT (OUTPATIENT)
Dept: PHYSICAL THERAPY | Facility: CLINIC | Age: 80
End: 2025-04-09
Payer: MEDICARE

## 2025-04-09 DIAGNOSIS — G89.29 CHRONIC LEFT SHOULDER PAIN: ICD-10-CM

## 2025-04-09 DIAGNOSIS — M25.512 CHRONIC LEFT SHOULDER PAIN: ICD-10-CM

## 2025-04-09 DIAGNOSIS — S46.012S TRAUMATIC TEAR OF LEFT ROTATOR CUFF, UNSPECIFIED TEAR EXTENT, SEQUELA: Primary | ICD-10-CM

## 2025-04-09 DIAGNOSIS — Z87.81 HISTORY OF FRACTURE DUE TO FALL: ICD-10-CM

## 2025-04-09 NOTE — PROGRESS NOTES
"  Physical Therapy Initial Evaluation and Plan of Care                           [address] [place] [address] [address], [address], [place]  [address]    Patient: Vianey Reeves   : 1945  Diagnosis/ICD-10 Code:  Traumatic tear of left rotator cuff, unspecified tear extent, sequela [S46.012S]  Referring practitioner: Anny Garcia MD  Date of Initial Visit: 2025  Today's Date: 2025  Patient seen for 1 sessions           Subjective Questionnaire: QuickDASH: 35 = 55% impairment      Subjective Evaluation    History of Present Illness  Mechanism of injury: Anny reports she fell about a year ago and has a torn RTC. She notes she has several health complications so surgery is out of the question. She has a lot of problems reaching back and lifting anything more than a couple pounds (\"I'm carly if I can carry a half gallon of milk.\"  She had injection in L shoulder yesterday which eased some of the pain.   She would like to just get it moving as much as possible and reduce some of the pain.   She has stage 3 kidney disease thus doesn't take any meds, though uses turmeric & black cherry juice for inflammation.  Anny may be having lumbar shots in a couple weeks      Patient Occupation: 4-5 days a week at the  Pain  Relieving factors: ice    Hand dominance: ambidextrous    Treatments  Current treatment: physical therapy  Patient Goals  Patient goals for therapy: decreased pain, increased motion, independence with ADLs/IADLs, return to sport/leisure activities and increased strength           Objective          Active Range of Motion   Cervical/Thoracic Spine   Cervical    Left rotation: 60 degrees   Right rotation: 54 degrees   Left Shoulder   Flexion: 86 degrees with pain  External rotation 0°: 60 degrees with pain  External rotation BTH: Active external rotation behind the head: occiput. with pain  Internal rotation BTB: T12 with pain    Right Shoulder   Flexion: 134 degrees   External rotation 0°: 80 degrees   External rotation " BTH: T2   Internal rotation BTB: T12     Strength/Myotome Testing     Left Shoulder     Planes of Motion   Flexion: 3-     Isolated Muscles   Biceps: 3+     Left Wrist/Hand      (2nd hand position)     Trial 1: 15 lbs    Trial 2: 15 lbs    Trial 3: 15 lbs    Average: 15 lbs    Right Wrist/Hand      (2nd hand position)     Trial 1: 25 lbs    Trial 2: 15 lbs    Trial 3: 15 lbs    Average: 18.33 lbs    Tests     Left Shoulder   Positive crossover, empty can, full can, Hawkin's, Neer's and painful arc.         SCT: Pt was provided with a hard copy of the HEP and instructed in use of it and all listed exercises.  Pt was instructed to cease any exercise that was painful, or that feels as though the form is incorrect.  Pt will return with any questions or difficulty at next session.  Pt acknowledged these instructions and agreed.   Access Code: [email or web address]  URL: [email or web address]  Date: 04/09/2025  Prepared by: Ria Burnham    Program Notes  DO NOT PUSH INTO PAIN!    Exercises  - Supine Shoulder Flexion AAROM with Hands Clasped  - 3 x daily - 7 x weekly - 2 sets - 10 reps  - Shoulder Flexion Wall Walk  - 3 x daily - 7 x weekly - 1 sets - 10 reps - 10s hold  - Standing Isometric Shoulder External Rotation with Doorway  - 2 x daily - 7 x weekly - 1 sets - 10 reps - 10s hold  - Isometric Shoulder Flexion at Wall  - 2 x daily - 7 x weekly - 1 sets - 10 reps - 10s hold  - Standing Isometric Shoulder Abduction with Doorway - Arm Bent  - 2 x daily - 7 x weekly - 1 sets - 10 reps - 10s hold  - Standing Isometric Shoulder Internal Rotation with Towel Roll at Doorway  - 2 x daily - 7 x weekly - 1 sets - 10 reps - 10s hold  - Standing Isometric Shoulder Extension with Doorway - Arm Bent  - 2 x daily - 7 x weekly - 1 sets - 10 reps - 10s hold    Assessment & Plan       Assessment  Impairments: abnormal muscle firing, abnormal muscle tone, abnormal or restricted ROM, activity intolerance, impaired balance,  impaired physical strength, lacks appropriate home exercise program, pain with function and safety issue   Functional limitations: carrying objects, lifting, sleeping, walking, pulling, pushing, uncomfortable because of pain, reaching behind back, reaching overhead and unable to perform repetitive tasks   Assessment details: Anny is an 78 yo ambidextrous female presenting to OP PT w/chronic L shoulder pain. BALBINA: GLF onto L shldr about a yr ago. She had injection yesterday but otherwise no treatment, pt is not a surgical candidate d/t comorbidities. Anny exhibits limited LUE ROM, strength, and balance.   The patient is an appropriate candidate for physical therapy and will benefit from skilled patient intervention in order address the above impairments/limitations.  The plan of care, goals and treatment plan were discussed with the patient and the patient is agreeable to participating in patient services.     Goals  Plan Goals: Plan Goals: STG: to be achieved in 3 wks  1. Pt will demonstrate at least 100 deg LUE elevation w/no greater than 2/10 pain for reaching up to cabinets at eye level.   2. Pt will demonstrate L shoulder strength improved to at least 4/5 for lifting items out of fridge.     LTG: to be achieved in 12 wks  1. Pt will demonstrate SLS at least 5s on R & L for negotiating curbs safely.  2. Pt will improve QuickDash score from 55% impairment to no greater than 40% impairment to reflect improved activity tolerance and functional mobility.   3. Pt will demonstrate BTH L shoulder IR/abduction to C2 for washing hair.  4. Pt will improve L  strength from 15# to at least 20# for opening bottles.  5. QuickDASH from 55% impairment to no greater than 30%    Plan  Therapy options: will be seen for skilled therapy services  Planned modality interventions: cryotherapy, dry needling, high voltage pulsed current (pain management), TENS, thermotherapy (hydrocollator packs), ultrasound and traction  Planned therapy  interventions: ADL retraining, balance/weight-bearing training, body mechanics training, fine motor coordination training, flexibility, functional ROM exercises, home exercise program, joint mobilization, manual therapy, motor coordination training, neuromuscular re-education, postural training, soft tissue mobilization, spinal/joint mobilization, strengthening, stretching, therapeutic activities, IADL retraining, gait training, transfer training and abdominal trunk stabilization  Frequency: 2x week  Duration in weeks: 12  Treatment plan discussed with: patient      Timed:         Manual Therapy:         mins  73993;     Therapeutic Exercise:    15     mins  61878;     Neuromuscular Lucia:        mins  06254;    Therapeutic Activity:     15     mins  23112;     Gait Training:           mins  33743;     Ultrasound:          mins  62723;    Self-care  __10__ mins 36159  Tests & Measures              mins  28250      Un-Timed:  Electrical Stimulation:         mins  95991 ( );  Dry Needling          mins self-pay 54566  Traction          mins 24451  Low Eval          mins  79624  Mod Eval          mins  40589  High Eval                        20    mins  68387  Canal repositioning ___  mins  15963        Timed Treatment:   40   mins   Total Treatment:     60   mins    PT SIGNATURE: Ria Burnham PT, DPT, cert. DN  [place] license # [id number]  Electronically signed by Ria Burnham PT, 04/09/25, 11:34 AM EDT    Initial Certification  Certification Period: 4/9/2025 through 7/7/2025  I certify that the therapy services are furnished while this patient is under my care.  The services outlined above are required by this patient, and will be reviewed every 90 days.     PHYSICIAN: Anny Garcia MD    NPI: [id number]                                       DATE: ______________________________________________________________________________________________     Please sign and return via fax to [phone]. Thank you,  [facility].

## 2025-04-23 ENCOUNTER — TELEPHONE (OUTPATIENT)
Dept: CARDIOLOGY | Facility: CLINIC | Age: 80
End: 2025-04-23

## 2025-04-23 NOTE — TELEPHONE ENCOUNTER
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 SHE IS REQUESTING TO SEE ZAHRA.        "

## 2025-04-25 ENCOUNTER — OFFICE VISIT (OUTPATIENT)
Dept: CARDIOLOGY | Facility: CLINIC | Age: 80
End: 2025-04-25
Payer: MEDICARE

## 2025-04-25 VITALS
DIASTOLIC BLOOD PRESSURE: 86 MMHG | SYSTOLIC BLOOD PRESSURE: 147 MMHG | OXYGEN SATURATION: 98 % | HEIGHT: 65 IN | WEIGHT: 176 LBS | BODY MASS INDEX: 29.32 KG/M2 | HEART RATE: 83 BPM

## 2025-04-25 DIAGNOSIS — N18.4 TYPE 2 DIABETES MELLITUS WITH STAGE 4 CHRONIC KIDNEY DISEASE, WITHOUT LONG-TERM CURRENT USE OF INSULIN: Primary | ICD-10-CM

## 2025-04-25 DIAGNOSIS — E78.2 MIXED HYPERLIPIDEMIA: ICD-10-CM

## 2025-04-25 DIAGNOSIS — I10 ESSENTIAL (PRIMARY) HYPERTENSION: ICD-10-CM

## 2025-04-25 DIAGNOSIS — R00.2 PALPITATIONS: ICD-10-CM

## 2025-04-25 DIAGNOSIS — E11.22 TYPE 2 DIABETES MELLITUS WITH STAGE 4 CHRONIC KIDNEY DISEASE, WITHOUT LONG-TERM CURRENT USE OF INSULIN: Primary | ICD-10-CM

## 2025-04-25 NOTE — PROGRESS NOTES
Cardiology Office Visit      Encounter Date:  2025    PATIENT IDENTIFICATION    Name: Vianey Reeves  Age: 79 y.o. Sex: female : 1945  MRN: [id number]    Reason For Followup:  Hypertension    Brief Clinical History:  Patient is a 79 y.o.  female  coming to cardiology office for follow up.    Patient has medical history of CAD status post PCI in 2024, hypertension, hyperlipidemia, type 2 diabetes, CKD, COPD.    In today's visit, patient reports no episodes of chest pain or shortness of breath, but complaining of feeling fatigued, generalized weakness.  No lower extremity edema, orthopnea, PND.  Her blood pressure in the office today was elevated at 147/86.  She is currently on amlodipine 10 and doxazosin 2 mg at night.  Recent labs in patient's chart: A1c 7.1, total cholesterol 205, , creatinine 1.53, potassium 4.3, sodium 143    Prior cardiology workup.  I have personally interpreted patient's prior TTE performed in 2024.  Study shows mildly reduced EF around 40%, mild to moderate mitral gravitation, mildly reduced RV systolic function.  I have personally interpreted the patient's prior EKG in 2025 showing sinus rhythm with PACs, right bundle branch block.  Assessment & Plan    Impressions:  Coronary artery disease status post PCI and drug-eluting stent placement in her mid RCA and Cx 2024       Diabetes mellitus.  Dyslipidemia with intolerance to statins.  Hypertension with hypertensive cardiovascular disease.      Limited medication repertoire  COPD  Solitary kidney.  Chronic renal failure with declining GFR     Followed by Dr. Julio  Tobacco abuse  Anti-platelet therapy.  Hypercalcemia-resolved  Fatigue  Weakness  Frequent falls  Alpha gal sensitivity    Recommendations:  I initially recommended increasing doxazosin dose but patient verbalized concerns about changing her medications and not feeling well.  Discussed importance of having better blood pressure  "control at home.  We will monitor her blood pressure at home closely over the next few weeks and in the next appointment decide if she needs to change antihypertensive medications based on home blood pressure readings.  Advised patient to engage in low-sodium diet.  Close follow-up in 6 weeks to discuss hypertensive medications      Objective:    Vitals:  Vitals:    04/25/25 1356   BP: 147/86   Pulse: 83   SpO2: 98%   Weight: 79.8 kg (176 lb)   Height: 165.1 cm (65\")     Body mass index is 29.29 kg/m².      Physical Exam:  General: Alert, cooperative, no distress, appears stated age  Lungs:  Clear to auscultation bilaterally, no wheezes, rhonchi or rales are noted  Chest wall: No tenderness  Heart::  Regular rate and rhythm, S1 and S2 normal, no murmur.  No rub or gallop  Abdomen: Soft, nontender, nondistended, bowel sounds active  Extremities: No cyanosis, clubbing, or edema  Pulses: 2+ and symmetric all extremities  Neuro/psych: No gross focal deficits        Allergies:  Allergies   Allergen Reactions    Erythromycin Rash    Naproxen Sodium Other (See Comments)     Dizzy lightheaded    Statins Myalgia    Latex Itching, Swelling and Hives    Metoclopramide Other (See Comments)     Unsteady on feet    Cardene [Nicardipine] Other (See Comments)     Shortness of breath, crushing pain in throat/neck.    Dicyclomine Unknown - Low Severity       Medication Review:     Current Outpatient Medications:     acetaminophen (TYLENOL) 650 MG 8 hr tablet, Take 2 tablets by mouth Every 8 (Eight) Hours As Needed for Mild Pain., Disp: , Rfl:     albuterol sulfate  (90 Base) MCG/ACT inhaler, Inhale 2 puffs Every 4 (Four) Hours As Needed for Wheezing or Shortness of Air., Disp: , Rfl:     amLODIPine (NORVASC) 10 MG tablet, Take 1 tablet by mouth Daily. Indications: High Blood Pressure, Disp: , Rfl:     aspirin 81 MG tablet, Take 1 tablet by mouth Every 72 (Seventy-Two) Hours. Indications: ANTIPLATELET, Disp: , Rfl:     " doxazosin (Cardura) 2 MG tablet, Take 1 tablet by mouth Every Night., Disp: 90 tablet, Rfl: 3    furosemide (LASIX) 20 MG tablet, Take 1 tablet by mouth Every Other Day., Disp: , Rfl:     HYDROcodone-acetaminophen (NORCO)  MG per tablet, Take 1 tablet by mouth At Night As Needed for Moderate Pain., Disp: 30 tablet, Rfl: 0    Insulin Glargine (LANTUS SOLOSTAR) 100 UNIT/ML injection pen, Inject 10 Units under the skin into the appropriate area as directed Every Night., Disp: 100 mL, Rfl: 12    ipratropium-albuterol (DUO-NEB) 0.5-2.5 mg/3 ml nebulizer, Take 3 mL by nebulization 4 (Four) Times a Day As Needed for Wheezing., Disp: 360 mL, Rfl: 1    Januvia 100 MG tablet, , Disp: , Rfl:     Kerendia 10 MG tablet, Take 1 tablet by mouth Daily., Disp: , Rfl:     Linzess 145 MCG capsule capsule, Take 1 capsule by mouth Daily. Indications: CONSTIPATION, Disp: , Rfl:     pantoprazole (PROTONIX) 40 MG EC tablet, Take 1 tablet by mouth Every Morning., Disp: 30 tablet, Rfl: 1    polyethylene glycol (MIRALAX) 17 g packet, Take 17 g by mouth Daily. Indications: Constipation, Disp: , Rfl:     sertraline (ZOLOFT) 50 MG tablet, Take 1 tablet by mouth Daily. Indications: MOOD, Disp: , Rfl:     ticagrelor (Brilinta) 90 MG tablet tablet, Take 1 tablet by mouth 2 (Two) Times a Day., Disp: 180 tablet, Rfl: 3    Umeclidinium-Vilanterol (Anoro Ellipta) 62.5-25 MCG/ACT aerosol powder  inhaler, Inhale 1 puff Daily. At the same time each day., Disp: , Rfl:     amoxicillin (AMOXIL) 500 MG tablet, , Disp: , Rfl:     Family History:  Family History   Problem Relation Age of Onset    Arthritis Father     Prostate cancer Father     Heart disease Sister        Past Medical History:  Past Medical History:   Diagnosis Date    Allergic     latex allergy    Allergies     Anxiety     Bilateral carotid bruits     Bulging lumbar disc     Bulging of thoracic intervertebral disc     Cancer     ureter    surgery    CKD (chronic kidney disease)     stage  3    Claudication, intermittent     COPD (chronic obstructive pulmonary disease)     Coronary artery disease     DDD (degenerative disc disease), lumbar     DDD (degenerative disc disease), thoracic     Diabetes mellitus     DJD (degenerative joint disease)     Dysphagia     Gout     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    Hypertension     IBS (irritable colon syndrome)     constipation    Mass of right breast     Neuropathy     Renal insufficiency     Sciatic leg pain     right    Simple chronic bronchitis     Solitary kidney     left       Past Surgical History:  Past Surgical History:   Procedure Laterality Date    BREAST BIOPSY Right     BRONCHOSCOPY N/A 7/14/2024    Procedure: BRONCHOSCOPY WITH BRONCHOALVEOLAR LAVAGE;  Surgeon: Marlin Ferguson MD;  Location: [facility] ENDOSCOPY;  Service: Pulmonary;  Laterality: N/A;  POST: RML ATELECTASIS    CARDIAC CATHETERIZATION      CARDIAC CATHETERIZATION N/A 11/11/2024    Procedure: Left Heart Cath;  Surgeon: Leon Gonzalez DO;  Location: [facility] CATH INVASIVE LOCATION;  Service: Cardiovascular;  Laterality: N/A;    CHOLECYSTECTOMY      COLON SURGERY      REMOVAL diverticular diseae    COLONOSCOPY N/A 08/12/2021    Procedure: COLONOSCOPY with polypectomy x 3;  Surgeon: Margarette Mcallister MD;  Location: [facility] ENDOSCOPY;  Service: Gastroenterology;  Laterality: N/A;  post op: hmorrhoids, diverticulosis, polyps    CORONARY STENT PLACEMENT      ENDOSCOPY N/A 08/12/2021    Procedure: ESOPHAGOGASTRODUODENOSCOPY with dilatation (18-20 mm balloon) and (54 bougie);  Surgeon: Margarette Mcallister MD;  Location: [facility] ENDOSCOPY;  Service: Gastroenterology;  Laterality: N/A;  post op: esophageal stricture, esophagitis, gastritis, history of nissen    ENDOSCOPY N/A 9/13/2023    Procedure: ESOPHAGOGASTRODUODENOSCOPY with biopsy x 1 area and dilation (50,54,56 non guided bougie);  Surgeon: Margarette Mcallister MD;  Location: [facility] ENDOSCOPY;  Service: Gastroenterology;  Laterality:  N/A;  post op: esophageal stricture    EYE SURGERY Bilateral     cataracts    HIATAL HERNIA REPAIR      NEPHRECTOMY Right     cancer    UPPER ENDOSCOPIC ULTRASOUND W/ FNA N/A 2022    Procedure: EUS;  Surgeon: Margarette Mcallister MD;  Location: [facility] ENDOSCOPY;  Service: Gastroenterology;  Laterality: N/A;  post: normal pancreas, dilated pancreatic duct, hiatal hernia,        Social History:  Social History     Socioeconomic History    Marital status:    Tobacco Use    Smoking status: Some Days     Current packs/day: 0.00     Average packs/day: 0.3 packs/day for 50.0 years (12.5 ttl pk-yrs)     Types: Cigarettes     Start date: 1973     Last attempt to quit: 2023     Years since quittin.4     Passive exposure: Past    Smokeless tobacco: Never    Tobacco comments:     Mostly patches, some days none, some days 1-2   Vaping Use    Vaping status: Never Used   Substance and Sexual Activity    Alcohol use: Not Currently     Comment: occasionally     Drug use: Never    Sexual activity: Defer       Review of Systems:  The following systems were reviewed as they relate to the cardiovascular system: Constitutional, Eyes, ENT, Cardiovascular, Respiratory, Gastrointestinal, Integumentary, Neurological, Psychiatric, Hematologic, Endocrine, Musculoskeletal, and Genitourinary. The pertinent cardiovascular findings are reported above with all other cardiovascular points within those systems being negative.    Diagnostic Study Review:         Laboratory Data:  Lab Results   Component Value Date    GLUCOSE 114 (H) 2024    BUN 38 (H) 2024    CREATININE 1.53 (H) 2024    EGFRIFNONA 29 (L) 2021    EGFRIFAFRI 33 (L) 2021    BCR 24.8 2024    K 4.3 2024    CO2 26.0 2024    CALCIUM 9.3 2024    ALBUMIN 3.4 (L) 2024    LABIL2 1.2 2021    AST 14 2024    ALT 10 2024     Lab Results   Component Value Date    GLUCOSE 114 (H) 2024    CALCIUM 9.3  11/14/2024     11/14/2024    K 4.3 11/14/2024    CO2 26.0 11/14/2024     (H) 11/14/2024    BUN 38 (H) 11/14/2024    CREATININE 1.53 (H) 11/14/2024    EGFRIFAFRI 33 (L) 11/11/2021    EGFRIFNONA 29 (L) 11/11/2021    BCR 24.8 11/14/2024    ANIONGAP 9.0 11/14/2024     Lab Results   Component Value Date    WBC 10.90 (H) 11/13/2024    HGB 13.6 11/13/2024    HCT 41.4 11/13/2024    MCV 98.3 (H) 11/13/2024     11/13/2024     Lab Results   Component Value Date    CHOL 205 (H) 11/09/2024    CHLPL 206 (H) 12/16/2022    TRIG 120 11/09/2024    HDL 49 11/09/2024     (H) 11/09/2024     Lab Results   Component Value Date    HGBA1C 7.10 (H) 01/21/2024     Lab Results   Component Value Date    INR 1.00 02/20/2023    PROTIME 10.3 02/20/2023       Most Recent Echo:  Results for orders placed during the hospital encounter of 11/08/24    Adult Transthoracic Echo Complete W/ Cont if Necessary Per Protocol    Interpretation Summary    Left ventricular ejection fraction appears to be 36 - 40%.    Indications  Valvular function.    Technically satisfactory study.  Mitral valve is structurally normal.  Mild to moderate mitral regurgitation is present.  Tricuspid valve is structurally normal.  Aortic valve is thickened with adequate opening motion.  Pulmonic valve could not be well visualized.  Left atrium is normal in size.  Right atrium is normal in size.  Left ventricle is enlarged with diffuse hypocontractility with ejection fraction of 35 to 40%.  Left ventricular peak systolic global longitudinal strain (GLS) is abnormal at -10%.  Grade 1 left ventricular diastolic dysfunction is present.  (MV E/A ratio 0.36).  Right ventricle is enlarged with hypocontractility with TAPSE of 1.53 cm.  Atrial septum is intact.  Aorta is normal.  No pericardial effusion or intracardiac thrombus is seen.    Impression  Mild to moderate mitral regurgitation.  Aortic valve is thickened with adequate opening motion.  Left ventricle is  enlarged with diffuse hypocontractility with ejection fraction of 35 to 40%.  Left ventricular peak systolic global longitudinal strain (GLS) is abnormal at -10%.  Grade 1 left ventricular diastolic dysfunction is present.  (MV E/A ratio 0.36).  Right ventricle is enlarged with hypocontractility with TAPSE of 1.53 cm.       Most Recent Stress Test:  Results for orders placed during the hospital encounter of 24    Stress Test With Myocardial Perfusion One Day    Interpretation Summary    Lexiscan myocardial perfusion study shows moderate size severe intensity mostly fixed perfusion defect involving the basal inferior wall with no significant reversible ischemia.    Likely underlying perfusion abnormality secondary to attenuation artifact    Impressions are consistent with a low risk study.    Left ventricular ejection fraction is normal (Calculated EF = 50%).    Clinical correlation is recommended       Most Recent Cardiac Catheterization:   Results for orders placed during the hospital encounter of 24    Cardiac Catheterization/Vascular Study    Conclusion  Table formatting from the original result was not included.  Cardiac Catheterization with PCI Report  PATIENT IDENTIFICATION  Name: Vianey Reeves  Age: 79 y.o. Sex: female : 1945  MRN: [id number]    Date: 2024  PCP: @PCP@    Requesting Provider  [unfilled]    She underwent cardiac catheterization left heart catheterization with selective coronary angiography, intracoronary nitroglycerin administration, PCI GINA x 2 circumflex, postintervention angiography x 4.    Indications for the procedure include: acute coronary syndrome.    Procedure Details:  The risks, benefits, complications, treatment options, and expected outcomes were discussed with the patient. The patient and/or family concurred with the proposed plan, giving informed consent.    After informed consent the patient was brought to the cath lab after appropriate IV hydration  was begun and oral premedication was given. She was further sedated with fentanyl and midazolam. She was prepped and draped in the usual manner. Using the modified Seldinger access technique and a micropuncture kit, a 6 Serbian sheath was placed in the femoral artery. A left heart catheterization with coronary arteriography was performed. Findings are discussed below.    The decision was made to proceed with intervention. She received Heparin as per protocol. The details of the intervention are as follows:    A 6 Serbian CLS 4 guiding catheter was placed in the ostium of left main coronary artery.  0.014 x 180 frooly PTCA wire was advanced into the distal obtuse marginal branch of the circumflex with the assistance of a balloon.  The lesion was predilated with a 2.0 x 12 trek NC at multiple locations.  The lesion was initially treated with a 2.75 x 33 Xience.  This was followed by a 2.75 x 18 Xience.  Both of these were postdilated to 3.25 mm with a 3.25 x 20 trek NC.  Intracoronary nitroglycerin was administered to relieve vasospasm.  Postintervention angiography demonstrated excellent result JILLIAN-3 flow no evidence of dissection or thrombus following PCI.    After the procedure was completed, sedation was stopped and the sheaths and catheters were all removed according to established hospital protocols.    Conscious sedation:  Conscious sedation was performed according to protocol.  I supervised and directed an independent trained observer with the assistance of monitoring the patient's level of consciousness and physiologic status throughout the procedure.  Intraoperative service time was 60 minutes.    Findings:    Hemodynamics LVEDP: 6  LV: 153/3  Ao: 149/74  Left Main No significant disease  RCA Patent stent with no other significant disease  LAD No significant disease  Circ Subtotal in-stent restenosis with JILLIAN I flow  SVG(s) Nonocclusive  KERRI Not applicable  LV Not imaged secondary to renal  "insufficiency  Coronary dominance RCA    Interventions/Vessels PCI GINA circumflex x 2  Guides/Wires 6 Ethiopian CLS 4 guide  0.014 x 180 ebindle  Devices 2.0 x 12 trek NC  2.75 x 33 Xience  2.75 x 18 Xience  3.25 x 20 trek NC  Post % Stenosis 0%  Pre-procedure JILLIAN flow 1  Post procedure JILLIAN flow 3  Closure Device Not applicable  Complications None immediate    Estimated Blood Loss:  Minimal    Specimens: None    Complications:  None; patient tolerated the procedure well.    Disposition: PACU - hemodynamically stable    Condition: stable    Impressions:  Successful PCI GINA circumflex x 2  Patent RCA stent    Recommendations:  Dual antiplatelet therapy consisting of aspirin and ticagrelor  Medical therapy and risk factor modification  Monitor renal function with nephrology       NOTE: The following portions of the patient's note were reviewed, confirmed and/or updated this visit as appropriate: History of present illness/Interval history, physical examination, assessment & plan, allergies, current medications, past family history, past medical history, past social history, past surgical history and problem list.    Labs pertinent to today's visit on 04/25/2025 (including but not limited to CBC, CMP, and lipid profiles) were requested from the patient's primary care provider/hospital/clinical laboratory.  If the labs were available for the visit, they were reviewed with the patient.  If they were not available, when received, special interest will be made to the labs pertinent to this visit.  The patient's most recent \"in-house\" labs are noted below and have been reviewed.  Outside labs pertinent to this visit are scanned into the record and have been reviewed.    Discussions held today, 04/25/2025,regarding procedures included risk, benefits, and options including but not limited to: Death, MI, stroke, pain, bleeding, infection, and possible need for vascular/thoracic/cardiothoracic surgery.    Copied " information within this note was reviewed and is current as of 04/25/2025.    Assessment and plan noted herein represents the current plan of care as of 04/25/2025.    Significant resources from our office and staff are inherent in engaging this patient in a continuous and active collaborative plan of care related to their chronic cardiovascular conditions outlined herein.  The management of these conditions requires the direction of our service with specialized clinical knowledge, skills, and experience.  This collaborative care includes but is not limited to patient education, expectations and responsibilities, shared decision making around therapeutic goals, and shared commitments to achieve those goals.

## 2025-04-25 NOTE — PATIENT INSTRUCTIONS
Thank you for following up with cardiology today.  We encourage you to continue taking her medications and engaging healthy lifestyle habits including physical activity and diet with low sodium levels. If you have any questions please let us know.

## 2025-05-02 RX ORDER — INSULIN GLARGINE 100 [IU]/ML
10 INJECTION, SOLUTION SUBCUTANEOUS NIGHTLY
Qty: 100 ML | Refills: 12 | OUTPATIENT
Start: 2025-05-02

## 2025-05-07 ENCOUNTER — TREATMENT (OUTPATIENT)
Dept: PHYSICAL THERAPY | Facility: CLINIC | Age: 80
End: 2025-05-07
Payer: MEDICARE

## 2025-05-07 DIAGNOSIS — S46.012S TRAUMATIC TEAR OF LEFT ROTATOR CUFF, UNSPECIFIED TEAR EXTENT, SEQUELA: ICD-10-CM

## 2025-05-07 DIAGNOSIS — Z87.81 HISTORY OF FRACTURE DUE TO FALL: ICD-10-CM

## 2025-05-07 DIAGNOSIS — M25.512 CHRONIC LEFT SHOULDER PAIN: Primary | ICD-10-CM

## 2025-05-07 DIAGNOSIS — S62.102D CLOSED FRACTURE OF LEFT WRIST WITH ROUTINE HEALING, SUBSEQUENT ENCOUNTER: ICD-10-CM

## 2025-05-07 DIAGNOSIS — G89.29 CHRONIC LEFT SHOULDER PAIN: Primary | ICD-10-CM

## 2025-05-07 NOTE — PROGRESS NOTES
Re-Assessment / Re-Certification  [address] [place] [address] [address], [address], [place]  [address]      Patient: Vianey Reeves   : 1945  Diagnosis/ICD-10 Code:  Chronic left shoulder pain [M25.512, G89.29]  Referring practitioner: Anny Garcia MD  Date of Initial Visit: Type: THERAPY  Noted: 2025  Today's Date: 2025  Patient seen for 2 sessions      Subjective:   Vianey Reeves reports: she fell the other day, tripped on curb while loading groceries into car. She saw ortho yesterday and had XR and was told she has L wrist & elbow fracture. She was advised to use sling but can take it off and relax L arm if seated at home. She indicates she'd like to avoid any movement of the LUE today and instead focus on some LE and balance. She's been using her bike at home.    Clinical Progress: worse  Home Program Compliance: N/A  Treatment has included: Pt was just evaluated on 25 for L shoulder pain, has not been back since IE 1 month ago.    Subjective   Objective   Assessment/Plan  Unable to assess L elbow or wrist today d/t acuity of injury and patient request. Added wrist fracture to diagnosis list. Will likely assess ROM and function in subsequent visits. In the meantime, will focus on balance, LE strength, and functional mobility.     Progress toward previous goals: Not Met  STG: to be achieved in 3 wks  1. Pt will demonstrate at least 100 deg LUE elevation w/no greater than 2/10 pain for reaching up to cabinets at eye level.   2. Pt will demonstrate L shoulder strength improved to at least 4/5 for lifting items out of fridge.     LTG: to be achieved in 12 wks  1. Pt will demonstrate SLS at least 5s on R & L for negotiating curbs safely.  2. Pt will improve QuickDash score from 55% impairment to no greater than 40% impairment to reflect improved activity tolerance and functional mobility.   3. Pt will demonstrate BTH L shoulder IR/abduction to C2 for washing hair.  4. Pt will improve L  strength from 15# to at  least 20# for opening bottles.  5. QuickDASH from 55% impairment to no greater than 30%        Recommendations: Continue as planned  Timeframe: 3 months  Frequency: 1-2x/wk  Prognosis to achieve goals: poor    PT Signature: Ria Burnham PT, DPT, cert DN  Physical Therapist  [place] Lic # [id number]  Electronically signed by Ria Burnham PT, 05/07/25, 8:38 AM EDT    Certification Period: 5/7/25 thru 8/4/25  I certify that the therapy services are furnished while this patient is under my care. The services outlined above are required by this patient and will be reviewed every 90 days.    PHYSICIAN: Anny Garcia MD _____________________________________________________________  NPI: [id number]                                      DATE:    ___________________________________________      Timed:         Manual Therapy:         mins  98291;     Therapeutic Exercise:    15     mins  92035;     Neuromuscular Lucia:    9    mins  17035;    Therapeutic Activity:          mins  95710;     Gait Training:           mins  12096;     Ultrasound:          mins  39746;    Ionto                                   mins   29516  Self Care                            mins   85957  Tests & Measures             mins   33675    Un-Timed:  Electrical Stimulation:         mins  03006 ( );  Dry Needling          mins 13621/97239  Traction          mins 91092  Can Repos          mins 53565  Low Eval          Mins  72628  Mod Eval          Mins  63660  High Eval                            Mins  51306  Re-Eval                               mins  35813      Timed Treatment:   24   mins   Total Treatment:     24   mins

## 2025-06-04 ENCOUNTER — OFFICE (AMBULATORY)
Dept: URBAN - METROPOLITAN AREA CLINIC 64 | Facility: CLINIC | Age: 80
End: 2025-06-04
Payer: COMMERCIAL

## 2025-06-04 VITALS
HEIGHT: 65 IN | SYSTOLIC BLOOD PRESSURE: 132 MMHG | DIASTOLIC BLOOD PRESSURE: 77 MMHG | WEIGHT: 178 LBS | HEART RATE: 75 BPM

## 2025-06-04 DIAGNOSIS — K59.00 CONSTIPATION, UNSPECIFIED: ICD-10-CM

## 2025-06-04 DIAGNOSIS — R10.10 UPPER ABDOMINAL PAIN, UNSPECIFIED: ICD-10-CM

## 2025-06-04 DIAGNOSIS — R14.0 ABDOMINAL DISTENSION (GASEOUS): ICD-10-CM

## 2025-06-04 PROCEDURE — 99212 OFFICE O/P EST SF 10 MIN: CPT | Performed by: NURSE PRACTITIONER

## 2025-07-11 ENCOUNTER — TELEPHONE (OUTPATIENT)
Dept: CARDIOLOGY | Facility: CLINIC | Age: 80
End: 2025-07-11
Payer: MEDICARE

## 2025-07-11 RX ORDER — TICAGRELOR 90 MG/1
90 TABLET, FILM COATED ORAL 2 TIMES DAILY
Qty: 180 TABLET | Refills: 3 | Status: SHIPPED | OUTPATIENT
Start: 2025-07-11

## 2025-07-11 NOTE — TELEPHONE ENCOUNTER
Caller: ERI STERLING    Relationship:SELF    Callback number: [phone]   Is it ok to leave a message: [x] Yes [] No    Requested medication for samples: BRILINTA     How much medication does the patient currently have left: OUT OF MEDICATION    Who will be picking up the samples: SELF    Do you need information about patient financial assistance for this medication: [] Yes [x] No    Additional details provided: PATIENT IS OUT OF MEDICATION THE PRESCRIPTION IS WAITING ON REFILL FROM PHARMACY NEEDS MEDICATION TO MAKE IT TILL REFILL IS DONE PLEASE CALL AND ADVISE.

## (undated) DEVICE — ST ACC MICROPUNCTURE STFF/CANN PLAT/TP 4F 21G 40CM

## (undated) DEVICE — ELECTRD DEFIB M/FUNC PROPADZ RADIOL 2PK

## (undated) DEVICE — PAPR PRNT PK SONY W RIBN UPC55

## (undated) DEVICE — SINGLE-USE BIOPSY FORCEPS: Brand: RADIAL JAW 4

## (undated) DEVICE — DEV INFL CRE STERIFLATE 60CC DISP

## (undated) DEVICE — DGW .035 FC J3MM 150CM TEF HEP: Brand: EMERALD

## (undated) DEVICE — GUIDE CATHETER: Brand: MACH1™

## (undated) DEVICE — BITEBLOCK ENDO W/STRAP 60F A/ LF DISP

## (undated) DEVICE — NC TREK NEO™ CORONARY DILATATION CATHETER 3.25 MM X 20 MM / RAPID-EXCHANGE: Brand: NC TREK NEO™

## (undated) DEVICE — CATH DIAG IMPULSE PIG .056 6F 110CM

## (undated) DEVICE — PK TRY HEART CATH 50

## (undated) DEVICE — NC TREK NEO™ CORONARY DILATATION CATHETER 2.00 MM X 12 MM / RAPID-EXCHANGE: Brand: NC TREK NEO™

## (undated) DEVICE — Device: Brand: PROWATERFLEX

## (undated) DEVICE — PINNACLE INTRODUCER SHEATH: Brand: PINNACLE

## (undated) DEVICE — PK ENDO GI 50

## (undated) DEVICE — DEV INFL COMPAK W/ACCESSPLUS IN4530

## (undated) DEVICE — CATH DIAG IMPULSE FL4 6F 100CM

## (undated) DEVICE — TRAP WIDEEYE POLYP

## (undated) DEVICE — SNAR POLYP HOTSNARE/BRAIDED OVL/MINI 7F 2.8X10MM 230CM 1P/U

## (undated) DEVICE — CATH DIAG IMPULSE FR4 6F 100CM

## (undated) DEVICE — TBG NAMIC PRESS MONTR A/ F/M 12IN

## (undated) DEVICE — CONTRST ISOVUE300 61PCT 50ML

## (undated) DEVICE — BAPTIST FLOYD BRONCHOSCOPY: Brand: MEDLINE INDUSTRIES, INC.

## (undated) DEVICE — ESOPHAGEAL BALLOON DILATATION CATHETER: Brand: CRE FIXED WIRE